# Patient Record
Sex: FEMALE | Race: WHITE | NOT HISPANIC OR LATINO | Employment: OTHER | URBAN - METROPOLITAN AREA
[De-identification: names, ages, dates, MRNs, and addresses within clinical notes are randomized per-mention and may not be internally consistent; named-entity substitution may affect disease eponyms.]

---

## 2017-01-02 RX ORDER — ATENOLOL 50 MG/1
TABLET ORAL
Qty: 30 TABLET | Refills: 11 | Status: SHIPPED | OUTPATIENT
Start: 2017-01-02 | End: 2017-09-19

## 2017-01-13 ENCOUNTER — HOSPITAL ENCOUNTER (OUTPATIENT)
Dept: RADIOLOGY | Facility: HOSPITAL | Age: 77
Discharge: HOME | End: 2017-01-13
Attending: INTERNAL MEDICINE
Payer: MEDICARE

## 2017-01-13 DIAGNOSIS — Z12.31 ENCOUNTER FOR SCREENING MAMMOGRAM FOR MALIGNANT NEOPLASM OF BREAST: ICD-10-CM

## 2017-01-13 DIAGNOSIS — M85.80 OSTEOPENIA: ICD-10-CM

## 2017-01-13 DIAGNOSIS — E03.9 ACQUIRED HYPOTHYROIDISM: ICD-10-CM

## 2017-01-13 DIAGNOSIS — I10 ESSENTIAL HYPERTENSION: ICD-10-CM

## 2017-01-13 DIAGNOSIS — Z00.00 ENCOUNTER FOR PREVENTIVE HEALTH EXAMINATION: ICD-10-CM

## 2017-01-13 PROCEDURE — 77067 SCR MAMMO BI INCL CAD: CPT | Mod: TC

## 2017-01-13 PROCEDURE — 77067 SCR MAMMO BI INCL CAD: CPT | Mod: 26,,, | Performed by: RADIOLOGY

## 2017-01-13 PROCEDURE — 77063 BREAST TOMOSYNTHESIS BI: CPT | Mod: 26,,, | Performed by: RADIOLOGY

## 2017-01-17 ENCOUNTER — LAB VISIT (OUTPATIENT)
Dept: LAB | Facility: HOSPITAL | Age: 77
End: 2017-01-17
Attending: INTERNAL MEDICINE
Payer: MEDICARE

## 2017-01-17 ENCOUNTER — OFFICE VISIT (OUTPATIENT)
Dept: HEMATOLOGY/ONCOLOGY | Facility: CLINIC | Age: 77
End: 2017-01-17
Payer: MEDICARE

## 2017-01-17 VITALS
OXYGEN SATURATION: 95 % | TEMPERATURE: 99 F | BODY MASS INDEX: 24.3 KG/M2 | SYSTOLIC BLOOD PRESSURE: 112 MMHG | DIASTOLIC BLOOD PRESSURE: 70 MMHG | HEART RATE: 75 BPM | WEIGHT: 132.06 LBS | HEIGHT: 62 IN

## 2017-01-17 DIAGNOSIS — I10 ESSENTIAL HYPERTENSION, MALIGNANT: Primary | ICD-10-CM

## 2017-01-17 DIAGNOSIS — D72.829 LEUKOCYTOSIS, UNSPECIFIED TYPE: Primary | ICD-10-CM

## 2017-01-17 DIAGNOSIS — E55.9 UNSPECIFIED VITAMIN D DEFICIENCY: ICD-10-CM

## 2017-01-17 DIAGNOSIS — D53.9 MACROCYTIC ANEMIA: ICD-10-CM

## 2017-01-17 DIAGNOSIS — I10 ESSENTIAL HYPERTENSION, MALIGNANT: ICD-10-CM

## 2017-01-17 DIAGNOSIS — E03.9 UNSPECIFIED HYPOTHYROIDISM: ICD-10-CM

## 2017-01-17 LAB
25(OH)D3+25(OH)D2 SERPL-MCNC: 49 NG/ML
ALBUMIN SERPL BCP-MCNC: 4 G/DL
ALP SERPL-CCNC: 82 U/L
ALT SERPL W/O P-5'-P-CCNC: 16 U/L
ANION GAP SERPL CALC-SCNC: 8 MMOL/L
AST SERPL-CCNC: 21 U/L
BILIRUB SERPL-MCNC: 0.5 MG/DL
BUN SERPL-MCNC: 33 MG/DL
CALCIUM SERPL-MCNC: 9.2 MG/DL
CHLORIDE SERPL-SCNC: 100 MMOL/L
CHOLEST/HDLC SERPL: 3.8 {RATIO}
CO2 SERPL-SCNC: 28 MMOL/L
CREAT SERPL-MCNC: 1 MG/DL
EST. GFR  (AFRICAN AMERICAN): >60 ML/MIN/1.73 M^2
EST. GFR  (NON AFRICAN AMERICAN): 55.6 ML/MIN/1.73 M^2
GLUCOSE SERPL-MCNC: 97 MG/DL
HDL/CHOLESTEROL RATIO: 26.3 %
HDLC SERPL-MCNC: 152 MG/DL
HDLC SERPL-MCNC: 40 MG/DL
LDLC SERPL CALC-MCNC: 66.4 MG/DL
NONHDLC SERPL-MCNC: 112 MG/DL
POTASSIUM SERPL-SCNC: 4.5 MMOL/L
PROT SERPL-MCNC: 7.8 G/DL
SODIUM SERPL-SCNC: 136 MMOL/L
T4 FREE SERPL-MCNC: 0.84 NG/DL
TRIGL SERPL-MCNC: 228 MG/DL
TSH SERPL DL<=0.005 MIU/L-ACNC: 4.5 UIU/ML

## 2017-01-17 PROCEDURE — 99214 OFFICE O/P EST MOD 30 MIN: CPT | Mod: S$GLB,,, | Performed by: INTERNAL MEDICINE

## 2017-01-17 PROCEDURE — 84439 ASSAY OF FREE THYROXINE: CPT

## 2017-01-17 PROCEDURE — 1159F MED LIST DOCD IN RCRD: CPT | Mod: S$GLB,,, | Performed by: INTERNAL MEDICINE

## 2017-01-17 PROCEDURE — 1157F ADVNC CARE PLAN IN RCRD: CPT | Mod: S$GLB,,, | Performed by: INTERNAL MEDICINE

## 2017-01-17 PROCEDURE — 84443 ASSAY THYROID STIM HORMONE: CPT

## 2017-01-17 PROCEDURE — 1160F RVW MEDS BY RX/DR IN RCRD: CPT | Mod: S$GLB,,, | Performed by: INTERNAL MEDICINE

## 2017-01-17 PROCEDURE — 99499 UNLISTED E&M SERVICE: CPT | Mod: S$GLB,,, | Performed by: INTERNAL MEDICINE

## 2017-01-17 PROCEDURE — 1126F AMNT PAIN NOTED NONE PRSNT: CPT | Mod: S$GLB,,, | Performed by: INTERNAL MEDICINE

## 2017-01-17 PROCEDURE — 80061 LIPID PANEL: CPT

## 2017-01-17 PROCEDURE — 3074F SYST BP LT 130 MM HG: CPT | Mod: S$GLB,,, | Performed by: INTERNAL MEDICINE

## 2017-01-17 PROCEDURE — 80053 COMPREHEN METABOLIC PANEL: CPT

## 2017-01-17 PROCEDURE — 3078F DIAST BP <80 MM HG: CPT | Mod: S$GLB,,, | Performed by: INTERNAL MEDICINE

## 2017-01-17 PROCEDURE — 99999 PR PBB SHADOW E&M-EST. PATIENT-LVL III: CPT | Mod: PBBFAC,,, | Performed by: INTERNAL MEDICINE

## 2017-01-17 PROCEDURE — 36415 COLL VENOUS BLD VENIPUNCTURE: CPT | Mod: PO

## 2017-01-17 PROCEDURE — 82306 VITAMIN D 25 HYDROXY: CPT

## 2017-01-17 NOTE — PROGRESS NOTES
Reason for visit: Leukocytosis    HPI:   The patient is a 76-year-old  female who presents to the hematology oncology clinic today to discuss further evaluation and management recommendations for leukocytosis.  I have reviewed all of the patient's relevant clinical history available in the medical record including her records from care everywhere.  Today the patient reports that overall she feels okay.  She reports that her chronic pain from rheumatoid arthritis is stable.  She reports chronic fatigue.  She denies any fevers, chills or night sweats.  She denies any loss of appetite or unintentional weight loss.  She denies any chest pain or shortness of breath.  She denies any melena, hematochezia, hematemesis, hemoptysis or hematuria.  She reports being up-to-date with all of her age-appropriate cancer screening. She denies any bowel or urinary complaints.  She denies any nausea, vomiting or abdominal pain.  The patient reports taking weekly methotrexate with supplemental folic acid and actemra for treatment of rheumatoid arthritis under the supervision of Dr. Chase Tejada with rheumatology.    PAST MEDICAL HISTORY:   1.  Rheumatoid arthritis  2.  Hypertension  3.  Anxiety  4.  Hypothyroidism  5.  Vitamin D deficiency  6.  Osteoporosis  7.  History of hiatal hernia with GERD  8.  Age-related macular degeneration    SURGICAL HISTORY:   1.  Bilateral wrist surgery  2.  Bilateral knee replacement  3.  Bilateral foot surgery  4.  Cholecystectomy  5.  Nissen fundoplication  6.  Appendectomy  7.  SAY with BSO  8.  Bilateral cataract extraction  9.  Multiple resections of localized skin cancer from the forearm  10. Cyroablation of left renal mass in sep 2016    FAMILY HISTORY: The patient's brother was treated for pancreatic cancer which was diagnosed at the age of 70.  She denies any other immediate family members with cancer or bleeding/clotting disorders.    SOCIAL HISTORY: She reports a 0.5-pack-year smoking  history and quit in 1965.  She denies any alcohol use or recreational drug use.  She used to work as a  and retired at the age of 64.  She is  and has 2 daughters.  She lives in [place], [place].    ALLERGIES: Reviewed on medication card.    MEDICATIONS: [Medcard has been reviewed and/or reconciled.]    REVIEW OF SYSTEMS:   GENERAL: [No fevers, chills or sweats. Reports chronic fatigue. Denies weight loss or loss of appetite.]  HEENT: [No blurred vision, tinnitus, nasal discharge, sorethroat or dysphagia.]  HEART: [No chest pain, palpitations or shortness of breath.]    LUNGS: [No cough, hemoptysis or breathing problems.]  ABDOMEN: [No abdominal pain, nausea, vomiting, diarrhea, constipation or melena.]  GENITOURINARY: [No dysuria, bleeding or malodorous discharge.]  NEURO: [No headache, dizziness or vertigo.]  HEMATOLOGY: [No easy bruising, spontaneous bleeding or blood clots in the past].  MUSCULOSKELETAL: [Chronic arthralgias. Denies myalgias or bone pains.]  SKIN: [No rashes or skin lesions.]  PSYCHIATRY: [No depression. Reports h/o anxiety.]    PHYSICAL EXAMINATION:   VS: Reviewed on nurse's notes.  APPEARANCE: The patient is a well-developed, well-nourished and well-groomed elderly  female who appears in no acute distress.    HEENT: No scleral icterus. Both external auditory canals clear. No oral ulcers, lesions. Throat clear  HEAD: No sinus tenderness.  NECK: Supple. No palpable lymphadenopathy. Thyroid non-tender, no palpable masses  CHEST: Breath sounds clear bilaterally. Occasional crackles/rales bilaterally. No rhonchi. Unlabored respirations.  CARDIOVASCULAR: Normal S1, S2. Normal rate. Regular rhythm.  ABDOMEN: Bowel sounds normal. No tenderness. No abdominal distention. No hepatomegaly. No splenomegaly.  LYMPHATIC: No palpable supraclavicular, axillary nodes  EXTREMITIES: No clubbing, cyanosis, edema  SKIN: No lesions. No petechiae. No ecchymoses. No induration or  nodules  NEUROLOGIC: No focal findings. Alert & Oriented x 3. Mood appropriate to affect    LABS:   Reviewed    IMAGING:  Reviewed    IMPRESSION:  1.  Chronic leukocytosis  2.  Chronic macrocytic anemia  3.  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  Bilateral lung inflammation [rheumatoid lung]  5.  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  I had a detailed discussion with the patient today with regard to the various possible etiologies for leukocytosis.  Review of the patient's medical record shows that this has been chronically present on and off for several years.  The patient recalls having been evaluated by a hematologist greater than 10 years ago for this and also underwent bone marrow aspiration and biopsy.  She reports that all of the results were benign at that time.  2.  Review of her peripheral smear does not show any significant abnormalities.  Her rheumatoid arthritis appears to be well-controlled at this time as noted by a normal inflammatory markers.  3.  Results of labwork done for further evaluation of her chronic macrocytic anemia were previously reviewed in detail.  This is most likely due to her chronic treatment with methotrexate.  Vitamin B12 and folate levels look ok.   4.  I had a detailed discussion about the various possible etiologies for her monoclonal paraproteinemia. Results of prior 24 hour UPEP with immunofixation reviewed and also look unremarkable.  5.  Results of CT scans of the thorax/abdomen/pelvis to evaluate for any evidence of pathologic lymphadenopathy suggestive of any evidence of malignancy in the context of leukocytosis with history of rheumatoid arthritis and immunosuppressive therapy were discussed in detail. Recent PET/CT results were also reviewed. Continue pulmonary follow up with Dr. Varela as recommended.  6.  Continue follow up with Dr. Beck with urology as recommended for complex left kidney cyst.  7.  Results of final report of  bone marrow aspiration and biopsy done at [facility] done on 6/1/16 were discussed in detail.  She has a hypercellular marrow with trilineage dyspoiesis and megakaryocytic hyperplasia.  She has relatively increased atypical myeloid blasts which constitute 5% of analyzed cells and approximately 5% clonal lambda restricted plasma cells.  She also has a small CD5 positive clonal B-cell population which constitutes 1% of the sample with a B-cell chronic lymphocytic leukemia/small lymphocytic lymphoma immunophenotype identified by flow cytometry only.  She has adequate bone marrow storage iron with no ringed sideroblasts. She had an abnormal myeloma FISH panel but the overall clinical picture at this time does not appear to support a diagnosis of multiple myeloma. Her overall clinical picture is most suggestive of medication related changes due to methotrexate and less likely to be other etiologies including a myelodysplastic/myeloproliferative neoplasm.  However we will continue with close monitoring at this time. We discussed repeating bone marrow biopsy in the near future based on follow up over the next few months if indicated.    Follow-up in 4 months. She knows to call sooner for any new problems or questions.    Sathish Devine MD

## 2017-01-20 ENCOUNTER — OFFICE VISIT (OUTPATIENT)
Dept: INTERNAL MEDICINE | Facility: CLINIC | Age: 77
End: 2017-01-20
Payer: MEDICARE

## 2017-01-20 VITALS
TEMPERATURE: 98 F | SYSTOLIC BLOOD PRESSURE: 128 MMHG | HEIGHT: 62 IN | HEART RATE: 72 BPM | BODY MASS INDEX: 23.49 KG/M2 | DIASTOLIC BLOOD PRESSURE: 82 MMHG | WEIGHT: 127.63 LBS | OXYGEN SATURATION: 96 %

## 2017-01-20 DIAGNOSIS — J44.1 COPD WITH EXACERBATION: ICD-10-CM

## 2017-01-20 DIAGNOSIS — N28.89 RENAL MASS: ICD-10-CM

## 2017-01-20 DIAGNOSIS — F32.0 MILD MAJOR DEPRESSION: ICD-10-CM

## 2017-01-20 DIAGNOSIS — M05.711 RHEUMATOID ARTHRITIS INVOLVING RIGHT SHOULDER WITH POSITIVE RHEUMATOID FACTOR: Primary | Chronic | ICD-10-CM

## 2017-01-20 DIAGNOSIS — I10 ESSENTIAL HYPERTENSION: Chronic | ICD-10-CM

## 2017-01-20 DIAGNOSIS — J01.00 ACUTE MAXILLARY SINUSITIS, RECURRENCE NOT SPECIFIED: ICD-10-CM

## 2017-01-20 DIAGNOSIS — E03.9 ACQUIRED HYPOTHYROIDISM: ICD-10-CM

## 2017-01-20 PROCEDURE — 1160F RVW MEDS BY RX/DR IN RCRD: CPT | Mod: S$GLB,,, | Performed by: INTERNAL MEDICINE

## 2017-01-20 PROCEDURE — 1157F ADVNC CARE PLAN IN RCRD: CPT | Mod: S$GLB,,, | Performed by: INTERNAL MEDICINE

## 2017-01-20 PROCEDURE — 99214 OFFICE O/P EST MOD 30 MIN: CPT | Mod: 25,S$GLB,, | Performed by: INTERNAL MEDICINE

## 2017-01-20 PROCEDURE — 99999 PR PBB SHADOW E&M-EST. PATIENT-LVL IV: CPT | Mod: PBBFAC,,, | Performed by: INTERNAL MEDICINE

## 2017-01-20 PROCEDURE — 1159F MED LIST DOCD IN RCRD: CPT | Mod: S$GLB,,, | Performed by: INTERNAL MEDICINE

## 2017-01-20 PROCEDURE — 3079F DIAST BP 80-89 MM HG: CPT | Mod: S$GLB,,, | Performed by: INTERNAL MEDICINE

## 2017-01-20 PROCEDURE — 99499 UNLISTED E&M SERVICE: CPT | Mod: S$GLB,,, | Performed by: INTERNAL MEDICINE

## 2017-01-20 PROCEDURE — 3074F SYST BP LT 130 MM HG: CPT | Mod: S$GLB,,, | Performed by: INTERNAL MEDICINE

## 2017-01-20 PROCEDURE — 96372 THER/PROPH/DIAG INJ SC/IM: CPT | Mod: S$GLB,,, | Performed by: INTERNAL MEDICINE

## 2017-01-20 RX ORDER — METHYLPREDNISOLONE ACETATE 80 MG/ML
60 INJECTION, SUSPENSION INTRA-ARTICULAR; INTRALESIONAL; INTRAMUSCULAR; SOFT TISSUE
Status: COMPLETED | OUTPATIENT
Start: 2017-01-20 | End: 2017-01-20

## 2017-01-20 RX ORDER — AMOXICILLIN AND CLAVULANATE POTASSIUM 875; 125 MG/1; MG/1
1 TABLET, FILM COATED ORAL 2 TIMES DAILY
Qty: 20 TABLET | Refills: 0 | Status: SHIPPED | OUTPATIENT
Start: 2017-01-20 | End: 2017-01-30

## 2017-01-20 RX ADMIN — METHYLPREDNISOLONE ACETATE 60 MG: 80 INJECTION, SUSPENSION INTRA-ARTICULAR; INTRALESIONAL; INTRAMUSCULAR; SOFT TISSUE at 10:01

## 2017-01-20 NOTE — MR AVS SNAPSHOT
[facility]  [address]  [place] [address]  Phone: [phone]  Fax: [phone]                  Sarah Parker   2017 10:00 AM   Office Visit    Description:  Female : 1940   Provider:  Briseida Reyes MD   Department:  [facility]           Reason for Visit     Follow-up           Diagnoses this Visit        Comments    Rheumatoid arthritis involving right shoulder with positive rheumatoid factor    -  Primary     Renal mass         Essential hypertension         Acquired hypothyroidism         Mild major depression         Acute maxillary sinusitis, recurrence not specified         COPD with exacerbation                To Do List           Future Appointments        Provider Department Dept Phone    2017 10:00 AM IBV LABORATORY [facility] [phone]    2017 9:00 AM IB CT1 LIMIT 500 LBS [facility] [phone]    2017 10:40 AM Fam Beck IV, MD [facility] - Urology [phone]    2017 2:00 PM Sathish Devine MD [facility] Hemotology Oncology [phone]    2017 9:00 AM Briseida Ryees MD [facility] Internal Medicine [phone]      Goals (5 Years of Data)     None      Follow-Up and Disposition     Return in about 4 months (around 2017).       These Medications        Disp Refills Start End    amoxicillin-clavulanate 875-125mg (AUGMENTIN) 875-125 mg per tablet 20 tablet 0 2017    Take 1 tablet by mouth 2 (two) times daily. - Oral    Pharmacy: [id number] - [facility] [place] - [address] AT [facility] [address] Ph #: [phone]         [facility]     [facility] Nurse Care Line -  Assistance  Registered nurses in the [facility] Center provide clinical advisement, health education, appointment booking, and other advisory services.  Call for this free service at [phone].             Medications            Message regarding Medications     Verify the changes and/or additions to your medication regime listed below are the same as discussed with your clinician today.  If any of these changes or additions are incorrect, please notify your healthcare provider.        START taking these NEW medications        Refills    amoxicillin-clavulanate 875-125mg (AUGMENTIN) 875-125 mg per tablet 0    Sig: Take 1 tablet by mouth 2 (two) times daily.    Class: Normal    Route: Oral      These medications were administered today        Dose Freq    methylPREDNISolone acetate injection 60 mg 60 mg Clinic/HOD 1 time    Sig: Inject 0.75 mLs (60 mg total) into the muscle one time.    Class: Normal    Route: Intramuscular           Verify that the below list of medications is an accurate representation of the medications you are currently taking.  If none reported, the list may be blank. If incorrect, please contact your healthcare provider. Carry this list with you in case of emergency.           Current Medications     albuterol (PROVENTIL) 2.5 mg /3 mL (0.083 %) nebulizer solution Take 3 mLs (2.5 mg total) by nebulization every 8 (eight) hours while awake.    amitriptyline (ELAVIL) 75 MG tablet TAKE 1 TABLET BY MOUTH EVERY EVENING    atenolol (TENORMIN) 50 MG tablet TAKE 1 TABLET BY MOUTH EVERY DAY    atenolol (TENORMIN) 50 MG tablet TAKE 1 TABLET BY MOUTH EVERY DAY    calcium citrate-vitamin D (CITRACAL + D) 315-200 mg-unit per tablet Take 1 tablet by mouth Daily.    citalopram (CELEXA) 10 MG tablet Take 1 tablet (10 mg total) by mouth once daily.    hydrocodone-acetaminophen 5-325mg (NORCO) 5-325 mg per tablet TK 1 T PO Q 6 H PRN    leucovorin (WELLCOVORIN) 5 mg Tab TAKE ONE WEEKLY ON SATURDAYS    levothyroxine (SYNTHROID) 88 MCG tablet TAKE 1 TABLET BY MOUTH BEFORE BREAKFAST    meclizine (ANTIVERT) 50 MG tablet Take 25 mg by mouth 3 (three) times daily as needed.    methotrexate 2.5 MG Tab Take 17.5 mg by mouth every 7 days.      "multivitamin capsule Take by mouth. As directed    ondansetron (ZOFRAN) 4 MG tablet Take 1 tablet (4 mg total) by mouth every 8 (eight) hours as needed for Nausea.    oxymetazoline (AFRIN) 0.05 % nasal spray 1 Aerosol, Spray Nasal At bedtime    tocilizumab (ACTEMRA) 80 mg/4 mL (20 mg/mL) Soln Inject into the vein.    valsartan-hydrochlorothiazide (DIOVAN-HCT) 80-12.5 mg per tablet TAKE 1 TABLET BY MOUTH DAILY    VITAMIN D2 50,000 unit capsule TAKE 1 CAPSULE BY MOUTH EVERY 7 DAYS    amoxicillin-clavulanate 875-125mg (AUGMENTIN) 875-125 mg per tablet Take 1 tablet by mouth 2 (two) times daily.           Clinical Reference Information           Vital Signs - Last Recorded  Most recent update: 1/20/2017 10:04 AM by Wendy Ortega MA    BP Pulse Temp Ht Wt SpO2    128/82 (BP Location: Right arm) 72 97.9 °F (36.6 °C) (Tympanic) 5' 2" (1.575 m) 57.9 kg (127 lb 10.3 oz) 96%    BMI                23.35 kg/m2          Blood Pressure          Most Recent Value    BP  128/82      Allergies as of 1/20/2017     Codeine      Immunizations Administered on Date of Encounter - 1/20/2017     None      [facility] Sign-Up     Activating your [facility] account is as easy as 1-2-3!     1) Visit [email or web address], select Sign Up Now, enter this activation code and your date of birth, then select Next.  [email or web address]  Expires: 3/6/2017 10:25 AM      2) Create a username and password to use when you visit [facility] in the future and select a security question in case you lose your password and select Next.    3) Enter your e-mail address and click Sign Up!    Additional Information  If you have questions, please e-mail [email or web address] or call [phone] to talk to our [facility] staff. Remember, [facility] is NOT to be used for urgent needs. For medical emergencies, dial 911.         "

## 2017-01-20 NOTE — PROGRESS NOTES
"Subjective:       Patient ID: Sarah Parker is a 76 y.o. female.    Chief Complaint: Follow-up    HPI Comments: Here for follow up of medical problems and 4 days cough and head and chest congestion.  No f/c/n/v.  Taking Alk sinus.  No cp/sob/palp.  Taking albuterol nebulizer bid.  Prior to Monday, not needing this regularly.  BMs normal.  Renal cyst ablation 9/27/16.  WBC followed by Dr. Devine.  Lung nodules stable, Dr. Varela follows.      Updated/ annual due 9/17:  HM: 10/16 fluvax, 5/16 [id number], 10/14 [id number], 10/13 TDaP, 1/17 BMD/will bring to Dr. Tejada rep 2y, 1/17 MMG, 10/13 EGD, 2015 Cscope Dr. Garcia rep 5y.        Review of Systems   Constitutional: Negative for chills, diaphoresis and fever.   Respiratory: Negative for cough and shortness of breath.    Cardiovascular: Negative for chest pain, palpitations and leg swelling.   Gastrointestinal: Negative for blood in stool, constipation, diarrhea, nausea and vomiting.   Genitourinary: Negative for dysuria, frequency and hematuria.   Psychiatric/Behavioral: The patient is not nervous/anxious.        Objective:     Visit Vitals    /82 (BP Location: Right arm)    Pulse 72    Temp 97.9 °F (36.6 °C) (Tympanic)    Ht 5' 2" (1.575 m)    Wt 57.9 kg (127 lb 10.3 oz)    SpO2 96%    BMI 23.35 kg/m2       Physical Exam   Constitutional: She is oriented to person, place, and time. She appears well-developed.   HENT:   Right Ear: External ear normal. Tympanic membrane is not injected.   Left Ear: External ear normal. Tympanic membrane is not injected.   Mouth/Throat: Oropharynx is clear and moist.   Eyes: Conjunctivae are normal.   Neck: Neck supple. Carotid bruit is not present. No thyroid mass and no thyromegaly present.   Cardiovascular: Normal rate, regular rhythm and intact distal pulses.  Exam reveals no gallop and no friction rub.    No murmur heard.  Pulmonary/Chest: Effort normal. She has wheezes. She has rales (chronic rales left " lower.).   Abdominal: Soft. Bowel sounds are normal. She exhibits no mass. There is no hepatosplenomegaly. There is no tenderness.   Musculoskeletal: She exhibits no edema.   Lymphadenopathy:     She has no cervical adenopathy.   Neurological: She is alert and oriented to person, place, and time.   Psychiatric: She has a normal mood and affect.       Results for orders placed or performed in visit on 01/17/17   Lipid panel   Result Value Ref Range    Cholesterol 152 120 - 199 mg/dL    Triglycerides 228 (H) 30 - 150 mg/dL    HDL 40 40 - 75 mg/dL    LDL Cholesterol 66.4 63.0 - 159.0 mg/dL    HDL/Chol Ratio 26.3 20.0 - 50.0 %    Total Cholesterol/HDL Ratio 3.8 2.0 - 5.0    Non-HDL Cholesterol 112 mg/dL   TSH   Result Value Ref Range    TSH 4.498 (H) 0.400 - 4.000 uIU/mL   Vitamin D   Result Value Ref Range    Vit D, 25-Hydroxy 49 30 - 96 ng/mL   Comprehensive metabolic panel   Result Value Ref Range    Sodium 136 136 - 145 mmol/L    Potassium 4.5 3.5 - 5.1 mmol/L    Chloride 100 95 - 110 mmol/L    CO2 28 23 - 29 mmol/L    Glucose 97 70 - 110 mg/dL    BUN, Bld 33 (H) 8 - 23 mg/dL    Creatinine 1.0 0.5 - 1.4 mg/dL    Calcium 9.2 8.7 - 10.5 mg/dL    Total Protein 7.8 6.0 - 8.4 g/dL    Albumin 4.0 3.5 - 5.2 g/dL    Total Bilirubin 0.5 0.1 - 1.0 mg/dL    Alkaline Phosphatase 82 55 - 135 U/L    AST 21 10 - 40 U/L    ALT 16 10 - 44 U/L    Anion Gap 8 8 - 16 mmol/L    eGFR if African American >60.0 >60 mL/min/1.73 m^2    eGFR if non African American 55.6 (A) >60 mL/min/1.73 m^2   T4, free   Result Value Ref Range    Free T4 0.84 0.71 - 1.51 ng/dL       Assessment:       1. Rheumatoid arthritis involving right shoulder with positive rheumatoid factor    2. Renal mass    3. Essential hypertension    4. Acquired hypothyroidism    5. Mild major depression    6. Acute maxillary sinusitis, recurrence not specified    7. COPD with exacerbation        Plan:       Sarah was seen today for follow-up.    Diagnoses and all orders for this  visit:    Rheumatoid arthritis involving right shoulder with positive rheumatoid factor- on immunorx.    Renal mass, ablated.    Essential hypertension- stable on rx.    Acquired hypothyroidism- clin stable.    Mild major depression- doing well on rx.    Acute maxillary sinusitis, COPD with exacerbation  -     amoxicillin-clavulanate 875-125mg (AUGMENTIN) 875-125 mg per tablet; Take 1 tablet by mouth 2 (two) times daily.  -     methylPREDNISolone acetate injection 60 mg; Inject 0.75 mLs (60 mg total) into the muscle one time.    RTC 4 mo.

## 2017-01-27 RX ORDER — LEVOTHYROXINE SODIUM 88 UG/1
TABLET ORAL
Qty: 30 TABLET | Refills: 11 | Status: SHIPPED | OUTPATIENT
Start: 2017-01-27 | End: 2019-02-13 | Stop reason: SDUPTHER

## 2017-02-17 ENCOUNTER — HOSPITAL ENCOUNTER (OUTPATIENT)
Dept: RADIOLOGY | Facility: HOSPITAL | Age: 77
Discharge: HOME | End: 2017-02-17
Attending: UROLOGY
Payer: MEDICARE

## 2017-02-17 ENCOUNTER — TELEPHONE (OUTPATIENT)
Dept: UROLOGY | Facility: CLINIC | Age: 77
End: 2017-02-17

## 2017-02-17 DIAGNOSIS — N28.89 RENAL MASS: ICD-10-CM

## 2017-02-17 PROCEDURE — 74178 CT ABD&PLV WO CNTR FLWD CNTR: CPT | Mod: 26,,, | Performed by: RADIOLOGY

## 2017-02-17 PROCEDURE — 74178 CT ABD&PLV WO CNTR FLWD CNTR: CPT | Mod: TC,PO

## 2017-02-17 PROCEDURE — 25500020 PHARM REV CODE 255: Mod: PO | Performed by: UROLOGY

## 2017-02-17 RX ADMIN — IOHEXOL 30 ML: 350 INJECTION, SOLUTION INTRAVENOUS at 02:02

## 2017-02-17 RX ADMIN — IOHEXOL 75 ML: 350 INJECTION, SOLUTION INTRAVENOUS at 02:02

## 2017-02-17 NOTE — TELEPHONE ENCOUNTER
----- Message from Felipe Zarco sent at 2/17/2017  9:33 AM CST -----  Contact: pt  She's calling in regards to being worked into the dr's schedule next week, please advise, [phone] (cell)

## 2017-02-22 ENCOUNTER — TELEPHONE (OUTPATIENT)
Dept: UROLOGY | Facility: CLINIC | Age: 77
End: 2017-02-22

## 2017-02-23 ENCOUNTER — OFFICE VISIT (OUTPATIENT)
Dept: UROLOGY | Facility: CLINIC | Age: 77
End: 2017-02-23
Payer: MEDICARE

## 2017-02-23 VITALS — BODY MASS INDEX: 23.23 KG/M2 | WEIGHT: 127 LBS | DIASTOLIC BLOOD PRESSURE: 72 MMHG | SYSTOLIC BLOOD PRESSURE: 138 MMHG

## 2017-02-23 DIAGNOSIS — N28.89 RENAL MASS: Primary | ICD-10-CM

## 2017-02-23 LAB
BILIRUB SERPL-MCNC: NORMAL MG/DL
BLOOD URINE, POC: NORMAL
COLOR, POC UA: YELLOW
GLUCOSE UR QL STRIP: NORMAL
KETONES UR QL STRIP: NORMAL
LEUKOCYTE ESTERASE URINE, POC: NORMAL
NITRITE, POC UA: NORMAL
PH, POC UA: 6
PROTEIN, POC: NORMAL
SPECIFIC GRAVITY, POC UA: 1.01
UROBILINOGEN, POC UA: NORMAL

## 2017-02-23 PROCEDURE — 1126F AMNT PAIN NOTED NONE PRSNT: CPT | Mod: S$GLB,,, | Performed by: UROLOGY

## 2017-02-23 PROCEDURE — 99999 PR PBB SHADOW E&M-EST. PATIENT-LVL II: CPT | Mod: PBBFAC,,, | Performed by: UROLOGY

## 2017-02-23 PROCEDURE — 1159F MED LIST DOCD IN RCRD: CPT | Mod: S$GLB,,, | Performed by: UROLOGY

## 2017-02-23 PROCEDURE — 81002 URINALYSIS NONAUTO W/O SCOPE: CPT | Mod: S$GLB,,, | Performed by: UROLOGY

## 2017-02-23 PROCEDURE — 1160F RVW MEDS BY RX/DR IN RCRD: CPT | Mod: S$GLB,,, | Performed by: UROLOGY

## 2017-02-23 PROCEDURE — 3078F DIAST BP <80 MM HG: CPT | Mod: S$GLB,,, | Performed by: UROLOGY

## 2017-02-23 PROCEDURE — 3075F SYST BP GE 130 - 139MM HG: CPT | Mod: S$GLB,,, | Performed by: UROLOGY

## 2017-02-23 PROCEDURE — 1157F ADVNC CARE PLAN IN RCRD: CPT | Mod: S$GLB,,, | Performed by: UROLOGY

## 2017-02-23 PROCEDURE — 99214 OFFICE O/P EST MOD 30 MIN: CPT | Mod: 25,S$GLB,, | Performed by: UROLOGY

## 2017-02-23 NOTE — PROGRESS NOTES
"Chief Complaint: left renal lesion    HPI:   2/23/17: Followup CT shows good results at left renal tumor site.  It suggests a new chest nodule (1 cm) but she has had these come and go as she gets bronchitis from time to time.  Also some possible ileac nodes.    11/14/16:  Had her left cryoablation no complications. Pre-procedure biopsy benign.  No pain, no hematuria.  9/9/16: Back after having left renal cryoablation scheduled but cancelled due to tech not arriving on a cancelled flight.  PET scan negative.   6/10/16: 75 yo woman was recently worked up for leukocytosis and CT shows "There has been interval enlargement of a cystic structure at the interpolar region of the left kidney which demonstrates enhancing thin internal septation. "  Designated a Bos3 cyst.  No abd/pelvic pain and no exac/rel factors.  No hematuria.  No urolithiasis.  No urinary bother.  No  history.  Normal sexual function.    Allergies:  Codeine    Medications: has a current medication list which includes the following prescription(s): albuterol, amitriptyline, atenolol, atenolol, calcium citrate-vitamin d3 315-200 mg, citalopram, hydrocodone-acetaminophen 5-325mg, leucovorin, levothyroxine, meclizine, methotrexate, multivitamin, ondansetron, oxymetazoline, tocilizumab, valsartan-hydrochlorothiazide, and vitamin d2.    Review of Systems:  General: No fever, chills, fatigability, or weight loss.  Skin: No rashes, itching, or changes in color or texture of skin.  Chest: Denies KILGORE, cyanosis, wheezing, cough, and sputum production.  Abdomen: Appetite fine. No weight loss. Denies diarrhea, abdominal pain, hematemesis, or blood in stool.  Musculoskeletal: No joint stiffness or swelling. Denies back pain.  : As above.  All other review of systems negative.    PMH:   has a past medical history of Acid reflux; Anxiety; Back pain; Bronchitis, chronic obstructive w acute bronchitis (7/29/2016); Cancer; Cataract; Degenerative disc disease; " Depression; Dry mouth; Hernia, hiatal (11/18/2013); Hypertension; Hypothyroid; Macrocytic anemia (5/3/2016); Macular degeneration; Migraines; Mixed anxiety and depressive disorder; Multiple fractures of ribs of right side; Osteoporosis; Pneumonia; Rheumatoid arthritis; Rheumatoid arthritis(714.0); and Rheumatoid arthritis(714.0).    PSH:   has a past surgical history that includes Laparoscopic Nissen fundoplication; Hernia repair; Cataract extraction (Bilateral, 6/11/15); Fracture surgery (Right); feet (Bilateral); cryoablasion kidney (Left, 09/27/2016); Joint replacement; Hysterectomy (1970); Cholecystectomy (2013); and Appendectomy (1985).    FamHx: family history includes Asthma in her brother and sister; Cancer in her brother and maternal grandfather; Cataracts in her mother; Chronic back pain in her brother and sister; Diabetes Mellitus in her brother; Fibromyalgia in her daughter; Heart disease in her father, maternal grandmother, and mother; Hyperlipidemia in her mother; Hypertension in her brother, father, mother, and sister; Osteoarthritis in her brother, father, mother, and sister; Thyroid disease in her brother and sister. There is no history of Colon cancer or Diabetes.    SocHx:  reports that she quit smoking about 51 years ago. She has a 0.50 pack-year smoking history. She does not have any smokeless tobacco history on file. She reports that she does not drink alcohol or use illicit drugs.     Physical Exam:  Vitals:   Vitals:    02/23/17 1529   BP: 138/72     General: A&Ox3. No apparent distress. No deformities.  Neck: No masses. Normal thyroid.  Lungs: normal inspiration. No use of accessory muscles.  Heart: normal pulse. No arrhythmias.  Abdomen: Soft. NT. ND. No masses. No hernias. No hepatosplenomegaly.  Lymphatic: Neck and groin nodes negative.  Skin: The skin is warm and dry. No jaundice.  Ext: No c/c/e.  : deferred    Labs/Studies:   Urinalysis performed in clinic, summary: UA  normal    Impression/Plan:   1. Would normally re-scan in a year but will check 6 mo once to follow up on the small abnormal findings of this exam.

## 2017-02-23 NOTE — MR AVS SNAPSHOT
[facility]  [address] [facility] [address]  Phone: [phone]  Fax: [phone]                  Sarah Parker   2017 3:00 PM   Office Visit    Description:  Female : 1940   Provider:  Fam Beck IV, MD   Department:  [facility]           Reason for Visit     Other           Diagnoses this Visit        Comments    Renal mass    -  Primary            To Do List           Future Appointments        Provider Department Dept Phone    2017 2:00 PM Sathish Devine MD [facility] Hemotology Oncology [phone]    2017 9:00 AM Briseida Reyes MD [facility] Internal Medicine [phone]    2017 11:00 AM IB LABORATORY [facility] [phone]    2017 8:30 AM IB CT1 LIMIT 500 LBS [facility] [phone]    2017 8:40 AM Fam Beck IV, MD [facility] [phone]      Goals (5 Years of Data)     None      Follow-Up and Disposition     Return in about 6 months (around 2017).      [facility]     [facility] - 24/7 Assistance  Registered nurses in the [facility] provide clinical advisement, health education, appointment booking, and other advisory services.  Call for this free service at [phone].             Medications           Message regarding Medications     Verify the changes and/or additions to your medication regime listed below are the same as discussed with your clinician today.  If any of these changes or additions are incorrect, please notify your healthcare provider.             Verify that the below list of medications is an accurate representation of the medications you are currently taking.  If none reported, the list may be blank. If incorrect, please contact your healthcare provider. Carry this list with you in case of emergency.           Current Medications     albuterol (PROVENTIL) 2.5 mg /3 mL (0.083 %) nebulizer  solution Take 3 mLs (2.5 mg total) by nebulization every 8 (eight) hours while awake.    amitriptyline (ELAVIL) 75 MG tablet TAKE 1 TABLET BY MOUTH EVERY EVENING    atenolol (TENORMIN) 50 MG tablet TAKE 1 TABLET BY MOUTH EVERY DAY    atenolol (TENORMIN) 50 MG tablet TAKE 1 TABLET BY MOUTH EVERY DAY    calcium citrate-vitamin D (CITRACAL + D) 315-200 mg-unit per tablet Take 1 tablet by mouth Daily.    citalopram (CELEXA) 10 MG tablet Take 1 tablet (10 mg total) by mouth once daily.    hydrocodone-acetaminophen 5-325mg (NORCO) 5-325 mg per tablet TK 1 T PO Q 6 H PRN    leucovorin (WELLCOVORIN) 5 mg Tab TAKE ONE WEEKLY ON SATURDAYS    levothyroxine (SYNTHROID) 88 MCG tablet TAKE 1 TABLET BY MOUTH BEFORE BREAKFAST    meclizine (ANTIVERT) 50 MG tablet Take 25 mg by mouth 3 (three) times daily as needed.    methotrexate 2.5 MG Tab Take 17.5 mg by mouth every 7 days.     multivitamin capsule Take by mouth. As directed    ondansetron (ZOFRAN) 4 MG tablet Take 1 tablet (4 mg total) by mouth every 8 (eight) hours as needed for Nausea.    oxymetazoline (AFRIN) 0.05 % nasal spray 1 Aerosol, Spray Nasal At bedtime    tocilizumab (ACTEMRA) 80 mg/4 mL (20 mg/mL) Soln Inject into the vein.    valsartan-hydrochlorothiazide (DIOVAN-HCT) 80-12.5 mg per tablet TAKE 1 TABLET BY MOUTH DAILY    VITAMIN D2 50,000 unit capsule TAKE 1 CAPSULE BY MOUTH EVERY 7 DAYS           Clinical Reference Information           Your Vitals Were     BP Weight BMI          138/72 (BP Location: Left arm, Patient Position: Sitting, BP Method: Manual) 57.6 kg (127 lb) 23.23 kg/m2        Blood Pressure          Most Recent Value    BP  138/72      Allergies as of 2/23/2017     Codeine      Immunizations Administered on Date of Encounter - 2/23/2017     None      Orders Placed During Today's Visit      Normal Orders This Visit    POCT urine dipstick without microscope     Future Labs/Procedures Expected by Expires    Creatinine, serum  2/23/2017 4/24/2018    CT  Abdomen Pelvis W Wo Contrast  2/23/2017 2/23/2018      [facility] Sign-Up     Activating your [facility] account is as easy as 1-2-3!     1) Visit [email or web address], select Sign Up Now, enter this activation code and your date of birth, then select Next.  [email or web address]  Expires: 3/6/2017 10:25 AM      2) Create a username and password to use when you visit [facility] in the future and select a security question in case you lose your password and select Next.    3) Enter your e-mail address and click Sign Up!    Additional Information  If you have questions, please e-mail [email or web address] or call [phone] to talk to our [facility] staff. Remember, [facility] is NOT to be used for urgent needs. For medical emergencies, dial 911.         Language Assistance Services     ATTENTION: Language assistance services are available, free of charge. Please call [phone].      ATENCIÓN: Si habla español, tiene a pichardo disposición servicios gratuitos de asistencia lingüística. Llame al [phone].     CHÚ Ý: N?u b?n nói Ti?ng Vi?t, có các d?ch v? h? tr? ngôn ng? mi?n phí dành cho b?n. G?i s? [phone].         [facility] complies with applicable Federal civil rights laws and does not discriminate on the basis of race, color, national origin, age, disability, or sex.

## 2017-03-30 ENCOUNTER — HOSPITAL ENCOUNTER (OUTPATIENT)
Dept: RADIOLOGY | Facility: HOSPITAL | Age: 77
Discharge: HOME | End: 2017-03-30
Attending: ORTHOPAEDIC SURGERY
Payer: MEDICARE

## 2017-03-30 ENCOUNTER — OFFICE VISIT (OUTPATIENT)
Dept: ORTHOPEDICS | Facility: CLINIC | Age: 77
End: 2017-03-30
Payer: MEDICARE

## 2017-03-30 VITALS
WEIGHT: 127 LBS | HEART RATE: 70 BPM | SYSTOLIC BLOOD PRESSURE: 122 MMHG | BODY MASS INDEX: 23.37 KG/M2 | HEIGHT: 62 IN | DIASTOLIC BLOOD PRESSURE: 68 MMHG

## 2017-03-30 DIAGNOSIS — M25.462 KNEE EFFUSION, LEFT: Primary | ICD-10-CM

## 2017-03-30 DIAGNOSIS — M25.562 ACUTE PAIN OF LEFT KNEE: Primary | ICD-10-CM

## 2017-03-30 DIAGNOSIS — M25.562 ACUTE PAIN OF LEFT KNEE: ICD-10-CM

## 2017-03-30 PROCEDURE — 3074F SYST BP LT 130 MM HG: CPT | Mod: S$GLB,,, | Performed by: PHYSICIAN ASSISTANT

## 2017-03-30 PROCEDURE — 99999 PR PBB SHADOW E&M-EST. PATIENT-LVL IV: CPT | Mod: PBBFAC,,, | Performed by: PHYSICIAN ASSISTANT

## 2017-03-30 PROCEDURE — 73560 X-RAY EXAM OF KNEE 1 OR 2: CPT | Mod: 26,59,RT, | Performed by: RADIOLOGY

## 2017-03-30 PROCEDURE — 73562 X-RAY EXAM OF KNEE 3: CPT | Mod: 26,LT,, | Performed by: RADIOLOGY

## 2017-03-30 PROCEDURE — 1125F AMNT PAIN NOTED PAIN PRSNT: CPT | Mod: S$GLB,,, | Performed by: PHYSICIAN ASSISTANT

## 2017-03-30 PROCEDURE — 1160F RVW MEDS BY RX/DR IN RCRD: CPT | Mod: S$GLB,,, | Performed by: PHYSICIAN ASSISTANT

## 2017-03-30 PROCEDURE — 1157F ADVNC CARE PLAN IN RCRD: CPT | Mod: S$GLB,,, | Performed by: PHYSICIAN ASSISTANT

## 2017-03-30 PROCEDURE — 3078F DIAST BP <80 MM HG: CPT | Mod: S$GLB,,, | Performed by: PHYSICIAN ASSISTANT

## 2017-03-30 PROCEDURE — 1159F MED LIST DOCD IN RCRD: CPT | Mod: S$GLB,,, | Performed by: PHYSICIAN ASSISTANT

## 2017-03-30 PROCEDURE — 20605 DRAIN/INJ JOINT/BURSA W/O US: CPT | Mod: LT,S$GLB,, | Performed by: PHYSICIAN ASSISTANT

## 2017-03-30 PROCEDURE — 99213 OFFICE O/P EST LOW 20 MIN: CPT | Mod: 25,S$GLB,, | Performed by: PHYSICIAN ASSISTANT

## 2017-03-30 NOTE — PROGRESS NOTES
CC:76 y/o female right knee pain    Date of injury: 3/29/2017    HPI:Was walking in her yard, tried to push down the septic tank cover, she slipped and twisted and landed on left knee, concerned to due hx of bilateral knee arthoplasty. Now increase swelling.    PMH:    Past Medical History:   Diagnosis Date    Acid reflux     Anxiety     Back pain     Bronchitis, chronic obstructive w acute bronchitis 7/29/2016    Cancer     [facility] arms, face- Dr. Lata Tejada    Cataract     2+NS    Degenerative disc disease     Depression     Dry mouth     Hernia, hiatal 11/18/2013    Hypertension     Hypothyroid     Macrocytic anemia 5/3/2016    Macular degeneration     Migraines     Mixed anxiety and depressive disorder     Multiple fractures of ribs of right side     Osteoporosis     Pneumonia     Rheumatoid arthritis     Rheumatoid arthritis(714.0)     Rheumatoid arthritis(714.0)     Remicade, MTX.       PSH:    Past Surgical History:   Procedure Laterality Date    APPENDECTOMY  1985    CATARACT EXTRACTION Bilateral 6/11/15    Dr. Booth    CHOLECYSTECTOMY  2013    cryoablasion kidney Left 09/27/2016    feet Bilateral     rheumatoid    FRACTURE SURGERY Right     tibia    HERNIA REPAIR      HYSTERECTOMY  1970    partial    JOINT REPLACEMENT      bilateral knees (2008), hands, wrists, knuckles, toes    LAPAROSCOPIC NISSEN FUNDOPLICATION         Family Hx:    Family History   Problem Relation Age of Onset    Heart disease Mother     Hyperlipidemia Mother     Hypertension Mother     Osteoarthritis Mother     Cataracts Mother     Hypertension Father     Osteoarthritis Father     Heart disease Father     Asthma Sister     Chronic back pain Sister     Hypertension Sister     Osteoarthritis Sister     Thyroid disease Sister     Asthma Brother     Cancer Brother     Chronic back pain Brother     Diabetes Mellitus Brother     Hypertension Brother     Osteoarthritis Brother     Thyroid  disease Brother     Cancer Maternal Grandfather     Fibromyalgia Daughter     Heart disease Maternal Grandmother     Colon cancer Neg Hx     Diabetes Neg Hx        Allergy:    Review of patient's allergies indicates:   Allergen Reactions    Codeine      Other reaction(s): hyper  Other reaction(s): hyper       Medication:    Current Outpatient Prescriptions:     albuterol (PROVENTIL) 2.5 mg /3 mL (0.083 %) nebulizer solution, Take 3 mLs (2.5 mg total) by nebulization every 8 (eight) hours while awake., Disp: 180 mL, Rfl: 0    amitriptyline (ELAVIL) 75 MG tablet, TAKE 1 TABLET BY MOUTH EVERY EVENING, Disp: 90 tablet, Rfl: 3    atenolol (TENORMIN) 50 MG tablet, TAKE 1 TABLET BY MOUTH EVERY DAY, Disp: 30 tablet, Rfl: 11    atenolol (TENORMIN) 50 MG tablet, TAKE 1 TABLET BY MOUTH EVERY DAY, Disp: 30 tablet, Rfl: 11    calcium citrate-vitamin D (CITRACAL + D) 315-200 mg-unit per tablet, Take 1 tablet by mouth Daily., Disp: , Rfl:     citalopram (CELEXA) 10 MG tablet, Take 1 tablet (10 mg total) by mouth once daily. (Patient taking differently: Take 10 mg by mouth nightly. ), Disp: 90 tablet, Rfl: 3    hydrocodone-acetaminophen 5-325mg (NORCO) 5-325 mg per tablet, TK 1 T PO Q 6 H PRN, Disp: , Rfl: 0    leucovorin (WELLCOVORIN) 5 mg Tab, TAKE ONE WEEKLY ON SATURDAYS, Disp: 4 tablet, Rfl: 3    levothyroxine (SYNTHROID) 88 MCG tablet, TAKE 1 TABLET BY MOUTH BEFORE BREAKFAST, Disp: 30 tablet, Rfl: 11    meclizine (ANTIVERT) 50 MG tablet, Take 25 mg by mouth 3 (three) times daily as needed., Disp: , Rfl:     methotrexate 2.5 MG Tab, Take 17.5 mg by mouth every 7 days. , Disp: , Rfl:     multivitamin capsule, Take by mouth. As directed, Disp: , Rfl:     ondansetron (ZOFRAN) 4 MG tablet, Take 1 tablet (4 mg total) by mouth every 8 (eight) hours as needed for Nausea., Disp: 30 tablet, Rfl: 1    oxymetazoline (AFRIN) 0.05 % nasal spray, 1 Aerosol, Spray Nasal At bedtime, Disp: , Rfl:     tocilizumab (ACTEMRA)  "80 mg/4 mL (20 mg/mL) Soln, Inject into the vein., Disp: , Rfl:     valsartan-hydrochlorothiazide (DIOVAN-HCT) 80-12.5 mg per tablet, TAKE 1 TABLET BY MOUTH DAILY, Disp: 90 tablet, Rfl: 3    VITAMIN D2 50,000 unit capsule, TAKE 1 CAPSULE BY MOUTH EVERY 7 DAYS, Disp: 4 capsule, Rfl: 11    Social History:    Social History     Social History    Marital status:      Spouse name: N/A    Number of children: N/A    Years of education: N/A     Occupational History    retired      Social History Main Topics    Smoking status: Former Smoker     Packs/day: 0.25     Years: 2.00     Quit date: 11/2/1965    Smokeless tobacco: Not on file    Alcohol use No    Drug use: No    Sexual activity: No     Other Topics Concern    Not on file     Social History Narrative    Patient is aretired and live with .       Vitals:   /68  Pulse 70  Ht 5' 2" (1.575 m)  Wt 57.6 kg (126 lb 15.8 oz)  BMI 23.23 kg/m2     ROS:  GENERAL: No fever, chills, fatigability or weight loss.  SKIN: No rashes, itching or changes in color or texture of skin.  HEAD: No headaches or recent head trauma.  EYES: Visual acuity fine. No photophobia, ocular pain or diplopia.  EARS: Denies ear pain, discharge or vertigo.  NOSE: No loss of smell, no epistaxis or postnasal drip.  MOUTH & THROAT: No hoarseness or change in voice. No excessive gum bleeding.  NODES: Denies swollen glands.  CHEST: Denies KILGORE, cyanosis, wheezing, cough and sputum production.  CARDIOVASCULAR: Denies chest pain, PND, orthopnea or reduced exercise tolerance.  ABDOMEN: Appetite fine. No weight loss. Denies diarrhea, abdominal pain, hematemesis or blood in stool.  URINARY: No flank pain, dysuria or hematuria.  PERIPHERAL VASCULAR: No claudication or cyanosis.  NEUROLOGIC: No history of seizures, paralysis, alteration of gait or coordination.  MUSCULOSKELETAL: See HPI    PE:  APPEARANCE: Well nourished, well developed, in no acute distress.   HEAD: Normocephalic, " atraumatic.  NEUROLOGIC: Cranial Nerves: II-XII grossly intact, also see MUSCULOSKELETAL  MUSCULOSKELETAL: Knee-left  Knee Exam-abnormal  Gait-abnormal  Muscle Appearance:abnormal  Grooming:normal  Spine Alignment-normal  Muscle Atrophy-Negative  Deformities-Positive  Tenderness-Positive  Paresthesias-Negative  Range of Motion         Ext-abnormal         Flex-abnormal  Muscle Strength-abnormal  Sensation-abnormal  Reflexes-normal  Crepitus-Negative                                Swelling-Positive  Effusion- Positive                                Edema-Positive  Lachman-Negative                               Erythema-Negative  [facility]-Negative                            Apley Grind-Negative  Patellar Comp-Negative                        Alignment-normal/symmetric  Patellar Apprehension-Negative            Synovial fullness-Positive  Passive Patellar Tilt-abnormal  Patellar Tracking-normal   Patellar Glide-abnormal  Q-Angle at 90 degrees-normal  Patellar Grind-abnormal  U-Nvij-Tgbxvcde  Fatigue-Negative                                     HS Tightness-Negative  Tests on Exam-abnormal  Neurovascular Status-normal+2 DP and PT artery pulses  Skin-normal  Mental Status-normal             Diagnosis:              1.left knee pain                   Diagnostic Studies  MRI-No  X-Ray-yes, agree with the findings of Findings: The patient is status post bilateral total knee arthroplasties.  No findings to suggest hardware failure or loosening.  There is a transverse oblique fracture involving the lower pole of the patella which is distracted inferiorly by approximately 9 mm.  There is a large amount of associated soft tissue edema surrounding the left knee.    EMG/NCV-No  Arthrogram-No  Bone Scan-No  CT Scan-No  Doppler-No  ESR-No  CRP-No  CBC with Diff-No   Rheumatoid/Arthritis Panel-No      Plan:                                                 1. PT-no                                                 2.OT-no                                           3.NSAID-no                                        4. Narcotics-no                                     5. Wound care-N/A                                 6. Rest-yes                                           7. Surgery-no                                         8. MYLES Hose-no                                    9. Anticoagulation therapy-no               10. Elevation-no                                     11. Crutches-no                                    12. Walker-no             13. Cane no                        14. Referral-no                                     15.Injection-Aspiration of the Knee:    The patient was placed in the supine position with   the left leg near the end of the bed facing me.  The left knee was placed over a nonsterile pad.The Knee was   prepped, sterily, with Alcohol and Betadine.    A one and   a half inch, 18 gauge needle attached to A 10 cc synringe was placed into the lateral joint. A negative pressure was placed on the needle to ensure the aspiration was placed into the jointAnd not into a blood vessel.   1 cc of cloudy,Straw colored, stringy fluid aspirated. The patient tolerated the knee aspiration well.  A Bandage was placed over the injection site.                             16. Splint   /    Cast   /   Cast Shoe-No              17. RICE            18. Follow up-  Cool compress, rest, elevation.

## 2017-04-01 NOTE — PATIENT INSTRUCTIONS
Water on the Knee    Water on the knee is also known as knee effusion. The knee joint normally has less than 1 ounce of fluid. Injury or inflammation of the knee joint causes extra fluid to collect there. When this happens, the knee joint looks swollen and is usually painful. It may be hard to fully bend the knee.  The most common cause of water on the knee is osteoarthritis due to wear and tear on the joint cartilage. Other causes include injury to the cartilage, inflammatory arthritis such as gout or rheumatoid arthritis, and infection of the joint.  You may need a needle aspiration, if the cause of your water on the knee is not certain. This procedure removes a sample of joint fluid from the knee for testing. This is done with a local anesthetic. Removing excess fluid may also relieve swelling and pain.  Home care  · Limit your activities. Stay off the injured leg as much as possible until pain improves.  · Keep your leg elevated to reduce pain and swelling. When sleeping, place a pillow under the injured leg. When sitting, support the injured leg so it is level with your waist. This is very important during the first 48 hours.  · Apply an ice pack over the injured area for 15 to 20 minutes every 3 to 6 hours. You should do this for the first 24 to 48 hours. You can make an ice pack by filling a plastic bag that seals at the top with ice cubes and then wrapping it with a thin towel. Continue to use ice packs for relief of pain and swelling as needed. As the ice melts, be careful to avoid getting your wrap, splint, or cast wet. After 48 hours, apply heat(warm shower or warm bath) for 15 to 20 minutes several times a day, or alternate ice and heat. If you have to wear a hook-and-loop knee brace, you can open it to apply the ice pack, or heat, directly to the knee. Never put ice directly on the skin. Always wrap the ice in a towel or other type of cloth.  · You may use over-the-counter pain medicine to control  pain, unless another pain medicine was prescribed. If you have chronic liver or kidney disease or have ever had a stomach ulcer or GI bleeding, talk with your healthcare provider before using these medicines.  · If crutches or a walker have been recommended, do not put weight on the injured leg until you can do so without pain. Check with your healthcare provider before returning to sports or full work duties.  · If you have a hook-and-loop knee brace, you can remove it to bathe and sleep, unless told otherwise.  Follow-up care  Follow up with your healthcare provider as advised.  If you are overweight, talk to your healthcare provider about a weight loss program. The excess weight puts extra strain on your knees.  When to seek medical advice  Call your healthcare provider right away if any of these occur:  · Increasing pain, redness, or swelling of the knee  · Fever of 100.4°F (38°C) or above lasting for 24 to 48 hours  Date Last Reviewed: 11/23/2015  © 3710-0130 [employer]. [address] [place] [address], [place] [address]. All rights reserved. This information is not intended as a substitute for professional medical care. Always follow your healthcare professional's instructions.

## 2017-04-03 ENCOUNTER — TELEPHONE (OUTPATIENT)
Dept: ORTHOPEDICS | Facility: CLINIC | Age: 77
End: 2017-04-03

## 2017-04-03 NOTE — TELEPHONE ENCOUNTER
----- Message from Polly Ugalde sent at 4/3/2017 10:17 AM CDT -----  Contact: Patient   Patient would like to get work in today her leg is not any better I offer to put her on the schedule with Merlene Love but she said she can not wait until 4:45,  Please call her at [phone].    Thanks  Td

## 2017-04-03 NOTE — TELEPHONE ENCOUNTER
Returned pt phone call. Pt states that the pain in her left knee has gotten worse and its swollen and changing colors. Pt wanted an appointment for today. Informed pt that Henok Brar is in surgery today and tomorrow morning and will not be back until tomorrow afternoon.pt was scheduled to see Merlene Love PA-C on 4/5 at 8:0am. Pt verified understanding

## 2017-04-04 ENCOUNTER — TELEPHONE (OUTPATIENT)
Dept: ORTHOPEDICS | Facility: CLINIC | Age: 77
End: 2017-04-04

## 2017-04-04 ENCOUNTER — HOSPITAL ENCOUNTER (OUTPATIENT)
Dept: RADIOLOGY | Facility: HOSPITAL | Age: 77
Discharge: HOME | End: 2017-04-04
Attending: ORTHOPAEDIC SURGERY
Payer: MEDICARE

## 2017-04-04 ENCOUNTER — OFFICE VISIT (OUTPATIENT)
Dept: ORTHOPEDICS | Facility: CLINIC | Age: 77
End: 2017-04-04
Payer: MEDICARE

## 2017-04-04 VITALS — SYSTOLIC BLOOD PRESSURE: 124 MMHG | HEIGHT: 62 IN | DIASTOLIC BLOOD PRESSURE: 73 MMHG | HEART RATE: 70 BPM

## 2017-04-04 DIAGNOSIS — R60.0 EDEMA OF LEFT LOWER EXTREMITY: ICD-10-CM

## 2017-04-04 DIAGNOSIS — M79.605 LEFT LEG PAIN: ICD-10-CM

## 2017-04-04 DIAGNOSIS — Z96.652 STATUS POST TOTAL LEFT KNEE REPLACEMENT: Primary | ICD-10-CM

## 2017-04-04 DIAGNOSIS — M25.562 ACUTE PAIN OF LEFT KNEE: ICD-10-CM

## 2017-04-04 DIAGNOSIS — M25.562 LEFT KNEE PAIN, UNSPECIFIED CHRONICITY: Primary | ICD-10-CM

## 2017-04-04 DIAGNOSIS — Z96.652 STATUS POST TOTAL LEFT KNEE REPLACEMENT: ICD-10-CM

## 2017-04-04 PROCEDURE — 1159F MED LIST DOCD IN RCRD: CPT | Mod: S$GLB,,, | Performed by: PHYSICIAN ASSISTANT

## 2017-04-04 PROCEDURE — 93970 EXTREMITY STUDY: CPT | Mod: 26,,, | Performed by: RADIOLOGY

## 2017-04-04 PROCEDURE — 3078F DIAST BP <80 MM HG: CPT | Mod: S$GLB,,, | Performed by: PHYSICIAN ASSISTANT

## 2017-04-04 PROCEDURE — 99999 PR PBB SHADOW E&M-EST. PATIENT-LVL IV: CPT | Mod: PBBFAC,,, | Performed by: PHYSICIAN ASSISTANT

## 2017-04-04 PROCEDURE — 93970 EXTREMITY STUDY: CPT | Mod: TC,PO

## 2017-04-04 PROCEDURE — 1125F AMNT PAIN NOTED PAIN PRSNT: CPT | Mod: S$GLB,,, | Performed by: PHYSICIAN ASSISTANT

## 2017-04-04 PROCEDURE — 99214 OFFICE O/P EST MOD 30 MIN: CPT | Mod: S$GLB,,, | Performed by: PHYSICIAN ASSISTANT

## 2017-04-04 PROCEDURE — 1157F ADVNC CARE PLAN IN RCRD: CPT | Mod: S$GLB,,, | Performed by: PHYSICIAN ASSISTANT

## 2017-04-04 PROCEDURE — 3074F SYST BP LT 130 MM HG: CPT | Mod: S$GLB,,, | Performed by: PHYSICIAN ASSISTANT

## 2017-04-04 PROCEDURE — 1160F RVW MEDS BY RX/DR IN RCRD: CPT | Mod: S$GLB,,, | Performed by: PHYSICIAN ASSISTANT

## 2017-04-04 RX ORDER — MELOXICAM 7.5 MG/1
7.5 TABLET ORAL DAILY
Qty: 30 TABLET | Refills: 0 | Status: SHIPPED | OUTPATIENT
Start: 2017-04-04 | End: 2018-04-06

## 2017-04-04 NOTE — PROGRESS NOTES
Subjective:      Patient ID: Sarah Parker is a 77 y.o. female.    Chief Complaint: Pain and Injury of the Left Knee      HPI: Sarah Parker  is a 77 y.o. female who c/o Pain and Injury of the Left Knee   for duration of about 6 days.  She was mowing the lawn last week when she was trying to resituate a septic tank cover with her right leg.  She inadvertently slipped and fell landing on the left knee.  She's had pain ever since then.  She saw Henok Brar PA-C, in the orthopedic department last week.  He attempted to aspirate the left knee but was only able to get about 1 cc of serous fluid off of the knee.  He did not do an injection, because she has a history of a total knee replacement done by Dr. zhou gasca in about 2007 or 2008.  She does on to tell me that about a year later she had to have a left total knee revision.  She states that the swelling and ecchymosis has worsened since last week.  Her pain level is 5 out of 10 in severity.  Quality is aching, sharp, and constant.  It's improved with ice and rest.  It's worsened with flexion as well as weightbearing.  She is using a walker at this time.  Again, she does complain of associated swelling and ecchymosis in the left lower extremity.  Of note, she has a history of rheumatoid arthritis.    Review of Systems   Constitution: Negative for fever.   HENT: Negative for headaches.    Cardiovascular: Negative for chest pain.   Respiratory: Negative for cough and shortness of breath.    Skin: Positive for color change (ecchymosis left leg). Negative for rash.   Musculoskeletal: Positive for joint pain, joint swelling and stiffness.   Gastrointestinal: Positive for heartburn.   Neurological: Negative for numbness.         Objective:        General    Nursing note and vitals reviewed.  Constitutional: She is oriented to person, place, and time. She appears well-developed and well-nourished.   HENT:   Head: Normocephalic and atraumatic.   Eyes: EOM are normal.    Cardiovascular: Normal rate and regular rhythm.    Pulmonary/Chest: Effort normal.   Abdominal: Soft.   Neurological: She is alert and oriented to person, place, and time.   Psychiatric: She has a normal mood and affect. Her behavior is normal.             Left Knee Exam     Inspection   Erythema: absent  Scars: present (consistent with left total knee arthroplasty.  No sign or symptom of infection.)  Swelling: present (LLE)  Effusion: present (1+)  Deformity: deformity  Bruising: present    Tenderness   The patient tender to palpation of the patellar tendon.    Crepitus   The patient has crepitus of the patella.    Range of Motion   Extension: abnormal   Flexion: abnormal     Comments:  She has a slight extension lag today.  I am passively able to get her to full extension at which point she is able to hold the leg extended.  Her extensor mechanism is intact.  She is tenderness to palpation over the patellar tendon origin on the inferior pole of the patella.  She has diffuse tenderness to palpation throughout the left leg as well.  This is ecchymotic and swollen.  She has 1+ to Hayes pedis pulse with capillary refill less than 2 seconds.  Compartments are soft.  She does have tenderness to palpation in the calf.  She is intact dorsiflexion and plantar flexion to the left foot.  She is able to wiggle her toes.    Vascular Exam       Left Pulses  Dorsalis Pedis:      1+          Edema  Left Lower Leg: present (Lle extends up leg to knee)            Xray:   Left knee from 3/30/2017 images and report were reviewed today.  I agree with the radiologist's interpretation.  The patient is status post bilateral total knee arthroplasties.  No findings to suggest hardware failure or loosening.  There appears to be a transverse oblique fracture involving the lower pole of the patella which is distracted inferiorly by approximately 9 mm.  There is a large amount of associated soft tissue edema surrounding the left  knee.    Assessment:       Encounter Diagnoses   Name Primary?    Status post total left knee replacement Yes    Acute pain of left knee     Edema of left lower extremity     Left leg pain           Plan:       Sarah was seen today for pain and injury.    Diagnoses and all orders for this visit:    Status post total left knee replacement  -     meloxicam (MOBIC) 7.5 MG tablet; Take 1 tablet (7.5 mg total) by mouth once daily. Take with food  -     US Lower Extremity Veins Bilateral; Future    Acute pain of left knee  -     meloxicam (MOBIC) 7.5 MG tablet; Take 1 tablet (7.5 mg total) by mouth once daily. Take with food  -     US Lower Extremity Veins Bilateral; Future    Edema of left lower extremity  -     meloxicam (MOBIC) 7.5 MG tablet; Take 1 tablet (7.5 mg total) by mouth once daily. Take with food  -     US Lower Extremity Veins Bilateral; Future    Left leg pain  -     meloxicam (MOBIC) 7.5 MG tablet; Take 1 tablet (7.5 mg total) by mouth once daily. Take with food  -     US Lower Extremity Veins Bilateral; Future    Ms. Smith comes in today for reevaluation of left knee pain.  Unfortunately, I do not have any prior x-rays to compare her x-rays from last week with.  She does appear to have some mild lucency at the inferior pole of the patella at the patellar tendon origin.  Indications noted within the patellar tendon.  I think this indicates a questionable patella fracture.  My recommendation at this time is to put her into a knee immobilizer on the left lower extremity.  She should ambulate with a walker but limit her weightbearing based on her pain level.  Additionally, she has an extensive amount of swelling within the left lower extremity.  We need to rule out a DVT, so I ordered an ultrasound of the bilateral lower chemise to be done as soon as possible.  Initially, I will put her on a prescription of meloxicam 7.5 mg once daily with food.  See Henok back in the office next week for reevaluation and  repeat x-rays.  I would defer any further treatment to him.  Patient verbalizes understanding and agrees with the above plan.    Return in about 1 week (around 4/11/2017) for f/u with Henok Brar PA-C.          The patient understands, chooses and consents to this plan and accepts all   the risks which include but are not limited to the risks mentioned above.     Disclaimer: This note was prepared using a voice recognition system and is likely to have sound alike errors within the text.

## 2017-04-04 NOTE — MR AVS SNAPSHOT
[facility]  [address] [place] [address]  Phone: [phone]  Fax: [phone]                  Sarah Parker   2017 8:00 AM   Office Visit    Description:  Female : 1940   Provider:  Merlene Love PA-C   Department[facility]  [facility]           Reason for Visit     Left Knee - Pain, Injury           Diagnoses this Visit        Comments    Status post total left knee replacement    -  Primary     Acute pain of left knee         Edema of left lower extremity         Left leg pain                To Do List           Future Appointments        Provider Department Dept Phone    2017 5:00 PM [facility] US1 [facility] [phone]    2017 9:30 AM [facility] XR2 [facility] [phone]    2017 10:00 AM Henok Brar PA-C [facility] [phone]    2017 2:40 PM Eden Gardiner DPM [facility] - Podiatry [phone]    2017 2:00 PM Sathish Devine MD [facility] Hemot[facility] Oncology [phone]      Goals (5 Years of Data)     None      Follow-Up and Disposition     Return in about 1 week (around 2017) for f/u with Henok Brar PA-C.    Follow-up and Disposition History       These Medications        Disp Refills Start End    meloxicam (MOBIC) 7.5 MG tablet 30 tablet 0 2017     Take 1 tablet (7.5 mg total) by mouth once daily. Take with food - Oral    Pharmacy: [facility] [id number] [place] [address] [place] - [address] AT [facility] Ph #: [phone]         [facility]     [facility] On Call Nurse Care Line -  Assistance  Unless otherwise directed by your provider, please contact [facility], our nurse care line that is available for  assistance.     Registered nurses in the [facility] provide: appointment scheduling, clinical advisement, health education, and other advisory services.  Call: [phone] (toll free)               Medications           Message  regarding Medications     Verify the changes and/or additions to your medication regime listed below are the same as discussed with your clinician today.  If any of these changes or additions are incorrect, please notify your healthcare provider.        START taking these NEW medications        Refills    meloxicam (MOBIC) 7.5 MG tablet 0    Sig: Take 1 tablet (7.5 mg total) by mouth once daily. Take with food    Class: Normal    Route: Oral           Verify that the below list of medications is an accurate representation of the medications you are currently taking.  If none reported, the list may be blank. If incorrect, please contact your healthcare provider. Carry this list with you in case of emergency.           Current Medications     albuterol (PROVENTIL) 2.5 mg /3 mL (0.083 %) nebulizer solution Take 3 mLs (2.5 mg total) by nebulization every 8 (eight) hours while awake.    amitriptyline (ELAVIL) 75 MG tablet TAKE 1 TABLET BY MOUTH EVERY EVENING    calcium citrate-vitamin D (CITRACAL + D) 315-200 mg-unit per tablet Take 1 tablet by mouth once daily.     citalopram (CELEXA) 10 MG tablet Take 1 tablet (10 mg total) by mouth once daily.    hydrocodone-acetaminophen 5-325mg (NORCO) 5-325 mg per tablet TK 1 T PO Q 6 H PRN    leucovorin (WELLCOVORIN) 5 mg Tab TAKE ONE WEEKLY ON SATURDAYS    levothyroxine (SYNTHROID) 88 MCG tablet TAKE 1 TABLET BY MOUTH BEFORE BREAKFAST    meclizine (ANTIVERT) 50 MG tablet Take 25 mg by mouth 3 (three) times daily as needed.    methotrexate 2.5 MG Tab Take 17.5 mg by mouth every 7 days.     multivitamin capsule Take by mouth. As directed    ondansetron (ZOFRAN) 4 MG tablet Take 1 tablet (4 mg total) by mouth every 8 (eight) hours as needed for Nausea.    oxymetazoline (AFRIN) 0.05 % nasal spray 1 Aerosol, Spray Nasal At bedtime    tocilizumab (ACTEMRA) 80 mg/4 mL (20 mg/mL) Soln Inject into the vein.    valsartan-hydrochlorothiazide (DIOVAN-HCT) 80-12.5 mg per tablet TAKE 1 TABLET  "BY MOUTH DAILY    VITAMIN D2 50,000 unit capsule TAKE 1 CAPSULE BY MOUTH EVERY 7 DAYS    atenolol (TENORMIN) 50 MG tablet TAKE 1 TABLET BY MOUTH EVERY DAY    meloxicam (MOBIC) 7.5 MG tablet Take 1 tablet (7.5 mg total) by mouth once daily. Take with food           Clinical Reference Information           Your Vitals Were     BP Pulse Height             124/73 70 5' 2" (1.575 m)         Blood Pressure          Most Recent Value    BP  124/73      Allergies as of 4/4/2017     Codeine      Immunizations Administered on Date of Encounter - 4/4/2017     None      Orders Placed During Today's Visit     Future Labs/Procedures Expected by Expires    US Lower Extremity Veins Bilateral  4/4/2017 4/4/2018      Language Assistance Services     ATTENTION: Language assistance services are available, free of charge. Please call [phone].      ATENCIÓN: Si lolly patel, tiene a pichardo disposición servicios gratuitos de asistencia lingüística. Llame al [phone].     CHÚ Ý: N?u b?n nói Ti?ng Vi?t, có các d?ch v? h? tr? ngôn ng? mi?n phí dành cho b?n. G?i s? [phone].         [facility] complies with applicable Federal civil rights laws and does not discriminate on the basis of race, color, national origin, age, disability, or sex.        "

## 2017-04-13 ENCOUNTER — OFFICE VISIT (OUTPATIENT)
Dept: ORTHOPEDICS | Facility: CLINIC | Age: 77
End: 2017-04-13
Payer: MEDICARE

## 2017-04-13 ENCOUNTER — HOSPITAL ENCOUNTER (OUTPATIENT)
Dept: RADIOLOGY | Facility: HOSPITAL | Age: 77
Discharge: HOME | End: 2017-04-13
Attending: ORTHOPAEDIC SURGERY
Payer: MEDICARE

## 2017-04-13 VITALS
HEIGHT: 62 IN | WEIGHT: 127 LBS | HEART RATE: 68 BPM | SYSTOLIC BLOOD PRESSURE: 145 MMHG | DIASTOLIC BLOOD PRESSURE: 76 MMHG | BODY MASS INDEX: 23.37 KG/M2

## 2017-04-13 DIAGNOSIS — M79.672 LEFT FOOT PAIN: ICD-10-CM

## 2017-04-13 DIAGNOSIS — S82.034D CLOSED NONDISPLACED TRANSVERSE FRACTURE OF RIGHT PATELLA WITH ROUTINE HEALING, SUBSEQUENT ENCOUNTER: ICD-10-CM

## 2017-04-13 DIAGNOSIS — M79.672 LEFT FOOT PAIN: Primary | ICD-10-CM

## 2017-04-13 DIAGNOSIS — M25.572 LEFT ANKLE PAIN, UNSPECIFIED CHRONICITY: ICD-10-CM

## 2017-04-13 DIAGNOSIS — M25.562 LEFT KNEE PAIN, UNSPECIFIED CHRONICITY: ICD-10-CM

## 2017-04-13 PROCEDURE — 3077F SYST BP >= 140 MM HG: CPT | Mod: S$GLB,,, | Performed by: PHYSICIAN ASSISTANT

## 2017-04-13 PROCEDURE — 73630 X-RAY EXAM OF FOOT: CPT | Mod: 26,LT,, | Performed by: RADIOLOGY

## 2017-04-13 PROCEDURE — 73562 X-RAY EXAM OF KNEE 3: CPT | Mod: 26,LT,, | Performed by: RADIOLOGY

## 2017-04-13 PROCEDURE — 1125F AMNT PAIN NOTED PAIN PRSNT: CPT | Mod: S$GLB,,, | Performed by: PHYSICIAN ASSISTANT

## 2017-04-13 PROCEDURE — 99999 PR PBB SHADOW E&M-EST. PATIENT-LVL IV: CPT | Mod: PBBFAC,,, | Performed by: PHYSICIAN ASSISTANT

## 2017-04-13 PROCEDURE — 73610 X-RAY EXAM OF ANKLE: CPT | Mod: 26,LT,, | Performed by: RADIOLOGY

## 2017-04-13 PROCEDURE — 99213 OFFICE O/P EST LOW 20 MIN: CPT | Mod: S$GLB,,, | Performed by: PHYSICIAN ASSISTANT

## 2017-04-13 PROCEDURE — 3078F DIAST BP <80 MM HG: CPT | Mod: S$GLB,,, | Performed by: PHYSICIAN ASSISTANT

## 2017-04-13 PROCEDURE — 1160F RVW MEDS BY RX/DR IN RCRD: CPT | Mod: S$GLB,,, | Performed by: PHYSICIAN ASSISTANT

## 2017-04-13 PROCEDURE — 1159F MED LIST DOCD IN RCRD: CPT | Mod: S$GLB,,, | Performed by: PHYSICIAN ASSISTANT

## 2017-04-13 PROCEDURE — 73560 X-RAY EXAM OF KNEE 1 OR 2: CPT | Mod: 26,59,RT, | Performed by: RADIOLOGY

## 2017-04-13 NOTE — MR AVS SNAPSHOT
[facility]  [address] [place] [address]  Phone: [phone]  Fax: [phone]                  Sarah Parker   2017 10:00 AM   Office Visit    Description:  Female : 1940   Provider:  Henok Brar PA-C   Department:  [facility]           Reason for Visit     Left Knee - Pain           Diagnoses this Visit        Comments    Left foot pain    -  Primary     Left ankle pain, unspecified chronicity                To Do List           Future Appointments        Provider Department Dept Phone    2017 11:00 AM [facility] XR2 [facility] [phone]    2017 2:40 PM Eden Gardiner DPM [facility] [phone]    2017 2:00 PM Sathish Devine MD [facility] Hemot[facility] Oncology [phone]    2017 9:00 AM Briseida Reyes MD [facility] Medicine [phone]    2017 11:00 AM [facility] [phone]      Goals (5 Years of Data)     None      [facility]     [facility] Nurse Care Line -  Assistance  Unless otherwise directed by your provider, please contact [facility], our nurse care line that is available for  assistance.     Registered nurses in the [facility] provide: appointment scheduling, clinical advisement, health education, and other advisory services.  Call: [phone] (toll free)               Medications           Message regarding Medications     Verify the changes and/or additions to your medication regime listed below are the same as discussed with your clinician today.  If any of these changes or additions are incorrect, please notify your healthcare provider.             Verify that the below list of medications is an accurate representation of the medications you are currently taking.  If none reported, the list may be blank. If incorrect, please contact your healthcare provider. Carry this list with you in case of emergency.          "  Current Medications     albuterol (PROVENTIL) 2.5 mg /3 mL (0.083 %) nebulizer solution Take 3 mLs (2.5 mg total) by nebulization every 8 (eight) hours while awake.    amitriptyline (ELAVIL) 75 MG tablet TAKE 1 TABLET BY MOUTH EVERY EVENING    atenolol (TENORMIN) 50 MG tablet TAKE 1 TABLET BY MOUTH EVERY DAY    calcium citrate-vitamin D (CITRACAL + D) 315-200 mg-unit per tablet Take 1 tablet by mouth once daily.     citalopram (CELEXA) 10 MG tablet Take 1 tablet (10 mg total) by mouth once daily.    hydrocodone-acetaminophen 5-325mg (NORCO) 5-325 mg per tablet TK 1 T PO Q 6 H PRN    leucovorin (WELLCOVORIN) 5 mg Tab TAKE ONE WEEKLY ON SATURDAYS    levothyroxine (SYNTHROID) 88 MCG tablet TAKE 1 TABLET BY MOUTH BEFORE BREAKFAST    meclizine (ANTIVERT) 50 MG tablet Take 25 mg by mouth 3 (three) times daily as needed.    meloxicam (MOBIC) 7.5 MG tablet Take 1 tablet (7.5 mg total) by mouth once daily. Take with food    methotrexate 2.5 MG Tab Take 17.5 mg by mouth every 7 days.     multivitamin capsule Take by mouth. As directed    ondansetron (ZOFRAN) 4 MG tablet Take 1 tablet (4 mg total) by mouth every 8 (eight) hours as needed for Nausea.    oxymetazoline (AFRIN) 0.05 % nasal spray 1 Aerosol, Spray Nasal At bedtime    tocilizumab (ACTEMRA) 80 mg/4 mL (20 mg/mL) Soln Inject into the vein.    valsartan-hydrochlorothiazide (DIOVAN-HCT) 80-12.5 mg per tablet TAKE 1 TABLET BY MOUTH DAILY    VITAMIN D2 50,000 unit capsule TAKE 1 CAPSULE BY MOUTH EVERY 7 DAYS           Clinical Reference Information           Your Vitals Were     BP Pulse Height Weight BMI    145/76 68 5' 2" (1.575 m) 57.6 kg (126 lb 15.8 oz) 23.23 kg/m2      Blood Pressure          Most Recent Value    BP  (!)  145/76      Allergies as of 4/13/2017     Codeine      Immunizations Administered on Date of Encounter - 4/13/2017     None      Orders Placed During Today's Visit     Future Labs/Procedures Expected by Expires    X-Ray Ankle 2 View Left  " 4/13/2017 4/13/2018    X-Ray Foot 2 View Left  4/13/2017 4/13/2018      Instructions      Knee Fracture    You have a break, or fracture, of the knee joint. This causes pain, swelling, and sometimes bruising.  This type of fracture is treated with a splint, cast, or knee brace, also called an immobilizer. It will take about 4 to 6 weeks for the fracture to heal. But it may take much longer for you to fully recover and go back to all your activities. Surgery may be needed to fix severe injuries.     Home care  · You will be given a splint, cast, or knee brace to prevent your knee joint from moving. Use crutches or a walker, unless you were told otherwise. Dont bear weight on your injured leg until your provider says its OK to do so. Crutches and walkers can be rented at many pharmacies and surgical or orthopedic supply stores.  · Keep your leg raised, or elevated, to reduce pain and swelling. When sleeping, place a pillow under your injured leg. When sitting, support your injured leg so it is level with your waist. This is very important during the first 48 hours.  · Apply an ice pack over the injured area for no more than 15 to 20 minutes. Do this every 1 to 2 hours for the first 24 to 48 hours. Keep using ice packs as needed to ease pain and swelling.  · To make an ice pack, put ice cubes in a sealed zip-lock plastic bag wrapped in a clean, thin towel or cloth. Never put ice or an ice pack directly on your skin. The ice pack can be put right on the cast, splint, or brace. As the ice melts, be careful that the cast, splint, or brace doesnt get wet.  · If you have a hook-and-loop closure knee brace, and your healthcare provider approves, open the brace to put the ice pack directly on your knee. Wrap the ice pack in a clean, thin towel or cloth. Be careful not to move your knee.   · Keep the cast, splint, or brace dry at all times. Bathe with your cast, splint, or brace out of the water. Protect it with 2 large  plastic bags. Place 1 bag around the other. Tape each bag with duct tape at the top end. Water can still leak in. So it's best to keep the cast, splint, or brace away from water. If a fiberglass splint or cast gets wet, dry it with a hair dryer on a cool setting.  · You may use over-the-counter pain medicine to ease pain, unless another pain medicine was prescribed. Always talk with your provider before using these medicines if you have chronic liver or kidney disease, or ever had a stomach ulcer or GI (gastrointestinal) bleeding.      Follow-up care  Follow up with your healthcare provider within 1 week, or as advised. This is to be sure the bone is healing properly.  If any X-rays were taken, you will be told of any new findings that may affect your care.     When to seek medical advice  Call your healthcare provider right away if any of the following occur:  · The plaster cast or splint gets wet or soft  · The fiberglass cast or splint stays wet for more than 24 hours  · The cast has a bad smell  · The plaster cast or splint becomes loose  · There is increased knee pain or tightness under the brace, splint, or cast  · Your toes become swollen, cold, blue, numb, or tingly  Date Last Reviewed: 12/3/2015  © 0640-6037 [employer]. [address] [place] [address]. All rights reserved. This information is not intended as a substitute for professional medical care. Always follow your healthcare professional's instructions.             Language Assistance Services     ATTENTION: Language assistance services are available, free of charge. Please call [phone].      ATENCIÓN: Si habla español, tiene a pichardo disposición servicios gratuitos de asistencia lingüística. Llame al [phone].     [facility] Ý: N?u b?n nói Ti?ng Vi?t, có các d?ch v? h? tr? ngôn ng? mi?n phí dành cho b?n. G?i s? [phone].         [facility] complies with applicable Federal civil rights laws and does not  discriminate on the basis of race, color, national origin, age, disability, or sex.

## 2017-04-13 NOTE — PATIENT INSTRUCTIONS
Knee Fracture    You have a break, or fracture, of the knee joint. This causes pain, swelling, and sometimes bruising.  This type of fracture is treated with a splint, cast, or knee brace, also called an immobilizer. It will take about 4 to 6 weeks for the fracture to heal. But it may take much longer for you to fully recover and go back to all your activities. Surgery may be needed to fix severe injuries.     Home care  · You will be given a splint, cast, or knee brace to prevent your knee joint from moving. Use crutches or a walker, unless you were told otherwise. Dont bear weight on your injured leg until your provider says its OK to do so. Crutches and walkers can be rented at many pharmacies and surgical or orthopedic supply stores.  · Keep your leg raised, or elevated, to reduce pain and swelling. When sleeping, place a pillow under your injured leg. When sitting, support your injured leg so it is level with your waist. This is very important during the first 48 hours.  · Apply an ice pack over the injured area for no more than 15 to 20 minutes. Do this every 1 to 2 hours for the first 24 to 48 hours. Keep using ice packs as needed to ease pain and swelling.  · To make an ice pack, put ice cubes in a sealed zip-lock plastic bag wrapped in a clean, thin towel or cloth. Never put ice or an ice pack directly on your skin. The ice pack can be put right on the cast, splint, or brace. As the ice melts, be careful that the cast, splint, or brace doesnt get wet.  · If you have a hook-and-loop closure knee brace, and your healthcare provider approves, open the brace to put the ice pack directly on your knee. Wrap the ice pack in a clean, thin towel or cloth. Be careful not to move your knee.   · Keep the cast, splint, or brace dry at all times. Bathe with your cast, splint, or brace out of the water. Protect it with 2 large plastic bags. Place 1 bag around the other. Tape each bag with duct tape at the top end.  Water can still leak in. So it's best to keep the cast, splint, or brace away from water. If a fiberglass splint or cast gets wet, dry it with a hair dryer on a cool setting.  · You may use over-the-counter pain medicine to ease pain, unless another pain medicine was prescribed. Always talk with your provider before using these medicines if you have chronic liver or kidney disease, or ever had a stomach ulcer or GI (gastrointestinal) bleeding.      Follow-up care  Follow up with your healthcare provider within 1 week, or as advised. This is to be sure the bone is healing properly.  If any X-rays were taken, you will be told of any new findings that may affect your care.     When to seek medical advice  Call your healthcare provider right away if any of the following occur:  · The plaster cast or splint gets wet or soft  · The fiberglass cast or splint stays wet for more than 24 hours  · The cast has a bad smell  · The plaster cast or splint becomes loose  · There is increased knee pain or tightness under the brace, splint, or cast  · Your toes become swollen, cold, blue, numb, or tingly  Date Last Reviewed: 12/3/2015  © 6133-2719 [employer]. [address] [place] [address], [place] [address]. All rights reserved. This information is not intended as a substitute for professional medical care. Always follow your healthcare professional's instructions.

## 2017-04-13 NOTE — LETTER
April 13, 2017      Merlene Love PA-C  [address] [facility] [address] [place] [address]           [facility] - [facility]  [address] [facility] [address]  [place] [address]  Phone: [phone]  Fax: [phone]          Patient: Sarah Parker   MR Number: [id number]   YOB: 1940   Date of Visit: 4/13/2017       Dear Merlene Love:    Thank you for referring Sarah Parker to me for evaluation. Attached you will find relevant portions of my assessment and plan of care.    If you have questions, please do not hesitate to call me. I look forward to following Sarah Parker along with you.    Sincerely,    Henok Brar PA-C    Enclosure  CC:  No Recipients    If you would like to receive this communication electronically, please contact [email or web address] or [phone] to request more information on [facility] access.    For providers and/or their staff who would like to refer a patient to [facility], please contact us through our one-stop-shop provider referral line, [facility] , at [phone].    If you feel you have received this communication in error or would no longer like to receive these types of communications, please e-mail [email or web address]

## 2017-04-14 NOTE — PROGRESS NOTES
CC:78 y/o female right knee pain    Date of injury: 3/29/2017    HPI:Was walking in her yard, tried to push down the septic tank cover, she slipped and twisted and landed on left knee, concerned to due hx of bilateral knee arthoplasty. Her knee is better, less pain.  PMH:    Past Medical History:   Diagnosis Date    Acid reflux     Anxiety     Back pain     Bronchitis, chronic obstructive w acute bronchitis 7/29/2016    Cancer     [facility] arms, face- Dr. Lata Tejada    Cataract     2+NS    Degenerative disc disease     Depression     Dry mouth     Hernia, hiatal 11/18/2013    Hypertension     Hypothyroid     Macrocytic anemia 5/3/2016    Macular degeneration     Migraines     Mixed anxiety and depressive disorder     Multiple fractures of ribs of right side     Osteoporosis     Pneumonia     Rheumatoid arthritis     Rheumatoid arthritis     Remicade, MTX.    Rheumatoid arthritis        PSH:    Past Surgical History:   Procedure Laterality Date    APPENDECTOMY  1985    CATARACT EXTRACTION Bilateral 6/11/15    Dr. Booth    CHOLECYSTECTOMY  2013    cryoablasion kidney Left 09/27/2016    feet Bilateral     rheumatoid    FRACTURE SURGERY Right     tibia    HERNIA REPAIR      HYSTERECTOMY  1970    partial    JOINT REPLACEMENT      bilateral knees (2008), hands, wrists, knuckles, toes    LAPAROSCOPIC NISSEN FUNDOPLICATION         Family Hx:    Family History   Problem Relation Age of Onset    Heart disease Mother     Hyperlipidemia Mother     Hypertension Mother     Osteoarthritis Mother     Cataracts Mother     Hypertension Father     Osteoarthritis Father     Heart disease Father     Asthma Sister     Chronic back pain Sister     Hypertension Sister     Osteoarthritis Sister     Thyroid disease Sister     Asthma Brother     Cancer Brother     Chronic back pain Brother     Diabetes Mellitus Brother     Hypertension Brother     Osteoarthritis Brother     Thyroid disease  Brother     Cancer Maternal Grandfather     Fibromyalgia Daughter     Heart disease Maternal Grandmother     Colon cancer Neg Hx     Diabetes Neg Hx        Allergy:    Review of patient's allergies indicates:   Allergen Reactions    Codeine      Other reaction(s): hyper  Other reaction(s): hyper       Medication:    Current Outpatient Prescriptions:     albuterol (PROVENTIL) 2.5 mg /3 mL (0.083 %) nebulizer solution, Take 3 mLs (2.5 mg total) by nebulization every 8 (eight) hours while awake., Disp: 180 mL, Rfl: 0    amitriptyline (ELAVIL) 75 MG tablet, TAKE 1 TABLET BY MOUTH EVERY EVENING, Disp: 90 tablet, Rfl: 3    atenolol (TENORMIN) 50 MG tablet, TAKE 1 TABLET BY MOUTH EVERY DAY, Disp: 30 tablet, Rfl: 11    calcium citrate-vitamin D (CITRACAL + D) 315-200 mg-unit per tablet, Take 1 tablet by mouth once daily. , Disp: , Rfl:     citalopram (CELEXA) 10 MG tablet, Take 1 tablet (10 mg total) by mouth once daily. (Patient taking differently: Take 10 mg by mouth nightly. ), Disp: 90 tablet, Rfl: 3    hydrocodone-acetaminophen 5-325mg (NORCO) 5-325 mg per tablet, TK 1 T PO Q 6 H PRN, Disp: , Rfl: 0    leucovorin (WELLCOVORIN) 5 mg Tab, TAKE ONE WEEKLY ON SATURDAYS, Disp: 4 tablet, Rfl: 3    levothyroxine (SYNTHROID) 88 MCG tablet, TAKE 1 TABLET BY MOUTH BEFORE BREAKFAST, Disp: 30 tablet, Rfl: 11    meclizine (ANTIVERT) 50 MG tablet, Take 25 mg by mouth 3 (three) times daily as needed., Disp: , Rfl:     meloxicam (MOBIC) 7.5 MG tablet, Take 1 tablet (7.5 mg total) by mouth once daily. Take with food, Disp: 30 tablet, Rfl: 0    methotrexate 2.5 MG Tab, Take 17.5 mg by mouth every 7 days. , Disp: , Rfl:     multivitamin capsule, Take by mouth. As directed, Disp: , Rfl:     ondansetron (ZOFRAN) 4 MG tablet, Take 1 tablet (4 mg total) by mouth every 8 (eight) hours as needed for Nausea., Disp: 30 tablet, Rfl: 1    oxymetazoline (AFRIN) 0.05 % nasal spray, 1 Aerosol, Spray Nasal At bedtime, Disp: , Rfl:  "    tocilizumab (ACTEMRA) 80 mg/4 mL (20 mg/mL) Soln, Inject into the vein., Disp: , Rfl:     valsartan-hydrochlorothiazide (DIOVAN-HCT) 80-12.5 mg per tablet, TAKE 1 TABLET BY MOUTH DAILY, Disp: 90 tablet, Rfl: 3    VITAMIN D2 50,000 unit capsule, TAKE 1 CAPSULE BY MOUTH EVERY 7 DAYS (Patient taking differently: TAKE 1 CAPSULE BY MOUTH EVERY 14 days), Disp: 4 capsule, Rfl: 11    Social History:    Social History     Social History    Marital status:      Spouse name: N/A    Number of children: N/A    Years of education: N/A     Occupational History    retired      Social History Main Topics    Smoking status: Former Smoker     Packs/day: 0.25     Years: 2.00     Quit date: 11/2/1965    Smokeless tobacco: Never Used    Alcohol use No    Drug use: No    Sexual activity: No     Other Topics Concern    Not on file     Social History Narrative    Patient is aretired and live with .       Vitals:   BP (!) 145/76  Pulse 68  Ht 5' 2" (1.575 m)  Wt 57.6 kg (126 lb 15.8 oz)  BMI 23.23 kg/m2     ROS:  GENERAL: No fever, chills, fatigability or weight loss.  SKIN: No rashes, itching or changes in color or texture of skin.  HEAD: No headaches or recent head trauma.  EYES: Visual acuity fine. No photophobia, ocular pain or diplopia.  EARS: Denies ear pain, discharge or vertigo.  NOSE: No loss of smell, no epistaxis or postnasal drip.  MOUTH & THROAT: No hoarseness or change in voice. No excessive gum bleeding.  NODES: Denies swollen glands.  CHEST: Denies KILGORE, cyanosis, wheezing, cough and sputum production.  CARDIOVASCULAR: Denies chest pain, PND, orthopnea or reduced exercise tolerance.  ABDOMEN: Appetite fine. No weight loss. Denies diarrhea, abdominal pain, hematemesis or blood in stool.  URINARY: No flank pain, dysuria or hematuria.  PERIPHERAL VASCULAR: No claudication or cyanosis.  NEUROLOGIC: No history of seizures, paralysis, alteration of gait or coordination.  MUSCULOSKELETAL: See " HPI    PE:  APPEARANCE: Well nourished, well developed, in no acute distress.   HEAD: Normocephalic, atraumatic.  NEUROLOGIC: Cranial Nerves: II-XII grossly intact, also see MUSCULOSKELETAL  MUSCULOSKELETAL: Knee-left  Knee Exam-abnormal  Gait-abnormal  Muscle Appearance:abnormal  Grooming:normal  Spine Alignment-normal  Muscle Atrophy-Negative  Deformities-Positive  Tenderness-Positive  Paresthesias-Negative  Range of Motion         Ext-abnormal         Flex-abnormal  Muscle Strength-abnormal  Sensation-abnormal  Reflexes-normal  Crepitus-Negative                                Swelling-Positive  Effusion- Positive                                Edema-Positive  Lachman-Negative                               Erythema-Negative  Alexandra's-Negative                            Apley Grind-Negative  Patellar Comp-Negative                        Alignment-normal/symmetric  Patellar Apprehension-Negative            Synovial fullness-Positive  Passive Patellar Tilt-abnormal  Patellar Tracking-normal   Patellar Glide-abnormal  Q-Angle at 90 degrees-normal  Patellar Grind-abnormal  W-Tges-Okspkkur  Fatigue-Negative                                     HS Tightness-Negative  Tests on Exam-abnormal  Neurovascular Status-normal+2 DP and PT artery pulses  Skin-normal  Mental Status-normal             Diagnosis:              1.left knee pain- resolving   2. Non displaced patella fracture   3. Foot pain                   Diagnostic Studies  MRI-No  X-Ray-yes, agree with the findings of Findings: The patient is status post bilateral total knee arthroplasties.  No findings to suggest hardware failure or loosening.  There is a transverse oblique fracture involving the lower pole of the patella which is distracted inferiorly by approximately 9 mm.  There is a large amount of associated soft tissue edema surrounding the left knee.    EMG/NCV-No  Arthrogram-No  Bone Scan-No  CT Scan-No  Doppler-negative DVT  ESR-No  CRP-No  CBC with  Diff-No   Rheumatoid/Arthritis Panel-No      Plan:                                                 1. PT-no                                                 2.OT-no                                          3.NSAID-no                                        4. Narcotics-no                                     5. Wound care-N/A                                 6. Rest-yes                                           7. Surgery-no   Discuss patella fx                                      8. MYLES Hose-no                                    9. Anticoagulation therapy-no               10. Elevation-no                                     11. Crutches-no                                    12. Walker-no             13. Cane no                        14. Referral-no                                     15.Injection-No                           16. Splint   /    Cast   /   Cast Shoe-No              17. RICE            18. Follow up-  Discuss non displaced patella and no change from prior. Patient elects conservative treatment at this time.

## 2017-04-17 ENCOUNTER — TELEPHONE (OUTPATIENT)
Dept: ORTHOPEDICS | Facility: CLINIC | Age: 77
End: 2017-04-17

## 2017-04-17 NOTE — TELEPHONE ENCOUNTER
Returned pt phone call. Pt stated she wanted to know the results of foot xray. Informed pt i would send that request to provider. Pt verified understanding.

## 2017-04-17 NOTE — TELEPHONE ENCOUNTER
----- Message from Cristel Woods sent at 4/17/2017 12:25 PM CDT -----  Contact: pt  Calling for the results of her left foot xray.

## 2017-05-16 ENCOUNTER — OFFICE VISIT (OUTPATIENT)
Dept: HEMATOLOGY/ONCOLOGY | Facility: CLINIC | Age: 77
End: 2017-05-16
Payer: MEDICARE

## 2017-05-16 VITALS
BODY MASS INDEX: 22.89 KG/M2 | HEART RATE: 92 BPM | OXYGEN SATURATION: 96 % | WEIGHT: 129.19 LBS | DIASTOLIC BLOOD PRESSURE: 83 MMHG | SYSTOLIC BLOOD PRESSURE: 140 MMHG | HEIGHT: 63 IN | TEMPERATURE: 99 F

## 2017-05-16 DIAGNOSIS — D53.9 MACROCYTIC ANEMIA: ICD-10-CM

## 2017-05-16 DIAGNOSIS — D72.829 LEUKOCYTOSIS, UNSPECIFIED TYPE: Primary | ICD-10-CM

## 2017-05-16 PROCEDURE — 99499 UNLISTED E&M SERVICE: CPT | Mod: S$GLB,,, | Performed by: INTERNAL MEDICINE

## 2017-05-16 PROCEDURE — 1126F AMNT PAIN NOTED NONE PRSNT: CPT | Mod: S$GLB,,, | Performed by: INTERNAL MEDICINE

## 2017-05-16 PROCEDURE — 3077F SYST BP >= 140 MM HG: CPT | Mod: S$GLB,,, | Performed by: INTERNAL MEDICINE

## 2017-05-16 PROCEDURE — 99999 PR PBB SHADOW E&M-EST. PATIENT-LVL III: CPT | Mod: PBBFAC,,, | Performed by: INTERNAL MEDICINE

## 2017-05-16 PROCEDURE — 1160F RVW MEDS BY RX/DR IN RCRD: CPT | Mod: S$GLB,,, | Performed by: INTERNAL MEDICINE

## 2017-05-16 PROCEDURE — 3079F DIAST BP 80-89 MM HG: CPT | Mod: S$GLB,,, | Performed by: INTERNAL MEDICINE

## 2017-05-16 PROCEDURE — 99214 OFFICE O/P EST MOD 30 MIN: CPT | Mod: S$GLB,,, | Performed by: INTERNAL MEDICINE

## 2017-05-16 PROCEDURE — 1159F MED LIST DOCD IN RCRD: CPT | Mod: S$GLB,,, | Performed by: INTERNAL MEDICINE

## 2017-05-16 NOTE — PROGRESS NOTES
Reason for visit: Chronic leukocytosis    HPI:   The patient is a 77-year-old  female who presents to the hematology oncology clinic today to discuss further evaluation and management recommendations for leukocytosis.  I have reviewed all of the patient's relevant clinical history available in the medical record including her records from care everywhere.  Today the patient reports that overall she feels okay.  She reports that her chronic pain from rheumatoid arthritis is stable.  She reports chronic fatigue.  She denies any fevers, chills or night sweats.  She denies any loss of appetite or unintentional weight loss.  She denies any chest pain or shortness of breath.  She denies any melena, hematochezia, hematemesis, hemoptysis or hematuria.  She reports being up-to-date with all of her age-appropriate cancer screening. She denies any bowel or urinary complaints.  She denies any nausea, vomiting or abdominal pain.  The patient reports taking weekly methotrexate with supplemental folic acid and actemra for treatment of rheumatoid arthritis under the supervision of Dr. Chase Tejada with rheumatology.    PAST MEDICAL HISTORY:   1.  Rheumatoid arthritis  2.  Hypertension  3.  Anxiety  4.  Hypothyroidism  5.  Vitamin D deficiency  6.  Osteoporosis  7.  History of hiatal hernia with GERD  8.  Age-related macular degeneration    SURGICAL HISTORY:   1.  Bilateral wrist surgery  2.  Bilateral knee replacement  3.  Bilateral foot surgery  4.  Cholecystectomy  5.  Nissen fundoplication  6.  Appendectomy  7.  SAY with BSO  8.  Bilateral cataract extraction  9.  Multiple resections of localized skin cancer from the forearm  10. Cyroablation of left renal mass in sep 2016    FAMILY HISTORY: The patient's brother was treated for pancreatic cancer which was diagnosed at the age of 70.  She denies any other immediate family members with cancer or bleeding/clotting disorders.    SOCIAL HISTORY: She reports a 0.5-pack-year  smoking history and quit in 1965.  She denies any alcohol use or recreational drug use.  She used to work as a  and retired at the age of 64.  She is  and has 2 daughters.  She lives in [place], [place].    ALLERGIES: Reviewed on medication card.    MEDICATIONS: [Medcard has been reviewed and/or reconciled.]    REVIEW OF SYSTEMS:   GENERAL: [No fevers, chills or sweats. Reports chronic fatigue. Denies weight loss or loss of appetite.]  HEENT: [No blurred vision, tinnitus, nasal discharge, sorethroat or dysphagia.]  HEART: [No chest pain, palpitations or shortness of breath.]    LUNGS: [No cough, hemoptysis or breathing problems.]  ABDOMEN: [No abdominal pain, nausea, vomiting, diarrhea, constipation or melena.]  GENITOURINARY: [No dysuria, bleeding or malodorous discharge.]  NEURO: [No headache, dizziness or vertigo.]  HEMATOLOGY: [No easy bruising, spontaneous bleeding or blood clots in the past].  MUSCULOSKELETAL: [Chronic arthralgias. Denies myalgias or bone pains.]  SKIN: [No rashes or skin lesions.]  PSYCHIATRY: [No depression. Reports h/o anxiety.]    PHYSICAL EXAMINATION:   VS: Reviewed on nurse's notes.  APPEARANCE: The patient is a well-developed, well-nourished and well-groomed elderly  female who appears in no acute distress.    HEENT: No scleral icterus. Both external auditory canals clear. No oral ulcers, lesions. Throat clear  HEAD: No sinus tenderness.  NECK: Supple. No palpable lymphadenopathy. Thyroid non-tender, no palpable masses  CHEST: Breath sounds clear bilaterally. Occasional crackles/rales bilaterally. No rhonchi. Unlabored respirations.  CARDIOVASCULAR: Normal S1, S2. Normal rate. Regular rhythm.  ABDOMEN: Bowel sounds normal. No tenderness. No abdominal distention. No hepatomegaly. No splenomegaly.  LYMPHATIC: No palpable supraclavicular, axillary nodes  EXTREMITIES: No clubbing, cyanosis. Mild edema in left leg due to recent accidental trauma. This is  improving.  SKIN: No lesions. No petechiae. No ecchymoses. No induration or nodules  NEUROLOGIC: No focal findings. Alert & Oriented x 3. Mood appropriate to affect    LABS:   Reviewed    IMAGING:  Reviewed    IMPRESSION:  1.  Chronic leukocytosis  2.  Chronic macrocytic anemia  3.  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  Bilateral lung inflammation [rheumatoid lung]  5.  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  I had a detailed discussion with the patient today with regard to the various possible etiologies for leukocytosis.  Review of the patient's medical record shows that this has been chronically present on and off for several years.  The patient recalls having been evaluated by a hematologist greater than 10 years ago for this and also underwent bone marrow aspiration and biopsy.  She reports that all of the results were benign at that time.  2.  Review of her peripheral smear does not show any significant abnormalities.  Her rheumatoid arthritis appears to be well-controlled at this time as noted by her inflammatory markers.  3.  Results of labwork done for further evaluation of her chronic macrocytic anemia were previously reviewed in detail.  This is most likely due to her chronic treatment with methotrexate.  Vitamin B12 and folate levels look ok.   4.  I had a detailed discussion about the various possible etiologies for her monoclonal paraproteinemia. Results of prior 24 hour UPEP with immunofixation reviewed and also look unremarkable.  5.  Results of CT scans of the thorax/abdomen/pelvis to evaluate for any evidence of pathologic lymphadenopathy suggestive of any evidence of malignancy in the context of leukocytosis with history of rheumatoid arthritis and immunosuppressive therapy were discussed in detail. Recent PET/CT results were also reviewed. Continue pulmonary follow up with Dr. Varela as recommended.  6.  Continue follow up with Dr. Beck with urology as  recommended for complex left kidney cyst.  7.  Results of final report of bone marrow aspiration and biopsy done at [facility] done on 6/1/16 were discussed in detail.  She has a hypercellular marrow with trilineage dyspoiesis and megakaryocytic hyperplasia.  She has relatively increased atypical myeloid blasts which constitute 5% of analyzed cells and approximately 5% clonal lambda restricted plasma cells.  She also has a small CD5 positive clonal B-cell population which constitutes 1% of the sample with a B-cell chronic lymphocytic leukemia/small lymphocytic lymphoma immunophenotype identified by flow cytometry only.  She has adequate bone marrow storage iron with no ringed sideroblasts. She had an abnormal myeloma FISH panel but the overall clinical picture at this time does not appear to support a diagnosis of multiple myeloma. Her overall clinical picture is most suggestive of medication related changes due to methotrexate and less likely to be other etiologies including a myelodysplastic/myeloproliferative neoplasm.  However we will continue with close monitoring at this time. We discussed repeating bone marrow biopsy in the near future based on follow up over the next few months if indicated.    Follow-up in 4 months. She knows to call sooner for any new problems or questions.    Sathish Devine MD

## 2017-05-18 ENCOUNTER — OFFICE VISIT (OUTPATIENT)
Dept: PODIATRY | Facility: CLINIC | Age: 77
End: 2017-05-18
Payer: MEDICARE

## 2017-05-18 VITALS
SYSTOLIC BLOOD PRESSURE: 170 MMHG | WEIGHT: 129.19 LBS | DIASTOLIC BLOOD PRESSURE: 78 MMHG | HEIGHT: 63 IN | BODY MASS INDEX: 22.89 KG/M2 | HEART RATE: 71 BPM

## 2017-05-18 DIAGNOSIS — M20.41 HAMMERTOES OF BOTH FEET: ICD-10-CM

## 2017-05-18 DIAGNOSIS — M20.42 HAMMERTOES OF BOTH FEET: ICD-10-CM

## 2017-05-18 DIAGNOSIS — M20.31 ACQUIRED HALLUX MALLEUS OF BOTH FEET: Primary | ICD-10-CM

## 2017-05-18 DIAGNOSIS — M20.32 ACQUIRED HALLUX MALLEUS OF BOTH FEET: Primary | ICD-10-CM

## 2017-05-18 PROCEDURE — 1159F MED LIST DOCD IN RCRD: CPT | Mod: S$GLB,,, | Performed by: PODIATRIST

## 2017-05-18 PROCEDURE — 1126F AMNT PAIN NOTED NONE PRSNT: CPT | Mod: S$GLB,,, | Performed by: PODIATRIST

## 2017-05-18 PROCEDURE — 1160F RVW MEDS BY RX/DR IN RCRD: CPT | Mod: S$GLB,,, | Performed by: PODIATRIST

## 2017-05-18 PROCEDURE — 3077F SYST BP >= 140 MM HG: CPT | Mod: S$GLB,,, | Performed by: PODIATRIST

## 2017-05-18 PROCEDURE — 99214 OFFICE O/P EST MOD 30 MIN: CPT | Mod: S$GLB,,, | Performed by: PODIATRIST

## 2017-05-18 PROCEDURE — 3078F DIAST BP <80 MM HG: CPT | Mod: S$GLB,,, | Performed by: PODIATRIST

## 2017-05-18 PROCEDURE — 99999 PR PBB SHADOW E&M-EST. PATIENT-LVL III: CPT | Mod: PBBFAC,,, | Performed by: PODIATRIST

## 2017-05-18 NOTE — PROGRESS NOTES
[facility] -   PODIATRIC MEDICINE AND SURGERY  PROGRESS NOTE  5/18/2017    PODIATRY NOTE  PCP: Dr. Briseida Bennett MD    CHIEF COMPLAINT   Chief Complaint   Patient presents with    Bunions     Bilateral bunions. Discuss treatment options       HPI  Sarah Parker is a 77 y.o. female who has a past medical history of Acid reflux; Anxiety; Back pain; Bronchitis, chronic obstructive w acute bronchitis (7/29/2016); Cancer; Cataract; Degenerative disc disease; Depression; Dry mouth; Hernia, hiatal (11/18/2013); Hypertension; Hypothyroid; Macrocytic anemia (5/3/2016); Macular degeneration; Migraines; Mixed anxiety and depressive disorder; Multiple fractures of ribs of right side; Osteoporosis; Pneumonia; Rheumatoid arthritis; Rheumatoid arthritis; and Rheumatoid arthritis.     Sarah presents to clinic today complaining of bilateral great toe pain.    Patient describes pain as:   Location:bilateral great toes   Quality: intermittent  Achy   Severity:6/10  Duration: several years; she has history of multiple foot surgeries several years ago for bunions, arthriti, and hammertoes   Modifying Factors (Aggravating): weight bearing, tight shoes   Modifying Factors (Alleviating): non weight bearing, wearing wider shoes     Patient denies other pedal complaints at this time.      PMH  Past Medical History:   Diagnosis Date    Acid reflux     Anxiety     Back pain     Bronchitis, chronic obstructive w acute bronchitis 7/29/2016    Cancer     [facility] arms, face- Dr. Lata Tejada    Cataract     2+NS    Degenerative disc disease     Depression     Dry mouth     Hernia, hiatal 11/18/2013    Hypertension     Hypothyroid     Macrocytic anemia 5/3/2016    Macular degeneration     Migraines     Mixed anxiety and depressive disorder     Multiple fractures of ribs of right side     Osteoporosis     Pneumonia     Rheumatoid arthritis     Rheumatoid arthritis     Remicade, MTX.    Rheumatoid arthritis         PROBLEM LIST  Patient Active Problem List    Diagnosis Date Noted    COPD with exacerbation 11/04/2016    History of skin cancer 10/26/2016    Chronic bronchitis 10/26/2016    Calcified granuloma of lung 10/26/2016    Renal mass 08/04/2016    Multiple lung nodules on CT 07/11/2016    Macrocytic anemia 05/03/2016    Leukocytosis 05/03/2016    Osteopenia 11/02/2015    Essential hypertension     Hiatal hernia with gastroesophageal reflux 11/18/2013    ARMD (age related macular degeneration) 11/11/2013    Atherosclerosis of aorta 09/03/2013    Acquired hypothyroidism     Mild major depression     Rheumatoid arthritis 06/19/2013    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 180 mL 0    amitriptyline (ELAVIL) 75 MG tablet TAKE 1 TABLET BY MOUTH EVERY EVENING 90 tablet 3    atenolol (TENORMIN) 50 MG tablet TAKE 1 TABLET BY MOUTH EVERY DAY 30 tablet 11    calcium citrate-vitamin D (CITRACAL + D) 315-200 mg-unit per tablet Take 1 tablet by mouth once daily.       citalopram (CELEXA) 10 MG tablet Take 1 tablet (10 mg total) by mouth once daily. (Patient taking differently: Take 10 mg by mouth nightly. ) 90 tablet 3    hydrocodone-acetaminophen 5-325mg (NORCO) 5-325 mg per tablet TK 1 T PO Q 6 H PRN  0    leucovorin (WELLCOVORIN) 5 mg Tab TAKE ONE WEEKLY ON SATURDAYS 4 tablet 3    levothyroxine (SYNTHROID) 88 MCG tablet TAKE 1 TABLET BY MOUTH BEFORE BREAKFAST 30 tablet 11    meclizine (ANTIVERT) 50 MG tablet Take 25 mg by mouth 3 (three) times daily as needed.      meloxicam (MOBIC) 7.5 MG tablet Take 1 tablet (7.5 mg total) by mouth once daily. Take with food 30 tablet 0    methotrexate 2.5 MG Tab Take 17.5 mg by mouth every 7 days.       multivitamin capsule Take by  mouth. As directed      oxymetazoline (AFRIN) 0.05 % nasal spray 1 Aerosol, Spray Nasal At bedtime      tocilizumab (ACTEMRA) 80 mg/4 mL (20 mg/mL) Soln Inject into the vein.      valsartan-hydrochlorothiazide (DIOVAN-HCT) 80-12.5 mg per tablet TAKE 1 TABLET BY MOUTH DAILY 90 tablet 3    VITAMIN D2 50,000 unit capsule TAKE 1 CAPSULE BY MOUTH EVERY 7 DAYS (Patient taking differently: TAKE 1 CAPSULE BY MOUTH EVERY 14 days) 4 capsule 11    ondansetron (ZOFRAN) 4 MG tablet Take 1 tablet (4 mg total) by mouth every 8 (eight) hours as needed for Nausea. 30 tablet 1     No current facility-administered medications on file prior to visit.        Medication List with Changes/Refills   Current Medications    ALBUTEROL (PROVENTIL) 2.5 MG /3 ML (0.083 %) NEBULIZER SOLUTION    Take 3 mLs (2.5 mg total) by nebulization every 8 (eight) hours while awake.    AMITRIPTYLINE (ELAVIL) 75 MG TABLET    TAKE 1 TABLET BY MOUTH EVERY EVENING    ATENOLOL (TENORMIN) 50 MG TABLET    TAKE 1 TABLET BY MOUTH EVERY DAY    CALCIUM CITRATE-VITAMIN D (CITRACAL + D) 315-200 MG-UNIT PER TABLET    Take 1 tablet by mouth once daily.     CITALOPRAM (CELEXA) 10 MG TABLET    Take 1 tablet (10 mg total) by mouth once daily.    HYDROCODONE-ACETAMINOPHEN 5-325MG (NORCO) 5-325 MG PER TABLET    TK 1 T PO Q 6 H PRN    LEUCOVORIN (WELLCOVORIN) 5 MG TAB    TAKE ONE WEEKLY ON SATURDAYS    LEVOTHYROXINE (SYNTHROID) 88 MCG TABLET    TAKE 1 TABLET BY MOUTH BEFORE BREAKFAST    MECLIZINE (ANTIVERT) 50 MG TABLET    Take 25 mg by mouth 3 (three) times daily as needed.    MELOXICAM (MOBIC) 7.5 MG TABLET    Take 1 tablet (7.5 mg total) by mouth once daily. Take with food    METHOTREXATE 2.5 MG TAB    Take 17.5 mg by mouth every 7 days.     MULTIVITAMIN CAPSULE    Take by mouth. As directed    ONDANSETRON (ZOFRAN) 4 MG TABLET    Take 1 tablet (4 mg total) by mouth every 8 (eight) hours as needed for Nausea.    OXYMETAZOLINE (AFRIN) 0.05 % NASAL SPRAY    1 Aerosol, Spray  Nasal At bedtime    TOCILIZUMAB (ACTEMRA) 80 MG/4 ML (20 MG/ML) SOLN    Inject into the vein.    VALSARTAN-HYDROCHLOROTHIAZIDE (DIOVAN-HCT) 80-12.5 MG PER TABLET    TAKE 1 TABLET BY MOUTH DAILY    VITAMIN D2 50,000 UNIT CAPSULE    TAKE 1 CAPSULE BY MOUTH EVERY 7 DAYS       PSH     Past Surgical History:   Procedure Laterality Date    APPENDECTOMY  1985    CATARACT EXTRACTION Bilateral 6/11/15    Dr. Booth    CHOLECYSTECTOMY  2013    cryoablasion kidney Left 09/27/2016    feet Bilateral     rheumatoid    FRACTURE SURGERY Right     tibia    HERNIA REPAIR      HYSTERECTOMY  1970    partial    JOINT REPLACEMENT      bilateral knees (2008), hands, wrists, knuckles, toes    LAPAROSCOPIC NISSEN FUNDOPLICATION          ALL  Review of patient's allergies indicates:   Allergen Reactions    Codeine      Other reaction(s): hyper  Other reaction(s): hyper       SOC     Social History   Substance Use Topics    Smoking status: Former Smoker     Packs/day: 0.25     Years: 2.00     Quit date: 11/2/1965    Smokeless tobacco: Never Used    Alcohol use No         FAMILY HX    Family History   Problem Relation Age of Onset    Heart disease Mother     Hyperlipidemia Mother     Hypertension Mother     Osteoarthritis Mother     Cataracts Mother     Hypertension Father     Osteoarthritis Father     Heart disease Father     Asthma Sister     Chronic back pain Sister     Hypertension Sister     Osteoarthritis Sister     Thyroid disease Sister     Asthma Brother     Cancer Brother     Chronic back pain Brother     Diabetes Mellitus Brother     Hypertension Brother     Osteoarthritis Brother     Thyroid disease Brother     Cancer Maternal Grandfather     Fibromyalgia Daughter     Heart disease Maternal Grandmother     Colon cancer Neg Hx     Diabetes Neg Hx             REVIEW OF SYSTEMS  General: Denies any fever or chills  Chest: Denies shortness of breath, wheezing, coughing, or sputum  "production  Heart: Denies chest pain, cold extremities, orthopenia, or reduced exercise tolerance  As noted above and per history of current illness above, otherwise negative in the remainder of the 14 systems.     PHYSICAL EXAM  Vitals:    05/18/17 1143   BP: (!) 170/78   Pulse: 71   Weight: 58.6 kg (129 lb 3 oz)   Height: 5' 2.5" (1.588 m)   PainSc: 0-No pain       General: This patient is well-developed, well-nourished and appears stated age, well-oriented to person, place and time, and cooperative and pleasant on today's visit      LOWER EXTREMITY  Vascular exam:   · Dorsalis pedis and posterior tibial pulses palpable 2/4 bilaterally.   · Capillary refill time immediate to the toes.   · Feet are warm to the touch. Skin temperature warm to warm from proximally to distally   · There are varicosities, telangiectasias noted to bilateral foot and ankle regions.   · There are no ecchymoses noted to bilateral foot and ankle regions.   · There is no gross lower extremity edema.    Dermatologic exam:   · Skin moist with healthy texture and turgor.  · There are no open ulcerations, lacerations, or fissures to bilateral foot and ankle regions. There are no signs of infection as there is no erythema, no proximal-extending lymphangiitis, no fluctuance, or crepitus noted on palpation to bilateral foot and ankle regions.   · There is no interdigital maceration.   · There are no hyperkeratotic lesions noted to feet. Nails are well-trimmed.    Neurologic exam:  · Epicritic sensation is intact as the patient is able to sense light touch to bilateral foot and ankle regions.   · Achilles and patellar deep tendon reflexes intact  · Babinski reflex absent    Musculoskeletal/Orthopedic exam:   · Rigid digital contactures b/l hallux IPJ with abduction deformity  · Muscle strength AT/EHL/EDL/PT: 5/5; Achilles/Gastroc/Soleus: 5/5; PB/PL: 5/5 Muscle tone is normal.  · Ankle joint ROM  B/L supple DF/PF, non-crepitus      IMAGING   Reviewed " by me and I agree with radiologist findings, 3 views of foot/ankle, reveal:    Results for orders placed during the hospital encounter of 04/13/17   X-Ray Foot Complete 3 view Left    Narrative 3 views of the left foot    Comparison: Study from 07/21/2014    Findings: There are 2 screws seen across the 1st MTP joint.  There is chronic deformity/nonunited fractures/postoperative changes involving the necks of the 2nd through 5th metatarsal heads.  No acute fractures are identified.  There is a flexion deformity at the interphalangeal joint of the 1st digit.  There is also some lateral subluxation/deviation at the interphalangeal joint of the 1st digit.  There are degenerative changes noted at the talonavicular joint and at the tarsal metatarsal joints.  Soft tissue calcification seen inferior to the calcaneus stable as well.    Impression  No significant overall change.      Electronically signed by: GWEN MONTAÑO D.O.  Date:     04/13/17  Time:    11:09           ASSESSMENT  Acquired hallux malleus of both feet    Hammertoes of both feet      PLAN    1. Patient was educated about clinical and imaging findings, and verbalizes understanding of above.  2. Treatment plan: Reviewed findings and explained condition to the patient with visual reference to the foot and x-rays. Patient was educated and counseled regarding hallux malleus. I explained the abnormal mechanics have lead to overpowering of the long flexor tendon and degenerative changes of the big toe joint. Sometimes surgical intervention involving tendon transfer or joint fusion necessary to resolve symptoms if conservative treatment measures fail. We discussed the use of NSAIDs, injections, and functional foot orthotics to improve pain and mechanics of the foot. The patient was given a prescription for orthotics and extra depth shoes. Will manage conservatively until options are exhausted   3. RTC  for follow up/evaluation as scheduled       Future  Appointments  Date Time Provider Department Center   5/23/2017 10:15 AM Octavio Navarro OD [facility] OPHTHAL [place]   5/24/2017 9:00 AM Briseida Reyes MD [facility] IM [place]   8/21/2017 11:00 AM [facility] [place]   8/24/2017 8:30 AM [facility] CT1 LIMIT 500 LBS [facility] CT SCAN [place]   8/28/2017 8:40 AM Fam Beck IV, MD ON UROLOGY [facility]   9/19/2017 2:40 PM Sathish Devine MD [facility] HEM ONC [place]   10/30/2017 10:00 AM [facility] XR2 SUMH XRAY [place]   10/30/2017 10:20 AM PULMONARY LAB, [facility] PULMLAB [place]   10/30/2017 11:00 AM Sonu Varela MD [facility] PULMSVC [place]       Report Electronically Signed By:  Eden Gardiner DPM   Podiatric Medicine & Surgery  [facility]  5/18/2017

## 2017-05-24 ENCOUNTER — OFFICE VISIT (OUTPATIENT)
Dept: INTERNAL MEDICINE | Facility: CLINIC | Age: 77
End: 2017-05-24
Payer: MEDICARE

## 2017-05-24 VITALS
HEIGHT: 63 IN | TEMPERATURE: 98 F | BODY MASS INDEX: 20.12 KG/M2 | DIASTOLIC BLOOD PRESSURE: 76 MMHG | WEIGHT: 113.56 LBS | OXYGEN SATURATION: 98 % | HEART RATE: 79 BPM | SYSTOLIC BLOOD PRESSURE: 132 MMHG

## 2017-05-24 DIAGNOSIS — J42 CHRONIC BRONCHITIS, UNSPECIFIED CHRONIC BRONCHITIS TYPE: ICD-10-CM

## 2017-05-24 DIAGNOSIS — F51.01 PRIMARY INSOMNIA: ICD-10-CM

## 2017-05-24 DIAGNOSIS — I10 ESSENTIAL HYPERTENSION: Chronic | ICD-10-CM

## 2017-05-24 DIAGNOSIS — I70.0 ATHEROSCLEROSIS OF AORTA: ICD-10-CM

## 2017-05-24 DIAGNOSIS — K59.00 CONSTIPATION, UNSPECIFIED CONSTIPATION TYPE: ICD-10-CM

## 2017-05-24 DIAGNOSIS — Z29.9 PREVENTIVE MEASURE: ICD-10-CM

## 2017-05-24 DIAGNOSIS — M05.711 RHEUMATOID ARTHRITIS INVOLVING RIGHT SHOULDER WITH POSITIVE RHEUMATOID FACTOR: Chronic | ICD-10-CM

## 2017-05-24 DIAGNOSIS — E03.9 ACQUIRED HYPOTHYROIDISM: Primary | ICD-10-CM

## 2017-05-24 DIAGNOSIS — F32.0 MILD MAJOR DEPRESSION: ICD-10-CM

## 2017-05-24 PROCEDURE — 99499 UNLISTED E&M SERVICE: CPT | Mod: S$GLB,,, | Performed by: INTERNAL MEDICINE

## 2017-05-24 PROCEDURE — 99214 OFFICE O/P EST MOD 30 MIN: CPT | Mod: S$GLB,,, | Performed by: INTERNAL MEDICINE

## 2017-05-24 PROCEDURE — 1159F MED LIST DOCD IN RCRD: CPT | Mod: S$GLB,,, | Performed by: INTERNAL MEDICINE

## 2017-05-24 PROCEDURE — 3075F SYST BP GE 130 - 139MM HG: CPT | Mod: S$GLB,,, | Performed by: INTERNAL MEDICINE

## 2017-05-24 PROCEDURE — 1160F RVW MEDS BY RX/DR IN RCRD: CPT | Mod: S$GLB,,, | Performed by: INTERNAL MEDICINE

## 2017-05-24 PROCEDURE — 3078F DIAST BP <80 MM HG: CPT | Mod: S$GLB,,, | Performed by: INTERNAL MEDICINE

## 2017-05-24 PROCEDURE — 1126F AMNT PAIN NOTED NONE PRSNT: CPT | Mod: S$GLB,,, | Performed by: INTERNAL MEDICINE

## 2017-05-24 PROCEDURE — 1157F ADVNC CARE PLAN IN RCRD: CPT | Mod: 8P,S$GLB,, | Performed by: INTERNAL MEDICINE

## 2017-05-24 PROCEDURE — 99999 PR PBB SHADOW E&M-EST. PATIENT-LVL III: CPT | Mod: PBBFAC,,, | Performed by: INTERNAL MEDICINE

## 2017-05-24 RX ORDER — LANOLIN ALCOHOL/MO/W.PET/CERES
400 CREAM (GRAM) TOPICAL DAILY
Qty: 100 TABLET | Refills: 6 | Status: SHIPPED | OUTPATIENT
Start: 2017-05-24 | End: 2017-11-21

## 2017-05-24 RX ORDER — HYDROXYZINE HYDROCHLORIDE 25 MG/1
25-50 TABLET, FILM COATED ORAL NIGHTLY PRN
Qty: 40 TABLET | Refills: 3 | Status: SHIPPED | OUTPATIENT
Start: 2017-05-24 | End: 2017-11-01 | Stop reason: SDUPTHER

## 2017-05-24 NOTE — PROGRESS NOTES
"Subjective:       Patient ID: Sarah Parker is a 77 y.o. female.    Chief Complaint: Follow-up    Here for follow up of medical problems.  Granddaughter committed suicide a few weeks ago, stress due to this.  Citalopram not working so well.  Can't sleep.  Breathing ok.  Appetite is low to normal.  No f/c/sw/cough.  No cp/sob/palp.  BMs slow, only taking gas pills.  Probiotics didn't help.  Norco about every 3-4 days.    Updated/ annual due 9/17:  HM: 10/16 fluvax, 5/16 [id number], 10/14 [id number], 10/13 TDaP, 1/17 BMD/will bring to Dr. Tejada rep 2y, 1/17 MMG, 10/13 EGD, 2015 Cscope Dr. Garcia rep 5y.            Review of Systems   Constitutional: Negative for chills, diaphoresis and fever.   Respiratory: Negative for cough and shortness of breath.    Cardiovascular: Negative for chest pain, palpitations and leg swelling.   Gastrointestinal: Negative for blood in stool, constipation, diarrhea, nausea and vomiting.   Genitourinary: Negative for dysuria, frequency and hematuria.   Psychiatric/Behavioral: The patient is not nervous/anxious.        Objective:   /76 (BP Location: Right arm, Patient Position: Sitting, BP Method: Manual)   Pulse 79   Temp 97.7 °F (36.5 °C) (Tympanic)   Ht 5' 2.5" (1.588 m)   Wt 51.5 kg (113 lb 8.6 oz)   SpO2 98%   BMI 20.44 kg/m²     Physical Exam   Constitutional: She is oriented to person, place, and time. She appears well-developed.   HENT:   Mouth/Throat: Oropharynx is clear and moist.   Neck: Neck supple. Carotid bruit is not present. No thyroid mass present.   Cardiovascular: Normal rate, regular rhythm and intact distal pulses.  Exam reveals no gallop and no friction rub.    No murmur heard.  Pulmonary/Chest: Effort normal. She has no wheezes. She has rales (scattered lower lungs).   Abdominal: Soft. Bowel sounds are normal. She exhibits no mass. There is no hepatosplenomegaly. There is no tenderness.   Musculoskeletal: She exhibits no edema.   Lymphadenopathy:     " She has no cervical adenopathy.   Neurological: She is alert and oriented to person, place, and time.   Psychiatric: She has a normal mood and affect.       Assessment:       1. Acquired hypothyroidism    2. Chronic bronchitis, unspecified chronic bronchitis type    3. Essential hypertension    4. Rheumatoid arthritis involving right shoulder with positive rheumatoid factor    5. Mild major depression    6. Primary insomnia    7. Atherosclerosis of aorta    8. Constipation, unspecified constipation type    9. Preventive measure        Plan:       Sarah was seen today for follow-up.    Diagnoses and all orders for this visit:    Acquired hypothyroidism- clin stable.    Chronic bronchitis, unspecified chronic bronchitis type- doing well.    Essential hypertension- stable on rx.    Rheumatoid arthritis involving right shoulder with positive rheumatoid factor- doing ok on treatment with prn norco.    Mild major depression- cont citalopram.    Primary insomnia- dry hydroxy, if not effective will use xanax so that effect will wear off by the morning/ cares for .  -     hydrOXYzine HCl (ATARAX) 25 MG tablet; Take 1-2 tablets (25-50 mg total) by mouth nightly as needed for Anxiety (and insomnia).    Atherosclerosis of aorta- chol very good level.    Constipation, unspecified constipation type- try MgO.  -     magnesium oxide (MAG-OX) 400 mg tablet; Take 1 tablet (400 mg total) by mouth once daily.    RTC 4 mo for annual.

## 2017-05-26 ENCOUNTER — OFFICE VISIT (OUTPATIENT)
Dept: OPHTHALMOLOGY | Facility: CLINIC | Age: 77
End: 2017-05-26
Payer: MEDICARE

## 2017-05-26 DIAGNOSIS — Z96.1 PSEUDOPHAKIA OF BOTH EYES: Primary | ICD-10-CM

## 2017-05-26 DIAGNOSIS — I10 ESSENTIAL HYPERTENSION: Chronic | ICD-10-CM

## 2017-05-26 DIAGNOSIS — H52.4 BILATERAL PRESBYOPIA: ICD-10-CM

## 2017-05-26 PROCEDURE — 99499 UNLISTED E&M SERVICE: CPT | Mod: S$GLB,,, | Performed by: OPTOMETRIST

## 2017-05-26 PROCEDURE — 92014 COMPRE OPH EXAM EST PT 1/>: CPT | Mod: S$GLB,,, | Performed by: OPTOMETRIST

## 2017-05-26 PROCEDURE — 99999 PR PBB SHADOW E&M-EST. PATIENT-LVL I: CPT | Mod: PBBFAC,,, | Performed by: OPTOMETRIST

## 2017-05-26 PROCEDURE — 92015 DETERMINE REFRACTIVE STATE: CPT | Mod: S$GLB,,, | Performed by: OPTOMETRIST

## 2017-05-26 NOTE — PROGRESS NOTES
HPI     No visual complaints. Last eye visit 09/01/2015 TRF. Left eye has been   having a thick yellowish mucus in it since January. Both eyelids stick   together in the morning.  PCIOL OS 07/30/15 + 21.5WF/CDE 8.04  PCIOL OD +21.5 SN60WF / CDE 9.46  AMD   RA X 36 YEARS    Systane BID OU  Systane gel QHS OU    Last edited by Jaymie Banda on 5/26/2017 11:22 AM. (History)            Assessment /Plan     For exam results, see Encounter Report.    Pseudophakia of both eyes    Essential hypertension    Bilateral presbyopia      Stable IOL OU    No HTN Retinopathy    Dispense Final Rx for glasses.  RTC 1 year

## 2017-07-06 ENCOUNTER — TELEPHONE (OUTPATIENT)
Dept: ORTHOPEDICS | Facility: CLINIC | Age: 77
End: 2017-07-06

## 2017-07-06 NOTE — TELEPHONE ENCOUNTER
Spoke with patient in regards to her severe pain in left leg. Patient asked to be put on the wait list if anyone cancels. Patient declined next available appt date and time. Pt verbalized all understanding of the wait list instructions. -AS

## 2017-07-24 ENCOUNTER — TELEPHONE (OUTPATIENT)
Dept: INTERNAL MEDICINE | Facility: CLINIC | Age: 77
End: 2017-07-24

## 2017-07-24 NOTE — TELEPHONE ENCOUNTER
----- Message from Dianna Almaraz sent at 7/24/2017  8:39 AM CDT -----  Patient states that she thinks that she has a pinched nerve in her lower back and she is in a lot of pain.   She would like a referral to see Dr Elliot Barry as soon as possible.   Call her at [phone] or [phone].                                                kirk

## 2017-07-25 ENCOUNTER — TELEPHONE (OUTPATIENT)
Dept: INTERNAL MEDICINE | Facility: CLINIC | Age: 77
End: 2017-07-25

## 2017-07-25 DIAGNOSIS — M54.9 BACK PAIN, UNSPECIFIED BACK LOCATION, UNSPECIFIED BACK PAIN LATERALITY, UNSPECIFIED CHRONICITY: Primary | ICD-10-CM

## 2017-07-25 NOTE — TELEPHONE ENCOUNTER
----- Message from Robyn Warner LPN sent at 7/24/2017  9:26 AM CDT -----  Pt informed will send message to Dr. Bennett office to call tomorrow with referral. pls contact pt.  ----- Message -----  From: Dianna Almaraz  Sent: 7/24/2017   8:39 AM  To: Eric [facility]ORROW Staff    Patient states that she thinks that she has a pinched nerve in her lower back and she is in a lot of pain.   She would like a referral to see Dr Elliot Barry as soon as possible.   Call her at [phone] or [phone].                                                kirk

## 2017-07-25 NOTE — TELEPHONE ENCOUNTER
Pt is requesting a referral to see Dr. Jhonny menendez.  She thinks she has a pinched nerve in her back and it is causing a lot of pain.  Please sign attached referral and send back for scheduling if you approve./rpr

## 2017-07-27 ENCOUNTER — OFFICE VISIT (OUTPATIENT)
Dept: PAIN MEDICINE | Facility: CLINIC | Age: 77
End: 2017-07-27
Payer: MEDICARE

## 2017-07-27 VITALS
BODY MASS INDEX: 22.08 KG/M2 | WEIGHT: 120 LBS | HEART RATE: 72 BPM | DIASTOLIC BLOOD PRESSURE: 79 MMHG | RESPIRATION RATE: 16 BRPM | HEIGHT: 62 IN | SYSTOLIC BLOOD PRESSURE: 137 MMHG

## 2017-07-27 DIAGNOSIS — M47.817 SPONDYLOSIS OF LUMBOSACRAL REGION WITHOUT MYELOPATHY OR RADICULOPATHY: Primary | ICD-10-CM

## 2017-07-27 DIAGNOSIS — M54.16 BILATERAL LUMBAR RADICULOPATHY: ICD-10-CM

## 2017-07-27 PROCEDURE — 99499 UNLISTED E&M SERVICE: CPT | Mod: S$GLB,,, | Performed by: ANESTHESIOLOGY

## 2017-07-27 PROCEDURE — 1125F AMNT PAIN NOTED PAIN PRSNT: CPT | Mod: S$GLB,,, | Performed by: ANESTHESIOLOGY

## 2017-07-27 PROCEDURE — 99214 OFFICE O/P EST MOD 30 MIN: CPT | Mod: S$GLB,,, | Performed by: ANESTHESIOLOGY

## 2017-07-27 PROCEDURE — 1159F MED LIST DOCD IN RCRD: CPT | Mod: S$GLB,,, | Performed by: ANESTHESIOLOGY

## 2017-07-27 PROCEDURE — 99999 PR PBB SHADOW E&M-EST. PATIENT-LVL III: CPT | Mod: PBBFAC,,, | Performed by: ANESTHESIOLOGY

## 2017-07-27 RX ORDER — METHYLPREDNISOLONE 4 MG/1
TABLET ORAL
Qty: 1 PACKAGE | Refills: 0 | Status: SHIPPED | OUTPATIENT
Start: 2017-07-27 | End: 2017-09-19

## 2017-07-27 NOTE — LETTER
July 27, 2017      Briseida Reyes MD  [address]  [place] [facility] [address]           [facility]  [address]  [place] [address]  Phone: [phone]  Fax: [phone]          Patient: Sarah Parker   MR Number: [id number]   YOB: 1940   Date of Visit: 7/27/2017       Dear Dr. Briseida Reyes:    Thank you for referring Sarah Parker to me for evaluation. Attached you will find relevant portions of my assessment and plan of care.    If you have questions, please do not hesitate to call me. I look forward to following Sarah Parker along with you.    Sincerely,    Elliot Barry MD    Enclosure  CC:  No Recipients    If you would like to receive this communication electronically, please contact [email or web address] or [phone] to request more information on [facility] access.    For providers and/or their staff who would like to refer a patient to [facility], please contact us through our one-stop-shop provider referral line, [facility], at [phone].    If you feel you have received this communication in error or would no longer like to receive these types of communications, please e-mail [email or web address]

## 2017-07-27 NOTE — PROGRESS NOTES
Chief Pain Complaint:  Lower back pain, bilateral leg pain    History of Present Illness:   This patient is a 77 y.o. female who presents today complaining of the above noted pain/s. The patient describes the pain as follows.    - duration of pain: 3 weeks   - timing: intermittent   - character: aching, sharp  - radiating, dermatomal: extends into bilateral lower extremities posteriorly, S1  - antecedent trauma, prior spinal surgery: patient reports prior trauma, no prior spinal surgery   - pertinent negatives: No fever, No chills, No weight loss, No bladder dysfunction, No bowel dysfunction, No saddle anesthesia  - pertinent positives: generalized nonspecific Lower Extremity weakness bilaterally    - medications, other therapies tried (physical therapy, injections):     >> Tylenol, Norco    >> Has NOT previously undergone Physical Therapy    >> Has NOT previously undergone spinal injection/s, an AYAAN with Dr. Gray      Imaging / Labs / Studies (reviewed on 7/27/2017):      Results for orders placed in visit on 08/19/10   X-Ray Lumbar Spine Complete 5 View    Narrative DATE OF EXAM: Aug 19 2010   RESULTS: THE BONES ARE DIFFUSELY DEMINERALIZED.  THERE IS MILD   DEXTROSCOLIOSIS.  GRADE 2 ANTEROLISTHESIS OF APPROXIMATELY 1.1 CM IS   IDENTIFIED AT L4-5.  MINIMAL GRADE 1 ANTEROLISTHESIS IS PRESENT AT L2-3.    THERE IS MULTILEVEL DEGENERATIVE VERTEBRAL END PLATE SPURRING, DISC SPACE   NARROWING, AND FACET ARTHROPATHY.  THE PEDICLES APPEAR INTACT.  THERE IS   NO VERTEBRAL COMPRESSION FRACTURE.         Review of Systems:  CONSTITUTIONAL: patient denies any fever, chills, or weight loss  SKIN: patient denies any rash or itching  RESPIRATORY: patient denies having any shortness of breath  GASTROINTESTINAL: patient reports constipation  GENITOURINARY: patient denies having any abnormal bladder function    MUSCULOSKELETAL:  - patient complains of the above noted pain/s (see chief pain complaint)    NEUROLOGICAL:   - pain as  "above  - strength in Lower extremities is decreased, BILATERALLY  - sensation in Lower extremities is abnormal, BILATERALLY  - patient denies any loss of bowel or bladder control      PSYCHIATRIC: patient reports a history of anxiety and depression    Other:  All other systems reviewed and are negative      Physical Exam:  /79 (BP Location: Right arm, Patient Position: Sitting, BP Method: Automatic)   Pulse 72   Resp 16   Ht 5' 2" (1.575 m)   Wt 54.4 kg (120 lb)   BMI 21.95 kg/m²  (reviewed on 7/27/2017)  General: alert and oriented, in no apparent distress  Gait: normal gait  Skin: No rashes, No discoloration, No obvious lesions  HEENT: EOMI  Cardiovascular: no significant peripheral edema present  Respiratory: respirations nonlabored    Musculoskeletal:  - Any pain on flexion, extension, rotation:    >> pain on extension and rotation  - Straight Leg Raise:     >> LEFT :: negative    >> RIGHT :: negative    - Any tenderness to palpation across paraspinal muscles, joints, bursae:     >> across lumbar paraspinals    Neuro:  - Extremity Strength:     >> LEFT :: 5/5    >> RIGHT :: 5/5     Psych:  Mood and affect is appropriate      Assessment:  Lumbar Spondylosis  Lumbar Radiculopathy      Plan:  Patient presents today complaining of low back and bilateral lower extremity pain that extends posteriorly for approximately 3 weeks.  Patient has a history of rheumatoid arthritis, she also notes having an episode of sciatica years prior which was treated with an epidural steroid injection.  I will prescribe patient a Medrol Dosepak and she will give her pain another 1-2 weeks.  If pain persists then I will check a lumbar MRI and consider a spinal injection.  She has been taking Norco which helps and she does not require a refill at this time.  This medication is prescribed by her rheumatologist.  Imaging / studies reviewed, detailed above.  I discussed in detail the risks, benefits, and alternatives to any and all " potential treatment options.  All questions and concerns were fully addressed today in clinic.      Disclaimer:  This note may have been prepared using voice recognition software, it may have not been extensively proofed, as such there could be errors within the text such as sound alike errors.

## 2017-07-28 RX ORDER — VALSARTAN AND HYDROCHLOROTHIAZIDE 80; 12.5 MG/1; MG/1
TABLET, FILM COATED ORAL
Qty: 90 TABLET | Refills: 3 | Status: SHIPPED | OUTPATIENT
Start: 2017-07-28 | End: 2018-07-30 | Stop reason: SDUPTHER

## 2017-08-08 ENCOUNTER — TELEPHONE (OUTPATIENT)
Dept: ORTHOPEDICS | Facility: CLINIC | Age: 77
End: 2017-08-08

## 2017-08-08 NOTE — TELEPHONE ENCOUNTER
Pt c/o pain to bilateral hips. Requesting appt before Aug 31st. Pt has seen Dr. Barry in Interventional pain mgmt and is to follow up with him.  We will send a message to Dr. Barry's staff for them to speak with her. Pt verbalized understanding.

## 2017-08-09 ENCOUNTER — TELEPHONE (OUTPATIENT)
Dept: CARDIOLOGY | Facility: HOSPITAL | Age: 77
End: 2017-08-09

## 2017-08-09 ENCOUNTER — TELEPHONE (OUTPATIENT)
Dept: RADIOLOGY | Facility: HOSPITAL | Age: 77
End: 2017-08-09

## 2017-08-09 DIAGNOSIS — M54.16 LUMBAR RADICULOPATHY: Primary | ICD-10-CM

## 2017-08-09 NOTE — TELEPHONE ENCOUNTER
----- Message from Mandy Umaña sent at 8/9/2017  8:39 AM CDT -----  Contact: Pt  Pt called and stated she needed to speak to the nurse. She stated that she needs an MRI. She can be reached at [phone] (home)       Thanks,  TF

## 2017-08-09 NOTE — TELEPHONE ENCOUNTER
Patient calling to schedule MRI.  MRI scheduling called and will contact patient for appointment.

## 2017-08-11 ENCOUNTER — TELEPHONE (OUTPATIENT)
Dept: PAIN MEDICINE | Facility: CLINIC | Age: 77
End: 2017-08-11

## 2017-08-11 NOTE — TELEPHONE ENCOUNTER
----- Message from Brianna Shabazz sent at 8/10/2017  9:33 AM CDT -----  Contact: Patient  Patient is checking on status of an order for an open MRI, please call her back at [phone]. Thank you

## 2017-08-11 NOTE — TELEPHONE ENCOUNTER
Left message letting patient know that her order for open MRI will be faxed today.  She should expect a call before the end of next week for an appointment.

## 2017-08-23 ENCOUNTER — TELEPHONE (OUTPATIENT)
Dept: PAIN MEDICINE | Facility: CLINIC | Age: 77
End: 2017-08-23

## 2017-08-23 NOTE — TELEPHONE ENCOUNTER
----- Message from Светлана Roy sent at 8/23/2017  1:40 PM CDT -----  Contact: pt  She's calling stating that she had a MRI yesterday and wants to know if she should schedule an appointment to go over results, please advise [phone] (home)

## 2017-08-24 ENCOUNTER — HOSPITAL ENCOUNTER (OUTPATIENT)
Dept: RADIOLOGY | Facility: HOSPITAL | Age: 77
Discharge: HOME | End: 2017-08-24
Attending: UROLOGY
Payer: MEDICARE

## 2017-08-24 DIAGNOSIS — N28.89 RENAL MASS: ICD-10-CM

## 2017-08-24 PROCEDURE — 74178 CT ABD&PLV WO CNTR FLWD CNTR: CPT | Mod: TC,PO

## 2017-08-24 PROCEDURE — 25500020 PHARM REV CODE 255: Mod: PO | Performed by: UROLOGY

## 2017-08-24 PROCEDURE — 74178 CT ABD&PLV WO CNTR FLWD CNTR: CPT | Mod: 26,,, | Performed by: RADIOLOGY

## 2017-08-24 RX ADMIN — IOHEXOL 100 ML: 350 INJECTION, SOLUTION INTRAVENOUS at 10:08

## 2017-08-24 RX ADMIN — IOHEXOL 30 ML: 350 INJECTION, SOLUTION INTRAVENOUS at 10:08

## 2017-08-28 ENCOUNTER — OFFICE VISIT (OUTPATIENT)
Dept: PAIN MEDICINE | Facility: CLINIC | Age: 77
End: 2017-08-28
Payer: MEDICARE

## 2017-08-28 ENCOUNTER — OFFICE VISIT (OUTPATIENT)
Dept: UROLOGY | Facility: CLINIC | Age: 77
End: 2017-08-28
Payer: MEDICARE

## 2017-08-28 VITALS
DIASTOLIC BLOOD PRESSURE: 94 MMHG | WEIGHT: 120 LBS | HEIGHT: 62 IN | BODY MASS INDEX: 22.08 KG/M2 | SYSTOLIC BLOOD PRESSURE: 146 MMHG | HEART RATE: 93 BPM

## 2017-08-28 VITALS
WEIGHT: 128.88 LBS | HEIGHT: 62 IN | BODY MASS INDEX: 23.72 KG/M2 | DIASTOLIC BLOOD PRESSURE: 94 MMHG | SYSTOLIC BLOOD PRESSURE: 170 MMHG

## 2017-08-28 DIAGNOSIS — M47.817 SPONDYLOSIS OF LUMBOSACRAL REGION WITHOUT MYELOPATHY OR RADICULOPATHY: ICD-10-CM

## 2017-08-28 DIAGNOSIS — M51.36 DDD (DEGENERATIVE DISC DISEASE), LUMBAR: ICD-10-CM

## 2017-08-28 DIAGNOSIS — M48.061 LUMBAR FORAMINAL STENOSIS: ICD-10-CM

## 2017-08-28 DIAGNOSIS — M54.16 LUMBAR RADICULOPATHY: Primary | ICD-10-CM

## 2017-08-28 DIAGNOSIS — N28.9 RENAL LESION: Primary | ICD-10-CM

## 2017-08-28 PROCEDURE — 99999 PR PBB SHADOW E&M-EST. PATIENT-LVL IV: CPT | Mod: PBBFAC,,, | Performed by: PHYSICIAN ASSISTANT

## 2017-08-28 PROCEDURE — 3008F BODY MASS INDEX DOCD: CPT | Mod: S$GLB,,, | Performed by: UROLOGY

## 2017-08-28 PROCEDURE — 1159F MED LIST DOCD IN RCRD: CPT | Mod: S$GLB,,, | Performed by: UROLOGY

## 2017-08-28 PROCEDURE — 99499 UNLISTED E&M SERVICE: CPT | Mod: S$GLB,,, | Performed by: PHYSICIAN ASSISTANT

## 2017-08-28 PROCEDURE — 3008F BODY MASS INDEX DOCD: CPT | Mod: S$GLB,,, | Performed by: PHYSICIAN ASSISTANT

## 2017-08-28 PROCEDURE — 99999 PR PBB SHADOW E&M-EST. PATIENT-LVL II: CPT | Mod: PBBFAC,,, | Performed by: UROLOGY

## 2017-08-28 PROCEDURE — 99214 OFFICE O/P EST MOD 30 MIN: CPT | Mod: S$GLB,,, | Performed by: UROLOGY

## 2017-08-28 PROCEDURE — 1125F AMNT PAIN NOTED PAIN PRSNT: CPT | Mod: S$GLB,,, | Performed by: PHYSICIAN ASSISTANT

## 2017-08-28 PROCEDURE — 3077F SYST BP >= 140 MM HG: CPT | Mod: S$GLB,,, | Performed by: UROLOGY

## 2017-08-28 PROCEDURE — 1159F MED LIST DOCD IN RCRD: CPT | Mod: S$GLB,,, | Performed by: PHYSICIAN ASSISTANT

## 2017-08-28 PROCEDURE — 3077F SYST BP >= 140 MM HG: CPT | Mod: S$GLB,,, | Performed by: PHYSICIAN ASSISTANT

## 2017-08-28 PROCEDURE — 3080F DIAST BP >= 90 MM HG: CPT | Mod: S$GLB,,, | Performed by: PHYSICIAN ASSISTANT

## 2017-08-28 PROCEDURE — 3080F DIAST BP >= 90 MM HG: CPT | Mod: S$GLB,,, | Performed by: UROLOGY

## 2017-08-28 PROCEDURE — 99214 OFFICE O/P EST MOD 30 MIN: CPT | Mod: S$GLB,,, | Performed by: PHYSICIAN ASSISTANT

## 2017-08-28 RX ORDER — GABAPENTIN 300 MG/1
300 CAPSULE ORAL NIGHTLY
Qty: 30 CAPSULE | Refills: 1 | Status: SHIPPED | OUTPATIENT
Start: 2017-08-28 | End: 2017-11-21 | Stop reason: HOSPADM

## 2017-08-28 NOTE — PROGRESS NOTES
"Chief Complaint: left renal lesion    HPI:   8/28/17: Having a lot of back pain lately, but CT totally reassuring and chest nodule is smaller and likely benign.    2/23/17: Followup CT shows good results at left renal tumor site.  It suggests a new chest nodule (1 cm) but she has had these come and go as she gets bronchitis from time to time.  Also some possible ileac nodes.    11/14/16:  Had her left cryoablation no complications. Pre-procedure biopsy benign.  No pain, no hematuria.  9/9/16: Back after having left renal cryoablation scheduled but cancelled due to tech not arriving on a cancelled flight.  PET scan negative.   6/10/16: 75 yo woman was recently worked up for leukocytosis and CT shows "There has been interval enlargement of a cystic structure at the interpolar region of the left kidney which demonstrates enhancing thin internal septation. "  Designated a Bos3 cyst.  No abd/pelvic pain and no exac/rel factors.  No hematuria.  No urolithiasis.  No urinary bother.  No  history.  Normal sexual function.    Allergies:  Codeine    Medications: has a current medication list which includes the following prescription(s): amitriptyline, calcium citrate-vitamin d3 315-200 mg, citalopram, hydrocodone-acetaminophen 5-325mg, hydroxyzine hcl, leucovorin, levothyroxine, magnesium oxide, meclizine, methotrexate, multivitamin, ondansetron, oxymetazoline, tocilizumab, valsartan-hydrochlorothiazide, vitamin d2, albuterol, atenolol, meloxicam, and methylprednisolone.    Review of Systems:  General: No fever, chills, fatigability, or weight loss.  Skin: No rashes, itching, or changes in color or texture of skin.  Chest: Denies KILGORE, cyanosis, wheezing, cough, and sputum production.  Abdomen: Appetite fine. No weight loss. Denies diarrhea, abdominal pain, hematemesis, or blood in stool.  Musculoskeletal: No joint stiffness or swelling. Denies back pain.  : As above.  All other review of systems negative.    PM:   has a " past medical history of Acid reflux; Anxiety; Back pain; Bronchitis, chronic obstructive w acute bronchitis (7/29/2016); Cancer; Cataract; Degenerative disc disease; Depression; Dry mouth; Hernia, hiatal (11/18/2013); Hypertension; Hypothyroid; Macrocytic anemia (5/3/2016); Macular degeneration; Migraines; Mixed anxiety and depressive disorder; Multiple fractures of ribs of right side; Osteoporosis; Pneumonia; Rheumatoid arthritis; Rheumatoid arthritis(714.0); and Rheumatoid arthritis(714.0).    PSH:   has a past surgical history that includes Laparoscopic Nissen fundoplication; Hernia repair; Cataract extraction (Bilateral, 6/11/15); Fracture surgery (Right); feet (Bilateral); cryoablasion kidney (Left, 09/27/2016); Joint replacement; Hysterectomy (1970); Cholecystectomy (2013); and Appendectomy (1985).    FamHx: family history includes Asthma in her brother and sister; Cancer in her brother and maternal grandfather; Cataracts in her mother; Chronic back pain in her brother and sister; Diabetes Mellitus in her brother; Fibromyalgia in her daughter; Heart disease in her father, maternal grandmother, and mother; Hyperlipidemia in her mother; Hypertension in her brother, father, mother, and sister; Osteoarthritis in her brother, father, mother, and sister; Thyroid disease in her brother and sister.    SocHx:  reports that she quit smoking about 51 years ago. She has a 0.50 pack-year smoking history. She has never used smokeless tobacco. She reports that she does not drink alcohol or use drugs.     Physical Exam:  Vitals:   Vitals:    08/28/17 0840   BP: (!) 170/94     General: A&Ox3. No apparent distress. No deformities.  Neck: No masses. Normal thyroid.  Lungs: normal inspiration. No use of accessory muscles.  Heart: normal pulse. No arrhythmias.  Abdomen: Soft. NT. ND.  Skin: The skin is warm and dry. No jaundice.  Ext: No c/c/e.  : deferred    Labs/Studies:   Urinalysis performed in clinic, summary: PADMINI  normal    Impression/Plan:   1. Recheck 1 year with CT.

## 2017-08-28 NOTE — PROGRESS NOTES
Chief Pain Complaint:  Lower back pain, bilateral leg pain    History of Present Illness:   This patient is a 77 y.o. female who presents today complaining of the above noted pain/s. The patient describes the pain as follows.    - duration of pain: > 3 weeks   - timing: intermittent   - character: aching, sharp  - radiating, dermatomal: extends into bilateral lower extremities posteriorly, S1  - antecedent trauma, prior spinal surgery: patient reports prior trauma, no prior spinal surgery   - pertinent negatives: No fever, No chills, No weight loss, No bladder dysfunction, No bowel dysfunction, No saddle anesthesia  - pertinent positives: generalized nonspecific Lower Extremity weakness bilaterally    - medications, other therapies tried (physical therapy, injections):     >> Tylenol, Norco, Medrol dose amira    >> Has NOT previously undergone Physical Therapy    >> Has previously undergone spinal injection/s   - including a L-AYAAN with Dr. Gray      Imaging / Labs / Studies (reviewed on 8/28/2017):    8/22/17 L[place]BAR [place] ([place], full report scanned into Media in EMR)  L1-L2: disc bulge, facet arthropathy,bilateral  bony NF narrowing greater on the right side, cannot exclude right L1 nerve impingement  L2-L3: disc bulge, facet arthropathy, bilateral bony NF narrowing with possible, but not definite, nerve impingements  L3-L4: disc bulge, facet arthropathy, bilateral bony NF narrowing greater on the left side, severe left lateral recess stenosis with possible left L3 nerve impingement  L4-L5: disc bulge, facet arthropathy, bilateral bony NF narrowing greater on the left side, severe left lateral recess stenosis with possible L4 nerve impingements  L5-S1: disc bulge, facet arthropathy, bilateral bony NF narrowing with possible, but not definite, nerve impingements       Results for orders placed in visit on 08/19/10   X-Ray Lumbar Spine Complete 5 View    Narrative DATE OF EXAM: Aug 19  "2010   RESULTS: THE BONES ARE DIFFUSELY DEMINERALIZED.  THERE IS MILD   DEXTROSCOLIOSIS.  GRADE 2 ANTEROLISTHESIS OF APPROXIMATELY 1.1 CM IS   IDENTIFIED AT L4-5.  MINIMAL GRADE 1 ANTEROLISTHESIS IS PRESENT AT L2-3.    THERE IS MULTILEVEL DEGENERATIVE VERTEBRAL END PLATE SPURRING, DISC SPACE   NARROWING, AND FACET ARTHROPATHY.  THE PEDICLES APPEAR INTACT.  THERE IS   NO VERTEBRAL COMPRESSION FRACTURE.         Review of Systems:  CONSTITUTIONAL: patient denies any fever, chills, or weight loss  SKIN: patient denies any rash or itching  RESPIRATORY: patient denies having any shortness of breath  GASTROINTESTINAL: patient reports constipation  GENITOURINARY: patient denies having any abnormal bladder function    MUSCULOSKELETAL:  - patient complains of the above noted pain/s (see chief pain complaint)    NEUROLOGICAL:   - pain as above  - strength in Lower extremities is decreased, BILATERALLY  - sensation in Lower extremities is abnormal, BILATERALLY  - patient denies any loss of bowel or bladder control      PSYCHIATRIC: patient reports a history of anxiety and depression    Other:  All other systems reviewed and are negative      Physical Exam:  Vitals:  BP (!) 146/94   Pulse 93   Ht 5' 2" (1.575 m)   Wt 54.4 kg (120 lb)   BMI 21.95 kg/m²    (reviewed on 8/28/2017)    General: alert and oriented, in no apparent distress  Gait: normal gait  Skin: No rashes, No discoloration, No obvious lesions  HEENT: EOMI  Cardiovascular: no significant peripheral edema present  Respiratory: respirations nonlabored    Musculoskeletal:  - Any pain on flexion, extension, rotation:    >> pain on extension and rotation  - Straight Leg Raise:     >> LEFT :: negative    >> RIGHT :: negative  - Any tenderness to palpation across paraspinal muscles, joints, bursae:     >> across lumbar paraspinals    Neuro:  - Extremity Strength:     >> LEFT :: 5/5    >> RIGHT :: 5/5     Psych:  Mood and affect is appropriate        Assessment:  Lumbar " Spondylosis  Lumbar Radiculopathy    Plan:  Patient presents today for follow-up. She complains of low back and bilateral lower extremity pain that extends posteriorly.  She had this pain in the past, had a L-AYAAN with Dr. Gray, and had great relief for over a year. Patient has a history of RA.  - Lumbar MRI reviewed, detailed above, which shows multilevel DDD with several levels with possible nerve root impingement.  - Schedule bilateral L5/S1 TF AYAAN.  - Will start gabapentin 300mg QHS.   - She has been taking Norco from Rheumatologist which helps, and she not wish to increase her dose.  RTC after injection if needed. I discussed the risks, benefits, and alternatives to potential treatment options. All questions and concerns were fully addressed today in clinic. Dr. Barry was consulted regarding the patient plan and agrees.

## 2017-09-01 ENCOUNTER — HOSPITAL ENCOUNTER (OUTPATIENT)
Dept: RADIOLOGY | Facility: HOSPITAL | Age: 77
Discharge: HOME | End: 2017-09-01
Attending: PHYSICIAN ASSISTANT | Admitting: ANESTHESIOLOGY
Payer: MEDICARE

## 2017-09-01 ENCOUNTER — HOSPITAL ENCOUNTER (OUTPATIENT)
Facility: HOSPITAL | Age: 77
Discharge: HOME | End: 2017-09-01
Attending: ANESTHESIOLOGY | Admitting: ANESTHESIOLOGY
Payer: MEDICARE

## 2017-09-01 VITALS
HEART RATE: 101 BPM | SYSTOLIC BLOOD PRESSURE: 193 MMHG | OXYGEN SATURATION: 96 % | WEIGHT: 120 LBS | HEIGHT: 62 IN | RESPIRATION RATE: 16 BRPM | BODY MASS INDEX: 22.08 KG/M2 | TEMPERATURE: 98 F | DIASTOLIC BLOOD PRESSURE: 99 MMHG

## 2017-09-01 DIAGNOSIS — M54.16 LUMBAR RADICULOPATHY: ICD-10-CM

## 2017-09-01 DIAGNOSIS — M54.16 BILATERAL LUMBAR RADICULOPATHY: Primary | ICD-10-CM

## 2017-09-01 PROCEDURE — 63600175 PHARM REV CODE 636 W HCPCS

## 2017-09-01 PROCEDURE — 64483 NJX AA&/STRD TFRM EPI L/S 1: CPT | Mod: 50

## 2017-09-01 PROCEDURE — 63600175 PHARM REV CODE 636 W HCPCS: Performed by: ANESTHESIOLOGY

## 2017-09-01 PROCEDURE — 25000003 PHARM REV CODE 250: Performed by: ANESTHESIOLOGY

## 2017-09-01 PROCEDURE — 25500020 PHARM REV CODE 255

## 2017-09-01 PROCEDURE — 64483 NJX AA&/STRD TFRM EPI L/S 1: CPT | Mod: 50,,, | Performed by: ANESTHESIOLOGY

## 2017-09-01 PROCEDURE — 25000003 PHARM REV CODE 250

## 2017-09-01 RX ORDER — LIDOCAINE HYDROCHLORIDE 20 MG/ML
INJECTION, SOLUTION INFILTRATION; PERINEURAL
Status: DISCONTINUED | OUTPATIENT
Start: 2017-09-01 | End: 2017-09-01 | Stop reason: HOSPADM

## 2017-09-01 RX ORDER — DEXAMETHASONE SODIUM PHOSPHATE 4 MG/ML
INJECTION, SOLUTION INTRA-ARTICULAR; INTRALESIONAL; INTRAMUSCULAR; INTRAVENOUS; SOFT TISSUE
Status: DISCONTINUED | OUTPATIENT
Start: 2017-09-01 | End: 2017-09-01 | Stop reason: HOSPADM

## 2017-09-01 NOTE — PLAN OF CARE
Problem: Patient Care Overview  Goal: Plan of Care Review  Outcome: Outcome(s) achieved Date Met: 09/01/17  Patient d/c home in stable condition via wheelchair with ride. Verbalized understanding of d/c instructions. Patient voiced no complaints at this time. Patient stood at side of bed, walked steps with no new motor deficits. Neurologically intact.

## 2017-09-01 NOTE — H&P (VIEW-ONLY)
Chief Pain Complaint:  Lower back pain, bilateral leg pain    History of Present Illness:   This patient is a 77 y.o. female who presents today complaining of the above noted pain/s. The patient describes the pain as follows.    - duration of pain: > 3 weeks   - timing: intermittent   - character: aching, sharp  - radiating, dermatomal: extends into bilateral lower extremities posteriorly, S1  - antecedent trauma, prior spinal surgery: patient reports prior trauma, no prior spinal surgery   - pertinent negatives: No fever, No chills, No weight loss, No bladder dysfunction, No bowel dysfunction, No saddle anesthesia  - pertinent positives: generalized nonspecific Lower Extremity weakness bilaterally    - medications, other therapies tried (physical therapy, injections):     >> Tylenol, Norco, Medrol dose amira    >> Has NOT previously undergone Physical Therapy    >> Has previously undergone spinal injection/s   - including a L-AYAAN with Dr. Gray      Imaging / Labs / Studies (reviewed on 8/28/2017):    8/22/17 LUMBAR MRI (from [facility], full report scanned into Media in EMR)  L1-L2: disc bulge, facet arthropathy,bilateral  bony NF narrowing greater on the right side, cannot exclude right L1 nerve impingement  L2-L3: disc bulge, facet arthropathy, bilateral bony NF narrowing with possible, but not definite, nerve impingements  L3-L4: disc bulge, facet arthropathy, bilateral bony NF narrowing greater on the left side, severe left lateral recess stenosis with possible left L3 nerve impingement  L4-L5: disc bulge, facet arthropathy, bilateral bony NF narrowing greater on the left side, severe left lateral recess stenosis with possible L4 nerve impingements  L5-S1: disc bulge, facet arthropathy, bilateral bony NF narrowing with possible, but not definite, nerve impingements       Results for orders placed in visit on 08/19/10   X-Ray Lumbar Spine Complete 5 View    Narrative DATE OF EXAM: Aug 19  "2010   RESULTS: THE BONES ARE DIFFUSELY DEMINERALIZED.  THERE IS MILD   DEXTROSCOLIOSIS.  GRADE 2 ANTEROLISTHESIS OF APPROXIMATELY 1.1 CM IS   IDENTIFIED AT L4-5.  MINIMAL GRADE 1 ANTEROLISTHESIS IS PRESENT AT L2-3.    THERE IS MULTILEVEL DEGENERATIVE VERTEBRAL END PLATE SPURRING, DISC SPACE   NARROWING, AND FACET ARTHROPATHY.  THE PEDICLES APPEAR INTACT.  THERE IS   NO VERTEBRAL COMPRESSION FRACTURE.         Review of Systems:  CONSTITUTIONAL: patient denies any fever, chills, or weight loss  SKIN: patient denies any rash or itching  RESPIRATORY: patient denies having any shortness of breath  GASTROINTESTINAL: patient reports constipation  GENITOURINARY: patient denies having any abnormal bladder function    MUSCULOSKELETAL:  - patient complains of the above noted pain/s (see chief pain complaint)    NEUROLOGICAL:   - pain as above  - strength in Lower extremities is decreased, BILATERALLY  - sensation in Lower extremities is abnormal, BILATERALLY  - patient denies any loss of bowel or bladder control      PSYCHIATRIC: patient reports a history of anxiety and depression    Other:  All other systems reviewed and are negative      Physical Exam:  Vitals:  BP (!) 146/94   Pulse 93   Ht 5' 2" (1.575 m)   Wt 54.4 kg (120 lb)   BMI 21.95 kg/m²    (reviewed on 8/28/2017)    General: alert and oriented, in no apparent distress  Gait: normal gait  Skin: No rashes, No discoloration, No obvious lesions  HEENT: EOMI  Cardiovascular: no significant peripheral edema present  Respiratory: respirations nonlabored    Musculoskeletal:  - Any pain on flexion, extension, rotation:    >> pain on extension and rotation  - Straight Leg Raise:     >> LEFT :: negative    >> RIGHT :: negative  - Any tenderness to palpation across paraspinal muscles, joints, bursae:     >> across lumbar paraspinals    Neuro:  - Extremity Strength:     >> LEFT :: 5/5    >> RIGHT :: 5/5     Psych:  Mood and affect is appropriate        Assessment:  Lumbar " Spondylosis  Lumbar Radiculopathy    Plan:  Patient presents today for follow-up. She complains of low back and bilateral lower extremity pain that extends posteriorly.  She had this pain in the past, had a L-AYAAN with Dr. Gray, and had great relief for over a year. Patient has a history of RA.  - Lumbar MRI reviewed, detailed above, which shows multilevel DDD with several levels with possible nerve root impingement.  - Schedule bilateral L5/S1 TF AYAAN.  - Will start gabapentin 300mg QHS.   - She has been taking Norco from Rheumatologist which helps, and she not wish to increase her dose.  RTC after injection if needed. I discussed the risks, benefits, and alternatives to potential treatment options. All questions and concerns were fully addressed today in clinic. Dr. Barry was consulted regarding the patient plan and agrees.

## 2017-09-01 NOTE — DISCHARGE SUMMARY
[facility]  Discharge Note       Description of Procedure: Lumbar Transforaminal Epidural Steroid Injection under Fluoroscopic Guidance    Procedure Date: 9/1/2017    Admit Date: 9/1/2017  Discharge Date: 9/1/2017     Attending Physician: Elliot Barry   Discharge Provider: Elliot Barry    Preoperative Diagnosis:   Active Hospital Problems    Diagnosis  POA    Lumbar radiculopathy [M54.16]  Yes     Priority: High      Resolved Hospital Problems    Diagnosis Date Resolved POA   No resolved problems to display.        Postoperative Diagnosis: as above, same as preoperative diagnosis    Discharged Condition: Stable    Hospital Course: Patient was admitted for an outpatient procedure. The procedure was tolerated well with no complications.    Final Diagnoses: Same as principal problem.    Disposition: Home, self-care.    Follow up/Patient Instructions:  Follow-up in clinic in 2-3 weeks.    Medications: No medications were prescribed today. The patient was advised to resume normal medication regimen without change.  Specific information was provided regarding restarting any anticoagulant/s.    Discharge Procedure Orders (must include Diet, Follow-up, Activity):  Light activity for the remainder of the day, resume normal activity tomorrow. Resume normal diet. Follow-up in clinic in 2-3 weeks.

## 2017-09-01 NOTE — OP NOTE
"Procedure: Lumbar Transforaminal Epidural Steroid Injection under Fluoroscopic Guidance (supraneural approach)    Level: L5/S1     Side: Bilateral    PROCEDURE DATE: 9/1/2017    Pre-operative Diagnosis: Lumbar Radiculopathy  Post-operative Diagnosis: Lumbar Radiculopathy    Provider: Elliot Barry MD  Assistant(s): None    Anesthesia: Local, No Sedation    >> 0 mg of VERSED    >> 0 mcg of FENTANYL     Indication: Low back pain with radiculopathy consistent with distribution of targeted nerve. Symptoms unresponsive to conservative treatments. Fluoroscopy was used to optimize visualization of needle placement and to maximize safety.     Procedure Description / Technique:  The patient was seen and identified in the preoperative area. Risks, benefits, complications, and alternatives were discussed with the patient. The patient agreed to proceed with the procedure and signed the consent. The site and side of the procedure was identified and marked. An IV was not placed for this procedure. The patient was taken to the procedural suite.    The patient was positioned in prone orientation on procedure table and a pillow was placed under the abdomen to reduce lumbar lordosis. A time out was performed prior to any intervention. The procedure, site, side, and allergies were stated and agreed to by all present. The lumbosacral area was widely prepped with ChloraPrep. The procedural site was draped in usual sterile fashion. Vital signs were closely monitored throughout this procedure. Conscious sedation was not used for this procedure.    The target area was visualized under fluoroscopy. The cephalocaudal angle of the fluoroscope was adjusted as to align the vertebral end plates. The fluoroscopic arm was rotated ipsilaterally to an angle of approximately 30 degrees until the "saadia dog" outline came into view and the tip of the inferior superior articular process pointed towards the midline, 6:00 position of the above pedicle. " "A 25 gauge 3.5 inch spinal needle was directed towards the "chin" of the "saadia dog" (adjacent to the pars interarticularis and inferior to the pedicle). The needle was advanced until OS was met at the inferior border of the pedicle / pars interface. The needle was adjusted so that it would pass inferior to the osseous border. The fluoroscope was then placed in the lateral position and the needle was slowly advanced until it rested in the posterior 1/3rd of the vertebral foramen. AP fluoroscopy was checked and the needle tip rested at the 6:00 position under the pedicle. No paresthesia was elicited during needle placement. With the needle tip in its final position, gentle aspiration was negative for blood and CSF. Omnipaque 240 (1 to 2 mL) was injected under live fluoroscopy. Microbore tubing was used for injection. There was no pain or paresthesia on injection. The contrast clearly delineated the targeted nerve root on AP fluoroscopy. No vascular uptake was seen. A solution containing 3 mL of 1% PF Lidocaine and 1.5 mL of Dexamethasone (10 mg/mL) was mixed and 2 mL was injected slowly at each level targeted. There was minimal resistance on injection. No pain or paresthesia was elicited on injection. The stylet was replaced and the needle was withdrawn intact. This procedure was performed for each of the above indicated levels.     Description of Findings: Not applicable    Prosthetic devices, grafts, tissues, or devices implanted: None    Specimen Removed: No    Estimated Blood Loss: minimal    COMPLICATIONS: None    DISPOSITION / PLANS: The patient was transferred to the recovery area in a stable condition for observation. The patient was reexamined prior to discharge. There was no evidence of acute neurologic injury following the procedure.  Patient was discharged from the recovery room after meeting discharge criteria. Home discharge instructions were given to the patient by the staff.    "

## 2017-09-02 ENCOUNTER — TELEPHONE (OUTPATIENT)
Dept: URGENT CARE | Facility: CLINIC | Age: 77
End: 2017-09-02

## 2017-09-02 NOTE — TELEPHONE ENCOUNTER
----- Message from Anastasiia Tracy sent at 9/1/2017 12:11 PM CDT -----  Contact: patient  Calling concerning needing an alternative medication for Atenolol. States the pharmacy said the manufactures is completely out at this time. Please call patient ASAP @ [phone]. Thanks, gunnar      [facility] [id number] - [place] - [address] AT [place] & [facility]  [address] [place] [address]  Phone: [phone] Fax: [phone]      RX SENT IN>  SM

## 2017-09-04 RX ORDER — METOPROLOL SUCCINATE 50 MG/1
50 TABLET, EXTENDED RELEASE ORAL DAILY
Qty: 30 TABLET | Refills: 11 | Status: SHIPPED | OUTPATIENT
Start: 2017-09-04 | End: 2018-09-10 | Stop reason: SDUPTHER

## 2017-09-08 ENCOUNTER — TELEPHONE (OUTPATIENT)
Dept: INTERNAL MEDICINE | Facility: CLINIC | Age: 77
End: 2017-09-08

## 2017-09-08 NOTE — TELEPHONE ENCOUNTER
----- Message from Dianna Almaraz sent at 9/8/2017 11:04 AM CDT -----  Patient is returning your call.  Call her at [phone].  She said she got her prescription and thank you.                                                       kirk

## 2017-09-19 ENCOUNTER — LAB VISIT (OUTPATIENT)
Dept: LAB | Facility: HOSPITAL | Age: 77
End: 2017-09-19
Attending: INTERNAL MEDICINE
Payer: MEDICARE

## 2017-09-19 ENCOUNTER — OFFICE VISIT (OUTPATIENT)
Dept: HEMATOLOGY/ONCOLOGY | Facility: CLINIC | Age: 77
End: 2017-09-19
Payer: MEDICARE

## 2017-09-19 VITALS
HEART RATE: 84 BPM | RESPIRATION RATE: 18 BRPM | SYSTOLIC BLOOD PRESSURE: 130 MMHG | DIASTOLIC BLOOD PRESSURE: 80 MMHG | WEIGHT: 131.38 LBS | HEIGHT: 62 IN | TEMPERATURE: 98 F | BODY MASS INDEX: 24.18 KG/M2 | OXYGEN SATURATION: 97 %

## 2017-09-19 DIAGNOSIS — D72.829 LEUKOCYTOSIS, UNSPECIFIED TYPE: Primary | ICD-10-CM

## 2017-09-19 DIAGNOSIS — E03.9 ACQUIRED HYPOTHYROIDISM: ICD-10-CM

## 2017-09-19 DIAGNOSIS — D53.9 MACROCYTIC ANEMIA: ICD-10-CM

## 2017-09-19 DIAGNOSIS — Z29.9 PREVENTIVE MEASURE: ICD-10-CM

## 2017-09-19 LAB
ALBUMIN SERPL BCP-MCNC: 3.6 G/DL
ALP SERPL-CCNC: 75 U/L
ALT SERPL W/O P-5'-P-CCNC: 13 U/L
ANION GAP SERPL CALC-SCNC: 11 MMOL/L
AST SERPL-CCNC: 18 U/L
BASOPHILS # BLD AUTO: 0.01 K/UL
BASOPHILS NFR BLD: 0.1 %
BILIRUB SERPL-MCNC: 0.5 MG/DL
BUN SERPL-MCNC: 16 MG/DL
CALCIUM SERPL-MCNC: 9.1 MG/DL
CHLORIDE SERPL-SCNC: 103 MMOL/L
CO2 SERPL-SCNC: 23 MMOL/L
CREAT SERPL-MCNC: 0.9 MG/DL
DIFFERENTIAL METHOD: ABNORMAL
EOSINOPHIL # BLD AUTO: 0.1 K/UL
EOSINOPHIL NFR BLD: 1.5 %
ERYTHROCYTE [DISTWIDTH] IN BLOOD BY AUTOMATED COUNT: 17.3 %
EST. GFR  (AFRICAN AMERICAN): >60 ML/MIN/1.73 M^2
EST. GFR  (NON AFRICAN AMERICAN): >60 ML/MIN/1.73 M^2
GLUCOSE SERPL-MCNC: 106 MG/DL
HCT VFR BLD AUTO: 29.9 %
HGB BLD-MCNC: 10.2 G/DL
LYMPHOCYTES # BLD AUTO: 2.3 K/UL
LYMPHOCYTES NFR BLD: 30 %
MCH RBC QN AUTO: 34.2 PG
MCHC RBC AUTO-ENTMCNC: 34.1 G/DL
MCV RBC AUTO: 100 FL
MONOCYTES # BLD AUTO: 1.3 K/UL
MONOCYTES NFR BLD: 16.5 %
NEUTROPHILS # BLD AUTO: 4 K/UL
NEUTROPHILS NFR BLD: 51 %
PLATELET # BLD AUTO: 440 K/UL
PMV BLD AUTO: 9.3 FL
POTASSIUM SERPL-SCNC: 4.2 MMOL/L
PROT SERPL-MCNC: 7.6 G/DL
RBC # BLD AUTO: 2.98 M/UL
SODIUM SERPL-SCNC: 137 MMOL/L
TSH SERPL DL<=0.005 MIU/L-ACNC: 1.88 UIU/ML
WBC # BLD AUTO: 7.76 K/UL

## 2017-09-19 PROCEDURE — 1126F AMNT PAIN NOTED NONE PRSNT: CPT | Mod: S$GLB,,, | Performed by: INTERNAL MEDICINE

## 2017-09-19 PROCEDURE — 3075F SYST BP GE 130 - 139MM HG: CPT | Mod: S$GLB,,, | Performed by: INTERNAL MEDICINE

## 2017-09-19 PROCEDURE — 85025 COMPLETE CBC W/AUTO DIFF WBC: CPT

## 2017-09-19 PROCEDURE — 99499 UNLISTED E&M SERVICE: CPT | Mod: S$GLB,,, | Performed by: INTERNAL MEDICINE

## 2017-09-19 PROCEDURE — 3079F DIAST BP 80-89 MM HG: CPT | Mod: S$GLB,,, | Performed by: INTERNAL MEDICINE

## 2017-09-19 PROCEDURE — 99999 PR PBB SHADOW E&M-EST. PATIENT-LVL IV: CPT | Mod: PBBFAC,,, | Performed by: INTERNAL MEDICINE

## 2017-09-19 PROCEDURE — 99214 OFFICE O/P EST MOD 30 MIN: CPT | Mod: S$GLB,,, | Performed by: INTERNAL MEDICINE

## 2017-09-19 PROCEDURE — 3008F BODY MASS INDEX DOCD: CPT | Mod: S$GLB,,, | Performed by: INTERNAL MEDICINE

## 2017-09-19 PROCEDURE — 80053 COMPREHEN METABOLIC PANEL: CPT

## 2017-09-19 PROCEDURE — 84443 ASSAY THYROID STIM HORMONE: CPT

## 2017-09-19 PROCEDURE — 36415 COLL VENOUS BLD VENIPUNCTURE: CPT | Mod: PO

## 2017-09-19 PROCEDURE — 1159F MED LIST DOCD IN RCRD: CPT | Mod: S$GLB,,, | Performed by: INTERNAL MEDICINE

## 2017-09-19 NOTE — PROGRESS NOTES
Reason for visit: Chronic leukocytosis    HPI:   The patient is a 77-year-old  female who presents to the hematology oncology clinic today to discuss further evaluation and management recommendations for leukocytosis.  I have reviewed all of the patient's relevant clinical history available in the medical record including her records from care everywhere.  Today the patient reports that overall she feels okay.  She reports that her chronic pain from rheumatoid arthritis is stable.  She reports chronic fatigue.  She denies any fevers, chills or night sweats.  She denies any loss of appetite or unintentional weight loss.  She denies any chest pain or shortness of breath.  She denies any melena, hematochezia, hematemesis, hemoptysis or hematuria.  She reports being up-to-date with all of her age-appropriate cancer screening. She denies any bowel or urinary complaints.  She denies any nausea, vomiting or abdominal pain.  The patient reports taking weekly methotrexate with supplemental folic acid and actemra for treatment of rheumatoid arthritis under the supervision of Dr. Chase Tejada with rheumatology.    PAST MEDICAL HISTORY:   1.  Rheumatoid arthritis  2.  Hypertension  3.  Anxiety  4.  Hypothyroidism  5.  Vitamin D deficiency  6.  Osteoporosis  7.  History of hiatal hernia with GERD  8.  Age-related macular degeneration    SURGICAL HISTORY:   1.  Bilateral wrist surgery  2.  Bilateral knee replacement  3.  Bilateral foot surgery  4.  Cholecystectomy  5.  Nissen fundoplication  6.  Appendectomy  7.  SAY with BSO  8.  Bilateral cataract extraction  9.  Multiple resections of localized skin cancer from the forearm  10. Cyroablation of left renal mass in sep 2016    FAMILY HISTORY: The patient's brother was treated for pancreatic cancer which was diagnosed at the age of 70.  She denies any other immediate family members with cancer or bleeding/clotting disorders.    SOCIAL HISTORY: She reports a 0.5-pack-year  smoking history and quit in 1965.  She denies any alcohol use or recreational drug use.  She used to work as a  and retired at the age of 64.  She is  and has 2 daughters.  She lives in [place], [place].    ALLERGIES: Reviewed on medication card.    MEDICATIONS: [Medcard has been reviewed and/or reconciled.]    REVIEW OF SYSTEMS:   GENERAL: [No fevers, chills or sweats. Reports chronic fatigue. Denies weight loss or loss of appetite.]  HEENT: [No blurred vision, tinnitus, nasal discharge, sorethroat or dysphagia.]  HEART: [No chest pain, palpitations or shortness of breath.]    LUNGS: [No cough, hemoptysis or breathing problems.]  ABDOMEN: [No abdominal pain, nausea, vomiting, diarrhea, constipation or melena.]  GENITOURINARY: [No dysuria, bleeding or malodorous discharge.]  NEURO: [No headache, dizziness or vertigo.]  HEMATOLOGY: [No easy bruising, spontaneous bleeding or blood clots in the past].  MUSCULOSKELETAL: [Chronic arthralgias. Denies myalgias or bone pains.]  SKIN: [No rashes or skin lesions.]  PSYCHIATRY: [No depression. Reports h/o anxiety.]    PHYSICAL EXAMINATION:   VS: Reviewed on nurse's notes.  APPEARANCE: The patient is a well-developed, well-nourished and well-groomed elderly  female who appears in no acute distress.    HEENT: No scleral icterus. Both external auditory canals clear. No oral ulcers, lesions. Throat clear  HEAD: No sinus tenderness.  NECK: Supple. No palpable lymphadenopathy. Thyroid non-tender, no palpable masses  CHEST: Breath sounds clear bilaterally. Occasional crackles/rales bilaterally. No rhonchi. Unlabored respirations.  CARDIOVASCULAR: Normal S1, S2. Normal rate. Regular rhythm.  ABDOMEN: Bowel sounds normal. No tenderness. No abdominal distention. No hepatomegaly. No splenomegaly.  LYMPHATIC: No palpable supraclavicular, axillary nodes  EXTREMITIES: No clubbing, cyanosis. Mild edema in left leg due to recent accidental trauma. This is  improving.  SKIN: No lesions. No petechiae. No ecchymoses. No induration or nodules  NEUROLOGIC: No focal findings. Alert & Oriented x 3. Mood appropriate to affect    LABS:   Reviewed    IMAGING:  Reviewed    IMPRESSION:  1.  Chronic leukocytosis  2.  Chronic macrocytic anemia  3.  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  Bilateral lung inflammation [rheumatoid lung]  5.  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  I had a detailed discussion with the patient today with regard to the various possible etiologies for leukocytosis.  Review of the patient's medical record shows that this has been chronically present on and off for several years.  The patient recalls having been evaluated by a hematologist greater than 10 years ago for this and also underwent bone marrow aspiration and biopsy.  She reports that all of the results were benign at that time.  2.  Review of her peripheral smear does not show any significant abnormalities.  Her rheumatoid arthritis appears to be well-controlled at this time as noted by her inflammatory markers.  3.  Results of labwork done for further evaluation of her chronic macrocytic anemia were previously reviewed in detail.  This is most likely due to her chronic treatment with methotrexate.  Vitamin B12 and folate levels look ok.   4.  I had a detailed discussion about the various possible etiologies for her monoclonal paraproteinemia. Results of prior 24 hour UPEP with immunofixation reviewed and also look unremarkable.  5.  Results of CT scans of the thorax/abdomen/pelvis to evaluate for any evidence of pathologic lymphadenopathy suggestive of any evidence of malignancy in the context of leukocytosis with history of rheumatoid arthritis and immunosuppressive therapy were discussed in detail. Recent PET/CT results were also reviewed. Continue pulmonary follow up with Dr. Varela as recommended.  6.  Continue follow up with Dr. Beck with urology as  recommended.  7.  Results of final report of bone marrow aspiration and biopsy done at [facility] done on 6/1/16 were discussed in detail.  She has a hypercellular marrow with trilineage dyspoiesis and megakaryocytic hyperplasia.  She has relatively increased atypical myeloid blasts which constitute 5% of analyzed cells and approximately 5% clonal lambda restricted plasma cells.  She also has a small CD5 positive clonal B-cell population which constitutes 1% of the sample with a B-cell chronic lymphocytic leukemia/small lymphocytic lymphoma immunophenotype identified by flow cytometry only.  She has adequate bone marrow storage iron with no ringed sideroblasts. She had an abnormal myeloma FISH panel but the overall clinical picture at this time does not appear to support a diagnosis of multiple myeloma. Her overall clinical picture is most suggestive of medication related changes due to methotrexate and less likely to be other etiologies including a myelodysplastic/myeloproliferative neoplasm.  However we will continue with close monitoring at this time. We discussed repeating bone marrow biopsy in the near future based on follow up over the next few months if indicated.    Follow-up in 6 months. She knows to call sooner for any new problems or questions.    Sathish Devine MD

## 2017-09-21 ENCOUNTER — OFFICE VISIT (OUTPATIENT)
Dept: PAIN MEDICINE | Facility: CLINIC | Age: 77
End: 2017-09-21
Payer: MEDICARE

## 2017-09-21 VITALS
RESPIRATION RATE: 18 BRPM | HEART RATE: 76 BPM | DIASTOLIC BLOOD PRESSURE: 77 MMHG | WEIGHT: 131 LBS | HEIGHT: 62 IN | SYSTOLIC BLOOD PRESSURE: 153 MMHG | BODY MASS INDEX: 24.11 KG/M2

## 2017-09-21 DIAGNOSIS — M51.36 DDD (DEGENERATIVE DISC DISEASE), LUMBAR: ICD-10-CM

## 2017-09-21 DIAGNOSIS — M48.061 LUMBAR FORAMINAL STENOSIS: ICD-10-CM

## 2017-09-21 DIAGNOSIS — M47.817 SPONDYLOSIS OF LUMBOSACRAL REGION WITHOUT MYELOPATHY OR RADICULOPATHY: Primary | ICD-10-CM

## 2017-09-21 DIAGNOSIS — M54.16 LUMBAR RADICULOPATHY: ICD-10-CM

## 2017-09-21 DIAGNOSIS — M54.16 BILATERAL LUMBAR RADICULOPATHY: ICD-10-CM

## 2017-09-21 PROCEDURE — 1159F MED LIST DOCD IN RCRD: CPT | Mod: S$GLB,,, | Performed by: PHYSICIAN ASSISTANT

## 2017-09-21 PROCEDURE — 3008F BODY MASS INDEX DOCD: CPT | Mod: S$GLB,,, | Performed by: PHYSICIAN ASSISTANT

## 2017-09-21 PROCEDURE — 3078F DIAST BP <80 MM HG: CPT | Mod: S$GLB,,, | Performed by: PHYSICIAN ASSISTANT

## 2017-09-21 PROCEDURE — 99499 UNLISTED E&M SERVICE: CPT | Mod: S$GLB,,, | Performed by: PHYSICIAN ASSISTANT

## 2017-09-21 PROCEDURE — 1125F AMNT PAIN NOTED PAIN PRSNT: CPT | Mod: S$GLB,,, | Performed by: PHYSICIAN ASSISTANT

## 2017-09-21 PROCEDURE — 99999 PR PBB SHADOW E&M-EST. PATIENT-LVL IV: CPT | Mod: PBBFAC,,, | Performed by: PHYSICIAN ASSISTANT

## 2017-09-21 PROCEDURE — 3077F SYST BP >= 140 MM HG: CPT | Mod: S$GLB,,, | Performed by: PHYSICIAN ASSISTANT

## 2017-09-21 PROCEDURE — 99213 OFFICE O/P EST LOW 20 MIN: CPT | Mod: S$GLB,,, | Performed by: PHYSICIAN ASSISTANT

## 2017-09-21 NOTE — PROGRESS NOTES
Chief Pain Complaint:  Lower back pain, bilateral leg pain    History of Present Illness:   This patient is a 77 y.o. female who presents today complaining of the above noted pain/s. The patient describes the pain as follows.    - duration of pain: > 3 weeks   - timing: intermittent   - character: aching, sharp  - radiating, dermatomal: extends into bilateral lower extremities posteriorly, S1  - antecedent trauma, prior spinal surgery: patient reports prior trauma, no prior spinal surgery   - pertinent negatives: No fever, No chills, No weight loss, No bladder dysfunction, No bowel dysfunction, No saddle anesthesia  - pertinent positives: generalized nonspecific Lower Extremity weakness bilaterally    - medications, other therapies tried (physical therapy, injections):     >> Tylenol, Norco, Medrol dose amira    >> Has NOT previously undergone Physical Therapy    >> Has previously undergone spinal injection/s   - including a L-AYAAN with Dr. Gray   - bilateral L5/S1 TF AYAAN on 9-1-17    Imaging / Labs / Studies (reviewed on 9/21/2017):    8/22/17 LUMBAR MRI (from [facility], full report scanned into Media in EMR)  L1-L2: disc bulge, facet arthropathy,bilateral  bony NF narrowing greater on the right side, cannot exclude right L1 nerve impingement  L2-L3: disc bulge, facet arthropathy, bilateral bony NF narrowing with possible, but not definite, nerve impingements  L3-L4: disc bulge, facet arthropathy, bilateral bony NF narrowing greater on the left side, severe left lateral recess stenosis with possible left L3 nerve impingement  L4-L5: disc bulge, facet arthropathy, bilateral bony NF narrowing greater on the left side, severe left lateral recess stenosis with possible L4 nerve impingements  L5-S1: disc bulge, facet arthropathy, bilateral bony NF narrowing with possible, but not definite, nerve impingements       Results for orders placed in visit on 08/19/10   X-Ray Lumbar Spine Complete 5  "View    Narrative RESULTS: THE BONES ARE DIFFUSELY DEMINERALIZED.  THERE IS MILD DEXTROSCOLIOSIS.  GRADE 2 ANTEROLISTHESIS OF APPROXIMATELY 1.1 CM IS IDENTIFIED AT L4-5.  MINIMAL GRADE 1 ANTEROLISTHESIS IS PRESENT AT L2-3.  THERE IS MULTILEVEL DEGENERATIVE VERTEBRAL END PLATE SPURRING, DISC SPACE NARROWING, AND FACET ARTHROPATHY.  THE PEDICLES APPEAR INTACT.  THERE IS NO VERTEBRAL COMPRESSION FRACTURE.         Review of Systems:  CONSTITUTIONAL: patient denies any fever, chills, or weight loss  SKIN: patient denies any rash or itching  RESPIRATORY: patient denies having any shortness of breath  GASTROINTESTINAL: patient reports constipation  GENITOURINARY: patient denies having any abnormal bladder function    MUSCULOSKELETAL:  - patient complains of the above noted pain/s (see chief pain complaint)    NEUROLOGICAL:   - pain as above  - strength in Lower extremities is decreased, BILATERALLY  - sensation in Lower extremities is abnormal, BILATERALLY  - patient denies any loss of bowel or bladder control      PSYCHIATRIC: patient reports a history of anxiety and depression    Other:  All other systems reviewed and are negative      Physical Exam:  Vitals:  BP (!) 153/77 (BP Location: Right arm, Patient Position: Sitting, BP Method: Large (Automatic))   Pulse 76   Resp 18   Ht 5' 2" (1.575 m)   Wt 59.4 kg (131 lb)   BMI 23.96 kg/m²    (reviewed on 9/21/2017)    General: alert and oriented, in no apparent distress  Gait: normal gait  Skin: No rashes, No discoloration, No obvious lesions  HEENT: EOMI  Cardiovascular: no significant peripheral edema present  Respiratory: respirations nonlabored    Musculoskeletal:  - Any pain on flexion, extension, rotation:    >> pain on extension and rotation  - Straight Leg Raise:     >> LEFT :: negative    >> RIGHT :: negative  - Any tenderness to palpation across paraspinal muscles, joints, bursae:     >> across lumbar paraspinals    Neuro:  - Extremity Strength:     >> LEFT :: " 5/5    >> RIGHT :: 5/5     Psych:  Mood and affect is appropriate        Assessment:  Lumbar Spondylosis  Lumbar Radiculopathy    Plan:  Patient presents today for follow-up. She complains of low back and bilateral lower extremity pain that extends posteriorly. Lumbar MRI shows multilevel DDD with several levels with possible nerve root impingement. She had this pain in the past, had a L-AYAAN with Dr. Gray, and had great relief for over a year. Patient has a history of RA.  - S/p bilateral L5/S1 TF AYAAN on 9-1-17 with excellent releif. She feels much better overall.  - She never started taking gabapentin 300mg QHS because she was feeling better. We can consider this in in the future if needed.   - She has been taking Norco from Rheumatologist which helps, and she not wish to increase her dose.  RTC PRN. I discussed the risks, benefits, and alternatives to potential treatment options. All questions and concerns were fully addressed today in clinic. Dr. Barry was consulted regarding the patient plan and agrees.

## 2017-09-26 NOTE — PROGRESS NOTES
"Subjective:       Patient ID: Sarah Parker is a 77 y.o. female.    Chief Complaint: Follow-up    Here for f/u medical problems and preventive exam.  RA flare of pain currently.  Taking pain meds.  LBP after a fall in 4/17, now improving after AYAAN bilaterally.  Breathing ok lately.  Energy is low.  Stress is very much lately, with family illness- brother dying of pancreatic CA.   illness progressing.  Daughter flooded in [place].  Grandchild suicide a few months ago.    No f/c/sw/cough.  No cp/sob/palp.  BMs normal.  Urine normal.  Taking vit D.    HM: 9/17 today fluvax, 5/16 [id number], 10/14 [id number], 10/13 TDaP, 1/17 BMD/will bring to Dr. Tejada rep 2y, 1/17 MMG, 10/13 EGD, 2015 Cscope Dr. Garcia rep 5y.          Review of Systems   Constitutional: Negative for appetite change, chills, diaphoresis and fever.   HENT: Negative for congestion, ear pain, rhinorrhea, sinus pressure and sore throat.    Respiratory: Negative for cough, chest tightness and shortness of breath.    Cardiovascular: Negative for chest pain and palpitations.   Gastrointestinal: Negative for blood in stool, constipation, diarrhea, nausea and vomiting.   Genitourinary: Negative for dysuria, frequency, hematuria, menstrual problem, urgency and vaginal discharge.   Musculoskeletal: Negative for arthralgias.   Skin: Negative for rash.   Neurological: Negative for dizziness and headaches.   Psychiatric/Behavioral: Negative for sleep disturbance. The patient is not nervous/anxious.        Objective:   /82 (BP Location: Right arm, Patient Position: Sitting)   Pulse 80   Temp 97.4 °F (36.3 °C) (Tympanic)   Ht 5' 2" (1.575 m)   Wt 56.8 kg (125 lb 3.5 oz)   SpO2 97%   BMI 22.90 kg/m²     Physical Exam   Constitutional: She is oriented to person, place, and time. She appears well-developed and well-nourished.   HENT:   Right Ear: External ear normal. Tympanic membrane is not injected.   Left Ear: External ear normal. Tympanic " membrane is not injected.   Mouth/Throat: Oropharynx is clear and moist.   Eyes: Conjunctivae are normal.   Neck: Normal range of motion. Neck supple. No thyromegaly present.   Cardiovascular: Normal rate, regular rhythm and intact distal pulses.  Exam reveals no gallop and no friction rub.    No murmur heard.  Pulmonary/Chest: Effort normal and breath sounds normal. She has no wheezes. She has no rales. Right breast exhibits no mass, no nipple discharge, no skin change and no tenderness. Left breast exhibits no mass, no nipple discharge, no skin change and no tenderness.   Abdominal: Soft. Bowel sounds are normal. She exhibits no mass. There is no tenderness.   Genitourinary: Vagina normal and uterus normal. There is no lesion on the right labia. There is no lesion on the left labia. Uterus is not tender. Cervix exhibits no motion tenderness and no discharge. Right adnexum displays no mass, no tenderness and no fullness. Left adnexum displays no mass, no tenderness and no fullness. No tenderness in the vagina. No vaginal discharge found.   Musculoskeletal: She exhibits no edema.   Lymphadenopathy:     She has no cervical adenopathy.     She has no axillary adenopathy.   Neurological: She is alert and oriented to person, place, and time.   Skin: Skin is warm. No rash noted.   Psychiatric: She has a normal mood and affect.         Results for MARKEL ROLDAN (MRN [id number]) as of 9/27/2017 10:00   Ref. Range 9/19/2017 12:51   WBC Latest Ref Range: 3.90 - 12.70 K/uL 7.76   RBC Latest Ref Range: 4.00 - 5.40 M/uL 2.98 (L)   Hemoglobin Latest Ref Range: 12.0 - 16.0 g/dL 10.2 (L)   Hematocrit Latest Ref Range: 37.0 - 48.5 % 29.9 (L)   MCV Latest Ref Range: 82 - 98 fL 100 (H)   MCH Latest Ref Range: 27.0 - 31.0 pg 34.2 (H)   MCHC Latest Ref Range: 32.0 - 36.0 g/dL 34.1   RDW Latest Ref Range: 11.5 - 14.5 % 17.3 (H)   Platelets Latest Ref Range: 150 - 350 K/uL 440 (H)   MPV Latest Ref Range: 9.2 - 12.9 fL 9.3   Gran%  Latest Ref Range: 38.0 - 73.0 % 51.0   Gran # Latest Ref Range: 1.8 - 7.7 K/uL 4.0   Lymph% Latest Ref Range: 18.0 - 48.0 % 30.0   Lymph # Latest Ref Range: 1.0 - 4.8 K/uL 2.3   Mono% Latest Ref Range: 4.0 - 15.0 % 16.5 (H)   Mono # Latest Ref Range: 0.3 - 1.0 K/uL 1.3 (H)   Eosinophil% Latest Ref Range: 0.0 - 8.0 % 1.5   Eos # Latest Ref Range: 0.0 - 0.5 K/uL 0.1   Basophil% Latest Ref Range: 0.0 - 1.9 % 0.1   Baso # Latest Ref Range: 0.00 - 0.20 K/uL 0.01   Sodium Latest Ref Range: 136 - 145 mmol/L 137   Potassium Latest Ref Range: 3.5 - 5.1 mmol/L 4.2   Chloride Latest Ref Range: 95 - 110 mmol/L 103   CO2 Latest Ref Range: 23 - 29 mmol/L 23   Anion Gap Latest Ref Range: 8 - 16 mmol/L 11   BUN, Bld Latest Ref Range: 8 - 23 mg/dL 16   Creatinine Latest Ref Range: 0.5 - 1.4 mg/dL 0.9   eGFR if non African American Latest Ref Range: >60 mL/min/1.73 m^2 >60.0   eGFR if African American Latest Ref Range: >60 mL/min/1.73 m^2 >60.0   Glucose Latest Ref Range: 70 - 110 mg/dL 106   Calcium Latest Ref Range: 8.7 - 10.5 mg/dL 9.1   Alkaline Phosphatase Latest Ref Range: 55 - 135 U/L 75   Total Protein Latest Ref Range: 6.0 - 8.4 g/dL 7.6   Albumin Latest Ref Range: 3.5 - 5.2 g/dL 3.6   Total Bilirubin Latest Ref Range: 0.1 - 1.0 mg/dL 0.5   AST Latest Ref Range: 10 - 40 U/L 18   ALT Latest Ref Range: 10 - 44 U/L 13   TSH Latest Ref Range: 0.400 - 4.000 uIU/mL 1.884   Results for MARKEL ROLDAN (MRN [id number]) as of 9/27/2017 10:37   Ref. Range 11/14/2016 14:00 1/17/2017 15:36   Vit D, 25-Hydroxy Latest Ref Range: 30 - 96 ng/mL  49     Assessment:       1. Encounter for preventive health examination    2. Rheumatoid arthritis involving right shoulder with positive rheumatoid factor    3. Mild major depression    4. Essential hypertension    5. Hiatal hernia with gastroesophageal reflux    6. Chronic bronchitis, unspecified chronic bronchitis type    7. Acquired hypothyroidism    8. Encounter for screening mammogram for  malignant neoplasm of breast     9. Anemia, unspecified        Plan:       Sarah was seen today for follow-up.    Diagnoses and all orders for this visit:    Encounter for preventive health examination- utd.  -     Influenza - High Dose (65+) (PF) (IM)  -     Mammo Digital Screening Bilat with CAD; Future    Rheumatoid arthritis involving right shoulder with positive rheumatoid factor    Mild major depression and chronic pain- if not iron def anemia, will change cital to duloxetine.  RTC 2 mo.  -     duloxetine (CYMBALTA) 20 MG capsule; Take 1 capsule (20 mg total) by mouth once daily.    Essential hypertension- stable on rx.    Hiatal hernia with gastroesophageal reflux    Chronic bronchitis, unspecified chronic bronchitis type- doing well currently.    Acquired hypothyroidism- stable on rx.    Anemia, unspecified- has been variable, but hx PUD bleeding in past- check lab now.  -     CBC auto differential; Future  -     Iron and TIBC; Future  -     Ferritin; Future

## 2017-09-27 ENCOUNTER — LAB VISIT (OUTPATIENT)
Dept: LAB | Facility: HOSPITAL | Age: 77
End: 2017-09-27
Attending: INTERNAL MEDICINE
Payer: MEDICARE

## 2017-09-27 ENCOUNTER — OFFICE VISIT (OUTPATIENT)
Dept: INTERNAL MEDICINE | Facility: CLINIC | Age: 77
End: 2017-09-27
Payer: MEDICARE

## 2017-09-27 VITALS
HEART RATE: 80 BPM | BODY MASS INDEX: 23.05 KG/M2 | DIASTOLIC BLOOD PRESSURE: 82 MMHG | HEIGHT: 62 IN | WEIGHT: 125.25 LBS | SYSTOLIC BLOOD PRESSURE: 130 MMHG | TEMPERATURE: 97 F | OXYGEN SATURATION: 97 %

## 2017-09-27 DIAGNOSIS — K21.9 HIATAL HERNIA WITH GASTROESOPHAGEAL REFLUX: ICD-10-CM

## 2017-09-27 DIAGNOSIS — D64.9 ANEMIA, UNSPECIFIED: ICD-10-CM

## 2017-09-27 DIAGNOSIS — Z00.00 ENCOUNTER FOR PREVENTIVE HEALTH EXAMINATION: Primary | ICD-10-CM

## 2017-09-27 DIAGNOSIS — I10 ESSENTIAL HYPERTENSION: Chronic | ICD-10-CM

## 2017-09-27 DIAGNOSIS — Z12.31 ENCOUNTER FOR SCREENING MAMMOGRAM FOR MALIGNANT NEOPLASM OF BREAST: ICD-10-CM

## 2017-09-27 DIAGNOSIS — K44.9 HIATAL HERNIA WITH GASTROESOPHAGEAL REFLUX: ICD-10-CM

## 2017-09-27 DIAGNOSIS — F32.0 MILD MAJOR DEPRESSION: ICD-10-CM

## 2017-09-27 DIAGNOSIS — M05.711 RHEUMATOID ARTHRITIS INVOLVING RIGHT SHOULDER WITH POSITIVE RHEUMATOID FACTOR: Chronic | ICD-10-CM

## 2017-09-27 DIAGNOSIS — J42 CHRONIC BRONCHITIS, UNSPECIFIED CHRONIC BRONCHITIS TYPE: ICD-10-CM

## 2017-09-27 DIAGNOSIS — E03.9 ACQUIRED HYPOTHYROIDISM: ICD-10-CM

## 2017-09-27 LAB
BASOPHILS # BLD AUTO: 0.01 K/UL
BASOPHILS NFR BLD: 0.1 %
DIFFERENTIAL METHOD: ABNORMAL
EOSINOPHIL # BLD AUTO: 0.1 K/UL
EOSINOPHIL NFR BLD: 1.3 %
ERYTHROCYTE [DISTWIDTH] IN BLOOD BY AUTOMATED COUNT: 17 %
FERRITIN SERPL-MCNC: 253 NG/ML
HCT VFR BLD AUTO: 31.7 %
HGB BLD-MCNC: 10.7 G/DL
IRON SERPL-MCNC: 118 UG/DL
LYMPHOCYTES # BLD AUTO: 2.4 K/UL
LYMPHOCYTES NFR BLD: 34.6 %
MCH RBC QN AUTO: 34.6 PG
MCHC RBC AUTO-ENTMCNC: 33.8 G/DL
MCV RBC AUTO: 103 FL
MONOCYTES # BLD AUTO: 0.9 K/UL
MONOCYTES NFR BLD: 12.6 %
NEUTROPHILS # BLD AUTO: 3.6 K/UL
NEUTROPHILS NFR BLD: 51 %
PLATELET # BLD AUTO: 407 K/UL
PMV BLD AUTO: 10.2 FL
RBC # BLD AUTO: 3.09 M/UL
SATURATED IRON: 29 %
TOTAL IRON BINDING CAPACITY: 406 UG/DL
TRANSFERRIN SERPL-MCNC: 274 MG/DL
WBC # BLD AUTO: 6.97 K/UL

## 2017-09-27 PROCEDURE — 99499 UNLISTED E&M SERVICE: CPT | Mod: S$GLB,,, | Performed by: INTERNAL MEDICINE

## 2017-09-27 PROCEDURE — 99999 PR PBB SHADOW E&M-EST. PATIENT-LVL IV: CPT | Mod: PBBFAC,,, | Performed by: INTERNAL MEDICINE

## 2017-09-27 PROCEDURE — 82728 ASSAY OF FERRITIN: CPT

## 2017-09-27 PROCEDURE — 85025 COMPLETE CBC W/AUTO DIFF WBC: CPT

## 2017-09-27 PROCEDURE — 83540 ASSAY OF IRON: CPT

## 2017-09-27 PROCEDURE — G0008 ADMIN INFLUENZA VIRUS VAC: HCPCS | Mod: S$GLB,,, | Performed by: INTERNAL MEDICINE

## 2017-09-27 PROCEDURE — 36415 COLL VENOUS BLD VENIPUNCTURE: CPT | Mod: PO

## 2017-09-27 PROCEDURE — 90662 IIV NO PRSV INCREASED AG IM: CPT | Mod: S$GLB,,, | Performed by: INTERNAL MEDICINE

## 2017-09-27 PROCEDURE — 99397 PER PM REEVAL EST PAT 65+ YR: CPT | Mod: S$GLB,,, | Performed by: INTERNAL MEDICINE

## 2017-09-27 RX ORDER — DULOXETIN HYDROCHLORIDE 20 MG/1
20 CAPSULE, DELAYED RELEASE ORAL DAILY
Qty: 30 CAPSULE | Refills: 11 | Status: SHIPPED | OUTPATIENT
Start: 2017-09-27 | End: 2018-05-24 | Stop reason: SDUPTHER

## 2017-09-28 ENCOUNTER — TELEPHONE (OUTPATIENT)
Dept: INTERNAL MEDICINE | Facility: CLINIC | Age: 77
End: 2017-09-28

## 2017-09-28 NOTE — TELEPHONE ENCOUNTER
Unable to reach pt and her mailbox is full--    Please inform blood count is a little better and iron studies are all normal.  So she is stable and this is not a concern.  SM

## 2017-10-02 RX ORDER — CITALOPRAM 10 MG/1
TABLET ORAL
Qty: 30 TABLET | Refills: 11 | Status: SHIPPED | OUTPATIENT
Start: 2017-10-02 | End: 2017-11-24

## 2017-10-04 ENCOUNTER — TELEPHONE (OUTPATIENT)
Dept: INTERNAL MEDICINE | Facility: CLINIC | Age: 77
End: 2017-10-04

## 2017-10-04 NOTE — TELEPHONE ENCOUNTER
----- Message from Yessenia Banda sent at 10/4/2017 10:59 AM CDT -----  Patient requesting lab test results. Please adv/call [phone].//thanks .cw

## 2017-10-17 ENCOUNTER — TELEPHONE (OUTPATIENT)
Dept: PULMONOLOGY | Facility: CLINIC | Age: 77
End: 2017-10-17

## 2017-10-22 RX ORDER — AMITRIPTYLINE HYDROCHLORIDE 75 MG/1
TABLET ORAL
Qty: 90 TABLET | Refills: 3 | Status: SHIPPED | OUTPATIENT
Start: 2017-10-22 | End: 2018-10-18 | Stop reason: SDUPTHER

## 2017-10-30 ENCOUNTER — HOSPITAL ENCOUNTER (OUTPATIENT)
Dept: RADIOLOGY | Facility: HOSPITAL | Age: 77
Discharge: HOME | End: 2017-10-30
Attending: INTERNAL MEDICINE
Payer: MEDICARE

## 2017-10-30 DIAGNOSIS — R91.8 MULTIPLE LUNG NODULES ON CT: Chronic | ICD-10-CM

## 2017-10-30 DIAGNOSIS — M05.10 RHEUMATOID LUNG: Chronic | ICD-10-CM

## 2017-10-30 PROCEDURE — 71020 XR CHEST PA AND LATERAL: CPT | Mod: TC,PO

## 2017-10-30 PROCEDURE — 71020 XR CHEST PA AND LATERAL: CPT | Mod: 26,,, | Performed by: RADIOLOGY

## 2017-11-01 DIAGNOSIS — F51.01 PRIMARY INSOMNIA: ICD-10-CM

## 2017-11-01 RX ORDER — HYDROXYZINE HYDROCHLORIDE 25 MG/1
TABLET, FILM COATED ORAL
Qty: 40 TABLET | Refills: 6 | Status: SHIPPED | OUTPATIENT
Start: 2017-11-01 | End: 2018-08-08 | Stop reason: SDUPTHER

## 2017-11-21 ENCOUNTER — OFFICE VISIT (OUTPATIENT)
Dept: OPHTHALMOLOGY | Facility: CLINIC | Age: 77
End: 2017-11-21
Payer: MEDICARE

## 2017-11-21 DIAGNOSIS — H10.13 ALLERGIC CONJUNCTIVITIS, BILATERAL: Primary | ICD-10-CM

## 2017-11-21 DIAGNOSIS — H04.123 DRY EYES, BILATERAL: ICD-10-CM

## 2017-11-21 PROCEDURE — 92012 INTRM OPH EXAM EST PATIENT: CPT | Mod: S$GLB,,, | Performed by: OPTOMETRIST

## 2017-11-21 PROCEDURE — 99999 PR PBB SHADOW E&M-EST. PATIENT-LVL II: CPT | Mod: PBBFAC,,, | Performed by: OPTOMETRIST

## 2017-11-21 RX ORDER — PREDNISOLONE ACETATE 10 MG/ML
1 SUSPENSION/ DROPS OPHTHALMIC 4 TIMES DAILY
Qty: 5 ML | Refills: 0 | Status: SHIPPED | OUTPATIENT
Start: 2017-11-21 | End: 2018-04-06

## 2017-11-21 RX ORDER — KETOTIFEN FUMARATE 0.35 MG/ML
1 SOLUTION/ DROPS OPHTHALMIC 2 TIMES DAILY
Qty: 5 ML | Refills: 1 | Status: SHIPPED | OUTPATIENT
Start: 2017-11-21 | End: 2019-06-07

## 2017-11-21 NOTE — PROGRESS NOTES
HPI     Mucus in both eyes in the morning, sometimes is a yellowish green color.   Eyes irritated x several months.  Eyes burns, watery, hurts. Patient still   using the Systane gel hs and eye drops tid which is not helping. Last eye   exam 05/06/2017 TRF.     Last edited by Octavio Navarro, KEILA on 11/21/2017 10:06 AM. (History)            Assessment /Plan     For exam results, see Encounter Report.    Allergic conjunctivitis, bilateral    Dry eyes, bilateral    Other orders  -     ketotifen (ZADITOR) 0.025 % (0.035 %) ophthalmic solution; Place 1 drop into both eyes 2 (two) times daily.  Dispense: 5 mL; Refill: 1  -     prednisoLONE acetate (PRED FORTE) 1 % DrpS; Place 1 drop into both eyes 4 (four) times daily. Qid x 7 days, bid x 7 days, qday x 7 days  Dispense: 5 mL; Refill: 0      RTC 10 days if no improvement

## 2017-11-24 ENCOUNTER — OFFICE VISIT (OUTPATIENT)
Dept: INTERNAL MEDICINE | Facility: CLINIC | Age: 77
End: 2017-11-24
Payer: MEDICARE

## 2017-11-24 VITALS
TEMPERATURE: 98 F | SYSTOLIC BLOOD PRESSURE: 124 MMHG | HEIGHT: 62 IN | HEART RATE: 90 BPM | BODY MASS INDEX: 24.01 KG/M2 | WEIGHT: 130.5 LBS | DIASTOLIC BLOOD PRESSURE: 88 MMHG | OXYGEN SATURATION: 100 %

## 2017-11-24 DIAGNOSIS — F32.0 MILD MAJOR DEPRESSION: ICD-10-CM

## 2017-11-24 DIAGNOSIS — M05.711 RHEUMATOID ARTHRITIS INVOLVING RIGHT SHOULDER WITH POSITIVE RHEUMATOID FACTOR: Chronic | ICD-10-CM

## 2017-11-24 DIAGNOSIS — I10 ESSENTIAL HYPERTENSION: Primary | Chronic | ICD-10-CM

## 2017-11-24 PROCEDURE — 99499 UNLISTED E&M SERVICE: CPT | Mod: S$GLB,,, | Performed by: INTERNAL MEDICINE

## 2017-11-24 PROCEDURE — 99213 OFFICE O/P EST LOW 20 MIN: CPT | Mod: S$GLB,,, | Performed by: INTERNAL MEDICINE

## 2017-11-24 PROCEDURE — 99999 PR PBB SHADOW E&M-EST. PATIENT-LVL III: CPT | Mod: PBBFAC,,, | Performed by: INTERNAL MEDICINE

## 2017-11-24 NOTE — PROGRESS NOTES
"Subjective:       Patient ID: Sarah Parker is a 77 y.o. female.    Chief Complaint: Follow-up    Here for follow up of medical problems.  Doing better on cymbalta.  Definitely helping pain, and depression much improved.  Sleeping much better now.  BMs normal.    Updated/ annual due 9/18:  HM: 9/17 fluvax, 5/16 [id number], 10/14 [id number], 10/13 TDaP, 1/17 BMD/will bring to Dr. Tejada rep 2y, 1/17 MMG, 10/13 EGD, 2015 Cscope Dr. Garcia rep 5y.          Review of Systems   Constitutional: Negative for chills, diaphoresis and fever.   Respiratory: Negative for cough and shortness of breath.    Cardiovascular: Negative for chest pain, palpitations and leg swelling.   Gastrointestinal: Negative for blood in stool, constipation, diarrhea, nausea and vomiting.   Genitourinary: Negative for dysuria, frequency and hematuria.   Psychiatric/Behavioral: The patient is not nervous/anxious.        Objective:   /88 (BP Location: Right arm, Patient Position: Sitting, BP Method: Medium (Manual))   Pulse 90   Temp 97.9 °F (36.6 °C) (Tympanic)   Ht 5' 2" (1.575 m)   Wt 59.2 kg (130 lb 8.2 oz)   SpO2 100%   BMI 23.87 kg/m²     Physical Exam   Constitutional: She is oriented to person, place, and time. She appears well-developed.   HENT:   Mouth/Throat: Oropharynx is clear and moist.   Neck: Neck supple. Carotid bruit is not present. No thyroid mass present.   Cardiovascular: Normal rate, regular rhythm and intact distal pulses.  Exam reveals no gallop and no friction rub.    No murmur heard.  Pulmonary/Chest: Effort normal and breath sounds normal. She has no wheezes. She has no rales.   Abdominal: Soft. Bowel sounds are normal. She exhibits no mass. There is no hepatosplenomegaly. There is no tenderness.   Musculoskeletal: She exhibits no edema.   Lymphadenopathy:     She has no cervical adenopathy.   Neurological: She is alert and oriented to person, place, and time.   Psychiatric: She has a normal mood and affect. "       Assessment:       1. Essential hypertension    2. Mild major depression    3. Rheumatoid arthritis involving right shoulder with positive rheumatoid factor        Plan:       Sarah was seen today for follow-up.    Diagnoses and all orders for this visit:    Essential hypertension- stable on rx.    Mild major depression- doing well, cont this rx.    Rheumatoid arthritis involving right shoulder with positive rheumatoid factor    RTC 6mo.

## 2018-01-15 ENCOUNTER — HOSPITAL ENCOUNTER (OUTPATIENT)
Dept: RADIOLOGY | Facility: HOSPITAL | Age: 78
Discharge: HOME | End: 2018-01-15
Attending: INTERNAL MEDICINE
Payer: MEDICARE

## 2018-01-15 DIAGNOSIS — Z00.00 ENCOUNTER FOR PREVENTIVE HEALTH EXAMINATION: ICD-10-CM

## 2018-01-15 DIAGNOSIS — Z12.31 ENCOUNTER FOR SCREENING MAMMOGRAM FOR MALIGNANT NEOPLASM OF BREAST: ICD-10-CM

## 2018-01-15 PROCEDURE — 77067 SCR MAMMO BI INCL CAD: CPT | Mod: TC,PO

## 2018-01-15 PROCEDURE — 77063 BREAST TOMOSYNTHESIS BI: CPT | Mod: 26,,, | Performed by: RADIOLOGY

## 2018-01-15 PROCEDURE — 77067 SCR MAMMO BI INCL CAD: CPT | Mod: 26,,, | Performed by: RADIOLOGY

## 2018-01-25 RX ORDER — LEVOTHYROXINE SODIUM 88 UG/1
TABLET ORAL
Qty: 30 TABLET | Refills: 11 | Status: SHIPPED | OUTPATIENT
Start: 2018-01-25 | End: 2018-04-06 | Stop reason: SDUPTHER

## 2018-01-31 ENCOUNTER — OFFICE VISIT (OUTPATIENT)
Dept: INTERNAL MEDICINE | Facility: CLINIC | Age: 78
End: 2018-01-31
Payer: MEDICARE

## 2018-01-31 VITALS
SYSTOLIC BLOOD PRESSURE: 132 MMHG | WEIGHT: 129 LBS | HEIGHT: 62 IN | BODY MASS INDEX: 23.74 KG/M2 | HEART RATE: 88 BPM | DIASTOLIC BLOOD PRESSURE: 84 MMHG | OXYGEN SATURATION: 100 % | TEMPERATURE: 97 F

## 2018-01-31 DIAGNOSIS — M05.711 RHEUMATOID ARTHRITIS INVOLVING RIGHT SHOULDER WITH POSITIVE RHEUMATOID FACTOR: Chronic | ICD-10-CM

## 2018-01-31 DIAGNOSIS — I10 ESSENTIAL HYPERTENSION: Chronic | ICD-10-CM

## 2018-01-31 DIAGNOSIS — E03.9 ACQUIRED HYPOTHYROIDISM: Primary | ICD-10-CM

## 2018-01-31 DIAGNOSIS — I70.0 ATHEROSCLEROSIS OF AORTA: ICD-10-CM

## 2018-01-31 DIAGNOSIS — F32.0 MILD MAJOR DEPRESSION: ICD-10-CM

## 2018-01-31 PROCEDURE — 99499 UNLISTED E&M SERVICE: CPT | Mod: S$GLB,,, | Performed by: INTERNAL MEDICINE

## 2018-01-31 PROCEDURE — 3008F BODY MASS INDEX DOCD: CPT | Mod: S$GLB,,, | Performed by: INTERNAL MEDICINE

## 2018-01-31 PROCEDURE — 99214 OFFICE O/P EST MOD 30 MIN: CPT | Mod: S$GLB,,, | Performed by: INTERNAL MEDICINE

## 2018-01-31 PROCEDURE — 1159F MED LIST DOCD IN RCRD: CPT | Mod: S$GLB,,, | Performed by: INTERNAL MEDICINE

## 2018-01-31 PROCEDURE — 99999 PR PBB SHADOW E&M-EST. PATIENT-LVL III: CPT | Mod: PBBFAC,,, | Performed by: INTERNAL MEDICINE

## 2018-01-31 PROCEDURE — 1125F AMNT PAIN NOTED PAIN PRSNT: CPT | Mod: S$GLB,,, | Performed by: INTERNAL MEDICINE

## 2018-01-31 RX ORDER — PRAVASTATIN SODIUM 10 MG/1
10 TABLET ORAL DAILY
Qty: 30 TABLET | Refills: 11 | Status: SHIPPED | OUTPATIENT
Start: 2018-01-31 | End: 2019-02-01 | Stop reason: SDUPTHER

## 2018-01-31 NOTE — PROGRESS NOTES
"Subjective:       Patient ID: Sarah Parker is a 77 y.o. female.    Chief Complaint: Follow-up    Here for follow up of medical problems.  Having more rheum pain, Rheum Dr. Tejada thinks she has a virus.  On Actemra.  Sleeping better, handling stress better on cymbalta.  BMs normal.    Dr. Tejada gave steroid injection.    Updated/ annual due 9/18:  HM: 9/17 fluvax, 5/16 [id number], 10/14 [id number], 10/13 TDaP, 1/17 BMD/will bring to Dr. Tejada rep 2y, 1/18 MMG, 10/13 EGD, 2015 Cscope Dr. Garcia rep 5y.          Review of Systems   Constitutional: Negative for chills, diaphoresis and fever.   Respiratory: Negative for cough and shortness of breath.    Cardiovascular: Negative for chest pain, palpitations and leg swelling.   Gastrointestinal: Negative for blood in stool, constipation, diarrhea, nausea and vomiting.   Genitourinary: Negative for dysuria, frequency and hematuria.   Psychiatric/Behavioral: The patient is not nervous/anxious.        Objective:   /84 (BP Location: Right arm, Patient Position: Sitting, BP Method: Medium (Manual))   Pulse 88   Temp 96.7 °F (35.9 °C) (Tympanic)   Ht 5' 2" (1.575 m)   Wt 58.5 kg (128 lb 15.5 oz)   SpO2 100%   BMI 23.59 kg/m²     Physical Exam   Constitutional: She is oriented to person, place, and time. She appears well-developed.   HENT:   Mouth/Throat: Oropharynx is clear and moist.   Neck: Neck supple. Carotid bruit is not present. No thyroid mass present.   Cardiovascular: Normal rate, regular rhythm and intact distal pulses.  Exam reveals no gallop and no friction rub.    No murmur heard.  Pulmonary/Chest: Effort normal and breath sounds normal. She has no wheezes. She has no rales.   Abdominal: Soft. Bowel sounds are normal. She exhibits no mass. There is no hepatosplenomegaly. There is no tenderness.   Musculoskeletal: She exhibits no edema.   Lymphadenopathy:     She has no cervical adenopathy.   Neurological: She is alert and oriented to person, " place, and time.   Psychiatric: She has a normal mood and affect.       Assessment:       1. Acquired hypothyroidism    2. Atherosclerosis of aorta    3. Essential hypertension    4. Mild major depression    5. Rheumatoid arthritis involving right shoulder with positive rheumatoid factor        Plan:       Sarah was seen today for follow-up.    Diagnoses and all orders for this visit:    Acquired hypothyroidism- clin stable.    Atherosclerosis of aorta- 10yr vasc risk 41% so start low dose statin, recheck 5mo.    Essential hypertension-stable on rx.    Mild major depression- doing well on rx.    Rheumatoid arthritis involving right shoulder with positive rheumatoid factor- per Dr. Tejada.    Call if achiness not resolved in another week.  RTC 5mo.

## 2018-03-19 ENCOUNTER — PES CALL (OUTPATIENT)
Dept: ADMINISTRATIVE | Facility: CLINIC | Age: 78
End: 2018-03-19

## 2018-04-06 ENCOUNTER — HOSPITAL ENCOUNTER (OUTPATIENT)
Dept: RADIOLOGY | Facility: HOSPITAL | Age: 78
Discharge: HOME | End: 2018-04-06
Attending: FAMILY MEDICINE
Payer: MEDICARE

## 2018-04-06 ENCOUNTER — OFFICE VISIT (OUTPATIENT)
Dept: INTERNAL MEDICINE | Facility: CLINIC | Age: 78
End: 2018-04-06
Payer: MEDICARE

## 2018-04-06 VITALS
DIASTOLIC BLOOD PRESSURE: 86 MMHG | WEIGHT: 129.63 LBS | HEIGHT: 62 IN | SYSTOLIC BLOOD PRESSURE: 134 MMHG | BODY MASS INDEX: 23.85 KG/M2 | OXYGEN SATURATION: 97 % | RESPIRATION RATE: 18 BRPM | TEMPERATURE: 98 F | HEART RATE: 92 BPM

## 2018-04-06 DIAGNOSIS — J18.9 COMMUNITY ACQUIRED PNEUMONIA, UNSPECIFIED LATERALITY: Primary | ICD-10-CM

## 2018-04-06 DIAGNOSIS — J44.1 COPD WITH EXACERBATION: ICD-10-CM

## 2018-04-06 PROCEDURE — 3079F DIAST BP 80-89 MM HG: CPT | Mod: CPTII,S$GLB,, | Performed by: FAMILY MEDICINE

## 2018-04-06 PROCEDURE — 99999 PR PBB SHADOW E&M-EST. PATIENT-LVL III: CPT | Mod: PBBFAC,,, | Performed by: FAMILY MEDICINE

## 2018-04-06 PROCEDURE — 71046 X-RAY EXAM CHEST 2 VIEWS: CPT | Mod: 26,,, | Performed by: RADIOLOGY

## 2018-04-06 PROCEDURE — 99499 UNLISTED E&M SERVICE: CPT | Mod: S$GLB,,, | Performed by: FAMILY MEDICINE

## 2018-04-06 PROCEDURE — 71046 X-RAY EXAM CHEST 2 VIEWS: CPT | Mod: TC,PO

## 2018-04-06 PROCEDURE — 3075F SYST BP GE 130 - 139MM HG: CPT | Mod: CPTII,S$GLB,, | Performed by: FAMILY MEDICINE

## 2018-04-06 PROCEDURE — 99214 OFFICE O/P EST MOD 30 MIN: CPT | Mod: S$GLB,,, | Performed by: FAMILY MEDICINE

## 2018-04-06 RX ORDER — PREDNISONE 20 MG/1
40 TABLET ORAL DAILY
Qty: 10 TABLET | Refills: 0 | Status: SHIPPED | OUTPATIENT
Start: 2018-04-06 | End: 2018-04-11

## 2018-04-06 RX ORDER — LEVOFLOXACIN 750 MG/1
750 TABLET ORAL DAILY
Qty: 5 TABLET | Refills: 0 | Status: SHIPPED | OUTPATIENT
Start: 2018-04-06 | End: 2018-04-11

## 2018-04-06 NOTE — ASSESSMENT & PLAN NOTE
Chest x-ray done today is concerning for developing pneumonia.  This along with her symptoms of rhonchi on exam.  We'll start on Levaquin since she has COPD.  Also started on a prednisone burst 40 mg daily.  Advised her that if she starts to feel worse over the weekend she needs to go to the ER.

## 2018-04-06 NOTE — PROGRESS NOTES
Subjective:       Patient ID: Saarh Parker is a 78 y.o. female.    Chief Complaint: Cough    HPI  Here today with cough and congestion that has been ongoing for about a week.  She is having diaphragmatic region pain due to the excessive coughing.  Also having sinus pressure and discomfort.  She has a significant past medical history for COPD and in the past that she has had some pneumonia and required hospital admissions for that.  Her last COPD exacerbation was reportedly over a year ago.  Typically she'll get steroids and antibiotics for those situations.  She continues to see pulmonology on a routine basis.  She is not having any fever.  She does take immunosuppressive medications for rheumatoid arthritis which makes her a bit higher risk for infection and also not responding the same.    Family History   Problem Relation Age of Onset    Heart disease Mother     Hyperlipidemia Mother     Hypertension Mother     Osteoarthritis Mother     Cataracts Mother     Hypertension Father     Osteoarthritis Father     Heart disease Father     Asthma Sister     Chronic back pain Sister     Hypertension Sister     Osteoarthritis Sister     Thyroid disease Sister     Asthma Brother     Cancer Brother     Chronic back pain Brother     Diabetes Mellitus Brother     Hypertension Brother     Osteoarthritis Brother     Thyroid disease Brother     Cancer Maternal Grandfather     Fibromyalgia Daughter     Heart disease Maternal Grandmother     Colon cancer Neg Hx     Diabetes Neg Hx        Current Outpatient Prescriptions:     amitriptyline (ELAVIL) 75 MG tablet, TAKE 1 TABLET BY MOUTH EVERY EVENING, Disp: 90 tablet, Rfl: 3    calcium citrate-vitamin D (CITRACAL + D) 315-200 mg-unit per tablet, Take 1 tablet by mouth once daily. , Disp: , Rfl:     duloxetine (CYMBALTA) 20 MG capsule, Take 1 capsule (20 mg total) by mouth once daily., Disp: 30 capsule, Rfl: 11    hydrocodone-acetaminophen 5-325mg  (NORCO) 5-325 mg per tablet, TK 1 T PO Q 6 H PRN, Disp: , Rfl: 0    hydrOXYzine HCl (ATARAX) 25 MG tablet, TAKE 1 TO 2 TABLETS(25 TO 50 MG) BY MOUTH EVERY NIGHT AS NEEDED FOR ANXIETY OR INSOMNIA, Disp: 40 tablet, Rfl: 6    ketotifen (ZADITOR) 0.025 % (0.035 %) ophthalmic solution, Place 1 drop into both eyes 2 (two) times daily., Disp: 5 mL, Rfl: 1    leucovorin (WELLCOVORIN) 5 mg Tab, TAKE ONE WEEKLY ON SATURDAYS, Disp: 4 tablet, Rfl: 3    levothyroxine (SYNTHROID) 88 MCG tablet, TAKE 1 TABLET BY MOUTH BEFORE BREAKFAST, Disp: 30 tablet, Rfl: 11    meclizine (ANTIVERT) 50 MG tablet, Take 25 mg by mouth 3 (three) times daily as needed., Disp: , Rfl:     methotrexate 2.5 MG Tab, Take 17.5 mg by mouth every 7 days. , Disp: , Rfl:     metoprolol succinate (TOPROL-XL) 50 MG 24 hr tablet, Take 1 tablet (50 mg total) by mouth once daily., Disp: 30 tablet, Rfl: 11    multivitamin capsule, Take by mouth. As directed, Disp: , Rfl:     ondansetron (ZOFRAN) 4 MG tablet, Take 1 tablet (4 mg total) by mouth every 8 (eight) hours as needed for Nausea., Disp: 30 tablet, Rfl: 1    oxymetazoline (AFRIN) 0.05 % nasal spray, 1 Aerosol, Spray Nasal At bedtime, Disp: , Rfl:     pravastatin (PRAVACHOL) 10 MG tablet, Take 1 tablet (10 mg total) by mouth once daily., Disp: 30 tablet, Rfl: 11    tocilizumab (ACTEMRA) 80 mg/4 mL (20 mg/mL) Soln, Inject into the vein., Disp: , Rfl:     valsartan-hydrochlorothiazide (DIOVAN-HCT) 80-12.5 mg per tablet, TAKE 1 TABLET BY MOUTH DAILY, Disp: 90 tablet, Rfl: 3    VITAMIN D2 50,000 unit capsule, TAKE 1 CAPSULE BY MOUTH EVERY 7 DAYS (Patient taking differently: monthly), Disp: 4 capsule, Rfl: 11    albuterol (PROVENTIL) 2.5 mg /3 mL (0.083 %) nebulizer solution, Take 3 mLs (2.5 mg total) by nebulization every 8 (eight) hours while awake., Disp: 180 mL, Rfl: 0    levoFLOXacin (LEVAQUIN) 750 MG tablet, Take 1 tablet (750 mg total) by mouth once daily., Disp: 5 tablet, Rfl: 0     "predniSONE (DELTASONE) 20 MG tablet, Take 2 tablets (40 mg total) by mouth once daily., Disp: 10 tablet, Rfl: 0    Review of Systems   Constitutional: Negative for chills and fever.   HENT: Positive for congestion.    Respiratory: Positive for cough and shortness of breath.    Cardiovascular: Negative for chest pain.   Gastrointestinal: Negative for abdominal pain.   Skin: Negative for rash.   Neurological: Negative for dizziness.       Objective:   /86   Pulse 92   Temp 97.8 °F (36.6 °C) (Tympanic)   Resp 18   Ht 5' 2" (1.575 m)   Wt 58.8 kg (129 lb 10.1 oz)   SpO2 97%   BMI 23.71 kg/m²      Physical Exam   Constitutional: She is oriented to person, place, and time. She appears well-developed and well-nourished. No distress.   HENT:   Head: Normocephalic and atraumatic.   Nose: Nose normal.   Eyes: Conjunctivae and EOM are normal. Pupils are equal, round, and reactive to light. Right eye exhibits no discharge. Left eye exhibits no discharge.   Neck: No thyromegaly present.   Cardiovascular: Normal rate and regular rhythm.    No murmur heard.  Pulmonary/Chest: Effort normal. No respiratory distress. She has wheezes. She has rales.   Has overall poor airflow and rhonchi in both lower lung fields.   Abdominal: Soft. She exhibits no distension.   Musculoskeletal: She exhibits no edema.   Neurological: She is alert and oriented to person, place, and time.   Skin: No rash noted. She is not diaphoretic.   Psychiatric: She has a normal mood and affect. Her behavior is normal.       Assessment & Plan     Problem List Items Addressed This Visit        Pulmonary    COPD with exacerbation - Primary    Current Assessment & Plan     Chest x-ray done today is concerning for developing pneumonia.  This along with her symptoms of rhonchi on exam.  We'll start on Levaquin since she has COPD.  Also started on a prednisone burst 40 mg daily.  Advised her that if she starts to feel worse over the weekend she needs to go to " the ER.                 Follow-up if symptoms worsen or fail to improve.

## 2018-04-10 ENCOUNTER — OFFICE VISIT (OUTPATIENT)
Dept: HEMATOLOGY/ONCOLOGY | Facility: CLINIC | Age: 78
End: 2018-04-10
Payer: MEDICARE

## 2018-04-10 VITALS
HEART RATE: 95 BPM | OXYGEN SATURATION: 99 % | TEMPERATURE: 98 F | DIASTOLIC BLOOD PRESSURE: 72 MMHG | SYSTOLIC BLOOD PRESSURE: 140 MMHG | HEIGHT: 62 IN | RESPIRATION RATE: 18 BRPM | WEIGHT: 129 LBS | BODY MASS INDEX: 23.74 KG/M2

## 2018-04-10 DIAGNOSIS — D72.829 LEUKOCYTOSIS, UNSPECIFIED TYPE: Primary | ICD-10-CM

## 2018-04-10 DIAGNOSIS — D53.9 MACROCYTIC ANEMIA: ICD-10-CM

## 2018-04-10 PROCEDURE — 99214 OFFICE O/P EST MOD 30 MIN: CPT | Mod: S$GLB,,, | Performed by: INTERNAL MEDICINE

## 2018-04-10 PROCEDURE — 99499 UNLISTED E&M SERVICE: CPT | Mod: S$GLB,,, | Performed by: INTERNAL MEDICINE

## 2018-04-10 PROCEDURE — 3078F DIAST BP <80 MM HG: CPT | Mod: CPTII,S$GLB,, | Performed by: INTERNAL MEDICINE

## 2018-04-10 PROCEDURE — 3077F SYST BP >= 140 MM HG: CPT | Mod: CPTII,S$GLB,, | Performed by: INTERNAL MEDICINE

## 2018-04-10 PROCEDURE — 99999 PR PBB SHADOW E&M-EST. PATIENT-LVL IV: CPT | Mod: PBBFAC,,, | Performed by: INTERNAL MEDICINE

## 2018-04-10 NOTE — PROGRESS NOTES
Reason for visit: Chronic leukocytosis    HPI:   The patient is a 78-year-old  female who presents to the hematology oncology clinic today to discuss further evaluation and management recommendations for leukocytosis.  I have reviewed all of the patient's relevant clinical history available in the medical record including her records from care everywhere.  Today the patient reports that she is currently on treatment for pneumonia. She reports slow improvement since diagnosis last week. She reports that her chronic pain from rheumatoid arthritis is stable.  She reports chronic fatigue.  She denies any fevers, chills or night sweats.  She denies any loss of appetite or unintentional weight loss.  She denies any chest pain or shortness of breath.  She denies any melena, hematochezia, hematemesis, hemoptysis or hematuria.  She reports being up-to-date with all of her age-appropriate cancer screening. She denies any bowel or urinary complaints.  She denies any nausea, vomiting or abdominal pain.  The patient reports taking weekly methotrexate with supplemental folic acid and actemra for treatment of rheumatoid arthritis under the supervision of Dr. Chase Tejada with rheumatology.    PAST MEDICAL HISTORY:   1.  Rheumatoid arthritis  2.  Hypertension  3.  Anxiety  4.  Hypothyroidism  5.  Vitamin D deficiency  6.  Osteoporosis  7.  History of hiatal hernia with GERD  8.  Age-related macular degeneration    SURGICAL HISTORY:   1.  Bilateral wrist surgery  2.  Bilateral knee replacement  3.  Bilateral foot surgery  4.  Cholecystectomy  5.  Nissen fundoplication  6.  Appendectomy  7.  SAY with BSO  8.  Bilateral cataract extraction  9.  Multiple resections of localized skin cancer from the forearm  10. Cyroablation of left renal mass in sep 2016    FAMILY HISTORY: The patient's brother was treated for pancreatic cancer which was diagnosed at the age of 70.  She denies any other immediate family members with cancer or  bleeding/clotting disorders.    SOCIAL HISTORY: She reports a 0.5-pack-year smoking history and quit in 1965.  She denies any alcohol use or recreational drug use.  She used to work as a  and retired at the age of 64.  She is  and has 2 daughters.  She lives in [place], [place].    ALLERGIES: Reviewed on medication card.    MEDICATIONS: [Medcard has been reviewed and/or reconciled.]    REVIEW OF SYSTEMS:   GENERAL: [No fevers, chills or sweats. Reports chronic fatigue. Denies weight loss or loss of appetite.]  HEENT: [No blurred vision, tinnitus, nasal discharge, sorethroat or dysphagia.]  HEART: [No chest pain, palpitations or shortness of breath.]    LUNGS: [Reports cough. Denies hemoptysis or breathing problems.]  ABDOMEN: [No abdominal pain, nausea, vomiting, diarrhea, constipation or melena.]  GENITOURINARY: [No dysuria, bleeding or malodorous discharge.]  NEURO: [No headache, dizziness or vertigo.]  HEMATOLOGY: [No easy bruising, spontaneous bleeding or blood clots in the past].  MUSCULOSKELETAL: [Chronic arthralgias. Denies myalgias or bone pains.]  SKIN: [No rashes or skin lesions.]  PSYCHIATRY: [No depression. Reports h/o anxiety.]    PHYSICAL EXAMINATION:   VS: Reviewed on nurse's notes.  APPEARANCE: The patient is a well-developed, well-nourished and well-groomed elderly  female who appears in no acute distress.    HEENT: No scleral icterus. Both external auditory canals clear. No oral ulcers, lesions. Throat clear  HEAD: No sinus tenderness.  NECK: Supple. No palpable lymphadenopathy. Thyroid non-tender, no palpable masses  CHEST: Breath sounds clear bilaterally. Occasional crackles/rales bilaterally. No rhonchi. Unlabored respirations.  CARDIOVASCULAR: Normal S1, S2. Normal rate. Regular rhythm.  ABDOMEN: Bowel sounds normal. No tenderness. No abdominal distention. No hepatomegaly. No splenomegaly.  LYMPHATIC: No palpable supraclavicular, axillary nodes  EXTREMITIES: No  clubbing, cyanosis. Mild edema in left leg due to recent accidental trauma. This is improving.  SKIN: No lesions. No petechiae. No ecchymoses. No induration or nodules  NEUROLOGIC: No focal findings. Alert & Oriented x 3. Mood appropriate to affect    LABS:   Reviewed    IMAGING:  Reviewed    IMPRESSION:  1.  Chronic leukocytosis  2.  Chronic macrocytic anemia  3.  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  Bilateral lung inflammation [rheumatoid lung]  5.  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  I had a detailed discussion with the patient previously with regard to the various possible etiologies for leukocytosis.  Review of the patient's medical record shows that this has been chronically present on and off for several years.  The patient recalls having been evaluated by a hematologist greater than 10 years ago for this and also underwent bone marrow aspiration and biopsy.  She reports that all of the results were benign at that time.  2.  Review of her peripheral smear does not show any significant abnormalities.  Her rheumatoid arthritis appears to be well-controlled at this time as noted by her inflammatory markers.  3.  Results of labwork done for further evaluation of her chronic macrocytic anemia were previously reviewed in detail.  This is most likely due to her chronic treatment with methotrexate.  Vitamin B12 and folate levels look ok.   4.  I had a detailed discussion about the various possible etiologies for her monoclonal paraproteinemia. Results of prior 24 hour UPEP with immunofixation reviewed and also look unremarkable.  5.  Results of CT scans of the thorax/abdomen/pelvis to evaluate for any evidence of pathologic lymphadenopathy suggestive of any evidence of malignancy in the context of leukocytosis with history of rheumatoid arthritis and immunosuppressive therapy were discussed in detail. Recent PET/CT results were also reviewed. Continue pulmonary follow up with  Dr. Varela as recommended.  6.  Continue follow up with Dr. Beck with urology as recommended.  7.  Results of final report of bone marrow aspiration and biopsy done at [facility] done on 6/1/16 were discussed in detail.  She has a hypercellular marrow with trilineage dyspoiesis and megakaryocytic hyperplasia.  She has relatively increased atypical myeloid blasts which constitute 5% of analyzed cells and approximately 5% clonal lambda restricted plasma cells.  She also has a small CD5 positive clonal B-cell population which constitutes 1% of the sample with a B-cell chronic lymphocytic leukemia/small lymphocytic lymphoma immunophenotype identified by flow cytometry only.  She has adequate bone marrow storage iron with no ringed sideroblasts. She had an abnormal myeloma FISH panel but the overall clinical picture at this time does not appear to support a diagnosis of multiple myeloma. Her overall clinical picture is most suggestive of medication related changes due to methotrexate and less likely to be other etiologies including a myelodysplastic/myeloproliferative neoplasm.  However we will continue with close monitoring at this time. We discussed repeating bone marrow biopsy in the near future based on follow up over the next few months if indicated.  8. She knows to proceed to ER for any worsening of her pneumonia.    Follow-up in 6 months. She knows to call sooner for any new problems or questions.    Sathish Devine MD

## 2018-04-16 ENCOUNTER — OFFICE VISIT (OUTPATIENT)
Dept: INTERNAL MEDICINE | Facility: CLINIC | Age: 78
End: 2018-04-16
Payer: MEDICARE

## 2018-04-16 VITALS
HEART RATE: 80 BPM | OXYGEN SATURATION: 97 % | TEMPERATURE: 98 F | WEIGHT: 131.19 LBS | DIASTOLIC BLOOD PRESSURE: 63 MMHG | BODY MASS INDEX: 24.14 KG/M2 | HEIGHT: 62 IN | RESPIRATION RATE: 18 BRPM | SYSTOLIC BLOOD PRESSURE: 135 MMHG

## 2018-04-16 DIAGNOSIS — J18.9 COMMUNITY ACQUIRED PNEUMONIA, UNSPECIFIED LATERALITY: Primary | ICD-10-CM

## 2018-04-16 PROCEDURE — 3075F SYST BP GE 130 - 139MM HG: CPT | Mod: CPTII,S$GLB,, | Performed by: FAMILY MEDICINE

## 2018-04-16 PROCEDURE — 99999 PR PBB SHADOW E&M-EST. PATIENT-LVL III: CPT | Mod: PBBFAC,,, | Performed by: FAMILY MEDICINE

## 2018-04-16 PROCEDURE — 99214 OFFICE O/P EST MOD 30 MIN: CPT | Mod: S$GLB,,, | Performed by: FAMILY MEDICINE

## 2018-04-16 PROCEDURE — 3078F DIAST BP <80 MM HG: CPT | Mod: CPTII,S$GLB,, | Performed by: FAMILY MEDICINE

## 2018-04-16 RX ORDER — METHYLPREDNISOLONE 4 MG/1
TABLET ORAL
Qty: 1 PACKAGE | Refills: 0 | Status: SHIPPED | OUTPATIENT
Start: 2018-04-16 | End: 2018-05-24

## 2018-04-16 NOTE — PROGRESS NOTES
Subjective:       Patient ID: Sarah Parker is a 78 y.o. female.    Chief Complaint: Cough    HPI  Mrs. Bell is here today to follow-up on cough.  10 days ago she was here and diagnosed with pneumonia.  She completed a course of prednisone burst as well as Levaquin.  She says that she has improved overall from that time except she continues to have a nagging cough.  It is worse whenever she lays down in the evening.  She has been also using the nebulizer treatment which doesn't help too much.  She reports also just feeling fatigued.  She said earlier she was in the grocery store and said that her legs just felt like rubber.    Family History   Problem Relation Age of Onset    Heart disease Mother     Hyperlipidemia Mother     Hypertension Mother     Osteoarthritis Mother     Cataracts Mother     Hypertension Father     Osteoarthritis Father     Heart disease Father     Asthma Sister     Chronic back pain Sister     Hypertension Sister     Osteoarthritis Sister     Thyroid disease Sister     Asthma Brother     Cancer Brother     Chronic back pain Brother     Diabetes Mellitus Brother     Hypertension Brother     Osteoarthritis Brother     Thyroid disease Brother     Cancer Maternal Grandfather     Fibromyalgia Daughter     Heart disease Maternal Grandmother     Colon cancer Neg Hx     Diabetes Neg Hx        Current Outpatient Prescriptions:     amitriptyline (ELAVIL) 75 MG tablet, TAKE 1 TABLET BY MOUTH EVERY EVENING, Disp: 90 tablet, Rfl: 3    calcium citrate-vitamin D (CITRACAL + D) 315-200 mg-unit per tablet, Take 1 tablet by mouth once daily. , Disp: , Rfl:     duloxetine (CYMBALTA) 20 MG capsule, Take 1 capsule (20 mg total) by mouth once daily., Disp: 30 capsule, Rfl: 11    hydrocodone-acetaminophen 5-325mg (NORCO) 5-325 mg per tablet, TK 1 T PO Q 6 H PRN, Disp: , Rfl: 0    hydrOXYzine HCl (ATARAX) 25 MG tablet, TAKE 1 TO 2 TABLETS(25 TO 50 MG) BY MOUTH EVERY NIGHT AS NEEDED  FOR ANXIETY OR INSOMNIA, Disp: 40 tablet, Rfl: 6    ketotifen (ZADITOR) 0.025 % (0.035 %) ophthalmic solution, Place 1 drop into both eyes 2 (two) times daily., Disp: 5 mL, Rfl: 1    leucovorin (WELLCOVORIN) 5 mg Tab, TAKE ONE WEEKLY ON SATURDAYS, Disp: 4 tablet, Rfl: 3    levothyroxine (SYNTHROID) 88 MCG tablet, TAKE 1 TABLET BY MOUTH BEFORE BREAKFAST, Disp: 30 tablet, Rfl: 11    meclizine (ANTIVERT) 50 MG tablet, Take 25 mg by mouth 3 (three) times daily as needed., Disp: , Rfl:     methotrexate 2.5 MG Tab, Take 17.5 mg by mouth every 7 days. , Disp: , Rfl:     metoprolol succinate (TOPROL-XL) 50 MG 24 hr tablet, Take 1 tablet (50 mg total) by mouth once daily., Disp: 30 tablet, Rfl: 11    multivitamin capsule, Take by mouth. As directed, Disp: , Rfl:     ondansetron (ZOFRAN) 4 MG tablet, Take 1 tablet (4 mg total) by mouth every 8 (eight) hours as needed for Nausea., Disp: 30 tablet, Rfl: 1    oxymetazoline (AFRIN) 0.05 % nasal spray, 1 Aerosol, Spray Nasal At bedtime, Disp: , Rfl:     pravastatin (PRAVACHOL) 10 MG tablet, Take 1 tablet (10 mg total) by mouth once daily., Disp: 30 tablet, Rfl: 11    tocilizumab (ACTEMRA) 80 mg/4 mL (20 mg/mL) Soln, Inject into the vein., Disp: , Rfl:     valsartan-hydrochlorothiazide (DIOVAN-HCT) 80-12.5 mg per tablet, TAKE 1 TABLET BY MOUTH DAILY, Disp: 90 tablet, Rfl: 3    VITAMIN D2 50,000 unit capsule, TAKE 1 CAPSULE BY MOUTH EVERY 7 DAYS (Patient taking differently: monthly), Disp: 4 capsule, Rfl: 11    albuterol (PROVENTIL) 2.5 mg /3 mL (0.083 %) nebulizer solution, Take 3 mLs (2.5 mg total) by nebulization every 8 (eight) hours while awake., Disp: 180 mL, Rfl: 0    methylPREDNISolone (MEDROL DOSEPACK) 4 mg tablet, use as directed, Disp: 1 Package, Rfl: 0    Review of Systems   Constitutional: Positive for fatigue. Negative for chills and fever.   Respiratory: Positive for cough. Negative for shortness of breath, wheezing and stridor.    Cardiovascular:  "Negative for chest pain.   Gastrointestinal: Negative for abdominal pain.   Skin: Negative for rash.   Neurological: Negative for dizziness.       Objective:   /63   Pulse 80   Temp 97.7 °F (36.5 °C) (Tympanic)   Resp 18   Ht 5' 2" (1.575 m)   Wt 59.5 kg (131 lb 2.8 oz)   SpO2 97%   BMI 23.99 kg/m²      Physical Exam   Constitutional: She is oriented to person, place, and time. She appears well-developed and well-nourished. No distress.   HENT:   Head: Normocephalic and atraumatic.   Nose: Nose normal.   Normal oropharynx.   Eyes: Conjunctivae and EOM are normal. Pupils are equal, round, and reactive to light. Right eye exhibits no discharge. Left eye exhibits no discharge.   Neck: No thyromegaly present.   Cardiovascular: Normal rate and regular rhythm.    No murmur heard.  Pulmonary/Chest: Effort normal and breath sounds normal. No respiratory distress. She has no wheezes.   Still has some coarse breath sounds but overall much better air movement and improved from last time.  No wheezing appreciated   Abdominal: Soft. She exhibits no distension.   Musculoskeletal: She exhibits no edema.   Neurological: She is alert and oriented to person, place, and time.   Skin: No rash noted. She is not diaphoretic.   Psychiatric: She has a normal mood and affect. Her behavior is normal.       Assessment & Plan     Problem List Items Addressed This Visit        Pulmonary    Community acquired pneumonia - Primary    Current Assessment & Plan     Recommended an extended course of Medrol Dosepak and over-the-counter cough syrup.  Her oxygen saturation is normal and lung findings are actually better.  I think her symptoms are just residual from the pneumonia treatment.  However, since she is having pretty significant fatigue I'm going to do some lab work today with CBC and CMP just to be sure that everything seems normal.         Relevant Orders    Comprehensive metabolic panel    CBC auto differential            No " Follow-up on file.

## 2018-04-16 NOTE — ASSESSMENT & PLAN NOTE
Recommended an extended course of Medrol Dosepak and over-the-counter cough syrup.  Her oxygen saturation is normal and lung findings are actually better.  I think her symptoms are just residual from the pneumonia treatment.  However, since she is having pretty significant fatigue I'm going to do some lab work today with CBC and CMP just to be sure that everything seems normal.

## 2018-05-10 ENCOUNTER — PATIENT OUTREACH (OUTPATIENT)
Dept: ADMINISTRATIVE | Facility: HOSPITAL | Age: 78
End: 2018-05-10

## 2018-05-22 NOTE — PROGRESS NOTES
"Subjective:      Patient ID: Sarah Parker is a 78 y.o. female.    Chief Complaint: Follow-up (6 month)      HPI  Here for follow up of medical problems.  Recent removal of several melanomas right arm, Dr. Tejada and Dr. Mack.  Tolerating low dose statin, started because of 41% 10yr vasc risk.  No f/c/sw/cough.  On abic for skin CA suture sites.  Lots stress with  stubborn issues.  Off actemra due to abics so having more RA pain.  BMs normal.  Sometimes trouble sleeping, lots frustration and irritation and worry.  Wants to go outside and scream at times.  No cp/sob/palp.    Updated/ annual due 9/18:  HM: 9/17 fluvax, 5/16 [id number], 10/14 [id number], 10/13 TDaP, 1/17 BMD/will bring to Dr. Tejada rep 2y, 1/18 MMG, 10/13 EGD, 2015 Cscope Dr. Garcia rep 5y.       Review of Systems   Constitutional: Negative for chills, diaphoresis and fever.   Respiratory: Negative for cough and shortness of breath.    Cardiovascular: Negative for chest pain, palpitations and leg swelling.   Gastrointestinal: Negative for blood in stool, constipation, diarrhea, nausea and vomiting.   Genitourinary: Negative for dysuria, frequency and hematuria.   Psychiatric/Behavioral: The patient is not nervous/anxious.          Objective:   /86 (BP Location: Right arm, Patient Position: Sitting)   Pulse 84   Temp 98.6 °F (37 °C) (Tympanic)   Ht 5' 2" (1.575 m)   Wt 58.4 kg (128 lb 12 oz)   SpO2 98%   BMI 23.55 kg/m²     Physical Exam   Constitutional: She is oriented to person, place, and time. She appears well-developed.   HENT:   Mouth/Throat: Oropharynx is clear and moist.   Neck: Neck supple. Carotid bruit is not present. No thyroid mass present.   Cardiovascular: Normal rate, regular rhythm and intact distal pulses.  Exam reveals no gallop and no friction rub.    No murmur heard.  Pulmonary/Chest: Effort normal and breath sounds normal. She has no wheezes. She has no rales.   Abdominal: Soft. Bowel sounds are normal. " She exhibits no mass. There is no hepatosplenomegaly. There is no tenderness.   Musculoskeletal: She exhibits no edema.   Lymphadenopathy:     She has no cervical adenopathy.   Neurological: She is alert and oriented to person, place, and time.   Psychiatric: She has a normal mood and affect.           Assessment:       1. Acquired hypothyroidism    2. Atherosclerosis of aorta    3. Essential hypertension    4. Mild major depression    5. Rheumatoid arthritis involving right shoulder with positive rheumatoid factor    6. Rheumatoid lung    7. Preventive measure    8. Melanoma of right upper arm          Plan:     Sarah was seen today for follow-up.    Diagnoses and all orders for this visit:    Acquired hypothyroidism- clinically stable, check lab 4mo.    Atherosclerosis of aorta- now on statin, check lab 4mo.    Essential hypertension- adeq control.    Mild major depression/ chronic pain- increase dose SNRI-  -     DULoxetine (CYMBALTA) 20 MG capsule; Take 2 capsules (40 mg total) by mouth once daily.    Rheumatoid arthritis involving right shoulder with positive rheumatoid factor/ Rheumatoid lung- per Rheum.    Preventive measure- due in 4mo.  -     CBC auto differential; Future  -     Comprehensive metabolic panel; Future  -     Lipid panel; Future  -     TSH; Future    Melanoma of right upper arm- per Derm.

## 2018-05-24 ENCOUNTER — OFFICE VISIT (OUTPATIENT)
Dept: INTERNAL MEDICINE | Facility: CLINIC | Age: 78
End: 2018-05-24
Payer: MEDICARE

## 2018-05-24 VITALS
BODY MASS INDEX: 23.69 KG/M2 | OXYGEN SATURATION: 98 % | SYSTOLIC BLOOD PRESSURE: 138 MMHG | HEIGHT: 62 IN | HEART RATE: 84 BPM | WEIGHT: 128.75 LBS | TEMPERATURE: 99 F | DIASTOLIC BLOOD PRESSURE: 86 MMHG

## 2018-05-24 DIAGNOSIS — E03.9 ACQUIRED HYPOTHYROIDISM: Primary | ICD-10-CM

## 2018-05-24 DIAGNOSIS — F32.0 MILD MAJOR DEPRESSION: ICD-10-CM

## 2018-05-24 DIAGNOSIS — I70.0 ATHEROSCLEROSIS OF AORTA: ICD-10-CM

## 2018-05-24 DIAGNOSIS — I10 ESSENTIAL HYPERTENSION: Chronic | ICD-10-CM

## 2018-05-24 DIAGNOSIS — M05.10 RHEUMATOID LUNG: ICD-10-CM

## 2018-05-24 DIAGNOSIS — M05.711 RHEUMATOID ARTHRITIS INVOLVING RIGHT SHOULDER WITH POSITIVE RHEUMATOID FACTOR: Chronic | ICD-10-CM

## 2018-05-24 DIAGNOSIS — C43.61 MELANOMA OF RIGHT UPPER ARM: ICD-10-CM

## 2018-05-24 DIAGNOSIS — Z29.9 PREVENTIVE MEASURE: ICD-10-CM

## 2018-05-24 PROCEDURE — 99499 UNLISTED E&M SERVICE: CPT | Mod: S$GLB,,, | Performed by: INTERNAL MEDICINE

## 2018-05-24 PROCEDURE — 99999 PR PBB SHADOW E&M-EST. PATIENT-LVL III: CPT | Mod: PBBFAC,,, | Performed by: INTERNAL MEDICINE

## 2018-05-24 PROCEDURE — 99214 OFFICE O/P EST MOD 30 MIN: CPT | Mod: S$GLB,,, | Performed by: INTERNAL MEDICINE

## 2018-05-24 PROCEDURE — 3075F SYST BP GE 130 - 139MM HG: CPT | Mod: CPTII,S$GLB,, | Performed by: INTERNAL MEDICINE

## 2018-05-24 PROCEDURE — 3079F DIAST BP 80-89 MM HG: CPT | Mod: CPTII,S$GLB,, | Performed by: INTERNAL MEDICINE

## 2018-05-24 RX ORDER — ERGOCALCIFEROL 1.25 MG/1
CAPSULE ORAL
COMMUNITY
Start: 2018-04-27 | End: 2019-07-08

## 2018-05-24 RX ORDER — DULOXETIN HYDROCHLORIDE 20 MG/1
40 CAPSULE, DELAYED RELEASE ORAL DAILY
Qty: 60 CAPSULE | Refills: 11 | Status: SHIPPED | OUTPATIENT
Start: 2018-05-24 | End: 2019-02-13

## 2018-05-30 ENCOUNTER — OFFICE VISIT (OUTPATIENT)
Dept: OPHTHALMOLOGY | Facility: CLINIC | Age: 78
End: 2018-05-30
Payer: MEDICARE

## 2018-05-30 DIAGNOSIS — H10.13 ALLERGIC CONJUNCTIVITIS, BILATERAL: Primary | ICD-10-CM

## 2018-05-30 DIAGNOSIS — H52.4 BILATERAL PRESBYOPIA: ICD-10-CM

## 2018-05-30 DIAGNOSIS — Z96.1 PSEUDOPHAKIA OF BOTH EYES: ICD-10-CM

## 2018-05-30 DIAGNOSIS — I10 ESSENTIAL HYPERTENSION: ICD-10-CM

## 2018-05-30 DIAGNOSIS — H04.123 DRY EYES, BILATERAL: ICD-10-CM

## 2018-05-30 PROCEDURE — 99999 PR PBB SHADOW E&M-EST. PATIENT-LVL II: CPT | Mod: PBBFAC,,, | Performed by: OPTOMETRIST

## 2018-05-30 PROCEDURE — 92014 COMPRE OPH EXAM EST PT 1/>: CPT | Mod: S$GLB,,, | Performed by: OPTOMETRIST

## 2018-05-30 PROCEDURE — 92015 DETERMINE REFRACTIVE STATE: CPT | Mod: S$GLB,,, | Performed by: OPTOMETRIST

## 2018-05-30 PROCEDURE — 99499 UNLISTED E&M SERVICE: CPT | Mod: S$GLB,,, | Performed by: OPTOMETRIST

## 2018-05-30 RX ORDER — PREDNISOLONE ACETATE 10 MG/ML
1 SUSPENSION/ DROPS OPHTHALMIC 4 TIMES DAILY
Qty: 5 ML | Refills: 0 | Status: SHIPPED | OUTPATIENT
Start: 2018-05-30 | End: 2018-07-11

## 2018-05-30 NOTE — PATIENT INSTRUCTIONS
Allergic conjunctivitis, bilateral  -     prednisoLONE acetate (PRED FORTE) 1 % DrpS; Place 1 drop into both eyes 4 (four) times daily. Qid x 7 days, bid x 7 days, qday x 7 days  Dispense: 5 mL; Refill: 0     Essential hypertension     Dry eyes, bilateral     Pseudophakia of both eyes     Bilateral presbyopia        Refilled Pred Forte for allergy symptoms.     No HTN Retinopathy     Stable IOL OU.     May use OTC glasses.  RTC 1 year  Discussed above and answered questions.

## 2018-05-30 NOTE — PROGRESS NOTES
HPI     Hypertensive Eye Exam    Additional comments: yearly           Comments   Last seen by TRF on 11/21/17 for allergic conjunctivitis OU.  No noticeable changes in vision since last exam.  Wear OTC readers +2.50  C/o yellow discharge coming from both eyes on and off.  Patient denies any pain  No other complaints  Pt uses Systane PRN  1. PCIOL OU           Last edited by Katheryn Blue, PCT on 5/30/2018  1:22 PM. (History)              Assessment /Plan     For exam results, see Encounter Report.    Allergic conjunctivitis, bilateral  -     prednisoLONE acetate (PRED FORTE) 1 % DrpS; Place 1 drop into both eyes 4 (four) times daily. Qid x 7 days, bid x 7 days, qday x 7 days  Dispense: 5 mL; Refill: 0    Essential hypertension    Dry eyes, bilateral    Pseudophakia of both eyes    Bilateral presbyopia      Refilled Pred Forte for allergy symptoms.    No HTN Retinopathy    Stable IOL OU.    May use OTC glasses.  RTC 1 year  Discussed above and answered questions.

## 2018-07-03 ENCOUNTER — LAB VISIT (OUTPATIENT)
Dept: LAB | Facility: HOSPITAL | Age: 78
End: 2018-07-03
Attending: INTERNAL MEDICINE
Payer: MEDICARE

## 2018-07-03 DIAGNOSIS — I70.0 ATHEROSCLEROSIS OF AORTA: ICD-10-CM

## 2018-07-03 LAB
ALT SERPL W/O P-5'-P-CCNC: 16 U/L
CHOLEST SERPL-MCNC: 142 MG/DL
CHOLEST/HDLC SERPL: 3.2 {RATIO}
HDLC SERPL-MCNC: 44 MG/DL
HDLC SERPL: 31 %
LDLC SERPL CALC-MCNC: 73.2 MG/DL
NONHDLC SERPL-MCNC: 98 MG/DL
TRIGL SERPL-MCNC: 124 MG/DL

## 2018-07-03 PROCEDURE — 84460 ALANINE AMINO (ALT) (SGPT): CPT | Mod: PO

## 2018-07-03 PROCEDURE — 36415 COLL VENOUS BLD VENIPUNCTURE: CPT | Mod: PO

## 2018-07-03 PROCEDURE — 80061 LIPID PANEL: CPT

## 2018-07-11 ENCOUNTER — OFFICE VISIT (OUTPATIENT)
Dept: INTERNAL MEDICINE | Facility: CLINIC | Age: 78
End: 2018-07-11
Payer: MEDICARE

## 2018-07-11 VITALS
TEMPERATURE: 97 F | BODY MASS INDEX: 23.37 KG/M2 | WEIGHT: 127 LBS | HEIGHT: 62 IN | HEART RATE: 96 BPM | OXYGEN SATURATION: 98 % | DIASTOLIC BLOOD PRESSURE: 78 MMHG | SYSTOLIC BLOOD PRESSURE: 126 MMHG

## 2018-07-11 DIAGNOSIS — J18.9 COMMUNITY ACQUIRED PNEUMONIA, UNSPECIFIED LATERALITY: ICD-10-CM

## 2018-07-11 DIAGNOSIS — D72.829 LEUKOCYTOSIS, UNSPECIFIED TYPE: ICD-10-CM

## 2018-07-11 DIAGNOSIS — H35.30 ARMD (AGE RELATED MACULAR DEGENERATION): Chronic | ICD-10-CM

## 2018-07-11 DIAGNOSIS — J42 CHRONIC BRONCHITIS, UNSPECIFIED CHRONIC BRONCHITIS TYPE: ICD-10-CM

## 2018-07-11 DIAGNOSIS — J44.1 COPD WITH EXACERBATION: ICD-10-CM

## 2018-07-11 DIAGNOSIS — E03.9 ACQUIRED HYPOTHYROIDISM: ICD-10-CM

## 2018-07-11 DIAGNOSIS — M05.711 RHEUMATOID ARTHRITIS INVOLVING RIGHT SHOULDER WITH POSITIVE RHEUMATOID FACTOR: Chronic | ICD-10-CM

## 2018-07-11 DIAGNOSIS — I70.0 ATHEROSCLEROSIS OF AORTA: ICD-10-CM

## 2018-07-11 DIAGNOSIS — Z00.00 ENCOUNTER FOR PREVENTIVE HEALTH EXAMINATION: Primary | ICD-10-CM

## 2018-07-11 DIAGNOSIS — J84.10 CALCIFIED GRANULOMA OF LUNG: ICD-10-CM

## 2018-07-11 DIAGNOSIS — K44.9 HIATAL HERNIA WITH GASTROESOPHAGEAL REFLUX: ICD-10-CM

## 2018-07-11 DIAGNOSIS — M85.80 OSTEOPENIA, UNSPECIFIED LOCATION: ICD-10-CM

## 2018-07-11 DIAGNOSIS — C43.61 MELANOMA OF RIGHT UPPER ARM: ICD-10-CM

## 2018-07-11 DIAGNOSIS — K21.9 HIATAL HERNIA WITH GASTROESOPHAGEAL REFLUX: ICD-10-CM

## 2018-07-11 DIAGNOSIS — D53.9 MACROCYTIC ANEMIA: ICD-10-CM

## 2018-07-11 DIAGNOSIS — F32.0 MILD MAJOR DEPRESSION: ICD-10-CM

## 2018-07-11 DIAGNOSIS — M54.16 LUMBAR RADICULOPATHY: ICD-10-CM

## 2018-07-11 DIAGNOSIS — M05.10 RHEUMATOID LUNG: ICD-10-CM

## 2018-07-11 DIAGNOSIS — N28.89 RENAL MASS: ICD-10-CM

## 2018-07-11 DIAGNOSIS — Z85.828 HISTORY OF SKIN CANCER: ICD-10-CM

## 2018-07-11 DIAGNOSIS — I10 ESSENTIAL HYPERTENSION: Chronic | ICD-10-CM

## 2018-07-11 DIAGNOSIS — R91.8 MULTIPLE LUNG NODULES ON CT: ICD-10-CM

## 2018-07-11 PROCEDURE — G0439 PPPS, SUBSEQ VISIT: HCPCS | Mod: S$GLB,,, | Performed by: PHYSICIAN ASSISTANT

## 2018-07-11 PROCEDURE — 99499 UNLISTED E&M SERVICE: CPT | Mod: HCNC,S$GLB,, | Performed by: PHYSICIAN ASSISTANT

## 2018-07-11 PROCEDURE — 3078F DIAST BP <80 MM HG: CPT | Mod: CPTII,S$GLB,, | Performed by: PHYSICIAN ASSISTANT

## 2018-07-11 PROCEDURE — 99999 PR PBB SHADOW E&M-EST. PATIENT-LVL V: CPT | Mod: PBBFAC,,, | Performed by: PHYSICIAN ASSISTANT

## 2018-07-11 PROCEDURE — 3074F SYST BP LT 130 MM HG: CPT | Mod: CPTII,S$GLB,, | Performed by: PHYSICIAN ASSISTANT

## 2018-07-11 NOTE — PROGRESS NOTES
"Sarah Parker presented for a  Medicare AWV and comprehensive Health Risk Assessment today. The following components were reviewed and updated:    · Medical history  · Family History  · Social history  · Allergies and Current Medications  · Health Risk Assessment  · Health Maintenance  · Care Team     ** See Completed Assessments for Annual Wellness Visit within the encounter summary.**       The following assessments were completed:  · Living Situation  · CAGE  · Depression Screening  · Timed Get Up and Go  · Whisper Test  · Cognitive Function Screening  · Nutrition Screening  · ADL Screening  · PAQ Screening    Vitals:    07/11/18 0946   BP: 126/78   Pulse: 96   Temp: 97.2 °F (36.2 °C)   TempSrc: Tympanic   SpO2: 98%   Weight: 57.6 kg (126 lb 15.8 oz)   Height: 5' 2" (1.575 m)     Body mass index is 23.23 kg/m².  Physical Exam   Constitutional: She is oriented to person, place, and time. She appears well-developed and well-nourished. No distress.   HENT:   Head: Normocephalic and atraumatic.   Cardiovascular: Normal rate, regular rhythm, normal heart sounds and intact distal pulses.  Exam reveals no gallop and no friction rub.    No murmur heard.  Pulmonary/Chest: Effort normal and breath sounds normal. No respiratory distress. She has no wheezes. She has no rales.   Musculoskeletal: Normal range of motion.   Neurological: She is alert and oriented to person, place, and time.   Skin: Skin is warm. Capillary refill takes less than 2 seconds. No rash noted. She is not diaphoretic.   Psychiatric: She has a normal mood and affect. Her behavior is normal. Judgment and thought content normal.   Nursing note and vitals reviewed.        Diagnoses and health risks identified today and associated recommendations/orders:    1. Encounter for preventive health examination  -completed today.  -last colonoscopy done at [facility] on [address] 2 years ago. Pt reports 1 small benign polyp was found and removed. "   -Zoster due but pt states that she recently had the shingles. Further recommendations for zoster vaccine, discuss with your rheumatologist and PCP.     2. Lumbar radiculopathy  -Stable and controlled. Not taking any medication. Continue current treatment plan as previously prescribed with your PCP.   -lumbar MRI 8/22/2017.   -has seen pain management. But pain has been stable so no further follow up.     3. Mild major depression  -Stable.  recently diagnosed with dementia. Stable on cymbalta 20mg. Continue current treatment plan as previously prescribed with your PCP.     4. ARMD (age related macular degeneration)  -Stable and controlled. Continue current treatment plan as previously prescribed with your ophthalmologist    5. Calcified granuloma of lung  -CT chest 7/18/2016  --Stable and controlled. Continue current treatment plan as previously prescribed with your PCP.     6. Chronic bronchitis, unspecified chronic bronchitis type  -Stable and controlled. Continue current treatment plan as previously prescribed with your PCP.     7. Community acquired pneumonia, unspecified laterality  -4/2018. CXR 4/6/2018. Symptoms resolved.     8. COPD with exacerbation  -Stable and controlled. Continue current treatment plan as previously prescribed with your PCP and pulmonologist.   - PFT 11/14/2016.     9. Multiple lung nodules on CT  -Stable and controlled. Continue current treatment plan as previously prescribed with your pulmonologist.   -CT chest 7/18/2016.     10. Atherosclerosis of aorta  -CT abd 10/17/2013  -CXR 4/6/2018  - Continue current treatment plan as previously prescribed with your PCP.     11. Essential hypertension  -Stable and controlled on metoprolol, and valsartan-hctz. Continue current treatment plan as previously prescribed with your PCP.     12. Renal mass  -Stable. Monitored yearly by Dr. Beck. Continue current treatment plan as previously prescribed with your urologist   -S/P left renal  cryoablation and biopsy 9/27/2016. Scheduled for follow up with urologist, Dr. Beck in August.     13. History of skin cancer  -monitored by Dermatologist Dr. Lata Tejada and Dr. Mack.    14. Leukocytosis, unspecified type  -Stable and controlled. Continue current treatment plan as previously prescribed with your hem/onc.     15. Macrocytic anemia  -Stable and controlled. Continue current treatment plan as previously prescribed with your hem/onc.   Lab Results   Component Value Date    WBC 6.97 09/27/2017    HGB 10.7 (L) 09/27/2017    HCT 31.7 (L) 09/27/2017     (H) 09/27/2017     (H) 09/27/2017       16. Melanoma of right upper arm  -monitored by Dermatologist Dr. Lata Tejada and Dr. Mack.    17. Acquired hypothyroidism  -Stable and controlled on synthroid 88mcg. Continue current treatment plan as previously prescribed with your PCP.   -  Lab Results   Component Value Date    TSH 1.884 09/19/2017     18. Hiatal hernia with gastroesophageal reflux  -Stable and controlled on prilosec Continue current treatment plan as previously prescribed with your PCP.   -CT abd pelvis 8/24/2017.     19. Osteopenia, unspecified location  -DEXA 1/13/2017.   -Stable and controlled. Continue current treatment plan as previously prescribed with your PCP.     20. Rheumatoid arthritis involving right shoulder with positive rheumatoid factor  -Stable and controlled. Close follow up with Rheumatologist, Dr. Tejada. Continue current treatment plan as previously prescribed with your rheumatologist.    21. Rheumatoid lung  -Stable and controlled. Close follow up with Rheumatologist, Dr. Tejada. Continue current treatment plan as previously prescribed with your rheumatologist.      Provided Sarah with a 5-10 year written screening schedule and personal prevention plan. Recommendations were developed using the USPSTF age appropriate recommendations. Education, counseling, and referrals were provided as needed. After  Visit Summary printed and given to patient which includes a list of additional screenings\tests needed.    Follow-up if symptoms worsen or fail to improve.    Marquita Matute PA-C  I offered to discuss end of life issues, including information on how to make advance directives that the patient could use to name someone who would make medical decisions on their behalf if they became too ill to make themselves.    ___Patient declined  _X_Patient is interested, I provided paper work and offered to discuss.

## 2018-07-11 NOTE — PATIENT INSTRUCTIONS
Counseling and Referral of Other Preventative  (Italic type indicates deductible and co-insurance are waived)    Patient Name: Sarah Parker  Today's Date: 7/11/2018    Health Maintenance       Date Due Completion Date    Zoster Vaccine 02/21/2002 ---    Influenza Vaccine 08/01/2018 9/27/2017    Override on 11/2/2015: Done    DEXA SCAN 01/13/2020 1/13/2017    Override on 5/22/2012: Done (Osteopenia; improving BMD; repeat in 2 years)    Lipid Panel 07/03/2023 7/3/2018    TETANUS VACCINE 10/14/2023 10/14/2013        No orders of the defined types were placed in this encounter.    The following information is provided to all patients.  This information is to help you find resources for any of the problems found today that may be affecting your health:                Living healthy guide: www.[facility].[place].gov      Understanding Diabetes: www.diabetes.org      Eating healthy: www.cdc.gov/healthyweight      [facility] home safety checklist: www.cdc.gov/steadi/patient.html      Agency on Aging: www.goea.[place].[facility]      Alcoholics anonymous (AA): www.aa.org      Physical Activity: www.rené.nih.gov/nc4zbcb      Tobacco use: www.quitwithusla.org

## 2018-07-30 RX ORDER — VALSARTAN AND HYDROCHLOROTHIAZIDE 80; 12.5 MG/1; MG/1
TABLET, FILM COATED ORAL
Qty: 90 TABLET | Refills: 3 | Status: SHIPPED | OUTPATIENT
Start: 2018-07-30 | End: 2019-07-08

## 2018-08-08 DIAGNOSIS — F51.01 PRIMARY INSOMNIA: ICD-10-CM

## 2018-08-08 RX ORDER — HYDROXYZINE HYDROCHLORIDE 25 MG/1
TABLET, FILM COATED ORAL
Qty: 60 TABLET | Refills: 3 | Status: ON HOLD | OUTPATIENT
Start: 2018-08-08 | End: 2019-08-26 | Stop reason: HOSPADM

## 2018-08-14 ENCOUNTER — LAB VISIT (OUTPATIENT)
Dept: LAB | Facility: HOSPITAL | Age: 78
End: 2018-08-14
Attending: UROLOGY
Payer: MEDICARE

## 2018-08-14 DIAGNOSIS — N28.9 RENAL LESION: ICD-10-CM

## 2018-08-14 LAB
CREAT SERPL-MCNC: 0.9 MG/DL
EST. GFR  (AFRICAN AMERICAN): >60 ML/MIN/1.73 M^2
EST. GFR  (NON AFRICAN AMERICAN): >60 ML/MIN/1.73 M^2

## 2018-08-14 PROCEDURE — 36415 COLL VENOUS BLD VENIPUNCTURE: CPT | Mod: PO

## 2018-08-14 PROCEDURE — 82565 ASSAY OF CREATININE: CPT | Mod: PO

## 2018-08-27 ENCOUNTER — TELEPHONE (OUTPATIENT)
Dept: RADIOLOGY | Facility: HOSPITAL | Age: 78
End: 2018-08-27

## 2018-08-28 ENCOUNTER — HOSPITAL ENCOUNTER (OUTPATIENT)
Dept: RADIOLOGY | Facility: HOSPITAL | Age: 78
Discharge: HOME | End: 2018-08-28
Attending: UROLOGY
Payer: MEDICARE

## 2018-08-28 DIAGNOSIS — N28.9 RENAL LESION: ICD-10-CM

## 2018-08-28 PROCEDURE — 25500020 PHARM REV CODE 255: Mod: PO | Performed by: UROLOGY

## 2018-08-28 PROCEDURE — 74178 CT ABD&PLV WO CNTR FLWD CNTR: CPT | Mod: 26,,, | Performed by: RADIOLOGY

## 2018-08-28 PROCEDURE — 74178 CT ABD&PLV WO CNTR FLWD CNTR: CPT | Mod: TC,PO

## 2018-08-28 RX ADMIN — IOHEXOL 30 ML: 350 INJECTION, SOLUTION INTRAVENOUS at 08:08

## 2018-08-28 RX ADMIN — IOHEXOL 100 ML: 350 INJECTION, SOLUTION INTRAVENOUS at 08:08

## 2018-08-30 ENCOUNTER — OFFICE VISIT (OUTPATIENT)
Dept: UROLOGY | Facility: CLINIC | Age: 78
End: 2018-08-30
Payer: MEDICARE

## 2018-08-30 VITALS — BODY MASS INDEX: 23.05 KG/M2 | WEIGHT: 126 LBS

## 2018-08-30 DIAGNOSIS — N28.89 RENAL MASS: Primary | ICD-10-CM

## 2018-08-30 LAB
BILIRUB SERPL-MCNC: NORMAL MG/DL
BLOOD URINE, POC: NORMAL
COLOR, POC UA: YELLOW
GLUCOSE UR QL STRIP: NORMAL
KETONES UR QL STRIP: NORMAL
LEUKOCYTE ESTERASE URINE, POC: NORMAL
NITRITE, POC UA: NORMAL
PH, POC UA: 5
PROTEIN, POC: NORMAL
SPECIFIC GRAVITY, POC UA: 1.01
UROBILINOGEN, POC UA: NORMAL

## 2018-08-30 PROCEDURE — 99999 PR PBB SHADOW E&M-EST. PATIENT-LVL III: CPT | Mod: PBBFAC,,, | Performed by: UROLOGY

## 2018-08-30 PROCEDURE — 81002 URINALYSIS NONAUTO W/O SCOPE: CPT | Mod: S$GLB,,, | Performed by: UROLOGY

## 2018-08-30 PROCEDURE — 99214 OFFICE O/P EST MOD 30 MIN: CPT | Mod: 25,S$GLB,, | Performed by: UROLOGY

## 2018-08-30 NOTE — PROGRESS NOTES
"Chief Complaint: left renal lesion    HPI:   8/30/18: Followup CT reassuring on annual imaging.  Path reviewed inconclusive.  No new problems, feeling fine.  Reviewed history in detail.  8/28/17: Having a lot of back pain lately, but CT totally reassuring and chest nodule is smaller and likely benign.    2/23/17: Followup CT shows good results at left renal tumor site.  It suggests a new chest nodule (1 cm) but she has had these come and go as she gets bronchitis from time to time.  Also some possible ileac nodes.    11/14/16:  Had her left cryoablation no complications. Pre-procedure biopsy benign.  No pain, no hematuria.  9/9/16: Back after having left renal cryoablation scheduled but cancelled due to tech not arriving on a cancelled flight.  PET scan negative.   6/10/16: 75 yo woman was recently worked up for leukocytosis and CT shows "There has been interval enlargement of a cystic structure at the interpolar region of the left kidney which demonstrates enhancing thin internal septation. "  Designated a Bos3 cyst.  No abd/pelvic pain and no exac/rel factors.  No hematuria.  No urolithiasis.  No urinary bother.  No  history.  Normal sexual function.    Allergies:  Codeine    Medications: has a current medication list which includes the following prescription(s): albuterol, amitriptyline, calcium citrate-vitamin d3 315-200 mg, duloxetine, ergocalciferol, hydrocodone-acetaminophen, hydroxyzine hcl, ketotifen, leucovorin, levothyroxine, meclizine, methotrexate, metoprolol succinate, multivitamin, ondansetron, oxymetazoline, pravastatin, tocilizumab, and valsartan-hydrochlorothiazide.    Review of Systems:  General: No fever, chills, fatigability, or weight loss.  Skin: No rashes, itching, or changes in color or texture of skin.  Chest: Denies [place], cyanosis, wheezing, cough, and sputum production.  Abdomen: Appetite fine. No weight loss. Denies diarrhea, abdominal pain, hematemesis, or blood in " stool.  Musculoskeletal: Some joint stiffness or swelling. Denies back pain.  : As above  All other review of systems negative.    PMH:   has a past medical history of Acid reflux, Anxiety, Back pain, Bronchitis, chronic obstructive w acute bronchitis (7/29/2016), Cancer, Cataract, Degenerative disc disease, Depression, Dry mouth, Hernia, hiatal (11/18/2013), Hypertension, Hypothyroid, Macrocytic anemia (5/3/2016), Macular degeneration, Migraines, Mixed anxiety and depressive disorder, Multiple fractures of ribs of right side, Osteoporosis, Pneumonia, Pneumonia due to other staphylococcus, Rheumatoid arthritis, Rheumatoid arthritis(714.0), and Rheumatoid arthritis(714.0).    PSH:   has a past surgical history that includes Laparoscopic Nissen fundoplication; Hernia repair; Cataract extraction (Bilateral, 6/11/15); Fracture surgery (Right); feet (Bilateral); cryoablasion kidney (Left, 09/27/2016); Joint replacement; Hysterectomy (1970); Cholecystectomy (2013); Appendectomy (1985); Renal Cryoablation (N/A, 9/27/2016); FUNDOPLICATION, NISSEN, LAPAROSCOPIC (N/A, 12/18/2013); and EGD (ESOPHAGOGASTRODUODENOSCOPY) (N/A, 10/31/2013).    FamHx: family history includes Asthma in her brother and sister; Cancer in her brother and maternal grandfather; Cataracts in her mother; Chronic back pain in her brother and sister; Diabetes Mellitus in her brother; Fibromyalgia in her daughter; Heart disease in her father, maternal grandmother, and mother; Hyperlipidemia in her mother; Hypertension in her brother, father, mother, and sister; Osteoarthritis in her brother, father, mother, and sister; Thyroid disease in her brother and sister.    SocHx:  reports that she quit smoking about 52 years ago. She has a 0.50 pack-year smoking history. she has never used smokeless tobacco. She reports that she does not drink alcohol or use drugs.     Physical Exam:  Vitals:   There were no vitals filed for this visit.  General: A&Ox3. No apparent  distress. No deformities.  Neck: No masses. Normal thyroid.  Lungs: normal inspiration. No use of accessory muscles.  Heart: normal pulse. No arrhythmias.  Abdomen: Soft. NT. ND.  Skin: The skin is warm and dry. No jaundice.  Ext: No c/c/e.  : deferred    Labs/Studies:   Urinalysis performed in clinic, summary: UA normal    Impression/Plan:   1. Recheck 1 year with CT.  Discussed in detail and reassured.

## 2018-09-10 RX ORDER — METOPROLOL SUCCINATE 50 MG/1
TABLET, EXTENDED RELEASE ORAL
Qty: 30 TABLET | Refills: 11 | Status: SHIPPED | OUTPATIENT
Start: 2018-09-10 | End: 2019-07-08

## 2018-09-12 NOTE — PROGRESS NOTES
"Subjective:      Patient ID: Sarah Parker is a 78 y.o. female.    Chief Complaint: Annual Exam and Cough      HPI  Here for f/u medical problems and preventive exam.  Stress is better on increased cymbalta, has "calmed down" with  issues.  Sleeping ok.  Melanoma surgeries are done.  Walking for exercise.  No f/c/sw.  No cp/sob/palp.  BMs regular for her.  Hx IBS.  Urine normal.  Tra vit D.  Has had URI sx x 3wk- took own 5mg prednisone for a few days and got better, until 4d ago started with head and chest congestion again.    Has lost a few pounds, notices in her clothes.      8/18 CT abd:  Impression       No adverse change since 08/24/2017.  Post ablation changes of the left kidney without evidence to suggest residual or recurrent disease at this time.  Additional stable findings as above.         HM: 9/17 fluvax, 5/16 [id number], 10/14 [id number], 10/13 TDaP, 1/17 BMD/will bring to Dr. Tejada rep 2y, 1/18 MMG, 10/13 EGD, 2015 Cscope Dr. Garcia rep 5y.     Review of Systems   Constitutional: Negative for appetite change, chills, diaphoresis and fever.   HENT: Negative for congestion, ear pain, rhinorrhea, sinus pressure and sore throat.    Respiratory: Negative for cough, chest tightness and shortness of breath.    Cardiovascular: Negative for chest pain and palpitations.   Gastrointestinal: Negative for blood in stool, constipation, diarrhea, nausea and vomiting.   Genitourinary: Negative for dysuria, frequency, hematuria, menstrual problem, urgency and vaginal discharge.   Musculoskeletal: Negative for arthralgias.   Skin: Negative for rash.   Neurological: Negative for dizziness and headaches.   Psychiatric/Behavioral: Negative for sleep disturbance. The patient is not nervous/anxious.          Objective:   /80 (BP Location: Right arm, Patient Position: Sitting, BP Method: Medium (Manual))   Pulse 89   Temp 97.6 °F (36.4 °C) (Tympanic)   Ht 5' 2" (1.575 m)   Wt 57.8 kg (127 lb 6.8 oz)  "  SpO2 97%   BMI 23.31 kg/m²     Physical Exam   Constitutional: She is oriented to person, place, and time. She appears well-developed and well-nourished.   HENT:   Right Ear: External ear normal. Tympanic membrane is not injected.   Left Ear: External ear normal. Tympanic membrane is not injected.   Mouth/Throat: Oropharynx is clear and moist.   Eyes: Conjunctivae are normal.   Neck: Normal range of motion. Neck supple. No thyromegaly present.   Cardiovascular: Normal rate, regular rhythm and intact distal pulses. Exam reveals no gallop and no friction rub.   No murmur heard.  Pulmonary/Chest: Effort normal. She has wheezes. She has rales (lower lungs bilat.). Right breast exhibits no mass, no nipple discharge, no skin change and no tenderness. Left breast exhibits no mass, no nipple discharge, no skin change and no tenderness.   Abdominal: Soft. Bowel sounds are normal. She exhibits no mass. There is no tenderness.   Musculoskeletal: She exhibits no edema.   Lymphadenopathy:     She has no cervical adenopathy.        Right axillary: No lateral adenopathy present.        Left axillary: No lateral adenopathy present.  Neurological: She is alert and oriented to person, place, and time.   Skin: Skin is warm. No rash noted.   Psychiatric: She has a normal mood and affect.       Results for MARKEL ROLDAN (MRN [id number]) as of 9/25/2018 09:55   Ref. Range 7/3/2018 09:04   ALT Latest Ref Range: 10 - 44 U/L 16   Triglycerides Latest Ref Range: 30 - 150 mg/dL 124   Cholesterol Latest Ref Range: 120 - 199 mg/dL 142   HDL Latest Ref Range: 40 - 75 mg/dL 44   LDL Cholesterol Latest Ref Range: 63.0 - 159.0 mg/dL 73.2   Total Cholesterol/HDL Ratio Latest Ref Range: 2.0 - 5.0  3.2       Assessment:       1. Encounter for preventive health examination    2. Rheumatoid arthritis involving right shoulder with positive rheumatoid factor    3. Rheumatoid lung    4. Renal mass    5. Osteopenia, unspecified location    6. Melanoma  of right upper arm    7. Mild major depression    8. Essential hypertension    9. Hiatal hernia with gastroesophageal reflux    10. Acquired hypothyroidism    11. Vitamin D deficiency    12. COPD with exacerbation    13. Encounter for screening mammogram for malignant neoplasm of breast           Plan:     Encounter for preventive health examination- lab now with [place].  Discussed pt needs to get Shingrix vaccination at pharmacy.  MMG due in 4mo.  -     CBC auto differential; Future; Expected date: 09/25/2018  -     Comprehensive metabolic panel; Future; Expected date: 09/25/2018  -     TSH; Future; Expected date: 09/25/2018  -     Vitamin D; Future    Rheumatoid arthritis involving right shoulder with positive rheumatoid factor/ Rheumatoid lung    Renal mass- per Urol.    Osteopenia, unspecified location    COPD with exacerbation- albuterol and zithro 500mg daily x 5d.  Call if not better.  Will try not to CXR but if not better, needs.  HOLD ACTEMRA for this week's dose.    Melanoma of right upper arm- per Derm.    Mild major depression and chronic pain, doing well.    Essential hypertension- stable on rx, cont.    Hiatal hernia with gastroesophageal reflux- on otc PPI, doing well.    Acquired hypothyroidism- clin stable, check lab.    Vitamin D deficiency  -     Vitamin D; Future

## 2018-09-25 ENCOUNTER — LAB VISIT (OUTPATIENT)
Dept: LAB | Facility: HOSPITAL | Age: 78
End: 2018-09-25
Attending: INTERNAL MEDICINE
Payer: MEDICARE

## 2018-09-25 ENCOUNTER — OFFICE VISIT (OUTPATIENT)
Dept: INTERNAL MEDICINE | Facility: CLINIC | Age: 78
End: 2018-09-25
Payer: MEDICARE

## 2018-09-25 VITALS
WEIGHT: 127.44 LBS | TEMPERATURE: 98 F | DIASTOLIC BLOOD PRESSURE: 80 MMHG | HEART RATE: 89 BPM | OXYGEN SATURATION: 97 % | SYSTOLIC BLOOD PRESSURE: 122 MMHG | HEIGHT: 62 IN | BODY MASS INDEX: 23.45 KG/M2

## 2018-09-25 DIAGNOSIS — E03.9 ACQUIRED HYPOTHYROIDISM: ICD-10-CM

## 2018-09-25 DIAGNOSIS — F32.0 MILD MAJOR DEPRESSION: ICD-10-CM

## 2018-09-25 DIAGNOSIS — Z00.00 ENCOUNTER FOR PREVENTIVE HEALTH EXAMINATION: ICD-10-CM

## 2018-09-25 DIAGNOSIS — M85.80 OSTEOPENIA, UNSPECIFIED LOCATION: ICD-10-CM

## 2018-09-25 DIAGNOSIS — C43.61 MELANOMA OF RIGHT UPPER ARM: ICD-10-CM

## 2018-09-25 DIAGNOSIS — Z00.00 ENCOUNTER FOR PREVENTIVE HEALTH EXAMINATION: Primary | ICD-10-CM

## 2018-09-25 DIAGNOSIS — K44.9 HIATAL HERNIA WITH GASTROESOPHAGEAL REFLUX: ICD-10-CM

## 2018-09-25 DIAGNOSIS — E55.9 VITAMIN D DEFICIENCY: ICD-10-CM

## 2018-09-25 DIAGNOSIS — Z12.31 ENCOUNTER FOR SCREENING MAMMOGRAM FOR MALIGNANT NEOPLASM OF BREAST: ICD-10-CM

## 2018-09-25 DIAGNOSIS — K21.9 HIATAL HERNIA WITH GASTROESOPHAGEAL REFLUX: ICD-10-CM

## 2018-09-25 DIAGNOSIS — J44.1 COPD WITH EXACERBATION: ICD-10-CM

## 2018-09-25 DIAGNOSIS — N28.89 RENAL MASS: ICD-10-CM

## 2018-09-25 DIAGNOSIS — I10 ESSENTIAL HYPERTENSION: Chronic | ICD-10-CM

## 2018-09-25 DIAGNOSIS — M05.10 RHEUMATOID LUNG: ICD-10-CM

## 2018-09-25 DIAGNOSIS — M05.711 RHEUMATOID ARTHRITIS INVOLVING RIGHT SHOULDER WITH POSITIVE RHEUMATOID FACTOR: Chronic | ICD-10-CM

## 2018-09-25 LAB
25(OH)D3+25(OH)D2 SERPL-MCNC: 40 NG/ML
ALBUMIN SERPL BCP-MCNC: 3.9 G/DL
ALP SERPL-CCNC: 73 U/L
ALT SERPL W/O P-5'-P-CCNC: 17 U/L
ANION GAP SERPL CALC-SCNC: 9 MMOL/L
AST SERPL-CCNC: 23 U/L
BASOPHILS # BLD AUTO: 0.01 K/UL
BASOPHILS NFR BLD: 0.1 %
BILIRUB SERPL-MCNC: 0.7 MG/DL
BUN SERPL-MCNC: 25 MG/DL
CALCIUM SERPL-MCNC: 9.4 MG/DL
CHLORIDE SERPL-SCNC: 104 MMOL/L
CO2 SERPL-SCNC: 21 MMOL/L
CREAT SERPL-MCNC: 0.9 MG/DL
DIFFERENTIAL METHOD: ABNORMAL
EOSINOPHIL # BLD AUTO: 0.1 K/UL
EOSINOPHIL NFR BLD: 1.6 %
ERYTHROCYTE [DISTWIDTH] IN BLOOD BY AUTOMATED COUNT: 17.7 %
EST. GFR  (AFRICAN AMERICAN): >60 ML/MIN/1.73 M^2
EST. GFR  (NON AFRICAN AMERICAN): >60 ML/MIN/1.73 M^2
GLUCOSE SERPL-MCNC: 93 MG/DL
HCT VFR BLD AUTO: 29.3 %
HGB BLD-MCNC: 9.3 G/DL
IMM GRANULOCYTES # BLD AUTO: 0.12 K/UL
IMM GRANULOCYTES NFR BLD AUTO: 1.4 %
LYMPHOCYTES # BLD AUTO: 3.4 K/UL
LYMPHOCYTES NFR BLD: 40.8 %
MCH RBC QN AUTO: 34.1 PG
MCHC RBC AUTO-ENTMCNC: 31.7 G/DL
MCV RBC AUTO: 107 FL
MONOCYTES # BLD AUTO: 0.3 K/UL
MONOCYTES NFR BLD: 3.6 %
NEUTROPHILS # BLD AUTO: 4.4 K/UL
NEUTROPHILS NFR BLD: 52.5 %
NRBC BLD-RTO: 0 /100 WBC
PLATELET # BLD AUTO: 150 K/UL
PMV BLD AUTO: 11.9 FL
POTASSIUM SERPL-SCNC: 4.4 MMOL/L
PROT SERPL-MCNC: 7.8 G/DL
RBC # BLD AUTO: 2.73 M/UL
SODIUM SERPL-SCNC: 134 MMOL/L
TSH SERPL DL<=0.005 MIU/L-ACNC: 2.43 UIU/ML
WBC # BLD AUTO: 8.31 K/UL

## 2018-09-25 PROCEDURE — 36415 COLL VENOUS BLD VENIPUNCTURE: CPT | Mod: PO

## 2018-09-25 PROCEDURE — 90662 IIV NO PRSV INCREASED AG IM: CPT | Mod: PBBFAC,PO

## 2018-09-25 PROCEDURE — 99999 PR PBB SHADOW E&M-EST. PATIENT-LVL III: CPT | Mod: PBBFAC,,, | Performed by: INTERNAL MEDICINE

## 2018-09-25 PROCEDURE — 80053 COMPREHEN METABOLIC PANEL: CPT

## 2018-09-25 PROCEDURE — 99213 OFFICE O/P EST LOW 20 MIN: CPT | Mod: 25,S$PBB,, | Performed by: INTERNAL MEDICINE

## 2018-09-25 PROCEDURE — 99499 UNLISTED E&M SERVICE: CPT | Mod: HCNC,S$GLB,, | Performed by: INTERNAL MEDICINE

## 2018-09-25 PROCEDURE — 3079F DIAST BP 80-89 MM HG: CPT | Mod: CPTII,,, | Performed by: INTERNAL MEDICINE

## 2018-09-25 PROCEDURE — 84443 ASSAY THYROID STIM HORMONE: CPT

## 2018-09-25 PROCEDURE — 82306 VITAMIN D 25 HYDROXY: CPT

## 2018-09-25 PROCEDURE — 99213 OFFICE O/P EST LOW 20 MIN: CPT | Mod: PBBFAC,PO,25 | Performed by: INTERNAL MEDICINE

## 2018-09-25 PROCEDURE — 1101F PT FALLS ASSESS-DOCD LE1/YR: CPT | Mod: CPTII,,, | Performed by: INTERNAL MEDICINE

## 2018-09-25 PROCEDURE — 85025 COMPLETE CBC W/AUTO DIFF WBC: CPT

## 2018-09-25 PROCEDURE — 99397 PER PM REEVAL EST PAT 65+ YR: CPT | Mod: S$PBB,25,, | Performed by: INTERNAL MEDICINE

## 2018-09-25 PROCEDURE — 3074F SYST BP LT 130 MM HG: CPT | Mod: CPTII,,, | Performed by: INTERNAL MEDICINE

## 2018-09-25 RX ORDER — AZITHROMYCIN 500 MG/1
500 TABLET, FILM COATED ORAL DAILY
Qty: 5 TABLET | Refills: 0 | Status: SHIPPED | OUTPATIENT
Start: 2018-09-25 | End: 2018-10-05

## 2018-10-01 ENCOUNTER — TELEPHONE (OUTPATIENT)
Dept: INTERNAL MEDICINE | Facility: CLINIC | Age: 78
End: 2018-10-01

## 2018-10-01 NOTE — TELEPHONE ENCOUNTER
----- Message from Briseiad Reyes MD sent at 9/27/2018 11:46 AM CDT -----  Please inform pt overall her labs look good/stable.   Let me know if not getting well in the next week.  SM

## 2018-10-18 RX ORDER — AMITRIPTYLINE HYDROCHLORIDE 75 MG/1
TABLET ORAL
Qty: 90 TABLET | Refills: 3 | Status: ON HOLD | OUTPATIENT
Start: 2018-10-18 | End: 2019-08-26 | Stop reason: HOSPADM

## 2018-10-23 ENCOUNTER — OFFICE VISIT (OUTPATIENT)
Dept: HEMATOLOGY/ONCOLOGY | Facility: CLINIC | Age: 78
End: 2018-10-23
Payer: MEDICARE

## 2018-10-23 VITALS
DIASTOLIC BLOOD PRESSURE: 90 MMHG | OXYGEN SATURATION: 98 % | HEART RATE: 73 BPM | TEMPERATURE: 98 F | HEIGHT: 62 IN | BODY MASS INDEX: 23.49 KG/M2 | SYSTOLIC BLOOD PRESSURE: 160 MMHG | WEIGHT: 127.63 LBS | RESPIRATION RATE: 18 BRPM

## 2018-10-23 DIAGNOSIS — D53.9 MACROCYTIC ANEMIA: Primary | ICD-10-CM

## 2018-10-23 PROCEDURE — 99999 PR PBB SHADOW E&M-EST. PATIENT-LVL IV: CPT | Mod: PBBFAC,,, | Performed by: INTERNAL MEDICINE

## 2018-10-23 PROCEDURE — 99214 OFFICE O/P EST MOD 30 MIN: CPT | Mod: PBBFAC,PO | Performed by: INTERNAL MEDICINE

## 2018-10-23 PROCEDURE — 99214 OFFICE O/P EST MOD 30 MIN: CPT | Mod: S$PBB,,, | Performed by: INTERNAL MEDICINE

## 2018-10-23 PROCEDURE — 3077F SYST BP >= 140 MM HG: CPT | Mod: CPTII,,, | Performed by: INTERNAL MEDICINE

## 2018-10-23 PROCEDURE — 1101F PT FALLS ASSESS-DOCD LE1/YR: CPT | Mod: CPTII,,, | Performed by: INTERNAL MEDICINE

## 2018-10-23 PROCEDURE — 99499 UNLISTED E&M SERVICE: CPT | Mod: HCNC,S$GLB,, | Performed by: INTERNAL MEDICINE

## 2018-10-23 PROCEDURE — 3080F DIAST BP >= 90 MM HG: CPT | Mod: CPTII,,, | Performed by: INTERNAL MEDICINE

## 2018-10-23 NOTE — PROGRESS NOTES
Reason for visit: Chronic leukocytosis    HPI:   The patient is a 78-year-old  female who presents to the hematology oncology clinic today to discuss further evaluation and management recommendations for leukocytosis.  I have reviewed all of the patient's relevant clinical history available in the medical record including her records from care everywhere.  Today the patient reports that she is currently on treatment for pneumonia. She reports slow improvement since diagnosis last week. She reports that her chronic pain from rheumatoid arthritis is stable.  She reports chronic fatigue.  She denies any fevers, chills or night sweats.  She denies any loss of appetite or unintentional weight loss.  She denies any chest pain or shortness of breath.  She denies any melena, hematochezia, hematemesis, hemoptysis or hematuria.  She reports being up-to-date with all of her age-appropriate cancer screening. She denies any bowel or urinary complaints.  She denies any nausea, vomiting or abdominal pain.  The patient reports taking weekly methotrexate with supplemental folic acid and actemra for treatment of rheumatoid arthritis under the supervision of Dr. Chase Tejada with rheumatology. She reports that she had to stop this for several weeks because of complications with infection after skin cancer resection/graft.    PAST MEDICAL HISTORY:   1.  Rheumatoid arthritis  2.  Hypertension  3.  Anxiety  4.  Hypothyroidism  5.  Vitamin D deficiency  6.  Osteoporosis  7.  History of hiatal hernia with GERD  8.  Age-related macular degeneration  9.  Melanoma treated in 2018    SURGICAL HISTORY:   1.  Bilateral wrist surgery  2.  Bilateral knee replacement  3.  Bilateral foot surgery  4.  Cholecystectomy  5.  Nissen fundoplication  6.  Appendectomy  7.  SAY with BSO  8.  Bilateral cataract extraction  9.  Multiple resections of localized skin cancer from the forearm  10. Cyroablation of left renal mass in sep 2016    FAMILY  HISTORY: The patient's brother was treated for pancreatic cancer which was diagnosed at the age of 70.  She denies any other immediate family members with cancer or bleeding/clotting disorders.    SOCIAL HISTORY: She reports a 0.5-pack-year smoking history and quit in 1965.  She denies any alcohol use or recreational drug use.  She used to work as a  and retired at the age of 64.  She is  and has 2 daughters.  She lives in [place], [place].    ALLERGIES: Reviewed on medication card.    MEDICATIONS: [Medcard has been reviewed and/or reconciled.]    REVIEW OF SYSTEMS:   GENERAL: [No fevers, chills or sweats. Reports chronic fatigue. Denies weight loss or loss of appetite.]  HEENT: [No blurred vision, tinnitus, nasal discharge, sorethroat or dysphagia.]  HEART: [No chest pain, palpitations or shortness of breath.]    LUNGS: [Reports cough. Denies hemoptysis or breathing problems.]  ABDOMEN: [No abdominal pain, nausea, vomiting, diarrhea, constipation or melena.]  GENITOURINARY: [No dysuria, bleeding or malodorous discharge.]  NEURO: [No headache, dizziness or vertigo.]  HEMATOLOGY: [No easy bruising, spontaneous bleeding or blood clots in the past].  MUSCULOSKELETAL: [Chronic arthralgias. Denies myalgias or bone pains.]  SKIN: [No rashes or skin lesions.]  PSYCHIATRY: [No depression. Reports h/o anxiety.]    PHYSICAL EXAMINATION:   VS: Reviewed on nurse's notes.  APPEARANCE: The patient is a well-developed, well-nourished and well-groomed elderly  female who appears in no acute distress.    HEENT: No scleral icterus. Both external auditory canals clear. No oral ulcers, lesions. Throat clear  HEAD: No sinus tenderness.  NECK: Supple. No palpable lymphadenopathy. Thyroid non-tender, no palpable masses  CHEST: Breath sounds clear bilaterally. Occasional crackles/rales bilaterally. No rhonchi. Unlabored respirations.  CARDIOVASCULAR: Normal S1, S2. Normal rate. Regular rhythm.  ABDOMEN:  Bowel sounds normal. No tenderness. No abdominal distention. No hepatomegaly. No splenomegaly.  LYMPHATIC: No palpable supraclavicular, axillary nodes  EXTREMITIES: No clubbing, cyanosis. Mild edema in left leg due to recent accidental trauma. This is improving.  SKIN: No lesions. No petechiae. No ecchymoses. No induration or nodules  NEUROLOGIC: No focal findings. Alert & Oriented x 3. Mood appropriate to affect    LABS:   Reviewed    IMAGING:  Reviewed    IMPRESSION:  1.  Chronic leukocytosis  2.  Chronic macrocytic anemia  3.  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  Bilateral lung inflammation [rheumatoid lung]  5.  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  I had a detailed discussion with the patient previously with regard to the various possible etiologies for leukocytosis.  Review of the patient's medical record shows that this has been chronically present on and off for several years.  The patient recalls having been evaluated by a hematologist greater than 10 years ago for this and also underwent bone marrow aspiration and biopsy.  She reports that all of the results were benign at that time.  2.  Review of her peripheral smear does not show any significant abnormalities.  Her rheumatoid arthritis appears to be well-controlled at this time as noted by her inflammatory markers.  3.  Results of labwork done for further evaluation of her chronic macrocytic anemia were previously reviewed in detail.  This is most likely due to her chronic treatment with methotrexate.  Vitamin B12 and folate levels look ok.   4.  I had a detailed discussion about the various possible etiologies for her monoclonal paraproteinemia. Results of prior 24 hour UPEP with immunofixation reviewed and also look unremarkable.  5.  Results of CT scans of the thorax/abdomen/pelvis to evaluate for any evidence of pathologic lymphadenopathy suggestive of any evidence of malignancy in the context of leukocytosis  with history of rheumatoid arthritis and immunosuppressive therapy were discussed in detail. Recent PET/CT results were also reviewed. Continue pulmonary follow up with Dr. Varela as recommended.  6.  Continue follow up with Dr. Beck with urology as recommended.  7.  Results of final report of bone marrow aspiration and biopsy done at [facility] done on 6/1/16 were discussed in detail.  She has a hypercellular marrow with trilineage dyspoiesis and megakaryocytic hyperplasia.  She has relatively increased atypical myeloid blasts which constitute 5% of analyzed cells and approximately 5% clonal lambda restricted plasma cells.  She also has a small CD5 positive clonal B-cell population which constitutes 1% of the sample with a B-cell chronic lymphocytic leukemia/small lymphocytic lymphoma immunophenotype identified by flow cytometry only.  She has adequate bone marrow storage iron with no ringed sideroblasts. She had an abnormal myeloma FISH panel but the overall clinical picture at this time does not appear to support a diagnosis of multiple myeloma. Her overall clinical picture is most suggestive of medication related changes due to methotrexate and less likely to be other etiologies including a myelodysplastic/myeloproliferative neoplasm.  However we will continue with close monitoring at this time. We discussed repeating bone marrow biopsy in the near future based on follow up over the next few months if indicated.  8. She knows to proceed to ER for any worsening of her pneumonia.    Follow-up in 3 months with labs. She knows to call sooner for any new problems or questions.    Sathish Devine MD

## 2019-01-30 RX ORDER — LEVOTHYROXINE SODIUM 88 UG/1
TABLET ORAL
Qty: 90 TABLET | Refills: 3 | Status: SHIPPED | OUTPATIENT
Start: 2019-01-30 | End: 2020-01-31 | Stop reason: SDUPTHER

## 2019-02-01 RX ORDER — PRAVASTATIN SODIUM 10 MG/1
TABLET ORAL
Qty: 30 TABLET | Refills: 11 | Status: SHIPPED | OUTPATIENT
Start: 2019-02-01 | End: 2020-02-17 | Stop reason: SDUPTHER

## 2019-02-04 ENCOUNTER — TELEPHONE (OUTPATIENT)
Dept: INTERNAL MEDICINE | Facility: CLINIC | Age: 79
End: 2019-02-04

## 2019-02-04 NOTE — TELEPHONE ENCOUNTER
----- Message from Mandy Laurentite sent at 2/4/2019  2:36 PM CST -----  Contact: Pt   Pt called and stated she needed to be squeezed into the schedule as soon as possible for a 4 mo follow up she missed in January. She can be reached at [phone].    Thanks,  TF

## 2019-02-05 ENCOUNTER — HOSPITAL ENCOUNTER (OUTPATIENT)
Dept: RADIOLOGY | Facility: HOSPITAL | Age: 79
Discharge: HOME | End: 2019-02-05
Attending: INTERNAL MEDICINE
Payer: MEDICARE

## 2019-02-05 VITALS — BODY MASS INDEX: 23.37 KG/M2 | WEIGHT: 127 LBS | HEIGHT: 62 IN

## 2019-02-05 DIAGNOSIS — Z12.31 ENCOUNTER FOR SCREENING MAMMOGRAM FOR MALIGNANT NEOPLASM OF BREAST: ICD-10-CM

## 2019-02-05 DIAGNOSIS — Z00.00 ENCOUNTER FOR PREVENTIVE HEALTH EXAMINATION: ICD-10-CM

## 2019-02-05 PROCEDURE — 77067 SCR MAMMO BI INCL CAD: CPT | Mod: TC,HCNC,PO

## 2019-02-05 PROCEDURE — 77063 BREAST TOMOSYNTHESIS BI: CPT | Mod: 26,HCNC,, | Performed by: RADIOLOGY

## 2019-02-05 PROCEDURE — 77063 MAMMO DIGITAL SCREENING BILAT WITH TOMOSYNTHESIS_CAD: ICD-10-PCS | Mod: 26,HCNC,, | Performed by: RADIOLOGY

## 2019-02-05 PROCEDURE — 77067 SCR MAMMO BI INCL CAD: CPT | Mod: 26,HCNC,, | Performed by: RADIOLOGY

## 2019-02-05 PROCEDURE — 77067 MAMMO DIGITAL SCREENING BILAT WITH TOMOSYNTHESIS_CAD: ICD-10-PCS | Mod: 26,HCNC,, | Performed by: RADIOLOGY

## 2019-02-06 ENCOUNTER — OFFICE VISIT (OUTPATIENT)
Dept: INTERNAL MEDICINE | Facility: CLINIC | Age: 79
End: 2019-02-06
Payer: MEDICARE

## 2019-02-06 ENCOUNTER — LAB VISIT (OUTPATIENT)
Dept: LAB | Facility: HOSPITAL | Age: 79
End: 2019-02-06
Attending: INTERNAL MEDICINE
Payer: MEDICARE

## 2019-02-06 ENCOUNTER — TELEPHONE (OUTPATIENT)
Dept: INTERNAL MEDICINE | Facility: CLINIC | Age: 79
End: 2019-02-06

## 2019-02-06 VITALS
DIASTOLIC BLOOD PRESSURE: 82 MMHG | HEIGHT: 62 IN | TEMPERATURE: 99 F | BODY MASS INDEX: 23.17 KG/M2 | SYSTOLIC BLOOD PRESSURE: 136 MMHG | HEART RATE: 98 BPM | OXYGEN SATURATION: 99 % | WEIGHT: 125.88 LBS

## 2019-02-06 DIAGNOSIS — R53.83 MALAISE AND FATIGUE: ICD-10-CM

## 2019-02-06 DIAGNOSIS — R44.1 VISUAL HALLUCINATION: ICD-10-CM

## 2019-02-06 DIAGNOSIS — J41.0 SIMPLE CHRONIC BRONCHITIS: ICD-10-CM

## 2019-02-06 DIAGNOSIS — M05.10 RHEUMATOID LUNG: ICD-10-CM

## 2019-02-06 DIAGNOSIS — I10 ESSENTIAL HYPERTENSION: Primary | Chronic | ICD-10-CM

## 2019-02-06 DIAGNOSIS — R53.81 MALAISE AND FATIGUE: ICD-10-CM

## 2019-02-06 DIAGNOSIS — N28.89 RENAL MASS: ICD-10-CM

## 2019-02-06 DIAGNOSIS — C43.61 MELANOMA OF RIGHT UPPER ARM: ICD-10-CM

## 2019-02-06 DIAGNOSIS — F32.0 MILD MAJOR DEPRESSION: ICD-10-CM

## 2019-02-06 DIAGNOSIS — M05.711 RHEUMATOID ARTHRITIS INVOLVING RIGHT SHOULDER WITH POSITIVE RHEUMATOID FACTOR: Chronic | ICD-10-CM

## 2019-02-06 DIAGNOSIS — E03.9 ACQUIRED HYPOTHYROIDISM: ICD-10-CM

## 2019-02-06 LAB
BACTERIA #/AREA URNS HPF: ABNORMAL /HPF
BILIRUB UR QL STRIP: NEGATIVE
CLARITY UR: CLEAR
COLOR UR: YELLOW
GLUCOSE UR QL STRIP: NEGATIVE
HGB UR QL STRIP: NEGATIVE
KETONES UR QL STRIP: NEGATIVE
LEUKOCYTE ESTERASE UR QL STRIP: ABNORMAL
MICROSCOPIC COMMENT: ABNORMAL
NITRITE UR QL STRIP: NEGATIVE
PH UR STRIP: 7 [PH] (ref 5–8)
PROT UR QL STRIP: NEGATIVE
SP GR UR STRIP: <=1.005 (ref 1–1.03)
URN SPEC COLLECT METH UR: ABNORMAL
WBC #/AREA URNS HPF: 12 /HPF (ref 0–5)

## 2019-02-06 PROCEDURE — 99214 PR OFFICE/OUTPT VISIT, EST, LEVL IV, 30-39 MIN: ICD-10-PCS | Mod: HCNC,S$GLB,, | Performed by: INTERNAL MEDICINE

## 2019-02-06 PROCEDURE — 1101F PT FALLS ASSESS-DOCD LE1/YR: CPT | Mod: HCNC,CPTII,S$GLB, | Performed by: INTERNAL MEDICINE

## 2019-02-06 PROCEDURE — 3075F SYST BP GE 130 - 139MM HG: CPT | Mod: HCNC,CPTII,S$GLB, | Performed by: INTERNAL MEDICINE

## 2019-02-06 PROCEDURE — 99999 PR PBB SHADOW E&M-EST. PATIENT-LVL III: CPT | Mod: PBBFAC,HCNC,, | Performed by: INTERNAL MEDICINE

## 2019-02-06 PROCEDURE — 99214 OFFICE O/P EST MOD 30 MIN: CPT | Mod: HCNC,S$GLB,, | Performed by: INTERNAL MEDICINE

## 2019-02-06 PROCEDURE — 3079F PR MOST RECENT DIASTOLIC BLOOD PRESSURE 80-89 MM HG: ICD-10-PCS | Mod: HCNC,CPTII,S$GLB, | Performed by: INTERNAL MEDICINE

## 2019-02-06 PROCEDURE — 3075F PR MOST RECENT SYSTOLIC BLOOD PRESS GE 130-139MM HG: ICD-10-PCS | Mod: HCNC,CPTII,S$GLB, | Performed by: INTERNAL MEDICINE

## 2019-02-06 PROCEDURE — 99499 RISK ADDL DX/OHS AUDIT: ICD-10-PCS | Mod: HCNC,S$GLB,, | Performed by: INTERNAL MEDICINE

## 2019-02-06 PROCEDURE — 81000 URINALYSIS NONAUTO W/SCOPE: CPT | Mod: HCNC

## 2019-02-06 PROCEDURE — 99499 UNLISTED E&M SERVICE: CPT | Mod: HCNC,S$GLB,, | Performed by: INTERNAL MEDICINE

## 2019-02-06 PROCEDURE — 99999 PR PBB SHADOW E&M-EST. PATIENT-LVL III: ICD-10-PCS | Mod: PBBFAC,HCNC,, | Performed by: INTERNAL MEDICINE

## 2019-02-06 PROCEDURE — 3079F DIAST BP 80-89 MM HG: CPT | Mod: HCNC,CPTII,S$GLB, | Performed by: INTERNAL MEDICINE

## 2019-02-06 PROCEDURE — 1101F PR PT FALLS ASSESS DOC 0-1 FALLS W/OUT INJ PAST YR: ICD-10-PCS | Mod: HCNC,CPTII,S$GLB, | Performed by: INTERNAL MEDICINE

## 2019-02-06 RX ORDER — CEPHALEXIN 500 MG/1
CAPSULE ORAL
Refills: 0 | COMMUNITY
Start: 2019-02-04 | End: 2019-05-16 | Stop reason: ALTCHOICE

## 2019-02-06 RX ORDER — DOXYCYCLINE 100 MG/1
100 CAPSULE ORAL 2 TIMES DAILY
Qty: 14 CAPSULE | Refills: 0 | Status: SHIPPED | OUTPATIENT
Start: 2019-02-06 | End: 2019-02-13

## 2019-02-06 NOTE — PROGRESS NOTES
"Subjective:      Patient ID: Sarah Parker is a 78 y.o. female.    Chief Complaint: Follow-up      HPI  Here for follow up of medical problems.  On keflex for melanoma site infection right forearm.  Breathing well, no recent URI.  Overall fatigue, "dragging."  Lots stress with  burdens.  Exhausted after lunch.  RA is lots pain due to off Actemra and off MTX, due to arm infx.  LOTS pain.  BMs ok, not nec daily.    In past month, seeing things not there- only at night, seeing a little girl that is going for her purse.  Last night didn't take hydroxyzine and didn't have sx.  Does have urine urgency, no dysuria.  No f/c/sw/cough.  Occas sinus drainage cough.  No cp/sob/palp.    Updated/ annual due 9/19:  HM: 9/17 fluvax, 5/16 [id number], 10/14 [id number], 10/13 TDaP, 1/17 BMD/will bring to Dr. Tejada rep 2y, 2/19 MMG, 10/13 EGD, 2015 Cscope Dr. Garcia rep 5y.      Review of Systems   Constitutional: Negative for chills, diaphoresis and fever.   Respiratory: Negative for cough and shortness of breath.    Cardiovascular: Negative for chest pain, palpitations and leg swelling.   Gastrointestinal: Negative for blood in stool, constipation, diarrhea, nausea and vomiting.   Genitourinary: Negative for dysuria, frequency and hematuria.   Psychiatric/Behavioral: The patient is not nervous/anxious.          Objective:   /82 (BP Location: Right arm, Patient Position: Sitting)   Pulse 98   Temp 98.9 °F (37.2 °C) (Tympanic)   Ht 5' 2" (1.575 m)   Wt 57.1 kg (125 lb 14.1 oz)   SpO2 99%   BMI 23.02 kg/m²     Physical Exam   Constitutional: She is oriented to person, place, and time. She appears well-developed.   HENT:   Mouth/Throat: Oropharynx is clear and moist.   Neck: Neck supple. Carotid bruit is not present. No thyroid mass present.   Cardiovascular: Normal rate, regular rhythm and intact distal pulses. Exam reveals no gallop and no friction rub.   No murmur heard.  Pulmonary/Chest: Effort normal and " breath sounds normal. She has no wheezes. She has no rales.   Abdominal: Soft. Bowel sounds are normal. She exhibits no mass. There is no hepatosplenomegaly. There is no tenderness.   Musculoskeletal: She exhibits no edema.   Lymphadenopathy:     She has no cervical adenopathy.   Neurological: She is alert and oriented to person, place, and time.   Psychiatric: She has a normal mood and affect.           Assessment:       1. Essential hypertension    2. Simple chronic bronchitis    3. Acquired hypothyroidism    4. Melanoma of right upper arm    5. Mild major depression    6. Renal mass    7. Rheumatoid arthritis involving right shoulder with positive rheumatoid factor    8. Rheumatoid lung    9. Malaise and fatigue    10. Visual hallucination          Plan:     Essential hypertension- stable, cont med.    Acquired hypothyroidism- cont rx.    Melanoma of right upper arm- per Derm.    Renal mass- per Urol.    Rheumatoid arthritis involving right shoulder with positive rheumatoid factor/ Mild major depression- MAY NEED increase cymbalta to 60mg if no infx.    Rheumatoid lung/ Simple chronic bronchitis- doing well right now.    Malaise and fatigue/ Visual hallucination- poss infx, poss hydroxyzine.  -     Urinalysis; Future; Expected date: 02/06/2019

## 2019-02-06 NOTE — TELEPHONE ENCOUNTER
Please tell pt I sent a rx for doxycycline to her pharmacy for a UTI.  She can ask the Derm if she can stop the cephalexin while she is on this.  But if she just has a couple more, I would take both just to finish out the cephalexin course.  SM

## 2019-02-06 NOTE — TELEPHONE ENCOUNTER
Informed pt rx for doxycycline has been sent to pharmacy for UTI and that she should ask Derm if she can stop the Cephalexin while she is on the Doxy.  Pt has 8 more days of abx left.  She stated she will call the doctor.  Verbalized understanding all instructions./rpr

## 2019-02-11 ENCOUNTER — TELEPHONE (OUTPATIENT)
Dept: INTERNAL MEDICINE | Facility: CLINIC | Age: 79
End: 2019-02-11

## 2019-02-11 NOTE — TELEPHONE ENCOUNTER
----- Message from Lindsay Diaz sent at 2/11/2019 10:32 AM CST -----  Contact: Pt  Please give pt a call at ..[phone] (home) regarding issues with the antibiotic she was given and states that it has her extremely nauseous.

## 2019-02-13 ENCOUNTER — OFFICE VISIT (OUTPATIENT)
Dept: INTERNAL MEDICINE | Facility: CLINIC | Age: 79
End: 2019-02-13
Payer: MEDICARE

## 2019-02-13 VITALS
WEIGHT: 127.19 LBS | TEMPERATURE: 98 F | HEIGHT: 63 IN | SYSTOLIC BLOOD PRESSURE: 122 MMHG | BODY MASS INDEX: 22.54 KG/M2 | DIASTOLIC BLOOD PRESSURE: 80 MMHG | HEART RATE: 96 BPM | OXYGEN SATURATION: 98 %

## 2019-02-13 DIAGNOSIS — F32.0 MILD MAJOR DEPRESSION: ICD-10-CM

## 2019-02-13 DIAGNOSIS — I10 ESSENTIAL HYPERTENSION: Chronic | ICD-10-CM

## 2019-02-13 DIAGNOSIS — M05.711 RHEUMATOID ARTHRITIS INVOLVING RIGHT SHOULDER WITH POSITIVE RHEUMATOID FACTOR: Chronic | ICD-10-CM

## 2019-02-13 DIAGNOSIS — R53.81 MALAISE AND FATIGUE: Primary | ICD-10-CM

## 2019-02-13 DIAGNOSIS — R53.83 MALAISE AND FATIGUE: Primary | ICD-10-CM

## 2019-02-13 DIAGNOSIS — R44.1 VISUAL HALLUCINATION: ICD-10-CM

## 2019-02-13 PROCEDURE — 99999 PR PBB SHADOW E&M-EST. PATIENT-LVL III: CPT | Mod: PBBFAC,HCNC,, | Performed by: INTERNAL MEDICINE

## 2019-02-13 PROCEDURE — 1101F PT FALLS ASSESS-DOCD LE1/YR: CPT | Mod: HCNC,CPTII,S$GLB, | Performed by: INTERNAL MEDICINE

## 2019-02-13 PROCEDURE — 3079F PR MOST RECENT DIASTOLIC BLOOD PRESSURE 80-89 MM HG: ICD-10-PCS | Mod: HCNC,CPTII,S$GLB, | Performed by: INTERNAL MEDICINE

## 2019-02-13 PROCEDURE — 3079F DIAST BP 80-89 MM HG: CPT | Mod: HCNC,CPTII,S$GLB, | Performed by: INTERNAL MEDICINE

## 2019-02-13 PROCEDURE — 1101F PR PT FALLS ASSESS DOC 0-1 FALLS W/OUT INJ PAST YR: ICD-10-PCS | Mod: HCNC,CPTII,S$GLB, | Performed by: INTERNAL MEDICINE

## 2019-02-13 PROCEDURE — 99499 RISK ADDL DX/OHS AUDIT: ICD-10-PCS | Mod: HCNC,S$GLB,, | Performed by: INTERNAL MEDICINE

## 2019-02-13 PROCEDURE — 99499 UNLISTED E&M SERVICE: CPT | Mod: HCNC,S$GLB,, | Performed by: INTERNAL MEDICINE

## 2019-02-13 PROCEDURE — 99999 PR PBB SHADOW E&M-EST. PATIENT-LVL III: ICD-10-PCS | Mod: PBBFAC,HCNC,, | Performed by: INTERNAL MEDICINE

## 2019-02-13 PROCEDURE — 99213 OFFICE O/P EST LOW 20 MIN: CPT | Mod: HCNC,S$GLB,, | Performed by: INTERNAL MEDICINE

## 2019-02-13 PROCEDURE — 99213 PR OFFICE/OUTPT VISIT, EST, LEVL III, 20-29 MIN: ICD-10-PCS | Mod: HCNC,S$GLB,, | Performed by: INTERNAL MEDICINE

## 2019-02-13 PROCEDURE — 3074F SYST BP LT 130 MM HG: CPT | Mod: HCNC,CPTII,S$GLB, | Performed by: INTERNAL MEDICINE

## 2019-02-13 PROCEDURE — 3074F PR MOST RECENT SYSTOLIC BLOOD PRESSURE < 130 MM HG: ICD-10-PCS | Mod: HCNC,CPTII,S$GLB, | Performed by: INTERNAL MEDICINE

## 2019-02-13 NOTE — PROGRESS NOTES
"Subjective:      Patient ID: Sarah Parker is a 78 y.o. female.    Chief Complaint: Follow-up (UTI)      HPI  Here for follow up of medical problems.  Didn't tolerate doxycycline after 2 doses so stopped and restarted keflex.  Feels much better.  No dysuria.  Only complaint is pain from RA because can't take Actmera due to infected melanoma removal site.  No more "dragging" feeling.  Sleeping better now.  No more hallucinating.  No more weird dreams.  No more hydroxyzine.      Updated/ annual due 9/19:  HM: 9/18 fluvax, 5/16 [id number], 10/14 [id number], 10/13 TDaP, 1/17 BMD/will bring to Dr. Tejada rep 2y, 2/19 MMG, 10/13 EGD, 2015 Cscope Dr. Garcia rep 5y.        Review of Systems   Constitutional: Negative for chills, diaphoresis and fever.   Respiratory: Negative for cough and shortness of breath.    Cardiovascular: Negative for chest pain, palpitations and leg swelling.   Gastrointestinal: Negative for blood in stool, constipation, diarrhea, nausea and vomiting.   Genitourinary: Negative for dysuria, frequency and hematuria.   Psychiatric/Behavioral: The patient is not nervous/anxious.          Objective:   /80 (BP Location: Right arm, Patient Position: Sitting)   Pulse 96   Temp 97.8 °F (36.6 °C) (Tympanic)   Ht 5' 2.5" (1.588 m)   Wt 57.7 kg (127 lb 3.3 oz)   SpO2 98%   BMI 22.90 kg/m²     Physical Exam   Constitutional: She is oriented to person, place, and time. She appears well-developed.   HENT:   Mouth/Throat: Oropharynx is clear and moist.   Neck: Neck supple. Carotid bruit is not present. No thyroid mass present.   Cardiovascular: Normal rate, regular rhythm and intact distal pulses. Exam reveals no gallop and no friction rub.   No murmur heard.  Pulmonary/Chest: Effort normal and breath sounds normal. She has no wheezes. She has no rales.   Abdominal: Soft. Bowel sounds are normal. She exhibits no mass. There is no hepatosplenomegaly. There is no tenderness.   Musculoskeletal: She " exhibits no edema.   Lymphadenopathy:     She has no cervical adenopathy.   Neurological: She is alert and oriented to person, place, and time.   Psychiatric: She has a normal mood and affect.     Results for MARKEL ROLDAN (MRN [id number]) as of 2/13/2019 10:40   Ref. Range 2/6/2019 11:17   WBC Latest Ref Range: 3.90 - 12.70 K/uL 13.20 (H)   RBC Latest Ref Range: 4.00 - 5.40 M/uL 2.91 (L)   Hemoglobin Latest Ref Range: 12.0 - 16.0 g/dL 10.2 (L)   Hematocrit Latest Ref Range: 37.0 - 48.5 % 31.9 (L)   MCV Latest Ref Range: 82 - 98 fL 110 (H)   MCH Latest Ref Range: 27.0 - 31.0 pg 35.1 (H)   MCHC Latest Ref Range: 32.0 - 36.0 g/dL 32.0   RDW Latest Ref Range: 11.5 - 14.5 % 17.2 (H)   Platelets Latest Ref Range: 150 - 350 K/uL 486 (H)   MPV Latest Ref Range: 9.2 - 12.9 fL 9.3   Gran% Latest Ref Range: 38.0 - 73.0 % 49.3   Gran # (ANC) Latest Ref Range: 1.8 - 7.7 K/uL 6.5   Immature Granulocytes Latest Ref Range: 0.0 - 0.5 % 1.9 (H)   Immature Grans (Abs) Latest Ref Range: 0.00 - 0.04 K/uL 0.25 (H)   Lymph% Latest Ref Range: 18.0 - 48.0 % 38.0   Lymph # Latest Ref Range: 1.0 - 4.8 K/uL 5.0 (H)   Mono% Latest Ref Range: 4.0 - 15.0 % 9.4   Mono # Latest Ref Range: 0.3 - 1.0 K/uL 1.2 (H)   Eosinophil% Latest Ref Range: 0.0 - 8.0 % 1.2   Eos # Latest Ref Range: 0.0 - 0.5 K/uL 0.2   Basophil% Latest Ref Range: 0.0 - 1.9 % 0.2   Baso # Latest Ref Range: 0.00 - 0.20 K/uL 0.02   nRBC Latest Ref Range: 0 /100 WBC 0   Aniso Unknown Slight   Hypo Unknown Occasional   Differential Method Unknown Automated   Sodium Latest Ref Range: 136 - 145 mmol/L 135 (L)   Potassium Latest Ref Range: 3.5 - 5.1 mmol/L 4.7   Chloride Latest Ref Range: 95 - 110 mmol/L 99   CO2 Latest Ref Range: 23 - 29 mmol/L 26   Anion Gap Latest Ref Range: 8 - 16 mmol/L 10   BUN, Bld Latest Ref Range: 8 - 23 mg/dL 24 (H)   Creatinine Latest Ref Range: 0.5 - 1.4 mg/dL 0.9   eGFR if non African American Latest Ref Range: >60 mL/min/1.73 m^2 >60.0   eGFR if African  American Latest Ref Range: >60 mL/min/1.73 m^2 >60.0   Glucose Latest Ref Range: 70 - 110 mg/dL 100   Calcium Latest Ref Range: 8.7 - 10.5 mg/dL 9.9   Alkaline Phosphatase Latest Ref Range: 55 - 135 U/L 114   Total Protein Latest Ref Range: 6.0 - 8.4 g/dL 8.8 (H)   Albumin Latest Ref Range: 3.5 - 5.2 g/dL 4.1   Total Bilirubin Latest Ref Range: 0.1 - 1.0 mg/dL 0.6   AST Latest Ref Range: 10 - 40 U/L 28   ALT Latest Ref Range: 10 - 44 U/L 27   Triglycerides Latest Ref Range: 30 - 150 mg/dL 203 (H)   Cholesterol Latest Ref Range: 120 - 199 mg/dL 144   HDL Latest Ref Range: 40 - 75 mg/dL 39 (L)   HDL/Chol Ratio Latest Ref Range: 20.0 - 50.0 % 27.1   LDL Cholesterol Latest Ref Range: 63.0 - 159.0 mg/dL 64.4   Non-HDL Cholesterol Latest Units: mg/dL 105   Total Cholesterol/HDL Ratio Latest Ref Range: 2.0 - 5.0  3.7   TSH Latest Ref Range: 0.400 - 4.000 uIU/mL 4.326 (H)   Free T4 Latest Ref Range: 0.71 - 1.51 ng/dL 1.00         Assessment:       1. Malaise and fatigue    2. Visual hallucination    3. Essential hypertension    4. Rheumatoid arthritis involving right shoulder with positive rheumatoid factor    5. Mild major depression          Plan:     Malaise and fatigue- back to baseline.  RTC 3 mo, then ongoing q4mo.    Visual hallucination- resolved- poss infection vs overload vs hydroxyzine.    Essential hypertension- stable.    Rheumatoid arthritis involving right shoulder with positive rheumatoid factor- restart Actmera once stops keflex in 2d.    Mild major depression- cont rx.

## 2019-02-15 ENCOUNTER — TELEPHONE (OUTPATIENT)
Dept: INTERNAL MEDICINE | Facility: CLINIC | Age: 79
End: 2019-02-15

## 2019-02-15 NOTE — TELEPHONE ENCOUNTER
Pt states that she cannot tolerate the Doxycycline d/t GI issues.  She also stated to tell you that you were correct about the dosage that was discussed.  Pt requesting new rx sent to [facility].    Please advise./rpr

## 2019-02-15 NOTE — TELEPHONE ENCOUNTER
Pt reports uti symptoms have cleared up with the Keflex.  Informed her that her chart has been updated.  Instructed to call with any concerns.  Verbalized understanding./rpr

## 2019-02-15 NOTE — TELEPHONE ENCOUNTER
Please tell pt I already noted doxycycline is not tolerated by her.  I did not think she needed another antibiotic- has something changed?  I think the Keflex for her arm wound was good for the urine.  How is she feeling?  SM

## 2019-02-15 NOTE — TELEPHONE ENCOUNTER
----- Message from Jackelyn Olivera sent at 2/15/2019 11:28 AM CST -----  Contact: Patient  Type:  Needs Medical Advice    Who Called:  Sarah  Symptoms (please be specific): n/a  How long has patient had these symptoms:  n/a  Pharmacy name and phone #:  n/a  Would the patient rather a call back or a response via [facility]? Call back  Best Call Back Number: [phone]  Additional Information: Please let  know that the medication (Doxcycline) she was given made her sick. She would like to discuss with Verónica.    Thanks,  Jackelyn

## 2019-03-05 ENCOUNTER — TELEPHONE (OUTPATIENT)
Dept: INTERNAL MEDICINE | Facility: CLINIC | Age: 79
End: 2019-03-05

## 2019-03-05 DIAGNOSIS — M54.16 LUMBAR RADICULOPATHY: Primary | ICD-10-CM

## 2019-03-05 NOTE — TELEPHONE ENCOUNTER
Pt is requesting to see pain mgmt for lt sciatic pain.  Please sign attached referral if approved and send back for scheduling./rpr

## 2019-03-05 NOTE — TELEPHONE ENCOUNTER
----- Message from Jackelyn Olivera sent at 3/5/2019  8:34 AM CST -----  Contact: Patient  Type:  Needs Medical Advice    Who Called: Sarah  Symptoms (please be specific):  Left Side Sciatica Pain.   How long has patient had these symptoms:  months  Pharmacy name and phone #: n/a  Would the patient rather a call back or a response via [facility]? Call back  Best Call Back Number: [phone]  Additional Information: The patient would like a referral to pain management for Left Side Sciatica Pain.

## 2019-03-20 ENCOUNTER — OFFICE VISIT (OUTPATIENT)
Dept: PAIN MEDICINE | Facility: CLINIC | Age: 79
End: 2019-03-20
Payer: MEDICARE

## 2019-03-20 VITALS
BODY MASS INDEX: 22.5 KG/M2 | SYSTOLIC BLOOD PRESSURE: 155 MMHG | DIASTOLIC BLOOD PRESSURE: 84 MMHG | RESPIRATION RATE: 16 BRPM | HEART RATE: 86 BPM | WEIGHT: 127 LBS | HEIGHT: 63 IN

## 2019-03-20 DIAGNOSIS — M54.16 LUMBAR RADICULOPATHY: Primary | ICD-10-CM

## 2019-03-20 PROCEDURE — 99999 PR PBB SHADOW E&M-EST. PATIENT-LVL III: CPT | Mod: PBBFAC,HCNC,, | Performed by: PAIN MEDICINE

## 2019-03-20 PROCEDURE — 99999 PR PBB SHADOW E&M-EST. PATIENT-LVL III: ICD-10-PCS | Mod: PBBFAC,HCNC,, | Performed by: PAIN MEDICINE

## 2019-03-20 PROCEDURE — 3079F PR MOST RECENT DIASTOLIC BLOOD PRESSURE 80-89 MM HG: ICD-10-PCS | Mod: HCNC,CPTII,S$GLB, | Performed by: PAIN MEDICINE

## 2019-03-20 PROCEDURE — 1101F PT FALLS ASSESS-DOCD LE1/YR: CPT | Mod: HCNC,CPTII,S$GLB, | Performed by: PAIN MEDICINE

## 2019-03-20 PROCEDURE — 3079F DIAST BP 80-89 MM HG: CPT | Mod: HCNC,CPTII,S$GLB, | Performed by: PAIN MEDICINE

## 2019-03-20 PROCEDURE — 99214 PR OFFICE/OUTPT VISIT, EST, LEVL IV, 30-39 MIN: ICD-10-PCS | Mod: HCNC,S$GLB,, | Performed by: PAIN MEDICINE

## 2019-03-20 PROCEDURE — 1101F PR PT FALLS ASSESS DOC 0-1 FALLS W/OUT INJ PAST YR: ICD-10-PCS | Mod: HCNC,CPTII,S$GLB, | Performed by: PAIN MEDICINE

## 2019-03-20 PROCEDURE — 3077F SYST BP >= 140 MM HG: CPT | Mod: HCNC,CPTII,S$GLB, | Performed by: PAIN MEDICINE

## 2019-03-20 PROCEDURE — 99214 OFFICE O/P EST MOD 30 MIN: CPT | Mod: HCNC,S$GLB,, | Performed by: PAIN MEDICINE

## 2019-03-20 PROCEDURE — 3077F PR MOST RECENT SYSTOLIC BLOOD PRESSURE >= 140 MM HG: ICD-10-PCS | Mod: HCNC,CPTII,S$GLB, | Performed by: PAIN MEDICINE

## 2019-03-20 RX ORDER — GABAPENTIN 300 MG/1
300 CAPSULE ORAL NIGHTLY
Qty: 30 CAPSULE | Refills: 3 | Status: SHIPPED | OUTPATIENT
Start: 2019-03-20 | End: 2019-05-07

## 2019-03-20 RX ORDER — METHYLPREDNISOLONE 4 MG/1
TABLET ORAL
Qty: 1 PACKAGE | Refills: 0 | Status: SHIPPED | OUTPATIENT
Start: 2019-03-20 | End: 2019-05-07

## 2019-03-20 NOTE — PATIENT INSTRUCTIONS
-provided a prescription for Medrol Dosepak  -will start gabapentin 300 mg at bedtime  -have ordered a lumbar MRI for evaluation of left lower extremity weakness  -continue physical therapy exercises at home as tolerated  -follow up in clinic in 4-6 weeks

## 2019-03-20 NOTE — PROGRESS NOTES
Chief Pain Complaint:  Back Pain (low back pain, to the legs, to feet) and Numbness (bilateral foot numbness/ burning sensation)      History of Present Illness:   This patient is a 79 y.o. female who presents today complaining of the above noted pain/s. The patient describes the pain as follows.  Ms. Parker is a new patient to clinic with complaints of low back pain which radiates into bilateral lower extremities.  She has been having these symptoms for several years and has undergone epidural steroid injections in 2017 which provided significant pain relief.  She reports that her symptoms returned approximately October of 2018 her located mostly in the bilateral S1 distribution.  She states that the left leg is worse than the right leg and she describes having numbness in her bilateral feet but denies weakness.  She reports that it does cause difficulty with walking and she spends much of her day walking for exercise.  She also performs physical therapy exercises at home 3 days per week for the last several years.  Today she rates her pain as 3/10 and describes a sensation like she has an Ace bandage wrapped around her legs..  She denies having bowel bladder difficulties.    Previous Therapy:  Medications:Lortab and Amitriptyline  Injections: Lumbar AYAAN  Surgeries: None  Physical Therapy: Completed in the Past    Past Surgical History:   Procedure Laterality Date    APPENDECTOMY  1985    CATARACT EXTRACTION Bilateral 6/11/15    Dr. Booth    CHOLECYSTECTOMY  2013    cryoablasion kidney Left 09/27/2016    EGD (ESOPHAGOGASTRODUODENOSCOPY) N/A 10/31/2013    [facility]    feet Bilateral     rheumatoid    FRACTURE SURGERY Right     tibia    FUNDOPLICATION, NISSEN, LAPAROSCOPIC N/A 12/18/2013    Performed by Galindo Vasquez MD at [facility] OR    HERNIA REPAIR      HYSTERECTOMY  1970    partial    JOINT REPLACEMENT      bilateral knees (2008), hands, wrists, knuckles, toes     "LAPAROSCOPIC NISSEN FUNDOPLICATION      Renal Cryoablation N/A 9/27/2016    Performed by [facility] Provider at [facility] OR       Imaging / Labs / Studies (reviewed on 3/20/2019):  Results for orders placed in visit on 08/19/10   X-Ray Lumbar Spine Complete 5 View    Narrative DATE OF EXAM: Aug 19 2010      [facility]   0144  -  L-SPINE 4 VIEWS MINIMUM:     [id number]     CLINICAL HISTORY:   724.4 0 LUMBOSACRAL NEURITIS NOS     RESULTS: THE BONES ARE DIFFUSELY DEMINERALIZED.  THERE IS MILD   DEXTROSCOLIOSIS.  GRADE 2 ANTEROLISTHESIS OF APPROXIMATELY 1.1 CM IS   IDENTIFIED AT L4-5.  MINIMAL GRADE 1 ANTEROLISTHESIS IS PRESENT AT L2-3.    THERE IS MULTILEVEL DEGENERATIVE VERTEBRAL END PLATE SPURRING, DISC SPACE   NARROWING, AND FACET ARTHROPATHY.  THE PEDICLES APPEAR INTACT.  THERE IS   NO VERTEBRAL COMPRESSION FRACTURE.     IMPRESSION: SEE RESULTS ABOVE.     Review of Systems:  Review of Systems   Constitutional: Negative for fever.   Eyes: Negative for blurred vision.   Respiratory: Negative for cough and wheezing.    Cardiovascular: Negative for chest pain and orthopnea.   Gastrointestinal: Negative for constipation, diarrhea, nausea and vomiting.   Genitourinary: Negative for dysuria.   Musculoskeletal: Positive for back pain.        Bilateral lower extremity pain   Skin: Negative for itching and rash.   Endo/Heme/Allergies: Does not bruise/bleed easily.       Physical Exam:  BP (!) 155/84   Pulse 86   Resp 16   Ht 5' 2.5" (1.588 m)   Wt 57.6 kg (127 lb)   BMI 22.86 kg/m²  (reviewed on 3/20/2019)\  General    Constitutional: She is oriented to person, place, and time. She appears well-developed and well-nourished.   HENT:   Head: Normocephalic and atraumatic.   Eyes: EOM are normal.   Neck: Neck supple.   Pulmonary/Chest: Effort normal.   Abdominal: She exhibits no distension.   Neurological: She is alert and oriented to person, place, and time. No cranial nerve deficit.   Psychiatric: She has a normal mood and " affect.     General Musculoskeletal Exam   Gait: antalgic     Back (L-Spine & T-Spine) / Neck (C-Spine) Exam     Tenderness Right paramedian tenderness of the Lower L-Spine. Left paramedian tenderness of the Lower L-Spine.     Back (L-Spine & T-Spine) Range of Motion   Extension: normal   Flexion: normal   Lateral bend right: normal   Lateral bend left: normal   Rotation right: normal   Rotation left: normal     Spinal Sensation   Right Side Sensation  L-Spine Level: normal  Left Side Sensation  L-Spine Level: normal    Comments:  Negative straight leg raise bilaterally      Muscle Strength   Right Lower Extremity   Hip Flexion: 5/5   Quadriceps:  5/5   Hamstrin/5   Left Lower Extremity   Hip Flexion: 4/5   Quadriceps:  5/5   Hamstrin/5     Reflexes     Left Side  Quadriceps:  2+  Achilles:  2+  Ankle Clonus:  absent    Right Side   Quadriceps:  2+  Achilles:  2+  Ankle Clonus:  absent      Assessment  Lumbar Radiculopathy  Lumbar Spondylosis    1. 79 y.o. year old patient with PMH of   Past Medical History:   Diagnosis Date    Acid reflux     Anxiety     Back pain     Bronchitis, chronic obstructive w acute bronchitis 2016    Cancer     NMSC arms, face- Dr. Lata Tejada    Cataract     2+NS    Degenerative disc disease     Depression     Dry mouth     Hernia, hiatal 2013    Hypertension     Hypothyroid     Macrocytic anemia 5/3/2016    Macular degeneration     Migraines     Mixed anxiety and depressive disorder     Multiple fractures of ribs of right side     Osteoporosis     Pneumonia     Pneumonia due to other staphylococcus     Rheumatoid arthritis     Rheumatoid arthritis(714.0)     Rheumatoid arthritis(714.0)     Remicade, MTX.      presenting with pain located lumbar spine and bilateral lower extremities  2. Pain Generators / Etiology: Lumbar Radiculopathy and Lumbar Spondylosis  3. Failed Meds (E- Effective, NE- Not Effective):  Amitriptyline-minimally effective,  Norco-minimally effective  4. Physical Therapy - Currently Participating  5. Psychological comorbidities - None  6. Anticoagulants / Antiplatelets: None     PLAN:  1. Medications:  Provided a Medrol Dosepak and gabapentin 300 mg at bedtime    2. PT - patient performs physical therapy exercises at home 3 days per week for the last several years, have instructed her to continue this  3. Psychological - none  4. Labs - obtain  none  5. Imaging - obtain lumbar MRI  6. Interventions - schedule none; consider bilateral S1 transforaminal epidural steroid injections in the future  7. Referrals - none  8. Records - none  9. Follow up visit - follow up in clinic in 4 weeks  10. Patient Questions - answered all of the patient's questions regarding diagnosis, therapy, and treatment  11.  This condition does not require this patient to take time off of work    JONAH Felix MD  Interventional Pain  [facility] [place]

## 2019-03-29 ENCOUNTER — TELEPHONE (OUTPATIENT)
Dept: RADIOLOGY | Facility: HOSPITAL | Age: 79
End: 2019-03-29

## 2019-04-01 ENCOUNTER — HOSPITAL ENCOUNTER (OUTPATIENT)
Dept: RADIOLOGY | Facility: HOSPITAL | Age: 79
Discharge: HOME | End: 2019-04-01
Attending: PAIN MEDICINE
Payer: MEDICARE

## 2019-04-01 DIAGNOSIS — M54.16 LUMBAR RADICULOPATHY: ICD-10-CM

## 2019-04-01 PROCEDURE — 72148 MRI LUMBAR SPINE WITHOUT CONTRAST: ICD-10-PCS | Mod: 26,HCNC,, | Performed by: RADIOLOGY

## 2019-04-01 PROCEDURE — 72148 MRI LUMBAR SPINE W/O DYE: CPT | Mod: 26,HCNC,, | Performed by: RADIOLOGY

## 2019-04-01 PROCEDURE — 72148 MRI LUMBAR SPINE W/O DYE: CPT | Mod: TC,HCNC

## 2019-04-02 ENCOUNTER — TELEPHONE (OUTPATIENT)
Dept: PAIN MEDICINE | Facility: CLINIC | Age: 79
End: 2019-04-02

## 2019-04-02 NOTE — TELEPHONE ENCOUNTER
----- Message from Syd Nunez sent at 4/2/2019 10:08 AM CDT -----  Contact: self  Type:  Sooner Apoointment Request    Caller is requesting a sooner appointment.  Caller declined first available appointment listed below.  Caller will not accept being placed on the waitlist and is requesting a message be sent to doctor.  Name of Caller:zeferino martinez  When is the first available appointment?05/06  Symptoms:severe pain  Would the patient rather a call back or a response via [facility]? Call back  Best Call Back Number:[phone]  Additional Information: requesting getting a sooner appt due to pt being out of medication and is in a lot of pain.    Thank,  Syd Nunez

## 2019-04-02 NOTE — TELEPHONE ENCOUNTER
Contacted pt. Pt requesting work in appt. Offered pt appt next Tuesday 4/9/19 at 12pm. Pt gladly accepted. All questions answered.//lp

## 2019-04-09 ENCOUNTER — OFFICE VISIT (OUTPATIENT)
Dept: PAIN MEDICINE | Facility: CLINIC | Age: 79
End: 2019-04-09
Payer: MEDICARE

## 2019-04-09 VITALS
HEART RATE: 87 BPM | WEIGHT: 127 LBS | BODY MASS INDEX: 23.37 KG/M2 | RESPIRATION RATE: 18 BRPM | SYSTOLIC BLOOD PRESSURE: 152 MMHG | DIASTOLIC BLOOD PRESSURE: 87 MMHG | HEIGHT: 62 IN

## 2019-04-09 DIAGNOSIS — M47.817 SPONDYLOSIS OF LUMBOSACRAL REGION WITHOUT MYELOPATHY OR RADICULOPATHY: ICD-10-CM

## 2019-04-09 DIAGNOSIS — M51.36 DDD (DEGENERATIVE DISC DISEASE), LUMBAR: ICD-10-CM

## 2019-04-09 DIAGNOSIS — M48.061 LUMBAR FORAMINAL STENOSIS: ICD-10-CM

## 2019-04-09 DIAGNOSIS — M54.16 LUMBAR RADICULOPATHY: Primary | ICD-10-CM

## 2019-04-09 PROCEDURE — 3079F PR MOST RECENT DIASTOLIC BLOOD PRESSURE 80-89 MM HG: ICD-10-PCS | Mod: HCNC,CPTII,S$GLB, | Performed by: PHYSICIAN ASSISTANT

## 2019-04-09 PROCEDURE — 99999 PR PBB SHADOW E&M-EST. PATIENT-LVL V: CPT | Mod: PBBFAC,HCNC,, | Performed by: PHYSICIAN ASSISTANT

## 2019-04-09 PROCEDURE — 99214 OFFICE O/P EST MOD 30 MIN: CPT | Mod: HCNC,S$GLB,, | Performed by: PHYSICIAN ASSISTANT

## 2019-04-09 PROCEDURE — 3077F SYST BP >= 140 MM HG: CPT | Mod: HCNC,CPTII,S$GLB, | Performed by: PHYSICIAN ASSISTANT

## 2019-04-09 PROCEDURE — 99999 PR PBB SHADOW E&M-EST. PATIENT-LVL V: ICD-10-PCS | Mod: PBBFAC,HCNC,, | Performed by: PHYSICIAN ASSISTANT

## 2019-04-09 PROCEDURE — 3077F PR MOST RECENT SYSTOLIC BLOOD PRESSURE >= 140 MM HG: ICD-10-PCS | Mod: HCNC,CPTII,S$GLB, | Performed by: PHYSICIAN ASSISTANT

## 2019-04-09 PROCEDURE — 1101F PT FALLS ASSESS-DOCD LE1/YR: CPT | Mod: HCNC,CPTII,S$GLB, | Performed by: PHYSICIAN ASSISTANT

## 2019-04-09 PROCEDURE — 3079F DIAST BP 80-89 MM HG: CPT | Mod: HCNC,CPTII,S$GLB, | Performed by: PHYSICIAN ASSISTANT

## 2019-04-09 PROCEDURE — 1101F PR PT FALLS ASSESS DOC 0-1 FALLS W/OUT INJ PAST YR: ICD-10-PCS | Mod: HCNC,CPTII,S$GLB, | Performed by: PHYSICIAN ASSISTANT

## 2019-04-09 PROCEDURE — 99214 PR OFFICE/OUTPT VISIT, EST, LEVL IV, 30-39 MIN: ICD-10-PCS | Mod: HCNC,S$GLB,, | Performed by: PHYSICIAN ASSISTANT

## 2019-04-09 NOTE — PROGRESS NOTES
Chief Pain Complaint:  Low-back Pain    Interval History: Patient presents today for follow-up. She was last seen on 3-20-19 with Dr. Felix. At that visit, the plan was to order an updated MRI. She complains of low back and bilateral lower extremity pain that extends posteriorly. Historically, both legs were painful, but she reports today that it has been more so on left side. She does have bilateral foot numbness, worse on left.    Initial History of Present Illness:   This patient is a 79 y.o. female who presents today complaining of the above noted pain/s. The patient describes the pain as follows.  Ms. Parker is a new patient to clinic with complaints of low back pain which radiates into bilateral lower extremities.  She has been having these symptoms for several years and has undergone epidural steroid injections in 2017 which provided significant pain relief.  She reports that her symptoms returned approximately October of 2018 her located mostly in the bilateral S1 distribution.  She states that the left leg is worse than the right leg and she describes having numbness in her bilateral feet but denies weakness.  She reports that it does cause difficulty with walking and she spends much of her day walking for exercise.  She also performs physical therapy exercises at home 3 days per week for the last several years.  Today she rates her pain as 3/10 and describes a sensation like she has an Ace bandage wrapped around her legs..  She denies having bowel bladder difficulties.    Previous Therapy:  Medications:Lortab and Amitriptyline  Injections: Left L3/4 TF AYAAN with Dr. Gray on 9-15-10 with relief for almost 5 years; bilateral L5/S1 TF AYAAN on 9-1-17 with Dr. Barry with excellent relief for about 13 months  Surgeries: No Spinal surgeries  Physical Therapy: Completed in the Past    Past Surgical History:   Procedure Laterality Date    APPENDECTOMY  1985    CATARACT EXTRACTION Bilateral 6/11/15    Dr. Booth     CHOLECYSTECTOMY  2013    cryoablasion kidney Left 09/27/2016    EGD (ESOPHAGOGASTRODUODENOSCOPY) N/A 10/31/2013    Performed by [facility]ello [facility] at [facility] ENDO    feet Bilateral     rheumatoid    FRACTURE SURGERY Right     tibia    FUNDOPLICATION, NISSEN, LAPAROSCOPIC N/A 12/18/2013    Performed by Galindo Vasquez MD at [facility] OR    HERNIA REPAIR      HYSTERECTOMY  1970    partial    JOINT REPLACEMENT      bilateral knees (2008), hands, wrists, knuckles, toes    LAPAROSCOPIC NISSEN FUNDOPLICATION      Renal Cryoablation N/A 9/27/2016    Performed by [facility] Diagnostic Provider at [facility] OR       Imaging / Labs / Studies (reviewed on 4/9/2019):    Results for orders placed during the hospital encounter of 04/01/19   MRI Lumbar Spine Without Contrast    Narrative COMPARISON:  CT of the abdomen pelvis from 08/28/2018.  FINDINGS:  The osseous structures demonstrate nonspecific diminished signal intensity on T1 weighted imaging.  L1-L2: Facet ligamentum flavum hypertrophy with prominent right paracentral disc protrusion.  There is asymmetric marked right neural foraminal narrowing.  The minimum AP spinal canal diameter measurement is 11 mm.  L2-L3: Facet ligamentum flavum hypertrophy with posterior disc bulge.  There is effacement of the ventral thecal sac.  There is mild bilateral neural foraminal narrowing.  L3-L4: Facet ligamentum flavum hypertrophy with posterior disc bulge.  There is a small annular tear of the disc.  There is bilateral neural foraminal narrowing, left greater than right.  The minimum AP spinal canal diameter measurement is 11 mm.  L4-L5: Small annular tear of the disc.  There is facet hypertrophy and posterior disc osteophyte complex which results in marked narrowing of the spinal canal which demonstrates a minimum AP diameter of 5.5 mm.  Bilateral neural foraminal narrowing is seen which is greater on the right side.  L5-S1: There is facet hypertrophy and posterior disc osteophyte complex.   There is narrowing at the inferior left lateral recess.  Bilateral renal cysts are partially visualized.  There are post interventional changes involving the lateral interpolar left kidney with adjacent scarring noted.  The tip of the conus medullaris terminates near the L1 level.  No fracture.  There is grade 1/2 anterolisthesis of L4 on L5.    Impression Multilevel discogenic degenerative changes of the lumbar spine with areas of spinal canal and neural foraminal encroachment as described above.  There is grade 1/2 anterolisthesis of L4 on L5.  Nonspecific diminished signal intensity within the osseous structures which can be seen with both benign and malignant marrow replacement processes.  Please correlate with history and lab values.  Other incidental findings as above.        8/22/17 LUMBAR MRI (from [facility], full report scanned into Media in EMR)  L1-L2: disc bulge, facet arthropathy,bilateral  bony NF narrowing greater on the right side, cannot exclude right L1 nerve impingement  L2-L3: disc bulge, facet arthropathy, bilateral bony NF narrowing with possible, but not definite, nerve impingements  L3-L4: disc bulge, facet arthropathy, bilateral bony NF narrowing greater on the left side, severe left lateral recess stenosis with possible left L3 nerve impingement  L4-L5: disc bulge, facet arthropathy, bilateral bony NF narrowing greater on the left side, severe left lateral recess stenosis with possible L4 nerve impingements  L5-S1: disc bulge, facet arthropathy, bilateral bony NF narrowing with possible, but not definite, nerve impingements      Results for orders placed in visit on 08/19/10   X-Ray Lumbar Spine Complete 5 View    Narrative RESULTS: THE BONES ARE DIFFUSELY DEMINERALIZED.  THERE IS MILD DEXTROSCOLIOSIS.  GRADE 2 ANTEROLISTHESIS OF APPROXIMATELY 1.1 CM IS IDENTIFIED AT L4-5.  MINIMAL GRADE 1 ANTEROLISTHESIS IS PRESENT AT L2-3.  THERE IS MULTILEVEL DEGENERATIVE  "VERTEBRAL END PLATE SPURRING, DISC SPACE NARROWING, AND FACET ARTHROPATHY.  THE PEDICLES APPEAR INTACT.  THERE IS NO VERTEBRAL COMPRESSION FRACTURE.         Review of Systems:  Review of Systems   Constitutional: Negative for fever.   Eyes: Negative for blurred vision.   Respiratory: Negative for cough and wheezing.    Cardiovascular: Negative for chest pain and orthopnea.   Gastrointestinal: Negative for constipation, diarrhea, nausea and vomiting.   Genitourinary: Negative for dysuria.   Musculoskeletal: Positive for back pain.        Bilateral lower extremity pain   Skin: Negative for itching and rash.   Endo/Heme/Allergies: Does not bruise/bleed easily.       Physical Exam:  Vitals:  BP (!) 152/87 (BP Location: Right arm, Patient Position: Sitting, BP Method: Medium (Automatic))   Pulse 87   Resp 18   Ht 5' 2" (1.575 m)   Wt 57.6 kg (127 lb)   BMI 23.23 kg/m²   (reviewed on 4/9/2019)    General: alert and oriented, in no apparent distress.  Gait: normal gait.  Skin: no rashes, no discoloration, no obvious lesions  HEENT: normocephalic, atraumatic. Pupils equal and round.  Cardiovascular: no significant peripheral edema present.  Respiratory: without use of accessory muscles of respiration.    Musculoskeletal - Lumbar Spine:  - Pain on flexion of lumbar spine: Present  - Pain on extension of lumbar spine: Present  - Lumbar facet loading: Absent   - TTP over the lumbar facet joints: Absent  - TTP over the lumbar paraspinals: Present  - TTP over the SI joints:  Absent   - TTP over GT bursa: Absent   - Straight Leg Raise: Equivocal on left  - DEE DEE: Negative    Neuro - Lower Extremities:  - RLE Strength:     >> 5/5 strength with right hip flexion/ extension    >> 5/5 strength with right knee flexion/ extension    >> 5/5 strength in right ankle with plantar and dorsiflexion  - LLE Strength:     >> 4/5 strength with left hip flexion/ extension    >> 5/5 strength with knee flexion extension on the left     >> 5/5 " strength in left ankle with plantar and dorsiflexion  - Extremity Reflexes: Brisk and symmetric throughout  - Sensory: Sensation to light touch decreased in dorsal foot on left, somewhat on right      Psych:  Mood and affect is appropriate        Assessment  1. 79 y.o. year old patient with PMH of   Past Medical History:   Diagnosis Date    Acid reflux     Anxiety     Back pain     Bronchitis, chronic obstructive w acute bronchitis 7/29/2016    Cancer     NMSC arms, face- Dr. Ltaa Tejada    Cataract     2+NS    Degenerative disc disease     Depression     Dry mouth     Hernia, hiatal 11/18/2013    Hypertension     Hypothyroid     Macrocytic anemia 5/3/2016    Macular degeneration     Migraines     Mixed anxiety and depressive disorder     Multiple fractures of ribs of right side     Osteoporosis     Pneumonia     Pneumonia due to other staphylococcus     Rheumatoid arthritis     Rheumatoid arthritis(714.0)     Rheumatoid arthritis(714.0)     Remicade, MTX.      presenting with pain located lumbar spine and bilateral lower extremities, mostly on left.  Diagnoses include:    ICD-10-CM ICD-9-CM   1. Lumbar radiculopathy M54.16 724.4   2. Spondylosis of lumbosacral region without myelopathy or radiculopathy M47.817 721.3   3. DDD (degenerative disc disease), lumbar M51.36 722.52   4. Lumbar foraminal stenosis M99.83 724.02      2. Pain Generators / Etiology: Lumbar Radiculopathy and Lumbar Spondylosis  3. Failed Meds (E- Effective, NE- Not Effective):  Amitriptyline-minimally effective, Norco-minimally effective  4. Physical Therapy - Currently Participating  5. Psychological comorbidities - None  6. Anticoagulants / Antiplatelets: None       Plan:  1. Interventional: Schedule left L5/S1 (vs. S1) TF AYAAN with Dr. Felix.  Patient is not taking prescription blood thinners or ASA.     2. Pharmacologic: Continue gabapentin 300mg QHS.    3. Rehabilitative: Patient performs physical therapy exercises at  home 3 days per week for the last several years. Encouraged continuation of regular exercise. Avoid heavy lifting/ bending    4. Diagnostic: Lumbar MRI reviewed, detailed above, which shows multilevel foraminal stenosis with severe spinal stenosis at L4/5.    5. Other: We discussed refer to Dr. Lopez (Neurosurgery) for opinion regarding spinal stenosis, but patient is not interested.     6. Follow up: 3-4 weeks post-injection    - I discussed the risks, benefits, and alternatives to potential treatment options. All questions and concerns were fully addressed today in clinic. Dr. Carlin was consulted regarding the patient plan and agrees.

## 2019-04-10 ENCOUNTER — TELEPHONE (OUTPATIENT)
Dept: PAIN MEDICINE | Facility: CLINIC | Age: 79
End: 2019-04-10

## 2019-04-10 NOTE — TELEPHONE ENCOUNTER
----- Message from Unruly Yeager sent at 4/10/2019 11:13 AM CDT -----  Contact: pt  States she's calling to follow up on receiving a phone call from the scheduling nurse rg scheduling her injection and can be reached at [phone]//thanks/dbw

## 2019-04-12 ENCOUNTER — TELEPHONE (OUTPATIENT)
Dept: PAIN MEDICINE | Facility: CLINIC | Age: 79
End: 2019-04-12

## 2019-04-15 ENCOUNTER — TELEPHONE (OUTPATIENT)
Dept: PAIN MEDICINE | Facility: CLINIC | Age: 79
End: 2019-04-15

## 2019-04-15 NOTE — TELEPHONE ENCOUNTER
Contacted patient; procedure scheduled with Dr. Felix. Instructions given verbally and also mailed to listed address.     ----- Message from Chelsea Singh sent at 4/15/2019  9:54 AM CDT -----  Contact: self  Pt is calling to schedule appt for back injections. Please call pt back at [phone].      Thanks,   Chelsea Singh

## 2019-04-18 ENCOUNTER — TELEPHONE (OUTPATIENT)
Dept: PAIN MEDICINE | Facility: CLINIC | Age: 79
End: 2019-04-18

## 2019-04-18 NOTE — TELEPHONE ENCOUNTER
----- Message from Liss Bentley sent at 4/18/2019  8:42 AM CDT -----  Contact: pt  Pt request call back from [place] needs to know if theres a cancellation next week for a procedure .. .[phone] (home)

## 2019-04-22 NOTE — TELEPHONE ENCOUNTER
Contacted patient; spoke to patient's spouse. Informed him that we did not have any appointments sooner than April 26th. Instructed spouse to have patient call back for any other questions/concerns.

## 2019-04-26 ENCOUNTER — HOSPITAL ENCOUNTER (OUTPATIENT)
Facility: HOSPITAL | Age: 79
Discharge: HOME | End: 2019-04-26
Attending: PAIN MEDICINE | Admitting: PAIN MEDICINE
Payer: MEDICARE

## 2019-04-26 VITALS
HEIGHT: 62 IN | OXYGEN SATURATION: 98 % | SYSTOLIC BLOOD PRESSURE: 126 MMHG | HEART RATE: 90 BPM | RESPIRATION RATE: 15 BRPM | DIASTOLIC BLOOD PRESSURE: 59 MMHG | WEIGHT: 127 LBS | BODY MASS INDEX: 23.37 KG/M2

## 2019-04-26 DIAGNOSIS — M54.16 LUMBAR RADICULOPATHY: ICD-10-CM

## 2019-04-26 PROCEDURE — 63600175 PHARM REV CODE 636 W HCPCS: Mod: HCNC

## 2019-04-26 PROCEDURE — 64483 PR EPIDURAL INJ, ANES/STEROID, TRANSFORAMINAL, LUMB/SACR, SNGL LEVL: ICD-10-PCS | Mod: HCNC,LT,, | Performed by: PAIN MEDICINE

## 2019-04-26 PROCEDURE — 64483 NJX AA&/STRD TFRM EPI L/S 1: CPT | Mod: HCNC,LT,, | Performed by: PAIN MEDICINE

## 2019-04-26 NOTE — OP NOTE
"Procedure: Lumbar Transforaminal Epidural Steroid Injection under Fluoroscopic Guidance (supraneural approach)    Level: L5/S1     Side: Left    PROCEDURE DATE: 4/26/2019    Pre-operative Diagnosis: Lumbar Radiculopathy  Post-operative Diagnosis: Lumbar Radiculopathy    Provider: JONAH Felix MD  Assistant(s): None    Anesthesia: Local, No Sedation    >> 0 mg of VERSED    >> 0 mcg of FENTANYL     Indication: Low back pain with radiculopathy consistent with distribution of targeted nerve. Symptoms unresponsive to conservative treatments. Fluoroscopy was used to optimize visualization of needle placement and to maximize safety.     Procedure Description / Technique:  The patient was seen and identified in the preoperative area. Risks, benefits, complications, and alternatives were discussed with the patient. The patient agreed to proceed with the procedure and signed the consent. The site and side of the procedure was identified and marked. An IV was not placed for this procedure. The patient was taken to the procedural suite.    The patient was positioned in prone orientation on procedure table and a pillow was placed under the abdomen to reduce lumbar lordosis. A time out was performed prior to any intervention. The procedure, site, side, and allergies were stated and agreed to by all present. The lumbosacral area was widely prepped with ChloraPrep. The procedural site was draped in usual sterile fashion. Vital signs were closely monitored throughout this procedure. Conscious sedation was not used for this procedure.    The target area was visualized under fluoroscopy. The cephalocaudal angle of the fluoroscope was adjusted as to align the vertebral end plates. The fluoroscopic arm was rotated ipsilaterally to an angle of approximately 30 degrees until the "saadia dog" outline came into view and the tip of the inferior superior articular process pointed towards the midline, 6:00 position of the above pedicle. A " "25 gauge 3.5 inch spinal needle was directed towards the "chin" of the "saadia dog" (adjacent to the pars interarticularis and inferior to the pedicle). The needle was advanced until OS was met at the inferior border of the pedicle / pars interface. The needle was adjusted so that it would pass inferior to the osseous border. The fluoroscope was then placed in the lateral position and the needle was slowly advanced until it rested in the posterior 1/3rd of the vertebral foramen. AP fluoroscopy was checked and the needle tip rested at the 6:00 position under the pedicle. No paresthesia was elicited during needle placement. With the needle tip in its final position, gentle aspiration was negative for blood and CSF. Omnipaque 240 (1 to 2 mL) was injected under live fluoroscopy. Microbore tubing was used for injection. There was no pain or paresthesia on injection. The contrast clearly delineated the targeted nerve root on AP fluoroscopy. No vascular uptake was seen. A solution containing 1 mL of 1% PF Lidocaine and 1 mL of Dexamethasone (10 mg/mL) was mixed and 2 mL was injected slowly at each level targeted. There was minimal resistance on injection. No pain or paresthesia was elicited on injection. The stylet was replaced and the needle was withdrawn intact. This procedure was performed for each of the above indicated levels.     Description of Findings: Not applicable    Prosthetic devices, grafts, tissues, or devices implanted: None    Specimen Removed: No    Estimated Blood Loss: minimal    COMPLICATIONS: None    DISPOSITION / PLANS: The patient was transferred to the recovery area in a stable condition for observation. The patient was reexamined prior to discharge. There was no evidence of acute neurologic injury following the procedure.  Patient was discharged from the recovery room after meeting discharge criteria. Home discharge instructions were given to the patient by the staff.    "

## 2019-04-26 NOTE — PLAN OF CARE
Problem: Adult Inpatient Plan of Care  Goal: Plan of Care Review  Outcome: Outcome(s) achieved Date Met: 04/26/19  Patient d/c home in stable condition via wheelchair with ride. Verbalized understanding of d/c instructions. Patient voiced no complaints at this time. Patient stood at side of bed, walked steps with no new motor deficits. Neurologically intact.

## 2019-04-26 NOTE — H&P
Chief Pain Complaint:  Low-back Pain     Interval History: Patient presents today for follow-up. She was last seen on 3-20-19 with Dr. Felix. At that visit, the plan was to order an updated MRI. She complains of low back and bilateral lower extremity pain that extends posteriorly. Historically, both legs were painful, but she reports today that it has been more so on left side. She does have bilateral foot numbness, worse on left.     Initial History of Present Illness:   This patient is a 79 y.o. female who presents today complaining of the above noted pain/s. The patient describes the pain as follows.  Ms. Parker is a new patient to clinic with complaints of low back pain which radiates into bilateral lower extremities.  She has been having these symptoms for several years and has undergone epidural steroid injections in 2017 which provided significant pain relief.  She reports that her symptoms returned approximately October of 2018 her located mostly in the bilateral S1 distribution.  She states that the left leg is worse than the right leg and she describes having numbness in her bilateral feet but denies weakness.  She reports that it does cause difficulty with walking and she spends much of her day walking for exercise.  She also performs physical therapy exercises at home 3 days per week for the last several years.  Today she rates her pain as 3/10 and describes a sensation like she has an Ace bandage wrapped around her legs..  She denies having bowel bladder difficulties.     Previous Therapy:  Medications:Lortab and Amitriptyline  Injections: Left L3/4 TF AYAAN with Dr. Gray on 9-15-10 with relief for almost 5 years; bilateral L5/S1 TF AYAAN on 9-1-17 with Dr. Barry with excellent relief for about 13 months  Surgeries: No Spinal surgeries  Physical Therapy: Completed in the Past           Past Surgical History:   Procedure Laterality Date    APPENDECTOMY   1985    CATARACT EXTRACTION Bilateral 6/11/15      Dr. Booth    CHOLECYSTECTOMY   2013    cryoablasion kidney Left 09/27/2016    EGD (ESOPHAGOGASTRODUODENOSCOPY) N/A 10/31/2013     Performed by Donald Cuello MD at [facility]    feet Bilateral       rheumatoid    FRACTURE SURGERY Right       tibia    FUNDOPLICATION, NISSEN, LAPAROSCOPIC N/A 12/18/2013     Performed by Galindo Vasquez MD at [facility]    HERNIA REPAIR        HYSTERECTOMY   1970     partial    JOINT REPLACEMENT         bilateral knees (2008), hands, wrists, knuckles, toes    LAPAROSCOPIC NISSEN FUNDOPLICATION        Renal Cryoablation N/A 9/27/2016     Performed by [facility] at [facility]         Imaging / Labs / Studies (reviewed on 4/9/2019):          Results for orders placed during the hospital encounter of 04/01/19   MRI Lumbar Spine Without Contrast     Narrative COMPARISON:  CT of the abdomen pelvis from 08/28/2018.  FINDINGS:  The osseous structures demonstrate nonspecific diminished signal intensity on T1 weighted imaging.  L1-L2: Facet ligamentum flavum hypertrophy with prominent right paracentral disc protrusion.  There is asymmetric marked right neural foraminal narrowing.  The minimum AP spinal canal diameter measurement is 11 mm.  L2-L3: Facet ligamentum flavum hypertrophy with posterior disc bulge.  There is effacement of the ventral thecal sac.  There is mild bilateral neural foraminal narrowing.  L3-L4: Facet ligamentum flavum hypertrophy with posterior disc bulge.  There is a small annular tear of the disc.  There is bilateral neural foraminal narrowing, left greater than right.  The minimum AP spinal canal diameter measurement is 11 mm.  L4-L5: Small annular tear of the disc.  There is facet hypertrophy and posterior disc osteophyte complex which results in marked narrowing of the spinal canal which demonstrates a minimum AP diameter of 5.5 mm.  Bilateral neural foraminal narrowing is seen which is greater on the right side.  L5-S1: There is facet hypertrophy  and posterior disc osteophyte complex.  There is narrowing at the inferior left lateral recess.  Bilateral renal cysts are partially visualized.  There are post interventional changes involving the lateral interpolar left kidney with adjacent scarring noted.  The tip of the conus medullaris terminates near the L1 level.  No fracture.  There is grade 1/2 anterolisthesis of L4 on L5.     Impression Multilevel discogenic degenerative changes of the lumbar spine with areas of spinal canal and neural foraminal encroachment as described above.  There is grade 1/2 anterolisthesis of L4 on L5.  Nonspecific diminished signal intensity within the osseous structures which can be seen with both benign and malignant marrow replacement processes.  Please correlate with history and lab values.  Other incidental findings as above.         8/22/17 LUMBAR MRI (from [facility], full report scanned into Media in EMR)  L1-L2: disc bulge, facet arthropathy,bilateral  bony NF narrowing greater on the right side, cannot exclude right L1 nerve impingement  L2-L3: disc bulge, facet arthropathy, bilateral bony NF narrowing with possible, but not definite, nerve impingements  L3-L4: disc bulge, facet arthropathy, bilateral bony NF narrowing greater on the left side, severe left lateral recess stenosis with possible left L3 nerve impingement  L4-L5: disc bulge, facet arthropathy, bilateral bony NF narrowing greater on the left side, severe left lateral recess stenosis with possible L4 nerve impingements  L5-S1: disc bulge, facet arthropathy, bilateral bony NF narrowing with possible, but not definite, nerve impingements             Results for orders placed in visit on 08/19/10   X-Ray Lumbar Spine Complete 5 View     Narrative RESULTS: THE BONES ARE DIFFUSELY DEMINERALIZED.  THERE IS MILD DEXTROSCOLIOSIS.  GRADE 2 ANTEROLISTHESIS OF APPROXIMATELY 1.1 CM IS IDENTIFIED AT L4-5.  MINIMAL GRADE 1 ANTEROLISTHESIS IS PRESENT  "AT L2-3.  THERE IS MULTILEVEL DEGENERATIVE VERTEBRAL END PLATE SPURRING, DISC SPACE NARROWING, AND FACET ARTHROPATHY.  THE PEDICLES APPEAR INTACT.  THERE IS NO VERTEBRAL COMPRESSION FRACTURE.            Review of Systems:  Review of Systems   Constitutional: Negative for fever.   Eyes: Negative for blurred vision.   Respiratory: Negative for cough and wheezing.    Cardiovascular: Negative for chest pain and orthopnea.   Gastrointestinal: Negative for constipation, diarrhea, nausea and vomiting.   Genitourinary: Negative for dysuria.   Musculoskeletal: Positive for back pain.        Bilateral lower extremity pain   Skin: Negative for itching and rash.   Endo/Heme/Allergies: Does not bruise/bleed easily.         Physical Exam:  Vitals:  BP (!) 152/87 (BP Location: Right arm, Patient Position: Sitting, BP Method: Medium (Automatic))   Pulse 87   Resp 18   Ht 5' 2" (1.575 m)   Wt 57.6 kg (127 lb)   BMI 23.23 kg/m²   (reviewed on 4/9/2019)     General: alert and oriented, in no apparent distress.  Gait: normal gait.  Skin: no rashes, no discoloration, no obvious lesions  HEENT: normocephalic, atraumatic. Pupils equal and round.  Cardiovascular: no significant peripheral edema present.  Respiratory: without use of accessory muscles of respiration.     Musculoskeletal - Lumbar Spine:  - Pain on flexion of lumbar spine: Present  - Pain on extension of lumbar spine: Present  - Lumbar facet loading: Absent   - TTP over the lumbar facet joints: Absent  - TTP over the lumbar paraspinals: Present  - TTP over the SI joints:  Absent   - TTP over GT bursa: Absent   - Straight Leg Raise: Equivocal on left  - DEE DEE: Negative     Neuro - Lower Extremities:  - RLE Strength:     >> 5/5 strength with right hip flexion/ extension    >> 5/5 strength with right knee flexion/ extension    >> 5/5 strength in right ankle with plantar and dorsiflexion  - LLE Strength:     >> 4/5 strength with left hip flexion/ extension    >> 5/5 strength " with knee flexion extension on the left     >> 5/5 strength in left ankle with plantar and dorsiflexion  - Extremity Reflexes: Brisk and symmetric throughout  - Sensory: Sensation to light touch decreased in dorsal foot on left, somewhat on right      Psych:  Mood and affect is appropriate           Assessment  1. 79 y.o. year old patient with PMH of        Past Medical History:   Diagnosis Date    Acid reflux      Anxiety      Back pain      Bronchitis, chronic obstructive w acute bronchitis 7/29/2016    Cancer       [facility] arms, face- Dr. Lata Tejada    Cataract       2+NS    Degenerative disc disease      Depression      Dry mouth      Hernia, hiatal 11/18/2013    Hypertension      Hypothyroid      Macrocytic anemia 5/3/2016    Macular degeneration      Migraines      Mixed anxiety and depressive disorder      Multiple fractures of ribs of right side      Osteoporosis      Pneumonia      Pneumonia due to other staphylococcus      Rheumatoid arthritis      Rheumatoid arthritis(714.0)      Rheumatoid arthritis(714.0)       Remicade, MTX.       presenting with pain located lumbar spine and bilateral lower extremities, mostly on left.  Diagnoses include:      ICD-10-CM ICD-9-CM   1. Lumbar radiculopathy M54.16 724.4   2. Spondylosis of lumbosacral region without myelopathy or radiculopathy M47.817 721.3   3. DDD (degenerative disc disease), lumbar M51.36 722.52   4. Lumbar foraminal stenosis M99.83 724.02      2. Pain Generators / Etiology: Lumbar Radiculopathy and Lumbar Spondylosis  3. Failed Meds (E- Effective, NE- Not Effective):  Amitriptyline-minimally effective, Norco-minimally effective  4. Physical Therapy - Currently Participating  5. Psychological comorbidities - None  6. Anticoagulants / Antiplatelets: None        Plan:  Will proceed with left L5/S1 (vs. S1) TF AYAAN Felix MD  Interventional Pain Medicine  [facility] [place]

## 2019-05-02 NOTE — PROGRESS NOTES
"Subjective:      Patient ID: Sarah Parker is a 79 y.o. female.    Chief Complaint: Follow-up      HPI  Here for follow up of medical problems.  Coughing constantly.  A little better after prednisone 50mg course, s/p 3d azithro 500mg.  Can't catch her breath.  No f/c/n/v/d/achiness.  Pain due to off RA med while sick.  More depression lately, cymbalta decreased to 20mg unknown when or why.  Lots stress and feels low.    Updated/ annual due 9/19:  HM: 9/18 fluvax, 5/16 [id number], 10/14 [id number], 10/13 TDaP, 1/17 BMD/will bring to Dr. Tejada rep 2y, 2/19 MMG, 10/13 EGD, 2015 Cscope Dr. Garcia rep 5y.     Review of Systems   Constitutional: Negative for chills, diaphoresis and fever.   Respiratory: Negative for cough and shortness of breath.    Cardiovascular: Negative for chest pain, palpitations and leg swelling.   Gastrointestinal: Negative for blood in stool, constipation, diarrhea, nausea and vomiting.   Genitourinary: Negative for dysuria, frequency and hematuria.   Psychiatric/Behavioral: The patient is not nervous/anxious.          Objective:   /78 (BP Location: Right arm, Patient Position: Sitting, BP Method: Medium (Manual))   Pulse 80   Temp 98.3 °F (36.8 °C) (Oral)   Ht 5' 2" (1.575 m)   Wt 57.5 kg (126 lb 12.2 oz)   SpO2 98%   BMI 23.19 kg/m²     Physical Exam   Constitutional: She is oriented to person, place, and time. She appears well-developed.   HENT:   Mouth/Throat: Oropharynx is clear and moist.   Neck: Neck supple. Carotid bruit is not present. No thyroid mass present.   Cardiovascular: Normal rate, regular rhythm and intact distal pulses. Exam reveals no gallop and no friction rub.   No murmur heard.  Pulmonary/Chest: Effort normal. She has wheezes. She has rales.   Abdominal: Soft. Bowel sounds are normal. She exhibits no mass. There is no hepatosplenomegaly. There is no tenderness.   Musculoskeletal: She exhibits no edema.   Lymphadenopathy:     She has no cervical adenopathy. "   Neurological: She is alert and oriented to person, place, and time.   Psychiatric: She has a normal mood and affect.           Assessment:       1. COPD exacerbation    2. Cough    3. Multiple lung nodules on CT    4. Essential hypertension    5. Rheumatoid lung    6. Rheumatoid arthritis involving right shoulder with positive rheumatoid factor    7. Acquired hypothyroidism    8. Calcified granuloma of lung    9. Mild major depression    10. Depression, recurrent          Plan:     COPD exacerbation,Cough x 2 weeks, Multiple lung nodules on CT, Rheumatoid lung-  -     azithromycin (ZITHROMAX) 500 MG tablet; Take 1 tablet (500 mg total) by mouth once daily. for 5 days  Dispense: 5 tablet; Refill: 0  -     X-Ray Chest PA And Lateral; Future; Expected date: 05/16/2019  -     methylPREDNISolone acetate injection 60 mg  -     hydrocodone-chlorpheniramine (TUSSIONEX) 10-8 mg/5 mL suspension; Take 5 mLs by mouth every 12 (twelve) hours as needed for Cough.  Dispense: 115 mL; Refill: 0    Essential hypertension- stable, cont rx.    Rheumatoid arthritis involving right shoulder with positive rheumatoid factor    Acquired hypothyroidism    Calcified granuloma of lung- no f/u needed.    Depression, recurrent- increase to 40mg.  RTC 2mo.  -     DULoxetine (CYMBALTA) 20 MG capsule; Take 2 capsules (40 mg total) by mouth once daily.  Dispense: 180 capsule; Refill: 3

## 2019-05-05 DIAGNOSIS — F32.0 MILD MAJOR DEPRESSION: ICD-10-CM

## 2019-05-06 RX ORDER — DULOXETIN HYDROCHLORIDE 20 MG/1
CAPSULE, DELAYED RELEASE ORAL
Qty: 30 CAPSULE | Refills: 11 | Status: SHIPPED | OUTPATIENT
Start: 2019-05-06 | End: 2019-05-16 | Stop reason: SDUPTHER

## 2019-05-07 ENCOUNTER — OFFICE VISIT (OUTPATIENT)
Dept: INTERNAL MEDICINE | Facility: CLINIC | Age: 79
End: 2019-05-07
Payer: MEDICARE

## 2019-05-07 VITALS
DIASTOLIC BLOOD PRESSURE: 82 MMHG | SYSTOLIC BLOOD PRESSURE: 152 MMHG | HEIGHT: 62 IN | BODY MASS INDEX: 23.32 KG/M2 | WEIGHT: 126.75 LBS | TEMPERATURE: 99 F

## 2019-05-07 DIAGNOSIS — J44.1 COPD WITH ACUTE EXACERBATION: Primary | ICD-10-CM

## 2019-05-07 PROCEDURE — 3077F SYST BP >= 140 MM HG: CPT | Mod: HCNC,CPTII,S$GLB, | Performed by: FAMILY MEDICINE

## 2019-05-07 PROCEDURE — 3079F PR MOST RECENT DIASTOLIC BLOOD PRESSURE 80-89 MM HG: ICD-10-PCS | Mod: HCNC,CPTII,S$GLB, | Performed by: FAMILY MEDICINE

## 2019-05-07 PROCEDURE — 99214 OFFICE O/P EST MOD 30 MIN: CPT | Mod: HCNC,S$GLB,, | Performed by: FAMILY MEDICINE

## 2019-05-07 PROCEDURE — 99999 PR PBB SHADOW E&M-EST. PATIENT-LVL III: ICD-10-PCS | Mod: PBBFAC,HCNC,, | Performed by: FAMILY MEDICINE

## 2019-05-07 PROCEDURE — 3077F PR MOST RECENT SYSTOLIC BLOOD PRESSURE >= 140 MM HG: ICD-10-PCS | Mod: HCNC,CPTII,S$GLB, | Performed by: FAMILY MEDICINE

## 2019-05-07 PROCEDURE — 1101F PR PT FALLS ASSESS DOC 0-1 FALLS W/OUT INJ PAST YR: ICD-10-PCS | Mod: HCNC,CPTII,S$GLB, | Performed by: FAMILY MEDICINE

## 2019-05-07 PROCEDURE — 99999 PR PBB SHADOW E&M-EST. PATIENT-LVL III: CPT | Mod: PBBFAC,HCNC,, | Performed by: FAMILY MEDICINE

## 2019-05-07 PROCEDURE — 1101F PT FALLS ASSESS-DOCD LE1/YR: CPT | Mod: HCNC,CPTII,S$GLB, | Performed by: FAMILY MEDICINE

## 2019-05-07 PROCEDURE — 3079F DIAST BP 80-89 MM HG: CPT | Mod: HCNC,CPTII,S$GLB, | Performed by: FAMILY MEDICINE

## 2019-05-07 PROCEDURE — 99214 PR OFFICE/OUTPT VISIT, EST, LEVL IV, 30-39 MIN: ICD-10-PCS | Mod: HCNC,S$GLB,, | Performed by: FAMILY MEDICINE

## 2019-05-07 RX ORDER — AZITHROMYCIN 500 MG/1
500 TABLET, FILM COATED ORAL DAILY
Qty: 3 TABLET | Refills: 0 | Status: SHIPPED | OUTPATIENT
Start: 2019-05-07 | End: 2019-05-10

## 2019-05-07 RX ORDER — PREDNISONE 50 MG/1
50 TABLET ORAL DAILY
Qty: 5 TABLET | Refills: 0 | Status: SHIPPED | OUTPATIENT
Start: 2019-05-07 | End: 2019-05-12

## 2019-05-07 RX ORDER — ALBUTEROL SULFATE 0.83 MG/ML
2.5 SOLUTION RESPIRATORY (INHALATION) EVERY 6 HOURS PRN
Qty: 1 BOX | Refills: 5 | Status: SHIPPED | OUTPATIENT
Start: 2019-05-07 | End: 2020-08-14

## 2019-05-07 RX ORDER — BENZONATATE 200 MG/1
200 CAPSULE ORAL 3 TIMES DAILY PRN
Qty: 21 CAPSULE | Refills: 0 | Status: SHIPPED | OUTPATIENT
Start: 2019-05-07 | End: 2019-08-19

## 2019-05-07 NOTE — PROGRESS NOTES
Subjective:   Patient ID: Sarah Parker is a 79 y.o. female.  Chief Complaint:  Cough and Chest Congestion      PCP Dr. Reyes.    Presents for evaluation of exacerbation of chronic bronchitis with increased cough, congestion, shortness of breath, and wheezing.    Diagnosis COPD/chronic bronchitis.  No maintenance medications.  Albuterol as needed.  Out of medication for nebulizer.    Last antibiotic use was doxycycline which caused significant nausea and vomiting, so just continue Keflex for UTI.  Symptoms improved.    Last bronchitis/pneumonia treatment appears to be September 2018 with azithromycin.    Last oral steroid use March 2019 related to chronic back pain.  Symptoms started 2 weeks ago with sore throat and sinus issues.  Those resolved.  Settled into chest.    2-3 day history of above symptoms getting rapidly worse.  Patient did have flu vaccine this season.    No known flu or strep contact.    Cough   This is a recurrent problem. Episode onset: 3-5 days. The problem has been gradually worsening. The problem occurs every few minutes. The cough is productive of sputum. Associated symptoms include myalgias, nasal congestion, postnasal drip, rhinorrhea, shortness of breath, weight loss and wheezing. Pertinent negatives include no chest pain, chills, ear congestion, ear pain, fever, headaches, heartburn, hemoptysis, rash, sore throat or sweats. Nothing aggravates the symptoms. Risk factors for lung disease include smoking/tobacco exposure. She has tried nothing for the symptoms. Her past medical history is significant for asthma, bronchitis, COPD and pneumonia.   Shortness of Breath   This is a recurrent problem. Episode onset: 3-5 days. The problem occurs intermittently. The problem has been waxing and waning. Associated symptoms include rhinorrhea, sputum production and wheezing. Pertinent negatives include no abdominal pain, chest pain, claudication, coryza, ear pain, fever, headaches, hemoptysis,  "leg pain, leg swelling, neck pain, orthopnea, PND, rash, sore throat, swollen glands, syncope or vomiting. The symptoms are aggravated by URIs. She has tried nothing for the symptoms. Her past medical history is significant for asthma, COPD and pneumonia.         Review of Systems   Constitutional: Positive for weight loss. Negative for chills, fatigue and fever.   HENT: Positive for congestion, postnasal drip and rhinorrhea. Negative for dental problem, ear discharge, ear pain, sinus pressure, sinus pain, sneezing, sore throat and trouble swallowing.    Eyes: Negative.    Respiratory: Positive for cough, sputum production, shortness of breath and wheezing. Negative for apnea, hemoptysis, choking, chest tightness and stridor.    Cardiovascular: Negative for chest pain, orthopnea, claudication, leg swelling, syncope and PND.   Gastrointestinal: Negative for abdominal pain, diarrhea, heartburn, nausea and vomiting.   Musculoskeletal: Positive for myalgias. Negative for neck pain.   Skin: Negative for rash.   Neurological: Negative for headaches.     Objective:   BP (!) 152/82 (BP Location: Left arm, Patient Position: Sitting, BP Method: Small (Manual))   Temp 98.9 °F (37.2 °C) (Tympanic)   Ht 5' 2" (1.575 m)   Wt 57.5 kg (126 lb 12.2 oz)   BMI 23.19 kg/m²     Physical Exam   Constitutional: She appears well-developed and well-nourished.  Non-toxic appearance. She does not have a sickly appearance. She appears ill. No distress.    Blood pressure elevated   HENT:   Right Ear: Hearing, tympanic membrane, external ear and ear canal normal.   Left Ear: Hearing, tympanic membrane, external ear and ear canal normal.   Nose: Nose normal. Right sinus exhibits no maxillary sinus tenderness and no frontal sinus tenderness. Left sinus exhibits no maxillary sinus tenderness and no frontal sinus tenderness.   Mouth/Throat: Uvula is midline, oropharynx is clear and moist and mucous membranes are normal.   Eyes: Conjunctivae are " normal. Right conjunctiva is not injected. Left conjunctiva is not injected.   Neck: No JVD present.   Cardiovascular: Normal rate, regular rhythm and normal heart sounds.   Pulmonary/Chest: Effort normal. No accessory muscle usage. No tachypnea. No respiratory distress. She has no decreased breath sounds. She has wheezes in the right upper field, the right middle field, the right lower field, the left upper field, the left middle field and the left lower field. She has rhonchi. She has no rales. She exhibits no tenderness.   Abdominal: Soft. She exhibits no distension. There is no tenderness.   Musculoskeletal: She exhibits no edema.   Lymphadenopathy:     She has no cervical adenopathy.   Skin: No rash noted.   Psychiatric: She has a normal mood and affect.   Nursing note and vitals reviewed.    Assessment:       ICD-10-CM ICD-9-CM   1. COPD with acute exacerbation J44.1 491.21     Plan:   COPD with acute exacerbation  -     albuterol (PROVENTIL) 2.5 mg /3 mL (0.083 %) nebulizer solution; Take 3 mLs (2.5 mg total) by nebulization every 6 (six) hours as needed for Wheezing.  Dispense: 1 Box; Refill: 5  -     benzonatate (TESSALON) 200 MG capsule; Take 1 capsule (200 mg total) by mouth 3 (three) times daily as needed for Cough.  Dispense: 21 capsule; Refill: 0  -     predniSONE (DELTASONE) 50 MG Tab; Take 1 tablet (50 mg total) by mouth once daily. for 5 days  Dispense: 5 tablet; Refill: 0  -     azithromycin (ZITHROMAX) 500 MG tablet; Take 1 tablet (500 mg total) by mouth once daily. for 3 days  Dispense: 3 tablet; Refill: 0      Azithromycin for possible bacterial cause of COPD exacerbation.  Patient unable to tolerate doxycycline.    Refill albuterol as needed for shortness of breath or wheezing.    Tessalon as needed for cough  Short course of prednisone based on significant amount of wheezing during today's exam.    Follow-up Dr. Reyes 2 weeks as scheduled  Return to clinic sooner if no significant  improvement with above treatment.

## 2019-05-16 ENCOUNTER — TELEPHONE (OUTPATIENT)
Dept: FAMILY MEDICINE | Facility: CLINIC | Age: 79
End: 2019-05-16

## 2019-05-16 ENCOUNTER — OFFICE VISIT (OUTPATIENT)
Dept: FAMILY MEDICINE | Facility: CLINIC | Age: 79
End: 2019-05-16
Payer: MEDICARE

## 2019-05-16 ENCOUNTER — HOSPITAL ENCOUNTER (OUTPATIENT)
Dept: RADIOLOGY | Facility: HOSPITAL | Age: 79
Discharge: HOME | End: 2019-05-16
Attending: INTERNAL MEDICINE
Payer: MEDICARE

## 2019-05-16 VITALS
DIASTOLIC BLOOD PRESSURE: 78 MMHG | OXYGEN SATURATION: 98 % | HEIGHT: 62 IN | SYSTOLIC BLOOD PRESSURE: 118 MMHG | WEIGHT: 126.75 LBS | TEMPERATURE: 98 F | HEART RATE: 80 BPM | BODY MASS INDEX: 23.32 KG/M2

## 2019-05-16 DIAGNOSIS — J84.10 CALCIFIED GRANULOMA OF LUNG: ICD-10-CM

## 2019-05-16 DIAGNOSIS — E03.9 ACQUIRED HYPOTHYROIDISM: ICD-10-CM

## 2019-05-16 DIAGNOSIS — J44.1 COPD EXACERBATION: ICD-10-CM

## 2019-05-16 DIAGNOSIS — R05.9 COUGH: ICD-10-CM

## 2019-05-16 DIAGNOSIS — I10 ESSENTIAL HYPERTENSION: Chronic | ICD-10-CM

## 2019-05-16 DIAGNOSIS — J44.1 COPD EXACERBATION: Primary | ICD-10-CM

## 2019-05-16 DIAGNOSIS — M05.711 RHEUMATOID ARTHRITIS INVOLVING RIGHT SHOULDER WITH POSITIVE RHEUMATOID FACTOR: Chronic | ICD-10-CM

## 2019-05-16 DIAGNOSIS — R91.8 MULTIPLE LUNG NODULES ON CT: ICD-10-CM

## 2019-05-16 DIAGNOSIS — F33.9 DEPRESSION, RECURRENT: ICD-10-CM

## 2019-05-16 DIAGNOSIS — F32.0 MILD MAJOR DEPRESSION: ICD-10-CM

## 2019-05-16 DIAGNOSIS — M05.10 RHEUMATOID LUNG: ICD-10-CM

## 2019-05-16 PROCEDURE — 71046 XR CHEST PA AND LATERAL: ICD-10-PCS | Mod: 26,HCNC,, | Performed by: RADIOLOGY

## 2019-05-16 PROCEDURE — 71046 X-RAY EXAM CHEST 2 VIEWS: CPT | Mod: TC,HCNC,FY,PO

## 2019-05-16 PROCEDURE — 99999 PR PBB SHADOW E&M-EST. PATIENT-LVL V: ICD-10-PCS | Mod: PBBFAC,HCNC,, | Performed by: INTERNAL MEDICINE

## 2019-05-16 PROCEDURE — 99499 RISK ADDL DX/OHS AUDIT: ICD-10-PCS | Mod: HCNC,S$GLB,, | Performed by: INTERNAL MEDICINE

## 2019-05-16 PROCEDURE — 1101F PT FALLS ASSESS-DOCD LE1/YR: CPT | Mod: HCNC,CPTII,S$GLB, | Performed by: INTERNAL MEDICINE

## 2019-05-16 PROCEDURE — 99499 UNLISTED E&M SERVICE: CPT | Mod: HCNC,S$GLB,, | Performed by: INTERNAL MEDICINE

## 2019-05-16 PROCEDURE — 99214 OFFICE O/P EST MOD 30 MIN: CPT | Mod: 25,HCNC,S$GLB, | Performed by: INTERNAL MEDICINE

## 2019-05-16 PROCEDURE — 3078F PR MOST RECENT DIASTOLIC BLOOD PRESSURE < 80 MM HG: ICD-10-PCS | Mod: HCNC,CPTII,S$GLB, | Performed by: INTERNAL MEDICINE

## 2019-05-16 PROCEDURE — 1101F PR PT FALLS ASSESS DOC 0-1 FALLS W/OUT INJ PAST YR: ICD-10-PCS | Mod: HCNC,CPTII,S$GLB, | Performed by: INTERNAL MEDICINE

## 2019-05-16 PROCEDURE — 99999 PR PBB SHADOW E&M-EST. PATIENT-LVL V: CPT | Mod: PBBFAC,HCNC,, | Performed by: INTERNAL MEDICINE

## 2019-05-16 PROCEDURE — 3074F SYST BP LT 130 MM HG: CPT | Mod: HCNC,CPTII,S$GLB, | Performed by: INTERNAL MEDICINE

## 2019-05-16 PROCEDURE — 71046 X-RAY EXAM CHEST 2 VIEWS: CPT | Mod: 26,HCNC,, | Performed by: RADIOLOGY

## 2019-05-16 PROCEDURE — 3074F PR MOST RECENT SYSTOLIC BLOOD PRESSURE < 130 MM HG: ICD-10-PCS | Mod: HCNC,CPTII,S$GLB, | Performed by: INTERNAL MEDICINE

## 2019-05-16 PROCEDURE — 99214 PR OFFICE/OUTPT VISIT, EST, LEVL IV, 30-39 MIN: ICD-10-PCS | Mod: 25,HCNC,S$GLB, | Performed by: INTERNAL MEDICINE

## 2019-05-16 PROCEDURE — 3078F DIAST BP <80 MM HG: CPT | Mod: HCNC,CPTII,S$GLB, | Performed by: INTERNAL MEDICINE

## 2019-05-16 RX ORDER — HYDROCODONE POLISTIREX AND CHLORPHENIRAMINE POLISTIREX 10; 8 MG/5ML; MG/5ML
5 SUSPENSION, EXTENDED RELEASE ORAL EVERY 12 HOURS PRN
Qty: 115 ML | Refills: 0 | Status: SHIPPED | OUTPATIENT
Start: 2019-05-16 | End: 2019-05-26

## 2019-05-16 RX ORDER — DULOXETIN HYDROCHLORIDE 20 MG/1
40 CAPSULE, DELAYED RELEASE ORAL DAILY
Qty: 180 CAPSULE | Refills: 3 | Status: SHIPPED | OUTPATIENT
Start: 2019-05-16 | End: 2020-06-07 | Stop reason: SDUPTHER

## 2019-05-16 RX ORDER — METHYLPREDNISOLONE ACETATE 80 MG/ML
60 INJECTION, SUSPENSION INTRA-ARTICULAR; INTRALESIONAL; INTRAMUSCULAR; SOFT TISSUE
Status: DISCONTINUED | OUTPATIENT
Start: 2019-05-16 | End: 2019-08-26 | Stop reason: HOSPADM

## 2019-05-16 RX ORDER — AZITHROMYCIN 500 MG/1
500 TABLET, FILM COATED ORAL DAILY
Qty: 5 TABLET | Refills: 0 | Status: SHIPPED | OUTPATIENT
Start: 2019-05-16 | End: 2019-05-21

## 2019-05-16 NOTE — TELEPHONE ENCOUNTER
----- Message from Briseida Reyes MD sent at 5/16/2019 12:22 PM CDT -----  Please inform pt no pneumonia, take meds as discussed today.  SM

## 2019-05-17 ENCOUNTER — TELEPHONE (OUTPATIENT)
Dept: FAMILY MEDICINE | Facility: CLINIC | Age: 79
End: 2019-05-17

## 2019-05-17 NOTE — TELEPHONE ENCOUNTER
----- Message from Alexis Chacon sent at 5/17/2019 10:14 AM CDT -----  Contact: Pt  Type:  Test Results    Who Called: Pt  Name of Test (Lab/Mammo/Etc): x-ray  Date of Test: 05/16/19  Ordering Provider: layo  Where the test was performed: [facility]  Would the patient rather a call back or a response via [facility]? Call back   Best Call Back Number:  [phone] ([place])   Additional Information: n/a

## 2019-05-22 ENCOUNTER — HOSPITAL ENCOUNTER (OUTPATIENT)
Dept: RADIOLOGY | Facility: HOSPITAL | Age: 79
Discharge: HOME | End: 2019-05-22
Attending: PHYSICIAN ASSISTANT
Payer: MEDICARE

## 2019-05-22 ENCOUNTER — OFFICE VISIT (OUTPATIENT)
Dept: PAIN MEDICINE | Facility: CLINIC | Age: 79
End: 2019-05-22
Payer: MEDICARE

## 2019-05-22 VITALS
DIASTOLIC BLOOD PRESSURE: 91 MMHG | WEIGHT: 126 LBS | HEART RATE: 83 BPM | RESPIRATION RATE: 18 BRPM | HEIGHT: 62 IN | BODY MASS INDEX: 23.19 KG/M2 | SYSTOLIC BLOOD PRESSURE: 170 MMHG

## 2019-05-22 DIAGNOSIS — M54.16 LUMBAR RADICULOPATHY: Primary | ICD-10-CM

## 2019-05-22 DIAGNOSIS — M54.16 BILATERAL LUMBAR RADICULOPATHY: ICD-10-CM

## 2019-05-22 DIAGNOSIS — M51.36 DDD (DEGENERATIVE DISC DISEASE), LUMBAR: ICD-10-CM

## 2019-05-22 DIAGNOSIS — M47.817 SPONDYLOSIS OF LUMBOSACRAL REGION WITHOUT MYELOPATHY OR RADICULOPATHY: ICD-10-CM

## 2019-05-22 DIAGNOSIS — M79.671 RIGHT FOOT PAIN: ICD-10-CM

## 2019-05-22 DIAGNOSIS — M48.061 LUMBAR FORAMINAL STENOSIS: ICD-10-CM

## 2019-05-22 PROCEDURE — 99214 OFFICE O/P EST MOD 30 MIN: CPT | Mod: HCNC,S$GLB,, | Performed by: PHYSICIAN ASSISTANT

## 2019-05-22 PROCEDURE — 99999 PR PBB SHADOW E&M-EST. PATIENT-LVL V: ICD-10-PCS | Mod: PBBFAC,HCNC,, | Performed by: PHYSICIAN ASSISTANT

## 2019-05-22 PROCEDURE — 1101F PR PT FALLS ASSESS DOC 0-1 FALLS W/OUT INJ PAST YR: ICD-10-PCS | Mod: HCNC,CPTII,S$GLB, | Performed by: PHYSICIAN ASSISTANT

## 2019-05-22 PROCEDURE — 73630 X-RAY EXAM OF FOOT: CPT | Mod: 26,HCNC,RT, | Performed by: RADIOLOGY

## 2019-05-22 PROCEDURE — 73630 XR FOOT COMPLETE 3 VIEW RIGHT: ICD-10-PCS | Mod: 26,HCNC,RT, | Performed by: RADIOLOGY

## 2019-05-22 PROCEDURE — 1101F PT FALLS ASSESS-DOCD LE1/YR: CPT | Mod: HCNC,CPTII,S$GLB, | Performed by: PHYSICIAN ASSISTANT

## 2019-05-22 PROCEDURE — 73630 X-RAY EXAM OF FOOT: CPT | Mod: TC,HCNC,RT

## 2019-05-22 PROCEDURE — 99214 PR OFFICE/OUTPT VISIT, EST, LEVL IV, 30-39 MIN: ICD-10-PCS | Mod: HCNC,S$GLB,, | Performed by: PHYSICIAN ASSISTANT

## 2019-05-22 PROCEDURE — 3077F SYST BP >= 140 MM HG: CPT | Mod: HCNC,CPTII,S$GLB, | Performed by: PHYSICIAN ASSISTANT

## 2019-05-22 PROCEDURE — 99999 PR PBB SHADOW E&M-EST. PATIENT-LVL V: CPT | Mod: PBBFAC,HCNC,, | Performed by: PHYSICIAN ASSISTANT

## 2019-05-22 PROCEDURE — 3077F PR MOST RECENT SYSTOLIC BLOOD PRESSURE >= 140 MM HG: ICD-10-PCS | Mod: HCNC,CPTII,S$GLB, | Performed by: PHYSICIAN ASSISTANT

## 2019-05-22 PROCEDURE — 3080F DIAST BP >= 90 MM HG: CPT | Mod: HCNC,CPTII,S$GLB, | Performed by: PHYSICIAN ASSISTANT

## 2019-05-22 PROCEDURE — 3080F PR MOST RECENT DIASTOLIC BLOOD PRESSURE >= 90 MM HG: ICD-10-PCS | Mod: HCNC,CPTII,S$GLB, | Performed by: PHYSICIAN ASSISTANT

## 2019-05-22 NOTE — PROGRESS NOTES
Chief Pain Complaint:  Low back with radiation into BLE (mostly on left)    Interval History: Patient was seen on 4/26/19. At that time she underwent left L5/S1 TF AYAAN.  The patient reports that she is/was better following the procedure.  she reports 90% pain relief.  The changes lasted 2-3 weeks.  The changes have not continued through this visit. She feels the pain is slowly returning, although it is not as bad yet as prior to the injection.    Interval History: Patient presents today for follow-up. She was last seen on 3-20-19 with Dr. Felix. At that visit, the plan was to order an updated MRI. She complains of low back and bilateral lower extremity pain that extends posteriorly. Historically, both legs were painful, but she reports today that it has been more so on left side. She does have bilateral foot numbness, worse on left.    Initial History of Present Illness:   This patient is a 79 y.o. female who presents today complaining of the above noted pain/s. The patient describes the pain as follows.  Ms. Parker is a new patient to clinic with complaints of low back pain which radiates into bilateral lower extremities.  She has been having these symptoms for several years and has undergone epidural steroid injections in 2017 which provided significant pain relief.  She reports that her symptoms returned approximately October of 2018 her located mostly in the bilateral S1 distribution.  She states that the left leg is worse than the right leg and she describes having numbness in her bilateral feet but denies weakness.  She reports that it does cause difficulty with walking and she spends much of her day walking for exercise.  She also performs physical therapy exercises at home 3 days per week for the last several years.  Today she rates her pain as 3/10 and describes a sensation like she has an Ace bandage wrapped around her legs..  She denies having bowel bladder difficulties.    Previous  Therapy:  Medications:Lortab and Amitriptyline  Injections:    - Left L3/4 TF AYAAN with Dr. Gray on 9-15-10 with relief for almost 5 years &  bilateral L5/S1 TF AYAAN on 9-1-17 with Dr. Barry with excellent relief for about 13 months   - left L5/S1 TF AYAAN on 4/26/19 with 90% pain relief for 2-3 weeks  Surgeries: No Spinal surgeries  Physical Therapy: Completed in the Past    Past Surgical History:   Procedure Laterality Date    APPENDECTOMY  1985    CATARACT EXTRACTION Bilateral 6/11/15    Dr. Booth    CHOLECYSTECTOMY  2013    cryoablasion kidney Left 09/27/2016    EGD (ESOPHAGOGASTRODUODENOSCOPY) N/A 10/31/2013    Performed by Donald Cuello MD at [facility]    feet Bilateral     rheumatoid    FRACTURE SURGERY Right     tibia    FUNDOPLICATION, NISSEN, LAPAROSCOPIC N/A 12/18/2013    Performed by Galindo Vasquez MD at [facility] OR    HERNIA REPAIR      HYSTERECTOMY  1970    partial    JOINT REPLACEMENT      bilateral knees (2008), hands, wrists, knuckles, toes    LAPAROSCOPIC NISSEN FUNDOPLICATION      Renal Cryoablation N/A 9/27/2016    Performed by [facility] Diagnostic Provider at [facility]       Imaging / Labs / Studies (reviewed on 5/22/2019):    Results for orders placed during the hospital encounter of 04/01/19   MRI Lumbar Spine Without Contrast    Narrative COMPARISON:  CT of the abdomen pelvis from 08/28/2018.  FINDINGS:  The osseous structures demonstrate nonspecific diminished signal intensity on T1 weighted imaging.  L1-L2: Facet ligamentum flavum hypertrophy with prominent right paracentral disc protrusion.  There is asymmetric marked right neural foraminal narrowing.  The minimum AP spinal canal diameter measurement is 11 mm.  L2-L3: Facet ligamentum flavum hypertrophy with posterior disc bulge.  There is effacement of the ventral thecal sac.  There is mild bilateral neural foraminal narrowing.  L3-L4: Facet ligamentum flavum hypertrophy with posterior disc bulge.  There is a small annular tear of  the disc.  There is bilateral neural foraminal narrowing, left greater than right.  The minimum AP spinal canal diameter measurement is 11 mm.  L4-L5: Small annular tear of the disc.  There is facet hypertrophy and posterior disc osteophyte complex which results in marked narrowing of the spinal canal which demonstrates a minimum AP diameter of 5.5 mm.  Bilateral neural foraminal narrowing is seen which is greater on the right side.  L5-S1: There is facet hypertrophy and posterior disc osteophyte complex.  There is narrowing at the inferior left lateral recess.  Bilateral renal cysts are partially visualized.  There are post interventional changes involving the lateral interpolar left kidney with adjacent scarring noted.  The tip of the conus medullaris terminates near the L1 level.  No fracture.  There is grade 1/2 anterolisthesis of L4 on L5.    Impression Multilevel discogenic degenerative changes of the lumbar spine with areas of spinal canal and neural foraminal encroachment as described above.  There is grade 1/2 anterolisthesis of L4 on L5.  Nonspecific diminished signal intensity within the osseous structures which can be seen with both benign and malignant marrow replacement processes.  Please correlate with history and lab values.  Other incidental findings as above.        8/22/17 LUMBAR MRI (from [facility], full report scanned into Media in EMR)  L1-L2: disc bulge, facet arthropathy,bilateral  bony NF narrowing greater on the right side, cannot exclude right L1 nerve impingement  L2-L3: disc bulge, facet arthropathy, bilateral bony NF narrowing with possible, but not definite, nerve impingements  L3-L4: disc bulge, facet arthropathy, bilateral bony NF narrowing greater on the left side, severe left lateral recess stenosis with possible left L3 nerve impingement  L4-L5: disc bulge, facet arthropathy, bilateral bony NF narrowing greater on the left side, severe left lateral recess  "stenosis with possible L4 nerve impingements  L5-S1: disc bulge, facet arthropathy, bilateral bony NF narrowing with possible, but not definite, nerve impingements      Results for orders placed in visit on 08/19/10   X-Ray Lumbar Spine Complete 5 View    Narrative RESULTS: THE BONES ARE DIFFUSELY DEMINERALIZED.  THERE IS MILD DEXTROSCOLIOSIS.  GRADE 2 ANTEROLISTHESIS OF APPROXIMATELY 1.1 CM IS IDENTIFIED AT L4-5.  MINIMAL GRADE 1 ANTEROLISTHESIS IS PRESENT AT L2-3.  THERE IS MULTILEVEL DEGENERATIVE VERTEBRAL END PLATE SPURRING, DISC SPACE NARROWING, AND FACET ARTHROPATHY.  THE PEDICLES APPEAR INTACT.  THERE IS NO VERTEBRAL COMPRESSION FRACTURE.         Review of Systems:  Review of Systems   Constitutional: Negative for fever.   Eyes: Negative for blurred vision.   Respiratory: Negative for cough and wheezing.    Cardiovascular: Negative for chest pain and orthopnea.   Gastrointestinal: Negative for constipation, diarrhea, nausea and vomiting.   Genitourinary: Negative for dysuria.   Musculoskeletal: Positive for back pain.        Bilateral lower extremity pain   Skin: Negative for itching and rash.   Endo/Heme/Allergies: Does not bruise/bleed easily.       Physical Exam:  Vitals:  BP (!) 170/91 (BP Location: Right arm, Patient Position: Sitting, BP Method: Medium (Automatic))   Pulse 83   Resp 18   Ht 5' 2" (1.575 m)   Wt 57.2 kg (126 lb)   BMI 23.05 kg/m²   (reviewed on 5/22/2019)    General: alert and oriented, in no apparent distress.  Gait: normal gait.  Skin: no rashes, no discoloration, no obvious lesions  HEENT: normocephalic, atraumatic. Pupils equal and round.  Cardiovascular: no significant peripheral edema present.  Respiratory: without use of accessory muscles of respiration.    Musculoskeletal - Lumbar Spine:  - Pain on flexion of lumbar spine: Present  - Pain on extension of lumbar spine: Present  - Lumbar facet loading: Absent   - TTP over the lumbar paraspinals: Present  - TTP over the SI " joints:  Absent   - TTP over GT bursa: Absent   - Straight Leg Raise: Equivocal on left  - DEE DEE: Negative    Neuro - Lower Extremities:  - RLE Strength:     >> 5/5 strength with right hip flexion/ extension    >> 5/5 strength with right knee flexion/ extension    >> 5/5 strength in right ankle with plantar and dorsiflexion  - LLE Strength:     >> 4/5 strength with left hip flexion/ extension    >> 5/5 strength with knee flexion extension on the left     >> 5/5 strength in left ankle with plantar and dorsiflexion  - Extremity Reflexes: Brisk and symmetric throughout  - Sensory: Sensation to light touch decreased in dorsal foot on left, somewhat on right      Psych:  Mood and affect is appropriate        Assessment  1. 79 y.o. year old patient with PMH of   Past Medical History:   Diagnosis Date    Acid reflux     Anxiety     Back pain     Bronchitis, chronic obstructive w acute bronchitis 7/29/2016    Cancer     [facility] arms, face- Dr. Lata Tejada    Cataract     2+NS    Degenerative disc disease     Depression     Dry mouth     Hernia, hiatal 11/18/2013    Hypertension     Hypothyroid     Macrocytic anemia 5/3/2016    Macular degeneration     Migraines     Mixed anxiety and depressive disorder     Multiple fractures of ribs of right side     Osteoporosis     Pneumonia     Pneumonia due to other staphylococcus     Rheumatoid arthritis     Rheumatoid arthritis(714.0)     Rheumatoid arthritis(714.0)     Remicade, MTX.      presenting with pain located lumbar spine and bilateral lower extremities, mostly on left.  Diagnoses include:    ICD-10-CM ICD-9-CM   1. Lumbar radiculopathy M54.16 724.4   2. Spondylosis of lumbosacral region without myelopathy or radiculopathy M47.817 721.3   3. DDD (degenerative disc disease), lumbar M51.36 722.52   4. Lumbar foraminal stenosis M99.83 724.02   5. Bilateral lumbar radiculopathy M54.16 724.4   6. Right foot pain M79.671 729.5      2. Pain Generators /  Etiology: Lumbar Radiculopathy and Lumbar Spondylosis. Lumbar MRI shows multilevel foraminal stenosis with severe spinal stenosis at L4/5.   3. Failed Meds (E- Effective, NE- Not Effective):  Amitriptyline-minimally effective, Norco-minimally effective  4. Physical Therapy - Currently Participating  5. Psychological comorbidities - None  6. Anticoagulants / Antiplatelets: None       Plan:  1. Interventional:   - S/p left L5/S1 TF AYAAN on 4/26/19 with 90% pain relief for 2-3 weeks. Pain is starting to return.  - Schedule repeat left L5/S1 TF AYAAN. Patient is not taking prescription blood thinners or ASA.     2. Pharmacologic: Continue gabapentin 300mg QHS.    3. Rehabilitative: Patient performs physical therapy exercises at home 3 days per week for the last several years. Encouraged continuation of regular exercise. Avoid heavy lifting/ bending    4. Diagnostic: Order right foot x-ray.    5. Other:   - Refer to podiatry for right foot pain.  She was a patient of Dr. Pitts in the past.  - We have previously discussed referral to Dr. Lopez (Neurosurgery) for opinion regarding spinal stenosis, but patient is not interested.     6. Follow up: 3-4 weeks post-injection    - I discussed the risks, benefits, and alternatives to potential treatment options. All questions and concerns were fully addressed today in clinic. Dr. Felix was consulted regarding the patient plan and agrees.

## 2019-05-31 ENCOUNTER — TELEPHONE (OUTPATIENT)
Dept: PHYSICAL MEDICINE AND REHAB | Facility: CLINIC | Age: 79
End: 2019-05-31

## 2019-05-31 ENCOUNTER — TELEPHONE (OUTPATIENT)
Dept: PAIN MEDICINE | Facility: CLINIC | Age: 79
End: 2019-05-31

## 2019-05-31 NOTE — TELEPHONE ENCOUNTER
----- Message from Lawrence Otto sent at 5/31/2019  2:15 PM CDT -----  ..Type:  Patient Returning Call    Who Called:pt   Who Left Message for Patient:  Does the patient know what this is regarding?: injection   Would the patient rather a call back or a response via [facility]? Call back   Best Call Back Number:[phone]  Additional Information: pt is requesting a call from nurse to schedule her injection.

## 2019-05-31 NOTE — TELEPHONE ENCOUNTER
Contacted patient. Injection scheduled on 06/07/19 at [facility]. Instructions given verbally. Pt verbalized understanding.       ----- Message from Allie Salmeron PA-C sent at 5/22/2019 11:54 AM CDT -----  Schedule repeat left L5/S1 TF AYAAN. Patient is not taking prescription blood thinners or ASA.

## 2019-06-05 ENCOUNTER — OFFICE VISIT (OUTPATIENT)
Dept: PODIATRY | Facility: CLINIC | Age: 79
End: 2019-06-05
Payer: MEDICARE

## 2019-06-05 ENCOUNTER — OFFICE VISIT (OUTPATIENT)
Dept: OPHTHALMOLOGY | Facility: CLINIC | Age: 79
End: 2019-06-05
Payer: MEDICARE

## 2019-06-05 VITALS
RESPIRATION RATE: 17 BRPM | WEIGHT: 127.88 LBS | BODY MASS INDEX: 23.53 KG/M2 | DIASTOLIC BLOOD PRESSURE: 79 MMHG | HEART RATE: 93 BPM | SYSTOLIC BLOOD PRESSURE: 144 MMHG | HEIGHT: 62 IN

## 2019-06-05 DIAGNOSIS — M06.9 RHEUMATOID ARTHRITIS INVOLVING LEFT FOOT, UNSPECIFIED RHEUMATOID FACTOR PRESENCE: ICD-10-CM

## 2019-06-05 DIAGNOSIS — I10 ESSENTIAL HYPERTENSION: Primary | ICD-10-CM

## 2019-06-05 DIAGNOSIS — M06.9 RHEUMATOID ARTHRITIS INVOLVING RIGHT FOOT, UNSPECIFIED RHEUMATOID FACTOR PRESENCE: ICD-10-CM

## 2019-06-05 DIAGNOSIS — M77.41 METATARSALGIA, RIGHT FOOT: ICD-10-CM

## 2019-06-05 DIAGNOSIS — B35.1 ONYCHOMYCOSIS: ICD-10-CM

## 2019-06-05 DIAGNOSIS — G60.9 IDIOPATHIC PERIPHERAL NEUROPATHY: Primary | ICD-10-CM

## 2019-06-05 DIAGNOSIS — M24.571 CONTRACTURE, RIGHT ANKLE: ICD-10-CM

## 2019-06-05 DIAGNOSIS — Z96.1 PSEUDOPHAKIA OF BOTH EYES: ICD-10-CM

## 2019-06-05 DIAGNOSIS — H52.4 BILATERAL PRESBYOPIA: ICD-10-CM

## 2019-06-05 DIAGNOSIS — M05.711 RHEUMATOID ARTHRITIS INVOLVING RIGHT SHOULDER WITH POSITIVE RHEUMATOID FACTOR: Chronic | ICD-10-CM

## 2019-06-05 DIAGNOSIS — M20.41 HAMMER TOE OF RIGHT FOOT: ICD-10-CM

## 2019-06-05 PROCEDURE — 11719 TRIM NAIL(S) ANY NUMBER: CPT | Mod: Q9,HCNC,S$GLB, | Performed by: PODIATRIST

## 2019-06-05 PROCEDURE — 99999 PR PBB SHADOW E&M-EST. PATIENT-LVL III: CPT | Mod: PBBFAC,HCNC,, | Performed by: PODIATRIST

## 2019-06-05 PROCEDURE — 3077F SYST BP >= 140 MM HG: CPT | Mod: HCNC,CPTII,S$GLB, | Performed by: PODIATRIST

## 2019-06-05 PROCEDURE — 92014 COMPRE OPH EXAM EST PT 1/>: CPT | Mod: HCNC,S$GLB,, | Performed by: OPTOMETRIST

## 2019-06-05 PROCEDURE — 99999 PR PBB SHADOW E&M-EST. PATIENT-LVL II: CPT | Mod: PBBFAC,HCNC,, | Performed by: OPTOMETRIST

## 2019-06-05 PROCEDURE — 99213 OFFICE O/P EST LOW 20 MIN: CPT | Mod: 25,HCNC,S$GLB, | Performed by: PODIATRIST

## 2019-06-05 PROCEDURE — 99213 PR OFFICE/OUTPT VISIT, EST, LEVL III, 20-29 MIN: ICD-10-PCS | Mod: 25,HCNC,S$GLB, | Performed by: PODIATRIST

## 2019-06-05 PROCEDURE — 11720 PR DEBRIDEMENT OF NAIL(S), 1-5: ICD-10-PCS | Mod: 59,Q9,HCNC,S$GLB | Performed by: PODIATRIST

## 2019-06-05 PROCEDURE — 3078F PR MOST RECENT DIASTOLIC BLOOD PRESSURE < 80 MM HG: ICD-10-PCS | Mod: HCNC,CPTII,S$GLB, | Performed by: PODIATRIST

## 2019-06-05 PROCEDURE — 99999 PR PBB SHADOW E&M-EST. PATIENT-LVL III: ICD-10-PCS | Mod: PBBFAC,HCNC,, | Performed by: PODIATRIST

## 2019-06-05 PROCEDURE — 99999 PR PBB SHADOW E&M-EST. PATIENT-LVL II: ICD-10-PCS | Mod: PBBFAC,HCNC,, | Performed by: OPTOMETRIST

## 2019-06-05 PROCEDURE — 11719 PR TRIM NAIL(S): ICD-10-PCS | Mod: Q9,HCNC,S$GLB, | Performed by: PODIATRIST

## 2019-06-05 PROCEDURE — 1101F PR PT FALLS ASSESS DOC 0-1 FALLS W/OUT INJ PAST YR: ICD-10-PCS | Mod: HCNC,CPTII,S$GLB, | Performed by: PODIATRIST

## 2019-06-05 PROCEDURE — 3077F PR MOST RECENT SYSTOLIC BLOOD PRESSURE >= 140 MM HG: ICD-10-PCS | Mod: HCNC,CPTII,S$GLB, | Performed by: PODIATRIST

## 2019-06-05 PROCEDURE — 3078F DIAST BP <80 MM HG: CPT | Mod: HCNC,CPTII,S$GLB, | Performed by: PODIATRIST

## 2019-06-05 PROCEDURE — 11720 DEBRIDE NAIL 1-5: CPT | Mod: 59,Q9,HCNC,S$GLB | Performed by: PODIATRIST

## 2019-06-05 PROCEDURE — 92015 DETERMINE REFRACTIVE STATE: CPT | Mod: HCNC,S$GLB,, | Performed by: OPTOMETRIST

## 2019-06-05 PROCEDURE — 1101F PT FALLS ASSESS-DOCD LE1/YR: CPT | Mod: HCNC,CPTII,S$GLB, | Performed by: PODIATRIST

## 2019-06-05 PROCEDURE — 92015 PR REFRACTION: ICD-10-PCS | Mod: HCNC,S$GLB,, | Performed by: OPTOMETRIST

## 2019-06-05 PROCEDURE — 92014 PR EYE EXAM, EST PATIENT,COMPREHESV: ICD-10-PCS | Mod: HCNC,S$GLB,, | Performed by: OPTOMETRIST

## 2019-06-05 NOTE — PROGRESS NOTES
Subjective:       Patient ID: Sarah Parker is a 79 y.o. female.    Chief Complaint: Nail Care (Bilat toenail cut down, pain 4/10,wear tennis, ambulation without pinky, non diabetic, PCP Dr. Bennett)      HPI: Patient presents to the office with the chief complaint of elongated, thickened and dystrophic nail plates to the B/L foot. This patient does have Peripheral Neuropathy. Patient does follow with Primary Care for management of comorbid states. This patient's PMD is Briseida Bennett MD. This patient last saw his/her primary care provider on 5/16.  Patient also like to discuss recent x-ray evaluation.  She states pain to the plantar aspect the right foot.  She does have severe rheumatoid arthritis with fibular deviations to the bilateral lower extremity.  She presents this afternoon with her .  She is ambulatory with the assistance of a cane.    Review of patient's allergies indicates:   Allergen Reactions    Codeine      Other reaction(s): hyper  Other reaction(s): hyper    Doxycycline        Past Medical History:   Diagnosis Date    Acid reflux     Anxiety     Back pain     Bronchitis, chronic obstructive w acute bronchitis 7/29/2016    Cancer     [facility] arms, face- Dr. Lata Tejada    Cataract     2+NS    Degenerative disc disease     Depression     Dry mouth     Hernia, hiatal 11/18/2013    Hypertension     Hypothyroid     Macrocytic anemia 5/3/2016    Macular degeneration     Migraines     Mixed anxiety and depressive disorder     Multiple fractures of ribs of right side     Osteoporosis     Pneumonia     Pneumonia due to other staphylococcus     Rheumatoid arthritis     Rheumatoid arthritis(714.0)     Rheumatoid arthritis(714.0)     Remicade, MTX.       Family History   Problem Relation Age of Onset    Heart disease Mother     Hyperlipidemia Mother     Hypertension Mother     Osteoarthritis Mother     Cataracts Mother     Hypertension Father     Osteoarthritis  Father     Heart disease Father     Asthma Sister     Chronic back pain Sister     Hypertension Sister     Osteoarthritis Sister     Thyroid disease Sister     Asthma Brother     Cancer Brother     Chronic back pain Brother     Diabetes Mellitus Brother     Hypertension Brother     Osteoarthritis Brother     Thyroid disease Brother     Cancer Maternal Grandfather     Fibromyalgia Daughter     Heart disease Maternal Grandmother     Colon cancer Neg Hx     Diabetes Neg Hx        Social History     Socioeconomic History    Marital status:      Spouse name: Not on file    Number of children: Not on file    Years of education: Not on file    Highest education level: Not on file   Occupational History    Occupation: retired   Social Needs    Financial resource strain: Not on file    Food insecurity:     Worry: Not on file     Inability: Not on file    Transportation needs:     Medical: Not on file     Non-medical: Not on file   Tobacco Use    Smoking status: Former Smoker     Packs/day: 0.25     Years: 2.00     Pack years: 0.50     Last attempt to quit: 1965     Years since quittin.6    Smokeless tobacco: Never Used   Substance and Sexual Activity    Alcohol use: No    Drug use: No    Sexual activity: Never     Partners: Male   Lifestyle    Physical activity:     Days per week: Not on file     Minutes per session: Not on file    Stress: Not on file   Relationships    Social connections:     Talks on phone: Not on file     Gets together: Not on file     Attends Anabaptism service: Not on file     Active member of club or organization: Not on file     Attends meetings of clubs or organizations: Not on file     Relationship status: Not on file   Other Topics Concern    Not on file   Social History Narrative    Patient is aretired and live with .       Past Surgical History:   Procedure Laterality Date    APPENDECTOMY  1985    CATARACT EXTRACTION Bilateral 6/11/15     "Dr. Booth    CHOLECYSTECTOMY  2013    cryoablasion kidney Left 09/27/2016    EGD (ESOPHAGOGASTRODUODENOSCOPY) N/A 10/31/2013    Performed by Donald Cuello MD at [facility] ENDO    feet Bilateral     rheumatoid    FRACTURE SURGERY Right     tibia    FUNDOPLICATION, NISSEN, LAPAROSCOPIC N/A 12/18/2013    Performed by Galindo Vasquez MD at [facility] OR    HERNIA REPAIR      HYSTERECTOMY  1970    partial    JOINT REPLACEMENT      bilateral knees (2008), hands, wrists, knuckles, toes    LAPAROSCOPIC NISSEN FUNDOPLICATION      Renal Cryoablation N/A 9/27/2016    Performed by [facility] Diagnostic Provider at [facility] OR       Review of Systems   Constitutional: Negative for chills, fatigue and fever.   HENT: Negative for hearing loss.    Eyes: Negative for photophobia and visual disturbance.   Respiratory: Negative for cough, chest tightness, shortness of breath and wheezing.    Cardiovascular: Negative for chest pain and palpitations.   Gastrointestinal: Negative for constipation, diarrhea, nausea and vomiting.   Endocrine: Negative for cold intolerance and heat intolerance.   Genitourinary: Negative for flank pain.   Musculoskeletal: Positive for arthralgias and gait problem. Negative for neck pain and neck stiffness.   Skin: Negative for wound.   Neurological: Positive for numbness. Negative for light-headedness and headaches.   Psychiatric/Behavioral: Negative for sleep disturbance.          Objective:   BP (!) 144/79 (BP Location: Right arm, Patient Position: Sitting, BP Method: Medium (Automatic))   Pulse 93   Resp 17   Ht 5' 2" (1.575 m)   Wt 58 kg (127 lb 13.9 oz)   BMI 23.39 kg/m²     LOWER EXTREMITY PHYSICAL EXAMINATION    NEUROLOGY: Protective sensation is not intact to the left and right plantar surfaces of the foot and digits, as the patient has no sensation/detection at greater than 4 distinct points of contact with 5.07 [place] monofilament. Sensation to light touch is intact on the left and right " foot. Proprioception is intact, bilateral. Sensation to pin prick is reduced to absent. Vibratory sensation is diminished    DERMATOLOGY: On the left foot, nails 1 are suggestive of onychomycotic changes. On the right foot, nails 1 are suggestive of onychomycotic changes. These nail plates are thickened, are dystrophic, chaulky in appearance and malodorous with substantial subungual debris. These nail plates are yellow to brown in appearance. The remaining nail plates are elongated and do not have suggestive clinical features of onychomycosis.  Callus formation, plantar aspect the right 4th metatarsophalangeal joint.    ORTHOPEDIC:  Severe rheumatoid arthritis noted to bilateral lower extremity, with fibular deviations.  Manual Muscle Testing is 5/5 in all planes on the left and right, without pains, with and without resistance.  Pain to palpation to plantar aspect right foot the 4th metatarsal head.  Prominent bone is noted. Hammertoe contractures are noted.    VASCULAR: Warm to warm, proximal to distal. Capillary refill time is within normal limits and less  than 3 seconds. Hair growth is sparse on the left and right dorsal foot and at the digits. The left dorsalis pedis pulse is 1/4 and on the right is 1/4, and the left posterior tibial pulse is 1/4 and is 1/4 on the right.     Assessment:     1. Idiopathic peripheral neuropathy    2. Rheumatoid arthritis involving right foot, unspecified rheumatoid factor presence    3. Metatarsalgia, right foot    4. Hammer toe of right foot    5. Contracture, right ankle    6. Rheumatoid arthritis involving left foot, unspecified rheumatoid factor presence    7. Rheumatoid arthritis involving right shoulder with positive rheumatoid factor    8. Onychomycosis        Plan:     Idiopathic peripheral neuropathy  Neuropathic foot counseling and education is provided at this visit. Patient is advised to wear socks and shoes at all times.  Do not walk barefoot, or with just socks,  even when indoors.  Be sure to check and inspect the inside of the shoe before putting them on her feet.  Protect your feet at all times.  Walking shoes and/or athletic shoes are the best types of shoe gear. Do not wear vinyl or plastic type shoe gear, as they do not stretch and/or breathe.  Protect your feet from hot and/or cold. Elevate the extremities when sitting.  Do not wear excessively tight socks and/or shoe gear. Wiggle your toes for a few minutes throughout the day. Move your ankles up and down, in and out, to help blood flow in your lower extremity.     Rheumatoid arthritis involving right foot, unspecified rheumatoid factor presence  Metatarsalgia, right foot  Hammer toe of right foot  Contracture, right ankle  XRAYS are reviewed in detail with the patient. All questions and concerns regarding findings and its/their implications are outlined and discussed.  Thorough discussion is had with the patient today, concerning the diagnosis, its etiology, and the treatment algorithm at present.    The procedure of (right 4th metatarsal head resection) was thoroughly explained to the patient. Its necessity and/or purpose and the implications therein were outlined, including any pertinent advantages and/or disadvantages, and possible complications, if any. Possible complications include recurrence of pathology and/or deformity, infection (cellulitis, drainage, purulence, malodor, etc...), pain, numbness, neuritis, edema, burning, loss of function, need for further surgery, possible need for removal of any implanted hardware, soft tissue contracture and/or scarring, etc... No guarantees were given and/or implied. Post-operative expectations and weightbearing protocol is thoroughly explained the patient, who acknowledges understanding.     This patient does have hammertoe (digital) contractures. I did advise the patient to ambulate with shoe gear that is high in the tox box to allow for extra room and depth in the  sagittal plane, in order to alleviate and lessen the potential for dorsal digital break down at the IPJs. I do also recommended shoe gear that is soft and supple in the foot bed as to lessen the potential for plantar distal digital break down at the contracted digits. If the patient does not feel the aforementioned is necessary, he or she may also purchase OTC padding devices to be worn across the MTPJ, at the distal aspects of the digits, and/or at the dorsal aspects of the IPJs. The patient does acknowledge understanding and is said to be amenable to compliance.      Rheumatoid arthritis involving left foot, unspecified rheumatoid factor presence  Rheumatoid arthritis involving right shoulder with positive rheumatoid factor  Continue rheumatological follow-up.  Patient advised to follow up with Primary Care Provider and/or Rheumatologist for management of rheumatological pathology.    Onychomycosis  The onychomycotic nail plates, as outlined above, are sharply debrided with double action nail nipper, and/or with the assistance of a mechanical rotary mel, with removal of all offending nail and nail border(s), for reduction of pains. Nails are reduced in terms of length, width and girth with removal of subungual debris to facilitate pain free weight bearing and ambulation. The elongated nails as outlined in the objective portion of this note, were trimmed to appropriate length, with a double action nail nipper, for alleviation/reduction of pains as well. Follow up in approx. 3-4 months.          Future Appointments   Date Time Provider Department Center   6/5/2019  1:30 PM Octavio Navarro OD [facility]   6/7/2019  7:00 AM [facility] PAIN1 [facility] PMXRAY [place]   7/5/2019  8:40 AM Allie Salmeron PA-C [facility] IN [facility]   7/16/2019  4:20 [facility]   8/12/2019 10:10 AM [facility] LABORATORY [facility] LAB [place]   8/13/2019  8:30 AM [facility] CT1 LIMIT 500 LBS IBV CT  SCAN [place]   8/14/2019  8:40 AM Fam Beck IV, MD [facility] UROLOGY [facility]   8/14/2019 10:00 AM JM COTA [facility] IM [facility]

## 2019-06-05 NOTE — PROGRESS NOTES
HPI     No visual complaints.  Patient wears OTC readers.  Last eye exam 05/30/2018 TRF.  Update glasses RX.    Last edited by Jaymie Banda on 6/5/2019 10:54 AM. (History)            Assessment /Plan     For exam results, see Encounter Report.    Essential hypertension    Pseudophakia of both eyes    Bilateral presbyopia      No HTN Retinopathy    Stable IOL OU.    Dispense Final Rx for glasses.  RTC 1 year  Discussed above and answered questions.    Pt hit head on mirror by door when she tripped on pt chair by door. Pt states she had a small bump but was nearly gone by time exam was over. Pt refused to see Urgent Care doctor this morning. SOS form filled out today.

## 2019-06-06 ENCOUNTER — TELEPHONE (OUTPATIENT)
Dept: PAIN MEDICINE | Facility: CLINIC | Age: 79
End: 2019-06-06

## 2019-06-06 NOTE — TELEPHONE ENCOUNTER
Pt stated she has poison ivy rash on both arms. Spoke with Dr. Felix and pt needs to cancel procedure for 6/7/2019 and get an appt with PCP

## 2019-06-06 NOTE — TELEPHONE ENCOUNTER
----- Message from Brianna Pickett sent at 6/6/2019 11:20 AM CDT -----  Contact: Patient  Type:  Needs Medical Advice    Who Called: Patient  Symptoms (please be specific):    How long has patient had these symptoms:    Pharmacy name and phone #:    Would the patient rather a call back or a response via [facility]? call  Best Call Back Number: [phone]  Additional Information: Patient has a poison ivy rash on both arms and needs to know if she can still have her injection

## 2019-06-07 ENCOUNTER — OFFICE VISIT (OUTPATIENT)
Dept: FAMILY MEDICINE | Facility: CLINIC | Age: 79
End: 2019-06-07
Payer: MEDICARE

## 2019-06-07 VITALS
SYSTOLIC BLOOD PRESSURE: 122 MMHG | OXYGEN SATURATION: 97 % | RESPIRATION RATE: 18 BRPM | DIASTOLIC BLOOD PRESSURE: 70 MMHG | HEART RATE: 94 BPM | WEIGHT: 125.44 LBS | TEMPERATURE: 98 F | BODY MASS INDEX: 23.08 KG/M2 | HEIGHT: 62 IN

## 2019-06-07 DIAGNOSIS — L23.7 POISON IVY DERMATITIS: Primary | ICD-10-CM

## 2019-06-07 PROCEDURE — 99213 PR OFFICE/OUTPT VISIT, EST, LEVL III, 20-29 MIN: ICD-10-PCS | Mod: 25,HCNC,S$GLB, | Performed by: REGISTERED NURSE

## 2019-06-07 PROCEDURE — 1101F PR PT FALLS ASSESS DOC 0-1 FALLS W/OUT INJ PAST YR: ICD-10-PCS | Mod: HCNC,CPTII,S$GLB, | Performed by: REGISTERED NURSE

## 2019-06-07 PROCEDURE — 3078F DIAST BP <80 MM HG: CPT | Mod: HCNC,CPTII,S$GLB, | Performed by: REGISTERED NURSE

## 2019-06-07 PROCEDURE — 1101F PT FALLS ASSESS-DOCD LE1/YR: CPT | Mod: HCNC,CPTII,S$GLB, | Performed by: REGISTERED NURSE

## 2019-06-07 PROCEDURE — 99213 OFFICE O/P EST LOW 20 MIN: CPT | Mod: 25,HCNC,S$GLB, | Performed by: REGISTERED NURSE

## 2019-06-07 PROCEDURE — 3078F PR MOST RECENT DIASTOLIC BLOOD PRESSURE < 80 MM HG: ICD-10-PCS | Mod: HCNC,CPTII,S$GLB, | Performed by: REGISTERED NURSE

## 2019-06-07 PROCEDURE — 3074F PR MOST RECENT SYSTOLIC BLOOD PRESSURE < 130 MM HG: ICD-10-PCS | Mod: HCNC,CPTII,S$GLB, | Performed by: REGISTERED NURSE

## 2019-06-07 PROCEDURE — 99999 PR PBB SHADOW E&M-EST. PATIENT-LVL V: ICD-10-PCS | Mod: PBBFAC,HCNC,, | Performed by: REGISTERED NURSE

## 2019-06-07 PROCEDURE — 96372 THER/PROPH/DIAG INJ SC/IM: CPT | Mod: HCNC,S$GLB,, | Performed by: REGISTERED NURSE

## 2019-06-07 PROCEDURE — 96372 PR INJECTION,THERAP/PROPH/DIAG2ST, IM OR SUBCUT: ICD-10-PCS | Mod: HCNC,S$GLB,, | Performed by: REGISTERED NURSE

## 2019-06-07 PROCEDURE — 99999 PR PBB SHADOW E&M-EST. PATIENT-LVL V: CPT | Mod: PBBFAC,HCNC,, | Performed by: REGISTERED NURSE

## 2019-06-07 PROCEDURE — 3074F SYST BP LT 130 MM HG: CPT | Mod: HCNC,CPTII,S$GLB, | Performed by: REGISTERED NURSE

## 2019-06-07 RX ORDER — BETAMETHASONE SODIUM PHOSPHATE AND BETAMETHASONE ACETATE 3; 3 MG/ML; MG/ML
6 INJECTION, SUSPENSION INTRA-ARTICULAR; INTRALESIONAL; INTRAMUSCULAR; SOFT TISSUE
Status: COMPLETED | OUTPATIENT
Start: 2019-06-07 | End: 2019-06-07

## 2019-06-07 RX ORDER — METHYLPREDNISOLONE 4 MG/1
TABLET ORAL
Qty: 1 PACKAGE | Refills: 0 | Status: SHIPPED | OUTPATIENT
Start: 2019-06-07 | End: 2019-07-08

## 2019-06-07 RX ADMIN — BETAMETHASONE SODIUM PHOSPHATE AND BETAMETHASONE ACETATE 6 MG: 3; 3 INJECTION, SUSPENSION INTRA-ARTICULAR; INTRALESIONAL; INTRAMUSCULAR; SOFT TISSUE at 09:06

## 2019-06-07 NOTE — PROGRESS NOTES
Subjective:       Patient ID: Sarah Parker is a 79 y.o. female.    Chief Complaint   Patient presents with    Rash       HPI    Sarah Parker is here today with c/o poison ivy to arms and eyelids after working in her yard recently.  She has been using Eucerin cream to area but not helping.  Reports skin itching and irritation with a few scattered blisters.      Review of Systems   Constitutional: Negative.    Respiratory: Negative.    Cardiovascular: Negative.    Skin: Positive for rash.   Neurological: Negative.        Review of patient's allergies indicates:   Allergen Reactions    Codeine      Other reaction(s): hyper  Other reaction(s): hyper    Doxycycline        Patient Active Problem List   Diagnosis    Rheumatoid arthritis    Acquired hypothyroidism    Depression, recurrent    Rheumatoid lung    Atherosclerosis of aorta    ARMD (age related macular degeneration)    Hiatal hernia with gastroesophageal reflux    Essential hypertension    Osteopenia    Macrocytic anemia    Leukocytosis    Multiple lung nodules on CT    Renal mass    History of skin cancer    Chronic bronchitis    Calcified granuloma of lung    COPD with exacerbation    Lumbar radiculopathy    Melanoma of right upper arm    Idiopathic peripheral neuropathy    Rheumatoid arthritis involving right foot    Rheumatoid arthritis involving left foot       Current Outpatient Medications on File Prior to Visit   Medication Sig Dispense Refill    albuterol (PROVENTIL) 2.5 mg /3 mL (0.083 %) nebulizer solution Take 3 mLs (2.5 mg total) by nebulization every 6 (six) hours as needed for Wheezing. 1 Box 5    amitriptyline (ELAVIL) 75 MG tablet TAKE 1 TABLET BY MOUTH EVERY EVENING 90 tablet 3    benzonatate (TESSALON) 200 MG capsule Take 1 capsule (200 mg total) by mouth 3 (three) times daily as needed for Cough. 21 capsule 0    calcium citrate-vitamin D (CITRACAL + D) 315-200 mg-unit per tablet Take 1 tablet by mouth  "once daily.       DULoxetine (CYMBALTA) 20 MG capsule Take 2 capsules (40 mg total) by mouth once daily. 180 capsule 3    ergocalciferol (ERGOCALCIFEROL) 50,000 unit Cap TAKE 1 CAPSULE BY MOUTH ONCE A WEEK      hydrocodone-acetaminophen 5-325mg (NORCO) 5-325 mg per tablet TK 1 T PO Q 6 H PRN  0    hydrOXYzine HCl (ATARAX) 25 MG tablet TAKE 1 TO 2 TABLETS(25 TO 50 MG) BY MOUTH EVERY NIGHT AS NEEDED FOR ANXIETY OR INSOMNIA 60 tablet 3    leucovorin (WELLCOVORIN) 5 mg Tab TAKE ONE WEEKLY ON SATURDAYS 4 tablet 3    levothyroxine (SYNTHROID) 88 MCG tablet TAKE 1 TABLET BY MOUTH BEFORE BREAKFAST 90 tablet 3    meclizine (ANTIVERT) 50 MG tablet Take 25 mg by mouth 3 (three) times daily as needed.      methotrexate 2.5 MG Tab Take 17.5 mg by mouth every 7 days.       metoprolol succinate (TOPROL-XL) 50 MG 24 hr tablet TAKE 1 TABLET(50 MG) BY MOUTH EVERY DAY 30 tablet 11    multivitamin capsule Take by mouth. As directed      ondansetron (ZOFRAN) 4 MG tablet Take 1 tablet (4 mg total) by mouth every 8 (eight) hours as needed for Nausea. 30 tablet 1    oxymetazoline (AFRIN) 0.05 % nasal spray 1 Aerosol, Spray Nasal At bedtime      pravastatin (PRAVACHOL) 10 MG tablet TAKE 1 TABLET(10 MG) BY MOUTH EVERY DAY 30 tablet 11    tocilizumab (ACTEMRA) 80 mg/4 mL (20 mg/mL) Soln Inject into the vein.      valsartan-hydrochlorothiazide (DIOVAN-HCT) 80-12.5 mg per tablet TAKE 1 TABLET BY MOUTH DAILY 90 tablet 3         Past medical, surgical, family and social histories have been reviewed today.        Objective:     Vitals:    06/07/19 0912   BP: 122/70   Pulse: 94   Resp: 18   Temp: 98 °F (36.7 °C)   TempSrc: Oral   SpO2: 97%   Weight: 56.9 kg (125 lb 7.1 oz)   Height: 5' 2" (1.575 m)         Physical Exam   Constitutional: She is oriented to person, place, and time. She appears well-developed and well-nourished.   Neurological: She is alert and oriented to person, place, and time.   Skin: Rash (poison ivy to arms and " RT upper eyelid) noted.        Vitals reviewed.        Diagnosis       1. Poison ivy dermatitis          Assessment/ Plan     Poison ivy dermatitis  -     betamethasone acetate-betamethasone sodium phosphate injection 6 mg  -     methylPREDNISolone (MEDROL DOSEPACK) 4 mg tablet; use as directed  Dispense: 1 Package; Refill: 0      Injection today.  Skin care discussed.  Fill the steroid pack if injection not effective.  Follow-up in clinic as needed.      JULY Dueñas  [facility]

## 2019-06-25 ENCOUNTER — HOSPITAL ENCOUNTER (OUTPATIENT)
Facility: HOSPITAL | Age: 79
Discharge: HOME | End: 2019-06-25
Attending: PAIN MEDICINE | Admitting: PAIN MEDICINE
Payer: MEDICARE

## 2019-06-25 VITALS
BODY MASS INDEX: 22.53 KG/M2 | TEMPERATURE: 98 F | WEIGHT: 122.44 LBS | HEART RATE: 89 BPM | DIASTOLIC BLOOD PRESSURE: 67 MMHG | RESPIRATION RATE: 16 BRPM | HEIGHT: 62 IN | OXYGEN SATURATION: 100 % | SYSTOLIC BLOOD PRESSURE: 139 MMHG

## 2019-06-25 DIAGNOSIS — M54.16 LUMBAR RADICULOPATHY: ICD-10-CM

## 2019-06-25 PROCEDURE — 64483 NJX AA&/STRD TFRM EPI L/S 1: CPT | Mod: HCNC | Performed by: PAIN MEDICINE

## 2019-06-25 PROCEDURE — 25500020 PHARM REV CODE 255: Mod: HCNC | Performed by: PAIN MEDICINE

## 2019-06-25 PROCEDURE — 64483 NJX AA&/STRD TFRM EPI L/S 1: CPT | Mod: HCNC,LT,, | Performed by: PAIN MEDICINE

## 2019-06-25 PROCEDURE — 63600175 PHARM REV CODE 636 W HCPCS: Mod: HCNC | Performed by: PAIN MEDICINE

## 2019-06-25 PROCEDURE — 25000003 PHARM REV CODE 250: Mod: HCNC | Performed by: PAIN MEDICINE

## 2019-06-25 PROCEDURE — 64483 PR EPIDURAL INJ, ANES/STEROID, TRANSFORAMINAL, LUMB/SACR, SNGL LEVL: ICD-10-PCS | Mod: HCNC,LT,, | Performed by: PAIN MEDICINE

## 2019-06-25 RX ORDER — DEXAMETHASONE SODIUM PHOSPHATE 10 MG/ML
INJECTION INTRAMUSCULAR; INTRAVENOUS
Status: DISCONTINUED | OUTPATIENT
Start: 2019-06-25 | End: 2019-06-25 | Stop reason: HOSPADM

## 2019-06-25 RX ORDER — LIDOCAINE HYDROCHLORIDE 10 MG/ML
INJECTION, SOLUTION EPIDURAL; INFILTRATION; INTRACAUDAL; PERINEURAL
Status: DISCONTINUED | OUTPATIENT
Start: 2019-06-25 | End: 2019-06-25 | Stop reason: HOSPADM

## 2019-06-25 NOTE — H&P
Progress Notes        Chief Pain Complaint:  Low back with radiation into BLE (mostly on left)     Interval History: Patient was seen on 4/26/19. At that time she underwent left L5/S1 TF AYAAN.  The patient reports that she is/was better following the procedure.  she reports 90% pain relief.  The changes lasted 2-3 weeks.  The changes have not continued through this visit. She feels the pain is slowly returning, although it is not as bad yet as prior to the injection.     Interval History: Patient presents today for follow-up. She was last seen on 3-20-19 with Dr. Felix. At that visit, the plan was to order an updated MRI. She complains of low back and bilateral lower extremity pain that extends posteriorly. Historically, both legs were painful, but she reports today that it has been more so on left side. She does have bilateral foot numbness, worse on left.     Initial History of Present Illness:   This patient is a 79 y.o. female who presents today complaining of the above noted pain/s. The patient describes the pain as follows.  Ms. Parker is a new patient to clinic with complaints of low back pain which radiates into bilateral lower extremities.  She has been having these symptoms for several years and has undergone epidural steroid injections in 2017 which provided significant pain relief.  She reports that her symptoms returned approximately October of 2018 her located mostly in the bilateral S1 distribution.  She states that the left leg is worse than the right leg and she describes having numbness in her bilateral feet but denies weakness.  She reports that it does cause difficulty with walking and she spends much of her day walking for exercise.  She also performs physical therapy exercises at home 3 days per week for the last several years.  Today she rates her pain as 3/10 and describes a sensation like she has an Ace bandage wrapped around her legs..  She denies having bowel bladder  difficulties.     Previous Therapy:  Medications:Lortab and Amitriptyline  Injections:               - Left L3/4 TF AYAAN with Dr. Gray on 9-15-10 with relief for almost 5 years &  bilateral L5/S1 TF AYAAN on 9-1-17 with Dr. Barry with excellent relief for about 13 months              - left L5/S1 TF AYAAN on 4/26/19 with 90% pain relief for 2-3 weeks  Surgeries: No Spinal surgeries  Physical Therapy: Completed in the Past           Past Surgical History:   Procedure Laterality Date    APPENDECTOMY   1985    CATARACT EXTRACTION Bilateral 6/11/15     Dr. Booth    CHOLECYSTECTOMY   2013    cryoablasion kidney Left 09/27/2016    EGD (ESOPHAGOGASTRODUODENOSCOPY) N/A 10/31/2013     Performed by Donlad Cuello MD at [facility] ENDO    feet Bilateral       rheumatoid    FRACTURE SURGERY Right       tibia    FUNDOPLICATION, NISSEN, LAPAROSCOPIC N/A 12/18/2013     Performed by Galindo Vasquez MD at [facility] OR    HERNIA REPAIR        HYSTERECTOMY   1970     partial    JOINT REPLACEMENT         bilateral knees (2008), hands, wrists, knuckles, toes    LAPAROSCOPIC NISSEN FUNDOPLICATION        Renal Cryoablation N/A 9/27/2016     Performed by [facility] Diagnostic Provider at [facility] OR         Imaging / Labs / Studies (reviewed on 5/22/2019):          Results for orders placed during the hospital encounter of 04/01/19   MRI Lumbar Spine Without Contrast     Narrative COMPARISON:  CT of the abdomen pelvis from 08/28/2018.  FINDINGS:  The osseous structures demonstrate nonspecific diminished signal intensity on T1 weighted imaging.  L1-L2: Facet ligamentum flavum hypertrophy with prominent right paracentral disc protrusion.  There is asymmetric marked right neural foraminal narrowing.  The minimum AP spinal canal diameter measurement is 11 mm.  L2-L3: Facet ligamentum flavum hypertrophy with posterior disc bulge.  There is effacement of the ventral thecal sac.  There is mild bilateral neural foraminal narrowing.  L3-L4: Facet  ligamentum flavum hypertrophy with posterior disc bulge.  There is a small annular tear of the disc.  There is bilateral neural foraminal narrowing, left greater than right.  The minimum AP spinal canal diameter measurement is 11 mm.  L4-L5: Small annular tear of the disc.  There is facet hypertrophy and posterior disc osteophyte complex which results in marked narrowing of the spinal canal which demonstrates a minimum AP diameter of 5.5 mm.  Bilateral neural foraminal narrowing is seen which is greater on the right side.  L5-S1: There is facet hypertrophy and posterior disc osteophyte complex.  There is narrowing at the inferior left lateral recess.  Bilateral renal cysts are partially visualized.  There are post interventional changes involving the lateral interpolar left kidney with adjacent scarring noted.  The tip of the conus medullaris terminates near the L1 level.  No fracture.  There is grade 1/2 anterolisthesis of L4 on L5.     Impression Multilevel discogenic degenerative changes of the lumbar spine with areas of spinal canal and neural foraminal encroachment as described above.  There is grade 1/2 anterolisthesis of L4 on L5.  Nonspecific diminished signal intensity within the osseous structures which can be seen with both benign and malignant marrow replacement processes.  Please correlate with history and lab values.  Other incidental findings as above.         8/22/17 LUMBAR MRI (from [facility], full report scanned into Media in EMR)  L1-L2: disc bulge, facet arthropathy,bilateral  bony NF narrowing greater on the right side, cannot exclude right L1 nerve impingement  L2-L3: disc bulge, facet arthropathy, bilateral bony NF narrowing with possible, but not definite, nerve impingements  L3-L4: disc bulge, facet arthropathy, bilateral bony NF narrowing greater on the left side, severe left lateral recess stenosis with possible left L3 nerve impingement  L4-L5: disc bulge, facet  "arthropathy, bilateral bony NF narrowing greater on the left side, severe left lateral recess stenosis with possible L4 nerve impingements  L5-S1: disc bulge, facet arthropathy, bilateral bony NF narrowing with possible, but not definite, nerve impingements             Results for orders placed in visit on 08/19/10   X-Ray Lumbar Spine Complete 5 View     Narrative RESULTS: THE BONES ARE DIFFUSELY DEMINERALIZED.  THERE IS MILD DEXTROSCOLIOSIS.  GRADE 2 ANTEROLISTHESIS OF APPROXIMATELY 1.1 CM IS IDENTIFIED AT L4-5.  MINIMAL GRADE 1 ANTEROLISTHESIS IS PRESENT AT L2-3.  THERE IS MULTILEVEL DEGENERATIVE VERTEBRAL END PLATE SPURRING, DISC SPACE NARROWING, AND FACET ARTHROPATHY.  THE PEDICLES APPEAR INTACT.  THERE IS NO VERTEBRAL COMPRESSION FRACTURE.            Review of Systems:  Review of Systems   Constitutional: Negative for fever.   Eyes: Negative for blurred vision.   Respiratory: Negative for cough and wheezing.    Cardiovascular: Negative for chest pain and orthopnea.   Gastrointestinal: Negative for constipation, diarrhea, nausea and vomiting.   Genitourinary: Negative for dysuria.   Musculoskeletal: Positive for back pain.        Bilateral lower extremity pain   Skin: Negative for itching and rash.   Endo/Heme/Allergies: Does not bruise/bleed easily.         Physical Exam:  Vitals:  BP (!) 170/91 (BP Location: Right arm, Patient Position: Sitting, BP Method: Medium (Automatic))   Pulse 83   Resp 18   Ht 5' 2" (1.575 m)   Wt 57.2 kg (126 lb)   BMI 23.05 kg/m²   (reviewed on 5/22/2019)     General: alert and oriented, in no apparent distress.  Gait: normal gait.  Skin: no rashes, no discoloration, no obvious lesions  HEENT: normocephalic, atraumatic. Pupils equal and round.  Cardiovascular: no significant peripheral edema present.  Respiratory: without use of accessory muscles of respiration.     Musculoskeletal - Lumbar Spine:  - Pain on flexion of lumbar spine: Present  - Pain on extension of lumbar spine: " Present  - Lumbar facet loading: Absent   - TTP over the lumbar paraspinals: Present  - TTP over the SI joints:  Absent   - TTP over GT bursa: Absent   - Straight Leg Raise: Equivocal on left  - DEE DEE: Negative     Neuro - Lower Extremities:  - RLE Strength:     >> 5/5 strength with right hip flexion/ extension    >> 5/5 strength with right knee flexion/ extension    >> 5/5 strength in right ankle with plantar and dorsiflexion  - LLE Strength:     >> 4/5 strength with left hip flexion/ extension    >> 5/5 strength with knee flexion extension on the left     >> 5/5 strength in left ankle with plantar and dorsiflexion  - Extremity Reflexes: Brisk and symmetric throughout  - Sensory: Sensation to light touch decreased in dorsal foot on left, somewhat on right      Psych:  Mood and affect is appropriate           Assessment  1. 79 y.o. year old patient with PMH of        Past Medical History:   Diagnosis Date    Acid reflux      Anxiety      Back pain      Bronchitis, chronic obstructive w acute bronchitis 7/29/2016    Cancer       [facility] arms, face- Dr. Lata Tejada    Cataract       2+NS    Degenerative disc disease      Depression      Dry mouth      Hernia, hiatal 11/18/2013    Hypertension      Hypothyroid      Macrocytic anemia 5/3/2016    Macular degeneration      Migraines      Mixed anxiety and depressive disorder      Multiple fractures of ribs of right side      Osteoporosis      Pneumonia      Pneumonia due to other staphylococcus      Rheumatoid arthritis      Rheumatoid arthritis(714.0)      Rheumatoid arthritis(714.0)       Remicade, MTX.       presenting with pain located lumbar spine and bilateral lower extremities, mostly on left.  Diagnoses include:      ICD-10-CM ICD-9-CM   1. Lumbar radiculopathy M54.16 724.4   2. Spondylosis of lumbosacral region without myelopathy or radiculopathy M47.817 721.3   3. DDD (degenerative disc disease), lumbar M51.36 722.52   4. Lumbar foraminal  stenosis M99.83 724.02   5. Bilateral lumbar radiculopathy M54.16 724.4   6. Right foot pain M79.671 729.5      2. Pain Generators / Etiology: Lumbar Radiculopathy and Lumbar Spondylosis. Lumbar MRI shows multilevel foraminal stenosis with severe spinal stenosis at L4/5.   3. Failed Meds (E- Effective, NE- Not Effective):  Amitriptyline-minimally effective, Norco-minimally effective  4. Physical Therapy - Currently Participating  5. Psychological comorbidities - None  6. Anticoagulants / Antiplatelets: None        Plan:  1. Interventional:   - repeat L5/S1 TF AYAAN on 4/26/19 with 90% pain relief for 2-3 weeks. Pain is starting to return.    JONAH Felix MD  Interventional Pain Medicine  [facility] [place]

## 2019-06-25 NOTE — PLAN OF CARE
PT D/C'D HOME VIA WHEELCHAIR WITH FAMILY TO VEHICLE. STEPS TAKEN TO WHEELCHAIR, NO DEFICITS NOTED. PT VERBALIZED UNDERSTANDING OF D/C INSTRUCTIONS.

## 2019-06-25 NOTE — PLAN OF CARE
Received patient to unit via stretcher. Received patient status report and AVS from procedure nurse. Patient monitoring system attached, vital signs obtained and assessed.

## 2019-06-25 NOTE — DISCHARGE INSTRUCTIONS

## 2019-06-25 NOTE — OP NOTE
"Procedure: Lumbar Transforaminal Epidural Steroid Injection under Fluoroscopic Guidance (supraneural approach)    Level: L5/S1     Side: Left    PROCEDURE DATE: 6/25/2019    Pre-operative Diagnosis: Lumbar Radiculopathy  Post-operative Diagnosis: Lumbar Radiculopathy    Provider: JONAH Felix MD  Assistant(s): None    Anesthesia: Local, No Sedation    >> 0 mg of VERSED    >> 0 mcg of FENTANYL     Indication: Low back pain with radiculopathy consistent with distribution of targeted nerve. Symptoms unresponsive to conservative treatments. Fluoroscopy was used to optimize visualization of needle placement and to maximize safety.     Procedure Description / Technique:  The patient was seen and identified in the preoperative area. Risks, benefits, complications, and alternatives were discussed with the patient. The patient agreed to proceed with the procedure and signed the consent. The site and side of the procedure was identified and marked. An IV was not placed for this procedure. The patient was taken to the procedural suite.    The patient was positioned in prone orientation on procedure table and a pillow was placed under the abdomen to reduce lumbar lordosis. A time out was performed prior to any intervention. The procedure, site, side, and allergies were stated and agreed to by all present. The lumbosacral area was widely prepped with ChloraPrep. The procedural site was draped in usual sterile fashion. Vital signs were closely monitored throughout this procedure. Conscious sedation was not used for this procedure.    The target area was visualized under fluoroscopy. The cephalocaudal angle of the fluoroscope was adjusted as to align the vertebral end plates. The fluoroscopic arm was rotated ipsilaterally to an angle of approximately 30 degrees until the "saadia dog" outline came into view and the tip of the inferior superior articular process pointed towards the midline, 6:00 position of the above pedicle. A " "25 gauge 3.5 inch spinal needle was directed towards the "chin" of the "saadia dog" (adjacent to the pars interarticularis and inferior to the pedicle). The needle was advanced until OS was met at the inferior border of the pedicle / pars interface. The needle was adjusted so that it would pass inferior to the osseous border. The fluoroscope was then placed in the lateral position and the needle was slowly advanced until it rested in the posterior 1/3rd of the vertebral foramen. AP fluoroscopy was checked and the needle tip rested at the 6:00 position under the pedicle. No paresthesia was elicited during needle placement. With the needle tip in its final position, gentle aspiration was negative for blood and CSF. Omnipaque 240 (1 to 2 mL) was injected under live fluoroscopy. Microbore tubing was used for injection. There was no pain or paresthesia on injection. The contrast clearly delineated the targeted nerve root on AP fluoroscopy. No vascular uptake was seen. A solution containing 1 mL of 1% PF Lidocaine and 1 mL of Dexamethasone (10 mg/mL) was mixed and 2 mL was injected slowly at each level targeted. There was minimal resistance on injection. No pain or paresthesia was elicited on injection. The stylet was replaced and the needle was withdrawn intact. This procedure was performed for each of the above indicated levels.     Description of Findings: Not applicable    Prosthetic devices, grafts, tissues, or devices implanted: None    Specimen Removed: No    Estimated Blood Loss: minimal    COMPLICATIONS: None    DISPOSITION / PLANS: The patient was transferred to the recovery area in a stable condition for observation. The patient was reexamined prior to discharge. There was no evidence of acute neurologic injury following the procedure.  Patient was discharged from the recovery room after meeting discharge criteria. Home discharge instructions were given to the patient by the staff.    "

## 2019-06-25 NOTE — DISCHARGE SUMMARY
[facility]  Short Stay  Discharge Summary    Admit Date: 6/25/2019    Discharge Date and Time: 6/25/2019 11:02 AM      Discharge Attending Physician: JONAH Felix MD     Hospital Course (synopsis of major diagnoses, care, treatment, and services provided during the course of the hospital stay): Patient was admitted to Pre-op where informed consent was signed.  The patient was then taken to the procedure suite where the procedure was performed.  The patient was then return to the Pre-Op area and discharge was performed.     Final Diagnoses:    Principal Problem: <principal problem not specified>   Secondary Diagnoses: There are no hospital problems to display for this patient.      Discharged Condition: good    Disposition: Home or Self Care    Follow up/Patient Instructions:    Medications:  Reconciled Home Medications:      Medication List      CONTINUE taking these medications    ACTEMRA 80 mg/4 mL (20 mg/mL) Soln  Generic drug:  tocilizumab  Inject into the vein.     AFRIN (OXYMETAZOLINE) 0.05 % nasal spray  Generic drug:  oxymetazoline  1 Aerosol, Spray Nasal At bedtime     albuterol 2.5 mg /3 mL (0.083 %) nebulizer solution  Commonly known as:  PROVENTIL  Take 3 mLs (2.5 mg total) by nebulization every 6 (six) hours as needed for Wheezing.     amitriptyline 75 MG tablet  Commonly known as:  ELAVIL  TAKE 1 TABLET BY MOUTH EVERY EVENING     benzonatate 200 MG capsule  Commonly known as:  TESSALON  Take 1 capsule (200 mg total) by mouth 3 (three) times daily as needed for Cough.     CITRACAL PLUS D 315-200 mg-unit per tablet  Generic drug:  calcium citrate-vitamin D3 315-200 mg  Take 1 tablet by mouth once daily.     DULoxetine 20 MG capsule  Commonly known as:  CYMBALTA  Take 2 capsules (40 mg total) by mouth once daily.     ergocalciferol 50,000 unit Cap  Commonly known as:  ERGOCALCIFEROL  TAKE 1 CAPSULE BY MOUTH ONCE A WEEK     HYDROcodone-acetaminophen 5-325 mg per tablet  Commonly known  as:  NORCO  TK 1 T PO Q 6 H PRN     hydrOXYzine HCl 25 MG tablet  Commonly known as:  ATARAX  TAKE 1 TO 2 TABLETS(25 TO 50 MG) BY MOUTH EVERY NIGHT AS NEEDED FOR ANXIETY OR INSOMNIA     leucovorin 5 mg Tab  Commonly known as:  WELLCOVORIN  TAKE ONE WEEKLY ON SATURDAYS     levothyroxine 88 MCG tablet  Commonly known as:  SYNTHROID  TAKE 1 TABLET BY MOUTH BEFORE BREAKFAST     meclizine 50 MG tablet  Commonly known as:  ANTIVERT  Take 25 mg by mouth 3 (three) times daily as needed.     methotrexate 2.5 MG Tab  Take 17.5 mg by mouth every 7 days.     methylPREDNISolone 4 mg tablet  Commonly known as:  MEDROL DOSEPACK  use as directed     metoprolol succinate 50 MG 24 hr tablet  Commonly known as:  TOPROL-XL  TAKE 1 TABLET(50 MG) BY MOUTH EVERY DAY     multivitamin capsule  Take by mouth. As directed     ondansetron 4 MG tablet  Commonly known as:  ZOFRAN  Take 1 tablet (4 mg total) by mouth every 8 (eight) hours as needed for Nausea.     pravastatin 10 MG tablet  Commonly known as:  PRAVACHOL  TAKE 1 TABLET(10 MG) BY MOUTH EVERY DAY     valsartan-hydrochlorothiazide 80-12.5 mg per tablet  Commonly known as:  DIOVAN-HCT  TAKE 1 TABLET BY MOUTH DAILY          Discharge Procedure Orders   Diet general     Call MD for:  severe uncontrolled pain     Call MD for:  difficulty breathing, headache or visual disturbances     Call MD for:  redness, tenderness, or signs of infection (pain, swelling, redness, odor or green/yellow discharge around incision site)     Activity as tolerated

## 2019-07-02 NOTE — PROGRESS NOTES
"Subjective:      Patient ID: Sarah Parker is a 79 y.o. female.    Chief Complaint: Follow-up      HPI  Here for follow up of medical problems.  Depression much better on increased cymbalta, but pain not improved even with 2 ESIs.  Breathing ok lately.  No wt loss.      Updated/ annual due 9/19:  HM: 9/18 fluvax, 5/16 [id number], 10/14 [id number], 10/13 TDaP, 1/17 BMD/will bring to Dr. Tejada rep 2y, 2/19 MMG, 10/13 EGD, 2015 Cscope Dr. Garcia rep 5y, Pain Dr. Carlin.     Review of Systems   Constitutional: Negative for chills, diaphoresis and fever.   Respiratory: Negative for cough and shortness of breath.    Cardiovascular: Negative for chest pain, palpitations and leg swelling.   Gastrointestinal: Negative for blood in stool, constipation, diarrhea, nausea and vomiting.   Genitourinary: Negative for dysuria, frequency and hematuria.   Psychiatric/Behavioral: The patient is not nervous/anxious.          Objective:   /82   Pulse 86   Temp 98.4 °F (36.9 °C) (Oral)   Ht 5' 2" (1.575 m)   Wt 57.1 kg (125 lb 14.1 oz)   SpO2 95%   BMI 23.02 kg/m²     Physical Exam   Constitutional: She is oriented to person, place, and time. She appears well-developed.   HENT:   Mouth/Throat: Oropharynx is clear and moist.   Neck: Neck supple. Carotid bruit is not present. No thyroid mass present.   Cardiovascular: Normal rate, regular rhythm and intact distal pulses. Exam reveals no gallop and no friction rub.   No murmur heard.  Pulmonary/Chest: Effort normal and breath sounds normal. She has no wheezes. She has no rales.   Abdominal: Soft. Bowel sounds are normal. She exhibits no mass. There is no hepatosplenomegaly. There is no tenderness.   Musculoskeletal: She exhibits no edema.   Lymphadenopathy:     She has no cervical adenopathy.   Neurological: She is alert and oriented to person, place, and time.   Psychiatric: She has a normal mood and affect.           Assessment:       1. Essential hypertension    2. " Depression, recurrent    3. Acquired hypothyroidism    4. Rheumatoid arthritis involving right shoulder with positive rheumatoid factor    5. Rheumatoid lung    6. Simple chronic bronchitis    7. Preventive measure    8. Asymptomatic postmenopausal state    9. Osteopenia, unspecified location    10. Encounter for screening mammogram for malignant neoplasm of breast           Plan:     Essential hypertension- monitor BPs at home.    Depression, recurrent- doing much better, cont rx.    Acquired hypothyroidism- Clinically stable, continue present treatment.    Rheumatoid arthritis involving right shoulder with positive rheumatoid factor  Rheumatoid lung, Simple chronic bronchitis    Preventive measure- due in 2 mo.  -     CBC auto differential; Future; Expected date: 07/16/2019  -     Comprehensive metabolic panel; Future; Expected date: 07/16/2019  -     Lipid panel; Future; Expected date: 07/16/2019  -     TSH; Future; Expected date: 07/16/2019  -     Vitamin D; Future  -     DXA Bone Density Spine And Hip; Future; Expected date: 07/16/2019  -     Mammo Digital Screening Bilat; Future; Expected date: 07/16/2019    To see Jose D re abd adenopathy.

## 2019-07-08 ENCOUNTER — OFFICE VISIT (OUTPATIENT)
Dept: GASTROENTEROLOGY | Facility: CLINIC | Age: 79
End: 2019-07-08
Payer: MEDICARE

## 2019-07-08 ENCOUNTER — LAB VISIT (OUTPATIENT)
Dept: LAB | Facility: HOSPITAL | Age: 79
End: 2019-07-08
Attending: INTERNAL MEDICINE
Payer: MEDICARE

## 2019-07-08 VITALS
SYSTOLIC BLOOD PRESSURE: 132 MMHG | BODY MASS INDEX: 23.77 KG/M2 | HEIGHT: 62 IN | HEART RATE: 115 BPM | DIASTOLIC BLOOD PRESSURE: 74 MMHG | WEIGHT: 129.19 LBS

## 2019-07-08 DIAGNOSIS — R19.01 RIGHT UPPER QUADRANT ABDOMINAL MASS: ICD-10-CM

## 2019-07-08 DIAGNOSIS — N28.89 RENAL MASS: ICD-10-CM

## 2019-07-08 DIAGNOSIS — R19.01 RIGHT UPPER QUADRANT ABDOMINAL MASS: Primary | ICD-10-CM

## 2019-07-08 LAB
CREAT SERPL-MCNC: 1 MG/DL (ref 0.5–1.4)
EST. GFR  (AFRICAN AMERICAN): >60 ML/MIN/1.73 M^2
EST. GFR  (NON AFRICAN AMERICAN): 54.5 ML/MIN/1.73 M^2

## 2019-07-08 PROCEDURE — 3075F SYST BP GE 130 - 139MM HG: CPT | Mod: HCNC,CPTII,S$GLB, | Performed by: NURSE PRACTITIONER

## 2019-07-08 PROCEDURE — 99999 PR PBB SHADOW E&M-EST. PATIENT-LVL III: CPT | Mod: PBBFAC,HCNC,, | Performed by: NURSE PRACTITIONER

## 2019-07-08 PROCEDURE — 3075F PR MOST RECENT SYSTOLIC BLOOD PRESS GE 130-139MM HG: ICD-10-PCS | Mod: HCNC,CPTII,S$GLB, | Performed by: NURSE PRACTITIONER

## 2019-07-08 PROCEDURE — 99204 OFFICE O/P NEW MOD 45 MIN: CPT | Mod: HCNC,S$GLB,, | Performed by: NURSE PRACTITIONER

## 2019-07-08 PROCEDURE — 36415 COLL VENOUS BLD VENIPUNCTURE: CPT | Mod: HCNC

## 2019-07-08 PROCEDURE — 82565 ASSAY OF CREATININE: CPT | Mod: HCNC

## 2019-07-08 PROCEDURE — 99204 PR OFFICE/OUTPT VISIT, NEW, LEVL IV, 45-59 MIN: ICD-10-PCS | Mod: HCNC,S$GLB,, | Performed by: NURSE PRACTITIONER

## 2019-07-08 PROCEDURE — 3078F PR MOST RECENT DIASTOLIC BLOOD PRESSURE < 80 MM HG: ICD-10-PCS | Mod: HCNC,CPTII,S$GLB, | Performed by: NURSE PRACTITIONER

## 2019-07-08 PROCEDURE — 1101F PT FALLS ASSESS-DOCD LE1/YR: CPT | Mod: HCNC,CPTII,S$GLB, | Performed by: NURSE PRACTITIONER

## 2019-07-08 PROCEDURE — 99999 PR PBB SHADOW E&M-EST. PATIENT-LVL III: ICD-10-PCS | Mod: PBBFAC,HCNC,, | Performed by: NURSE PRACTITIONER

## 2019-07-08 PROCEDURE — 1101F PR PT FALLS ASSESS DOC 0-1 FALLS W/OUT INJ PAST YR: ICD-10-PCS | Mod: HCNC,CPTII,S$GLB, | Performed by: NURSE PRACTITIONER

## 2019-07-08 PROCEDURE — 3078F DIAST BP <80 MM HG: CPT | Mod: HCNC,CPTII,S$GLB, | Performed by: NURSE PRACTITIONER

## 2019-07-08 NOTE — PROGRESS NOTES
Clinic Consult:  [facility] Consultation Note    Reason for Consult:  The primary encounter diagnosis was Right upper quadrant abdominal mass. A diagnosis of Renal mass was also pertinent to this visit.    PCP: Briseida Bennett   No address on file    HPI:  This is a 79 y.o. female here for evaluation of the above. She presents to clinic with complaints of painless palpable mass over right abdomen. Onset started 2 weeks ago. She reports seeing and feeling this mass only when standing up. She says it goes away with laying down. Her last colonoscopy was 4-5 years ago at [facility]. She is scheduled for a CT scan in August for surveillance of renal mass.     Review of Systems   Constitutional: Negative for fever, malaise/fatigue and weight loss.   HENT: Negative for sore throat.    Respiratory: Negative for cough and wheezing.    Cardiovascular: Negative for chest pain and palpitations.   Gastrointestinal: Negative for abdominal pain, blood in stool, constipation, diarrhea, heartburn, melena, nausea and vomiting.   Genitourinary: Negative for dysuria and frequency.   Musculoskeletal: Negative for back pain, joint pain, myalgias and neck pain.   Skin: Negative for itching and rash.   Neurological: Negative for dizziness, speech change, seizures, loss of consciousness and headaches.   Psychiatric/Behavioral: Negative for depression and substance abuse. The patient is not nervous/anxious.        Medical History:  has a past medical history of Acid reflux, Anxiety, Back pain, Bronchitis, chronic obstructive w acute bronchitis (7/29/2016), Cancer, Cataract, Degenerative disc disease, Depression, Dry mouth, Hernia, hiatal (11/18/2013), Hypertension, Hypothyroid, Macrocytic anemia (5/3/2016), Macular degeneration, Migraines, Mixed anxiety and depressive disorder, Multiple fractures of ribs of right side, Osteoporosis, Pneumonia, Pneumonia due to other staphylococcus, Rheumatoid arthritis, Rheumatoid  arthritis(714.0), and Rheumatoid arthritis(714.0).    Surgical History:  has a past surgical history that includes Laparoscopic Nissen fundoplication; Hernia repair; Cataract extraction (Bilateral, 6/11/15); Fracture surgery (Right); feet (Bilateral); cryoablasion kidney (Left, 09/27/2016); Joint replacement; Hysterectomy (1970); Cholecystectomy (2013); Appendectomy (1985); and Transforaminal epidural injection of steroid (Left, 6/25/2019).    Family History: family history includes Asthma in her brother and sister; Cancer in her brother and maternal grandfather; Cataracts in her mother; Chronic back pain in her brother and sister; Diabetes Mellitus in her brother; Fibromyalgia in her daughter; Heart disease in her father, maternal grandmother, and mother; Hyperlipidemia in her mother; Hypertension in her brother, father, mother, and sister; Osteoarthritis in her brother, father, mother, and sister; Thyroid disease in her brother and sister..     Social History:  reports that she quit smoking about 53 years ago. She has a 0.50 pack-year smoking history. She has never used smokeless tobacco. She reports that she does not drink alcohol or use drugs.    Allergies: Reviewed    Home Medications:   Current Outpatient Medications on File Prior to Visit   Medication Sig Dispense Refill    albuterol (PROVENTIL) 2.5 mg /3 mL (0.083 %) nebulizer solution Take 3 mLs (2.5 mg total) by nebulization every 6 (six) hours as needed for Wheezing. 1 Box 5    amitriptyline (ELAVIL) 75 MG tablet TAKE 1 TABLET BY MOUTH EVERY EVENING 90 tablet 3    benzonatate (TESSALON) 200 MG capsule Take 1 capsule (200 mg total) by mouth 3 (three) times daily as needed for Cough. 21 capsule 0    calcium citrate-vitamin D (CITRACAL + D) 315-200 mg-unit per tablet Take 1 tablet by mouth once daily.       DULoxetine (CYMBALTA) 20 MG capsule Take 2 capsules (40 mg total) by mouth once daily. 180 capsule 3    hydrocodone-acetaminophen 5-325mg (NORCO)  "5-325 mg per tablet TK 1 T PO Q 6 H PRN  0    hydrOXYzine HCl (ATARAX) 25 MG tablet TAKE 1 TO 2 TABLETS(25 TO 50 MG) BY MOUTH EVERY NIGHT AS NEEDED FOR ANXIETY OR INSOMNIA 60 tablet 3    leucovorin (WELLCOVORIN) 5 mg Tab TAKE ONE WEEKLY ON SATURDAYS 4 tablet 3    levothyroxine (SYNTHROID) 88 MCG tablet TAKE 1 TABLET BY MOUTH BEFORE BREAKFAST 90 tablet 3    methotrexate 2.5 MG Tab Take 17.5 mg by mouth every 7 days.       multivitamin capsule Take by mouth. As directed      ondansetron (ZOFRAN) 4 MG tablet Take 1 tablet (4 mg total) by mouth every 8 (eight) hours as needed for Nausea. 30 tablet 1    oxymetazoline (AFRIN) 0.05 % nasal spray 1 Aerosol, Spray Nasal At bedtime      pravastatin (PRAVACHOL) 10 MG tablet TAKE 1 TABLET(10 MG) BY MOUTH EVERY DAY 30 tablet 11    tocilizumab (ACTEMRA) 80 mg/4 mL (20 mg/mL) Soln Inject into the vein.      meclizine (ANTIVERT) 50 MG tablet Take 25 mg by mouth 3 (three) times daily as needed.       Current Facility-Administered Medications on File Prior to Visit   Medication Dose Route Frequency Provider Last Rate Last Dose    methylPREDNISolone acetate injection 60 mg  60 mg Intramuscular 1 time in Clinic/HOD Briseida Reyes MD           Physical Exam:  /74   Pulse (!) 115   Ht 5' 2" (1.575 m)   Wt 58.6 kg (129 lb 3 oz)   BMI 23.63 kg/m²   Body mass index is 23.63 kg/m².  Physical Exam   Constitutional: She is oriented to person, place, and time and well-developed, well-nourished, and in no distress. No distress.   HENT:   Head: Normocephalic.   Eyes: Pupils are equal, round, and reactive to light. Conjunctivae are normal.   Cardiovascular: Normal rate, regular rhythm and normal heart sounds.   Pulmonary/Chest: Effort normal and breath sounds normal. No respiratory distress.   Abdominal: Soft. Bowel sounds are normal. She exhibits no distension. There is no tenderness.       Neurological: She is alert and oriented to person, place, and time. No cranial " nerve deficit.   Skin: Skin is warm and dry. No rash noted.   Psychiatric: Mood and affect normal.       Labs: Pertinent labs reviewed.    Assessment:  1. Right upper quadrant abdominal mass    2. Renal mass         Recommendations:  - will plan on getting CT scan now. Will also add dx of renal mass so she will not need repeat CT scan in August.   -     CT Abdomen Pelvis W Wo Contrast; Future; Expected date: 07/08/2019  -     Creatinine, serum; Future; Expected date: 07/08/2019    Follow up to be determined after procedure.    Thank you so much for allowing me to participate in the care of LEXI Nieves

## 2019-07-09 ENCOUNTER — HOSPITAL ENCOUNTER (OUTPATIENT)
Dept: RADIOLOGY | Facility: HOSPITAL | Age: 79
Discharge: HOME | End: 2019-07-09
Attending: NURSE PRACTITIONER
Payer: MEDICARE

## 2019-07-09 DIAGNOSIS — N28.89 RENAL MASS: ICD-10-CM

## 2019-07-09 DIAGNOSIS — R19.01 RIGHT UPPER QUADRANT ABDOMINAL MASS: ICD-10-CM

## 2019-07-09 PROCEDURE — 74178 CT ABD&PLV WO CNTR FLWD CNTR: CPT | Mod: 26,HCNC,, | Performed by: RADIOLOGY

## 2019-07-09 PROCEDURE — 74178 CT ABDOMEN PELVIS W WO CONTRAST: ICD-10-PCS | Mod: 26,HCNC,, | Performed by: RADIOLOGY

## 2019-07-09 PROCEDURE — 74178 CT ABD&PLV WO CNTR FLWD CNTR: CPT | Mod: TC,HCNC,PO

## 2019-07-09 PROCEDURE — 25500020 PHARM REV CODE 255: Mod: HCNC,PO | Performed by: NURSE PRACTITIONER

## 2019-07-09 RX ADMIN — IOHEXOL 75 ML: 350 INJECTION, SOLUTION INTRAVENOUS at 08:07

## 2019-07-12 ENCOUNTER — TELEPHONE (OUTPATIENT)
Dept: GASTROENTEROLOGY | Facility: CLINIC | Age: 79
End: 2019-07-12

## 2019-07-12 NOTE — TELEPHONE ENCOUNTER
----- Message from Miah Brown sent at 7/12/2019  9:35 AM CDT -----  Contact: [phone]  Would like a nurse to contact her regarding her MRI state she was told she woiuld be called today, she can be reached @ [phone]. Thanks

## 2019-07-15 ENCOUNTER — TELEPHONE (OUTPATIENT)
Dept: FAMILY MEDICINE | Facility: CLINIC | Age: 79
End: 2019-07-15

## 2019-07-15 NOTE — TELEPHONE ENCOUNTER
----- Message from Macie Lutz sent at 7/15/2019  9:33 AM CDT -----  Contact: self  needs call back regarding appointment, will elaborate..[phone] (home)

## 2019-07-16 ENCOUNTER — OFFICE VISIT (OUTPATIENT)
Dept: FAMILY MEDICINE | Facility: CLINIC | Age: 79
End: 2019-07-16
Payer: MEDICARE

## 2019-07-16 VITALS
WEIGHT: 125.88 LBS | HEART RATE: 86 BPM | BODY MASS INDEX: 23.17 KG/M2 | HEIGHT: 62 IN | TEMPERATURE: 98 F | DIASTOLIC BLOOD PRESSURE: 82 MMHG | OXYGEN SATURATION: 95 % | SYSTOLIC BLOOD PRESSURE: 135 MMHG

## 2019-07-16 DIAGNOSIS — M05.711 RHEUMATOID ARTHRITIS INVOLVING RIGHT SHOULDER WITH POSITIVE RHEUMATOID FACTOR: Chronic | ICD-10-CM

## 2019-07-16 DIAGNOSIS — Z29.9 PREVENTIVE MEASURE: ICD-10-CM

## 2019-07-16 DIAGNOSIS — M85.80 OSTEOPENIA, UNSPECIFIED LOCATION: ICD-10-CM

## 2019-07-16 DIAGNOSIS — M05.10 RHEUMATOID LUNG: ICD-10-CM

## 2019-07-16 DIAGNOSIS — E03.9 ACQUIRED HYPOTHYROIDISM: ICD-10-CM

## 2019-07-16 DIAGNOSIS — F33.9 DEPRESSION, RECURRENT: ICD-10-CM

## 2019-07-16 DIAGNOSIS — Z78.0 ASYMPTOMATIC POSTMENOPAUSAL STATE: ICD-10-CM

## 2019-07-16 DIAGNOSIS — J41.0 SIMPLE CHRONIC BRONCHITIS: ICD-10-CM

## 2019-07-16 DIAGNOSIS — Z12.31 ENCOUNTER FOR SCREENING MAMMOGRAM FOR MALIGNANT NEOPLASM OF BREAST: ICD-10-CM

## 2019-07-16 DIAGNOSIS — I10 ESSENTIAL HYPERTENSION: Primary | Chronic | ICD-10-CM

## 2019-07-16 PROCEDURE — 3079F PR MOST RECENT DIASTOLIC BLOOD PRESSURE 80-89 MM HG: ICD-10-PCS | Mod: HCNC,CPTII,S$GLB, | Performed by: INTERNAL MEDICINE

## 2019-07-16 PROCEDURE — 99213 PR OFFICE/OUTPT VISIT, EST, LEVL III, 20-29 MIN: ICD-10-PCS | Mod: HCNC,S$GLB,, | Performed by: INTERNAL MEDICINE

## 2019-07-16 PROCEDURE — 99213 OFFICE O/P EST LOW 20 MIN: CPT | Mod: HCNC,S$GLB,, | Performed by: INTERNAL MEDICINE

## 2019-07-16 PROCEDURE — 3079F DIAST BP 80-89 MM HG: CPT | Mod: HCNC,CPTII,S$GLB, | Performed by: INTERNAL MEDICINE

## 2019-07-16 PROCEDURE — 99999 PR PBB SHADOW E&M-EST. PATIENT-LVL III: ICD-10-PCS | Mod: PBBFAC,HCNC,, | Performed by: INTERNAL MEDICINE

## 2019-07-16 PROCEDURE — 99999 PR PBB SHADOW E&M-EST. PATIENT-LVL III: CPT | Mod: PBBFAC,HCNC,, | Performed by: INTERNAL MEDICINE

## 2019-07-16 PROCEDURE — 3075F PR MOST RECENT SYSTOLIC BLOOD PRESS GE 130-139MM HG: ICD-10-PCS | Mod: HCNC,CPTII,S$GLB, | Performed by: INTERNAL MEDICINE

## 2019-07-16 PROCEDURE — 3075F SYST BP GE 130 - 139MM HG: CPT | Mod: HCNC,CPTII,S$GLB, | Performed by: INTERNAL MEDICINE

## 2019-07-16 PROCEDURE — 1101F PR PT FALLS ASSESS DOC 0-1 FALLS W/OUT INJ PAST YR: ICD-10-PCS | Mod: HCNC,CPTII,S$GLB, | Performed by: INTERNAL MEDICINE

## 2019-07-16 PROCEDURE — 1101F PT FALLS ASSESS-DOCD LE1/YR: CPT | Mod: HCNC,CPTII,S$GLB, | Performed by: INTERNAL MEDICINE

## 2019-07-16 RX ORDER — METOPROLOL SUCCINATE 100 MG/1
100 TABLET, EXTENDED RELEASE ORAL DAILY
COMMUNITY
End: 2019-11-21

## 2019-07-19 ENCOUNTER — TELEPHONE (OUTPATIENT)
Dept: UROLOGY | Facility: CLINIC | Age: 79
End: 2019-07-19

## 2019-07-19 ENCOUNTER — TELEPHONE (OUTPATIENT)
Dept: HEMATOLOGY/ONCOLOGY | Facility: CLINIC | Age: 79
End: 2019-07-19

## 2019-07-19 DIAGNOSIS — R59.1 LYMPHADENOPATHY: Primary | ICD-10-CM

## 2019-07-19 NOTE — TELEPHONE ENCOUNTER
Pt called back regarding message left.  Pt rescheduled appt with Dr. Devine to Tuesday 7/23 at 10:40.  Pt verbalized understanding of date, time, and location.

## 2019-07-19 NOTE — TELEPHONE ENCOUNTER
LM for pt to call back to reschedule appt with Dr. Devine to sooner date.  Pt now with enlarged lymph nodes.

## 2019-07-19 NOTE — TELEPHONE ENCOUNTER
----- Message from Donald Cuello MD sent at 7/19/2019 11:12 AM CDT -----  I would refer to Heme Onc for sure    ----- Message -----  From: Nicolle Martinez NP  Sent: 7/17/2019  12:37 PM  To: Donald Cuello MD    I was wondering if you could look at a Ct scan for me dated 7/9/19. I saw her for a palpable mass over right abdomen. It is a hardened protrusion over her right mid abdomen (close to her side) that is only felt when she is standing up. Nothing is palpated when she is laying down. I checked a CT scan that showed bilateral enlarged lymph node chain. I am not sure this is contributory to her symptoms. Would this need follow up with hem/onc. She is already seeing quezada for kidney abnormality.   Thanks,  Nicolle

## 2019-07-23 ENCOUNTER — OFFICE VISIT (OUTPATIENT)
Dept: HEMATOLOGY/ONCOLOGY | Facility: CLINIC | Age: 79
End: 2019-07-23
Payer: MEDICARE

## 2019-07-23 ENCOUNTER — LAB VISIT (OUTPATIENT)
Dept: LAB | Facility: HOSPITAL | Age: 79
End: 2019-07-23
Attending: INTERNAL MEDICINE
Payer: MEDICARE

## 2019-07-23 VITALS
HEART RATE: 89 BPM | BODY MASS INDEX: 23.05 KG/M2 | DIASTOLIC BLOOD PRESSURE: 69 MMHG | SYSTOLIC BLOOD PRESSURE: 160 MMHG | WEIGHT: 125.25 LBS | TEMPERATURE: 97 F | HEIGHT: 62 IN | OXYGEN SATURATION: 98 % | RESPIRATION RATE: 17 BRPM

## 2019-07-23 DIAGNOSIS — D72.829 LEUKOCYTOSIS, UNSPECIFIED TYPE: ICD-10-CM

## 2019-07-23 DIAGNOSIS — R59.0 INGUINAL LYMPHADENOPATHY: ICD-10-CM

## 2019-07-23 DIAGNOSIS — D53.9 MACROCYTIC ANEMIA: Primary | ICD-10-CM

## 2019-07-23 DIAGNOSIS — D53.9 MACROCYTIC ANEMIA: ICD-10-CM

## 2019-07-23 DIAGNOSIS — R93.5 ABNORMAL FINDINGS ON DIAGNOSTIC IMAGING OF OTHER ABDOMINAL REGIONS, INCLUDING RETROPERITONEUM: ICD-10-CM

## 2019-07-23 LAB
ALBUMIN SERPL BCP-MCNC: 3.7 G/DL (ref 3.5–5.2)
ALP SERPL-CCNC: 116 U/L (ref 55–135)
ALT SERPL W/O P-5'-P-CCNC: 13 U/L (ref 10–44)
ANION GAP SERPL CALC-SCNC: 11 MMOL/L (ref 8–16)
AST SERPL-CCNC: 16 U/L (ref 10–40)
BASOPHILS # BLD AUTO: 0.02 K/UL (ref 0–0.2)
BASOPHILS NFR BLD: 0.1 % (ref 0–1.9)
BILIRUB SERPL-MCNC: 0.4 MG/DL (ref 0.1–1)
BUN SERPL-MCNC: 16 MG/DL (ref 8–23)
CALCIUM SERPL-MCNC: 9.4 MG/DL (ref 8.7–10.5)
CHLORIDE SERPL-SCNC: 102 MMOL/L (ref 95–110)
CO2 SERPL-SCNC: 23 MMOL/L (ref 23–29)
CREAT SERPL-MCNC: 0.9 MG/DL (ref 0.5–1.4)
DIFFERENTIAL METHOD: ABNORMAL
EOSINOPHIL # BLD AUTO: 0.1 K/UL (ref 0–0.5)
EOSINOPHIL NFR BLD: 0.4 % (ref 0–8)
ERYTHROCYTE [DISTWIDTH] IN BLOOD BY AUTOMATED COUNT: 19.1 % (ref 11.5–14.5)
EST. GFR  (AFRICAN AMERICAN): >60 ML/MIN/1.73 M^2
EST. GFR  (NON AFRICAN AMERICAN): >60 ML/MIN/1.73 M^2
GLUCOSE SERPL-MCNC: 104 MG/DL (ref 70–110)
HCT VFR BLD AUTO: 27.9 % (ref 37–48.5)
HGB BLD-MCNC: 8.9 G/DL (ref 12–16)
LDH SERPL L TO P-CCNC: 231 U/L (ref 110–260)
LYMPHOCYTES # BLD AUTO: 7.8 K/UL (ref 1–4.8)
LYMPHOCYTES NFR BLD: 38.4 % (ref 18–48)
MCH RBC QN AUTO: 34.9 PG (ref 27–31)
MCHC RBC AUTO-ENTMCNC: 31.9 G/DL (ref 32–36)
MCV RBC AUTO: 109 FL (ref 82–98)
MONOCYTES # BLD AUTO: 4.4 K/UL (ref 0.3–1)
MONOCYTES NFR BLD: 21.5 % (ref 4–15)
NEUTROPHILS # BLD AUTO: 8.1 K/UL (ref 1.8–7.7)
NEUTROPHILS NFR BLD: 44.4 % (ref 38–73)
PLATELET # BLD AUTO: 327 K/UL (ref 150–350)
PMV BLD AUTO: 9.4 FL (ref 9.2–12.9)
POTASSIUM SERPL-SCNC: 4 MMOL/L (ref 3.5–5.1)
PROT SERPL-MCNC: 8.6 G/DL (ref 6–8.4)
RBC # BLD AUTO: 2.55 M/UL (ref 4–5.4)
SODIUM SERPL-SCNC: 136 MMOL/L (ref 136–145)
WBC # BLD AUTO: 20.32 K/UL (ref 3.9–12.7)

## 2019-07-23 PROCEDURE — 1101F PT FALLS ASSESS-DOCD LE1/YR: CPT | Mod: HCNC,CPTII,S$GLB, | Performed by: INTERNAL MEDICINE

## 2019-07-23 PROCEDURE — 99499 UNLISTED E&M SERVICE: CPT | Mod: HCNC,S$GLB,, | Performed by: INTERNAL MEDICINE

## 2019-07-23 PROCEDURE — 3078F DIAST BP <80 MM HG: CPT | Mod: HCNC,CPTII,S$GLB, | Performed by: INTERNAL MEDICINE

## 2019-07-23 PROCEDURE — 99499 RISK ADDL DX/OHS AUDIT: ICD-10-PCS | Mod: HCNC,S$GLB,, | Performed by: INTERNAL MEDICINE

## 2019-07-23 PROCEDURE — 99999 PR PBB SHADOW E&M-EST. PATIENT-LVL IV: CPT | Mod: PBBFAC,HCNC,, | Performed by: INTERNAL MEDICINE

## 2019-07-23 PROCEDURE — 85025 COMPLETE CBC W/AUTO DIFF WBC: CPT | Mod: HCNC

## 2019-07-23 PROCEDURE — 99214 PR OFFICE/OUTPT VISIT, EST, LEVL IV, 30-39 MIN: ICD-10-PCS | Mod: HCNC,S$GLB,, | Performed by: INTERNAL MEDICINE

## 2019-07-23 PROCEDURE — 3077F SYST BP >= 140 MM HG: CPT | Mod: HCNC,CPTII,S$GLB, | Performed by: INTERNAL MEDICINE

## 2019-07-23 PROCEDURE — 3077F PR MOST RECENT SYSTOLIC BLOOD PRESSURE >= 140 MM HG: ICD-10-PCS | Mod: HCNC,CPTII,S$GLB, | Performed by: INTERNAL MEDICINE

## 2019-07-23 PROCEDURE — 99999 PR PBB SHADOW E&M-EST. PATIENT-LVL IV: ICD-10-PCS | Mod: PBBFAC,HCNC,, | Performed by: INTERNAL MEDICINE

## 2019-07-23 PROCEDURE — 36415 COLL VENOUS BLD VENIPUNCTURE: CPT | Mod: HCNC

## 2019-07-23 PROCEDURE — 1101F PR PT FALLS ASSESS DOC 0-1 FALLS W/OUT INJ PAST YR: ICD-10-PCS | Mod: HCNC,CPTII,S$GLB, | Performed by: INTERNAL MEDICINE

## 2019-07-23 PROCEDURE — 99214 OFFICE O/P EST MOD 30 MIN: CPT | Mod: HCNC,S$GLB,, | Performed by: INTERNAL MEDICINE

## 2019-07-23 PROCEDURE — 3078F PR MOST RECENT DIASTOLIC BLOOD PRESSURE < 80 MM HG: ICD-10-PCS | Mod: HCNC,CPTII,S$GLB, | Performed by: INTERNAL MEDICINE

## 2019-07-23 PROCEDURE — 83615 LACTATE (LD) (LDH) ENZYME: CPT | Mod: HCNC

## 2019-07-23 PROCEDURE — 80053 COMPREHEN METABOLIC PANEL: CPT | Mod: HCNC

## 2019-07-23 NOTE — PROGRESS NOTES
Reason for visit: Chronic leukocytosis    HPI:   The patient is a 79-year-old  female who presents to the hematology oncology clinic today to discuss further evaluation and management recommendations for leukocytosis.  I have reviewed all of the patient's relevant clinical history available in the medical record including her records from care everywhere.  Today the patient reports that she had been having mild abdominal pain associated with a palpable mass in her right abdominal quadrant which she had noticed a few weeks ago.  This led to CT imaging which was significant for pelvic lymphadenopathy.  She was also noted to have increased leukocytosis during her recent lab work with Dr. Tejada with Rheumatology.  The patient presents to the clinic today to discuss further recommendations.    She reports that her chronic pain from rheumatoid arthritis is stable.  She reports chronic fatigue.  She denies any fevers, chills or night sweats.  She denies any loss of appetite or unintentional weight loss.  She denies any chest pain or shortness of breath.  She denies any melena, hematochezia, hematemesis, hemoptysis or hematuria.  She reports being up-to-date with all of her age-appropriate cancer screening. She denies any bowel or urinary complaints.  She denies any nausea, vomiting or abdominal pain.  The patient reports taking weekly methotrexate with supplemental folic acid and actemra for treatment of rheumatoid arthritis under the supervision of Dr. Chase Tejada with rheumatology.     PAST MEDICAL HISTORY:   1.  Rheumatoid arthritis  2.  Hypertension  3.  Anxiety  4.  Hypothyroidism  5.  Vitamin D deficiency  6.  Osteoporosis  7.  History of hiatal hernia with GERD  8.  Age-related macular degeneration  9.  Melanoma treated in 2018    SURGICAL HISTORY:   1.  Bilateral wrist surgery  2.  Bilateral knee replacement  3.  Bilateral foot surgery  4.  Cholecystectomy  5.  Nissen fundoplication  6.   Appendectomy  7.  SAY with BSO  8.  Bilateral cataract extraction  9.  Multiple resections of localized skin cancer from the forearm  10. Cyroablation of left renal mass in sep 2016    FAMILY HISTORY: The patient's brother was treated for pancreatic cancer which was diagnosed at the age of 70.  She denies any other immediate family members with cancer or bleeding/clotting disorders.    SOCIAL HISTORY: She reports a 0.5-pack-year smoking history and quit in 1965.  She denies any alcohol use or recreational drug use.  She used to work as a  and retired at the age of 64.  She is  and has 2 daughters.  She lives in [place], [place].    ALLERGIES: Reviewed on medication card.    MEDICATIONS: [Medcard has been reviewed and/or reconciled.]    REVIEW OF SYSTEMS:   GENERAL: [No fevers, chills or sweats. Reports chronic fatigue. Denies weight loss or loss of appetite.]  HEENT: [No blurred vision, tinnitus, nasal discharge, sorethroat or dysphagia.]  HEART: [No chest pain, palpitations or shortness of breath.]    LUNGS: [Reports cough. Denies hemoptysis or breathing problems.]  ABDOMEN: [No abdominal pain, nausea, vomiting, diarrhea, constipation or melena.]  GENITOURINARY: [No dysuria, bleeding or malodorous discharge.]  NEURO: [No headache, dizziness or vertigo.]  HEMATOLOGY: [No easy bruising, spontaneous bleeding or blood clots in the past].  MUSCULOSKELETAL: [Chronic arthralgias. Denies myalgias or bone pains.]  SKIN: [No rashes or skin lesions.]  PSYCHIATRY: [No depression. Reports h/o anxiety.]    PHYSICAL EXAMINATION:   VS: Reviewed on nurse's notes.  APPEARANCE: The patient is a well-developed, well-nourished and well-groomed elderly  female who appears in no acute distress.    HEENT: No scleral icterus. Both external auditory canals clear. No oral ulcers, lesions. Throat clear  HEAD: No sinus tenderness.  NECK: Supple. No palpable lymphadenopathy. Thyroid non-tender, no palpable  masses  CHEST: Breath sounds clear bilaterally. Occasional crackles/rales bilaterally. No rhonchi. Unlabored respirations.  CARDIOVASCULAR: Normal S1, S2. Normal rate. Regular rhythm.  ABDOMEN: Bowel sounds normal. No tenderness. No abdominal distention. No hepatomegaly. No splenomegaly.  There is 4 cm x 4 cm diffuse palpable mass in the right lower abdominal quadrant with no significant tenderness to palpation (?  Lipoma)  LYMPHATIC: No palpable supraclavicular, axillary nodes. She has palpable bilateral inguinal lymphadenopathy.  EXTREMITIES: No clubbing, cyanosis.   SKIN: No lesions. No petechiae. No ecchymoses. No induration or nodules  NEUROLOGIC: No focal findings. Alert & Oriented x 3. Mood appropriate to affect    LABS:   Reviewed    IMAGING:  Reviewed    IMPRESSION:  1.  Chronic leukocytosis  2.  Chronic macrocytic anemia  3.  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  Bilateral lung inflammation [rheumatoid lung]  5.  Left kidney complex cyst s/p cryoablation in sep 2016  6.  Bilateral inguinal lymphadenopathy    PLAN:  1.  I had a detailed discussion with the patient previously with regard to the various possible etiologies for leukocytosis.  Review of the patient's medical record shows that this has been chronically present on and off for several years.  The patient recalls having been evaluated by a hematologist greater than 10 years ago for this and also underwent bone marrow aspiration and biopsy.  She reports that all of the results were benign at that time.  2.  Review of her peripheral smear does not show any significant abnormalities.  Her rheumatoid arthritis appears to be well-controlled at this time as noted by her inflammatory markers.  3.  Results of labwork done for further evaluation of her chronic macrocytic anemia were previously reviewed in detail.  This is most likely due to her chronic treatment with methotrexate.  Vitamin B12 and folate levels look ok.   4.  I had a detailed  discussion about the various possible etiologies for her monoclonal paraproteinemia. Results of prior 24 hour UPEP with immunofixation reviewed and also look unremarkable.  5.  Results of CT scans of the thorax/abdomen/pelvis to evaluate for any evidence of pathologic lymphadenopathy suggestive of any evidence of malignancy in the context of leukocytosis with history of rheumatoid arthritis and immunosuppressive therapy were discussed in detail. Recent PET/CT results were also reviewed. Continue pulmonary follow up with Dr. Varela as recommended.  6.  Continue follow up with Dr. Beck with urology as recommended.  7.  Results of final report of bone marrow aspiration and biopsy done at [facility] done on 6/1/16 were discussed in detail.  She has a hypercellular marrow with trilineage dyspoiesis and megakaryocytic hyperplasia.  She has relatively increased atypical myeloid blasts which constitute 5% of analyzed cells and approximately 5% clonal lambda restricted plasma cells.  She also has a small CD5 positive clonal B-cell population which constitutes 1% of the sample with a B-cell chronic lymphocytic leukemia/small lymphocytic lymphoma immunophenotype identified by flow cytometry only.  She has adequate bone marrow storage iron with no ringed sideroblasts. She had an abnormal myeloma FISH panel but the overall clinical picture at this time does not appear to support a diagnosis of multiple myeloma. Her overall clinical picture is most suggestive of medication related changes due to methotrexate and less likely to be other etiologies including a myelodysplastic/myeloproliferative neoplasm.  However we will continue with close monitoring at this time. We discussed repeating bone marrow biopsy in the near future based on follow up over the next few months if indicated.  8.  We discussed that at this time I would recommend proceeding with repeat PET-CT scan and I will consider proceeding with excisional biopsy  for further diagnostic workup based on results.  I will also check lab work today.    Follow-up after PET-CT scan to review results and discuss further management. She knows to call sooner for any new problems or questions.    Sathish Devnie MD

## 2019-07-23 NOTE — LETTER
July 23, 2019      [place]  [address] [address] [facility] [address]            [facility]  [address] [address] [facility] [address]  Phone: [phone]  Fax: [phone]          Patient: Sarah Parker   MR Number: [id number]   YOB: 1940   Date of Visit: 7/23/2019       Dear Nicolle Martinez:    Thank you for referring Sarah Parker to me for evaluation. Attached you will find relevant portions of my assessment and plan of care.    If you have questions, please do not hesitate to call me. I look forward to following Sarah Parker along with you.    Sincerely,    Sathish Devine MD    Enclosure  CC:  No Recipients    If you would like to receive this communication electronically, please contact [email or web address] or [phone] to request more information on [email or web address] access.    For providers and/or their staff who would like to refer a patient to [facility], please contact us through our one-stop-shop provider referral line, Prashant Smith, at [phone].    If you feel you have received this communication in error or would no longer like to receive these types of communications, please e-mail [email or web address]

## 2019-07-25 ENCOUNTER — TELEPHONE (OUTPATIENT)
Dept: RADIOLOGY | Facility: HOSPITAL | Age: 79
End: 2019-07-25

## 2019-07-29 ENCOUNTER — HOSPITAL ENCOUNTER (OUTPATIENT)
Dept: RADIOLOGY | Facility: HOSPITAL | Age: 79
Discharge: HOME | End: 2019-07-29
Attending: INTERNAL MEDICINE
Payer: MEDICARE

## 2019-07-29 DIAGNOSIS — R93.5 ABNORMAL FINDINGS ON DIAGNOSTIC IMAGING OF OTHER ABDOMINAL REGIONS, INCLUDING RETROPERITONEUM: ICD-10-CM

## 2019-07-29 DIAGNOSIS — R59.0 INGUINAL LYMPHADENOPATHY: ICD-10-CM

## 2019-07-29 DIAGNOSIS — D53.9 MACROCYTIC ANEMIA: ICD-10-CM

## 2019-07-29 DIAGNOSIS — D72.829 LEUKOCYTOSIS, UNSPECIFIED TYPE: ICD-10-CM

## 2019-07-29 PROCEDURE — A9552 F18 FDG: HCPCS | Mod: HCNC

## 2019-07-29 PROCEDURE — 78815 NM PET CT ROUTINE: ICD-10-PCS | Mod: 26,PS,HCNC, | Performed by: RADIOLOGY

## 2019-07-29 PROCEDURE — 78815 PET IMAGE W/CT SKULL-THIGH: CPT | Mod: 26,PS,HCNC, | Performed by: RADIOLOGY

## 2019-07-29 PROCEDURE — 78815 PET IMAGE W/CT SKULL-THIGH: CPT | Mod: TC,HCNC,PS

## 2019-07-30 RX ORDER — VALSARTAN AND HYDROCHLOROTHIAZIDE 80; 12.5 MG/1; MG/1
TABLET, FILM COATED ORAL
Qty: 90 TABLET | Refills: 3 | Status: SHIPPED | OUTPATIENT
Start: 2019-07-30

## 2019-08-02 ENCOUNTER — OFFICE VISIT (OUTPATIENT)
Dept: HEMATOLOGY/ONCOLOGY | Facility: CLINIC | Age: 79
End: 2019-08-02
Payer: MEDICARE

## 2019-08-02 VITALS
HEART RATE: 96 BPM | TEMPERATURE: 98 F | RESPIRATION RATE: 18 BRPM | WEIGHT: 127 LBS | SYSTOLIC BLOOD PRESSURE: 162 MMHG | OXYGEN SATURATION: 98 % | BODY MASS INDEX: 23.37 KG/M2 | HEIGHT: 62 IN | DIASTOLIC BLOOD PRESSURE: 88 MMHG

## 2019-08-02 DIAGNOSIS — M05.711 RHEUMATOID ARTHRITIS INVOLVING RIGHT SHOULDER WITH POSITIVE RHEUMATOID FACTOR: Chronic | ICD-10-CM

## 2019-08-02 DIAGNOSIS — D53.9 MACROCYTIC ANEMIA: Primary | ICD-10-CM

## 2019-08-02 DIAGNOSIS — D47.2 MONOCLONAL PARAPROTEINEMIA: ICD-10-CM

## 2019-08-02 DIAGNOSIS — D72.829 LEUKOCYTOSIS, UNSPECIFIED TYPE: ICD-10-CM

## 2019-08-02 DIAGNOSIS — R91.8 MULTIPLE LUNG NODULES ON CT: ICD-10-CM

## 2019-08-02 PROCEDURE — 99999 PR PBB SHADOW E&M-EST. PATIENT-LVL IV: ICD-10-PCS | Mod: PBBFAC,HCNC,, | Performed by: INTERNAL MEDICINE

## 2019-08-02 PROCEDURE — 99499 UNLISTED E&M SERVICE: CPT | Mod: HCNC,S$GLB,, | Performed by: INTERNAL MEDICINE

## 2019-08-02 PROCEDURE — 3077F SYST BP >= 140 MM HG: CPT | Mod: HCNC,CPTII,S$GLB, | Performed by: INTERNAL MEDICINE

## 2019-08-02 PROCEDURE — 99999 PR PBB SHADOW E&M-EST. PATIENT-LVL IV: CPT | Mod: PBBFAC,HCNC,, | Performed by: INTERNAL MEDICINE

## 2019-08-02 PROCEDURE — 99214 PR OFFICE/OUTPT VISIT, EST, LEVL IV, 30-39 MIN: ICD-10-PCS | Mod: HCNC,S$GLB,, | Performed by: INTERNAL MEDICINE

## 2019-08-02 PROCEDURE — 99214 OFFICE O/P EST MOD 30 MIN: CPT | Mod: HCNC,S$GLB,, | Performed by: INTERNAL MEDICINE

## 2019-08-02 PROCEDURE — 3077F PR MOST RECENT SYSTOLIC BLOOD PRESSURE >= 140 MM HG: ICD-10-PCS | Mod: HCNC,CPTII,S$GLB, | Performed by: INTERNAL MEDICINE

## 2019-08-02 PROCEDURE — 3079F PR MOST RECENT DIASTOLIC BLOOD PRESSURE 80-89 MM HG: ICD-10-PCS | Mod: HCNC,CPTII,S$GLB, | Performed by: INTERNAL MEDICINE

## 2019-08-02 PROCEDURE — 1101F PR PT FALLS ASSESS DOC 0-1 FALLS W/OUT INJ PAST YR: ICD-10-PCS | Mod: HCNC,CPTII,S$GLB, | Performed by: INTERNAL MEDICINE

## 2019-08-02 PROCEDURE — 1101F PT FALLS ASSESS-DOCD LE1/YR: CPT | Mod: HCNC,CPTII,S$GLB, | Performed by: INTERNAL MEDICINE

## 2019-08-02 PROCEDURE — 99499 RISK ADDL DX/OHS AUDIT: ICD-10-PCS | Mod: HCNC,S$GLB,, | Performed by: INTERNAL MEDICINE

## 2019-08-02 PROCEDURE — 3079F DIAST BP 80-89 MM HG: CPT | Mod: HCNC,CPTII,S$GLB, | Performed by: INTERNAL MEDICINE

## 2019-08-02 NOTE — PROGRESS NOTES
Reason for visit: Chronic leukocytosis    HPI:   The patient is a 79-year-old  female who presents to the hematology oncology clinic today to discuss further evaluation and management recommendations for leukocytosis.  I have reviewed all of the patient's relevant clinical history available in the medical record including her records from care everywhere.  Today the patient reports that she had been having mild abdominal pain associated with a palpable mass in her right abdominal quadrant which she had noticed a few weeks ago.  This led to CT imaging which was significant for pelvic lymphadenopathy.  She was also noted to have increased leukocytosis during her recent lab work with Dr. Tejada with Rheumatology.  The patient presents to the clinic today to discuss further recommendations.    She reports that her chronic pain from rheumatoid arthritis is stable.  She reports chronic fatigue.  She denies any fevers, chills or night sweats.  She denies any loss of appetite or unintentional weight loss.  She denies any chest pain or shortness of breath.  She denies any melena, hematochezia, hematemesis, hemoptysis or hematuria.  She reports being up-to-date with all of her age-appropriate cancer screening. She denies any bowel or urinary complaints.  She denies any nausea, vomiting or abdominal pain.  The patient reports taking weekly methotrexate with supplemental folic acid and actemra for treatment of rheumatoid arthritis under the supervision of Dr. Chase Tejada with rheumatology.     PAST MEDICAL HISTORY:   1.  Rheumatoid arthritis  2.  Hypertension  3.  Anxiety  4.  Hypothyroidism  5.  Vitamin D deficiency  6.  Osteoporosis  7.  History of hiatal hernia with GERD  8.  Age-related macular degeneration  9.  Melanoma treated in 2018    SURGICAL HISTORY:   1.  Bilateral wrist surgery  2.  Bilateral knee replacement  3.  Bilateral foot surgery  4.  Cholecystectomy  5.  Nissen fundoplication  6.   Appendectomy  7.  SAY with BSO  8.  Bilateral cataract extraction  9.  Multiple resections of localized skin cancer from the forearm  10. Cyroablation of left renal mass in sep 2016    FAMILY HISTORY: The patient's brother was treated for pancreatic cancer which was diagnosed at the age of 70.  She denies any other immediate family members with cancer or bleeding/clotting disorders.    SOCIAL HISTORY: She reports a 0.5-pack-year smoking history and quit in 1965.  She denies any alcohol use or recreational drug use.  She used to work as a  and retired at the age of 64.  She is  and has 2 daughters.  She lives in [place], [place].    ALLERGIES: Reviewed on medication card.    MEDICATIONS: [Medcard has been reviewed and/or reconciled.]    REVIEW OF SYSTEMS:   GENERAL: [No fevers, chills or sweats. Reports chronic fatigue. Denies weight loss or loss of appetite.]  HEENT: [No blurred vision, tinnitus, nasal discharge, sorethroat or dysphagia.]  HEART: [No chest pain, palpitations or shortness of breath.]    LUNGS: [Reports cough. Denies hemoptysis or breathing problems.]  ABDOMEN: [No abdominal pain, nausea, vomiting, diarrhea, constipation or melena.]  GENITOURINARY: [No dysuria, bleeding or malodorous discharge.]  NEURO: [No headache, dizziness or vertigo.]  HEMATOLOGY: [No easy bruising, spontaneous bleeding or blood clots in the past].  MUSCULOSKELETAL: [Chronic arthralgias. Denies myalgias or bone pains.]  SKIN: [No rashes or skin lesions.]  PSYCHIATRY: [No depression. Reports h/o anxiety.]    PHYSICAL EXAMINATION:   VS: Reviewed on nurse's notes.  APPEARANCE: The patient is a well-developed, well-nourished and well-groomed elderly  female who appears in no acute distress.    HEENT: No scleral icterus. Both external auditory canals clear. No oral ulcers, lesions. Throat clear  HEAD: No sinus tenderness.  NECK: Supple. No palpable lymphadenopathy. Thyroid non-tender, no palpable  masses  CHEST: Breath sounds clear bilaterally. Occasional crackles/rales bilaterally. No rhonchi. Unlabored respirations.  CARDIOVASCULAR: Normal S1, S2. Normal rate. Regular rhythm.  ABDOMEN: Bowel sounds normal. No tenderness. No abdominal distention. No hepatomegaly. No splenomegaly.  There is 4 cm x 4 cm diffuse palpable mass in the right lower abdominal quadrant with no significant tenderness to palpation (?  Lipoma)  LYMPHATIC: No palpable supraclavicular, axillary nodes. She has palpable bilateral inguinal lymphadenopathy.  EXTREMITIES: No clubbing, cyanosis.   SKIN: No lesions. No petechiae. No ecchymoses. No induration or nodules  NEUROLOGIC: No focal findings. Alert & Oriented x 3. Mood appropriate to affect    LABS:   Reviewed    IMAGING:  Reviewed    IMPRESSION:  1.  Chronic leukocytosis  2.  Chronic macrocytic anemia  3.  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  Bilateral lung inflammation [rheumatoid lung]  5.  Left kidney complex cyst s/p cryoablation in sep 2016  6.  Bilateral inguinal lymphadenopathy    PLAN:  1.  I had a detailed discussion with the patient previously with regard to the various possible etiologies for leukocytosis.  Review of the patient's medical record shows that this has been chronically present on and off for several years.  The patient recalls having been evaluated by a hematologist greater than 10 years ago for this and also underwent bone marrow aspiration and biopsy.  She reports that all of the results were benign at that time.  2.  Review of her peripheral smear does not show any significant abnormalities.  Her rheumatoid arthritis appears to be well-controlled at this time as noted by her inflammatory markers.  3.  Results of labwork done for further evaluation of her chronic macrocytic anemia were previously reviewed in detail.  This is most likely due to her chronic treatment with methotrexate.  Vitamin B12 and folate levels look ok.   4.  I had a detailed  discussion about the various possible etiologies for her monoclonal paraproteinemia. Results of prior 24 hour UPEP with immunofixation reviewed and also look unremarkable.  5.  Results of CT scans of the thorax/abdomen/pelvis to evaluate for any evidence of pathologic lymphadenopathy suggestive of any evidence of malignancy in the context of leukocytosis with history of rheumatoid arthritis and immunosuppressive therapy were discussed in detail. Recent PET/CT results were also reviewed. Continue pulmonary follow up with Dr. Varela as recommended.  6.  Continue follow up with Dr. Beck with urology as recommended.  7.  Results of final report of bone marrow aspiration and biopsy done at [facility] done on 6/1/16 were discussed in detail.  She has a hypercellular marrow with trilineage dyspoiesis and megakaryocytic hyperplasia.  She has relatively increased atypical myeloid blasts which constitute 5% of analyzed cells and approximately 5% clonal lambda restricted plasma cells.  She also has a small CD5 positive clonal B-cell population which constitutes 1% of the sample with a B-cell chronic lymphocytic leukemia/small lymphocytic lymphoma immunophenotype identified by flow cytometry only.  She has adequate bone marrow storage iron with no ringed sideroblasts. She had an abnormal myeloma FISH panel but the overall clinical picture at this time does not appear to support a diagnosis of multiple myeloma. Her overall clinical picture is most suggestive of medication related changes due to methotrexate and less likely to be other etiologies including a myelodysplastic/myeloproliferative neoplasm.  However we will continue with close monitoring at this time. We discussed repeating bone marrow biopsy in the near future based on follow up over the next few months if indicated.  8. PET-CT scan results from July 2019 were reviewed in detail.  At this time I will present the patient's case at multidisciplinary tumor  conference and review recommendations with the patient after.      Follow-up after above to discuss further management. She knows to call sooner for any new problems or questions.    Sathish Devine MD

## 2019-08-09 ENCOUNTER — TELEPHONE (OUTPATIENT)
Dept: HEMATOLOGY/ONCOLOGY | Facility: CLINIC | Age: 79
End: 2019-08-09

## 2019-08-09 NOTE — TELEPHONE ENCOUNTER
----- Message from Sathish Devine MD sent at 8/9/2019 11:26 AM CDT -----  Contact: Patient   She is scheduled to be discussed at tumor board next Friday (7-8 am). We will contact her after this. Thank you  ----- Message -----  From: Tammie Bernard LPN  Sent: 8/9/2019  11:12 AM  To: Sathish Devine MD    Good morning,    Please see below patient would like her test results, and treatment recommendations.    Thanks,  Ibeth    ----- Message -----  From: Polly Ugalde  Sent: 8/9/2019  10:47 AM  To: Sybil Bower Staff    Patient is calling to get her results and also wants to know about her treatment options. Please call her at  [phone].    Thanks  Td

## 2019-08-14 ENCOUNTER — OFFICE VISIT (OUTPATIENT)
Dept: UROLOGY | Facility: CLINIC | Age: 79
End: 2019-08-14
Payer: MEDICARE

## 2019-08-14 ENCOUNTER — OFFICE VISIT (OUTPATIENT)
Dept: PAIN MEDICINE | Facility: CLINIC | Age: 79
End: 2019-08-14
Payer: MEDICARE

## 2019-08-14 VITALS — BODY MASS INDEX: 23.05 KG/M2 | WEIGHT: 126 LBS

## 2019-08-14 VITALS
SYSTOLIC BLOOD PRESSURE: 152 MMHG | HEART RATE: 99 BPM | WEIGHT: 126 LBS | DIASTOLIC BLOOD PRESSURE: 86 MMHG | BODY MASS INDEX: 23.19 KG/M2 | HEIGHT: 62 IN

## 2019-08-14 DIAGNOSIS — C64.9 RENAL CELL CARCINOMA, UNSPECIFIED LATERALITY: Primary | ICD-10-CM

## 2019-08-14 DIAGNOSIS — M51.36 DDD (DEGENERATIVE DISC DISEASE), LUMBAR: ICD-10-CM

## 2019-08-14 DIAGNOSIS — R59.1 LYMPHADENOPATHY: ICD-10-CM

## 2019-08-14 DIAGNOSIS — M47.817 SPONDYLOSIS OF LUMBOSACRAL REGION WITHOUT MYELOPATHY OR RADICULOPATHY: ICD-10-CM

## 2019-08-14 DIAGNOSIS — M54.16 LUMBAR RADICULOPATHY: Primary | ICD-10-CM

## 2019-08-14 DIAGNOSIS — M48.061 LUMBAR FORAMINAL STENOSIS: ICD-10-CM

## 2019-08-14 LAB
BILIRUB SERPL-MCNC: NORMAL MG/DL
BLOOD URINE, POC: NORMAL
COLOR, POC UA: YELLOW
GLUCOSE UR QL STRIP: NORMAL
KETONES UR QL STRIP: NORMAL
LEUKOCYTE ESTERASE URINE, POC: NORMAL
NITRITE, POC UA: NORMAL
PH, POC UA: 6
PROTEIN, POC: NORMAL
SPECIFIC GRAVITY, POC UA: 1.02
UROBILINOGEN, POC UA: NORMAL

## 2019-08-14 PROCEDURE — 1101F PR PT FALLS ASSESS DOC 0-1 FALLS W/OUT INJ PAST YR: ICD-10-PCS | Mod: HCNC,CPTII,S$GLB, | Performed by: UROLOGY

## 2019-08-14 PROCEDURE — 3079F DIAST BP 80-89 MM HG: CPT | Mod: HCNC,CPTII,S$GLB, | Performed by: PHYSICIAN ASSISTANT

## 2019-08-14 PROCEDURE — 3077F PR MOST RECENT SYSTOLIC BLOOD PRESSURE >= 140 MM HG: ICD-10-PCS | Mod: HCNC,CPTII,S$GLB, | Performed by: PHYSICIAN ASSISTANT

## 2019-08-14 PROCEDURE — 99214 PR OFFICE/OUTPT VISIT, EST, LEVL IV, 30-39 MIN: ICD-10-PCS | Mod: HCNC,S$GLB,, | Performed by: PHYSICIAN ASSISTANT

## 2019-08-14 PROCEDURE — 99214 PR OFFICE/OUTPT VISIT, EST, LEVL IV, 30-39 MIN: ICD-10-PCS | Mod: HCNC,25,S$GLB, | Performed by: UROLOGY

## 2019-08-14 PROCEDURE — 99214 OFFICE O/P EST MOD 30 MIN: CPT | Mod: HCNC,S$GLB,, | Performed by: PHYSICIAN ASSISTANT

## 2019-08-14 PROCEDURE — 99999 PR PBB SHADOW E&M-EST. PATIENT-LVL III: CPT | Mod: PBBFAC,HCNC,, | Performed by: UROLOGY

## 2019-08-14 PROCEDURE — 81002 POCT URINE DIPSTICK WITHOUT MICROSCOPE: ICD-10-PCS | Mod: HCNC,S$GLB,, | Performed by: UROLOGY

## 2019-08-14 PROCEDURE — 1101F PT FALLS ASSESS-DOCD LE1/YR: CPT | Mod: HCNC,CPTII,S$GLB, | Performed by: PHYSICIAN ASSISTANT

## 2019-08-14 PROCEDURE — 81002 URINALYSIS NONAUTO W/O SCOPE: CPT | Mod: HCNC,S$GLB,, | Performed by: UROLOGY

## 2019-08-14 PROCEDURE — 99999 PR PBB SHADOW E&M-EST. PATIENT-LVL V: CPT | Mod: PBBFAC,HCNC,, | Performed by: PHYSICIAN ASSISTANT

## 2019-08-14 PROCEDURE — 1101F PR PT FALLS ASSESS DOC 0-1 FALLS W/OUT INJ PAST YR: ICD-10-PCS | Mod: HCNC,CPTII,S$GLB, | Performed by: PHYSICIAN ASSISTANT

## 2019-08-14 PROCEDURE — 99999 PR PBB SHADOW E&M-EST. PATIENT-LVL V: ICD-10-PCS | Mod: PBBFAC,HCNC,, | Performed by: PHYSICIAN ASSISTANT

## 2019-08-14 PROCEDURE — 99214 OFFICE O/P EST MOD 30 MIN: CPT | Mod: HCNC,25,S$GLB, | Performed by: UROLOGY

## 2019-08-14 PROCEDURE — 1101F PT FALLS ASSESS-DOCD LE1/YR: CPT | Mod: HCNC,CPTII,S$GLB, | Performed by: UROLOGY

## 2019-08-14 PROCEDURE — 99999 PR PBB SHADOW E&M-EST. PATIENT-LVL III: ICD-10-PCS | Mod: PBBFAC,HCNC,, | Performed by: UROLOGY

## 2019-08-14 PROCEDURE — 3079F PR MOST RECENT DIASTOLIC BLOOD PRESSURE 80-89 MM HG: ICD-10-PCS | Mod: HCNC,CPTII,S$GLB, | Performed by: PHYSICIAN ASSISTANT

## 2019-08-14 PROCEDURE — 3077F SYST BP >= 140 MM HG: CPT | Mod: HCNC,CPTII,S$GLB, | Performed by: PHYSICIAN ASSISTANT

## 2019-08-14 RX ORDER — TOPIRAMATE 25 MG/1
TABLET ORAL
Qty: 60 TABLET | Refills: 0 | Status: ON HOLD | OUTPATIENT
Start: 2019-08-14 | End: 2020-09-11 | Stop reason: HOSPADM

## 2019-08-14 NOTE — PROGRESS NOTES
Chief Pain Complaint:  Low back with radiation into BLE (mostly on left)    Interval History: Patient was seen on 6/25/19. At that time she underwent left L5/S1 TF AYAAN.  The patient reports that she is/was unchanged following the procedure.  She also has been taking care of her sick .      Interval History: Patient was seen on 4/26/19. At that time she underwent left L5/S1 TF AYAAN.  The patient reports that she is/was better following the procedure.  she reports 90% pain relief.  The changes lasted 2-3 weeks.  The changes have not continued through this visit. She feels the pain is slowly returning, although it is not as bad yet as prior to the injection.    Interval History: Patient presents today for follow-up. She was last seen on 3-20-19 with Dr. Felix. At that visit, the plan was to order an updated MRI. She complains of low back and bilateral lower extremity pain that extends posteriorly. Historically, both legs were painful, but she reports today that it has been more so on left side. She does have bilateral foot numbness, worse on left.    Initial History of Present Illness:   This patient is a 79 y.o. female who presents today complaining of the above noted pain/s. The patient describes the pain as follows.  Ms. Parker is a new patient to clinic with complaints of low back pain which radiates into bilateral lower extremities.  She has been having these symptoms for several years and has undergone epidural steroid injections in 2017 which provided significant pain relief.  She reports that her symptoms returned approximately October of 2018 her located mostly in the bilateral S1 distribution.  She states that the left leg is worse than the right leg and she describes having numbness in her bilateral feet but denies weakness.  She reports that it does cause difficulty with walking and she spends much of her day walking for exercise.  She also performs physical therapy exercises at home 3 days per week for  the last several years.  Today she rates her pain as 3/10 and describes a sensation like she has an Ace bandage wrapped around her legs..  She denies having bowel bladder difficulties.    Previous Therapy:  Medications:Lortab and Amitriptyline  Injections:    - Left L3/4 TF AYAAN with Dr. Gray on 9-15-10 with relief for almost 5 years &  bilateral L5/S1 TF AYAAN on 9-1-17 with Dr. Barry with excellent relief for about 13 months   - left L5/S1 TF AYAAN on 4/26/19 with 90% pain relief for 2-3 weeks   - left L5/S1 TF AYAAN on 6/25/19 with limited relief  Surgeries: No Spinal surgeries  Physical Therapy: Completed in the Past    Past Surgical History:   Procedure Laterality Date    APPENDECTOMY  1985    CATARACT EXTRACTION Bilateral 6/11/15    Dr. Booth    CHOLECYSTECTOMY  2013    cryoablasion kidney Left 09/27/2016    EGD (ESOPHAGOGASTRODUODENOSCOPY) N/A 10/31/2013    Performed by [facility]ello [facility] at [facility] ENDO    feet Bilateral     rheumatoid    FRACTURE SURGERY Right     tibia    FUNDOPLICATION, NISSEN, LAPAROSCOPIC N/A 12/18/2013    Performed by Galindo Vasquez MD at [facility] OR    HERNIA REPAIR      HYSTERECTOMY  1970    partial    JOINT REPLACEMENT      bilateral knees (2008), hands, wrists, knuckles, toes    LAPAROSCOPIC NISSEN FUNDOPLICATION      Left L5/S1 TF AYAAN with local Left 6/25/2019    [facility]    Renal Cryoablation N/A 9/27/2016    Performed by [facility] Diagnostic Provider at [facility] OR       Imaging / Labs / Studies (reviewed on 8/14/2019):    Results for orders placed during the hospital encounter of 05/22/19   X-Ray Foot Complete Right    Narrative COMPARISON:  No bilateral foot series July 21, 2014  FINDINGS:  Minimal interval progression degenerative changes on the right.  There is generalized osteopenia, pes planus and extensive postsurgical change noted throughout the forefoot.  Little significant interval change in the forefoot.  Postsurgical changes  1st MTP joint with cannulated screw transfixing the fused joint.  Postsurgical changes identified in the distal margins of the 2nd through 5th metatarsals with varying degrees of subluxation at the articulating margins of the metatarsals with the dislocation noted at the 4th MTP joint.  There is dorsal overriding of the remaining portion of the proximal phalanx.    Impression Overall essentially stable findings as detailed above the.     Results for orders placed during the hospital encounter of 04/01/19   MRI Lumbar Spine Without Contrast    Narrative COMPARISON:  CT of the abdomen pelvis from 08/28/2018.  FINDINGS:  The osseous structures demonstrate nonspecific diminished signal intensity on T1 weighted imaging.  L1-L2: Facet ligamentum flavum hypertrophy with prominent right paracentral disc protrusion.  There is asymmetric marked right neural foraminal narrowing.  The minimum AP spinal canal diameter measurement is 11 mm.  L2-L3: Facet ligamentum flavum hypertrophy with posterior disc bulge.  There is effacement of the ventral thecal sac.  There is mild bilateral neural foraminal narrowing.  L3-L4: Facet ligamentum flavum hypertrophy with posterior disc bulge.  There is a small annular tear of the disc.  There is bilateral neural foraminal narrowing, left greater than right.  The minimum AP spinal canal diameter measurement is 11 mm.  L4-L5: Small annular tear of the disc.  There is facet hypertrophy and posterior disc osteophyte complex which results in marked narrowing of the spinal canal which demonstrates a minimum AP diameter of 5.5 mm.  Bilateral neural foraminal narrowing is seen which is greater on the right side.  L5-S1: There is facet hypertrophy and posterior disc osteophyte complex.  There is narrowing at the inferior left lateral recess.  Bilateral renal cysts are partially visualized.  There are post interventional changes involving the lateral interpolar left kidney with adjacent scarring  noted.  The tip of the conus medullaris terminates near the L1 level.  No fracture.  There is grade 1/2 anterolisthesis of L4 on L5.    Impression Multilevel discogenic degenerative changes of the lumbar spine with areas of spinal canal and neural foraminal encroachment as described above.  There is grade 1/2 anterolisthesis of L4 on L5.  Nonspecific diminished signal intensity within the osseous structures which can be seen with both benign and malignant marrow replacement processes.  Please correlate with history and lab values.  Other incidental findings as above.        8/22/17 LUMBAR MRI (from [facility], full report scanned into Media in EMR)  L1-L2: disc bulge, facet arthropathy,bilateral  bony NF narrowing greater on the right side, cannot exclude right L1 nerve impingement  L2-L3: disc bulge, facet arthropathy, bilateral bony NF narrowing with possible, but not definite, nerve impingements  L3-L4: disc bulge, facet arthropathy, bilateral bony NF narrowing greater on the left side, severe left lateral recess stenosis with possible left L3 nerve impingement  L4-L5: disc bulge, facet arthropathy, bilateral bony NF narrowing greater on the left side, severe left lateral recess stenosis with possible L4 nerve impingements  L5-S1: disc bulge, facet arthropathy, bilateral bony NF narrowing with possible, but not definite, nerve impingements      Results for orders placed in visit on 08/19/10   X-Ray Lumbar Spine Complete 5 View    Narrative RESULTS: THE BONES ARE DIFFUSELY DEMINERALIZED.  THERE IS MILD DEXTROSCOLIOSIS.  GRADE 2 ANTEROLISTHESIS OF APPROXIMATELY 1.1 CM IS IDENTIFIED AT L4-5.  MINIMAL GRADE 1 ANTEROLISTHESIS IS PRESENT AT L2-3.  THERE IS MULTILEVEL DEGENERATIVE VERTEBRAL END PLATE SPURRING, DISC SPACE NARROWING, AND FACET ARTHROPATHY.  THE PEDICLES APPEAR INTACT.  THERE IS NO VERTEBRAL COMPRESSION FRACTURE.         Review of Systems:  Constitutional: Negative for fever.  "  Eyes: Negative for blurred vision.   Respiratory: Negative for cough and wheezing.    Cardiovascular: Negative for chest pain and orthopnea.   Gastrointestinal: Negative for constipation, diarrhea, nausea and vomiting.   Genitourinary: Negative for dysuria.   Musculoskeletal: Positive for back pain.        Bilateral lower extremity pain   Skin: Negative for itching and rash.   Endo/Heme/Allergies: Does not bruise/bleed easily.       Physical Exam:  Vitals:  BP (!) 152/86 (BP Location: Right arm, Patient Position: Sitting)   Pulse 99   Ht 5' 2" (1.575 m)   Wt 57.2 kg (126 lb)   BMI 23.05 kg/m²   (reviewed on 8/14/2019)    General: alert and oriented, in no apparent distress.  Gait: antalgic gait.  Skin: no rashes, no discoloration, no obvious lesions  HEENT: normocephalic, atraumatic. Pupils equal and round.  Cardiovascular: no significant peripheral edema present.  Respiratory: without use of accessory muscles of respiration.    Musculoskeletal - Lumbar Spine:  - Pain on flexion of lumbar spine: Present  - Pain on extension of lumbar spine: Present  - Lumbar facet loading: Absent   - TTP over the lumbar paraspinals: Present  - TTP over the SI joints:  Absent   - TTP over GT bursa: Absent   - Straight Leg Raise: Equivocal on left  - DEE DEE: Negative    Neuro - Lower Extremities:  - RLE Strength:     >> 5/5 strength with right hip flexion/ extension    >> 5/5 strength with right knee flexion/ extension    >> 5/5 strength in right ankle with plantar and dorsiflexion  - LLE Strength:     >> 4/5 strength with left hip flexion/ extension    >> 5/5 strength with knee flexion extension on the left     >> 5/5 strength in left ankle with plantar and dorsiflexion  - Extremity Reflexes: Brisk and symmetric throughout  - Sensory: Sensation to light touch decreased in dorsal foot on left, somewhat on right      Psych:  Mood and affect is appropriate        Assessment  1. 79 y.o. year old patient with PMH of   Past Medical " History:   Diagnosis Date    Acid reflux     Anxiety     Back pain     Bronchitis, chronic obstructive w acute bronchitis 7/29/2016    Cancer     [facility] [place]- Dr. Lata Tejada    Cataract     2+NS    Degenerative disc disease     Depression     Dry mouth     Hernia, hiatal 11/18/2013    Hypertension     Hypothyroid     Macrocytic anemia 5/3/2016    Macular degeneration     Migraines     Mixed anxiety and depressive disorder     Multiple fractures of ribs of right side     Osteoporosis     Pneumonia     Pneumonia due to other staphylococcus     Rheumatoid arthritis     Rheumatoid arthritis(714.0)     Rheumatoid arthritis(714.0)     Remicade, MTX.      presenting with pain located lumbar spine and bilateral lower extremities, mostly on left.  Diagnoses include:    ICD-10-CM ICD-9-CM   1. Lumbar radiculopathy M54.16 724.4   2. Spondylosis of lumbosacral region without myelopathy or radiculopathy M47.817 721.3   3. DDD (degenerative disc disease), lumbar M51.36 722.52   4. Lumbar foraminal stenosis M99.83 724.02      2. Pain Generators / Etiology: Lumbar Radiculopathy and Lumbar Spondylosis. Lumbar MRI shows multilevel foraminal stenosis with severe spinal stenosis at L4/5.   3. Failed Meds (E- Effective, NE- Not Effective):  Amitriptyline-minimally effective, Norco-minimally effective  4. Physical Therapy - Currently Participating  5. Psychological comorbidities - None  6. Anticoagulants / Antiplatelets: None       Plan:  1. Interventional: S/p left L5/S1 TF AYAAN on 4/26/19 with 90% pain relief for 2-3 weeks. She then had repeat left L5/S1 TF AYAAN on 6/25/19 with limited relief.    2. Pharmacologic:   - Will start Topamax 25mg BID (with titration instructions, take 1 tablet QHS x 1 week then increase to BID increasing as tolerated).   - D/c gabapentin 300mg QHS - as this caused bad dreams.    3. Rehabilitative: Patient performs physical therapy exercises at home 3 days per week for the last  several years. Encouraged continuation of regular exercise. Avoid heavy lifting/ bending    4. Diagnostic: None.     5. Other: Refer to Dr. Loepz/ Missy Richards PA-C (Neurosurgery) for surgical consultation.      6. Follow up: PRN    - I discussed the risks, benefits, and alternatives to potential treatment options. All questions and concerns were fully addressed today in clinic. Dr. Felix was consulted regarding the patient plan and agrees.

## 2019-08-14 NOTE — PROGRESS NOTES
"Chief Complaint: left renal lesion    HPI:   8/14/19: Followup PET/CT shows "Interval enlargement of a couple of obturator/external iliac chain lymph node on the right ( 9 -> 15mm).  No changes in renal findings.  8/30/18: Followup CT reassuring on annual imaging.  Path reviewed inconclusive.  No new problems, feeling fine.  Reviewed history in detail.  8/28/17: Having a lot of back pain lately, but CT totally reassuring and chest nodule is smaller and likely benign.    2/23/17: Followup CT shows good results at left renal tumor site.  It suggests a new chest nodule (1 cm) but she has had these come and go as she gets bronchitis from time to time.  Also some possible ileac nodes.    11/14/16:  Had her left cryoablation no complications. Pre-procedure biopsy benign.  No pain, no hematuria.  9/9/16: Back after having left renal cryoablation scheduled but cancelled due to tech not arriving on a cancelled flight.  PET scan negative.   6/10/16: 75 yo woman was recently worked up for leukocytosis and CT shows "There has been interval enlargement of a cystic structure at the interpolar region of the left kidney which demonstrates enhancing thin internal septation. "  Designated a Bos3 cyst.  No abd/pelvic pain and no exac/rel factors.  No hematuria.  No urolithiasis.  No urinary bother.  No  history.  Normal sexual function.    Allergies:  Codeine and Doxycycline    Medications: has a current medication list which includes the following prescription(s): albuterol, amitriptyline, benzonatate, calcium citrate-vitamin d3 315-200 mg, duloxetine, hydrocodone-acetaminophen, hydroxyzine hcl, leucovorin, levothyroxine, meclizine, methotrexate, metoprolol succinate, multivitamin, ondansetron, oxymetazoline, pravastatin, tocilizumab, and valsartan-hydrochlorothiazide, and the following Facility-Administered Medications: methylprednisolone acetate.    Review of Systems:  General: No fever, chills, fatigability, or weight " loss.  Skin: No rashes, itching, or changes in color or texture of skin.  Chest: Denies KILGORE, cyanosis, wheezing, cough, and sputum production.  Abdomen: Appetite fine. No weight loss. Denies diarrhea, abdominal pain, hematemesis, or blood in stool.  Musculoskeletal: Some joint stiffness or swelling. Denies back pain.  : As above  All other review of systems negative.    PMH:   has a past medical history of Acid reflux, Anxiety, Back pain, Bronchitis, chronic obstructive w acute bronchitis (7/29/2016), Cancer, Cataract, Degenerative disc disease, Depression, Dry mouth, Hernia, hiatal (11/18/2013), Hypertension, Hypothyroid, Macrocytic anemia (5/3/2016), Macular degeneration, Migraines, Mixed anxiety and depressive disorder, Multiple fractures of ribs of right side, Osteoporosis, Pneumonia, Pneumonia due to other staphylococcus, Rheumatoid arthritis, Rheumatoid arthritis(714.0), and Rheumatoid arthritis(714.0).    PSH:   has a past surgical history that includes Laparoscopic Nissen fundoplication; Hernia repair; Cataract extraction (Bilateral, 6/11/15); Fracture surgery (Right); feet (Bilateral); cryoablasion kidney (Left, 09/27/2016); Joint replacement; Hysterectomy (1970); Cholecystectomy (2013); Appendectomy (1985); and Transforaminal epidural injection of steroid (Left, 6/25/2019).    FamHx: family history includes Asthma in her brother and sister; Cancer in her brother and maternal grandfather; Cataracts in her mother; Chronic back pain in her brother and sister; Diabetes Mellitus in her brother; Fibromyalgia in her daughter; Heart disease in her father, maternal grandmother, and mother; Hyperlipidemia in her mother; Hypertension in her brother, father, mother, and sister; Osteoarthritis in her brother, father, mother, and sister; Thyroid disease in her brother and sister.    SocHx:  reports that she quit smoking about 53 years ago. She has a 0.50 pack-year smoking history. She has never used smokeless tobacco.  She reports that she does not drink alcohol or use drugs.     Physical Exam:  Vitals:   There were no vitals filed for this visit.  General: A&Ox3. No apparent distress. No deformities.  Neck: No masses. Normal thyroid.  Lungs: normal inspiration. No use of accessory muscles.  Heart: normal pulse. No arrhythmias.  Abdomen: Soft. NT. ND.  Skin: The skin is warm and dry. No jaundice.  Ext: No c/c/e.  : deferred    Labs/Studies:   Urinalysis performed in clinic, summary: UA normal    Impression/Plan:   1. Recheck 1 year with CT.  Will not order now as [facility] may do interim imaging.   No adverse renal findings but changes in lymphadenopathy suggests a need for biopsy.  Will defer to Dr. Devine's judgment would refer to GenSurg if this is desired.  Could be linked to RA and benign.

## 2019-08-16 ENCOUNTER — TUMOR BOARD CONFERENCE (OUTPATIENT)
Dept: INFUSION THERAPY | Facility: HOSPITAL | Age: 79
End: 2019-08-16

## 2019-08-16 NOTE — PROGRESS NOTES
Tumor Board Documentation      Sarah Parker was presented by Dr. Sathish Devine at our Tumor Board on 8/16/2019, which included representatives from Medical Oncology, Radiation Oncology, Surgical Oncology, Pathology, Navigation, Research, Plastic Surgery, Radiology, Gastrointestinal.    Sarah currently presents as a new patient with Leukocytosis, with history of the following treatments:  .    Additionally, we reviewed previous medical and familial history, history of present illness, and recent lab results along with all available histopathologic and imaging studies. The tumor board considered available treatment options and made the following recommendations:  Additional screening: Send blood specimen for FISH to determine if CLL       The following procedures/referrals were also placed: No orders of the defined types were placed in this encounter.      Clinical Trial Status: Not discussed     National site-specific guidelines were discussed with respect to the case.    Tumor board is a meeting of clinicians from various specialty areas who evaluate and discuss patients for whom a multidisciplinary approach is being considered. Final determinations in the plan of care are those of the provider(s). The responsibility for follow up of recommendations given during tumor board is that of the provider.     Devika Fish RN

## 2019-08-19 ENCOUNTER — OFFICE VISIT (OUTPATIENT)
Dept: FAMILY MEDICINE | Facility: CLINIC | Age: 79
End: 2019-08-19
Payer: MEDICARE

## 2019-08-19 VITALS
SYSTOLIC BLOOD PRESSURE: 110 MMHG | OXYGEN SATURATION: 98 % | BODY MASS INDEX: 23.17 KG/M2 | TEMPERATURE: 98 F | HEIGHT: 62 IN | HEART RATE: 109 BPM | DIASTOLIC BLOOD PRESSURE: 58 MMHG | WEIGHT: 125.88 LBS

## 2019-08-19 DIAGNOSIS — J44.1 COPD WITH EXACERBATION: Primary | ICD-10-CM

## 2019-08-19 PROCEDURE — 96372 THER/PROPH/DIAG INJ SC/IM: CPT | Mod: HCNC,S$GLB,, | Performed by: REGISTERED NURSE

## 2019-08-19 PROCEDURE — 99999 PR PBB SHADOW E&M-EST. PATIENT-LVL V: ICD-10-PCS | Mod: PBBFAC,HCNC,, | Performed by: REGISTERED NURSE

## 2019-08-19 PROCEDURE — 3074F SYST BP LT 130 MM HG: CPT | Mod: HCNC,CPTII,S$GLB, | Performed by: REGISTERED NURSE

## 2019-08-19 PROCEDURE — 3078F PR MOST RECENT DIASTOLIC BLOOD PRESSURE < 80 MM HG: ICD-10-PCS | Mod: HCNC,CPTII,S$GLB, | Performed by: REGISTERED NURSE

## 2019-08-19 PROCEDURE — 99214 OFFICE O/P EST MOD 30 MIN: CPT | Mod: 25,HCNC,S$GLB, | Performed by: REGISTERED NURSE

## 2019-08-19 PROCEDURE — 99214 PR OFFICE/OUTPT VISIT, EST, LEVL IV, 30-39 MIN: ICD-10-PCS | Mod: 25,HCNC,S$GLB, | Performed by: REGISTERED NURSE

## 2019-08-19 PROCEDURE — 99999 PR PBB SHADOW E&M-EST. PATIENT-LVL V: CPT | Mod: PBBFAC,HCNC,, | Performed by: REGISTERED NURSE

## 2019-08-19 PROCEDURE — 1101F PT FALLS ASSESS-DOCD LE1/YR: CPT | Mod: HCNC,CPTII,S$GLB, | Performed by: REGISTERED NURSE

## 2019-08-19 PROCEDURE — 96372 PR INJECTION,THERAP/PROPH/DIAG2ST, IM OR SUBCUT: ICD-10-PCS | Mod: HCNC,S$GLB,, | Performed by: REGISTERED NURSE

## 2019-08-19 PROCEDURE — 3074F PR MOST RECENT SYSTOLIC BLOOD PRESSURE < 130 MM HG: ICD-10-PCS | Mod: HCNC,CPTII,S$GLB, | Performed by: REGISTERED NURSE

## 2019-08-19 PROCEDURE — 1101F PR PT FALLS ASSESS DOC 0-1 FALLS W/OUT INJ PAST YR: ICD-10-PCS | Mod: HCNC,CPTII,S$GLB, | Performed by: REGISTERED NURSE

## 2019-08-19 PROCEDURE — 3078F DIAST BP <80 MM HG: CPT | Mod: HCNC,CPTII,S$GLB, | Performed by: REGISTERED NURSE

## 2019-08-19 RX ORDER — BETAMETHASONE SODIUM PHOSPHATE AND BETAMETHASONE ACETATE 3; 3 MG/ML; MG/ML
9 INJECTION, SUSPENSION INTRA-ARTICULAR; INTRALESIONAL; INTRAMUSCULAR; SOFT TISSUE
Status: COMPLETED | OUTPATIENT
Start: 2019-08-19 | End: 2019-08-19

## 2019-08-19 RX ORDER — AZITHROMYCIN 250 MG/1
TABLET, FILM COATED ORAL
Qty: 6 TABLET | Refills: 0 | Status: ON HOLD | OUTPATIENT
Start: 2019-08-19 | End: 2019-08-26 | Stop reason: HOSPADM

## 2019-08-19 RX ORDER — BENZONATATE 100 MG/1
100-200 CAPSULE ORAL 3 TIMES DAILY PRN
Qty: 60 CAPSULE | Refills: 0 | Status: ON HOLD | OUTPATIENT
Start: 2019-08-19 | End: 2020-07-24

## 2019-08-19 RX ADMIN — BETAMETHASONE SODIUM PHOSPHATE AND BETAMETHASONE ACETATE 9 MG: 3; 3 INJECTION, SUSPENSION INTRA-ARTICULAR; INTRALESIONAL; INTRAMUSCULAR; SOFT TISSUE at 10:08

## 2019-08-21 ENCOUNTER — HOSPITAL ENCOUNTER (INPATIENT)
Facility: HOSPITAL | Age: 79
LOS: 5 days | Discharge: HOME HEALTH | DRG: 871 | End: 2019-08-26
Attending: EMERGENCY MEDICINE | Admitting: INTERNAL MEDICINE
Payer: MEDICARE

## 2019-08-21 DIAGNOSIS — A41.9 SEPSIS, DUE TO UNSPECIFIED ORGANISM: Primary | ICD-10-CM

## 2019-08-21 DIAGNOSIS — R50.9 FEVER: ICD-10-CM

## 2019-08-21 DIAGNOSIS — E87.6 HYPOKALEMIA: ICD-10-CM

## 2019-08-21 DIAGNOSIS — A41.9 SEPSIS: ICD-10-CM

## 2019-08-21 DIAGNOSIS — I10 ESSENTIAL HYPERTENSION: Chronic | ICD-10-CM

## 2019-08-21 DIAGNOSIS — J18.9 PNEUMONIA OF LEFT LOWER LOBE DUE TO INFECTIOUS ORGANISM: ICD-10-CM

## 2019-08-21 DIAGNOSIS — D64.9 ANEMIA, UNSPECIFIED TYPE: ICD-10-CM

## 2019-08-21 LAB
ABO + RH BLD: NORMAL
ALBUMIN SERPL BCP-MCNC: 2.2 G/DL (ref 3.5–5.2)
ALP SERPL-CCNC: 154 U/L (ref 55–135)
ALT SERPL W/O P-5'-P-CCNC: 32 U/L (ref 10–44)
ANION GAP SERPL CALC-SCNC: 12 MMOL/L (ref 8–16)
AST SERPL-CCNC: 23 U/L (ref 10–40)
BILIRUB SERPL-MCNC: 0.8 MG/DL (ref 0.1–1)
BLD GP AB SCN CELLS X3 SERPL QL: NORMAL
BUN SERPL-MCNC: 22 MG/DL (ref 8–23)
CALCIUM SERPL-MCNC: 8.2 MG/DL (ref 8.7–10.5)
CHLORIDE SERPL-SCNC: 95 MMOL/L (ref 95–110)
CO2 SERPL-SCNC: 21 MMOL/L (ref 23–29)
CREAT SERPL-MCNC: 1 MG/DL (ref 0.5–1.4)
EST. GFR  (AFRICAN AMERICAN): >60 ML/MIN/1.73 M^2
EST. GFR  (NON AFRICAN AMERICAN): 54 ML/MIN/1.73 M^2
GLUCOSE SERPL-MCNC: 124 MG/DL (ref 70–110)
INR PPP: 1 (ref 0.8–1.2)
LACTATE SERPL-SCNC: 0.8 MMOL/L (ref 0.5–2.2)
LACTATE SERPL-SCNC: 1 MMOL/L (ref 0.5–2.2)
POTASSIUM SERPL-SCNC: 2.9 MMOL/L (ref 3.5–5.1)
PROCALCITONIN SERPL IA-MCNC: 2.25 NG/ML
PROT SERPL-MCNC: 6.9 G/DL (ref 6–8.4)
PROTHROMBIN TIME: 11.3 SEC (ref 9–12.5)
SODIUM SERPL-SCNC: 128 MMOL/L (ref 136–145)

## 2019-08-21 PROCEDURE — 93005 ELECTROCARDIOGRAM TRACING: CPT | Mod: HCNC

## 2019-08-21 PROCEDURE — 63600175 PHARM REV CODE 636 W HCPCS: Mod: HCNC | Performed by: EMERGENCY MEDICINE

## 2019-08-21 PROCEDURE — 85060 PATHOLOGIST REVIEW: ICD-10-PCS | Mod: HCNC,,, | Performed by: PATHOLOGY

## 2019-08-21 PROCEDURE — 25000003 PHARM REV CODE 250: Mod: HCNC | Performed by: INTERNAL MEDICINE

## 2019-08-21 PROCEDURE — 85027 COMPLETE CBC AUTOMATED: CPT | Mod: HCNC

## 2019-08-21 PROCEDURE — 86920 COMPATIBILITY TEST SPIN: CPT | Mod: HCNC

## 2019-08-21 PROCEDURE — 85007 BL SMEAR W/DIFF WBC COUNT: CPT | Mod: HCNC

## 2019-08-21 PROCEDURE — 85060 BLOOD SMEAR INTERPRETATION: CPT | Mod: HCNC,,, | Performed by: PATHOLOGY

## 2019-08-21 PROCEDURE — 85610 PROTHROMBIN TIME: CPT | Mod: HCNC

## 2019-08-21 PROCEDURE — 83605 ASSAY OF LACTIC ACID: CPT | Mod: HCNC

## 2019-08-21 PROCEDURE — 63600175 PHARM REV CODE 636 W HCPCS: Mod: HCNC | Performed by: INTERNAL MEDICINE

## 2019-08-21 PROCEDURE — 93010 EKG 12-LEAD: ICD-10-PCS | Mod: HCNC,,, | Performed by: INTERNAL MEDICINE

## 2019-08-21 PROCEDURE — 99291 CRITICAL CARE FIRST HOUR: CPT | Mod: 25,HCNC

## 2019-08-21 PROCEDURE — 84145 PROCALCITONIN (PCT): CPT | Mod: HCNC

## 2019-08-21 PROCEDURE — 25000003 PHARM REV CODE 250: Mod: HCNC | Performed by: EMERGENCY MEDICINE

## 2019-08-21 PROCEDURE — 93010 ELECTROCARDIOGRAM REPORT: CPT | Mod: HCNC,,, | Performed by: INTERNAL MEDICINE

## 2019-08-21 PROCEDURE — 36415 COLL VENOUS BLD VENIPUNCTURE: CPT | Mod: HCNC

## 2019-08-21 PROCEDURE — 80053 COMPREHEN METABOLIC PANEL: CPT | Mod: HCNC

## 2019-08-21 PROCEDURE — 11000001 HC ACUTE MED/SURG PRIVATE ROOM: Mod: HCNC

## 2019-08-21 PROCEDURE — 86901 BLOOD TYPING SEROLOGIC RH(D): CPT | Mod: HCNC

## 2019-08-21 PROCEDURE — 96365 THER/PROPH/DIAG IV INF INIT: CPT | Mod: HCNC

## 2019-08-21 PROCEDURE — 87040 BLOOD CULTURE FOR BACTERIA: CPT | Mod: 59,HCNC

## 2019-08-21 RX ORDER — PANTOPRAZOLE SODIUM 40 MG/1
40 TABLET, DELAYED RELEASE ORAL DAILY
Status: DISCONTINUED | OUTPATIENT
Start: 2019-08-22 | End: 2019-08-26 | Stop reason: HOSPADM

## 2019-08-21 RX ORDER — IPRATROPIUM BROMIDE AND ALBUTEROL SULFATE 2.5; .5 MG/3ML; MG/3ML
3 SOLUTION RESPIRATORY (INHALATION) EVERY 6 HOURS
Status: DISCONTINUED | OUTPATIENT
Start: 2019-08-21 | End: 2019-08-24

## 2019-08-21 RX ORDER — DULOXETIN HYDROCHLORIDE 20 MG/1
40 CAPSULE, DELAYED RELEASE ORAL DAILY
Status: DISCONTINUED | OUTPATIENT
Start: 2019-08-22 | End: 2019-08-26 | Stop reason: HOSPADM

## 2019-08-21 RX ORDER — LEVOTHYROXINE SODIUM 88 UG/1
88 TABLET ORAL
Status: DISCONTINUED | OUTPATIENT
Start: 2019-08-22 | End: 2019-08-22

## 2019-08-21 RX ORDER — PRAVASTATIN SODIUM 10 MG/1
10 TABLET ORAL DAILY
Status: DISCONTINUED | OUTPATIENT
Start: 2019-08-22 | End: 2019-08-26 | Stop reason: HOSPADM

## 2019-08-21 RX ORDER — ONDANSETRON 2 MG/ML
4 INJECTION INTRAMUSCULAR; INTRAVENOUS EVERY 8 HOURS PRN
Status: DISCONTINUED | OUTPATIENT
Start: 2019-08-21 | End: 2019-08-26 | Stop reason: HOSPADM

## 2019-08-21 RX ORDER — ACETAMINOPHEN 500 MG
1000 TABLET ORAL
Status: COMPLETED | OUTPATIENT
Start: 2019-08-21 | End: 2019-08-21

## 2019-08-21 RX ORDER — HYDROXYZINE HYDROCHLORIDE 25 MG/1
25 TABLET, FILM COATED ORAL 3 TIMES DAILY PRN
Status: DISCONTINUED | OUTPATIENT
Start: 2019-08-21 | End: 2019-08-26 | Stop reason: HOSPADM

## 2019-08-21 RX ORDER — LEVOFLOXACIN 5 MG/ML
750 INJECTION, SOLUTION INTRAVENOUS
Status: DISCONTINUED | OUTPATIENT
Start: 2019-08-22 | End: 2019-08-22

## 2019-08-21 RX ORDER — GUAIFENESIN 100 MG/5ML
200 SOLUTION ORAL EVERY 4 HOURS PRN
Status: DISCONTINUED | OUTPATIENT
Start: 2019-08-21 | End: 2019-08-26 | Stop reason: HOSPADM

## 2019-08-21 RX ORDER — TOPIRAMATE 25 MG/1
25 TABLET ORAL DAILY
Status: DISCONTINUED | OUTPATIENT
Start: 2019-08-22 | End: 2019-08-26 | Stop reason: HOSPADM

## 2019-08-21 RX ORDER — LEVOFLOXACIN 5 MG/ML
750 INJECTION, SOLUTION INTRAVENOUS
Status: COMPLETED | OUTPATIENT
Start: 2019-08-21 | End: 2019-08-21

## 2019-08-21 RX ORDER — IPRATROPIUM BROMIDE AND ALBUTEROL SULFATE 2.5; .5 MG/3ML; MG/3ML
3 SOLUTION RESPIRATORY (INHALATION) EVERY 6 HOURS
Status: DISCONTINUED | OUTPATIENT
Start: 2019-08-22 | End: 2019-08-21

## 2019-08-21 RX ORDER — ACETAMINOPHEN 325 MG/1
650 TABLET ORAL EVERY 6 HOURS PRN
Status: DISCONTINUED | OUTPATIENT
Start: 2019-08-21 | End: 2019-08-26 | Stop reason: HOSPADM

## 2019-08-21 RX ORDER — MAG HYDROX/ALUMINUM HYD/SIMETH 200-200-20
30 SUSPENSION, ORAL (FINAL DOSE FORM) ORAL EVERY 6 HOURS PRN
Status: DISCONTINUED | OUTPATIENT
Start: 2019-08-21 | End: 2019-08-26 | Stop reason: HOSPADM

## 2019-08-21 RX ORDER — POTASSIUM CHLORIDE 20 MEQ/1
40 TABLET, EXTENDED RELEASE ORAL ONCE
Status: COMPLETED | OUTPATIENT
Start: 2019-08-22 | End: 2019-08-22

## 2019-08-21 RX ORDER — SODIUM CHLORIDE 9 MG/ML
INJECTION, SOLUTION INTRAVENOUS CONTINUOUS
Status: DISCONTINUED | OUTPATIENT
Start: 2019-08-21 | End: 2019-08-22

## 2019-08-21 RX ORDER — HYDRALAZINE HYDROCHLORIDE 20 MG/ML
10 INJECTION INTRAMUSCULAR; INTRAVENOUS EVERY 8 HOURS PRN
Status: DISCONTINUED | OUTPATIENT
Start: 2019-08-21 | End: 2019-08-26 | Stop reason: HOSPADM

## 2019-08-21 RX ORDER — POTASSIUM CHLORIDE 20 MEQ/1
40 TABLET, EXTENDED RELEASE ORAL
Status: COMPLETED | OUTPATIENT
Start: 2019-08-21 | End: 2019-08-21

## 2019-08-21 RX ORDER — DIPHENHYDRAMINE HCL 25 MG
25 CAPSULE ORAL EVERY 6 HOURS PRN
Status: DISCONTINUED | OUTPATIENT
Start: 2019-08-21 | End: 2019-08-26 | Stop reason: HOSPADM

## 2019-08-21 RX ADMIN — VANCOMYCIN HYDROCHLORIDE 1750 MG: 750 INJECTION, POWDER, LYOPHILIZED, FOR SOLUTION INTRAVENOUS at 11:08

## 2019-08-21 RX ADMIN — PIPERACILLIN AND TAZOBACTAM 4.5 G: 4; .5 INJECTION, POWDER, LYOPHILIZED, FOR SOLUTION INTRAVENOUS; PARENTERAL at 10:08

## 2019-08-21 RX ADMIN — LEVOFLOXACIN 750 MG: 750 INJECTION, SOLUTION INTRAVENOUS at 08:08

## 2019-08-21 RX ADMIN — ACETAMINOPHEN 1000 MG: 500 TABLET ORAL at 08:08

## 2019-08-21 RX ADMIN — POTASSIUM CHLORIDE 40 MEQ: 1500 TABLET, EXTENDED RELEASE ORAL at 10:08

## 2019-08-21 RX ADMIN — SODIUM CHLORIDE: 0.9 INJECTION, SOLUTION INTRAVENOUS at 10:08

## 2019-08-21 RX ADMIN — SODIUM CHLORIDE 1503 ML: 0.9 INJECTION, SOLUTION INTRAVENOUS at 07:08

## 2019-08-21 RX ADMIN — AMITRIPTYLINE HYDROCHLORIDE 75 MG: 50 TABLET, FILM COATED ORAL at 10:08

## 2019-08-21 RX ADMIN — ACETAMINOPHEN 650 MG: 325 TABLET ORAL at 11:08

## 2019-08-21 NOTE — ED PROVIDER NOTES
79 year old female with weakness and fever for 6 days. Was treated for URI by PCP Friday but is worsening.     Pt understands that a workup will begin in the treatment lounge/results waiting areas due to there being no available beds. Pt also undertstands they will be placed in the next available bed where they will be seen and dispositioned by a physician. I am removing myself from the care of pt. Pt will be assigned to next available physician.      Walker Quispeshon FNP-C          SCRIBE #1 NOTE: I, Mick Hurst, am scribing for, and in the presence of, Giovanni Cunningham Jr., MD. I have scribed the HPI, ROS, PEX.     SCRIBE #2 NOTE: I, Eric Garay, am scribing for, and in the presence of,  Khushbu Cheek MD. I have scribed the remaining portions of the note not scribed by Scribe #1.      History     Chief Complaint   Patient presents with    Shortness of Breath     +fatigue     Review of patient's allergies indicates:   Allergen Reactions    Codeine      Other reaction(s): hyper  Other reaction(s): hyper    Doxycycline     Gabapentin Other (See Comments)     Bad dreams         History of Present Illness     HPI    8/21/2019, 7:23 PM  History obtained from the patient      History of Present Illness: Sarah Parker is a 79 y.o. female patient who presents to the Emergency Department for evaluation of SOB which onset gradually 5 days ago. Symptoms are constant and moderate in severity. No mitigating or exacerbating factors reported. Associated sxs include fatigue and productive cough. Patient denies any CP, fever, chills, abd pain, back pain, and all other sxs at this time. No prior Tx reported. Pt is not on oxygen at home. No further complaints or concerns at this time.         Arrival mode: Personal vehicle    PCP: Briseida Bennett MD        Past Medical History:  Past Medical History:   Diagnosis Date    Acid reflux     Anxiety     Back pain     Bronchitis, chronic obstructive w acute bronchitis 7/29/2016     Cancer     [facility] arms, face- Dr. Lata Tejada    Cataract     2+NS    Degenerative disc disease     Depression     Dry mouth     Hernia, hiatal 11/18/2013    Hypertension     Hypothyroid     Macrocytic anemia 5/3/2016    Macular degeneration     Migraines     Mixed anxiety and depressive disorder     Multiple fractures of ribs of right side     Osteoporosis     Pneumonia     Pneumonia due to other staphylococcus     Rheumatoid arthritis     Rheumatoid arthritis(714.0)     Rheumatoid arthritis(714.0)     Remicade, MTX.       Past Surgical History:  Past Surgical History:   Procedure Laterality Date    APPENDECTOMY  1985    CATARACT EXTRACTION Bilateral 6/11/15    Dr. Booth    CHOLECYSTECTOMY  2013    cryoablasion kidney Left 09/27/2016    EGD (ESOPHAGOGASTRODUODENOSCOPY) N/A 10/31/2013    Performed by Donald Cuello MD at [facility] ENDO    feet Bilateral     rheumatoid    FRACTURE SURGERY Right     tibia    FUNDOPLICATION, NISSEN, LAPAROSCOPIC N/A 12/18/2013    Performed by Galindo Vasquez MD at [facility] OR    HERNIA REPAIR      HYSTERECTOMY  1970    partial    JOINT REPLACEMENT      bilateral knees (2008), hands, wrists, knuckles, toes    LAPAROSCOPIC NISSEN FUNDOPLICATION      Left L5/S1 TF AYAAN with local Left 6/25/2019    Performed by [facility] at [facility] PAIN MGT    Renal Cryoablation N/A 9/27/2016    Performed by [facility] Provider at [facility] OR         Family History:  Family History   Problem Relation Age of Onset    Heart disease Mother     Hyperlipidemia Mother     Hypertension Mother     Osteoarthritis Mother     Cataracts Mother     Hypertension Father     Osteoarthritis Father     Heart disease Father     Asthma Sister     Chronic back pain Sister     Hypertension Sister     Osteoarthritis Sister     Thyroid disease Sister     Asthma Brother     Cancer Brother     Chronic back pain Brother     Diabetes Mellitus Brother     Hypertension Brother      Osteoarthritis Brother     Thyroid disease Brother     Cancer Maternal Grandfather     Fibromyalgia Daughter     Heart disease Maternal Grandmother     Colon cancer Neg Hx     Diabetes Neg Hx        Social History:  Social History     Tobacco Use    Smoking status: Former Smoker     Packs/day: 0.25     Years: 2.00     Pack years: 0.50     Last attempt to quit: 1965     Years since quittin.8    Smokeless tobacco: Never Used   Substance and Sexual Activity    Alcohol use: No    Drug use: No    Sexual activity: Never     Partners: Male        Review of Systems     Review of Systems   Constitutional: Positive for fatigue. Negative for fever.   HENT: Negative for sore throat.    Respiratory: Positive for cough and shortness of breath.    Cardiovascular: Negative for chest pain.   Gastrointestinal: Negative for abdominal pain and nausea.   Genitourinary: Negative for dysuria.   Musculoskeletal: Negative for back pain.   Skin: Negative for rash.   Neurological: Negative for weakness.   Hematological: Does not bruise/bleed easily.   All other systems reviewed and are negative.       Physical Exam     Initial Vitals [19 1853]   BP Pulse Resp Temp SpO2   134/63 (!) 135 (!) 25 (!) 100.6 °F (38.1 °C) (!) 90 %      MAP       --          Physical Exam  Nursing Notes and Vital Signs Reviewed.  Constitutional: Pt has low grade fever. Patient is in no acute distress. Well-developed and well-nourished.  Head: Atraumatic. Normocephalic.  Eyes: PERRL. EOM intact. Conjunctivae are not pale. No scleral icterus.  ENT: Mucous membranes are moist. Oropharynx is clear and symmetric.    Neck: Supple. Full ROM. No lymphadenopathy.  Cardiovascular: Tachycardic. Regular rhythm. No murmurs, rubs, or gallops. Distal pulses are 2+ and symmetric.  Pulmonary/Chest: No respiratory distress. Bilateral rales.  Abdominal: Soft and non-distended.  There is no tenderness.  No rebound, guarding, or rigidity. Good bowel  sounds.  Genitourinary: No CVA tenderness  Musculoskeletal: Moves all extremities. No obvious deformities. No edema. No calf tenderness.  Skin: Warm and dry.  Neurological:  Alert, awake, and appropriate.  Normal speech.  No acute focal neurological deficits are appreciated.  Psychiatric: Normal affect. Good eye contact. Appropriate in content.     ED Course   Critical Care  Date/Time: 8/21/2019 9:24 PM  Performed by: Khushbu Cheek MD  Authorized by: Khushbu Cheek MD   Direct patient critical care time: 20 minutes  Additional history critical care time: 10 minutes  Ordering / reviewing critical care time: 15 minutes  Documentation critical care time: 5 minutes  Total critical care time (exclusive of procedural time) : 50 minutes  Critical care time was exclusive of separately billable procedures and treating other patients and teaching time.  Critical care was necessary to treat or prevent imminent or life-threatening deterioration of the following conditions: sepsis (PNA).  Critical care was time spent personally by me on the following activities: blood draw for specimens, development of treatment plan with patient or surrogate, interpretation of cardiac output measurements, evaluation of patient's response to treatment, examination of patient, ordering and performing treatments and interventions, obtaining history from patient or surrogate, ordering and review of laboratory studies, ordering and review of radiographic studies, pulse oximetry, re-evaluation of patient's condition and review of old charts.        ED Vital Signs:  Vitals:    08/23/19 0758 08/23/19 1133 08/23/19 1300 08/23/19 1430   BP: (!) 176/83 (!) 173/83  (!) 140/63   Pulse: 99 94 98 106   Resp: 20 18 20    Temp: 98.9 °F (37.2 °C) 97.6 °F (36.4 °C)     TempSrc: Oral      SpO2: 98% 98% 96% 98%   Weight:       Height:        08/23/19 1550 08/23/19 1827 08/23/19 1957 08/23/19 2011   BP: (!) 142/73 124/60 139/83    Pulse: (!) 113 (!) 127 (!) 122 (!)  127   Resp: 20 (!) 22 20 18   Temp: 98.1 °F (36.7 °C) 99.3 °F (37.4 °C) 99.3 °F (37.4 °C)    TempSrc:  Oral Oral    SpO2: 99% 98% 96% 97%   Weight:       Height:        08/24/19 0016 08/24/19 0050 08/24/19 0442 08/24/19 0500   BP: (!) 144/73  (!) 175/81    Pulse: 110 109 110    Resp: 18 18 18    Temp: 98.4 °F (36.9 °C)  98 °F (36.7 °C)    TempSrc: Oral  Oral    SpO2: 96% 96% 95%    Weight:    60 kg (132 lb 4.4 oz)   Height:        08/24/19 0523 08/24/19 0700 08/24/19 0726   BP: (!) 174/81 (!) 164/72 139/66   Pulse: (!) 111 (!) 122 (!) 116   Resp:   20   Temp:   99 °F (37.2 °C)   TempSrc:   Oral   SpO2: 96%  96%   Weight:      Height:          Abnormal Lab Results:  Labs Reviewed   COMPREHENSIVE METABOLIC PANEL - Abnormal; Notable for the following components:       Result Value    Sodium 128 (*)     Potassium 2.9 (*)     CO2 21 (*)     Glucose 124 (*)     Calcium 8.2 (*)     Albumin 2.2 (*)     Alkaline Phosphatase 154 (*)     eGFR if non  54 (*)     All other components within normal limits   URINALYSIS, REFLEX TO URINE CULTURE - Abnormal; Notable for the following components:    Specific Gravity, UA <=1.005 (*)     Protein, UA Trace (*)     Occult Blood UA Trace (*)     Leukocytes, UA 2+ (*)     All other components within normal limits    Narrative:     Preferred Collection Type->Urine, Clean Catch   PROCALCITONIN - Abnormal; Notable for the following components:    Procalcitonin 2.25 (*)     All other components within normal limits   MAGNESIUM - Abnormal; Notable for the following components:    Magnesium 0.9 (*)     All other components within normal limits    Narrative:      MG critical result(s) called and verbal readback obtained from Ria Almonte RN, 08/22/2019 05:59   PHOSPHORUS - Abnormal; Notable for the following components:    Phosphorus 2.3 (*)     All other components within normal limits   COMPREHENSIVE METABOLIC PANEL - Abnormal; Notable for the following components:    Sodium  130 (*)     Potassium 3.2 (*)     CO2 22 (*)     Calcium 7.5 (*)     Total Protein 5.8 (*)     Albumin 1.8 (*)     Anion Gap 7 (*)     All other components within normal limits   URINALYSIS MICROSCOPIC - Abnormal; Notable for the following components:    WBC, UA 15 (*)     Bacteria Few (*)     All other components within normal limits    Narrative:     Preferred Collection Type->Urine, Clean Catch   LACTIC ACID, PLASMA   PROTIME-INR   LACTIC ACID, PLASMA   TYPE & SCREEN        All Lab Results:  Results for orders placed or performed during the hospital encounter of 08/21/19   Blood culture x two cultures. Draw prior to antibiotics.   Result Value Ref Range    Blood Culture, Routine No Growth to date     Blood Culture, Routine No Growth to date     Blood Culture, Routine No Growth to date    Blood culture x two cultures. Draw prior to antibiotics.   Result Value Ref Range    Blood Culture, Routine No Growth to date     Blood Culture, Routine No Growth to date     Blood Culture, Routine No Growth to date    Urine culture   Result Value Ref Range    Urine Culture, Routine No growth    CBC auto differential   Result Value Ref Range    WBC 60.67 (HH) 3.90 - 12.70 K/uL    RBC 1.99 (L) 4.00 - 5.40 M/uL    Hemoglobin 7.0 (L) 12.0 - 16.0 g/dL    Hematocrit 20.9 (L) 37.0 - 48.5 %    Mean Corpuscular Volume 105 (H) 82 - 98 fL    Mean Corpuscular Hemoglobin 35.2 (H) 27.0 - 31.0 pg    Mean Corpuscular Hemoglobin Conc 33.5 32.0 - 36.0 g/dL    RDW 18.8 (H) 11.5 - 14.5 %    Platelets 266 150 - 350 K/uL    MPV 9.7 9.2 - 12.9 fL    Lymph # CANCELED 1.0 - 4.8 K/uL    Mono # CANCELED 0.3 - 1.0 K/uL    Eos # CANCELED 0.0 - 0.5 K/uL    Baso # CANCELED 0.00 - 0.20 K/uL    Gran% 50.0 38.0 - 73.0 %    Lymph% 21.0 18.0 - 48.0 %    Mono% 26.0 (H) 4.0 - 15.0 %    Eosinophil% 0.0 0.0 - 8.0 %    Basophil% 0.0 0.0 - 1.9 %    Metamyelocytes 2.0 %    Myelocytes 1.0 %    Platelet Estimate Appears normal     Aniso Slight     Poik Slight     Poly  Occasional     Ovalocytes Occasional     Target Cells Occasional     Tear Drop Cells Occasional     Spherocytes Occasional     Large/Giant Platelets Present     Differential Method Manual    Comprehensive metabolic panel   Result Value Ref Range    Sodium 128 (L) 136 - 145 mmol/L    Potassium 2.9 (L) 3.5 - 5.1 mmol/L    Chloride 95 95 - 110 mmol/L    CO2 21 (L) 23 - 29 mmol/L    Glucose 124 (H) 70 - 110 mg/dL    BUN, Bld 22 8 - 23 mg/dL    Creatinine 1.0 0.5 - 1.4 mg/dL    Calcium 8.2 (L) 8.7 - 10.5 mg/dL    Total Protein 6.9 6.0 - 8.4 g/dL    Albumin 2.2 (L) 3.5 - 5.2 g/dL    Total Bilirubin 0.8 0.1 - 1.0 mg/dL    Alkaline Phosphatase 154 (H) 55 - 135 U/L    AST 23 10 - 40 U/L    ALT 32 10 - 44 U/L    Anion Gap 12 8 - 16 mmol/L    eGFR if African American >60 >60 mL/min/1.73 m^2    eGFR if non African American 54 (A) >60 mL/min/1.73 m^2   Lactic acid, plasma #1   Result Value Ref Range    Lactate (Lactic Acid) 1.0 0.5 - 2.2 mmol/L   Urinalysis, Reflex to Urine Culture Urine, Clean Catch   Result Value Ref Range    Specimen UA Urine, Clean Catch     Color, UA Yellow Yellow, Straw, Monse    Appearance, UA Clear Clear    pH, UA 6.0 5.0 - 8.0    Specific Gravity, UA <=1.005 (A) 1.005 - 1.030    Protein, UA Trace (A) Negative    Glucose, UA Negative Negative    Ketones, UA Negative Negative    Bilirubin (UA) Negative Negative    Occult Blood UA Trace (A) Negative    Nitrite, UA Negative Negative    Urobilinogen, UA Negative <2.0 EU/dL    Leukocytes, UA 2+ (A) Negative   Procalcitonin   Result Value Ref Range    Procalcitonin 2.25 (H) <0.25 ng/mL   Protime-INR   Result Value Ref Range    Prothrombin Time 11.3 9.0 - 12.5 sec    INR 1.0 0.8 - 1.2   Lactic acid, plasma #2   Result Value Ref Range    Lactate (Lactic Acid) 0.8 0.5 - 2.2 mmol/L   Magnesium   Result Value Ref Range    Magnesium 0.9 (LL) 1.6 - 2.6 mg/dL   Phosphorus   Result Value Ref Range    Phosphorus 2.3 (L) 2.7 - 4.5 mg/dL   Comprehensive metabolic panel    Result Value Ref Range    Sodium 130 (L) 136 - 145 mmol/L    Potassium 3.2 (L) 3.5 - 5.1 mmol/L    Chloride 101 95 - 110 mmol/L    CO2 22 (L) 23 - 29 mmol/L    Glucose 103 70 - 110 mg/dL    BUN, Bld 18 8 - 23 mg/dL    Creatinine 0.9 0.5 - 1.4 mg/dL    Calcium 7.5 (L) 8.7 - 10.5 mg/dL    Total Protein 5.8 (L) 6.0 - 8.4 g/dL    Albumin 1.8 (L) 3.5 - 5.2 g/dL    Total Bilirubin 0.7 0.1 - 1.0 mg/dL    Alkaline Phosphatase 124 55 - 135 U/L    AST 20 10 - 40 U/L    ALT 25 10 - 44 U/L    Anion Gap 7 (L) 8 - 16 mmol/L    eGFR if African American >60 >60 mL/min/1.73 m^2    eGFR if non African American >60 >60 mL/min/1.73 m^2   Urinalysis Microscopic   Result Value Ref Range    RBC, UA 1 0 - 4 /hpf    WBC, UA 15 (H) 0 - 5 /hpf    Bacteria Few (A) None-Occ /hpf    Squam Epithel, UA 1 /hpf    Microscopic Comment SEE COMMENT    CBC auto differential   Result Value Ref Range    WBC 39.89 (H) 3.90 - 12.70 K/uL    RBC 1.81 (L) 4.00 - 5.40 M/uL    Hemoglobin 6.2 (L) 12.0 - 16.0 g/dL    Hematocrit 18.8 (LL) 37.0 - 48.5 %    Mean Corpuscular Volume 104 (H) 82 - 98 fL    Mean Corpuscular Hemoglobin 34.3 (H) 27.0 - 31.0 pg    Mean Corpuscular Hemoglobin Conc 33.0 32.0 - 36.0 g/dL    RDW 18.7 (H) 11.5 - 14.5 %    Platelets 216 150 - 350 K/uL    MPV 9.6 9.2 - 12.9 fL    Gran # (ANC) 18.5 (H) 1.8 - 7.7 K/uL    Lymph # 10.6 (H) 1.0 - 4.8 K/uL    Mono # 10.7 (H) 0.3 - 1.0 K/uL    Eos # 0.0 0.0 - 0.5 K/uL    Baso # 0.06 0.00 - 0.20 K/uL    Gran% 50.9 38.0 - 73.0 %    Lymph% 26.5 18.0 - 48.0 %    Mono% 26.8 (H) 4.0 - 15.0 %    Eosinophil% 0.1 0.0 - 8.0 %    Basophil% 0.2 0.0 - 1.9 %    Differential Method Automated    Pathologist Review   Result Value Ref Range    Pathologist Review Peripheral Smear REVIEWED    Pathologist Review   Result Value Ref Range    Pathologist Review Peripheral Smear REVIEWED    Basic metabolic panel   Result Value Ref Range    Sodium 133 (L) 136 - 145 mmol/L    Potassium 2.7 (LL) 3.5 - 5.1 mmol/L    Chloride 99 95  - 110 mmol/L    CO2 23 23 - 29 mmol/L    Glucose 115 (H) 70 - 110 mg/dL    BUN, Bld 14 8 - 23 mg/dL    Creatinine 0.8 0.5 - 1.4 mg/dL    Calcium 8.0 (L) 8.7 - 10.5 mg/dL    Anion Gap 11 8 - 16 mmol/L    eGFR if African American >60 >60 mL/min/1.73 m^2    eGFR if non African American >60 >60 mL/min/1.73 m^2   Magnesium   Result Value Ref Range    Magnesium 0.9 (LL) 1.6 - 2.6 mg/dL   CBC auto differential   Result Value Ref Range    WBC 38.34 (H) 3.90 - 12.70 K/uL    RBC 2.80 (L) 4.00 - 5.40 M/uL    Hemoglobin 9.2 (L) 12.0 - 16.0 g/dL    Hematocrit 26.8 (L) 37.0 - 48.5 %    Mean Corpuscular Volume 96 82 - 98 fL    Mean Corpuscular Hemoglobin 32.9 (H) 27.0 - 31.0 pg    Mean Corpuscular Hemoglobin Conc 34.3 32.0 - 36.0 g/dL    RDW 21.1 (H) 11.5 - 14.5 %    Platelets 151 150 - 350 K/uL    MPV 10.0 9.2 - 12.9 fL    Lymph # CANCELED 1.0 - 4.8 K/uL    Mono # CANCELED 0.3 - 1.0 K/uL    Eos # CANCELED 0.0 - 0.5 K/uL    Baso # CANCELED 0.00 - 0.20 K/uL    Gran% 45.0 38.0 - 73.0 %    Lymph% 25.0 18.0 - 48.0 %    Mono% 24.0 (H) 4.0 - 15.0 %    Eosinophil% 0.0 0.0 - 8.0 %    Basophil% 0.0 0.0 - 1.9 %    Bands 1.0 %    Metamyelocytes 4.0 %    Myelocytes 1.0 %    Differential Method Manual    VANCOMYCIN, TROUGH before 3rd dose   Result Value Ref Range    Vancomycin-Trough 9.3 (L) 10.0 - 22.0 ug/mL   CBC auto differential   Result Value Ref Range    WBC 37.54 (H) 3.90 - 12.70 K/uL    RBC 2.46 (L) 4.00 - 5.40 M/uL    Hemoglobin 8.0 (L) 12.0 - 16.0 g/dL    Hematocrit 23.5 (L) 37.0 - 48.5 %    Mean Corpuscular Volume 96 82 - 98 fL    Mean Corpuscular Hemoglobin 32.5 (H) 27.0 - 31.0 pg    Mean Corpuscular Hemoglobin Conc 34.0 32.0 - 36.0 g/dL    RDW 21.2 (H) 11.5 - 14.5 %    Platelets 145 (L) 150 - 350 K/uL    MPV 10.3 9.2 - 12.9 fL   Magnesium   Result Value Ref Range    Magnesium 1.0 (L) 1.6 - 2.6 mg/dL   Comprehensive metabolic panel   Result Value Ref Range    Sodium 132 (L) 136 - 145 mmol/L    Potassium 3.0 (L) 3.5 - 5.1 mmol/L     Chloride 104 95 - 110 mmol/L    CO2 22 (L) 23 - 29 mmol/L    Glucose 101 70 - 110 mg/dL    BUN, Bld 10 8 - 23 mg/dL    Creatinine 0.7 0.5 - 1.4 mg/dL    Calcium 7.4 (L) 8.7 - 10.5 mg/dL    Total Protein 5.2 (L) 6.0 - 8.4 g/dL    Albumin 1.5 (L) 3.5 - 5.2 g/dL    Total Bilirubin 0.6 0.1 - 1.0 mg/dL    Alkaline Phosphatase 123 55 - 135 U/L    AST 18 10 - 40 U/L    ALT 22 10 - 44 U/L    Anion Gap 6 (L) 8 - 16 mmol/L    eGFR if African American >60 >60 mL/min/1.73 m^2    eGFR if non African American >60 >60 mL/min/1.73 m^2   Type & Screen   Result Value Ref Range    Group & Rh O POS     Indirect Saw NEG    ISTAT PROCEDURE   Result Value Ref Range    POC PH 7.436 7.35 - 7.45    POC PCO2 24.9 (LL) 35 - 45 mmHg    POC PO2 85 80 - 100 mmHg    POC HCO3 16.8 (L) 24 - 28 mmol/L    POC BE -7 -2 to 2 mmol/L    POC SATURATED O2 97 95 - 100 %    Sample ARTERIAL     Site LR     Allens Test Pass     DelSys Nasal Can     Mode SPONT     Flow 2    Prepare RBC 2 Units; H/H 6.2/18.8   Result Value Ref Range    UNIT NUMBER [id number]     Product Code K1096N17     DISPENSE STATUS TRANSFUSED     CODING SYSTEM [id number]     Unit Blood Type Code 5100     Unit Blood Type O POS     Unit Expiration [id number]     UNIT NUMBER [id number]     Product Code X4701P22     DISPENSE STATUS TRANSFUSED     CODING SYSTEM ZBUK798     Unit Blood Type Code 5100     Unit Blood Type O POS     Unit Expiration [id number]          Imaging Results:  Imaging Results          X-Ray Chest AP Portable (Final result)  Result time 08/21/19 20:00:57    Final result by Molina Riley MD (08/21/19 20:00:57)                 Impression:      Left lower lobe infiltrate compatible with pneumonia.      Electronically signed by: Molina Riley MD  Date:    08/21/2019  Time:    20:00             Narrative:    EXAMINATION:  XR CHEST AP PORTABLE    CLINICAL HISTORY:  Pneumonia., Sepsis;    COMPARISON:  05/16/2019.    FINDINGS:  Patchy left perihilar and lower lobe  infiltrate are present with small pleural effusion.  Hiatal hernia.    Right lung is clear.  Old right rib fractures.  Scoliosis.    Bilateral shoulder arthropathy.                               RADIOLOGY REPORT (Final result)  Result time 08/23/19 16:34:50    Final result by Unknown User (08/23/19 16:34:50)                                   The EKG was ordered, reviewed, and independently interpreted by the ED provider.  Interpretation time: 1900  Rate: 134 BPM  Rhythm: sinus tachycardia  Interpretation: Nonspecific intraventricular block. T wave abnormality, consider inferior ischemia and anterolateral ischemia. No STEMI.           The Emergency Provider reviewed the vital signs and test results, which are outlined above.     ED Discussion     8:00 PM: Dr. Cunningham transfers care of pt to Dr. Cheek pending imaging results.    8:20 PM: Re-evaluated pt. Pt denies any recent hospitalizations but states that her  recently spent time in the hospital. Pt reports sxs onset 5 days ago. Pt is O x3 at this time. Pt reports chronic back and joint pain.    9:11 PM: Discussed case with Dr. Wilkinson ([facility]). Dr. Wilkinson agrees with current care and management of pt and accepts admission.   Admitting Service: Hospital Medicine  Admitting Physician: Dr. Wilkinson  Admit to: Inpatient Tele    9:16 PM: Re-evaluated pt. I have discussed test results, shared treatment plan, and the need for admission with patient and family at bedside. Pt and family express understanding at this time and agree with all information. All questions answered. Pt and famil have no further questions or concerns at this time. Pt is ready for admit.      ED Medication(s):  Medications   piperacillin-tazobactam 4.5 g in dextrose 5 % 100 mL IVPB (ready to mix system) (4.5 g Intravenous New Bag 8/24/19 0051)   hydrALAZINE injection 10 mg (10 mg Intravenous Given 8/24/19 0166)   acetaminophen tablet 650 mg (650 mg Oral Given 8/23/19 2540)    ondansetron injection 4 mg (has no administration in time range)   diphenhydrAMINE capsule 25 mg (has no administration in time range)   guaifenesin 100 mg/5 ml syrup 200 mg (has no administration in time range)   aluminum-magnesium hydroxide-simethicone 200-200-20 mg/5 mL suspension 30 mL (has no administration in time range)   pantoprazole EC tablet 40 mg (40 mg Oral Given 8/23/19 0831)   amitriptyline tablet 75 mg (75 mg Oral Given 8/23/19 2115)   DULoxetine DR capsule 40 mg (40 mg Oral Given 8/23/19 0830)   hydrOXYzine HCl tablet 25 mg (has no administration in time range)   pravastatin tablet 10 mg (10 mg Oral Given 8/23/19 0831)   topiramate tablet 25 mg (25 mg Oral Given 8/23/19 0831)   albuterol-ipratropium 2.5 mg-0.5 mg/3 mL nebulizer solution 3 mL (3 mLs Nebulization Given 8/24/19 0050)   0.9%  NaCl infusion (for blood administration) (has no administration in time range)   levothyroxine tablet 100 mcg (100 mcg Oral Given 8/24/19 0553)   sodium chloride 3% nebulizer solution 4 mL (has no administration in time range)   docusate sodium capsule 100 mg (100 mg Oral Given 8/23/19 2115)   0.9%  NaCl infusion ( Intravenous New Bag 8/24/19 0700)   tamsulosin 24 hr capsule 0.4 mg (0.4 mg Oral Given 8/23/19 1955)   vancomycin in dextrose 5 % 1 gram/250 mL IVPB 1,000 mg (1,000 mg Intravenous New Bag 8/24/19 0130)   sodium chloride 0.9% bolus 1,503 mL (0 mL/kg × 50.1 kg (Ideal) Intravenous Stopped 8/21/19 2234)   levoFLOXacin 750 mg/150 mL IVPB 750 mg (0 mg Intravenous Stopped 8/21/19 2131)   acetaminophen tablet 1,000 mg (1,000 mg Oral Given 8/21/19 2032)   potassium chloride SA CR tablet 40 mEq (40 mEq Oral Given 8/21/19 2218)   piperacillin-tazobactam 4.5 g in dextrose 5 % 100 mL IVPB (ready to mix system) (0 g Intravenous Stopped 8/21/19 5669)   vancomycin 1.75 g in 5 % dextrose 500 mL IVPB (0 mg Intravenous Stopped 8/22/19 1567)   potassium chloride SA CR tablet 40 mEq (40 mEq Oral Given 8/22/19 0029)    magnesium sulfate 2g in water 50mL IVPB (premix) (2 g Intravenous New Bag 8/22/19 0613)   furosemide injection 40 mg (40 mg Intravenous Given 8/22/19 1841)   magnesium sulfate 2g in water 50mL IVPB (premix) (0 g Intravenous Stopped 8/23/19 1203)     Followed by   magnesium sulfate in dextrose IVPB (premix) 1 g (1 g Intravenous New Bag 8/23/19 1203)   potassium chloride 10 mEq in 100 mL IVPB (10 mEq Intravenous New Bag 8/23/19 1827)       Current Discharge Medication List                    Medical Decision Making:   Clinical Tests:   Lab Tests: Ordered and Reviewed  Radiological Study: Reviewed and Ordered  Medical Tests: Reviewed and Ordered             Scribe Attestation:   Scribe #1: I performed the above scribed service and the documentation accurately describes the services I performed. I attest to the accuracy of the note.     Attending:   Physician Attestation Statement for Scribe #1: I, Giovanni Cunningham Jr., MD, personally performed the services described in this documentation, as scribed by Mick Hurst, in my presence, and it is both accurate and complete.       Scribe Attestation:   Scribe #2: I performed the above scribed service and the documentation accurately describes the services I performed. I attest to the accuracy of the note.    Attending Attestation:           Physician Attestation for Scribe:    Physician Attestation Statement for Scribe #2: I, Khushbu Cheek MD, reviewed documentation, as scribed by Eric Garay in my presence, and it is both accurate and complete. I also acknowledge and confirm the content of the note done by Scribe #1.           Clinical Impression       ICD-10-CM ICD-9-CM   1. Sepsis, due to unspecified organism A41.9 038.9     995.91   2. Fever R50.9 780.60   3. Pneumonia of left lower lobe due to infectious organism J18.1 486   4. Anemia, unspecified type D64.9 285.9   5. Hypokalemia E87.6 276.8   6. Sepsis A41.9 038.9     995.91       Disposition:   Disposition:  Admitted  Condition: Fair         Si ALEXANDER Cheek MD  08/24/19 0790

## 2019-08-22 ENCOUNTER — TELEPHONE (OUTPATIENT)
Dept: FAMILY MEDICINE | Facility: CLINIC | Age: 79
End: 2019-08-22

## 2019-08-22 PROBLEM — D64.9 ANEMIA: Status: ACTIVE | Noted: 2019-08-22

## 2019-08-22 PROBLEM — E87.6 HYPOKALEMIA: Status: ACTIVE | Noted: 2019-08-22

## 2019-08-22 PROBLEM — E83.42 HYPOMAGNESEMIA: Status: ACTIVE | Noted: 2019-08-22

## 2019-08-22 LAB
ALBUMIN SERPL BCP-MCNC: 1.8 G/DL (ref 3.5–5.2)
ALLENS TEST: ABNORMAL
ALP SERPL-CCNC: 124 U/L (ref 55–135)
ALT SERPL W/O P-5'-P-CCNC: 25 U/L (ref 10–44)
ANION GAP SERPL CALC-SCNC: 7 MMOL/L (ref 8–16)
ANISOCYTOSIS BLD QL SMEAR: SLIGHT
AST SERPL-CCNC: 20 U/L (ref 10–40)
BACTERIA #/AREA URNS HPF: ABNORMAL /HPF
BASOPHILS # BLD AUTO: 0.06 K/UL (ref 0–0.2)
BASOPHILS # BLD AUTO: ABNORMAL K/UL (ref 0–0.2)
BASOPHILS NFR BLD: 0 % (ref 0–1.9)
BASOPHILS NFR BLD: 0.2 % (ref 0–1.9)
BILIRUB SERPL-MCNC: 0.7 MG/DL (ref 0.1–1)
BILIRUB UR QL STRIP: NEGATIVE
BUN SERPL-MCNC: 18 MG/DL (ref 8–23)
CALCIUM SERPL-MCNC: 7.5 MG/DL (ref 8.7–10.5)
CHLORIDE SERPL-SCNC: 101 MMOL/L (ref 95–110)
CLARITY UR: CLEAR
CO2 SERPL-SCNC: 22 MMOL/L (ref 23–29)
COLOR UR: YELLOW
CREAT SERPL-MCNC: 0.9 MG/DL (ref 0.5–1.4)
DACRYOCYTES BLD QL SMEAR: ABNORMAL
DELSYS: ABNORMAL
DIFFERENTIAL METHOD: ABNORMAL
DIFFERENTIAL METHOD: ABNORMAL
EOSINOPHIL # BLD AUTO: 0 K/UL (ref 0–0.5)
EOSINOPHIL # BLD AUTO: ABNORMAL K/UL (ref 0–0.5)
EOSINOPHIL NFR BLD: 0 % (ref 0–8)
EOSINOPHIL NFR BLD: 0.1 % (ref 0–8)
ERYTHROCYTE [DISTWIDTH] IN BLOOD BY AUTOMATED COUNT: 18.7 % (ref 11.5–14.5)
ERYTHROCYTE [DISTWIDTH] IN BLOOD BY AUTOMATED COUNT: 18.8 % (ref 11.5–14.5)
EST. GFR  (AFRICAN AMERICAN): >60 ML/MIN/1.73 M^2
EST. GFR  (NON AFRICAN AMERICAN): >60 ML/MIN/1.73 M^2
FLOW: 2
GIANT PLATELETS BLD QL SMEAR: PRESENT
GLUCOSE SERPL-MCNC: 103 MG/DL (ref 70–110)
GLUCOSE UR QL STRIP: NEGATIVE
HCO3 UR-SCNC: 16.8 MMOL/L (ref 24–28)
HCT VFR BLD AUTO: 18.8 % (ref 37–48.5)
HCT VFR BLD AUTO: 20.9 % (ref 37–48.5)
HGB BLD-MCNC: 6.2 G/DL (ref 12–16)
HGB BLD-MCNC: 7 G/DL (ref 12–16)
HGB UR QL STRIP: ABNORMAL
KETONES UR QL STRIP: NEGATIVE
LEUKOCYTE ESTERASE UR QL STRIP: ABNORMAL
LYMPHOCYTES # BLD AUTO: 10.6 K/UL (ref 1–4.8)
LYMPHOCYTES # BLD AUTO: ABNORMAL K/UL (ref 1–4.8)
LYMPHOCYTES NFR BLD: 21 % (ref 18–48)
LYMPHOCYTES NFR BLD: 26.5 % (ref 18–48)
MAGNESIUM SERPL-MCNC: 0.9 MG/DL (ref 1.6–2.6)
MCH RBC QN AUTO: 34.3 PG (ref 27–31)
MCH RBC QN AUTO: 35.2 PG (ref 27–31)
MCHC RBC AUTO-ENTMCNC: 33 G/DL (ref 32–36)
MCHC RBC AUTO-ENTMCNC: 33.5 G/DL (ref 32–36)
MCV RBC AUTO: 104 FL (ref 82–98)
MCV RBC AUTO: 105 FL (ref 82–98)
METAMYELOCYTES NFR BLD MANUAL: 2 %
MICROSCOPIC COMMENT: ABNORMAL
MODE: ABNORMAL
MONOCYTES # BLD AUTO: 10.7 K/UL (ref 0.3–1)
MONOCYTES # BLD AUTO: ABNORMAL K/UL (ref 0.3–1)
MONOCYTES NFR BLD: 26 % (ref 4–15)
MONOCYTES NFR BLD: 26.8 % (ref 4–15)
MYELOCYTES NFR BLD MANUAL: 1 %
NEUTROPHILS # BLD AUTO: 18.5 K/UL (ref 1.8–7.7)
NEUTROPHILS NFR BLD: 50 % (ref 38–73)
NEUTROPHILS NFR BLD: 50.9 % (ref 38–73)
NITRITE UR QL STRIP: NEGATIVE
OVALOCYTES BLD QL SMEAR: ABNORMAL
PATH REV BLD -IMP: NORMAL
PATH REV BLD -IMP: NORMAL
PCO2 BLDA: 24.9 MMHG (ref 35–45)
PH SMN: 7.44 [PH] (ref 7.35–7.45)
PH UR STRIP: 6 [PH] (ref 5–8)
PHOSPHATE SERPL-MCNC: 2.3 MG/DL (ref 2.7–4.5)
PLATELET # BLD AUTO: 216 K/UL (ref 150–350)
PLATELET # BLD AUTO: 266 K/UL (ref 150–350)
PLATELET BLD QL SMEAR: ABNORMAL
PMV BLD AUTO: 9.6 FL (ref 9.2–12.9)
PMV BLD AUTO: 9.7 FL (ref 9.2–12.9)
PO2 BLDA: 85 MMHG (ref 80–100)
POC BE: -7 MMOL/L
POC SATURATED O2: 97 % (ref 95–100)
POIKILOCYTOSIS BLD QL SMEAR: SLIGHT
POLYCHROMASIA BLD QL SMEAR: ABNORMAL
POTASSIUM SERPL-SCNC: 3.2 MMOL/L (ref 3.5–5.1)
PROT SERPL-MCNC: 5.8 G/DL (ref 6–8.4)
PROT UR QL STRIP: ABNORMAL
RBC # BLD AUTO: 1.81 M/UL (ref 4–5.4)
RBC # BLD AUTO: 1.99 M/UL (ref 4–5.4)
RBC #/AREA URNS HPF: 1 /HPF (ref 0–4)
SAMPLE: ABNORMAL
SITE: ABNORMAL
SODIUM SERPL-SCNC: 130 MMOL/L (ref 136–145)
SP GR UR STRIP: <=1.005 (ref 1–1.03)
SPHEROCYTES BLD QL SMEAR: ABNORMAL
SQUAMOUS #/AREA URNS HPF: 1 /HPF
TARGETS BLD QL SMEAR: ABNORMAL
URN SPEC COLLECT METH UR: ABNORMAL
UROBILINOGEN UR STRIP-ACNC: NEGATIVE EU/DL
WBC # BLD AUTO: 39.89 K/UL (ref 3.9–12.7)
WBC # BLD AUTO: 60.67 K/UL (ref 3.9–12.7)
WBC #/AREA URNS HPF: 15 /HPF (ref 0–5)

## 2019-08-22 PROCEDURE — 25000003 PHARM REV CODE 250: Mod: HCNC | Performed by: INTERNAL MEDICINE

## 2019-08-22 PROCEDURE — 84100 ASSAY OF PHOSPHORUS: CPT | Mod: HCNC

## 2019-08-22 PROCEDURE — 85025 COMPLETE CBC W/AUTO DIFF WBC: CPT | Mod: HCNC

## 2019-08-22 PROCEDURE — 63600175 PHARM REV CODE 636 W HCPCS: Mod: HCNC | Performed by: EMERGENCY MEDICINE

## 2019-08-22 PROCEDURE — 99900035 HC TECH TIME PER 15 MIN (STAT): Mod: HCNC

## 2019-08-22 PROCEDURE — 99223 PR INITIAL HOSPITAL CARE,LEVL III: ICD-10-PCS | Mod: HCNC,,, | Performed by: INTERNAL MEDICINE

## 2019-08-22 PROCEDURE — 63600175 PHARM REV CODE 636 W HCPCS: Mod: HCNC | Performed by: INTERNAL MEDICINE

## 2019-08-22 PROCEDURE — 27000221 HC OXYGEN, UP TO 24 HOURS: Mod: HCNC

## 2019-08-22 PROCEDURE — 21400001 HC TELEMETRY ROOM: Mod: HCNC

## 2019-08-22 PROCEDURE — 83735 ASSAY OF MAGNESIUM: CPT | Mod: HCNC

## 2019-08-22 PROCEDURE — 36430 TRANSFUSION BLD/BLD COMPNT: CPT

## 2019-08-22 PROCEDURE — 94640 AIRWAY INHALATION TREATMENT: CPT | Mod: HCNC

## 2019-08-22 PROCEDURE — 81000 URINALYSIS NONAUTO W/SCOPE: CPT | Mod: HCNC

## 2019-08-22 PROCEDURE — 80053 COMPREHEN METABOLIC PANEL: CPT | Mod: HCNC

## 2019-08-22 PROCEDURE — 94761 N-INVAS EAR/PLS OXIMETRY MLT: CPT | Mod: HCNC

## 2019-08-22 PROCEDURE — 25000242 PHARM REV CODE 250 ALT 637 W/ HCPCS: Mod: HCNC | Performed by: EMERGENCY MEDICINE

## 2019-08-22 PROCEDURE — 85060 BLOOD SMEAR INTERPRETATION: CPT | Mod: HCNC,,, | Performed by: PATHOLOGY

## 2019-08-22 PROCEDURE — 82803 BLOOD GASES ANY COMBINATION: CPT | Mod: HCNC

## 2019-08-22 PROCEDURE — 99223 1ST HOSP IP/OBS HIGH 75: CPT | Mod: HCNC,,, | Performed by: INTERNAL MEDICINE

## 2019-08-22 PROCEDURE — 87086 URINE CULTURE/COLONY COUNT: CPT | Mod: HCNC

## 2019-08-22 PROCEDURE — 85060 PATHOLOGIST REVIEW: ICD-10-PCS | Mod: HCNC,,, | Performed by: PATHOLOGY

## 2019-08-22 PROCEDURE — 36600 WITHDRAWAL OF ARTERIAL BLOOD: CPT | Mod: HCNC

## 2019-08-22 PROCEDURE — P9016 RBC LEUKOCYTES REDUCED: HCPCS | Mod: HCNC

## 2019-08-22 RX ORDER — FUROSEMIDE 10 MG/ML
40 INJECTION INTRAMUSCULAR; INTRAVENOUS ONCE
Status: COMPLETED | OUTPATIENT
Start: 2019-08-22 | End: 2019-08-22

## 2019-08-22 RX ORDER — HYDROCODONE BITARTRATE AND ACETAMINOPHEN 500; 5 MG/1; MG/1
TABLET ORAL
Status: DISCONTINUED | OUTPATIENT
Start: 2019-08-22 | End: 2019-08-26 | Stop reason: HOSPADM

## 2019-08-22 RX ORDER — DOCUSATE SODIUM 100 MG/1
100 CAPSULE, LIQUID FILLED ORAL 2 TIMES DAILY
Status: DISCONTINUED | OUTPATIENT
Start: 2019-08-22 | End: 2019-08-26 | Stop reason: HOSPADM

## 2019-08-22 RX ORDER — MAGNESIUM SULFATE HEPTAHYDRATE 40 MG/ML
2 INJECTION, SOLUTION INTRAVENOUS
Status: COMPLETED | OUTPATIENT
Start: 2019-08-22 | End: 2019-08-22

## 2019-08-22 RX ORDER — LEVOTHYROXINE SODIUM 100 UG/1
100 TABLET ORAL
Status: DISCONTINUED | OUTPATIENT
Start: 2019-08-23 | End: 2019-08-26 | Stop reason: HOSPADM

## 2019-08-22 RX ORDER — SODIUM CHLORIDE FOR INHALATION 3 %
4 VIAL, NEBULIZER (ML) INHALATION
Status: DISCONTINUED | OUTPATIENT
Start: 2019-08-22 | End: 2019-08-26 | Stop reason: HOSPADM

## 2019-08-22 RX ADMIN — POTASSIUM CHLORIDE 40 MEQ: 1500 TABLET, EXTENDED RELEASE ORAL at 12:08

## 2019-08-22 RX ADMIN — DOCUSATE SODIUM 100 MG: 100 CAPSULE, LIQUID FILLED ORAL at 08:08

## 2019-08-22 RX ADMIN — PANTOPRAZOLE SODIUM 40 MG: 40 TABLET, DELAYED RELEASE ORAL at 10:08

## 2019-08-22 RX ADMIN — TOPIRAMATE 25 MG: 25 TABLET, FILM COATED ORAL at 10:08

## 2019-08-22 RX ADMIN — HYDRALAZINE HYDROCHLORIDE 10 MG: 20 INJECTION INTRAMUSCULAR; INTRAVENOUS at 08:08

## 2019-08-22 RX ADMIN — VANCOMYCIN HYDROCHLORIDE 750 MG: 750 INJECTION, POWDER, LYOPHILIZED, FOR SOLUTION INTRAVENOUS at 10:08

## 2019-08-22 RX ADMIN — PIPERACILLIN AND TAZOBACTAM 4.5 G: 4; .5 INJECTION, POWDER, LYOPHILIZED, FOR SOLUTION INTRAVENOUS; PARENTERAL at 05:08

## 2019-08-22 RX ADMIN — DULOXETINE HYDROCHLORIDE 40 MG: 20 CAPSULE, DELAYED RELEASE ORAL at 10:08

## 2019-08-22 RX ADMIN — IPRATROPIUM BROMIDE AND ALBUTEROL SULFATE 3 ML: .5; 3 SOLUTION RESPIRATORY (INHALATION) at 07:08

## 2019-08-22 RX ADMIN — LEVOTHYROXINE SODIUM 88 MCG: 88 TABLET ORAL at 05:08

## 2019-08-22 RX ADMIN — PRAVASTATIN SODIUM 10 MG: 10 TABLET ORAL at 10:08

## 2019-08-22 RX ADMIN — AMITRIPTYLINE HYDROCHLORIDE 75 MG: 50 TABLET, FILM COATED ORAL at 08:08

## 2019-08-22 RX ADMIN — IPRATROPIUM BROMIDE AND ALBUTEROL SULFATE 3 ML: .5; 3 SOLUTION RESPIRATORY (INHALATION) at 01:08

## 2019-08-22 RX ADMIN — PIPERACILLIN AND TAZOBACTAM 4.5 G: 4; .5 INJECTION, POWDER, LYOPHILIZED, FOR SOLUTION INTRAVENOUS; PARENTERAL at 02:08

## 2019-08-22 RX ADMIN — FUROSEMIDE 40 MG: 10 INJECTION, SOLUTION INTRAMUSCULAR; INTRAVENOUS at 06:08

## 2019-08-22 RX ADMIN — IPRATROPIUM BROMIDE AND ALBUTEROL SULFATE 3 ML: .5; 3 SOLUTION RESPIRATORY (INHALATION) at 12:08

## 2019-08-22 RX ADMIN — MAGNESIUM SULFATE HEPTAHYDRATE 2 G: 40 INJECTION, SOLUTION INTRAVENOUS at 06:08

## 2019-08-22 NOTE — PLAN OF CARE
Problem: Adult Inpatient Plan of Care  Goal: Plan of Care Review  Outcome: Ongoing (interventions implemented as appropriate)  Patient AAO x4. VSS..   Patient remained afebrile throughout the shift.  Patient remained free of falls this shift.  Plan of care reviewed.  Patient verbalized understanding.  Patient turning independently   Frequent weight shifting encouraged.  Patient ST on monitor.  Bed low, siderails up x2, wheels locked, call light in reach.  Bed alarm maintained for safety.  Patient to receive 2 units of RBC  IV abx continued  Patient instructed to call for assistance.  Hourly rounding completed.  Will continue to monitor.

## 2019-08-22 NOTE — SUBJECTIVE & OBJECTIVE
Oncology Treatment Plan:   [No treatment plan]    Medications:  Continuous Infusions:   sodium chloride 0.9% 125 mL/hr at 08/21/19 2235     Scheduled Meds:   albuterol-ipratropium  3 mL Nebulization Q6H    amitriptyline  75 mg Oral QHS    DULoxetine  40 mg Oral Daily    levoFLOXacin  750 mg Intravenous Q48H    levothyroxine  88 mcg Oral Before breakfast    pantoprazole  40 mg Oral Daily    piperacillin-tazobactam (ZOSYN) IVPB  4.5 g Intravenous Q8H    pravastatin  10 mg Oral Daily    topiramate  25 mg Oral Daily    vancomycin (VANCOCIN) IVPB  750 mg Intravenous Q24H     PRN Meds:acetaminophen, aluminum-magnesium hydroxide-simethicone, diphenhydrAMINE, guaifenesin 100 mg/5 ml, hydrALAZINE, hydrOXYzine HCl, ondansetron     Review of patient's allergies indicates:   Allergen Reactions    Codeine      Other reaction(s): hyper  Other reaction(s): hyper    Doxycycline     Gabapentin Other (See Comments)     Bad dreams        Past Medical History:   Diagnosis Date    Acid reflux     Anxiety     Back pain     Bronchitis, chronic obstructive w acute bronchitis 7/29/2016    Cancer     [facility] arms, face- Dr. Lata Tejada    Cataract     2+NS    Degenerative disc disease     Depression     Dry mouth     Hernia, hiatal 11/18/2013    Hypertension     Hypothyroid     Macrocytic anemia 5/3/2016    Macular degeneration     Migraines     Mixed anxiety and depressive disorder     Multiple fractures of ribs of right side     Osteoporosis     Pneumonia     Pneumonia due to other staphylococcus     Rheumatoid arthritis     Rheumatoid arthritis(714.0)     Rheumatoid arthritis(714.0)     Remicade, MTX.     Past Surgical History:   Procedure Laterality Date    APPENDECTOMY  1985    CATARACT EXTRACTION Bilateral 6/11/15    Dr. Booth    CHOLECYSTECTOMY  2013    cryoablasion kidney Left 09/27/2016    EGD (ESOPHAGOGASTRODUODENOSCOPY) N/A 10/31/2013    Per[facility] ENDO    feet  Bilateral     rheumatoid    FRACTURE SURGERY Right     tibia    FUNDOPLICATION, NISSEN, LAPAROSCOPIC N/A 2013    Performed by Galindo Vasquez [facility] at [facility] OR    HERNIA REPAIR      HYSTERECTOMY  1970    partial    JOINT REPLACEMENT      bilateral knees (), hands, wrists, knuckles, toes    LAPAROSCOPIC NISSEN FUNDOPLICATION      Left L5/S1 TF AYAAN with local Left 2019    Performed by [facility]dy Felix [facility] at [facility] PAIN MGT    Renal Cryoablation N/A 2016    Performed by [facility] Diagnostic Provider at [facility] OR     Family History     Problem Relation (Age of Onset)    Asthma Sister, Brother    Cancer Brother, Maternal Grandfather    Cataracts Mother    Chronic back pain Sister, Brother    Diabetes Mellitus Brother    Fibromyalgia Daughter    Heart disease Mother, Father, Maternal Grandmother    Hyperlipidemia Mother    Hypertension Mother, Father, Sister, Brother    Osteoarthritis Mother, Father, Sister, Brother    Thyroid disease Sister, Brother        Tobacco Use    Smoking status: Former Smoker     Packs/day: 0.25     Years: 2.00     Pack years: 0.50     Last attempt to quit: 1965     Years since quittin.8    Smokeless tobacco: Never Used   Substance and Sexual Activity    Alcohol use: No    Drug use: No    Sexual activity: Never     Partners: Male       Review of Systems   Constitutional: Positive for activity change, appetite change and fatigue. Negative for chills, diaphoresis, fever and unexpected weight change.   HENT: Negative for congestion, hearing loss, nosebleeds, postnasal drip and trouble swallowing.    Eyes: Negative for discharge and visual disturbance.   Respiratory: Positive for cough, chest tightness and shortness of breath.    Cardiovascular: Negative for chest pain, palpitations and leg swelling.   Gastrointestinal: Positive for nausea. Negative for abdominal distention, blood in stool, constipation, diarrhea and vomiting.   Endocrine: Negative for cold intolerance  and heat intolerance.   Genitourinary: Negative for difficulty urinating, dyspareunia, flank pain and hematuria.   Musculoskeletal: Positive for arthralgias and back pain. Negative for gait problem and myalgias.   Skin: Negative.    Neurological: Negative for dizziness, weakness, light-headedness and headaches.   Hematological: Negative for adenopathy. Does not bruise/bleed easily.   Psychiatric/Behavioral: Negative for agitation, behavioral problems and confusion. The patient is nervous/anxious.      Objective:     Vital Signs (Most Recent):  Temp: 98.8 °F (37.1 °C) (08/22/19 0732)  Pulse: (!) 119 (08/22/19 0932)  Resp: 18 (08/22/19 0732)  BP: (!) 140/67 (08/22/19 0732)  SpO2: 98 % (08/22/19 0732) Vital Signs (24h Range):  Temp:  [97.8 °F (36.6 °C)-101.3 °F (38.5 °C)] 98.8 °F (37.1 °C)  Pulse:  [] 119  Resp:  [16-30] 18  SpO2:  [90 %-98 %] 98 %  BP: (121-155)/(58-76) 140/67     Weight: 59.1 kg (130 lb 3.2 oz)  Body mass index is 23.81 kg/m².  Body surface area is 1.61 meters squared.      Intake/Output Summary (Last 24 hours) at 8/22/2019 1013  Last data filed at 8/22/2019 0327  Gross per 24 hour   Intake 2253 ml   Output --   Net 2253 ml       Physical Exam   Constitutional: She is oriented to person, place, and time. She appears well-developed and well-nourished. She appears lethargic. She appears ill. No distress.   HENT:   Head: Normocephalic and atraumatic.   Right Ear: Hearing and external ear normal.   Left Ear: Hearing and external ear normal.   Nose: No rhinorrhea or sinus tenderness. Right sinus exhibits no maxillary sinus tenderness and no frontal sinus tenderness. Left sinus exhibits no maxillary sinus tenderness and no frontal sinus tenderness.   Mouth/Throat: Uvula is midline, oropharynx is clear and moist and mucous membranes are normal. No oral lesions.   Eyes: Pupils are equal, round, and reactive to light. Conjunctivae are normal. Right eye exhibits no discharge. Left eye exhibits no  discharge.   Neck: Normal range of motion. Carotid bruit is not present. No tracheal deviation present. No thyromegaly present.   Cardiovascular: Normal rate, regular rhythm, S1 normal, S2 normal, normal heart sounds and intact distal pulses.   No murmur heard.  Pulses:       Dorsalis pedis pulses are 2+ on the right side, and 2+ on the left side.   Pulmonary/Chest: Effort normal. No respiratory distress. She has wheezes in the right upper field and the left upper field. She has rales.   Abdominal: Soft. Bowel sounds are normal. She exhibits distension. She exhibits no mass. There is no tenderness.   Musculoskeletal: Normal range of motion. She exhibits no edema.   Lymphadenopathy:     She has no cervical adenopathy.        Right: No supraclavicular adenopathy present.        Left: No supraclavicular adenopathy present.   Neurological: She is oriented to person, place, and time. She has normal strength. She appears lethargic. No sensory deficit. Coordination and gait normal.   Skin: Skin is warm and dry. Capillary refill takes less than 2 seconds. No rash noted. There is pallor.   Psychiatric: Her speech is normal and behavior is normal. Judgment and thought content normal. Her mood appears anxious. Cognition and memory are normal. She does not exhibit a depressed mood.   Vitals reviewed.      Significant Labs:   CBC:   Recent Labs   Lab 08/21/19 1945 08/22/19  0539   WBC 60.67* 39.89*   HGB 7.0* 6.2*   HCT 20.9* 18.8*    216    and CMP:   Recent Labs   Lab 08/21/19 1945 08/22/19  0453   * 130*   K 2.9* 3.2*   CL 95 101   CO2 21* 22*   * 103   BUN 22 18   CREATININE 1.0 0.9   CALCIUM 8.2* 7.5*   PROT 6.9 5.8*   ALBUMIN 2.2* 1.8*   BILITOT 0.8 0.7   ALKPHOS 154* 124   AST 23 20   ALT 32 25   ANIONGAP 12 7*   EGFRNONAA 54* >60       Diagnostic Results:  I have reviewed all pertinent imaging results/findings within the past 24 hours.

## 2019-08-22 NOTE — ED NOTES
Notified Charge Nurse that no urine was returned upon in-and-out catheterization attempt. ED tech attempted catheterization twice and I attempted catheterization once. Will evaluate patient's ability to urinate after fluids are complete.

## 2019-08-22 NOTE — PROGRESS NOTES
Checked with Dr. John about a sputum induction.   Patient brought sputum cup and said that she has been coughing sputum up. Cup left at bedside.  Patient states that she would prefer to not take the sodium chloride because she doesn't want to cough more.  Patient told to call and let us know when she has coughed into cup.

## 2019-08-22 NOTE — ASSESSMENT & PLAN NOTE
Chronic leukocytosis, followed by Dr. Devine in the Oncology Clinic.  Scheduled to see Oncology in the clinic tomorrow.  History of bone marrow biopsy in the past.  26% monocytes, with 50% granulocytes.  Consult Oncology in the morning for evaluation/recommendations.

## 2019-08-22 NOTE — ASSESSMENT & PLAN NOTE
Patient has been followed by the Hem/Onc clinic for chronic lymphocytosis, baseline WBC is approximately 20K, WBC increased to 60K, likely a leukmoid reaction. Will evaluate after discharge and possibly order a flow cytometry at that time.     --Daily CBC, CMP  --If WBC does not continue to decrease may order further studies to evaluate.

## 2019-08-22 NOTE — ASSESSMENT & PLAN NOTE
Sepsis secondary to pneumonia.  Fever 101.3, -135, WBC 03346, 0% bands, lactic acid 0.8.  Chest x-ray reveals left lower lobe infiltrate.  Started on IV antibiotics.  Continue IV fluids.  Follow up on cultures.     8/22 Cultures to date negative;  Continue antibiotics pending clinical course.

## 2019-08-22 NOTE — TELEPHONE ENCOUNTER
----- Message from Tomas Honeycutt sent at 8/22/2019  8:08 AM CDT -----  Contact: pt daughter   Type:  Needs Medical Advice    Who Called:  abraham   Symptoms (please be specific):   How long has patient had these symptoms:   Pharmacy name and phone #:    Would the patient rather a call back or a response via [facility]?: call   Best Call Back Number: [phone]  Additional Information: caller is requesting a call back from the nurse in regards to the pt being in the hospital

## 2019-08-22 NOTE — PLAN OF CARE
CM spoke with patient  who is awake and alert, and able to make needs known. CM spoke to patient about Help at Home and who was at home to manage her care once discharged. Patient states that she stays with her  but her daughter stays next door to her and she helps her. Patient states that before hospitalization she was able to get around in her house with the use of a walker. Patient denies using any oxygen at home. Patient states that her family provides transportation to and from appointments. CM provided a transitional care folder, information on advanced directives, information on pharmacy bedside delivery, and discharge planning begins on admission with contact information for any needs/questions.    D/C Plan: Home   PCP: Dr. Reyes  Preferred Pharmacy: [email or web address]   Discharge transportation: family   [facility]: declined  Pharmacy Bedside Delivery: yes       08/22/19 1478   Discharge Assessment   Assessment Type Discharge Planning Assessment   Confirmed/corrected address and phone number on facesheet? Yes   Assessment information obtained from? Patient   Expected Length of Stay (days)   (tbd)   Communicated expected length of stay with patient/caregiver yes   Prior to hospitilization cognitive status: Alert/Oriented   Prior to hospitalization functional status: Assistive Equipment   Current cognitive status: Alert/Oriented   Current Functional Status: Assistive Equipment   Facility Arrived From: home   Lives With spouse   Able to Return to Prior Arrangements yes   Is patient able to care for self after discharge? Yes   Who are your caregiver(s) and their phone number(s)? Albina Martínez (daughter) [phone]   Patient's perception of discharge disposition home or selfcare   Readmission Within the Last 30 Days no previous admission in last 30 days   Patient currently being followed by outpatient case management? No   Patient currently receives any other outside agency services? No    Equipment Currently Used at Home walker, standard   Do you have any problems affording any of your prescribed medications? No   Is the patient taking medications as prescribed? yes   Does the patient have transportation home? Yes   Does the patient receive services at the Coumadin Clinic? No   Discharge Plan A Home with family   DME Needed Upon Discharge  none   Patient/Family in Agreement with Plan yes

## 2019-08-22 NOTE — HOSPITAL COURSE
8/22:  States to be breathing better;  Nurse reported difficult breathing with some gurgling type respirations;  Fluids going at 125cc/hr have been curtailed;  ABG with reduce PCo2 at ~24; oxygenating well; have asked for a portable chest xray;  Will continue nebsl; attention to pulmonary toilet; incentives etc    8/23:  Looks and feels better today;  Complains of no BM since admission;  Colace added last evening;  To receive IV potassium and magnesium today;  BP up today; Portable CXR reviewed; syill with left sided infiltrates;  Effusion on left is less; will continue present regimen with monitoring    8/24/19 -  Pt feels better . Denies SOB or chest pain.   BP is elevated and tachycardia is noted. Pt will be placed back on BB therapy that she was on it at home.   Leukocytosis is trending down .Blood cultures no growth to date. Urine culture is negative.   H/H are stable at moderate anemia . Will monitor   Nelson is placed due to large volume urinary retention. Flomax is added as well as Bethanechol . Will consult Urology for evaluation of urinary retention. Will decrease Amitriptyline to 25 mg . This could contribute to urinary retention.      8/25/19-  Pt continues to feel better . Tmax 99.7.  Blood cultures and urine cultures are negative   Worsening leukocytosis with premature cells are noted in today's lab. Dr. Miranda saw pt today and has ordered  leukemia immuno phenotyping of  peripheral blood.     Nelson discontinued today . Pt is voiding without difficulty.    Antibiotic day #5.     8/26/19-   Pt feels better subjectively . Reports fatigue and SOB are better . We evaluated pt for home O2 this morning . Her O2 sat on RA while exercising was 88% , therefore qualifies for Home O2.   Hematology rounded on her today and their recommendations are as follows --WBC: 63.17 with 2% blasts, peripheral flow cytometry pending, patient will need bone marrow biopsy, message sent to IR waiting for reply for scheduling.  Discussed this with patient, patient is agreeable for bone marrow biopsy. Discussed with HM, plan is for possible d/c today.    Pt was noted to have urinary retention and needed Nelson placed . We started her on Flomax which has helped . Nelson discontinued and pt was able to void.     Pt is afebrile. We plans to discharge pt home today with home health service and home O2. Pt will be switched to oral Augmentin to complete antibiotic treatment for pneumonia.  She will follow up with her PCP in 3 days and Hematology /Oncology in a week and possible bone marrow biopsy as out patient.

## 2019-08-22 NOTE — HPI
79-year-old  female with PMH significant for rheumatoid arthritis, chronic leukocytosis, followed in the Oncology Clinic by Dr. Devine, history of bone marrow biopsy in the past, presents to the ED complaining of five days of generalized weakness, associated with fever.  She was seen by Dr. Reyes (PCP), was told that she had acute viral bronchitis and was prescribed oral Zithromax.  However patient has not been improving, hence presented to the ED.  She reported fever of 101.3, -135.  WBC 60,000, 0% bands, lactic acid 1.2.  Chest x-ray reveals left lower lobe infiltrate.  Other laboratory abnormalities including low potassium of 2.9, hemoglobin 7.0 (recently was 8.9).  Patient received IV levofloxacin, IV Zosyn, IV vancomycin empirically in the ED.  Admitting diagnosis sepsis secondary to pneumonia.     
Ms. Parker is an elderly 79-year-old  female with PMH significant for rheumatoid arthritis, chronic leukocytosis, followed in the Oncology Clinic by Dr. Devine, history of bone marrow biopsy in the past, presents to the ED complaining of five days of generalized weakness, associated with fever.  She was seen by Dr. Reyes (PCP), was told that she had acute viral bronchitis and was prescribed oral Zithromax.  However patient has not been improving, hence presented to the ED.  She reported fever of 101.3, -135.  WBC 60,000, 0% bands, lactic acid 1.2.  Chest x-ray reveals left lower lobe infiltrate.  Other laboratory abnormalities including low potassium of 2.9, hemoglobin 7.0 (recently was 8.9).  Patient received IV levofloxacin, IV Zosyn, IV vancomycin empirically in the ED.  Admitting diagnosis sepsis secondary to pneumonia.  
Improved

## 2019-08-22 NOTE — ASSESSMENT & PLAN NOTE
Sepsis secondary to pneumonia.  Fever 101.3, -135, WBC 34686, 0% bands, lactic acid 0.8.  Chest x-ray reveals left lower lobe infiltrate.  Started on IV antibiotics.  Continue IV fluids.  Follow up on cultures.

## 2019-08-22 NOTE — PROGRESS NOTES
No respiratory distress at this time . Wearing 2 L NC at this time . Dry cough . Coarse crackles breath sounds

## 2019-08-22 NOTE — SUBJECTIVE & OBJECTIVE
Interval History: admitted with sepsis from pulmonary source, with symptomatic anemia and marked leukocytosis; hemetology consulted;    Review of Systems   Constitutional: Positive for activity change, appetite change and fever.   HENT: Positive for congestion.    Eyes: Negative.    Respiratory: Positive for cough and shortness of breath.    Cardiovascular: Negative.    Gastrointestinal: Negative.  Negative for nausea and vomiting.   Endocrine: Negative.    Genitourinary: Negative for difficulty urinating and dysuria.   Musculoskeletal: Negative.    Skin: Negative.    Allergic/Immunologic: Negative.    Neurological: Positive for weakness. Negative for dizziness.   Hematological: Negative.    Psychiatric/Behavioral: Negative for agitation and confusion.     Objective:     Vital Signs (Most Recent):  Temp: 99.4 °F (37.4 °C) (08/22/19 1114)  Pulse: (!) 120 (08/22/19 1327)  Resp: (!) 22 (08/22/19 1327)  BP: (!) 159/70 (08/22/19 1114)  SpO2: 96 % (08/22/19 1345) Vital Signs (24h Range):  Temp:  [97.8 °F (36.6 °C)-101.3 °F (38.5 °C)] 99.4 °F (37.4 °C)  Pulse:  [] 120  Resp:  [16-30] 22  SpO2:  [90 %-98 %] 96 %  BP: (121-159)/(58-76) 159/70     Weight: 59.1 kg (130 lb 3.2 oz)  Body mass index is 23.81 kg/m².    Intake/Output Summary (Last 24 hours) at 8/22/2019 1428  Last data filed at 8/22/2019 0327  Gross per 24 hour   Intake 2253 ml   Output --   Net 2253 ml      Physical Exam   Constitutional: She is oriented to person, place, and time. She appears well-developed and well-nourished. No distress.   HENT:   Head: Normocephalic and atraumatic.   Eyes: Pupils are equal, round, and reactive to light. Right eye exhibits no discharge. Left eye exhibits no discharge.   Neck: Normal range of motion. No JVD present.   Cardiovascular: Regular rhythm.   Resting tachycardia   Pulmonary/Chest: She is in respiratory distress.   Labored breathing  'decreased at bases   Abdominal: Soft.   Genitourinary:   Genitourinary Comments:  Deferred   Musculoskeletal: Normal range of motion.   Neurological: She is alert and oriented to person, place, and time.   Skin: Skin is warm and dry. Capillary refill takes 2 to 3 seconds. She is not diaphoretic. There is pallor.   Arthritic changes both hands, with ulnar deviation   Psychiatric: She has a normal mood and affect. Her behavior is normal.   Nursing note and vitals reviewed.      Significant Labs:   ABGs:   Recent Labs   Lab 08/22/19  1401   PH 7.436   PCO2 24.9*   HCO3 16.8*   POCSATURATED 97   BE -7     Blood Culture:   Recent Labs   Lab 08/21/19 1930 08/21/19 1945   LABBLOO No Growth to date No Growth to date     BMP:   Recent Labs   Lab 08/22/19  0453      *   K 3.2*      CO2 22*   BUN 18   CREATININE 0.9   CALCIUM 7.5*   MG 0.9*     CBC:   Recent Labs   Lab 08/21/19 1945 08/22/19  0539   WBC 60.67* 39.89*   HGB 7.0* 6.2*   HCT 20.9* 18.8*    216     Cardiac Markers: No results for input(s): CKMB, MYOGLOBIN, BNP, TROPISTAT in the last 48 hours.  Lactic Acid:   Recent Labs   Lab 08/21/19 1945 08/21/19  2312   LACTATE 1.0 0.8     Magnesium:   Recent Labs   Lab 08/22/19  0453   MG 0.9*     Urine Culture: No results for input(s): LABURIN in the last 48 hours.    Significant Imaging:   Imaging Results          X-Ray Chest AP Portable (Final result)  Result time 08/21/19 20:00:57    Final result by Molina Riley MD (08/21/19 20:00:57)                 Impression:      Left lower lobe infiltrate compatible with pneumonia.      Electronically signed by: Molina Riley MD  Date:    08/21/2019  Time:    20:00             Narrative:    EXAMINATION:  XR CHEST AP PORTABLE    CLINICAL HISTORY:  Pneumonia., Sepsis;    COMPARISON:  05/16/2019.    FINDINGS:  Patchy left perihilar and lower lobe infiltrate are present with small pleural effusion.  Hiatal hernia.    Right lung is clear.  Old right rib fractures.  Scoliosis.    Bilateral shoulder arthropathy.

## 2019-08-22 NOTE — SUBJECTIVE & OBJECTIVE
Interval History: presenting with sepsis due to pneumonia and a chronic indwelling conner catheter.    Review of Systems   Constitutional: Positive for activity change and fever.   HENT: Negative for congestion.    Eyes: Negative.    Respiratory: Positive for cough and shortness of breath.    Cardiovascular: Negative for chest pain, palpitations and leg swelling.   Gastrointestinal: Negative for abdominal distention, nausea and vomiting.   Endocrine: Negative for cold intolerance.   Genitourinary:        Chronic indwelling conner   Musculoskeletal: Negative.    Skin: Negative.    Neurological: Negative for dizziness.   Hematological: Negative.    Psychiatric/Behavioral: Negative for agitation and behavioral problems.     Objective:     Vital Signs (Most Recent):  Temp: 100.1 °F (37.8 °C) (08/22/19 1730)  Pulse: (!) 122 (08/22/19 1730)  Resp: (!) 22 (08/22/19 1730)  BP: (!) 178/77 (08/22/19 1730)  SpO2: 95 % (08/22/19 1630) Vital Signs (24h Range):  Temp:  [97.8 °F (36.6 °C)-101.3 °F (38.5 °C)] 100.1 °F (37.8 °C)  Pulse:  [] 122  Resp:  [16-30] 22  SpO2:  [90 %-98 %] 95 %  BP: (121-178)/(58-77) 178/77     Weight: 59.1 kg (130 lb 3.2 oz)  Body mass index is 23.81 kg/m².    Intake/Output Summary (Last 24 hours) at 8/22/2019 1812  Last data filed at 8/22/2019 1415  Gross per 24 hour   Intake 2453 ml   Output --   Net 2453 ml      Physical Exam   Constitutional: She is oriented to person, place, and time. She appears well-nourished. She appears distressed.   Appears older than stated;    HENT:   Head: Normocephalic and atraumatic.   Eyes: EOM are normal. Right eye exhibits no discharge. Left eye exhibits no discharge. No scleral icterus.   Neck: Normal range of motion. No JVD present.   Cardiovascular: Normal rate and regular rhythm.   Pulmonary/Chest: Effort normal.   Dullness at the lung bases   Abdominal: Soft.   Genitourinary:   Genitourinary Comments: Conner in place   Musculoskeletal: Normal range of motion.    Lymphadenopathy:     She has no cervical adenopathy.   Neurological: She is alert and oriented to person, place, and time.   Skin: Skin is dry. Capillary refill takes 2 to 3 seconds. She is not diaphoretic. No erythema.   Psychiatric: She has a normal mood and affect. Her behavior is normal. Thought content normal.       Significant Labs:   Blood Culture:   Recent Labs   Lab 08/21/19 1930 08/21/19 1945   LABBLOO No Growth to date No Growth to date     BMP:   Recent Labs   Lab 08/22/19  0453      *   K 3.2*      CO2 22*   BUN 18   CREATININE 0.9   CALCIUM 7.5*   MG 0.9*     CBC:   Recent Labs   Lab 08/21/19 1945 08/22/19  0539   WBC 60.67* 39.89*   HGB 7.0* 6.2*   HCT 20.9* 18.8*    216     CMP:   Recent Labs   Lab 08/21/19 1945 08/22/19  0453   * 130*   K 2.9* 3.2*   CL 95 101   CO2 21* 22*   * 103   BUN 22 18   CREATININE 1.0 0.9   CALCIUM 8.2* 7.5*   PROT 6.9 5.8*   ALBUMIN 2.2* 1.8*   BILITOT 0.8 0.7   ALKPHOS 154* 124   AST 23 20   ALT 32 25   ANIONGAP 12 7*   EGFRNONAA 54* >60     Magnesium:   Recent Labs   Lab 08/22/19  0453   MG 0.9*     TSH: No results for input(s): TSH in the last 4320 hours.  Urine Culture: No results for input(s): LABURIN in the last 48 hours.  Urine Studies:   Recent Labs   Lab 08/22/19  0354   COLORU Yellow   APPEARANCEUA Clear   PHUR 6.0   SPECGRAV <=1.005*   PROTEINUA Trace*   GLUCUA Negative   KETONESU Negative   BILIRUBINUA Negative   OCCULTUA Trace*   NITRITE Negative   UROBILINOGEN Negative   LEUKOCYTESUR 2+*   RBCUA 1   WBCUA 15*   BACTERIA Few*   SQUAMEPITHEL 1       Significant Imaging: I have reviewed all pertinent imaging results/findings within the past 24 hours.

## 2019-08-22 NOTE — ASSESSMENT & PLAN NOTE
Chest x-ray reveals left lower lobe infiltrate.  Complains of dry nonproductive cough.  Continue IV levofloxacin, IV Zosyn, IV vancomycin empirically.  Follow up on blood and sputum cultures.  Continue supplemental oxygen to maintain saturations greater than 92%.  Continue bronchodilators every 6 hr scheduled.      8/22:  Continue antibiotics; Cultures to date (blood and urine) are negative; continue nebs Q6h around the clock and Q2hrs prn.

## 2019-08-22 NOTE — SUBJECTIVE & OBJECTIVE
Past Medical History:   Diagnosis Date    Acid reflux     Anxiety     Back pain     Bronchitis, chronic obstructive w acute bronchitis 7/29/2016    Cancer     [facility] arms, face- Dr. Lata Tejada    Cataract     2+NS    Degenerative disc disease     Depression     Dry mouth     Hernia, hiatal 11/18/2013    Hypertension     Hypothyroid     Macrocytic anemia 5/3/2016    Macular degeneration     Migraines     Mixed anxiety and depressive disorder     Multiple fractures of ribs of right side     Osteoporosis     Pneumonia     Pneumonia due to other staphylococcus     Rheumatoid arthritis     Rheumatoid arthritis(714.0)     Rheumatoid arthritis(714.0)     Remicade, MTX.       Past Surgical History:   Procedure Laterality Date    APPENDECTOMY  1985    CATARACT EXTRACTION Bilateral 6/11/15    Dr. Booth    CHOLECYSTECTOMY  2013    cryoablasion kidney Left 09/27/2016    EGD (ESOPHAGOGASTRODUODENOSCOPY) N/A 10/31/2013    Performed by [facility] at [facility] ENDO    feet Bilateral     rheumatoid    FRACTURE SURGERY Right     tibia    FUNDOPLICATION, NISSEN, LAPAROSCOPIC N/A 12/18/2013    Performed by Galindo Vasquez MD at [facility] OR    HERNIA REPAIR      HYSTERECTOMY  1970    partial    JOINT REPLACEMENT      bilateral knees (2008), hands, wrists, knuckles, toes    LAPAROSCOPIC NISSEN FUNDOPLICATION      Left L5/S1 TF AYAAN with local Left 6/25/2019    [facility] PAIN MGT    Renal Cryoablation N/A 9/27/2016    Performed by [facility] Diagnostic Provider at [facility] OR       Review of patient's allergies indicates:   Allergen Reactions    Codeine      Other reaction(s): hyper  Other reaction(s): hyper    Doxycycline     Gabapentin Other (See Comments)     Bad dreams       Current Facility-Administered Medications on File Prior to Encounter   Medication    methylPREDNISolone acetate injection 60 mg     Current Outpatient Medications on File Prior to Encounter   Medication  Sig    albuterol (PROVENTIL) 2.5 mg /3 mL (0.083 %) nebulizer solution Take 3 mLs (2.5 mg total) by nebulization every 6 (six) hours as needed for Wheezing.    amitriptyline (ELAVIL) 75 MG tablet TAKE 1 TABLET BY MOUTH EVERY EVENING    azithromycin (Z-ANUEL) 250 MG tablet Take 2 tablets by mouth on day 1; Take 1 tablet by mouth on days 2-5    benzonatate (TESSALON) 100 MG capsule Take 1-2 capsules (100-200 mg total) by mouth 3 (three) times daily as needed for Cough.    calcium citrate-vitamin D (CITRACAL + D) 315-200 mg-unit per tablet Take 1 tablet by mouth once daily.     DULoxetine (CYMBALTA) 20 MG capsule Take 2 capsules (40 mg total) by mouth once daily.    hydrocodone-acetaminophen 5-325mg (NORCO) 5-325 mg per tablet TK 1 T PO Q 6 H PRN    hydrOXYzine HCl (ATARAX) 25 MG tablet TAKE 1 TO 2 TABLETS(25 TO 50 MG) BY MOUTH EVERY NIGHT AS NEEDED FOR ANXIETY OR INSOMNIA    leucovorin (WELLCOVORIN) 5 mg Tab TAKE ONE WEEKLY ON SATURDAYS    levothyroxine (SYNTHROID) 88 MCG tablet TAKE 1 TABLET BY MOUTH BEFORE BREAKFAST    methotrexate 2.5 MG Tab Take 17.5 mg by mouth every 7 days.     metoprolol succinate (TOPROL-XL) 100 MG 24 hr tablet Take 100 mg by mouth once daily.    multivitamin capsule Take by mouth. As directed    pravastatin (PRAVACHOL) 10 MG tablet TAKE 1 TABLET(10 MG) BY MOUTH EVERY DAY    topiramate (TOPAMAX) 25 MG tablet Take 1 tablet (25 mg total) by mouth at night x 1 week then increase to 2 (two) times daily. Increase as tolerated.    valsartan-hydrochlorothiazide (DIOVAN-HCT) 80-12.5 mg per tablet TAKE 1 TABLET BY MOUTH DAILY    meclizine (ANTIVERT) 50 MG tablet Take 25 mg by mouth 3 (three) times daily as needed.    ondansetron (ZOFRAN) 4 MG tablet Take 1 tablet (4 mg total) by mouth every 8 (eight) hours as needed for Nausea.    oxymetazoline (AFRIN) 0.05 % nasal spray 1 Aerosol, Spray Nasal At bedtime    tocilizumab (ACTEMRA) 80 mg/4 mL (20 mg/mL) Soln Inject into the vein.      Family History     Problem Relation (Age of Onset)    Asthma Sister, Brother    Cancer Brother, Maternal Grandfather    Cataracts Mother    Chronic back pain Sister, Brother    Diabetes Mellitus Brother    Fibromyalgia Daughter    Heart disease Mother, Father, Maternal Grandmother    Hyperlipidemia Mother    Hypertension Mother, Father, Sister, Brother    Osteoarthritis Mother, Father, Sister, Brother    Thyroid disease Sister, Brother        Tobacco Use    Smoking status: Former Smoker     Packs/day: 0.25     Years: 2.00     Pack years: 0.50     Last attempt to quit: 1965     Years since quittin.8    Smokeless tobacco: Never Used   Substance and Sexual Activity    Alcohol use: No    Drug use: No    Sexual activity: Never     Partners: Male     Review of Systems   Constitutional: Positive for diaphoresis, fatigue and fever. Negative for chills.   HENT: Positive for congestion. Negative for nosebleeds and sinus pressure.    Eyes: Negative.  Negative for visual disturbance.   Respiratory: Positive for cough (Dry nonproductive) and shortness of breath. Negative for chest tightness and wheezing.    Cardiovascular: Negative.  Negative for chest pain, palpitations and leg swelling.   Gastrointestinal: Negative.  Negative for abdominal pain, diarrhea, nausea and vomiting.   Endocrine: Negative.  Negative for polyuria.   Genitourinary: Negative.  Negative for dysuria, flank pain, frequency and urgency.   Musculoskeletal: Negative.  Negative for back pain, joint swelling and neck stiffness.   Skin: Negative.  Negative for color change, pallor and rash.   Allergic/Immunologic: Positive for immunocompromised state.   Neurological: Negative.  Negative for dizziness, syncope, speech difficulty, numbness and headaches.   Hematological: Negative.  Negative for adenopathy. Does not bruise/bleed easily.   Psychiatric/Behavioral: Positive for decreased concentration. Negative for confusion and hallucinations. The  patient is not nervous/anxious.    All other systems reviewed and are negative.    Objective:     Vital Signs (Most Recent):  Temp: 100.3 °F (37.9 °C) (08/21/19 2315)  Pulse: (!) 111 (08/21/19 2331)  Resp: 20 (08/21/19 2309)  BP: (!) 145/68 (08/21/19 2331)  SpO2: (!) 92 % (08/21/19 2331) Vital Signs (24h Range):  Temp:  [100.3 °F (37.9 °C)-101.3 °F (38.5 °C)] 100.3 °F (37.9 °C)  Pulse:  [109-135] 111  Resp:  [18-25] 20  SpO2:  [90 %-94 %] 92 %  BP: (134-155)/(63-76) 145/68     Weight: 59.1 kg (130 lb 3.2 oz)  Body mass index is 23.81 kg/m².    Physical Exam   Constitutional: She is oriented to person, place, and time. No distress.   Elderly pleasant  female, appears uncomfortable, worn out and fatigued.  Daughter at the bedside P   HENT:   Head: Normocephalic and atraumatic.   Eyes: Conjunctivae and EOM are normal. No scleral icterus.   Neck: Normal range of motion. Neck supple.   Cardiovascular: Regular rhythm, normal heart sounds and intact distal pulses. Tachycardia present.   No murmur heard.  Pulmonary/Chest: Effort normal. No accessory muscle usage. No respiratory distress. She has rhonchi. She exhibits no tenderness.   Abdominal: Soft. Bowel sounds are normal. She exhibits no distension. There is no tenderness.   Musculoskeletal: Normal range of motion. She exhibits no edema or tenderness.   Neurological: She is alert and oriented to person, place, and time. No cranial nerve deficit. She exhibits normal muscle tone. Coordination normal.   Skin: Skin is warm and dry. She is not diaphoretic. No erythema.   Psychiatric: She has a normal mood and affect. Her behavior is normal.   Nursing note and vitals reviewed.        CRANIAL NERVES     CN III, IV, VI   Extraocular motions are normal.        Significant Labs:   BMP:   Recent Labs   Lab 08/21/19 1945   *   *   K 2.9*   CL 95   CO2 21*   BUN 22   CREATININE 1.0   CALCIUM 8.2*     CBC:   Recent Labs   Lab 08/21/19 1945   WBC 60.67*   HGB  7.0*   HCT 20.9*        CMP:   Recent Labs   Lab 08/21/19 1945   *   K 2.9*   CL 95   CO2 21*   *   BUN 22   CREATININE 1.0   CALCIUM 8.2*   PROT 6.9   ALBUMIN 2.2*   BILITOT 0.8   ALKPHOS 154*   AST 23   ALT 32   ANIONGAP 12   EGFRNONAA 54*     Lactic Acid:   Recent Labs   Lab 08/21/19 1945 08/21/19  2312   LACTATE 1.0 0.8     All pertinent labs within the past 24 hours have been reviewed.    Significant Imaging: I have reviewed and interpreted all pertinent imaging results/findings within the past 24 hours.     Imaging Results          X-Ray Chest AP Portable (Final result)  Result time 08/21/19 20:00:57    Final result by Molina Riley MD (08/21/19 20:00:57)                 Impression:      Left lower lobe infiltrate compatible with pneumonia.      Electronically signed by: Molina Riley MD  Date:    08/21/2019  Time:    20:00             Narrative:    EXAMINATION:  XR CHEST AP PORTABLE    CLINICAL HISTORY:  Pneumonia., Sepsis;    COMPARISON:  05/16/2019.    FINDINGS:  Patchy left perihilar and lower lobe infiltrate are present with small pleural effusion.  Hiatal hernia.    Right lung is clear.  Old right rib fractures.  Scoliosis.    Bilateral shoulder arthropathy.                                I have independently reviewed and interpreted the EKG.     I have independently reviewed all pertinent labs within the past 24 hours.    I have independently reviewed, visualized and interpreted all pertinent imaging results within the past 24 hours and discussed the findings with the ED physician, Dr. Cheek.

## 2019-08-22 NOTE — PLAN OF CARE
Problem: Adult Inpatient Plan of Care  Goal: Readiness for Transition of Care    Intervention: Mutually Develop Transition Plan     08/22/19 0206   Discharge Needs Assessment   Readmission Within the Last 30 Days no previous admission in last 30 days   Equipment Currently Used at Home walker, standard   Social Work Plan   Patient/Family in Agreement with Plan yes   Living Environment   Able to Return to Prior Arrangements yes   (RETIRED) Current Health   Expected Length of Stay (days)   (tbd)   OTHER   Communicated expected length of stay with patient/caregiver yes   Is patient able to care for self after discharge? Yes   Who are your caregiver(s) and their phone number(s)? Albina Martínez (daughter) [phone]   (RETIRED) Social Work Plan   Patient's perception of discharge disposition home or selfcare

## 2019-08-22 NOTE — PROGRESS NOTES
Pharmacokinetic Initial Assessment: IV Vancomycin    Assessment/Plan:    Initiate intravenous vancomycin with loading dose of 1750 mg once followed by a maintenance dose of vancomycin 750 mg IV every 24 hours  Desired empiric serum trough concentration is 15 to 20 mcg/mL  Draw vancomycin trough level 30 min prior to third dose on 8/23/19 at approximately 2230   Pharmacy will continue to follow and monitor vancomycin.      Please contact pharmacy at extension [phone] with any questions regarding this assessment.     Thank you for the consult,   Zeyad Flores       Patient brief summary:  Sarah Parker is a 79 y.o. female initiated on antimicrobial therapy with IV Vancomycin for treatment of suspected lower respiratory infection    Drug Allergies:   Review of patient's allergies indicates:   Allergen Reactions    Codeine      Other reaction(s): hyper  Other reaction(s): hyper    Doxycycline     Gabapentin Other (See Comments)     Bad dreams       Actual Body Weight:   59.1 kg    Renal Function:   Estimated Creatinine Clearance: 37.3 mL/min (based on SCr of 1 mg/dL).,     Dialysis Method (if applicable):  N/A     CBC (last 72 hours):  Recent Labs   Lab Result Units 08/21/19  1945   WBC K/uL 60.67*   Hemoglobin g/dL 7.0*   Hematocrit % 20.9*   Platelets K/uL 266   Gran% % 50.0   Lymph% % 21.0   Mono% % 26.0*   Eosinophil% % 0.0   Basophil% % 0.0   Differential Method  Manual       Metabolic Panel (last 72 hours):  Recent Labs   Lab Result Units 08/21/19  1945   Sodium mmol/L 128*   Potassium mmol/L 2.9*   Chloride mmol/L 95   CO2 mmol/L 21*   Glucose mg/dL 124*   BUN, Bld mg/dL 22   Creatinine mg/dL 1.0   Albumin g/dL 2.2*   Total Bilirubin mg/dL 0.8   Alkaline Phosphatase U/L 154*   AST U/L 23   ALT U/L 32       Drug levels (last 3 results):  No results for input(s): VANCOMYCINRA, VANCOMYCINPE, VANCOMYCINTR in the last 72 hours.    Microbiologic Results:  Microbiology Results (last 7 days)       Procedure Component  Value Units Date/Time    Blood culture x two cultures. Draw prior to antibiotics. [[id number]] Collected:  08/21/19 1945    Order Status:  Sent Specimen:  Blood from Peripheral, Hand, Right Updated:  08/21/19 1948    Blood culture x two cultures. Draw prior to antibiotics. [[id number]] Collected:  08/21/19 1930    Order Status:  Sent Specimen:  Blood from Peripheral, Hand, Right Updated:  08/21/19 1948

## 2019-08-22 NOTE — H&P
[facility] Hospital Medicine  History & Physical    Patient Name: Sarah Parker  MRN: [id number]  Admission Date: 8/21/2019  Attending Physician: Ramiro Wilkinson MD  Primary Care Provider: Briseida Reyes MD         Patient information was obtained from patient, relative(s), past medical records and ER records.     Subjective:     Principal Problem:Sepsis    Chief Complaint:   Chief Complaint   Patient presents with    Shortness of Breath     +fatigue        HPI: Ms. Parker is an elderly 79-year-old  female with PMH significant for rheumatoid arthritis, chronic leukocytosis, followed in the Oncology Clinic by Dr. Devine, history of bone marrow biopsy in the past, presents to the ED complaining of five days of generalized weakness, associated with fever.  She was seen by Dr. Reyes (PCP), was told that she had acute viral bronchitis and was prescribed oral Zithromax.  However patient has not been improving, hence presented to the ED.  She reported fever of 101.3, -135.  WBC 60,000, 0% bands, lactic acid 1.2.  Chest x-ray reveals left lower lobe infiltrate.  Other laboratory abnormalities including low potassium of 2.9, hemoglobin 7.0 (recently was 8.9).  Patient received IV levofloxacin, IV Zosyn, IV vancomycin empirically in the ED.  Admitting diagnosis sepsis secondary to pneumonia.    Past Medical History:   Diagnosis Date    Acid reflux     Anxiety     Back pain     Bronchitis, chronic obstructive w acute bronchitis 7/29/2016    Cancer     [facility] arms, face- Dr. Lata Tejada    Cataract     2+NS    Degenerative disc disease     Depression     Dry mouth     Hernia, hiatal 11/18/2013    Hypertension     Hypothyroid     Macrocytic anemia 5/3/2016    Macular degeneration     Migraines     Mixed anxiety and depressive disorder     Multiple fractures of ribs of right side     Osteoporosis     Pneumonia     Pneumonia due to other staphylococcus     Rheumatoid  arthritis     Rheumatoid arthritis(714.0)     Rheumatoid arthritis(714.0)     Remicade, MTX.       Past Surgical History:   Procedure Laterality Date    APPENDECTOMY  1985    CATARACT EXTRACTION Bilateral 6/11/15    Dr. Booth    CHOLECYSTECTOMY  2013    cryoablasion kidney Left 09/27/2016    EGD (ESOPHAGOGASTRODUODENOSCOPY) N/A 10/31/2013    Performed by Donald Cuello MD at [facility] ENDO    feet Bilateral     rheumatoid    FRACTURE SURGERY Right     tibia    FUNDOPLICATION, NISSEN, LAPAROSCOPIC N/A 12/18/2013    Performed by Galindo Vasquez MD at [facility] OR    HERNIA REPAIR      HYSTERECTOMY  1970    partial    JOINT REPLACEMENT      bilateral knees (2008), hands, wrists, knuckles, toes    LAPAROSCOPIC NISSEN FUNDOPLICATION      Left L5/S1 TF AYAAN with local Left 6/25/2019    [facility] PAIN MGT    Renal Cryoablation N/A 9/27/2016    Performed by [facility] Provider at [facility] OR       Review of patient's allergies indicates:   Allergen Reactions    Codeine      Other reaction(s): hyper  Other reaction(s): hyper    Doxycycline     Gabapentin Other (See Comments)     Bad dreams       Current Facility-Administered Medications on File Prior to Encounter   Medication    methylPREDNISolone acetate injection 60 mg     Current Outpatient Medications on File Prior to Encounter   Medication Sig    albuterol (PROVENTIL) 2.5 mg /3 mL (0.083 %) nebulizer solution Take 3 mLs (2.5 mg total) by nebulization every 6 (six) hours as needed for Wheezing.    amitriptyline (ELAVIL) 75 MG tablet TAKE 1 TABLET BY MOUTH EVERY EVENING    azithromycin (Z-ANUEL) 250 MG tablet Take 2 tablets by mouth on day 1; Take 1 tablet by mouth on days 2-5    benzonatate (TESSALON) 100 MG capsule Take 1-2 capsules (100-200 mg total) by mouth 3 (three) times daily as needed for Cough.    calcium citrate-vitamin D (CITRACAL + D) 315-200 mg-unit per tablet Take 1 tablet by mouth once daily.     DULoxetine  (CYMBALTA) 20 MG capsule Take 2 capsules (40 mg total) by mouth once daily.    hydrocodone-acetaminophen 5-325mg (NORCO) 5-325 mg per tablet TK 1 T PO Q 6 H PRN    hydrOXYzine HCl (ATARAX) 25 MG tablet TAKE 1 TO 2 TABLETS(25 TO 50 MG) BY MOUTH EVERY NIGHT AS NEEDED FOR ANXIETY OR INSOMNIA    leucovorin (WELLCOVORIN) 5 mg Tab TAKE ONE WEEKLY ON SATURDAYS    levothyroxine (SYNTHROID) 88 MCG tablet TAKE 1 TABLET BY MOUTH BEFORE BREAKFAST    methotrexate 2.5 MG Tab Take 17.5 mg by mouth every 7 days.     metoprolol succinate (TOPROL-XL) 100 MG 24 hr tablet Take 100 mg by mouth once daily.    multivitamin capsule Take by mouth. As directed    pravastatin (PRAVACHOL) 10 MG tablet TAKE 1 TABLET(10 MG) BY MOUTH EVERY DAY    topiramate (TOPAMAX) 25 MG tablet Take 1 tablet (25 mg total) by mouth at night x 1 week then increase to 2 (two) times daily. Increase as tolerated.    valsartan-hydrochlorothiazide (DIOVAN-HCT) 80-12.5 mg per tablet TAKE 1 TABLET BY MOUTH DAILY    meclizine (ANTIVERT) 50 MG tablet Take 25 mg by mouth 3 (three) times daily as needed.    ondansetron (ZOFRAN) 4 MG tablet Take 1 tablet (4 mg total) by mouth every 8 (eight) hours as needed for Nausea.    oxymetazoline (AFRIN) 0.05 % nasal spray 1 Aerosol, Spray Nasal At bedtime    tocilizumab (ACTEMRA) 80 mg/4 mL (20 mg/mL) Soln Inject into the vein.     Family History     Problem Relation (Age of Onset)    Asthma Sister, Brother    Cancer Brother, Maternal Grandfather    Cataracts Mother    Chronic back pain Sister, Brother    Diabetes Mellitus Brother    Fibromyalgia Daughter    Heart disease Mother, Father, Maternal Grandmother    Hyperlipidemia Mother    Hypertension Mother, Father, Sister, Brother    Osteoarthritis Mother, Father, Sister, Brother    Thyroid disease Sister, Brother        Tobacco Use    Smoking status: Former Smoker     Packs/day: 0.25     Years: 2.00     Pack years: 0.50     Last attempt to quit: 11/2/1965     Years  since quittin.8    Smokeless tobacco: Never Used   Substance and Sexual Activity    Alcohol use: No    Drug use: No    Sexual activity: Never     Partners: Male     Review of Systems   Constitutional: Positive for diaphoresis, fatigue and fever. Negative for chills.   HENT: Positive for congestion. Negative for nosebleeds and sinus pressure.    Eyes: Negative.  Negative for visual disturbance.   Respiratory: Positive for cough (Dry nonproductive) and shortness of breath. Negative for chest tightness and wheezing.    Cardiovascular: Negative.  Negative for chest pain, palpitations and leg swelling.   Gastrointestinal: Negative.  Negative for abdominal pain, diarrhea, nausea and vomiting.   Endocrine: Negative.  Negative for polyuria.   Genitourinary: Negative.  Negative for dysuria, flank pain, frequency and urgency.   Musculoskeletal: Negative.  Negative for back pain, joint swelling and neck stiffness.   Skin: Negative.  Negative for color change, pallor and rash.   Allergic/Immunologic: Positive for immunocompromised state.   Neurological: Negative.  Negative for dizziness, syncope, speech difficulty, numbness and headaches.   Hematological: Negative.  Negative for adenopathy. Does not bruise/bleed easily.   Psychiatric/Behavioral: Positive for decreased concentration. Negative for confusion and hallucinations. The patient is not nervous/anxious.    All other systems reviewed and are negative.    Objective:     Vital Signs (Most Recent):  Temp: 100.3 °F (37.9 °C) (19 2315)  Pulse: (!) 111 (19)  Resp: 20 (19 2309)  BP: (!) 145/68 (19)  SpO2: (!) 92 % (19) Vital Signs (24h Range):  Temp:  [100.3 °F (37.9 °C)-101.3 °F (38.5 °C)] 100.3 °F (37.9 °C)  Pulse:  [109-135] 111  Resp:  [18-25] 20  SpO2:  [90 %-94 %] 92 %  BP: (134-155)/(63-76) 145/68     Weight: 59.1 kg (130 lb 3.2 oz)  Body mass index is 23.81 kg/m².    Physical Exam   Constitutional: She is oriented to  person, place, and time. No distress.   Elderly pleasant  female, appears uncomfortable, worn out and fatigued.  Daughter at the bedside P   HENT:   Head: Normocephalic and atraumatic.   Eyes: Conjunctivae and EOM are normal. No scleral icterus.   Neck: Normal range of motion. Neck supple.   Cardiovascular: Regular rhythm, normal heart sounds and intact distal pulses. Tachycardia present.   No murmur heard.  Pulmonary/Chest: Effort normal. No accessory muscle usage. No respiratory distress. She has rhonchi. She exhibits no tenderness.   Abdominal: Soft. Bowel sounds are normal. She exhibits no distension. There is no tenderness.   Musculoskeletal: Normal range of motion. She exhibits no edema or tenderness.   Neurological: She is alert and oriented to person, place, and time. No cranial nerve deficit. She exhibits normal muscle tone. Coordination normal.   Skin: Skin is warm and dry. She is not diaphoretic. No erythema.   Psychiatric: She has a normal mood and affect. Her behavior is normal.   Nursing note and vitals reviewed.        CRANIAL NERVES     CN III, IV, VI   Extraocular motions are normal.        Significant Labs:   BMP:   Recent Labs   Lab 08/21/19 1945   *   *   K 2.9*   CL 95   CO2 21*   BUN 22   CREATININE 1.0   CALCIUM 8.2*     CBC:   Recent Labs   Lab 08/21/19 1945   WBC 60.67*   HGB 7.0*   HCT 20.9*        CMP:   Recent Labs   Lab 08/21/19 1945   *   K 2.9*   CL 95   CO2 21*   *   BUN 22   CREATININE 1.0   CALCIUM 8.2*   PROT 6.9   ALBUMIN 2.2*   BILITOT 0.8   ALKPHOS 154*   AST 23   ALT 32   ANIONGAP 12   EGFRNONAA 54*     Lactic Acid:   Recent Labs   Lab 08/21/19 1945 08/21/19  2312   LACTATE 1.0 0.8     All pertinent labs within the past 24 hours have been reviewed.    Significant Imaging: I have reviewed and interpreted all pertinent imaging results/findings within the past 24 hours.     Imaging Results          X-Ray Chest AP Portable (Final  result)  Result time 08/21/19 20:00:57    Final result by Molina Riley MD (08/21/19 20:00:57)                 Impression:      Left lower lobe infiltrate compatible with pneumonia.      Electronically signed by: Molina Riley MD  Date:    08/21/2019  Time:    20:00             Narrative:    EXAMINATION:  XR CHEST AP PORTABLE    CLINICAL HISTORY:  Pneumonia., Sepsis;    COMPARISON:  05/16/2019.    FINDINGS:  Patchy left perihilar and lower lobe infiltrate are present with small pleural effusion.  Hiatal hernia.    Right lung is clear.  Old right rib fractures.  Scoliosis.    Bilateral shoulder arthropathy.                                I have independently reviewed and interpreted the EKG.     I have independently reviewed all pertinent labs within the past 24 hours.    I have independently reviewed, visualized and interpreted all pertinent imaging results within the past 24 hours and discussed the findings with the ED physician, Dr. Cheek.            Assessment/Plan:     * Sepsis  Sepsis secondary to pneumonia.  Fever 101.3, -135, WBC 42357, 0% bands, lactic acid 0.8.  Chest x-ray reveals left lower lobe infiltrate.  Started on IV antibiotics.  Continue IV fluids.  Follow up on cultures.    Pneumonia of left lower lobe due to infectious organism  Chest x-ray reveals left lower lobe infiltrate.  Complains of dry nonproductive cough.  Continue IV levofloxacin, IV Zosyn, IV vancomycin empirically.  Follow up on blood and sputum cultures.  Continue supplemental oxygen to maintain saturations greater than 92%.  Continue bronchodilators every 6 hr scheduled.      Leukocytosis  Chronic leukocytosis, followed by Dr. Devine in the Oncology Clinic.  Scheduled to see Oncology in the clinic tomorrow.  History of bone marrow biopsy in the past.  26% monocytes, with 50% granulocytes.  Consult Oncology in the morning for evaluation/recommendations.        Acquired hypothyroidism  Continue home dose  Synthroid.      Rheumatoid arthritis  Methotrexate every seven days at home.        VTE Risk Mitigation (From admission, onward)        Ordered     Place sequential compression device  Until discontinued      08/21/19 2115             Ramiro Wilkinson MD  Department of Hospital Medicine   [facility]

## 2019-08-22 NOTE — ASSESSMENT & PLAN NOTE
Chronic leukocytosis, followed by Dr. Devine in the Oncology Clinic.  Scheduled to see Oncology in the clinic tomorrow.  History of bone marrow biopsy in the past.  26% monocytes, with 50% granulocytes.  Consult Oncology in the morning for evaluation/recommendations.    8/22:  History of chronic leukocytosis; likely exacerbated by the infection;  Heme-onc in earlier to see;  No new recommendations offered  To continue antibiotics pending culture results;

## 2019-08-22 NOTE — NURSING
Notified Dr. John of patient's BP elevation during blood transfusion. Received orders for lasix 40mg between units.

## 2019-08-22 NOTE — CONSULTS
[facility]   Hematology/Oncology  Consult Note    Patient Name: Sarah Parker  MRN: [id number]  Admission Date: 8/21/2019  Hospital Length of Stay: 1 days  Code Status: Prior   Attending Provider: Fam Morales MD  Consulting Provider: Joan Kay NP  Primary Care Physician: Briseida Reyes MD  Principal Problem:Sepsis    Inpatient consult to Oncology  Consult performed by: Joan Kay NP  Consult ordered by: Ramiro Wilkinson MD  Reason for consult: Leukocytosis  Assessment/Recommendations: Leukocytosis  Patient has been followed by the Hem/Onc clinic for chronic lymphocytosis, baseline WBC is approximately 20K, WBC increased to 60K, likely a leukmoid reaction. Will evaluate after discharge and possibly order a flow cytometry at that time.     --Daily CBC, CMP  --If WBC does not continue to decrease may order further studies to evaluate.           Subjective:     HPI:  79-year-old  female with PMH significant for rheumatoid arthritis, chronic leukocytosis, followed in the Oncology Clinic by Dr. Devine, history of bone marrow biopsy in the past, presents to the ED complaining of five days of generalized weakness, associated with fever.  She was seen by Dr. Reyes (PCP), was told that she had acute viral bronchitis and was prescribed oral Zithromax.  However patient has not been improving, hence presented to the ED.  She reported fever of 101.3, -135.  WBC 60,000, 0% bands, lactic acid 1.2.  Chest x-ray reveals left lower lobe infiltrate.  Other laboratory abnormalities including low potassium of 2.9, hemoglobin 7.0 (recently was 8.9).  Patient received IV levofloxacin, IV Zosyn, IV vancomycin empirically in the ED.  Admitting diagnosis sepsis secondary to pneumonia.       Oncology Treatment Plan:   [No treatment plan]    Medications:  Continuous Infusions:   sodium chloride 0.9% 125 mL/hr at 08/21/19 5667     Scheduled Meds:   albuterol-ipratropium  3 mL Nebulization  Q6H    amitriptyline  75 mg Oral QHS    DULoxetine  40 mg Oral Daily    levoFLOXacin  750 mg Intravenous Q48H    levothyroxine  88 mcg Oral Before breakfast    pantoprazole  40 mg Oral Daily    piperacillin-tazobactam (ZOSYN) IVPB  4.5 g Intravenous Q8H    pravastatin  10 mg Oral Daily    topiramate  25 mg Oral Daily    vancomycin (VANCOCIN) IVPB  750 mg Intravenous Q24H     PRN Meds:acetaminophen, aluminum-magnesium hydroxide-simethicone, diphenhydrAMINE, guaifenesin 100 mg/5 ml, hydrALAZINE, hydrOXYzine HCl, ondansetron     Review of patient's allergies indicates:   Allergen Reactions    Codeine      Other reaction(s): hyper  Other reaction(s): hyper    Doxycycline     Gabapentin Other (See Comments)     Bad dreams        Past Medical History:   Diagnosis Date    Acid reflux     Anxiety     Back pain     Bronchitis, chronic obstructive w acute bronchitis 7/29/2016    Cancer     [facility] arms, face- Dr. Lata Tejada    Cataract     2+NS    Degenerative disc disease     Depression     Dry mouth     Hernia, hiatal 11/18/2013    Hypertension     Hypothyroid     Macrocytic anemia 5/3/2016    Macular degeneration     Migraines     Mixed anxiety and depressive disorder     Multiple fractures of ribs of right side     Osteoporosis     Pneumonia     Pneumonia due to other staphylococcus     Rheumatoid arthritis     Rheumatoid arthritis(714.0)     Rheumatoid arthritis(714.0)     Remicade, MTX.     Past Surgical History:   Procedure Laterality Date    APPENDECTOMY  1985    CATARACT EXTRACTION Bilateral 6/11/15    Dr. Booth    CHOLECYSTECTOMY  2013    cryoablasion kidney Left 09/27/2016    EGD (ESOPHAGOGASTRODUODENOSCOPY) N/A 10/31/2013    Performed by [facility] at [facility] ENDO    feet Bilateral     rheumatoid    FRACTURE SURGERY Right     tibia    FUNDOPLICATION, NISSEN, LAPAROSCOPIC N/A 12/18/2013    Performed by Galindo [facility]quez MD at [facility] OR    HERNIA REPAIR       HYSTERECTOMY  1970    partial    JOINT REPLACEMENT      bilateral knees (), hands, wrists, knuckles, toes    LAPAROSCOPIC NISSEN FUNDOPLICATION      Left L5/S1 TF AYAAN with local Left 2019    [facility] PAIN MGT    Renal Cryoablation N/A 2016    Performed by [facility] at [facility] OR     Family History     Problem Relation (Age of Onset)    Asthma Sister, Brother    Cancer Brother, Maternal Grandfather    Cataracts Mother    Chronic back pain Sister, Brother    Diabetes Mellitus Brother    Fibromyalgia Daughter    Heart disease Mother, Father, Maternal Grandmother    Hyperlipidemia Mother    Hypertension Mother, Father, Sister, Brother    Osteoarthritis Mother, Father, Sister, Brother    Thyroid disease Sister, Brother        Tobacco Use    Smoking status: Former Smoker     Packs/day: 0.25     Years: 2.00     Pack years: 0.50     Last attempt to quit: 1965     Years since quittin.8    Smokeless tobacco: Never Used   Substance and Sexual Activity    Alcohol use: No    Drug use: No    Sexual activity: Never     Partners: Male       Review of Systems   Constitutional: Positive for activity change, appetite change and fatigue. Negative for chills, diaphoresis, fever and unexpected weight change.   HENT: Negative for congestion, hearing loss, nosebleeds, postnasal drip and trouble swallowing.    Eyes: Negative for discharge and visual disturbance.   Respiratory: Positive for cough, chest tightness and shortness of breath.    Cardiovascular: Negative for chest pain, palpitations and leg swelling.   Gastrointestinal: Positive for nausea. Negative for abdominal distention, blood in stool, constipation, diarrhea and vomiting.   Endocrine: Negative for cold intolerance and heat intolerance.   Genitourinary: Negative for difficulty urinating, dyspareunia, flank pain and hematuria.   Musculoskeletal: Positive for arthralgias and back pain. Negative for gait  problem and myalgias.   Skin: Negative.    Neurological: Negative for dizziness, weakness, light-headedness and headaches.   Hematological: Negative for adenopathy. Does not bruise/bleed easily.   Psychiatric/Behavioral: Negative for agitation, behavioral problems and confusion. The patient is nervous/anxious.      Objective:     Vital Signs (Most Recent):  Temp: 98.8 °F (37.1 °C) (08/22/19 0732)  Pulse: (!) 119 (08/22/19 0932)  Resp: 18 (08/22/19 0732)  BP: (!) 140/67 (08/22/19 0732)  SpO2: 98 % (08/22/19 0732) Vital Signs (24h Range):  Temp:  [97.8 °F (36.6 °C)-101.3 °F (38.5 °C)] 98.8 °F (37.1 °C)  Pulse:  [] 119  Resp:  [16-30] 18  SpO2:  [90 %-98 %] 98 %  BP: (121-155)/(58-76) 140/67     Weight: 59.1 kg (130 lb 3.2 oz)  Body mass index is 23.81 kg/m².  Body surface area is 1.61 meters squared.      Intake/Output Summary (Last 24 hours) at 8/22/2019 1013  Last data filed at 8/22/2019 0327  Gross per 24 hour   Intake 2253 ml   Output --   Net 2253 ml       Physical Exam   Constitutional: She is oriented to person, place, and time. She appears well-developed and well-nourished. She appears lethargic. She appears ill. No distress.   HENT:   Head: Normocephalic and atraumatic.   Right Ear: Hearing and external ear normal.   Left Ear: Hearing and external ear normal.   Nose: No rhinorrhea or sinus tenderness. Right sinus exhibits no maxillary sinus tenderness and no frontal sinus tenderness. Left sinus exhibits no maxillary sinus tenderness and no frontal sinus tenderness.   Mouth/Throat: Uvula is midline, oropharynx is clear and moist and mucous membranes are normal. No oral lesions.   Eyes: Pupils are equal, round, and reactive to light. Conjunctivae are normal. Right eye exhibits no discharge. Left eye exhibits no discharge.   Neck: Normal range of motion. Carotid bruit is not present. No tracheal deviation present. No thyromegaly present.   Cardiovascular: Normal rate, regular rhythm, S1 normal, S2 normal,  normal heart sounds and intact distal pulses.   No murmur heard.  Pulses:       Dorsalis pedis pulses are 2+ on the right side, and 2+ on the left side.   Pulmonary/Chest: Effort normal. No respiratory distress. She has wheezes in the right upper field and the left upper field. She has rales.   Abdominal: Soft. Bowel sounds are normal. She exhibits distension. She exhibits no mass. There is no tenderness.   Musculoskeletal: Normal range of motion. She exhibits no edema.   Lymphadenopathy:     She has no cervical adenopathy.        Right: No supraclavicular adenopathy present.        Left: No supraclavicular adenopathy present.   Neurological: She is oriented to person, place, and time. She has normal strength. She appears lethargic. No sensory deficit. Coordination and gait normal.   Skin: Skin is warm and dry. Capillary refill takes less than 2 seconds. No rash noted. There is pallor.   Psychiatric: Her speech is normal and behavior is normal. Judgment and thought content normal. Her mood appears anxious. Cognition and memory are normal. She does not exhibit a depressed mood.   Vitals reviewed.      Significant Labs:   CBC:   Recent Labs   Lab 08/21/19 1945 08/22/19  0539   WBC 60.67* 39.89*   HGB 7.0* 6.2*   HCT 20.9* 18.8*    216    and CMP:   Recent Labs   Lab 08/21/19 1945 08/22/19  0453   * 130*   K 2.9* 3.2*   CL 95 101   CO2 21* 22*   * 103   BUN 22 18   CREATININE 1.0 0.9   CALCIUM 8.2* 7.5*   PROT 6.9 5.8*   ALBUMIN 2.2* 1.8*   BILITOT 0.8 0.7   ALKPHOS 154* 124   AST 23 20   ALT 32 25   ANIONGAP 12 7*   EGFRNONAA 54* >60       Diagnostic Results:  I have reviewed all pertinent imaging results/findings within the past 24 hours.    Assessment/Plan:     Leukocytosis  Patient has been followed by the Hem/Onc clinic for chronic lymphocytosis, baseline WBC is approximately 20K, WBC increased to 60K, likely a leukmoid reaction. Will evaluate after discharge and possibly order a flow  cytometry at that time.     --Daily CBC, CMP  --If WBC does not continue to decrease may order further studies to evaluate.         Thank you for your consult. I will follow-up with patient. Please contact us if you have any additional questions.    Joan Kay NP  Hematology/Oncology  [facility]

## 2019-08-22 NOTE — PROGRESS NOTES
[facility] Hospital Medicine  Progress Note    Patient Name: Sarah Parker  MRN: [id number]  Patient Class: IP- Inpatient   Admission Date: 8/21/2019  Length of Stay: 1 days  Attending Physician: aFm Morales MD  Primary Care Provider: Briseida Reyes MD        Subjective:     Principal Problem:Sepsis        HPI:  Ms. Parker is an elderly 79-year-old  female with PMH significant for rheumatoid arthritis, chronic leukocytosis, followed in the Oncology Clinic by Dr. Devine, history of bone marrow biopsy in the past, presents to the ED complaining of five days of generalized weakness, associated with fever.  She was seen by Dr. Reyes (PCP), was told that she had acute viral bronchitis and was prescribed oral Zithromax.  However patient has not been improving, hence presented to the ED.  She reported fever of 101.3, -135.  WBC 60,000, 0% bands, lactic acid 1.2.  Chest x-ray reveals left lower lobe infiltrate.  Other laboratory abnormalities including low potassium of 2.9, hemoglobin 7.0 (recently was 8.9).  Patient received IV levofloxacin, IV Zosyn, IV vancomycin empirically in the ED.  Admitting diagnosis sepsis secondary to pneumonia.    Overview/Hospital Course:  8/22:  States to be breathing better;  Nurse reported difficult breathing with some gurgling type respirations;  Fluids going at 125cc/hr have been curtailed;  ABG with reduce PCo2 at ~24; oxygenating well; have asked for a portable chest xray;  Will continue nebsl; attention to pulmonary toilet; incentives etc    Interval History: admitted with sepsis from pulmonary source, with symptomatic anemia and marked leukocytosis; hemetology consulted;    Review of Systems   Constitutional: Positive for activity change, appetite change and fever.   HENT: Positive for congestion.    Eyes: Negative.    Respiratory: Positive for cough and shortness of breath.    Cardiovascular: Negative.    Gastrointestinal: Negative.   Negative for nausea and vomiting.   Endocrine: Negative.    Genitourinary: Negative for difficulty urinating and dysuria.   Musculoskeletal: Negative.    Skin: Negative.    Allergic/Immunologic: Negative.    Neurological: Positive for weakness. Negative for dizziness.   Hematological: Negative.    Psychiatric/Behavioral: Negative for agitation and confusion.     Objective:     Vital Signs (Most Recent):  Temp: 99.4 °F (37.4 °C) (08/22/19 1114)  Pulse: (!) 120 (08/22/19 1327)  Resp: (!) 22 (08/22/19 1327)  BP: (!) 159/70 (08/22/19 1114)  SpO2: 96 % (08/22/19 1345) Vital Signs (24h Range):  Temp:  [97.8 °F (36.6 °C)-101.3 °F (38.5 °C)] 99.4 °F (37.4 °C)  Pulse:  [] 120  Resp:  [16-30] 22  SpO2:  [90 %-98 %] 96 %  BP: (121-159)/(58-76) 159/70     Weight: 59.1 kg (130 lb 3.2 oz)  Body mass index is 23.81 kg/m².    Intake/Output Summary (Last 24 hours) at 8/22/2019 1428  Last data filed at 8/22/2019 0327  Gross per 24 hour   Intake 2253 ml   Output --   Net 2253 ml      Physical Exam   Constitutional: She is oriented to person, place, and time. She appears well-developed and well-nourished. No distress.   HENT:   Head: Normocephalic and atraumatic.   Eyes: Pupils are equal, round, and reactive to light. Right eye exhibits no discharge. Left eye exhibits no discharge.   Neck: Normal range of motion. No JVD present.   Cardiovascular: Regular rhythm.   Resting tachycardia   Pulmonary/Chest: She is in respiratory distress.   Labored breathing  'decreased at bases   Abdominal: Soft.   Genitourinary:   Genitourinary Comments: Deferred   Musculoskeletal: Normal range of motion.   Neurological: She is alert and oriented to person, place, and time.   Skin: Skin is warm and dry. Capillary refill takes 2 to 3 seconds. She is not diaphoretic. There is pallor.   Arthritic changes both hands, with ulnar deviation   Psychiatric: She has a normal mood and affect. Her behavior is normal.   Nursing note and vitals  reviewed.      Significant Labs:   ABGs:   Recent Labs   Lab 08/22/19  1401   PH 7.436   PCO2 24.9*   HCO3 16.8*   POCSATURATED 97   BE -7     Blood Culture:   Recent Labs   Lab 08/21/19 1930 08/21/19 1945   LABBLOO No Growth to date No Growth to date     BMP:   Recent Labs   Lab 08/22/19  0453      *   K 3.2*      CO2 22*   BUN 18   CREATININE 0.9   CALCIUM 7.5*   MG 0.9*     CBC:   Recent Labs   Lab 08/21/19 1945 08/22/19  0539   WBC 60.67* 39.89*   HGB 7.0* 6.2*   HCT 20.9* 18.8*    216     Cardiac Markers: No results for input(s): CKMB, MYOGLOBIN, BNP, TROPISTAT in the last 48 hours.  Lactic Acid:   Recent Labs   Lab 08/21/19 1945 08/21/19  2312   LACTATE 1.0 0.8     Magnesium:   Recent Labs   Lab 08/22/19  0453   MG 0.9*     Urine Culture: No results for input(s): LABURIN in the last 48 hours.    Significant Imaging:   Imaging Results          X-Ray Chest AP Portable (Final result)  Result time 08/21/19 20:00:57    Final result by Molina Riley MD (08/21/19 20:00:57)                 Impression:      Left lower lobe infiltrate compatible with pneumonia.      Electronically signed by: Molina Riley MD  Date:    08/21/2019  Time:    20:00             Narrative:    EXAMINATION:  XR CHEST AP PORTABLE    CLINICAL HISTORY:  Pneumonia., Sepsis;    COMPARISON:  05/16/2019.    FINDINGS:  Patchy left perihilar and lower lobe infiltrate are present with small pleural effusion.  Hiatal hernia.    Right lung is clear.  Old right rib fractures.  Scoliosis.    Bilateral shoulder arthropathy.                                Assessment/Plan:      * Sepsis  Sepsis secondary to pneumonia.  Fever 101.3, -135, WBC 60212, 0% bands, lactic acid 0.8.  Chest x-ray reveals left lower lobe infiltrate.  Started on IV antibiotics.  Continue IV fluids.  Follow up on cultures.     8/22 Cultures to date negative;  Continue antibiotics pending clinical course.    Anemia  8/22:  Symptomatic at  present;  Will transfuse 2 units f packed cells and observe      Hypomagnesemia  8/22 Magnesium ordered with monitoring; received 2 grams IV this morning; will repeat daily      Hypokalemia  8/22:  Replace by oral route and monitor labs;      Pneumonia of left lower lobe due to infectious organism  Chest x-ray reveals left lower lobe infiltrate.  Complains of dry nonproductive cough.  Continue IV levofloxacin, IV Zosyn, IV vancomycin empirically.  Follow up on blood and sputum cultures.  Continue supplemental oxygen to maintain saturations greater than 92%.  Continue bronchodilators every 6 hr scheduled.      8/22:  Continue antibiotics; Cultures to date (blood and urine) are negative; continue nebs Q6h around the clock and Q2hrs prn.    Leukocytosis  Chronic leukocytosis, followed by Dr. Devine in the Oncology Clinic.  Scheduled to see Oncology in the clinic tomorrow.  History of bone marrow biopsy in the past.  26% monocytes, with 50% granulocytes.  Consult Oncology in the morning for evaluation/recommendations.    8/22:  History of chronic leukocytosis; likely exacerbated by the infection;  Heme-onc in earlier to see;  No new recommendations offered  To continue antibiotics pending culture results;        Acquired hypothyroidism  Continue home dose Synthroid.  8/22:  adjusting synthroid;   Will monitor;    Rheumatoid arthritis  Methotrexate every seven days at home.      8/22;  Continue her MTX per schedule and observe;       VTE Risk Mitigation (From admission, onward)        Ordered     Place sequential compression device  Until discontinued      08/21/19 211                Fam John MD  Department of Hospital Medicine   [facility]

## 2019-08-22 NOTE — ASSESSMENT & PLAN NOTE
Chest x-ray reveals left lower lobe infiltrate.  Complains of dry nonproductive cough.  Continue IV levofloxacin, IV Zosyn, IV vancomycin empirically.  Follow up on blood and sputum cultures.  Continue supplemental oxygen to maintain saturations greater than 92%.  Continue bronchodilators every 6 hr scheduled.

## 2019-08-23 LAB
ANION GAP SERPL CALC-SCNC: 11 MMOL/L (ref 8–16)
BACTERIA UR CULT: NO GROWTH
BASOPHILS # BLD AUTO: ABNORMAL K/UL (ref 0–0.2)
BASOPHILS NFR BLD: 0 % (ref 0–1.9)
BLD PROD TYP BPU: NORMAL
BLD PROD TYP BPU: NORMAL
BLOOD UNIT EXPIRATION DATE: NORMAL
BLOOD UNIT EXPIRATION DATE: NORMAL
BLOOD UNIT TYPE CODE: 5100
BLOOD UNIT TYPE CODE: 5100
BLOOD UNIT TYPE: NORMAL
BLOOD UNIT TYPE: NORMAL
BUN SERPL-MCNC: 14 MG/DL (ref 8–23)
CALCIUM SERPL-MCNC: 8 MG/DL (ref 8.7–10.5)
CHLORIDE SERPL-SCNC: 99 MMOL/L (ref 95–110)
CO2 SERPL-SCNC: 23 MMOL/L (ref 23–29)
CODING SYSTEM: NORMAL
CODING SYSTEM: NORMAL
CREAT SERPL-MCNC: 0.8 MG/DL (ref 0.5–1.4)
DIFFERENTIAL METHOD: ABNORMAL
DISPENSE STATUS: NORMAL
DISPENSE STATUS: NORMAL
EOSINOPHIL # BLD AUTO: ABNORMAL K/UL (ref 0–0.5)
EOSINOPHIL NFR BLD: 0 % (ref 0–8)
ERYTHROCYTE [DISTWIDTH] IN BLOOD BY AUTOMATED COUNT: 21.1 % (ref 11.5–14.5)
EST. GFR  (AFRICAN AMERICAN): >60 ML/MIN/1.73 M^2
EST. GFR  (NON AFRICAN AMERICAN): >60 ML/MIN/1.73 M^2
GLUCOSE SERPL-MCNC: 115 MG/DL (ref 70–110)
HCT VFR BLD AUTO: 26.8 % (ref 37–48.5)
HGB BLD-MCNC: 9.2 G/DL (ref 12–16)
LYMPHOCYTES # BLD AUTO: ABNORMAL K/UL (ref 1–4.8)
LYMPHOCYTES NFR BLD: 25 % (ref 18–48)
MAGNESIUM SERPL-MCNC: 0.9 MG/DL (ref 1.6–2.6)
MCH RBC QN AUTO: 32.9 PG (ref 27–31)
MCHC RBC AUTO-ENTMCNC: 34.3 G/DL (ref 32–36)
MCV RBC AUTO: 96 FL (ref 82–98)
METAMYELOCYTES NFR BLD MANUAL: 4 %
MONOCYTES # BLD AUTO: ABNORMAL K/UL (ref 0.3–1)
MONOCYTES NFR BLD: 24 % (ref 4–15)
MYELOCYTES NFR BLD MANUAL: 1 %
NEUTROPHILS NFR BLD: 45 % (ref 38–73)
NEUTS BAND NFR BLD MANUAL: 1 %
NUM UNITS TRANS PACKED RBC: NORMAL
NUM UNITS TRANS PACKED RBC: NORMAL
PLATELET # BLD AUTO: 151 K/UL (ref 150–350)
PMV BLD AUTO: 10 FL (ref 9.2–12.9)
POTASSIUM SERPL-SCNC: 2.7 MMOL/L (ref 3.5–5.1)
RBC # BLD AUTO: 2.8 M/UL (ref 4–5.4)
SODIUM SERPL-SCNC: 133 MMOL/L (ref 136–145)
WBC # BLD AUTO: 38.34 K/UL (ref 3.9–12.7)

## 2019-08-23 PROCEDURE — 99232 SBSQ HOSP IP/OBS MODERATE 35: CPT | Mod: HCNC,,, | Performed by: INTERNAL MEDICINE

## 2019-08-23 PROCEDURE — 83735 ASSAY OF MAGNESIUM: CPT | Mod: HCNC

## 2019-08-23 PROCEDURE — 25000003 PHARM REV CODE 250: Mod: HCNC | Performed by: INTERNAL MEDICINE

## 2019-08-23 PROCEDURE — P9016 RBC LEUKOCYTES REDUCED: HCPCS | Mod: HCNC

## 2019-08-23 PROCEDURE — 85027 COMPLETE CBC AUTOMATED: CPT | Mod: HCNC

## 2019-08-23 PROCEDURE — C1751 CATH, INF, PER/CENT/MIDLINE: HCPCS | Mod: HCNC

## 2019-08-23 PROCEDURE — 51702 INSERT TEMP BLADDER CATH: CPT | Mod: HCNC

## 2019-08-23 PROCEDURE — 63600175 PHARM REV CODE 636 W HCPCS: Mod: HCNC | Performed by: INTERNAL MEDICINE

## 2019-08-23 PROCEDURE — 99232 PR SUBSEQUENT HOSPITAL CARE,LEVL II: ICD-10-PCS | Mod: HCNC,,, | Performed by: INTERNAL MEDICINE

## 2019-08-23 PROCEDURE — 63600175 PHARM REV CODE 636 W HCPCS: Mod: HCNC | Performed by: EMERGENCY MEDICINE

## 2019-08-23 PROCEDURE — 94640 AIRWAY INHALATION TREATMENT: CPT | Mod: HCNC

## 2019-08-23 PROCEDURE — 85007 BL SMEAR W/DIFF WBC COUNT: CPT | Mod: HCNC

## 2019-08-23 PROCEDURE — 25000242 PHARM REV CODE 250 ALT 637 W/ HCPCS: Mod: HCNC | Performed by: EMERGENCY MEDICINE

## 2019-08-23 PROCEDURE — 80202 ASSAY OF VANCOMYCIN: CPT | Mod: HCNC

## 2019-08-23 PROCEDURE — 21400001 HC TELEMETRY ROOM: Mod: HCNC

## 2019-08-23 PROCEDURE — 27000221 HC OXYGEN, UP TO 24 HOURS: Mod: HCNC

## 2019-08-23 PROCEDURE — 80048 BASIC METABOLIC PNL TOTAL CA: CPT | Mod: HCNC

## 2019-08-23 PROCEDURE — 36569 INSJ PICC 5 YR+ W/O IMAGING: CPT | Mod: HCNC

## 2019-08-23 PROCEDURE — 94761 N-INVAS EAR/PLS OXIMETRY MLT: CPT | Mod: HCNC

## 2019-08-23 RX ORDER — MAGNESIUM SULFATE 1 G/100ML
1 INJECTION INTRAVENOUS ONCE
Status: COMPLETED | OUTPATIENT
Start: 2019-08-23 | End: 2019-08-23

## 2019-08-23 RX ORDER — SODIUM CHLORIDE 9 MG/ML
INJECTION, SOLUTION INTRAVENOUS CONTINUOUS
Status: DISCONTINUED | OUTPATIENT
Start: 2019-08-23 | End: 2019-08-24

## 2019-08-23 RX ORDER — MAGNESIUM SULFATE HEPTAHYDRATE 40 MG/ML
2 INJECTION, SOLUTION INTRAVENOUS ONCE
Status: COMPLETED | OUTPATIENT
Start: 2019-08-23 | End: 2019-08-23

## 2019-08-23 RX ORDER — TAMSULOSIN HYDROCHLORIDE 0.4 MG/1
0.4 CAPSULE ORAL DAILY
Status: DISCONTINUED | OUTPATIENT
Start: 2019-08-23 | End: 2019-08-26 | Stop reason: HOSPADM

## 2019-08-23 RX ORDER — POTASSIUM CHLORIDE 7.45 MG/ML
10 INJECTION INTRAVENOUS
Status: COMPLETED | OUTPATIENT
Start: 2019-08-23 | End: 2019-08-23

## 2019-08-23 RX ADMIN — POTASSIUM CHLORIDE 10 MEQ: 10 INJECTION, SOLUTION INTRAVENOUS at 02:08

## 2019-08-23 RX ADMIN — TAMSULOSIN HYDROCHLORIDE 0.4 MG: 0.4 CAPSULE ORAL at 07:08

## 2019-08-23 RX ADMIN — AMITRIPTYLINE HYDROCHLORIDE 75 MG: 50 TABLET, FILM COATED ORAL at 09:08

## 2019-08-23 RX ADMIN — POTASSIUM CHLORIDE 10 MEQ: 10 INJECTION, SOLUTION INTRAVENOUS at 01:08

## 2019-08-23 RX ADMIN — DOCUSATE SODIUM 100 MG: 100 CAPSULE, LIQUID FILLED ORAL at 08:08

## 2019-08-23 RX ADMIN — PIPERACILLIN AND TAZOBACTAM 4.5 G: 4; .5 INJECTION, POWDER, LYOPHILIZED, FOR SOLUTION INTRAVENOUS; PARENTERAL at 12:08

## 2019-08-23 RX ADMIN — POTASSIUM CHLORIDE 10 MEQ: 10 INJECTION, SOLUTION INTRAVENOUS at 03:08

## 2019-08-23 RX ADMIN — LEVOTHYROXINE SODIUM 100 MCG: 100 TABLET ORAL at 06:08

## 2019-08-23 RX ADMIN — POTASSIUM CHLORIDE 10 MEQ: 10 INJECTION, SOLUTION INTRAVENOUS at 06:08

## 2019-08-23 RX ADMIN — POTASSIUM CHLORIDE 10 MEQ: 10 INJECTION, SOLUTION INTRAVENOUS at 04:08

## 2019-08-23 RX ADMIN — PRAVASTATIN SODIUM 10 MG: 10 TABLET ORAL at 08:08

## 2019-08-23 RX ADMIN — POTASSIUM CHLORIDE 10 MEQ: 10 INJECTION, SOLUTION INTRAVENOUS at 05:08

## 2019-08-23 RX ADMIN — ACETAMINOPHEN 650 MG: 325 TABLET ORAL at 02:08

## 2019-08-23 RX ADMIN — PIPERACILLIN AND TAZOBACTAM 4.5 G: 4; .5 INJECTION, POWDER, LYOPHILIZED, FOR SOLUTION INTRAVENOUS; PARENTERAL at 08:08

## 2019-08-23 RX ADMIN — DULOXETINE HYDROCHLORIDE 40 MG: 20 CAPSULE, DELAYED RELEASE ORAL at 08:08

## 2019-08-23 RX ADMIN — MAGNESIUM SULFATE IN DEXTROSE 1 G: 10 INJECTION, SOLUTION INTRAVENOUS at 12:08

## 2019-08-23 RX ADMIN — IPRATROPIUM BROMIDE AND ALBUTEROL SULFATE 3 ML: .5; 3 SOLUTION RESPIRATORY (INHALATION) at 08:08

## 2019-08-23 RX ADMIN — DOCUSATE SODIUM 100 MG: 100 CAPSULE, LIQUID FILLED ORAL at 09:08

## 2019-08-23 RX ADMIN — HYDRALAZINE HYDROCHLORIDE 10 MG: 20 INJECTION INTRAMUSCULAR; INTRAVENOUS at 01:08

## 2019-08-23 RX ADMIN — TOPIRAMATE 25 MG: 25 TABLET, FILM COATED ORAL at 08:08

## 2019-08-23 RX ADMIN — SODIUM CHLORIDE: 0.9 INJECTION, SOLUTION INTRAVENOUS at 07:08

## 2019-08-23 RX ADMIN — IPRATROPIUM BROMIDE AND ALBUTEROL SULFATE 3 ML: .5; 3 SOLUTION RESPIRATORY (INHALATION) at 07:08

## 2019-08-23 RX ADMIN — PANTOPRAZOLE SODIUM 40 MG: 40 TABLET, DELAYED RELEASE ORAL at 08:08

## 2019-08-23 RX ADMIN — IPRATROPIUM BROMIDE AND ALBUTEROL SULFATE 3 ML: .5; 3 SOLUTION RESPIRATORY (INHALATION) at 12:08

## 2019-08-23 RX ADMIN — PIPERACILLIN AND TAZOBACTAM 4.5 G: 4; .5 INJECTION, POWDER, LYOPHILIZED, FOR SOLUTION INTRAVENOUS; PARENTERAL at 04:08

## 2019-08-23 RX ADMIN — IPRATROPIUM BROMIDE AND ALBUTEROL SULFATE 3 ML: .5; 3 SOLUTION RESPIRATORY (INHALATION) at 01:08

## 2019-08-23 RX ADMIN — MAGNESIUM SULFATE IN WATER 2 G: 40 INJECTION, SOLUTION INTRAVENOUS at 10:08

## 2019-08-23 NOTE — SUBJECTIVE & OBJECTIVE
Interval History:  Patient resting and sleeping in bed spoke with daughter at bedside    Oncology Treatment Plan:   [No treatment plan]    Medications:  Continuous Infusions:  Scheduled Meds:   albuterol-ipratropium  3 mL Nebulization Q6H    amitriptyline  75 mg Oral QHS    docusate sodium  100 mg Oral BID    DULoxetine  40 mg Oral Daily    levothyroxine  100 mcg Oral Before breakfast    pantoprazole  40 mg Oral Daily    piperacillin-tazobactam (ZOSYN) IVPB  4.5 g Intravenous Q8H    pravastatin  10 mg Oral Daily    topiramate  25 mg Oral Daily    vancomycin (VANCOCIN) IVPB  750 mg Intravenous Q24H     PRN Meds:sodium chloride, acetaminophen, aluminum-magnesium hydroxide-simethicone, diphenhydrAMINE, guaifenesin 100 mg/5 ml, hydrALAZINE, hydrOXYzine HCl, ondansetron, sodium chloride 3%     Review of Systems   Constitutional: Positive for activity change and fatigue.   Psychiatric/Behavioral: Positive for dysphoric mood. The patient is nervous/anxious.      Objective:     Vital Signs (Most Recent):  Temp: 98.6 °F (37 °C) (08/23/19 0445)  Pulse: 93 (08/23/19 0445)  Resp: 18 (08/23/19 0445)  BP: (!) 142/73 (08/23/19 0445)  SpO2: 100 % (08/23/19 0445) Vital Signs (24h Range):  Temp:  [98.6 °F (37 °C)-100.1 °F (37.8 °C)] 98.6 °F (37 °C)  Pulse:  [] 93  Resp:  [16-27] 18  SpO2:  [95 %-100 %] 100 %  BP: (132-184)/(63-81) 142/73     Weight: 59.1 kg (130 lb 3.2 oz)  Body mass index is 23.81 kg/m².  Body surface area is 1.61 meters squared.      Intake/Output Summary (Last 24 hours) at 8/23/2019 0559  Last data filed at 8/22/2019 1415  Gross per 24 hour   Intake 560 ml   Output 1426 ml   Net -866 ml       Physical Exam   Constitutional: She appears cachectic. She has a sickly appearance. She appears ill. She appears distressed.   Pulmonary/Chest: She is in respiratory distress.   Abdominal: Soft.   Skin: Skin is dry.       Significant Labs:   BMP:   Recent Labs   Lab 08/21/19  1945 08/22/19  0453   * 103    * 130*   K 2.9* 3.2*   CL 95 101   CO2 21* 22*   BUN 22 18   CREATININE 1.0 0.9   CALCIUM 8.2* 7.5*   MG  --  0.9*   , CBC:   Recent Labs   Lab 08/21/19 1945 08/22/19  0539   WBC 60.67* 39.89*   HGB 7.0* 6.2*   HCT 20.9* 18.8*    216   , CMP:   Recent Labs   Lab 08/21/19 1945 08/22/19  0453   * 130*   K 2.9* 3.2*   CL 95 101   CO2 21* 22*   * 103   BUN 22 18   CREATININE 1.0 0.9   CALCIUM 8.2* 7.5*   PROT 6.9 5.8*   ALBUMIN 2.2* 1.8*   BILITOT 0.8 0.7   ALKPHOS 154* 124   AST 23 20   ALT 32 25   ANIONGAP 12 7*   EGFRNONAA 54* >60   , Coagulation:   Recent Labs   Lab 08/21/19 2034   INR 1.0   , Haptoglobin: No results for input(s): HAPTOGLOBIN in the last 48 hours., Immunology: No results for input(s): SPEP, TOBIN, STEWART, FREELAMBDALI in the last 48 hours. and LDH: No results for input(s): LDHCSF, BFSOURCE in the last 48 hours.    Diagnostic Results:  I have reviewed all pertinent imaging results/findings within the past 24 hours.

## 2019-08-23 NOTE — PROGRESS NOTES
"In to see patient with resting tachycardia;  Receiving duonebs, but not recently;  Had in and out cath last evening with 1liter out;  Bladder scan this afternoon disclosed greater than 1 liter in her bladder; she had a folet placed and has had about 1.175 liters out;  She complains of thirst;  No prior bladder issues; She's bee at bed rest;    /60 (BP Location: Right arm, Patient Position: Lying)   Pulse (!) 127   Temp 99.3 °F (37.4 °C) (Oral)   Resp (!) 22   Ht 5' 2" (1.575 m)   Wt 58.8 kg (129 lb 10.1 oz)   LMP  (LMP Unknown)   SpO2 98%   Breastfeeding? No   BMI 23.71 kg/m²    No JVD; Cor rapid regular  Lungs with dullness at the lung bases.    Will start her on maintenance fluids and observe;  Add flomax in the evening;  Also a stool softener  Continue nebs Q6hrs;  Increase activity level  Possible voiding trial next 24-48 hours.  "

## 2019-08-23 NOTE — ASSESSMENT & PLAN NOTE
Sepsis secondary to pneumonia.  Fever 101.3, -135, WBC 90153, 0% bands, lactic acid 0.8.  Chest x-ray reveals left lower lobe infiltrate.  Started on IV antibiotics.  Continue IV fluids.  Follow up on cultures.    8/22 Cultures to date negative;  Continue antibiotics pending clinical course.    8/23:CXR stable; some improvement noted; Cultures still negative to date;  Continues on vanc and zosyn; continue nebs. Supplemental oxygen; monitor CXR

## 2019-08-23 NOTE — PROGRESS NOTES
[facility] Hospital Medicine  Progress Note    Patient Name: Sarah Parker  MRN: [id number]  Patient Class: IP- Inpatient   Admission Date: 8/21/2019  Length of Stay: 2 days  Attending Physician: Fam Morales MD  Primary Care Provider: Briseida Reyes MD        Subjective:     Principal Problem:Sepsis        HPI:  Ms. Parker is an elderly 79-year-old  female with PMH significant for rheumatoid arthritis, chronic leukocytosis, followed in the Oncology Clinic by Dr. Devine, history of bone marrow biopsy in the past, presents to the ED complaining of five days of generalized weakness, associated with fever.  She was seen by Dr. Reyes (PCP), was told that she had acute viral bronchitis and was prescribed oral Zithromax.  However patient has not been improving, hence presented to the ED.  She reported fever of 101.3, -135.  WBC 60,000, 0% bands, lactic acid 1.2.  Chest x-ray reveals left lower lobe infiltrate.  Other laboratory abnormalities including low potassium of 2.9, hemoglobin 7.0 (recently was 8.9).  Patient received IV levofloxacin, IV Zosyn, IV vancomycin empirically in the ED.  Admitting diagnosis sepsis secondary to pneumonia.    Overview/Hospital Course:  8/22:  States to be breathing better;  Nurse reported difficult breathing with some gurgling type respirations;  Fluids going at 125cc/hr have been curtailed;  ABG with reduce PCo2 at ~24; oxygenating well; have asked for a portable chest xray;  Will continue nebsl; attention to pulmonary toilet; incentives etc    8/23:  Looks and feels better today;  Complains of no BM since admission;  Colace added last evening;  To receive IV potassium and magnesium today;  BP up today; Portable CXR reviewed; syill with left sided infiltrates;  Effusion on left is less; will continue present regimen with monitoring    Interval History: presenting with sepsis due to pneumonia and a chronic indwelling conner catheter.    Review of  Systems   Constitutional: Positive for activity change and fever.   HENT: Negative for congestion.    Eyes: Negative.    Respiratory: Positive for cough and shortness of breath.    Cardiovascular: Negative for chest pain, palpitations and leg swelling.   Gastrointestinal: Negative for abdominal distention, nausea and vomiting.   Endocrine: Negative for cold intolerance.   Genitourinary:        Chronic indwelling conner   Musculoskeletal: Negative.    Skin: Negative.    Neurological: Negative for dizziness.   Hematological: Negative.    Psychiatric/Behavioral: Negative for agitation and behavioral problems.     Objective:     Vital Signs (Most Recent):  Temp: 100.1 °F (37.8 °C) (08/22/19 1730)  Pulse: (!) 122 (08/22/19 1730)  Resp: (!) 22 (08/22/19 1730)  BP: (!) 178/77 (08/22/19 1730)  SpO2: 95 % (08/22/19 1630) Vital Signs (24h Range):  Temp:  [97.8 °F (36.6 °C)-101.3 °F (38.5 °C)] 100.1 °F (37.8 °C)  Pulse:  [] 122  Resp:  [16-30] 22  SpO2:  [90 %-98 %] 95 %  BP: (121-178)/(58-77) 178/77     Weight: 59.1 kg (130 lb 3.2 oz)  Body mass index is 23.81 kg/m².    Intake/Output Summary (Last 24 hours) at 8/22/2019 1812  Last data filed at 8/22/2019 1415  Gross per 24 hour   Intake 2453 ml   Output --   Net 2453 ml      Physical Exam   Constitutional: She is oriented to person, place, and time. She appears well-nourished. She appears distressed.   Appears older than stated;    HENT:   Head: Normocephalic and atraumatic.   Eyes: EOM are normal. Right eye exhibits no discharge. Left eye exhibits no discharge. No scleral icterus.   Neck: Normal range of motion. No JVD present.   Cardiovascular: Normal rate and regular rhythm.   Pulmonary/Chest: Effort normal.   Dullness at the lung bases   Abdominal: Soft.   Genitourinary:   Genitourinary Comments: Conner in place   Musculoskeletal: Normal range of motion.   Lymphadenopathy:     She has no cervical adenopathy.   Neurological: She is alert and oriented to person, place,  and time.   Skin: Skin is dry. Capillary refill takes 2 to 3 seconds. She is not diaphoretic. No erythema.   Psychiatric: She has a normal mood and affect. Her behavior is normal. Thought content normal.       Significant Labs:   Blood Culture:   Recent Labs   Lab 08/21/19 1930 08/21/19 1945   LABBLOO No Growth to date No Growth to date     BMP:   Recent Labs   Lab 08/22/19  0453      *   K 3.2*      CO2 22*   BUN 18   CREATININE 0.9   CALCIUM 7.5*   MG 0.9*     CBC:   Recent Labs   Lab 08/21/19 1945 08/22/19  0539   WBC 60.67* 39.89*   HGB 7.0* 6.2*   HCT 20.9* 18.8*    216     CMP:   Recent Labs   Lab 08/21/19 1945 08/22/19  0453   * 130*   K 2.9* 3.2*   CL 95 101   CO2 21* 22*   * 103   BUN 22 18   CREATININE 1.0 0.9   CALCIUM 8.2* 7.5*   PROT 6.9 5.8*   ALBUMIN 2.2* 1.8*   BILITOT 0.8 0.7   ALKPHOS 154* 124   AST 23 20   ALT 32 25   ANIONGAP 12 7*   EGFRNONAA 54* >60     Magnesium:   Recent Labs   Lab 08/22/19  0453   MG 0.9*     TSH: No results for input(s): TSH in the last 4320 hours.  Urine Culture: No results for input(s): LABURIN in the last 48 hours.  Urine Studies:   Recent Labs   Lab 08/22/19  0354   COLORU Yellow   APPEARANCEUA Clear   PHUR 6.0   SPECGRAV <=1.005*   PROTEINUA Trace*   GLUCUA Negative   KETONESU Negative   BILIRUBINUA Negative   OCCULTUA Trace*   NITRITE Negative   UROBILINOGEN Negative   LEUKOCYTESUR 2+*   RBCUA 1   WBCUA 15*   BACTERIA Few*   SQUAMEPITHEL 1       Significant Imaging: I have reviewed all pertinent imaging results/findings within the past 24 hours.      Assessment/Plan:      * Sepsis  Sepsis secondary to pneumonia.  Fever 101.3, -135, WBC 64819, 0% bands, lactic acid 0.8.  Chest x-ray reveals left lower lobe infiltrate.  Started on IV antibiotics.  Continue IV fluids.  Follow up on cultures.    8/22 Cultures to date negative;  Continue antibiotics pending clinical course.    8/23:CXR stable; some improvement noted;  Cultures still negative to date;  Continues on vanc and zosyn; continue nebs. Supplemental oxygen; monitor CXR    Anemia  8/22:  Symptomatic at present;  Will transfuse 2 units f packed cells and observe      8/23:  Transfused 2 units overnight; completed this morning; will repeat labs;   HCT now 26.8; up from 18.8 on admission;  Looks and feels better;     Hypomagnesemia  8/22 Magnesium ordered with monitoring; received 2 grams IV this morning; will repeat daily      8/23:  To receive 3 grams IV now and monitor;  Potassium replacement today as well;  Will monitor the labs;     Hypokalemia  8/22:  Replace by oral route and monitor labs;    8/23:  IV potassium ordered; will monitor;      Pneumonia of left lower lobe due to infectious organism  Chest x-ray reveals left lower lobe infiltrate.  Complains of dry nonproductive cough.  Continue IV levofloxacin, IV Zosyn, IV vancomycin empirically.  Follow up on blood and sputum cultures.  Continue supplemental oxygen to maintain saturations greater than 92%.  Continue bronchodilators every 6 hr scheduled.      8/22:  Continue antibiotics; Cultures to date (blood and urine) are negative; continue nebs Q6h around the clock and Q2hrs prn.    8/23:  Cultures remain negative; CXR slightly improved effusion on left; infiltrates persist;  Continue nebs; supplemental oxygen; antibiotics;   Will add a probiotic to her regimen;     Leukocytosis  Chronic leukocytosis, followed by Dr. Devine in the Oncology Clinic.  Scheduled to see Oncology in the clinic tomorrow.  History of bone marrow biopsy in the past.  26% monocytes, with 50% granulocytes.  Consult Oncology in the morning for evaluation/recommendations.    8/22:  History of chronic leukocytosis; likely exacerbated by the infection;  Heme-onc in earlier to see;  No new recommendations offered  To continue antibiotics pending culture results; and again today;  Has history of lymphocytosis with usal counts 20k;  Present rise  represents a leukemoid reaction;  Will continue to trend with daily labs;  Will refer back to hematology at discharge;     8/23:  Hematology in yesterday        Acquired hypothyroidism  Continue home dose Synthroid.  8/22:  adjusting synthroid;   Will monitor;    Rheumatoid arthritis  Methotrexate every seven days at home.      8/22;  Continue her MTX per schedule and observe;     8/23:  Stable clinically; continues on MTX weekly; will monitor her labs, notably the hemograms;      VTE Risk Mitigation (From admission, onward)        Ordered     Place sequential compression device  Until discontinued      08/21/19 2117                Fam John MD  Department of Hospital Medicine   [facility]

## 2019-08-23 NOTE — PROGRESS NOTES
[facility] -   Hematology/Oncology  Progress Note    Patient Name: Sarah Parker  Admission Date: 8/21/2019  Hospital Length of Stay: 2 days  Code Status: Prior     Subjective:     HPI:  79-year-old  female with PMH significant for rheumatoid arthritis, chronic leukocytosis, followed in the Oncology Clinic by Dr. Devine, history of bone marrow biopsy in the past, presents to the ED complaining of five days of generalized weakness, associated with fever.  She was seen by Dr. Reyes (PCP), was told that she had acute viral bronchitis and was prescribed oral Zithromax.  However patient has not been improving, hence presented to the ED.  She reported fever of 101.3, -135.  WBC 60,000, 0% bands, lactic acid 1.2.  Chest x-ray reveals left lower lobe infiltrate.  Other laboratory abnormalities including low potassium of 2.9, hemoglobin 7.0 (recently was 8.9).  Patient received IV levofloxacin, IV Zosyn, IV vancomycin empirically in the ED.  Admitting diagnosis sepsis secondary to pneumonia.       Interval History:  Patient resting and sleeping in bed spoke with daughter at bedside    Oncology Treatment Plan:   [No treatment plan]    Medications:  Continuous Infusions:  Scheduled Meds:   albuterol-ipratropium  3 mL Nebulization Q6H    amitriptyline  75 mg Oral QHS    docusate sodium  100 mg Oral BID    DULoxetine  40 mg Oral Daily    levothyroxine  100 mcg Oral Before breakfast    pantoprazole  40 mg Oral Daily    piperacillin-tazobactam (ZOSYN) IVPB  4.5 g Intravenous Q8H    pravastatin  10 mg Oral Daily    topiramate  25 mg Oral Daily    vancomycin (VANCOCIN) IVPB  750 mg Intravenous Q24H     PRN Meds:sodium chloride, acetaminophen, aluminum-magnesium hydroxide-simethicone, diphenhydrAMINE, guaifenesin 100 mg/5 ml, hydrALAZINE, hydrOXYzine HCl, ondansetron, sodium chloride 3%     Review of Systems   Constitutional: Positive for activity change and fatigue.    Psychiatric/Behavioral: Positive for dysphoric mood. The patient is nervous/anxious.      Objective:     Vital Signs (Most Recent):  Temp: 98.6 °F (37 °C) (08/23/19 0445)  Pulse: 93 (08/23/19 0445)  Resp: 18 (08/23/19 0445)  BP: (!) 142/73 (08/23/19 0445)  SpO2: 100 % (08/23/19 0445) Vital Signs (24h Range):  Temp:  [98.6 °F (37 °C)-100.1 °F (37.8 °C)] 98.6 °F (37 °C)  Pulse:  [] 93  Resp:  [16-27] 18  SpO2:  [95 %-100 %] 100 %  BP: (132-184)/(63-81) 142/73     Weight: 59.1 kg (130 lb 3.2 oz)  Body mass index is 23.81 kg/m².  Body surface area is 1.61 meters squared.      Intake/Output Summary (Last 24 hours) at 8/23/2019 0559  Last data filed at 8/22/2019 1415  Gross per 24 hour   Intake 560 ml   Output 1426 ml   Net -866 ml       Physical Exam   Constitutional: She appears cachectic. She has a sickly appearance. She appears ill. She appears distressed.   Pulmonary/Chest: She is in respiratory distress.   Abdominal: Soft.   Skin: Skin is dry.       Significant Labs:   BMP:   Recent Labs   Lab 08/21/19 1945 08/22/19  0453   * 103   * 130*   K 2.9* 3.2*   CL 95 101   CO2 21* 22*   BUN 22 18   CREATININE 1.0 0.9   CALCIUM 8.2* 7.5*   MG  --  0.9*   , CBC:   Recent Labs   Lab 08/21/19 1945 08/22/19  0539   WBC 60.67* 39.89*   HGB 7.0* 6.2*   HCT 20.9* 18.8*    216   , CMP:   Recent Labs   Lab 08/21/19 1945 08/22/19  0453   * 130*   K 2.9* 3.2*   CL 95 101   CO2 21* 22*   * 103   BUN 22 18   CREATININE 1.0 0.9   CALCIUM 8.2* 7.5*   PROT 6.9 5.8*   ALBUMIN 2.2* 1.8*   BILITOT 0.8 0.7   ALKPHOS 154* 124   AST 23 20   ALT 32 25   ANIONGAP 12 7*   EGFRNONAA 54* >60   , Coagulation:   Recent Labs   Lab 08/21/19 2034   INR 1.0   , Haptoglobin: No results for input(s): HAPTOGLOBIN in the last 48 hours., Immunology: No results for input(s): SPEP, TOBIN, STEWART, FREELAMBDALI in the last 48 hours. and LDH: No results for input(s): LDHCSF, BFSOURCE in the last 48 hours.    Diagnostic  Results:  I have reviewed all pertinent imaging results/findings within the past 24 hours.    Assessment/Plan:     Leukocytosis  Patient has been followed by the Hem/Onc clinic for chronic lymphocytosis, baseline WBC is approximately 20K, WBC increased to 60K, likely a leukmoid reaction. Will evaluate after discharge and possibly order a flow cytometry at that time.     --Daily CBC, CMP  --If WBC does not continue to decrease may order further studies to evaluate.  08/23/2019 white count decreased from yesterday on admission.  Spoke with daughter likely leukemoid reaction but will need to be assessed in as we proceed followed no need for leukemia immuno phenotyping at the present time        Thank you for your consult. I will follow-up with patient. Please contact us if you have any additional questions.     Corby Miranda MD  Hematology/Oncology  [facility]

## 2019-08-23 NOTE — PROCEDURES
"Sarah Parker is a 79 y.o. female patient.    Temp: 98.9 °F (37.2 °C) (08/22/19 1955)  Pulse: 110 (08/23/19 0022)  Resp: 18 (08/23/19 0022)  BP: (!) 155/76 (08/23/19 0022)  SpO2: 97 % (08/23/19 0022)  Weight: 59.1 kg (130 lb 3.2 oz) (08/21/19 1904)  Height: 5' 2" (157.5 cm) (08/21/19 1853)    PICC  Date/Time: 8/23/2019 1:00 AM  Location procedure was performed: [facility] PICC LINE PLACEMENT  Performed by: Mercedes Connor RN  Consent Done: Yes  Time out: Immediately prior to procedure a time out was called to verify the correct patient, procedure, equipment, support staff and site/side marked as required  Indications: med administration  Anesthesia: local infiltration  Local anesthetic: lidocaine 1% without epinephrine  Anesthetic Total (mL): 5  Preparation: skin prepped with ChloraPrep  Skin prep agent dried: skin prep agent completely dried prior to procedure  Sterile barriers: all five maximum sterile barriers used - cap, mask, sterile gown, sterile gloves, and large sterile sheet  Hand hygiene: hand hygiene performed prior to central venous catheter insertion  Location details: left basilic  Catheter type: double lumen  Catheter size: 6 Fr  Catheter Length: 37cm    Ultrasound guidance: yes  Vessel Caliber: small and patent, compressibility normal  Vascular Doppler: not done  Needle advanced into vessel with real time Ultrasound guidance.  Guidewire confirmed in vessel.  Sterile sheath used.  no esophageal manometryNumber of attempts: 1  Post-procedure: blood return through all ports, chlorhexidine patch and sterile dressing applied  Estimated blood loss (mL): 0  Specimens: No  Implants: No  Assessment: placement verified by x-ray, tip termination and successful placement  Complications: none          Mercedes Connor  8/23/2019  "

## 2019-08-23 NOTE — ASSESSMENT & PLAN NOTE
Methotrexate every seven days at home.      8/22;  Continue her MTX per schedule and observe;     8/23:  Stable clinically; continues on MTX weekly; will monitor her labs, notably the hemograms;

## 2019-08-23 NOTE — CHAPLAIN
Initial visit with patient.  Provided ministries of listening, presence, and prayer.  Pt mentioned that she has been though a lot.  She shared that her  had been here just a few weeks ago and now she is here.  Pt said that it has been difficult especially for her daughter who is having to drive a lot to help take care of them.  I prayed with pt before leaving and will follow up as needed.    Chaplain Octavio Post M.Div., [facility]

## 2019-08-23 NOTE — ASSESSMENT & PLAN NOTE
8/22:  Symptomatic at present;  Will transfuse 2 units f packed cells and observe      8/23:  Transfused 2 units overnight; completed this morning; will repeat labs;   HCT now 26.8; up from 18.8 on admission;  Looks and feels better;

## 2019-08-23 NOTE — ASSESSMENT & PLAN NOTE
Patient has been followed by the Hem/Onc clinic for chronic lymphocytosis, baseline WBC is approximately 20K, WBC increased to 60K, likely a leukmoid reaction. Will evaluate after discharge and possibly order a flow cytometry at that time.     --Daily CBC, CMP  --If WBC does not continue to decrease may order further studies to evaluate.  08/23/2019 white count decreased from yesterday on admission.  Spoke with daughter likely leukemoid reaction but will need to be assessed in as we proceed followed no need for leukemia immuno phenotyping at the present time

## 2019-08-23 NOTE — ASSESSMENT & PLAN NOTE
8/22 Magnesium ordered with monitoring; received 2 grams IV this morning; will repeat daily      8/23:  To receive 3 grams IV now and monitor;  Potassium replacement today as well;  Will monitor the labs;

## 2019-08-23 NOTE — ASSESSMENT & PLAN NOTE
Chest x-ray reveals left lower lobe infiltrate.  Complains of dry nonproductive cough.  Continue IV levofloxacin, IV Zosyn, IV vancomycin empirically.  Follow up on blood and sputum cultures.  Continue supplemental oxygen to maintain saturations greater than 92%.  Continue bronchodilators every 6 hr scheduled.      8/22:  Continue antibiotics; Cultures to date (blood and urine) are negative; continue nebs Q6h around the clock and Q2hrs prn.    8/23:  Cultures remain negative; CXR slightly improved effusion on left; infiltrates persist;  Continue nebs; supplemental oxygen; antibiotics;   Will add a probiotic to her regimen;

## 2019-08-23 NOTE — ASSESSMENT & PLAN NOTE
Chronic leukocytosis, followed by Dr. Devine in the Oncology Clinic.  Scheduled to see Oncology in the clinic tomorrow.  History of bone marrow biopsy in the past.  26% monocytes, with 50% granulocytes.  Consult Oncology in the morning for evaluation/recommendations.    8/22:  History of chronic leukocytosis; likely exacerbated by the infection;  Heme-onc in earlier to see;  No new recommendations offered  To continue antibiotics pending culture results; and again today;  Has history of lymphocytosis with usal counts 20k;  Present rise represents a leukemoid reaction;  Will continue to trend with daily labs;  Will refer back to hematology at discharge;     8/23:  Hematology in yesterday

## 2019-08-24 PROBLEM — R33.9 URINARY RETENTION: Status: ACTIVE | Noted: 2019-08-24

## 2019-08-24 LAB
ALBUMIN SERPL BCP-MCNC: 1.5 G/DL (ref 3.5–5.2)
ALP SERPL-CCNC: 123 U/L (ref 55–135)
ALT SERPL W/O P-5'-P-CCNC: 22 U/L (ref 10–44)
ANION GAP SERPL CALC-SCNC: 6 MMOL/L (ref 8–16)
ANISOCYTOSIS BLD QL SMEAR: SLIGHT
AST SERPL-CCNC: 18 U/L (ref 10–40)
BASOPHILS NFR BLD: 0 % (ref 0–1.9)
BILIRUB SERPL-MCNC: 0.6 MG/DL (ref 0.1–1)
BUN SERPL-MCNC: 10 MG/DL (ref 8–23)
CALCIUM SERPL-MCNC: 7.4 MG/DL (ref 8.7–10.5)
CHLORIDE SERPL-SCNC: 104 MMOL/L (ref 95–110)
CO2 SERPL-SCNC: 22 MMOL/L (ref 23–29)
CREAT SERPL-MCNC: 0.7 MG/DL (ref 0.5–1.4)
DACRYOCYTES BLD QL SMEAR: ABNORMAL
DIFFERENTIAL METHOD: ABNORMAL
EOSINOPHIL NFR BLD: 0 % (ref 0–8)
ERYTHROCYTE [DISTWIDTH] IN BLOOD BY AUTOMATED COUNT: 21.2 % (ref 11.5–14.5)
EST. GFR  (AFRICAN AMERICAN): >60 ML/MIN/1.73 M^2
EST. GFR  (NON AFRICAN AMERICAN): >60 ML/MIN/1.73 M^2
GLUCOSE SERPL-MCNC: 101 MG/DL (ref 70–110)
HCT VFR BLD AUTO: 23.5 % (ref 37–48.5)
HGB BLD-MCNC: 8 G/DL (ref 12–16)
LYMPHOCYTES NFR BLD: 18 % (ref 18–48)
MAGNESIUM SERPL-MCNC: 1 MG/DL (ref 1.6–2.6)
MCH RBC QN AUTO: 32.5 PG (ref 27–31)
MCHC RBC AUTO-ENTMCNC: 34 G/DL (ref 32–36)
MCV RBC AUTO: 96 FL (ref 82–98)
METAMYELOCYTES NFR BLD MANUAL: 6 %
MONOCYTES NFR BLD: 21 % (ref 4–15)
MYELOCYTES NFR BLD MANUAL: 4 %
NEUTROPHILS NFR BLD: 40 % (ref 38–73)
NEUTS BAND NFR BLD MANUAL: 9 %
OVALOCYTES BLD QL SMEAR: ABNORMAL
PLATELET # BLD AUTO: 145 K/UL (ref 150–350)
PLATELET BLD QL SMEAR: ABNORMAL
PMV BLD AUTO: 10.3 FL (ref 9.2–12.9)
POIKILOCYTOSIS BLD QL SMEAR: SLIGHT
POLYCHROMASIA BLD QL SMEAR: ABNORMAL
POTASSIUM SERPL-SCNC: 3 MMOL/L (ref 3.5–5.1)
PROMYELOCYTES NFR BLD MANUAL: 2 %
PROT SERPL-MCNC: 5.2 G/DL (ref 6–8.4)
RBC # BLD AUTO: 2.46 M/UL (ref 4–5.4)
SODIUM SERPL-SCNC: 132 MMOL/L (ref 136–145)
SPHEROCYTES BLD QL SMEAR: ABNORMAL
TARGETS BLD QL SMEAR: ABNORMAL
VANCOMYCIN SERPL-MCNC: 12.6 UG/ML
VANCOMYCIN TROUGH SERPL-MCNC: 9.3 UG/ML (ref 10–22)
WBC # BLD AUTO: 37.54 K/UL (ref 3.9–12.7)

## 2019-08-24 PROCEDURE — 85027 COMPLETE CBC AUTOMATED: CPT | Mod: HCNC

## 2019-08-24 PROCEDURE — 63600175 PHARM REV CODE 636 W HCPCS: Mod: HCNC | Performed by: INTERNAL MEDICINE

## 2019-08-24 PROCEDURE — 25000003 PHARM REV CODE 250: Mod: HCNC | Performed by: INTERNAL MEDICINE

## 2019-08-24 PROCEDURE — 97162 PT EVAL MOD COMPLEX 30 MIN: CPT | Mod: HCNC

## 2019-08-24 PROCEDURE — 97530 THERAPEUTIC ACTIVITIES: CPT | Mod: HCNC

## 2019-08-24 PROCEDURE — 99232 PR SUBSEQUENT HOSPITAL CARE,LEVL II: ICD-10-PCS | Mod: HCNC,,, | Performed by: INTERNAL MEDICINE

## 2019-08-24 PROCEDURE — 63600175 PHARM REV CODE 636 W HCPCS: Mod: HCNC | Performed by: EMERGENCY MEDICINE

## 2019-08-24 PROCEDURE — 99232 SBSQ HOSP IP/OBS MODERATE 35: CPT | Mod: HCNC,,, | Performed by: INTERNAL MEDICINE

## 2019-08-24 PROCEDURE — 83735 ASSAY OF MAGNESIUM: CPT | Mod: HCNC

## 2019-08-24 PROCEDURE — 85007 BL SMEAR W/DIFF WBC COUNT: CPT | Mod: HCNC

## 2019-08-24 PROCEDURE — 99900035 HC TECH TIME PER 15 MIN (STAT): Mod: HCNC

## 2019-08-24 PROCEDURE — 94761 N-INVAS EAR/PLS OXIMETRY MLT: CPT | Mod: HCNC

## 2019-08-24 PROCEDURE — 25000242 PHARM REV CODE 250 ALT 637 W/ HCPCS: Mod: HCNC | Performed by: EMERGENCY MEDICINE

## 2019-08-24 PROCEDURE — 80202 ASSAY OF VANCOMYCIN: CPT | Mod: HCNC

## 2019-08-24 PROCEDURE — 97110 THERAPEUTIC EXERCISES: CPT | Mod: HCNC

## 2019-08-24 PROCEDURE — 27000221 HC OXYGEN, UP TO 24 HOURS: Mod: HCNC

## 2019-08-24 PROCEDURE — 21400001 HC TELEMETRY ROOM: Mod: HCNC

## 2019-08-24 PROCEDURE — 80053 COMPREHEN METABOLIC PANEL: CPT | Mod: HCNC

## 2019-08-24 PROCEDURE — 25000242 PHARM REV CODE 250 ALT 637 W/ HCPCS: Mod: HCNC | Performed by: INTERNAL MEDICINE

## 2019-08-24 PROCEDURE — 94640 AIRWAY INHALATION TREATMENT: CPT | Mod: HCNC

## 2019-08-24 RX ORDER — AMITRIPTYLINE HYDROCHLORIDE 25 MG/1
25 TABLET, FILM COATED ORAL NIGHTLY
Status: DISCONTINUED | OUTPATIENT
Start: 2019-08-24 | End: 2019-08-26 | Stop reason: HOSPADM

## 2019-08-24 RX ORDER — METOPROLOL SUCCINATE 50 MG/1
TABLET, EXTENDED RELEASE ORAL
Refills: 11 | Status: ON HOLD | COMMUNITY
Start: 2019-08-06 | End: 2019-08-25

## 2019-08-24 RX ORDER — LEVALBUTEROL INHALATION SOLUTION 0.63 MG/3ML
0.63 SOLUTION RESPIRATORY (INHALATION) EVERY 8 HOURS
Status: DISCONTINUED | OUTPATIENT
Start: 2019-08-24 | End: 2019-08-26 | Stop reason: HOSPADM

## 2019-08-24 RX ORDER — VANCOMYCIN HCL IN 5 % DEXTROSE 1G/250ML
1000 PLASTIC BAG, INJECTION (ML) INTRAVENOUS
Status: DISCONTINUED | OUTPATIENT
Start: 2019-08-24 | End: 2019-08-24

## 2019-08-24 RX ORDER — MAGNESIUM SULFATE HEPTAHYDRATE 40 MG/ML
2 INJECTION, SOLUTION INTRAVENOUS ONCE
Status: COMPLETED | OUTPATIENT
Start: 2019-08-24 | End: 2019-08-24

## 2019-08-24 RX ORDER — BETHANECHOL CHLORIDE 10 MG/1
10 TABLET ORAL 3 TIMES DAILY
Status: DISCONTINUED | OUTPATIENT
Start: 2019-08-24 | End: 2019-08-26

## 2019-08-24 RX ORDER — METOPROLOL SUCCINATE 50 MG/1
50 TABLET, EXTENDED RELEASE ORAL DAILY
Status: DISCONTINUED | OUTPATIENT
Start: 2019-08-24 | End: 2019-08-25

## 2019-08-24 RX ORDER — POTASSIUM CHLORIDE 20 MEQ/1
40 TABLET, EXTENDED RELEASE ORAL ONCE
Status: COMPLETED | OUTPATIENT
Start: 2019-08-24 | End: 2019-08-24

## 2019-08-24 RX ADMIN — POTASSIUM CHLORIDE 40 MEQ: 1500 TABLET, EXTENDED RELEASE ORAL at 09:08

## 2019-08-24 RX ADMIN — PANTOPRAZOLE SODIUM 40 MG: 40 TABLET, DELAYED RELEASE ORAL at 08:08

## 2019-08-24 RX ADMIN — DOCUSATE SODIUM 100 MG: 100 CAPSULE, LIQUID FILLED ORAL at 08:08

## 2019-08-24 RX ADMIN — LEVALBUTEROL 0.63 MG: 0.63 SOLUTION RESPIRATORY (INHALATION) at 04:08

## 2019-08-24 RX ADMIN — PRAVASTATIN SODIUM 10 MG: 10 TABLET ORAL at 08:08

## 2019-08-24 RX ADMIN — VANCOMYCIN HYDROCHLORIDE 1000 MG: 1 INJECTION, POWDER, LYOPHILIZED, FOR SOLUTION INTRAVENOUS at 01:08

## 2019-08-24 RX ADMIN — GUAIFENESIN 200 MG: 200 SOLUTION ORAL at 02:08

## 2019-08-24 RX ADMIN — PIPERACILLIN AND TAZOBACTAM 4.5 G: 4; .5 INJECTION, POWDER, LYOPHILIZED, FOR SOLUTION INTRAVENOUS; PARENTERAL at 03:08

## 2019-08-24 RX ADMIN — SODIUM CHLORIDE: 0.9 INJECTION, SOLUTION INTRAVENOUS at 07:08

## 2019-08-24 RX ADMIN — SODIUM CHLORIDE: 0.9 INJECTION, SOLUTION INTRAVENOUS at 06:08

## 2019-08-24 RX ADMIN — LEVALBUTEROL 0.63 MG: 0.63 SOLUTION RESPIRATORY (INHALATION) at 10:08

## 2019-08-24 RX ADMIN — GUAIFENESIN 200 MG: 200 SOLUTION ORAL at 08:08

## 2019-08-24 RX ADMIN — PIPERACILLIN AND TAZOBACTAM 4.5 G: 4; .5 INJECTION, POWDER, LYOPHILIZED, FOR SOLUTION INTRAVENOUS; PARENTERAL at 08:08

## 2019-08-24 RX ADMIN — PIPERACILLIN AND TAZOBACTAM 4.5 G: 4; .5 INJECTION, POWDER, LYOPHILIZED, FOR SOLUTION INTRAVENOUS; PARENTERAL at 12:08

## 2019-08-24 RX ADMIN — DULOXETINE HYDROCHLORIDE 40 MG: 20 CAPSULE, DELAYED RELEASE ORAL at 08:08

## 2019-08-24 RX ADMIN — BETHANECHOL CHLORIDE 10 MG: 10 TABLET ORAL at 02:08

## 2019-08-24 RX ADMIN — IPRATROPIUM BROMIDE AND ALBUTEROL SULFATE 3 ML: .5; 3 SOLUTION RESPIRATORY (INHALATION) at 07:08

## 2019-08-24 RX ADMIN — BETHANECHOL CHLORIDE 10 MG: 10 TABLET ORAL at 09:08

## 2019-08-24 RX ADMIN — TOPIRAMATE 25 MG: 25 TABLET, FILM COATED ORAL at 08:08

## 2019-08-24 RX ADMIN — ACETAMINOPHEN 650 MG: 325 TABLET ORAL at 02:08

## 2019-08-24 RX ADMIN — TAMSULOSIN HYDROCHLORIDE 0.4 MG: 0.4 CAPSULE ORAL at 08:08

## 2019-08-24 RX ADMIN — DOCUSATE SODIUM 100 MG: 100 CAPSULE, LIQUID FILLED ORAL at 09:08

## 2019-08-24 RX ADMIN — LEVOTHYROXINE SODIUM 100 MCG: 100 TABLET ORAL at 05:08

## 2019-08-24 RX ADMIN — METOPROLOL SUCCINATE 50 MG: 50 TABLET, EXTENDED RELEASE ORAL at 10:08

## 2019-08-24 RX ADMIN — HYDRALAZINE HYDROCHLORIDE 10 MG: 20 INJECTION INTRAMUSCULAR; INTRAVENOUS at 05:08

## 2019-08-24 RX ADMIN — AMITRIPTYLINE HYDROCHLORIDE 25 MG: 25 TABLET, FILM COATED ORAL at 09:08

## 2019-08-24 RX ADMIN — VANCOMYCIN HYDROCHLORIDE 1250 MG: 100 INJECTION, POWDER, LYOPHILIZED, FOR SOLUTION INTRAVENOUS at 08:08

## 2019-08-24 RX ADMIN — MAGNESIUM SULFATE IN WATER 2 G: 40 INJECTION, SOLUTION INTRAVENOUS at 08:08

## 2019-08-24 RX ADMIN — IPRATROPIUM BROMIDE AND ALBUTEROL SULFATE 3 ML: .5; 3 SOLUTION RESPIRATORY (INHALATION) at 12:08

## 2019-08-24 NOTE — SUBJECTIVE & OBJECTIVE
Interval History:  Patient much more alert this morning in bed daughter at bedside  Oncology Treatment Plan:   [No treatment plan]    Medications:  Continuous Infusions:   sodium chloride 0.9% 100 mL/hr at 08/24/19 0700     Scheduled Meds:   albuterol-ipratropium  3 mL Nebulization Q6H    amitriptyline  75 mg Oral QHS    docusate sodium  100 mg Oral BID    DULoxetine  40 mg Oral Daily    levothyroxine  100 mcg Oral Before breakfast    pantoprazole  40 mg Oral Daily    piperacillin-tazobactam (ZOSYN) IVPB  4.5 g Intravenous Q8H    potassium chloride  40 mEq Oral Once    pravastatin  10 mg Oral Daily    tamsulosin  0.4 mg Oral Daily    topiramate  25 mg Oral Daily    vancomycin (VANCOCIN) IVPB  1,000 mg Intravenous Q24H     PRN Meds:sodium chloride, acetaminophen, aluminum-magnesium hydroxide-simethicone, diphenhydrAMINE, guaifenesin 100 mg/5 ml, hydrALAZINE, hydrOXYzine HCl, ondansetron, sodium chloride 3%     Review of Systems   Constitutional: Positive for activity change and fatigue.   Respiratory: Positive for shortness of breath.    Neurological: Positive for weakness.   Psychiatric/Behavioral: Positive for dysphoric mood. The patient is nervous/anxious.      Objective:     Vital Signs (Most Recent):  Temp: 99 °F (37.2 °C) (08/24/19 0726)  Pulse: (!) 118 (08/24/19 0746)  Resp: 20 (08/24/19 0746)  BP: 139/66 (08/24/19 0726)  SpO2: 95 % (08/24/19 0746) Vital Signs (24h Range):  Temp:  [97.6 °F (36.4 °C)-99.3 °F (37.4 °C)] 99 °F (37.2 °C)  Pulse:  [] 118  Resp:  [18-22] 20  SpO2:  [95 %-99 %] 95 %  BP: (124-175)/(60-83) 139/66     Weight: 60 kg (132 lb 4.4 oz)  Body mass index is 24.19 kg/m².  Body surface area is 1.62 meters squared.      Intake/Output Summary (Last 24 hours) at 8/24/2019 0910  Last data filed at 8/24/2019 0600  Gross per 24 hour   Intake 3266.33 ml   Output 2075 ml   Net 1191.33 ml       Physical Exam   Constitutional: She appears cachectic. She has a sickly appearance. She  appears ill. She appears distressed.   HENT:   Head: Normocephalic.   Eyes: Pupils are equal, round, and reactive to light.   Pulmonary/Chest: She is in respiratory distress.   Neurological: She is alert.   Skin: Skin is dry.       Significant Labs:   BMP:   Recent Labs   Lab 08/23/19  0815 08/24/19  0510   * 101   * 132*   K 2.7* 3.0*   CL 99 104   CO2 23 22*   BUN 14 10   CREATININE 0.8 0.7   CALCIUM 8.0* 7.4*   MG 0.9* 1.0*   , CBC:   Recent Labs   Lab 08/23/19  0851 08/24/19  0510   WBC 38.34* 37.54*   HGB 9.2* 8.0*   HCT 26.8* 23.5*    145*   , CMP:   Recent Labs   Lab 08/23/19  0815 08/24/19  0510   * 132*   K 2.7* 3.0*   CL 99 104   CO2 23 22*   * 101   BUN 14 10   CREATININE 0.8 0.7   CALCIUM 8.0* 7.4*   PROT  --  5.2*   ALBUMIN  --  1.5*   BILITOT  --  0.6   ALKPHOS  --  123   AST  --  18   ALT  --  22   ANIONGAP 11 6*   EGFRNONAA >60 >60   , Coagulation: No results for input(s): PT, INR, APTT in the last 48 hours., Haptoglobin: No results for input(s): HAPTOGLOBIN in the last 48 hours. and Immunology: No results for input(s): SPEP, TOBIN, STEWART, FREELAMBDALI in the last 48 hours.    Diagnostic Results:  I have reviewed all pertinent imaging results/findings within the past 24 hours.

## 2019-08-24 NOTE — ASSESSMENT & PLAN NOTE
Patient has been followed by the Hem/Onc clinic for chronic lymphocytosis, baseline WBC is approximately 20K, WBC increased to 60K, likely a leukmoid reaction. Will evaluate after discharge and possibly order a flow cytometry at that time.     --Daily CBC, CMP  --If WBC does not continue to decrease may order further studies to evaluate.  08/23/2019 white count decreased from yesterday on admission.  Spoke with daughter likely leukemoid reaction but will need to be assessed in as we proceed followed no need for leukemia immuno phenotyping at the present time  08/24/2019 white count slowly decreasing the still remains elevated patient appears to be clinically improving discussed with them the fact that she most likely has looks moist reaction on top of persistent leukocytosis once patient is discharged may want to consider flow cytometry on peripheral blood patient is not interested in bone marrow aspirate and biopsy will make a determination once patient is outpatient setting

## 2019-08-24 NOTE — PROGRESS NOTES
[facility] -   Hematology/Oncology  Progress Note    Patient Name: Sarah Parker  Admission Date: 8/21/2019  Hospital Length of Stay: 3 days  Code Status: Prior     Subjective:     HPI:  79-year-old  female with PMH significant for rheumatoid arthritis, chronic leukocytosis, followed in the Oncology Clinic by Dr. Devine, history of bone marrow biopsy in the past, presents to the ED complaining of five days of generalized weakness, associated with fever.  She was seen by Dr. Reyes (PCP), was told that she had acute viral bronchitis and was prescribed oral Zithromax.  However patient has not been improving, hence presented to the ED.  She reported fever of 101.3, -135.  WBC 60,000, 0% bands, lactic acid 1.2.  Chest x-ray reveals left lower lobe infiltrate.  Other laboratory abnormalities including low potassium of 2.9, hemoglobin 7.0 (recently was 8.9).  Patient received IV levofloxacin, IV Zosyn, IV vancomycin empirically in the ED.  Admitting diagnosis sepsis secondary to pneumonia.       Interval History:  Patient much more alert this morning in bed daughter at bedside  Oncology Treatment Plan:   [No treatment plan]    Medications:  Continuous Infusions:   sodium chloride 0.9% 100 mL/hr at 08/24/19 0700     Scheduled Meds:   albuterol-ipratropium  3 mL Nebulization Q6H    amitriptyline  75 mg Oral QHS    docusate sodium  100 mg Oral BID    DULoxetine  40 mg Oral Daily    levothyroxine  100 mcg Oral Before breakfast    pantoprazole  40 mg Oral Daily    piperacillin-tazobactam (ZOSYN) IVPB  4.5 g Intravenous Q8H    potassium chloride  40 mEq Oral Once    pravastatin  10 mg Oral Daily    tamsulosin  0.4 mg Oral Daily    topiramate  25 mg Oral Daily    vancomycin (VANCOCIN) IVPB  1,000 mg Intravenous Q24H     PRN Meds:sodium chloride, acetaminophen, aluminum-magnesium hydroxide-simethicone, diphenhydrAMINE, guaifenesin 100 mg/5 ml, hydrALAZINE, hydrOXYzine HCl,  ondansetron, sodium chloride 3%     Review of Systems   Constitutional: Positive for activity change and fatigue.   Respiratory: Positive for shortness of breath.    Neurological: Positive for weakness.   Psychiatric/Behavioral: Positive for dysphoric mood. The patient is nervous/anxious.      Objective:     Vital Signs (Most Recent):  Temp: 99 °F (37.2 °C) (08/24/19 0726)  Pulse: (!) 118 (08/24/19 0746)  Resp: 20 (08/24/19 0746)  BP: 139/66 (08/24/19 0726)  SpO2: 95 % (08/24/19 0746) Vital Signs (24h Range):  Temp:  [97.6 °F (36.4 °C)-99.3 °F (37.4 °C)] 99 °F (37.2 °C)  Pulse:  [] 118  Resp:  [18-22] 20  SpO2:  [95 %-99 %] 95 %  BP: (124-175)/(60-83) 139/66     Weight: 60 kg (132 lb 4.4 oz)  Body mass index is 24.19 kg/m².  Body surface area is 1.62 meters squared.      Intake/Output Summary (Last 24 hours) at 8/24/2019 0910  Last data filed at 8/24/2019 0600  Gross per 24 hour   Intake 3266.33 ml   Output 2075 ml   Net 1191.33 ml       Physical Exam   Constitutional: She appears cachectic. She has a sickly appearance. She appears ill. She appears distressed.   HENT:   Head: Normocephalic.   Eyes: Pupils are equal, round, and reactive to light.   Pulmonary/Chest: She is in respiratory distress.   Neurological: She is alert.   Skin: Skin is dry.       Significant Labs:   BMP:   Recent Labs   Lab 08/23/19  0815 08/24/19  0510   * 101   * 132*   K 2.7* 3.0*   CL 99 104   CO2 23 22*   BUN 14 10   CREATININE 0.8 0.7   CALCIUM 8.0* 7.4*   MG 0.9* 1.0*   , CBC:   Recent Labs   Lab 08/23/19  0851 08/24/19  0510   WBC 38.34* 37.54*   HGB 9.2* 8.0*   HCT 26.8* 23.5*    145*   , CMP:   Recent Labs   Lab 08/23/19  0815 08/24/19  0510   * 132*   K 2.7* 3.0*   CL 99 104   CO2 23 22*   * 101   BUN 14 10   CREATININE 0.8 0.7   CALCIUM 8.0* 7.4*   PROT  --  5.2*   ALBUMIN  --  1.5*   BILITOT  --  0.6   ALKPHOS  --  123   AST  --  18   ALT  --  22   ANIONGAP 11 6*   EGFRNONAA >60 >60   ,  Coagulation: No results for input(s): PT, INR, APTT in the last 48 hours., Haptoglobin: No results for input(s): HAPTOGLOBIN in the last 48 hours. and Immunology: No results for input(s): SPEP, TOBIN, STEWART, FREELAMBDALI in the last 48 hours.    Diagnostic Results:  I have reviewed all pertinent imaging results/findings within the past 24 hours.    Assessment/Plan:     Leukocytosis  Patient has been followed by the Hem/Onc clinic for chronic lymphocytosis, baseline WBC is approximately 20K, WBC increased to 60K, likely a leukmoid reaction. Will evaluate after discharge and possibly order a flow cytometry at that time.     --Daily CBC, CMP  --If WBC does not continue to decrease may order further studies to evaluate.  08/23/2019 white count decreased from yesterday on admission.  Spoke with daughter likely leukemoid reaction but will need to be assessed in as we proceed followed no need for leukemia immuno phenotyping at the present time  08/24/2019 white count slowly decreasing the still remains elevated patient appears to be clinically improving discussed with them the fact that she most likely has looks moist reaction on top of persistent leukocytosis once patient is discharged may want to consider flow cytometry on peripheral blood patient is not interested in bone marrow aspirate and biopsy will make a determination once patient is outpatient setting        Thank you for your consult. I will follow-up with patient. Please contact us if you have any additional questions.     Corby Miranda MD  Hematology/Oncology  [facility] -

## 2019-08-24 NOTE — PT/OT/SLP EVAL
Physical Therapy Evaluation    Patient Name:  Sarah Parker   MRN:  [id number]    Recommendations:     Discharge Recommendations:  nursing facility, skilled, home health PT, home health OT   Discharge Equipment Recommendations: (tbd)   Barriers to discharge: Decreased caregiver support    Assessment:     Sarah Parker is a 79 y.o. female admitted with a medical diagnosis of Sepsis.  She presents with the following impairments/functional limitations:  weakness, impaired functional mobilty, decreased safety awareness, impaired endurance, gait instability, impaired self care skills, decreased lower extremity function, impaired balance.    Rehab Prognosis: Fair; patient would benefit from acute skilled PT services to address these deficits and reach maximum level of function.    Recent Surgery: * No surgery found *      Plan:     During this hospitalization, patient to be seen 5 x/week to address the identified rehab impairments via gait training, therapeutic activities, therapeutic exercises and progress toward the following goals:    · Plan of Care Expires:  08/31/19    Subjective     Chief Complaint: weakness; sob  Patient/Family Comments/goals: to go home and back to normal  Pain/Comfort:  · Pain Rating 1: 0/10    Patients cultural, spiritual, Baptism conflicts given the current situation:      Living Environment:  Lives with , but  is currently in snf; no family close by, but dtr is coming in from [place] to stay with patient; ramp and no steps  Prior to admission, patients level of function was indep.  Equipment used at home: (a walker).  DME owned (not currently used): wheelchair.  Upon discharge, patient will have assistance from family - not sure how long.    Objective:     Communicated with MOHAN Chawla prior to session.  Patient found HOB elevated with conner catheter, oxygen, peripheral IV, PICC line, telemetry, SCD  upon PT entry to room.    General Precautions: Standard, fall, respiratory    Orthopedic Precautions:N/A   Braces: N/A     Exams:  · B LE strength grossly 3+/5 and ROM WFL    Functional Mobility:  · Bed Mobility - sup to/from sit min A cues for efficient log-rolling technique/HOB elevated  · Transfers - sit to/from stand min A with RW - cues for safe hand placement  · Gt - Amb 5ft to bedside chair RW min A - inc sob and unsteady      Therapeutic Activities and Exercises:   PT educated patient on POC, safety/fall precautions with mobility, d/c recs and le rom to do in chair.    AM-PAC 6 CLICK MOBILITY  Total Score:15     Patient left up in chair with all lines intact, call button in reach, chair alarm on, [place] notified and daughter present.    GOALS:   Multidisciplinary Problems     Physical Therapy Goals        Problem: Physical Therapy Goal    Goal Priority Disciplines Outcome Goal Variances Interventions   Physical Therapy Goal     PT, PT/OT      Description:  1. Patient will perform supine to/from sit sup  2. Patient will perform sit to/from stand with RW sup  3. Patient will amb > 100ft RW sba no gross LOB                    History:     Past Medical History:   Diagnosis Date    Acid reflux     Anxiety     Back pain     Bronchitis, chronic obstructive w acute bronchitis 7/29/2016    Cancer     [facility] arms, face- Dr. Lata Tejada    Cataract     2+NS    Degenerative disc disease     Depression     Dry mouth     Hernia, hiatal 11/18/2013    Hypertension     Hypothyroid     Macrocytic anemia 5/3/2016    Macular degeneration     Migraines     Mixed anxiety and depressive disorder     Multiple fractures of ribs of right side     Osteoporosis     Pneumonia     Pneumonia due to other staphylococcus     Rheumatoid arthritis     Rheumatoid arthritis(714.0)     Rheumatoid arthritis(714.0)     Remicade, MTX.       Past Surgical History:   Procedure Laterality Date    APPENDECTOMY  1985    CATARACT EXTRACTION Bilateral 6/11/15    Dr. Booth    CHOLECYSTECTOMY  2013     cryoablasion kidney Left 09/27/2016    EGD (ESOPHAGOGASTRODUODENOSCOPY) N/A 10/31/2013    Performed by Donald Cuello MD at [facility] ENDO    feet Bilateral     rheumatoid    FRACTURE SURGERY Right     tibia    FUNDOPLICATION, NISSEN, LAPAROSCOPIC N/A 12/18/2013    Performed by Galindo Vasquez MD at [facility] OR    HERNIA REPAIR      HYSTERECTOMY  1970    partial    JOINT REPLACEMENT      bilateral knees (2008), hands, wrists, knuckles, toes    LAPAROSCOPIC NISSEN FUNDOPLICATION      Left L5/S1 TF AYAAN with local Left 6/25/2019    [facility]    Renal Cryoablation N/A 9/27/2016    Performed by Dosc Diagnostic Provider at [facility] OR       Time Tracking:     PT Received On:    PT Start Time: 0800     PT Stop Time: 0840  PT Total Time (min): 40 min     Billable Minutes: Evaluation 15, Therapeutic Activity 15 and Therapeutic Exercise 10      Elliot Chatterjee, PT  08/24/2019

## 2019-08-24 NOTE — PLAN OF CARE
Problem: Infection  Goal: Infection Symptom Resolution    Intervention: Prevent or Manage Infection  Patient awake and alert free from falls and injury daughter at bedside, sinus tachycardic rhythm on monitor, denies pain at this time, weight shift assistance provided, ABT in progress, PICC line patent and intact, POC reviewed with patient and daughter,  bed low locked, call light within reach, will continue to monitor

## 2019-08-24 NOTE — PLAN OF CARE
Problem: Adult Inpatient Plan of Care  Goal: Plan of Care Review  Outcome: Ongoing (interventions implemented as appropriate)  Pt tolerates txs well; on nc 2l/m.

## 2019-08-24 NOTE — PLAN OF CARE
Problem: Adult Inpatient Plan of Care  Goal: Plan of Care Review  Outcome: Ongoing (interventions implemented as appropriate)  AAO X4. VSS. Sinus tach on monitor. NS @100 mL/hr.   Pain managed with PRN medication. Family at bedside.   Fall precautions in place, call bell in reach, bed in low and locked position.   POC discussed w/, verbalized understanding. Will continue to monitor.

## 2019-08-24 NOTE — PROGRESS NOTES
Pharmacokinetic Assessment Follow Up: IV Vancomycin    Vancomycin serum concentration assessment(s):    The trough level was drawn correctly and can be used to guide therapy at this time. The measurement is below the desired definitive target range of 15 to 20 mcg/mL.    Vancomycin Regimen Plan:    Change regimen to Vancomycin 1000 mg IV every 24 hours and we are collecting a random level at 18 hours post the last dose to test whether 1 gram every 18 hours is the most appropriate interval for this patient. The next dose is still scheduled for 0130 on 8/25/19     Drug levels (last 3 results):  Recent Labs   Lab Result Units 08/23/19  2321   Vancomycin-Trough ug/mL 9.3*       Pharmacy will continue to follow and monitor vancomycin.    Please contact pharmacy at extension [phone] for questions regarding this assessment.    Thank you for the consult,   Zeyad Flores       Patient brief summary:  Sarah Parker is a 79 y.o. female initiated on antimicrobial therapy with IV Vancomycin for treatment of lower respiratory infection    The patient's current regimen is Vancomycin 1000 mg every 24 hours    Drug Allergies:   Review of patient's allergies indicates:   Allergen Reactions    Codeine      Other reaction(s): hyper  Other reaction(s): hyper    Doxycycline     Gabapentin Other (See Comments)     Bad dreams       Actual Body Weight:   58.8 kg    Renal Function:   Estimated Creatinine Clearance: 46.6 mL/min (based on SCr of 0.8 mg/dL).,     Dialysis Method (if applicable):  N/A     CBC (last 72 hours):  Recent Labs   Lab Result Units 08/21/19  1945 08/22/19  0539 08/23/19  0851   WBC K/uL 60.67* 39.89* 38.34*   Hemoglobin g/dL 7.0* 6.2* 9.2*   Hematocrit % 20.9* 18.8* 26.8*   Platelets K/uL 266 216 151   Gran% % 50.0 50.9 45.0   Lymph% % 21.0 26.5 25.0   Mono% % 26.0* 26.8* 24.0*   Eosinophil% % 0.0 0.1 0.0   Basophil% % 0.0 0.2 0.0   Differential Method  Manual Automated Manual       Metabolic Panel (last 72  hours):  Recent Labs   Lab Result Units 08/21/19 1945 08/22/19  0354 08/22/19  0453 08/23/19  0815   Sodium mmol/L 128*  --  130* 133*   Potassium mmol/L 2.9*  --  3.2* 2.7*   Chloride mmol/L 95  --  101 99   CO2 mmol/L 21*  --  22* 23   Glucose mg/dL 124*  --  103 115*   Glucose, UA   --  Negative  --   --    BUN, Bld mg/dL 22  --  18 14   Creatinine mg/dL 1.0  --  0.9 0.8   Albumin g/dL 2.2*  --  1.8*  --    Total Bilirubin mg/dL 0.8  --  0.7  --    Alkaline Phosphatase U/L 154*  --  124  --    AST U/L 23  --  20  --    ALT U/L 32  --  25  --    Magnesium mg/dL  --   --  0.9* 0.9*   Phosphorus mg/dL  --   --  2.3*  --        Vancomycin Administrations:  vancomycin given in the last 96 hours                     vancomycin in dextrose 5 % 1 gram/250 mL IVPB 1,000 mg (mg) 1,000 mg New Bag 08/24/19 0130    vancomycin 750 mg in dextrose 5 % 250 mL IVPB (ready to mix system) (mg) 750 mg New Bag 08/22/19 2250    vancomycin 1.75 g in 5 % dextrose 500 mL IVPB (mg) 1,750 mg New Bag 08/21/19 2305                      Microbiologic Results:  Microbiology Results (last 7 days)       Procedure Component Value Units Date/Time    Urine culture [[id number]] Collected:  08/22/19 0354    Order Status:  Completed Specimen:  Urine Updated:  08/23/19 1220     Urine Culture, Routine No growth    Narrative:       Preferred Collection Type->Urine, Clean Catch    Blood culture x two cultures. Draw prior to antibiotics. [[id number]] Collected:  08/21/19 1930    Order Status:  Completed Specimen:  Blood from Peripheral, Hand, Right Updated:  08/23/19 0612     Blood Culture, Routine No Growth to date      No Growth to date    Narrative:       Aerobic and anaerobic    Blood culture x two cultures. Draw prior to antibiotics. [[id number]] Collected:  08/21/19 1945    Order Status:  Completed Specimen:  Blood from Peripheral, Hand, Right Updated:  08/23/19 0612     Blood Culture, Routine No Growth to date      No Growth to date    Narrative:        Aerobic and anaerobic

## 2019-08-25 LAB
ALBUMIN SERPL BCP-MCNC: 1.6 G/DL (ref 3.5–5.2)
ALP SERPL-CCNC: 147 U/L (ref 55–135)
ALT SERPL W/O P-5'-P-CCNC: 23 U/L (ref 10–44)
ANION GAP SERPL CALC-SCNC: 11 MMOL/L (ref 8–16)
ANISOCYTOSIS BLD QL SMEAR: SLIGHT
AST SERPL-CCNC: 23 U/L (ref 10–40)
BASOPHILS NFR BLD: 0 % (ref 0–1.9)
BILIRUB SERPL-MCNC: 0.7 MG/DL (ref 0.1–1)
BLASTS NFR BLD MANUAL: 2 %
BUN SERPL-MCNC: 9 MG/DL (ref 8–23)
CALCIUM SERPL-MCNC: 8 MG/DL (ref 8.7–10.5)
CHLORIDE SERPL-SCNC: 101 MMOL/L (ref 95–110)
CO2 SERPL-SCNC: 20 MMOL/L (ref 23–29)
CREAT SERPL-MCNC: 0.7 MG/DL (ref 0.5–1.4)
DACRYOCYTES BLD QL SMEAR: ABNORMAL
DIFFERENTIAL METHOD: ABNORMAL
EOSINOPHIL NFR BLD: 1 % (ref 0–8)
ERYTHROCYTE [DISTWIDTH] IN BLOOD BY AUTOMATED COUNT: 21.6 % (ref 11.5–14.5)
EST. GFR  (AFRICAN AMERICAN): >60 ML/MIN/1.73 M^2
EST. GFR  (NON AFRICAN AMERICAN): >60 ML/MIN/1.73 M^2
FOLATE SERPL-MCNC: 12 NG/ML (ref 4–24)
GLUCOSE SERPL-MCNC: 94 MG/DL (ref 70–110)
HCT VFR BLD AUTO: 25.1 % (ref 37–48.5)
HGB BLD-MCNC: 8.2 G/DL (ref 12–16)
IRON SERPL-MCNC: 36 UG/DL (ref 30–160)
LYMPHOCYTES NFR BLD: 21 % (ref 18–48)
MAGNESIUM SERPL-MCNC: 1.1 MG/DL (ref 1.6–2.6)
MCH RBC QN AUTO: 32 PG (ref 27–31)
MCHC RBC AUTO-ENTMCNC: 32.7 G/DL (ref 32–36)
MCV RBC AUTO: 98 FL (ref 82–98)
METAMYELOCYTES NFR BLD MANUAL: 4 %
MONOCYTES NFR BLD: 12 % (ref 4–15)
MYELOCYTES NFR BLD MANUAL: 5 %
NEUTROPHILS NFR BLD: 46 % (ref 38–73)
OB PNL STL: NEGATIVE
OVALOCYTES BLD QL SMEAR: ABNORMAL
PLATELET # BLD AUTO: 154 K/UL (ref 150–350)
PLATELET BLD QL SMEAR: ABNORMAL
PMV BLD AUTO: 9.6 FL (ref 9.2–12.9)
POIKILOCYTOSIS BLD QL SMEAR: SLIGHT
POLYCHROMASIA BLD QL SMEAR: ABNORMAL
POTASSIUM SERPL-SCNC: 3.5 MMOL/L (ref 3.5–5.1)
PROMYELOCYTES NFR BLD MANUAL: 9 %
PROT SERPL-MCNC: 5.7 G/DL (ref 6–8.4)
RBC # BLD AUTO: 2.56 M/UL (ref 4–5.4)
SATURATED IRON: 19 % (ref 20–50)
SODIUM SERPL-SCNC: 132 MMOL/L (ref 136–145)
SPHEROCYTES BLD QL SMEAR: ABNORMAL
TARGETS BLD QL SMEAR: ABNORMAL
TOTAL IRON BINDING CAPACITY: 185 UG/DL (ref 250–450)
TRANSFERRIN SERPL-MCNC: 125 MG/DL (ref 200–375)
VIT B12 SERPL-MCNC: 1918 PG/ML (ref 210–950)
WBC # BLD AUTO: 51.5 K/UL (ref 3.9–12.7)

## 2019-08-25 PROCEDURE — 21400001 HC TELEMETRY ROOM: Mod: HCNC

## 2019-08-25 PROCEDURE — 88184 FLOWCYTOMETRY/ TC 1 MARKER: CPT | Mod: HCNC | Performed by: PATHOLOGY

## 2019-08-25 PROCEDURE — 27000221 HC OXYGEN, UP TO 24 HOURS: Mod: HCNC

## 2019-08-25 PROCEDURE — 85027 COMPLETE CBC AUTOMATED: CPT | Mod: HCNC

## 2019-08-25 PROCEDURE — 25000003 PHARM REV CODE 250: Mod: HCNC | Performed by: INTERNAL MEDICINE

## 2019-08-25 PROCEDURE — 97530 THERAPEUTIC ACTIVITIES: CPT | Mod: HCNC

## 2019-08-25 PROCEDURE — 63600175 PHARM REV CODE 636 W HCPCS: Mod: HCNC | Performed by: INTERNAL MEDICINE

## 2019-08-25 PROCEDURE — 82272 OCCULT BLD FECES 1-3 TESTS: CPT | Mod: HCNC

## 2019-08-25 PROCEDURE — 85060 BLOOD SMEAR INTERPRETATION: CPT | Mod: HCNC,,, | Performed by: PATHOLOGY

## 2019-08-25 PROCEDURE — 88189 FLOWCYTOMETRY/READ 16 & >: CPT | Mod: HCNC,,, | Performed by: PATHOLOGY

## 2019-08-25 PROCEDURE — 94761 N-INVAS EAR/PLS OXIMETRY MLT: CPT | Mod: HCNC

## 2019-08-25 PROCEDURE — 85060 PATHOLOGIST REVIEW: ICD-10-PCS | Mod: HCNC,,, | Performed by: PATHOLOGY

## 2019-08-25 PROCEDURE — 82607 VITAMIN B-12: CPT | Mod: HCNC

## 2019-08-25 PROCEDURE — 99900035 HC TECH TIME PER 15 MIN (STAT): Mod: HCNC

## 2019-08-25 PROCEDURE — 83540 ASSAY OF IRON: CPT | Mod: HCNC

## 2019-08-25 PROCEDURE — 80053 COMPREHEN METABOLIC PANEL: CPT | Mod: HCNC

## 2019-08-25 PROCEDURE — 88189 PR  FLOWCYTOMETRY/READ, 16 & > MARKERS: ICD-10-PCS | Mod: HCNC,,, | Performed by: PATHOLOGY

## 2019-08-25 PROCEDURE — 99232 SBSQ HOSP IP/OBS MODERATE 35: CPT | Mod: HCNC,,, | Performed by: INTERNAL MEDICINE

## 2019-08-25 PROCEDURE — 88185 FLOWCYTOMETRY/TC ADD-ON: CPT | Mod: HCNC | Performed by: PATHOLOGY

## 2019-08-25 PROCEDURE — 97116 GAIT TRAINING THERAPY: CPT | Mod: HCNC

## 2019-08-25 PROCEDURE — 94640 AIRWAY INHALATION TREATMENT: CPT | Mod: HCNC

## 2019-08-25 PROCEDURE — 85007 BL SMEAR W/DIFF WBC COUNT: CPT | Mod: HCNC

## 2019-08-25 PROCEDURE — 25000242 PHARM REV CODE 250 ALT 637 W/ HCPCS: Mod: HCNC | Performed by: INTERNAL MEDICINE

## 2019-08-25 PROCEDURE — 82746 ASSAY OF FOLIC ACID SERUM: CPT | Mod: HCNC

## 2019-08-25 PROCEDURE — 83735 ASSAY OF MAGNESIUM: CPT | Mod: HCNC

## 2019-08-25 PROCEDURE — 99232 PR SUBSEQUENT HOSPITAL CARE,LEVL II: ICD-10-PCS | Mod: HCNC,,, | Performed by: INTERNAL MEDICINE

## 2019-08-25 PROCEDURE — 63600175 PHARM REV CODE 636 W HCPCS: Mod: HCNC | Performed by: EMERGENCY MEDICINE

## 2019-08-25 RX ORDER — ENOXAPARIN SODIUM 100 MG/ML
40 INJECTION SUBCUTANEOUS EVERY 24 HOURS
Status: DISCONTINUED | OUTPATIENT
Start: 2019-08-25 | End: 2019-08-26 | Stop reason: HOSPADM

## 2019-08-25 RX ORDER — HYDROCHLOROTHIAZIDE 25 MG/1
25 TABLET ORAL DAILY
Status: DISCONTINUED | OUTPATIENT
Start: 2019-08-25 | End: 2019-08-26 | Stop reason: HOSPADM

## 2019-08-25 RX ORDER — LOSARTAN POTASSIUM 25 MG/1
25 TABLET ORAL DAILY
Status: DISCONTINUED | OUTPATIENT
Start: 2019-08-25 | End: 2019-08-26 | Stop reason: HOSPADM

## 2019-08-25 RX ORDER — LANOLIN ALCOHOL/MO/W.PET/CERES
400 CREAM (GRAM) TOPICAL 2 TIMES DAILY
Status: DISCONTINUED | OUTPATIENT
Start: 2019-08-25 | End: 2019-08-26

## 2019-08-25 RX ORDER — FERROUS GLUCONATE 324(37.5)
324 TABLET ORAL
Status: DISCONTINUED | OUTPATIENT
Start: 2019-08-26 | End: 2019-08-26 | Stop reason: HOSPADM

## 2019-08-25 RX ORDER — METOPROLOL SUCCINATE 50 MG/1
100 TABLET, EXTENDED RELEASE ORAL DAILY
Status: DISCONTINUED | OUTPATIENT
Start: 2019-08-26 | End: 2019-08-26 | Stop reason: HOSPADM

## 2019-08-25 RX ADMIN — TAMSULOSIN HYDROCHLORIDE 0.4 MG: 0.4 CAPSULE ORAL at 08:08

## 2019-08-25 RX ADMIN — TOPIRAMATE 25 MG: 25 TABLET, FILM COATED ORAL at 08:08

## 2019-08-25 RX ADMIN — BETHANECHOL CHLORIDE 10 MG: 10 TABLET ORAL at 08:08

## 2019-08-25 RX ADMIN — PIPERACILLIN AND TAZOBACTAM 4.5 G: 4; .5 INJECTION, POWDER, LYOPHILIZED, FOR SOLUTION INTRAVENOUS; PARENTERAL at 08:08

## 2019-08-25 RX ADMIN — LEVOTHYROXINE SODIUM 100 MCG: 100 TABLET ORAL at 06:08

## 2019-08-25 RX ADMIN — GUAIFENESIN 200 MG: 200 SOLUTION ORAL at 08:08

## 2019-08-25 RX ADMIN — AMITRIPTYLINE HYDROCHLORIDE 25 MG: 25 TABLET, FILM COATED ORAL at 08:08

## 2019-08-25 RX ADMIN — LEVALBUTEROL 0.63 MG: 0.63 SOLUTION RESPIRATORY (INHALATION) at 03:08

## 2019-08-25 RX ADMIN — PIPERACILLIN AND TAZOBACTAM 4.5 G: 4; .5 INJECTION, POWDER, LYOPHILIZED, FOR SOLUTION INTRAVENOUS; PARENTERAL at 12:08

## 2019-08-25 RX ADMIN — METOPROLOL SUCCINATE 50 MG: 50 TABLET, EXTENDED RELEASE ORAL at 08:08

## 2019-08-25 RX ADMIN — ENOXAPARIN SODIUM 40 MG: 100 INJECTION SUBCUTANEOUS at 05:08

## 2019-08-25 RX ADMIN — LEVALBUTEROL 0.63 MG: 0.63 SOLUTION RESPIRATORY (INHALATION) at 12:08

## 2019-08-25 RX ADMIN — LEVALBUTEROL 0.63 MG: 0.63 SOLUTION RESPIRATORY (INHALATION) at 08:08

## 2019-08-25 RX ADMIN — GUAIFENESIN 200 MG: 200 SOLUTION ORAL at 02:08

## 2019-08-25 RX ADMIN — PANTOPRAZOLE SODIUM 40 MG: 40 TABLET, DELAYED RELEASE ORAL at 08:08

## 2019-08-25 RX ADMIN — GUAIFENESIN 200 MG: 200 SOLUTION ORAL at 04:08

## 2019-08-25 RX ADMIN — DOCUSATE SODIUM 100 MG: 100 CAPSULE, LIQUID FILLED ORAL at 08:08

## 2019-08-25 RX ADMIN — HYDRALAZINE HYDROCHLORIDE 10 MG: 20 INJECTION INTRAMUSCULAR; INTRAVENOUS at 12:08

## 2019-08-25 RX ADMIN — ACETAMINOPHEN 650 MG: 325 TABLET ORAL at 08:08

## 2019-08-25 RX ADMIN — LOSARTAN POTASSIUM 25 MG: 25 TABLET, FILM COATED ORAL at 04:08

## 2019-08-25 RX ADMIN — MAGNESIUM OXIDE TAB 400 MG (241.3 MG ELEMENTAL MG) 400 MG: 400 (241.3 MG) TAB at 10:08

## 2019-08-25 RX ADMIN — PIPERACILLIN AND TAZOBACTAM 4.5 G: 4; .5 INJECTION, POWDER, LYOPHILIZED, FOR SOLUTION INTRAVENOUS; PARENTERAL at 04:08

## 2019-08-25 RX ADMIN — HYDROCHLOROTHIAZIDE 25 MG: 25 TABLET ORAL at 04:08

## 2019-08-25 RX ADMIN — BETHANECHOL CHLORIDE 10 MG: 10 TABLET ORAL at 04:08

## 2019-08-25 RX ADMIN — MAGNESIUM OXIDE TAB 400 MG (241.3 MG ELEMENTAL MG) 400 MG: 400 (241.3 MG) TAB at 08:08

## 2019-08-25 RX ADMIN — DULOXETINE HYDROCHLORIDE 40 MG: 20 CAPSULE, DELAYED RELEASE ORAL at 08:08

## 2019-08-25 RX ADMIN — GUAIFENESIN 200 MG: 200 SOLUTION ORAL at 11:08

## 2019-08-25 RX ADMIN — PRAVASTATIN SODIUM 10 MG: 10 TABLET ORAL at 08:08

## 2019-08-25 NOTE — PT/OT/SLP PROGRESS
Physical Therapy  Treatment    Sarah Parker   MRN: [id number]   Admitting Diagnosis: Sepsis    PT Received On: 08/25/19  PT Start Time: 0921     PT Stop Time: 0944    PT Total Time (min): 23 min       Billable Minutes:  Gait Training 13 minutes and Therapeutic Activity 10 minutes    Treatment Type: Treatment  PT/PTA: PTA     PTA Visit Number: 1       General Precautions: Standard, fall  Orthopedic Precautions: N/A   Braces:      Spiritual, Cultural Beliefs, Evangelical Practices, Values that Affect Care: no    Subjective:  Communicated with epic and nurse Christina prior to session.    Pain/Comfort  Pain Rating 1: 0/10    Objective:   Patient found with: oxygen, peripheral IV, conner catheter, telemetry    Functional Mobility:  Bed Mobility: SPV       Transfers:Min assist       Gait: min assist       Stairs:n/a          Balance:   Static Sit: GOOD: Takes MODERATE challenges from all directions  Dynamic Sit: GOOD: Maintains balance through MODERATE excursions of active trunk movement  Static Stand: FAIR+: Takes MINIMAL challenges from all directions  Dynamic stand: FAIR: Needs CONTACT GUARD during gait     Therapeutic Activities and Exercises:  Completed bed mobility: roll to R, roll to L, supine to side lying to sitting; sit to stand with supervision to min assist. Gait training with RW, min assist ~ 20 feet with slow danilo and decreased B step length. Returned to EOB and with mostly supervision and CGA for LE management transitioned back to supine.     AM-PAC 6 CLICK MOBILITY  How much help from another person does this patient currently need?   1 = Unable, Total/Dependent Assistance  2 = A lot, Maximum/Moderate Assistance  3 = A little, Minimum/Contact Guard/Supervision  4 = None, Modified [place]/Independent    Turning over in bed (including adjusting bedclothes, sheets and blankets)?: 4  Sitting down on and standing up from a chair with arms (e.g., wheelchair, bedside commode, etc.): 3  Moving from lying on  back to sitting on the side of the bed?: 3  Moving to and from a bed to a chair (including a wheelchair)?: 3  Need to walk in hospital room?: 3  Climbing 3-5 steps with a railing?: 1  Basic Mobility Total Score: 17    AM-PAC Raw Score CMS G-Code Modifier Level of Impairment Assistance   6 % Total / Unable   7 - 9 CM 80 - 100% Maximal Assist   10 - 14 CL 60 - 80% Moderate Assist   15 - 19 CK 40 - 60% Moderate Assist   20 - 22 CJ 20 - 40% Minimal Assist   23 CI 1-20% SBA / CGA   24 CH 0% Independent/ Mod I     Patient left supine with all lines intact, call button in reach, nursing notified and daughter present.    Assessment:  Sarah Parker is a 79 y.o. female with a medical diagnosis of Sepsis.    Rehab identified problem list/impairments: Rehab identified problem list/impairments: weakness, impaired endurance, decreased coordination    Rehab potential is good.    Activity tolerance: Good    Discharge recommendations: Discharge Facility/Level of Care Needs: home health PT, nursing facility, skilled(depending on progress)     Barriers to discharge:      Equipment recommendations:       GOALS:   Multidisciplinary Problems     Physical Therapy Goals        Problem: Physical Therapy Goal    Goal Priority Disciplines Outcome Goal Variances Interventions   Physical Therapy Goal     PT, PT/OT      Description:  1. Patient will perform supine to/from sit sup  2. Patient will perform sit to/from stand with RW sup  3. Patient will amb > 100ft RW sba no gross LOB                    PLAN:    Patient to be seen 5 x/week  to address the above listed problems via gait training, therapeutic activities, therapeutic exercises  Plan of Care expires: 08/31/19  Plan of Care reviewed with: patient         Ant Morriskins, PTA  08/25/2019

## 2019-08-25 NOTE — ASSESSMENT & PLAN NOTE
8/24/19-  Nelson was placed due to large volume retention detected in bladder scan.  Flomax and Bethanechol are added.  Decrease Amitriptyline to 25 mg and eventually stop   Consult urology     8/25/19-  Nelson is discontinued today. Pt is voiding without difficulty   Continue Bethanechol and Flomax   Amitriptyline dose is being decreased to 25 mg

## 2019-08-25 NOTE — ASSESSMENT & PLAN NOTE
Patient has been followed by the Hem/Onc clinic for chronic lymphocytosis, baseline WBC is approximately 20K, WBC increased to 60K, likely a leukmoid reaction. Will evaluate after discharge and possibly order a flow cytometry at that time.     --Daily CBC, CMP  --If WBC does not continue to decrease may order further studies to evaluate.  08/23/2019 white count decreased from yesterday on admission.  Spoke with daughter likely leukemoid reaction but will need to be assessed in as we proceed followed no need for leukemia immuno phenotyping at the present time  08/24/2019 white count slowly decreasing the still remains elevated patient appears to be clinically improving discussed with them the fact that she most likely has looks moist reaction on top of persistent leukocytosis once patient is discharged may want to consider flow cytometry on peripheral blood patient is not interested in bone marrow aspirate and biopsy will make a determination once patient is outpatient setting  08/25/2019.  White count increased to greater than 50,000 will request leukemia immuno phenotyping peripheral blood discussed with family at bedside discussed with Hospital Medicine as well

## 2019-08-25 NOTE — ASSESSMENT & PLAN NOTE
Sepsis secondary to pneumonia.  Fever 101.3, -135, WBC 21210, 0% bands, lactic acid 0.8.  Chest x-ray reveals left lower lobe infiltrate.  Started on IV antibiotics.  Continue IV fluids.  Follow up on cultures.    8/22 Cultures to date negative;  Continue antibiotics pending clinical course.    8/23:CXR stable; some improvement noted; Cultures still negative to date;  Continues on vanc and zosyn; continue nebs. Supplemental oxygen; monitor CXR  8/24/19-  Clinically improving . BP is elevated . Tachcyardia is noted. Will resume home beta blocker   Continue Antibiotic  Zosyn and vancomycin for now Day #4.  8/25/19-   Clinically improving. Vitals are stable . Tmax 99.7.   Antibiotic day #5. Will deescalate   Worsening Leukocytosis is noted today . This  is  leukemoid  reaction in response to infection or stress  VS leukemia . Dr. Miranda , Hematologist is following pt.

## 2019-08-25 NOTE — PROGRESS NOTES
Pharmacokinetic Assessment Follow Up: IV Vancomycin    Vancomycin serum concentration assessment(s):    The random level was drawn correctly and can be used to guide therapy at this time. The measurement is below the desired definitive target range of 15 to 20 mcg/mL.     However, note that the random level was collected 15.5 hours after a single dose of 1000 mg. Patient had previously been on Vancomycin 750 mg IV q24h after an initial loading dose of 1750 mg. Trough prior to the 3rd dose on 8/23 was subtherapeutic at 9.3 mcg/mL (L). A proportional dose increase would have been to increase the dose to 1250 mg IV every 24 hours based on the trough from 8/23/19. Furthermore, patient's SCr has improved from 1 mg/dL at baseline and is now 0.7 mg/dL, which puts her in the 1250 mg every 24 hours dosing regimen per nomogram as well as based on recent levels.     Vancomycin Regimen Plan:    Change regimen to Vancomycin 1250 mg IV every 24 hours with next serum trough concentration measured at 2000 prior to 3rd new dose on 08/26/2019.     Drug levels (last 3 results):  Recent Labs   Lab Result Units 08/23/19  2321 08/24/19  1705   Vancomycin, Random ug/mL  --  12.6   Vancomycin-Trough ug/mL 9.3*  --      Pharmacy will continue to follow and monitor vancomycin.    Please contact pharmacy at extension [phone] for questions regarding this assessment.    Thank you for the consult,   Adelina Antonio PharmD     Patient brief summary:  Sarah Parker is a 79 y.o. female initiated on antimicrobial therapy with IV Vancomycin for treatment of sepsis secondary to pneumonia    The patient's current regimen is Vancomycin 1250 mg IV every 24 hours.    Drug Allergies:   Review of patient's allergies indicates:   Allergen Reactions    Codeine      Other reaction(s): hyper  Other reaction(s): hyper    Doxycycline     Gabapentin Other (See Comments)     Bad dreams     Actual Body Weight:   62.5 kg    Renal Function:   Estimated Creatinine  Clearance: 58.5 mL/min (based on SCr of 0.7 mg/dL).,     Dialysis Method (if applicable):  N/A    CBC (last 72 hours):  Recent Labs   Lab Result Units 08/23/19  0851 08/24/19  0510 08/25/19  0546   WBC K/uL 38.34* 37.54* 51.50*   Hemoglobin g/dL 9.2* 8.0* 8.2*   Hematocrit % 26.8* 23.5* 25.1*   Platelets K/uL 151 145* 154   Gran% % 45.0 40.0 46.0   Lymph% % 25.0 18.0 21.0   Mono% % 24.0* 21.0* 12.0   Eosinophil% % 0.0 0.0 1.0   Basophil% % 0.0 0.0 0.0   Differential Method  Manual Manual Manual     Metabolic Panel (last 72 hours):  Recent Labs   Lab Result Units 08/23/19  0815 08/24/19  0510 08/25/19  0546   Sodium mmol/L 133* 132* 132*   Potassium mmol/L 2.7* 3.0* 3.5   Chloride mmol/L 99 104 101   CO2 mmol/L 23 22* 20*   Glucose mg/dL 115* 101 94   BUN, Bld mg/dL 14 10 9   Creatinine mg/dL 0.8 0.7 0.7   Albumin g/dL  --  1.5* 1.6*   Total Bilirubin mg/dL  --  0.6 0.7   Alkaline Phosphatase U/L  --  123 147*   AST U/L  --  18 23   ALT U/L  --  22 23   Magnesium mg/dL 0.9* 1.0* 1.1*     Vancomycin Administrations:  vancomycin given in the last 96 hours                     vancomycin (VANCOCIN) 1,250 mg in dextrose 5 % 250 mL IVPB (mg) 1,250 mg New Bag 08/24/19 2043    vancomycin in dextrose 5 % 1 gram/250 mL IVPB 1,000 mg (mg) 1,000 mg New Bag 08/24/19 0130    vancomycin 750 mg in dextrose 5 % 250 mL IVPB (ready to mix system) (mg) 750 mg New Bag 08/22/19 2250                Microbiologic Results:  Microbiology Results (last 7 days)       Procedure Component Value Units Date/Time    Blood culture x two cultures. Draw prior to antibiotics. [[id number]] Collected:  08/21/19 1930    Order Status:  Completed Specimen:  Blood from Peripheral, Hand, Right Updated:  08/25/19 0612     Blood Culture, Routine No Growth to date      No Growth to date      No Growth to date      No Growth to date    Narrative:       Aerobic and anaerobic    Blood culture x two cultures. Draw prior to antibiotics. [[id number]] Collected:  08/21/19  1945    Order Status:  Completed Specimen:  Blood from Peripheral, Hand, Right Updated:  08/25/19 0612     Blood Culture, Routine No Growth to date      No Growth to date      No Growth to date      No Growth to date    Narrative:       Aerobic and anaerobic    Urine culture [[id number]] Collected:  08/22/19 0354    Order Status:  Completed Specimen:  Urine Updated:  08/23/19 1220     Urine Culture, Routine No growth    Narrative:       Preferred Collection Type->Urine, Clean Catch          Thank you for allowing us to participate in this patient's care.     Adelina Antonio, PharmD 08/24/2019 7:45 PM

## 2019-08-25 NOTE — ASSESSMENT & PLAN NOTE
8/22:  Symptomatic at present;  Will transfuse 2 units f packed cells and observe      8/23:  Transfused 2 units overnight; completed this morning; will repeat labs;   HCT now 26.8; up from 18.8 on admission;  Looks and feels better;     8/24/19- Monitor H&H. Get stool for occult blood, Get iron study ..    8/25/19- Stool occult is pending . Start oral iron replacement.

## 2019-08-25 NOTE — PLAN OF CARE
Problem: Adult Inpatient Plan of Care  Goal: Plan of Care Review  Outcome: Ongoing (interventions implemented as appropriate)  Patient has some wheezing but her spo2 is good on nasal cannula 2 lpm.

## 2019-08-25 NOTE — ASSESSMENT & PLAN NOTE
Chest x-ray reveals left lower lobe infiltrate.  Complains of dry nonproductive cough.  Continue IV levofloxacin, IV Zosyn, IV vancomycin empirically.  Follow up on blood and sputum cultures.  Continue supplemental oxygen to maintain saturations greater than 92%.  Continue bronchodilators every 6 hr scheduled.      8/22:  Continue antibiotics; Cultures to date (blood and urine) are negative; continue nebs Q6h around the clock and Q2hrs prn.    8/23:  Cultures remain negative; CXR slightly improved effusion on left; infiltrates persist;  Continue nebs; supplemental oxygen; antibiotics;   Will add a probiotic to her regimen;   8/24/19-  Continue antibiotic . Day#4. Leukocytosis is trending down.  Blood cultures are neg to date   CXR showed some radiologic improvement

## 2019-08-25 NOTE — PROGRESS NOTES
[facility] -   Hematology/Oncology  Progress Note    Patient Name: Sarah Parker  Admission Date: 8/21/2019  Hospital Length of Stay: 4 days  Code Status: Prior     Subjective:     HPI:  79-year-old  female with PMH significant for rheumatoid arthritis, chronic leukocytosis, followed in the Oncology Clinic by Dr. Devine, history of bone marrow biopsy in the past, presents to the ED complaining of five days of generalized weakness, associated with fever.  She was seen by Dr. Reyes (PCP), was told that she had acute viral bronchitis and was prescribed oral Zithromax.  However patient has not been improving, hence presented to the ED.  She reported fever of 101.3, -135.  WBC 60,000, 0% bands, lactic acid 1.2.  Chest x-ray reveals left lower lobe infiltrate.  Other laboratory abnormalities including low potassium of 2.9, hemoglobin 7.0 (recently was 8.9).  Patient received IV levofloxacin, IV Zosyn, IV vancomycin empirically in the ED.  Admitting diagnosis sepsis secondary to pneumonia.       Interval History:  Patient appears alert and asking when she can she go home    Oncology Treatment Plan:   [No treatment plan]    Medications:  Continuous Infusions:  Scheduled Meds:   amitriptyline  25 mg Oral QHS    bethanechol  10 mg Oral TID    docusate sodium  100 mg Oral BID    DULoxetine  40 mg Oral Daily    levalbuterol  0.63 mg Nebulization Q8H    levothyroxine  100 mcg Oral Before breakfast    metoprolol succinate  50 mg Oral Daily    pantoprazole  40 mg Oral Daily    piperacillin-tazobactam (ZOSYN) IVPB  4.5 g Intravenous Q8H    pravastatin  10 mg Oral Daily    tamsulosin  0.4 mg Oral Daily    topiramate  25 mg Oral Daily    vancomycin (VANCOCIN) IVPB  1,250 mg Intravenous Q24H     PRN Meds:sodium chloride, acetaminophen, aluminum-magnesium hydroxide-simethicone, diphenhydrAMINE, guaifenesin 100 mg/5 ml, hydrALAZINE, hydrOXYzine HCl, ondansetron, sodium chloride 3%      Review of Systems   Constitutional: Positive for activity change and fatigue.   Respiratory: Positive for shortness of breath.    Neurological: Positive for weakness.   Psychiatric/Behavioral: Positive for decreased concentration and dysphoric mood. The patient is nervous/anxious.      Objective:     Vital Signs (Most Recent):  Temp: 99.7 °F (37.6 °C) (08/25/19 0718)  Pulse: 102 (08/25/19 0801)  Resp: 18 (08/25/19 0801)  BP: (!) 144/72 (08/25/19 0718)  SpO2: 97 % (08/25/19 0801) Vital Signs (24h Range):  Temp:  [97 °F (36.1 °C)-99.7 °F (37.6 °C)] 99.7 °F (37.6 °C)  Pulse:  [100-127] 102  Resp:  [18-24] 18  SpO2:  [92 %-97 %] 97 %  BP: (138-175)/(63-80) 144/72     Weight: 62.5 kg (137 lb 12.6 oz)  Body mass index is 25.2 kg/m².  Body surface area is 1.65 meters squared.      Intake/Output Summary (Last 24 hours) at 8/25/2019 0842  Last data filed at 8/25/2019 0830  Gross per 24 hour   Intake 5301.66 ml   Output 2000 ml   Net 3301.66 ml       Physical Exam   Constitutional: She appears cachectic. She has a sickly appearance. She appears ill. She appears distressed.   HENT:   Head: Normocephalic.   Eyes: Pupils are equal, round, and reactive to light.   Cardiovascular: Normal rate.   Pulmonary/Chest: She is in respiratory distress.   Neurological: She is alert.   Skin: Skin is dry.   Psychiatric: Her mood appears anxious. She exhibits a depressed mood.       Significant Labs:   BMP:   Recent Labs   Lab 08/24/19  0510 08/25/19  0546    94   * 132*   K 3.0* 3.5    101   CO2 22* 20*   BUN 10 9   CREATININE 0.7 0.7   CALCIUM 7.4* 8.0*   MG 1.0* 1.1*   , CBC:   Recent Labs   Lab 08/23/19  0851 08/24/19  0510 08/25/19  0546   WBC 38.34* 37.54* 51.50*   HGB 9.2* 8.0* 8.2*   HCT 26.8* 23.5* 25.1*    145* 154   , CMP:   Recent Labs   Lab 08/24/19  0510 08/25/19  0546   * 132*   K 3.0* 3.5    101   CO2 22* 20*    94   BUN 10 9   CREATININE 0.7 0.7   CALCIUM 7.4* 8.0*   PROT 5.2*  5.7*   ALBUMIN 1.5* 1.6*   BILITOT 0.6 0.7   ALKPHOS 123 147*   AST 18 23   ALT 22 23   ANIONGAP 6* 11   EGFRNONAA >60 >60   , Coagulation: No results for input(s): PT, INR, APTT in the last 48 hours., Haptoglobin: No results for input(s): HAPTOGLOBIN in the last 48 hours., Immunology: No results for input(s): SPEP, TOBIN, STEWART, FREELAMBDALI in the last 48 hours., LDH: No results for input(s): LDHCSF, BFSOURCE in the last 48 hours., LFTs:   Recent Labs   Lab 08/24/19  0510 08/25/19  0546   ALT 22 23   AST 18 23   ALKPHOS 123 147*   BILITOT 0.6 0.7   PROT 5.2* 5.7*   ALBUMIN 1.5* 1.6*    and Reticulocytes: No results for input(s): RETIC in the last 48 hours.    Diagnostic Results:  I have reviewed all pertinent imaging results/findings within the past 24 hours.    Assessment/Plan:     Leukocytosis  Patient has been followed by the Hem/Onc clinic for chronic lymphocytosis, baseline WBC is approximately 20K, WBC increased to 60K, likely a leukmoid reaction. Will evaluate after discharge and possibly order a flow cytometry at that time.     --Daily CBC, CMP  --If WBC does not continue to decrease may order further studies to evaluate.  08/23/2019 white count decreased from yesterday on admission.  Spoke with daughter likely leukemoid reaction but will need to be assessed in as we proceed followed no need for leukemia immuno phenotyping at the present time  08/24/2019 white count slowly decreasing the still remains elevated patient appears to be clinically improving discussed with them the fact that she most likely has looks moist reaction on top of persistent leukocytosis once patient is discharged may want to consider flow cytometry on peripheral blood patient is not interested in bone marrow aspirate and biopsy will make a determination once patient is outpatient setting  08/25/2019.  White count increased to greater than 50,000 will request leukemia immuno phenotyping peripheral blood discussed with family at bedside  discussed with Hospital Medicine as well        Thank you for your consult. I will follow-up with patient. Please contact us if you have any additional questions.     Corby Miranda MD  Hematology/Oncology  [facility]

## 2019-08-25 NOTE — SUBJECTIVE & OBJECTIVE
Interval History:   Pt remains afebrile .  Leukocytosis is trending down .  Pt feels better symptomatically  H&H are stable at moderate anemia . Will get iron study . Monitor H&H. Get stool for occult blood.    Nelson was placed due to large volume urianary retention.  Decrease Amitriptyline to 25 mg ( home med)      Review of Systems   Constitutional: Negative for activity change, appetite change and fever.   HENT: Negative for sore throat.    Eyes: Negative for visual disturbance.   Respiratory: Negative for cough, chest tightness and shortness of breath.    Cardiovascular: Negative for chest pain, palpitations and leg swelling.   Gastrointestinal: Negative for abdominal distention, abdominal pain, constipation, diarrhea, nausea and vomiting.   Endocrine: Negative for polyuria.   Genitourinary: Negative for decreased urine volume, dysuria, flank pain, frequency and hematuria.        Urinary retention    Musculoskeletal: Negative for back pain and gait problem.   Skin: Negative for rash.   Neurological: Negative for syncope, speech difficulty, weakness, light-headedness and headaches.   Psychiatric/Behavioral: Negative for confusion, hallucinations and sleep disturbance.     Objective:     Vital Signs (Most Recent):  Temp: 98.9 °F (37.2 °C) (08/24/19 1613)  Pulse: (!) 114 (08/24/19 1621)  Resp: 20 (08/24/19 1621)  BP: (!) 144/67 (08/24/19 1613)  SpO2: 96 % (08/24/19 1621) Vital Signs (24h Range):  Temp:  [98 °F (36.7 °C)-99.3 °F (37.4 °C)] 98.9 °F (37.2 °C)  Pulse:  [100-127] 114  Resp:  [18-20] 20  SpO2:  [95 %-97 %] 96 %  BP: (138-175)/(63-83) 144/67     Weight: 63.1 kg (139 lb 1.8 oz)  Body mass index is 25.44 kg/m².    Intake/Output Summary (Last 24 hours) at 8/24/2019 1902  Last data filed at 8/24/2019 1800  Gross per 24 hour   Intake 3116.33 ml   Output 1425 ml   Net 1691.33 ml      Physical Exam   Constitutional: She is oriented to person, place, and time. She appears well-nourished. No distress.   HENT:    Head: Normocephalic and atraumatic.   Eyes: Pupils are equal, round, and reactive to light. EOM are normal.   Neck: Normal range of motion. Neck supple.   Cardiovascular: Normal rate and normal heart sounds.   Pulmonary/Chest: Breath sounds normal. She has no wheezes. She has no rales.   Abdominal: Bowel sounds are normal. She exhibits no mass. There is no tenderness. There is no guarding.   Musculoskeletal: She exhibits no edema.   Neurological: She is alert and oriented to person, place, and time. No cranial nerve deficit.       Significant Labs:   BMP:   Recent Labs   Lab 08/24/19  0510      *   K 3.0*      CO2 22*   BUN 10   CREATININE 0.7   CALCIUM 7.4*   MG 1.0*     CBC:   Recent Labs   Lab 08/23/19  0851 08/24/19  0510   WBC 38.34* 37.54*   HGB 9.2* 8.0*   HCT 26.8* 23.5*    145*     CMP:   Recent Labs   Lab 08/23/19  0815 08/24/19  0510   * 132*   K 2.7* 3.0*   CL 99 104   CO2 23 22*   * 101   BUN 14 10   CREATININE 0.8 0.7   CALCIUM 8.0* 7.4*   PROT  --  5.2*   ALBUMIN  --  1.5*   BILITOT  --  0.6   ALKPHOS  --  123   AST  --  18   ALT  --  22   ANIONGAP 11 6*   EGFRNONAA >60 >60       Significant Imaging:

## 2019-08-25 NOTE — ASSESSMENT & PLAN NOTE
8/22 Magnesium ordered with monitoring; received 2 grams IV this morning; will repeat daily      8/23:  To receive 3 grams IV now and monitor;  Potassium replacement today as well;  Will monitor the labs;   8/24/19-  Again low noted . Replete IV . Start oral replacement for maintenance

## 2019-08-25 NOTE — PROGRESS NOTES
[facility] Hospital Medicine  Progress Note    Patient Name: Sarah Parker  MRN: [id number]  Patient Class: IP- Inpatient   Admission Date: 8/21/2019  Length of Stay: 4 days  Attending Physician: Hari Sloan MD  Primary Care Provider: Briseida Reyes MD        Subjective:     Principal Problem:Sepsis        HPI:  Ms. Parker is an elderly 79-year-old  female with PMH significant for rheumatoid arthritis, chronic leukocytosis, followed in the Oncology Clinic by Dr. Devine, history of bone marrow biopsy in the past, presents to the ED complaining of five days of generalized weakness, associated with fever.  She was seen by Dr. Reyes (PCP), was told that she had acute viral bronchitis and was prescribed oral Zithromax.  However patient has not been improving, hence presented to the ED.  She reported fever of 101.3, -135.  WBC 60,000, 0% bands, lactic acid 1.2.  Chest x-ray reveals left lower lobe infiltrate.  Other laboratory abnormalities including low potassium of 2.9, hemoglobin 7.0 (recently was 8.9).  Patient received IV levofloxacin, IV Zosyn, IV vancomycin empirically in the ED.  Admitting diagnosis sepsis secondary to pneumonia.    Overview/Hospital Course:  8/22:  States to be breathing better;  Nurse reported difficult breathing with some gurgling type respirations;  Fluids going at 125cc/hr have been curtailed;  ABG with reduce PCo2 at ~24; oxygenating well; have asked for a portable chest xray;  Will continue nebsl; attention to pulmonary toilet; incentives etc    8/23:  Looks and feels better today;  Complains of no BM since admission;  Colace added last evening;  To receive IV potassium and magnesium today;  BP up today; Portable CXR reviewed; syill with left sided infiltrates;  Effusion on left is less; will continue present regimen with monitoring    8/24/19 -  Pt feels better . Denies SOB or chest pain.   BP is elevated and tachycardia is noted. Pt will be  placed back on BB therapy that she was on it at home.   Leukocytosis is trending down .Blood cultures no growth to date. Urine culture is negative.   H/H are stable at moderate anemia . Will monitor   Nelson is placed due to large volume urinary retention. Flomax is added as well as Bethanechol . Will consult Urology for evaluation of urinary retention. Will decrease Amitriptyline to 25 mg . This could contribute to urinary retention.      8/25/19-  Pt continues to feel better . Tmax 99.7.  Blood cultures and urine cultures are negative   Worsening leukocytosis with premature cells are noted in today's lab. Dr. Miranda saw pt today and has ordered  leukemia immuno phenotyping of  peripheral blood.     Nelson discontinued today . Pt is voiding without difficulty.    Antibiotic day #5.     Interval History:   Tmax 99.7. Pt feels better overall . Reports not feeling as tired.  Appetite is normal.  Labs revealed worsening leukocytosis with premature cells in the peripheral blood.  evaluated pt and has ordered Leukemia phenotyping of peripheral blood.   Antibiotic day #5.     Review of Systems   Constitutional: Negative for activity change, appetite change and fever.   HENT: Negative for sore throat.    Eyes: Negative for visual disturbance.   Respiratory: Negative for cough, chest tightness and shortness of breath.         On O2 via NC    Cardiovascular: Negative for chest pain, palpitations and leg swelling.   Gastrointestinal: Negative for abdominal distention, abdominal pain, constipation, diarrhea, nausea and vomiting.   Endocrine: Negative for polyuria.   Genitourinary: Negative for decreased urine volume, dysuria, flank pain, frequency and hematuria.   Musculoskeletal: Negative for back pain and gait problem.   Skin: Negative for rash.   Neurological: Negative for syncope, speech difficulty, weakness, light-headedness and headaches.   Psychiatric/Behavioral: Negative for confusion, hallucinations and sleep  disturbance.     Objective:     Vital Signs (Most Recent):  Temp: 98.5 °F (36.9 °C) (08/25/19 1214)  Pulse: 89 (08/25/19 1513)  Resp: 20 (08/25/19 1513)  BP: (!) 146/69 (08/25/19 1214)  SpO2: (!) 94 % (08/25/19 1214) Vital Signs (24h Range):  Temp:  [97 °F (36.1 °C)-99.7 °F (37.6 °C)] 98.5 °F (36.9 °C)  Pulse:  [] 89  Resp:  [18-24] 20  SpO2:  [92 %-97 %] 94 %  BP: (144-175)/(69-80) 146/69     Weight: 62.5 kg (137 lb 12.6 oz)  Body mass index is 25.2 kg/m².    Intake/Output Summary (Last 24 hours) at 8/25/2019 1630  Last data filed at 8/25/2019 0830  Gross per 24 hour   Intake 5201.66 ml   Output 1150 ml   Net 4051.66 ml      Physical Exam   Constitutional: She is oriented to person, place, and time. No distress.   HENT:   Head: Normocephalic and atraumatic.   Eyes: Pupils are equal, round, and reactive to light. EOM are normal.   Neck: Normal range of motion. Neck supple.   Cardiovascular: Regular rhythm and normal heart sounds.   Tachycardia    Pulmonary/Chest: She has no rales.   Crackles at bases , clear upper lung fields    Abdominal: Soft. Bowel sounds are normal. She exhibits no distension. There is no tenderness. There is no guarding.   Musculoskeletal: She exhibits no edema.   Neurological: She is alert and oriented to person, place, and time.       Significant Labs:   BMP:   Recent Labs   Lab 08/25/19  0546   GLU 94   *   K 3.5      CO2 20*   BUN 9   CREATININE 0.7   CALCIUM 8.0*   MG 1.1*     CBC:   Recent Labs   Lab 08/24/19  0510 08/25/19  0546   WBC 37.54* 51.50*   HGB 8.0* 8.2*   HCT 23.5* 25.1*   * 154     Urine culture [[id number]] Collected: 08/22/19 0353   Order Status: Completed Specimen: Urine Updated: 08/23/19 1220    Urine Culture, Routine No growth   Narrative:     Preferred Collection Type->Urine, Clean Catch   Blood culture x two cultures. Draw prior to antibiotics. [[id number]] Collected: 08/21/19 1945   Order Status: Completed Specimen: Blood from Peripheral, Hand,  Right Updated: 08/25/19 0612    Blood Culture, Routine No Growth to date     No Growth to date     No Growth to date     No Growth to date   Narrative:     Aerobic and anaerobic   Blood culture x two cultures. Draw prior to antibiotics. [[id number]] Collected: 08/21/19 1930   Order Status: Completed Specimen: Blood from Peripheral, Hand, Right Updated: 08/25/19 0612    Blood Culture, Routine No Growth to date     No Growth to date     No Growth to date     No Growth to date     Significant Imaging:       Assessment/Plan:      * Sepsis  Sepsis secondary to pneumonia.  Fever 101.3, -135, WBC 69075, 0% bands, lactic acid 0.8.  Chest x-ray reveals left lower lobe infiltrate.  Started on IV antibiotics.  Continue IV fluids.  Follow up on cultures.    8/22 Cultures to date negative;  Continue antibiotics pending clinical course.    8/23:CXR stable; some improvement noted; Cultures still negative to date;  Continues on vanc and zosyn; continue nebs. Supplemental oxygen; monitor CXR  8/24/19-  Clinically improving . BP is elevated . Tachcyardia is noted. Will resume home beta blocker   Continue Antibiotic  Zosyn and vancomycin for now Day #4.  8/25/19-   Clinically improving. Vitals are stable . Tmax 99.7.   Antibiotic day #5. Will deescalate   Worsening Leukocytosis is noted today . This  is  leukemoid  reaction in response to infection or stress  VS leukemia . Dr. Miranda , Hematologist is following pt.     Urinary retention  8/24/19-  Nelson was placed due to large volume retention detected in bladder scan.  Flomax and Bethanechol are added.  Decrease Amitriptyline to 25 mg and eventually stop   Consult urology     8/25/19-  Nelson is discontinued today. Pt is voiding without difficulty   Continue Bethanechol and Flomax   Amitriptyline dose is being decreased to 25 mg        Anemia  8/22:  Symptomatic at present;  Will transfuse 2 units f packed cells and observe      8/23:  Transfused 2 units overnight; completed this  morning; will repeat labs;   HCT now 26.8; up from 18.8 on admission;  Looks and feels better;     8/24/19- Monitor H&H. Get stool for occult blood, Get iron study ..    8/25/19- Stool occult is pending . Start oral iron replacement.     Hypomagnesemia  8/22 Magnesium ordered with monitoring; received 2 grams IV this morning; will repeat daily      8/23:  To receive 3 grams IV now and monitor;  Potassium replacement today as well;  Will monitor the labs;   8/24/19-  Again low noted . Replete IV . Start oral replacement for maintenance .  8/25/19-  Start oral replacement     Hypokalemia  8/22:  Replace by oral route and monitor labs;    8/23:  IV potassium ordered; will monitor;    8/24/19- Slightly low noted. Replace orally       Pneumonia of left lower lobe due to infectious organism  Chest x-ray reveals left lower lobe infiltrate.  Complains of dry nonproductive cough.  Continue IV levofloxacin, IV Zosyn, IV vancomycin empirically.  Follow up on blood and sputum cultures.  Continue supplemental oxygen to maintain saturations greater than 92%.  Continue bronchodilators every 6 hr scheduled.      8/22:  Continue antibiotics; Cultures to date (blood and urine) are negative; continue nebs Q6h around the clock and Q2hrs prn.    8/23:  Cultures remain negative; CXR slightly improved effusion on left; infiltrates persist;  Continue nebs; supplemental oxygen; antibiotics;   Will add a probiotic to her regimen;   8/24/19-  Continue antibiotic . Day#4. Leukocytosis is trending down.  Blood cultures are neg to date   CXR showed some radiologic improvement     8/25/19-   Antibiotic day #5. Will deescalate .     Leukocytosis  Chronic leukocytosis, followed by Dr. Devine in the Oncology Clinic.  Scheduled to see Oncology in the clinic tomorrow.  History of bone marrow biopsy in the past.  26% monocytes, with 50% granulocytes.  Consult Oncology in the morning for evaluation/recommendations.    8/22:  History of chronic  leukocytosis; likely exacerbated by the infection;  Heme-onc in earlier to see;  No new recommendations offered  To continue antibiotics pending culture results; and again today;  Has history of lymphocytosis with usal counts 20k;  Present rise represents a leukemoid reaction;  Will continue to trend with daily labs;  Will refer back to hematology at discharge;     8/23:  Hematology in yesterday  8/24/19-  Hematology consult obtained and their plan is noted.   8/25/19 -  Worsening leukocytosis is noted today .  is following . He has ordered leukemia immunophenotyping of peripheral blood.         Acquired hypothyroidism  Continue home dose Synthroid.  8/22:  adjusting synthroid;   Will monitor;    Rheumatoid arthritis  Methotrexate every seven days at home.      8/22;  Continue her MTX per schedule and observe;     8/23:  Stable clinically; continues on MTX weekly; will monitor her labs, notably the hemograms;      VTE Risk Mitigation (From admission, onward)        Ordered     Place sequential compression device  Until discontinued      08/21/19 0719                Hari Sloan MD  Department of Hospital Medicine   [facility]

## 2019-08-25 NOTE — PLAN OF CARE
Problem: Adult Inpatient Plan of Care  Goal: Plan of Care Review  Outcome: Ongoing (interventions implemented as appropriate)  Pt tolerates txs well; no resp distress noted.

## 2019-08-25 NOTE — PROGRESS NOTES
[facility] Hospital Medicine  Progress Note    Patient Name: Sarah Parker  MRN: [id number]  Patient Class: IP- Inpatient   Admission Date: 8/21/2019  Length of Stay: 3 days  Attending Physician: Hari Sloan MD  Primary Care Provider: Briseida Reyes MD        Subjective:     Principal Problem:Sepsis        HPI:  Ms. Parker is an elderly 79-year-old  female with PMH significant for rheumatoid arthritis, chronic leukocytosis, followed in the Oncology Clinic by Dr. Devine, history of bone marrow biopsy in the past, presents to the ED complaining of five days of generalized weakness, associated with fever.  She was seen by Dr. Reyes (PCP), was told that she had acute viral bronchitis and was prescribed oral Zithromax.  However patient has not been improving, hence presented to the ED.  She reported fever of 101.3, -135.  WBC 60,000, 0% bands, lactic acid 1.2.  Chest x-ray reveals left lower lobe infiltrate.  Other laboratory abnormalities including low potassium of 2.9, hemoglobin 7.0 (recently was 8.9).  Patient received IV levofloxacin, IV Zosyn, IV vancomycin empirically in the ED.  Admitting diagnosis sepsis secondary to pneumonia.    Overview/Hospital Course:  8/22:  States to be breathing better;  Nurse reported difficult breathing with some gurgling type respirations;  Fluids going at 125cc/hr have been curtailed;  ABG with reduce PCo2 at ~24; oxygenating well; have asked for a portable chest xray;  Will continue nebsl; attention to pulmonary toilet; incentives etc    8/23:  Looks and feels better today;  Complains of no BM since admission;  Colace added last evening;  To receive IV potassium and magnesium today;  BP up today; Portable CXR reviewed; syill with left sided infiltrates;  Effusion on left is less; will continue present regimen with monitoring    8/24/19 -  Pt feels better . Denies SOB or chest pain.   BP is elevated and tachycardia is noted. Pt will be  placed back on BB therapy that she was on it at home.   Leukocytosis is trending down .Blood cultures no growth to date. Urine culture is negative.   H/H are stable at moderate anemia . Will monitor   Nelson is placed due to large volume urinary retention. Flomax is added as well as Bethanechol . Will consult Urology for evaluation of urinary retention. Will decrease Amitriptyline to 25 mg . This could contribute to urinary retention.        Interval History:   Pt remains afebrile .  Leukocytosis is trending down .  Pt feels better symptomatically  H&H are stable at moderate anemia . Will get iron study . Monitor H&H. Get stool for occult blood.    Nelson was placed due to large volume urianary retention.  Decrease Amitriptyline to 25 mg ( home med)      Review of Systems   Constitutional: Negative for activity change, appetite change and fever.   HENT: Negative for sore throat.    Eyes: Negative for visual disturbance.   Respiratory: Negative for cough, chest tightness and shortness of breath.    Cardiovascular: Negative for chest pain, palpitations and leg swelling.   Gastrointestinal: Negative for abdominal distention, abdominal pain, constipation, diarrhea, nausea and vomiting.   Endocrine: Negative for polyuria.   Genitourinary: Negative for decreased urine volume, dysuria, flank pain, frequency and hematuria.        Urinary retention    Musculoskeletal: Negative for back pain and gait problem.   Skin: Negative for rash.   Neurological: Negative for syncope, speech difficulty, weakness, light-headedness and headaches.   Psychiatric/Behavioral: Negative for confusion, hallucinations and sleep disturbance.     Objective:     Vital Signs (Most Recent):  Temp: 98.9 °F (37.2 °C) (08/24/19 1613)  Pulse: (!) 114 (08/24/19 1621)  Resp: 20 (08/24/19 1621)  BP: (!) 144/67 (08/24/19 1613)  SpO2: 96 % (08/24/19 1621) Vital Signs (24h Range):  Temp:  [98 °F (36.7 °C)-99.3 °F (37.4 °C)] 98.9 °F (37.2 °C)  Pulse:  [100-127]  114  Resp:  [18-20] 20  SpO2:  [95 %-97 %] 96 %  BP: (138-175)/(63-83) 144/67     Weight: 63.1 kg (139 lb 1.8 oz)  Body mass index is 25.44 kg/m².    Intake/Output Summary (Last 24 hours) at 8/24/2019 1902  Last data filed at 8/24/2019 1800  Gross per 24 hour   Intake 3116.33 ml   Output 1425 ml   Net 1691.33 ml      Physical Exam   Constitutional: She is oriented to person, place, and time. She appears well-nourished. No distress.   HENT:   Head: Normocephalic and atraumatic.   Eyes: Pupils are equal, round, and reactive to light. EOM are normal.   Neck: Normal range of motion. Neck supple.   Cardiovascular: Normal rate and normal heart sounds.   Pulmonary/Chest: Breath sounds normal. She has no wheezes. She has no rales.   Abdominal: Bowel sounds are normal. She exhibits no mass. There is no tenderness. There is no guarding.   Musculoskeletal: She exhibits no edema.   Neurological: She is alert and oriented to person, place, and time. No cranial nerve deficit.       Significant Labs:   BMP:   Recent Labs   Lab 08/24/19  0510      *   K 3.0*      CO2 22*   BUN 10   CREATININE 0.7   CALCIUM 7.4*   MG 1.0*     CBC:   Recent Labs   Lab 08/23/19  0851 08/24/19  0510   WBC 38.34* 37.54*   HGB 9.2* 8.0*   HCT 26.8* 23.5*    145*     CMP:   Recent Labs   Lab 08/23/19  0815 08/24/19  0510   * 132*   K 2.7* 3.0*   CL 99 104   CO2 23 22*   * 101   BUN 14 10   CREATININE 0.8 0.7   CALCIUM 8.0* 7.4*   PROT  --  5.2*   ALBUMIN  --  1.5*   BILITOT  --  0.6   ALKPHOS  --  123   AST  --  18   ALT  --  22   ANIONGAP 11 6*   EGFRNONAA >60 >60       Significant Imaging:       Assessment/Plan:      * Sepsis  Sepsis secondary to pneumonia.  Fever 101.3, -135, WBC 48050, 0% bands, lactic acid 0.8.  Chest x-ray reveals left lower lobe infiltrate.  Started on IV antibiotics.  Continue IV fluids.  Follow up on cultures.    8/22 Cultures to date negative;  Continue antibiotics pending clinical  course.    8/23:CXR stable; some improvement noted; Cultures still negative to date;  Continues on vanc and zosyn; continue nebs. Supplemental oxygen; monitor CXR  8/24/19-  Clinically improving . BP is elevated . Tachcyardia is noted. Will resume home beta blocker   Continue Antibiotic  Zosyn and vancomycin for now Day #4.    Urinary retention  8/24/19-  Nelson was placed due to large volume retention detected in bladder scan.  Flomax and Bethanechol are added.  Decrease Amitriptyline to 25 mg and eventually stop   Consult urology       Anemia  8/22:  Symptomatic at present;  Will transfuse 2 units f packed cells and observe      8/23:  Transfused 2 units overnight; completed this morning; will repeat labs;   HCT now 26.8; up from 18.8 on admission;  Looks and feels better;     8/24/19- Monitor H&H. Get stool for occult blood, Get iron study     Hypomagnesemia  8/22 Magnesium ordered with monitoring; received 2 grams IV this morning; will repeat daily      8/23:  To receive 3 grams IV now and monitor;  Potassium replacement today as well;  Will monitor the labs;   8/24/19-  Again low noted . Replete IV . Start oral replacement for maintenance     Hypokalemia  8/22:  Replace by oral route and monitor labs;    8/23:  IV potassium ordered; will monitor;    8/24/19- Slightly low noted. Replace orally       Pneumonia of left lower lobe due to infectious organism  Chest x-ray reveals left lower lobe infiltrate.  Complains of dry nonproductive cough.  Continue IV levofloxacin, IV Zosyn, IV vancomycin empirically.  Follow up on blood and sputum cultures.  Continue supplemental oxygen to maintain saturations greater than 92%.  Continue bronchodilators every 6 hr scheduled.      8/22:  Continue antibiotics; Cultures to date (blood and urine) are negative; continue nebs Q6h around the clock and Q2hrs prn.    8/23:  Cultures remain negative; CXR slightly improved effusion on left; infiltrates persist;  Continue nebs;  supplemental oxygen; antibiotics;   Will add a probiotic to her regimen;   8/24/19-  Continue antibiotic . Day#4. Leukocytosis is trending down.  Blood cultures are neg to date   CXR showed some radiologic improvement     Leukocytosis  Chronic leukocytosis, followed by Dr. Devine in the Oncology Clinic.  Scheduled to see Oncology in the clinic tomorrow.  History of bone marrow biopsy in the past.  26% monocytes, with 50% granulocytes.  Consult Oncology in the morning for evaluation/recommendations.    8/22:  History of chronic leukocytosis; likely exacerbated by the infection;  Heme-onc in earlier to see;  No new recommendations offered  To continue antibiotics pending culture results; and again today;  Has history of lymphocytosis with usal counts 20k;  Present rise represents a leukemoid reaction;  Will continue to trend with daily labs;  Will refer back to hematology at discharge;     8/23:  Hematology in yesterday  8/24/19-  Hematology consult obtained and their plan is noted.         Acquired hypothyroidism  Continue home dose Synthroid.  8/22:  adjusting synthroid;   Will monitor;    Rheumatoid arthritis  Methotrexate every seven days at home.      8/22;  Continue her MTX per schedule and observe;     8/23:  Stable clinically; continues on MTX weekly; will monitor her labs, notably the hemograms;      VTE Risk Mitigation (From admission, onward)        Ordered     Place sequential compression device  Until discontinued      08/21/19 2114                Hari Sloan MD  Department of Hospital Medicine   [facility]

## 2019-08-25 NOTE — SUBJECTIVE & OBJECTIVE
Interval History:  Patient appears alert and asking when she can she go home    Oncology Treatment Plan:   [No treatment plan]    Medications:  Continuous Infusions:  Scheduled Meds:   amitriptyline  25 mg Oral QHS    bethanechol  10 mg Oral TID    docusate sodium  100 mg Oral BID    DULoxetine  40 mg Oral Daily    levalbuterol  0.63 mg Nebulization Q8H    levothyroxine  100 mcg Oral Before breakfast    metoprolol succinate  50 mg Oral Daily    pantoprazole  40 mg Oral Daily    piperacillin-tazobactam (ZOSYN) IVPB  4.5 g Intravenous Q8H    pravastatin  10 mg Oral Daily    tamsulosin  0.4 mg Oral Daily    topiramate  25 mg Oral Daily    vancomycin (VANCOCIN) IVPB  1,250 mg Intravenous Q24H     PRN Meds:sodium chloride, acetaminophen, aluminum-magnesium hydroxide-simethicone, diphenhydrAMINE, guaifenesin 100 mg/5 ml, hydrALAZINE, hydrOXYzine HCl, ondansetron, sodium chloride 3%     Review of Systems   Constitutional: Positive for activity change and fatigue.   Respiratory: Positive for shortness of breath.    Neurological: Positive for weakness.   Psychiatric/Behavioral: Positive for decreased concentration and dysphoric mood. The patient is nervous/anxious.      Objective:     Vital Signs (Most Recent):  Temp: 99.7 °F (37.6 °C) (08/25/19 0718)  Pulse: 102 (08/25/19 0801)  Resp: 18 (08/25/19 0801)  BP: (!) 144/72 (08/25/19 0718)  SpO2: 97 % (08/25/19 0801) Vital Signs (24h Range):  Temp:  [97 °F (36.1 °C)-99.7 °F (37.6 °C)] 99.7 °F (37.6 °C)  Pulse:  [100-127] 102  Resp:  [18-24] 18  SpO2:  [92 %-97 %] 97 %  BP: (138-175)/(63-80) 144/72     Weight: 62.5 kg (137 lb 12.6 oz)  Body mass index is 25.2 kg/m².  Body surface area is 1.65 meters squared.      Intake/Output Summary (Last 24 hours) at 8/25/2019 0842  Last data filed at 8/25/2019 0830  Gross per 24 hour   Intake 5301.66 ml   Output 2000 ml   Net 3301.66 ml       Physical Exam   Constitutional: She appears cachectic. She has a sickly appearance. She  appears ill. She appears distressed.   HENT:   Head: Normocephalic.   Eyes: Pupils are equal, round, and reactive to light.   Cardiovascular: Normal rate.   Pulmonary/Chest: She is in respiratory distress.   Neurological: She is alert.   Skin: Skin is dry.   Psychiatric: Her mood appears anxious. She exhibits a depressed mood.       Significant Labs:   BMP:   Recent Labs   Lab 08/24/19  0510 08/25/19  0546    94   * 132*   K 3.0* 3.5    101   CO2 22* 20*   BUN 10 9   CREATININE 0.7 0.7   CALCIUM 7.4* 8.0*   MG 1.0* 1.1*   , CBC:   Recent Labs   Lab 08/23/19  0851 08/24/19  0510 08/25/19  0546   WBC 38.34* 37.54* 51.50*   HGB 9.2* 8.0* 8.2*   HCT 26.8* 23.5* 25.1*    145* 154   , CMP:   Recent Labs   Lab 08/24/19 0510 08/25/19  0546   * 132*   K 3.0* 3.5    101   CO2 22* 20*    94   BUN 10 9   CREATININE 0.7 0.7   CALCIUM 7.4* 8.0*   PROT 5.2* 5.7*   ALBUMIN 1.5* 1.6*   BILITOT 0.6 0.7   ALKPHOS 123 147*   AST 18 23   ALT 22 23   ANIONGAP 6* 11   EGFRNONAA >60 >60   , Coagulation: No results for input(s): PT, INR, APTT in the last 48 hours., Haptoglobin: No results for input(s): HAPTOGLOBIN in the last 48 hours., Immunology: No results for input(s): SPEP, TOBIN, STEWART, FREELAMBDALI in the last 48 hours., LDH: No results for input(s): LDHCSF, BFSOURCE in the last 48 hours., LFTs:   Recent Labs   Lab 08/24/19  0510 08/25/19  0546   ALT 22 23   AST 18 23   ALKPHOS 123 147*   BILITOT 0.6 0.7   PROT 5.2* 5.7*   ALBUMIN 1.5* 1.6*    and Reticulocytes: No results for input(s): RETIC in the last 48 hours.    Diagnostic Results:  I have reviewed all pertinent imaging results/findings within the past 24 hours.

## 2019-08-25 NOTE — SUBJECTIVE & OBJECTIVE
Interval History:   Tmax 99.7. Pt feels better overall . Reports not feeling as tired.  Appetite is normal.  Labs revealed worsening leukocytosis with premature cells in the peripheral blood.  evaluated pt and has ordered Leukemia phenotyping of peripheral blood.   Antibiotic day #5.     Review of Systems   Constitutional: Negative for activity change, appetite change and fever.   HENT: Negative for sore throat.    Eyes: Negative for visual disturbance.   Respiratory: Negative for cough, chest tightness and shortness of breath.         On O2 via NC    Cardiovascular: Negative for chest pain, palpitations and leg swelling.   Gastrointestinal: Negative for abdominal distention, abdominal pain, constipation, diarrhea, nausea and vomiting.   Endocrine: Negative for polyuria.   Genitourinary: Negative for decreased urine volume, dysuria, flank pain, frequency and hematuria.   Musculoskeletal: Negative for back pain and gait problem.   Skin: Negative for rash.   Neurological: Negative for syncope, speech difficulty, weakness, light-headedness and headaches.   Psychiatric/Behavioral: Negative for confusion, hallucinations and sleep disturbance.     Objective:     Vital Signs (Most Recent):  Temp: 98.5 °F (36.9 °C) (08/25/19 1214)  Pulse: 89 (08/25/19 1513)  Resp: 20 (08/25/19 1513)  BP: (!) 146/69 (08/25/19 1214)  SpO2: (!) 94 % (08/25/19 1214) Vital Signs (24h Range):  Temp:  [97 °F (36.1 °C)-99.7 °F (37.6 °C)] 98.5 °F (36.9 °C)  Pulse:  [] 89  Resp:  [18-24] 20  SpO2:  [92 %-97 %] 94 %  BP: (144-175)/(69-80) 146/69     Weight: 62.5 kg (137 lb 12.6 oz)  Body mass index is 25.2 kg/m².    Intake/Output Summary (Last 24 hours) at 8/25/2019 1630  Last data filed at 8/25/2019 0830  Gross per 24 hour   Intake 5201.66 ml   Output 1150 ml   Net 4051.66 ml      Physical Exam   Constitutional: She is oriented to person, place, and time. No distress.   HENT:   Head: Normocephalic and atraumatic.   Eyes: Pupils are  equal, round, and reactive to light. EOM are normal.   Neck: Normal range of motion. Neck supple.   Cardiovascular: Regular rhythm and normal heart sounds.   Tachycardia    Pulmonary/Chest: She has no rales.   Crackles at bases , clear upper lung fields    Abdominal: Soft. Bowel sounds are normal. She exhibits no distension. There is no tenderness. There is no guarding.   Musculoskeletal: She exhibits no edema.   Neurological: She is alert and oriented to person, place, and time.       Significant Labs:   BMP:   Recent Labs   Lab 08/25/19  0546   GLU 94   *   K 3.5      CO2 20*   BUN 9   CREATININE 0.7   CALCIUM 8.0*   MG 1.1*     CBC:   Recent Labs   Lab 08/24/19  0510 08/25/19  0546   WBC 37.54* 51.50*   HGB 8.0* 8.2*   HCT 23.5* 25.1*   * 154     Urine culture [[id number]] Collected: 08/22/19 0354   Order Status: Completed Specimen: Urine Updated: 08/23/19 1220    Urine Culture, Routine No growth   Narrative:     Preferred Collection Type->Urine, Clean Catch   Blood culture x two cultures. Draw prior to antibiotics. [[id number]] Collected: 08/21/19 1945   Order Status: Completed Specimen: Blood from Peripheral, Hand, Right Updated: 08/25/19 0612    Blood Culture, Routine No Growth to date     No Growth to date     No Growth to date     No Growth to date   Narrative:     Aerobic and anaerobic   Blood culture x two cultures. Draw prior to antibiotics. [[id number]] Collected: 08/21/19 1930   Order Status: Completed Specimen: Blood from Peripheral, Hand, Right Updated: 08/25/19 0612    Blood Culture, Routine No Growth to date     No Growth to date     No Growth to date     No Growth to date     Significant Imaging:

## 2019-08-25 NOTE — ASSESSMENT & PLAN NOTE
8/22 Magnesium ordered with monitoring; received 2 grams IV this morning; will repeat daily      8/23:  To receive 3 grams IV now and monitor;  Potassium replacement today as well;  Will monitor the labs;   8/24/19-  Again low noted . Replete IV . Start oral replacement for maintenance .  8/25/19-  Start oral replacement

## 2019-08-25 NOTE — ASSESSMENT & PLAN NOTE
Sepsis secondary to pneumonia.  Fever 101.3, -135, WBC 89815, 0% bands, lactic acid 0.8.  Chest x-ray reveals left lower lobe infiltrate.  Started on IV antibiotics.  Continue IV fluids.  Follow up on cultures.    8/22 Cultures to date negative;  Continue antibiotics pending clinical course.    8/23:CXR stable; some improvement noted; Cultures still negative to date;  Continues on vanc and zosyn; continue nebs. Supplemental oxygen; monitor CXR  8/24/19-  Clinically improving . BP is elevated . Tachcyardia is noted. Will resume home beta blocker   Continue Antibiotic  Zosyn and vancomycin for now Day #4.

## 2019-08-25 NOTE — PLAN OF CARE
Problem: Physical Therapy Goal  Goal: Physical Therapy Goal  1. Patient will perform supine to/from sit sup  2. Patient will perform sit to/from stand with RW sup  3. Patient will amb > 100ft RW sba no gross LOB   Outcome: Ongoing (interventions implemented as appropriate)  Patient ambulated ~ 20 feet with RW, min assist and supine to sit with SPV

## 2019-08-25 NOTE — PROGRESS NOTES
Pharmacokinetic Assessment Sign Off: IV Vancomycin    Therapy with Vancomycin complete and/or consult discontinued by provider.  Pharmacy will sign off, please re-consult as needed.    Thank you for allowing us to participate in this patient's care.     Adelina Antonio PharmD 08/25/2019 4:51 PM

## 2019-08-25 NOTE — ASSESSMENT & PLAN NOTE
8/22:  Symptomatic at present;  Will transfuse 2 units f packed cells and observe      8/23:  Transfused 2 units overnight; completed this morning; will repeat labs;   HCT now 26.8; up from 18.8 on admission;  Looks and feels better;     8/24/19- Monitor H&H. Get stool for occult blood, Get iron study

## 2019-08-25 NOTE — ASSESSMENT & PLAN NOTE
8/22:  Replace by oral route and monitor labs;    8/23:  IV potassium ordered; will monitor;    8/24/19- Slightly low noted. Replace orally

## 2019-08-25 NOTE — ASSESSMENT & PLAN NOTE
Chronic leukocytosis, followed by Dr. Devine in the Oncology Clinic.  Scheduled to see Oncology in the clinic tomorrow.  History of bone marrow biopsy in the past.  26% monocytes, with 50% granulocytes.  Consult Oncology in the morning for evaluation/recommendations.    8/22:  History of chronic leukocytosis; likely exacerbated by the infection;  Heme-onc in earlier to see;  No new recommendations offered  To continue antibiotics pending culture results; and again today;  Has history of lymphocytosis with usal counts 20k;  Present rise represents a leukemoid reaction;  Will continue to trend with daily labs;  Will refer back to hematology at discharge;     8/23:  Hematology in yesterday  8/24/19-  Hematology consult obtained and their plan is noted.   8/25/19 -  Worsening leukocytosis is noted today .  is following . He has ordered leukemia immunophenotyping of peripheral blood.

## 2019-08-25 NOTE — ASSESSMENT & PLAN NOTE
8/24/19-  Nelson was placed due to large volume retention detected in bladder scan.  Flomax and Bethanechol are added.  Decrease Amitriptyline to 25 mg and eventually stop   Consult urology

## 2019-08-25 NOTE — ASSESSMENT & PLAN NOTE
Chest x-ray reveals left lower lobe infiltrate.  Complains of dry nonproductive cough.  Continue IV levofloxacin, IV Zosyn, IV vancomycin empirically.  Follow up on blood and sputum cultures.  Continue supplemental oxygen to maintain saturations greater than 92%.  Continue bronchodilators every 6 hr scheduled.      8/22:  Continue antibiotics; Cultures to date (blood and urine) are negative; continue nebs Q6h around the clock and Q2hrs prn.    8/23:  Cultures remain negative; CXR slightly improved effusion on left; infiltrates persist;  Continue nebs; supplemental oxygen; antibiotics;   Will add a probiotic to her regimen;   8/24/19-  Continue antibiotic . Day#4. Leukocytosis is trending down.  Blood cultures are neg to date   CXR showed some radiologic improvement     8/25/19-   Antibiotic day #5. Will deescalate .

## 2019-08-25 NOTE — ASSESSMENT & PLAN NOTE
Chronic leukocytosis, followed by Dr. Devine in the Oncology Clinic.  Scheduled to see Oncology in the clinic tomorrow.  History of bone marrow biopsy in the past.  26% monocytes, with 50% granulocytes.  Consult Oncology in the morning for evaluation/recommendations.    8/22:  History of chronic leukocytosis; likely exacerbated by the infection;  Heme-onc in earlier to see;  No new recommendations offered  To continue antibiotics pending culture results; and again today;  Has history of lymphocytosis with usal counts 20k;  Present rise represents a leukemoid reaction;  Will continue to trend with daily labs;  Will refer back to hematology at discharge;     8/23:  Hematology in yesterday  8/24/19-  Hematology consult obtained and their plan is noted.

## 2019-08-26 ENCOUNTER — DOCUMENTATION ONLY (OUTPATIENT)
Dept: CASE MANAGEMENT | Facility: HOSPITAL | Age: 79
End: 2019-08-26

## 2019-08-26 VITALS
HEIGHT: 62 IN | SYSTOLIC BLOOD PRESSURE: 139 MMHG | BODY MASS INDEX: 23.98 KG/M2 | RESPIRATION RATE: 18 BRPM | HEART RATE: 90 BPM | TEMPERATURE: 98 F | WEIGHT: 130.31 LBS | DIASTOLIC BLOOD PRESSURE: 72 MMHG | OXYGEN SATURATION: 96 %

## 2019-08-26 PROBLEM — E87.6 HYPOKALEMIA: Status: RESOLVED | Noted: 2019-08-22 | Resolved: 2019-08-26

## 2019-08-26 PROBLEM — A41.9 SEPSIS: Status: RESOLVED | Noted: 2019-08-21 | Resolved: 2019-08-26

## 2019-08-26 PROBLEM — J18.9 PNEUMONIA OF LEFT LOWER LOBE DUE TO INFECTIOUS ORGANISM: Status: RESOLVED | Noted: 2019-08-21 | Resolved: 2019-08-26

## 2019-08-26 PROBLEM — E83.42 HYPOMAGNESEMIA: Status: RESOLVED | Noted: 2019-08-22 | Resolved: 2019-08-26

## 2019-08-26 PROBLEM — R33.9 URINARY RETENTION: Status: RESOLVED | Noted: 2019-08-24 | Resolved: 2019-08-26

## 2019-08-26 LAB
ANISOCYTOSIS BLD QL SMEAR: SLIGHT
BASOPHILS # BLD AUTO: ABNORMAL K/UL (ref 0–0.2)
BASOPHILS NFR BLD: 0 % (ref 0–1.9)
BLASTS NFR BLD MANUAL: 2 %
BNP SERPL-MCNC: 356 PG/ML (ref 0–99)
DACRYOCYTES BLD QL SMEAR: ABNORMAL
DIFFERENTIAL METHOD: ABNORMAL
EOSINOPHIL # BLD AUTO: ABNORMAL K/UL (ref 0–0.5)
EOSINOPHIL NFR BLD: 0 % (ref 0–8)
ERYTHROCYTE [DISTWIDTH] IN BLOOD BY AUTOMATED COUNT: 21.4 % (ref 11.5–14.5)
HCT VFR BLD AUTO: 25 % (ref 37–48.5)
HGB BLD-MCNC: 8.2 G/DL (ref 12–16)
LYMPHOCYTES # BLD AUTO: ABNORMAL K/UL (ref 1–4.8)
LYMPHOCYTES NFR BLD: 22 % (ref 18–48)
MAGNESIUM SERPL-MCNC: 0.9 MG/DL (ref 1.6–2.6)
MCH RBC QN AUTO: 32.3 PG (ref 27–31)
MCHC RBC AUTO-ENTMCNC: 32.8 G/DL (ref 32–36)
MCV RBC AUTO: 98 FL (ref 82–98)
METAMYELOCYTES NFR BLD MANUAL: 8 %
MONOCYTES # BLD AUTO: ABNORMAL K/UL (ref 0.3–1)
MONOCYTES NFR BLD: 25 % (ref 4–15)
MYELOCYTES NFR BLD MANUAL: 7 %
NEUTROPHILS NFR BLD: 33 % (ref 38–73)
NEUTS BAND NFR BLD MANUAL: 1 %
OVALOCYTES BLD QL SMEAR: ABNORMAL
PATH REV BLD -IMP: NORMAL
PLATELET # BLD AUTO: 153 K/UL (ref 150–350)
PMV BLD AUTO: 10.9 FL (ref 9.2–12.9)
POIKILOCYTOSIS BLD QL SMEAR: SLIGHT
POLYCHROMASIA BLD QL SMEAR: ABNORMAL
PROCALCITONIN SERPL IA-MCNC: 0.49 NG/ML
PROMYELOCYTES NFR BLD MANUAL: 2 %
RBC # BLD AUTO: 2.54 M/UL (ref 4–5.4)
WBC # BLD AUTO: 63.17 K/UL (ref 3.9–12.7)

## 2019-08-26 PROCEDURE — 63600175 PHARM REV CODE 636 W HCPCS: Mod: HCNC | Performed by: INTERNAL MEDICINE

## 2019-08-26 PROCEDURE — 25000003 PHARM REV CODE 250: Mod: HCNC | Performed by: INTERNAL MEDICINE

## 2019-08-26 PROCEDURE — 99233 SBSQ HOSP IP/OBS HIGH 50: CPT | Mod: HCNC,,, | Performed by: INTERNAL MEDICINE

## 2019-08-26 PROCEDURE — 85027 COMPLETE CBC AUTOMATED: CPT | Mod: HCNC

## 2019-08-26 PROCEDURE — 85007 BL SMEAR W/DIFF WBC COUNT: CPT | Mod: HCNC

## 2019-08-26 PROCEDURE — 97110 THERAPEUTIC EXERCISES: CPT | Mod: HCNC

## 2019-08-26 PROCEDURE — 25000242 PHARM REV CODE 250 ALT 637 W/ HCPCS: Mod: HCNC | Performed by: INTERNAL MEDICINE

## 2019-08-26 PROCEDURE — 63600175 PHARM REV CODE 636 W HCPCS: Mod: HCNC | Performed by: EMERGENCY MEDICINE

## 2019-08-26 PROCEDURE — 97166 OT EVAL MOD COMPLEX 45 MIN: CPT | Mod: HCNC

## 2019-08-26 PROCEDURE — 94761 N-INVAS EAR/PLS OXIMETRY MLT: CPT | Mod: HCNC

## 2019-08-26 PROCEDURE — 99900035 HC TECH TIME PER 15 MIN (STAT): Mod: HCNC

## 2019-08-26 PROCEDURE — 84145 PROCALCITONIN (PCT): CPT | Mod: HCNC

## 2019-08-26 PROCEDURE — 83735 ASSAY OF MAGNESIUM: CPT | Mod: HCNC

## 2019-08-26 PROCEDURE — 94640 AIRWAY INHALATION TREATMENT: CPT | Mod: HCNC

## 2019-08-26 PROCEDURE — 97116 GAIT TRAINING THERAPY: CPT | Mod: HCNC

## 2019-08-26 PROCEDURE — 97530 THERAPEUTIC ACTIVITIES: CPT | Mod: HCNC

## 2019-08-26 PROCEDURE — 99233 PR SUBSEQUENT HOSPITAL CARE,LEVL III: ICD-10-PCS | Mod: HCNC,,, | Performed by: INTERNAL MEDICINE

## 2019-08-26 PROCEDURE — 83880 ASSAY OF NATRIURETIC PEPTIDE: CPT | Mod: HCNC

## 2019-08-26 RX ORDER — LANOLIN ALCOHOL/MO/W.PET/CERES
400 CREAM (GRAM) TOPICAL 3 TIMES DAILY
Status: DISCONTINUED | OUTPATIENT
Start: 2019-08-26 | End: 2019-08-26 | Stop reason: HOSPADM

## 2019-08-26 RX ORDER — LANOLIN ALCOHOL/MO/W.PET/CERES
400 CREAM (GRAM) TOPICAL 3 TIMES DAILY
Refills: 0 | COMMUNITY
Start: 2019-08-26

## 2019-08-26 RX ORDER — TAMSULOSIN HYDROCHLORIDE 0.4 MG/1
0.4 CAPSULE ORAL DAILY
Qty: 30 CAPSULE | Refills: 0 | Status: SHIPPED | OUTPATIENT
Start: 2019-08-27 | End: 2020-09-14

## 2019-08-26 RX ORDER — FUROSEMIDE 10 MG/ML
20 INJECTION INTRAMUSCULAR; INTRAVENOUS ONCE
Status: COMPLETED | OUTPATIENT
Start: 2019-08-26 | End: 2019-08-26

## 2019-08-26 RX ORDER — POTASSIUM CHLORIDE 20 MEQ/1
40 TABLET, EXTENDED RELEASE ORAL ONCE
Status: COMPLETED | OUTPATIENT
Start: 2019-08-26 | End: 2019-08-26

## 2019-08-26 RX ORDER — AMOXICILLIN AND CLAVULANATE POTASSIUM 875; 125 MG/1; MG/1
1 TABLET, FILM COATED ORAL 2 TIMES DAILY
Qty: 20 TABLET | Refills: 0 | Status: SHIPPED | OUTPATIENT
Start: 2019-08-26 | End: 2019-08-30

## 2019-08-26 RX ORDER — FERROUS GLUCONATE 324(37.5)
324 TABLET ORAL
Qty: 30 TABLET | Refills: 11 | COMMUNITY
Start: 2019-08-27 | End: 2020-06-30

## 2019-08-26 RX ORDER — AMITRIPTYLINE HYDROCHLORIDE 25 MG/1
25 TABLET, FILM COATED ORAL NIGHTLY
Qty: 30 TABLET | Refills: 0 | Status: SHIPPED | OUTPATIENT
Start: 2019-08-26 | End: 2019-09-10 | Stop reason: SDUPTHER

## 2019-08-26 RX ADMIN — DULOXETINE HYDROCHLORIDE 40 MG: 20 CAPSULE, DELAYED RELEASE ORAL at 09:08

## 2019-08-26 RX ADMIN — MAGNESIUM OXIDE TAB 400 MG (241.3 MG ELEMENTAL MG) 400 MG: 400 (241.3 MG) TAB at 09:08

## 2019-08-26 RX ADMIN — TAMSULOSIN HYDROCHLORIDE 0.4 MG: 0.4 CAPSULE ORAL at 09:08

## 2019-08-26 RX ADMIN — PRAVASTATIN SODIUM 10 MG: 10 TABLET ORAL at 09:08

## 2019-08-26 RX ADMIN — LEVALBUTEROL 0.63 MG: 0.63 SOLUTION RESPIRATORY (INHALATION) at 07:08

## 2019-08-26 RX ADMIN — PANTOPRAZOLE SODIUM 40 MG: 40 TABLET, DELAYED RELEASE ORAL at 09:08

## 2019-08-26 RX ADMIN — METOPROLOL SUCCINATE 100 MG: 50 TABLET, EXTENDED RELEASE ORAL at 09:08

## 2019-08-26 RX ADMIN — POTASSIUM CHLORIDE 40 MEQ: 1500 TABLET, EXTENDED RELEASE ORAL at 09:08

## 2019-08-26 RX ADMIN — PIPERACILLIN AND TAZOBACTAM 4.5 G: 4; .5 INJECTION, POWDER, LYOPHILIZED, FOR SOLUTION INTRAVENOUS; PARENTERAL at 09:08

## 2019-08-26 RX ADMIN — PIPERACILLIN AND TAZOBACTAM 4.5 G: 4; .5 INJECTION, POWDER, LYOPHILIZED, FOR SOLUTION INTRAVENOUS; PARENTERAL at 01:08

## 2019-08-26 RX ADMIN — FERROUS GLUCONATE TAB 324 MG (37.5 MG ELEMENTAL IRON) 324 MG: 324 (37.5 FE) TAB at 09:08

## 2019-08-26 RX ADMIN — TOPIRAMATE 25 MG: 25 TABLET, FILM COATED ORAL at 09:08

## 2019-08-26 RX ADMIN — HYDROCHLOROTHIAZIDE 25 MG: 25 TABLET ORAL at 09:08

## 2019-08-26 RX ADMIN — LEVALBUTEROL 0.63 MG: 0.63 SOLUTION RESPIRATORY (INHALATION) at 12:08

## 2019-08-26 RX ADMIN — DOCUSATE SODIUM 100 MG: 100 CAPSULE, LIQUID FILLED ORAL at 09:08

## 2019-08-26 RX ADMIN — LEVOTHYROXINE SODIUM 100 MCG: 100 TABLET ORAL at 05:08

## 2019-08-26 RX ADMIN — LOSARTAN POTASSIUM 25 MG: 25 TABLET, FILM COATED ORAL at 09:08

## 2019-08-26 RX ADMIN — FUROSEMIDE 20 MG: 10 INJECTION, SOLUTION INTRAMUSCULAR; INTRAVENOUS at 09:08

## 2019-08-26 NOTE — DISCHARGE SUMMARY
[facility] Hospital Medicine  Discharge Summary      Patient Name: Sarah Parker  MRN: [id number]  Admission Date: 8/21/2019  Hospital Length of Stay: 5 days  Discharge Date and Time:  08/26/2019 1:27 PM  Attending Physician: Hari Sloan MD   Discharging Provider: Hari Sloan MD  Primary Care Provider: Briseida Reyes MD      HPI:   Ms. Parker is an elderly 79-year-old  female with PMH significant for rheumatoid arthritis, chronic leukocytosis, followed in the Oncology Clinic by Dr. Devine, history of bone marrow biopsy in the past, presents to the ED complaining of five days of generalized weakness, associated with fever.  She was seen by Dr. Reyes (PCP), was told that she had acute viral bronchitis and was prescribed oral Zithromax.  However patient has not been improving, hence presented to the ED.  She reported fever of 101.3, -135.  WBC 60,000, 0% bands, lactic acid 1.2.  Chest x-ray reveals left lower lobe infiltrate.  Other laboratory abnormalities including low potassium of 2.9, hemoglobin 7.0 (recently was 8.9).  Patient received IV levofloxacin, IV Zosyn, IV vancomycin empirically in the ED.  Admitting diagnosis sepsis secondary to pneumonia.    * No surgery found *      Hospital Course:   8/22:  States to be breathing better;  Nurse reported difficult breathing with some gurgling type respirations;  Fluids going at 125cc/hr have been curtailed;  ABG with reduce PCo2 at ~24; oxygenating well; have asked for a portable chest xray;  Will continue nebsl; attention to pulmonary toilet; incentives etc    8/23:  Looks and feels better today;  Complains of no BM since admission;  Colace added last evening;  To receive IV potassium and magnesium today;  BP up today; Portable CXR reviewed; syill with left sided infiltrates;  Effusion on left is less; will continue present regimen with monitoring    8/24/19 -  Pt feels better . Denies SOB or chest pain.   BP is  elevated and tachycardia is noted. Pt will be placed back on BB therapy that she was on it at home.   Leukocytosis is trending down .Blood cultures no growth to date. Urine culture is negative.   H/H are stable at moderate anemia . Will monitor   Nelson is placed due to large volume urinary retention. Flomax is added as well as Bethanechol . Will consult Urology for evaluation of urinary retention. Will decrease Amitriptyline to 25 mg . This could contribute to urinary retention.      8/25/19-  Pt continues to feel better . Tmax 99.7.  Blood cultures and urine cultures are negative   Worsening leukocytosis with premature cells are noted in today's lab. Dr. Miranda saw pt today and has ordered  leukemia immuno phenotyping of  peripheral blood.     Nelson discontinued today . Pt is voiding without difficulty.    Antibiotic day #5.     8/26/19-   Pt feels better subjectively . Reports fatigue and SOB are better . We evaluated pt for home O2 this morning . Her O2 sat on RA while exercising was 88% , therefore qualifies for Home O2.   Hematology rounded on her today and their recommendations are as follows --WBC: 63.17 with 2% blasts, peripheral flow cytometry pending, patient will need bone marrow biopsy, message sent to IR waiting for reply for scheduling. Discussed this with patient, patient is agreeable for bone marrow biopsy. Discussed with HM, plan is for possible d/c today.    Pt was noted to have urinary retention and needed Nelson placed . We started her on Flomax which has helped . Nelson discontinued and pt was able to void.     Pt is afebrile. We plans to discharge pt home today with home health service and home O2. Pt will be switched to oral Augmentin to complete antibiotic treatment for pneumonia.  She will follow up with her PCP in 3 days and Hematology /Oncology in a week and possible bone marrow biopsy as out patient.      Clarification of Home O2 requirement - Pt is noted to have hypoxia  and desaturation  with exertion documented in  Home oxygen evaluation by RT. Pt has documented h/o COPD (PFT done in 11/2016) and Rheumatoid lung suggestive of chronic lung disease which has worsened due to current pulmonary infection.       Consults:   Consults (From admission, onward)        Status Ordering Provider     Inpatient consult to Oncology  Once     Provider:  Corby Miranda MD    Completed AMRITA LEAL     Inpatient consult to PICC team ([facility])  Once     Provider:  (Not yet assigned)    Acknowledged ZACK ANDERSON          No new Assessment & Plan notes have been filed under this hospital service since the last note was generated.  Service: Hospital Medicine    Final Active Diagnoses:    Diagnosis Date Noted POA    Anemia [D64.9] 08/22/2019 Unknown    Leukocytosis [D72.829] 05/03/2016 Yes    Acquired hypothyroidism [E03.9]  Yes    Rheumatoid arthritis [M06.9] 06/19/2013 Yes     Chronic      Problems Resolved During this Admission:    Diagnosis Date Noted Date Resolved POA    PRINCIPAL PROBLEM:  Sepsis [A41.9] 08/21/2019 08/26/2019 Yes    Urinary retention [R33.9] 08/24/2019 08/26/2019 Clinically Undetermined    Hypokalemia [E87.6] 08/22/2019 08/26/2019 Unknown    Hypomagnesemia [E83.42] 08/22/2019 08/26/2019 Unknown    Pneumonia of left lower lobe due to infectious organism [J18.1] 08/21/2019 08/26/2019 Yes       Discharged Condition: stable    Disposition: Home-Health Care c    Follow Up:  Follow-up Information     Briseida Bennett MD. Schedule an appointment as soon as possible for a visit in 3 days.    Specialty:  Internal Medicine  Why:  discharge follow up   Contact information:  [address] [id number] [phone]             Sathish Devine MD. Schedule an appointment as soon as possible for a visit in 1 week.    Specialty:  Hematology and Oncology  Why:  Follow up on Leukocytosis. Evaluation for bone marrow biopsy   Contact information:  [address]  [place]  "[place] [id number]  [phone]             [facility].    Specialty:  Home Health Services  Why:  Home Health  Contact information:  [address]  [place] [address] [phone]                 Patient Instructions:      OXYGEN FOR HOME USE     Order Specific Question Answer Comments   Liter Flow 2    Duration Continuous    Qualifying SpO2: 88% on exercise    Testing done at: Rest    Device home concentrator with portable unit    Height: 5' 2" (1.575 m)    Weight: 59.1 kg (130 lb 4.7 oz)    Alternative treatment measures have been tried or considered and deemed clinically ineffective. Yes      Ambulatory referral to Home Health   Referral Priority: Routine Referral Type: Home Health   Referral Reason: Specialty Services Required   Requested Specialty: Home Health Services   Number of Visits Requested: 1     Diet Adult Regular     Order Specific Question Answer Comments   Na restriction, if any: 2gNa      Activity as tolerated       Significant Diagnostic Studies: Labs:   BMP:   Recent Labs   Lab 08/25/19 0546 08/26/19 0520   GLU 94  --    *  --    K 3.5  --      --    CO2 20*  --    BUN 9  --    CREATININE 0.7  --    CALCIUM 8.0*  --    MG 1.1* 0.9*   , CMP   Recent Labs   Lab 08/25/19  0546   *   K 3.5      CO2 20*   GLU 94   BUN 9   CREATININE 0.7   CALCIUM 8.0*   PROT 5.7*   ALBUMIN 1.6*   BILITOT 0.7   ALKPHOS 147*   AST 23   ALT 23   ANIONGAP 11   ESTGFRAFRICA >60   EGFRNONAA >60    and CBC   Recent Labs   Lab 08/25/19  0546 08/26/19  0520   WBC 51.50* 63.17*   HGB 8.2* 8.2*   HCT 25.1* 25.0*    153       Pending Diagnostic Studies:     Procedure Component Value Units Date/Time    Flow Cytometry Analysis (Peripheral Blood) [[id number]] Collected:  08/25/19 0850    Order Status:  Sent Lab Status:  In process Updated:  08/26/19 0745    Specimen:  Blood          Medications:  Reconciled Home Medications:      Medication List      START taking these medications  "   amoxicillin-clavulanate 875-125mg 875-125 mg per tablet  Commonly known as:  AUGMENTIN  Take 1 tablet by mouth 2 (two) times daily. for 10 days     ferrous gluconate 324 mg (37.5 mg iron) Tab tablet  Take 1 tablet (324 mg total) by mouth daily with breakfast.  Start taking on:  8/27/2019     magnesium oxide 400 mg (241.3 mg magnesium) tablet  Commonly known as:  MAG-OX  Take 1 tablet (400 mg total) by mouth 3 (three) times daily.     tamsulosin 0.4 mg Cap  Commonly known as:  FLOMAX  Take 1 capsule (0.4 mg total) by mouth once daily. For urinary retention  Start taking on:  8/27/2019        CHANGE how you take these medications    amitriptyline 25 MG tablet  Commonly known as:  ELAVIL  Take 1 tablet (25 mg total) by mouth every evening. Was 75 mg, now decrease to 25 mg  What changed:    · medication strength  · how much to take  · additional instructions     metoprolol succinate 100 MG 24 hr tablet  Commonly known as:  TOPROL-XL  Take 100 mg by mouth once daily.  What changed:  Another medication with the same name was removed. Continue taking this medication, and follow the directions you see here.        CONTINUE taking these medications    ACTEMRA 80 mg/4 mL (20 mg/mL) Soln  Generic drug:  tocilizumab  Inject into the vein.     albuterol 2.5 mg /3 mL (0.083 %) nebulizer solution  Commonly known as:  PROVENTIL  Take 3 mLs (2.5 mg total) by nebulization every 6 (six) hours as needed for Wheezing.     benzonatate 100 MG capsule  Commonly known as:  TESSALON  Take 1-2 capsules (100-200 mg total) by mouth 3 (three) times daily as needed for Cough.     CITRACAL PLUS D 315-200 mg-unit per tablet  Generic drug:  calcium citrate-vitamin D3 315-200 mg  Take 1 tablet by mouth once daily.     DULoxetine 20 MG capsule  Commonly known as:  CYMBALTA  Take 2 capsules (40 mg total) by mouth once daily.     HYDROcodone-acetaminophen 5-325 mg per tablet  Commonly known as:  NORCO  TK 1 T PO Q 6 H PRN     leucovorin 5 mg  Tab  Commonly known as:  WELLCOVORIN  TAKE ONE WEEKLY ON SATURDAYS     levothyroxine 88 MCG tablet  Commonly known as:  SYNTHROID  TAKE 1 TABLET BY MOUTH BEFORE BREAKFAST     meclizine 50 MG tablet  Commonly known as:  ANTIVERT  Take 25 mg by mouth 3 (three) times daily as needed.     methotrexate 2.5 MG Tab  Take 17.5 mg by mouth every 7 days.     multivitamin capsule  Take by mouth. As directed     ondansetron 4 MG tablet  Commonly known as:  ZOFRAN  Take 1 tablet (4 mg total) by mouth every 8 (eight) hours as needed for Nausea.     pravastatin 10 MG tablet  Commonly known as:  PRAVACHOL  TAKE 1 TABLET(10 MG) BY MOUTH EVERY DAY     topiramate 25 MG tablet  Commonly known as:  TOPAMAX  Take 1 tablet (25 mg total) by mouth at night x 1 week then increase to 2 (two) times daily. Increase as tolerated.     valsartan-hydrochlorothiazide 80-12.5 mg per tablet  Commonly known as:  DIOVAN-HCT  TAKE 1 TABLET BY MOUTH DAILY        STOP taking these medications    AFRIN (OXYMETAZOLINE) 0.05 % nasal spray  Generic drug:  oxymetazoline     azithromycin 250 MG tablet  Commonly known as:  Z-ANUEL     hydrOXYzine HCl 25 MG tablet  Commonly known as:  ATARAX            Indwelling Lines/Drains at time of discharge:   Lines/Drains/Airways     Peripherally Inserted Central Catheter Line                 PICC Double Lumen 08/23/19 0100 left basilic 3 days                Time spent on the discharge of patient: 40 minutes  Patient was seen and examined on the date of discharge and determined to be suitable for discharge.         Hari Sloan MD  Department of Hospital Medicine  [facility]

## 2019-08-26 NOTE — PROGRESS NOTES
Contacted Rhonda Evans, [facility] regarding O2 order.  Resides outside of service area so contacted Austin Matthews with [facility] [phone].  Discharge summary, orders and Home O2 evaluation faxed to [facility] for O2 approval and delivery.

## 2019-08-26 NOTE — NURSING
"Pts daughter upset stating "we've been waiting since 10 am with discharge orders". o2 still not delivered for pt transfer as of 1740. Pts daughter states they will leave with or without oxygen and will sign AMA if necessary.   Secure chat message sent to Dr. Sloan requesting to send pt home without transport o2 per pt and family request.  okay with request. Family aware of risk of being sent without o2, will follow up with home health/ home o2 delivery themselves after discharge.   Pt wheeled down to daughters vehicle. Case management notified of o2 situation.   "

## 2019-08-26 NOTE — PLAN OF CARE
"Received IM from Kat (supervisor) asking if O2 delivered at 2:57p informed "no" .   Received secured chat from Kera SALVADOR(RN) stating [facility]uf[facility] ([facility]) would reach out once authorization approved.   Rufino have not called , IM, or emailed with approval or denial.   No further action taken.   "

## 2019-08-26 NOTE — PROGRESS NOTES
[facility]   Hematology/Oncology  Progress Note    Patient Name: Sarah Parker  Admission Date: 8/21/2019  Hospital Length of Stay: 5 days  Code Status: Prior     Subjective:     HPI:  79-year-old  female with PMH significant for rheumatoid arthritis, chronic leukocytosis, followed in the Oncology Clinic by Dr. Devine, history of bone marrow biopsy in the past, presents to the ED complaining of five days of generalized weakness, associated with fever.  She was seen by Dr. Reyes (PCP), was told that she had acute viral bronchitis and was prescribed oral Zithromax.  However patient has not been improving, hence presented to the ED.  She reported fever of 101.3, -135.  WBC 60,000, 0% bands, lactic acid 1.2.  Chest x-ray reveals left lower lobe infiltrate.  Other laboratory abnormalities including low potassium of 2.9, hemoglobin 7.0 (recently was 8.9).  Patient received IV levofloxacin, IV Zosyn, IV vancomycin empirically in the ED.  Admitting diagnosis sepsis secondary to pneumonia.       Interval History: Patient reports overall improvement. WBC: 63.17 today with 2% blasts. Will need a bone marrow biopsy, message sent to IR waiting for reply.     Oncology Treatment Plan:   [No treatment plan]    Medications:  Continuous Infusions:  Scheduled Meds:   amitriptyline  25 mg Oral QHS    docusate sodium  100 mg Oral BID    DULoxetine  40 mg Oral Daily    enoxaparin  40 mg Subcutaneous Daily    ferrous gluconate  324 mg Oral Daily with breakfast    furosemide  20 mg Intravenous Once    hydroCHLOROthiazide  25 mg Oral Daily    levalbuterol  0.63 mg Nebulization Q8H    levothyroxine  100 mcg Oral Before breakfast    losartan  25 mg Oral Daily    magnesium oxide  400 mg Oral TID    metoprolol succinate  100 mg Oral Daily    pantoprazole  40 mg Oral Daily    piperacillin-tazobactam (ZOSYN) IVPB  4.5 g Intravenous Q8H    potassium chloride  40 mEq Oral Once    pravastatin   10 mg Oral Daily    tamsulosin  0.4 mg Oral Daily    topiramate  25 mg Oral Daily     PRN Meds:sodium chloride, acetaminophen, aluminum-magnesium hydroxide-simethicone, diphenhydrAMINE, guaifenesin 100 mg/5 ml, hydrALAZINE, hydrOXYzine HCl, ondansetron, sodium chloride 3%     Review of Systems   Constitutional: Positive for fatigue. Negative for activity change, appetite change, chills, diaphoresis, fever and unexpected weight change.   HENT: Negative for congestion, hearing loss, nosebleeds, postnasal drip and trouble swallowing.    Eyes: Negative for discharge and visual disturbance.   Respiratory: Positive for cough, shortness of breath and wheezing. Negative for chest tightness.    Cardiovascular: Negative for chest pain, palpitations and leg swelling.   Gastrointestinal: Negative for abdominal distention, blood in stool, constipation, diarrhea, nausea and vomiting.   Endocrine: Negative for cold intolerance and heat intolerance.   Genitourinary: Negative for difficulty urinating, dyspareunia, flank pain and hematuria.   Musculoskeletal: Positive for arthralgias, back pain and myalgias.   Skin: Negative.    Neurological: Negative for dizziness, weakness, light-headedness and headaches.   Hematological: Negative for adenopathy. Does not bruise/bleed easily.   Psychiatric/Behavioral: Negative for agitation, behavioral problems and confusion. The patient is nervous/anxious.      Objective:     Vital Signs (Most Recent):  Temp: 98.5 °F (36.9 °C) (08/26/19 0727)  Pulse: 105 (08/26/19 0741)  Resp: 18 (08/26/19 0741)  BP: (!) 143/73 (08/26/19 0727)  SpO2: (!) 91 % (08/26/19 0741) Vital Signs (24h Range):  Temp:  [98 °F (36.7 °C)-99 °F (37.2 °C)] 98.5 °F (36.9 °C)  Pulse:  [] 105  Resp:  [16-20] 18  SpO2:  [91 %-96 %] 91 %  BP: (143-165)/(68-73) 143/73     Weight: 59.1 kg (130 lb 4.7 oz)  Body mass index is 23.83 kg/m².  Body surface area is 1.61 meters squared.      Intake/Output Summary (Last 24 hours) at  8/26/2019 0926  Last data filed at 8/26/2019 0600  Gross per 24 hour   Intake 540 ml   Output 200 ml   Net 340 ml       Physical Exam   Constitutional: She is oriented to person, place, and time. She appears well-developed and well-nourished. She appears ill. No distress.   HENT:   Head: Normocephalic and atraumatic.   Right Ear: Hearing and external ear normal.   Left Ear: Hearing and external ear normal.   Nose: No rhinorrhea or sinus tenderness. Right sinus exhibits no maxillary sinus tenderness and no frontal sinus tenderness. Left sinus exhibits no maxillary sinus tenderness and no frontal sinus tenderness.   Mouth/Throat: Uvula is midline, oropharynx is clear and moist and mucous membranes are normal. No oral lesions.   Eyes: Pupils are equal, round, and reactive to light. Conjunctivae are normal. Right eye exhibits no discharge. Left eye exhibits no discharge.   Neck: Normal range of motion. Carotid bruit is not present. No tracheal deviation present. No thyromegaly present.   Cardiovascular: Normal rate, regular rhythm, S1 normal, S2 normal, normal heart sounds and intact distal pulses.   No murmur heard.  Pulses:       Dorsalis pedis pulses are 2+ on the right side, and 2+ on the left side.   Pulmonary/Chest: Effort normal. No respiratory distress. She has rales in the right lower field and the left lower field.   Abdominal: Soft. She exhibits distension. She exhibits no mass. Bowel sounds are decreased. There is no tenderness.   Musculoskeletal: Normal range of motion. She exhibits deformity (Left hand). She exhibits no edema.        Lumbar back: She exhibits tenderness.   Lymphadenopathy:     She has no cervical adenopathy.        Right: No supraclavicular adenopathy present.        Left: No supraclavicular adenopathy present.   Neurological: She is alert and oriented to person, place, and time. She has normal strength. No sensory deficit. Coordination and gait normal.   Skin: Skin is warm and dry.  Capillary refill takes less than 2 seconds. No rash noted. There is pallor.   Psychiatric: Her speech is normal and behavior is normal. Judgment and thought content normal. Her mood appears anxious. Her affect is not labile. Cognition and memory are normal. She does not exhibit a depressed mood.   Nursing note and vitals reviewed.      Significant Labs:   CBC:   Recent Labs   Lab 08/25/19  0546 08/26/19  0520   WBC 51.50* 63.17*   HGB 8.2* 8.2*   HCT 25.1* 25.0*    153    and CMP:   Recent Labs   Lab 08/25/19  0546   *   K 3.5      CO2 20*   GLU 94   BUN 9   CREATININE 0.7   CALCIUM 8.0*   PROT 5.7*   ALBUMIN 1.6*   BILITOT 0.7   ALKPHOS 147*   AST 23   ALT 23   ANIONGAP 11   EGFRNONAA >60       Diagnostic Results:  I have reviewed all pertinent imaging results/findings within the past 24 hours.    Assessment/Plan:     Leukocytosis  Patient has been followed by the Hem/Onc clinic for chronic lymphocytosis, baseline WBC is approximately 20K, WBC increased to 60K, likely a leukmoid reaction. Will evaluate after discharge and possibly order a flow cytometry at that time.     --Daily CBC, CMP  --If WBC does not continue to decrease may order further studies to evaluate.  08/23/2019 white count decreased from yesterday on admission.  Spoke with daughter likely leukemoid reaction but will need to be assessed in as we proceed followed no need for leukemia immuno phenotyping at the present time  08/24/2019 white count slowly decreasing the still remains elevated patient appears to be clinically improving discussed with them the fact that she most likely has looks moist reaction on top of persistent leukocytosis once patient is discharged may want to consider flow cytometry on peripheral blood patient is not interested in bone marrow aspirate and biopsy will make a determination once patient is outpatient setting  08/25/2019.  White count increased to greater than 50,000 will request leukemia immuno  phenotyping peripheral blood discussed with family at bedside discussed with Hospital Medicine as well    8/26/19: WBC: 63.17 with 2% blasts, peripheral flow cytometry pending, patient will need bone marrow biopsy, message sent to IR waiting for reply for scheduling. Discussed this with patient, patient is agreeable for bone marrow biopsy. Discussed with HM, plan is for possible d/c today.        Thank you for your consult. I will follow-up with patient. Please contact us if you have any additional questions.     Joan Kay NP  Hematology/Oncology  [facility]

## 2019-08-26 NOTE — PLAN OF CARE
Problem: Infection  Goal: Infection Symptom Resolution    Intervention: Prevent or Manage Infection  Patient awake and alert free from falls and injury, sinus tachycardic rhythm on monitor, denies pain at this time, weight shift assistance provided, ABT continues, voids spontaneously, POC reviewed with patient,  bed low locked, call light within reach, will continue to monitor

## 2019-08-26 NOTE — PT/OT/SLP EVAL
Occupational Therapy   Evaluation    Name: Sarah Parker  MRN: [id number]  Admitting Diagnosis:  Sepsis      Recommendations:     Discharge Recommendations: nursing facility, skilled, rehabilitation facility  Discharge Equipment Recommendations:  (tbd)  Barriers to discharge:  None    Assessment:     Sarah Parker is a 79 y.o. female with a medical diagnosis of Sepsis.  She presents with debility and generalized weakness . Performance deficits affecting function: weakness, impaired self care skills, impaired balance, decreased safety awareness, decreased ROM, impaired endurance, gait instability, impaired functional mobilty, decreased upper extremity function.      Rehab Prognosis: Fair; patient would benefit from acute skilled OT services to address these deficits and reach maximum level of function.       Plan:     Patient to be seen 3 x/week to address the above listed problems via self-care/home management, therapeutic exercises, therapeutic activities  · Plan of Care Expires: 09/02/19  · Plan of Care Reviewed with: patient    Subjective     Chief Complaint: debility and generalized weakness  Patient/Family Comments/goals:     Occupational Profile:  Living Environment: lives with spouse who is unable to care for her  Previous level of function: mod (I) with functional mobiliy and adl's  Roles and Routines: occupational therapy  Equipment Used at Home:  rollator  Assistance upon Discharge:     Pain/Comfort:  · Pain Rating 1: 0/10    Patients cultural, spiritual, Taoism conflicts given the current situation:      Objective:     Communicated with: nurse and epic chart review prior to session.  Patient found HOB elevated with peripheral IV, oxygen upon OT entry to room.    General Precautions: Standard, fall   Orthopedic Precautions:N/A   Braces: N/A     Occupational Performance:    Bed Mobility:    · Patient completed Rolling/Turning to Right with minimum assistance  · Patient completed Scooting/Bridging  with minimum assistance  · Patient completed Supine to Sit with minimum assistance    Functional Mobility/Transfers:  · Patient completed Sit <> Stand Transfer with minimum assistance  with  rolling walker   · Patient completed Bed <> Chair Transfer using Step Transfer technique with minimum assistance with rolling walker  · Functional Mobility: pt ambulated 40 feet x 2 with min a with rw at slow pace    Activities of Daily Living:  · Grooming: minimum assistance .  · Lower Body Dressing: minimum assistance .    Cognitive/Visual Perceptual:  Cognitive/Psychosocial Skills:     -       Oriented to: Person, Place, Time and Situation   -       Follows Commands/attention:Follows two-step commands  -       Communication: clear/fluent  -       Memory: No Deficits noted  -       Safety awareness/insight to disability: impaired   Visual/Perceptual:      -Intact .    Physical Exam:  Upper Extremity Range of Motion:     -       Right Upper Extremity: WFL except hand /digits  -       Left Upper Extremity: WFL except hand/ digits  Upper Extremity Strength:    -       Right Upper Extremity: mmt: 3/5 grossly  -       Left Upper Extremity: mmt: 3/5 grossly   Strength: -       Right Upper Extremity: unable to accurately test  -       Left Upper Extremity: unable to accurately test    AMPAC 6 Click ADL:  AMPAC Total Score: 18    Treatment & Education:    Education:    Patient left up in chair with all lines intact, call button in reach, chair alarm on and nurse notified    GOALS:   Multidisciplinary Problems     Occupational Therapy Goals        Problem: Occupational Therapy Goal    Goal Priority Disciplines Outcome Interventions   Occupational Therapy Goal     OT, PT/OT     Description:  OT goals to be met 9-2-19  Min a with ue dressing  Min a with le dressing  Pt will tolerate 1 set x 15 reps b ue rom exercise                    History:     Past Medical History:   Diagnosis Date    Acid reflux     Anxiety     Back pain      Bronchitis, chronic obstructive w acute bronchitis 7/29/2016    Cancer     [facility] arms, face- Dr. Lata Tejada    Cataract     2+NS    Degenerative disc disease     Depression     Dry mouth     Hernia, hiatal 11/18/2013    Hypertension     Hypothyroid     Macrocytic anemia 5/3/2016    Macular degeneration     Migraines     Mixed anxiety and depressive disorder     Multiple fractures of ribs of right side     Osteoporosis     Pneumonia     Pneumonia due to other staphylococcus     Rheumatoid arthritis     Rheumatoid arthritis(714.0)     Rheumatoid arthritis(714.0)     Remicade, MTX.       Past Surgical History:   Procedure Laterality Date    APPENDECTOMY  1985    CATARACT EXTRACTION Bilateral 6/11/15    Dr. Booth    CHOLECYSTECTOMY  2013    cryoablasion kidney Left 09/27/2016    EGD (ESOPHAGOGASTRODUODENOSCOPY) N/A 10/31/2013    Performed [facility] Donald [facility]ello [facility] ENDO    feet Bilateral     rheumatoid    FRACTURE SURGERY Right     tibia    FUNDOPLICATION, NISSEN, LAPAROSCOPIC N/A 12/18/2013    Performed by Galindo Vasquez MD at [facility] OR    HERNIA REPAIR      HYSTERECTOMY  1970    partial    JOINT REPLACEMENT      bilateral knees (2008), hands, wrists, knuckles, toes    LAPAROSCOPIC NISSEN FUNDOPLICATION      Left L5/S1 TF AYAAN with local Left 6/25/2019    [facility] Rowdy [facility] at [facility] PAIN MGT    Renal Cryoablation N/A 9/27/2016    Performed by Dosc Diagnostic Provider at [facility] OR       Time Tracking:     OT Date of Treatment: 08/26/19  OT Start Time: 1146  OT Stop Time: 1210  OT Total Time (min): 24 min    Billable Minutes:Evaluation 12 minutes  Therapeutic Activity 12 minutes    Yolis Penny, OT  8/26/2019

## 2019-08-26 NOTE — NURSING
Went over discharge instructions with patient and family member at bedside.   Stressed importance of making and keeping all follow ups as well as making prescribed medication changes.   Prescriptions x3 delivered to pt bedside via [facility] prior to discharge.  Patient verbalized understanding and has no questions in regards to discharge.  PICC removed from [facility] without complications.  Telemetry box removed and returned to monitor tech.  Patient awaiting home oxygen delivery to bedside, instructed to call nurse station once O2 has arrived.   Primary nurse notified of pt's discharge status.

## 2019-08-26 NOTE — SUBJECTIVE & OBJECTIVE
Interval History: Patient reports overall improvement. WBC: 63.17 today with 2% blasts. Will need a bone marrow biopsy, message sent to IR waiting for reply.     Oncology Treatment Plan:   [No treatment plan]    Medications:  Continuous Infusions:  Scheduled Meds:   amitriptyline  25 mg Oral QHS    docusate sodium  100 mg Oral BID    DULoxetine  40 mg Oral Daily    enoxaparin  40 mg Subcutaneous Daily    ferrous gluconate  324 mg Oral Daily with breakfast    furosemide  20 mg Intravenous Once    hydroCHLOROthiazide  25 mg Oral Daily    levalbuterol  0.63 mg Nebulization Q8H    levothyroxine  100 mcg Oral Before breakfast    losartan  25 mg Oral Daily    magnesium oxide  400 mg Oral TID    metoprolol succinate  100 mg Oral Daily    pantoprazole  40 mg Oral Daily    piperacillin-tazobactam (ZOSYN) IVPB  4.5 g Intravenous Q8H    potassium chloride  40 mEq Oral Once    pravastatin  10 mg Oral Daily    tamsulosin  0.4 mg Oral Daily    topiramate  25 mg Oral Daily     PRN Meds:sodium chloride, acetaminophen, aluminum-magnesium hydroxide-simethicone, diphenhydrAMINE, guaifenesin 100 mg/5 ml, hydrALAZINE, hydrOXYzine HCl, ondansetron, sodium chloride 3%     Review of Systems   Constitutional: Positive for fatigue. Negative for activity change, appetite change, chills, diaphoresis, fever and unexpected weight change.   HENT: Negative for congestion, hearing loss, nosebleeds, postnasal drip and trouble swallowing.    Eyes: Negative for discharge and visual disturbance.   Respiratory: Positive for cough, shortness of breath and wheezing. Negative for chest tightness.    Cardiovascular: Negative for chest pain, palpitations and leg swelling.   Gastrointestinal: Negative for abdominal distention, blood in stool, constipation, diarrhea, nausea and vomiting.   Endocrine: Negative for cold intolerance and heat intolerance.   Genitourinary: Negative for difficulty urinating, dyspareunia, flank pain and hematuria.    Musculoskeletal: Positive for arthralgias, back pain and myalgias.   Skin: Negative.    Neurological: Negative for dizziness, weakness, light-headedness and headaches.   Hematological: Negative for adenopathy. Does not bruise/bleed easily.   Psychiatric/Behavioral: Negative for agitation, behavioral problems and confusion. The patient is nervous/anxious.      Objective:     Vital Signs (Most Recent):  Temp: 98.5 °F (36.9 °C) (08/26/19 0727)  Pulse: 105 (08/26/19 0741)  Resp: 18 (08/26/19 0741)  BP: (!) 143/73 (08/26/19 0727)  SpO2: (!) 91 % (08/26/19 0741) Vital Signs (24h Range):  Temp:  [98 °F (36.7 °C)-99 °F (37.2 °C)] 98.5 °F (36.9 °C)  Pulse:  [] 105  Resp:  [16-20] 18  SpO2:  [91 %-96 %] 91 %  BP: (143-165)/(68-73) 143/73     Weight: 59.1 kg (130 lb 4.7 oz)  Body mass index is 23.83 kg/m².  Body surface area is 1.61 meters squared.      Intake/Output Summary (Last 24 hours) at 8/26/2019 0926  Last data filed at 8/26/2019 0600  Gross per 24 hour   Intake 540 ml   Output 200 ml   Net 340 ml       Physical Exam   Constitutional: She is oriented to person, place, and time. She appears well-developed and well-nourished. She appears ill. No distress.   HENT:   Head: Normocephalic and atraumatic.   Right Ear: Hearing and external ear normal.   Left Ear: Hearing and external ear normal.   Nose: No rhinorrhea or sinus tenderness. Right sinus exhibits no maxillary sinus tenderness and no frontal sinus tenderness. Left sinus exhibits no maxillary sinus tenderness and no frontal sinus tenderness.   Mouth/Throat: Uvula is midline, oropharynx is clear and moist and mucous membranes are normal. No oral lesions.   Eyes: Pupils are equal, round, and reactive to light. Conjunctivae are normal. Right eye exhibits no discharge. Left eye exhibits no discharge.   Neck: Normal range of motion. Carotid bruit is not present. No tracheal deviation present. No thyromegaly present.   Cardiovascular: Normal rate, regular rhythm,  S1 normal, S2 normal, normal heart sounds and intact distal pulses.   No murmur heard.  Pulses:       Dorsalis pedis pulses are 2+ on the right side, and 2+ on the left side.   Pulmonary/Chest: Effort normal. No respiratory distress. She has rales in the right lower field and the left lower field.   Abdominal: Soft. She exhibits distension. She exhibits no mass. Bowel sounds are decreased. There is no tenderness.   Musculoskeletal: Normal range of motion. She exhibits deformity (Left hand). She exhibits no edema.        Lumbar back: She exhibits tenderness.   Lymphadenopathy:     She has no cervical adenopathy.        Right: No supraclavicular adenopathy present.        Left: No supraclavicular adenopathy present.   Neurological: She is alert and oriented to person, place, and time. She has normal strength. No sensory deficit. Coordination and gait normal.   Skin: Skin is warm and dry. Capillary refill takes less than 2 seconds. No rash noted. There is pallor.   Psychiatric: Her speech is normal and behavior is normal. Judgment and thought content normal. Her mood appears anxious. Her affect is not labile. Cognition and memory are normal. She does not exhibit a depressed mood.   Nursing note and vitals reviewed.      Significant Labs:   CBC:   Recent Labs   Lab 08/25/19  0546 08/26/19  0520   WBC 51.50* 63.17*   HGB 8.2* 8.2*   HCT 25.1* 25.0*    153    and CMP:   Recent Labs   Lab 08/25/19  0546   *   K 3.5      CO2 20*   GLU 94   BUN 9   CREATININE 0.7   CALCIUM 8.0*   PROT 5.7*   ALBUMIN 1.6*   BILITOT 0.7   ALKPHOS 147*   AST 23   ALT 23   ANIONGAP 11   EGFRNONAA >60       Diagnostic Results:  I have reviewed all pertinent imaging results/findings within the past 24 hours.

## 2019-08-26 NOTE — PLAN OF CARE
Met with patient and daughter. Discussed options for receiving care upon hospital discharge. Patient would like to use [facility]. Preference letter obtained for [facility]. Referral placed in [facility] to [facility].      Patient qualifies for home oxygen, spoke with Rufino at [facility]. Once oxygen approved to deliver they will contact JENNY Hammer . Secure message sent to Keila.    Both home health and oxygen discussed with patient and daughter.         08/26/19 1314   Post-Acute Status   Post-Acute Authorization [facility] Status Referrals Sent

## 2019-08-26 NOTE — ASSESSMENT & PLAN NOTE
Patient has been followed by the Hem/Onc clinic for chronic lymphocytosis, baseline WBC is approximately 20K, WBC increased to 60K, likely a leukmoid reaction. Will evaluate after discharge and possibly order a flow cytometry at that time.     --Daily CBC, CMP  --If WBC does not continue to decrease may order further studies to evaluate.  08/23/2019 white count decreased from yesterday on admission.  Spoke with daughter likely leukemoid reaction but will need to be assessed in as we proceed followed no need for leukemia immuno phenotyping at the present time  08/24/2019 white count slowly decreasing the still remains elevated patient appears to be clinically improving discussed with them the fact that she most likely has looks moist reaction on top of persistent leukocytosis once patient is discharged may want to consider flow cytometry on peripheral blood patient is not interested in bone marrow aspirate and biopsy will make a determination once patient is outpatient setting  08/25/2019.  White count increased to greater than 50,000 will request leukemia immuno phenotyping peripheral blood discussed with family at bedside discussed with Hospital Medicine as well    8/26/19: WBC: 63.17 with 2% blasts, peripheral flow cytometry pending, patient will need bone marrow biopsy, message sent to IR waiting for reply for scheduling. Discussed this with patient, patient is agreeable for bone marrow biopsy. Discussed with HM, plan is for possible d/c today.

## 2019-08-26 NOTE — PROGRESS NOTES
Oxygen Evaluation    1) Patient's O2 Sat on room air while at rest: 92%         If at rest Sat is 89% or above, continue.    2) Patient's O2 Sat on room air while exercisin%         If exercise Sat is 88% or below, continue.    3) Patient's O2 Sat while exercising on O2:  at  LPM         (Must show improvement from #2 for patients to qualify)    If exercise Sat on O2 shows improvement from #2, this patient qualifies for portable Oxygen.  If not, the patient does not qualify.

## 2019-08-26 NOTE — PLAN OF CARE
Problem: Physical Therapy Goal  Goal: Physical Therapy Goal  1. Patient will perform supine to/from sit sup  2. Patient will perform sit to/from stand with RW sup  3. Patient will amb > 100ft RW sba no gross LOB   Outcome: Ongoing (interventions implemented as appropriate)  PATIENT DOING WELL GT INTO HALLWAY WITH RW 40'X2 , GOOD STEADY PACE AT CGAX1.

## 2019-08-26 NOTE — PT/OT/SLP PROGRESS
Physical Therapy  Treatment    Sarah Parker   MRN: [id number]   Admitting Diagnosis: Sepsis    PT Received On: 08/26/19  PT Start Time: 1235     PT Stop Time: 1300    PT Total Time (min): 25 min       Billable Minutes:  Gait Training 15 and Therapeutic Exercise 10    Treatment Type: Treatment  PT/PTA: PTA     PTA Visit Number: 2       General Precautions: Standard, fall  Orthopedic Precautions: N/A   Braces: N/A    Spiritual, Cultural Beliefs, Orthodox Practices, Values that Affect Care: no    Subjective:  Communicated with NURSE, MARITZA AND [facility] CHART REVIEW  prior to session.  PATIENT AGREE TO TX NOW.          Objective:   Patient found with: peripheral IV, telemetry, oxygen, SUPINE  IN BED  . ASSISTED PATIENT FOR OOB T/FS , GT ACTIVITY, TYLER LE EXERCISES.    Functional Mobility:  Bed Mobility:    SUPINE TO SIT AT CGAX1.    Transfers:   SIT TO STAND ,STAND TO SIT AT CGAX1.    Gait:    40'X2, AT CGAX1 ,  GOOD STEADY PACE ,   CUES TO MAINTAIN SAFE ALIGNMENT WITH RW DURING GT .    Stairs:N/A    Balance:   Static Sit: FAIR: Maintains without assist, but unable to take any challenges   Dynamic Sit: FAIR: Cannot move trunk without losing balance  Static Stand: FAIR: Maintains without assist but unable to take challenges  Dynamic stand: FAIR: Needs CONTACT GUARD during gait     Therapeutic Activities and Exercises:  TYLER LE EXERCISES, OOB T/FS TO B/S CHAIR, GT INTO HALLWAY.    AM-PAC 6 CLICK MOBILITY  How much help from another person does this patient currently need?   1 = Unable, Total/Dependent Assistance  2 = A lot, Maximum/Moderate Assistance  3 = A little, Minimum/Contact Guard/Supervision  4 = None, Modified [place]/Independent    Turning over in bed (including adjusting bedclothes, sheets and blankets)?: 4  Sitting down on and standing up from a chair with arms (e.g., wheelchair, bedside commode, etc.): 3  Moving from lying on back to sitting on the side of the bed?: 3  Moving to and from a bed to a chair  (including a wheelchair)?: 3  Need to walk in hospital room?: 3  Climbing 3-5 steps with a railing?: 1  Basic Mobility Total Score: 17    AM-PAC Raw Score CMS G-Code Modifier Level of Impairment Assistance   6 % Total / Unable   7 - 9 CM 80 - 100% Maximal Assist   10 - 14 CL 60 - 80% Moderate Assist   15 - 19 CK 40 - 60% Moderate Assist   20 - 22 CJ 20 - 40% Minimal Assist   23 CI 1-20% SBA / CGA   24 CH 0% Independent/ Mod I     Patient left up in chair with all lines intact, call button in reach and chair alarm on.    Assessment:  Saarh Parker is a 79 y.o. female with a medical diagnosis of Sepsis .   PATIENT PROGRESSING WELL WITH INCREASING HER FUNCTIONAL MOBILITY .   PATIENT REMAIN MOTIVATED TO INCREASE ACTIVITIES AS TOLERATED.    Rehab identified problem list/impairments: Rehab identified problem list/impairments: weakness, impaired self care skills, impaired balance, decreased coordination, impaired endurance, impaired functional mobilty, gait instability    Rehab potential is good.    Activity tolerance: Good    Discharge recommendations: Discharge Facility/Level of Care Needs: home health PT, nursing facility, skilled, rehabilitation facility     Barriers to discharge:      Equipment recommendations:       GOALS:   Multidisciplinary Problems     Physical Therapy Goals        Problem: Physical Therapy Goal    Goal Priority Disciplines Outcome Goal Variances Interventions   Physical Therapy Goal     PT, PT/OT Ongoing (interventions implemented as appropriate)     Description:  1. Patient will perform supine to/from sit sup  2. Patient will perform sit to/from stand with RW sup  3. Patient will amb > 100ft RW sba no gross LOB                    PLAN:    Patient to be seen 5 x/week  to address the above listed problems via gait training, therapeutic activities, therapeutic exercises  Plan of Care expires: 08/31/19  Plan of Care reviewed with: patient         Elizabeth Berkowitz, PTA  08/26/2019

## 2019-08-27 LAB
BACTERIA BLD CULT: NORMAL
BACTERIA BLD CULT: NORMAL
FLOW CYTOMETRY ANTIBODIES ANALYZED - BLOOD: NORMAL
FLOW CYTOMETRY COMMENT - BLOOD: NORMAL
FLOW CYTOMETRY INTERPRETATION - BLOOD: NORMAL

## 2019-08-27 NOTE — PLAN OF CARE
08/27/19 1608   Final Note   Assessment Type Final Discharge Note   Anticipated Discharge Disposition Home-Health   Right Care Referral Info   Post Acute Recommendation Home-care   Facility Name [facility]

## 2019-08-28 ENCOUNTER — PATIENT OUTREACH (OUTPATIENT)
Dept: ADMINISTRATIVE | Facility: CLINIC | Age: 79
End: 2019-08-28

## 2019-08-28 ENCOUNTER — TELEPHONE (OUTPATIENT)
Dept: FAMILY MEDICINE | Facility: CLINIC | Age: 79
End: 2019-08-28

## 2019-08-28 NOTE — PATIENT INSTRUCTIONS
When You Have Pneumonia  You have been diagnosed with pneumonia. This is a serious lung infection. Most cases of pneumonia are caused by bacteria. Pneumonia most often occurs in older adults, young children, and people with chronic health problems.  Home care  · Take your medicine exactly as directed. Dont skip doses. Continue taking your antibiotics as until they are all gone, even if you start to feel better. This will prevent the pneumonia from coming back.  · Drink at least 8 glasses of water daily, unless directed otherwise. This helps to loosen and thin secretions so that you can cough them up.  · Use a cool-mist humidifier in your bedroom. Be sure to clean the humidifier daily.  · Dont use medicines to suppress your cough unless your cough is dry, painful, or interferes with your sleep. Coughing up mucus is normal. You may use an expectorant if your healthcare provider says its okay.  · You can use warm compresses or a heating pad on the lowest setting to relieve chest discomfort. Use several times a day for 15-20 minutes at a time. To prevent injury to your skin, set the temperature to warm, not hot. Dont put the compress or pad directly on your skin. Make certain it has a cover or wrap it in a towel. This is to prevent skin burns.  · Get plenty of rest until your fever, shortness of breath, and chest pain go away.  · Plan to get a flu shot every year. The flu is a common cause of pneumonia. Getting a flu shot every year can help prevent both the flu and pneumonia.  Getting the pneumococcal vaccine  Talk with your healthcare provider about getting the pneumococcalvaccine. Pneumococcal pneumonia is caused by bacteria that spread from person to person. It can cause minor problems, such as ear infections. But it can also turn into life-threatening illnesses of the lungs (pneumonia), the covering of the brain and spinal cord (meningitis), and the blood (bacteremia).  Children under 2 years of age, adults  over age 65, people with certain health conditions, and smokers are at the highest risk of pneumococcal disease. This vaccine can help prevent pneumococcal disease in both adults and children. Some people should not have the vaccine. Make sure to ask your healthcare provider if you should have the vaccine.   Follow-up care  Make a follow-up appointment as directed by our staff.  When to call your healthcare provider  Call your healthcare provider if you have any of the following:  · Fever above 100.4°F (38°C), or as directed by your healthcare provider  · Mucus from the lungs (sputum) thats yellow, green, bloody, or smells bad  · A large amount of sputum  · Vomiting  · Symptoms that get worse  When to call 911  Call 911 right away if you have any of the following:  · Chest pain  · Trouble breathing  · Blue lips or fingernails   Date Last Reviewed: 11/1/2016  © 7907-7673 [employer]. [address] [place] [address], [place] [address]. All rights reserved. This information is not intended as a substitute for professional medical care. Always follow your healthcare professional's instructions.

## 2019-08-28 NOTE — TELEPHONE ENCOUNTER
----- Message from Tiffany Mendenhall sent at 8/28/2019  7:54 AM CDT -----  Contact: PATIENT   PT requesting a call back to speak with MsMarjorie Verónica before scheduling an appointment.Please call back at [phone].      Thanks,  Tiffany Mendenhall

## 2019-08-29 NOTE — PROGRESS NOTES
Transitional Care Note  Subjective:       Patient ID: Sarah Parker is a 79 y.o. female.  Chief Complaint: Transitional Care    Family and/or Caretaker present at visit?  Yes.  Diagnostic tests reviewed/disposition: No diagnosic tests pending after this hospitalization.  Disease/illness education:   Home health/community services discussion/referrals: Patient has home health established at [facility].   Establishment or re-establishment of referral orders for community resources: No other necessary community resources.   Discussion with other health care providers: No discussion with other health care providers necessary.   HPI   Here for follow up of medical problems and 8/21-8/26/19 hospital follow up for:  --------------------------------------------------------------------------------------------------------  Admitting diagnosis sepsis secondary to pneumonia.     * No surgery found *       Hospital Course:   8/22:  States to be breathing better;  Nurse reported difficult breathing with some gurgling type respirations;  Fluids going at 125cc/hr have been curtailed;  ABG with reduce PCo2 at ~24; oxygenating well; have asked for a portable chest xray;  Will continue nebsl; attention to pulmonary toilet; incentives etc     8/23:  Looks and feels better today;  Complains of no BM since admission;  Colace added last evening;  To receive IV potassium and magnesium today;  BP up today; Portable CXR reviewed; syill with left sided infiltrates;  Effusion on left is less; will continue present regimen with monitoring     8/24/19 -  Pt feels better . Denies SOB or chest pain.   BP is elevated and tachycardia is noted. Pt will be placed back on BB therapy that she was on it at home.   Leukocytosis is trending down .Blood cultures no growth to date. Urine culture is negative.   H/H are stable at moderate anemia . Will monitor   Nelson is placed due to large volume urinary retention. Flomax is added as well as Bethanechol .  Will consult Urology for evaluation of urinary retention. Will decrease Amitriptyline to 25 mg . This could contribute to urinary retention.       8/25/19-  Pt continues to feel better . Tmax 99.7.  Blood cultures and urine cultures are negative   Worsening leukocytosis with premature cells are noted in today's lab. Dr. Miranda saw pt today and has ordered  leukemia immuno phenotyping of  peripheral blood.      Nelson discontinued today . Pt is voiding without difficulty.     Antibiotic day #5.      8/26/19-   Pt feels better subjectively . Reports fatigue and SOB are better . We evaluated pt for home O2 this morning . Her O2 sat on RA while exercising was 88% , therefore qualifies for Home O2.   Hematology rounded on her today and their recommendations are as follows --WBC: 63.17 with 2% blasts, peripheral flow cytometry pending, patient will need bone marrow biopsy, message sent to IR waiting for reply for scheduling. Discussed this with patient, patient is agreeable for bone marrow biopsy. Discussed with HM, plan is for possible d/c today.     Pt was noted to have urinary retention and needed Nelson placed . We started her on Flomax which has helped . Nleson discontinued and pt was able to void.      Pt is afebrile. We plans to discharge pt home today with home health service and home O2. Pt will be switched to oral Augmentin to complete antibiotic treatment for pneumonia.  She will follow up with her PCP in 3 days and Hematology /Oncology in a week and possible bone marrow biopsy as out patient.       Clarification of Home O2 requirement - Pt is noted to have hypoxia  and desaturation with exertion documented in  Home oxygen evaluation by RT. Pt has documented h/o COPD (PFT done in 11/2016) and Rheumatoid lung suggestive of chronic lung disease which has worsened due to current pulmonary infection.  "  -------------------------------------------------------------------------------------------------------------------------   Had 3 units PRBC in hospital.  Taking iron and Mg.  Stool is loose since day #2 of antibiotics.  "Black water", little amount at a time, 4-5x per day.  On day #10 Augmentin.  No n/v.  Breathing back to baseline, O2 new at night since hospital.     Has not been eating much.  Only fluids lately.  Not taking in protein.  Now no f/c.    No pain currently.  Urinating now normal.  Off Actemra and MTX x 1mo.      Review of Systems   Constitutional: Negative for chills, diaphoresis and fever.   Respiratory: Negative for cough and shortness of breath.    Cardiovascular: Negative for chest pain, palpitations and leg swelling.   Gastrointestinal: Negative for blood in stool, constipation, diarrhea, nausea and vomiting.   Genitourinary: Negative for dysuria, frequency and hematuria.   Psychiatric/Behavioral: The patient is not nervous/anxious.        Objective:       Vitals:    08/30/19 1000   BP: (!) 149/75   Pulse: 101   Temp: 97.7 °F (36.5 °C)       Physical Exam   Constitutional: She is oriented to person, place, and time. She appears well-developed.   HENT:   Mouth/Throat: Oropharynx is clear and moist.   Neck: Neck supple. Carotid bruit is not present. No thyroid mass present.   Cardiovascular: Normal rate, regular rhythm and intact distal pulses. Exam reveals no gallop and no friction rub.   No murmur heard.  Pulmonary/Chest: Effort normal. She has no wheezes. She has rales (scattered bilateral).   Abdominal: Soft. Bowel sounds are normal. She exhibits no mass. There is no hepatosplenomegaly. There is no tenderness.   Musculoskeletal: She exhibits no edema.   Lymphadenopathy:     She has no cervical adenopathy.   Neurological: She is alert and oriented to person, place, and time.   Psychiatric: She has a normal mood and affect.       Assessment:       1. Pneumonia, unspecified organism    2. " Anemia, unspecified type    3. Leukocytosis, unspecified type    4. Rheumatoid arthritis involving right shoulder with positive rheumatoid factor    5. Monoclonal paraproteinemia    6. Essential hypertension    7. Depression, recurrent    8. COPD with exacerbation    9. Acquired hypothyroidism        Plan:     Sarah was seen today for transitional care.    Diagnoses and all orders for this visit:    Pneumonia, unspecified organism with sepsis, Anemia, unspecified type, Leukocytosis, unspecified type- stat tests now.  Increase protein in diet.  RTC 1wk.  -     CBC auto differential; Future  -     Comprehensive metabolic panel; Future  -     X-Ray Chest PA And Lateral; Future    Rheumatoid arthritis involving right shoulder with positive rheumatoid factor- off Actemra and MTX now.    Monoclonal paraproteinemia    Essential hypertension- cont rx.    Depression, recurrent    COPD with exacerbation    Acquired hypothyroidism

## 2019-08-30 ENCOUNTER — TELEPHONE (OUTPATIENT)
Dept: FAMILY MEDICINE | Facility: CLINIC | Age: 79
End: 2019-08-30

## 2019-08-30 ENCOUNTER — HOSPITAL ENCOUNTER (OUTPATIENT)
Dept: RADIOLOGY | Facility: HOSPITAL | Age: 79
Discharge: HOME | End: 2019-08-30
Attending: INTERNAL MEDICINE
Payer: MEDICARE

## 2019-08-30 ENCOUNTER — OFFICE VISIT (OUTPATIENT)
Dept: FAMILY MEDICINE | Facility: CLINIC | Age: 79
End: 2019-08-30
Payer: MEDICARE

## 2019-08-30 VITALS
WEIGHT: 127.44 LBS | HEIGHT: 62 IN | BODY MASS INDEX: 23.45 KG/M2 | HEART RATE: 101 BPM | OXYGEN SATURATION: 91 % | DIASTOLIC BLOOD PRESSURE: 75 MMHG | SYSTOLIC BLOOD PRESSURE: 149 MMHG | TEMPERATURE: 98 F

## 2019-08-30 DIAGNOSIS — D64.9 ANEMIA, UNSPECIFIED TYPE: ICD-10-CM

## 2019-08-30 DIAGNOSIS — D47.2 MONOCLONAL PARAPROTEINEMIA: ICD-10-CM

## 2019-08-30 DIAGNOSIS — J18.9 PNEUMONIA, UNSPECIFIED ORGANISM: Primary | ICD-10-CM

## 2019-08-30 DIAGNOSIS — F33.9 DEPRESSION, RECURRENT: ICD-10-CM

## 2019-08-30 DIAGNOSIS — J44.1 COPD WITH EXACERBATION: ICD-10-CM

## 2019-08-30 DIAGNOSIS — J18.9 PNEUMONIA, UNSPECIFIED ORGANISM: ICD-10-CM

## 2019-08-30 DIAGNOSIS — D72.829 LEUKOCYTOSIS, UNSPECIFIED TYPE: ICD-10-CM

## 2019-08-30 DIAGNOSIS — M05.711 RHEUMATOID ARTHRITIS INVOLVING RIGHT SHOULDER WITH POSITIVE RHEUMATOID FACTOR: Chronic | ICD-10-CM

## 2019-08-30 DIAGNOSIS — E03.9 ACQUIRED HYPOTHYROIDISM: ICD-10-CM

## 2019-08-30 DIAGNOSIS — I10 ESSENTIAL HYPERTENSION: Chronic | ICD-10-CM

## 2019-08-30 PROCEDURE — 99496 TRANSITIONAL CARE MANAGE SERVICE 7 DAY DISCHARGE: ICD-10-PCS | Mod: HCNC,S$GLB,, | Performed by: INTERNAL MEDICINE

## 2019-08-30 PROCEDURE — 99999 PR PBB SHADOW E&M-EST. PATIENT-LVL V: CPT | Mod: PBBFAC,HCNC,, | Performed by: INTERNAL MEDICINE

## 2019-08-30 PROCEDURE — 71046 X-RAY EXAM CHEST 2 VIEWS: CPT | Mod: 26,HCNC,, | Performed by: RADIOLOGY

## 2019-08-30 PROCEDURE — 71046 X-RAY EXAM CHEST 2 VIEWS: CPT | Mod: TC,HCNC,PO

## 2019-08-30 PROCEDURE — 99499 UNLISTED E&M SERVICE: CPT | Mod: HCNC,S$GLB,, | Performed by: INTERNAL MEDICINE

## 2019-08-30 PROCEDURE — 71046 XR CHEST PA AND LATERAL: ICD-10-PCS | Mod: 26,HCNC,, | Performed by: RADIOLOGY

## 2019-08-30 PROCEDURE — 99496 TRANSJ CARE MGMT HIGH F2F 7D: CPT | Mod: HCNC,S$GLB,, | Performed by: INTERNAL MEDICINE

## 2019-08-30 PROCEDURE — 99499 RISK ADDL DX/OHS AUDIT: ICD-10-PCS | Mod: HCNC,S$GLB,, | Performed by: INTERNAL MEDICINE

## 2019-08-30 PROCEDURE — 99999 PR PBB SHADOW E&M-EST. PATIENT-LVL V: ICD-10-PCS | Mod: PBBFAC,HCNC,, | Performed by: INTERNAL MEDICINE

## 2019-08-30 RX ORDER — CEFDINIR 300 MG/1
300 CAPSULE ORAL 2 TIMES DAILY
Qty: 20 CAPSULE | Refills: 0 | Status: SHIPPED | OUTPATIENT
Start: 2019-08-30 | End: 2019-09-09

## 2019-08-30 NOTE — TELEPHONE ENCOUNTER
PC with pt    Pulse ox 91%.  Daughter has picked up new Omnicef Rx, to start soon.    Discussed labs, WBC higher 77K, Hct stable.  Lytes ok.  Pneumonia is not resolving on augmentin, so changing to Omnicef  Pt does not want to go to hospital.  Will drink plenty fluids and rest.  To ER if fever or worsening shortness of breath or weakness or distress.  SM

## 2019-08-30 NOTE — TELEPHONE ENCOUNTER
This elevated WBC has been noted before and she is being followed by Hematology.  She is being seen by Oncology today.

## 2019-08-30 NOTE — TELEPHONE ENCOUNTER
I left message on pt's home phone that CXR shows a new spot.  Stop augmentin, start omnicef.  CXR in 3-4d.  SM

## 2019-08-30 NOTE — TELEPHONE ENCOUNTER
S/w pt regarding that CXR shows a new spot.  Stop augmentin, start omnicef.  CXR in 3-4d. Pt verbalized understanding. -KT-

## 2019-08-30 NOTE — TELEPHONE ENCOUNTER
----- Message from Maggi De Oliveira sent at 8/30/2019 12:38 PM CDT -----  Contact: [facility]   Labs calling is calling with critical labs results. #[phone]          .Thank You  Maggi De Oliveira

## 2019-09-03 ENCOUNTER — HOSPITAL ENCOUNTER (OUTPATIENT)
Dept: RADIOLOGY | Facility: HOSPITAL | Age: 79
Discharge: HOME | End: 2019-09-03
Attending: INTERNAL MEDICINE
Payer: MEDICARE

## 2019-09-03 DIAGNOSIS — J18.9 PNEUMONIA, UNSPECIFIED ORGANISM: ICD-10-CM

## 2019-09-03 PROCEDURE — 71046 X-RAY EXAM CHEST 2 VIEWS: CPT | Mod: 26,HCNC,, | Performed by: RADIOLOGY

## 2019-09-03 PROCEDURE — 71046 X-RAY EXAM CHEST 2 VIEWS: CPT | Mod: TC,HCNC,PO

## 2019-09-03 PROCEDURE — 71046 XR CHEST PA AND LATERAL: ICD-10-PCS | Mod: 26,HCNC,, | Performed by: RADIOLOGY

## 2019-09-03 RX ORDER — METOPROLOL SUCCINATE 50 MG/1
TABLET, EXTENDED RELEASE ORAL
Qty: 30 TABLET | Refills: 11 | Status: SHIPPED | OUTPATIENT
Start: 2019-09-03 | End: 2020-09-08

## 2019-09-03 NOTE — PROGRESS NOTES
"Subjective:      Patient ID: Sarah Parker is a 79 y.o. female.    Chief Complaint: Follow-up      HPI  Here for follow up of medical problems.  Feels some better, but still weak.  Tolerates omnicef.  No f/c.  Less short of breath, on O2 at night and a lot of the time during the day.  Pulse ox at home 94-96% now during the day.  BMs much better on omnicef from augmentin.  Does not appear that she got steroids in the hospital.  Last night she coughed to exhaustion and gagging.        9/3/19 CXR:  FINDINGS:  The cardiac and mediastinal silhouettes appear within normal limits.   There is persistent left basilar parenchymal opacity and effusion which is not appear significantly changed from priors.  Consider further evaluation with CT given persistence of findings.  Right lung remains clear.  Multiple healed right-sided rib fracture deformities are again noted.    Results for SARAH PARKER (MRN [id number]) as of 9/3/2019 12:43   Ref. Range 8/30/2019 11:51 9/3/2019 11:01 9/3/2019 11:28   WBC Latest Ref Range: 3.90 - 12.70 K/uL 77.35 (HH)  40.76 (H)   RBC Latest Ref Range: 4.00 - 5.40 M/uL 2.60 (L)  2.50 (L)   Hemoglobin Latest Ref Range: 12.0 - 16.0 g/dL 8.6 (L)  7.9 (L)   Hematocrit Latest Ref Range: 37.0 - 48.5 % 26.5 (L)  25.7 (L)   MCV Latest Ref Range: 82 - 98 fL 102 (H)  103 (H)   MCH Latest Ref Range: 27.0 - 31.0 pg 33.1 (H)  31.6 (H)   MCHC Latest Ref Range: 32.0 - 36.0 g/dL 32.5  30.7 (L)   RDW Latest Ref Range: 11.5 - 14.5 % 20.1 (H)  19.9 (H)   Platelets Latest Ref Range: 150 - 350 K/uL 188  184   MPV Latest Ref Range: 9.2 - 12.9 fL 10.4  9.8     Review of Systems      Objective:   /66 (BP Location: Left arm, Patient Position: Sitting, BP Method: Medium (Manual))   Pulse 104   Temp 97.8 °F (36.6 °C) (Oral)   Ht 5' 2" (1.575 m)   Wt 54.5 kg (120 lb 2.4 oz)   LMP  (LMP Unknown)   SpO2 96%   BMI 21.98 kg/m²     Physical Exam        Results for SARAH PARKER (MRN [id number]) as of 9/3/2019 " 13:22   Ref. Range 8/30/2019 11:51 9/3/2019 11:01 9/3/2019 11:28   WBC Latest Ref Range: 3.90 - 12.70 K/uL 77.35 (HH)  40.76 (H)   RBC Latest Ref Range: 4.00 - 5.40 M/uL 2.60 (L)  2.50 (L)   Hemoglobin Latest Ref Range: 12.0 - 16.0 g/dL 8.6 (L)  7.9 (L)   Hematocrit Latest Ref Range: 37.0 - 48.5 % 26.5 (L)  25.7 (L)   MCV Latest Ref Range: 82 - 98 fL 102 (H)  103 (H)   MCH Latest Ref Range: 27.0 - 31.0 pg 33.1 (H)  31.6 (H)   MCHC Latest Ref Range: 32.0 - 36.0 g/dL 32.5  30.7 (L)   RDW Latest Ref Range: 11.5 - 14.5 % 20.1 (H)  19.9 (H)   Platelets Latest Ref Range: 150 - 350 K/uL 188  184   MPV Latest Ref Range: 9.2 - 12.9 fL 10.4  9.8   Platelet Estimate Unknown   Appears normal   Gran% Latest Ref Range: 38.0 - 73.0 % 45.0  46.0   Lymph% Latest Ref Range: 18.0 - 48.0 % 20.0  25.0   Lymph # Latest Ref Range: 1.0 - 4.8 K/uL   CANCELED   Mono% Latest Ref Range: 4.0 - 15.0 % 18.0 (H)  16.0 (H)   Mono # Latest Ref Range: 0.3 - 1.0 K/uL   CANCELED   Eosinophil% Latest Ref Range: 0.0 - 8.0 % 0.0  0.0   Eos # Latest Ref Range: 0.0 - 0.5 K/uL   CANCELED   Basophil% Latest Ref Range: 0.0 - 1.9 % 0.0  0.0   Baso # Latest Ref Range: 0.00 - 0.20 K/uL   CANCELED   BANDS Latest Units: % 3.0  4.0   Metamyelocytes Latest Units: % 3.0  5.0   Myelocytes Latest Units: % 5.0  2.0   Promyelocytes Latest Units: % 1.0  1.0   Blasts Latest Ref Range: 0 % 5.0 (A)  1.0 (A)   Aniso Unknown Slight  Slight   Poik Unknown Slight  Slight   Poly Unknown   Occasional   Large/Giant Platelets Unknown   Present   Differential Method Unknown Manual  Manual   Sodium Latest Ref Range: 136 - 145 mmol/L 133 (L)  131 (L)   Potassium Latest Ref Range: 3.5 - 5.1 mmol/L 3.9  4.3   Chloride Latest Ref Range: 95 - 110 mmol/L 95  97   CO2 Latest Ref Range: 23 - 29 mmol/L 26  25   Anion Gap Latest Ref Range: 8 - 16 mmol/L 12  9   BUN, Bld Latest Ref Range: 8 - 23 mg/dL 7 (L)  12   Creatinine Latest Ref Range: 0.5 - 1.4 mg/dL 0.7  0.8   eGFR if non African American  Latest Ref Range: >60 mL/min/1.73 m^2 >60.0  >60.0   eGFR if African American Latest Ref Range: >60 mL/min/1.73 m^2 >60.0  >60.0   Glucose Latest Ref Range: 70 - 110 mg/dL 97  100   Calcium Latest Ref Range: 8.7 - 10.5 mg/dL 8.6 (L)  8.8   Alkaline Phosphatase Latest Ref Range: 55 - 135 U/L 129  115   PROTEIN TOTAL Latest Ref Range: 6.0 - 8.4 g/dL 7.9  7.8   Albumin Latest Ref Range: 3.5 - 5.2 g/dL 2.5 (L)  2.7 (L)   BILIRUBIN TOTAL Latest Ref Range: 0.1 - 1.0 mg/dL 0.3  0.3   AST Latest Ref Range: 10 - 40 U/L 24  16   ALT Latest Ref Range: 10 - 44 U/L 17  11   TSH Latest Ref Range: 0.400 - 4.000 uIU/mL   5.174 (H)   Free T4 Latest Ref Range: 0.71 - 1.51 ng/dL   1.20     Assessment:       1. Pneumonia, unspecified organism    2. Leukocytosis, unspecified type    3. Rheumatoid arthritis involving right shoulder with positive rheumatoid factor    4. Acquired hypothyroidism    5. COPD with exacerbation    6. Essential hypertension    7. Depression, recurrent    8. COPD exacerbation          Plan:     Pneumonia, unspecified organism, COPD with exacerbation- cont omnicef, steroid now for bronchospasm.  -     methylPREDNISolone acetate injection 60 mg  -     Ambulatory referral to Pulmonology  -     X-Ray Chest PA And Lateral; Future; Expected date: 09/04/2019    Leukocytosis, unspecified type  -     CBC auto differential; Future; Expected date: 09/04/2019    Rheumatoid arthritis involving right shoulder with positive rheumatoid factor  Acquired hypothyroidism  Essential hypertension  Depression, recurrent  All stable now.

## 2019-09-03 NOTE — H&P (VIEW-ONLY)
"Subjective:      Patient ID: Sarah Parker is a 79 y.o. female.    Chief Complaint: Follow-up      HPI  Here for follow up of medical problems.  Feels some better, but still weak.  Tolerates omnicef.  No f/c.  Less short of breath, on O2 at night and a lot of the time during the day.  Pulse ox at home 94-96% now during the day.  BMs much better on omnicef from augmentin.  Does not appear that she got steroids in the hospital.  Last night she coughed to exhaustion and gagging.        9/3/19 CXR:  FINDINGS:  The cardiac and mediastinal silhouettes appear within normal limits.   There is persistent left basilar parenchymal opacity and effusion which is not appear significantly changed from priors.  Consider further evaluation with CT given persistence of findings.  Right lung remains clear.  Multiple healed right-sided rib fracture deformities are again noted.    Results for SARAH PARKER (MRN [id number]) as of 9/3/2019 12:43   Ref. Range 8/30/2019 11:51 9/3/2019 11:01 9/3/2019 11:28   WBC Latest Ref Range: 3.90 - 12.70 K/uL 77.35 (HH)  40.76 (H)   RBC Latest Ref Range: 4.00 - 5.40 M/uL 2.60 (L)  2.50 (L)   Hemoglobin Latest Ref Range: 12.0 - 16.0 g/dL 8.6 (L)  7.9 (L)   Hematocrit Latest Ref Range: 37.0 - 48.5 % 26.5 (L)  25.7 (L)   MCV Latest Ref Range: 82 - 98 fL 102 (H)  103 (H)   MCH Latest Ref Range: 27.0 - 31.0 pg 33.1 (H)  31.6 (H)   MCHC Latest Ref Range: 32.0 - 36.0 g/dL 32.5  30.7 (L)   RDW Latest Ref Range: 11.5 - 14.5 % 20.1 (H)  19.9 (H)   Platelets Latest Ref Range: 150 - 350 K/uL 188  184   MPV Latest Ref Range: 9.2 - 12.9 fL 10.4  9.8     Review of Systems      Objective:   /66 (BP Location: Left arm, Patient Position: Sitting, BP Method: Medium (Manual))   Pulse 104   Temp 97.8 °F (36.6 °C) (Oral)   Ht 5' 2" (1.575 m)   Wt 54.5 kg (120 lb 2.4 oz)   LMP  (LMP Unknown)   SpO2 96%   BMI 21.98 kg/m²     Physical Exam        Results for SARAH PARKER (MRN [id number]) as of 9/3/2019 " 13:22   Ref. Range 8/30/2019 11:51 9/3/2019 11:01 9/3/2019 11:28   WBC Latest Ref Range: 3.90 - 12.70 K/uL 77.35 (HH)  40.76 (H)   RBC Latest Ref Range: 4.00 - 5.40 M/uL 2.60 (L)  2.50 (L)   Hemoglobin Latest Ref Range: 12.0 - 16.0 g/dL 8.6 (L)  7.9 (L)   Hematocrit Latest Ref Range: 37.0 - 48.5 % 26.5 (L)  25.7 (L)   MCV Latest Ref Range: 82 - 98 fL 102 (H)  103 (H)   MCH Latest Ref Range: 27.0 - 31.0 pg 33.1 (H)  31.6 (H)   MCHC Latest Ref Range: 32.0 - 36.0 g/dL 32.5  30.7 (L)   RDW Latest Ref Range: 11.5 - 14.5 % 20.1 (H)  19.9 (H)   Platelets Latest Ref Range: 150 - 350 K/uL 188  184   MPV Latest Ref Range: 9.2 - 12.9 fL 10.4  9.8   Platelet Estimate Unknown   Appears normal   Gran% Latest Ref Range: 38.0 - 73.0 % 45.0  46.0   Lymph% Latest Ref Range: 18.0 - 48.0 % 20.0  25.0   Lymph # Latest Ref Range: 1.0 - 4.8 K/uL   CANCELED   Mono% Latest Ref Range: 4.0 - 15.0 % 18.0 (H)  16.0 (H)   Mono # Latest Ref Range: 0.3 - 1.0 K/uL   CANCELED   Eosinophil% Latest Ref Range: 0.0 - 8.0 % 0.0  0.0   Eos # Latest Ref Range: 0.0 - 0.5 K/uL   CANCELED   Basophil% Latest Ref Range: 0.0 - 1.9 % 0.0  0.0   Baso # Latest Ref Range: 0.00 - 0.20 K/uL   CANCELED   BANDS Latest Units: % 3.0  4.0   Metamyelocytes Latest Units: % 3.0  5.0   Myelocytes Latest Units: % 5.0  2.0   Promyelocytes Latest Units: % 1.0  1.0   Blasts Latest Ref Range: 0 % 5.0 (A)  1.0 (A)   Aniso Unknown Slight  Slight   Poik Unknown Slight  Slight   Poly Unknown   Occasional   Large/Giant Platelets Unknown   Present   Differential Method Unknown Manual  Manual   Sodium Latest Ref Range: 136 - 145 mmol/L 133 (L)  131 (L)   Potassium Latest Ref Range: 3.5 - 5.1 mmol/L 3.9  4.3   Chloride Latest Ref Range: 95 - 110 mmol/L 95  97   CO2 Latest Ref Range: 23 - 29 mmol/L 26  25   Anion Gap Latest Ref Range: 8 - 16 mmol/L 12  9   BUN, Bld Latest Ref Range: 8 - 23 mg/dL 7 (L)  12   Creatinine Latest Ref Range: 0.5 - 1.4 mg/dL 0.7  0.8   eGFR if non African American  Latest Ref Range: >60 mL/min/1.73 m^2 >60.0  >60.0   eGFR if African American Latest Ref Range: >60 mL/min/1.73 m^2 >60.0  >60.0   Glucose Latest Ref Range: 70 - 110 mg/dL 97  100   Calcium Latest Ref Range: 8.7 - 10.5 mg/dL 8.6 (L)  8.8   Alkaline Phosphatase Latest Ref Range: 55 - 135 U/L 129  115   PROTEIN TOTAL Latest Ref Range: 6.0 - 8.4 g/dL 7.9  7.8   Albumin Latest Ref Range: 3.5 - 5.2 g/dL 2.5 (L)  2.7 (L)   BILIRUBIN TOTAL Latest Ref Range: 0.1 - 1.0 mg/dL 0.3  0.3   AST Latest Ref Range: 10 - 40 U/L 24  16   ALT Latest Ref Range: 10 - 44 U/L 17  11   TSH Latest Ref Range: 0.400 - 4.000 uIU/mL   5.174 (H)   Free T4 Latest Ref Range: 0.71 - 1.51 ng/dL   1.20     Assessment:       1. Pneumonia, unspecified organism    2. Leukocytosis, unspecified type    3. Rheumatoid arthritis involving right shoulder with positive rheumatoid factor    4. Acquired hypothyroidism    5. COPD with exacerbation    6. Essential hypertension    7. Depression, recurrent    8. COPD exacerbation          Plan:     Pneumonia, unspecified organism, COPD with exacerbation- cont omnicef, steroid now for bronchospasm.  -     methylPREDNISolone acetate injection 60 mg  -     Ambulatory referral to Pulmonology  -     X-Ray Chest PA And Lateral; Future; Expected date: 09/04/2019    Leukocytosis, unspecified type  -     CBC auto differential; Future; Expected date: 09/04/2019    Rheumatoid arthritis involving right shoulder with positive rheumatoid factor  Acquired hypothyroidism  Essential hypertension  Depression, recurrent  All stable now.

## 2019-09-04 ENCOUNTER — OFFICE VISIT (OUTPATIENT)
Dept: FAMILY MEDICINE | Facility: CLINIC | Age: 79
End: 2019-09-04
Payer: MEDICARE

## 2019-09-04 ENCOUNTER — TELEPHONE (OUTPATIENT)
Dept: HOME HEALTH SERVICES | Facility: HOSPITAL | Age: 79
End: 2019-09-04

## 2019-09-04 VITALS
WEIGHT: 120.13 LBS | OXYGEN SATURATION: 96 % | BODY MASS INDEX: 22.11 KG/M2 | TEMPERATURE: 98 F | HEIGHT: 62 IN | HEART RATE: 104 BPM | SYSTOLIC BLOOD PRESSURE: 138 MMHG | DIASTOLIC BLOOD PRESSURE: 66 MMHG

## 2019-09-04 DIAGNOSIS — F33.9 DEPRESSION, RECURRENT: ICD-10-CM

## 2019-09-04 DIAGNOSIS — J18.9 PNEUMONIA, UNSPECIFIED ORGANISM: Primary | ICD-10-CM

## 2019-09-04 DIAGNOSIS — D72.829 LEUKOCYTOSIS, UNSPECIFIED TYPE: ICD-10-CM

## 2019-09-04 DIAGNOSIS — M05.711 RHEUMATOID ARTHRITIS INVOLVING RIGHT SHOULDER WITH POSITIVE RHEUMATOID FACTOR: Chronic | ICD-10-CM

## 2019-09-04 DIAGNOSIS — J44.1 COPD EXACERBATION: ICD-10-CM

## 2019-09-04 DIAGNOSIS — J44.1 COPD WITH EXACERBATION: ICD-10-CM

## 2019-09-04 DIAGNOSIS — I10 ESSENTIAL HYPERTENSION: Chronic | ICD-10-CM

## 2019-09-04 DIAGNOSIS — E03.9 ACQUIRED HYPOTHYROIDISM: ICD-10-CM

## 2019-09-04 PROCEDURE — 1101F PT FALLS ASSESS-DOCD LE1/YR: CPT | Mod: HCNC,CPTII,S$GLB, | Performed by: INTERNAL MEDICINE

## 2019-09-04 PROCEDURE — 3075F PR MOST RECENT SYSTOLIC BLOOD PRESS GE 130-139MM HG: ICD-10-PCS | Mod: HCNC,CPTII,S$GLB, | Performed by: INTERNAL MEDICINE

## 2019-09-04 PROCEDURE — 99999 PR PBB SHADOW E&M-EST. PATIENT-LVL V: CPT | Mod: PBBFAC,HCNC,, | Performed by: INTERNAL MEDICINE

## 2019-09-04 PROCEDURE — 99999 PR PBB SHADOW E&M-EST. PATIENT-LVL V: ICD-10-PCS | Mod: PBBFAC,HCNC,, | Performed by: INTERNAL MEDICINE

## 2019-09-04 PROCEDURE — 99213 OFFICE O/P EST LOW 20 MIN: CPT | Mod: 25,HCNC,S$GLB, | Performed by: INTERNAL MEDICINE

## 2019-09-04 PROCEDURE — 96372 THER/PROPH/DIAG INJ SC/IM: CPT | Mod: HCNC,S$GLB,, | Performed by: INTERNAL MEDICINE

## 2019-09-04 PROCEDURE — 96372 PR INJECTION,THERAP/PROPH/DIAG2ST, IM OR SUBCUT: ICD-10-PCS | Mod: HCNC,S$GLB,, | Performed by: INTERNAL MEDICINE

## 2019-09-04 PROCEDURE — 3078F PR MOST RECENT DIASTOLIC BLOOD PRESSURE < 80 MM HG: ICD-10-PCS | Mod: HCNC,CPTII,S$GLB, | Performed by: INTERNAL MEDICINE

## 2019-09-04 PROCEDURE — 3078F DIAST BP <80 MM HG: CPT | Mod: HCNC,CPTII,S$GLB, | Performed by: INTERNAL MEDICINE

## 2019-09-04 PROCEDURE — 99213 PR OFFICE/OUTPT VISIT, EST, LEVL III, 20-29 MIN: ICD-10-PCS | Mod: 25,HCNC,S$GLB, | Performed by: INTERNAL MEDICINE

## 2019-09-04 PROCEDURE — 1101F PR PT FALLS ASSESS DOC 0-1 FALLS W/OUT INJ PAST YR: ICD-10-PCS | Mod: HCNC,CPTII,S$GLB, | Performed by: INTERNAL MEDICINE

## 2019-09-04 PROCEDURE — 3075F SYST BP GE 130 - 139MM HG: CPT | Mod: HCNC,CPTII,S$GLB, | Performed by: INTERNAL MEDICINE

## 2019-09-04 RX ORDER — METHYLPREDNISOLONE ACETATE 80 MG/ML
60 INJECTION, SUSPENSION INTRA-ARTICULAR; INTRALESIONAL; INTRAMUSCULAR; SOFT TISSUE
Status: COMPLETED | OUTPATIENT
Start: 2019-09-04 | End: 2019-09-04

## 2019-09-04 RX ADMIN — METHYLPREDNISOLONE ACETATE 60 MG: 80 INJECTION, SUSPENSION INTRA-ARTICULAR; INTRALESIONAL; INTRAMUSCULAR; SOFT TISSUE at 10:09

## 2019-09-05 ENCOUNTER — EXTERNAL HOME HEALTH (OUTPATIENT)
Dept: HOME HEALTH SERVICES | Facility: HOSPITAL | Age: 79
End: 2019-09-05
Payer: MEDICARE

## 2019-09-05 ENCOUNTER — OFFICE VISIT (OUTPATIENT)
Dept: PULMONOLOGY | Facility: CLINIC | Age: 79
End: 2019-09-05
Payer: MEDICARE

## 2019-09-05 ENCOUNTER — HOSPITAL ENCOUNTER (OUTPATIENT)
Dept: RADIOLOGY | Facility: HOSPITAL | Age: 79
Discharge: HOME | End: 2019-09-05
Attending: INTERNAL MEDICINE
Payer: MEDICARE

## 2019-09-05 ENCOUNTER — TELEPHONE (OUTPATIENT)
Dept: RADIOLOGY | Facility: HOSPITAL | Age: 79
End: 2019-09-05

## 2019-09-05 VITALS
OXYGEN SATURATION: 96 % | DIASTOLIC BLOOD PRESSURE: 80 MMHG | SYSTOLIC BLOOD PRESSURE: 138 MMHG | BODY MASS INDEX: 21.99 KG/M2 | WEIGHT: 119.5 LBS | HEART RATE: 95 BPM | HEIGHT: 62 IN | RESPIRATION RATE: 18 BRPM

## 2019-09-05 DIAGNOSIS — J18.9 PNEUMONIA, UNSPECIFIED ORGANISM: ICD-10-CM

## 2019-09-05 DIAGNOSIS — M85.80 OSTEOPENIA, UNSPECIFIED LOCATION: ICD-10-CM

## 2019-09-05 DIAGNOSIS — D72.829 LEUKOCYTOSIS, UNSPECIFIED TYPE: ICD-10-CM

## 2019-09-05 DIAGNOSIS — D53.9 MACROCYTIC ANEMIA: Primary | ICD-10-CM

## 2019-09-05 DIAGNOSIS — D53.9 MACROCYTIC ANEMIA: ICD-10-CM

## 2019-09-05 DIAGNOSIS — D64.9 ANEMIA, UNSPECIFIED TYPE: ICD-10-CM

## 2019-09-05 DIAGNOSIS — I10 ESSENTIAL HYPERTENSION: Chronic | ICD-10-CM

## 2019-09-05 DIAGNOSIS — J18.9 PNEUMONIA OF LEFT LOWER LOBE DUE TO INFECTIOUS ORGANISM: Primary | ICD-10-CM

## 2019-09-05 DIAGNOSIS — M05.10 RHEUMATOID LUNG: ICD-10-CM

## 2019-09-05 DIAGNOSIS — R91.8 MULTIPLE LUNG NODULES ON CT: ICD-10-CM

## 2019-09-05 DIAGNOSIS — N28.89 RENAL MASS: ICD-10-CM

## 2019-09-05 DIAGNOSIS — D72.829 LEUKOCYTOSIS, UNSPECIFIED TYPE: Primary | ICD-10-CM

## 2019-09-05 PROCEDURE — 71046 XR CHEST PA AND LATERAL: ICD-10-PCS | Mod: 26,HCNC,, | Performed by: RADIOLOGY

## 2019-09-05 PROCEDURE — 99499 RISK ADDL DX/OHS AUDIT: ICD-10-PCS | Mod: HCNC,S$GLB,, | Performed by: INTERNAL MEDICINE

## 2019-09-05 PROCEDURE — 3079F PR MOST RECENT DIASTOLIC BLOOD PRESSURE 80-89 MM HG: ICD-10-PCS | Mod: HCNC,CPTII,S$GLB, | Performed by: INTERNAL MEDICINE

## 2019-09-05 PROCEDURE — 99999 PR PBB SHADOW E&M-EST. PATIENT-LVL V: ICD-10-PCS | Mod: PBBFAC,HCNC,, | Performed by: INTERNAL MEDICINE

## 2019-09-05 PROCEDURE — 3075F PR MOST RECENT SYSTOLIC BLOOD PRESS GE 130-139MM HG: ICD-10-PCS | Mod: HCNC,CPTII,S$GLB, | Performed by: INTERNAL MEDICINE

## 2019-09-05 PROCEDURE — 99499 UNLISTED E&M SERVICE: CPT | Mod: HCNC,S$GLB,, | Performed by: INTERNAL MEDICINE

## 2019-09-05 PROCEDURE — 71046 X-RAY EXAM CHEST 2 VIEWS: CPT | Mod: TC,HCNC

## 2019-09-05 PROCEDURE — 99999 PR PBB SHADOW E&M-EST. PATIENT-LVL V: CPT | Mod: PBBFAC,HCNC,, | Performed by: INTERNAL MEDICINE

## 2019-09-05 PROCEDURE — 3288F PR FALLS RISK ASSESSMENT DOCUMENTED: ICD-10-PCS | Mod: HCNC,CPTII,S$GLB, | Performed by: INTERNAL MEDICINE

## 2019-09-05 PROCEDURE — 1100F PR PT FALLS ASSESS DOC 2+ FALLS/FALL W/INJURY/YR: ICD-10-PCS | Mod: HCNC,CPTII,S$GLB, | Performed by: INTERNAL MEDICINE

## 2019-09-05 PROCEDURE — 99215 PR OFFICE/OUTPT VISIT, EST, LEVL V, 40-54 MIN: ICD-10-PCS | Mod: HCNC,S$GLB,, | Performed by: INTERNAL MEDICINE

## 2019-09-05 PROCEDURE — 3288F FALL RISK ASSESSMENT DOCD: CPT | Mod: HCNC,CPTII,S$GLB, | Performed by: INTERNAL MEDICINE

## 2019-09-05 PROCEDURE — 71046 X-RAY EXAM CHEST 2 VIEWS: CPT | Mod: 26,HCNC,, | Performed by: RADIOLOGY

## 2019-09-05 PROCEDURE — 99215 OFFICE O/P EST HI 40 MIN: CPT | Mod: HCNC,S$GLB,, | Performed by: INTERNAL MEDICINE

## 2019-09-05 PROCEDURE — 3075F SYST BP GE 130 - 139MM HG: CPT | Mod: HCNC,CPTII,S$GLB, | Performed by: INTERNAL MEDICINE

## 2019-09-05 PROCEDURE — 3079F DIAST BP 80-89 MM HG: CPT | Mod: HCNC,CPTII,S$GLB, | Performed by: INTERNAL MEDICINE

## 2019-09-05 PROCEDURE — 1100F PTFALLS ASSESS-DOCD GE2>/YR: CPT | Mod: HCNC,CPTII,S$GLB, | Performed by: INTERNAL MEDICINE

## 2019-09-05 NOTE — PROGRESS NOTES
Subjective:       Patient ID: Sarah Parker is a 79 y.o. female.    Chief Complaint:   She   presents for evaluation and treatment of pneumonia  after being discharged from the hospital  2  weeks ago. Hx of Rheumatoid lung disease with pulmonary nodules in the past. Since discharge symptoms have unchanged course since that time. Still weak. Patient denies fever or chills. Symptoms are aggravated by acitivty - unable to perfrom activities of daily living . Symptoms improve with rest. On oxygen  Respiratory: positive for dyspnea on exertion and sputum; Cardiovascular: no chest pain or palpitations.  Patient currently is on oxygen at 2 L/min per nasal cannula..  MEDICAL RECORDS FROM THE HOSPITAL REVIEWED:  [facility] Hospital Medicine  Discharge Summary     Patient Name: Sarah Parker  MRN: [id number]  Admission Date: 8/21/2019  Hospital Length of Stay: 5 days  Discharge Date and Time:  08/26/2019 1:27 PM  Attending Physician: Hari Sloan MD   Discharging Provider: Hari Sloan MD  Primary Care Provider: Briseida Reyes MD     HPI:   Ms. Parker is an elderly 79-year-old  female with PMH significant for rheumatoid arthritis, chronic leukocytosis, followed in the Oncology Clinic by Dr. Devine, history of bone marrow biopsy in the past, presents to the ED complaining of five days of generalized weakness, associated with fever.  She was seen by Dr. Reyes (PCP), was told that she had acute viral bronchitis and was prescribed oral Zithromax.  However patient has not been improving, hence presented to the ED.  She reported fever of 101.3, -135.  WBC 60,000, 0% bands, lactic acid 1.2.  Chest x-ray reveals left lower lobe infiltrate.  Other laboratory abnormalities including low potassium of 2.9, hemoglobin 7.0 (recently was 8.9).  Patient received IV levofloxacin, IV Zosyn, IV vancomycin empirically in the ED.  Admitting diagnosis sepsis secondary to pneumonia.     * No  surgery found *       Hospital Course:   8/22:  States to be breathing better;  Nurse reported difficult breathing with some gurgling type respirations;  Fluids going at 125cc/hr have been curtailed;  ABG with reduce PCo2 at ~24; oxygenating well; have asked for a portable chest xray;  Will continue nebsl; attention to pulmonary toilet; incentives etc     8/23:  Looks and feels better today;  Complains of no BM since admission;  Colace added last evening;  To receive IV potassium and magnesium today;  BP up today; Portable CXR reviewed; syill with left sided infiltrates;  Effusion on left is less; will continue present regimen with monitoring     8/24/19 -  Pt feels better . Denies SOB or chest pain.   BP is elevated and tachycardia is noted. Pt will be placed back on BB therapy that she was on it at home.   Leukocytosis is trending down .Blood cultures no growth to date. Urine culture is negative.   H/H are stable at moderate anemia . Will monitor   Nelson is placed due to large volume urinary retention. Flomax is added as well as Bethanechol . Will consult Urology for evaluation of urinary retention. Will decrease Amitriptyline to 25 mg . This could contribute to urinary retention.       8/25/19-  Pt continues to feel better . Tmax 99.7.  Blood cultures and urine cultures are negative   Worsening leukocytosis with premature cells are noted in today's lab. Dr. Miranda saw pt today and has ordered  leukemia immuno phenotyping of  peripheral blood.      Nelson discontinued today . Pt is voiding without difficulty.     Antibiotic day #5.      8/26/19-   Pt feels better subjectively . Reports fatigue and SOB are better . We evaluated pt for home O2 this morning . Her O2 sat on RA while exercising was 88% , therefore qualifies for Home O2.   Hematology rounded on her today and their recommendations are as follows --WBC: 63.17 with 2% blasts, peripheral flow cytometry pending, patient will need bone marrow biopsy, message  sent to IR waiting for reply for scheduling. Discussed this with patient, patient is agreeable for bone marrow biopsy. Discussed with HM, plan is for possible d/c today.     Pt was noted to have urinary retention and needed Nelson placed . We started her on Flomax which has helped . Nelson discontinued and pt was able to void.      Pt is afebrile. We plans to discharge pt home today with home health service and home O2. Pt will be switched to oral Augmentin to complete antibiotic treatment for pneumonia.  She will follow up with her PCP in 3 days and Hematology /Oncology in a week and possible bone marrow biopsy as out patient.       Clarification of Home O2 requirement - Pt is noted to have hypoxia  and desaturation with exertion documented in  Home oxygen evaluation by RT. Pt has documented h/o COPD (PFT done in 11/2016) and Rheumatoid lung suggestive of chronic lung disease which has worsened due to current pulmonary infection.         Consults:           Consults (From admission, onward)         Status Ordering Provider       Inpatient consult to Oncology  Once     Provider:  Corby Miranda MD    Completed AMRITA LEAL       Inpatient consult to PICC team ([facility])  Once     Provider:  (Not yet assigned)    Acknowledged ZACK ANDERSON             No new Assessment & Plan notes have been filed under this hospital service since the last note was generated.  Service: Hospital Medicine             Final Active Diagnoses:     Diagnosis Date Noted POA    Anemia [D64.9] 08/22/2019 Unknown    Leukocytosis [D72.829] 05/03/2016 Yes    Acquired hypothyroidism [E03.9]   Yes    Rheumatoid arthritis [M06.9] 06/19/2013 Yes       Chronic       Problems Resolved During this Admission:     Diagnosis Date Noted Date Resolved POA    PRINCIPAL PROBLEM:  Sepsis [A41.9] 08/21/2019 08/26/2019 Yes    Urinary retention [R33.9] 08/24/2019 08/26/2019 Clinically Undetermined    Hypokalemia [E87.6] 08/22/2019 08/26/2019 Unknown     Hypomagnesemia [E83.42] 08/22/2019 08/26/2019 Unknown    Pneumonia of left lower lobe due to infectious organism [J18.1] 08/21/2019 08/26/2019 Yes         Discharged Condition: stable     Disposition: Home-Health Care [facility]     Follow Up:      Follow-up Information      Briseida Bennett MD. Schedule an appointment as soon as possible for a visit in 3 days.    Specialty:  Internal Medicine  Why:  discharge follow up   Contact information:  [address]  [facility] [address] [phone]                 Sathish Devine MD. Schedule an appointment as soon as possible for a visit in 1 week.    Specialty:  Hematology and Oncology  Why:  Follow up on Leukocytosis. Evaluation for bone marrow biopsy   Contact information:  [address]  [place] [address] [phone]                 [facility].    Specialty:  Home Health Services  Why:  Home Health  [facility] information:  [address] [facility] [id number] [phone]          Pneumonia    HPI  Past Medical History:   Diagnosis Date    Acid reflux     Anxiety     Back pain     Bronchitis, chronic obstructive w acute bronchitis 7/29/2016    [facility]    Cataract     2+NS    Degenerative disc disease     Depression     Dry mouth     Hernia, hiatal 11/18/2013    Hypertension     Hypothyroid     Macrocytic anemia 5/3/2016    Macular degeneration     Migraines     Mixed anxiety and depressive disorder     Multiple fractures of ribs of right side     Osteoporosis     Pneumonia     Pneumonia due to other staphylococcus     Rheumatoid arthritis     Rheumatoid arthritis(714.0)     Rheumatoid arthritis(714.0)     Remicade, MTX.     Past Surgical History:   Procedure Laterality Date    APPENDECTOMY  1985    CATARACT EXTRACTION Bilateral 6/11/15    Dr. Booth    CHOLECYSTECTOMY  2013    cryoablasion kidney Left 09/27/2016    EGD (ESOPHAGOGASTRODUODENOSCOPY) N/A 10/31/2013     Performed by Donald Cuello MD at [facility] ENDO    feet Bilateral     rheumatoid    FRACTURE SURGERY Right     tibia    FUNDOPLICATION, NISSEN, LAPAROSCOPIC N/A 2013    Performed by Galindo Vasquez MD at [facility] OR    HERNIA REPAIR      HYSTERECTOMY  1970    partial    JOINT REPLACEMENT      bilateral knees (), hands, wrists, knuckles, toes    LAPAROSCOPIC NISSEN FUNDOPLICATION      Left L5/S1 TF AYAAN with local Left 2019    Performed by Rowdy Felix [facility] at [facility] MGT    Renal Cryoablation N/A 2016    Performed by [facility] Diagnostic Provider at [facility] OR     Social History     Socioeconomic History    Marital status:      Spouse name: Not on file    Number of children: Not on file    Years of education: Not on file    Highest education level: Not on file   Occupational History    Occupation: retired   Social Needs    Financial resource strain: Not on file    Food insecurity:     Worry: Not on file     Inability: Not on file    Transportation needs:     Medical: Not on file     Non-medical: Not on file   Tobacco Use    Smoking status: Former Smoker     Packs/day: 0.25     Years: 2.00     Pack years: 0.50     Last attempt to quit: 1965     Years since quittin.8    Smokeless tobacco: Never Used   Substance and Sexual Activity    Alcohol use: No    Drug use: No    Sexual activity: Never     Partners: Male   Lifestyle    Physical activity:     Days per week: Not on file     Minutes per session: Not on file    Stress: Not on file   Relationships    Social connections:     Talks on phone: Not on file     Gets together: Not on file     Attends Jain service: Not on file     Active member of club or organization: Not on file     Attends meetings of clubs or organizations: Not on file     Relationship status: Not on file   Other Topics Concern    Not on file   Social History Narrative    Patient is aretired and live with .     Review of Systems    Constitutional: Positive for fatigue. Negative for fever.   HENT: Positive for postnasal drip, rhinorrhea and congestion.    Eyes: Negative for redness and itching.   Respiratory: Positive for cough, sputum production, shortness of breath, dyspnea on extertion, use of rescue inhaler and Paroxysmal Nocturnal Dyspnea.    Cardiovascular: Negative for chest pain, palpitations and leg swelling.   Genitourinary: Negative for difficulty urinating and hematuria.   Endocrine: Negative for cold intolerance and heat intolerance.    Skin: Negative for rash.   Gastrointestinal: Negative for nausea and abdominal pain.   Neurological: Negative for dizziness, syncope, weakness and light-headedness.   Hematological: Negative for adenopathy. Does not bruise/bleed easily.   Psychiatric/Behavioral: Negative for sleep disturbance. The patient is not nervous/anxious.        Objective:      Physical Exam   Constitutional: She is oriented to person, place, and time. She appears well-developed and well-nourished.   HENT:   Head: Normocephalic and atraumatic.   Mouth/Throat: Oropharyngeal exudate present.   Eyes: Pupils are equal, round, and reactive to light. Conjunctivae are normal.   Neck: Neck supple. No JVD present. No tracheal deviation present. No thyromegaly present.   Cardiovascular: Normal rate, regular rhythm and normal heart sounds.   Pulmonary/Chest: Effort normal. No respiratory distress. She has decreased breath sounds. She has wheezes in the right lower field and the left lower field. She has no rhonchi. She has no rales. She exhibits no tenderness.   Abdominal: Soft. Bowel sounds are normal.   Musculoskeletal: Normal range of motion. She exhibits no edema.   Lymphadenopathy:     She has no cervical adenopathy.   Neurological: She is alert and oriented to person, place, and time.   Skin: Skin is warm and dry.   Nursing note and vitals reviewed.    Personal Diagnostic Review  Chest x-ray: left lower lobe loculated pleural  effusion  CT Chest Without Contrast  Narrative: EXAMINATION:  CT CHEST WITHOUT CONTRAST    CLINICAL HISTORY:  Chest pain or SOB, pleurisy or effusion suspected; Lobar pneumonia, unspecified organism    TECHNIQUE:  Axial images performed through the chest without IV contrast.  Sagittal and coronal reformats obtained.    COMPARISON:  Chest x-ray 09/05/2019    FINDINGS:  Heart: Normal heart size.  Small pericardial effusion.  Coronary arterial calcification.    Mediastinum: Moderate sized hiatal hernia.  Numerous mildly enlarged lymph nodes present throughout the mediastinum.  Largest representative lymph node is a subcarinal lymph node measuring 2.1 x 1.8 cm.    Vasculature: Moderate aortic atherosclerosis..    Mild mediastinal adenopathy, presumably reactive.  Attention on follow-up.    No pleural effusion evident.    Lungs: Large amount of consolidation throughout the left lower lobe.  No effusion.  Some additional scattered reticulonodular opacities surrounding this area consolidation left lower lobe with some of these changes also present in the left upper lobe.  A few scattered subtle reticulonodular opacities in the right lung.  No consolidation or right-sided effusion.    Esophagus: Unremarkable.    Upper Abdomen: Aortic atherosclerosis.  Small cyst hepatic dome.    Bones: No acute fracture. Large amount of arthritic changes lower thoracic spine with levoscoliosis lower thoracic spine.  Impression: Large amount of left lower lobe consolidation consistent with pneumonia.  Additional scattered reticulonodular opacities in the lungs greater in the left upper lobe.    Negative for pleural effusion.    Very small pericardial effusion.    All CT scans at this facility use dose modulation, iterative reconstruction and/or weight based dosing when appropriate to reduce radiation dose to as low as reasonably achievable.    Electronically signed by: Escobar Walsh MD  Date:    09/06/2019  Time:    12:45  CT Biopsy Bone  Marrow (xpd)  Narrative: EXAMINATION:  CT BIOPSY BONE MARROW (XPD)    CLINICAL HISTORY:  Nutritional anemia, unspecified    TECHNIQUE:  1% lidocaine locally    :ANITA Rodrigues    Complications: None    COMPARISON:  None    FINDINGS:  Procedure: The risks and benefits of this procedure were discussed with the patient, written informed consent was obtained.  The patient was placed prone on the CT gantry.  Preprocedural imaging revealed normal positioning of the iliac crests.  A suitable skin site for biopsy was selected. Moderate sedation using versed and fentanyl was provided with and trained observer monitoring the patient's vital signs and level of consciousness. The total time of sedation was 30 minutes.    The skin site was prepped and draped in sterile fashion.  The skin was anesthetized with 1% lidocaine.    Using CT guidance a 11 gauge introducer needle was guided into the right iliac crest.  Prior to biopsy appropriate needle positioning was confirmed with CT. Thirty cc of marrow aspirate was obtained and given to pathology.  Single 11 gauge marrow core biopsy was then obtained and given to pathology.    The needle was removed.    Postprocedural imaging was acquired. The site was bandaged sterilely. The patient left the room in stable condition.    Findings:    1.  Preprocedural CT showed appropriate imaging characteristics for right iliac bone marrow aspiration and biopsy.    2.  CT-guided biopsy of a right iliac crest with 11 gauge samples acquired.  3.  Post procedural CT showed No complication.  Impression: CT guided marrow aspiration and biopsy from the right iliac crest.    All CT scans at this facility use dose modulation, iterative reconstruction, and/or weight based dosing when appropriate to reduce radiation dose to as low as reasonable achievable.    Electronically signed by: Zeyad Rodrigues MD  Date:    09/06/2019  Time:    12:39      .[email or web address]  No flowsheet data found.        Assessment:        1. Pneumonia of left lower lobe due to infectious organism    2. Multiple lung nodules on CT    3. Rheumatoid lung        Outpatient Encounter Medications as of 9/5/2019   Medication Sig Dispense Refill    albuterol (PROVENTIL) 2.5 mg /3 mL (0.083 %) nebulizer solution Take 3 mLs (2.5 mg total) by nebulization every 6 (six) hours as needed for Wheezing. 1 Box 5    amitriptyline (ELAVIL) 25 MG tablet Take 1 tablet (25 mg total) by mouth every evening. Was 75 mg, now decrease to 25 mg 30 tablet 0    benzonatate (TESSALON) 100 MG capsule Take 1-2 capsules (100-200 mg total) by mouth 3 (three) times daily as needed for Cough. 60 capsule 0    calcium citrate-vitamin D (CITRACAL + D) 315-200 mg-unit per tablet Take 1 tablet by mouth once daily.       cefdinir (OMNICEF) 300 MG capsule Take 1 capsule (300 mg total) by mouth 2 (two) times daily. for 10 days 20 capsule 0    DULoxetine (CYMBALTA) 20 MG capsule Take 2 capsules (40 mg total) by mouth once daily. 180 capsule 3    ferrous gluconate 324 mg (37.5 mg iron) Tab tablet Take 1 tablet (324 mg total) by mouth daily with breakfast. 30 tablet 11    hydrocodone-acetaminophen 5-325mg (NORCO) 5-325 mg per tablet TK 1 T PO Q 6 H PRN  0    leucovorin (WELLCOVORIN) 5 mg Tab TAKE ONE WEEKLY ON SATURDAYS 4 tablet 3    levothyroxine (SYNTHROID) 88 MCG tablet TAKE 1 TABLET BY MOUTH BEFORE BREAKFAST 90 tablet 3    magnesium oxide (MAG-OX) 400 mg (241.3 mg magnesium) tablet Take 1 tablet (400 mg total) by mouth 3 (three) times daily.  0    meclizine (ANTIVERT) 50 MG tablet Take 25 mg by mouth 3 (three) times daily as needed.      methotrexate 2.5 MG Tab Take 17.5 mg by mouth every 7 days.       metoprolol succinate (TOPROL-XL) 100 MG 24 hr tablet Take 100 mg by mouth once daily.      metoprolol succinate (TOPROL-XL) 50 MG 24 hr tablet TAKE 1 TABLET(50 MG) BY MOUTH EVERY DAY 30 tablet 11    multivitamin capsule Take by mouth. As directed      ondansetron (ZOFRAN) 4 MG  tablet Take 1 tablet (4 mg total) by mouth every 8 (eight) hours as needed for Nausea. 30 tablet 1    pravastatin (PRAVACHOL) 10 MG tablet TAKE 1 TABLET(10 MG) BY MOUTH EVERY DAY 30 tablet 11    tamsulosin (FLOMAX) 0.4 mg Cap Take 1 capsule (0.4 mg total) by mouth once daily. For urinary retention 30 capsule 0    tocilizumab (ACTEMRA) 80 mg/4 mL (20 mg/mL) Soln Inject into the vein.      topiramate (TOPAMAX) 25 MG tablet Take 1 tablet (25 mg total) by mouth at night x 1 week then increase to 2 (two) times daily. Increase as tolerated. 60 tablet 0    valsartan-hydrochlorothiazide (DIOVAN-HCT) 80-12.5 mg per tablet TAKE 1 TABLET BY MOUTH DAILY 90 tablet 3    [] methylPREDNISolone acetate injection 60 mg        No facility-administered encounter medications on file as of 2019.      Orders Placed This Encounter   Procedures    CT Chest Without Contrast     Standing Status:   Future     Number of Occurrences:   1     Standing Expiration Date:   2020     Order Specific Question:   May the Radiologist modify the order per protocol to meet the clinical needs of the patient?     Answer:   Yes     Plan:       Requested Prescriptions      No prescriptions requested or ordered in this encounter     Pneumonia of left lower lobe due to infectious organism  -     CT Chest Without Contrast; Future; Expected date: 2019    Multiple lung nodules on CT    Rheumatoid lung           Follow up in about 6 weeks (around 10/17/2019).    Review of medical records from hospital. Medical records from hospital were reviewed during office visit - these included but were not limited to review of radiographic studies and /or radiologists reports, laboratory studies, discharge summaries, procedure notes, consultations and other transcribed notes.    MEDICAL DECISION MAKING: Moderate to high complexity.  Overall, the multiple problems listed are of moderate to high severity that may impact quality of life and activities  of daily living. Side effects of medications, treatment plan as well as options and alternatives reviewed and discussed with patient. There was counseling of patient concerning these issues.    Total time spent in face to face counseling and coordination of care - 40 minutes over 50% of time was used in discussion of prognosis, risks, benefits of treatment, instructions and compliance with regimen . Discussion with other physicians or health care providers (homehealth, durable medical equipment providers).

## 2019-09-05 NOTE — PATIENT INSTRUCTIONS
PRE-THORACENTESIS INSTRUCTIONS   *Arrive at    am / pm (2 hours before your scheduled procedure time). This arrival time will allow us time to prepare you for your procedure   on (day)  at(time)  on(date) . Check in at Endoscopy Lab desk on the 5th Floor of [facility] [id number] [place] [address] [address] [place] [address]). You DO NOT check in on the first floor for this procedure.   Please read the following instructions carefully before your appointment.   ALL PATIENTS:   Plan to be at the hospital for approximately 4-6 hours.   You must have a responsible person who can drive you to the hospital, stay while the procedure is being done, assume responsibility for your care at discharge, and drive you home. If not, your exam will be canceled.   Please leave all valuables at home, including jewelry. You will need to bring your s license and medical insurance card.   Also, you will be responsible for any co-payment at time of registration.   You will be sedated for the procedure. Due to the sedation you will not be able to operate a vehicle or sign any legal documentation for 24 hours after exam.   Wear loose and comfortable clothing and wear socks if you are cold natured.   Bring all medications (in original containers) that you are currently taking, or a complete list.   To prepare for this test, you MAY NOT have anything to eat or drink after midnight, not even water, unless you take any necessary medications as listed in # 1 below and then a sip of water with those medications is allowed.   1 - If you take BLOOD PRESSURE, HEART, SEIZURE or PSYCHIATRIC MEDICATIONS in the morning, please take them the morning of your procedure.   Please take these medications as soon as you awaken with a small sip of water ... please make sure they are taken before you leave to come to the hospital for your procedure.   On the day before  your procedure, take all of your regular scheduled medications except for the blood thinning medications discussed below.   2 - If you are DIABETIC:   Check blood sugar levels on the morning of the exam and/or as needed if you feel hypoglycemic (low blood sugar).   DO NOT TAKE any diabetic medications (including insulin) the morning of the exam.   If your blood sugar goes below 70, you may drink 4 ounces of juice, soda or eat a piece of hard candy. Wait 15 minutes then recheck your blood sugar. If it isn't going up, you may drink another 4 ounces and contact your Primary Care doctor or our office. Please do not have any liquids within 2 hours of your arrival time.   3 - STOP BLOOD THINNING MEDICATION, Aspirin, Ibuprofen, Naproxen, etc as listed below:   Coumadin, Plavix, Aspirin, NSAIDs (nonsteroidal anti-inflammatory drugs)  and fish oil.   If on Coumadin or Plavix, notify the prescribing physician that it is being temporarily discontinued for procedure.   Coumadin must be stopped 3 days prior to exam.   Plavix, Aspirin and NSAIDs (see above) must be stopped 7 DAYS PRIOR TO EXAM.   If you are on a blood thinner not listed, please contact your physician about stop times. Such as Aggrenox, Brilinta, Effient, Pradaxa, Xaralto, etc.   Avoid Smoking for 24 hours prior to the test!   Endoscopy Pre-Procedure Nursing Call Line [phone]   For Insurance or Financial obligations, call [phone]   [facility] Nursing Line [phone]

## 2019-09-05 NOTE — LETTER
September 9, 2019      Briseida Reyes MD  [address] [facility]  [place] [address]           [facility]  [address] [facility]  [place] [address]  Phone: [phone]  Fax: [phone]          Patient: Sarah Parker   MR Number: [id number]   YOB: 1940   Date of Visit: 9/5/2019       Dear Dr. Briseida Reyes:    Thank you for referring Sarah Parker to me for evaluation. Attached you will find relevant portions of my assessment and plan of care.    If you have questions, please do not hesitate to call me. I look forward to following Sarah Parker along with you.    Sincerely,    Feliciano Hernandez MD    Enclosure  CC:  No Recipients    If you would like to receive this communication electronically, please contact [email or web address] or [phone] to request more information on [employer] access.    For providers and/or their staff who would like to refer a patient to [facility], please contact us through our one-stop-shop provider referral line, [facility], at [phone].    If you feel you have received this communication in error or would no longer like to receive these types of communications, please e-mail [email or web address]

## 2019-09-06 ENCOUNTER — HOSPITAL ENCOUNTER (OUTPATIENT)
Dept: RADIOLOGY | Facility: HOSPITAL | Age: 79
Discharge: HOME | End: 2019-09-06
Attending: INTERNAL MEDICINE
Payer: MEDICARE

## 2019-09-06 VITALS
WEIGHT: 119 LBS | SYSTOLIC BLOOD PRESSURE: 142 MMHG | OXYGEN SATURATION: 96 % | BODY MASS INDEX: 21.9 KG/M2 | DIASTOLIC BLOOD PRESSURE: 74 MMHG | RESPIRATION RATE: 16 BRPM | HEART RATE: 91 BPM | HEIGHT: 62 IN

## 2019-09-06 DIAGNOSIS — D53.9 MACROCYTIC ANEMIA: ICD-10-CM

## 2019-09-06 DIAGNOSIS — D72.829 LEUKOCYTOSIS, UNSPECIFIED TYPE: ICD-10-CM

## 2019-09-06 DIAGNOSIS — J18.9 PNEUMONIA OF LEFT LOWER LOBE DUE TO INFECTIOUS ORGANISM: ICD-10-CM

## 2019-09-06 PROCEDURE — 88184 FLOWCYTOMETRY/ TC 1 MARKER: CPT | Mod: HCNC | Performed by: PATHOLOGY

## 2019-09-06 PROCEDURE — 88342 TISSUE SPECIMEN TO PATHOLOGY, BONE MARROW ASPIRATION/BIOPSY PROCEDURE: ICD-10-PCS | Mod: 26,59,HCNC, | Performed by: PATHOLOGY

## 2019-09-06 PROCEDURE — 88189 PR  FLOWCYTOMETRY/READ, 16 & > MARKERS: ICD-10-PCS | Mod: HCNC,,, | Performed by: PATHOLOGY

## 2019-09-06 PROCEDURE — 88341 IMHCHEM/IMCYTCHM EA ADD ANTB: CPT | Mod: 26,HCNC,, | Performed by: PATHOLOGY

## 2019-09-06 PROCEDURE — 88365 TISSUE SPECIMEN TO PATHOLOGY, BONE MARROW ASPIRATION/BIOPSY PROCEDURE: ICD-10-PCS | Mod: 26,HCNC,, | Performed by: PATHOLOGY

## 2019-09-06 PROCEDURE — 85097 BONE MARROW INTERPRETATION: CPT | Mod: HCNC,,, | Performed by: PATHOLOGY

## 2019-09-06 PROCEDURE — 63600175 PHARM REV CODE 636 W HCPCS: Mod: HCNC | Performed by: RADIOLOGY

## 2019-09-06 PROCEDURE — 88311 DECALCIFY TISSUE: CPT | Mod: 26,HCNC,, | Performed by: PATHOLOGY

## 2019-09-06 PROCEDURE — 88185 FLOWCYTOMETRY/TC ADD-ON: CPT | Mod: 59,HCNC | Performed by: PATHOLOGY

## 2019-09-06 PROCEDURE — 88365 INSITU HYBRIDIZATION (FISH): CPT | Mod: 26,HCNC,, | Performed by: PATHOLOGY

## 2019-09-06 PROCEDURE — 88341 TISSUE SPECIMEN TO PATHOLOGY, BONE MARROW ASPIRATION/BIOPSY PROCEDURE: ICD-10-PCS | Mod: 26,HCNC,, | Performed by: PATHOLOGY

## 2019-09-06 PROCEDURE — 88305 TISSUE EXAM BY PATHOLOGIST: CPT | Mod: 26,HCNC,, | Performed by: PATHOLOGY

## 2019-09-06 PROCEDURE — 71250 CT THORAX DX C-: CPT | Mod: TC,HCNC

## 2019-09-06 PROCEDURE — 88311 TISSUE SPECIMEN TO PATHOLOGY, BONE MARROW ASPIRATION/BIOPSY PROCEDURE: ICD-10-PCS | Mod: 26,HCNC,, | Performed by: PATHOLOGY

## 2019-09-06 PROCEDURE — 77012 CT SCAN FOR NEEDLE BIOPSY: CPT | Mod: TC,HCNC

## 2019-09-06 PROCEDURE — 81450 HL NEO GSAP 5-50DNA/DNA&RNA: CPT | Mod: HCNC

## 2019-09-06 PROCEDURE — 88305 TISSUE EXAM BY PATHOLOGIST: CPT | Mod: HCNC | Performed by: PATHOLOGY

## 2019-09-06 PROCEDURE — 30000890 MAYO MISCELLANEOUS TEST (REFLEX): Mod: HCNC

## 2019-09-06 PROCEDURE — 88313 TISSUE SPECIMEN TO PATHOLOGY, BONE MARROW ASPIRATION/BIOPSY PROCEDURE: ICD-10-PCS | Mod: 26,HCNC,, | Performed by: PATHOLOGY

## 2019-09-06 PROCEDURE — 85097 TISSUE SPECIMEN TO PATHOLOGY, BONE MARROW ASPIRATION/BIOPSY PROCEDURE: ICD-10-PCS | Mod: HCNC,,, | Performed by: PATHOLOGY

## 2019-09-06 PROCEDURE — 88305 TISSUE SPECIMEN TO PATHOLOGY, BONE MARROW ASPIRATION/BIOPSY PROCEDURE: ICD-10-PCS | Mod: 26,HCNC,, | Performed by: PATHOLOGY

## 2019-09-06 PROCEDURE — 88341 IMHCHEM/IMCYTCHM EA ADD ANTB: CPT | Mod: HCNC,59 | Performed by: PATHOLOGY

## 2019-09-06 PROCEDURE — 88264 CHROMOSOME ANALYSIS 20-25: CPT | Mod: HCNC

## 2019-09-06 PROCEDURE — 88299 UNLISTED CYTOGENETIC STUDY: CPT | Mod: HCNC

## 2019-09-06 PROCEDURE — C1830 POWER BONE MARROW BX NEEDLE: HCPCS | Mod: HCNC

## 2019-09-06 PROCEDURE — 88342 IMHCHEM/IMCYTCHM 1ST ANTB: CPT | Mod: 26,59,HCNC, | Performed by: PATHOLOGY

## 2019-09-06 PROCEDURE — 88189 FLOWCYTOMETRY/READ 16 & >: CPT | Mod: HCNC,,, | Performed by: PATHOLOGY

## 2019-09-06 PROCEDURE — 88313 SPECIAL STAINS GROUP 2: CPT | Mod: HCNC

## 2019-09-06 PROCEDURE — 88237 TISSUE CULTURE BONE MARROW: CPT | Mod: HCNC

## 2019-09-06 PROCEDURE — 88313 SPECIAL STAINS GROUP 2: CPT | Mod: 26,HCNC,, | Performed by: PATHOLOGY

## 2019-09-06 RX ORDER — MIDAZOLAM HYDROCHLORIDE 1 MG/ML
INJECTION INTRAMUSCULAR; INTRAVENOUS CODE/TRAUMA/SEDATION MEDICATION
Status: COMPLETED | OUTPATIENT
Start: 2019-09-06 | End: 2019-09-06

## 2019-09-06 RX ORDER — FENTANYL CITRATE 50 UG/ML
INJECTION, SOLUTION INTRAMUSCULAR; INTRAVENOUS CODE/TRAUMA/SEDATION MEDICATION
Status: COMPLETED | OUTPATIENT
Start: 2019-09-06 | End: 2019-09-06

## 2019-09-06 RX ADMIN — MIDAZOLAM HYDROCHLORIDE 0.5 MG: 1 INJECTION, SOLUTION INTRAMUSCULAR; INTRAVENOUS at 11:09

## 2019-09-06 RX ADMIN — MIDAZOLAM HYDROCHLORIDE 0.5 MG: 1 INJECTION, SOLUTION INTRAMUSCULAR; INTRAVENOUS at 12:09

## 2019-09-06 RX ADMIN — FENTANYL CITRATE 25 MCG: 50 INJECTION, SOLUTION INTRAMUSCULAR; INTRAVENOUS at 11:09

## 2019-09-06 RX ADMIN — FENTANYL CITRATE 25 MCG: 50 INJECTION, SOLUTION INTRAMUSCULAR; INTRAVENOUS at 12:09

## 2019-09-06 NOTE — NURSING
Spoke with Maribel at Dr. Devine's office and verified that doctor was aware that patient has active pneumonia. Orders given to proceed with bone marrow biospy

## 2019-09-06 NOTE — SEDATION DOCUMENTATION
Patient placed on CT table prone with bilateral arms above head.  VSS.  CM in place.  Patient verbalizes understanding of procedure

## 2019-09-06 NOTE — DISCHARGE INSTRUCTIONS
Bone Marrow Aspiration and Biopsy  Does this test have other names?  Bone marrow exam  What is this test?  This is a two-part test that looks at the blood cells in a sample of bone marrow, the spongy tissue within certain bones. This test may help your healthcare provider diagnose or monitor a blood disease or health condition affecting your marrow.  Your bone marrow has a liquid part and a solid part. Aspiration uses a needle to remove a sample of the liquid part of bone marrow. Biopsy uses a larger needle to remove a small amount of bone with its marrow.  Part of the job of bone marrow is to make blood cells. This test can find out how well your bone marrow is working. This test is also done to find some types of cancer.  Why do I need this test?  You might have this test if your healthcare provider wants to find out the health of your bone marrow or to check on how well your marrow is making blood cells.  You may have an aspiration to check for:  · The health of your bone marrow for a transplant  · Acute leukemia  · Multiple myeloma  In some cases, bone marrow aspiration is used to confirm chromosome disorders in newborns.  You may have an aspiration followed by a biopsy if you could have:  · Bacterial, fungal, or parasitic infection  · Unexplained anemia, leucopenia, or thrombocytopenia  · Metastatic cancer or many other diseases  What other tests might I have along with this test?  Your healthcare provider may also order these tests:  · Complete blood count, or CBC  · Reticulocyte count to find out your red blood cell survival rate  What do my test results mean?  Many things may affect your lab test results. These include the method each lab uses to do the test. Even if your test results are different from the normal value, you may not have a problem. To learn what the results mean for you, talk with your healthcare provider.  The lab will look at different aspects of your bone marrow to help find certain  diseases or conditions. These aspects include:  · Type and number of blood cells  · Any abnormalities in the size, shape, or look of cells  · Level of iron in the bone marrow  · Abnormal amount of young white blood cells, called blasts  · Any chromosomal abnormalities  Depending on what is seen, your results may mean you have an infection, a blood disease, leukemia, or cancer that has spread to the bone marrow from another site.  Your healthcare provider will take your results and combine this information with information from your physical exam, health history, and other types of tests to make a diagnosis.   If your results are negative, your provider may order other tests to diagnose your condition.   How is this test done?  These tests require a sample of bone marrow. A number of sites on your body can be used for marrow aspiration, but the hip bone is a common spot. You will likely lie on your side or stomach on an exam table. Your healthcare provider will numb the area of the test. You may feel a slight prick from the needle that the provider uses to give the numbing agent.  Does this test pose any risks?  It's not possible to numb the bone, so you may feel slight pain during the procedure. But you shouldn't feel any pain afterward. Risks from a bone marrow test are rare, but you could have bleeding or an infection.  What might affect my test results?  Other factors aren't likely to affect your results.  How do I get ready for this test?  Tell your healthcare provider if you take aspirin or have any allergies. Also tell your provider if you are pregnant, take any blood-thinner medicines, or have a history of bleeding problems.  Be sure your provider knows about all other medicines, herbs, vitamins, and supplements you are taking. This includes medicines that don't need a prescription and any illicit drugs you may use.     © 0194-3620 [employer]. [address] [place] [address], [place] [address]. All  rights reserved. This information is not intended as a substitute for professional medical care. Always follow your healthcare professional's instructions.        Recovery After Procedural Sedation (Adult)  You have been given medicine by vein to make you sleep during your surgery. This may have included both a pain medicine and sleeping medicine. Most of the effects have worn off. But you may still have some drowsiness for the next 6 to 8 hours.  Home care  Follow these guidelines when you get home:  · For the next 8 hours, you should be watched by a responsible adult. This person should make sure your condition is not getting worse.  · Don't drink any alcohol for the next 24 hours.  · Don't drive, operate dangerous machinery, or make important business or personal decisions during the next 24 hours.  Note: Your healthcare provider may tell you not to take any medicine by mouth for pain or sleep in the next 4 hours. These medicines may react with the medicines you were given in the hospital. This could cause a much stronger response than usual.  Follow-up care  Follow up with your healthcare provider if you are not alert and back to your usual level of activity within 12 hours.  When to seek medical advice  Call your healthcare provider right away if any of these occur:  · Drowsiness gets worse  · Weakness or dizziness gets worse  · Repeated vomiting  · You can't be awakened   Date Last Reviewed: 10/18/2016  © 7795-7078 [employer]. [address] [place] [address], [place] [address]. All rights reserved. This information is not intended as a substitute for professional medical care. Always follow your healthcare professional's instructions.

## 2019-09-06 NOTE — DISCHARGE SUMMARY
Pre Op Diagnosis: Chronic leukocytosis      Post Op Diagnosis: same     Procedure:  Bone marrow bx     Procedure performed by: Lilia JUARES, Barbara BOSS     Written Informed Consent Obtained: Yes     Specimen Removed:  Yes     Estimated Blood Loss:  minimal     Findings: Local anesthesia and moderate sedation were used.     The patient tolerated the procedure well and there were no complications.      Sterile technique was performed in the posterior right iliac, lidocaine was used as a local anesthetic.  Multiple samples taken from right iliac crest.  Pt tolerated the procedure well without immediate complications.  Please see radiologist report for details. F/u with PCP and/or ordering physician.

## 2019-09-09 ENCOUNTER — TELEPHONE (OUTPATIENT)
Dept: FAMILY MEDICINE | Facility: CLINIC | Age: 79
End: 2019-09-09

## 2019-09-09 LAB
BONE MARROW IRON STAIN COMMENT: NORMAL
BONE MARROW WRIGHT STAIN COMMENT: NORMAL

## 2019-09-09 NOTE — TELEPHONE ENCOUNTER
----- Message from Macie Lutz sent at 9/9/2019  4:14 PM CDT -----  Contact: self  needs call back regarding amitriptyline dosage..[phone] (hlvf)

## 2019-09-09 NOTE — TELEPHONE ENCOUNTER
Pt called regarding dosage of amitriptyline.    When she was d/c'd from the hospital, her dosage had been decreased from 75mg to 25mg.  Pt states that you instructed her to double the 25mg tablets.  Pt sleeping well with 50mg.  Does she need a new rx?  She is running out of pills.  Please advise./rpr

## 2019-09-10 LAB
DNA/RNA EXTRACT AND HOLD RESULT: NORMAL
DNA/RNA EXTRACTION: NORMAL
EXHR SPECIMEN TYPE: NORMAL

## 2019-09-10 RX ORDER — AMITRIPTYLINE HYDROCHLORIDE 50 MG/1
50 TABLET, FILM COATED ORAL NIGHTLY
Qty: 30 TABLET | Refills: 11 | Status: SHIPPED | OUTPATIENT
Start: 2019-09-10 | End: 2019-10-17

## 2019-09-12 ENCOUNTER — TELEPHONE (OUTPATIENT)
Dept: PULMONOLOGY | Facility: CLINIC | Age: 79
End: 2019-09-12

## 2019-09-12 LAB
BODY SITE - BONE MARROW: NORMAL
CLINICAL DIAGNOSIS - BONE MARROW: NORMAL
FLOW CYTOMETRY ANTIBODIES ANALYZED - BONE MARROW: NORMAL
FLOW CYTOMETRY COMMENT - BONE MARROW: NORMAL
FLOW CYTOMETRY INTERPRETATION - BONE MARROW: NORMAL

## 2019-09-13 ENCOUNTER — OFFICE VISIT (OUTPATIENT)
Dept: HEMATOLOGY/ONCOLOGY | Facility: CLINIC | Age: 79
End: 2019-09-13
Payer: MEDICARE

## 2019-09-13 ENCOUNTER — LAB VISIT (OUTPATIENT)
Dept: LAB | Facility: HOSPITAL | Age: 79
End: 2019-09-13
Attending: INTERNAL MEDICINE
Payer: MEDICARE

## 2019-09-13 VITALS
HEIGHT: 62 IN | TEMPERATURE: 98 F | SYSTOLIC BLOOD PRESSURE: 125 MMHG | OXYGEN SATURATION: 94 % | DIASTOLIC BLOOD PRESSURE: 84 MMHG | HEART RATE: 114 BPM | WEIGHT: 117.94 LBS | BODY MASS INDEX: 21.7 KG/M2 | RESPIRATION RATE: 18 BRPM

## 2019-09-13 DIAGNOSIS — D72.829 LEUKOCYTOSIS, UNSPECIFIED TYPE: ICD-10-CM

## 2019-09-13 DIAGNOSIS — C93.10 CHRONIC MYELOMONOCYTIC LEUKEMIA NOT HAVING ACHIEVED REMISSION: ICD-10-CM

## 2019-09-13 DIAGNOSIS — D50.9 MICROCYTIC ANEMIA: ICD-10-CM

## 2019-09-13 DIAGNOSIS — D46.9 MDS/MPN (MYELODYSPLASTIC/MYELOPROLIFERATIVE NEOPLASMS): ICD-10-CM

## 2019-09-13 DIAGNOSIS — M05.10 RHEUMATOID LUNG: ICD-10-CM

## 2019-09-13 DIAGNOSIS — D46.9 MDS/MPN (MYELODYSPLASTIC/MYELOPROLIFERATIVE NEOPLASMS): Primary | ICD-10-CM

## 2019-09-13 DIAGNOSIS — E88.09 PLASMA CELL DYSCRASIA: ICD-10-CM

## 2019-09-13 LAB
ALBUMIN SERPL BCP-MCNC: 3.2 G/DL (ref 3.5–5.2)
ALP SERPL-CCNC: 128 U/L (ref 55–135)
ALT SERPL W/O P-5'-P-CCNC: 10 U/L (ref 10–44)
ANION GAP SERPL CALC-SCNC: 11 MMOL/L (ref 8–16)
ANISOCYTOSIS BLD QL SMEAR: SLIGHT
AST SERPL-CCNC: 20 U/L (ref 10–40)
BASOPHILS # BLD AUTO: ABNORMAL K/UL (ref 0–0.2)
BASOPHILS NFR BLD: 0 % (ref 0–1.9)
BILIRUB SERPL-MCNC: 0.5 MG/DL (ref 0.1–1)
BUN SERPL-MCNC: 22 MG/DL (ref 8–23)
CALCIUM SERPL-MCNC: 9.3 MG/DL (ref 8.7–10.5)
CHLORIDE SERPL-SCNC: 98 MMOL/L (ref 95–110)
CO2 SERPL-SCNC: 24 MMOL/L (ref 23–29)
CREAT SERPL-MCNC: 1 MG/DL (ref 0.5–1.4)
DIFFERENTIAL METHOD: ABNORMAL
EOSINOPHIL # BLD AUTO: ABNORMAL K/UL (ref 0–0.5)
EOSINOPHIL NFR BLD: 0 % (ref 0–8)
ERYTHROCYTE [DISTWIDTH] IN BLOOD BY AUTOMATED COUNT: 20.6 % (ref 11.5–14.5)
EST. GFR  (AFRICAN AMERICAN): >60 ML/MIN/1.73 M^2
EST. GFR  (NON AFRICAN AMERICAN): 54 ML/MIN/1.73 M^2
GLUCOSE SERPL-MCNC: 110 MG/DL (ref 70–110)
HCT VFR BLD AUTO: 25.4 % (ref 37–48.5)
HGB BLD-MCNC: 7.9 G/DL (ref 12–16)
IMM GRANULOCYTES # BLD AUTO: ABNORMAL K/UL (ref 0–0.04)
IMM GRANULOCYTES NFR BLD AUTO: ABNORMAL % (ref 0–0.5)
LYMPHOCYTES # BLD AUTO: ABNORMAL K/UL (ref 1–4.8)
LYMPHOCYTES NFR BLD: 60 % (ref 18–48)
MCH RBC QN AUTO: 32.1 PG (ref 27–31)
MCHC RBC AUTO-ENTMCNC: 31.1 G/DL (ref 32–36)
MCV RBC AUTO: 103 FL (ref 82–98)
MONOCYTES # BLD AUTO: ABNORMAL K/UL (ref 0.3–1)
MONOCYTES NFR BLD: 10 % (ref 4–15)
MYELOCYTES NFR BLD MANUAL: 1 %
NEUTROPHILS # BLD AUTO: ABNORMAL K/UL (ref 1.8–7.7)
NEUTROPHILS NFR BLD: 27 % (ref 38–73)
NEUTS BAND NFR BLD MANUAL: 2 %
NRBC BLD-RTO: 1 /100 WBC
PLATELET # BLD AUTO: 189 K/UL (ref 150–350)
PLATELET BLD QL SMEAR: ABNORMAL
PMV BLD AUTO: 10.3 FL (ref 9.2–12.9)
POLYCHROMASIA BLD QL SMEAR: ABNORMAL
POTASSIUM SERPL-SCNC: 4.9 MMOL/L (ref 3.5–5.1)
PROT SERPL-MCNC: 8.6 G/DL (ref 6–8.4)
RBC # BLD AUTO: 2.46 M/UL (ref 4–5.4)
SODIUM SERPL-SCNC: 133 MMOL/L (ref 136–145)
WBC # BLD AUTO: 26.07 K/UL (ref 3.9–12.7)

## 2019-09-13 PROCEDURE — 3288F PR FALLS RISK ASSESSMENT DOCUMENTED: ICD-10-PCS | Mod: HCNC,CPTII,S$GLB, | Performed by: INTERNAL MEDICINE

## 2019-09-13 PROCEDURE — 3288F FALL RISK ASSESSMENT DOCD: CPT | Mod: HCNC,CPTII,S$GLB, | Performed by: INTERNAL MEDICINE

## 2019-09-13 PROCEDURE — 81270 JAK2 GENE: CPT | Mod: HCNC

## 2019-09-13 PROCEDURE — 81207 BCR/ABL1 GENE MINOR BP: CPT | Mod: HCNC

## 2019-09-13 PROCEDURE — 3074F PR MOST RECENT SYSTOLIC BLOOD PRESSURE < 130 MM HG: ICD-10-PCS | Mod: HCNC,CPTII,S$GLB, | Performed by: INTERNAL MEDICINE

## 2019-09-13 PROCEDURE — 99999 PR PBB SHADOW E&M-EST. PATIENT-LVL V: CPT | Mod: PBBFAC,HCNC,, | Performed by: INTERNAL MEDICINE

## 2019-09-13 PROCEDURE — 3079F PR MOST RECENT DIASTOLIC BLOOD PRESSURE 80-89 MM HG: ICD-10-PCS | Mod: HCNC,CPTII,S$GLB, | Performed by: INTERNAL MEDICINE

## 2019-09-13 PROCEDURE — 85007 BL SMEAR W/DIFF WBC COUNT: CPT | Mod: HCNC

## 2019-09-13 PROCEDURE — 1100F PTFALLS ASSESS-DOCD GE2>/YR: CPT | Mod: HCNC,CPTII,S$GLB, | Performed by: INTERNAL MEDICINE

## 2019-09-13 PROCEDURE — 99499 RISK ADDL DX/OHS AUDIT: ICD-10-PCS | Mod: HCNC,S$GLB,, | Performed by: INTERNAL MEDICINE

## 2019-09-13 PROCEDURE — 3079F DIAST BP 80-89 MM HG: CPT | Mod: HCNC,CPTII,S$GLB, | Performed by: INTERNAL MEDICINE

## 2019-09-13 PROCEDURE — 99999 PR PBB SHADOW E&M-EST. PATIENT-LVL V: ICD-10-PCS | Mod: PBBFAC,HCNC,, | Performed by: INTERNAL MEDICINE

## 2019-09-13 PROCEDURE — 99215 OFFICE O/P EST HI 40 MIN: CPT | Mod: HCNC,S$GLB,, | Performed by: INTERNAL MEDICINE

## 2019-09-13 PROCEDURE — 3074F SYST BP LT 130 MM HG: CPT | Mod: HCNC,CPTII,S$GLB, | Performed by: INTERNAL MEDICINE

## 2019-09-13 PROCEDURE — 81403 MOPATH PROCEDURE LEVEL 4: CPT | Mod: HCNC

## 2019-09-13 PROCEDURE — 81219 CALR GENE COM VARIANTS: CPT | Mod: HCNC

## 2019-09-13 PROCEDURE — 85060 PATHOLOGIST REVIEW: ICD-10-PCS | Mod: HCNC,,, | Performed by: PATHOLOGY

## 2019-09-13 PROCEDURE — 80053 COMPREHEN METABOLIC PANEL: CPT | Mod: HCNC

## 2019-09-13 PROCEDURE — 85060 BLOOD SMEAR INTERPRETATION: CPT | Mod: HCNC,,, | Performed by: PATHOLOGY

## 2019-09-13 PROCEDURE — 1100F PR PT FALLS ASSESS DOC 2+ FALLS/FALL W/INJURY/YR: ICD-10-PCS | Mod: HCNC,CPTII,S$GLB, | Performed by: INTERNAL MEDICINE

## 2019-09-13 PROCEDURE — 85027 COMPLETE CBC AUTOMATED: CPT | Mod: HCNC

## 2019-09-13 PROCEDURE — 81403 MOPATH PROCEDURE LEVEL 4: CPT | Mod: 91,HCNC

## 2019-09-13 PROCEDURE — 36415 COLL VENOUS BLD VENIPUNCTURE: CPT | Mod: HCNC

## 2019-09-13 PROCEDURE — 99215 PR OFFICE/OUTPT VISIT, EST, LEVL V, 40-54 MIN: ICD-10-PCS | Mod: HCNC,S$GLB,, | Performed by: INTERNAL MEDICINE

## 2019-09-13 PROCEDURE — 99499 UNLISTED E&M SERVICE: CPT | Mod: HCNC,S$GLB,, | Performed by: INTERNAL MEDICINE

## 2019-09-13 RX ORDER — HYDROXYUREA 500 MG/1
CAPSULE ORAL
Qty: 30 CAPSULE | Refills: 0 | Status: SHIPPED | OUTPATIENT
Start: 2019-09-13 | End: 2019-11-27

## 2019-09-13 NOTE — PROGRESS NOTES
Subjective:   Date of Visit: 9/13/19   ?   CHIEF COMPLAINT: Follow-up  ???????   ONCOLOGIC DIAGNOSIS:  CMML TYPE 2  ?   CURRENT TREATMENT:  HYDREA    FINAL PATHOLOGIC DIAGNOSIS  BONE MARROW, RIGHT ILIAC CREST (ASPIRATE SMEAR, TOUCH PREPARATION, CLOT SECTION, AND  CORE BIOPSY):  -- HYPERCELLULAR MARROW (95%) WITH TRILINEAGE DYSPOIESIS, MONOCYTOSIS AND INCEREASED  BLASTS, SUSPICIOUS FOR CHRONIC MYELOMONOCYTIC LEUKEMIA -2.  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  -- PLASMA CELL NEOPLASM.  -- CD5-POSITIVE MONOCLONAL B-CELL POPULATION DETECTED BY FLOW CYTOMETRIC ANALYSIS.  -- ADEQUATE IRON STORAGE.    HPI:  I am seeing Ms. Parker for the 1st time today in the company of her daughter.  She was a patient of Dr. Devine.  I will assume care of the patient going forward.  She is here today to discuss the results of her bone marrow biopsy sites which showed hypercellular marrow with trilineage dyspoiesis and monocytosis; there was also increased blasts suspicious for chronic myelomonocytic leukemia type 2.  The bone marrow was also evident is with reticulin myelofibrosis a plasma cell neoplasm.  There was a population of CD5 positive monoclonal B-cell that was detected in the bone marrow.  This result was discussed with patient and daughter who obviously was very overwhelmed with information as she was recently hospitalized for pneumonia.  She complains of generalized weakness and fatigue but denies any specific symptoms such as fever, chills, nausea or vomiting, diarrhea, unintentional weight changes, chest pain or shortness of breath.  She also denies any lymphadenopathy, night sweats, hemoptysis or hematochezia.  Patient's  was recently discharged from hospital as well and currently at a rehab facility.  They felt there has been a lot of psychosocial issues in family making today's discussion it very difficult one.  Patient is known to have severe arthritis which she follows rheumatology service for and has  been optimized therapeutically. I have reviewed all of the patient's relevant clinical history available in the medical record including her records from care everywhere as well as her previous imaging and pathology results. She reports being up-to-date with all of her age-appropriate cancer screening. She denies any bowel or urinary complaints. The patient reports taking weekly methotrexate with supplemental folic acid and actemra for treatment of rheumatoid arthritis under the supervision of Dr. Chase Tejada with rheumatology.       Review of Systems   Constitutional: Positive for fatigue. Negative for activity change, appetite change, chills, fever and unexpected weight change.   HENT: Negative for hearing loss, mouth sores, nosebleeds, sore throat, tinnitus, trouble swallowing and voice change.    Eyes: Negative for visual disturbance.   Respiratory: Negative for cough, chest tightness and shortness of breath.    Cardiovascular: Negative for chest pain, palpitations and leg swelling.   Gastrointestinal: Negative for abdominal pain, anal bleeding, blood in stool, constipation, diarrhea, nausea and vomiting.   Genitourinary: Negative for dysuria, frequency, hematuria, pelvic pain, vaginal bleeding and vaginal pain.   Musculoskeletal: Negative for arthralgias, back pain, joint swelling and neck pain.   Skin: Negative for color change, pallor, rash and wound.   Allergic/Immunologic: Negative for immunocompromised state.   Neurological: Positive for weakness. Negative for dizziness, tremors, syncope, speech difficulty, light-headedness and headaches.   Hematological: Negative for adenopathy. Does not bruise/bleed easily.   Psychiatric/Behavioral: Negative for agitation, confusion, decreased concentration, hallucinations and sleep disturbance. The patient is not nervous/anxious.        ?   PAST MEDICAL HISTORY:   Past Medical History:   Diagnosis Date    Acid reflux     Anxiety     Back pain     Bronchitis,  chronic obstructive w acute bronchitis 7/29/2016    Cancer     [facility] arms, face- Dr. Lata Tejada    Cataract     2+NS    Degenerative disc disease     Depression     Dry mouth     Hernia, hiatal 11/18/2013    Hypertension     Hypothyroid     Macrocytic anemia 5/3/2016    Macular degeneration     Migraines     Mixed anxiety and depressive disorder     Multiple fractures of ribs of right side     Osteoporosis     Pneumonia     Pneumonia due to other staphylococcus     Rheumatoid arthritis     Rheumatoid arthritis(714.0)     Rheumatoid arthritis(714.0)     Remicade, MTX.    ?     PAST SURGICAL HISTORY:   Past Surgical History:   Procedure Laterality Date    APPENDECTOMY  1985    CATARACT EXTRACTION Bilateral 6/11/15    Dr. Booth    CHOLECYSTECTOMY  2013    cryoablasion kidney Left 09/27/2016    EGD (ESOPHAGOGASTRODUODENOSCOPY) N/A 10/31/2013    Performed by Donald Cuello MD at [facility] ENDO    feet Bilateral     rheumatoid    FRACTURE SURGERY Right     tibia    FUNDOPLICATION, NISSEN, LAPAROSCOPIC N/A 12/18/2013    Performed by Galindo Vasquez MD at [facility] OR    HERNIA REPAIR      HYSTERECTOMY  1970    partial    JOINT REPLACEMENT      bilateral knees (2008), hands, wrists, knuckles, toes    LAPAROSCOPIC NISSEN FUNDOPLICATION      Left L5/S1 TF AYAAN with local Left 6/25/2019    Performed by Rowdy Felix MD at [facility] PAIN MGT    Renal Cryoablation N/A 9/27/2016    Performed by [facility] Provider at [facility] OR      ?   ALLERGIES:   Allergies as of 09/13/2019 - Reviewed 09/13/2019   Allergen Reaction Noted    Codeine  06/08/2012    Doxycycline  02/13/2019    Gabapentin Other (See Comments) 08/14/2019      ?   MEDICATIONS:?   Outpatient Medications Marked as Taking for the 9/13/19 encounter (Office Visit) with Denton Knox MD   Medication Sig Dispense Refill    albuterol (PROVENTIL) 2.5 mg /3 mL (0.083 %) nebulizer solution Take 3 mLs (2.5 mg total) by nebulization every 6  (six) hours as needed for Wheezing. 1 Box 5    amitriptyline (ELAVIL) 50 MG tablet Take 1 tablet (50 mg total) by mouth every evening. Was 75 mg, now decrease to 25 mg 30 tablet 11    benzonatate (TESSALON) 100 MG capsule Take 1-2 capsules (100-200 mg total) by mouth 3 (three) times daily as needed for Cough. 60 capsule 0    calcium citrate-vitamin D (CITRACAL + D) 315-200 mg-unit per tablet Take 1 tablet by mouth once daily.       DULoxetine (CYMBALTA) 20 MG capsule Take 2 capsules (40 mg total) by mouth once daily. 180 capsule 3    ferrous gluconate 324 mg (37.5 mg iron) Tab tablet Take 1 tablet (324 mg total) by mouth daily with breakfast. 30 tablet 11    hydrocodone-acetaminophen 5-325mg (NORCO) 5-325 mg per tablet TK 1 T PO Q 6 H PRN  0    leucovorin (WELLCOVORIN) 5 mg Tab TAKE ONE WEEKLY ON SATURDAYS 4 tablet 3    levothyroxine (SYNTHROID) 88 MCG tablet TAKE 1 TABLET BY MOUTH BEFORE BREAKFAST 90 tablet 3    magnesium oxide (MAG-OX) 400 mg (241.3 mg magnesium) tablet Take 1 tablet (400 mg total) by mouth 3 (three) times daily.  0    meclizine (ANTIVERT) 50 MG tablet Take 25 mg by mouth 3 (three) times daily as needed.      methotrexate 2.5 MG Tab Take 17.5 mg by mouth every 7 days.       metoprolol succinate (TOPROL-XL) 100 MG 24 hr tablet Take 100 mg by mouth once daily.      metoprolol succinate (TOPROL-XL) 50 MG 24 hr tablet TAKE 1 TABLET(50 MG) BY MOUTH EVERY DAY 30 tablet 11    multivitamin capsule Take by mouth. As directed      ondansetron (ZOFRAN) 4 MG tablet Take 1 tablet (4 mg total) by mouth every 8 (eight) hours as needed for Nausea. 30 tablet 1    pravastatin (PRAVACHOL) 10 MG tablet TAKE 1 TABLET(10 MG) BY MOUTH EVERY DAY 30 tablet 11    tamsulosin (FLOMAX) 0.4 mg Cap Take 1 capsule (0.4 mg total) by mouth once daily. For urinary retention 30 capsule 0    tocilizumab (ACTEMRA) 80 mg/4 mL (20 mg/mL) Soln Inject into the vein.      topiramate (TOPAMAX) 25 MG tablet Take 1 tablet  (25 mg total) by mouth at night x 1 week then increase to 2 (two) times daily. Increase as tolerated. 60 tablet 0    valsartan-hydrochlorothiazide (DIOVAN-HCT) 80-12.5 mg per tablet TAKE 1 TABLET BY MOUTH DAILY 90 tablet 3      ?   SOCIAL HISTORY:?   Social History     Tobacco Use    Smoking status: Former Smoker     Packs/day: 0.25     Years: 2.00     Pack years: 0.50     Last attempt to quit: 1965     Years since quittin.8    Smokeless tobacco: Never Used   Substance Use Topics    Alcohol use: No        ?   FAMILY HISTORY:   family history includes Asthma in her brother and sister; Cancer in her brother and maternal grandfather; Cataracts in her mother; Chronic back pain in her brother and sister; Diabetes Mellitus in her brother; Fibromyalgia in her daughter; Heart disease in her father, maternal grandmother, and mother; Hyperlipidemia in her mother; Hypertension in her brother, father, mother, and sister; Osteoarthritis in her brother, father, mother, and sister; Thyroid disease in her brother and sister.   ?     Objective:      Physical Exam   Constitutional: She is oriented to person, place, and time. She appears well-developed and well-nourished. She appears cachectic. She is cooperative.  Non-toxic appearance. She does not appear ill. She appears distressed.   HENT:   Head: Normocephalic and atraumatic.   Mouth/Throat: No oropharyngeal exudate.   Eyes: Pupils are equal, round, and reactive to light. Conjunctivae are normal. Right eye exhibits no discharge. Left eye exhibits no discharge. No scleral icterus.   Neck: Normal range of motion. Neck supple. No thyromegaly present.   Cardiovascular: Normal rate and regular rhythm.   No murmur heard.  Pulmonary/Chest: Effort normal and breath sounds normal. No respiratory distress. She exhibits no tenderness.   Abdominal: Soft. Bowel sounds are normal. She exhibits no distension and no mass. There is no tenderness. There is no rebound and no guarding.    Musculoskeletal: Normal range of motion. She exhibits no edema or tenderness.   Lymphadenopathy:     She has no cervical adenopathy.        Right cervical: No superficial cervical adenopathy present.       Left cervical: No superficial cervical adenopathy present.        Right axillary: No pectoral adenopathy present.        Left axillary: No pectoral adenopathy present.       Right: No inguinal and no supraclavicular adenopathy present.        Left: No inguinal and no supraclavicular adenopathy present.   Neurological: She is alert and oriented to person, place, and time. No cranial nerve deficit or sensory deficit.   Skin: Skin is warm and dry. Capillary refill takes 2 to 3 seconds. No rash noted. No erythema. No pallor.   Psychiatric: She has a normal mood and affect. Her behavior is normal. Judgment normal.       ?   Vitals:    09/13/19 1238   BP: 125/84   Pulse: (!) 114   Resp: 18   Temp: 98.1 °F (36.7 °C)      ?     ECOG SCORE    2 - Capable of all selfcare but unable to carry out any work activities, active > 50% of hours           Laboratory:  ?   Lab Visit on 09/13/2019   Component Date Value Ref Range Status    WBC 09/13/2019 26.07* 3.90 - 12.70 K/uL Final    RBC 09/13/2019 2.46* 4.00 - 5.40 M/uL Final    Hemoglobin 09/13/2019 7.9* 12.0 - 16.0 g/dL Final    Hematocrit 09/13/2019 25.4* 37.0 - 48.5 % Final    Mean Corpuscular Volume 09/13/2019 103* 82 - 98 fL Final    Mean Corpuscular Hemoglobin 09/13/2019 32.1* 27.0 - 31.0 pg Final    Mean Corpuscular Hemoglobin Conc 09/13/2019 31.1* 32.0 - 36.0 g/dL Final    RDW 09/13/2019 20.6* 11.5 - 14.5 % Final    Platelets 09/13/2019 189  150 - 350 K/uL Final    MPV 09/13/2019 10.3  9.2 - 12.9 fL Final    Immature Granulocytes 09/13/2019 CANCELED  0.0 - 0.5 % Final    Gran # (ANC) 09/13/2019 CANCELED  1.8 - 7.7 K/uL Final    Immature Grans (Abs) 09/13/2019 CANCELED  0.00 - 0.04 K/uL Final    Lymph # 09/13/2019 CANCELED  1.0 - 4.8 K/uL Final    Mono #  09/13/2019 CANCELED  0.3 - 1.0 K/uL Final    Eos # 09/13/2019 CANCELED  0.0 - 0.5 K/uL Final    Baso # 09/13/2019 CANCELED  0.00 - 0.20 K/uL Final    nRBC 09/13/2019 1* 0 /100 WBC Final    Gran% 09/13/2019 27.0* 38.0 - 73.0 % Final    Lymph% 09/13/2019 60.0* 18.0 - 48.0 % Final    Mono% 09/13/2019 10.0  4.0 - 15.0 % Final    Eosinophil% 09/13/2019 0.0  0.0 - 8.0 % Final    Basophil% 09/13/2019 0.0  0.0 - 1.9 % Final    Bands 09/13/2019 2.0  % Final    Myelocytes 09/13/2019 1.0  % Final    Platelet Estimate 09/13/2019 Appears normal   Final    Aniso 09/13/2019 Slight   Final    Poly 09/13/2019 Occasional   Final    Differential Method 09/13/2019 Manual   Final    Sodium 09/13/2019 133* 136 - 145 mmol/L Final    Potassium 09/13/2019 4.9  3.5 - 5.1 mmol/L Final    Chloride 09/13/2019 98  95 - 110 mmol/L Final    CO2 09/13/2019 24  23 - 29 mmol/L Final    Glucose 09/13/2019 110  70 - 110 mg/dL Final    BUN, Bld 09/13/2019 22  8 - 23 mg/dL Final    Creatinine 09/13/2019 1.0  0.5 - 1.4 mg/dL Final    Calcium 09/13/2019 9.3  8.7 - 10.5 mg/dL Final    Total Protein 09/13/2019 8.6* 6.0 - 8.4 g/dL Final    Albumin 09/13/2019 3.2* 3.5 - 5.2 g/dL Final    Total Bilirubin 09/13/2019 0.5  0.1 - 1.0 mg/dL Final    Alkaline Phosphatase 09/13/2019 128  55 - 135 U/L Final    AST 09/13/2019 20  10 - 40 U/L Final    ALT 09/13/2019 10  10 - 44 U/L Final    Anion Gap 09/13/2019 11  8 - 16 mmol/L Final    eGFR if African American 09/13/2019 >60  >60 mL/min/1.73 m^2 Final    eGFR if non African American 09/13/2019 54* >60 mL/min/1.73 m^2 Final      ?   Tumor markers   ?   ?   Imaging: CT Chest Without Contrast  Narrative: EXAMINATION:  CT CHEST WITHOUT CONTRAST    CLINICAL HISTORY:  Chest pain or SOB, pleurisy or effusion suspected; Lobar pneumonia, unspecified organism    TECHNIQUE:  Axial images performed through the chest without IV contrast.  Sagittal and coronal reformats obtained.    COMPARISON:  Chest  x-ray 09/05/2019    FINDINGS:  Heart: Normal heart size.  Small pericardial effusion.  Coronary arterial calcification.    Mediastinum: Moderate sized hiatal hernia.  Numerous mildly enlarged lymph nodes present throughout the mediastinum.  Largest representative lymph node is a subcarinal lymph node measuring 2.1 x 1.8 cm.    Vasculature: Moderate aortic atherosclerosis..    Mild mediastinal adenopathy, presumably reactive.  Attention on follow-up.    No pleural effusion evident.    Lungs: Large amount of consolidation throughout the left lower lobe.  No effusion.  Some additional scattered reticulonodular opacities surrounding this area consolidation left lower lobe with some of these changes also present in the left upper lobe.  A few scattered subtle reticulonodular opacities in the right lung.  No consolidation or right-sided effusion.    Esophagus: Unremarkable.    Upper Abdomen: Aortic atherosclerosis.  Small cyst hepatic dome.    Bones: No acute fracture. Large amount of arthritic changes lower thoracic spine with levoscoliosis lower thoracic spine.  Impression: Large amount of left lower lobe consolidation consistent with pneumonia.  Additional scattered reticulonodular opacities in the lungs greater in the left upper lobe.    Negative for pleural effusion.    Very small pericardial effusion.    All CT scans at this facility use dose modulation, iterative reconstruction and/or weight based dosing when appropriate to reduce radiation dose to as low as reasonably achievable.    Electronically signed by: Escobar Walsh MD  Date:    09/06/2019  Time:    12:45  CT Biopsy Bone Marrow (xpd)  Narrative: EXAMINATION:  CT BIOPSY BONE MARROW (XPD)    CLINICAL HISTORY:  Nutritional anemia, unspecified    TECHNIQUE:  1% lidocaine locally    :ANITA Rodrigues    Complications: None    COMPARISON:  None    FINDINGS:  Procedure: The risks and benefits of this procedure were discussed with the patient, written informed  consent was obtained.  The patient was placed prone on the CT gantry.  Preprocedural imaging revealed normal positioning of the iliac crests.  A suitable skin site for biopsy was selected. Moderate sedation using versed and fentanyl was provided with and trained observer monitoring the patient's vital signs and level of consciousness. The total time of sedation was 30 minutes.    The skin site was prepped and draped in sterile fashion.  The skin was anesthetized with 1% lidocaine.    Using CT guidance a 11 gauge introducer needle was guided into the right iliac crest.  Prior to biopsy appropriate needle positioning was confirmed with CT. Thirty cc of marrow aspirate was obtained and given to pathology.  Single 11 gauge marrow core biopsy was then obtained and given to pathology.    The needle was removed.    Postprocedural imaging was acquired. The site was bandaged sterilely. The patient left the room in stable condition.    Findings:    1.  Preprocedural CT showed appropriate imaging characteristics for right iliac bone marrow aspiration and biopsy.    2.  CT-guided biopsy of a right iliac crest with 11 gauge samples acquired.  3.  Post procedural CT showed No complication.  Impression: CT guided marrow aspiration and biopsy from the right iliac crest.    All CT scans at this facility use dose modulation, iterative reconstruction, and/or weight based dosing when appropriate to reduce radiation dose to as low as reasonable achievable.    Electronically signed by: Zeyad Rodrigues MD  Date:    09/06/2019  Time:    12:39     ?      Pathology:  Pathology Results  (Last 10 years)               10/12/15 0000  Tissue Specimen to Pathology Final result    05/29/13 0000  Tissue Specimen to Pathology Final result           ?   Assessment/Plan:       1. MDS/MPN (myelodysplastic/myeloproliferative neoplasms)    2. Leukocytosis, unspecified type    3. Microcytic anemia    4. Rheumatoid lung    5. Plasma cell dyscrasia    6.  Chronic myelomonocytic leukemia not having achieved remission      I had a detailed discussion with patient as well as daughter who was present in the room regarding the results of her most recent bone marrow.  We discussed the findings including CMML type 2, CLL as well as plasma cell dyscrasia.  I discussed the natural history of each of these disorders and had mentioned to her that the most crucial and serious want to tackle at this time will be CMML.  Our best intention at this time will be to treat CMML however not curative at this time given the fact the patient is not a transplant candidate.  I will order few more test is to call for the actual diagnosis including but not limited to NGS using bone marrow aspirate,  BCR-ABL gene, the JAK2 Exon 12, MPL and CALR mutations.  I have prescribed a low-dose of cytoreductive agent, Hydrea 500 mg daily for palliation pending the results of this testing. Chronic macrocytic anemia were previously reviewed in detail.  This is most likely due to her chronic treatment with methotrexate.  Vitamin B12 and folate levels look ok.  Will await the results of the testing and will plan to represent this case at tumor conference.  Patient knows to call our office with any questions and to proceed to the emergency room weight worsened symptoms including fever, chills, chest pain or shortness of breath.      Follow-Up: No follow-ups on file.    CECILIO PUGA Md., Ph.D  Hematology & Oncology Department  Phone #: [phone]

## 2019-09-16 LAB
PATH REV BLD -IMP: NORMAL
PATH REV BLD -IMP: NORMAL

## 2019-09-17 LAB
CHROM BANDING METHOD: NORMAL
CHROMOSOME ANALYSIS BM ADDITIONAL INFORMATION: NORMAL
CHROMOSOME ANALYSIS BM RELEASED BY: NORMAL
CHROMOSOME ANALYSIS BM RESULT SUMMARY: NORMAL
CLINICAL CYTOGENETICIST REVIEW: NORMAL
DIAGNOSTIC BCR/ABL 1 RESULT: NORMAL
KARYOTYP MAR: NORMAL
MAYO MISCELLANEOUS RESULT (REF): NORMAL
NARRATIVE DIAGNOSTIC REPORT-IMP: NORMAL
REASON FOR REFERRAL (NARRATIVE): NORMAL
REF LAB TEST METHOD: NORMAL
SPECIMEN SOURCE: NORMAL
SPECIMEN TYPE, BCR/ABL: NORMAL
SPECIMEN: NORMAL

## 2019-09-18 LAB
JAK2 EXON 12 MUTATION DETECTION BLOOD: NORMAL
PATH REPORT.FINAL DX SPEC: NORMAL

## 2019-09-20 ENCOUNTER — OFFICE VISIT (OUTPATIENT)
Dept: HEMATOLOGY/ONCOLOGY | Facility: CLINIC | Age: 79
End: 2019-09-20
Payer: MEDICARE

## 2019-09-20 ENCOUNTER — LAB VISIT (OUTPATIENT)
Dept: LAB | Facility: HOSPITAL | Age: 79
End: 2019-09-20
Attending: INTERNAL MEDICINE
Payer: MEDICARE

## 2019-09-20 VITALS
WEIGHT: 117.5 LBS | DIASTOLIC BLOOD PRESSURE: 87 MMHG | HEIGHT: 62 IN | RESPIRATION RATE: 18 BRPM | TEMPERATURE: 97 F | SYSTOLIC BLOOD PRESSURE: 151 MMHG | HEART RATE: 104 BPM | BODY MASS INDEX: 21.62 KG/M2 | OXYGEN SATURATION: 98 %

## 2019-09-20 DIAGNOSIS — C93.10 CHRONIC MYELOMONOCYTIC LEUKEMIA NOT HAVING ACHIEVED REMISSION: ICD-10-CM

## 2019-09-20 DIAGNOSIS — Z51.11 ENCOUNTER FOR ANTINEOPLASTIC CHEMOTHERAPY: ICD-10-CM

## 2019-09-20 DIAGNOSIS — Z01.89 ENCOUNTER FOR OTHER SPECIFIED SPECIAL EXAMINATIONS: ICD-10-CM

## 2019-09-20 DIAGNOSIS — C93.10 CHRONIC MYELOMONOCYTIC LEUKEMIA NOT HAVING ACHIEVED REMISSION: Primary | ICD-10-CM

## 2019-09-20 LAB
ALBUMIN SERPL BCP-MCNC: 3.2 G/DL (ref 3.5–5.2)
ALP SERPL-CCNC: 110 U/L (ref 55–135)
ALT SERPL W/O P-5'-P-CCNC: 9 U/L (ref 10–44)
ANION GAP SERPL CALC-SCNC: 10 MMOL/L (ref 8–16)
AST SERPL-CCNC: 19 U/L (ref 10–40)
BASOPHILS # BLD AUTO: ABNORMAL K/UL (ref 0–0.2)
BASOPHILS NFR BLD: 0 % (ref 0–1.9)
BILIRUB SERPL-MCNC: 0.3 MG/DL (ref 0.1–1)
BUN SERPL-MCNC: 33 MG/DL (ref 8–23)
CALCIUM SERPL-MCNC: 9.2 MG/DL (ref 8.7–10.5)
CHLORIDE SERPL-SCNC: 98 MMOL/L (ref 95–110)
CO2 SERPL-SCNC: 24 MMOL/L (ref 23–29)
CREAT SERPL-MCNC: 1.2 MG/DL (ref 0.5–1.4)
DIFFERENTIAL METHOD: ABNORMAL
EOSINOPHIL # BLD AUTO: ABNORMAL K/UL (ref 0–0.5)
EOSINOPHIL NFR BLD: 0 % (ref 0–8)
ERYTHROCYTE [DISTWIDTH] IN BLOOD BY AUTOMATED COUNT: 21.2 % (ref 11.5–14.5)
EST. GFR  (AFRICAN AMERICAN): 50 ML/MIN/1.73 M^2
EST. GFR  (NON AFRICAN AMERICAN): 44 ML/MIN/1.73 M^2
GLUCOSE SERPL-MCNC: 106 MG/DL (ref 70–110)
HCT VFR BLD AUTO: 24.2 % (ref 37–48.5)
HGB BLD-MCNC: 7.7 G/DL (ref 12–16)
IMM GRANULOCYTES # BLD AUTO: ABNORMAL K/UL (ref 0–0.04)
IMM GRANULOCYTES NFR BLD AUTO: ABNORMAL % (ref 0–0.5)
LYMPHOCYTES # BLD AUTO: ABNORMAL K/UL (ref 1–4.8)
LYMPHOCYTES NFR BLD: 47 % (ref 18–48)
MCH RBC QN AUTO: 32.9 PG (ref 27–31)
MCHC RBC AUTO-ENTMCNC: 31.8 G/DL (ref 32–36)
MCV RBC AUTO: 103 FL (ref 82–98)
METAMYELOCYTES NFR BLD MANUAL: 2 %
MONOCYTES # BLD AUTO: ABNORMAL K/UL (ref 0.3–1)
MONOCYTES NFR BLD: 13 % (ref 4–15)
NEUTROPHILS NFR BLD: 36 % (ref 38–73)
NEUTS BAND NFR BLD MANUAL: 2 %
NRBC BLD-RTO: 1 /100 WBC
PLATELET # BLD AUTO: 213 K/UL (ref 150–350)
PLATELET BLD QL SMEAR: ABNORMAL
PMV BLD AUTO: 9.9 FL (ref 9.2–12.9)
POTASSIUM SERPL-SCNC: 4.8 MMOL/L (ref 3.5–5.1)
PROT SERPL-MCNC: 8.5 G/DL (ref 6–8.4)
RBC # BLD AUTO: 2.34 M/UL (ref 4–5.4)
SODIUM SERPL-SCNC: 132 MMOL/L (ref 136–145)
WBC # BLD AUTO: 24.74 K/UL (ref 3.9–12.7)

## 2019-09-20 PROCEDURE — 99499 UNLISTED E&M SERVICE: CPT | Mod: HCNC,S$GLB,, | Performed by: INTERNAL MEDICINE

## 2019-09-20 PROCEDURE — 1100F PR PT FALLS ASSESS DOC 2+ FALLS/FALL W/INJURY/YR: ICD-10-PCS | Mod: HCNC,CPTII,S$GLB, | Performed by: INTERNAL MEDICINE

## 2019-09-20 PROCEDURE — 3079F DIAST BP 80-89 MM HG: CPT | Mod: HCNC,CPTII,S$GLB, | Performed by: INTERNAL MEDICINE

## 2019-09-20 PROCEDURE — 3077F PR MOST RECENT SYSTOLIC BLOOD PRESSURE >= 140 MM HG: ICD-10-PCS | Mod: HCNC,CPTII,S$GLB, | Performed by: INTERNAL MEDICINE

## 2019-09-20 PROCEDURE — 36415 COLL VENOUS BLD VENIPUNCTURE: CPT | Mod: HCNC

## 2019-09-20 PROCEDURE — 85007 BL SMEAR W/DIFF WBC COUNT: CPT | Mod: HCNC

## 2019-09-20 PROCEDURE — 80053 COMPREHEN METABOLIC PANEL: CPT | Mod: HCNC

## 2019-09-20 PROCEDURE — 3288F PR FALLS RISK ASSESSMENT DOCUMENTED: ICD-10-PCS | Mod: HCNC,CPTII,S$GLB, | Performed by: INTERNAL MEDICINE

## 2019-09-20 PROCEDURE — 1100F PTFALLS ASSESS-DOCD GE2>/YR: CPT | Mod: HCNC,CPTII,S$GLB, | Performed by: INTERNAL MEDICINE

## 2019-09-20 PROCEDURE — 99999 PR PBB SHADOW E&M-EST. PATIENT-LVL V: ICD-10-PCS | Mod: PBBFAC,HCNC,, | Performed by: INTERNAL MEDICINE

## 2019-09-20 PROCEDURE — 99215 PR OFFICE/OUTPT VISIT, EST, LEVL V, 40-54 MIN: ICD-10-PCS | Mod: HCNC,S$GLB,, | Performed by: INTERNAL MEDICINE

## 2019-09-20 PROCEDURE — 3079F PR MOST RECENT DIASTOLIC BLOOD PRESSURE 80-89 MM HG: ICD-10-PCS | Mod: HCNC,CPTII,S$GLB, | Performed by: INTERNAL MEDICINE

## 2019-09-20 PROCEDURE — 99999 PR PBB SHADOW E&M-EST. PATIENT-LVL V: CPT | Mod: PBBFAC,HCNC,, | Performed by: INTERNAL MEDICINE

## 2019-09-20 PROCEDURE — 3077F SYST BP >= 140 MM HG: CPT | Mod: HCNC,CPTII,S$GLB, | Performed by: INTERNAL MEDICINE

## 2019-09-20 PROCEDURE — 99499 RISK ADDL DX/OHS AUDIT: ICD-10-PCS | Mod: HCNC,S$GLB,, | Performed by: INTERNAL MEDICINE

## 2019-09-20 PROCEDURE — 99215 OFFICE O/P EST HI 40 MIN: CPT | Mod: HCNC,S$GLB,, | Performed by: INTERNAL MEDICINE

## 2019-09-20 PROCEDURE — 85027 COMPLETE CBC AUTOMATED: CPT | Mod: HCNC

## 2019-09-20 PROCEDURE — 3288F FALL RISK ASSESSMENT DOCD: CPT | Mod: HCNC,CPTII,S$GLB, | Performed by: INTERNAL MEDICINE

## 2019-09-20 NOTE — PROGRESS NOTES
Subjective:   Date of Visit: 9/20/19   ?   CHIEF COMPLAINT:   Chronic myelomonocytic leukemia type 2???????   ?   ONCOLOGIC DIAGNOSIS:  Chronic myelomonocytic leukemia type 2  ?   CURRENT TREATMENT:  Hydrea    ?      Leukocytosis    5/3/2016 Initial Diagnosis     Leukocytosis         8/16/2019 Tumor Conference     Presenting Hospital / Clinic: [facility] [place]  Virtual Tumor Board Conference: In person  Presenter: Dr. Sathish Devine  Specialties Present: Medical Oncology;Radiation Oncology;Surgical Oncology;Pathology;Navigation;Research;Plastic Surgery;Radiology;Gastrointestinal  Presentation at Cancer Conference: Prospective  Cancer Type: Other  Other Cancer: Leukocytosis  Recommended Plan: Additional screening  Send blood specimen for FISH to determine if CLL           Chronic myelomonocytic leukemia not having achieved remission    9/13/2019 Initial Diagnosis     Chronic myelomonocytic leukemia not having achieved remission         9/30/2019 -  Chemotherapy     Treatment Summary   Plan Name: OP AZACITADINE 7-DAY (SUB-Q)  Treatment Goal: Palliative  Status: Active  Start Date: 9/30/2019 (Planned)  End Date: 12/31/2019 (Planned)  Provider: Denton Knox MD  Chemotherapy: azaCITIDine (VIDAZA) chemo injection 115 mg, 75 mg/m2, Subcutaneous, Clinic/HOD 1 time, 0 of 4 cycles             Note:  Ms Parker was seen at [facility] today in the company of her daughter and .  She is a pleasant 79-year-old female with recent diagnosis of chronic myelomonocytic leukemia type 2 who was started on Hydrea about a week ago.  Today she denies any symptoms with the Hydrea including diarrhea, headache, blurry vision, chest pain or shortness of breath.  She also denies any fever, chills, nausea or vomiting, no abdominal pain or change in urinary frequency.  She seems to be tolerating the medications so far well without any complaints or obvious complications.    Review of Systems   Constitutional: Negative  for activity change, appetite change, chills, fatigue, fever and unexpected weight change.   HENT: Negative for hearing loss, mouth sores, nosebleeds, sore throat, tinnitus, trouble swallowing and voice change.    Eyes: Negative for visual disturbance.   Respiratory: Negative for cough, chest tightness and shortness of breath.    Cardiovascular: Negative for chest pain, palpitations and leg swelling.   Gastrointestinal: Negative for abdominal pain, anal bleeding, blood in stool, constipation, diarrhea, nausea and vomiting.   Genitourinary: Negative for dysuria, frequency, hematuria, pelvic pain, vaginal bleeding and vaginal pain.   Musculoskeletal: Negative for arthralgias, back pain, joint swelling and neck pain.   Skin: Negative for color change, pallor, rash and wound.   Allergic/Immunologic: Negative for immunocompromised state.   Neurological: Negative for dizziness, tremors, syncope, speech difficulty, weakness, light-headedness and headaches.   Hematological: Negative for adenopathy. Does not bruise/bleed easily.   Psychiatric/Behavioral: Negative for agitation, confusion, decreased concentration, hallucinations and sleep disturbance. The patient is not nervous/anxious.        ?   PAST MEDICAL HISTORY:   Past Medical History:   Diagnosis Date    Acid reflux     Anxiety     Back pain     Bronchitis, chronic obstructive w acute bronchitis 7/29/2016    Cancer     [facility] arms, face- Dr. Lata Tejada    Cataract     2+NS    Degenerative disc disease     Depression     Dry mouth     Hernia, hiatal 11/18/2013    Hypertension     Hypothyroid     Macrocytic anemia 5/3/2016    Macular degeneration     Migraines     Mixed anxiety and depressive disorder     Multiple fractures of ribs of right side     Osteoporosis     Pneumonia     Pneumonia due to other staphylococcus     Rheumatoid arthritis     Rheumatoid arthritis(714.0)     Rheumatoid arthritis(714.0)     Remicade, MTX.    ?     PAST SURGICAL  HISTORY:   Past Surgical History:   Procedure Laterality Date    APPENDECTOMY  1985    CATARACT EXTRACTION Bilateral 6/11/15    Dr. Booth    CHOLECYSTECTOMY  2013    cryoablasion kidney Left 09/27/2016    EGD (ESOPHAGOGASTRODUODENOSCOPY) N/A 10/31/2013    Performed by Donald Cuello [facility] at [facility]    feet Bilateral     rheumatoid    FRACTURE SURGERY Right     tibia    FUNDOPLICATION, NISSEN, LAPAROSCOPIC N/A 12/18/2013    Performed by Galindo Vasquez MD at [facility] OR    HERNIA REPAIR      HYSTERECTOMY  1970    partial    JOINT REPLACEMENT      bilateral knees (2008), hands, wrists, knuckles, toes    LAPAROSCOPIC NISSEN FUNDOPLICATION      Left L5/S1 TF AYAAN with local Left 6/25/2019    Performed by Rowdy Felix MD at [facility] PAIN MGT    Renal Cryoablation N/A 9/27/2016    Performed by Dosc Diagnostic Provider at [facility] OR      ?   ALLERGIES:   Allergies as of 09/20/2019 - Reviewed 09/20/2019   Allergen Reaction Noted    Codeine  06/08/2012    Doxycycline  02/13/2019    Gabapentin Other (See Comments) 08/14/2019      ?   MEDICATIONS:?   Outpatient Medications Marked as Taking for the 9/20/19 encounter (Office Visit) with Denton Knox MD   Medication Sig Dispense Refill    albuterol (PROVENTIL) 2.5 mg /3 mL (0.083 %) nebulizer solution Take 3 mLs (2.5 mg total) by nebulization every 6 (six) hours as needed for Wheezing. 1 Box 5    amitriptyline (ELAVIL) 50 MG tablet Take 1 tablet (50 mg total) by mouth every evening. Was 75 mg, now decrease to 25 mg 30 tablet 11    benzonatate (TESSALON) 100 MG capsule Take 1-2 capsules (100-200 mg total) by mouth 3 (three) times daily as needed for Cough. 60 capsule 0    calcium citrate-vitamin D (CITRACAL + D) 315-200 mg-unit per tablet Take 1 tablet by mouth once daily.       DULoxetine (CYMBALTA) 20 MG capsule Take 2 capsules (40 mg total) by mouth once daily. 180 capsule 3    ferrous gluconate 324 mg (37.5 mg iron) Tab tablet Take 1 tablet (324 mg  total) by mouth daily with breakfast. 30 tablet 11    hydrocodone-acetaminophen 5-325mg (NORCO) 5-325 mg per tablet TK 1 T PO Q 6 H PRN  0    hydroxyurea (HYDREA) 500 mg Cap Take one capsule by mouth once daily. 30 capsule 0    leucovorin (WELLCOVORIN) 5 mg Tab TAKE ONE WEEKLY ON  4 tablet 3    levothyroxine (SYNTHROID) 88 MCG tablet TAKE 1 TABLET BY MOUTH BEFORE BREAKFAST 90 tablet 3    magnesium oxide (MAG-OX) 400 mg (241.3 mg magnesium) tablet Take 1 tablet (400 mg total) by mouth 3 (three) times daily.  0    meclizine (ANTIVERT) 50 MG tablet Take 25 mg by mouth 3 (three) times daily as needed.      methotrexate 2.5 MG Tab Take 17.5 mg by mouth every 7 days.       metoprolol succinate (TOPROL-XL) 100 MG 24 hr tablet Take 100 mg by mouth once daily.      metoprolol succinate (TOPROL-XL) 50 MG 24 hr tablet TAKE 1 TABLET(50 MG) BY MOUTH EVERY DAY 30 tablet 11    multivitamin capsule Take by mouth. As directed      ondansetron (ZOFRAN) 4 MG tablet Take 1 tablet (4 mg total) by mouth every 8 (eight) hours as needed for Nausea. 30 tablet 1    pravastatin (PRAVACHOL) 10 MG tablet TAKE 1 TABLET(10 MG) BY MOUTH EVERY DAY 30 tablet 11    tamsulosin (FLOMAX) 0.4 mg Cap Take 1 capsule (0.4 mg total) by mouth once daily. For urinary retention 30 capsule 0    tocilizumab (ACTEMRA) 80 mg/4 mL (20 mg/mL) Soln Inject into the vein.      valsartan-hydrochlorothiazide (DIOVAN-HCT) 80-12.5 mg per tablet TAKE 1 TABLET BY MOUTH DAILY 90 tablet 3      ?   SOCIAL HISTORY:?   Social History     Tobacco Use    Smoking status: Former Smoker     Packs/day: 0.25     Years: 2.00     Pack years: 0.50     Last attempt to quit: 1965     Years since quittin.9    Smokeless tobacco: Never Used   Substance Use Topics    Alcohol use: No        ?   FAMILY HISTORY:   family history includes Asthma in her brother and sister; Cancer in her brother and maternal grandfather; Cataracts in her mother; Chronic back pain in  her brother and sister; Diabetes Mellitus in her brother; Fibromyalgia in her daughter; Heart disease in her father, maternal grandmother, and mother; Hyperlipidemia in her mother; Hypertension in her brother, father, mother, and sister; Osteoarthritis in her brother, father, mother, and sister; Thyroid disease in her brother and sister.   ?     Objective:      Physical Exam   Constitutional: She is oriented to person, place, and time. She appears well-developed and well-nourished. She appears cachectic. She is cooperative.  Non-toxic appearance. She does not appear ill. No distress.   HENT:   Head: Normocephalic and atraumatic.   Mouth/Throat: No oropharyngeal exudate.   Eyes: Pupils are equal, round, and reactive to light. Conjunctivae are normal. Right eye exhibits no discharge. Left eye exhibits no discharge. No scleral icterus.   Neck: Normal range of motion. Neck supple. No thyromegaly present.   Cardiovascular: Normal rate and regular rhythm.   No murmur heard.  Pulmonary/Chest: Effort normal and breath sounds normal. No respiratory distress. She exhibits no tenderness.   Abdominal: Soft. Bowel sounds are normal. She exhibits no distension and no mass. There is no tenderness. There is no rebound and no guarding.   Musculoskeletal: Normal range of motion. She exhibits no edema or tenderness.   Lymphadenopathy:     She has no cervical adenopathy.        Right cervical: No superficial cervical adenopathy present.       Left cervical: No superficial cervical adenopathy present.        Right axillary: No pectoral adenopathy present.        Left axillary: No pectoral adenopathy present.       Right: No inguinal and no supraclavicular adenopathy present.        Left: No inguinal and no supraclavicular adenopathy present.   Neurological: She is alert and oriented to person, place, and time. No cranial nerve deficit or sensory deficit.   Skin: Skin is warm and dry. Capillary refill takes 2 to 3 seconds. No rash noted.  No erythema. No pallor.   Psychiatric: She has a normal mood and affect. Her behavior is normal. Judgment normal.       ?   Vitals:    09/20/19 1341   BP: (!) 151/87   Pulse: 104   Resp: 18   Temp: 97.3 °F (36.3 °C)      ?     ECOG SCORE    2 - Capable of all selfcare but unable to carry out any work activities, active > 50% of hours         Laboratory:  ?   Lab Visit on 09/20/2019   Component Date Value Ref Range Status    WBC 09/20/2019 24.74* 3.90 - 12.70 K/uL Final    RBC 09/20/2019 2.34* 4.00 - 5.40 M/uL Final    Hemoglobin 09/20/2019 7.7* 12.0 - 16.0 g/dL Final    Hematocrit 09/20/2019 24.2* 37.0 - 48.5 % Final    Mean Corpuscular Volume 09/20/2019 103* 82 - 98 fL Final    Mean Corpuscular Hemoglobin 09/20/2019 32.9* 27.0 - 31.0 pg Final    Mean Corpuscular Hemoglobin Conc 09/20/2019 31.8* 32.0 - 36.0 g/dL Final    RDW 09/20/2019 21.2* 11.5 - 14.5 % Final    Platelets 09/20/2019 213  150 - 350 K/uL Final    MPV 09/20/2019 9.9  9.2 - 12.9 fL Final    Immature Granulocytes 09/20/2019 CANCELED  0.0 - 0.5 % Final    Immature Grans (Abs) 09/20/2019 CANCELED  0.00 - 0.04 K/uL Final    Lymph # 09/20/2019 CANCELED  1.0 - 4.8 K/uL Final    Mono # 09/20/2019 CANCELED  0.3 - 1.0 K/uL Final    Eos # 09/20/2019 CANCELED  0.0 - 0.5 K/uL Final    Baso # 09/20/2019 CANCELED  0.00 - 0.20 K/uL Final    nRBC 09/20/2019 1* 0 /100 WBC Final    Gran% 09/20/2019 36.0* 38.0 - 73.0 % Final    Lymph% 09/20/2019 47.0  18.0 - 48.0 % Final    Mono% 09/20/2019 13.0  4.0 - 15.0 % Final    Eosinophil% 09/20/2019 0.0  0.0 - 8.0 % Final    Basophil% 09/20/2019 0.0  0.0 - 1.9 % Final    Bands 09/20/2019 2.0  % Final    Metamyelocytes 09/20/2019 2.0  % Final    Platelet Estimate 09/20/2019 Appears normal   Final    Differential Method 09/20/2019 Manual   Final    Sodium 09/20/2019 132* 136 - 145 mmol/L Final    Potassium 09/20/2019 4.8  3.5 - 5.1 mmol/L Final    Chloride 09/20/2019 98  95 - 110 mmol/L Final    CO2  09/20/2019 24  23 - 29 mmol/L Final    Glucose 09/20/2019 106  70 - 110 mg/dL Final    BUN, Bld 09/20/2019 33* 8 - 23 mg/dL Final    Creatinine 09/20/2019 1.2  0.5 - 1.4 mg/dL Final    Calcium 09/20/2019 9.2  8.7 - 10.5 mg/dL Final    Total Protein 09/20/2019 8.5* 6.0 - 8.4 g/dL Final    Albumin 09/20/2019 3.2* 3.5 - 5.2 g/dL Final    Total Bilirubin 09/20/2019 0.3  0.1 - 1.0 mg/dL Final    Alkaline Phosphatase 09/20/2019 110  55 - 135 U/L Final    AST 09/20/2019 19  10 - 40 U/L Final    ALT 09/20/2019 9* 10 - 44 U/L Final    Anion Gap 09/20/2019 10  8 - 16 mmol/L Final    eGFR if  09/20/2019 50* >60 mL/min/1.73 m^2 Final    eGFR if non African American 09/20/2019 44* >60 mL/min/1.73 m^2 Final      ?   Tumor markers   ?   ?   Imaging: CT Chest Without Contrast  Narrative: EXAMINATION:  CT CHEST WITHOUT CONTRAST    CLINICAL HISTORY:  Chest pain or SOB, pleurisy or effusion suspected; Lobar pneumonia, unspecified organism    TECHNIQUE:  Axial images performed through the chest without IV contrast.  Sagittal and coronal reformats obtained.    COMPARISON:  Chest x-ray 09/05/2019    FINDINGS:  Heart: Normal heart size.  Small pericardial effusion.  Coronary arterial calcification.    Mediastinum: Moderate sized hiatal hernia.  Numerous mildly enlarged lymph nodes present throughout the mediastinum.  Largest representative lymph node is a subcarinal lymph node measuring 2.1 x 1.8 cm.    Vasculature: Moderate aortic atherosclerosis..    Mild mediastinal adenopathy, presumably reactive.  Attention on follow-up.    No pleural effusion evident.    Lungs: Large amount of consolidation throughout the left lower lobe.  No effusion.  Some additional scattered reticulonodular opacities surrounding this area consolidation left lower lobe with some of these changes also present in the left upper lobe.  A few scattered subtle reticulonodular opacities in the right lung.  No consolidation or right-sided  effusion.    Esophagus: Unremarkable.    Upper Abdomen: Aortic atherosclerosis.  Small cyst hepatic dome.    Bones: No acute fracture. Large amount of arthritic changes lower thoracic spine with levoscoliosis lower thoracic spine.  Impression: Large amount of left lower lobe consolidation consistent with pneumonia.  Additional scattered reticulonodular opacities in the lungs greater in the left upper lobe.    Negative for pleural effusion.    Very small pericardial effusion.    All CT scans at this facility use dose modulation, iterative reconstruction and/or weight based dosing when appropriate to reduce radiation dose to as low as reasonably achievable.    Electronically signed by: Escobar Walsh MD  Date:    09/06/2019  Time:    12:45  CT Biopsy Bone Marrow (xpd)  Narrative: EXAMINATION:  CT BIOPSY BONE MARROW (XPD)    CLINICAL HISTORY:  Nutritional anemia, unspecified    TECHNIQUE:  1% lidocaine locally    :ANITA Rodrigues    Complications: None    COMPARISON:  None    FINDINGS:  Procedure: The risks and benefits of this procedure were discussed with the patient, written informed consent was obtained.  The patient was placed prone on the CT gantry.  Preprocedural imaging revealed normal positioning of the iliac crests.  A suitable skin site for biopsy was selected. Moderate sedation using versed and fentanyl was provided with and trained observer monitoring the patient's vital signs and level of consciousness. The total time of sedation was 30 minutes.    The skin site was prepped and draped in sterile fashion.  The skin was anesthetized with 1% lidocaine.    Using CT guidance a 11 gauge introducer needle was guided into the right iliac crest.  Prior to biopsy appropriate needle positioning was confirmed with CT. Thirty cc of marrow aspirate was obtained and given to pathology.  Single 11 gauge marrow core biopsy was then obtained and given to pathology.    The needle was removed.    Postprocedural imaging  was acquired. The site was bandaged sterilely. The patient left the room in stable condition.    Findings:    1.  Preprocedural CT showed appropriate imaging characteristics for right iliac bone marrow aspiration and biopsy.    2.  CT-guided biopsy of a right iliac crest with 11 gauge samples acquired.  3.  Post procedural CT showed No complication.  Impression: CT guided marrow aspiration and biopsy from the right iliac crest.    All CT scans at this facility use dose modulation, iterative reconstruction, and/or weight based dosing when appropriate to reduce radiation dose to as low as reasonable achievable.    Electronically signed by: Zeyad Rodrigues MD  Date:    09/06/2019  Time:    12:39     ?      Pathology:  Pathology Results  (Last 10 years)               10/12/15 0000  Tissue Specimen to Pathology Final result    05/29/13 0000  Tissue Specimen to Pathology Final result           ?   Assessment/Plan:       1. Chronic myelomonocytic leukemia not having achieved remission     I had a detailed conversation with patient's and her family regarding the prognosis and survival data on CMML.  I discussed with her that based on [place] chronic myelomonocytic leukemia (CMML) prognostic model in adults = 1 point for monocytosis = intermediate risk; 18.5 months median survival.  Patient denies any unintentional weight loss, fever or night sweats.  I have reviewed her most recent labs and determine that since she is tolerating Hydrea reasonably well without any obvious complications that will proceed with hypomethylating agent such as azacitidine.  This is scheduled on a 7 day course every 28 days as 50 milligram/meter sq subcutaneously.  Meanwhile I will order order labs to be obtained in a week including serum erythropoietin level, reticulocyte count, RBC folate, serum vitamin B12, CBC and CMP, LDH, iron studies, serum copper, hepatitis panel, these tests are to rule out other causes of monocytosis.  Patient and family agree  to plan of care as documented.  She knows to contact our clinic with any questions or concerns regarding her treatment or condition.  Will plan on seeing her back in about 2 weeks.  ?   Follow-Up: Follow up in about 2 weeks (around 10/4/2019).    CECILIO PUGA Md., Ph.D  Hematology & Oncology Department  Phone #: [phone]

## 2019-09-24 LAB
MPNR  FINAL DIAGNOSIS: NORMAL
MPNR  SPECIMEN TYPE: NORMAL
MPNR RESULT: NORMAL

## 2019-09-25 ENCOUNTER — TELEPHONE (OUTPATIENT)
Dept: HEMATOLOGY/ONCOLOGY | Facility: CLINIC | Age: 79
End: 2019-09-25

## 2019-09-25 NOTE — TELEPHONE ENCOUNTER
Returned  patient's call back, she was wanting to know when was her next appointment and time..  Patient also had questions in regards to [employer].. Transferred call to finance on behalf thereafter

## 2019-09-25 NOTE — TELEPHONE ENCOUNTER
----- Message from Lindsay Diaz sent at 9/25/2019  9:34 AM CDT -----  Contact: Pt  Please give pt a call at .[phone] (home) regarding her treatment

## 2019-09-27 NOTE — PROGRESS NOTES
"Subjective:      Patient ID: Sarah Parker is a 79 y.o. female.    Chief Complaint: Follow-up (1 month )      HPI  Here for f/u medical problems and preventive exam.  Feeling very well.  Tolerating hydrea.  Energy ok.  Sleeping ok.  No f/c/sw/cough.  No cp/sob/palp.  BMs slow, ok with stool softener.  Urine normal.  Taking vit D.      HM: 9/18 fluvax, 5/16 [id number], 10/14 [id number], 10/13 TDaP, 1/17 BMD/will bring to Dr. Tejada rep 2y, 2/19 MMG, 10/13 EGD, 2015 Cscope Dr. Garcia rep 5y, Pain Dr. Carlin.     Review of Systems   Constitutional: Negative for appetite change, chills, diaphoresis and fever.   HENT: Negative for congestion, ear pain, rhinorrhea, sinus pressure and sore throat.    Respiratory: Negative for cough, chest tightness and shortness of breath.    Cardiovascular: Negative for chest pain and palpitations.   Gastrointestinal: Negative for blood in stool, constipation, diarrhea, nausea and vomiting.   Genitourinary: Negative for dysuria, frequency, hematuria, menstrual problem, urgency and vaginal discharge.   Musculoskeletal: Negative for arthralgias.   Skin: Negative for rash.   Neurological: Negative for dizziness and headaches.   Psychiatric/Behavioral: Negative for sleep disturbance. The patient is not nervous/anxious.          Objective:   Pulse (!) 37   Temp 96.7 °F (35.9 °C) (Tympanic)   Ht 5' 2" (1.575 m)   Wt 53.2 kg (117 lb 4.6 oz)   LMP  (LMP Unknown)   SpO2 (!) 89%   BMI 21.45 kg/m²     Physical Exam   Constitutional: She is oriented to person, place, and time. She appears well-developed and well-nourished.   HENT:   Right Ear: External ear normal. Tympanic membrane is not injected.   Left Ear: External ear normal. Tympanic membrane is not injected.   Mouth/Throat: Oropharynx is clear and moist.   Eyes: Conjunctivae are normal.   Neck: Normal range of motion. Neck supple. No thyromegaly present.   Cardiovascular: Normal rate, regular rhythm and intact distal pulses. Exam " reveals no gallop and no friction rub.   No murmur heard.  Pulmonary/Chest: Effort normal and breath sounds normal. She has no wheezes. She has no rales. Right breast exhibits no mass, no nipple discharge, no skin change and no tenderness. Left breast exhibits no mass, no nipple discharge, no skin change and no tenderness.   Abdominal: Soft. Bowel sounds are normal. She exhibits no mass. There is no tenderness.   Musculoskeletal: She exhibits no edema.   Lymphadenopathy:     She has no cervical adenopathy.        Right axillary: No lateral adenopathy present.        Left axillary: No lateral adenopathy present.  Neurological: She is alert and oriented to person, place, and time.   Skin: Skin is warm. No rash noted.   Psychiatric: She has a normal mood and affect.       Results for MARKEL ROLDAN (MRN [id number]) as of 10/11/2019 11:02   Ref. Range 9/3/2019 11:28   Cholesterol Latest Ref Range: 120 - 199 mg/dL 132   HDL Latest Ref Range: 40 - 75 mg/dL 33 (L)   Hdl/Cholesterol Ratio Latest Ref Range: 20.0 - 50.0 % 25.0   LDL Cholesterol External Latest Ref Range: 63.0 - 159.0 mg/dL 49.0 (L)   Non-HDL Cholesterol Latest Units: mg/dL 99   Total Cholesterol/HDL Ratio Latest Ref Range: 2.0 - 5.0  4.0   Triglycerides Latest Ref Range: 30 - 150 mg/dL 250 (H)   Vit D, 25-Hydroxy Latest Ref Range: 30 - 96 ng/mL 52   TSH Latest Ref Range: 0.400 - 4.000 uIU/mL 5.174 (H)   Free T4 Latest Ref Range: 0.71 - 1.51 ng/dL 1.20       Assessment:       1. Encounter for preventive health examination    2. Rheumatoid arthritis involving left foot with positive rheumatoid factor    3. Rheumatoid lung    4. Osteopenia, unspecified location    5. Monoclonal paraproteinemia    6. Melanoma of right upper arm    7. Essential hypertension    8. Depression, recurrent    9. Simple chronic bronchitis    10. Acquired hypothyroidism    11. Asymptomatic postmenopausal state    12. Atherosclerosis of aorta    13. Chronic myelomonocytic leukemia not  having achieved remission    14. Other hyperlipidemia    15. CKD (chronic kidney disease) stage 3, GFR 30-59 ml/min          Plan:     Encounter for preventive health examination- fluvax when ok with HemeOnc.    Rheumatoid arthritis involving left foot with positive rheumatoid factor- off mtx, on Actemra.    Rheumatoid lung, Simple chronic bronchitis- doing well lately.    Osteopenia, unspecified location, Asymptomatic postmenopausal state  -     DXA Bone Density Spine And Hip; Future; Expected date: 10/11/2019    Melanoma of right upper arm    Essential hypertension, CKD 3- adeq control of BP, cont to follow.    Depression, recurrent- doing well, cont rx.    Acquired hypothyroidism- Clinically stable, continue present treatment.    Atherosclerosis of aorta- cont statin.    Monoclonal paraproteinemia,Chronic myelomonocytic leukemia not having achieved remission- per HemeOnc.    RTC 3 mo.

## 2019-10-01 ENCOUNTER — TELEPHONE (OUTPATIENT)
Dept: FAMILY MEDICINE | Facility: CLINIC | Age: 79
End: 2019-10-01

## 2019-10-01 NOTE — TELEPHONE ENCOUNTER
----- Message from Chel Mina sent at 10/1/2019  9:10 AM CDT -----  Patient needs call back rg oxygen tank, patient needs ok from doctor for tank to be picked up..[phone]

## 2019-10-01 NOTE — TELEPHONE ENCOUNTER
Informed pt she can stay off of Flomax if she is not having urinary issues and that [facility] has been contacted to d/c O2./rpr

## 2019-10-01 NOTE — TELEPHONE ENCOUNTER
Pt states that she needs ok for [employer] to  home O2 that she is no longer using.  States it was prescribed upon hospital d/c.  She has not used in in 4 days and sats have been in high 90's.  Also wants to be advised on taking Flomax.  She completed rx that was given to her by lani johnson for urinary retention.  She denies having any urinary dysfunction.  Does she need to get a new rx?  Please advise./rpr

## 2019-10-09 ENCOUNTER — OFFICE VISIT (OUTPATIENT)
Dept: PODIATRY | Facility: CLINIC | Age: 79
End: 2019-10-09
Payer: MEDICARE

## 2019-10-09 VITALS
SYSTOLIC BLOOD PRESSURE: 146 MMHG | HEART RATE: 100 BPM | DIASTOLIC BLOOD PRESSURE: 77 MMHG | HEIGHT: 62 IN | WEIGHT: 117.5 LBS | BODY MASS INDEX: 21.62 KG/M2

## 2019-10-09 DIAGNOSIS — G60.9 IDIOPATHIC PERIPHERAL NEUROPATHY: Primary | ICD-10-CM

## 2019-10-09 DIAGNOSIS — M06.9 RHEUMATOID ARTHRITIS INVOLVING RIGHT FOOT, UNSPECIFIED RHEUMATOID FACTOR PRESENCE: ICD-10-CM

## 2019-10-09 DIAGNOSIS — M24.571 CONTRACTURE, RIGHT ANKLE: ICD-10-CM

## 2019-10-09 DIAGNOSIS — B35.1 ONYCHOMYCOSIS: ICD-10-CM

## 2019-10-09 DIAGNOSIS — L84 CALLUS: ICD-10-CM

## 2019-10-09 DIAGNOSIS — M79.671 PAIN IN RIGHT FOOT: ICD-10-CM

## 2019-10-09 DIAGNOSIS — M24.572 CONTRACTURE, LEFT ANKLE: ICD-10-CM

## 2019-10-09 DIAGNOSIS — M72.2 PLANTAR FASCIITIS: ICD-10-CM

## 2019-10-09 DIAGNOSIS — M79.672 PAIN IN LEFT FOOT: ICD-10-CM

## 2019-10-09 PROCEDURE — 99499 RISK ADDL DX/OHS AUDIT: ICD-10-PCS | Mod: HCNC,S$GLB,, | Performed by: PODIATRIST

## 2019-10-09 PROCEDURE — 11719 TRIM NAIL(S) ANY NUMBER: CPT | Mod: Q9,59,HCNC,S$GLB | Performed by: PODIATRIST

## 2019-10-09 PROCEDURE — 99499 UNLISTED E&M SERVICE: CPT | Mod: HCNC,S$GLB,, | Performed by: PODIATRIST

## 2019-10-09 PROCEDURE — 99999 PR PBB SHADOW E&M-EST. PATIENT-LVL III: ICD-10-PCS | Mod: PBBFAC,HCNC,, | Performed by: PODIATRIST

## 2019-10-09 PROCEDURE — 3077F SYST BP >= 140 MM HG: CPT | Mod: HCNC,CPTII,S$GLB, | Performed by: PODIATRIST

## 2019-10-09 PROCEDURE — 1101F PT FALLS ASSESS-DOCD LE1/YR: CPT | Mod: HCNC,CPTII,S$GLB, | Performed by: PODIATRIST

## 2019-10-09 PROCEDURE — 3078F DIAST BP <80 MM HG: CPT | Mod: HCNC,CPTII,S$GLB, | Performed by: PODIATRIST

## 2019-10-09 PROCEDURE — 11720 PR DEBRIDEMENT OF NAIL(S), 1-5: ICD-10-PCS | Mod: 59,Q9,HCNC,S$GLB | Performed by: PODIATRIST

## 2019-10-09 PROCEDURE — 11055 PR TRIM HYPERKERATOTIC SKIN LESION, ONE: ICD-10-PCS | Mod: Q9,HCNC,S$GLB, | Performed by: PODIATRIST

## 2019-10-09 PROCEDURE — 3077F PR MOST RECENT SYSTOLIC BLOOD PRESSURE >= 140 MM HG: ICD-10-PCS | Mod: HCNC,CPTII,S$GLB, | Performed by: PODIATRIST

## 2019-10-09 PROCEDURE — 99999 PR PBB SHADOW E&M-EST. PATIENT-LVL III: CPT | Mod: PBBFAC,HCNC,, | Performed by: PODIATRIST

## 2019-10-09 PROCEDURE — 11720 DEBRIDE NAIL 1-5: CPT | Mod: 59,Q9,HCNC,S$GLB | Performed by: PODIATRIST

## 2019-10-09 PROCEDURE — 3078F PR MOST RECENT DIASTOLIC BLOOD PRESSURE < 80 MM HG: ICD-10-PCS | Mod: HCNC,CPTII,S$GLB, | Performed by: PODIATRIST

## 2019-10-09 PROCEDURE — 1101F PR PT FALLS ASSESS DOC 0-1 FALLS W/OUT INJ PAST YR: ICD-10-PCS | Mod: HCNC,CPTII,S$GLB, | Performed by: PODIATRIST

## 2019-10-09 PROCEDURE — 11055 PARING/CUTG B9 HYPRKER LES 1: CPT | Mod: Q9,HCNC,S$GLB, | Performed by: PODIATRIST

## 2019-10-09 PROCEDURE — 11719 PR TRIM NAIL(S): ICD-10-PCS | Mod: Q9,59,HCNC,S$GLB | Performed by: PODIATRIST

## 2019-10-09 PROCEDURE — 99213 OFFICE O/P EST LOW 20 MIN: CPT | Mod: 25,HCNC,S$GLB, | Performed by: PODIATRIST

## 2019-10-09 PROCEDURE — 99213 PR OFFICE/OUTPT VISIT, EST, LEVL III, 20-29 MIN: ICD-10-PCS | Mod: 25,HCNC,S$GLB, | Performed by: PODIATRIST

## 2019-10-09 NOTE — PROGRESS NOTES
Subjective:       Patient ID: Sarah Parker is a 79 y.o. female.    Chief Complaint: Routine Foot Care (right and left foot nail care,  pain 0/10,no diabetic, PCP Sabrina) and Nail Care      HPI: Patient presents to the office with the chief complaint of elongated, thickened and dystrophic nail plates to the B/L foot. Patient also complains of calluses to the left and/or right foot. This patient does have a PMHx. of Peripheral Neuropathy. Patient does follow with Primary Care for management of comorbid states. This patient's PMD is Briseida Bennett MD. This patient last saw his/her primary care provider on 9/4.  Recent diagnosis of leukemia.  Patient presents this afternoon ambulatory with a rolling walker and in the presence of her .  States plantar heel pains, bilateral, 8/10 with gait.    Review of patient's allergies indicates:   Allergen Reactions    Codeine      Other reaction(s): hyper  Other reaction(s): hyper    Doxycycline     Gabapentin Other (See Comments)     Bad dreams       Past Medical History:   Diagnosis Date    Acid reflux     Anxiety     Back pain     Bronchitis, chronic obstructive w acute bronchitis 7/29/2016    Cancer     [facility] arms, face- Dr. Lata Tejada    Cataract     2+NS    Degenerative disc disease     Depression     Dry mouth     Hernia, hiatal 11/18/2013    Hypertension     Hypothyroid     Macrocytic anemia 5/3/2016    Macular degeneration     Migraines     Mixed anxiety and depressive disorder     Multiple fractures of ribs of right side     Osteoporosis     Pneumonia     Pneumonia due to other staphylococcus     Rheumatoid arthritis     Rheumatoid arthritis(714.0)     Rheumatoid arthritis(714.0)     Remicade, MTX.       Family History   Problem Relation Age of Onset    Heart disease Mother     Hyperlipidemia Mother     Hypertension Mother     Osteoarthritis Mother     Cataracts Mother     Hypertension Father     Osteoarthritis Father      Heart disease Father     Asthma Sister     Chronic back pain Sister     Hypertension Sister     Osteoarthritis Sister     Thyroid disease Sister     Asthma Brother     Cancer Brother     Chronic back pain Brother     Diabetes Mellitus Brother     Hypertension Brother     Osteoarthritis Brother     Thyroid disease Brother     Cancer Maternal Grandfather     Fibromyalgia Daughter     Heart disease Maternal Grandmother     Colon cancer Neg Hx     Diabetes Neg Hx        Social History     Socioeconomic History    Marital status:      Spouse name: Not on file    Number of children: Not on file    Years of education: Not on file    Highest education level: Not on file   Occupational History    Occupation: retired   Social Needs    Financial resource strain: Not on file    Food insecurity:     Worry: Not on file     Inability: Not on file    Transportation needs:     Medical: Not on file     Non-medical: Not on file   Tobacco Use    Smoking status: Former Smoker     Packs/day: 0.25     Years: 2.00     Pack years: 0.50     Last attempt to quit: 1965     Years since quittin.9    Smokeless tobacco: Never Used   Substance and Sexual Activity    Alcohol use: No    Drug use: No    Sexual activity: Never     Partners: Male   Lifestyle    Physical activity:     Days per week: Not on file     Minutes per session: Not on file    Stress: Not on file   Relationships    Social connections:     Talks on phone: Not on file     Gets together: Not on file     Attends Latter day service: Not on file     Active member of club or organization: Not on file     Attends meetings of clubs or organizations: Not on file     Relationship status: Not on file   Other Topics Concern    Not on file   Social History Narrative    Patient is aretired and live with .       Past Surgical History:   Procedure Laterality Date    APPENDECTOMY  1985    CATARACT EXTRACTION Bilateral 6/11/15    Dr. Botoh  "   CHOLECYSTECTOMY  2013    cryoablasion kidney Left 09/27/2016    feet Bilateral     rheumatoid    FRACTURE SURGERY Right     tibia    HERNIA REPAIR      HYSTERECTOMY  1970    partial    JOINT REPLACEMENT      bilateral knees (2008), hands, wrists, knuckles, toes    LAPAROSCOPIC NISSEN FUNDOPLICATION      TRANSFORAMINAL EPIDURAL INJECTION OF STEROID Left 6/25/2019    Procedure: Left L5/S1 TF AYAAN with local;  Surgeon: Rowdy Felix MD;  Location: [facility];  Service: Pain Management;  Laterality: Left;       Review of Systems   Constitutional: Negative for chills, fatigue and fever.   HENT: Negative for hearing loss.    Eyes: Negative for photophobia and visual disturbance.   Respiratory: Negative for cough, chest tightness, shortness of breath and wheezing.    Cardiovascular: Negative for chest pain and palpitations.   Gastrointestinal: Negative for constipation, diarrhea, nausea and vomiting.   Endocrine: Negative for cold intolerance and heat intolerance.   Genitourinary: Negative for flank pain.   Musculoskeletal: Positive for arthralgias and gait problem. Negative for neck pain and neck stiffness.   Skin: Positive for wound.   Neurological: Positive for numbness. Negative for light-headedness and headaches.   Psychiatric/Behavioral: Negative for sleep disturbance.          Objective:   BP (!) 146/77 (BP Location: Right arm, Patient Position: Sitting, BP Method: Medium (Automatic))   Pulse 100   Ht 5' 2" (1.575 m)   Wt 53.3 kg (117 lb 8.1 oz)   LMP  (LMP Unknown)   BMI 21.49 kg/m²     Physical Exam  LOWER EXTREMITY PHYSICAL EXAMINATION    NEUROLOGY: Protective sensation is not intact to the left and right plantar surfaces of the foot and digits, as the patient has no sensation/detection at greater than 4 distinct points of contact with 5.07 [place] monofilament. Sensation to light touch is intact on the left and right foot. Proprioception is intact, bilateral. Sensation to pin prick " is reduced to absent. Vibratory sensation is diminished    DERMATOLOGY: On the left foot, nails 1 are suggestive of onychomycotic changes. On the right foot, nails 1 are suggestive of onychomycotic changes. These nail plates are thickened, are dystrophic, chaulky in appearance and malodorous with substantial subungual debris. These nail plates are yellow to brown in appearance. The remaining nail plates are elongated and do not have suggestive clinical features of onychomycosis. Callus formation, plantar aspect of the right cuboid bone..     ORTHOPEDIC: Manual Muscle Testing is 5/5 in all planes on the left and right, without pains, with and without resistance.  Fibular deviation to noted. Severe HAV is noted with lateral deviation.  Pes planus foot type is noted.  Patient is ambulatory with assistance of a rolling walker.    VASCULAR: Warm to warm, proximal to distal. Capillary refill time is within normal limits and less  than 3 seconds. Hair growth is sparse on the left and right dorsal foot and at the digits. The left dorsalis pedis pulse is 1/4 and on the right is 1/4, and the left posterior tibial pulse is 1/4 and is 1/4 on the right.     Assessment:     1. Idiopathic peripheral neuropathy    2. Onychomycosis    3. Callus    4. Plantar fasciitis    5. Pain in right foot    6. Pain in left foot    7. Contracture, right ankle    8. Contracture, left ankle    9. Rheumatoid arthritis involving right foot, unspecified rheumatoid factor presence        Plan:     Idiopathic peripheral neuropathy  Neuropathic foot counseling and education is provided at this visit. Patient is advised to wear socks and shoes at all times.  Do not walk barefoot, or with just socks, even when indoors.  Be sure to check and inspect the inside of the shoe before putting them on her feet.  Protect your feet at all times.  Walking shoes and/or athletic shoes are the best types of shoe gear. Do not wear vinyl or plastic type shoe gear, as  they do not stretch and/or breathe.  Protect your feet from hot and/or cold. Elevate the extremities when sitting.  Do not wear excessively tight socks and/or shoe gear. Wiggle your toes for a few minutes throughout the day. Move your ankles up and down, in and out, to help blood flow in your lower extremity.     Onychomycosis  The onychomycotic nail plates, as outlined above, are sharply debrided with double action nail nipper, and/or with the assistance of a mechanical rotary mel, with removal of all offending nail and nail border(s), for reduction of pains. Nails are reduced in terms of length, width and girth with removal of subungual debris to facilitate pain free weight bearing and ambulation. The elongated nails as outlined in the objective portion of this note, were trimmed to appropriate length, with a double action nail nipper, for alleviation/reduction of pains as well. Follow up in approx. 3-4 months.    Callus  Hypertrophic skin formation, as outlined within the examination portion of this note, is/are trimmed/pared surgically debrided with sharp #10/#15 blade, to alleviate discomfort with weight bearing and ambulation, and to lessen the possibility of skin complications, e.g., ulceration due to pressure. No ulceration(s) is are noted with/post debridement.     Plantar fasciitis  Pain in right foot  Pain in left foot  Did discuss proper and supportive shoe gear in detail and at length with the patient.  These are shoes with firm and robust arch support; medial counter.  Shoes which only bend at the metatarsophalangeal joint and which are rigid in the midfoot and hindfoot. Patient urged to purchase running type or cross training type shoes gear which are designed for pronation control.    I did recommend cortisone injection, but patient denies this time.  Continue Tylenol as needed.     Contracture, right ankle  Contracture, left ankle  Stretching exercises are discussed, taught, and are demonstrates to  the patient this afternoon due to concomitant diagnosis of equinus contracture. The relationship between equinus contracture and the other aforementioned pathologies are detailed and outlined to the patient. The patient does acknowledge understanding, and we will embark on a vigorous stretching algorithm for the lower extremity.    Rheumatoid arthritis involving right foot, unspecified rheumatoid factor presence  Patient advised to follow up with Primary Care Provider and/or Rheumatologist for management of rheumatological pathology.          Future Appointments   Date Time Provider Department Center   10/11/2019  9:30 AM Prerna Banda NP [facility]   10/11/2019 10:40 AM Briseida Reyes MD [facility]   10/14/2019  1:05 PM LABORATORY, [facility] LAB [facility]   10/14/2019  1:40 PM Denton Knox MD HG HEM ONC [facility]   10/17/2019  9:00 AM Feliciano Hernandez MD HG PULMSVC [facility]   2/6/2020 10:00 AM [facility] MAMMO1 [facility] [place]   2/12/2020  9:30 AM Sebastian Walter DPM ONLC POD [facility]   6/10/2020  1:30 PM Octavio Navarro OD ONLC OPHTHAL [facility]   8/17/2020  9:40 AM Fam Beck IV, MD ON UROLOGY [facility]

## 2019-10-11 ENCOUNTER — OFFICE VISIT (OUTPATIENT)
Dept: FAMILY MEDICINE | Facility: CLINIC | Age: 79
End: 2019-10-11
Payer: MEDICARE

## 2019-10-11 VITALS
RESPIRATION RATE: 16 BRPM | DIASTOLIC BLOOD PRESSURE: 90 MMHG | TEMPERATURE: 98 F | SYSTOLIC BLOOD PRESSURE: 146 MMHG | WEIGHT: 117.19 LBS | WEIGHT: 117.31 LBS | HEIGHT: 62 IN | OXYGEN SATURATION: 97 % | HEART RATE: 98 BPM | TEMPERATURE: 97 F | OXYGEN SATURATION: 89 % | HEIGHT: 62 IN | BODY MASS INDEX: 21.59 KG/M2 | BODY MASS INDEX: 21.57 KG/M2 | HEART RATE: 37 BPM

## 2019-10-11 DIAGNOSIS — N18.30 CKD (CHRONIC KIDNEY DISEASE) STAGE 3, GFR 30-59 ML/MIN: ICD-10-CM

## 2019-10-11 DIAGNOSIS — F33.9 DEPRESSION, RECURRENT: ICD-10-CM

## 2019-10-11 DIAGNOSIS — K21.9 HIATAL HERNIA WITH GASTROESOPHAGEAL REFLUX: ICD-10-CM

## 2019-10-11 DIAGNOSIS — C93.10 CHRONIC MYELOMONOCYTIC LEUKEMIA NOT HAVING ACHIEVED REMISSION: Chronic | ICD-10-CM

## 2019-10-11 DIAGNOSIS — K44.9 HIATAL HERNIA WITH GASTROESOPHAGEAL REFLUX: ICD-10-CM

## 2019-10-11 DIAGNOSIS — I10 ESSENTIAL HYPERTENSION: Chronic | ICD-10-CM

## 2019-10-11 DIAGNOSIS — G60.9 IDIOPATHIC PERIPHERAL NEUROPATHY: ICD-10-CM

## 2019-10-11 DIAGNOSIS — E78.49 OTHER HYPERLIPIDEMIA: ICD-10-CM

## 2019-10-11 DIAGNOSIS — C43.61 MELANOMA OF RIGHT UPPER ARM: ICD-10-CM

## 2019-10-11 DIAGNOSIS — I70.0 ATHEROSCLEROSIS OF AORTA: ICD-10-CM

## 2019-10-11 DIAGNOSIS — M05.711 RHEUMATOID ARTHRITIS INVOLVING RIGHT SHOULDER WITH POSITIVE RHEUMATOID FACTOR: Chronic | ICD-10-CM

## 2019-10-11 DIAGNOSIS — M54.16 LUMBAR RADICULOPATHY: ICD-10-CM

## 2019-10-11 DIAGNOSIS — D84.9 IMMUNOSUPPRESSED STATUS: ICD-10-CM

## 2019-10-11 DIAGNOSIS — E03.9 ACQUIRED HYPOTHYROIDISM: ICD-10-CM

## 2019-10-11 DIAGNOSIS — Z00.00 ENCOUNTER FOR PREVENTIVE HEALTH EXAMINATION: Primary | ICD-10-CM

## 2019-10-11 DIAGNOSIS — J84.10 CALCIFIED GRANULOMA OF LUNG: ICD-10-CM

## 2019-10-11 DIAGNOSIS — M85.80 OSTEOPENIA, UNSPECIFIED LOCATION: ICD-10-CM

## 2019-10-11 DIAGNOSIS — Z85.828 HISTORY OF SKIN CANCER: ICD-10-CM

## 2019-10-11 DIAGNOSIS — D64.9 ANEMIA, UNSPECIFIED TYPE: ICD-10-CM

## 2019-10-11 DIAGNOSIS — M05.10 RHEUMATOID LUNG: ICD-10-CM

## 2019-10-11 DIAGNOSIS — Z78.0 ASYMPTOMATIC POSTMENOPAUSAL STATE: ICD-10-CM

## 2019-10-11 DIAGNOSIS — D47.2 MONOCLONAL PARAPROTEINEMIA: ICD-10-CM

## 2019-10-11 DIAGNOSIS — J44.1 COPD WITH EXACERBATION: ICD-10-CM

## 2019-10-11 DIAGNOSIS — M05.772 RHEUMATOID ARTHRITIS INVOLVING LEFT FOOT WITH POSITIVE RHEUMATOID FACTOR: ICD-10-CM

## 2019-10-11 DIAGNOSIS — J41.0 SIMPLE CHRONIC BRONCHITIS: ICD-10-CM

## 2019-10-11 DIAGNOSIS — H35.30 ARMD (AGE RELATED MACULAR DEGENERATION): Chronic | ICD-10-CM

## 2019-10-11 PROCEDURE — 3080F DIAST BP >= 90 MM HG: CPT | Mod: HCNC,CPTII,S$GLB, | Performed by: INTERNAL MEDICINE

## 2019-10-11 PROCEDURE — 99499 UNLISTED E&M SERVICE: CPT | Mod: HCNC,S$GLB,, | Performed by: NURSE PRACTITIONER

## 2019-10-11 PROCEDURE — 99999 PR PBB SHADOW E&M-EST. PATIENT-LVL V: CPT | Mod: PBBFAC,HCNC,, | Performed by: NURSE PRACTITIONER

## 2019-10-11 PROCEDURE — G0439 PPPS, SUBSEQ VISIT: HCPCS | Mod: HCNC,S$GLB,, | Performed by: NURSE PRACTITIONER

## 2019-10-11 PROCEDURE — 3080F PR MOST RECENT DIASTOLIC BLOOD PRESSURE >= 90 MM HG: ICD-10-PCS | Mod: HCNC,CPTII,S$GLB, | Performed by: NURSE PRACTITIONER

## 2019-10-11 PROCEDURE — 99499 RISK ADDL DX/OHS AUDIT: ICD-10-PCS | Mod: HCNC,S$GLB,, | Performed by: INTERNAL MEDICINE

## 2019-10-11 PROCEDURE — 3077F PR MOST RECENT SYSTOLIC BLOOD PRESSURE >= 140 MM HG: ICD-10-PCS | Mod: HCNC,CPTII,S$GLB, | Performed by: NURSE PRACTITIONER

## 2019-10-11 PROCEDURE — 99499 UNLISTED E&M SERVICE: CPT | Mod: HCNC,S$GLB,, | Performed by: INTERNAL MEDICINE

## 2019-10-11 PROCEDURE — 3080F DIAST BP >= 90 MM HG: CPT | Mod: HCNC,CPTII,S$GLB, | Performed by: NURSE PRACTITIONER

## 2019-10-11 PROCEDURE — 3077F PR MOST RECENT SYSTOLIC BLOOD PRESSURE >= 140 MM HG: ICD-10-PCS | Mod: HCNC,CPTII,S$GLB, | Performed by: INTERNAL MEDICINE

## 2019-10-11 PROCEDURE — 99999 PR PBB SHADOW E&M-EST. PATIENT-LVL IV: ICD-10-PCS | Mod: PBBFAC,HCNC,, | Performed by: INTERNAL MEDICINE

## 2019-10-11 PROCEDURE — 3080F PR MOST RECENT DIASTOLIC BLOOD PRESSURE >= 90 MM HG: ICD-10-PCS | Mod: HCNC,CPTII,S$GLB, | Performed by: INTERNAL MEDICINE

## 2019-10-11 PROCEDURE — 99499 RISK ADDL DX/OHS AUDIT: ICD-10-PCS | Mod: HCNC,S$GLB,, | Performed by: NURSE PRACTITIONER

## 2019-10-11 PROCEDURE — 99999 PR PBB SHADOW E&M-EST. PATIENT-LVL IV: CPT | Mod: PBBFAC,HCNC,, | Performed by: INTERNAL MEDICINE

## 2019-10-11 PROCEDURE — G0439 PR MEDICARE ANNUAL WELLNESS SUBSEQUENT VISIT: ICD-10-PCS | Mod: HCNC,S$GLB,, | Performed by: NURSE PRACTITIONER

## 2019-10-11 PROCEDURE — 99999 PR PBB SHADOW E&M-EST. PATIENT-LVL V: ICD-10-PCS | Mod: PBBFAC,HCNC,, | Performed by: NURSE PRACTITIONER

## 2019-10-11 PROCEDURE — 99397 PR PREVENTIVE VISIT,EST,65 & OVER: ICD-10-PCS | Mod: HCNC,S$GLB,, | Performed by: INTERNAL MEDICINE

## 2019-10-11 PROCEDURE — 3077F SYST BP >= 140 MM HG: CPT | Mod: HCNC,CPTII,S$GLB, | Performed by: INTERNAL MEDICINE

## 2019-10-11 PROCEDURE — 99397 PER PM REEVAL EST PAT 65+ YR: CPT | Mod: HCNC,S$GLB,, | Performed by: INTERNAL MEDICINE

## 2019-10-11 PROCEDURE — 3077F SYST BP >= 140 MM HG: CPT | Mod: HCNC,CPTII,S$GLB, | Performed by: NURSE PRACTITIONER

## 2019-10-11 RX ORDER — HYDROXYZINE HYDROCHLORIDE 25 MG/1
25 TABLET, FILM COATED ORAL NIGHTLY
COMMUNITY
Start: 2018-08-08

## 2019-10-11 NOTE — PATIENT INSTRUCTIONS
Counseling and Referral of Other Preventative  (Italic type indicates deductible and co-insurance are waived)    Patient Name: Sarah Parker  Today's Date: 10/11/2019    Health Maintenance       Date Due Completion Date    Shingles Vaccine (1 of 2) 02/21/1992 ---    Influenza Vaccine (1) 09/01/2019 9/25/2018    DEXA SCAN 01/13/2020 1/13/2017    Override on 5/22/2012: Done (Osteopenia; improving BMD; repeat in 2 years)    TETANUS VACCINE 10/14/2023 10/14/2013    Lipid Panel 09/03/2024 9/3/2019        No orders of the defined types were placed in this encounter.    The following information is provided to all patients.  This information is to help you find resources for any of the problems found today that may be affecting your health:                Living healthy guide: www.[employer].[place].[facility]      Understanding Diabetes: www.diabetes.org      Eating healthy: www.cdc.gov/healthyweight      [facility] home safety checklist: www.cdc.gov/steadi/patient.html      Agency on Aging: www.goea.[place].[facility]      Alcoholics anonymous (AA): www.aa.org      Physical Activity: www.rené.nih.gov/rj1vetq      Tobacco use: www.quitwithusla.org     Counseling and Referral of Other Preventative  (Italic type indicates deductible and co-insurance are waived)    Patient Name: Sarah Parker  Today's Date: 10/14/2019    Health Maintenance       Date Due Completion Date    Shingles Vaccine (1 of 2) 02/21/1992 ---    Influenza Vaccine (1) 09/01/2019 9/25/2018    DEXA SCAN 01/13/2020 1/13/2017    Override on 5/22/2012: Done (Osteopenia; improving BMD; repeat in 2 years)    TETANUS VACCINE 10/14/2023 10/14/2013    Lipid Panel 09/03/2024 9/3/2019        No orders of the defined types were placed in this encounter.    The following information is provided to all patients.  This information is to help you find resources for any of the problems found today that may be affecting your health:                Living healthy guide: www.[facility].[place]       Understanding Diabetes: www.diabetes.org      Eating healthy: www.cdc.gov/healthyweight      CDC home safety checklist: www.cdc.gov/steadi/patient.html      Agency on Aging: www.goea.[place].[facility]      Alcoholics anonymous (AA): www.aa.org      Physical Activity: www.rené.nih.gov/mp6uboq      Tobacco use: www.quitwithusla.org

## 2019-10-11 NOTE — Clinical Note
Your patient was seen today for a HRA visit. Abnormalities have been identified during this visit that may require additional testing and follow up. I have included a copy of my visit note, please review the note and feel free to contact me with any questions. Thank you for allowing me to participate in the care of your patients. Prerna Banda NP

## 2019-10-11 NOTE — PROGRESS NOTES
"Sarah Parker presented for a  Medicare AWV and comprehensive Health Risk Assessment today. The following components were reviewed and updated:    · Medical history  · Family History  · Social history  · Allergies and Current Medications  · Health Risk Assessment  · Health Maintenance  · Care Team     ** See Completed Assessments for Annual Wellness Visit within the encounter summary.**       The following assessments were completed:  · Living Situation  · CAGE  · Depression Screening  · Timed Get Up and Go  · Whisper Test  · Cognitive Function Screening  · Nutrition Screening  · ADL Screening  · PAQ Screening    Vitals:    10/11/19 1004   BP: (!) 146/90   Pulse: 98   Resp: 16   Temp: 98.3 °F (36.8 °C)   SpO2: 97%   Weight: 53.1 kg (117 lb 2.8 oz)   Height: 5' 2" (1.575 m)     Body mass index is 21.43 kg/m².  Physical Exam   Constitutional: She appears well-developed.   HENT:   Head: Normocephalic and atraumatic.   Eyes: Pupils are equal, round, and reactive to light.   Neck: Carotid bruit is not present.   Cardiovascular: Normal rate, regular rhythm, normal heart sounds, intact distal pulses and normal pulses. Exam reveals no gallop.   No murmur heard.  Pulmonary/Chest: Effort normal and breath sounds normal.   Abdominal: Soft. Normal appearance and bowel sounds are normal. She exhibits no distension. There is no tenderness.   Musculoskeletal: Normal range of motion. She exhibits no edema or tenderness.   Neurological: She is alert. She exhibits normal muscle tone. Gait normal.   Skin: Skin is warm, dry and intact.   Psychiatric: She has a normal mood and affect. Her speech is normal and behavior is normal. Judgment and thought content normal. Cognition and memory are normal.   Nursing note and vitals reviewed.        Diagnoses and health risks identified today and associated recommendations/orders:    1. Encounter for preventive health examination  completed    2. Essential hypertension  This problem is currently not " controlled on Diovan and Metoprolol.   Please follow up with your PCP as planned to discuss adjustments to your treatment plan.    3. Chronic myelomonocytic leukemia not having achieved remission   Chemotherapy        Treatment Summary   Plan Name: OP AZACITIDINE 7-DAY (SUB-Q)  Treatment Goal: Palliative  Status: Active  Start Date: 9/30/2019 (Planned)  End Date: 12/31/2019 (Planned)  Provider: Denton Knox MD  Chemotherapy: azacitidine (VIDAZA) chemo injection 115 mg, 75 mg/m2, Subcutaneous, Clinic/HOD 1 time, 0 of 4 cycles   Stable. Followed by oncologist     4. Atherosclerosis of aorta  Chronic and Stable on Lipitor. Continue current treatment plan as previously prescribed with your PCP    5. COPD   Chronic and Stable on Proventil as needed. Continue current treatment plan as previously prescribed with your pulmologist    6. Depression, recurrent  Chronic and Stable on Cymbalta. Continue current treatment plan as previously prescribed with your PCP    7. Rheumatoid arthritis involving right shoulder with positive rheumatoid factor  Chronic and Stable on Actemra and Nor co as needed for pain . Continue current treatment plan as previously prescribed with your rheumatologist    8. Rheumatoid lung  Chronic and Stable on Actemra. Continue current treatment plan as previously prescribed with your rheumatologist- Mallory and pulmonologist     9. Other hyperlipidemia  Component      Latest Ref Rng & Units 9/3/2019   Cholesterol      120 - 199 mg/dL 132   Triglycerides      30 - 150 mg/dL 250 (H)   HDL      40 - 75 mg/dL 33 (L)   LDL Cholesterol External      63.0 - 159.0 mg/dL 49.0 (L)   Hdl/Cholesterol Ratio      20.0 - 50.0 % 25.0   Total Cholesterol/HDL Ratio      2.0 - 5.0 4.0   Non-HDL Cholesterol      mg/dL 99   Chronic and Stable on Lipitor. Continue current treatment plan as previously prescribed with your rheumatologist    10. Osteopenia, unspecified location   DEXA1/20/2017-  due now Chronic and Stable.  Follow  Up with PCP in the next hour    11. History of skin cancer  Stable . States sites on the forearm  scheduled to see dermatologist in the next    12. Hiatal hernia with gastroesophageal reflux  Chronic and Stable on diet modifications Continue current treatment plan as previously prescribed with your PCP    13. Idiopathic peripheral neuropathy  Chronic and Stable. Continue current treatment plan as previously prescribed with your PCP       14 Lumbar radiculopathy  Chronic and Stable on Norco as needed . Continue current treatment plan as previously prescribed with your pain management - [facility]     15. Melanoma of right upper arm  Stable . States new site on the foream. scheduled to see dermatologist in the next week    16. Calcified granuloma of lung  Chronic and Stable. Continue current treatment plan as previously prescribed with your PCP    17. ARMD (age related macular degeneration)  Chronic and Stable. Continue current treatment plan as previously prescribed with your  opthiolomgist    18. Anemia, unspecified type  Chronic and Stable on Icar C  Continue current treatment plan as previously prescribed with your PCP    19. Acquired hypothyroidism  Chronic and Stable on Synthroid . Continue current treatment plan as previously prescribed with your PCP    20 . Immunosuppressed status  Chronic and Stable. Treated 9/919 in pneumonia now resolved  Continue current treatment plan as previously prescribed with your rheumatologist     I offered to discuss end of life issues, including information on how to make advance directives that the patient could use to name someone who would make medical decisions on their behalf if they became too ill to make themselves.  _X_Patient is interested, I provided paper work and offered to discuss.    Provided Sarah with a 5-10 year written screening schedule and personal prevention plan. Recommendations were developed using the USPSTF age appropriate recommendations. Education,  counseling, and referrals were provided as needed. After Visit Summary printed and given to patient which includes a list of additional screenings\tests needed.    Follow up in about 1 year (around 10/11/2020).    Prerna Banda NP

## 2019-10-13 NOTE — PROGRESS NOTES
Subjective:   Date of Visit: 10/14/19   ?   CHIEF COMPLAINT:   Chronic myelomonocytic leukemia type 2???????   ?   ONCOLOGIC DIAGNOSIS:  Chronic myelomonocytic leukemia type 2  ?   CURRENT TREATMENT:  Hydrea    ?      Leukocytosis    5/3/2016 Initial Diagnosis     Leukocytosis      8/16/2019 Tumor Conference     Presenting Hospital / Clinic: [facility]  Virtual Tumor Board Conference: In person  Presenter: Dr. Sathish Devine  Specialties Present: Medical Oncology;Radiation Oncology;Surgical Oncology;Pathology;Navigation;Research;Plastic Surgery;Radiology;Gastrointestinal  Presentation at Cancer Conference: Prospective  Cancer Type: Other  Other Cancer: Leukocytosis  Recommended Plan: Additional screening  Send blood specimen for FISH to determine if CLL        Chronic myelomonocytic leukemia not having achieved remission    9/13/2019 Initial Diagnosis     Chronic myelomonocytic leukemia not having achieved remission      10/14/2019 -  Chemotherapy     Treatment Summary   Plan Name: OP AZACITADINE 7-DAY (SUB-Q)  Treatment Goal: Palliative  Status: Active  Start Date: 10/14/2019  End Date: 1/14/2020 (Planned)  Provider: Denton Knox MD  Chemotherapy: azaCITIDine (VIDAZA) chemo injection 115 mg, 75 mg/m2 = 115 mg, Subcutaneous, Clinic/HOD 1 time, 1 of 4 cycles          Note:  Ms Parker was seen at [facility] today in the company of her daughter and .  She is a pleasant 79-year-old female with recent diagnosis of chronic myelomonocytic leukemia type 2 who was started on Hydrea about a week ago.  Today she denies any symptoms with the Hydrea including diarrhea, headache, blurry vision, chest pain or shortness of breath.  She also denies any fever, chills, nausea or vomiting, no abdominal pain or change in urinary frequency.  She seems to be tolerating the medications so far well without any complaints or obvious complications.    Review of Systems   Constitutional: Negative for activity  change, appetite change, chills, fatigue, fever and unexpected weight change.   HENT: Negative for hearing loss, mouth sores, nosebleeds, sore throat, tinnitus, trouble swallowing and voice change.    Eyes: Negative for visual disturbance.   Respiratory: Negative for cough, chest tightness and shortness of breath.    Cardiovascular: Negative for chest pain, palpitations and leg swelling.   Gastrointestinal: Negative for abdominal pain, anal bleeding, blood in stool, constipation, diarrhea, nausea and vomiting.   Genitourinary: Negative for dysuria, frequency, hematuria, pelvic pain, vaginal bleeding and vaginal pain.   Musculoskeletal: Negative for arthralgias, back pain, joint swelling and neck pain.   Skin: Negative for color change, pallor, rash and wound.   Allergic/Immunologic: Negative for immunocompromised state.   Neurological: Negative for dizziness, tremors, syncope, speech difficulty, weakness, light-headedness and headaches.   Hematological: Negative for adenopathy. Does not bruise/bleed easily.   Psychiatric/Behavioral: Negative for agitation, confusion, decreased concentration, hallucinations and sleep disturbance. The patient is not nervous/anxious.        ?   PAST MEDICAL HISTORY:   Past Medical History:   Diagnosis Date    Acid reflux     Anxiety     Back pain     Bronchitis, chronic obstructive w acute bronchitis 7/29/2016    Cancer     [facility] arms, face- Dr. Lata Tejada    Cataract     2+NS    Degenerative disc disease     Depression     Dry mouth     Hernia, hiatal 11/18/2013    Hypertension     Hypothyroid     Macrocytic anemia 5/3/2016    Macular degeneration     Migraines     Mixed anxiety and depressive disorder     Multiple fractures of ribs of right side     Osteoporosis     Other hyperlipidemia 10/11/2019    Pneumonia     Pneumonia due to other staphylococcus     Rheumatoid arthritis     Rheumatoid arthritis(714.0)     Rheumatoid arthritis(714.0)     Remicade, MTX.     ?     PAST SURGICAL HISTORY:   Past Surgical History:   Procedure Laterality Date    APPENDECTOMY  1985    CATARACT EXTRACTION Bilateral 6/11/15    Dr. Booth    CHOLECYSTECTOMY  2013    cryoablasion kidney Left 09/27/2016    feet Bilateral     rheumatoid    FRACTURE SURGERY Right     tibia    HERNIA REPAIR      HYSTERECTOMY  1970    partial    JOINT REPLACEMENT      bilateral knees (2008), hands, wrists, knuckles, toes    LAPAROSCOPIC NISSEN FUNDOPLICATION      TRANSFORAMINAL EPIDURAL INJECTION OF STEROID Left 6/25/2019    Procedure: Left L5/S1 TF AYAAN with local;  Surgeon: Rowdy Felix MD;  [facility];  Service: Pain Management;  Laterality: Left;      ?   ALLERGIES:   Allergies as of 10/14/2019 - Reviewed 10/14/2019   Allergen Reaction Noted    Codeine  06/08/2012    Doxycycline  02/13/2019    Gabapentin Other (See Comments) 08/14/2019      ?   MEDICATIONS:?   Outpatient Medications Marked as Taking for the 10/14/19 encounter (Office Visit) with Denton Knox MD   Medication Sig Dispense Refill    albuterol (PROVENTIL) 2.5 mg /3 mL (0.083 %) nebulizer solution Take 3 mLs (2.5 mg total) by nebulization every 6 (six) hours as needed for Wheezing. 1 Box 5    benzonatate (TESSALON) 100 MG capsule Take 1-2 capsules (100-200 mg total) by mouth 3 (three) times daily as needed for Cough. 60 capsule 0    calcium citrate-vitamin D (CITRACAL + D) 315-200 mg-unit per tablet Take 1 tablet by mouth once daily.       DULoxetine (CYMBALTA) 20 MG capsule Take 2 capsules (40 mg total) by mouth once daily. 180 capsule 3    hydrocodone-acetaminophen 5-325mg (NORCO) 5-325 mg per tablet TK 1 T PO Q 6 H PRN  0    hydroxyurea (HYDREA) 500 mg Cap Take one capsule by mouth once daily. 30 capsule 0    hydrOXYzine HCl (ATARAX) 25 MG tablet       leucovorin (WELLCOVORIN) 5 mg Tab TAKE ONE WEEKLY ON SATURDAYS 4 tablet 3    levothyroxine (SYNTHROID) 88 MCG tablet TAKE 1 TABLET BY MOUTH BEFORE  BREAKFAST 90 tablet 3    magnesium oxide (MAG-OX) 400 mg (241.3 mg magnesium) tablet Take 1 tablet (400 mg total) by mouth 3 (three) times daily.  0    meclizine (ANTIVERT) 50 MG tablet Take 25 mg by mouth 3 (three) times daily as needed.      methotrexate 2.5 MG Tab Take 17.5 mg by mouth every 7 days.       metoprolol succinate (TOPROL-XL) 100 MG 24 hr tablet Take 100 mg by mouth once daily.      metoprolol succinate (TOPROL-XL) 50 MG 24 hr tablet TAKE 1 TABLET(50 MG) BY MOUTH EVERY DAY 30 tablet 11    multivitamin capsule Take by mouth. As directed      ondansetron (ZOFRAN) 4 MG tablet Take 1 tablet (4 mg total) by mouth every 8 (eight) hours as needed for Nausea. 30 tablet 1    pravastatin (PRAVACHOL) 10 MG tablet TAKE 1 TABLET(10 MG) BY MOUTH EVERY DAY 30 tablet 11    tocilizumab (ACTEMRA) 80 mg/4 mL (20 mg/mL) Soln Inject into the vein.      valsartan-hydrochlorothiazide (DIOVAN-HCT) 80-12.5 mg per tablet TAKE 1 TABLET BY MOUTH DAILY 90 tablet 3      ?   SOCIAL HISTORY:?   Social History     Tobacco Use    Smoking status: Former Smoker     Packs/day: 0.25     Years: 2.00     Pack years: 0.50     Last attempt to quit: 1965     Years since quittin.9    Smokeless tobacco: Never Used   Substance Use Topics    Alcohol use: No        ?   FAMILY HISTORY:   family history includes Asthma in her brother and sister; Cancer in her brother and maternal grandfather; Cataracts in her mother; Chronic back pain in her brother and sister; Diabetes Mellitus in her brother; Fibromyalgia in her daughter; Heart disease in her father, maternal grandmother, and mother; Hyperlipidemia in her mother; Hypertension in her brother, father, mother, and sister; Osteoarthritis in her brother, father, mother, and sister; Thyroid disease in her brother and sister.   ?     Objective:      Physical Exam   Constitutional: She is oriented to person, place, and time. She appears well-developed and well-nourished. She appears  cachectic. She is cooperative.  Non-toxic appearance. She does not appear ill. No distress.   HENT:   Head: Normocephalic and atraumatic.   Mouth/Throat: No oropharyngeal exudate.   Eyes: Pupils are equal, round, and reactive to light. Conjunctivae are normal. Right eye exhibits no discharge. Left eye exhibits no discharge. No scleral icterus.   Neck: Normal range of motion. Neck supple. No thyromegaly present.   Cardiovascular: Normal rate and regular rhythm.   No murmur heard.  Pulmonary/Chest: Effort normal and breath sounds normal. No respiratory distress. She exhibits no tenderness.   Abdominal: Soft. Bowel sounds are normal. She exhibits no distension and no mass. There is no tenderness. There is no rebound and no guarding.   Musculoskeletal: Normal range of motion. She exhibits no edema or tenderness.   Lymphadenopathy:     She has no cervical adenopathy.        Right cervical: No superficial cervical adenopathy present.       Left cervical: No superficial cervical adenopathy present.        Right axillary: No pectoral adenopathy present.        Left axillary: No pectoral adenopathy present.       Right: No inguinal and no supraclavicular adenopathy present.        Left: No inguinal and no supraclavicular adenopathy present.   Neurological: She is alert and oriented to person, place, and time. No cranial nerve deficit or sensory deficit.   Skin: Skin is warm and dry. Capillary refill takes 2 to 3 seconds. No rash noted. No erythema. No pallor.   Psychiatric: She has a normal mood and affect. Her behavior is normal. Judgment normal.       ?   Vitals:    10/14/19 1328   BP: (!) 159/89   Pulse: 91   Resp: 14   Temp: 97.9 °F (36.6 °C)      ?     ECOG SCORE    1 - Restricted in strenuous activity-ambulatory and able to carry out work of a light nature         Laboratory:  ?   Lab Visit on 10/14/2019   Component Date Value Ref Range Status    WBC 10/14/2019 9.38  3.90 - 12.70 K/uL Final    RBC 10/14/2019 2.28*  4.00 - 5.40 M/uL Final    Hemoglobin 10/14/2019 7.7* 12.0 - 16.0 g/dL Final    Hematocrit 10/14/2019 24.9* 37.0 - 48.5 % Final    Mean Corpuscular Volume 10/14/2019 109* 82 - 98 fL Final    Mean Corpuscular Hemoglobin 10/14/2019 33.8* 27.0 - 31.0 pg Final    Mean Corpuscular Hemoglobin Conc 10/14/2019 30.9* 32.0 - 36.0 g/dL Final    RDW 10/14/2019 24.7* 11.5 - 14.5 % Final    Platelets 10/14/2019 121* 150 - 350 K/uL Final    MPV 10/14/2019 10.6  9.2 - 12.9 fL Final    Immature Granulocytes 10/14/2019 1.9* 0.0 - 0.5 % Final    Gran # (ANC) 10/14/2019 1.9  1.8 - 7.7 K/uL Final    Immature Grans (Abs) 10/14/2019 0.18* 0.00 - 0.04 K/uL Final    Lymph # 10/14/2019 6.3* 1.0 - 4.8 K/uL Final    Mono # 10/14/2019 1.1* 0.3 - 1.0 K/uL Final    Eos # 10/14/2019 0.1  0.0 - 0.5 K/uL Final    Baso # 10/14/2019 0.02  0.00 - 0.20 K/uL Final    nRBC 10/14/2019 2* 0 /100 WBC Final    Gran% 10/14/2019 20.7* 38.0 - 73.0 % Final    Lymph% 10/14/2019 67.3* 18.0 - 48.0 % Final    Mono% 10/14/2019 11.3  4.0 - 15.0 % Final    Eosinophil% 10/14/2019 0.5  0.0 - 8.0 % Final    Basophil% 10/14/2019 0.2  0.0 - 1.9 % Final    Aniso 10/14/2019 Moderate   Final    Poly 10/14/2019 Occasional   Final    Hypo 10/14/2019 Occasional   Final    Differential Method 10/14/2019 Automated   Final    Sodium 10/14/2019 138  136 - 145 mmol/L Final    Potassium 10/14/2019 4.7  3.5 - 5.1 mmol/L Final    Chloride 10/14/2019 102  95 - 110 mmol/L Final    CO2 10/14/2019 26  23 - 29 mmol/L Final    Glucose 10/14/2019 100  70 - 110 mg/dL Final    BUN, Bld 10/14/2019 25* 8 - 23 mg/dL Final    Creatinine 10/14/2019 0.9  0.5 - 1.4 mg/dL Final    Calcium 10/14/2019 9.4  8.7 - 10.5 mg/dL Final    Total Protein 10/14/2019 8.7* 6.0 - 8.4 g/dL Final    Albumin 10/14/2019 3.8  3.5 - 5.2 g/dL Final    Total Bilirubin 10/14/2019 0.5  0.1 - 1.0 mg/dL Final    Alkaline Phosphatase 10/14/2019 91  55 - 135 U/L Final    AST 10/14/2019 17  10 - 40  U/L Final    ALT 10/14/2019 14  10 - 44 U/L Final    Anion Gap 10/14/2019 10  8 - 16 mmol/L Final    eGFR if African American 10/14/2019 >60  >60 mL/min/1.73 m^2 Final    eGFR if non African American 10/14/2019 >60  >60 mL/min/1.73 m^2 Final      ?   Tumor markers   ?   ?   Imaging: CT Chest Without Contrast  Narrative: EXAMINATION:  CT CHEST WITHOUT CONTRAST    CLINICAL HISTORY:  Chest pain or SOB, pleurisy or effusion suspected; Lobar pneumonia, unspecified organism    TECHNIQUE:  Axial images performed through the chest without IV contrast.  Sagittal and coronal reformats obtained.    COMPARISON:  Chest x-ray 09/05/2019    FINDINGS:  Heart: Normal heart size.  Small pericardial effusion.  Coronary arterial calcification.    Mediastinum: Moderate sized hiatal hernia.  Numerous mildly enlarged lymph nodes present throughout the mediastinum.  Largest representative lymph node is a subcarinal lymph node measuring 2.1 x 1.8 cm.    Vasculature: Moderate aortic atherosclerosis..    Mild mediastinal adenopathy, presumably reactive.  Attention on follow-up.    No pleural effusion evident.    Lungs: Large amount of consolidation throughout the left lower lobe.  No effusion.  Some additional scattered reticulonodular opacities surrounding this area consolidation left lower lobe with some of these changes also present in the left upper lobe.  A few scattered subtle reticulonodular opacities in the right lung.  No consolidation or right-sided effusion.    Esophagus: Unremarkable.    Upper Abdomen: Aortic atherosclerosis.  Small cyst hepatic dome.    Bones: No acute fracture. Large amount of arthritic changes lower thoracic spine with levoscoliosis lower thoracic spine.  Impression: Large amount of left lower lobe consolidation consistent with pneumonia.  Additional scattered reticulonodular opacities in the lungs greater in the left upper lobe.    Negative for pleural effusion.    Very small pericardial effusion.    All  CT scans at this facility use dose modulation, iterative reconstruction and/or weight based dosing when appropriate to reduce radiation dose to as low as reasonably achievable.    Electronically signed by: Escobar Walsh MD  Date:    09/06/2019  Time:    12:45  CT Biopsy Bone Marrow (xpd)  Narrative: EXAMINATION:  CT BIOPSY BONE MARROW (XPD)    CLINICAL HISTORY:  Nutritional anemia, unspecified    TECHNIQUE:  1% lidocaine locally    :ANITA Rodrigues    Complications: None    COMPARISON:  None    FINDINGS:  Procedure: The risks and benefits of this procedure were discussed with the patient, written informed consent was obtained.  The patient was placed prone on the CT gantry.  Preprocedural imaging revealed normal positioning of the iliac crests.  A suitable skin site for biopsy was selected. Moderate sedation using versed and fentanyl was provided with and trained observer monitoring the patient's vital signs and level of consciousness. The total time of sedation was 30 minutes.    The skin site was prepped and draped in sterile fashion.  The skin was anesthetized with 1% lidocaine.    Using CT guidance a 11 gauge introducer needle was guided into the right iliac crest.  Prior to biopsy appropriate needle positioning was confirmed with CT. Thirty cc of marrow aspirate was obtained and given to pathology.  Single 11 gauge marrow core biopsy was then obtained and given to pathology.    The needle was removed.    Postprocedural imaging was acquired. The site was bandaged sterilely. The patient left the room in stable condition.    Findings:    1.  Preprocedural CT showed appropriate imaging characteristics for right iliac bone marrow aspiration and biopsy.    2.  CT-guided biopsy of a right iliac crest with 11 gauge samples acquired.  3.  Post procedural CT showed No complication.  Impression: CT guided marrow aspiration and biopsy from the right iliac crest.    All CT scans at this facility use dose modulation,  iterative reconstruction, and/or weight based dosing when appropriate to reduce radiation dose to as low as reasonable achievable.    Electronically signed by: Zeyad Rodrigues MD  Date:    09/06/2019  Time:    12:39     ?      Pathology:  Pathology Results  (Last 10 years)               10/12/15 0000  Tissue Specimen to Pathology Final result    05/29/13 0000  Tissue Specimen to Pathology Final result           ?   Assessment/Plan:       1. Chronic myelomonocytic leukemia not having achieved remission     I had a detailed conversation with patient's and her family regarding the prognosis and survival data on CMML.  I discussed with her that based on [place] chronic myelomonocytic leukemia (CMML) prognostic model in adults = 1 point for monocytosis = intermediate risk; 18.5 months median survival.  Patient denies any unintentional weight loss, fever or night sweats.  I have reviewed her recent labs which seems stable and improving on the Hydrea.  Will plan on starting azacitidine today for 7 day course every 28 days at 50 milligrams/meter sq subcutaneously.  I reiterated some of the common side effects associated with this drug including GI toxicity nausea and vomiting, fever, chills.  Will continue to monitor her labs.  She is not a candidate for transplant and they are aware of this.  She knows to contact our clinic with any questions or concerns regarding her treatment or condition.  Will plan on seeing her back in about 4 weeks.  Will plan on administering flu shots on day 7 of this for cycle.  I also refilled anti medics for her to  from her local pharmacy.  ?   Follow-Up: Follow up in about 4 weeks (around 11/11/2019).    CECILIO PUGA Md., Ph.D  Hematology & Oncology Department  Phone #: [phone]

## 2019-10-14 ENCOUNTER — INFUSION (OUTPATIENT)
Dept: INFUSION THERAPY | Facility: HOSPITAL | Age: 79
End: 2019-10-14
Attending: INTERNAL MEDICINE
Payer: MEDICARE

## 2019-10-14 ENCOUNTER — OFFICE VISIT (OUTPATIENT)
Dept: HEMATOLOGY/ONCOLOGY | Facility: CLINIC | Age: 79
End: 2019-10-14
Payer: MEDICARE

## 2019-10-14 VITALS
RESPIRATION RATE: 14 BRPM | WEIGHT: 116.88 LBS | OXYGEN SATURATION: 97 % | DIASTOLIC BLOOD PRESSURE: 89 MMHG | TEMPERATURE: 98 F | BODY MASS INDEX: 21.51 KG/M2 | HEIGHT: 62 IN | HEART RATE: 91 BPM | SYSTOLIC BLOOD PRESSURE: 159 MMHG

## 2019-10-14 VITALS — WEIGHT: 116.88 LBS | HEIGHT: 62 IN | BODY MASS INDEX: 21.51 KG/M2

## 2019-10-14 DIAGNOSIS — C93.10 CHRONIC MYELOMONOCYTIC LEUKEMIA NOT HAVING ACHIEVED REMISSION: Primary | ICD-10-CM

## 2019-10-14 DIAGNOSIS — C93.10 CHRONIC MYELOMONOCYTIC LEUKEMIA NOT HAVING ACHIEVED REMISSION: ICD-10-CM

## 2019-10-14 PROCEDURE — 3079F PR MOST RECENT DIASTOLIC BLOOD PRESSURE 80-89 MM HG: ICD-10-PCS | Mod: HCNC,CPTII,S$GLB, | Performed by: INTERNAL MEDICINE

## 2019-10-14 PROCEDURE — 99499 RISK ADDL DX/OHS AUDIT: ICD-10-PCS | Mod: HCNC,S$GLB,, | Performed by: INTERNAL MEDICINE

## 2019-10-14 PROCEDURE — 3077F SYST BP >= 140 MM HG: CPT | Mod: HCNC,CPTII,S$GLB, | Performed by: INTERNAL MEDICINE

## 2019-10-14 PROCEDURE — 99999 PR PBB SHADOW E&M-EST. PATIENT-LVL IV: CPT | Mod: PBBFAC,HCNC,, | Performed by: INTERNAL MEDICINE

## 2019-10-14 PROCEDURE — 3077F PR MOST RECENT SYSTOLIC BLOOD PRESSURE >= 140 MM HG: ICD-10-PCS | Mod: HCNC,CPTII,S$GLB, | Performed by: INTERNAL MEDICINE

## 2019-10-14 PROCEDURE — 1101F PT FALLS ASSESS-DOCD LE1/YR: CPT | Mod: HCNC,CPTII,S$GLB, | Performed by: INTERNAL MEDICINE

## 2019-10-14 PROCEDURE — 3079F DIAST BP 80-89 MM HG: CPT | Mod: HCNC,CPTII,S$GLB, | Performed by: INTERNAL MEDICINE

## 2019-10-14 PROCEDURE — 63600175 PHARM REV CODE 636 W HCPCS: Mod: JG,HCNC | Performed by: INTERNAL MEDICINE

## 2019-10-14 PROCEDURE — 1101F PR PT FALLS ASSESS DOC 0-1 FALLS W/OUT INJ PAST YR: ICD-10-PCS | Mod: HCNC,CPTII,S$GLB, | Performed by: INTERNAL MEDICINE

## 2019-10-14 PROCEDURE — 99215 PR OFFICE/OUTPT VISIT, EST, LEVL V, 40-54 MIN: ICD-10-PCS | Mod: 25,HCNC,S$GLB, | Performed by: INTERNAL MEDICINE

## 2019-10-14 PROCEDURE — 99499 UNLISTED E&M SERVICE: CPT | Mod: HCNC,S$GLB,, | Performed by: INTERNAL MEDICINE

## 2019-10-14 PROCEDURE — 99999 PR PBB SHADOW E&M-EST. PATIENT-LVL IV: ICD-10-PCS | Mod: PBBFAC,HCNC,, | Performed by: INTERNAL MEDICINE

## 2019-10-14 PROCEDURE — 96401 CHEMO ANTI-NEOPL SQ/IM: CPT | Mod: HCNC

## 2019-10-14 PROCEDURE — 99215 OFFICE O/P EST HI 40 MIN: CPT | Mod: 25,HCNC,S$GLB, | Performed by: INTERNAL MEDICINE

## 2019-10-14 RX ORDER — AZACITIDINE 100 MG/1
75 INJECTION, POWDER, LYOPHILIZED, FOR SOLUTION INTRAVENOUS; SUBCUTANEOUS
Status: CANCELLED | OUTPATIENT
Start: 2019-10-14

## 2019-10-14 RX ORDER — AZACITIDINE 100 MG/1
75 INJECTION, POWDER, LYOPHILIZED, FOR SOLUTION INTRAVENOUS; SUBCUTANEOUS
Status: CANCELLED | OUTPATIENT
Start: 2019-10-17

## 2019-10-14 RX ORDER — AZACITIDINE 100 MG/1
75 INJECTION, POWDER, LYOPHILIZED, FOR SOLUTION INTRAVENOUS; SUBCUTANEOUS
Status: CANCELLED | OUTPATIENT
Start: 2019-10-18

## 2019-10-14 RX ORDER — AZACITIDINE 100 MG/1
75 INJECTION, POWDER, LYOPHILIZED, FOR SOLUTION INTRAVENOUS; SUBCUTANEOUS
Status: CANCELLED | OUTPATIENT
Start: 2019-10-15

## 2019-10-14 RX ORDER — PROCHLORPERAZINE MALEATE 5 MG
5 TABLET ORAL EVERY 6 HOURS PRN
Qty: 30 TABLET | Refills: 1 | Status: SHIPPED | OUTPATIENT
Start: 2019-10-14 | End: 2019-10-14 | Stop reason: SDUPTHER

## 2019-10-14 RX ORDER — AZACITIDINE 100 MG/1
75 INJECTION, POWDER, LYOPHILIZED, FOR SOLUTION INTRAVENOUS; SUBCUTANEOUS
Status: CANCELLED | OUTPATIENT
Start: 2019-10-21

## 2019-10-14 RX ORDER — AZACITIDINE 100 MG/1
75 INJECTION, POWDER, LYOPHILIZED, FOR SOLUTION INTRAVENOUS; SUBCUTANEOUS
Status: CANCELLED | OUTPATIENT
Start: 2019-10-22

## 2019-10-14 RX ORDER — ONDANSETRON HYDROCHLORIDE 8 MG/1
8 TABLET, FILM COATED ORAL EVERY 12 HOURS PRN
Qty: 30 TABLET | Refills: 2 | Status: SHIPPED | OUTPATIENT
Start: 2019-10-14 | End: 2019-11-27

## 2019-10-14 RX ORDER — PROCHLORPERAZINE MALEATE 5 MG
TABLET ORAL
Qty: 385 TABLET | Refills: 1 | Status: SHIPPED | OUTPATIENT
Start: 2019-10-14

## 2019-10-14 RX ORDER — AZACITIDINE 100 MG/1
75 INJECTION, POWDER, LYOPHILIZED, FOR SOLUTION INTRAVENOUS; SUBCUTANEOUS
Status: CANCELLED | OUTPATIENT
Start: 2019-10-16

## 2019-10-14 RX ORDER — AZACITIDINE 100 MG/1
75 INJECTION, POWDER, LYOPHILIZED, FOR SOLUTION INTRAVENOUS; SUBCUTANEOUS
Status: COMPLETED | OUTPATIENT
Start: 2019-10-14 | End: 2019-10-14

## 2019-10-14 RX ADMIN — AZACITIDINE 115 MG: 100 INJECTION, POWDER, LYOPHILIZED, FOR SOLUTION INTRAVENOUS; SUBCUTANEOUS at 02:10

## 2019-10-14 NOTE — PLAN OF CARE
Pt. Is stable. Pt. Received vidaza d1 today. Pt. Will return consecutively for 6 more days of treatment.

## 2019-10-14 NOTE — PROGRESS NOTES
Patient, Sarah Parker (MRN #[id number]), presented with a recent Platelet count less than 150 K/uL consistent with the definition of thrombocytopenia (ICD10 - D69.6).    Platelets   Date Value Ref Range Status   10/14/2019 121 (L) 150 - 350 K/uL Final     The patient's thrombocytopenia was monitored, evaluated, addressed and/or treated. This addendum to the medical record is made on 10/14/2019.

## 2019-10-14 NOTE — DISCHARGE INSTRUCTIONS
[facility]  [address] [facility]  [address]  [phone] phone     [phone] fax  Hours of Operation: Monday- Friday 8:00am- 5:00pm  After hours phone  [phone]  Hematology / Oncology Physicians on call      Dr. Ruben Colón      Please call with any concerns regarding your appointment today.    HOME CARE AFTER CHEMOTHERAPY   Meals   Many patients feel sick and lose their appetites during treatment. Eat small meals several times a day. Choose bland foods with little taste or smell if you have problems with nausea. Be sure to cook all food thoroughly. This kills bacteria and helps you avoid intestinal infection. Soft foods are easier to swallow and digest.   Activity   Exercise keeps you strong and keeps your heart and lungs active. Talk to your doctor about an appropriate exercise program for you.   Skin Care   To prevent a skin infection, bathe or shower once a day. Use a moisturizing soap and wash with warm water. Avoid very hot or cold water. Chemotherapy can make your skin dry . Apply moisturizing lotion to help relieve dry skin. Some drugs used in high doses can cause slight burns to appear (like sunburn). Ask for a special cream to help relieve the burn and protect your skin.   Prevent Mouth Sores   During chemotherapy, many people get mouth sores. Do the following to help prevent mouth sores or to ease discomfort.   Brush your teeth with a soft-bristle toothbrush after every meal.  Don't use dental floss if your platelet count is below 50,000. Your doctor or nurse will tell you if this is the case.  Use an oral swab or special soft toothbrush if your gums bleed during regular brushing.  Use mouthwash as directed. If you can't tolerate commercial mouthwash, use salt and baking soda to clean your mouth. Mix 1 teaspoon of salt and 1 teaspoon of baking soda into a glass of water. Swish and spit.  Call your  doctor or return to this facility if you develop any of the following:   Sore throat   White patches in the mouth or throat   Fever of 100.4ºF (38ºC) or higher, or as directed by your healthcare provider  © 2000-2011 [facility], [address] [place] [address]. All rights reserved. This information is not intended as a substitute for professional medical care. Always follow your healthcare professional's   FALL PREVENTION   Falls often occur due to slipping, tripping or losing your balance. Here are ways to reduce your risk of falling again.   Was there anything that caused your fall that can be fixed, removed or replaced?   Make your home safe by keeping walkways clear of objects you may trip over.   Use non-slip pads under rugs.   Do not walk in poorly lit areas.   Do not stand on chairs or wobbly ladders.   Use caution when reaching overhead or looking upward. This position can cause a loss of balance.   Be sure your shoes fit properly, have non-slip bottoms and are in good condition.   Be cautious when going up and down stairs, curbs, and when walking on uneven sidewalks.   If your balance is poor, consider using a cane or walker.   If your fall was related to alcohol use, stop or limit alcohol intake.   If your fall was related to use of sleeping medicines, talk to your doctor about this. You may need to reduce your dosage at bedtime if you awaken during the night to go to the bathroom.   To reduce the need for nighttime bathroom trips:   Avoid drinking fluids for several hours before going to bed   Empty your bladder before going to bed   Men can keep a urinal at the bedside   © 2000-2011 [facility], [address] [place] [address]. All rights reserved. This information is not intended as a substitute for professional medical care. Always follow your healthcare professional's instructions.  Support Groups/Classes    Support groups and classes are being offered at the   [facility] and [facility]!!    "Cooking with Cancer" (Nutrition Class):  Second Wednesday of each month   at noon at the [facility] [place].  Metastatic Support Group:  Third Tuesday of each month   at noon at the [facility].  Next Steps Class/Group: Second and fourth Thursday of each month at noon at the [facility].  [facility] (Breast Cancer Support Group): First Tuesday of each month   at 5:30pm at the [facility] location.  [employer]: [facility]: Second and third Tuesday of each month from 7:30am - 2pm.  [facility]: First and fourth Tuesday of each month from 7:30am - 2pm    If you are interested in attending or would like more information please ask our social workers or your nurse!  "

## 2019-10-15 ENCOUNTER — INFUSION (OUTPATIENT)
Dept: INFUSION THERAPY | Facility: HOSPITAL | Age: 79
End: 2019-10-15
Attending: INTERNAL MEDICINE
Payer: MEDICARE

## 2019-10-15 VITALS
DIASTOLIC BLOOD PRESSURE: 76 MMHG | HEIGHT: 62 IN | TEMPERATURE: 98 F | HEART RATE: 106 BPM | SYSTOLIC BLOOD PRESSURE: 132 MMHG | OXYGEN SATURATION: 100 % | BODY MASS INDEX: 21.51 KG/M2 | RESPIRATION RATE: 16 BRPM | WEIGHT: 116.88 LBS

## 2019-10-15 DIAGNOSIS — C93.10 CHRONIC MYELOMONOCYTIC LEUKEMIA NOT HAVING ACHIEVED REMISSION: Primary | ICD-10-CM

## 2019-10-15 LAB
ANNOTATION COMMENT IMP: NORMAL
DX: NORMAL
NGS CLINCIAL TRIALS: NORMAL
NGS INTERPRETATION: NORMAL
NGS ONCOHEME PANEL GENE LIST: NORMAL
NGS PATHOGENIC MUTATIONS DETECTED: NORMAL
NGS REVIEWED BY:: NORMAL
NGS VARIANTS OF UNKNOWN SIGNIFICANCE: NORMAL
NGSHM RESULT, BLOOD: NORMAL
REF LAB TEST METHOD: NORMAL
SPECIMEN SOURCE: NORMAL
TEST PERFORMANCE INFO SPEC: NORMAL

## 2019-10-15 PROCEDURE — 96401 CHEMO ANTI-NEOPL SQ/IM: CPT | Mod: HCNC

## 2019-10-15 PROCEDURE — 63600175 PHARM REV CODE 636 W HCPCS: Mod: JW,JG,HCNC | Performed by: INTERNAL MEDICINE

## 2019-10-15 RX ORDER — AZACITIDINE 100 MG/1
75 INJECTION, POWDER, LYOPHILIZED, FOR SOLUTION INTRAVENOUS; SUBCUTANEOUS
Status: DISCONTINUED | OUTPATIENT
Start: 2019-10-15 | End: 2019-10-15

## 2019-10-15 RX ORDER — AZACITIDINE 100 MG/1
75 INJECTION, POWDER, LYOPHILIZED, FOR SOLUTION INTRAVENOUS; SUBCUTANEOUS
Status: COMPLETED | OUTPATIENT
Start: 2019-10-15 | End: 2019-10-15

## 2019-10-15 RX ADMIN — AZACITIDINE 115 MG: 100 INJECTION, POWDER, LYOPHILIZED, FOR SOLUTION INTRAVENOUS; SUBCUTANEOUS at 11:10

## 2019-10-15 NOTE — DISCHARGE INSTRUCTIONS
[facility]  [id number] [facility]  [address]  [phone] phone     [phone] fax  Hours of Operation: Monday- Friday 8:00am- 5:00pm  After hours phone  [phone]  Hematology / Oncology Physicians on call      Dr. Ruben Colón      Please call with any concerns regarding your appointment today.    FALL PREVENTION   Falls often occur due to slipping, tripping or losing your balance. Here are ways to reduce your risk of falling again.   Was there anything that caused your fall that can be fixed, removed or replaced?   Make your home safe by keeping walkways clear of objects you may trip over.   Use non-slip pads under rugs.   Do not walk in poorly lit areas.   Do not stand on chairs or wobbly ladders.   Use caution when reaching overhead or looking upward. This position can cause a loss of balance.   Be sure your shoes fit properly, have non-slip bottoms and are in good condition.   Be cautious when going up and down stairs, curbs, and when walking on uneven sidewalks.   If your balance is poor, consider using a cane or walker.   If your fall was related to alcohol use, stop or limit alcohol intake.   If your fall was related to use of sleeping medicines, talk to your doctor about this. You may need to reduce your dosage at bedtime if you awaken during the night to go to the bathroom.   To reduce the need for nighttime bathroom trips:   Avoid drinking fluids for several hours before going to bed   Empty your bladder before going to bed   Men can keep a urinal at the bedside   © 2189-9052 Abdullahi Watson, [address] [place] [address], [place] [address]. All rights reserved. This information is not intended as a substitute for professional medical care. Always follow your healthcare professional's instructions.  Support Groups/Classes    Support groups and classes are being offered at the   [facility] and  "[facility]    "Cooking with Cancer" (Nutrition Class):  Second Wednesday of each month   at noon at the [facility].  Metastatic Support Group:  Third Tuesday of each month   at noon at the [facility].  Next Steps Class/Group: Second and fourth Thursday of each month at noon at the [facility].  Hope Chest (Breast Cancer Support Group): First Tuesday of each month   at 5:30pm at the [facility] location.  [employer]: [facility]: Second and third Tuesday of each month from 7:30am - 2pm.  [facility]: First and fourth Tuesday of each month from 7:30am - 2pm    If you are interested in attending or would like more information please ask our social workers or your nurse!  "

## 2019-10-16 ENCOUNTER — INFUSION (OUTPATIENT)
Dept: INFUSION THERAPY | Facility: HOSPITAL | Age: 79
End: 2019-10-16
Attending: INTERNAL MEDICINE
Payer: MEDICARE

## 2019-10-16 VITALS
HEIGHT: 62 IN | HEART RATE: 96 BPM | WEIGHT: 116.88 LBS | SYSTOLIC BLOOD PRESSURE: 135 MMHG | TEMPERATURE: 98 F | DIASTOLIC BLOOD PRESSURE: 77 MMHG | RESPIRATION RATE: 16 BRPM | BODY MASS INDEX: 21.51 KG/M2 | OXYGEN SATURATION: 99 %

## 2019-10-16 DIAGNOSIS — C93.10 CHRONIC MYELOMONOCYTIC LEUKEMIA NOT HAVING ACHIEVED REMISSION: Primary | ICD-10-CM

## 2019-10-16 PROCEDURE — 63600175 PHARM REV CODE 636 W HCPCS: Mod: JG,HCNC | Performed by: INTERNAL MEDICINE

## 2019-10-16 PROCEDURE — 96401 CHEMO ANTI-NEOPL SQ/IM: CPT | Mod: HCNC

## 2019-10-16 RX ORDER — AZACITIDINE 100 MG/1
75 INJECTION, POWDER, LYOPHILIZED, FOR SOLUTION INTRAVENOUS; SUBCUTANEOUS
Status: COMPLETED | OUTPATIENT
Start: 2019-10-16 | End: 2019-10-16

## 2019-10-16 RX ADMIN — AZACITIDINE 115 MG: 100 INJECTION, POWDER, LYOPHILIZED, FOR SOLUTION INTRAVENOUS; SUBCUTANEOUS at 10:10

## 2019-10-16 NOTE — DISCHARGE INSTRUCTIONS
[facility]  [address]  [address]  [phone] phone     [phone] fax  Hours of Operation: Monday- Friday 8:00am- 5:00pm  After hours phone  [phone]  Hematology / Oncology Physicians on call      Dr. Ruben Colón      Please call with any concerns regarding your appointment today.    HOME CARE AFTER CHEMOTHERAPY   Meals   Many patients feel sick and lose their appetites during treatment. Eat small meals several times a day. Choose bland foods with little taste or smell if you have problems with nausea. Be sure to cook all food thoroughly. This kills bacteria and helps you avoid intestinal infection. Soft foods are easier to swallow and digest.   Activity   Exercise keeps you strong and keeps your heart and lungs active. Talk to your doctor about an appropriate exercise program for you.   Skin Care   To prevent a skin infection, bathe or shower once a day. Use a moisturizing soap and wash with warm water. Avoid very hot or cold water. Chemotherapy can make your skin dry . Apply moisturizing lotion to help relieve dry skin. Some drugs used in high doses can cause slight burns to appear (like sunburn). Ask for a special cream to help relieve the burn and protect your skin.   Prevent Mouth Sores   During chemotherapy, many people get mouth sores. Do the following to help prevent mouth sores or to ease discomfort.   Brush your teeth with a soft-bristle toothbrush after every meal.  Don't use dental floss if your platelet count is below 50,000. Your doctor or nurse will tell you if this is the case.  Use an oral swab or special soft toothbrush if your gums bleed during regular brushing.  Use mouthwash as directed. If you can't tolerate commercial mouthwash, use salt and baking soda to clean your mouth. Mix 1 teaspoon of salt and 1 teaspoon of baking soda into a glass of water. Swish and spit.  Call your  doctor or return to this facility if you develop any of the following:   Sore throat   White patches in the mouth or throat   Fever of 100.4ºF (38ºC) or higher, or as directed by your healthcare provider  © 2000-2011 Abdullahi [facility], [address] [place] [address]. All rights reserved. This information is not intended as a substitute for professional medical care. Always follow your healthcare professional's   FALL PREVENTION   Falls often occur due to slipping, tripping or losing your balance. Here are ways to reduce your risk of falling again.   Was there anything that caused your fall that can be fixed, removed or replaced?   Make your home safe by keeping walkways clear of objects you may trip over.   Use non-slip pads under rugs.   Do not walk in poorly lit areas.   Do not stand on chairs or wobbly ladders.   Use caution when reaching overhead or looking upward. This position can cause a loss of balance.   Be sure your shoes fit properly, have non-slip bottoms and are in good condition.   Be cautious when going up and down stairs, curbs, and when walking on uneven sidewalks.   If your balance is poor, consider using a cane or walker.   If your fall was related to alcohol use, stop or limit alcohol intake.   If your fall was related to use of sleeping medicines, talk to your doctor about this. You may need to reduce your dosage at bedtime if you awaken during the night to go to the bathroom.   To reduce the need for nighttime bathroom trips:   Avoid drinking fluids for several hours before going to bed   Empty your bladder before going to bed   Men can keep a urinal at the bedside   © 2000-2011 Abdullahi [facility], [address] [place] [address]. All rights reserved. This information is not intended as a substitute for professional medical care. Always follow your healthcare professional's instructions.  Support Groups/Classes    Support groups and classes are being offered at the   [facility] and [facility]    "Cooking with Cancer" (Nutrition Class):  Second Wednesday of each month   at noon at the [facility].  Metastatic Support Group:  Third Tuesday of each month   at noon at the [facility].  Next Steps Class/Group: Second and fourth Thursday of each month at noon at the [facility].  Hope Chest (Breast Cancer Support Group): First Tuesday of each month   at 5:30pm at the [facility] location.  [employer]: [facility]: Second and third Tuesday of each month from 7:30am - 2pm.  [facility]: First and fourth Tuesday of each month from 7:30am - 2pm    If you are interested in attending or would like more information please ask our social workers or your nurse!  "

## 2019-10-17 ENCOUNTER — SOCIAL WORK (OUTPATIENT)
Dept: HEMATOLOGY/ONCOLOGY | Facility: CLINIC | Age: 79
End: 2019-10-17

## 2019-10-17 ENCOUNTER — INFUSION (OUTPATIENT)
Dept: INFUSION THERAPY | Facility: HOSPITAL | Age: 79
End: 2019-10-17
Attending: INTERNAL MEDICINE
Payer: MEDICARE

## 2019-10-17 ENCOUNTER — OFFICE VISIT (OUTPATIENT)
Dept: PULMONOLOGY | Facility: CLINIC | Age: 79
End: 2019-10-17
Payer: MEDICARE

## 2019-10-17 VITALS
BODY MASS INDEX: 21.25 KG/M2 | SYSTOLIC BLOOD PRESSURE: 122 MMHG | HEART RATE: 69 BPM | WEIGHT: 115.5 LBS | DIASTOLIC BLOOD PRESSURE: 66 MMHG | HEIGHT: 62 IN | OXYGEN SATURATION: 99 % | RESPIRATION RATE: 18 BRPM

## 2019-10-17 VITALS
RESPIRATION RATE: 18 BRPM | TEMPERATURE: 97 F | OXYGEN SATURATION: 97 % | HEIGHT: 62 IN | WEIGHT: 115.5 LBS | BODY MASS INDEX: 21.25 KG/M2 | HEART RATE: 90 BPM | DIASTOLIC BLOOD PRESSURE: 68 MMHG | SYSTOLIC BLOOD PRESSURE: 132 MMHG

## 2019-10-17 DIAGNOSIS — C93.10 CHRONIC MYELOMONOCYTIC LEUKEMIA NOT HAVING ACHIEVED REMISSION: Primary | ICD-10-CM

## 2019-10-17 DIAGNOSIS — R91.8 MULTIPLE LUNG NODULES ON CT: Primary | ICD-10-CM

## 2019-10-17 DIAGNOSIS — M05.10 RHEUMATOID LUNG: ICD-10-CM

## 2019-10-17 PROCEDURE — 96401 CHEMO ANTI-NEOPL SQ/IM: CPT | Mod: HCNC

## 2019-10-17 PROCEDURE — 3074F PR MOST RECENT SYSTOLIC BLOOD PRESSURE < 130 MM HG: ICD-10-PCS | Mod: HCNC,CPTII,S$GLB, | Performed by: INTERNAL MEDICINE

## 2019-10-17 PROCEDURE — 3074F SYST BP LT 130 MM HG: CPT | Mod: HCNC,CPTII,S$GLB, | Performed by: INTERNAL MEDICINE

## 2019-10-17 PROCEDURE — 3078F PR MOST RECENT DIASTOLIC BLOOD PRESSURE < 80 MM HG: ICD-10-PCS | Mod: HCNC,CPTII,S$GLB, | Performed by: INTERNAL MEDICINE

## 2019-10-17 PROCEDURE — 99999 PR PBB SHADOW E&M-EST. PATIENT-LVL V: ICD-10-PCS | Mod: PBBFAC,HCNC,, | Performed by: INTERNAL MEDICINE

## 2019-10-17 PROCEDURE — 3288F FALL RISK ASSESSMENT DOCD: CPT | Mod: HCNC,CPTII,S$GLB, | Performed by: INTERNAL MEDICINE

## 2019-10-17 PROCEDURE — 99213 OFFICE O/P EST LOW 20 MIN: CPT | Mod: HCNC,S$GLB,, | Performed by: INTERNAL MEDICINE

## 2019-10-17 PROCEDURE — 3288F PR FALLS RISK ASSESSMENT DOCUMENTED: ICD-10-PCS | Mod: HCNC,CPTII,S$GLB, | Performed by: INTERNAL MEDICINE

## 2019-10-17 PROCEDURE — 1100F PR PT FALLS ASSESS DOC 2+ FALLS/FALL W/INJURY/YR: ICD-10-PCS | Mod: HCNC,CPTII,S$GLB, | Performed by: INTERNAL MEDICINE

## 2019-10-17 PROCEDURE — 99999 PR PBB SHADOW E&M-EST. PATIENT-LVL V: CPT | Mod: PBBFAC,HCNC,, | Performed by: INTERNAL MEDICINE

## 2019-10-17 PROCEDURE — 3078F DIAST BP <80 MM HG: CPT | Mod: HCNC,CPTII,S$GLB, | Performed by: INTERNAL MEDICINE

## 2019-10-17 PROCEDURE — 1100F PTFALLS ASSESS-DOCD GE2>/YR: CPT | Mod: HCNC,CPTII,S$GLB, | Performed by: INTERNAL MEDICINE

## 2019-10-17 PROCEDURE — 63600175 PHARM REV CODE 636 W HCPCS: Mod: JW,JG,HCNC | Performed by: INTERNAL MEDICINE

## 2019-10-17 PROCEDURE — 99213 PR OFFICE/OUTPT VISIT, EST, LEVL III, 20-29 MIN: ICD-10-PCS | Mod: HCNC,S$GLB,, | Performed by: INTERNAL MEDICINE

## 2019-10-17 RX ORDER — AZACITIDINE 100 MG/1
75 INJECTION, POWDER, LYOPHILIZED, FOR SOLUTION INTRAVENOUS; SUBCUTANEOUS
Status: COMPLETED | OUTPATIENT
Start: 2019-10-17 | End: 2019-10-17

## 2019-10-17 RX ADMIN — AZACITIDINE 115 MG: 100 INJECTION, POWDER, LYOPHILIZED, FOR SOLUTION INTRAVENOUS; SUBCUTANEOUS at 10:10

## 2019-10-17 NOTE — PROGRESS NOTES
Subjective:       Patient ID: Sarah Parker is a 79 y.o. female.    Chief Complaint: She       Pneumonia    HPI   Follow-up for pneumonia approximately 7 to 8 weeks ago.  The patient has no complaints of cough or sputum production.  No fever or chills.  Shortness of breath has resolved.  Patient reports that overall her physical status has returned to normal. She recently restarted taking therapy for her CML.    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    Past Medical History:   Diagnosis Date    Acid reflux     Anxiety     Back pain     Bronchitis, chronic obstructive w acute bronchitis 7/29/2016    Cancer     [facility] arms, face- Dr. Lata Tejada    Cataract     2+NS    Degenerative disc disease     Depression     Dry mouth     Hernia, hiatal 11/18/2013    Hypertension     Hypothyroid     Macrocytic anemia 5/3/2016    Macular degeneration     Migraines     Mixed anxiety and depressive disorder     Multiple fractures of ribs of right side     Osteoporosis     Other hyperlipidemia 10/11/2019    Pneumonia     Pneumonia due to other staphylococcus     Rheumatoid arthritis     Rheumatoid arthritis(714.0)     Rheumatoid arthritis(714.0)     Remicade, MTX.     Past Surgical History:   Procedure Laterality Date    APPENDECTOMY  1985    CATARACT EXTRACTION Bilateral 6/11/15    Dr. Booth    CHOLECYSTECTOMY  2013    cryoablasion kidney Left 09/27/2016    feet Bilateral     rheumatoid    FRACTURE SURGERY Right     tibia    HERNIA REPAIR      HYSTERECTOMY  1970    partial    JOINT REPLACEMENT      bilateral knees (2008), hands, wrists, knuckles, toes    LAPAROSCOPIC NISSEN FUNDOPLICATION      TRANSFORAMINAL EPIDURAL INJECTION OF STEROID Left 6/25/2019    Procedure: Left L5/S1 TF AYAAN with local;  Surgeon: Rowdy Felix MD;  Location: [facility];  Service: Pain Management;  Laterality: Left;     Social History      Socioeconomic History    Marital status:      Spouse name: Not on file    Number of children: Not on file    Years of education: Not on file    Highest education level: Not on file   Occupational History    Occupation: retired   Social Needs    Financial resource strain: Not on file    Food insecurity:     Worry: Not on file     Inability: Not on file    Transportation needs:     Medical: Not on file     Non-medical: Not on file   Tobacco Use    Smoking status: Former Smoker     Packs/day: 0.25     Years: 2.00     Pack years: 0.50     Last attempt to quit: 1965     Years since quittin.9    Smokeless tobacco: Never Used   Substance and Sexual Activity    Alcohol use: No    Drug use: No    Sexual activity: Never     Partners: Male   Lifestyle    Physical activity:     Days per week: Not on file     Minutes per session: Not on file    Stress: Not on file   Relationships    Social connections:     Talks on phone: Not on file     Gets together: Not on file     Attends [employer] service: Not on file     Active member of club or organization: Not on file     Attends meetings of clubs or organizations: Not on file     Relationship status: Not on file   Other Topics Concern    Not on file   Social History Narrative    Patient is aretired and live with .     Review of Systems   Constitutional: Positive for fatigue. Negative for fever.   HENT: Negative for postnasal drip and rhinorrhea.    Eyes: Negative for redness and itching.   Respiratory: Negative for cough, shortness of breath, wheezing, dyspnea on extertion and Paroxysmal Nocturnal Dyspnea.    Cardiovascular: Negative for chest pain.   Genitourinary: Negative for difficulty urinating and hematuria.   Endocrine: Negative for polyphagia, cold intolerance and heat intolerance.    Musculoskeletal: Positive for arthralgias.   Skin: Negative for rash.   Gastrointestinal: Negative for nausea, vomiting, abdominal pain and abdominal  "distention.   Neurological: Negative for dizziness and headaches.   Hematological: Negative for adenopathy. Does not bruise/bleed easily and no excessive bruising.   Psychiatric/Behavioral: The patient is not nervous/anxious.        Objective:      /66   Pulse 69   Resp 18   Ht 5' 2" (1.575 m)   Wt 52.4 kg (115 lb 8.3 oz)   LMP  (LMP Unknown)   SpO2 99%   BMI 21.13 kg/m²   Physical Exam   Constitutional: She is oriented to person, place, and time. She appears well-developed and well-nourished.   HENT:   Head: Normocephalic and atraumatic.   Eyes: Pupils are equal, round, and reactive to light. Conjunctivae are normal.   Neck: Neck supple. No JVD present. No tracheal deviation present. No thyromegaly present.   Cardiovascular: Normal rate, regular rhythm and normal heart sounds.   Pulmonary/Chest: Effort normal and breath sounds normal. No respiratory distress. She has no wheezes. She has no rales. She exhibits no tenderness.   Abdominal: Soft. Bowel sounds are normal.   Musculoskeletal: Normal range of motion. She exhibits no edema.   Lymphadenopathy:     She has no cervical adenopathy.   Neurological: She is alert and oriented to person, place, and time.   Skin: Skin is warm and dry.   Nursing note and vitals reviewed.    Personal Diagnostic Review  none pertinent  CT Chest Without Contrast  Narrative: EXAMINATION:  CT CHEST WITHOUT CONTRAST    CLINICAL HISTORY:  Chest pain or SOB, pleurisy or effusion suspected; Lobar pneumonia, unspecified organism    TECHNIQUE:  Axial images performed through the chest without IV contrast.  Sagittal and coronal reformats obtained.    COMPARISON:  Chest x-ray 09/05/2019    FINDINGS:  Heart: Normal heart size.  Small pericardial effusion.  Coronary arterial calcification.    Mediastinum: Moderate sized hiatal hernia.  Numerous mildly enlarged lymph nodes present throughout the mediastinum.  Largest representative lymph node is a subcarinal lymph node measuring 2.1 x " 1.8 cm.    Vasculature: Moderate aortic atherosclerosis..    Mild mediastinal adenopathy, presumably reactive.  Attention on follow-up.    No pleural effusion evident.    Lungs: Large amount of consolidation throughout the left lower lobe.  No effusion.  Some additional scattered reticulonodular opacities surrounding this area consolidation left lower lobe with some of these changes also present in the left upper lobe.  A few scattered subtle reticulonodular opacities in the right lung.  No consolidation or right-sided effusion.    Esophagus: Unremarkable.    Upper Abdomen: Aortic atherosclerosis.  Small cyst hepatic dome.    Bones: No acute fracture. Large amount of arthritic changes lower thoracic spine with levoscoliosis lower thoracic spine.  Impression: Large amount of left lower lobe consolidation consistent with pneumonia.  Additional scattered reticulonodular opacities in the lungs greater in the left upper lobe.    Negative for pleural effusion.    Very small pericardial effusion.    All CT scans at this facility use dose modulation, iterative reconstruction and/or weight based dosing when appropriate to reduce radiation dose to as low as reasonably achievable.    Electronically signed by: Escobar Walsh MD  Date:    09/06/2019  Time:    12:45  CT Biopsy Bone Marrow (xpd)  Narrative: EXAMINATION:  CT BIOPSY BONE MARROW (XPD)    CLINICAL HISTORY:  Nutritional anemia, unspecified    TECHNIQUE:  1% lidocaine locally    :ANITA Rodrigues    Complications: None    COMPARISON:  None    FINDINGS:  Procedure: The risks and benefits of this procedure were discussed with the patient, written informed consent was obtained.  The patient was placed prone on the CT gantry.  Preprocedural imaging revealed normal positioning of the iliac crests.  A suitable skin site for biopsy was selected. Moderate sedation using versed and fentanyl was provided with and trained observer monitoring the patient's vital signs and level  of consciousness. The total time of sedation was 30 minutes.    The skin site was prepped and draped in sterile fashion.  The skin was anesthetized with 1% lidocaine.    Using CT guidance a 11 gauge introducer needle was guided into the right iliac crest.  Prior to biopsy appropriate needle positioning was confirmed with CT. Thirty cc of marrow aspirate was obtained and given to pathology.  Single 11 gauge marrow core biopsy was then obtained and given to pathology.    The needle was removed.    Postprocedural imaging was acquired. The site was bandaged sterilely. The patient left the room in stable condition.    Findings:    1.  Preprocedural CT showed appropriate imaging characteristics for right iliac bone marrow aspiration and biopsy.    2.  CT-guided biopsy of a right iliac crest with 11 gauge samples acquired.  3.  Post procedural CT showed No complication.  Impression: CT guided marrow aspiration and biopsy from the right iliac crest.    All CT scans at this facility use dose modulation, iterative reconstruction, and/or weight based dosing when appropriate to reduce radiation dose to as low as reasonable achievable.    Electronically signed by: Zeyad Rodrigues MD  Date:    09/06/2019  Time:    12:39      Office Spirometry Results:     No flowsheet data found.  Pulmonary Studies Review 10/17/2019   SpO2 99   Height 62.000   Weight 1848.34   BMI (Calculated) 21.2   Predicted Distance 296.8   Predicted Distance Meters (Calculated) 434.27         Assessment:       Multiple lung nodules on CT  -     X-Ray Chest PA And Lateral; Future; Expected date: 10/17/2019    Rheumatoid lung  -     X-Ray Chest PA And Lateral; Future; Expected date: 10/17/2019          Outpatient Encounter Medications as of 10/17/2019   Medication Sig Dispense Refill    albuterol (PROVENTIL) 2.5 mg /3 mL (0.083 %) nebulizer solution Take 3 mLs (2.5 mg total) by nebulization every 6 (six) hours as needed for Wheezing. 1 Box 5    amitriptyline  (ELAVIL) 50 MG tablet Take 1 tablet (50 mg total) by mouth every evening. Was 75 mg, now decrease to 25 mg 30 tablet 11    benzonatate (TESSALON) 100 MG capsule Take 1-2 capsules (100-200 mg total) by mouth 3 (three) times daily as needed for Cough. 60 capsule 0    calcium citrate-vitamin D (CITRACAL + D) 315-200 mg-unit per tablet Take 1 tablet by mouth once daily.       DULoxetine (CYMBALTA) 20 MG capsule Take 2 capsules (40 mg total) by mouth once daily. 180 capsule 3    ferrous gluconate 324 mg (37.5 mg iron) Tab tablet Take 1 tablet (324 mg total) by mouth daily with breakfast. 30 tablet 11    hydrocodone-acetaminophen 5-325mg (NORCO) 5-325 mg per tablet TK 1 T PO Q 6 H PRN  0    hydroxyurea (HYDREA) 500 mg Cap Take one capsule by mouth once daily. 30 capsule 0    hydrOXYzine HCl (ATARAX) 25 MG tablet       leucovorin (WELLCOVORIN) 5 mg Tab TAKE ONE WEEKLY ON SATURDAYS 4 tablet 3    levothyroxine (SYNTHROID) 88 MCG tablet TAKE 1 TABLET BY MOUTH BEFORE BREAKFAST 90 tablet 3    magnesium oxide (MAG-OX) 400 mg (241.3 mg magnesium) tablet Take 1 tablet (400 mg total) by mouth 3 (three) times daily.  0    meclizine (ANTIVERT) 50 MG tablet Take 25 mg by mouth 3 (three) times daily as needed.      methotrexate 2.5 MG Tab Take 17.5 mg by mouth every 7 days.       metoprolol succinate (TOPROL-XL) 100 MG 24 hr tablet Take 100 mg by mouth once daily.      metoprolol succinate (TOPROL-XL) 50 MG 24 hr tablet TAKE 1 TABLET(50 MG) BY MOUTH EVERY DAY 30 tablet 11    multivitamin capsule Take by mouth. As directed      ondansetron (ZOFRAN) 4 MG tablet Take 1 tablet (4 mg total) by mouth every 8 (eight) hours as needed for Nausea. 30 tablet 1    pravastatin (PRAVACHOL) 10 MG tablet TAKE 1 TABLET(10 MG) BY MOUTH EVERY DAY 30 tablet 11    prochlorperazine (COMPAZINE) 5 MG tablet TAKE 1 TABLET(5 MG) BY MOUTH EVERY 6 HOURS AS NEEDED FOR NAUSEA 385 tablet 1    tamsulosin (FLOMAX) 0.4 mg Cap Take 1 capsule (0.4 mg  total) by mouth once daily. For urinary retention 30 capsule 0    tocilizumab (ACTEMRA) 80 mg/4 mL (20 mg/mL) Soln Inject into the vein.      valsartan-hydrochlorothiazide (DIOVAN-HCT) 80-12.5 mg per tablet TAKE 1 TABLET BY MOUTH DAILY 90 tablet 3    ondansetron (ZOFRAN) 8 MG tablet Take 1 tablet (8 mg total) by mouth every 12 (twelve) hours as needed for Nausea. 30 tablet 2    topiramate (TOPAMAX) 25 MG tablet Take 1 tablet (25 mg total) by mouth at night x 1 week then increase to 2 (two) times daily. Increase as tolerated. 60 tablet 0     Facility-Administered Encounter Medications as of 10/17/2019   Medication Dose Route Frequency Provider Last Rate Last Dose    [COMPLETED] azaCITIDine (VIDAZA) chemo injection 115 mg  75 mg/m2 (Treatment Plan Recorded) Subcutaneous 1 time in Clinic/HOD Denton Knox MD   115 mg at 10/16/19 1058     Plan:       Requested Prescriptions      No prescriptions requested or ordered in this encounter     Problem List Items Addressed This Visit     Multiple lung nodules on CT - Primary    Relevant Orders    X-Ray Chest PA And Lateral    Rheumatoid lung    Relevant Orders    X-Ray Chest PA And Lateral             Follow up in about 1 year (around 10/17/2020) for Review CXR.    MEDICAL DECISION MAKING: Moderate to high complexity.  Overall, the multiple problems listed are of moderate to high severity that may impact quality of life and activities of daily living. Side effects of medications, treatment plan as well as options and alternatives reviewed and discussed with patient. There was counseling of patient concerning these issues.     Total time spent in face to face counseling and coordination of care - 15  minutes over 50% of time was used in discussion of prognosis, risks, benefits of treatment, instructions and compliance with regimen . Discussion with other physicians or health care providers (DME, NP, pharmacy, respiratory therapy) occurred.

## 2019-10-17 NOTE — PROGRESS NOTES
MSDAIJA Figueroa met with pt for the first time. MSW Intern offered services such as financial assistance, emotional support, and transportation. The pt said that she did not need any assistance of any kind at this time. MSW Intern also spoke about Cancer Services and [employer] and the services that they can provide. The pt was not interested in any services at this time. GAGE Figueroa met with the pt's daughter as well and gave the pt the SW packet. GAGE Figueroa will follow up at next appt to check on pt status.

## 2019-10-17 NOTE — DISCHARGE INSTRUCTIONS
[facility]  [address] [facility]  [address]  [phone] phone     [phone] fax  Hours of Operation: Monday- Friday 8:00am- 5:00pm  After hours phone  [phone]  Hematology / Oncology Physicians on call      Dr. Ruben Colón      Please call with any concerns regarding your appointment today.    HOME CARE AFTER CHEMOTHERAPY   Meals   Many patients feel sick and lose their appetites during treatment. Eat small meals several times a day. Choose bland foods with little taste or smell if you have problems with nausea. Be sure to cook all food thoroughly. This kills bacteria and helps you avoid intestinal infection. Soft foods are easier to swallow and digest.   Activity   Exercise keeps you strong and keeps your heart and lungs active. Talk to your doctor about an appropriate exercise program for you.   Skin Care   To prevent a skin infection, bathe or shower once a day. Use a moisturizing soap and wash with warm water. Avoid very hot or cold water. Chemotherapy can make your skin dry . Apply moisturizing lotion to help relieve dry skin. Some drugs used in high doses can cause slight burns to appear (like sunburn). Ask for a special cream to help relieve the burn and protect your skin.   Prevent Mouth Sores   During chemotherapy, many people get mouth sores. Do the following to help prevent mouth sores or to ease discomfort.   Brush your teeth with a soft-bristle toothbrush after every meal.  Don't use dental floss if your platelet count is below 50,000. Your doctor or nurse will tell you if this is the case.  Use an oral swab or special soft toothbrush if your gums bleed during regular brushing.  Use mouthwash as directed. If you can't tolerate commercial mouthwash, use salt and baking soda to clean your mouth. Mix 1 teaspoon of salt and 1 teaspoon of baking soda into a glass of water. Swish and spit.  Call your  doctor or return to this facility if you develop any of the following:   Sore throat   White patches in the mouth or throat   Fever of 100.4ºF (38ºC) or higher, or as directed by your healthcare provider  © 2000-2011 [facility], [address] [place] [address]. All rights reserved. This information is not intended as a substitute for professional medical care. Always follow your healthcare professional's   FALL PREVENTION   Falls often occur due to slipping, tripping or losing your balance. Here are ways to reduce your risk of falling again.   Was there anything that caused your fall that can be fixed, removed or replaced?   Make your home safe by keeping walkways clear of objects you may trip over.   Use non-slip pads under rugs.   Do not walk in poorly lit areas.   Do not stand on chairs or wobbly ladders.   Use caution when reaching overhead or looking upward. This position can cause a loss of balance.   Be sure your shoes fit properly, have non-slip bottoms and are in good condition.   Be cautious when going up and down stairs, curbs, and when walking on uneven sidewalks.   If your balance is poor, consider using a cane or walker.   If your fall was related to alcohol use, stop or limit alcohol intake.   If your fall was related to use of sleeping medicines, talk to your doctor about this. You may need to reduce your dosage at bedtime if you awaken during the night to go to the bathroom.   To reduce the need for nighttime bathroom trips:   Avoid drinking fluids for several hours before going to bed   Empty your bladder before going to bed   Men can keep a urinal at the bedside   © 2000-2011 [facility], [address] [place] [address]. All rights reserved. This information is not intended as a substitute for professional medical care. Always follow your healthcare professional's instructions.  Support Groups/Classes    Support groups and classes are being offered at the   [facility] and [facility]    "Cooking with Cancer" (Nutrition Class):  Second Wednesday of each month   at noon at the [facility] [place].  Metastatic Support Group:  Third Tuesday of each month   at noon at the [facility].  Next Steps Class/Group: Second and fourth Thursday of each month at noon at the [facility].  Hope Chest (Breast Cancer Support Group): First Tuesday of each month   at 5:30pm at the [facility] location.  [employer]: [facility]: Second and third Tuesday of each month from 7:30am - 2pm.  [facility]: First and fourth Tuesday of each month from 7:30am - 2pm    If you are interested in attending or would like more information please ask our social workers or your nurse!  "

## 2019-10-18 ENCOUNTER — INFUSION (OUTPATIENT)
Dept: INFUSION THERAPY | Facility: HOSPITAL | Age: 79
End: 2019-10-18
Payer: MEDICARE

## 2019-10-18 ENCOUNTER — SOCIAL WORK (OUTPATIENT)
Dept: INFUSION THERAPY | Facility: HOSPITAL | Age: 79
End: 2019-10-18

## 2019-10-18 VITALS
SYSTOLIC BLOOD PRESSURE: 157 MMHG | BODY MASS INDEX: 21.25 KG/M2 | HEART RATE: 95 BPM | RESPIRATION RATE: 16 BRPM | WEIGHT: 115.5 LBS | DIASTOLIC BLOOD PRESSURE: 81 MMHG | OXYGEN SATURATION: 97 % | TEMPERATURE: 97 F | HEIGHT: 62 IN

## 2019-10-18 DIAGNOSIS — C93.10 CHRONIC MYELOMONOCYTIC LEUKEMIA NOT HAVING ACHIEVED REMISSION: Primary | ICD-10-CM

## 2019-10-18 PROCEDURE — 96401 CHEMO ANTI-NEOPL SQ/IM: CPT | Mod: HCNC

## 2019-10-18 PROCEDURE — 63600175 PHARM REV CODE 636 W HCPCS: Mod: JW,JG,HCNC | Performed by: INTERNAL MEDICINE

## 2019-10-18 RX ORDER — AZACITIDINE 100 MG/1
75 INJECTION, POWDER, LYOPHILIZED, FOR SOLUTION INTRAVENOUS; SUBCUTANEOUS
Status: COMPLETED | OUTPATIENT
Start: 2019-10-18 | End: 2019-10-18

## 2019-10-18 RX ADMIN — AZACITIDINE 115 MG: 100 INJECTION, POWDER, LYOPHILIZED, FOR SOLUTION INTRAVENOUS; SUBCUTANEOUS at 11:10

## 2019-10-19 NOTE — PROGRESS NOTES
CHA f/u with pt from MSW intern, Adelina BLACK. previous conversation. Pt stated that she had taken time to read over the paperwork that MSW intern had provided pt with the previous day. Pt stated that she was familiar with [employer] because of her  cancer diagnosis but was unsure if she could get boost for herself. Also, pt explained that she would be buying prosthesis bras when she finished her treatment today. SW explained that a simple referral was needed with her signature and she would be able to get boost on her own and look into their bras. Pt provided signature for [employer] referral. Pt stated that she would like to fund out what the totals of her bills are at this time because she has not received a bill yet. SW explained that meeting with a FC would be ideal; however, finding out the exact amount would be challenging because every visit has not occurred, and somethings may be needed on other days when it is not such as fluids. CHA explained that on Monday/Tuesday, pt could meet with Zenaida MCCULLOUGH, for financial counseling. CHA introduced the [employer] to pt as an additional source of income. Pt provided signature to [employer] and is anticipating a phone call. By the end of next week. SW explained that she would email Zenaida MCCULLOUGH, regarding pts inquiry. SW provided information on LLS and explained that pt could apply for co-pay assistance because of her diagnosis. CHA provided a pt with a packet of information for pt to read over and discuss with her daughter.     CHA explained that she would have her SW colleague, Adelina, f/u regarding applying for LLS deanna if interested.

## 2019-10-21 ENCOUNTER — INFUSION (OUTPATIENT)
Dept: INFUSION THERAPY | Facility: HOSPITAL | Age: 79
End: 2019-10-21
Attending: INTERNAL MEDICINE
Payer: MEDICARE

## 2019-10-21 VITALS
OXYGEN SATURATION: 94 % | TEMPERATURE: 97 F | DIASTOLIC BLOOD PRESSURE: 79 MMHG | HEART RATE: 97 BPM | RESPIRATION RATE: 18 BRPM | SYSTOLIC BLOOD PRESSURE: 145 MMHG

## 2019-10-21 DIAGNOSIS — C93.10 CHRONIC MYELOMONOCYTIC LEUKEMIA NOT HAVING ACHIEVED REMISSION: Primary | ICD-10-CM

## 2019-10-21 PROCEDURE — 63600175 PHARM REV CODE 636 W HCPCS: Mod: JG,HCNC | Performed by: INTERNAL MEDICINE

## 2019-10-21 PROCEDURE — 96401 CHEMO ANTI-NEOPL SQ/IM: CPT | Mod: HCNC

## 2019-10-21 RX ORDER — AZACITIDINE 100 MG/1
75 INJECTION, POWDER, LYOPHILIZED, FOR SOLUTION INTRAVENOUS; SUBCUTANEOUS
Status: COMPLETED | OUTPATIENT
Start: 2019-10-21 | End: 2019-10-21

## 2019-10-21 RX ADMIN — AZACITIDINE 115 MG: 100 INJECTION, POWDER, LYOPHILIZED, FOR SOLUTION INTRAVENOUS; SUBCUTANEOUS at 10:10

## 2019-10-21 NOTE — DISCHARGE INSTRUCTIONS
[facility]  [address] [facility]  [address]  [phone] phone     [phone] fax  Hours of Operation: Monday- Friday 8:00am- 5:00pm  After hours phone  [phone]  Hematology / Oncology Physicians on call      GILBERT Miller Dr., Dr., Dr., Dr., NP Sydney Prescott, NP Tyesha Taylor, NP    Please call with any concerns regarding your appointment today.HOME CARE AFTER CHEMOTHERAPY   Meals   Many patients feel sick and lose their appetites during treatment. Eat small meals several times a day. Choose bland foods with little taste or smell if you have problems with nausea. Be sure to cook all food thoroughly. This kills bacteria and helps you avoid intestinal infection. Soft foods are easier to swallow and digest.   Activity   Exercise keeps you strong and keeps your heart and lungs active. Talk to your doctor about an appropriate exercise program for you.   Skin Care   To prevent a skin infection, bathe or shower once a day. Use a moisturizing soap and wash with warm water. Avoid very hot or cold water. Chemotherapy can make your skin dry . Apply moisturizing lotion to help relieve dry skin. Some drugs used in high doses can cause slight burns to appear (like sunburn). Ask for a special cream to help relieve the burn and protect your skin.   Prevent Mouth Sores   During chemotherapy, many people get mouth sores. Do the following to help prevent mouth sores or to ease discomfort.   Brush your teeth with a soft-bristle toothbrush after every meal.  Don't use dental floss if your platelet count is below 50,000. Your doctor or nurse will tell you if this is the case.  Use an oral swab or special soft toothbrush if your gums bleed during regular brushing.  Use mouthwash as directed. If you can't tolerate commercial mouthwash, use salt and baking soda to clean your mouth. Mix 1 teaspoon of salt and 1  teaspoon of baking soda into a glass of water. Swish and spit.  Call your doctor or return to this facility if you develop any of the following:   Sore throat   White patches in the mouth or throat   Fever of 100.4ºF (38ºC) or higher, or as directed by your healthcare provider  © 2000-2011 [facility], [address] [place] [address]. All rights reserved. This information is not intended as a substitute for professional medical care. Always follow your healthcare professional's   FALL PREVENTION   Falls often occur due to slipping, tripping or losing your balance. Here are ways to reduce your risk of falling again.   Was there anything that caused your fall that can be fixed, removed or replaced?   Make your home safe by keeping walkways clear of objects you may trip over.   Use non-slip pads under rugs.   Do not walk in poorly lit areas.   Do not stand on chairs or wobbly ladders.   Use caution when reaching overhead or looking upward. This position can cause a loss of balance.   Be sure your shoes fit properly, have non-slip bottoms and are in good condition.   Be cautious when going up and down stairs, curbs, and when walking on uneven sidewalks.   If your balance is poor, consider using a cane or walker.   If your fall was related to alcohol use, stop or limit alcohol intake.   If your fall was related to use of sleeping medicines, talk to your doctor about this. You may need to reduce your dosage at bedtime if you awaken during the night to go to the bathroom.   To reduce the need for nighttime bathroom trips:   Avoid drinking fluids for several hours before going to bed   Empty your bladder before going to bed   Men can keep a urinal at the bedside   © 2000-2011 [facility], [address] [place] [address]. All rights reserved. This information is not intended as a substitute for professional medical care. Always follow your healthcare professional's instructions.  Support  "Groups/Classes    Support groups and classes are being offered at the   [facility] and [facility]    "Cooking with Cancer" (Nutrition Class):  Second Wednesday of each month   at noon at the Cancer Center.  Metastatic Support Group:  Third Tuesday of each month   at noon at the [facility].  Next Steps Class/Group: Second and fourth Thursday of each month at noon at the Cancer Center.  Hope Chest (Breast Cancer Support Group): First Tuesday of each month   at 5:30pm at the [facility] location.  [employer]: [facility]: Second and third Tuesday of each month from 7:30am - 2pm.  [facility]: First and fourth Tuesday of each month from 7:30am - 2pm    If you are interested in attending or would like more information please ask our social workers or your nurse!  "

## 2019-10-22 ENCOUNTER — INFUSION (OUTPATIENT)
Dept: INFUSION THERAPY | Facility: HOSPITAL | Age: 79
End: 2019-10-22
Attending: INTERNAL MEDICINE
Payer: MEDICARE

## 2019-10-22 ENCOUNTER — SOCIAL WORK (OUTPATIENT)
Dept: HEMATOLOGY/ONCOLOGY | Facility: CLINIC | Age: 79
End: 2019-10-22

## 2019-10-22 VITALS
WEIGHT: 115.5 LBS | OXYGEN SATURATION: 96 % | RESPIRATION RATE: 18 BRPM | SYSTOLIC BLOOD PRESSURE: 116 MMHG | HEART RATE: 102 BPM | TEMPERATURE: 98 F | DIASTOLIC BLOOD PRESSURE: 68 MMHG | HEIGHT: 62 IN | BODY MASS INDEX: 21.25 KG/M2

## 2019-10-22 DIAGNOSIS — C93.10 CHRONIC MYELOMONOCYTIC LEUKEMIA NOT HAVING ACHIEVED REMISSION: Primary | ICD-10-CM

## 2019-10-22 PROCEDURE — G0008 FLU VACCINE - HIGH DOSE (65+) PRESERVATIVE FREE IM: ICD-10-PCS | Mod: HCNC,,, | Performed by: INTERNAL MEDICINE

## 2019-10-22 PROCEDURE — 63600175 PHARM REV CODE 636 W HCPCS: Mod: JG,HCNC | Performed by: INTERNAL MEDICINE

## 2019-10-22 PROCEDURE — G0008 ADMIN INFLUENZA VIRUS VAC: HCPCS | Mod: PBBFAC

## 2019-10-22 PROCEDURE — 90662 IIV NO PRSV INCREASED AG IM: CPT | Mod: HCNC,,, | Performed by: INTERNAL MEDICINE

## 2019-10-22 PROCEDURE — G0008 ADMIN INFLUENZA VIRUS VAC: HCPCS | Mod: HCNC,,, | Performed by: INTERNAL MEDICINE

## 2019-10-22 PROCEDURE — 90662 FLU VACCINE - HIGH DOSE (65+) PRESERVATIVE FREE IM: ICD-10-PCS | Mod: HCNC,,, | Performed by: INTERNAL MEDICINE

## 2019-10-22 PROCEDURE — 96401 CHEMO ANTI-NEOPL SQ/IM: CPT | Mod: HCNC

## 2019-10-22 PROCEDURE — 90662 IIV NO PRSV INCREASED AG IM: CPT | Mod: PBBFAC

## 2019-10-22 RX ORDER — AMITRIPTYLINE HYDROCHLORIDE 75 MG/1
TABLET ORAL
Qty: 90 TABLET | Refills: 3 | Status: SHIPPED | OUTPATIENT
Start: 2019-10-22

## 2019-10-22 RX ORDER — AZACITIDINE 100 MG/1
75 INJECTION, POWDER, LYOPHILIZED, FOR SOLUTION INTRAVENOUS; SUBCUTANEOUS
Status: COMPLETED | OUTPATIENT
Start: 2019-10-22 | End: 2019-10-22

## 2019-10-22 RX ADMIN — AZACITIDINE 115 MG: 100 INJECTION, POWDER, LYOPHILIZED, FOR SOLUTION INTRAVENOUS; SUBCUTANEOUS at 11:10

## 2019-10-22 NOTE — DISCHARGE INSTRUCTIONS
[facility]  [address] [facility]  [address]  [phone] phone     [phone] fax  Hours of Operation: Monday- Friday 8:00am- 5:00pm  After hours phone  [phone]  Hematology / Oncology Physicians on call      GILBERT Miller Dr., Dr., Dr., Dr., NP Sydney Prescott, NP Tyesha Taylor, NP    Please call with any concerns regarding your appointment today.HOME CARE AFTER CHEMOTHERAPY   Meals   Many patients feel sick and lose their appetites during treatment. Eat small meals several times a day. Choose bland foods with little taste or smell if you have problems with nausea. Be sure to cook all food thoroughly. This kills bacteria and helps you avoid intestinal infection. Soft foods are easier to swallow and digest.   Activity   Exercise keeps you strong and keeps your heart and lungs active. Talk to your doctor about an appropriate exercise program for you.   Skin Care   To prevent a skin infection, bathe or shower once a day. Use a moisturizing soap and wash with warm water. Avoid very hot or cold water. Chemotherapy can make your skin dry . Apply moisturizing lotion to help relieve dry skin. Some drugs used in high doses can cause slight burns to appear (like sunburn). Ask for a special cream to help relieve the burn and protect your skin.   Prevent Mouth Sores   During chemotherapy, many people get mouth sores. Do the following to help prevent mouth sores or to ease discomfort.   Brush your teeth with a soft-bristle toothbrush after every meal.  Don't use dental floss if your platelet count is below 50,000. Your doctor or nurse will tell you if this is the case.  Use an oral swab or special soft toothbrush if your gums bleed during regular brushing.  Use mouthwash as directed. If you can't tolerate commercial mouthwash, use salt and baking soda to clean your mouth. Mix 1 teaspoon of salt and 1  teaspoon of baking soda into a glass of water. Swish and spit.  Call your doctor or return to this facility if you develop any of the following:   Sore throat   White patches in the mouth or throat   Fever of 100.4ºF (38ºC) or higher, or as directed by your healthcare provider  © 2000-2011 [facility], [address] [place] [address]. All rights reserved. This information is not intended as a substitute for professional medical care. Always follow your healthcare professional's   FALL PREVENTION   Falls often occur due to slipping, tripping or losing your balance. Here are ways to reduce your risk of falling again.   Was there anything that caused your fall that can be fixed, removed or replaced?   Make your home safe by keeping walkways clear of objects you may trip over.   Use non-slip pads under rugs.   Do not walk in poorly lit areas.   Do not stand on chairs or wobbly ladders.   Use caution when reaching overhead or looking upward. This position can cause a loss of balance.   Be sure your shoes fit properly, have non-slip bottoms and are in good condition.   Be cautious when going up and down stairs, curbs, and when walking on uneven sidewalks.   If your balance is poor, consider using a cane or walker.   If your fall was related to alcohol use, stop or limit alcohol intake.   If your fall was related to use of sleeping medicines, talk to your doctor about this. You may need to reduce your dosage at bedtime if you awaken during the night to go to the bathroom.   To reduce the need for nighttime bathroom trips:   Avoid drinking fluids for several hours before going to bed   Empty your bladder before going to bed   Men can keep a urinal at the bedside   © 2000-2011 [facility], [address] [place] [address]. All rights reserved. This information is not intended as a substitute for professional medical care. Always follow your healthcare professional's instructions.  Support  "Groups/Classes    Support groups and classes are being offered at the   [facility] and [facility]    "Cooking with Cancer" (Nutrition Class):  Second Wednesday of each month   at noon at the Cancer Center.  Metastatic Support Group:  Third Tuesday of each month   at noon at the [facility].  Next Steps Class/Group: Second and fourth Thursday of each month at noon at the Cancer Center.  Hope Chest (Breast Cancer Support Group): First Tuesday of each month   at 5:30pm at the [facility] location.  [employer]: [facility]: Second and third Tuesday of each month from 7:30am - 2pm.  [facility]: First and fourth Tuesday of each month from 7:30am - 2pm    If you are interested in attending or would like more information please ask our social workers or your nurse!  "

## 2019-10-26 PROCEDURE — G0179 PR HOME HEALTH MD RECERTIFICATION: ICD-10-PCS | Mod: ,,, | Performed by: INTERNAL MEDICINE

## 2019-10-26 PROCEDURE — G0179 MD RECERTIFICATION HHA PT: HCPCS | Mod: ,,, | Performed by: INTERNAL MEDICINE

## 2019-10-28 ENCOUNTER — DOCUMENTATION ONLY (OUTPATIENT)
Dept: HEMATOLOGY/ONCOLOGY | Facility: CLINIC | Age: 79
End: 2019-10-28

## 2019-10-30 ENCOUNTER — DOCUMENTATION ONLY (OUTPATIENT)
Dept: HEMATOLOGY/ONCOLOGY | Facility: CLINIC | Age: 79
End: 2019-10-30

## 2019-10-30 ENCOUNTER — TELEPHONE (OUTPATIENT)
Dept: HEMATOLOGY/ONCOLOGY | Facility: CLINIC | Age: 79
End: 2019-10-30

## 2019-10-30 NOTE — PROGRESS NOTES
Applications submitted today for pt via [employer] provider portal for co-pay assistance (chemotherapy) as well as the travel assistance deanna.

## 2019-10-30 NOTE — TELEPHONE ENCOUNTER
MSW Intern followed up with pt over the phone in regards to qualifying for Medicaid from a previous meeting with CHA. The pt does nto qualify for Medicaid at this time. MSW Intern offered pt the opportunity to apply for S for copay assistance. The pt agreed to application process and provided MSW Intern with household income and size to begin the application process. MSW Intern will follow up with pt as needed.

## 2019-11-01 ENCOUNTER — EXTERNAL HOME HEALTH (OUTPATIENT)
Dept: HOME HEALTH SERVICES | Facility: HOSPITAL | Age: 79
End: 2019-11-01
Payer: MEDICARE

## 2019-11-01 ENCOUNTER — TELEPHONE (OUTPATIENT)
Dept: FAMILY MEDICINE | Facility: CLINIC | Age: 79
End: 2019-11-01

## 2019-11-01 NOTE — TELEPHONE ENCOUNTER
----- Message from Lyn Castillo sent at 11/1/2019 12:02 PM CDT -----  Contact: michael/[facility]  Please call [phone] regarding pt, states pt has swollen area on leg, hurt to touch, need advise.

## 2019-11-09 NOTE — PROGRESS NOTES
Subjective:   Date of Visit: 11/11/19   ?   CHIEF COMPLAINT:   Chronic myelomonocytic leukemia type 2???????   ?   ONCOLOGIC DIAGNOSIS:  Chronic myelomonocytic leukemia type 2  ?   CURRENT TREATMENT:  Azacitadine  ?      Leukocytosis    5/3/2016 Initial Diagnosis     Leukocytosis      8/16/2019 Tumor Conference     Presenting Hospital / Clinic: [facility]  Virtual Tumor Board Conference: In person  Presenter: Dr. Sathish Devine  Specialties Present: Medical Oncology;Radiation Oncology;Surgical Oncology;Pathology;Navigation;Research;Plastic Surgery;Radiology;Gastrointestinal  Presentation at Cancer Conference: Prospective  Cancer Type: Other  Other Cancer: Leukocytosis  Recommended Plan: Additional screening  Send blood specimen for FISH to determine if CLL        Chronic myelomonocytic leukemia not having achieved remission    9/13/2019 Initial Diagnosis     Chronic myelomonocytic leukemia not having achieved remission      10/14/2019 -  Chemotherapy     Treatment Summary   Plan Name: OP AZACITADINE 7-DAY (SUB-Q)  Treatment Goal: Palliative  Status: Active  Start Date: 10/14/2019  End Date: 1/21/2020 (Planned)  Provider: Denton Knox MD  Chemotherapy: azaCITIDine (VIDAZA) chemo injection 115 mg, 75 mg/m2 = 115 mg, Subcutaneous, Clinic/HOD 1 time, 1 of 4 cycles  Administration: 115 mg (10/14/2019), 115 mg (10/15/2019), 115 mg (10/21/2019), 115 mg (10/16/2019), 115 mg (10/17/2019), 115 mg (10/18/2019), 115 mg (10/22/2019)          Note:  Ms Parker was seen at [facility] today in the company of her daughter and .  She is a pleasant 79-year-old female with recent diagnosis of chronic myelomonocytic leukemia type 2 who was started on azacitidine.  She seems to be tolerating that well and has completed the 1st 7 day course    Today she denies diarrhea, headache, blurry vision, chest pain or shortness of breath.  She also denies any fever, chills, nausea or vomiting, no abdominal pain or  change in urinary frequency.      Review of Systems   Constitutional: Negative for activity change, appetite change, chills, fatigue, fever and unexpected weight change.   HENT: Negative for hearing loss, mouth sores, nosebleeds, sore throat, tinnitus, trouble swallowing and voice change.    Eyes: Negative for visual disturbance.   Respiratory: Negative for cough, chest tightness and shortness of breath.    Cardiovascular: Negative for chest pain, palpitations and leg swelling.   Gastrointestinal: Negative for abdominal pain, anal bleeding, blood in stool, constipation, diarrhea, nausea and vomiting.   Genitourinary: Negative for dysuria, frequency, hematuria, pelvic pain, vaginal bleeding and vaginal pain.   Musculoskeletal: Negative for arthralgias, back pain, joint swelling and neck pain.   Skin: Negative for color change, pallor, rash and wound.   Allergic/Immunologic: Negative for immunocompromised state.   Neurological: Negative for dizziness, tremors, syncope, speech difficulty, weakness, light-headedness and headaches.   Hematological: Negative for adenopathy. Does not bruise/bleed easily.   Psychiatric/Behavioral: Negative for agitation, confusion, decreased concentration, hallucinations and sleep disturbance. The patient is not nervous/anxious.        ?   PAST MEDICAL HISTORY:   Past Medical History:   Diagnosis Date    Acid reflux     Anxiety     Back pain     Bronchitis, chronic obstructive w acute bronchitis 7/29/2016    Cancer     [facility] arms, face- Dr. Lata Tejada    Cataract     2+NS    Degenerative disc disease     Depression     Dry mouth     Hernia, hiatal 11/18/2013    Hypertension     Hypothyroid     Macrocytic anemia 5/3/2016    Macular degeneration     Migraines     Mixed anxiety and depressive disorder     Multiple fractures of ribs of right side     Osteoporosis     Other hyperlipidemia 10/11/2019    Pneumonia     Pneumonia due to other staphylococcus     Rheumatoid  arthritis     Rheumatoid arthritis(714.0)     Rheumatoid arthritis(714.0)     Remicade, MTX.    ?     PAST SURGICAL HISTORY:   Past Surgical History:   Procedure Laterality Date    APPENDECTOMY  1985    CATARACT EXTRACTION Bilateral 6/11/15    Dr. Booth    CHOLECYSTECTOMY  2013    cryoablasion kidney Left 09/27/2016    feet Bilateral     rheumatoid    FRACTURE SURGERY Right     tibia    HERNIA REPAIR      HYSTERECTOMY  1970    partial    JOINT REPLACEMENT      bilateral knees (2008), hands, wrists, knuckles, toes    LAPAROSCOPIC NISSEN FUNDOPLICATION      TRANSFORAMINAL EPIDURAL INJECTION OF STEROID Left 6/25/2019    Procedure: Left L5/S1 TF AYAAN with local;  Surgeon: Rowdy Felix MD;  Location: [facility];  Service: Pain Management;  Laterality: Left;      ?   ALLERGIES:   Allergies as of 11/11/2019 - Reviewed 11/11/2019   Allergen Reaction Noted    Codeine  06/08/2012    Doxycycline  02/13/2019    Gabapentin Other (See Comments) 08/14/2019      ?   MEDICATIONS:?   Outpatient Medications Marked as Taking for the 11/11/19 encounter (Office Visit) with Denton Knox MD   Medication Sig Dispense Refill    albuterol (PROVENTIL) 2.5 mg /3 mL (0.083 %) nebulizer solution Take 3 mLs (2.5 mg total) by nebulization every 6 (six) hours as needed for Wheezing. 1 Box 5    amitriptyline (ELAVIL) 75 MG tablet TAKE 1 TABLET BY MOUTH EVERY EVENING 90 tablet 3    benzonatate (TESSALON) 100 MG capsule Take 1-2 capsules (100-200 mg total) by mouth 3 (three) times daily as needed for Cough. 60 capsule 0    calcium citrate-vitamin D (CITRACAL + D) 315-200 mg-unit per tablet Take 1 tablet by mouth once daily.       DULoxetine (CYMBALTA) 20 MG capsule Take 2 capsules (40 mg total) by mouth once daily. 180 capsule 3    hydrocodone-acetaminophen 5-325mg (NORCO) 5-325 mg per tablet TK 1 T PO Q 6 H PRN  0    hydroxyurea (HYDREA) 500 mg Cap Take one capsule by mouth once daily. 30 capsule 0    hydrOXYzine  HCl (ATARAX) 25 MG tablet       leucovorin (WELLCOVORIN) 5 mg Tab TAKE ONE WEEKLY ON  4 tablet 3    levothyroxine (SYNTHROID) 88 MCG tablet TAKE 1 TABLET BY MOUTH BEFORE BREAKFAST 90 tablet 3    magnesium oxide (MAG-OX) 400 mg (241.3 mg magnesium) tablet Take 1 tablet (400 mg total) by mouth 3 (three) times daily.  0    meclizine (ANTIVERT) 50 MG tablet Take 25 mg by mouth 3 (three) times daily as needed.      methotrexate 2.5 MG Tab Take 17.5 mg by mouth every 7 days.       metoprolol succinate (TOPROL-XL) 100 MG 24 hr tablet Take 100 mg by mouth once daily.      metoprolol succinate (TOPROL-XL) 50 MG 24 hr tablet TAKE 1 TABLET(50 MG) BY MOUTH EVERY DAY 30 tablet 11    multivitamin capsule Take by mouth. As directed      ondansetron (ZOFRAN) 4 MG tablet Take 1 tablet (4 mg total) by mouth every 8 (eight) hours as needed for Nausea. 30 tablet 1    ondansetron (ZOFRAN) 8 MG tablet Take 1 tablet (8 mg total) by mouth every 12 (twelve) hours as needed for Nausea. 30 tablet 2    pravastatin (PRAVACHOL) 10 MG tablet TAKE 1 TABLET(10 MG) BY MOUTH EVERY DAY 30 tablet 11    prochlorperazine (COMPAZINE) 5 MG tablet TAKE 1 TABLET(5 MG) BY MOUTH EVERY 6 HOURS AS NEEDED FOR NAUSEA 385 tablet 1    tocilizumab (ACTEMRA) 80 mg/4 mL (20 mg/mL) Soln Inject into the vein.      valsartan-hydrochlorothiazide (DIOVAN-HCT) 80-12.5 mg per tablet TAKE 1 TABLET BY MOUTH DAILY 90 tablet 3      ?   SOCIAL HISTORY:?   Social History     Tobacco Use    Smoking status: Former Smoker     Packs/day: 0.25     Years: 2.00     Pack years: 0.50     Last attempt to quit: 1965     Years since quittin.0    Smokeless tobacco: Never Used   Substance Use Topics    Alcohol use: No        ?   FAMILY HISTORY:   family history includes Asthma in her brother and sister; Cancer in her brother and maternal grandfather; Cataracts in her mother; Chronic back pain in her brother and sister; Diabetes Mellitus in her brother;  Fibromyalgia in her daughter; Heart disease in her father, maternal grandmother, and mother; Hyperlipidemia in her mother; Hypertension in her brother, father, mother, and sister; Osteoarthritis in her brother, father, mother, and sister; Thyroid disease in her brother and sister.   ?     Objective:      Physical Exam   Constitutional: She is oriented to person, place, and time. She appears well-developed and well-nourished. She is cooperative.  Non-toxic appearance. She does not appear ill. No distress.   HENT:   Head: Normocephalic and atraumatic.   Mouth/Throat: No oropharyngeal exudate.   Eyes: Pupils are equal, round, and reactive to light. Conjunctivae are normal. Right eye exhibits no discharge. Left eye exhibits no discharge. No scleral icterus.   Neck: Normal range of motion. Neck supple. No thyromegaly present.   Cardiovascular: Normal rate and regular rhythm.   No murmur heard.  Pulmonary/Chest: Effort normal and breath sounds normal. No respiratory distress. She exhibits no tenderness.   Abdominal: Soft. Bowel sounds are normal. She exhibits no distension and no mass. There is no tenderness. There is no rebound and no guarding.   Musculoskeletal: Normal range of motion. She exhibits no edema or tenderness.   Lymphadenopathy:     She has no cervical adenopathy.        Right cervical: No superficial cervical adenopathy present.       Left cervical: No superficial cervical adenopathy present.        Right axillary: No pectoral adenopathy present.        Left axillary: No pectoral adenopathy present.No inguinal adenopathy noted on the right or left side.        Right: No supraclavicular adenopathy present.        Left: No supraclavicular adenopathy present.   Neurological: She is alert and oriented to person, place, and time. No cranial nerve deficit or sensory deficit.   Skin: Skin is warm and dry. Capillary refill takes 2 to 3 seconds. No rash noted. No erythema. No pallor.   Psychiatric: She has a normal  mood and affect. Her behavior is normal. Judgment normal.       ?   Vitals:    11/11/19 1255   BP: (!) 144/89   Pulse: 101   Temp: 98.4 °F (36.9 °C)      ?     ECOG SCORE    1 - Restricted in strenuous activity-ambulatory and able to carry out work of a light nature         Laboratory:  ?   Lab Visit on 11/11/2019   Component Date Value Ref Range Status    WBC 11/11/2019 15.66* 3.90 - 12.70 K/uL Final    RBC 11/11/2019 2.12* 4.00 - 5.40 M/uL Final    Hemoglobin 11/11/2019 7.2* 12.0 - 16.0 g/dL Final    Hematocrit 11/11/2019 23.8* 37.0 - 48.5 % Final    Mean Corpuscular Volume 11/11/2019 112* 82 - 98 fL Final    Mean Corpuscular Hemoglobin 11/11/2019 34.0* 27.0 - 31.0 pg Final    Mean Corpuscular Hemoglobin Conc 11/11/2019 30.3* 32.0 - 36.0 g/dL Final    RDW 11/11/2019 22.5* 11.5 - 14.5 % Final    Platelets 11/11/2019 63* 150 - 350 K/uL Final    MPV 11/11/2019 11.3  9.2 - 12.9 fL Final    Immature Granulocytes 11/11/2019 8.6* 0.0 - 0.5 % Final    Gran # (ANC) 11/11/2019 3.5  1.8 - 7.7 K/uL Final    Immature Grans (Abs) 11/11/2019 1.34* 0.00 - 0.04 K/uL Final    Lymph # 11/11/2019 8.0* 1.0 - 4.8 K/uL Final    Mono # 11/11/2019 2.8* 0.3 - 1.0 K/uL Final    Eos # 11/11/2019 0.0  0.0 - 0.5 K/uL Final    Baso # 11/11/2019 0.02  0.00 - 0.20 K/uL Final    nRBC 11/11/2019 3* 0 /100 WBC Final    Gran% 11/11/2019 22.2* 38.0 - 73.0 % Final    Lymph% 11/11/2019 50.8* 18.0 - 48.0 % Final    Mono% 11/11/2019 18.1* 4.0 - 15.0 % Final    Eosinophil% 11/11/2019 0.2  0.0 - 8.0 % Final    Basophil% 11/11/2019 0.1  0.0 - 1.9 % Final    Differential Method 11/11/2019 Automated   Final    Sodium 11/11/2019 137  136 - 145 mmol/L Final    Potassium 11/11/2019 4.6  3.5 - 5.1 mmol/L Final    Chloride 11/11/2019 103  95 - 110 mmol/L Final    CO2 11/11/2019 23  23 - 29 mmol/L Final    Glucose 11/11/2019 98  70 - 110 mg/dL Final    BUN, Bld 11/11/2019 23  8 - 23 mg/dL Final    Creatinine 11/11/2019 1.1  0.5 - 1.4  mg/dL Final    Calcium 11/11/2019 9.8  8.7 - 10.5 mg/dL Final    Total Protein 11/11/2019 8.3  6.0 - 8.4 g/dL Final    Albumin 11/11/2019 3.7  3.5 - 5.2 g/dL Final    Total Bilirubin 11/11/2019 0.6  0.1 - 1.0 mg/dL Final    Alkaline Phosphatase 11/11/2019 90  55 - 135 U/L Final    AST 11/11/2019 21  10 - 40 U/L Final    ALT 11/11/2019 8* 10 - 44 U/L Final    Anion Gap 11/11/2019 11  8 - 16 mmol/L Final    eGFR if  11/11/2019 56* >60 mL/min/1.73 m^2 Final    eGFR if non African American 11/11/2019 49* >60 mL/min/1.73 m^2 Final      ?   Tumor markers   ?   ?   Imaging: CT Chest Without Contrast  Narrative: EXAMINATION:  CT CHEST WITHOUT CONTRAST    CLINICAL HISTORY:  Chest pain or SOB, pleurisy or effusion suspected; Lobar pneumonia, unspecified organism    TECHNIQUE:  Axial images performed through the chest without IV contrast.  Sagittal and coronal reformats obtained.    COMPARISON:  Chest x-ray 09/05/2019    FINDINGS:  Heart: Normal heart size.  Small pericardial effusion.  Coronary arterial calcification.    Mediastinum: Moderate sized hiatal hernia.  Numerous mildly enlarged lymph nodes present throughout the mediastinum.  Largest representative lymph node is a subcarinal lymph node measuring 2.1 x 1.8 cm.    Vasculature: Moderate aortic atherosclerosis..    Mild mediastinal adenopathy, presumably reactive.  Attention on follow-up.    No pleural effusion evident.    Lungs: Large amount of consolidation throughout the left lower lobe.  No effusion.  Some additional scattered reticulonodular opacities surrounding this area consolidation left lower lobe with some of these changes also present in the left upper lobe.  A few scattered subtle reticulonodular opacities in the right lung.  No consolidation or right-sided effusion.    Esophagus: Unremarkable.    Upper Abdomen: Aortic atherosclerosis.  Small cyst hepatic dome.    Bones: No acute fracture. Large amount of arthritic changes lower  thoracic spine with levoscoliosis lower thoracic spine.  Impression: Large amount of left lower lobe consolidation consistent with pneumonia.  Additional scattered reticulonodular opacities in the lungs greater in the left upper lobe.    Negative for pleural effusion.    Very small pericardial effusion.    All CT scans at this facility use dose modulation, iterative reconstruction and/or weight based dosing when appropriate to reduce radiation dose to as low as reasonably achievable.    Electronically signed by: Escobar Walsh MD  Date:    09/06/2019  Time:    12:45  CT Biopsy Bone Marrow (xpd)  Narrative: EXAMINATION:  CT BIOPSY BONE MARROW (XPD)    CLINICAL HISTORY:  Nutritional anemia, unspecified    TECHNIQUE:  1% lidocaine locally    :ANITA Rodrigues    Complications: None    COMPARISON:  None    FINDINGS:  Procedure: The risks and benefits of this procedure were discussed with the patient, written informed consent was obtained.  The patient was placed prone on the CT gantry.  Preprocedural imaging revealed normal positioning of the iliac crests.  A suitable skin site for biopsy was selected. Moderate sedation using versed and fentanyl was provided with and trained observer monitoring the patient's vital signs and level of consciousness. The total time of sedation was 30 minutes.    The skin site was prepped and draped in sterile fashion.  The skin was anesthetized with 1% lidocaine.    Using CT guidance a 11 gauge introducer needle was guided into the right iliac crest.  Prior to biopsy appropriate needle positioning was confirmed with CT. Thirty cc of marrow aspirate was obtained and given to pathology.  Single 11 gauge marrow core biopsy was then obtained and given to pathology.    The needle was removed.    Postprocedural imaging was acquired. The site was bandaged sterilely. The patient left the room in stable condition.    Findings:    1.  Preprocedural CT showed appropriate imaging characteristics for  right iliac bone marrow aspiration and biopsy.    2.  CT-guided biopsy of a right iliac crest with 11 gauge samples acquired.  3.  Post procedural CT showed No complication.  Impression: CT guided marrow aspiration and biopsy from the right iliac crest.    All CT scans at this facility use dose modulation, iterative reconstruction, and/or weight based dosing when appropriate to reduce radiation dose to as low as reasonable achievable.    Electronically signed by: Zeyad Rodrigues MD  Date:    09/06/2019  Time:    12:39     ?      Pathology:  Pathology Results  (Last 10 years)               10/12/15 0000  Tissue Specimen to Pathology Final result    05/29/13 0000  Tissue Specimen to Pathology Final result           ?   Assessment/Plan:       1. Chronic myelomonocytic leukemia not having achieved remission        Ms. mckinley'rafal is noted to have CMML type 2.  I discussed with her that based on [place] chronic myelomonocytic leukemia (CMML) prognostic model in adults = 1 point for monocytosis = intermediate risk; 18.5 months median survival.     She is currently on azacitidine 7 day course every 28 days at 50 milligrams/meter sq subcutaneously and seems to be tolerating effectively well.  She is however noted to be thrombocytopenic with platelet noted at 63,000. She denies any bleeding episode.  Hemoglobin is also noted at 7.2 grams/deciliter however she is asymptomatic.    Plan is to delay treatment that was supposed to start on 11/11/2019 by 1 week and re-evaluate her lab work to determine if cytopenia is improving.  She is agreeable to the plan as documented.    Ample time given for questions and addressed to her satisfaction.  Will see her back in a week however she knows to contact us sooner if needed    ?   Follow-Up: Follow up in about 1 week (around 11/18/2019).    CECILIO PUGA Md., Ph.D  Hematology & Oncology Department  Phone #: [phone]

## 2019-11-11 ENCOUNTER — LAB VISIT (OUTPATIENT)
Dept: LAB | Facility: HOSPITAL | Age: 79
End: 2019-11-11
Attending: INTERNAL MEDICINE
Payer: MEDICARE

## 2019-11-11 ENCOUNTER — OFFICE VISIT (OUTPATIENT)
Dept: HEMATOLOGY/ONCOLOGY | Facility: CLINIC | Age: 79
End: 2019-11-11
Payer: MEDICARE

## 2019-11-11 VITALS
WEIGHT: 115.94 LBS | DIASTOLIC BLOOD PRESSURE: 89 MMHG | HEART RATE: 101 BPM | TEMPERATURE: 98 F | BODY MASS INDEX: 21.34 KG/M2 | HEIGHT: 62 IN | OXYGEN SATURATION: 97 % | SYSTOLIC BLOOD PRESSURE: 144 MMHG

## 2019-11-11 DIAGNOSIS — C93.10 CHRONIC MYELOMONOCYTIC LEUKEMIA NOT HAVING ACHIEVED REMISSION: Primary | ICD-10-CM

## 2019-11-11 DIAGNOSIS — C93.10 CHRONIC MYELOMONOCYTIC LEUKEMIA NOT HAVING ACHIEVED REMISSION: ICD-10-CM

## 2019-11-11 LAB
ALBUMIN SERPL BCP-MCNC: 3.7 G/DL (ref 3.5–5.2)
ALP SERPL-CCNC: 90 U/L (ref 55–135)
ALT SERPL W/O P-5'-P-CCNC: 8 U/L (ref 10–44)
ANION GAP SERPL CALC-SCNC: 11 MMOL/L (ref 8–16)
AST SERPL-CCNC: 21 U/L (ref 10–40)
BASOPHILS # BLD AUTO: 0.02 K/UL (ref 0–0.2)
BASOPHILS NFR BLD: 0.1 % (ref 0–1.9)
BILIRUB SERPL-MCNC: 0.6 MG/DL (ref 0.1–1)
BUN SERPL-MCNC: 23 MG/DL (ref 8–23)
CALCIUM SERPL-MCNC: 9.8 MG/DL (ref 8.7–10.5)
CHLORIDE SERPL-SCNC: 103 MMOL/L (ref 95–110)
CO2 SERPL-SCNC: 23 MMOL/L (ref 23–29)
CREAT SERPL-MCNC: 1.1 MG/DL (ref 0.5–1.4)
DIFFERENTIAL METHOD: ABNORMAL
EOSINOPHIL # BLD AUTO: 0 K/UL (ref 0–0.5)
EOSINOPHIL NFR BLD: 0.2 % (ref 0–8)
ERYTHROCYTE [DISTWIDTH] IN BLOOD BY AUTOMATED COUNT: 22.5 % (ref 11.5–14.5)
EST. GFR  (AFRICAN AMERICAN): 56 ML/MIN/1.73 M^2
EST. GFR  (NON AFRICAN AMERICAN): 49 ML/MIN/1.73 M^2
GLUCOSE SERPL-MCNC: 98 MG/DL (ref 70–110)
HCT VFR BLD AUTO: 23.8 % (ref 37–48.5)
HGB BLD-MCNC: 7.2 G/DL (ref 12–16)
IMM GRANULOCYTES # BLD AUTO: 1.34 K/UL (ref 0–0.04)
IMM GRANULOCYTES NFR BLD AUTO: 8.6 % (ref 0–0.5)
LYMPHOCYTES # BLD AUTO: 8 K/UL (ref 1–4.8)
LYMPHOCYTES NFR BLD: 50.8 % (ref 18–48)
MCH RBC QN AUTO: 34 PG (ref 27–31)
MCHC RBC AUTO-ENTMCNC: 30.3 G/DL (ref 32–36)
MCV RBC AUTO: 112 FL (ref 82–98)
MONOCYTES # BLD AUTO: 2.8 K/UL (ref 0.3–1)
MONOCYTES NFR BLD: 18.1 % (ref 4–15)
NEUTROPHILS # BLD AUTO: 3.5 K/UL (ref 1.8–7.7)
NEUTROPHILS NFR BLD: 22.2 % (ref 38–73)
NRBC BLD-RTO: 3 /100 WBC
PLATELET # BLD AUTO: 63 K/UL (ref 150–350)
PMV BLD AUTO: 11.3 FL (ref 9.2–12.9)
POTASSIUM SERPL-SCNC: 4.6 MMOL/L (ref 3.5–5.1)
PROT SERPL-MCNC: 8.3 G/DL (ref 6–8.4)
RBC # BLD AUTO: 2.12 M/UL (ref 4–5.4)
SODIUM SERPL-SCNC: 137 MMOL/L (ref 136–145)
WBC # BLD AUTO: 15.66 K/UL (ref 3.9–12.7)

## 2019-11-11 PROCEDURE — 99999 PR PBB SHADOW E&M-EST. PATIENT-LVL IV: ICD-10-PCS | Mod: PBBFAC,HCNC,, | Performed by: INTERNAL MEDICINE

## 2019-11-11 PROCEDURE — 99999 PR PBB SHADOW E&M-EST. PATIENT-LVL IV: CPT | Mod: PBBFAC,HCNC,, | Performed by: INTERNAL MEDICINE

## 2019-11-11 PROCEDURE — 80053 COMPREHEN METABOLIC PANEL: CPT | Mod: HCNC

## 2019-11-11 PROCEDURE — 99499 RISK ADDL DX/OHS AUDIT: ICD-10-PCS | Mod: HCNC,S$GLB,, | Performed by: INTERNAL MEDICINE

## 2019-11-11 PROCEDURE — 99214 OFFICE O/P EST MOD 30 MIN: CPT | Mod: HCNC,S$GLB,, | Performed by: INTERNAL MEDICINE

## 2019-11-11 PROCEDURE — 1101F PR PT FALLS ASSESS DOC 0-1 FALLS W/OUT INJ PAST YR: ICD-10-PCS | Mod: HCNC,CPTII,S$GLB, | Performed by: INTERNAL MEDICINE

## 2019-11-11 PROCEDURE — 3077F SYST BP >= 140 MM HG: CPT | Mod: HCNC,CPTII,S$GLB, | Performed by: INTERNAL MEDICINE

## 2019-11-11 PROCEDURE — 1101F PT FALLS ASSESS-DOCD LE1/YR: CPT | Mod: HCNC,CPTII,S$GLB, | Performed by: INTERNAL MEDICINE

## 2019-11-11 PROCEDURE — 3077F PR MOST RECENT SYSTOLIC BLOOD PRESSURE >= 140 MM HG: ICD-10-PCS | Mod: HCNC,CPTII,S$GLB, | Performed by: INTERNAL MEDICINE

## 2019-11-11 PROCEDURE — 3079F DIAST BP 80-89 MM HG: CPT | Mod: HCNC,CPTII,S$GLB, | Performed by: INTERNAL MEDICINE

## 2019-11-11 PROCEDURE — 99214 PR OFFICE/OUTPT VISIT, EST, LEVL IV, 30-39 MIN: ICD-10-PCS | Mod: HCNC,S$GLB,, | Performed by: INTERNAL MEDICINE

## 2019-11-11 PROCEDURE — 99499 UNLISTED E&M SERVICE: CPT | Mod: HCNC,S$GLB,, | Performed by: INTERNAL MEDICINE

## 2019-11-11 PROCEDURE — 3079F PR MOST RECENT DIASTOLIC BLOOD PRESSURE 80-89 MM HG: ICD-10-PCS | Mod: HCNC,CPTII,S$GLB, | Performed by: INTERNAL MEDICINE

## 2019-11-11 PROCEDURE — 36415 COLL VENOUS BLD VENIPUNCTURE: CPT | Mod: HCNC

## 2019-11-18 ENCOUNTER — OFFICE VISIT (OUTPATIENT)
Dept: HEMATOLOGY/ONCOLOGY | Facility: CLINIC | Age: 79
End: 2019-11-18
Payer: MEDICARE

## 2019-11-18 ENCOUNTER — INFUSION (OUTPATIENT)
Dept: INFUSION THERAPY | Facility: HOSPITAL | Age: 79
End: 2019-11-18
Attending: INTERNAL MEDICINE
Payer: MEDICARE

## 2019-11-18 VITALS — HEIGHT: 62 IN | BODY MASS INDEX: 21.34 KG/M2 | WEIGHT: 115.94 LBS

## 2019-11-18 DIAGNOSIS — R22.32 AXILLARY MASS, LEFT: ICD-10-CM

## 2019-11-18 DIAGNOSIS — C93.10 CHRONIC MYELOMONOCYTIC LEUKEMIA NOT HAVING ACHIEVED REMISSION: Primary | ICD-10-CM

## 2019-11-18 DIAGNOSIS — D64.9 ANEMIA, UNSPECIFIED TYPE: ICD-10-CM

## 2019-11-18 PROCEDURE — 63600175 PHARM REV CODE 636 W HCPCS: Mod: JG,HCNC | Performed by: INTERNAL MEDICINE

## 2019-11-18 PROCEDURE — 1101F PT FALLS ASSESS-DOCD LE1/YR: CPT | Mod: HCNC,CPTII,S$GLB, | Performed by: INTERNAL MEDICINE

## 2019-11-18 PROCEDURE — 99215 PR OFFICE/OUTPT VISIT, EST, LEVL V, 40-54 MIN: ICD-10-PCS | Mod: 25,HCNC,S$GLB, | Performed by: INTERNAL MEDICINE

## 2019-11-18 PROCEDURE — 99499 RISK ADDL DX/OHS AUDIT: ICD-10-PCS | Mod: HCNC,S$GLB,, | Performed by: INTERNAL MEDICINE

## 2019-11-18 PROCEDURE — 99499 UNLISTED E&M SERVICE: CPT | Mod: HCNC,S$GLB,, | Performed by: INTERNAL MEDICINE

## 2019-11-18 PROCEDURE — 1101F PR PT FALLS ASSESS DOC 0-1 FALLS W/OUT INJ PAST YR: ICD-10-PCS | Mod: HCNC,CPTII,S$GLB, | Performed by: INTERNAL MEDICINE

## 2019-11-18 PROCEDURE — 99215 OFFICE O/P EST HI 40 MIN: CPT | Mod: 25,HCNC,S$GLB, | Performed by: INTERNAL MEDICINE

## 2019-11-18 PROCEDURE — 96401 CHEMO ANTI-NEOPL SQ/IM: CPT | Mod: HCNC

## 2019-11-18 RX ORDER — AZACITIDINE 100 MG/1
75 INJECTION, POWDER, LYOPHILIZED, FOR SOLUTION INTRAVENOUS; SUBCUTANEOUS
Status: CANCELLED | OUTPATIENT
Start: 2019-11-21

## 2019-11-18 RX ORDER — AZACITIDINE 100 MG/1
75 INJECTION, POWDER, LYOPHILIZED, FOR SOLUTION INTRAVENOUS; SUBCUTANEOUS
Status: CANCELLED | OUTPATIENT
Start: 2019-11-26

## 2019-11-18 RX ORDER — AZACITIDINE 100 MG/1
75 INJECTION, POWDER, LYOPHILIZED, FOR SOLUTION INTRAVENOUS; SUBCUTANEOUS
Status: CANCELLED | OUTPATIENT
Start: 2019-11-18

## 2019-11-18 RX ORDER — AZACITIDINE 100 MG/1
75 INJECTION, POWDER, LYOPHILIZED, FOR SOLUTION INTRAVENOUS; SUBCUTANEOUS
Status: DISCONTINUED | OUTPATIENT
Start: 2019-11-18 | End: 2019-11-18

## 2019-11-18 RX ORDER — AZACITIDINE 100 MG/1
75 INJECTION, POWDER, LYOPHILIZED, FOR SOLUTION INTRAVENOUS; SUBCUTANEOUS
Status: CANCELLED | OUTPATIENT
Start: 2019-11-25

## 2019-11-18 RX ORDER — AZACITIDINE 100 MG/1
75 INJECTION, POWDER, LYOPHILIZED, FOR SOLUTION INTRAVENOUS; SUBCUTANEOUS
Status: COMPLETED | OUTPATIENT
Start: 2019-11-18 | End: 2019-11-18

## 2019-11-18 RX ORDER — AZACITIDINE 100 MG/1
75 INJECTION, POWDER, LYOPHILIZED, FOR SOLUTION INTRAVENOUS; SUBCUTANEOUS
Status: CANCELLED | OUTPATIENT
Start: 2019-11-22

## 2019-11-18 RX ORDER — AZACITIDINE 100 MG/1
75 INJECTION, POWDER, LYOPHILIZED, FOR SOLUTION INTRAVENOUS; SUBCUTANEOUS
Status: CANCELLED | OUTPATIENT
Start: 2019-11-20

## 2019-11-18 RX ORDER — AZACITIDINE 100 MG/1
75 INJECTION, POWDER, LYOPHILIZED, FOR SOLUTION INTRAVENOUS; SUBCUTANEOUS
Status: CANCELLED | OUTPATIENT
Start: 2019-11-19

## 2019-11-18 RX ADMIN — AZACITIDINE 115 MG: 100 INJECTION, POWDER, LYOPHILIZED, FOR SOLUTION INTRAVENOUS; SUBCUTANEOUS at 02:11

## 2019-11-18 NOTE — ASSESSMENT & PLAN NOTE
Partly related to current treatment with hypomethylatting agent.  Currently asymptomatic.  Continue to monitor.

## 2019-11-18 NOTE — ASSESSMENT & PLAN NOTE
Patient noticed left axilla mass about a week ago.  Physical assessment showed nontender for axillary mass measuring about 1 cm in diameter.  Patient is due for mammogram and has a scheduled mammographic evaluation.  Encouraged to go ahead with mammogram.  Will monitor.

## 2019-11-18 NOTE — NURSING
1413pm: Injection given without difficulties.Bandaid applied. Patient instructed to stay in the clinic for 15 minutes. Patient verbalized understanding and will notify nurse with any complaints.

## 2019-11-18 NOTE — DISCHARGE INSTRUCTIONS
[facility]  [address] [facility]  [address]  [phone] phone     [phone] fax  Hours of Operation: Monday- Friday 8:00am- 5:00pm  After hours phone  [phone]  Hematology / Oncology Physicians on call      Dr. Ruben Baird, GILBERT Youngblood NP Tyesha Taylor, NP    Please call with any concerns regarding your appointment today.FALL PREVENTION   Falls often occur due to slipping, tripping or losing your balance. Here are ways to reduce your risk of falling again.   Was there anything that caused your fall that can be fixed, removed or replaced?   Make your home safe by keeping walkways clear of objects you may trip over.   Use non-slip pads under rugs.   Do not walk in poorly lit areas.   Do not stand on chairs or wobbly ladders.   Use caution when reaching overhead or looking upward. This position can cause a loss of balance.   Be sure your shoes fit properly, have non-slip bottoms and are in good condition.   Be cautious when going up and down stairs, curbs, and when walking on uneven sidewalks.   If your balance is poor, consider using a cane or walker.   If your fall was related to alcohol use, stop or limit alcohol intake.   If your fall was related to use of sleeping medicines, talk to your doctor about this. You may need to reduce your dosage at bedtime if you awaken during the night to go to the bathroom.   To reduce the need for nighttime bathroom trips:   Avoid drinking fluids for several hours before going to bed   Empty your bladder before going to bed   Men can keep a urinal at the bedside   © 1268-5859 [facility] [facility], [address] [place] [address], [place] [address]. All rights reserved. This information is not intended as a substitute for professional medical care. Always follow your healthcare professional's instructions.  Support Groups/Classes    Support groups  "and classes are being offered at the   [facility] and [facility]!!    "Cooking with Cancer" (Nutrition Class):  Second Wednesday of each month   at noon at the Cancer Center.  Metastatic Support Group:  Third Tuesday of each month   at noon at the [facility].  Next Steps Class/Group: Second and fourth Thursday of each month at noon at the [facility].  [facility] (Breast Cancer Support Group): First Tuesday of each month   at 5:30pm at the [facility] location.  [facility]: [facility]: Second and third Tuesday of each month from 7:30am - 2pm.  [facility]: First and fourth Tuesday of each month from 7:30am - 2pm    If you are interested in attending or would like more information please ask our social workers or your nurse!  "

## 2019-11-18 NOTE — ASSESSMENT & PLAN NOTE
noted to have CMML type 2.  I discussed with her that based on [place] chronic myelomonocytic leukemia (CMML) prognostic model in adults = 1 point for monocytosis = intermediate risk; 18.5 months median survival.     Reviewed today's lab.  Hemoglobin noted at 7.4.  Improvement and platelet count to above 80,000. Will proceed with treatment.

## 2019-11-18 NOTE — PROGRESS NOTES
Subjective:   Date of Visit: 11/18/19   ?   CHIEF COMPLAINT:   Chronic myelomonocytic leukemia type 2???????   ?   ONCOLOGIC DIAGNOSIS:  Chronic myelomonocytic leukemia type 2  ?   CURRENT TREATMENT:  Azacitadine  ?      Leukocytosis    5/3/2016 Initial Diagnosis     Leukocytosis      8/16/2019 Tumor Conference     Presenting Hospital / Clinic: [facility] [place] [facility]  Virtual Tumor Board Conference: In person  Presenter: Dr. Sathish Devine  Specialties Present: Medical Oncology;Radiation Oncology;Surgical Oncology;Pathology;Navigation;Research;Plastic Surgery;Radiology;Gastrointestinal  Presentation at Cancer Conference: Prospective  Cancer Type: Other  Other Cancer: Leukocytosis  Recommended Plan: Additional screening  Send blood specimen for FISH to determine if CLL        Chronic myelomonocytic leukemia not having achieved remission    9/13/2019 Initial Diagnosis     Chronic myelomonocytic leukemia not having achieved remission      10/14/2019 -  Chemotherapy     Treatment Summary   Plan Name: OP AZACITADINE 7-DAY (SUB-Q)  Treatment Goal: Palliative  Status: Active  Start Date: 10/14/2019  End Date: 1/21/2020 (Planned)  Provider: Denton Knox MD  Chemotherapy: azaCITIDine (VIDAZA) chemo injection 115 mg, 75 mg/m2 = 115 mg, Subcutaneous, Clinic/HOD 1 time, 1 of 4 cycles  Administration: 115 mg (10/14/2019), 115 mg (10/15/2019), 115 mg (10/21/2019), 115 mg (10/16/2019), 115 mg (10/17/2019), 115 mg (10/18/2019), 115 mg (10/22/2019)          Note:  Ms Parker was seen at [facility] today in the company of her daughter and .  She is a pleasant 79-year-old female with recent diagnosis of chronic myelomonocytic leukemia type 2 who was started on azacitidine.  She seems to be tolerating that well and has completed the 1st 7 day course.  Treatment was held by a week due to significant pancytopenia.    Today she denies diarrhea, headache, blurry vision, chest pain or shortness of breath.  She also  denies any fever, chills, nausea or vomiting, no abdominal pain or change in urinary frequency.      Review of Systems   Constitutional: Negative for activity change, appetite change, chills, fatigue, fever and unexpected weight change.   HENT: Negative for hearing loss, mouth sores, nosebleeds, sore throat, tinnitus, trouble swallowing and voice change.    Eyes: Negative for visual disturbance.   Respiratory: Negative for cough, chest tightness and shortness of breath.    Cardiovascular: Negative for chest pain, palpitations and leg swelling.   Gastrointestinal: Negative for abdominal pain, anal bleeding, blood in stool, constipation, diarrhea, nausea and vomiting.   Genitourinary: Negative for dysuria, frequency, hematuria, pelvic pain, vaginal bleeding and vaginal pain.   Musculoskeletal: Negative for arthralgias, back pain, joint swelling and neck pain.   Skin: Negative for color change, pallor, rash and wound.   Allergic/Immunologic: Negative for immunocompromised state.   Neurological: Negative for dizziness, tremors, syncope, speech difficulty, weakness, light-headedness and headaches.   Hematological: Negative for adenopathy. Does not bruise/bleed easily.   Psychiatric/Behavioral: Negative for agitation, confusion, decreased concentration, hallucinations and sleep disturbance. The patient is not nervous/anxious.        ?   PAST MEDICAL HISTORY:   Past Medical History:   Diagnosis Date    Acid reflux     Anxiety     Back pain     Bronchitis, chronic obstructive w acute bronchitis 7/29/2016    Cancer     [facility] arms, face- Dr. Lata Tejada    Cataract     2+NS    Degenerative disc disease     Depression     Dry mouth     Hernia, hiatal 11/18/2013    Hypertension     Hypothyroid     Macrocytic anemia 5/3/2016    Macular degeneration     Migraines     Mixed anxiety and depressive disorder     Multiple fractures of ribs of right side     Osteoporosis     Other hyperlipidemia 10/11/2019     Pneumonia     Pneumonia due to other staphylococcus     Rheumatoid arthritis     Rheumatoid arthritis(714.0)     Rheumatoid arthritis(714.0)     Remicade, MTX.    ?     PAST SURGICAL HISTORY:   Past Surgical History:   Procedure Laterality Date    APPENDECTOMY  1985    CATARACT EXTRACTION Bilateral 6/11/15    Dr. Booth    CHOLECYSTECTOMY  2013    cryoablasion kidney Left 2016    feet Bilateral     rheumatoid    FRACTURE SURGERY Right     tibia    HERNIA REPAIR      HYSTERECTOMY  1970    partial    JOINT REPLACEMENT      bilateral knees (), hands, wrists, knuckles, toes    LAPAROSCOPIC NISSEN FUNDOPLICATION      TRANSFORAMINAL EPIDURAL INJECTION OF STEROID Left 2019    Procedure: Left L5/S1 TF AYAAN with local;  Surgeon: Rowdy Felix MD;  Location: [facility];  Service: Pain Management;  Laterality: Left;      ?   ALLERGIES:   Allergies as of 2019 - Reviewed 2019   Allergen Reaction Noted    Codeine  2012    Doxycycline  2019    Gabapentin Other (See Comments) 2019      ?   MEDICATIONS:?   No outpatient medications have been marked as taking for the 19 encounter (Office Visit) with Denton Knox MD.      ?   SOCIAL HISTORY:?   Social History     Tobacco Use    Smoking status: Former Smoker     Packs/day: 0.25     Years: 2.00     Pack years: 0.50     Last attempt to quit: 1965     Years since quittin.0    Smokeless tobacco: Never Used   Substance Use Topics    Alcohol use: No        ?   FAMILY HISTORY:   family history includes Asthma in her brother and sister; Cancer in her brother and maternal grandfather; Cataracts in her mother; Chronic back pain in her brother and sister; Diabetes Mellitus in her brother; Fibromyalgia in her daughter; Heart disease in her father, maternal grandmother, and mother; Hyperlipidemia in her mother; Hypertension in her brother, father, mother, and sister; Osteoarthritis in her brother, father,  mother, and sister; Thyroid disease in her brother and sister.   ?     Objective:      Physical Exam   Constitutional: She is oriented to person, place, and time. She appears well-developed and well-nourished. She is cooperative.  Non-toxic appearance. She does not appear ill. No distress.   HENT:   Head: Normocephalic and atraumatic.   Mouth/Throat: No oropharyngeal exudate.   Eyes: Pupils are equal, round, and reactive to light. Conjunctivae are normal. Right eye exhibits no discharge. Left eye exhibits no discharge. No scleral icterus.   Neck: Normal range of motion. Neck supple. No thyromegaly present.   Cardiovascular: Normal rate and regular rhythm.   No murmur heard.  Pulmonary/Chest: Effort normal and breath sounds normal. No respiratory distress. She exhibits no tenderness.   Abdominal: Soft. Bowel sounds are normal. She exhibits no distension and no mass. There is no tenderness. There is no rebound and no guarding.   Musculoskeletal: Normal range of motion. She exhibits no edema or tenderness.   Lymphadenopathy:     She has no cervical adenopathy.        Right cervical: No superficial cervical adenopathy present.       Left cervical: No superficial cervical adenopathy present.        Right axillary: No pectoral adenopathy present.        Left axillary: No pectoral adenopathy present.No inguinal adenopathy noted on the right or left side.        Right: No supraclavicular adenopathy present.        Left: No supraclavicular adenopathy present.   Neurological: She is alert and oriented to person, place, and time. No cranial nerve deficit or sensory deficit.   Skin: Skin is warm and dry. Capillary refill takes 2 to 3 seconds. No rash noted. No erythema. No pallor.   Psychiatric: She has a normal mood and affect. Her behavior is normal. Judgment normal.       ?   There were no vitals filed for this visit.   ?     ECOG SCORE    2 - Capable of all selfcare but unable to carry out any work activities, active > 50%  of hours         Laboratory:  ?   Lab Visit on 11/18/2019   Component Date Value Ref Range Status    WBC 11/18/2019 26.78* 3.90 - 12.70 K/uL Final    RBC 11/18/2019 2.17* 4.00 - 5.40 M/uL Final    Hemoglobin 11/18/2019 7.4* 12.0 - 16.0 g/dL Final    Hematocrit 11/18/2019 24.4* 37.0 - 48.5 % Final    Mean Corpuscular Volume 11/18/2019 112* 82 - 98 fL Final    Mean Corpuscular Hemoglobin 11/18/2019 34.1* 27.0 - 31.0 pg Final    Mean Corpuscular Hemoglobin Conc 11/18/2019 30.3* 32.0 - 36.0 g/dL Final    RDW 11/18/2019 22.0* 11.5 - 14.5 % Final    Platelets 11/18/2019 87* 150 - 350 K/uL Final    MPV 11/18/2019 12.4  9.2 - 12.9 fL Final    Immature Granulocytes 11/18/2019 CANCELED  0.0 - 0.5 % Final    Immature Grans (Abs) 11/18/2019 CANCELED  0.00 - 0.04 K/uL Final    Lymph # 11/18/2019 CANCELED  1.0 - 4.8 K/uL Final    Mono # 11/18/2019 CANCELED  0.3 - 1.0 K/uL Final    Eos # 11/18/2019 CANCELED  0.0 - 0.5 K/uL Final    Baso # 11/18/2019 CANCELED  0.00 - 0.20 K/uL Final    nRBC 11/18/2019 4* 0 /100 WBC Final    Gran% 11/18/2019 30.0* 38.0 - 73.0 % Final    Lymph% 11/18/2019 51.0* 18.0 - 48.0 % Final    Mono% 11/18/2019 12.0  4.0 - 15.0 % Final    Eosinophil% 11/18/2019 1.0  0.0 - 8.0 % Final    Basophil% 11/18/2019 0.0  0.0 - 1.9 % Final    Bands 11/18/2019 4.0  % Final    Metamyelocytes 11/18/2019 2.0  % Final    Platelet Estimate 11/18/2019 Decreased*  Final    Aniso 11/18/2019 Moderate   Final    Hypo 11/18/2019 Occasional   Final    Smudge Cells 11/18/2019 Present   Final    Differential Method 11/18/2019 Manual   Final    Sodium 11/18/2019 136  136 - 145 mmol/L Final    Potassium 11/18/2019 4.8  3.5 - 5.1 mmol/L Final    Chloride 11/18/2019 101  95 - 110 mmol/L Final    CO2 11/18/2019 25  23 - 29 mmol/L Final    Glucose 11/18/2019 102  70 - 110 mg/dL Final    BUN, Bld 11/18/2019 22  8 - 23 mg/dL Final    Creatinine 11/18/2019 1.0  0.5 - 1.4 mg/dL Final    Calcium 11/18/2019 9.3   8.7 - 10.5 mg/dL Final    Total Protein 11/18/2019 8.6* 6.0 - 8.4 g/dL Final    Albumin 11/18/2019 3.9  3.5 - 5.2 g/dL Final    Total Bilirubin 11/18/2019 0.5  0.1 - 1.0 mg/dL Final    Alkaline Phosphatase 11/18/2019 106  55 - 135 U/L Final    AST 11/18/2019 24  10 - 40 U/L Final    ALT 11/18/2019 11  10 - 44 U/L Final    Anion Gap 11/18/2019 10  8 - 16 mmol/L Final    eGFR if African American 11/18/2019 >60  >60 mL/min/1.73 m^2 Final    eGFR if non African American 11/18/2019 54* >60 mL/min/1.73 m^2 Final      ?   Tumor markers   ?   ?   Imaging: CT Chest Without Contrast  Narrative: EXAMINATION:  CT CHEST WITHOUT CONTRAST    CLINICAL HISTORY:  Chest pain or SOB, pleurisy or effusion suspected; Lobar pneumonia, unspecified organism    TECHNIQUE:  Axial images performed through the chest without IV contrast.  Sagittal and coronal reformats obtained.    COMPARISON:  Chest x-ray 09/05/2019    FINDINGS:  Heart: Normal heart size.  Small pericardial effusion.  Coronary arterial calcification.    Mediastinum: Moderate sized hiatal hernia.  Numerous mildly enlarged lymph nodes present throughout the mediastinum.  Largest representative lymph node is a subcarinal lymph node measuring 2.1 x 1.8 cm.    Vasculature: Moderate aortic atherosclerosis..    Mild mediastinal adenopathy, presumably reactive.  Attention on follow-up.    No pleural effusion evident.    Lungs: Large amount of consolidation throughout the left lower lobe.  No effusion.  Some additional scattered reticulonodular opacities surrounding this area consolidation left lower lobe with some of these changes also present in the left upper lobe.  A few scattered subtle reticulonodular opacities in the right lung.  No consolidation or right-sided effusion.    Esophagus: Unremarkable.    Upper Abdomen: Aortic atherosclerosis.  Small cyst hepatic dome.    Bones: No acute fracture. Large amount of arthritic changes lower thoracic spine with levoscoliosis  lower thoracic spine.  Impression: Large amount of left lower lobe consolidation consistent with pneumonia.  Additional scattered reticulonodular opacities in the lungs greater in the left upper lobe.    Negative for pleural effusion.    Very small pericardial effusion.    All CT scans at this facility use dose modulation, iterative reconstruction and/or weight based dosing when appropriate to reduce radiation dose to as low as reasonably achievable.    Electronically signed by: Escobar Walsh MD  Date:    09/06/2019  Time:    12:45  CT Biopsy Bone Marrow (xpd)  Narrative: EXAMINATION:  CT BIOPSY BONE MARROW (XPD)    CLINICAL HISTORY:  Nutritional anemia, unspecified    TECHNIQUE:  1% lidocaine locally    :ANITA Rodrigues    Complications: None    COMPARISON:  None    FINDINGS:  Procedure: The risks and benefits of this procedure were discussed with the patient, written informed consent was obtained.  The patient was placed prone on the CT gantry.  Preprocedural imaging revealed normal positioning of the iliac crests.  A suitable skin site for biopsy was selected. Moderate sedation using versed and fentanyl was provided with and trained observer monitoring the patient's vital signs and level of consciousness. The total time of sedation was 30 minutes.    The skin site was prepped and draped in sterile fashion.  The skin was anesthetized with 1% lidocaine.    Using CT guidance a 11 gauge introducer needle was guided into the right iliac crest.  Prior to biopsy appropriate needle positioning was confirmed with CT. Thirty cc of marrow aspirate was obtained and given to pathology.  Single 11 gauge marrow core biopsy was then obtained and given to pathology.    The needle was removed.    Postprocedural imaging was acquired. The site was bandaged sterilely. The patient left the room in stable condition.    Findings:    1.  Preprocedural CT showed appropriate imaging characteristics for right iliac bone marrow  aspiration and biopsy.    2.  CT-guided biopsy of a right iliac crest with 11 gauge samples acquired.  3.  Post procedural CT showed No complication.  Impression: CT guided marrow aspiration and biopsy from the right iliac crest.    All CT scans at this facility use dose modulation, iterative reconstruction, and/or weight based dosing when appropriate to reduce radiation dose to as low as reasonable achievable.    Electronically signed by: Zeyad Rodrigues MD  Date:    09/06/2019  Time:    12:39     ?      Pathology:  Pathology Results  (Last 10 years)               10/12/15 0000  Tissue Specimen to Pathology Final result    05/29/13 0000  Tissue Specimen to Pathology Final result           ?   Assessment/Plan:       .Chronic myelomonocytic leukemia not having achieved remission   noted to have CMML type 2.  I discussed with her that based on [place] chronic myelomonocytic leukemia (CMML) prognostic model in adults = 1 point for monocytosis = intermediate risk; 18.5 months median survival.     Reviewed today's lab.  Hemoglobin noted at 7.4.  Improvement and platelet count to above 80,000. Will proceed with treatment.    Axillary mass, left  Patient noticed left axilla mass about a week ago.  Physical assessment showed nontender for axillary mass measuring about 1 cm in diameter.  Patient is due for mammogram and has a scheduled mammographic evaluation.  Encouraged to go ahead with mammogram.  Will monitor.    Anemia  Partly related to current treatment with hypomethylatting agent.  Currently asymptomatic.  Continue to monitor.      Ample time given for questions and addressed to her satisfaction.  Will see her back in 2 weeks however she knows to contact us sooner if needed    ?   Follow-Up: Follow up in about 2 weeks (around 12/2/2019).    CECILIO PUGA Md., Ph.D  Hematology & Oncology Department  Phone #: [phone]

## 2019-11-19 ENCOUNTER — INFUSION (OUTPATIENT)
Dept: INFUSION THERAPY | Facility: HOSPITAL | Age: 79
End: 2019-11-19
Attending: INTERNAL MEDICINE
Payer: MEDICARE

## 2019-11-19 ENCOUNTER — TELEPHONE (OUTPATIENT)
Dept: FAMILY MEDICINE | Facility: CLINIC | Age: 79
End: 2019-11-19

## 2019-11-19 VITALS
WEIGHT: 115.94 LBS | DIASTOLIC BLOOD PRESSURE: 76 MMHG | HEART RATE: 86 BPM | BODY MASS INDEX: 21.34 KG/M2 | TEMPERATURE: 97 F | RESPIRATION RATE: 17 BRPM | HEIGHT: 62 IN | SYSTOLIC BLOOD PRESSURE: 135 MMHG | OXYGEN SATURATION: 94 %

## 2019-11-19 DIAGNOSIS — C93.10 CHRONIC MYELOMONOCYTIC LEUKEMIA NOT HAVING ACHIEVED REMISSION: Primary | ICD-10-CM

## 2019-11-19 PROCEDURE — 96401 CHEMO ANTI-NEOPL SQ/IM: CPT | Mod: HCNC

## 2019-11-19 PROCEDURE — 63600175 PHARM REV CODE 636 W HCPCS: Mod: JW,JG,HCNC | Performed by: INTERNAL MEDICINE

## 2019-11-19 RX ORDER — AZACITIDINE 100 MG/1
75 INJECTION, POWDER, LYOPHILIZED, FOR SOLUTION INTRAVENOUS; SUBCUTANEOUS
Status: COMPLETED | OUTPATIENT
Start: 2019-11-19 | End: 2019-11-19

## 2019-11-19 RX ADMIN — AZACITIDINE 115 MG: 100 INJECTION, POWDER, LYOPHILIZED, FOR SOLUTION INTRAVENOUS; SUBCUTANEOUS at 10:11

## 2019-11-19 NOTE — NURSING
1054am: Injection given without difficulties.Bandaid applied. Patient instructed to stay in the clinic for 15 minutes. Patient verbalized understanding and will notify nurse with any complaints.

## 2019-11-19 NOTE — TELEPHONE ENCOUNTER
Pt states she has located a lump under her arm next to her breast.  States it is not painful.  The Oncologist advised her to see PCP ASAP to get mmg ordered.  Scheduled pt to see Dr. Reyes this week./rpr

## 2019-11-19 NOTE — PROGRESS NOTES
"Subjective:      Patient ID: Sarah Parker is a 79 y.o. female.    Chief Complaint: lump under arm/ breast area      HPI  Pt here for follow up of medical problems and left axillary lump noted about a week ago.  No pain, no drainage.  Last MMG 2/5/19.    Updated/ annual due 10/20:  HM: 11/19 fluvax, 5/16 [id number], 10/14 [id number], 10/13 TDaP, 1/17 BMD/will bring to Dr. Tejada rep 2y, 2/19 MMG, 10/13 EGD, 2015 Cscope Dr. Garcia rep 5y, Pain Dr. Carlin.     Review of Systems   Constitutional: Negative for chills, diaphoresis and fever.   Respiratory: Negative for cough and shortness of breath.    Cardiovascular: Negative for chest pain, palpitations and leg swelling.   Gastrointestinal: Negative for blood in stool, constipation, diarrhea, nausea and vomiting.   Genitourinary: Negative for dysuria, frequency and hematuria.   Psychiatric/Behavioral: The patient is not nervous/anxious.          Objective:   /70 (BP Location: Left arm, Patient Position: Sitting, BP Method: Medium (Manual))   Pulse 85   Temp 98.2 °F (36.8 °C) (Oral)   Ht 5' 2" (1.575 m)   Wt 52.6 kg (115 lb 15.4 oz)   LMP  (LMP Unknown)   SpO2 (!) 74%   BMI 21.21 kg/m²     Physical Exam   Constitutional: She is oriented to person, place, and time. She appears well-developed and well-nourished.   HENT:   Right Ear: External ear normal. Tympanic membrane is not injected.   Left Ear: External ear normal. Tympanic membrane is not injected.   Mouth/Throat: Oropharynx is clear and moist.   Eyes: Conjunctivae are normal.   Neck: Normal range of motion. Neck supple. No thyromegaly present.   Cardiovascular: Normal rate, regular rhythm and intact distal pulses. Exam reveals no gallop and no friction rub.   No murmur heard.  Pulmonary/Chest: Effort normal and breath sounds normal. She has no wheezes. She has no rales. Right breast exhibits no mass, no nipple discharge, no skin change and no tenderness. Left breast exhibits no mass, no nipple " discharge, no skin change and no tenderness.   Abdominal: Soft. Bowel sounds are normal. She exhibits no mass. There is no tenderness.   Musculoskeletal: She exhibits no edema.   Lymphadenopathy:     She has no cervical adenopathy.     She has axillary adenopathy.        Right axillary: No lateral adenopathy present.        Left axillary: Lateral (2cm, nontender) adenopathy present.   Neurological: She is alert and oriented to person, place, and time.   Skin: Skin is warm. No rash noted.   Psychiatric: She has a normal mood and affect.           Assessment:       1. Axillary mass, left    2. Enlarged lymph nodes, unspecified     3. Chronic myelomonocytic leukemia not having achieved remission    4. Essential hypertension    5. Rheumatoid arthritis involving right shoulder with positive rheumatoid factor          Plan:     Axillary mass, left  -     US Soft Tissue Axilla; Future; Expected date: 11/21/2019  -     Mammo Digital Diagnostic Left; Future; Expected date: 11/21/2019    Enlarged lymph nodes, unspecified   -     Mammo Digital Diagnostic Left; Future; Expected date: 11/21/2019    [place] results.

## 2019-11-19 NOTE — TELEPHONE ENCOUNTER
----- Message from Hammad Mack sent at 11/19/2019  9:37 AM CST -----  Contact: self  [phone]  Pt would like to return call from nurse regarding knot on breast. Please call back at [phone].   damon Britt Md

## 2019-11-19 NOTE — DISCHARGE INSTRUCTIONS
HOME CARE AFTER CHEMOTHERAPY   Meals   Many patients feel sick and lose their appetites during treatment. Eat small meals several times a day. Choose bland foods with little taste or smell if you have problems with nausea. Be sure to cook all food thoroughly. This kills bacteria and helps you avoid intestinal infection. Soft foods are easier to swallow and digest.   Activity   Exercise keeps you strong and keeps your heart and lungs active. Talk to your doctor about an appropriate exercise program for you.   Skin Care   To prevent a skin infection, bathe or shower once a day. Use a moisturizing soap and wash with warm water. Avoid very hot or cold water. Chemotherapy can make your skin dry . Apply moisturizing lotion to help relieve dry skin. Some drugs used in high doses can cause slight burns to appear (like sunburn). Ask for a special cream to help relieve the burn and protect your skin.   Prevent Mouth Sores   During chemotherapy, many people get mouth sores. Do the following to help prevent mouth sores or to ease discomfort.   Brush your teeth with a soft-bristle toothbrush after every meal.  Don't use dental floss if your platelet count is below 50,000. Your doctor or nurse will tell you if this is the case.  Use an oral swab or special soft toothbrush if your gums bleed during regular brushing.  Use mouthwash as directed. If you can't tolerate commercial mouthwash, use salt and baking soda to clean your mouth. Mix 1 teaspoon of salt and 1 teaspoon of baking soda into a glass of water. Swish and spit.  Call your doctor or return to this facility if you develop any of the following:   Sore throat   White patches in the mouth or throat   Fever of 100.4ºF (38ºC) or higher, or as directed by your healthcare provider  © 3282-5467 [employer], [address] [place] [address], [place] [address]. All rights reserved. This information is not intended as a substitute for professional medical care. Always follow your  healthcare professional's   FALL PREVENTION   Falls often occur due to slipping, tripping or losing your balance. Here are ways to reduce your risk of falling again.   Was there anything that caused your fall that can be fixed, removed or replaced?   Make your home safe by keeping walkways clear of objects you may trip over.   Use non-slip pads under rugs.   Do not walk in poorly lit areas.   Do not stand on chairs or wobbly ladders.   Use caution when reaching overhead or looking upward. This position can cause a loss of balance.   Be sure your shoes fit properly, have non-slip bottoms and are in good condition.   Be cautious when going up and down stairs, curbs, and when walking on uneven sidewalks.   If your balance is poor, consider using a cane or walker.   If your fall was related to alcohol use, stop or limit alcohol intake.   If your fall was related to use of sleeping medicines, talk to your doctor about this. You may need to reduce your dosage at bedtime if you awaken during the night to go to the bathroom.   To reduce the need for nighttime bathroom trips:   Avoid drinking fluids for several hours before going to bed   Empty your bladder before going to bed   Men can keep a urinal at the bedside   © 5719-0475 [facility], [address] [place] [address], [place] [address]. All rights reserved. This information is not intended as a substitute for professional medical care. Always follow your healthcare professional's instructions.

## 2019-11-20 ENCOUNTER — INFUSION (OUTPATIENT)
Dept: INFUSION THERAPY | Facility: HOSPITAL | Age: 79
End: 2019-11-20
Attending: INTERNAL MEDICINE
Payer: MEDICARE

## 2019-11-20 ENCOUNTER — DOCUMENTATION ONLY (OUTPATIENT)
Dept: HEMATOLOGY/ONCOLOGY | Facility: CLINIC | Age: 79
End: 2019-11-20

## 2019-11-20 VITALS
DIASTOLIC BLOOD PRESSURE: 61 MMHG | OXYGEN SATURATION: 96 % | WEIGHT: 115.94 LBS | HEIGHT: 62 IN | RESPIRATION RATE: 16 BRPM | SYSTOLIC BLOOD PRESSURE: 107 MMHG | HEART RATE: 96 BPM | BODY MASS INDEX: 21.34 KG/M2 | TEMPERATURE: 98 F

## 2019-11-20 DIAGNOSIS — C93.10 CHRONIC MYELOMONOCYTIC LEUKEMIA NOT HAVING ACHIEVED REMISSION: Primary | ICD-10-CM

## 2019-11-20 PROCEDURE — 96401 CHEMO ANTI-NEOPL SQ/IM: CPT | Mod: HCNC

## 2019-11-20 PROCEDURE — 63600175 PHARM REV CODE 636 W HCPCS: Mod: JG,HCNC | Performed by: INTERNAL MEDICINE

## 2019-11-20 RX ORDER — AZACITIDINE 100 MG/1
75 INJECTION, POWDER, LYOPHILIZED, FOR SOLUTION INTRAVENOUS; SUBCUTANEOUS
Status: COMPLETED | OUTPATIENT
Start: 2019-11-20 | End: 2019-11-20

## 2019-11-20 RX ADMIN — AZACITIDINE 115 MG: 100 INJECTION, POWDER, LYOPHILIZED, FOR SOLUTION INTRAVENOUS; SUBCUTANEOUS at 11:11

## 2019-11-20 NOTE — PROGRESS NOTES
Brief f/u with pt. She is smiling, well-groomed and in no visible distress. She reports having received travel assistance check from [employer]. We also discussed her co-pay assistance approval. If she would like to get her Medicare B premiums reimbursed by [employer], she was asked to bring in a copy of her Social Security award letter. She agreed; plans made to f/u Friday (SW on opposite side of town tomorrow). She had no other new needs for  today; will f/u as planned.

## 2019-11-20 NOTE — NURSING
1118am: Injection given without difficulties.Bandaid applied. Patient instructed to stay in the clinic for 15 minutes. Patient verbalized understanding and will notify nurse with any complaints.

## 2019-11-20 NOTE — DISCHARGE INSTRUCTIONS
HOME CARE AFTER CHEMOTHERAPY   Meals   Many patients feel sick and lose their appetites during treatment. Eat small meals several times a day. Choose bland foods with little taste or smell if you have problems with nausea. Be sure to cook all food thoroughly. This kills bacteria and helps you avoid intestinal infection. Soft foods are easier to swallow and digest.   Activity   Exercise keeps you strong and keeps your heart and lungs active. Talk to your doctor about an appropriate exercise program for you.   Skin Care   To prevent a skin infection, bathe or shower once a day. Use a moisturizing soap and wash with warm water. Avoid very hot or cold water. Chemotherapy can make your skin dry . Apply moisturizing lotion to help relieve dry skin. Some drugs used in high doses can cause slight burns to appear (like sunburn). Ask for a special cream to help relieve the burn and protect your skin.   Prevent Mouth Sores   During chemotherapy, many people get mouth sores. Do the following to help prevent mouth sores or to ease discomfort.   Brush your teeth with a soft-bristle toothbrush after every meal.  Don't use dental floss if your platelet count is below 50,000. Your doctor or nurse will tell you if this is the case.  Use an oral swab or special soft toothbrush if your gums bleed during regular brushing.  Use mouthwash as directed. If you can't tolerate commercial mouthwash, use salt and baking soda to clean your mouth. Mix 1 teaspoon of salt and 1 teaspoon of baking soda into a glass of water. Swish and spit.  Call your doctor or return to this facility if you develop any of the following:   Sore throat   White patches in the mouth or throat   Fever of 100.4ºF (38ºC) or higher, or as directed by your healthcare provider  © 5794-2367 [employer]i [employer], [address] [place] [address], [place] [address]. All rights reserved. This information is not intended as a substitute for professional medical care. Always follow your  "healthcare professional's   FALL PREVENTION   Falls often occur due to slipping, tripping or losing your balance. Here are ways to reduce your risk of falling again.   Was there anything that caused your fall that can be fixed, removed or replaced?   Make your home safe by keeping walkways clear of objects you may trip over.   Use non-slip pads under rugs.   Do not walk in poorly lit areas.   Do not stand on chairs or wobbly ladders.   Use caution when reaching overhead or looking upward. This position can cause a loss of balance.   Be sure your shoes fit properly, have non-slip bottoms and are in good condition.   Be cautious when going up and down stairs, curbs, and when walking on uneven sidewalks.   If your balance is poor, consider using a cane or walker.   If your fall was related to alcohol use, stop or limit alcohol intake.   If your fall was related to use of sleeping medicines, talk to your doctor about this. You may need to reduce your dosage at bedtime if you awaken during the night to go to the bathroom.   To reduce the need for nighttime bathroom trips:   Avoid drinking fluids for several hours before going to bed   Empty your bladder before going to bed   Men can keep a urinal at the bedside   © 5630-3777 [facility]ly [facility], [address] [place] [address], [place] [address]. All rights reserved. This information is not intended as a substitute for professional medical care. Always follow your healthcare professional's instructions.  Support Groups/Classes    Support groups and classes are being offered at the   [facility] and [facility]!!    "Cooking with Cancer" (Nutrition Class):  Second Wednesday of each month   at noon at the Cancer Center.  Metastatic Support Group:  Third Tuesday of each month   at noon at the Cancer Center.  Next Steps Class/Group: Second and fourth Thursday of each month at noon at the Cancer [place].  Hope Chest (Breast Cancer Support Group): First Tuesday of each month   at " 5:30pm at the [facility] location.  Rajwinder Vasques Mobile: [facility]: Second and third Tuesday of each month from 7:30am - 2pm.  [facility]: First and fourth Tuesday of each month from 7:30am - 2pm    If you are interested in attending or would like more information please ask our social workers or your nurse!

## 2019-11-21 ENCOUNTER — OFFICE VISIT (OUTPATIENT)
Dept: FAMILY MEDICINE | Facility: CLINIC | Age: 79
End: 2019-11-21
Payer: MEDICARE

## 2019-11-21 ENCOUNTER — INFUSION (OUTPATIENT)
Dept: INFUSION THERAPY | Facility: HOSPITAL | Age: 79
End: 2019-11-21
Attending: INTERNAL MEDICINE
Payer: MEDICARE

## 2019-11-21 VITALS
TEMPERATURE: 97 F | HEART RATE: 94 BPM | HEIGHT: 62 IN | BODY MASS INDEX: 21.34 KG/M2 | RESPIRATION RATE: 15 BRPM | DIASTOLIC BLOOD PRESSURE: 81 MMHG | WEIGHT: 115.94 LBS | OXYGEN SATURATION: 86 % | SYSTOLIC BLOOD PRESSURE: 131 MMHG

## 2019-11-21 VITALS
HEIGHT: 62 IN | WEIGHT: 115.94 LBS | OXYGEN SATURATION: 74 % | SYSTOLIC BLOOD PRESSURE: 124 MMHG | BODY MASS INDEX: 21.34 KG/M2 | HEART RATE: 85 BPM | DIASTOLIC BLOOD PRESSURE: 70 MMHG | TEMPERATURE: 98 F

## 2019-11-21 DIAGNOSIS — R59.9 ENLARGED LYMPH NODES, UNSPECIFIED: ICD-10-CM

## 2019-11-21 DIAGNOSIS — I10 ESSENTIAL HYPERTENSION: Chronic | ICD-10-CM

## 2019-11-21 DIAGNOSIS — M05.711 RHEUMATOID ARTHRITIS INVOLVING RIGHT SHOULDER WITH POSITIVE RHEUMATOID FACTOR: Chronic | ICD-10-CM

## 2019-11-21 DIAGNOSIS — R22.32 AXILLARY MASS, LEFT: Primary | ICD-10-CM

## 2019-11-21 DIAGNOSIS — C93.10 CHRONIC MYELOMONOCYTIC LEUKEMIA NOT HAVING ACHIEVED REMISSION: Primary | ICD-10-CM

## 2019-11-21 DIAGNOSIS — C93.10 CHRONIC MYELOMONOCYTIC LEUKEMIA NOT HAVING ACHIEVED REMISSION: Chronic | ICD-10-CM

## 2019-11-21 PROCEDURE — 3074F PR MOST RECENT SYSTOLIC BLOOD PRESSURE < 130 MM HG: ICD-10-PCS | Mod: HCNC,CPTII,S$GLB, | Performed by: INTERNAL MEDICINE

## 2019-11-21 PROCEDURE — 99999 PR PBB SHADOW E&M-EST. PATIENT-LVL III: ICD-10-PCS | Mod: PBBFAC,HCNC,, | Performed by: INTERNAL MEDICINE

## 2019-11-21 PROCEDURE — 99499 RISK ADDL DX/OHS AUDIT: ICD-10-PCS | Mod: HCNC,S$GLB,, | Performed by: INTERNAL MEDICINE

## 2019-11-21 PROCEDURE — 96401 CHEMO ANTI-NEOPL SQ/IM: CPT | Mod: HCNC

## 2019-11-21 PROCEDURE — 63600175 PHARM REV CODE 636 W HCPCS: Mod: JG,HCNC | Performed by: INTERNAL MEDICINE

## 2019-11-21 PROCEDURE — 1159F PR MEDICATION LIST DOCUMENTED IN MEDICAL RECORD: ICD-10-PCS | Mod: HCNC,S$GLB,, | Performed by: INTERNAL MEDICINE

## 2019-11-21 PROCEDURE — 3074F SYST BP LT 130 MM HG: CPT | Mod: HCNC,CPTII,S$GLB, | Performed by: INTERNAL MEDICINE

## 2019-11-21 PROCEDURE — 3078F DIAST BP <80 MM HG: CPT | Mod: HCNC,CPTII,S$GLB, | Performed by: INTERNAL MEDICINE

## 2019-11-21 PROCEDURE — 99499 UNLISTED E&M SERVICE: CPT | Mod: HCNC,S$GLB,, | Performed by: INTERNAL MEDICINE

## 2019-11-21 PROCEDURE — 1159F MED LIST DOCD IN RCRD: CPT | Mod: HCNC,S$GLB,, | Performed by: INTERNAL MEDICINE

## 2019-11-21 PROCEDURE — 3078F PR MOST RECENT DIASTOLIC BLOOD PRESSURE < 80 MM HG: ICD-10-PCS | Mod: HCNC,CPTII,S$GLB, | Performed by: INTERNAL MEDICINE

## 2019-11-21 PROCEDURE — 1126F AMNT PAIN NOTED NONE PRSNT: CPT | Mod: HCNC,S$GLB,, | Performed by: INTERNAL MEDICINE

## 2019-11-21 PROCEDURE — 99999 PR PBB SHADOW E&M-EST. PATIENT-LVL III: CPT | Mod: PBBFAC,HCNC,, | Performed by: INTERNAL MEDICINE

## 2019-11-21 PROCEDURE — 99213 PR OFFICE/OUTPT VISIT, EST, LEVL III, 20-29 MIN: ICD-10-PCS | Mod: HCNC,S$GLB,, | Performed by: INTERNAL MEDICINE

## 2019-11-21 PROCEDURE — 1126F PR PAIN SEVERITY QUANTIFIED, NO PAIN PRESENT: ICD-10-PCS | Mod: HCNC,S$GLB,, | Performed by: INTERNAL MEDICINE

## 2019-11-21 PROCEDURE — 1101F PT FALLS ASSESS-DOCD LE1/YR: CPT | Mod: HCNC,CPTII,S$GLB, | Performed by: INTERNAL MEDICINE

## 2019-11-21 PROCEDURE — 1101F PR PT FALLS ASSESS DOC 0-1 FALLS W/OUT INJ PAST YR: ICD-10-PCS | Mod: HCNC,CPTII,S$GLB, | Performed by: INTERNAL MEDICINE

## 2019-11-21 PROCEDURE — 99213 OFFICE O/P EST LOW 20 MIN: CPT | Mod: HCNC,S$GLB,, | Performed by: INTERNAL MEDICINE

## 2019-11-21 RX ORDER — AZACITIDINE 100 MG/1
75 INJECTION, POWDER, LYOPHILIZED, FOR SOLUTION INTRAVENOUS; SUBCUTANEOUS
Status: COMPLETED | OUTPATIENT
Start: 2019-11-21 | End: 2019-11-21

## 2019-11-21 RX ADMIN — AZACITIDINE 115 MG: 100 INJECTION, POWDER, LYOPHILIZED, FOR SOLUTION INTRAVENOUS; SUBCUTANEOUS at 11:11

## 2019-11-21 NOTE — DISCHARGE INSTRUCTIONS
[facility]  [address] [facility]  [address]  [phone] phone     [phone] fax  Hours of Operation: Monday- Friday 8:00am- 5:00pm  After hours phone  [phone]  Hematology / Oncology Physicians on call      GILBERT Miller Dr., Dr., Dr., Dr., NP Sydney Prescott, NP Tyesha Taylor, NP    Please call with any concerns regarding your appointment today.HOME CARE AFTER CHEMOTHERAPY   Meals   Many patients feel sick and lose their appetites during treatment. Eat small meals several times a day. Choose bland foods with little taste or smell if you have problems with nausea. Be sure to cook all food thoroughly. This kills bacteria and helps you avoid intestinal infection. Soft foods are easier to swallow and digest.   Activity   Exercise keeps you strong and keeps your heart and lungs active. Talk to your doctor about an appropriate exercise program for you.   Skin Care   To prevent a skin infection, bathe or shower once a day. Use a moisturizing soap and wash with warm water. Avoid very hot or cold water. Chemotherapy can make your skin dry . Apply moisturizing lotion to help relieve dry skin. Some drugs used in high doses can cause slight burns to appear (like sunburn). Ask for a special cream to help relieve the burn and protect your skin.   Prevent Mouth Sores   During chemotherapy, many people get mouth sores. Do the following to help prevent mouth sores or to ease discomfort.   Brush your teeth with a soft-bristle toothbrush after every meal.  Don't use dental floss if your platelet count is below 50,000. Your doctor or nurse will tell you if this is the case.  Use an oral swab or special soft toothbrush if your gums bleed during regular brushing.  Use mouthwash as directed. If you can't tolerate commercial mouthwash, use salt and baking soda to clean your mouth. Mix 1 teaspoon of salt and 1  teaspoon of baking soda into a glass of water. Swish and spit.  Call your doctor or return to this facility if you develop any of the following:   Sore throat   White patches in the mouth or throat   Fever of 100.4ºF (38ºC) or higher, or as directed by your healthcare provider  © 2000-2011 [facility], [address] [place] [address]. All rights reserved. This information is not intended as a substitute for professional medical care. Always follow your healthcare professional's   FALL PREVENTION   Falls often occur due to slipping, tripping or losing your balance. Here are ways to reduce your risk of falling again.   Was there anything that caused your fall that can be fixed, removed or replaced?   Make your home safe by keeping walkways clear of objects you may trip over.   Use non-slip pads under rugs.   Do not walk in poorly lit areas.   Do not stand on chairs or wobbly ladders.   Use caution when reaching overhead or looking upward. This position can cause a loss of balance.   Be sure your shoes fit properly, have non-slip bottoms and are in good condition.   Be cautious when going up and down stairs, curbs, and when walking on uneven sidewalks.   If your balance is poor, consider using a cane or walker.   If your fall was related to alcohol use, stop or limit alcohol intake.   If your fall was related to use of sleeping medicines, talk to your doctor about this. You may need to reduce your dosage at bedtime if you awaken during the night to go to the bathroom.   To reduce the need for nighttime bathroom trips:   Avoid drinking fluids for several hours before going to bed   Empty your bladder before going to bed   Men can keep a urinal at the bedside   © 2000-2011 [facility], [address] [place] [address]. All rights reserved. This information is not intended as a substitute for professional medical care. Always follow your healthcare professional's instructions.  Support  "Groups/Classes    Support groups and classes are being offered at the   [facility] and [facility]    "Cooking with Cancer" (Nutrition Class):  Second Wednesday of each month   at noon at the Cancer Center.  Metastatic Support Group:  Third Tuesday of each month   at noon at the Cancer Center.  Next Steps Class/Group: Second and fourth Thursday of each month at noon at the Cancer Center.  Hope Chest (Breast Cancer Support Group): First Tuesday of each month   at 5:30pm at the [facility] location.  [employer]: [facility]: Second and third Tuesday of each month from 7:30am - 2pm.  [facility]: First and fourth Tuesday of each month from 7:30am - 2pm    If you are interested in attending or would like more information please ask our social workers or your nurse!  "

## 2019-11-21 NOTE — NURSING
1114am: Injection given without difficulties.Bandaid applied. Patient instructed to stay in the clinic for 15 minutes. Patient verbalized understanding and will notify nurse with any complaints.   no

## 2019-11-22 ENCOUNTER — DOCUMENTATION ONLY (OUTPATIENT)
Dept: HEMATOLOGY/ONCOLOGY | Facility: CLINIC | Age: 79
End: 2019-11-22

## 2019-11-22 ENCOUNTER — INFUSION (OUTPATIENT)
Dept: INFUSION THERAPY | Facility: HOSPITAL | Age: 79
End: 2019-11-22
Attending: INTERNAL MEDICINE
Payer: MEDICARE

## 2019-11-22 VITALS
TEMPERATURE: 98 F | SYSTOLIC BLOOD PRESSURE: 144 MMHG | OXYGEN SATURATION: 98 % | HEART RATE: 101 BPM | WEIGHT: 115.94 LBS | DIASTOLIC BLOOD PRESSURE: 78 MMHG | BODY MASS INDEX: 21.34 KG/M2 | RESPIRATION RATE: 16 BRPM | HEIGHT: 62 IN

## 2019-11-22 DIAGNOSIS — C93.10 CHRONIC MYELOMONOCYTIC LEUKEMIA NOT HAVING ACHIEVED REMISSION: Primary | ICD-10-CM

## 2019-11-22 PROCEDURE — 96401 CHEMO ANTI-NEOPL SQ/IM: CPT | Mod: HCNC

## 2019-11-22 PROCEDURE — 63600175 PHARM REV CODE 636 W HCPCS: Mod: JG,HCNC | Performed by: INTERNAL MEDICINE

## 2019-11-22 RX ORDER — AZACITIDINE 100 MG/1
75 INJECTION, POWDER, LYOPHILIZED, FOR SOLUTION INTRAVENOUS; SUBCUTANEOUS
Status: COMPLETED | OUTPATIENT
Start: 2019-11-22 | End: 2019-11-22

## 2019-11-22 RX ADMIN — AZACITIDINE 115 MG: 100 INJECTION, POWDER, LYOPHILIZED, FOR SOLUTION INTRAVENOUS; SUBCUTANEOUS at 11:11

## 2019-11-22 NOTE — DISCHARGE INSTRUCTIONS
[facility]  [address] [address]  [address]  [phone] phone     [phone] fax  Hours of Operation: Monday- Friday 8:00am- 5:00pm  After hours phone  [phone]  Hematology / Oncology Physicians on call      GILBERT Miller Dr., Dr., Dr., Dr., NP Sydney Prescott, NP Tyesha Taylor, NP    Please call with any concerns regarding your appointment today.HOME CARE AFTER CHEMOTHERAPY   Meals   Many patients feel sick and lose their appetites during treatment. Eat small meals several times a day. Choose bland foods with little taste or smell if you have problems with nausea. Be sure to cook all food thoroughly. This kills bacteria and helps you avoid intestinal infection. Soft foods are easier to swallow and digest.   Activity   Exercise keeps you strong and keeps your heart and lungs active. Talk to your doctor about an appropriate exercise program for you.   Skin Care   To prevent a skin infection, bathe or shower once a day. Use a moisturizing soap and wash with warm water. Avoid very hot or cold water. Chemotherapy can make your skin dry . Apply moisturizing lotion to help relieve dry skin. Some drugs used in high doses can cause slight burns to appear (like sunburn). Ask for a special cream to help relieve the burn and protect your skin.   Prevent Mouth Sores   During chemotherapy, many people get mouth sores. Do the following to help prevent mouth sores or to ease discomfort.   Brush your teeth with a soft-bristle toothbrush after every meal.  Don't use dental floss if your platelet count is below 50,000. Your doctor or nurse will tell you if this is the case.  Use an oral swab or special soft toothbrush if your gums bleed during regular brushing.  Use mouthwash as directed. If you can't tolerate commercial mouthwash, use salt and baking soda to clean your mouth. Mix 1 teaspoon of salt and 1  teaspoon of baking soda into a glass of water. Swish and spit.  Call your doctor or return to this facility if you develop any of the following:   Sore throat   White patches in the mouth or throat   Fever of 100.4ºF (38ºC) or higher, or as directed by your healthcare provider  © 2000-2011 [facility], [address] [place] [address]. All rights reserved. This information is not intended as a substitute for professional medical care. Always follow your healthcare professional's   FALL PREVENTION   Falls often occur due to slipping, tripping or losing your balance. Here are ways to reduce your risk of falling again.   Was there anything that caused your fall that can be fixed, removed or replaced?   Make your home safe by keeping walkways clear of objects you may trip over.   Use non-slip pads under rugs.   Do not walk in poorly lit areas.   Do not stand on chairs or wobbly ladders.   Use caution when reaching overhead or looking upward. This position can cause a loss of balance.   Be sure your shoes fit properly, have non-slip bottoms and are in good condition.   Be cautious when going up and down stairs, curbs, and when walking on uneven sidewalks.   If your balance is poor, consider using a cane or walker.   If your fall was related to alcohol use, stop or limit alcohol intake.   If your fall was related to use of sleeping medicines, talk to your doctor about this. You may need to reduce your dosage at bedtime if you awaken during the night to go to the bathroom.   To reduce the need for nighttime bathroom trips:   Avoid drinking fluids for several hours before going to bed   Empty your bladder before going to bed   Men can keep a urinal at the bedside   © 2000-2011 [facility], [address] [place] [address]. All rights reserved. This information is not intended as a substitute for professional medical care. Always follow your healthcare professional's instructions.  Support  "Groups/Classes    Support groups and classes are being offered at the   [facility] and [facility]    "Cooking with Cancer" (Nutrition Class):  Second Wednesday of each month   at noon at the Cancer Center.  Metastatic Support Group:  Third Tuesday of each month   at noon at the Cancer Center.  Next Steps Class/Group: Second and fourth Thursday of each month at noon at the Cancer Center.  Hope Chest (Breast Cancer Support Group): First Tuesday of each month   at 5:30pm at the [facility] location.  [employer] Mobile: [facility]: Second and third Tuesday of each month from 7:30am - 2pm.  [facility]: First and fourth Tuesday of each month from 7:30am - 2pm    If you are interested in attending or would like more information please ask our social workers or your nurse!  "

## 2019-11-22 NOTE — PROGRESS NOTES
Met with pt to f/u. 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 Said she does not yet have 2020 SS award letter. Let her know that is okay but if she has 2019 award letter she can bring in and we can request reimbursement for a few months this year. She verbalized understanding and said she would bring it when she comes back in. SW or colleagues will f/u then.

## 2019-11-22 NOTE — NURSING
1104am: Injection given without difficulties.Bandaid applied. Patient instructed to stay in the clinic for 15 minutes. Patient verbalized understanding and will notify nurse with any complaints.

## 2019-11-25 ENCOUNTER — INFUSION (OUTPATIENT)
Dept: INFUSION THERAPY | Facility: HOSPITAL | Age: 79
End: 2019-11-25
Attending: INTERNAL MEDICINE
Payer: MEDICARE

## 2019-11-25 VITALS
BODY MASS INDEX: 21.34 KG/M2 | HEIGHT: 62 IN | HEART RATE: 97 BPM | RESPIRATION RATE: 16 BRPM | WEIGHT: 115.94 LBS | SYSTOLIC BLOOD PRESSURE: 107 MMHG | DIASTOLIC BLOOD PRESSURE: 62 MMHG | OXYGEN SATURATION: 96 % | TEMPERATURE: 97 F

## 2019-11-25 DIAGNOSIS — C93.10 CHRONIC MYELOMONOCYTIC LEUKEMIA NOT HAVING ACHIEVED REMISSION: Primary | ICD-10-CM

## 2019-11-25 PROCEDURE — 96401 CHEMO ANTI-NEOPL SQ/IM: CPT | Mod: HCNC

## 2019-11-25 PROCEDURE — 63600175 PHARM REV CODE 636 W HCPCS: Mod: JW,JG,HCNC | Performed by: INTERNAL MEDICINE

## 2019-11-25 RX ORDER — AZACITIDINE 100 MG/1
75 INJECTION, POWDER, LYOPHILIZED, FOR SOLUTION INTRAVENOUS; SUBCUTANEOUS
Status: COMPLETED | OUTPATIENT
Start: 2019-11-25 | End: 2019-11-25

## 2019-11-25 RX ADMIN — AZACITIDINE 115 MG: 100 INJECTION, POWDER, LYOPHILIZED, FOR SOLUTION INTRAVENOUS; SUBCUTANEOUS at 10:11

## 2019-11-25 NOTE — DISCHARGE INSTRUCTIONS
[facility]  [address] [address]  [address]  [phone] phone     [phone] fax  Hours of Operation: Monday- Friday 8:00am- 5:00pm  After hours phone  [phone]  Hematology / Oncology Physicians on call      GILBERT Miller Dr., Dr., Dr., Dr., NP Sydney Prescott, NP Tyesha Taylor, NP    Please call with any concerns regarding your appointment today.HOME CARE AFTER CHEMOTHERAPY   Meals   Many patients feel sick and lose their appetites during treatment. Eat small meals several times a day. Choose bland foods with little taste or smell if you have problems with nausea. Be sure to cook all food thoroughly. This kills bacteria and helps you avoid intestinal infection. Soft foods are easier to swallow and digest.   Activity   Exercise keeps you strong and keeps your heart and lungs active. Talk to your doctor about an appropriate exercise program for you.   Skin Care   To prevent a skin infection, bathe or shower once a day. Use a moisturizing soap and wash with warm water. Avoid very hot or cold water. Chemotherapy can make your skin dry . Apply moisturizing lotion to help relieve dry skin. Some drugs used in high doses can cause slight burns to appear (like sunburn). Ask for a special cream to help relieve the burn and protect your skin.   Prevent Mouth Sores   During chemotherapy, many people get mouth sores. Do the following to help prevent mouth sores or to ease discomfort.   Brush your teeth with a soft-bristle toothbrush after every meal.  Don't use dental floss if your platelet count is below 50,000. Your doctor or nurse will tell you if this is the case.  Use an oral swab or special soft toothbrush if your gums bleed during regular brushing.  Use mouthwash as directed. If you can't tolerate commercial mouthwash, use salt and baking soda to clean your mouth. Mix 1 teaspoon of salt and 1  teaspoon of baking soda into a glass of water. Swish and spit.  Call your doctor or return to this facility if you develop any of the following:   Sore throat   White patches in the mouth or throat   Fever of 100.4ºF (38ºC) or higher, or as directed by your healthcare provider  © 2000-2011 [facility], [address] [place] [address]. All rights reserved. This information is not intended as a substitute for professional medical care. Always follow your healthcare professional's   FALL PREVENTION   Falls often occur due to slipping, tripping or losing your balance. Here are ways to reduce your risk of falling again.   Was there anything that caused your fall that can be fixed, removed or replaced?   Make your home safe by keeping walkways clear of objects you may trip over.   Use non-slip pads under rugs.   Do not walk in poorly lit areas.   Do not stand on chairs or wobbly ladders.   Use caution when reaching overhead or looking upward. This position can cause a loss of balance.   Be sure your shoes fit properly, have non-slip bottoms and are in good condition.   Be cautious when going up and down stairs, curbs, and when walking on uneven sidewalks.   If your balance is poor, consider using a cane or walker.   If your fall was related to alcohol use, stop or limit alcohol intake.   If your fall was related to use of sleeping medicines, talk to your doctor about this. You may need to reduce your dosage at bedtime if you awaken during the night to go to the bathroom.   To reduce the need for nighttime bathroom trips:   Avoid drinking fluids for several hours before going to bed   Empty your bladder before going to bed   Men can keep a urinal at the bedside   © 2000-2011 [facility], [address] [place] [address]. All rights reserved. This information is not intended as a substitute for professional medical care. Always follow your healthcare professional's instructions.

## 2019-11-25 NOTE — NURSING
1048am: Injection given without difficulties.Bandaid applied. Patient instructed to stay in the clinic for 15 minutes. Patient verbalized understanding and will notify nurse with any complaints.

## 2019-11-26 ENCOUNTER — INFUSION (OUTPATIENT)
Dept: INFUSION THERAPY | Facility: HOSPITAL | Age: 79
End: 2019-11-26
Attending: INTERNAL MEDICINE
Payer: MEDICARE

## 2019-11-26 VITALS
RESPIRATION RATE: 16 BRPM | HEIGHT: 62 IN | HEART RATE: 75 BPM | SYSTOLIC BLOOD PRESSURE: 121 MMHG | OXYGEN SATURATION: 97 % | BODY MASS INDEX: 21.34 KG/M2 | WEIGHT: 115.94 LBS | DIASTOLIC BLOOD PRESSURE: 69 MMHG | TEMPERATURE: 97 F

## 2019-11-26 DIAGNOSIS — C93.10 CHRONIC MYELOMONOCYTIC LEUKEMIA NOT HAVING ACHIEVED REMISSION: Primary | ICD-10-CM

## 2019-11-26 PROCEDURE — 96401 CHEMO ANTI-NEOPL SQ/IM: CPT | Mod: HCNC

## 2019-11-26 PROCEDURE — 63600175 PHARM REV CODE 636 W HCPCS: Mod: JG,HCNC | Performed by: INTERNAL MEDICINE

## 2019-11-26 RX ORDER — AZACITIDINE 100 MG/1
75 INJECTION, POWDER, LYOPHILIZED, FOR SOLUTION INTRAVENOUS; SUBCUTANEOUS
Status: COMPLETED | OUTPATIENT
Start: 2019-11-26 | End: 2019-11-26

## 2019-11-26 RX ADMIN — AZACITIDINE 115 MG: 100 INJECTION, POWDER, LYOPHILIZED, FOR SOLUTION INTRAVENOUS; SUBCUTANEOUS at 11:11

## 2019-11-26 NOTE — NURSING
1103am: Injection given without difficulties.Bandaid applied. Patient instructed to stay in the clinic for 15 minutes. Patient verbalized understanding and will notify nurse with any complaints.

## 2019-11-26 NOTE — DISCHARGE INSTRUCTIONS
[facility]  [address] [address] [facility]  [address]  [phone] phone     [phone] fax  Hours of Operation: Monday- Friday 8:00am- 5:00pm  After hours phone  [phone]  Hematology / Oncology Physicians on call      GILBERT Miller Dr., Dr., Dr., Dr., NP Sydney Prescott, NP Tyesha Taylor, NP    Please call with any concerns regarding your appointment today.HOME CARE AFTER CHEMOTHERAPY   Meals   Many patients feel sick and lose their appetites during treatment. Eat small meals several times a day. Choose bland foods with little taste or smell if you have problems with nausea. Be sure to cook all food thoroughly. This kills bacteria and helps you avoid intestinal infection. Soft foods are easier to swallow and digest.   Activity   Exercise keeps you strong and keeps your heart and lungs active. Talk to your doctor about an appropriate exercise program for you.   Skin Care   To prevent a skin infection, bathe or shower once a day. Use a moisturizing soap and wash with warm water. Avoid very hot or cold water. Chemotherapy can make your skin dry . Apply moisturizing lotion to help relieve dry skin. Some drugs used in high doses can cause slight burns to appear (like sunburn). Ask for a special cream to help relieve the burn and protect your skin.   Prevent Mouth Sores   During chemotherapy, many people get mouth sores. Do the following to help prevent mouth sores or to ease discomfort.   Brush your teeth with a soft-bristle toothbrush after every meal.  Don't use dental floss if your platelet count is below 50,000. Your doctor or nurse will tell you if this is the case.  Use an oral swab or special soft toothbrush if your gums bleed during regular brushing.  Use mouthwash as directed. If you can't tolerate commercial mouthwash, use salt and baking soda to clean your mouth. Mix 1 teaspoon of salt and 1  teaspoon of baking soda into a glass of water. Swish and spit.  Call your doctor or return to this facility if you develop any of the following:   Sore throat   White patches in the mouth or throat   Fever of 100.4ºF (38ºC) or higher, or as directed by your healthcare provider  © 2000-2011 [facility], [address] [place] [address]. All rights reserved. This information is not intended as a substitute for professional medical care. Always follow your healthcare professional's   FALL PREVENTION   Falls often occur due to slipping, tripping or losing your balance. Here are ways to reduce your risk of falling again.   Was there anything that caused your fall that can be fixed, removed or replaced?   Make your home safe by keeping walkways clear of objects you may trip over.   Use non-slip pads under rugs.   Do not walk in poorly lit areas.   Do not stand on chairs or wobbly ladders.   Use caution when reaching overhead or looking upward. This position can cause a loss of balance.   Be sure your shoes fit properly, have non-slip bottoms and are in good condition.   Be cautious when going up and down stairs, curbs, and when walking on uneven sidewalks.   If your balance is poor, consider using a cane or walker.   If your fall was related to alcohol use, stop or limit alcohol intake.   If your fall was related to use of sleeping medicines, talk to your doctor about this. You may need to reduce your dosage at bedtime if you awaken during the night to go to the bathroom.   To reduce the need for nighttime bathroom trips:   Avoid drinking fluids for several hours before going to bed   Empty your bladder before going to bed   Men can keep a urinal at the bedside   © 2000-2011 [facility], [address] [place] [address]. All rights reserved. This information is not intended as a substitute for professional medical care. Always follow your healthcare professional's instructions.  Support  "Groups/Classes    Support groups and classes are being offered at the   [facility] and [facility]    "Cooking with Cancer" (Nutrition Class):  Second Wednesday of each month   at noon at the Cancer Center.  Metastatic Support Group:  Third Tuesday of each month   at noon at the Cancer Center.  Next Steps Class/Group: Second and fourth Thursday of each month at noon at the Cancer Center.  Hope Chest (Breast Cancer Support Group): First Tuesday of each month   at 5:30pm at the [facility] location.  [employer] Mobile: [facility]: Second and third Tuesday of each month from 7:30am - 2pm.  [facility]: First and fourth Tuesday of each month from 7:30am - 2pm    If you are interested in attending or would like more information please ask our social workers or your nurse!  "

## 2019-11-27 ENCOUNTER — OFFICE VISIT (OUTPATIENT)
Dept: FAMILY MEDICINE | Facility: CLINIC | Age: 79
End: 2019-11-27
Payer: MEDICARE

## 2019-11-27 ENCOUNTER — TELEPHONE (OUTPATIENT)
Dept: RADIOLOGY | Facility: HOSPITAL | Age: 79
End: 2019-11-27

## 2019-11-27 ENCOUNTER — TELEPHONE (OUTPATIENT)
Dept: FAMILY MEDICINE | Facility: CLINIC | Age: 79
End: 2019-11-27

## 2019-11-27 VITALS
BODY MASS INDEX: 21.34 KG/M2 | SYSTOLIC BLOOD PRESSURE: 126 MMHG | HEIGHT: 62 IN | WEIGHT: 115.94 LBS | HEART RATE: 105 BPM | DIASTOLIC BLOOD PRESSURE: 69 MMHG | TEMPERATURE: 99 F

## 2019-11-27 DIAGNOSIS — R05.9 COUGH: ICD-10-CM

## 2019-11-27 DIAGNOSIS — J44.1 COPD WITH EXACERBATION: Primary | ICD-10-CM

## 2019-11-27 PROCEDURE — 99214 OFFICE O/P EST MOD 30 MIN: CPT | Mod: HCNC,S$GLB,, | Performed by: REGISTERED NURSE

## 2019-11-27 PROCEDURE — 99999 PR PBB SHADOW E&M-EST. PATIENT-LVL IV: CPT | Mod: PBBFAC,HCNC,, | Performed by: REGISTERED NURSE

## 2019-11-27 PROCEDURE — 1126F PR PAIN SEVERITY QUANTIFIED, NO PAIN PRESENT: ICD-10-PCS | Mod: HCNC,S$GLB,, | Performed by: REGISTERED NURSE

## 2019-11-27 PROCEDURE — 99214 PR OFFICE/OUTPT VISIT, EST, LEVL IV, 30-39 MIN: ICD-10-PCS | Mod: HCNC,S$GLB,, | Performed by: REGISTERED NURSE

## 2019-11-27 PROCEDURE — 3078F PR MOST RECENT DIASTOLIC BLOOD PRESSURE < 80 MM HG: ICD-10-PCS | Mod: HCNC,CPTII,S$GLB, | Performed by: REGISTERED NURSE

## 2019-11-27 PROCEDURE — 99999 PR PBB SHADOW E&M-EST. PATIENT-LVL IV: ICD-10-PCS | Mod: PBBFAC,HCNC,, | Performed by: REGISTERED NURSE

## 2019-11-27 PROCEDURE — 3074F PR MOST RECENT SYSTOLIC BLOOD PRESSURE < 130 MM HG: ICD-10-PCS | Mod: HCNC,CPTII,S$GLB, | Performed by: REGISTERED NURSE

## 2019-11-27 PROCEDURE — 1159F PR MEDICATION LIST DOCUMENTED IN MEDICAL RECORD: ICD-10-PCS | Mod: HCNC,S$GLB,, | Performed by: REGISTERED NURSE

## 2019-11-27 PROCEDURE — 1101F PR PT FALLS ASSESS DOC 0-1 FALLS W/OUT INJ PAST YR: ICD-10-PCS | Mod: HCNC,CPTII,S$GLB, | Performed by: REGISTERED NURSE

## 2019-11-27 PROCEDURE — 1101F PT FALLS ASSESS-DOCD LE1/YR: CPT | Mod: HCNC,CPTII,S$GLB, | Performed by: REGISTERED NURSE

## 2019-11-27 PROCEDURE — 1126F AMNT PAIN NOTED NONE PRSNT: CPT | Mod: HCNC,S$GLB,, | Performed by: REGISTERED NURSE

## 2019-11-27 PROCEDURE — 3074F SYST BP LT 130 MM HG: CPT | Mod: HCNC,CPTII,S$GLB, | Performed by: REGISTERED NURSE

## 2019-11-27 PROCEDURE — 3078F DIAST BP <80 MM HG: CPT | Mod: HCNC,CPTII,S$GLB, | Performed by: REGISTERED NURSE

## 2019-11-27 PROCEDURE — 1159F MED LIST DOCD IN RCRD: CPT | Mod: HCNC,S$GLB,, | Performed by: REGISTERED NURSE

## 2019-11-27 RX ORDER — AMOXICILLIN AND CLAVULANATE POTASSIUM 875; 125 MG/1; MG/1
1 TABLET, FILM COATED ORAL 2 TIMES DAILY
Qty: 20 TABLET | Refills: 0 | Status: SHIPPED | OUTPATIENT
Start: 2019-11-27 | End: 2019-12-07

## 2019-11-27 NOTE — PROGRESS NOTES
Subjective:       Patient ID: Sarah Parker is a 79 y.o. female.    Chief Complaint   Patient presents with    Cough       HPI    Sarah Parker is here today with c/o a cough for the past few days.  Tessalon not helping.  Cough productive with PND and RN.  Does report fatigue but she has finished her chemo yesterday.      Review of Systems   Constitutional: Positive for fatigue. Negative for chills and fever.   HENT: Positive for congestion and postnasal drip. Negative for ear pain, rhinorrhea, sinus pain and sore throat.    Eyes: Negative.    Respiratory: Positive for cough. Negative for shortness of breath, wheezing and stridor.    Cardiovascular: Negative.    Neurological: Negative.    Hematological: Negative for adenopathy.         Review of patient's allergies indicates:   Allergen Reactions    Codeine      Other reaction(s): hyper  Other reaction(s): hyper    Doxycycline     Gabapentin Other (See Comments)     Bad dreams         Medication List with Changes/Refills   Current Medications    ALBUTEROL (PROVENTIL) 2.5 MG /3 ML (0.083 %) NEBULIZER SOLUTION    Take 3 mLs (2.5 mg total) by nebulization every 6 (six) hours as needed for Wheezing.    AMITRIPTYLINE (ELAVIL) 75 MG TABLET    TAKE 1 TABLET BY MOUTH EVERY EVENING    BENZONATATE (TESSALON) 100 MG CAPSULE    Take 1-2 capsules (100-200 mg total) by mouth 3 (three) times daily as needed for Cough.    CALCIUM CITRATE-VITAMIN D (CITRACAL + D) 315-200 MG-UNIT PER TABLET    Take 1 tablet by mouth once daily.     DULOXETINE (CYMBALTA) 20 MG CAPSULE    Take 2 capsules (40 mg total) by mouth once daily.    FERROUS GLUCONATE 324 MG (37.5 MG IRON) TAB TABLET    Take 1 tablet (324 mg total) by mouth daily with breakfast.    HYDROCODONE-ACETAMINOPHEN 5-325MG (NORCO) 5-325 MG PER TABLET    TK 1 T PO Q 6 H PRN    HYDROXYZINE HCL (ATARAX) 25 MG TABLET        LEUCOVORIN (WELLCOVORIN) 5 MG TAB    TAKE ONE WEEKLY ON SATURDAYS    LEVOTHYROXINE (SYNTHROID) 88 MCG  TABLET    TAKE 1 TABLET BY MOUTH BEFORE BREAKFAST    MAGNESIUM OXIDE (MAG-OX) 400 MG (241.3 MG MAGNESIUM) TABLET    Take 1 tablet (400 mg total) by mouth 3 (three) times daily.    MECLIZINE (ANTIVERT) 50 MG TABLET    Take 25 mg by mouth 3 (three) times daily as needed.    METHOTREXATE 2.5 MG TAB    Take 17.5 mg by mouth every 7 days.     METOPROLOL SUCCINATE (TOPROL-XL) 50 MG 24 HR TABLET    TAKE 1 TABLET(50 MG) BY MOUTH EVERY DAY    MULTIVITAMIN CAPSULE    Take by mouth. As directed    ONDANSETRON (ZOFRAN) 4 MG TABLET    Take 1 tablet (4 mg total) by mouth every 8 (eight) hours as needed for Nausea.    PRAVASTATIN (PRAVACHOL) 10 MG TABLET    TAKE 1 TABLET(10 MG) BY MOUTH EVERY DAY    PROCHLORPERAZINE (COMPAZINE) 5 MG TABLET    TAKE 1 TABLET(5 MG) BY MOUTH EVERY 6 HOURS AS NEEDED FOR NAUSEA    TAMSULOSIN (FLOMAX) 0.4 MG CAP    Take 1 capsule (0.4 mg total) by mouth once daily. For urinary retention    TOCILIZUMAB (ACTEMRA) 80 MG/4 ML (20 MG/ML) SOLN    Inject into the vein.    TOPIRAMATE (TOPAMAX) 25 MG TABLET    Take 1 tablet (25 mg total) by mouth at night x 1 week then increase to 2 (two) times daily. Increase as tolerated.    VALSARTAN-HYDROCHLOROTHIAZIDE (DIOVAN-HCT) 80-12.5 MG PER TABLET    TAKE 1 TABLET BY MOUTH DAILY   Discontinued Medications    HYDROXYUREA (HYDREA) 500 MG CAP    Take one capsule by mouth once daily.    ONDANSETRON (ZOFRAN) 8 MG TABLET    Take 1 tablet (8 mg total) by mouth every 12 (twelve) hours as needed for Nausea.       Patient Active Problem List   Diagnosis    Rheumatoid arthritis    Acquired hypothyroidism    Depression, recurrent    Rheumatoid lung    Atherosclerosis of aorta    ARMD (age related macular degeneration)    Hiatal hernia with gastroesophageal reflux    Essential hypertension    Osteopenia    Macrocytic anemia    Leukocytosis    Multiple lung nodules on CT    Renal mass    History of skin cancer    Chronic bronchitis    Calcified granuloma of lung     "COPD with exacerbation    Lumbar radiculopathy    Melanoma of right upper arm    Idiopathic peripheral neuropathy    Rheumatoid arthritis involving right foot    Rheumatoid arthritis involving left foot    Monoclonal paraproteinemia    Anemia    Chronic myelomonocytic leukemia not having achieved remission    Other hyperlipidemia    Immunosuppressed status    Axillary mass, left         Past medical, surgical, family and social histories have been reviewed today.        Objective:     Vitals:    11/27/19 1120   BP: 126/69   Pulse: 105   Temp: 98.8 °F (37.1 °C)   Weight: 52.6 kg (115 lb 15.4 oz)   Height: 5' 2" (1.575 m)   PainSc: 0-No pain         Physical Exam   Constitutional: She is oriented to person, place, and time. She appears well-developed and well-nourished.   HENT:   Head: Normocephalic and atraumatic.   Right Ear: Tympanic membrane normal.   Left Ear: Tympanic membrane normal.   Nose: No mucosal edema or rhinorrhea.   Mouth/Throat: Oropharynx is clear and moist and mucous membranes are normal.   Eyes: Pupils are equal, round, and reactive to light. Conjunctivae are normal. Right eye exhibits no discharge. Left eye exhibits no discharge.   Cardiovascular: Normal rate and regular rhythm.   Pulmonary/Chest: Effort normal. No respiratory distress. She has wheezes. She has no rales. She exhibits no tenderness.   Musculoskeletal: Normal range of motion. She exhibits no edema.   Lymphadenopathy:     She has no cervical adenopathy.   Neurological: She is alert and oriented to person, place, and time.   Skin: Skin is warm and dry. Capillary refill takes less than 2 seconds. No rash noted.   Psychiatric: She has a normal mood and affect. Her behavior is normal. Judgment and thought content normal.   Vitals reviewed.        Diagnosis       1. COPD with exacerbation    2. Cough          Assessment/ Plan     COPD with exacerbation  -     amoxicillin-clavulanate 875-125mg (AUGMENTIN) 875-125 mg per tablet; " Take 1 tablet by mouth 2 (two) times daily. for 10 days  Dispense: 20 tablet; Refill: 0    Cough          Robitussin or Tessalon prn cough.  Symptomatic care, rest and fluids.  Follow-up in clinic as needed.          Patient Care Team:  Briseida Reyes MD as PCP - General (Internal Medicine)  Ellen Yu LPN as Care Coordinator  Lata Tejada MD (Dermatology)  Chase Tejada MD (Rheumatology)      JULY Dueñas  [facility]

## 2019-11-27 NOTE — TELEPHONE ENCOUNTER
Pt states that she has had a cough and congestion in what feels like the bronchial tubes for the past few days.  Requesting abx.  Advised that Dr. Reyes is out of the office today and that she should be seen by NP here.  Pt agreed.  Appt scheduled./rpr

## 2019-11-27 NOTE — TELEPHONE ENCOUNTER
----- Message from Lyn Castillo sent at 11/27/2019  7:59 AM CST -----  Contact: pt  Type:  Needs Medical Advice    Who Called: Patient  Symptoms (please be specific): congestion/coughing  How long has patient had these symptoms:  3days  Pharmacy name and phone #:  [facility]/[address]  Would the patient rather a call back or a response via [facility]? Call back  Best Call Back Number: [phone]  Additional Information: pt need to know if she should come in , pt states she is on Chemo

## 2019-11-29 ENCOUNTER — HOSPITAL ENCOUNTER (OUTPATIENT)
Dept: RADIOLOGY | Facility: HOSPITAL | Age: 79
Discharge: HOME | End: 2019-11-29
Attending: INTERNAL MEDICINE
Payer: MEDICARE

## 2019-11-29 VITALS — WEIGHT: 115.94 LBS | BODY MASS INDEX: 21.34 KG/M2 | HEIGHT: 62 IN

## 2019-11-29 DIAGNOSIS — R22.32 AXILLARY MASS, LEFT: ICD-10-CM

## 2019-11-29 DIAGNOSIS — R59.9 ENLARGED LYMPH NODES, UNSPECIFIED: ICD-10-CM

## 2019-11-29 PROCEDURE — 76642 US BREAST LEFT LIMITED: ICD-10-PCS | Mod: 26,HCNC,LT, | Performed by: RADIOLOGY

## 2019-11-29 PROCEDURE — 77065 MAMMO DIGITAL DIAGNOSTIC LEFT WITH TOMOSYNTHESIS_CAD: ICD-10-PCS | Mod: 26,HCNC,LT, | Performed by: RADIOLOGY

## 2019-11-29 PROCEDURE — 77061 MAMMO DIGITAL DIAGNOSTIC LEFT WITH TOMOSYNTHESIS_CAD: ICD-10-PCS | Mod: 26,HCNC,LT, | Performed by: RADIOLOGY

## 2019-11-29 PROCEDURE — 77061 BREAST TOMOSYNTHESIS UNI: CPT | Mod: 26,HCNC,LT, | Performed by: RADIOLOGY

## 2019-11-29 PROCEDURE — 76642 ULTRASOUND BREAST LIMITED: CPT | Mod: TC,HCNC,LT

## 2019-11-29 PROCEDURE — 77065 DX MAMMO INCL CAD UNI: CPT | Mod: 26,HCNC,LT, | Performed by: RADIOLOGY

## 2019-11-29 PROCEDURE — 77061 BREAST TOMOSYNTHESIS UNI: CPT | Mod: TC,HCNC,LT

## 2019-11-29 PROCEDURE — 77065 DX MAMMO INCL CAD UNI: CPT | Mod: TC,HCNC,LT

## 2019-11-29 PROCEDURE — 76642 ULTRASOUND BREAST LIMITED: CPT | Mod: 26,HCNC,LT, | Performed by: RADIOLOGY

## 2019-12-02 ENCOUNTER — TELEPHONE (OUTPATIENT)
Dept: FAMILY MEDICINE | Facility: CLINIC | Age: 79
End: 2019-12-02

## 2019-12-02 NOTE — TELEPHONE ENCOUNTER
----- Message from Briseida Reyes MD sent at 12/2/2019  1:21 PM CST -----  Please tell pt that the radiologist thinks the underarm nodes are due to her CML, [facility]  SM

## 2019-12-02 NOTE — TELEPHONE ENCOUNTER
Informed pt that the radiologist thinks the underarm nodes are due to her CML, so please discuss with [facility] at appt tomorrow. Pt verbalized understanding. -KT-

## 2019-12-03 ENCOUNTER — LAB VISIT (OUTPATIENT)
Dept: LAB | Facility: HOSPITAL | Age: 79
End: 2019-12-03
Attending: INTERNAL MEDICINE
Payer: MEDICARE

## 2019-12-03 ENCOUNTER — OFFICE VISIT (OUTPATIENT)
Dept: HEMATOLOGY/ONCOLOGY | Facility: CLINIC | Age: 79
End: 2019-12-03
Payer: MEDICARE

## 2019-12-03 ENCOUNTER — TELEPHONE (OUTPATIENT)
Dept: HEMATOLOGY/ONCOLOGY | Facility: CLINIC | Age: 79
End: 2019-12-03

## 2019-12-03 VITALS
HEIGHT: 62 IN | RESPIRATION RATE: 14 BRPM | BODY MASS INDEX: 21.38 KG/M2 | TEMPERATURE: 97 F | SYSTOLIC BLOOD PRESSURE: 134 MMHG | WEIGHT: 116.19 LBS | DIASTOLIC BLOOD PRESSURE: 74 MMHG | HEART RATE: 92 BPM | OXYGEN SATURATION: 90 %

## 2019-12-03 DIAGNOSIS — R22.32 AXILLARY MASS, LEFT: Primary | ICD-10-CM

## 2019-12-03 DIAGNOSIS — M05.711 RHEUMATOID ARTHRITIS INVOLVING RIGHT SHOULDER WITH POSITIVE RHEUMATOID FACTOR: Chronic | ICD-10-CM

## 2019-12-03 DIAGNOSIS — D64.9 ANEMIA, UNSPECIFIED TYPE: ICD-10-CM

## 2019-12-03 DIAGNOSIS — C93.10 CHRONIC MYELOMONOCYTIC LEUKEMIA NOT HAVING ACHIEVED REMISSION: ICD-10-CM

## 2019-12-03 DIAGNOSIS — C93.10 CHRONIC MYELOMONOCYTIC LEUKEMIA NOT HAVING ACHIEVED REMISSION: Chronic | ICD-10-CM

## 2019-12-03 LAB
ALBUMIN SERPL BCP-MCNC: 3.9 G/DL (ref 3.5–5.2)
ALP SERPL-CCNC: 90 U/L (ref 55–135)
ALT SERPL W/O P-5'-P-CCNC: 12 U/L (ref 10–44)
ANION GAP SERPL CALC-SCNC: 10 MMOL/L (ref 8–16)
AST SERPL-CCNC: 26 U/L (ref 10–40)
BASOPHILS # BLD AUTO: 0.12 K/UL (ref 0–0.2)
BASOPHILS NFR BLD: 0.2 % (ref 0–1.9)
BILIRUB SERPL-MCNC: 0.5 MG/DL (ref 0.1–1)
BUN SERPL-MCNC: 25 MG/DL (ref 8–23)
CALCIUM SERPL-MCNC: 9.4 MG/DL (ref 8.7–10.5)
CHLORIDE SERPL-SCNC: 103 MMOL/L (ref 95–110)
CO2 SERPL-SCNC: 24 MMOL/L (ref 23–29)
CREAT SERPL-MCNC: 1.1 MG/DL (ref 0.5–1.4)
DIFFERENTIAL METHOD: ABNORMAL
EOSINOPHIL # BLD AUTO: 0.2 K/UL (ref 0–0.5)
EOSINOPHIL NFR BLD: 0.4 % (ref 0–8)
ERYTHROCYTE [DISTWIDTH] IN BLOOD BY AUTOMATED COUNT: 21.4 % (ref 11.5–14.5)
EST. GFR  (AFRICAN AMERICAN): 56 ML/MIN/1.73 M^2
EST. GFR  (NON AFRICAN AMERICAN): 49 ML/MIN/1.73 M^2
GLUCOSE SERPL-MCNC: 104 MG/DL (ref 70–110)
HCT VFR BLD AUTO: 23.6 % (ref 37–48.5)
HGB BLD-MCNC: 7.1 G/DL (ref 12–16)
IMM GRANULOCYTES # BLD AUTO: 9.6 K/UL (ref 0–0.04)
IMM GRANULOCYTES NFR BLD AUTO: 19.3 % (ref 0–0.5)
LYMPHOCYTES # BLD AUTO: 19.2 K/UL (ref 1–4.8)
LYMPHOCYTES NFR BLD: 38.7 % (ref 18–48)
MCH RBC QN AUTO: 34.1 PG (ref 27–31)
MCHC RBC AUTO-ENTMCNC: 30.1 G/DL (ref 32–36)
MCV RBC AUTO: 114 FL (ref 82–98)
MONOCYTES # BLD AUTO: 6.1 K/UL (ref 0.3–1)
MONOCYTES NFR BLD: 12.2 % (ref 4–15)
NEUTROPHILS # BLD AUTO: 14.5 K/UL (ref 1.8–7.7)
NEUTROPHILS NFR BLD: 29.2 % (ref 38–73)
NRBC BLD-RTO: 5 /100 WBC
PATH REV BLD -IMP: NORMAL
PLATELET # BLD AUTO: 103 K/UL (ref 150–350)
PMV BLD AUTO: 11.5 FL (ref 9.2–12.9)
POTASSIUM SERPL-SCNC: 4.4 MMOL/L (ref 3.5–5.1)
PROT SERPL-MCNC: 8.4 G/DL (ref 6–8.4)
RBC # BLD AUTO: 2.08 M/UL (ref 4–5.4)
SODIUM SERPL-SCNC: 137 MMOL/L (ref 136–145)
WBC # BLD AUTO: 49.7 K/UL (ref 3.9–12.7)

## 2019-12-03 PROCEDURE — 1159F MED LIST DOCD IN RCRD: CPT | Mod: HCNC,S$GLB,, | Performed by: INTERNAL MEDICINE

## 2019-12-03 PROCEDURE — 99215 PR OFFICE/OUTPT VISIT, EST, LEVL V, 40-54 MIN: ICD-10-PCS | Mod: HCNC,S$GLB,, | Performed by: INTERNAL MEDICINE

## 2019-12-03 PROCEDURE — 85027 COMPLETE CBC AUTOMATED: CPT | Mod: HCNC

## 2019-12-03 PROCEDURE — 99215 OFFICE O/P EST HI 40 MIN: CPT | Mod: HCNC,S$GLB,, | Performed by: INTERNAL MEDICINE

## 2019-12-03 PROCEDURE — 99499 UNLISTED E&M SERVICE: CPT | Mod: HCNC,S$GLB,, | Performed by: INTERNAL MEDICINE

## 2019-12-03 PROCEDURE — 85007 BL SMEAR W/DIFF WBC COUNT: CPT | Mod: HCNC

## 2019-12-03 PROCEDURE — 1159F PR MEDICATION LIST DOCUMENTED IN MEDICAL RECORD: ICD-10-PCS | Mod: HCNC,S$GLB,, | Performed by: INTERNAL MEDICINE

## 2019-12-03 PROCEDURE — 3075F SYST BP GE 130 - 139MM HG: CPT | Mod: HCNC,CPTII,S$GLB, | Performed by: INTERNAL MEDICINE

## 2019-12-03 PROCEDURE — 3078F PR MOST RECENT DIASTOLIC BLOOD PRESSURE < 80 MM HG: ICD-10-PCS | Mod: HCNC,CPTII,S$GLB, | Performed by: INTERNAL MEDICINE

## 2019-12-03 PROCEDURE — 99499 RISK ADDL DX/OHS AUDIT: ICD-10-PCS | Mod: HCNC,S$GLB,, | Performed by: INTERNAL MEDICINE

## 2019-12-03 PROCEDURE — 85060 BLOOD SMEAR INTERPRETATION: CPT | Mod: HCNC,,, | Performed by: PATHOLOGY

## 2019-12-03 PROCEDURE — 3078F DIAST BP <80 MM HG: CPT | Mod: HCNC,CPTII,S$GLB, | Performed by: INTERNAL MEDICINE

## 2019-12-03 PROCEDURE — 1101F PT FALLS ASSESS-DOCD LE1/YR: CPT | Mod: HCNC,CPTII,S$GLB, | Performed by: INTERNAL MEDICINE

## 2019-12-03 PROCEDURE — 99999 PR PBB SHADOW E&M-EST. PATIENT-LVL V: CPT | Mod: PBBFAC,HCNC,, | Performed by: INTERNAL MEDICINE

## 2019-12-03 PROCEDURE — 1126F PR PAIN SEVERITY QUANTIFIED, NO PAIN PRESENT: ICD-10-PCS | Mod: HCNC,S$GLB,, | Performed by: INTERNAL MEDICINE

## 2019-12-03 PROCEDURE — 99999 PR PBB SHADOW E&M-EST. PATIENT-LVL V: ICD-10-PCS | Mod: PBBFAC,HCNC,, | Performed by: INTERNAL MEDICINE

## 2019-12-03 PROCEDURE — 3075F PR MOST RECENT SYSTOLIC BLOOD PRESS GE 130-139MM HG: ICD-10-PCS | Mod: HCNC,CPTII,S$GLB, | Performed by: INTERNAL MEDICINE

## 2019-12-03 PROCEDURE — 1126F AMNT PAIN NOTED NONE PRSNT: CPT | Mod: HCNC,S$GLB,, | Performed by: INTERNAL MEDICINE

## 2019-12-03 PROCEDURE — 1101F PR PT FALLS ASSESS DOC 0-1 FALLS W/OUT INJ PAST YR: ICD-10-PCS | Mod: HCNC,CPTII,S$GLB, | Performed by: INTERNAL MEDICINE

## 2019-12-03 PROCEDURE — 85060 PATHOLOGIST REVIEW: ICD-10-PCS | Mod: HCNC,,, | Performed by: PATHOLOGY

## 2019-12-03 PROCEDURE — 36415 COLL VENOUS BLD VENIPUNCTURE: CPT | Mod: HCNC

## 2019-12-03 PROCEDURE — 80053 COMPREHEN METABOLIC PANEL: CPT | Mod: HCNC

## 2019-12-03 NOTE — ASSESSMENT & PLAN NOTE
Unknown etiology.  Recent ultrasound did not show evidence of mammographic malignancy however there was a lymph node seen in the left axilla. Corresponding to axillary palpable mass are at least 2 enlarged lymph nodes.  The larger one measures 3.2 x 2.1 x 0.9 cm. A second node measures 2.9 x 1.9 x 1.3 cm.  This was attributable to patient's CMML.  Ultrasound guided biopsy of the larger left axilla lymph node has been ordered.

## 2019-12-03 NOTE — ASSESSMENT & PLAN NOTE
Multifactorial including drug effect.  Currently stable at 7.1 grams/deciliter with no evidence of bleed.  Continue to monitor.

## 2019-12-03 NOTE — TELEPHONE ENCOUNTER
----- Message from Anastasiia Tracy sent at 12/3/2019 10:06 AM CST -----  Contact: PATIENT  STATES SHE WILL BE LATE HER  FELL AND SHE IS WAITING FOR SOMEONE TO COME GET HIM UP. PLEASE CALL PATIENT @ [phone]. THANKS, ISABELA

## 2019-12-03 NOTE — PROGRESS NOTES
Subjective:   Date of Visit: 12/3/19   ?   CHIEF COMPLAINT:   Chronic myelomonocytic leukemia type 2???????   ?   ONCOLOGIC DIAGNOSIS:  Chronic myelomonocytic leukemia type 2  ?   CURRENT TREATMENT:  Azacitadine  ?      Leukocytosis    5/3/2016 Initial Diagnosis     Leukocytosis      8/16/2019 Tumor Conference     Presenting Hospital / Clinic: [facility] [place]  Virtual Tumor Board Conference: In person  Presenter: Dr. Sathish Devine  Specialties Present: Medical Oncology;Radiation Oncology;Surgical Oncology;Pathology;Navigation;Research;Plastic Surgery;Radiology;Gastrointestinal  Presentation at Cancer Conference: Prospective  Cancer Type: Other  Other Cancer: Leukocytosis  Recommended Plan: Additional screening  Send blood specimen for FISH to determine if CLL        Chronic myelomonocytic leukemia not having achieved remission    9/13/2019 Initial Diagnosis     Chronic myelomonocytic leukemia not having achieved remission      10/14/2019 -  Chemotherapy     Treatment Summary   Plan Name: OP AZACITADINE 7-DAY (SUB-Q)  Treatment Goal: Palliative  Status: Active  Start Date: 10/14/2019  End Date: 1/21/2020 (Planned)  Provider: Denton Knox MD  Chemotherapy: azaCITIDine (VIDAZA) chemo injection 115 mg, 75 mg/m2 = 115 mg, Subcutaneous, Clinic/HOD 1 time, 2 of 4 cycles  Administration: 115 mg (10/14/2019), 115 mg (10/15/2019), 115 mg (10/21/2019), 115 mg (10/16/2019), 115 mg (10/17/2019), 115 mg (10/18/2019), 115 mg (10/22/2019), 115 mg (11/18/2019), 115 mg (11/19/2019), 115 mg (11/25/2019), 115 mg (11/20/2019), 115 mg (11/21/2019), 115 mg (11/22/2019), 115 mg (11/26/2019)          Note:  Ms Parker was seen at [facility] today in the company of her daughter and .  She is a pleasant 79-year-old female with recent diagnosis of chronic myelomonocytic leukemia type 2 who was started on azacitidine.  Completed cycle 1 of treatment and tolerated well with minimal setbacks.    Recently she was noted  to have upper respiratory infection and currently on antibiotics with amoxicillin.  She denies fever, sick contacts, nausea or vomiting, chest pain, abdominal pain, diarrhea or dysuria.  She did complain of mild shortness of breath only with exertion.    She also was recently sent for ultrasound of her left axillary lymphadenopathy which was thought to be likely related to her chronic leukemia and less likely breast cancer or lymphoma.  She denies unintentional weight loss, fever, or night sweats.      Review of Systems   Constitutional: Negative for activity change, appetite change, chills, fatigue, fever and unexpected weight change.   HENT: Negative for hearing loss, mouth sores, nosebleeds, sore throat, tinnitus, trouble swallowing and voice change.    Eyes: Negative for visual disturbance.   Respiratory: Negative for cough, chest tightness and shortness of breath.    Cardiovascular: Negative for chest pain, palpitations and leg swelling.   Gastrointestinal: Negative for abdominal pain, anal bleeding, blood in stool, constipation, diarrhea, nausea and vomiting.   Genitourinary: Negative for dysuria, frequency, hematuria, pelvic pain, vaginal bleeding and vaginal pain.   Musculoskeletal: Negative for arthralgias, back pain, joint swelling and neck pain.   Skin: Negative for color change, pallor, rash and wound.   Allergic/Immunologic: Negative for immunocompromised state.   Neurological: Negative for dizziness, tremors, syncope, speech difficulty, weakness, light-headedness and headaches.   Hematological: Negative for adenopathy. Does not bruise/bleed easily.   Psychiatric/Behavioral: Negative for agitation, confusion, decreased concentration, hallucinations and sleep disturbance. The patient is not nervous/anxious.        ?   PAST MEDICAL HISTORY:   Past Medical History:   Diagnosis Date    Acid reflux     Anxiety     Back pain     Bronchitis, chronic obstructive w acute bronchitis 7/29/2016    Cancer      [facility] arms, face- Dr. Lata Tejada    Cataract     2+NS    Degenerative disc disease     Depression     Dry mouth     Hernia, hiatal 11/18/2013    Hypertension     Hypothyroid     Macrocytic anemia 5/3/2016    Macular degeneration     Migraines     Mixed anxiety and depressive disorder     Multiple fractures of ribs of right side     Osteoporosis     Other hyperlipidemia 10/11/2019    Pneumonia     Pneumonia due to other staphylococcus     Rheumatoid arthritis     Rheumatoid arthritis(714.0)     Rheumatoid arthritis(714.0)     Remicade, MTX.    ?     PAST SURGICAL HISTORY:   Past Surgical History:   Procedure Laterality Date    APPENDECTOMY  1985    CATARACT EXTRACTION Bilateral 6/11/15    Dr. Booth    CHOLECYSTECTOMY  2013    cryoablasion kidney Left 09/27/2016    feet Bilateral     rheumatoid    FRACTURE SURGERY Right     tibia    HERNIA REPAIR      HYSTERECTOMY  1970    partial    JOINT REPLACEMENT      bilateral knees (2008), hands, wrists, knuckles, toes    LAPAROSCOPIC NISSEN FUNDOPLICATION      TRANSFORAMINAL EPIDURAL INJECTION OF STEROID Left 6/25/2019    Procedure: Left L5/S1 TF AYAAN with local;  Surgeon: Rowdy Felix MD;  Location: [facility];  Service: Pain Management;  Laterality: Left;      ?   ALLERGIES:   Allergies as of 12/03/2019 - Reviewed 12/03/2019   Allergen Reaction Noted    Codeine  06/08/2012    Doxycycline  02/13/2019    Gabapentin Other (See Comments) 08/14/2019      ?   MEDICATIONS:?   Outpatient Medications Marked as Taking for the 12/3/19 encounter (Office Visit) with Denton Knox MD   Medication Sig Dispense Refill    albuterol (PROVENTIL) 2.5 mg /3 mL (0.083 %) nebulizer solution Take 3 mLs (2.5 mg total) by nebulization every 6 (six) hours as needed for Wheezing. 1 Box 5    amoxicillin-clavulanate 875-125mg (AUGMENTIN) 875-125 mg per tablet Take 1 tablet by mouth 2 (two) times daily. for 10 days 20 tablet 0    calcium citrate-vitamin D  (CITRACAL + D) 315-200 mg-unit per tablet Take 1 tablet by mouth once daily.       DULoxetine (CYMBALTA) 20 MG capsule Take 2 capsules (40 mg total) by mouth once daily. 180 capsule 3    hydrocodone-acetaminophen 5-325mg (NORCO) 5-325 mg per tablet TK 1 T PO Q 6 H PRN  0    hydrOXYzine HCl (ATARAX) 25 MG tablet       leucovorin (WELLCOVORIN) 5 mg Tab TAKE ONE WEEKLY ON  4 tablet 3    levothyroxine (SYNTHROID) 88 MCG tablet TAKE 1 TABLET BY MOUTH BEFORE BREAKFAST 90 tablet 3    magnesium oxide (MAG-OX) 400 mg (241.3 mg magnesium) tablet Take 1 tablet (400 mg total) by mouth 3 (three) times daily.  0    meclizine (ANTIVERT) 50 MG tablet Take 25 mg by mouth 3 (three) times daily as needed.      methotrexate 2.5 MG Tab Take 17.5 mg by mouth every 7 days.       metoprolol succinate (TOPROL-XL) 50 MG 24 hr tablet TAKE 1 TABLET(50 MG) BY MOUTH EVERY DAY 30 tablet 11    multivitamin capsule Take by mouth. As directed      ondansetron (ZOFRAN) 4 MG tablet Take 1 tablet (4 mg total) by mouth every 8 (eight) hours as needed for Nausea. 30 tablet 1    pravastatin (PRAVACHOL) 10 MG tablet TAKE 1 TABLET(10 MG) BY MOUTH EVERY DAY 30 tablet 11    prochlorperazine (COMPAZINE) 5 MG tablet TAKE 1 TABLET(5 MG) BY MOUTH EVERY 6 HOURS AS NEEDED FOR NAUSEA 385 tablet 1    tocilizumab (ACTEMRA) 80 mg/4 mL (20 mg/mL) Soln Inject into the vein.      valsartan-hydrochlorothiazide (DIOVAN-HCT) 80-12.5 mg per tablet TAKE 1 TABLET BY MOUTH DAILY 90 tablet 3      ?   SOCIAL HISTORY:?   Social History     Tobacco Use    Smoking status: Former Smoker     Packs/day: 0.25     Years: 2.00     Pack years: 0.50     Last attempt to quit: 1965     Years since quittin.1    Smokeless tobacco: Never Used   Substance Use Topics    Alcohol use: No        ?   FAMILY HISTORY:   family history includes Asthma in her brother and sister; Cancer in her brother and maternal grandfather; Cataracts in her mother; Chronic back pain  in her brother and sister; Diabetes Mellitus in her brother; Fibromyalgia in her daughter; Heart disease in her father, maternal grandmother, and mother; Hyperlipidemia in her mother; Hypertension in her brother, father, mother, and sister; Osteoarthritis in her brother, father, mother, and sister; Thyroid disease in her brother and sister.   ?     Objective:      Physical Exam   Constitutional: She is oriented to person, place, and time. She appears well-developed and well-nourished. She is cooperative.  Non-toxic appearance. She does not appear ill. No distress.   HENT:   Head: Normocephalic and atraumatic.   Mouth/Throat: No oropharyngeal exudate.   Eyes: Pupils are equal, round, and reactive to light. Conjunctivae are normal. Right eye exhibits no discharge. Left eye exhibits no discharge. No scleral icterus.   Neck: Normal range of motion. Neck supple. No thyromegaly present.   Cardiovascular: Normal rate and regular rhythm.   No murmur heard.  Pulmonary/Chest: Effort normal and breath sounds normal. No respiratory distress. She exhibits no tenderness.   Abdominal: Soft. Bowel sounds are normal. She exhibits no distension and no mass. There is no tenderness. There is no rebound and no guarding.   Musculoskeletal: Normal range of motion. She exhibits no edema or tenderness.   Lymphadenopathy:     She has no cervical adenopathy.        Right cervical: No superficial cervical adenopathy present.       Left cervical: No superficial cervical adenopathy present.        Right axillary: No pectoral adenopathy present.        Left axillary: No pectoral adenopathy present.No inguinal adenopathy noted on the right or left side.        Right: No supraclavicular adenopathy present.        Left: No supraclavicular adenopathy present.   Neurological: She is alert and oriented to person, place, and time. No cranial nerve deficit or sensory deficit.   Skin: Skin is warm and dry. Capillary refill takes 2 to 3 seconds. No rash  noted. No erythema. No pallor.   Psychiatric: She has a normal mood and affect. Her behavior is normal. Judgment normal.       ?   Vitals:    12/03/19 1123   BP: 134/74   Pulse: 92   Resp: 14   Temp: 97.3 °F (36.3 °C)      ?     ECOG SCORE    1 - Restricted in strenuous activity-ambulatory and able to carry out work of a light nature         Laboratory:  ?   Lab Visit on 12/03/2019   Component Date Value Ref Range Status    WBC 12/03/2019 49.70* 3.90 - 12.70 K/uL Final    RBC 12/03/2019 2.08* 4.00 - 5.40 M/uL Final    Hemoglobin 12/03/2019 7.1* 12.0 - 16.0 g/dL Final    Hematocrit 12/03/2019 23.6* 37.0 - 48.5 % Final    Mean Corpuscular Volume 12/03/2019 114* 82 - 98 fL Final    Mean Corpuscular Hemoglobin 12/03/2019 34.1* 27.0 - 31.0 pg Final    Mean Corpuscular Hemoglobin Conc 12/03/2019 30.1* 32.0 - 36.0 g/dL Final    RDW 12/03/2019 21.4* 11.5 - 14.5 % Final    Platelets 12/03/2019 103* 150 - 350 K/uL Final    MPV 12/03/2019 11.5  9.2 - 12.9 fL Final    Immature Granulocytes 12/03/2019 19.3* 0.0 - 0.5 % Final    Gran # (ANC) 12/03/2019 14.5* 1.8 - 7.7 K/uL Final    Immature Grans (Abs) 12/03/2019 9.60* 0.00 - 0.04 K/uL Final    Lymph # 12/03/2019 19.2* 1.0 - 4.8 K/uL Final    Mono # 12/03/2019 6.1* 0.3 - 1.0 K/uL Final    Eos # 12/03/2019 0.2  0.0 - 0.5 K/uL Final    Baso # 12/03/2019 0.12  0.00 - 0.20 K/uL Final    nRBC 12/03/2019 5* 0 /100 WBC Final    Gran% 12/03/2019 29.2* 38.0 - 73.0 % Final    Lymph% 12/03/2019 38.7  18.0 - 48.0 % Final    Mono% 12/03/2019 12.2  4.0 - 15.0 % Final    Eosinophil% 12/03/2019 0.4  0.0 - 8.0 % Final    Basophil% 12/03/2019 0.2  0.0 - 1.9 % Final    Differential Method 12/03/2019 Automated   Final    Sodium 12/03/2019 137  136 - 145 mmol/L Final    Potassium 12/03/2019 4.4  3.5 - 5.1 mmol/L Final    Chloride 12/03/2019 103  95 - 110 mmol/L Final    CO2 12/03/2019 24  23 - 29 mmol/L Final    Glucose 12/03/2019 104  70 - 110 mg/dL Final    BUN, Bld  12/03/2019 25* 8 - 23 mg/dL Final    Creatinine 12/03/2019 1.1  0.5 - 1.4 mg/dL Final    Calcium 12/03/2019 9.4  8.7 - 10.5 mg/dL Final    Total Protein 12/03/2019 8.4  6.0 - 8.4 g/dL Final    Albumin 12/03/2019 3.9  3.5 - 5.2 g/dL Final    Total Bilirubin 12/03/2019 0.5  0.1 - 1.0 mg/dL Final    Alkaline Phosphatase 12/03/2019 90  55 - 135 U/L Final    AST 12/03/2019 26  10 - 40 U/L Final    ALT 12/03/2019 12  10 - 44 U/L Final    Anion Gap 12/03/2019 10  8 - 16 mmol/L Final    eGFR if  12/03/2019 56* >60 mL/min/1.73 m^2 Final    eGFR if non African American 12/03/2019 49* >60 mL/min/1.73 m^2 Final    Pathologist Review Peripheral Smear 12/03/2019 REVIEWED   Final      ?   Tumor markers   ?   ?   Imaging: Mammo Digital Diagnostic Left w/ Chintan, US Breast Left Limited  Narrative: Result:   Mammo Digital Diagnostic Left w/ Chintan  US Breast Left Limited     History:  Patient is 79 y.o. and is seen for a diagnostic mammogram.    Films Compared:  Compared to: 02/05/2019 Mammo Digital Screening Bilat w/ Chintan, 01/15/2018   Mammo Digital Screening Bilat with Tomosynthesis_CAD, 01/13/2017 Mammo   Digital Screening Bilat with Tomosynthesis_CAD, 10/08/2014 Mammo Digital   Screening Bilat with Tomosynthesis_CAD, and 10/08/2013 Mammo Digital   Screening Bilat with CAD     Findings:  This procedure was performed using tomosynthesis.  Computer-aided   detection was utilized in the interpretation of this examination.  US Breast Left Limited  There is a lymph node seen in the left axilla. Corresponding to axillary   palpable mass are at least 2 enlarged lymph nodes.  The larger one   measures 3.2 x 2.1 x 0.9 cm.   A second node measures 2.9 x 1.9 x 1.3 cm.    This is presumably secondary to the patient's known leukemia.  Axillary   and mediastinal adenopathy was also present on the recent CT from 9/6/19.    Pet scan also demonstrated pelvic adenopathy.  Correlation suggested.     Mammo Digital Diagnostic  Left w/ Chintan  The left breast is heterogeneously dense, which may obscure small masses.    There are no corresponding lymph nodes seen on this modality.   Impression: There is no mammographic evidence of breast malignancy.    Axillary adenopathy, secondary to patient's leukemia.  Please correlate.    BI-RADS Category:   Left: 2 - Benign  Overall: 2 - Benign.  No evidence of breast malignancy.     Recommendation:  Routine screening mammogram in 1 year is recommended.  Routine screening mammogram in 1 year is recommended.     Your estimated lifetime risk of breast cancer (to age 85) based on   Tyrer-Cuzick risk assessment model is Tyrer-Cuzick: 0.84 %. According to   the American Cancer Society, patients with a lifetime breast cancer risk   of 20% or higher might benefit from supplemental screening tests. ??      ?      Pathology:  Pathology Results  (Last 10 years)               10/12/15 0000  Tissue Specimen to Pathology Final result    05/29/13 0000  Tissue Specimen to Pathology Final result           ?   Assessment/Plan:       .Chronic myelomonocytic leukemia not having achieved remission  On azacitidine.  Reviewed blood work today, leukocytosis noted which is attributable to recent upper respiratory infection for which she is currently on antibiotics.  Hemoglobin is noted at 7.1 grams/deciliter and there is improvement in thrombocytopenia with platelet count above 100.  Her next treatment resumes December 23, 2019.    Axillary mass, left  Unknown etiology.  Recent ultrasound did not show evidence of mammographic malignancy however there was a lymph node seen in the left axilla. Corresponding to axillary palpable mass are at least 2 enlarged lymph nodes.  The larger one measures 3.2 x 2.1 x 0.9 cm. A second node measures 2.9 x 1.9 x 1.3 cm.  This was attributable to patient's CMML.  Ultrasound guided biopsy of the larger left axilla lymph node has been ordered.    Rheumatoid arthritis  Management per rheumatology  service.    Anemia  Multifactorial including drug effect.  Currently stable at 7.1 grams/deciliter with no evidence of bleed.  Continue to monitor.      Ample time given for questions and addressed to her satisfaction.  Will see her back in 2 weeks however she knows to contact us sooner if needed    ?   Follow-Up: Follow up in about 2 weeks (around 12/17/2019).    CECILIO PUGA Md., Ph.D  Hematology & Oncology Department  Phone #: [phone]

## 2019-12-03 NOTE — ASSESSMENT & PLAN NOTE
On azacitidine.  Reviewed blood work today, leukocytosis noted which is attributable to recent upper respiratory infection for which she is currently on antibiotics.  Hemoglobin is noted at 7.1 grams/deciliter and there is improvement in thrombocytopenia with platelet count above 100.  Her next treatment resumes December 23, 2019.

## 2019-12-17 ENCOUNTER — TELEPHONE (OUTPATIENT)
Dept: INTERNAL MEDICINE | Facility: CLINIC | Age: 79
End: 2019-12-17

## 2019-12-22 NOTE — ASSESSMENT & PLAN NOTE
Reviewed labs.  Unfortunately hemoglobin is noted at 6.8 grams/deciliter.  Patient remains asymptomatic.  White count also decreased favorably from 49,229 1000.  At this point will hold off on treatment today.  She be re-evaluated in 1 week with repeat labs.

## 2019-12-22 NOTE — PROGRESS NOTES
Subjective:   Date of Visit: 12/23/19   ?   CHIEF COMPLAINT:   Chronic myelomonocytic leukemia type 2???????   ?   ONCOLOGIC DIAGNOSIS:  Chronic myelomonocytic leukemia type 2  ?   CURRENT TREATMENT:  Azacitadine  ?      Leukocytosis    5/3/2016 Initial Diagnosis     Leukocytosis      8/16/2019 Tumor Conference     Presenting Hospital / Clinic: [facility] [place]  Virtual Tumor Board Conference: In person  Presenter: Dr. Sathish Devine  Specialties Present: Medical Oncology;Radiation Oncology;Surgical Oncology;Pathology;Navigation;Research;Plastic Surgery;Radiology;Gastrointestinal  Presentation at Cancer Conference: Prospective  Cancer Type: Other  Other Cancer: Leukocytosis  Recommended Plan: Additional screening  Send blood specimen for FISH to determine if CLL        Chronic myelomonocytic leukemia not having achieved remission    9/13/2019 Initial Diagnosis     Chronic myelomonocytic leukemia not having achieved remission      10/14/2019 -  Chemotherapy     Treatment Summary   Plan Name: OP AZACITADINE 7-DAY (SUB-Q)  Treatment Goal: Palliative  Status: Active  Start Date: 10/14/2019  End Date: 1/21/2020 (Planned)  Provider: Denton Knox MD  Chemotherapy: azaCITIDine (VIDAZA) chemo injection 115 mg, 75 mg/m2 = 115 mg, Subcutaneous, Clinic/HOD 1 time, 2 of 4 cycles  Administration: 115 mg (10/14/2019), 115 mg (10/15/2019), 115 mg (10/21/2019), 115 mg (10/16/2019), 115 mg (10/17/2019), 115 mg (10/18/2019), 115 mg (10/22/2019), 115 mg (11/18/2019), 115 mg (11/19/2019), 115 mg (11/25/2019), 115 mg (11/20/2019), 115 mg (11/21/2019), 115 mg (11/22/2019), 115 mg (11/26/2019)          Note:  Ms Parker was seen at [facility] today in the company of her daughter and .  She is a pleasant 79-year-old female with recent diagnosis of chronic myelomonocytic leukemia type 2 and currently on azacitidine.    She denies fever, sick contacts, nausea or vomiting, shortness of breath, chest pain, abdominal  pain, diarrhea or dysuria.     She also was recently sent for ultrasound of her left axillary lymphadenopathy which was thought to be likely related to her chronic leukemia and less likely breast cancer or lymphoma.  She denies unintentional weight loss, fever, or night sweats.      Review of Systems   Constitutional: Negative for activity change, appetite change, chills, fatigue, fever and unexpected weight change.   HENT: Negative for hearing loss, mouth sores, nosebleeds, sore throat, tinnitus, trouble swallowing and voice change.    Eyes: Negative for visual disturbance.   Respiratory: Negative for cough, chest tightness and shortness of breath.    Cardiovascular: Negative for chest pain, palpitations and leg swelling.   Gastrointestinal: Negative for abdominal pain, anal bleeding, blood in stool, constipation, diarrhea, nausea and vomiting.   Genitourinary: Negative for dysuria, frequency, hematuria, pelvic pain, vaginal bleeding and vaginal pain.   Musculoskeletal: Negative for arthralgias, back pain, joint swelling and neck pain.   Skin: Negative for color change, pallor, rash and wound.   Allergic/Immunologic: Negative for immunocompromised state.   Neurological: Negative for dizziness, tremors, syncope, speech difficulty, weakness, light-headedness and headaches.   Hematological: Negative for adenopathy. Does not bruise/bleed easily.   Psychiatric/Behavioral: Negative for agitation, confusion, decreased concentration, hallucinations and sleep disturbance. The patient is not nervous/anxious.        ?   PAST MEDICAL HISTORY:   Past Medical History:   Diagnosis Date    Acid reflux     Anxiety     Back pain     Bronchitis, chronic obstructive w acute bronchitis 7/29/2016    Cancer     [facility] arms, face- Dr. Lata Tejada    Cataract     2+NS    Degenerative disc disease     Depression     Dry mouth     Hernia, hiatal 11/18/2013    Hypertension     Hypothyroid     Macrocytic anemia 5/3/2016    Macular  degeneration     Migraines     Mixed anxiety and depressive disorder     Multiple fractures of ribs of right side     Osteoporosis     Other hyperlipidemia 10/11/2019    Pneumonia     Pneumonia due to other staphylococcus     Rheumatoid arthritis     Rheumatoid arthritis(714.0)     Rheumatoid arthritis(714.0)     Remicade, MTX.    ?     PAST SURGICAL HISTORY:   Past Surgical History:   Procedure Laterality Date    APPENDECTOMY  1985    CATARACT EXTRACTION Bilateral 6/11/15    Dr. Booth    CHOLECYSTECTOMY  2013    cryoablasion kidney Left 09/27/2016    feet Bilateral     rheumatoid    FRACTURE SURGERY Right     tibia    HERNIA REPAIR      HYSTERECTOMY  1970    partial    JOINT REPLACEMENT      bilateral knees (2008), hands, wrists, knuckles, toes    LAPAROSCOPIC NISSEN FUNDOPLICATION      TRANSFORAMINAL EPIDURAL INJECTION OF STEROID Left 6/25/2019    Procedure: Left L5/S1 TF AYAAN with local;  Surgeon: Rowdy Felix MD;  Location: [facility];  Service: Pain Management;  Laterality: Left;      ?   ALLERGIES:   Allergies as of 12/23/2019 - Reviewed 12/23/2019   Allergen Reaction Noted    Codeine  06/08/2012    Doxycycline  02/13/2019    Gabapentin Other (See Comments) 08/14/2019      ?   MEDICATIONS:?   Outpatient Medications Marked as Taking for the 12/23/19 encounter (Office Visit) with Denton Knox MD   Medication Sig Dispense Refill    albuterol (PROVENTIL) 2.5 mg /3 mL (0.083 %) nebulizer solution Take 3 mLs (2.5 mg total) by nebulization every 6 (six) hours as needed for Wheezing. 1 Box 5    amitriptyline (ELAVIL) 75 MG tablet TAKE 1 TABLET BY MOUTH EVERY EVENING 90 tablet 3    benzonatate (TESSALON) 100 MG capsule Take 1-2 capsules (100-200 mg total) by mouth 3 (three) times daily as needed for Cough. 60 capsule 0    calcium citrate-vitamin D (CITRACAL + D) 315-200 mg-unit per tablet Take 1 tablet by mouth once daily.       DULoxetine (CYMBALTA) 20 MG capsule Take 2  capsules (40 mg total) by mouth once daily. 180 capsule 3    hydrocodone-acetaminophen 5-325mg (NORCO) 5-325 mg per tablet TK 1 T PO Q 6 H PRN  0    hydrOXYzine HCl (ATARAX) 25 MG tablet       leucovorin (WELLCOVORIN) 5 mg Tab TAKE ONE WEEKLY ON  4 tablet 3    levothyroxine (SYNTHROID) 88 MCG tablet TAKE 1 TABLET BY MOUTH BEFORE BREAKFAST 90 tablet 3    magnesium oxide (MAG-OX) 400 mg (241.3 mg magnesium) tablet Take 1 tablet (400 mg total) by mouth 3 (three) times daily.  0    meclizine (ANTIVERT) 50 MG tablet Take 25 mg by mouth 3 (three) times daily as needed.      metoprolol succinate (TOPROL-XL) 50 MG 24 hr tablet TAKE 1 TABLET(50 MG) BY MOUTH EVERY DAY 30 tablet 11    multivitamin capsule Take by mouth. As directed      ondansetron (ZOFRAN) 4 MG tablet Take 1 tablet (4 mg total) by mouth every 8 (eight) hours as needed for Nausea. 30 tablet 1    pravastatin (PRAVACHOL) 10 MG tablet TAKE 1 TABLET(10 MG) BY MOUTH EVERY DAY 30 tablet 11    prochlorperazine (COMPAZINE) 5 MG tablet TAKE 1 TABLET(5 MG) BY MOUTH EVERY 6 HOURS AS NEEDED FOR NAUSEA 385 tablet 1    tocilizumab (ACTEMRA) 80 mg/4 mL (20 mg/mL) Soln Inject into the vein.      valsartan-hydrochlorothiazide (DIOVAN-HCT) 80-12.5 mg per tablet TAKE 1 TABLET BY MOUTH DAILY 90 tablet 3      ?   SOCIAL HISTORY:?   Social History     Tobacco Use    Smoking status: Former Smoker     Packs/day: 0.25     Years: 2.00     Pack years: 0.50     Last attempt to quit: 1965     Years since quittin.1    Smokeless tobacco: Never Used   Substance Use Topics    Alcohol use: No        ?   FAMILY HISTORY:   family history includes Asthma in her brother and sister; Cancer in her brother and maternal grandfather; Cataracts in her mother; Chronic back pain in her brother and sister; Diabetes Mellitus in her brother; Fibromyalgia in her daughter; Heart disease in her father, maternal grandmother, and mother; Hyperlipidemia in her mother;  Hypertension in her brother, father, mother, and sister; Osteoarthritis in her brother, father, mother, and sister; Thyroid disease in her brother and sister.   ?     Objective:      Physical Exam   Constitutional: She is oriented to person, place, and time. She appears well-developed and well-nourished. She is cooperative.  Non-toxic appearance. She does not appear ill. No distress.   HENT:   Head: Normocephalic and atraumatic.   Mouth/Throat: No oropharyngeal exudate.   Eyes: Pupils are equal, round, and reactive to light. Conjunctivae are normal. Right eye exhibits no discharge. Left eye exhibits no discharge. No scleral icterus.   Neck: Normal range of motion. Neck supple. No thyromegaly present.   Cardiovascular: Normal rate and regular rhythm.   No murmur heard.  Pulmonary/Chest: Effort normal and breath sounds normal. No respiratory distress. She exhibits no tenderness.   Abdominal: Soft. Bowel sounds are normal. She exhibits no distension and no mass. There is no tenderness. There is no rebound and no guarding.   Musculoskeletal: Normal range of motion. She exhibits no edema or tenderness.   Lymphadenopathy:     She has no cervical adenopathy.        Right cervical: No superficial cervical adenopathy present.       Left cervical: No superficial cervical adenopathy present.        Right axillary: No pectoral adenopathy present.        Left axillary: No pectoral adenopathy present.No inguinal adenopathy noted on the right or left side.        Right: No supraclavicular adenopathy present.        Left: No supraclavicular adenopathy present.   Neurological: She is alert and oriented to person, place, and time. No cranial nerve deficit or sensory deficit.   Skin: Skin is warm and dry. Capillary refill takes 2 to 3 seconds. No rash noted. No erythema. No pallor.   Psychiatric: She has a normal mood and affect. Her behavior is normal. Judgment normal.       ?   Vitals:    12/23/19 1311   BP: 137/80   Pulse: 78    Resp: 12   Temp: 98.2 °F (36.8 °C)      ?     ECOG SCORE    1 - Restricted in strenuous activity-ambulatory and able to carry out work of a light nature         Laboratory:  ?   Lab Visit on 12/23/2019   Component Date Value Ref Range Status    WBC 12/23/2019 29.72* 3.90 - 12.70 K/uL Final    RBC 12/23/2019 2.06* 4.00 - 5.40 M/uL Final    Hemoglobin 12/23/2019 6.8* 12.0 - 16.0 g/dL Final    Hematocrit 12/23/2019 22.9* 37.0 - 48.5 % Final    Mean Corpuscular Volume 12/23/2019 111* 82 - 98 fL Final    Mean Corpuscular Hemoglobin 12/23/2019 33.0* 27.0 - 31.0 pg Final    Mean Corpuscular Hemoglobin Conc 12/23/2019 29.7* 32.0 - 36.0 g/dL Final    RDW 12/23/2019 21.1* 11.5 - 14.5 % Final    Platelets 12/23/2019 80* 150 - 350 K/uL Final    MPV 12/23/2019 12.3  9.2 - 12.9 fL Final    Immature Granulocytes 12/23/2019 CANCELED  0.0 - 0.5 % Final    Immature Grans (Abs) 12/23/2019 CANCELED  0.00 - 0.04 K/uL Final    nRBC 12/23/2019 9* 0 /100 WBC Final    Gran% 12/23/2019 38.0  38.0 - 73.0 % Final    Lymph% 12/23/2019 47.0  18.0 - 48.0 % Final    Mono% 12/23/2019 11.0  4.0 - 15.0 % Final    Eosinophil% 12/23/2019 0.0  0.0 - 8.0 % Final    Basophil% 12/23/2019 0.0  0.0 - 1.9 % Final    Myelocytes 12/23/2019 4.0  % Final    Platelet Estimate 12/23/2019 Decreased*  Final    Aniso 12/23/2019 Moderate   Final    Poik 12/23/2019 Moderate   Final    Poly 12/23/2019 Occasional   Final    Hypo 12/23/2019 Occasional   Final    Tear Drop Cells 12/23/2019 Occasional   Final    Stomatocytes 12/23/2019 Present   Final    Smudge Cells 12/23/2019 Present   Final    Differential Method 12/23/2019 Manual   Final    Sodium 12/23/2019 136  136 - 145 mmol/L Final    Potassium 12/23/2019 4.8  3.5 - 5.1 mmol/L Final    Chloride 12/23/2019 103  95 - 110 mmol/L Final    CO2 12/23/2019 24  23 - 29 mmol/L Final    Glucose 12/23/2019 103  70 - 110 mg/dL Final    BUN, Bld 12/23/2019 19  8 - 23 mg/dL Final    Creatinine  12/23/2019 1.0  0.5 - 1.4 mg/dL Final    Calcium 12/23/2019 9.1  8.7 - 10.5 mg/dL Final    Total Protein 12/23/2019 8.4  6.0 - 8.4 g/dL Final    Albumin 12/23/2019 3.7  3.5 - 5.2 g/dL Final    Total Bilirubin 12/23/2019 0.5  0.1 - 1.0 mg/dL Final    Alkaline Phosphatase 12/23/2019 137* 55 - 135 U/L Final    AST 12/23/2019 26  10 - 40 U/L Final    ALT 12/23/2019 17  10 - 44 U/L Final    Anion Gap 12/23/2019 9  8 - 16 mmol/L Final    eGFR if African American 12/23/2019 >60  >60 mL/min/1.73 m^2 Final    eGFR if non African American 12/23/2019 54* >60 mL/min/1.73 m^2 Final      ?   Tumor markers   ?   ?   Imaging: Mammo Digital Diagnostic Left w/ Chintan, US Breast Left Limited  Narrative: Result:   Mammo Digital Diagnostic Left w/ Chintan  US Breast Left Limited     History:  Patient is 79 y.o. and is seen for a diagnostic mammogram.    Films Compared:  Compared to: 02/05/2019 Mammo Digital Screening Bilat w/ Chintan, 01/15/2018   Mammo Digital Screening Bilat with Tomosynthesis_CAD, 01/13/2017 Mammo   Digital Screening Bilat with Tomosynthesis_CAD, 10/08/2014 Mammo Digital   Screening Bilat with Tomosynthesis_CAD, and 10/08/2013 Mammo Digital   Screening Bilat with CAD     Findings:  This procedure was performed using tomosynthesis.  Computer-aided   detection was utilized in the interpretation of this examination.  US Breast Left Limited  There is a lymph node seen in the left axilla. Corresponding to axillary   palpable mass are at least 2 enlarged lymph nodes.  The larger one   measures 3.2 x 2.1 x 0.9 cm.   A second node measures 2.9 x 1.9 x 1.3 cm.    This is presumably secondary to the patient's known leukemia.  Axillary   and mediastinal adenopathy was also present on the recent CT from 9/6/19.    Pet scan also demonstrated pelvic adenopathy.  Correlation suggested.     Mammo Digital Diagnostic Left w/ Chintan  The left breast is heterogeneously dense, which may obscure small masses.    There are no corresponding  lymph nodes seen on this modality.   Impression: There is no mammographic evidence of breast malignancy.    Axillary adenopathy, secondary to patient's leukemia.  Please correlate.    BI-RADS Category:   Left: 2 - Benign  Overall: 2 - Benign.  No evidence of breast malignancy.     Recommendation:  Routine screening mammogram in 1 year is recommended.  Routine screening mammogram in 1 year is recommended.     Your estimated lifetime risk of breast cancer (to age 85) based on   Tyrer-Cuzick risk assessment model is Tyrer-Cuzick: 0.84 %. According to   the American Cancer Society, patients with a lifetime breast cancer risk   of 20% or higher might benefit from supplemental screening tests. ??      ?      Pathology:  Pathology Results  (Last 10 years)               10/12/15 0000  Tissue Specimen to Pathology Final result    05/29/13 0000  Tissue Specimen to Pathology Final result           ?   Assessment/Plan:       .Axillary mass, left  Left axillary lymph node was noted on physical assessment, ultrasound of left axilla showed 2 enlarged lymph nodes. The larger one measures 3.2 x 2.1 x 0.9 cm.  A second node measures 2.9 x 1.9 x 1.3 cm.     This is likely due to CMML.  I also reviewed patient's previous PET-CT scan axilla CT scan that showed diffuse adenopathy including pelvic adenopathy.    Mammography evaluation was unremarkable for any breast mass.  Ultrasound-guided biopsy of the left axillary lymph node has been ordered and scheduled.    .     Rheumatoid arthritis  Management per rheumatology service.    Chronic myelomonocytic leukemia not having achieved remission  Reviewed labs.  Unfortunately hemoglobin is noted at 6.8 grams/deciliter.  Patient remains asymptomatic.  White count also decreased favorably from 49,229 1000.  At this point will hold off on treatment today.  She be re-evaluated in 1 week with repeat labs.      Ample time given for questions and addressed to her satisfaction.  Will see her back in 2  weeks however she knows to contact us sooner if needed    ?   Follow-Up: Follow up in about 1 week (around 12/30/2019).    CECILIO PUGA Md., Ph.D  Hematology & Oncology Department  Phone #: [phone]

## 2019-12-22 NOTE — ASSESSMENT & PLAN NOTE
Left axillary lymph node was noted on physical assessment, ultrasound of left axilla showed 2 enlarged lymph nodes. The larger one measures 3.2 x 2.1 x 0.9 cm.  A second node measures 2.9 x 1.9 x 1.3 cm.     This is likely due to CMML.  I also reviewed patient's previous PET-CT scan axilla CT scan that showed diffuse adenopathy including pelvic adenopathy.    Mammography evaluation was unremarkable for any breast mass.  Ultrasound-guided biopsy of the left axillary lymph node has been ordered and scheduled.    .

## 2019-12-23 ENCOUNTER — OFFICE VISIT (OUTPATIENT)
Dept: HEMATOLOGY/ONCOLOGY | Facility: CLINIC | Age: 79
End: 2019-12-23
Payer: MEDICARE

## 2019-12-23 ENCOUNTER — LAB VISIT (OUTPATIENT)
Dept: LAB | Facility: HOSPITAL | Age: 79
End: 2019-12-23
Attending: INTERNAL MEDICINE
Payer: MEDICARE

## 2019-12-23 ENCOUNTER — TELEPHONE (OUTPATIENT)
Dept: HOME HEALTH SERVICES | Facility: HOSPITAL | Age: 79
End: 2019-12-23

## 2019-12-23 VITALS
HEIGHT: 62 IN | DIASTOLIC BLOOD PRESSURE: 80 MMHG | SYSTOLIC BLOOD PRESSURE: 137 MMHG | WEIGHT: 116.19 LBS | BODY MASS INDEX: 21.38 KG/M2 | HEART RATE: 78 BPM | TEMPERATURE: 98 F | OXYGEN SATURATION: 82 % | RESPIRATION RATE: 12 BRPM

## 2019-12-23 DIAGNOSIS — R22.32 AXILLARY MASS, LEFT: ICD-10-CM

## 2019-12-23 DIAGNOSIS — M05.711 RHEUMATOID ARTHRITIS INVOLVING RIGHT SHOULDER WITH POSITIVE RHEUMATOID FACTOR: Chronic | ICD-10-CM

## 2019-12-23 DIAGNOSIS — C93.10 CHRONIC MYELOMONOCYTIC LEUKEMIA NOT HAVING ACHIEVED REMISSION: Chronic | ICD-10-CM

## 2019-12-23 LAB
ALBUMIN SERPL BCP-MCNC: 3.7 G/DL (ref 3.5–5.2)
ALP SERPL-CCNC: 137 U/L (ref 55–135)
ALT SERPL W/O P-5'-P-CCNC: 17 U/L (ref 10–44)
ANION GAP SERPL CALC-SCNC: 9 MMOL/L (ref 8–16)
ANISOCYTOSIS BLD QL SMEAR: ABNORMAL
AST SERPL-CCNC: 26 U/L (ref 10–40)
BASOPHILS NFR BLD: 0 % (ref 0–1.9)
BILIRUB SERPL-MCNC: 0.5 MG/DL (ref 0.1–1)
BUN SERPL-MCNC: 19 MG/DL (ref 8–23)
CALCIUM SERPL-MCNC: 9.1 MG/DL (ref 8.7–10.5)
CHLORIDE SERPL-SCNC: 103 MMOL/L (ref 95–110)
CO2 SERPL-SCNC: 24 MMOL/L (ref 23–29)
CREAT SERPL-MCNC: 1 MG/DL (ref 0.5–1.4)
DACRYOCYTES BLD QL SMEAR: ABNORMAL
DIFFERENTIAL METHOD: ABNORMAL
EOSINOPHIL NFR BLD: 0 % (ref 0–8)
ERYTHROCYTE [DISTWIDTH] IN BLOOD BY AUTOMATED COUNT: 21.1 % (ref 11.5–14.5)
EST. GFR  (AFRICAN AMERICAN): >60 ML/MIN/1.73 M^2
EST. GFR  (NON AFRICAN AMERICAN): 54 ML/MIN/1.73 M^2
GLUCOSE SERPL-MCNC: 103 MG/DL (ref 70–110)
HCT VFR BLD AUTO: 22.9 % (ref 37–48.5)
HGB BLD-MCNC: 6.8 G/DL (ref 12–16)
HYPOCHROMIA BLD QL SMEAR: ABNORMAL
IMM GRANULOCYTES # BLD AUTO: ABNORMAL K/UL (ref 0–0.04)
IMM GRANULOCYTES NFR BLD AUTO: ABNORMAL % (ref 0–0.5)
LYMPHOCYTES NFR BLD: 47 % (ref 18–48)
MCH RBC QN AUTO: 33 PG (ref 27–31)
MCHC RBC AUTO-ENTMCNC: 29.7 G/DL (ref 32–36)
MCV RBC AUTO: 111 FL (ref 82–98)
MONOCYTES NFR BLD: 11 % (ref 4–15)
MYELOCYTES NFR BLD MANUAL: 4 %
NEUTROPHILS NFR BLD: 38 % (ref 38–73)
NRBC BLD-RTO: 9 /100 WBC
PLATELET # BLD AUTO: 80 K/UL (ref 150–350)
PLATELET BLD QL SMEAR: ABNORMAL
PMV BLD AUTO: 12.3 FL (ref 9.2–12.9)
POIKILOCYTOSIS BLD QL SMEAR: ABNORMAL
POLYCHROMASIA BLD QL SMEAR: ABNORMAL
POTASSIUM SERPL-SCNC: 4.8 MMOL/L (ref 3.5–5.1)
PROT SERPL-MCNC: 8.4 G/DL (ref 6–8.4)
RBC # BLD AUTO: 2.06 M/UL (ref 4–5.4)
SMUDGE CELLS BLD QL SMEAR: PRESENT
SODIUM SERPL-SCNC: 136 MMOL/L (ref 136–145)
STOMATOCYTES BLD QL SMEAR: PRESENT
WBC # BLD AUTO: 29.72 K/UL (ref 3.9–12.7)

## 2019-12-23 PROCEDURE — 85027 COMPLETE CBC AUTOMATED: CPT | Mod: HCNC

## 2019-12-23 PROCEDURE — 1126F PR PAIN SEVERITY QUANTIFIED, NO PAIN PRESENT: ICD-10-PCS | Mod: HCNC,S$GLB,, | Performed by: INTERNAL MEDICINE

## 2019-12-23 PROCEDURE — 3079F DIAST BP 80-89 MM HG: CPT | Mod: HCNC,CPTII,S$GLB, | Performed by: INTERNAL MEDICINE

## 2019-12-23 PROCEDURE — 1126F AMNT PAIN NOTED NONE PRSNT: CPT | Mod: HCNC,S$GLB,, | Performed by: INTERNAL MEDICINE

## 2019-12-23 PROCEDURE — 99999 PR PBB SHADOW E&M-EST. PATIENT-LVL V: ICD-10-PCS | Mod: PBBFAC,HCNC,, | Performed by: INTERNAL MEDICINE

## 2019-12-23 PROCEDURE — 36415 COLL VENOUS BLD VENIPUNCTURE: CPT | Mod: HCNC

## 2019-12-23 PROCEDURE — 3079F PR MOST RECENT DIASTOLIC BLOOD PRESSURE 80-89 MM HG: ICD-10-PCS | Mod: HCNC,CPTII,S$GLB, | Performed by: INTERNAL MEDICINE

## 2019-12-23 PROCEDURE — 99215 PR OFFICE/OUTPT VISIT, EST, LEVL V, 40-54 MIN: ICD-10-PCS | Mod: HCNC,S$GLB,, | Performed by: INTERNAL MEDICINE

## 2019-12-23 PROCEDURE — 80053 COMPREHEN METABOLIC PANEL: CPT | Mod: HCNC

## 2019-12-23 PROCEDURE — 1159F PR MEDICATION LIST DOCUMENTED IN MEDICAL RECORD: ICD-10-PCS | Mod: HCNC,S$GLB,, | Performed by: INTERNAL MEDICINE

## 2019-12-23 PROCEDURE — 1159F MED LIST DOCD IN RCRD: CPT | Mod: HCNC,S$GLB,, | Performed by: INTERNAL MEDICINE

## 2019-12-23 PROCEDURE — 99215 OFFICE O/P EST HI 40 MIN: CPT | Mod: HCNC,S$GLB,, | Performed by: INTERNAL MEDICINE

## 2019-12-23 PROCEDURE — 3075F SYST BP GE 130 - 139MM HG: CPT | Mod: HCNC,CPTII,S$GLB, | Performed by: INTERNAL MEDICINE

## 2019-12-23 PROCEDURE — 99499 UNLISTED E&M SERVICE: CPT | Mod: HCNC,S$GLB,, | Performed by: INTERNAL MEDICINE

## 2019-12-23 PROCEDURE — 85007 BL SMEAR W/DIFF WBC COUNT: CPT | Mod: HCNC

## 2019-12-23 PROCEDURE — 99999 PR PBB SHADOW E&M-EST. PATIENT-LVL V: CPT | Mod: PBBFAC,HCNC,, | Performed by: INTERNAL MEDICINE

## 2019-12-23 PROCEDURE — 99499 RISK ADDL DX/OHS AUDIT: ICD-10-PCS | Mod: HCNC,S$GLB,, | Performed by: INTERNAL MEDICINE

## 2019-12-23 PROCEDURE — 3075F PR MOST RECENT SYSTOLIC BLOOD PRESS GE 130-139MM HG: ICD-10-PCS | Mod: HCNC,CPTII,S$GLB, | Performed by: INTERNAL MEDICINE

## 2019-12-23 PROCEDURE — 1101F PT FALLS ASSESS-DOCD LE1/YR: CPT | Mod: HCNC,CPTII,S$GLB, | Performed by: INTERNAL MEDICINE

## 2019-12-23 PROCEDURE — 1101F PR PT FALLS ASSESS DOC 0-1 FALLS W/OUT INJ PAST YR: ICD-10-PCS | Mod: HCNC,CPTII,S$GLB, | Performed by: INTERNAL MEDICINE

## 2019-12-31 ENCOUNTER — LAB VISIT (OUTPATIENT)
Dept: LAB | Facility: HOSPITAL | Age: 79
End: 2019-12-31
Attending: INTERNAL MEDICINE
Payer: MEDICARE

## 2019-12-31 ENCOUNTER — OFFICE VISIT (OUTPATIENT)
Dept: HEMATOLOGY/ONCOLOGY | Facility: CLINIC | Age: 79
End: 2019-12-31
Payer: MEDICARE

## 2019-12-31 VITALS
TEMPERATURE: 97 F | WEIGHT: 114.88 LBS | HEIGHT: 62 IN | BODY MASS INDEX: 21.14 KG/M2 | SYSTOLIC BLOOD PRESSURE: 141 MMHG | HEART RATE: 88 BPM | DIASTOLIC BLOOD PRESSURE: 75 MMHG

## 2019-12-31 DIAGNOSIS — M05.731 RHEUMATOID ARTHRITIS INVOLVING BOTH WRISTS WITH POSITIVE RHEUMATOID FACTOR: Chronic | ICD-10-CM

## 2019-12-31 DIAGNOSIS — D61.810 PANCYTOPENIA DUE TO ANTINEOPLASTIC CHEMOTHERAPY: ICD-10-CM

## 2019-12-31 DIAGNOSIS — T45.1X5A PANCYTOPENIA DUE TO ANTINEOPLASTIC CHEMOTHERAPY: ICD-10-CM

## 2019-12-31 DIAGNOSIS — C93.10 CHRONIC MYELOMONOCYTIC LEUKEMIA NOT HAVING ACHIEVED REMISSION: Primary | Chronic | ICD-10-CM

## 2019-12-31 DIAGNOSIS — D69.6 THROMBOCYTOPENIA: ICD-10-CM

## 2019-12-31 DIAGNOSIS — R64 CACHEXIA: ICD-10-CM

## 2019-12-31 DIAGNOSIS — M05.732 RHEUMATOID ARTHRITIS INVOLVING BOTH WRISTS WITH POSITIVE RHEUMATOID FACTOR: Chronic | ICD-10-CM

## 2019-12-31 DIAGNOSIS — C93.10 CHRONIC MYELOMONOCYTIC LEUKEMIA NOT HAVING ACHIEVED REMISSION: Chronic | ICD-10-CM

## 2019-12-31 LAB
ALBUMIN SERPL BCP-MCNC: 3.7 G/DL (ref 3.5–5.2)
ALP SERPL-CCNC: 112 U/L (ref 55–135)
ALT SERPL W/O P-5'-P-CCNC: 11 U/L (ref 10–44)
ANION GAP SERPL CALC-SCNC: 9 MMOL/L (ref 8–16)
ANISOCYTOSIS BLD QL SMEAR: ABNORMAL
AST SERPL-CCNC: 24 U/L (ref 10–40)
BASOPHILS # BLD AUTO: ABNORMAL K/UL (ref 0–0.2)
BASOPHILS NFR BLD: 0 % (ref 0–1.9)
BILIRUB SERPL-MCNC: 0.6 MG/DL (ref 0.1–1)
BUN SERPL-MCNC: 23 MG/DL (ref 8–23)
CALCIUM SERPL-MCNC: 8.9 MG/DL (ref 8.7–10.5)
CHLORIDE SERPL-SCNC: 103 MMOL/L (ref 95–110)
CO2 SERPL-SCNC: 25 MMOL/L (ref 23–29)
CREAT SERPL-MCNC: 1 MG/DL (ref 0.5–1.4)
DACRYOCYTES BLD QL SMEAR: ABNORMAL
DIFFERENTIAL METHOD: ABNORMAL
EOSINOPHIL # BLD AUTO: ABNORMAL K/UL (ref 0–0.5)
EOSINOPHIL NFR BLD: 1 % (ref 0–8)
ERYTHROCYTE [DISTWIDTH] IN BLOOD BY AUTOMATED COUNT: 20.8 % (ref 11.5–14.5)
EST. GFR  (AFRICAN AMERICAN): >60 ML/MIN/1.73 M^2
EST. GFR  (NON AFRICAN AMERICAN): 54 ML/MIN/1.73 M^2
GLUCOSE SERPL-MCNC: 119 MG/DL (ref 70–110)
HCT VFR BLD AUTO: 23.1 % (ref 37–48.5)
HGB BLD-MCNC: 6.7 G/DL (ref 12–16)
HYPOCHROMIA BLD QL SMEAR: ABNORMAL
IMM GRANULOCYTES # BLD AUTO: ABNORMAL K/UL (ref 0–0.04)
IMM GRANULOCYTES NFR BLD AUTO: ABNORMAL % (ref 0–0.5)
LYMPHOCYTES # BLD AUTO: ABNORMAL K/UL (ref 1–4.8)
LYMPHOCYTES NFR BLD: 38 % (ref 18–48)
MCH RBC QN AUTO: 32.1 PG (ref 27–31)
MCHC RBC AUTO-ENTMCNC: 29 G/DL (ref 32–36)
MCV RBC AUTO: 111 FL (ref 82–98)
METAMYELOCYTES NFR BLD MANUAL: 5 %
MONOCYTES # BLD AUTO: ABNORMAL K/UL (ref 0.3–1)
MONOCYTES NFR BLD: 7 % (ref 4–15)
MYELOCYTES NFR BLD MANUAL: 4 %
NEUTROPHILS NFR BLD: 45 % (ref 38–73)
NRBC BLD-RTO: 9 /100 WBC
PLATELET # BLD AUTO: 95 K/UL (ref 150–350)
PLATELET BLD QL SMEAR: ABNORMAL
PMV BLD AUTO: 11.9 FL (ref 9.2–12.9)
POIKILOCYTOSIS BLD QL SMEAR: SLIGHT
POLYCHROMASIA BLD QL SMEAR: ABNORMAL
POTASSIUM SERPL-SCNC: 4.3 MMOL/L (ref 3.5–5.1)
PROT SERPL-MCNC: 8.1 G/DL (ref 6–8.4)
RBC # BLD AUTO: 2.09 M/UL (ref 4–5.4)
SODIUM SERPL-SCNC: 137 MMOL/L (ref 136–145)
STOMATOCYTES BLD QL SMEAR: PRESENT
WBC # BLD AUTO: 24.68 K/UL (ref 3.9–12.7)

## 2019-12-31 PROCEDURE — 99999 PR PBB SHADOW E&M-EST. PATIENT-LVL III: ICD-10-PCS | Mod: PBBFAC,HCNC,, | Performed by: INTERNAL MEDICINE

## 2019-12-31 PROCEDURE — 85027 COMPLETE CBC AUTOMATED: CPT | Mod: HCNC

## 2019-12-31 PROCEDURE — 1101F PT FALLS ASSESS-DOCD LE1/YR: CPT | Mod: HCNC,CPTII,S$GLB, | Performed by: INTERNAL MEDICINE

## 2019-12-31 PROCEDURE — 80053 COMPREHEN METABOLIC PANEL: CPT | Mod: HCNC

## 2019-12-31 PROCEDURE — 3077F SYST BP >= 140 MM HG: CPT | Mod: HCNC,CPTII,S$GLB, | Performed by: INTERNAL MEDICINE

## 2019-12-31 PROCEDURE — 99999 PR PBB SHADOW E&M-EST. PATIENT-LVL III: CPT | Mod: PBBFAC,HCNC,, | Performed by: INTERNAL MEDICINE

## 2019-12-31 PROCEDURE — 99499 RISK ADDL DX/OHS AUDIT: ICD-10-PCS | Mod: HCNC,S$GLB,, | Performed by: INTERNAL MEDICINE

## 2019-12-31 PROCEDURE — 3077F PR MOST RECENT SYSTOLIC BLOOD PRESSURE >= 140 MM HG: ICD-10-PCS | Mod: HCNC,CPTII,S$GLB, | Performed by: INTERNAL MEDICINE

## 2019-12-31 PROCEDURE — 1159F MED LIST DOCD IN RCRD: CPT | Mod: HCNC,S$GLB,, | Performed by: INTERNAL MEDICINE

## 2019-12-31 PROCEDURE — 3078F PR MOST RECENT DIASTOLIC BLOOD PRESSURE < 80 MM HG: ICD-10-PCS | Mod: HCNC,CPTII,S$GLB, | Performed by: INTERNAL MEDICINE

## 2019-12-31 PROCEDURE — 1101F PR PT FALLS ASSESS DOC 0-1 FALLS W/OUT INJ PAST YR: ICD-10-PCS | Mod: HCNC,CPTII,S$GLB, | Performed by: INTERNAL MEDICINE

## 2019-12-31 PROCEDURE — 1125F AMNT PAIN NOTED PAIN PRSNT: CPT | Mod: HCNC,S$GLB,, | Performed by: INTERNAL MEDICINE

## 2019-12-31 PROCEDURE — 1159F PR MEDICATION LIST DOCUMENTED IN MEDICAL RECORD: ICD-10-PCS | Mod: HCNC,S$GLB,, | Performed by: INTERNAL MEDICINE

## 2019-12-31 PROCEDURE — 99214 PR OFFICE/OUTPT VISIT, EST, LEVL IV, 30-39 MIN: ICD-10-PCS | Mod: HCNC,S$GLB,, | Performed by: INTERNAL MEDICINE

## 2019-12-31 PROCEDURE — 99499 UNLISTED E&M SERVICE: CPT | Mod: HCNC,S$GLB,, | Performed by: INTERNAL MEDICINE

## 2019-12-31 PROCEDURE — 1125F PR PAIN SEVERITY QUANTIFIED, PAIN PRESENT: ICD-10-PCS | Mod: HCNC,S$GLB,, | Performed by: INTERNAL MEDICINE

## 2019-12-31 PROCEDURE — 36415 COLL VENOUS BLD VENIPUNCTURE: CPT | Mod: HCNC

## 2019-12-31 PROCEDURE — 99214 OFFICE O/P EST MOD 30 MIN: CPT | Mod: HCNC,S$GLB,, | Performed by: INTERNAL MEDICINE

## 2019-12-31 PROCEDURE — 3078F DIAST BP <80 MM HG: CPT | Mod: HCNC,CPTII,S$GLB, | Performed by: INTERNAL MEDICINE

## 2019-12-31 PROCEDURE — 85007 BL SMEAR W/DIFF WBC COUNT: CPT | Mod: HCNC

## 2019-12-31 NOTE — H&P (VIEW-ONLY)
Subjective:       Patient ID: Sarah Parker is a 79 y.o. female.    Chief Complaint: Results; Chemotherapy; and Anemia    HPI 79-year-old female with chronic myelomonocytic leukemia type 2 patient has been receiving Vidaza returns for evaluation of next cycle of therapy with persistent pancytopenia.  ECOG status 1    Past Medical History:   Diagnosis Date    Acid reflux     Anxiety     Back pain     Bronchitis, chronic obstructive w acute bronchitis 7/29/2016    Cancer     [facility] arms, face- Dr. Lata Tejada    Cataract     2+NS    Degenerative disc disease     Depression     Dry mouth     Hernia, hiatal 11/18/2013    Hypertension     Hypothyroid     Macrocytic anemia 5/3/2016    Macular degeneration     Migraines     Mixed anxiety and depressive disorder     Multiple fractures of ribs of right side     Osteoporosis     Other hyperlipidemia 10/11/2019    Pneumonia     Pneumonia due to other staphylococcus     Rheumatoid arthritis     Rheumatoid arthritis(714.0)     Rheumatoid arthritis(714.0)     Remicade, MTX.     Family History   Problem Relation Age of Onset    Heart disease Mother     Hyperlipidemia Mother     Hypertension Mother     Osteoarthritis Mother     Cataracts Mother     Hypertension Father     Osteoarthritis Father     Heart disease Father     Asthma Sister     Chronic back pain Sister     Hypertension Sister     Osteoarthritis Sister     Thyroid disease Sister     Asthma Brother     Cancer Brother     Chronic back pain Brother     Diabetes Mellitus Brother     Hypertension Brother     Osteoarthritis Brother     Thyroid disease Brother     Cancer Maternal Grandfather     Fibromyalgia Daughter     Heart disease Maternal Grandmother     Colon cancer Neg Hx     Diabetes Neg Hx      Social History     Socioeconomic History    Marital status:      Spouse name: Not on file    Number of children: Not on file    Years of education: Not on file     Highest education level: Not on file   Occupational History    Occupation: retired   Social Needs    Financial resource strain: Not on file    Food insecurity:     Worry: Not on file     Inability: Not on file    Transportation needs:     Medical: Not on file     Non-medical: Not on file   Tobacco Use    Smoking status: Former Smoker     Packs/day: 0.25     Years: 2.00     Pack years: 0.50     Last attempt to quit: 1965     Years since quittin.1    Smokeless tobacco: Never Used   Substance and Sexual Activity    Alcohol use: No    Drug use: No    Sexual activity: Never     Partners: Male   Lifestyle    Physical activity:     Days per week: Not on file     Minutes per session: Not on file    Stress: Not at all   Relationships    Social connections:     Talks on phone: Not on file     Gets together: Not on file     Attends Denominational service: Not on file     Active member of club or organization: Not on file     Attends meetings of clubs or organizations: Not on file     Relationship status: Not on file   Other Topics Concern    Not on file   Social History Narrative    Patient is aretired and live with .     Past Surgical History:   Procedure Laterality Date    APPENDECTOMY  1985    CATARACT EXTRACTION Bilateral 6/11/15    Dr. Booth    CHOLECYSTECTOMY  2013    cryoablasion kidney Left 2016    feet Bilateral     rheumatoid    FRACTURE SURGERY Right     tibia    HERNIA REPAIR      HYSTERECTOMY  1970    partial    JOINT REPLACEMENT      bilateral knees (), hands, wrists, knuckles, toes    LAPAROSCOPIC NISSEN FUNDOPLICATION      TRANSFORAMINAL EPIDURAL INJECTION OF STEROID Left 2019    Procedure: Left L5/S1 TF AYAAN with local;  Surgeon: Rowdy Felix MD;  Location: [facility];  Service: Pain Management;  Laterality: Left;       Labs:  Lab Results   Component Value Date    WBC 24.68 (H) 2019    HGB 6.7 (L) 2019    HCT 23.1 (L) 2019      (H) 12/31/2019    PLT 95 (L) 12/31/2019     BMP  Lab Results   Component Value Date     12/31/2019    K 4.3 12/31/2019     12/31/2019    CO2 25 12/31/2019    BUN 23 12/31/2019    CREATININE 1.0 12/31/2019    CALCIUM 8.9 12/31/2019    ANIONGAP 9 12/31/2019    ESTGFRAFRICA >60 12/31/2019    EGFRNONAA 54 (A) 12/31/2019     Lab Results   Component Value Date    ALT 11 12/31/2019    AST 24 12/31/2019    ALKPHOS 112 12/31/2019    BILITOT 0.6 12/31/2019       Lab Results   Component Value Date    IRON 36 08/25/2019    TIBC 185 (L) 08/25/2019    FERRITIN 253 09/27/2017     Lab Results   Component Value Date    EYMJQHXN27 1918 (H) 08/25/2019     Lab Results   Component Value Date    FOLATE 12.0 08/25/2019     Lab Results   Component Value Date    TSH 5.174 (H) 09/03/2019         Review of Systems   Constitutional: Positive for activity change, appetite change, fatigue and unexpected weight change. Negative for chills, diaphoresis and fever.   HENT: Negative for congestion, dental problem, drooling, ear discharge, ear pain, facial swelling, hearing loss, mouth sores, nosebleeds, postnasal drip, rhinorrhea, sinus pressure, sneezing, sore throat, tinnitus, trouble swallowing and voice change.    Eyes: Negative for photophobia, pain, discharge, redness, itching and visual disturbance.   Respiratory: Negative for cough, choking, chest tightness, shortness of breath, wheezing and stridor.    Cardiovascular: Negative for chest pain, palpitations and leg swelling.   Gastrointestinal: Negative for abdominal distention, abdominal pain, anal bleeding, blood in stool, constipation, diarrhea, nausea, rectal pain and vomiting.   Endocrine: Negative for cold intolerance, heat intolerance, polydipsia, polyphagia and polyuria.   Genitourinary: Negative for decreased urine volume, difficulty urinating, dyspareunia, dysuria, enuresis, flank pain, frequency, genital sores, hematuria, menstrual problem, pelvic pain, urgency, vaginal  bleeding, vaginal discharge and vaginal pain.   Musculoskeletal: Positive for arthralgias and joint swelling. Negative for back pain, gait problem, myalgias, neck pain and neck stiffness.   Skin: Negative for color change, pallor and rash.   Allergic/Immunologic: Negative for environmental allergies, food allergies and immunocompromised state.   Neurological: Positive for weakness. Negative for dizziness, tremors, seizures, syncope, facial asymmetry, speech difficulty, light-headedness, numbness and headaches.   Hematological: Negative for adenopathy. Does not bruise/bleed easily.   Psychiatric/Behavioral: Positive for dysphoric mood. Negative for agitation, behavioral problems, confusion, decreased concentration, hallucinations, self-injury, sleep disturbance and suicidal ideas. The patient is nervous/anxious. The patient is not hyperactive.        Objective:      Physical Exam   Constitutional: She is oriented to person, place, and time. She appears cachectic. She has a sickly appearance. She appears ill. She appears distressed.   HENT:   Head: Normocephalic and atraumatic.   Right Ear: External ear normal.   Left Ear: External ear normal.   Nose: Nose normal. Right sinus exhibits no maxillary sinus tenderness and no frontal sinus tenderness. Left sinus exhibits no maxillary sinus tenderness and no frontal sinus tenderness.   Mouth/Throat: Oropharynx is clear and moist. No oropharyngeal exudate.   Eyes: Pupils are equal, round, and reactive to light. Conjunctivae, EOM and lids are normal. Right eye exhibits no discharge. Left eye exhibits no discharge. Right conjunctiva is not injected. Right conjunctiva has no hemorrhage. Left conjunctiva is not injected. Left conjunctiva has no hemorrhage. No scleral icterus.   Neck: Normal range of motion. Neck supple. No JVD present. No tracheal deviation present. No thyromegaly present.   Cardiovascular: Normal rate and regular rhythm.   Pulmonary/Chest: Effort normal. No  stridor. No respiratory distress. She exhibits no tenderness.   Abdominal: Soft. She exhibits no distension and no mass. There is no splenomegaly or hepatomegaly. There is no tenderness. There is no rebound.   Musculoskeletal: Normal range of motion. She exhibits no edema or tenderness.   Lymphadenopathy:     She has no cervical adenopathy.     She has no axillary adenopathy.        Right: No supraclavicular adenopathy present.        Left: No supraclavicular adenopathy present.   Neurological: She is alert and oriented to person, place, and time. No cranial nerve deficit. Coordination abnormal.   Skin: Skin is dry. No rash noted. She is not diaphoretic. No erythema.   Psychiatric: Her behavior is normal. Judgment and thought content normal. Her mood appears anxious. She exhibits a depressed mood.   Vitals reviewed.          Assessment:      1. Chronic myelomonocytic leukemia not having achieved remission    2. Pancytopenia due to antineoplastic chemotherapy    3. Thrombocytopenia    4. Cachexia    5. Rheumatoid arthritis involving both wrists with positive rheumatoid factor           Plan:     Review of laboratory studies demonstrates persistent pancytopenia thrombocytopenia.  Will hold treatment again today she is relatively asymptomatic with a hemoglobin of 6.7.  Patient will return to clinic on 01/09/2020 for evaluation of possible retreatment week of 01/13/2020 discussed implications with patient and indications for further systemic therapy printed off laboratory studies for review fever precautions reviewed 25 min face-to-face time coordination of care 50% time a greater spent with patient and family face-to-face        Corby Miranda Jr, MD FACP

## 2019-12-31 NOTE — PROGRESS NOTES
Subjective:       Patient ID: Sarah Parker is a 79 y.o. female.    Chief Complaint: Results; Chemotherapy; and Anemia    HPI 79-year-old female with chronic myelomonocytic leukemia type 2 patient has been receiving Vidaza returns for evaluation of next cycle of therapy with persistent pancytopenia.  ECOG status 1    Past Medical History:   Diagnosis Date    Acid reflux     Anxiety     Back pain     Bronchitis, chronic obstructive w acute bronchitis 7/29/2016    Cancer     [facility] arms, face- Dr. Lata Tejada    Cataract     2+NS    Degenerative disc disease     Depression     Dry mouth     Hernia, hiatal 11/18/2013    Hypertension     Hypothyroid     Macrocytic anemia 5/3/2016    Macular degeneration     Migraines     Mixed anxiety and depressive disorder     Multiple fractures of ribs of right side     Osteoporosis     Other hyperlipidemia 10/11/2019    Pneumonia     Pneumonia due to other staphylococcus     Rheumatoid arthritis     Rheumatoid arthritis(714.0)     Rheumatoid arthritis(714.0)     Remicade, MTX.     Family History   Problem Relation Age of Onset    Heart disease Mother     Hyperlipidemia Mother     Hypertension Mother     Osteoarthritis Mother     Cataracts Mother     Hypertension Father     Osteoarthritis Father     Heart disease Father     Asthma Sister     Chronic back pain Sister     Hypertension Sister     Osteoarthritis Sister     Thyroid disease Sister     Asthma Brother     Cancer Brother     Chronic back pain Brother     Diabetes Mellitus Brother     Hypertension Brother     Osteoarthritis Brother     Thyroid disease Brother     Cancer Maternal Grandfather     Fibromyalgia Daughter     Heart disease Maternal Grandmother     Colon cancer Neg Hx     Diabetes Neg Hx      Social History     Socioeconomic History    Marital status:      Spouse name: Not on file    Number of children: Not on file    Years of education: Not on file     Highest education level: Not on file   Occupational History    Occupation: retired   Social Needs    Financial resource strain: Not on file    Food insecurity:     Worry: Not on file     Inability: Not on file    Transportation needs:     Medical: Not on file     Non-medical: Not on file   Tobacco Use    Smoking status: Former Smoker     Packs/day: 0.25     Years: 2.00     Pack years: 0.50     Last attempt to quit: 1965     Years since quittin.1    Smokeless tobacco: Never Used   Substance and Sexual Activity    Alcohol use: No    Drug use: No    Sexual activity: Never     Partners: Male   Lifestyle    Physical activity:     Days per week: Not on file     Minutes per session: Not on file    Stress: Not at all   Relationships    Social connections:     Talks on phone: Not on file     Gets together: Not on file     Attends Zoroastrianism service: Not on file     Active member of club or organization: Not on file     Attends meetings of clubs or organizations: Not on file     Relationship status: Not on file   Other Topics Concern    Not on file   Social History Narrative    Patient is aretired and live with .     Past Surgical History:   Procedure Laterality Date    APPENDECTOMY  1985    CATARACT EXTRACTION Bilateral 6/11/15    Dr. Booth    CHOLECYSTECTOMY  2013    cryoablasion kidney Left 2016    feet Bilateral     rheumatoid    FRACTURE SURGERY Right     tibia    HERNIA REPAIR      HYSTERECTOMY  1970    partial    JOINT REPLACEMENT      bilateral knees (), hands, wrists, knuckles, toes    LAPAROSCOPIC NISSEN FUNDOPLICATION      TRANSFORAMINAL EPIDURAL INJECTION OF STEROID Left 2019    Procedure: Left L5/S1 TF AYAAN with local;  Surgeon: Rowdy Felix MD;  Location: [facility];  Service: Pain Management;  Laterality: Left;       Labs:  Lab Results   Component Value Date    WBC 24.68 (H) 2019    HGB 6.7 (L) 2019    HCT 23.1 (L) 2019      (H) 12/31/2019    PLT 95 (L) 12/31/2019     BMP  Lab Results   Component Value Date     12/31/2019    K 4.3 12/31/2019     12/31/2019    CO2 25 12/31/2019    BUN 23 12/31/2019    CREATININE 1.0 12/31/2019    CALCIUM 8.9 12/31/2019    ANIONGAP 9 12/31/2019    ESTGFRAFRICA >60 12/31/2019    EGFRNONAA 54 (A) 12/31/2019     Lab Results   Component Value Date    ALT 11 12/31/2019    AST 24 12/31/2019    ALKPHOS 112 12/31/2019    BILITOT 0.6 12/31/2019       Lab Results   Component Value Date    IRON 36 08/25/2019    TIBC 185 (L) 08/25/2019    FERRITIN 253 09/27/2017     Lab Results   Component Value Date    [id number] 1918 (H) 08/25/2019     Lab Results   Component Value Date    FOLATE 12.0 08/25/2019     Lab Results   Component Value Date    TSH 5.174 (H) 09/03/2019         Review of Systems   Constitutional: Positive for activity change, appetite change, fatigue and unexpected weight change. Negative for chills, diaphoresis and fever.   HENT: Negative for congestion, dental problem, drooling, ear discharge, ear pain, facial swelling, hearing loss, mouth sores, nosebleeds, postnasal drip, rhinorrhea, sinus pressure, sneezing, sore throat, tinnitus, trouble swallowing and voice change.    Eyes: Negative for photophobia, pain, discharge, redness, itching and visual disturbance.   Respiratory: Negative for cough, choking, chest tightness, shortness of breath, wheezing and stridor.    Cardiovascular: Negative for chest pain, palpitations and leg swelling.   Gastrointestinal: Negative for abdominal distention, abdominal pain, anal bleeding, blood in stool, constipation, diarrhea, nausea, rectal pain and vomiting.   Endocrine: Negative for cold intolerance, heat intolerance, polydipsia, polyphagia and polyuria.   Genitourinary: Negative for decreased urine volume, difficulty urinating, dyspareunia, dysuria, enuresis, flank pain, frequency, genital sores, hematuria, menstrual problem, pelvic pain, urgency, vaginal  bleeding, vaginal discharge and vaginal pain.   Musculoskeletal: Positive for arthralgias and joint swelling. Negative for back pain, gait problem, myalgias, neck pain and neck stiffness.   Skin: Negative for color change, pallor and rash.   Allergic/Immunologic: Negative for environmental allergies, food allergies and immunocompromised state.   Neurological: Positive for weakness. Negative for dizziness, tremors, seizures, syncope, facial asymmetry, speech difficulty, light-headedness, numbness and headaches.   Hematological: Negative for adenopathy. Does not bruise/bleed easily.   Psychiatric/Behavioral: Positive for dysphoric mood. Negative for agitation, behavioral problems, confusion, decreased concentration, hallucinations, self-injury, sleep disturbance and suicidal ideas. The patient is nervous/anxious. The patient is not hyperactive.        Objective:      Physical Exam   Constitutional: She is oriented to person, place, and time. She appears cachectic. She has a sickly appearance. She appears ill. She appears distressed.   HENT:   Head: Normocephalic and atraumatic.   Right Ear: External ear normal.   Left Ear: External ear normal.   Nose: Nose normal. Right sinus exhibits no maxillary sinus tenderness and no frontal sinus tenderness. Left sinus exhibits no maxillary sinus tenderness and no frontal sinus tenderness.   Mouth/Throat: Oropharynx is clear and moist. No oropharyngeal exudate.   Eyes: Pupils are equal, round, and reactive to light. Conjunctivae, EOM and lids are normal. Right eye exhibits no discharge. Left eye exhibits no discharge. Right conjunctiva is not injected. Right conjunctiva has no hemorrhage. Left conjunctiva is not injected. Left conjunctiva has no hemorrhage. No scleral icterus.   Neck: Normal range of motion. Neck supple. No JVD present. No tracheal deviation present. No thyromegaly present.   Cardiovascular: Normal rate and regular rhythm.   Pulmonary/Chest: Effort normal. No  stridor. No respiratory distress. She exhibits no tenderness.   Abdominal: Soft. She exhibits no distension and no mass. There is no splenomegaly or hepatomegaly. There is no tenderness. There is no rebound.   Musculoskeletal: Normal range of motion. She exhibits no edema or tenderness.   Lymphadenopathy:     She has no cervical adenopathy.     She has no axillary adenopathy.        Right: No supraclavicular adenopathy present.        Left: No supraclavicular adenopathy present.   Neurological: She is alert and oriented to person, place, and time. No cranial nerve deficit. Coordination abnormal.   Skin: Skin is dry. No rash noted. She is not diaphoretic. No erythema.   Psychiatric: Her behavior is normal. Judgment and thought content normal. Her mood appears anxious. She exhibits a depressed mood.   Vitals reviewed.          Assessment:      1. Chronic myelomonocytic leukemia not having achieved remission    2. Pancytopenia due to antineoplastic chemotherapy    3. Thrombocytopenia    4. Cachexia    5. Rheumatoid arthritis involving both wrists with positive rheumatoid factor           Plan:     Review of laboratory studies demonstrates persistent pancytopenia thrombocytopenia.  Will hold treatment again today she is relatively asymptomatic with a hemoglobin of 6.7.  Patient will return to clinic on 01/09/2020 for evaluation of possible retreatment week of 01/13/2020 discussed implications with patient and indications for further systemic therapy printed off laboratory studies for review fever precautions reviewed 25 min face-to-face time coordination of care 50% time a greater spent with patient and family face-to-face        Corby Miranda Jr, MD FACP

## 2020-01-02 ENCOUNTER — HOSPITAL ENCOUNTER (OUTPATIENT)
Dept: RADIOLOGY | Facility: HOSPITAL | Age: 80
Discharge: HOME | End: 2020-01-02
Attending: INTERNAL MEDICINE
Payer: MEDICARE

## 2020-01-02 DIAGNOSIS — R22.32 AXILLARY MASS, LEFT: ICD-10-CM

## 2020-01-02 PROCEDURE — 88341 IMHCHEM/IMCYTCHM EA ADD ANTB: CPT | Mod: 26,HCNC,59, | Performed by: PATHOLOGY

## 2020-01-02 PROCEDURE — 88189 PR  FLOWCYTOMETRY/READ, 16 & > MARKERS: ICD-10-PCS | Mod: HCNC,,, | Performed by: PATHOLOGY

## 2020-01-02 PROCEDURE — 88342 CHG IMMUNOCYTOCHEMISTRY: ICD-10-PCS | Mod: 26,HCNC,59, | Performed by: PATHOLOGY

## 2020-01-02 PROCEDURE — 88307 TISSUE EXAM BY PATHOLOGIST: CPT | Mod: 26,HCNC,, | Performed by: PATHOLOGY

## 2020-01-02 PROCEDURE — 88185 FLOWCYTOMETRY/TC ADD-ON: CPT | Mod: HCNC | Performed by: PATHOLOGY

## 2020-01-02 PROCEDURE — 88341 IMHCHEM/IMCYTCHM EA ADD ANTB: CPT | Mod: 59,HCNC | Performed by: PATHOLOGY

## 2020-01-02 PROCEDURE — 88184 FLOWCYTOMETRY/ TC 1 MARKER: CPT | Mod: HCNC | Performed by: PATHOLOGY

## 2020-01-02 PROCEDURE — 88341 PR IHC OR ICC EACH ADD'L SINGLE ANTIBODY  STAINPR: ICD-10-PCS | Mod: 26,HCNC,59, | Performed by: PATHOLOGY

## 2020-01-02 PROCEDURE — 88342 IMHCHEM/IMCYTCHM 1ST ANTB: CPT | Mod: 91,HCNC | Performed by: PATHOLOGY

## 2020-01-02 PROCEDURE — 88360 TUMOR IMMUNOHISTOCHEM/MANUAL: CPT | Mod: 26,HCNC,, | Performed by: PATHOLOGY

## 2020-01-02 PROCEDURE — 88189 FLOWCYTOMETRY/READ 16 & >: CPT | Mod: HCNC,,, | Performed by: PATHOLOGY

## 2020-01-02 PROCEDURE — 88360 PR  TUMOR IMMUNOHISTOCHEM/MANUAL: ICD-10-PCS | Mod: 26,HCNC,, | Performed by: PATHOLOGY

## 2020-01-02 PROCEDURE — 88342 IMHCHEM/IMCYTCHM 1ST ANTB: CPT | Mod: 26,HCNC,59, | Performed by: PATHOLOGY

## 2020-01-02 PROCEDURE — 88307 PR  SURG PATH,LEVEL V: ICD-10-PCS | Mod: 26,HCNC,, | Performed by: PATHOLOGY

## 2020-01-02 PROCEDURE — 76942 ECHO GUIDE FOR BIOPSY: CPT | Mod: TC,HCNC

## 2020-01-02 PROCEDURE — 88342 IMHCHEM/IMCYTCHM 1ST ANTB: CPT | Mod: HCNC | Performed by: PATHOLOGY

## 2020-01-02 NOTE — DISCHARGE SUMMARY
Procedure was performed Yessenia KILPATRICK.  Sterile technique was performed in the left axillary area, lidocaine was used as a local anesthetic.  Multiple samples taken from axillary lymph node.  Pt tolerated the procedure well without immediate complications.  Please see radiologist report for details. F/u with PCP and/or ordering physician.

## 2020-01-03 LAB
FLOW CYTOMETRY ANTIBODIES ANALYZED - LYMPH NODE: NORMAL
FLOW CYTOMETRY COMMENT - LYMPH NODE: NORMAL
FLOW CYTOMETRY INTERPRETATION - LYMPH NODE: NORMAL

## 2020-01-10 ENCOUNTER — OFFICE VISIT (OUTPATIENT)
Dept: RADIATION ONCOLOGY | Facility: CLINIC | Age: 80
End: 2020-01-10
Payer: MEDICARE

## 2020-01-10 ENCOUNTER — OFFICE VISIT (OUTPATIENT)
Dept: HEMATOLOGY/ONCOLOGY | Facility: CLINIC | Age: 80
End: 2020-01-10
Payer: MEDICARE

## 2020-01-10 ENCOUNTER — LAB VISIT (OUTPATIENT)
Dept: LAB | Facility: HOSPITAL | Age: 80
End: 2020-01-10
Attending: INTERNAL MEDICINE
Payer: MEDICARE

## 2020-01-10 VITALS
BODY MASS INDEX: 20.98 KG/M2 | SYSTOLIC BLOOD PRESSURE: 149 MMHG | HEIGHT: 62 IN | WEIGHT: 114 LBS | RESPIRATION RATE: 18 BRPM | TEMPERATURE: 98 F | DIASTOLIC BLOOD PRESSURE: 85 MMHG | HEART RATE: 89 BPM | OXYGEN SATURATION: 80 %

## 2020-01-10 VITALS
SYSTOLIC BLOOD PRESSURE: 149 MMHG | RESPIRATION RATE: 12 BRPM | BODY MASS INDEX: 20.98 KG/M2 | HEART RATE: 89 BPM | WEIGHT: 114 LBS | HEIGHT: 62 IN | TEMPERATURE: 98 F | OXYGEN SATURATION: 80 % | DIASTOLIC BLOOD PRESSURE: 85 MMHG

## 2020-01-10 DIAGNOSIS — C93.10 CHRONIC MYELOMONOCYTIC LEUKEMIA NOT HAVING ACHIEVED REMISSION: Primary | Chronic | ICD-10-CM

## 2020-01-10 DIAGNOSIS — C94.80 LEUKEMIA CUTIS: ICD-10-CM

## 2020-01-10 DIAGNOSIS — I70.0 ATHEROSCLEROSIS OF AORTA: ICD-10-CM

## 2020-01-10 DIAGNOSIS — D61.810 PANCYTOPENIA DUE TO ANTINEOPLASTIC CHEMOTHERAPY: ICD-10-CM

## 2020-01-10 DIAGNOSIS — L98.8 LEUKEMIA CUTIS: ICD-10-CM

## 2020-01-10 DIAGNOSIS — T45.1X5A PANCYTOPENIA DUE TO ANTINEOPLASTIC CHEMOTHERAPY: ICD-10-CM

## 2020-01-10 DIAGNOSIS — C93.10 CHRONIC MYELOMONOCYTIC LEUKEMIA NOT HAVING ACHIEVED REMISSION: Primary | ICD-10-CM

## 2020-01-10 DIAGNOSIS — C93.10 CHRONIC MYELOMONOCYTIC LEUKEMIA NOT HAVING ACHIEVED REMISSION: Chronic | ICD-10-CM

## 2020-01-10 DIAGNOSIS — R22.32 AXILLARY MASS, LEFT: ICD-10-CM

## 2020-01-10 LAB
ALBUMIN SERPL BCP-MCNC: 3.7 G/DL (ref 3.5–5.2)
ALP SERPL-CCNC: 134 U/L (ref 55–135)
ALT SERPL W/O P-5'-P-CCNC: 16 U/L (ref 10–44)
ANION GAP SERPL CALC-SCNC: 10 MMOL/L (ref 8–16)
AST SERPL-CCNC: 22 U/L (ref 10–40)
BASOPHILS # BLD AUTO: 0.06 K/UL (ref 0–0.2)
BASOPHILS NFR BLD: 0.2 % (ref 0–1.9)
BILIRUB SERPL-MCNC: 0.6 MG/DL (ref 0.1–1)
BUN SERPL-MCNC: 20 MG/DL (ref 8–23)
CALCIUM SERPL-MCNC: 9.6 MG/DL (ref 8.7–10.5)
CHLORIDE SERPL-SCNC: 102 MMOL/L (ref 95–110)
CO2 SERPL-SCNC: 24 MMOL/L (ref 23–29)
CREAT SERPL-MCNC: 1 MG/DL (ref 0.5–1.4)
DIFFERENTIAL METHOD: ABNORMAL
EOSINOPHIL # BLD AUTO: 0.1 K/UL (ref 0–0.5)
EOSINOPHIL NFR BLD: 0.2 % (ref 0–8)
ERYTHROCYTE [DISTWIDTH] IN BLOOD BY AUTOMATED COUNT: 20.2 % (ref 11.5–14.5)
EST. GFR  (AFRICAN AMERICAN): >60 ML/MIN/1.73 M^2
EST. GFR  (NON AFRICAN AMERICAN): 54 ML/MIN/1.73 M^2
GLUCOSE SERPL-MCNC: 99 MG/DL (ref 70–110)
HCT VFR BLD AUTO: 23.7 % (ref 37–48.5)
HGB BLD-MCNC: 6.9 G/DL (ref 12–16)
IMM GRANULOCYTES # BLD AUTO: 2.69 K/UL (ref 0–0.04)
IMM GRANULOCYTES NFR BLD AUTO: 10.6 % (ref 0–0.5)
LYMPHOCYTES # BLD AUTO: 10.8 K/UL (ref 1–4.8)
LYMPHOCYTES NFR BLD: 42.5 % (ref 18–48)
MCH RBC QN AUTO: 31.8 PG (ref 27–31)
MCHC RBC AUTO-ENTMCNC: 29.1 G/DL (ref 32–36)
MCV RBC AUTO: 109 FL (ref 82–98)
MONOCYTES # BLD AUTO: 3.6 K/UL (ref 0.3–1)
MONOCYTES NFR BLD: 14.1 % (ref 4–15)
NEUTROPHILS # BLD AUTO: 8.2 K/UL (ref 1.8–7.7)
NEUTROPHILS NFR BLD: 32.4 % (ref 38–73)
NRBC BLD-RTO: 8 /100 WBC
PLATELET # BLD AUTO: 91 K/UL (ref 150–350)
PMV BLD AUTO: 12.9 FL (ref 9.2–12.9)
POTASSIUM SERPL-SCNC: 4.5 MMOL/L (ref 3.5–5.1)
PROT SERPL-MCNC: 8.4 G/DL (ref 6–8.4)
RBC # BLD AUTO: 2.17 M/UL (ref 4–5.4)
SODIUM SERPL-SCNC: 136 MMOL/L (ref 136–145)
WBC # BLD AUTO: 25.33 K/UL (ref 3.9–12.7)

## 2020-01-10 PROCEDURE — 3077F PR MOST RECENT SYSTOLIC BLOOD PRESSURE >= 140 MM HG: ICD-10-PCS | Mod: HCNC,CPTII,S$GLB, | Performed by: RADIOLOGY

## 2020-01-10 PROCEDURE — 3288F PR FALLS RISK ASSESSMENT DOCUMENTED: ICD-10-PCS | Mod: HCNC,CPTII,S$GLB, | Performed by: INTERNAL MEDICINE

## 2020-01-10 PROCEDURE — 99205 OFFICE O/P NEW HI 60 MIN: CPT | Mod: HCNC,S$GLB,, | Performed by: RADIOLOGY

## 2020-01-10 PROCEDURE — 99499 UNLISTED E&M SERVICE: CPT | Mod: HCNC,S$GLB,, | Performed by: RADIOLOGY

## 2020-01-10 PROCEDURE — 99499 RISK ADDL DX/OHS AUDIT: ICD-10-PCS | Mod: HCNC,S$GLB,, | Performed by: INTERNAL MEDICINE

## 2020-01-10 PROCEDURE — 99215 OFFICE O/P EST HI 40 MIN: CPT | Mod: 25,HCNC,S$GLB, | Performed by: INTERNAL MEDICINE

## 2020-01-10 PROCEDURE — 1126F AMNT PAIN NOTED NONE PRSNT: CPT | Mod: HCNC,S$GLB,, | Performed by: INTERNAL MEDICINE

## 2020-01-10 PROCEDURE — 3288F FALL RISK ASSESSMENT DOCD: CPT | Mod: HCNC,CPTII,S$GLB, | Performed by: RADIOLOGY

## 2020-01-10 PROCEDURE — 1159F MED LIST DOCD IN RCRD: CPT | Mod: HCNC,S$GLB,, | Performed by: RADIOLOGY

## 2020-01-10 PROCEDURE — 99999 PR PBB SHADOW E&M-EST. PATIENT-LVL III: ICD-10-PCS | Mod: PBBFAC,HCNC,, | Performed by: INTERNAL MEDICINE

## 2020-01-10 PROCEDURE — 36415 COLL VENOUS BLD VENIPUNCTURE: CPT | Mod: HCNC

## 2020-01-10 PROCEDURE — 1125F PR PAIN SEVERITY QUANTIFIED, PAIN PRESENT: ICD-10-PCS | Mod: HCNC,S$GLB,, | Performed by: RADIOLOGY

## 2020-01-10 PROCEDURE — 1125F AMNT PAIN NOTED PAIN PRSNT: CPT | Mod: HCNC,S$GLB,, | Performed by: RADIOLOGY

## 2020-01-10 PROCEDURE — 3079F DIAST BP 80-89 MM HG: CPT | Mod: HCNC,CPTII,S$GLB, | Performed by: RADIOLOGY

## 2020-01-10 PROCEDURE — 99999 PR PBB SHADOW E&M-EST. PATIENT-LVL IV: CPT | Mod: PBBFAC,HCNC,, | Performed by: RADIOLOGY

## 2020-01-10 PROCEDURE — 80053 COMPREHEN METABOLIC PANEL: CPT | Mod: HCNC

## 2020-01-10 PROCEDURE — 3079F PR MOST RECENT DIASTOLIC BLOOD PRESSURE 80-89 MM HG: ICD-10-PCS | Mod: HCNC,CPTII,S$GLB, | Performed by: RADIOLOGY

## 2020-01-10 PROCEDURE — 1126F PR PAIN SEVERITY QUANTIFIED, NO PAIN PRESENT: ICD-10-PCS | Mod: HCNC,S$GLB,, | Performed by: INTERNAL MEDICINE

## 2020-01-10 PROCEDURE — 99999 PR PBB SHADOW E&M-EST. PATIENT-LVL III: CPT | Mod: PBBFAC,HCNC,, | Performed by: INTERNAL MEDICINE

## 2020-01-10 PROCEDURE — 3079F DIAST BP 80-89 MM HG: CPT | Mod: HCNC,CPTII,S$GLB, | Performed by: INTERNAL MEDICINE

## 2020-01-10 PROCEDURE — 1159F MED LIST DOCD IN RCRD: CPT | Mod: HCNC,S$GLB,, | Performed by: INTERNAL MEDICINE

## 2020-01-10 PROCEDURE — 3288F PR FALLS RISK ASSESSMENT DOCUMENTED: ICD-10-PCS | Mod: HCNC,CPTII,S$GLB, | Performed by: RADIOLOGY

## 2020-01-10 PROCEDURE — 1159F PR MEDICATION LIST DOCUMENTED IN MEDICAL RECORD: ICD-10-PCS | Mod: HCNC,S$GLB,, | Performed by: INTERNAL MEDICINE

## 2020-01-10 PROCEDURE — 99999 PR PBB SHADOW E&M-EST. PATIENT-LVL IV: ICD-10-PCS | Mod: PBBFAC,HCNC,, | Performed by: RADIOLOGY

## 2020-01-10 PROCEDURE — 3077F SYST BP >= 140 MM HG: CPT | Mod: HCNC,CPTII,S$GLB, | Performed by: RADIOLOGY

## 2020-01-10 PROCEDURE — 99499 UNLISTED E&M SERVICE: CPT | Mod: HCNC,S$GLB,, | Performed by: INTERNAL MEDICINE

## 2020-01-10 PROCEDURE — 1100F PR PT FALLS ASSESS DOC 2+ FALLS/FALL W/INJURY/YR: ICD-10-PCS | Mod: HCNC,CPTII,S$GLB, | Performed by: RADIOLOGY

## 2020-01-10 PROCEDURE — 3077F PR MOST RECENT SYSTOLIC BLOOD PRESSURE >= 140 MM HG: ICD-10-PCS | Mod: HCNC,CPTII,S$GLB, | Performed by: INTERNAL MEDICINE

## 2020-01-10 PROCEDURE — 3288F FALL RISK ASSESSMENT DOCD: CPT | Mod: HCNC,CPTII,S$GLB, | Performed by: INTERNAL MEDICINE

## 2020-01-10 PROCEDURE — 1100F PTFALLS ASSESS-DOCD GE2>/YR: CPT | Mod: HCNC,CPTII,S$GLB, | Performed by: RADIOLOGY

## 2020-01-10 PROCEDURE — 1159F PR MEDICATION LIST DOCUMENTED IN MEDICAL RECORD: ICD-10-PCS | Mod: HCNC,S$GLB,, | Performed by: RADIOLOGY

## 2020-01-10 PROCEDURE — 99215 PR OFFICE/OUTPT VISIT, EST, LEVL V, 40-54 MIN: ICD-10-PCS | Mod: 25,HCNC,S$GLB, | Performed by: INTERNAL MEDICINE

## 2020-01-10 PROCEDURE — 1100F PR PT FALLS ASSESS DOC 2+ FALLS/FALL W/INJURY/YR: ICD-10-PCS | Mod: HCNC,CPTII,S$GLB, | Performed by: INTERNAL MEDICINE

## 2020-01-10 PROCEDURE — 99499 RISK ADDL DX/OHS AUDIT: ICD-10-PCS | Mod: HCNC,S$GLB,, | Performed by: RADIOLOGY

## 2020-01-10 PROCEDURE — 3077F SYST BP >= 140 MM HG: CPT | Mod: HCNC,CPTII,S$GLB, | Performed by: INTERNAL MEDICINE

## 2020-01-10 PROCEDURE — 1100F PTFALLS ASSESS-DOCD GE2>/YR: CPT | Mod: HCNC,CPTII,S$GLB, | Performed by: INTERNAL MEDICINE

## 2020-01-10 PROCEDURE — 99205 PR OFFICE/OUTPT VISIT, NEW, LEVL V, 60-74 MIN: ICD-10-PCS | Mod: HCNC,S$GLB,, | Performed by: RADIOLOGY

## 2020-01-10 PROCEDURE — 3079F PR MOST RECENT DIASTOLIC BLOOD PRESSURE 80-89 MM HG: ICD-10-PCS | Mod: HCNC,CPTII,S$GLB, | Performed by: INTERNAL MEDICINE

## 2020-01-10 RX ORDER — AZACITIDINE 100 MG/1
75 INJECTION, POWDER, LYOPHILIZED, FOR SOLUTION INTRAVENOUS; SUBCUTANEOUS
Status: CANCELLED | OUTPATIENT
Start: 2020-01-20

## 2020-01-10 RX ORDER — AZACITIDINE 100 MG/1
75 INJECTION, POWDER, LYOPHILIZED, FOR SOLUTION INTRAVENOUS; SUBCUTANEOUS
Status: CANCELLED | OUTPATIENT
Start: 2020-01-21

## 2020-01-10 RX ORDER — AZACITIDINE 100 MG/1
75 INJECTION, POWDER, LYOPHILIZED, FOR SOLUTION INTRAVENOUS; SUBCUTANEOUS
Status: CANCELLED | OUTPATIENT
Start: 2020-01-23

## 2020-01-10 RX ORDER — AZACITIDINE 100 MG/1
75 INJECTION, POWDER, LYOPHILIZED, FOR SOLUTION INTRAVENOUS; SUBCUTANEOUS
Status: CANCELLED | OUTPATIENT
Start: 2020-01-27

## 2020-01-10 RX ORDER — AZACITIDINE 100 MG/1
75 INJECTION, POWDER, LYOPHILIZED, FOR SOLUTION INTRAVENOUS; SUBCUTANEOUS
Status: CANCELLED | OUTPATIENT
Start: 2020-01-28

## 2020-01-10 RX ORDER — AZACITIDINE 100 MG/1
75 INJECTION, POWDER, LYOPHILIZED, FOR SOLUTION INTRAVENOUS; SUBCUTANEOUS
Status: CANCELLED | OUTPATIENT
Start: 2020-01-22

## 2020-01-10 RX ORDER — AZACITIDINE 100 MG/1
75 INJECTION, POWDER, LYOPHILIZED, FOR SOLUTION INTRAVENOUS; SUBCUTANEOUS
Status: CANCELLED | OUTPATIENT
Start: 2020-01-24

## 2020-01-10 NOTE — PROGRESS NOTES
Patient, Sarah Parker (MRN #[id number]), presented with a recent Platelet count less than 150 K/uL consistent with the definition of thrombocytopenia (ICD10 - D69.6).    Platelets   Date Value Ref Range Status   01/10/2020 91 (L) 150 - 350 K/uL Final     The patient's thrombocytopenia was monitored, evaluated, addressed and/or treated. This addendum to the medical record is made on 01/10/2020.

## 2020-01-10 NOTE — LETTER
January 10, 2020      Corby Miranda MD  [address]  [place] [address]            [facility]  [address] [facility] [address]  Phone: [phone]  Fax: [phone]          Patient: Sarah Parker   MR Number: [id number]   YOB: 1940   Date of Visit: 1/10/2020       Dear Dr. Corby Miranda:    Thank you for referring Sarah Parker to me for evaluation. Attached you will find relevant portions of my assessment and plan of care.    If you have questions, please do not hesitate to call me. I look forward to following Sarah Parker along with you.    Sincerely,    Anne Marie Almaraz III, MD    Enclosure  CC:  No Recipients    If you would like to receive this communication electronically, please contact [email or web address] or [phone] to request more information on [facility] [employer] access.    For providers and/or their staff who would like to refer a patient to [facility], please contact us through our one-stop-shop provider referral line, Prashant Smith, at [phone].    If you feel you have received this communication in error or would no longer like to receive these types of communications, please e-mail [email or web address]

## 2020-01-10 NOTE — PROGRESS NOTES
[facility] - [facility]  RADIATION ONCOLOGY CONSULTATION    Name: Sarah Parker  : 1940      Patient Referred To Radiation Oncology By:  Dr. 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 female who presents for consultation for the above diagnosis.   She has been following with Dr. Miranda for CML maintenance on Vidaza.   CT chest 19 showed mediastinal enlarged nodes, LLL consolidation.   She recently noted mass in left axilla growing over the past month.   US left axilla 19 showed enlarged lymph nodes, 3.2cm and 2.9cm. Biopsy is consistent on flow cytometry with leukemia CD5+ B cell lymphoproliferative disorder. She was sent to see me for consideration of palliation to this area.  Today, she notes pain under her left arm and bruising after biopsy.    REVIEW OF SYSTEMS: (Positive findings bold, otherwise negative)   Constitutional: fever, fatigue, weight change  Eyes: blurred vision in the past 3 months, double vision   ENT: ear pain, new mouth lesions, jaw pain, difficulty swallowing, sore throat  Cardiovascular: chest pain on exertion, reflux, leg swelling  Respiratory: shortness of breath, dyspnea, cough, hemoptysis.   GI: abdominal pain, diarrhea, constipation, blood in stool, painful bowel movements  : painful or burning urination, blood in urine  Musculoskeletal: new bone or joint pains  Neurologic: headache, seizure, focal numbness or tingling, balance changes, speech changes  Lymph: new or enlarged lymph nodes  Psychiatric: depression, anxiety    PRIOR RADIATION HISTORY: none    PAST MEDICAL HISTORY:  Past Medical History:   Diagnosis Date    Acid reflux     Anxiety     Back pain     Bronchitis, chronic obstructive w acute bronchitis 2016    Cancer     [facility] arms, face- Dr. Lata Tejada    Cataract     2+NS     Degenerative disc disease     Depression     Dry mouth     Hernia, hiatal 11/18/2013    Hypertension     Hypothyroid     Macrocytic anemia 5/3/2016    Macular degeneration     Migraines     Mixed anxiety and depressive disorder     Multiple fractures of ribs of right side     Osteoporosis     Other hyperlipidemia 10/11/2019    Pneumonia     Pneumonia due to other staphylococcus     Rheumatoid arthritis     Rheumatoid arthritis(714.0)     Rheumatoid arthritis(714.0)     Remicade, MTX.       PAST SURGICAL HISTORY:  Past Surgical History:   Procedure Laterality Date    APPENDECTOMY  1985    CATARACT EXTRACTION Bilateral 6/11/15    Dr. Booth    CHOLECYSTECTOMY  2013    cryoablasion kidney Left 09/27/2016    feet Bilateral     rheumatoid    FRACTURE SURGERY Right     tibia    HERNIA REPAIR      HYSTERECTOMY  1970    partial    JOINT REPLACEMENT      bilateral knees (2008), hands, wrists, knuckles, toes    LAPAROSCOPIC NISSEN FUNDOPLICATION      TRANSFORAMINAL EPIDURAL INJECTION OF STEROID Left 6/25/2019    Procedure: Left L5/S1 TF AYAAN with local;  Surgeon: Rowdy Felix MD;  Location: [facility];  Service: Pain Management;  Laterality: Left;       ALLERGIES:   Review of patient's allergies indicates:   Allergen Reactions    Codeine      Other reaction(s): hyper  Other reaction(s): hyper    Doxycycline     Gabapentin Other (See Comments)     Bad dreams       MEDICATIONS:    Current Outpatient Medications:     albuterol (PROVENTIL) 2.5 mg /3 mL (0.083 %) nebulizer solution, Take 3 mLs (2.5 mg total) by nebulization every 6 (six) hours as needed for Wheezing., Disp: 1 Box, Rfl: 5    amitriptyline (ELAVIL) 75 MG tablet, TAKE 1 TABLET BY MOUTH EVERY EVENING, Disp: 90 tablet, Rfl: 3    benzonatate (TESSALON) 100 MG capsule, Take 1-2 capsules (100-200 mg total) by mouth 3 (three) times daily as needed for Cough., Disp: 60 capsule, Rfl: 0    calcium citrate-vitamin D (CITRACAL + D)  315-200 mg-unit per tablet, Take 1 tablet by mouth once daily. , Disp: , Rfl:     DULoxetine (CYMBALTA) 20 MG capsule, Take 2 capsules (40 mg total) by mouth once daily., Disp: 180 capsule, Rfl: 3    hydrocodone-acetaminophen 5-325mg (NORCO) 5-325 mg per tablet, TK 1 T PO Q 6 H PRN, Disp: , Rfl: 0    hydrOXYzine HCl (ATARAX) 25 MG tablet, , Disp: , Rfl:     leucovorin (WELLCOVORIN) 5 mg Tab, TAKE ONE WEEKLY ON SATURDAYS, Disp: 4 tablet, Rfl: 3    levothyroxine (SYNTHROID) 88 MCG tablet, TAKE 1 TABLET BY MOUTH BEFORE BREAKFAST, Disp: 90 tablet, Rfl: 3    magnesium oxide (MAG-OX) 400 mg (241.3 mg magnesium) tablet, Take 1 tablet (400 mg total) by mouth 3 (three) times daily., Disp: , Rfl: 0    meclizine (ANTIVERT) 50 MG tablet, Take 25 mg by mouth 3 (three) times daily as needed., Disp: , Rfl:     multivitamin capsule, Take by mouth. As directed, Disp: , Rfl:     ondansetron (ZOFRAN) 4 MG tablet, Take 1 tablet (4 mg total) by mouth every 8 (eight) hours as needed for Nausea., Disp: 30 tablet, Rfl: 1    pravastatin (PRAVACHOL) 10 MG tablet, TAKE 1 TABLET(10 MG) BY MOUTH EVERY DAY, Disp: 30 tablet, Rfl: 11    prochlorperazine (COMPAZINE) 5 MG tablet, TAKE 1 TABLET(5 MG) BY MOUTH EVERY 6 HOURS AS NEEDED FOR NAUSEA, Disp: 385 tablet, Rfl: 1    tocilizumab (ACTEMRA) 80 mg/4 mL (20 mg/mL) Soln, Inject into the vein., Disp: , Rfl:     valsartan-hydrochlorothiazide (DIOVAN-HCT) 80-12.5 mg per tablet, TAKE 1 TABLET BY MOUTH DAILY, Disp: 90 tablet, Rfl: 3    ferrous gluconate 324 mg (37.5 mg iron) Tab tablet, Take 1 tablet (324 mg total) by mouth daily with breakfast., Disp: 30 tablet, Rfl: 11    methotrexate 2.5 MG Tab, Take 17.5 mg by mouth every 7 days. , Disp: , Rfl:     metoprolol succinate (TOPROL-XL) 50 MG 24 hr tablet, TAKE 1 TABLET(50 MG) BY MOUTH EVERY DAY, Disp: 30 tablet, Rfl: 11    tamsulosin (FLOMAX) 0.4 mg Cap, Take 1 capsule (0.4 mg total) by mouth once daily. For urinary retention, Disp: 30  capsule, Rfl: 0    topiramate (TOPAMAX) 25 MG tablet, Take 1 tablet (25 mg total) by mouth at night x 1 week then increase to 2 (two) times daily. Increase as tolerated., Disp: 60 tablet, Rfl: 0    SOCIAL HISTORY:  Social History     Socioeconomic History    Marital status:      Spouse name: Not on file    Number of children: Not on file    Years of education: Not on file    Highest education level: Not on file   Occupational History    Occupation: retired   Social Needs    Financial resource strain: Not on file    Food insecurity:     Worry: Not on file     Inability: Not on file    Transportation needs:     Medical: Not on file     Non-medical: Not on file   Tobacco Use    Smoking status: Former Smoker     Packs/day: 0.25     Years: 2.00     Pack years: 0.50     Last attempt to quit: 1965     Years since quittin.2    Smokeless tobacco: Never Used   Substance and Sexual Activity    Alcohol use: No    Drug use: No    Sexual activity: Never     Partners: Male   Lifestyle    Physical activity:     Days per week: Not on file     Minutes per session: Not on file    Stress: Not at all   Relationships    Social connections:     Talks on phone: Not on file     Gets together: Not on file     Attends Protestant service: Not on file     Active member of club or organization: Not on file     Attends meetings of clubs or organizations: Not on file     Relationship status: Not on file   Other Topics Concern    Not on file   Social History Narrative    Patient is aretired and live with .     [facility] in [facility]    FAMILY HISTORY:  Family History   Problem Relation Age of Onset    Heart disease Mother     Hyperlipidemia Mother     Hypertension Mother     Osteoarthritis Mother     Cataracts Mother     Hypertension Father     Osteoarthritis Father     Heart disease Father     Asthma Sister     Chronic back pain Sister     Hypertension Sister     Osteoarthritis Sister     Thyroid  "disease Sister     Asthma Brother     Cancer Brother     Chronic back pain Brother     Diabetes Mellitus Brother     Hypertension Brother     Osteoarthritis Brother     Thyroid disease Brother     Cancer Maternal Grandfather     Fibromyalgia Daughter     Heart disease Maternal Grandmother     Colon cancer Neg Hx     Diabetes Neg Hx        PHYSICAL EXAMINATION:  Constitutional: well appearing, no acute distress, ECOG 1 - Ambulates, capable of light work  Vitals:    BP (!) 149/85   Pulse 89   Temp 98.1 °F (36.7 °C)   Resp 18   Ht 5' 2" (1.575 m)   Wt 51.7 kg (113 lb 15.7 oz)   LMP  (LMP Unknown)   SpO2 (!) 80%   BMI 20.85 kg/m²   Eyes: sclera anicteric, EOMI, pupils equal, round and reactive to light  ENT: oral cavity without lesions, moist mucous membranes  Neck: trachea midline, neck supple  Lymphatic: no cervical, supraclavicular adenopathy, ~4cm round mobile tender left axillary mass  Cardiovascular: regular rate, no murmurs, no edema of the upper or lower extremities, radial pulse 2+  Respiratory: unlabored effort, clear to auscultation, no wheezes  Abdomen: soft, non-tender, no rigidity, no masses, no hepatomegaly  Neuro: Cranial nerves III-XII intact, speech not slurred, gait non-ataxic, no dysdiadochokinesia, strength 5/5 upper and lower extremities  Spine: non-tender to percussion cervical, thoracic and lumbosacral spine    IMAGING AND LABORATORY FINDINGS: As per HPI; images reviewed personally.    ASSESSMENT: 79 y.o. female with symptomatic left axillary mass, likely leukemia in axillary node or leukemia cutis    PLAN: Chloroma or leukemia cutis is very radiosensitive and should respond well to radiation. I will give somewhere around 20Gy/5fx (final dose pending CT simulation) to treat the left axillary lymph node +/- skin involvement if deemed at risk. Unsure at this point if will use photons or electrons. Will follow up final pathology.     We discussed the techniques, toxicities and " indications of radiation and I answered the patient's questions to their apparent satisfaction. Informed consent was obtained and CT simulation will be scheduled next week.    I spent approximately 60 minutes reviewing the available records and evaluating the patient, out of which over 50% of the time was spent face to face with the patient in counseling and coordinating this patient's care.      Anne Marie Almaraz III, M.D.  Radiation Oncology  [facility] [address] [place] [address]  Ph: [phone]  [email or web address]

## 2020-01-10 NOTE — PROGRESS NOTES
Subjective:       Patient ID: Sarah Parker is a 79 y.o. female.    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  Patient reports mass growing in left upper arm biopsy reveals on leukemia immuno phenotyping consistent with lymphoproliferative disorder H&E not completed ECOG status 1    Past Medical History:   Diagnosis Date    Acid reflux     Anxiety     Back pain     Bronchitis, chronic obstructive w acute bronchitis 7/29/2016    Cancer     [facility] arms, face- Dr. Lata Tejada    Cataract     2+NS    Degenerative disc disease     Depression     Dry mouth     Hernia, hiatal 11/18/2013    Hypertension     Hypothyroid     Macrocytic anemia 5/3/2016    Macular degeneration     Migraines     Mixed anxiety and depressive disorder     Multiple fractures of ribs of right side     Osteoporosis     Other hyperlipidemia 10/11/2019    Pneumonia     Pneumonia due to other staphylococcus     Rheumatoid arthritis     Rheumatoid arthritis(714.0)     Rheumatoid arthritis(714.0)     Remicade, MTX.     Family History   Problem Relation Age of Onset    Heart disease Mother     Hyperlipidemia Mother     Hypertension Mother     Osteoarthritis Mother     Cataracts Mother     Hypertension Father     Osteoarthritis Father     Heart disease Father     Asthma Sister     Chronic back pain Sister     Hypertension Sister     Osteoarthritis Sister     Thyroid disease Sister     Asthma Brother     Cancer Brother     Chronic back pain Brother     Diabetes Mellitus Brother     Hypertension Brother     Osteoarthritis Brother     Thyroid disease Brother     Cancer Maternal Grandfather     Fibromyalgia Daughter     Heart disease Maternal Grandmother     Colon cancer Neg Hx     Diabetes Neg Hx      Social History     Socioeconomic History    Marital status:      Spouse  name: Not on file    Number of children: Not on file    Years of education: Not on file    Highest education level: Not on file   Occupational History    Occupation: retired   Social Needs    Financial resource strain: Not on file    Food insecurity:     Worry: Not on file     Inability: Not on file    Transportation needs:     Medical: Not on file     Non-medical: Not on file   Tobacco Use    Smoking status: Former Smoker     Packs/day: 0.25     Years: 2.00     Pack years: 0.50     Last attempt to quit: 1965     Years since quittin.2    Smokeless tobacco: Never Used   Substance and Sexual Activity    Alcohol use: No    Drug use: No    Sexual activity: Never     Partners: Male   Lifestyle    Physical activity:     Days per week: Not on file     Minutes per session: Not on file    Stress: Not at all   Relationships    Social connections:     Talks on phone: Not on file     Gets together: Not on file     Attends Sikhism service: Not on file     Active member of club or organization: Not on file     Attends meetings of clubs or organizations: Not on file     Relationship status: Not on file   Other Topics Concern    Not on file   Social History Narrative    Patient is aretired and live with .     Past Surgical History:   Procedure Laterality Date    APPENDECTOMY  1985    CATARACT EXTRACTION Bilateral 6/11/15    Dr. Booth    CHOLECYSTECTOMY  2013    cryoablasion kidney Left 2016    feet Bilateral     rheumatoid    FRACTURE SURGERY Right     tibia    HERNIA REPAIR      HYSTERECTOMY  1970    partial    JOINT REPLACEMENT      bilateral knees (), hands, wrists, knuckles, toes    LAPAROSCOPIC NISSEN FUNDOPLICATION      TRANSFORAMINAL EPIDURAL INJECTION OF STEROID Left 2019    Procedure: Left L5/S1 TF AYAAN with local;  Surgeon: Rowdy Felix MD;  Location: [facility];  Service: Pain Management;  Laterality: Left;       Labs:  Lab Results   Component Value Date     WBC 25.33 (H) 01/10/2020    HGB 6.9 (L) 01/10/2020    HCT 23.7 (L) 01/10/2020     (H) 01/10/2020    PLT 91 (L) 01/10/2020     BMP  Lab Results   Component Value Date     01/10/2020    K 4.5 01/10/2020     01/10/2020    CO2 24 01/10/2020    BUN 20 01/10/2020    CREATININE 1.0 01/10/2020    CALCIUM 9.6 01/10/2020    ANIONGAP 10 01/10/2020    ESTGFRAFRICA >60 01/10/2020    EGFRNONAA 54 (A) 01/10/2020     Lab Results   Component Value Date    ALT 16 01/10/2020    AST 22 01/10/2020    ALKPHOS 134 01/10/2020    BILITOT 0.6 01/10/2020       Lab Results   Component Value Date    IRON 36 08/25/2019    TIBC 185 (L) 08/25/2019    FERRITIN 253 09/27/2017     Lab Results   Component Value Date    JBPEUWZL32 1918 (H) 08/25/2019     Lab Results   Component Value Date    FOLATE 12.0 08/25/2019     Lab Results   Component Value Date    TSH 5.174 (H) 09/03/2019         Review of Systems   Constitutional: Positive for fatigue. Negative for activity change, appetite change, chills, diaphoresis, fever and unexpected weight change.   HENT: Negative for congestion, dental problem, drooling, ear discharge, ear pain, facial swelling, hearing loss, mouth sores, nosebleeds, postnasal drip, rhinorrhea, sinus pressure, sneezing, sore throat, tinnitus, trouble swallowing and voice change.    Eyes: Negative for photophobia, pain, discharge, redness, itching and visual disturbance.   Respiratory: Negative for cough, choking, chest tightness, shortness of breath, wheezing and stridor.    Cardiovascular: Negative for chest pain, palpitations and leg swelling.   Gastrointestinal: Negative for abdominal distention, abdominal pain, anal bleeding, blood in stool, constipation, diarrhea, nausea, rectal pain and vomiting.   Endocrine: Negative for cold intolerance, heat intolerance, polydipsia, polyphagia and polyuria.   Genitourinary: Negative for decreased urine volume, difficulty urinating, dyspareunia, dysuria, enuresis, flank  pain, frequency, genital sores, hematuria, menstrual problem, pelvic pain, urgency, vaginal bleeding, vaginal discharge and vaginal pain.   Musculoskeletal: Negative for arthralgias, back pain, gait problem, joint swelling, myalgias, neck pain and neck stiffness.   Skin: Positive for rash and wound. Negative for color change and pallor.   Allergic/Immunologic: Negative for environmental allergies, food allergies and immunocompromised state.   Neurological: Positive for weakness. Negative for dizziness, tremors, seizures, syncope, facial asymmetry, speech difficulty, light-headedness, numbness and headaches.   Hematological: Negative for adenopathy. Does not bruise/bleed easily.   Psychiatric/Behavioral: Positive for dysphoric mood. Negative for agitation, behavioral problems, confusion, decreased concentration, hallucinations, self-injury, sleep disturbance and suicidal ideas. The patient is nervous/anxious. The patient is not hyperactive.        Objective:      Physical Exam   Constitutional: She is oriented to person, place, and time. She appears well-developed and well-nourished. She appears distressed.   HENT:   Head: Normocephalic and atraumatic.   Right Ear: External ear normal.   Left Ear: External ear normal.   Nose: Nose normal. Right sinus exhibits no maxillary sinus tenderness and no frontal sinus tenderness. Left sinus exhibits no maxillary sinus tenderness and no frontal sinus tenderness.   Mouth/Throat: Oropharynx is clear and moist. No oropharyngeal exudate.   Eyes: Pupils are equal, round, and reactive to light. Conjunctivae, EOM and lids are normal. Right eye exhibits no discharge. Left eye exhibits no discharge. Right conjunctiva is not injected. Right conjunctiva has no hemorrhage. Left conjunctiva is not injected. Left conjunctiva has no hemorrhage. No scleral icterus.   Neck: Normal range of motion. Neck supple. No JVD present. No tracheal deviation present. No thyromegaly present.    Cardiovascular: Normal rate and regular rhythm.   Pulmonary/Chest: Effort normal. No stridor. No respiratory distress. She exhibits no tenderness.   Abdominal: Soft. She exhibits no distension and no mass. There is no splenomegaly or hepatomegaly. There is no tenderness. There is no rebound.   Musculoskeletal: Normal range of motion. She exhibits no edema or tenderness.   Lymphadenopathy:     She has no cervical adenopathy.     She has no axillary adenopathy.        Right: No supraclavicular adenopathy present.        Left: No supraclavicular adenopathy present.   Neurological: She is alert and oriented to person, place, and time. No cranial nerve deficit. Coordination normal.   Skin: Skin is dry. Rash noted. She is not diaphoretic. No erythema.        Psychiatric: She has a normal mood and affect. Her behavior is normal. Judgment and thought content normal.   Vitals reviewed.          Assessment:      1. Chronic myelomonocytic leukemia not having achieved remission    2. Pancytopenia due to antineoplastic chemotherapy    3. Atherosclerosis of aorta    4. Leukemia cutis           Plan:     Area in left arm is consistent on flow cytometry with leukemia cutis.  At this point will continue to treat with current dosing of Vidaza I will see patient back in 1 week with a repeat CBC.  I have ask Radiation Oncology to see the patient today for presumptive diagnosis of leukemia cutis but we will begin palliative radiation therapy and treat his oligometastatic disease although the possibility of other areas becoming involved is increasingly of our concern patient is aware spoke with pathology hopefully will have results by Monday for further evaluation        Corby Miranda Jr, MD FACP

## 2020-01-13 ENCOUNTER — TELEPHONE (OUTPATIENT)
Dept: FAMILY MEDICINE | Facility: CLINIC | Age: 80
End: 2020-01-13

## 2020-01-13 NOTE — TELEPHONE ENCOUNTER
S/w pt regarding rescheduling appt w/  on same day due to chemo appt on original appt. Verbalized understanding. -KT-

## 2020-01-13 NOTE — TELEPHONE ENCOUNTER
----- Message from Edith Romero sent at 1/13/2020  9:51 AM CST -----  Contact: self [phone]  States that she is calling to speak to nurse regarding her upcoming appt. Please call back at [phone]//thank you acc

## 2020-01-15 ENCOUNTER — TELEPHONE (OUTPATIENT)
Dept: HEMATOLOGY/ONCOLOGY | Facility: CLINIC | Age: 80
End: 2020-01-15

## 2020-01-15 NOTE — TELEPHONE ENCOUNTER
SW intern attempted to speak with pt in regards to distress score. SW intern called both contact numbers and neither have a VM set up to leave messages. SW will call back at a later time.

## 2020-01-15 NOTE — PROGRESS NOTES
"Subjective:      Patient ID: Sarah Parker is a 79 y.o. female.    Chief Complaint: Follow-up      HPI  Here for follow up of medical problems.  Radiation to axillary leukemia mass.  On chemo now.  Feeling well.  Gets weak spells, but rests and is ok.  BPs 125-130sys.  Off rxs for RA, doing well pain-wise.  No f/c/sw/cough.  No cp/sob/palp.  BMs little slow.  Using aftrin every night.  Wt stable in past 3mo.    Updated/ annual due 10/20:  HM: 11/19 fluvax, 5/16 [id number], 10/14 [id number], 10/13 TDaP, 1/17 BMD/will bring to Dr. Tejada rep 2y, 2/19 MMG, 10/13 EGD, 2015 Cscope Dr. Garcia rep 5y, Pain Dr. Carlin.     Review of Systems   Constitutional: Negative for chills, diaphoresis and fever.   Respiratory: Negative for cough and shortness of breath.    Cardiovascular: Negative for chest pain, palpitations and leg swelling.   Gastrointestinal: Negative for blood in stool, constipation, diarrhea, nausea and vomiting.   Genitourinary: Negative for dysuria, frequency and hematuria.   Psychiatric/Behavioral: The patient is not nervous/anxious.          Objective:   /74 (BP Location: Left arm, Patient Position: Sitting, BP Method: Medium (Manual))   Pulse 89   Temp 98.4 °F (36.9 °C) (Temporal)   Ht 5' 2" (1.575 m)   Wt 52.4 kg (115 lb 8.3 oz)   LMP  (LMP Unknown)   SpO2 97%   BMI 21.13 kg/m²     Physical Exam   Constitutional: She is oriented to person, place, and time. She appears well-developed.   HENT:   Mouth/Throat: Oropharynx is clear and moist.   Neck: Neck supple. Carotid bruit is not present. No thyroid mass present.   Cardiovascular: Normal rate, regular rhythm and intact distal pulses. Exam reveals no gallop and no friction rub.   No murmur heard.  Pulmonary/Chest: Effort normal. She has no wheezes. She has rales (chronic.).   Abdominal: Soft. Bowel sounds are normal. She exhibits no mass. There is no hepatosplenomegaly. There is no tenderness.   Musculoskeletal: She exhibits no edema. "   Lymphadenopathy:     She has no cervical adenopathy.     She has axillary adenopathy.        Left axillary: Lateral (2.5cm, nontender firm mass.) adenopathy present.   Neurological: She is alert and oriented to person, place, and time.   Psychiatric: She has a normal mood and affect.           Assessment:       1. Acquired hypothyroidism    2. Axillary mass, left    3. Simple chronic bronchitis    4. Chronic myelomonocytic leukemia not having achieved remission    5. Depression, recurrent    6. Essential hypertension    7. Pancytopenia due to antineoplastic chemotherapy    8. Rheumatoid arthritis involving right shoulder with positive rheumatoid factor    9. Seasonal allergic rhinitis due to pollen          Plan:     Acquired hypothyroidism- cont synthroid.    Simple chronic bronchitis- doing well.    Axillary mass, left, Chronic myelomonocytic leukemia not having achieved remission, Pancytopenia due to antineoplastic chemotherapy- per HemeOnc.    Depression, recurrent- doing well, cont rx.    Essential hypertension- stable, cont to monitor.    Rheumatoid arthritis involving right shoulder with positive rheumatoid factor    Seasonal allergic rhinitis due to pollen- discussed try to wean off aftrin, take flonase daily.  -     fluticasone propionate (FLONASE) 50 mcg/actuation nasal spray; 2 sprays (100 mcg total) by Each Nostril route once daily.  Dispense: 16 g; Refill: 6

## 2020-01-17 ENCOUNTER — HOSPITAL ENCOUNTER (OUTPATIENT)
Dept: RADIATION THERAPY | Facility: HOSPITAL | Age: 80
Discharge: HOME | End: 2020-01-17
Attending: RADIOLOGY
Payer: MEDICARE

## 2020-01-17 ENCOUNTER — OFFICE VISIT (OUTPATIENT)
Dept: HEMATOLOGY/ONCOLOGY | Facility: CLINIC | Age: 80
End: 2020-01-17
Payer: MEDICARE

## 2020-01-17 ENCOUNTER — HOSPITAL ENCOUNTER (OUTPATIENT)
Dept: RADIOLOGY | Facility: HOSPITAL | Age: 80
Discharge: HOME | End: 2020-01-17
Attending: RADIOLOGY
Payer: MEDICARE

## 2020-01-17 VITALS
HEIGHT: 62 IN | OXYGEN SATURATION: 98 % | WEIGHT: 115.5 LBS | BODY MASS INDEX: 21.25 KG/M2 | HEART RATE: 87 BPM | SYSTOLIC BLOOD PRESSURE: 165 MMHG | DIASTOLIC BLOOD PRESSURE: 82 MMHG

## 2020-01-17 DIAGNOSIS — T45.1X5A PANCYTOPENIA DUE TO ANTINEOPLASTIC CHEMOTHERAPY: ICD-10-CM

## 2020-01-17 DIAGNOSIS — R64 CACHEXIA: ICD-10-CM

## 2020-01-17 DIAGNOSIS — D61.810 PANCYTOPENIA DUE TO ANTINEOPLASTIC CHEMOTHERAPY: ICD-10-CM

## 2020-01-17 DIAGNOSIS — D50.0 IRON DEFICIENCY ANEMIA DUE TO CHRONIC BLOOD LOSS: ICD-10-CM

## 2020-01-17 DIAGNOSIS — D69.6 THROMBOCYTOPENIA: ICD-10-CM

## 2020-01-17 DIAGNOSIS — D53.9 MACROCYTIC ANEMIA: ICD-10-CM

## 2020-01-17 DIAGNOSIS — C93.10 CHRONIC MYELOMONOCYTIC LEUKEMIA NOT HAVING ACHIEVED REMISSION: Primary | Chronic | ICD-10-CM

## 2020-01-17 LAB
COMMENT: NORMAL
FINAL PATHOLOGIC DIAGNOSIS: NORMAL
GROSS: NORMAL
Lab: NORMAL
MICROSCOPIC EXAM: NORMAL

## 2020-01-17 PROCEDURE — 1100F PTFALLS ASSESS-DOCD GE2>/YR: CPT | Mod: HCNC,CPTII,S$GLB, | Performed by: INTERNAL MEDICINE

## 2020-01-17 PROCEDURE — 77290 PR  SET RADN THERAPY FIELD COMPLEX: ICD-10-PCS | Mod: 26,HCNC,, | Performed by: RADIOLOGY

## 2020-01-17 PROCEDURE — 99999 PR PBB SHADOW E&M-EST. PATIENT-LVL IV: CPT | Mod: PBBFAC,HCNC,, | Performed by: INTERNAL MEDICINE

## 2020-01-17 PROCEDURE — 3079F DIAST BP 80-89 MM HG: CPT | Mod: HCNC,CPTII,S$GLB, | Performed by: INTERNAL MEDICINE

## 2020-01-17 PROCEDURE — 1100F PR PT FALLS ASSESS DOC 2+ FALLS/FALL W/INJURY/YR: ICD-10-PCS | Mod: HCNC,CPTII,S$GLB, | Performed by: INTERNAL MEDICINE

## 2020-01-17 PROCEDURE — 99499 UNLISTED E&M SERVICE: CPT | Mod: HCNC,S$GLB,, | Performed by: INTERNAL MEDICINE

## 2020-01-17 PROCEDURE — 77014 HC CT GUIDANCE RADIATION THERAPY FLDS PLACEMENT: CPT | Mod: TC,HCNC | Performed by: RADIOLOGY

## 2020-01-17 PROCEDURE — 77290 THER RAD SIMULAJ FIELD CPLX: CPT | Mod: 26,HCNC,, | Performed by: RADIOLOGY

## 2020-01-17 PROCEDURE — 1126F AMNT PAIN NOTED NONE PRSNT: CPT | Mod: HCNC,S$GLB,, | Performed by: INTERNAL MEDICINE

## 2020-01-17 PROCEDURE — 99499 RISK ADDL DX/OHS AUDIT: ICD-10-PCS | Mod: HCNC,S$GLB,, | Performed by: INTERNAL MEDICINE

## 2020-01-17 PROCEDURE — 99999 PR PBB SHADOW E&M-EST. PATIENT-LVL IV: ICD-10-PCS | Mod: PBBFAC,HCNC,, | Performed by: INTERNAL MEDICINE

## 2020-01-17 PROCEDURE — 3077F PR MOST RECENT SYSTOLIC BLOOD PRESSURE >= 140 MM HG: ICD-10-PCS | Mod: HCNC,CPTII,S$GLB, | Performed by: INTERNAL MEDICINE

## 2020-01-17 PROCEDURE — 3288F PR FALLS RISK ASSESSMENT DOCUMENTED: ICD-10-PCS | Mod: HCNC,CPTII,S$GLB, | Performed by: INTERNAL MEDICINE

## 2020-01-17 PROCEDURE — 77334 RADIATION TREATMENT AID(S): CPT | Mod: 26,HCNC,, | Performed by: RADIOLOGY

## 2020-01-17 PROCEDURE — 77334 RADIATION TREATMENT AID(S): CPT | Mod: TC,HCNC | Performed by: RADIOLOGY

## 2020-01-17 PROCEDURE — 77334 PR  RADN TREATMENT AID(S) COMPLX: ICD-10-PCS | Mod: 26,HCNC,, | Performed by: RADIOLOGY

## 2020-01-17 PROCEDURE — 3077F SYST BP >= 140 MM HG: CPT | Mod: HCNC,CPTII,S$GLB, | Performed by: INTERNAL MEDICINE

## 2020-01-17 PROCEDURE — 99215 OFFICE O/P EST HI 40 MIN: CPT | Mod: 25,HCNC,S$GLB, | Performed by: INTERNAL MEDICINE

## 2020-01-17 PROCEDURE — 77290 THER RAD SIMULAJ FIELD CPLX: CPT | Mod: TC,HCNC | Performed by: RADIOLOGY

## 2020-01-17 PROCEDURE — 1159F PR MEDICATION LIST DOCUMENTED IN MEDICAL RECORD: ICD-10-PCS | Mod: HCNC,S$GLB,, | Performed by: INTERNAL MEDICINE

## 2020-01-17 PROCEDURE — 77263 THER RADIOLOGY TX PLNG CPLX: CPT | Mod: HCNC,,, | Performed by: RADIOLOGY

## 2020-01-17 PROCEDURE — 99215 PR OFFICE/OUTPT VISIT, EST, LEVL V, 40-54 MIN: ICD-10-PCS | Mod: 25,HCNC,S$GLB, | Performed by: INTERNAL MEDICINE

## 2020-01-17 PROCEDURE — 1159F MED LIST DOCD IN RCRD: CPT | Mod: HCNC,S$GLB,, | Performed by: INTERNAL MEDICINE

## 2020-01-17 PROCEDURE — 3079F PR MOST RECENT DIASTOLIC BLOOD PRESSURE 80-89 MM HG: ICD-10-PCS | Mod: HCNC,CPTII,S$GLB, | Performed by: INTERNAL MEDICINE

## 2020-01-17 PROCEDURE — 1126F PR PAIN SEVERITY QUANTIFIED, NO PAIN PRESENT: ICD-10-PCS | Mod: HCNC,S$GLB,, | Performed by: INTERNAL MEDICINE

## 2020-01-17 PROCEDURE — 3288F FALL RISK ASSESSMENT DOCD: CPT | Mod: HCNC,CPTII,S$GLB, | Performed by: INTERNAL MEDICINE

## 2020-01-17 PROCEDURE — 77263 PR  RADIATION THERAPY PLAN COMPLEX: ICD-10-PCS | Mod: HCNC,,, | Performed by: RADIOLOGY

## 2020-01-17 NOTE — PROGRESS NOTES
Subjective:       Patient ID: Sarah Parker is a 79 y.o. female.    Chief Complaint: Results; Chemotherapy; and Leukemia    HPI 79-year-old female presents for reinstitution  of Vidaza therapy for chronic myelomonocytic leukemia patient also has leukemia cutis on her left arm.  ECOG status 1 accompanied by family with family member on phone  Past Medical History:   Diagnosis Date    Acid reflux     Anxiety     Back pain     Bronchitis, chronic obstructive w acute bronchitis 7/29/2016    Cancer     [facility] arms, face- Dr. Lata Tejada    Cataract     2+NS    Degenerative disc disease     Depression     Dry mouth     Hernia, hiatal 11/18/2013    Hypertension     Hypothyroid     Macrocytic anemia 5/3/2016    Macular degeneration     Migraines     Mixed anxiety and depressive disorder     Multiple fractures of ribs of right side     Osteoporosis     Other hyperlipidemia 10/11/2019    Pneumonia     Pneumonia due to other staphylococcus     Rheumatoid arthritis     Rheumatoid arthritis(714.0)     Rheumatoid arthritis(714.0)     Remicade, MTX.     Family History   Problem Relation Age of Onset    Heart disease Mother     Hyperlipidemia Mother     Hypertension Mother     Osteoarthritis Mother     Cataracts Mother     Hypertension Father     Osteoarthritis Father     Heart disease Father     Asthma Sister     Chronic back pain Sister     Hypertension Sister     Osteoarthritis Sister     Thyroid disease Sister     Asthma Brother     Cancer Brother     Chronic back pain Brother     Diabetes Mellitus Brother     Hypertension Brother     Osteoarthritis Brother     Thyroid disease Brother     Cancer Maternal Grandfather     Fibromyalgia Daughter     Heart disease Maternal Grandmother     Colon cancer Neg Hx     Diabetes Neg Hx      Social History     Socioeconomic History    Marital status:      Spouse name: Not on file    Number of children: Not on file    Years of  education: Not on file    Highest education level: Not on file   Occupational History    Occupation: retired   Social Needs    Financial resource strain: Not on file    Food insecurity:     Worry: Not on file     Inability: Not on file    Transportation needs:     Medical: Not on file     Non-medical: Not on file   Tobacco Use    Smoking status: Former Smoker     Packs/day: 0.25     Years: 2.00     Pack years: 0.50     Last attempt to quit: 1965     Years since quittin.2    Smokeless tobacco: Never Used   Substance and Sexual Activity    Alcohol use: No    Drug use: No    Sexual activity: Never     Partners: Male   Lifestyle    Physical activity:     Days per week: Not on file     Minutes per session: Not on file    Stress: Not at all   Relationships    Social connections:     Talks on phone: Not on file     Gets together: Not on file     Attends Faith service: Not on file     Active member of club or organization: Not on file     Attends meetings of clubs or organizations: Not on file     Relationship status: Not on file   Other Topics Concern    Not on file   Social History Narrative    Patient is aretired and live with .     Past Surgical History:   Procedure Laterality Date    APPENDECTOMY  1985    CATARACT EXTRACTION Bilateral 6/11/15    Dr. Booth    CHOLECYSTECTOMY  2013    cryoablasion kidney Left 2016    feet Bilateral     rheumatoid    FRACTURE SURGERY Right     tibia    HERNIA REPAIR      HYSTERECTOMY  1970    partial    JOINT REPLACEMENT      bilateral knees (), hands, wrists, knuckles, toes    LAPAROSCOPIC NISSEN FUNDOPLICATION      TRANSFORAMINAL EPIDURAL INJECTION OF STEROID Left 2019    Procedure: Left L5/S1 TF AYAAN with local;  Surgeon: Rowdy Felix MD;  Location: [facility];  Service: Pain Management;  Laterality: Left;       Labs:  Lab Results   Component Value Date    WBC 27.77 (H) 2020    HGB 6.8 (L) 2020    HCT 23.5  (L) 01/17/2020     (H) 01/17/2020    PLT 99 (L) 01/17/2020     BMP  Lab Results   Component Value Date     01/10/2020    K 4.5 01/10/2020     01/10/2020    CO2 24 01/10/2020    BUN 20 01/10/2020    CREATININE 1.0 01/10/2020    CALCIUM 9.6 01/10/2020    ANIONGAP 10 01/10/2020    ESTGFRAFRICA >60 01/10/2020    EGFRNONAA 54 (A) 01/10/2020     Lab Results   Component Value Date    ALT 16 01/10/2020    AST 22 01/10/2020    ALKPHOS 134 01/10/2020    BILITOT 0.6 01/10/2020       Lab Results   Component Value Date    IRON 36 08/25/2019    TIBC 185 (L) 08/25/2019    FERRITIN 253 09/27/2017     Lab Results   Component Value Date    HCERXAXK16 1918 (H) 08/25/2019     Lab Results   Component Value Date    FOLATE 12.0 08/25/2019     Lab Results   Component Value Date    TSH 5.174 (H) 09/03/2019         Review of Systems   Constitutional: Positive for activity change, appetite change, fatigue and unexpected weight change. Negative for chills, diaphoresis and fever.   HENT: Negative for congestion, dental problem, drooling, ear discharge, ear pain, facial swelling, hearing loss, mouth sores, nosebleeds, postnasal drip, rhinorrhea, sinus pressure, sneezing, sore throat, tinnitus, trouble swallowing and voice change.    Eyes: Negative for photophobia, pain, discharge, redness, itching and visual disturbance.   Respiratory: Negative for cough, choking, chest tightness, shortness of breath, wheezing and stridor.    Cardiovascular: Negative for chest pain, palpitations and leg swelling.   Gastrointestinal: Negative for abdominal distention, abdominal pain, anal bleeding, blood in stool, constipation, diarrhea, nausea, rectal pain and vomiting.   Endocrine: Negative for cold intolerance, heat intolerance, polydipsia, polyphagia and polyuria.   Genitourinary: Negative for decreased urine volume, difficulty urinating, dyspareunia, dysuria, enuresis, flank pain, frequency, genital sores, hematuria, menstrual problem,  pelvic pain, urgency, vaginal bleeding, vaginal discharge and vaginal pain.   Musculoskeletal: Negative for arthralgias, back pain, gait problem, joint swelling, myalgias, neck pain and neck stiffness.   Skin: Positive for wound. Negative for color change, pallor and rash.   Allergic/Immunologic: Negative for environmental allergies, food allergies and immunocompromised state.   Neurological: Positive for weakness. Negative for dizziness, tremors, seizures, syncope, facial asymmetry, speech difficulty, light-headedness, numbness and headaches.   Hematological: Negative for adenopathy. Does not bruise/bleed easily.   Psychiatric/Behavioral: Positive for dysphoric mood. Negative for agitation, behavioral problems, confusion, decreased concentration, hallucinations, self-injury, sleep disturbance and suicidal ideas. The patient is nervous/anxious. The patient is not hyperactive.        Objective:      Physical Exam   Constitutional: She is oriented to person, place, and time. She appears cachectic. She has a sickly appearance. She appears ill. She appears distressed.   HENT:   Head: Normocephalic and atraumatic.   Right Ear: External ear normal.   Left Ear: External ear normal.   Nose: Nose normal. Right sinus exhibits no maxillary sinus tenderness and no frontal sinus tenderness. Left sinus exhibits no maxillary sinus tenderness and no frontal sinus tenderness.   Mouth/Throat: Oropharynx is clear and moist. No oropharyngeal exudate.   Eyes: Pupils are equal, round, and reactive to light. Conjunctivae, EOM and lids are normal. Right eye exhibits no discharge. Left eye exhibits no discharge. Right conjunctiva is not injected. Right conjunctiva has no hemorrhage. Left conjunctiva is not injected. Left conjunctiva has no hemorrhage. No scleral icterus.   Neck: Normal range of motion. Neck supple. No JVD present. No tracheal deviation present. No thyromegaly present.   Cardiovascular: Normal rate and regular rhythm.    Pulmonary/Chest: Effort normal. No stridor. No respiratory distress. She exhibits no tenderness.   Abdominal: Soft. She exhibits no distension and no mass. There is no splenomegaly or hepatomegaly. There is no tenderness. There is no rebound.   Musculoskeletal: Normal range of motion. She exhibits no edema or tenderness.   Lymphadenopathy:     She has no cervical adenopathy.     She has no axillary adenopathy.        Right: No supraclavicular adenopathy present.        Left: No supraclavicular adenopathy present.   Neurological: She is alert and oriented to person, place, and time. No cranial nerve deficit. Coordination normal.   Skin: Skin is dry. No rash noted. She is not diaphoretic. No erythema.   Psychiatric: She has a normal mood and affect. Her behavior is normal. Judgment and thought content normal.   Vitals reviewed.          Assessment:      1. Chronic myelomonocytic leukemia not having achieved remission    2. Iron deficiency anemia due to chronic blood loss    3. Pancytopenia due to antineoplastic chemotherapy    4. Macrocytic anemia    5. Thrombocytopenia           Plan:     Patient will begin radiation therapy for leukemia cutis under left arm.  Will continue with current dosing of Vidaza.  Also will check iron status they any peau level to see of erythropoietin can be used concurrently will see back in 2 weeks CBC CMP orders written reviewed 40 min face-to-face time coordination of care with 50% time face-to-face with patient        Corby Miranda Jr, MD FACP

## 2020-01-20 ENCOUNTER — INFUSION (OUTPATIENT)
Dept: INFUSION THERAPY | Facility: HOSPITAL | Age: 80
End: 2020-01-20
Attending: INTERNAL MEDICINE
Payer: MEDICARE

## 2020-01-20 VITALS
RESPIRATION RATE: 20 BRPM | SYSTOLIC BLOOD PRESSURE: 141 MMHG | OXYGEN SATURATION: 100 % | HEART RATE: 96 BPM | DIASTOLIC BLOOD PRESSURE: 80 MMHG | TEMPERATURE: 98 F

## 2020-01-20 DIAGNOSIS — C93.10 CHRONIC MYELOMONOCYTIC LEUKEMIA NOT HAVING ACHIEVED REMISSION: Primary | ICD-10-CM

## 2020-01-20 PROCEDURE — 96401 CHEMO ANTI-NEOPL SQ/IM: CPT | Mod: HCNC

## 2020-01-20 PROCEDURE — 63600175 PHARM REV CODE 636 W HCPCS: Mod: JG,HCNC | Performed by: INTERNAL MEDICINE

## 2020-01-20 RX ORDER — AZACITIDINE 100 MG/1
75 INJECTION, POWDER, LYOPHILIZED, FOR SOLUTION INTRAVENOUS; SUBCUTANEOUS
Status: COMPLETED | OUTPATIENT
Start: 2020-01-20 | End: 2020-01-20

## 2020-01-20 RX ADMIN — AZACITIDINE 115 MG: 100 INJECTION, POWDER, LYOPHILIZED, FOR SOLUTION INTRAVENOUS; SUBCUTANEOUS at 11:01

## 2020-01-20 NOTE — NURSING
Patient's platelets are 99,000. Treatment plan call for platelets above 100,000. Per Dr. Aiyana ross to proceed with treatment.

## 2020-01-20 NOTE — NURSING
Vidaza injections given without difficulties.Bandaids applied. Patient instructed to stay in the clinic for 15 minutes. Patient verbalized understanding and will notify nurse with any complaints.

## 2020-01-20 NOTE — NURSING
Upon further review of patient's labs Hgb was 6.8 on 1/17. Notified Dr. Knox who asked what Dr. Miranda recommended as he saw the patient on 1/17/ The following message/respose was sent via in basket message.     MD Janay Werner RN             Will just continue to follow up I believe she has labs Internet no other week or so    Previous Messages      ----- Message -----   From: Janay Mayfield RN   Sent: 1/20/2020   1:12 PM CST   To: MD Dr. Ruben Werner,     This patient came in this morning for vidaza. Her V/S and O2 sats were good. She did well with treatment and left. However, when reviewing her labs again I noticed that her Hgb was 6.8 on 1/17 and 6.9 on 1/10. The patient was not symptomatic this morning.      I messaged Dr. Knox and he asked for your recommendations since you saw her on 1/17. Any orders for when the patient returns tomorrow? Please advise.     Thanks,     Janay

## 2020-01-20 NOTE — DISCHARGE INSTRUCTIONS
[facility]  [address] [address]  [address] [address]  [phone] phone     [phone] fax  Hours of Operation: Monday- Friday 8:00am- 5:00pm  After hours phone  [phone]  Hematology / Oncology Physicians on call      Dr. Ruben Baird, GILBERT Youngblood NP Tyesha Taylor, NP    Please call with any concerns regarding your appointment today.    FALL PREVENTION   Falls often occur due to slipping, tripping or losing your balance. Here are ways to reduce your risk of falling again.   Was there anything that caused your fall that can be fixed, removed or replaced?   Make your home safe by keeping walkways clear of objects you may trip over.   Use non-slip pads under rugs.   Do not walk in poorly lit areas.   Do not stand on chairs or wobbly ladders.   Use caution when reaching overhead or looking upward. This position can cause a loss of balance.   Be sure your shoes fit properly, have non-slip bottoms and are in good condition.   Be cautious when going up and down stairs, curbs, and when walking on uneven sidewalks.   If your balance is poor, consider using a cane or walker.   If your fall was related to alcohol use, stop or limit alcohol intake.   If your fall was related to use of sleeping medicines, talk to your doctor about this. You may need to reduce your dosage at bedtime if you awaken during the night to go to the bathroom.   To reduce the need for nighttime bathroom trips:   Avoid drinking fluids for several hours before going to bed   Empty your bladder before going to bed   Men can keep a urinal at the bedside   © 7251-1766 [facility], [address] [place] [address], [place] [address]. All rights reserved. This information is not intended as a substitute for professional medical care. Always follow your healthcare professional's instructions.      Oncology: Managing Fatigue    Fatigue is a common side effect of chemotherapy and radiation therapy. It can be caused by worry, lack of sleep, and poor appetite. Fatigue can also be a sign of anemia (a shortage of red blood cells). This could require medical treatment. The tips below can help you feel better.      Family members can help with meals and chores around the house.      Conserving Energy   Note the times of day when you are most tired and plan around them. For instance, if you are more tired in the afternoon, try to get tasks done in the morning.   Decide which tasks are most important. Do those first.   Pass tasks along to others when you need to. Ask for help.   Accept help when its offered. Tell people what they can do to help. For instance, you may need someone to fix a meal, fold clothes, or put gas in your car.   Plan rest times. You may want to take a nap each day. Just sitting quietly for a few minutes can make you feel more rested.  What You Can Do to Feel Better   Relax before you try to sleep. Take a bath or read for a while.   Form a sleep pattern. Go to bed at the same time each night and get up at the same time each morning.   Eat well. Choose foods from all of the food groups each day.   Exercise. Take a brisk walk to help increase your energy.   Avoid caffeine and alcohol. Drink plenty of water or fruit juices instead.  Treating Anemia   If you begin to feel more tired than normal, tell your doctor. Fatigue could be a sign of anemia. This problem is fairly common during chemotherapy and radiation treatments. If your red blood cell count is too low, you are likely to receive a blood transfusion. Most people feel better shortly after the transfusion. In some cases, medication may be prescribed to increase red blood cell production.   Call Your Doctor If You Have:   Shortness of breath or chest pain   A dizzy feeling when you get up from lying or sitting down   Paler skin than normal   Extreme tiredness that is not  helped by sleep    © 5841-5790 [facility], [address] [place] [address], [place] [address]. All rights reserved. This information is not intended as a substitute for professional medical care. Always follow your healthcare professional's instructions.

## 2020-01-21 ENCOUNTER — INFUSION (OUTPATIENT)
Dept: INFUSION THERAPY | Facility: HOSPITAL | Age: 80
End: 2020-01-21
Attending: INTERNAL MEDICINE
Payer: MEDICARE

## 2020-01-21 VITALS
SYSTOLIC BLOOD PRESSURE: 137 MMHG | DIASTOLIC BLOOD PRESSURE: 74 MMHG | RESPIRATION RATE: 18 BRPM | HEART RATE: 93 BPM | TEMPERATURE: 98 F

## 2020-01-21 DIAGNOSIS — C93.10 CHRONIC MYELOMONOCYTIC LEUKEMIA NOT HAVING ACHIEVED REMISSION: Primary | ICD-10-CM

## 2020-01-21 PROCEDURE — 96401 CHEMO ANTI-NEOPL SQ/IM: CPT | Mod: HCNC

## 2020-01-21 PROCEDURE — 63600175 PHARM REV CODE 636 W HCPCS: Mod: JG,HCNC | Performed by: INTERNAL MEDICINE

## 2020-01-21 RX ORDER — AZACITIDINE 100 MG/1
75 INJECTION, POWDER, LYOPHILIZED, FOR SOLUTION INTRAVENOUS; SUBCUTANEOUS
Status: COMPLETED | OUTPATIENT
Start: 2020-01-21 | End: 2020-01-21

## 2020-01-21 RX ADMIN — AZACITIDINE 115 MG: 100 INJECTION, POWDER, LYOPHILIZED, FOR SOLUTION INTRAVENOUS; SUBCUTANEOUS at 10:01

## 2020-01-22 ENCOUNTER — INFUSION (OUTPATIENT)
Dept: INFUSION THERAPY | Facility: HOSPITAL | Age: 80
End: 2020-01-22
Attending: INTERNAL MEDICINE
Payer: MEDICARE

## 2020-01-22 VITALS
HEART RATE: 94 BPM | OXYGEN SATURATION: 94 % | TEMPERATURE: 97 F | DIASTOLIC BLOOD PRESSURE: 74 MMHG | BODY MASS INDEX: 21.25 KG/M2 | SYSTOLIC BLOOD PRESSURE: 145 MMHG | WEIGHT: 115.5 LBS | HEIGHT: 62 IN

## 2020-01-22 DIAGNOSIS — C93.10 CHRONIC MYELOMONOCYTIC LEUKEMIA NOT HAVING ACHIEVED REMISSION: Primary | ICD-10-CM

## 2020-01-22 PROCEDURE — 96401 CHEMO ANTI-NEOPL SQ/IM: CPT | Mod: HCNC

## 2020-01-22 PROCEDURE — 77300 RADIATION THERAPY DOSE PLAN: CPT | Mod: TC,HCNC | Performed by: RADIOLOGY

## 2020-01-22 PROCEDURE — 77295 3-D RADIOTHERAPY PLAN: CPT | Mod: 26,HCNC,, | Performed by: RADIOLOGY

## 2020-01-22 PROCEDURE — 77334 PR  RADN TREATMENT AID(S) COMPLX: ICD-10-PCS | Mod: 26,HCNC,, | Performed by: RADIOLOGY

## 2020-01-22 PROCEDURE — 77295 3-D RADIOTHERAPY PLAN: CPT | Mod: TC,HCNC | Performed by: RADIOLOGY

## 2020-01-22 PROCEDURE — 77300 RADIATION THERAPY DOSE PLAN: CPT | Mod: 26,HCNC,, | Performed by: RADIOLOGY

## 2020-01-22 PROCEDURE — 63600175 PHARM REV CODE 636 W HCPCS: Mod: JG,HCNC | Performed by: INTERNAL MEDICINE

## 2020-01-22 PROCEDURE — 77295 PR 3D RADIOTHERAPY PLAN: ICD-10-PCS | Mod: 26,HCNC,, | Performed by: RADIOLOGY

## 2020-01-22 PROCEDURE — 77334 RADIATION TREATMENT AID(S): CPT | Mod: 26,HCNC,, | Performed by: RADIOLOGY

## 2020-01-22 PROCEDURE — 77300 PR RADIATION THERAPY,DOSIMETRY PLAN: ICD-10-PCS | Mod: 26,HCNC,, | Performed by: RADIOLOGY

## 2020-01-22 PROCEDURE — 77334 RADIATION TREATMENT AID(S): CPT | Mod: TC,HCNC | Performed by: RADIOLOGY

## 2020-01-22 RX ORDER — AZACITIDINE 100 MG/1
75 INJECTION, POWDER, LYOPHILIZED, FOR SOLUTION INTRAVENOUS; SUBCUTANEOUS
Status: COMPLETED | OUTPATIENT
Start: 2020-01-22 | End: 2020-01-22

## 2020-01-22 RX ADMIN — AZACITIDINE 115 MG: 100 INJECTION, POWDER, LYOPHILIZED, FOR SOLUTION INTRAVENOUS; SUBCUTANEOUS at 10:01

## 2020-01-23 ENCOUNTER — DOCUMENTATION ONLY (OUTPATIENT)
Dept: RADIATION ONCOLOGY | Facility: CLINIC | Age: 80
End: 2020-01-23

## 2020-01-23 ENCOUNTER — INFUSION (OUTPATIENT)
Dept: INFUSION THERAPY | Facility: HOSPITAL | Age: 80
End: 2020-01-23
Attending: INTERNAL MEDICINE
Payer: MEDICARE

## 2020-01-23 VITALS
WEIGHT: 115.5 LBS | HEART RATE: 94 BPM | TEMPERATURE: 98 F | BODY MASS INDEX: 21.25 KG/M2 | RESPIRATION RATE: 18 BRPM | SYSTOLIC BLOOD PRESSURE: 134 MMHG | HEIGHT: 62 IN | OXYGEN SATURATION: 99 % | DIASTOLIC BLOOD PRESSURE: 75 MMHG

## 2020-01-23 DIAGNOSIS — L98.8 LEUKEMIA CUTIS: ICD-10-CM

## 2020-01-23 DIAGNOSIS — C93.10 CHRONIC MYELOMONOCYTIC LEUKEMIA NOT HAVING ACHIEVED REMISSION: Primary | ICD-10-CM

## 2020-01-23 DIAGNOSIS — C94.80 LEUKEMIA CUTIS: ICD-10-CM

## 2020-01-23 PROCEDURE — 77417 THER RADIOLOGY PORT IMAGE(S): CPT | Mod: HCNC | Performed by: RADIOLOGY

## 2020-01-23 PROCEDURE — 77412 RADIATION TX DELIVERY LVL 3: CPT | Mod: HCNC | Performed by: RADIOLOGY

## 2020-01-23 PROCEDURE — 63600175 PHARM REV CODE 636 W HCPCS: Mod: JG,HCNC | Performed by: INTERNAL MEDICINE

## 2020-01-23 PROCEDURE — 77387 GUIDANCE FOR RADJ TX DLVR: CPT | Mod: TC,HCNC | Performed by: RADIOLOGY

## 2020-01-23 PROCEDURE — G6002 PR STEREOSCOPIC XRAY GUIDE FOR RADIATION TX DELIV: ICD-10-PCS | Mod: 26,HCNC,, | Performed by: RADIOLOGY

## 2020-01-23 PROCEDURE — 96401 CHEMO ANTI-NEOPL SQ/IM: CPT | Mod: HCNC

## 2020-01-23 PROCEDURE — G6002 STEREOSCOPIC X-RAY GUIDANCE: HCPCS | Mod: 26,HCNC,, | Performed by: RADIOLOGY

## 2020-01-23 RX ORDER — AZACITIDINE 100 MG/1
75 INJECTION, POWDER, LYOPHILIZED, FOR SOLUTION INTRAVENOUS; SUBCUTANEOUS
Status: COMPLETED | OUTPATIENT
Start: 2020-01-23 | End: 2020-01-23

## 2020-01-23 RX ADMIN — AZACITIDINE 115 MG: 100 INJECTION, POWDER, LYOPHILIZED, FOR SOLUTION SUBCUTANEOUS at 10:01

## 2020-01-23 NOTE — PLAN OF CARE
Day 1 outpatient xrt to left axilla. Short term side effects handout and verbal instructions given. Skin care and side effects reviewed. Contact info provided. Patient verbalized understanding.

## 2020-01-23 NOTE — DISCHARGE INSTRUCTIONS
[facility]  [address] [facility] [address]  [address] [address]  [phone] phone     [phone] fax  Hours of Operation: Monday- Friday 8:00am- 5:00pm  After hours phone  [phone]  Hematology / Oncology Physicians on call      GILBERT Miller Dr., Dr., Dr., Dr., NP Sydney Prescott, NP Tyesha Taylor, NP    Please call with any concerns regarding your appointment today.HOME CARE AFTER CHEMOTHERAPY   Meals   Many patients feel sick and lose their appetites during treatment. Eat small meals several times a day. Choose bland foods with little taste or smell if you have problems with nausea. Be sure to cook all food thoroughly. This kills bacteria and helps you avoid intestinal infection. Soft foods are easier to swallow and digest.   Activity   Exercise keeps you strong and keeps your heart and lungs active. Talk to your doctor about an appropriate exercise program for you.   Skin Care   To prevent a skin infection, bathe or shower once a day. Use a moisturizing soap and wash with warm water. Avoid very hot or cold water. Chemotherapy can make your skin dry . Apply moisturizing lotion to help relieve dry skin. Some drugs used in high doses can cause slight burns to appear (like sunburn). Ask for a special cream to help relieve the burn and protect your skin.   Prevent Mouth Sores   During chemotherapy, many people get mouth sores. Do the following to help prevent mouth sores or to ease discomfort.   Brush your teeth with a soft-bristle toothbrush after every meal.  Don't use dental floss if your platelet count is below 50,000. Your doctor or nurse will tell you if this is the case.  Use an oral swab or special soft toothbrush if your gums bleed during regular brushing.  Use mouthwash as directed. If you can't tolerate commercial mouthwash, use salt and baking soda to clean your mouth. Mix 1 teaspoon of salt and 1  teaspoon of baking soda into a glass of water. Swish and spit.  Call your doctor or return to this facility if you develop any of the following:   Sore throat   White patches in the mouth or throat   Fever of 100.4ºF (38ºC) or higher, or as directed by your healthcare provider  © 2000-2011 [facility], [address] [place] [address]. All rights reserved. This information is not intended as a substitute for professional medical care. Always follow your healthcare professional's   FALL PREVENTION   Falls often occur due to slipping, tripping or losing your balance. Here are ways to reduce your risk of falling again.   Was there anything that caused your fall that can be fixed, removed or replaced?   Make your home safe by keeping walkways clear of objects you may trip over.   Use non-slip pads under rugs.   Do not walk in poorly lit areas.   Do not stand on chairs or wobbly ladders.   Use caution when reaching overhead or looking upward. This position can cause a loss of balance.   Be sure your shoes fit properly, have non-slip bottoms and are in good condition.   Be cautious when going up and down stairs, curbs, and when walking on uneven sidewalks.   If your balance is poor, consider using a cane or walker.   If your fall was related to alcohol use, stop or limit alcohol intake.   If your fall was related to use of sleeping medicines, talk to your doctor about this. You may need to reduce your dosage at bedtime if you awaken during the night to go to the bathroom.   To reduce the need for nighttime bathroom trips:   Avoid drinking fluids for several hours before going to bed   Empty your bladder before going to bed   Men can keep a urinal at the bedside   © 2000-2011 [facility], [address] [place] [address]. All rights reserved. This information is not intended as a substitute for professional medical care. Always follow your healthcare professional's instructions.  Support  "Groups/Classes    Support groups and classes are being offered at the   [facility] and [facility]!!    "Cooking with Cancer" (Nutrition Class):  Second Wednesday of each month   at noon at the Cancer Center.  Metastatic Support Group:  Third Tuesday of each month   at noon at the Cancer Center.  Next Steps Class/Group: Second and fourth Thursday of each month at noon at the Cancer Center.  Hope [facility] (Breast Cancer Support Group): First Tuesday of each month   at 5:30pm at the [facility] location.  [employer]: [facility]: Second and third Tuesday of each month from 7:30am - 2pm.  [facility]: First and fourth Tuesday of each month from 7:30am - 2pm    If you are interested in attending or would like more information please ask our social workers or your nurse!  "

## 2020-01-24 ENCOUNTER — INFUSION (OUTPATIENT)
Dept: INFUSION THERAPY | Facility: HOSPITAL | Age: 80
End: 2020-01-24
Attending: INTERNAL MEDICINE
Payer: MEDICARE

## 2020-01-24 VITALS
TEMPERATURE: 98 F | SYSTOLIC BLOOD PRESSURE: 134 MMHG | RESPIRATION RATE: 18 BRPM | HEART RATE: 104 BPM | DIASTOLIC BLOOD PRESSURE: 83 MMHG

## 2020-01-24 DIAGNOSIS — C93.10 CHRONIC MYELOMONOCYTIC LEUKEMIA NOT HAVING ACHIEVED REMISSION: Primary | ICD-10-CM

## 2020-01-24 PROCEDURE — 96401 CHEMO ANTI-NEOPL SQ/IM: CPT | Mod: HCNC

## 2020-01-24 PROCEDURE — G6002 STEREOSCOPIC X-RAY GUIDANCE: HCPCS | Mod: 26,HCNC,, | Performed by: RADIOLOGY

## 2020-01-24 PROCEDURE — G6002 PR STEREOSCOPIC XRAY GUIDE FOR RADIATION TX DELIV: ICD-10-PCS | Mod: 26,HCNC,, | Performed by: RADIOLOGY

## 2020-01-24 PROCEDURE — 63600175 PHARM REV CODE 636 W HCPCS: Mod: JG,HCNC | Performed by: INTERNAL MEDICINE

## 2020-01-24 PROCEDURE — 77412 RADIATION TX DELIVERY LVL 3: CPT | Mod: HCNC | Performed by: RADIOLOGY

## 2020-01-24 PROCEDURE — 77387 GUIDANCE FOR RADJ TX DLVR: CPT | Mod: TC,HCNC | Performed by: RADIOLOGY

## 2020-01-24 RX ORDER — AZACITIDINE 100 MG/1
75 INJECTION, POWDER, LYOPHILIZED, FOR SOLUTION INTRAVENOUS; SUBCUTANEOUS
Status: COMPLETED | OUTPATIENT
Start: 2020-01-24 | End: 2020-01-24

## 2020-01-24 RX ADMIN — AZACITIDINE 115 MG: 100 INJECTION, POWDER, LYOPHILIZED, FOR SOLUTION INTRAVENOUS; SUBCUTANEOUS at 10:01

## 2020-01-24 NOTE — DISCHARGE INSTRUCTIONS
"[facility]  [address] [address]  or  [address] [address]  [phone] phone     [phone] fax  Hours of Operation: Monday- Friday 8:00am- 5:00pm  After hours phone  [phone]  Hematology / Oncology Physicians on call      Dr. Ruben Baird, GILBERT Youngblood NP Tyesha Taylor, NP    Please call with any concerns regarding your appointment today.Support Groups/Classes    Support groups and classes are being offered at the   [facility] and [facility]!!    "Cooking with Cancer" (Nutrition Class):  Second Wednesday of each month   at noon at the [facility].  Metastatic Support Group:  Third Tuesday of each month   at noon at the [facility].  Next Steps Class/Group: Second and fourth Thursday of each month at noon at the [facility].  [facility] (Breast Cancer Support Group): First Tuesday of each month   at 5:30pm at the [facility] location.  [employer]: [facility]: Second and third Tuesday of each month from 7:30am - 2pm.  [facility]: First and fourth Tuesday of each month from 7:30am - 2pm    If you are interested in attending or would like more information please ask our social workers or your nurse!  "

## 2020-01-24 NOTE — NURSING
Pt stated Dr. Miranda ordered procrit, but her Dr. (Lisette) did not feel like she needed it.  Dr. Miranda off today, spoke with Dr. Knox who said because her erythropoetin level in WNL, he is ok with her not having it.  Held procrit today. Vidaza given per order/protocol without difficulty.  Tolerated well.

## 2020-01-27 ENCOUNTER — INFUSION (OUTPATIENT)
Dept: INFUSION THERAPY | Facility: HOSPITAL | Age: 80
End: 2020-01-27
Attending: INTERNAL MEDICINE
Payer: MEDICARE

## 2020-01-27 VITALS
HEART RATE: 98 BPM | DIASTOLIC BLOOD PRESSURE: 78 MMHG | RESPIRATION RATE: 20 BRPM | SYSTOLIC BLOOD PRESSURE: 124 MMHG | OXYGEN SATURATION: 98 % | TEMPERATURE: 97 F

## 2020-01-27 DIAGNOSIS — C93.10 CHRONIC MYELOMONOCYTIC LEUKEMIA NOT HAVING ACHIEVED REMISSION: Primary | ICD-10-CM

## 2020-01-27 PROCEDURE — G6002 PR STEREOSCOPIC XRAY GUIDE FOR RADIATION TX DELIV: ICD-10-PCS | Mod: 26,HCNC,, | Performed by: RADIOLOGY

## 2020-01-27 PROCEDURE — 63600175 PHARM REV CODE 636 W HCPCS: Mod: JW,JG,HCNC | Performed by: INTERNAL MEDICINE

## 2020-01-27 PROCEDURE — G6002 STEREOSCOPIC X-RAY GUIDANCE: HCPCS | Mod: 26,HCNC,, | Performed by: RADIOLOGY

## 2020-01-27 PROCEDURE — 96401 CHEMO ANTI-NEOPL SQ/IM: CPT | Mod: HCNC

## 2020-01-27 PROCEDURE — 77412 RADIATION TX DELIVERY LVL 3: CPT | Mod: HCNC | Performed by: RADIOLOGY

## 2020-01-27 PROCEDURE — 77387 GUIDANCE FOR RADJ TX DLVR: CPT | Mod: TC,HCNC | Performed by: RADIOLOGY

## 2020-01-27 RX ORDER — AZACITIDINE 100 MG/1
75 INJECTION, POWDER, LYOPHILIZED, FOR SOLUTION INTRAVENOUS; SUBCUTANEOUS
Status: COMPLETED | OUTPATIENT
Start: 2020-01-27 | End: 2020-01-27

## 2020-01-27 RX ADMIN — AZACITIDINE 115 MG: 100 INJECTION, POWDER, LYOPHILIZED, FOR SOLUTION INTRAVENOUS; SUBCUTANEOUS at 10:01

## 2020-01-28 ENCOUNTER — INFUSION (OUTPATIENT)
Dept: INFUSION THERAPY | Facility: HOSPITAL | Age: 80
End: 2020-01-28
Attending: INTERNAL MEDICINE
Payer: MEDICARE

## 2020-01-28 VITALS
TEMPERATURE: 97 F | HEART RATE: 87 BPM | BODY MASS INDEX: 21.25 KG/M2 | SYSTOLIC BLOOD PRESSURE: 131 MMHG | HEIGHT: 62 IN | RESPIRATION RATE: 18 BRPM | WEIGHT: 115.5 LBS | DIASTOLIC BLOOD PRESSURE: 65 MMHG

## 2020-01-28 DIAGNOSIS — C93.10 CHRONIC MYELOMONOCYTIC LEUKEMIA NOT HAVING ACHIEVED REMISSION: Primary | ICD-10-CM

## 2020-01-28 PROCEDURE — 63600175 PHARM REV CODE 636 W HCPCS: Mod: JW,JG,HCNC | Performed by: INTERNAL MEDICINE

## 2020-01-28 PROCEDURE — 77412 RADIATION TX DELIVERY LVL 3: CPT | Mod: HCNC | Performed by: RADIOLOGY

## 2020-01-28 PROCEDURE — 96401 CHEMO ANTI-NEOPL SQ/IM: CPT | Mod: HCNC

## 2020-01-28 PROCEDURE — G6002 STEREOSCOPIC X-RAY GUIDANCE: HCPCS | Mod: 26,HCNC,, | Performed by: RADIOLOGY

## 2020-01-28 PROCEDURE — 77387 GUIDANCE FOR RADJ TX DLVR: CPT | Mod: TC,HCNC | Performed by: RADIOLOGY

## 2020-01-28 PROCEDURE — G6002 PR STEREOSCOPIC XRAY GUIDE FOR RADIATION TX DELIV: ICD-10-PCS | Mod: 26,HCNC,, | Performed by: RADIOLOGY

## 2020-01-28 RX ORDER — AZACITIDINE 100 MG/1
75 INJECTION, POWDER, LYOPHILIZED, FOR SOLUTION INTRAVENOUS; SUBCUTANEOUS
Status: COMPLETED | OUTPATIENT
Start: 2020-01-28 | End: 2020-01-28

## 2020-01-28 RX ADMIN — AZACITIDINE 115 MG: 100 INJECTION, POWDER, LYOPHILIZED, FOR SOLUTION INTRAVENOUS; SUBCUTANEOUS at 09:01

## 2020-01-28 NOTE — NURSING
..Injection given without difficulties.Bandaid applied. Patient instructed to stay in the clinic for 15 minutes. Patient verbalized understanding and will notify nurse with any complaints.Pt has radiation appointment.

## 2020-01-28 NOTE — DISCHARGE INSTRUCTIONS
..HOME CARE AFTER CHEMOTHERAPY   Meals   Many patients feel sick and lose their appetites during treatment. Eat small meals several times a day. Choose bland foods with little taste or smell if you have problems with nausea. Be sure to cook all food thoroughly. This kills bacteria and helps you avoid intestinal infection. Soft foods are easier to swallow and digest.   Activity   Exercise keeps you strong and keeps your heart and lungs active. Talk to your doctor about an appropriate exercise program for you.   Skin Care   To prevent a skin infection, bathe or shower once a day. Use a moisturizing soap and wash with warm water. Avoid very hot or cold water. Chemotherapy can make your skin dry . Apply moisturizing lotion to help relieve dry skin. Some drugs used in high doses can cause slight burns to appear (like sunburn). Ask for a special cream to help relieve the burn and protect your skin.   Prevent Mouth Sores   During chemotherapy, many people get mouth sores. Do the following to help prevent mouth sores or to ease discomfort.   Brush your teeth with a soft-bristle toothbrush after every meal.  Don't use dental floss if your platelet count is below 50,000. Your doctor or nurse will tell you if this is the case.  Use an oral swab or special soft toothbrush if your gums bleed during regular brushing.  Use mouthwash as directed. If you can't tolerate commercial mouthwash, use salt and baking soda to clean your mouth. Mix 1 teaspoon of salt and 1 teaspoon of baking soda into a glass of water. Swish and spit.  Call your doctor or return to this facility if you develop any of the following:   Sore throat   White patches in the mouth or throat   Fever of 100.4ºF (38ºC) or higher, or as directed by your healthcare provider  © 4246-2016 [employer], [address] [place] [address], [place] [address]. All rights reserved. This information is not intended as a substitute for professional medical care. Always follow your  healthcare professional's     Preventing the Spread of Infection  Certain infections can spread from person to person. This is why your friend or family member may be put in a special room while in the hospital. Restrictions may be placed on who can go in and out of that room and what protection must be worn. Read this sheet to better understand why this is done.     Practice good hand hygiene to help stop the spread of germs.     Practice good hand hygiene to help stop the spread of germs.   How infection spreads  Infection is caused by germs. An infected person carries germs that he or she can give to others. Even a person who doesnt feel sick can still carry and spread germs. Germs can cause infection by traveling through the air or through direct contact or when each of you touch the same surface in a room such as a doorknob, sink faucet, tabletop or chair.   How you can become infected  To infect you, germs first have to get inside your body. You can get infected by touching a contaminated person or object. You can also get infected by breathing contaminated air. This is why good hand hygiene and other infection control recommendations, like wearing a mask, are so important. Hand hygiene refers to handwashing with soap and water or the use of alcohol-based gels or foams that do not need using water.  Preventing infection  To stop infection from spreading, healthcare workers may do one or more of the following:  · Place an infected patient in a private room, or in a room with others who have exactly the same infection. (This depends on what kind of infection the patient has.)  · Wear a mask, gloves, gown, or other items.  · Wear a respirator (air filter) for some infections.  What you can do  Heres how to help stop the spread of infection:  · You may be asked to wear a mask, gloves, gown, or respirator when you visit. Follow any instructions carefully.  · Practice good hand hygiene, especially after using the  bathroom and before and after touching the patient or the patient surroundings.  · Keep your hands away from your face.  · Cough or sneeze only into a tissue.  · Do not use the patients bathroom.  · Do not visit a patient if you feel sick. Do not visit if you have been exposed to an illness such as the flu, chickenpox, or measles.  Date Last Reviewed: 12/1/2016  © 3627-5269 [employer]. [address] [place] [address]. All rights reserved. This information is not intended as a substitute for professional medical care. Always follow your healthcare professional's instructions.  ..FALL PREVENTION   Falls often occur due to slipping, tripping or losing your balance. Here are ways to reduce your risk of falling again.   Was there anything that caused your fall that can be fixed, removed or replaced?   Make your home safe by keeping walkways clear of objects you may trip over.   Use non-slip pads under rugs.   Do not walk in poorly lit areas.   Do not stand on chairs or wobbly ladders.   Use caution when reaching overhead or looking upward. This position can cause a loss of balance.   Be sure your shoes fit properly, have non-slip bottoms and are in good condition.   Be cautious when going up and down stairs, curbs, and when walking on uneven sidewalks.   If your balance is poor, consider using a cane or walker.   If your fall was related to alcohol use, stop or limit alcohol intake.   If your fall was related to use of sleeping medicines, talk to your doctor about this. You may need to reduce your dosage at bedtime if you awaken during the night to go to the bathroom.   To reduce the need for nighttime bathroom trips:   Avoid drinking fluids for several hours before going to bed   Empty your bladder before going to bed   Men can keep a urinal at the bedside   © 4217-9552 [employer], [address] [place] [address], [place] [address]. All rights reserved. This information is not intended as a  substitute for professional medical care. Always follow your healthcare professional's instructions.

## 2020-01-29 ENCOUNTER — OFFICE VISIT (OUTPATIENT)
Dept: FAMILY MEDICINE | Facility: CLINIC | Age: 80
End: 2020-01-29
Payer: MEDICARE

## 2020-01-29 VITALS
SYSTOLIC BLOOD PRESSURE: 118 MMHG | WEIGHT: 115.5 LBS | HEART RATE: 89 BPM | BODY MASS INDEX: 21.25 KG/M2 | DIASTOLIC BLOOD PRESSURE: 74 MMHG | HEIGHT: 62 IN | TEMPERATURE: 98 F | OXYGEN SATURATION: 97 %

## 2020-01-29 DIAGNOSIS — M05.711 RHEUMATOID ARTHRITIS INVOLVING RIGHT SHOULDER WITH POSITIVE RHEUMATOID FACTOR: Chronic | ICD-10-CM

## 2020-01-29 DIAGNOSIS — C93.10 CHRONIC MYELOMONOCYTIC LEUKEMIA NOT HAVING ACHIEVED REMISSION: Chronic | ICD-10-CM

## 2020-01-29 DIAGNOSIS — J30.1 SEASONAL ALLERGIC RHINITIS DUE TO POLLEN: ICD-10-CM

## 2020-01-29 DIAGNOSIS — J41.0 SIMPLE CHRONIC BRONCHITIS: ICD-10-CM

## 2020-01-29 DIAGNOSIS — E03.9 ACQUIRED HYPOTHYROIDISM: Primary | ICD-10-CM

## 2020-01-29 DIAGNOSIS — R22.32 AXILLARY MASS, LEFT: ICD-10-CM

## 2020-01-29 DIAGNOSIS — I10 ESSENTIAL HYPERTENSION: Chronic | ICD-10-CM

## 2020-01-29 DIAGNOSIS — F33.9 DEPRESSION, RECURRENT: ICD-10-CM

## 2020-01-29 DIAGNOSIS — T45.1X5A PANCYTOPENIA DUE TO ANTINEOPLASTIC CHEMOTHERAPY: ICD-10-CM

## 2020-01-29 DIAGNOSIS — D61.810 PANCYTOPENIA DUE TO ANTINEOPLASTIC CHEMOTHERAPY: ICD-10-CM

## 2020-01-29 PROCEDURE — 3078F PR MOST RECENT DIASTOLIC BLOOD PRESSURE < 80 MM HG: ICD-10-PCS | Mod: HCNC,CPTII,S$GLB, | Performed by: INTERNAL MEDICINE

## 2020-01-29 PROCEDURE — 99214 PR OFFICE/OUTPT VISIT, EST, LEVL IV, 30-39 MIN: ICD-10-PCS | Mod: HCNC,S$GLB,, | Performed by: INTERNAL MEDICINE

## 2020-01-29 PROCEDURE — 3074F PR MOST RECENT SYSTOLIC BLOOD PRESSURE < 130 MM HG: ICD-10-PCS | Mod: HCNC,CPTII,S$GLB, | Performed by: INTERNAL MEDICINE

## 2020-01-29 PROCEDURE — 1159F MED LIST DOCD IN RCRD: CPT | Mod: HCNC,S$GLB,, | Performed by: INTERNAL MEDICINE

## 2020-01-29 PROCEDURE — 1101F PR PT FALLS ASSESS DOC 0-1 FALLS W/OUT INJ PAST YR: ICD-10-PCS | Mod: HCNC,CPTII,S$GLB, | Performed by: INTERNAL MEDICINE

## 2020-01-29 PROCEDURE — 1101F PT FALLS ASSESS-DOCD LE1/YR: CPT | Mod: HCNC,CPTII,S$GLB, | Performed by: INTERNAL MEDICINE

## 2020-01-29 PROCEDURE — 99499 UNLISTED E&M SERVICE: CPT | Mod: HCNC,S$GLB,, | Performed by: INTERNAL MEDICINE

## 2020-01-29 PROCEDURE — 1159F PR MEDICATION LIST DOCUMENTED IN MEDICAL RECORD: ICD-10-PCS | Mod: HCNC,S$GLB,, | Performed by: INTERNAL MEDICINE

## 2020-01-29 PROCEDURE — 99999 PR PBB SHADOW E&M-EST. PATIENT-LVL III: ICD-10-PCS | Mod: PBBFAC,HCNC,, | Performed by: INTERNAL MEDICINE

## 2020-01-29 PROCEDURE — 3078F DIAST BP <80 MM HG: CPT | Mod: HCNC,CPTII,S$GLB, | Performed by: INTERNAL MEDICINE

## 2020-01-29 PROCEDURE — 99999 PR PBB SHADOW E&M-EST. PATIENT-LVL III: CPT | Mod: PBBFAC,HCNC,, | Performed by: INTERNAL MEDICINE

## 2020-01-29 PROCEDURE — 1126F AMNT PAIN NOTED NONE PRSNT: CPT | Mod: HCNC,S$GLB,, | Performed by: INTERNAL MEDICINE

## 2020-01-29 PROCEDURE — 3074F SYST BP LT 130 MM HG: CPT | Mod: HCNC,CPTII,S$GLB, | Performed by: INTERNAL MEDICINE

## 2020-01-29 PROCEDURE — 1126F PR PAIN SEVERITY QUANTIFIED, NO PAIN PRESENT: ICD-10-PCS | Mod: HCNC,S$GLB,, | Performed by: INTERNAL MEDICINE

## 2020-01-29 PROCEDURE — 99499 RISK ADDL DX/OHS AUDIT: ICD-10-PCS | Mod: HCNC,S$GLB,, | Performed by: INTERNAL MEDICINE

## 2020-01-29 PROCEDURE — 99214 OFFICE O/P EST MOD 30 MIN: CPT | Mod: HCNC,S$GLB,, | Performed by: INTERNAL MEDICINE

## 2020-01-29 RX ORDER — FLUTICASONE PROPIONATE 50 MCG
2 SPRAY, SUSPENSION (ML) NASAL DAILY
Qty: 16 G | Refills: 6 | Status: SHIPPED | OUTPATIENT
Start: 2020-01-29

## 2020-01-30 ENCOUNTER — DOCUMENTATION ONLY (OUTPATIENT)
Dept: RADIATION ONCOLOGY | Facility: CLINIC | Age: 80
End: 2020-01-30

## 2020-01-30 PROCEDURE — G6002 STEREOSCOPIC X-RAY GUIDANCE: HCPCS | Mod: 26,HCNC,, | Performed by: RADIOLOGY

## 2020-01-30 PROCEDURE — 77412 RADIATION TX DELIVERY LVL 3: CPT | Mod: HCNC | Performed by: RADIOLOGY

## 2020-01-30 PROCEDURE — G6002 PR STEREOSCOPIC XRAY GUIDE FOR RADIATION TX DELIV: ICD-10-PCS | Mod: 26,HCNC,, | Performed by: RADIOLOGY

## 2020-01-30 PROCEDURE — 77387 GUIDANCE FOR RADJ TX DLVR: CPT | Mod: TC,HCNC | Performed by: RADIOLOGY

## 2020-01-31 ENCOUNTER — INFUSION (OUTPATIENT)
Dept: INFUSION THERAPY | Facility: HOSPITAL | Age: 80
End: 2020-01-31
Attending: INTERNAL MEDICINE
Payer: MEDICARE

## 2020-01-31 ENCOUNTER — OFFICE VISIT (OUTPATIENT)
Dept: HEMATOLOGY/ONCOLOGY | Facility: CLINIC | Age: 80
End: 2020-01-31
Payer: MEDICARE

## 2020-01-31 VITALS
BODY MASS INDEX: 21.37 KG/M2 | HEART RATE: 90 BPM | TEMPERATURE: 98 F | WEIGHT: 116.88 LBS | DIASTOLIC BLOOD PRESSURE: 77 MMHG | SYSTOLIC BLOOD PRESSURE: 130 MMHG

## 2020-01-31 VITALS
SYSTOLIC BLOOD PRESSURE: 111 MMHG | HEART RATE: 81 BPM | TEMPERATURE: 98 F | RESPIRATION RATE: 20 BRPM | DIASTOLIC BLOOD PRESSURE: 67 MMHG

## 2020-01-31 DIAGNOSIS — D64.9 ANEMIA, UNSPECIFIED TYPE: ICD-10-CM

## 2020-01-31 DIAGNOSIS — C93.10 CHRONIC MYELOMONOCYTIC LEUKEMIA NOT HAVING ACHIEVED REMISSION: Primary | ICD-10-CM

## 2020-01-31 DIAGNOSIS — C93.10 CHRONIC MYELOMONOCYTIC LEUKEMIA NOT HAVING ACHIEVED REMISSION: ICD-10-CM

## 2020-01-31 PROCEDURE — 1126F PR PAIN SEVERITY QUANTIFIED, NO PAIN PRESENT: ICD-10-PCS | Mod: HCNC,S$GLB,, | Performed by: INTERNAL MEDICINE

## 2020-01-31 PROCEDURE — 1126F AMNT PAIN NOTED NONE PRSNT: CPT | Mod: HCNC,S$GLB,, | Performed by: INTERNAL MEDICINE

## 2020-01-31 PROCEDURE — 1101F PR PT FALLS ASSESS DOC 0-1 FALLS W/OUT INJ PAST YR: ICD-10-PCS | Mod: HCNC,CPTII,S$GLB, | Performed by: INTERNAL MEDICINE

## 2020-01-31 PROCEDURE — 3078F DIAST BP <80 MM HG: CPT | Mod: HCNC,CPTII,S$GLB, | Performed by: INTERNAL MEDICINE

## 2020-01-31 PROCEDURE — 36430 TRANSFUSION BLD/BLD COMPNT: CPT | Mod: HCNC

## 2020-01-31 PROCEDURE — 99999 PR PBB SHADOW E&M-EST. PATIENT-LVL IV: ICD-10-PCS | Mod: PBBFAC,HCNC,, | Performed by: INTERNAL MEDICINE

## 2020-01-31 PROCEDURE — 1101F PT FALLS ASSESS-DOCD LE1/YR: CPT | Mod: HCNC,CPTII,S$GLB, | Performed by: INTERNAL MEDICINE

## 2020-01-31 PROCEDURE — 3075F SYST BP GE 130 - 139MM HG: CPT | Mod: HCNC,CPTII,S$GLB, | Performed by: INTERNAL MEDICINE

## 2020-01-31 PROCEDURE — 1159F MED LIST DOCD IN RCRD: CPT | Mod: HCNC,S$GLB,, | Performed by: INTERNAL MEDICINE

## 2020-01-31 PROCEDURE — 1159F PR MEDICATION LIST DOCUMENTED IN MEDICAL RECORD: ICD-10-PCS | Mod: HCNC,S$GLB,, | Performed by: INTERNAL MEDICINE

## 2020-01-31 PROCEDURE — 3075F PR MOST RECENT SYSTOLIC BLOOD PRESS GE 130-139MM HG: ICD-10-PCS | Mod: HCNC,CPTII,S$GLB, | Performed by: INTERNAL MEDICINE

## 2020-01-31 PROCEDURE — 99215 OFFICE O/P EST HI 40 MIN: CPT | Mod: HCNC,S$GLB,, | Performed by: INTERNAL MEDICINE

## 2020-01-31 PROCEDURE — 3078F PR MOST RECENT DIASTOLIC BLOOD PRESSURE < 80 MM HG: ICD-10-PCS | Mod: HCNC,CPTII,S$GLB, | Performed by: INTERNAL MEDICINE

## 2020-01-31 PROCEDURE — 99999 PR PBB SHADOW E&M-EST. PATIENT-LVL IV: CPT | Mod: PBBFAC,HCNC,, | Performed by: INTERNAL MEDICINE

## 2020-01-31 PROCEDURE — 77336 RADIATION PHYSICS CONSULT: CPT | Mod: HCNC | Performed by: RADIOLOGY

## 2020-01-31 PROCEDURE — 99215 PR OFFICE/OUTPT VISIT, EST, LEVL V, 40-54 MIN: ICD-10-PCS | Mod: HCNC,S$GLB,, | Performed by: INTERNAL MEDICINE

## 2020-01-31 RX ORDER — HYDROCODONE BITARTRATE AND ACETAMINOPHEN 500; 5 MG/1; MG/1
TABLET ORAL ONCE
Status: CANCELLED | OUTPATIENT
Start: 2020-01-31 | End: 2020-01-31

## 2020-01-31 RX ORDER — HYDROCODONE BITARTRATE AND ACETAMINOPHEN 500; 5 MG/1; MG/1
TABLET ORAL ONCE
Status: DISCONTINUED | OUTPATIENT
Start: 2020-01-31 | End: 2020-01-31 | Stop reason: HOSPADM

## 2020-01-31 RX ORDER — LEVOTHYROXINE SODIUM 88 UG/1
TABLET ORAL
Qty: 90 TABLET | Refills: 3 | Status: SHIPPED | OUTPATIENT
Start: 2020-01-31

## 2020-01-31 NOTE — ASSESSMENT & PLAN NOTE
Hemoglobin noted at 6 Mongolian per dL.  Patient remains asymptomatic with mild shortness of breath and fatigued but not in out of ordinary.  Plan is to transfuse with 1 unit of irradiated blood cell.  She is to contact us with any question or concerns.    Plan is to see her back in about 2 weeks with repeat labs.

## 2020-01-31 NOTE — PLAN OF CARE
Pt tolerated transfusion of one unit irradiated blood without s/s transfusion reaction.  AVS printed reviewed, pt verbalizes understanding.  Discharged to ride ambulatory with personal belongings.  Pt states she is feeling a little better at discharge.

## 2020-01-31 NOTE — PATIENT INSTRUCTIONS
Preventing Falls in the Home  An adult or child can fall for many reasons. If you are an older adult, you may fall because your reaction time slows down. Your muscles and joints may get stiff, weak, or less flexible because of illness, medicines, or a physical condition. These things can also make a child more likely to fall or be injured in a fall.  Other health problems that make falls more likely include:  · Arthritis  · Dizziness or lightheadedness when you get out of bed (orthostatic hypotension)  · History of a stroke  · Dizziness  · Anemia  · Certain medicines taken for mental illness  · Problems with balance or gait  · History of falls with or without an injury  · Changes in vision (vision impairment)  · Changes in thinking skills and memory (cognitive impairment)  Injuries from a fall can include broken bones, dislocated joints, and cuts. When these injuries are serious enough, they can make it impossible for you or a child who is injured in a fall to live on his or her own.  Prevention tips  To help prevent falls and fall-related injuries, follow the tips below.   Floors  Make floors safer by doing the following:   · Put nonskid pads under area rugs.  · Remove throw rugs.  · Replace worn floor coverings.  · Tack carpets firmly to each step on carpeted stairs. Put nonskid strips on the edges of uncarpeted stairs.  · Keep floors and stairs free of clutter and cords.  · Arrange furniture so there are clear pathways.  · Clean up any spills right away.  · Wear shoes that fit.  Bathrooms    Make bathrooms safer by doing the following:   · Install grab bars in the tub or shower.  · Apply nonskid strips or put a nonskid rubber mat in the tub or shower.  · Sit on a bath chair to bathe.  · Use bathmats with nonskid backing.  Lighting and the environment  Improve lighting in your home by doing the following:   · Keep a flashlight in each room. Or put a lamp next to the bed within easy reach.  · Put nightlights in  the bedrooms, hallways, kitchen, and bathrooms.  · Make sure all stairways have good lighting.  · Take your time when going up and down stairs.  · Put handrails on both sides of stairs and in walkways for more support. To prevent injury to your wrist or arm, dont use handrails to pull yourself up.  · Install grab bars to pull yourself up.  · Move or rearrange items that you use often. This will make them easier to find or reach.  · Look at your home to find any safety hazards. Especially look at doorways, walkways, and the driveway. Remove or repair any safety problems that you find.  Date Last Reviewed: 8/1/2016  © 0371-5443 [employer], [employer]. [address] [place] [address], [place] [address]. All rights reserved. This information is not intended as a substitute for professional medical care. Always follow your healthcare professional's instructions.

## 2020-01-31 NOTE — PROGRESS NOTES
Subjective:       Patient ID: Sarah Parker is a 79 y.o. female.    Chief Complaint:  Chronic myelomonocytic leukemia    HPI     Patient is a 79-year-old female presents for reinstitution  of Vidaza therapy for chronic myelomonocytic leukemia patient who is currently on treatment with azacitidine.  She is here for routine follow-up.  She denies shortness of breath, palpitation, chest discomfort, abdominal pain, diarrhea or dysuria.  Also denies hemoptysis, hematemesis, hematochezia, melena.  No urinary symptoms per patient.    Past Medical History:   Diagnosis Date    Acid reflux     Anxiety     Back pain     Bronchitis, chronic obstructive w acute bronchitis 7/29/2016    Cancer     [facility] arms, face- Dr. Lata Tejada    Cataract     2+NS    Degenerative disc disease     Depression     Dry mouth     Hernia, hiatal 11/18/2013    Hypertension     Hypothyroid     Macrocytic anemia 5/3/2016    Macular degeneration     Migraines     Mixed anxiety and depressive disorder     Multiple fractures of ribs of right side     Osteoporosis     Other hyperlipidemia 10/11/2019    Pneumonia     Pneumonia due to other staphylococcus     Rheumatoid arthritis     Rheumatoid arthritis(714.0)     Rheumatoid arthritis(714.0)     Remicade, MTX.     Family History   Problem Relation Age of Onset    Heart disease Mother     Hyperlipidemia Mother     Hypertension Mother     Osteoarthritis Mother     Cataracts Mother     Hypertension Father     Osteoarthritis Father     Heart disease Father     Asthma Sister     Chronic back pain Sister     Hypertension Sister     Osteoarthritis Sister     Thyroid disease Sister     Asthma Brother     Cancer Brother     Chronic back pain Brother     Diabetes Mellitus Brother     Hypertension Brother     Osteoarthritis Brother     Thyroid disease Brother     Cancer Maternal Grandfather     Fibromyalgia Daughter     Heart disease Maternal Grandmother     Colon  cancer Neg Hx     Diabetes Neg Hx      Social History     Socioeconomic History    Marital status:      Spouse name: Not on file    Number of children: Not on file    Years of education: Not on file    Highest education level: Not on file   Occupational History    Occupation: retired   Social Needs    Financial resource strain: Not on file    Food insecurity:     Worry: Not on file     Inability: Not on file    Transportation needs:     Medical: Not on file     Non-medical: Not on file   Tobacco Use    Smoking status: Former Smoker     Packs/day: 0.25     Years: 2.00     Pack years: 0.50     Last attempt to quit: 1965     Years since quittin.2    Smokeless tobacco: Never Used   Substance and Sexual Activity    Alcohol use: No    Drug use: No    Sexual activity: Never     Partners: Male   Lifestyle    Physical activity:     Days per week: Not on file     Minutes per session: Not on file    Stress: Not at all   Relationships    Social connections:     Talks on phone: Not on file     Gets together: Not on file     Attends Zoroastrian service: Not on file     Active member of club or organization: Not on file     Attends meetings of clubs or organizations: Not on file     Relationship status: Not on file   Other Topics Concern    Not on file   Social History Narrative    Patient is aretired and live with .     Past Surgical History:   Procedure Laterality Date    APPENDECTOMY  1985    CATARACT EXTRACTION Bilateral 6/11/15    Dr. Booth    CHOLECYSTECTOMY  2013    cryoablasion kidney Left 2016    feet Bilateral     rheumatoid    FRACTURE SURGERY Right     tibia    HERNIA REPAIR      HYSTERECTOMY  1970    partial    JOINT REPLACEMENT      bilateral knees (), hands, wrists, knuckles, toes    LAPAROSCOPIC NISSEN FUNDOPLICATION      TRANSFORAMINAL EPIDURAL INJECTION OF STEROID Left 2019    Procedure: Left L5/S1 TF AYAAN with local;  Surgeon: Rowdy Felix MD;   Location: [facility];  Service: Pain Management;  Laterality: Left;       Labs:  Lab Results   Component Value Date    WBC 18.87 (H) 01/31/2020    HGB 6.0 (L) 01/31/2020    HCT 20.7 (L) 01/31/2020     (H) 01/31/2020    PLT 80 (L) 01/31/2020     BMP  Lab Results   Component Value Date     (L) 01/31/2020    K 4.4 01/31/2020     01/31/2020    CO2 24 01/31/2020    BUN 21 01/31/2020    CREATININE 1.1 01/31/2020    CALCIUM 8.9 01/31/2020    ANIONGAP 8 01/31/2020    ESTGFRAFRICA 56 (A) 01/31/2020    EGFRNONAA 49 (A) 01/31/2020     Lab Results   Component Value Date    ALT 12 01/31/2020    AST 30 01/31/2020    ALKPHOS 160 (H) 01/31/2020    BILITOT 0.4 01/31/2020       Lab Results   Component Value Date    IRON 93 01/17/2020    TIBC 425 01/17/2020    FERRITIN 276 01/17/2020     Lab Results   Component Value Date    HYFSHQZF63 1918 (H) 08/25/2019     Lab Results   Component Value Date    FOLATE 12.0 08/25/2019     Lab Results   Component Value Date    TSH 5.174 (H) 09/03/2019         Review of Systems   Constitutional: Positive for activity change, appetite change, fatigue and unexpected weight change. Negative for chills, diaphoresis and fever.   HENT: Negative for congestion, dental problem, drooling, ear discharge, ear pain, facial swelling, hearing loss, mouth sores, nosebleeds, postnasal drip, rhinorrhea, sinus pressure, sneezing, sore throat, tinnitus, trouble swallowing and voice change.    Eyes: Negative for photophobia, pain, discharge, redness, itching and visual disturbance.   Respiratory: Negative for cough, choking, chest tightness, shortness of breath, wheezing and stridor.    Cardiovascular: Negative for chest pain, palpitations and leg swelling.   Gastrointestinal: Negative for abdominal distention, abdominal pain, anal bleeding, blood in stool, constipation, diarrhea, nausea, rectal pain and vomiting.   Endocrine: Negative for cold intolerance, heat intolerance, polydipsia, polyphagia  and polyuria.   Genitourinary: Negative for decreased urine volume, difficulty urinating, dyspareunia, dysuria, enuresis, flank pain, frequency, genital sores, hematuria, menstrual problem, pelvic pain, urgency, vaginal bleeding, vaginal discharge and vaginal pain.   Musculoskeletal: Negative for arthralgias, back pain, gait problem, joint swelling, myalgias, neck pain and neck stiffness.   Skin: Positive for wound. Negative for color change, pallor and rash.   Allergic/Immunologic: Negative for environmental allergies, food allergies and immunocompromised state.   Neurological: Positive for weakness. Negative for dizziness, tremors, seizures, syncope, facial asymmetry, speech difficulty, light-headedness, numbness and headaches.   Hematological: Negative for adenopathy. Does not bruise/bleed easily.   Psychiatric/Behavioral: Positive for dysphoric mood. Negative for agitation, behavioral problems, confusion, decreased concentration, hallucinations, self-injury, sleep disturbance and suicidal ideas. The patient is nervous/anxious. The patient is not hyperactive.        Objective:      Physical Exam   Constitutional: She is oriented to person, place, and time. She appears cachectic. She has a sickly appearance. She appears ill. She appears distressed.   HENT:   Head: Normocephalic and atraumatic.   Right Ear: External ear normal.   Left Ear: External ear normal.   Nose: Nose normal. Right sinus exhibits no maxillary sinus tenderness and no frontal sinus tenderness. Left sinus exhibits no maxillary sinus tenderness and no frontal sinus tenderness.   Mouth/Throat: Oropharynx is clear and moist. No oropharyngeal exudate.   Eyes: Pupils are equal, round, and reactive to light. Conjunctivae, EOM and lids are normal. Right eye exhibits no discharge. Left eye exhibits no discharge. Right conjunctiva is not injected. Right conjunctiva has no hemorrhage. Left conjunctiva is not injected. Left conjunctiva has no hemorrhage. No  scleral icterus.   Neck: Normal range of motion. Neck supple. No JVD present. No tracheal deviation present. No thyromegaly present.   Cardiovascular: Normal rate and regular rhythm.   Pulmonary/Chest: Effort normal. No stridor. No respiratory distress. She exhibits no tenderness.   Abdominal: Soft. She exhibits no distension and no mass. There is no splenomegaly or hepatomegaly. There is no tenderness. There is no rebound.   Musculoskeletal: Normal range of motion. She exhibits no edema or tenderness.   Lymphadenopathy:     She has no cervical adenopathy.     She has no axillary adenopathy.        Right: No supraclavicular adenopathy present.        Left: No supraclavicular adenopathy present.   Neurological: She is alert and oriented to person, place, and time. No cranial nerve deficit. Coordination normal.   Skin: Skin is dry. No rash noted. She is not diaphoretic. No erythema.   Psychiatric: She has a normal mood and affect. Her behavior is normal. Judgment and thought content normal.   Vitals reviewed.          Assessment:      1. Chronic myelomonocytic leukemia not having achieved remission    2. Anemia, unspecified type           Plan:     Chronic myelomonocytic leukemia not having achieved remission  Currently on active treatment with azacitidine.  Seems to be tolerating effectively well.    Anemia  Hemoglobin noted at 6 Barbadian per dL.  Patient remains asymptomatic with mild shortness of breath and fatigued but not in out of ordinary.  Plan is to transfuse with 1 unit of irradiated blood cell.  She is to contact us with any question or concerns.    Plan is to see her back in about 2 weeks with repeat labs.      Denton Knox MD

## 2020-02-06 ENCOUNTER — HOSPITAL ENCOUNTER (OUTPATIENT)
Dept: RADIOLOGY | Facility: HOSPITAL | Age: 80
Discharge: HOME | End: 2020-02-06
Attending: INTERNAL MEDICINE
Payer: MEDICARE

## 2020-02-06 VITALS — WEIGHT: 116.88 LBS | HEIGHT: 62 IN | BODY MASS INDEX: 21.51 KG/M2

## 2020-02-06 DIAGNOSIS — Z12.31 ENCOUNTER FOR SCREENING MAMMOGRAM FOR MALIGNANT NEOPLASM OF BREAST: ICD-10-CM

## 2020-02-06 DIAGNOSIS — Z29.9 PREVENTIVE MEASURE: ICD-10-CM

## 2020-02-06 DIAGNOSIS — E03.9 ACQUIRED HYPOTHYROIDISM: ICD-10-CM

## 2020-02-06 PROCEDURE — 77067 SCR MAMMO BI INCL CAD: CPT | Mod: TC,HCNC,PO

## 2020-02-06 PROCEDURE — 77063 MAMMO DIGITAL SCREENING BILAT WITH TOMOSYNTHESIS_CAD: ICD-10-PCS | Mod: 26,HCNC,, | Performed by: RADIOLOGY

## 2020-02-06 PROCEDURE — 77067 MAMMO DIGITAL SCREENING BILAT WITH TOMOSYNTHESIS_CAD: ICD-10-PCS | Mod: 26,HCNC,, | Performed by: RADIOLOGY

## 2020-02-06 PROCEDURE — 77063 BREAST TOMOSYNTHESIS BI: CPT | Mod: 26,HCNC,, | Performed by: RADIOLOGY

## 2020-02-06 PROCEDURE — 77067 SCR MAMMO BI INCL CAD: CPT | Mod: 26,HCNC,, | Performed by: RADIOLOGY

## 2020-02-12 ENCOUNTER — OFFICE VISIT (OUTPATIENT)
Dept: PODIATRY | Facility: CLINIC | Age: 80
End: 2020-02-12
Payer: MEDICARE

## 2020-02-12 ENCOUNTER — TELEPHONE (OUTPATIENT)
Dept: PODIATRY | Facility: CLINIC | Age: 80
End: 2020-02-12

## 2020-02-12 VITALS
WEIGHT: 116.88 LBS | HEIGHT: 62 IN | SYSTOLIC BLOOD PRESSURE: 133 MMHG | DIASTOLIC BLOOD PRESSURE: 70 MMHG | HEART RATE: 97 BPM | BODY MASS INDEX: 21.51 KG/M2

## 2020-02-12 DIAGNOSIS — G60.9 IDIOPATHIC PERIPHERAL NEUROPATHY: Primary | ICD-10-CM

## 2020-02-12 DIAGNOSIS — B35.1 ONYCHOMYCOSIS: ICD-10-CM

## 2020-02-12 PROCEDURE — 99999 PR PBB SHADOW E&M-EST. PATIENT-LVL III: ICD-10-PCS | Mod: PBBFAC,HCNC,, | Performed by: PODIATRIST

## 2020-02-12 PROCEDURE — 11719 PR TRIM NAIL(S): ICD-10-PCS | Mod: Q9,HCNC,S$GLB, | Performed by: PODIATRIST

## 2020-02-12 PROCEDURE — 11719 TRIM NAIL(S) ANY NUMBER: CPT | Mod: Q9,HCNC,S$GLB, | Performed by: PODIATRIST

## 2020-02-12 PROCEDURE — 99499 UNLISTED E&M SERVICE: CPT | Mod: HCNC,S$GLB,, | Performed by: PODIATRIST

## 2020-02-12 PROCEDURE — 99499 NO LOS: ICD-10-PCS | Mod: HCNC,S$GLB,, | Performed by: PODIATRIST

## 2020-02-12 PROCEDURE — 11720 PR DEBRIDEMENT OF NAIL(S), 1-5: ICD-10-PCS | Mod: 59,Q9,HCNC,S$GLB | Performed by: PODIATRIST

## 2020-02-12 PROCEDURE — 99999 PR PBB SHADOW E&M-EST. PATIENT-LVL III: CPT | Mod: PBBFAC,HCNC,, | Performed by: PODIATRIST

## 2020-02-12 PROCEDURE — 11720 DEBRIDE NAIL 1-5: CPT | Mod: 59,Q9,HCNC,S$GLB | Performed by: PODIATRIST

## 2020-02-12 NOTE — PROGRESS NOTES
Subjective:       Patient ID: Sarah Parker is a 79 y.o. female.    Chief Complaint: Routine Foot Care (pt rates pain 0/10 casulas shoes non daibetic pt PCP Dr. Bennett.)      HPI: Patient presents to the office with the chief complaint of elongated, thickened and dystrophic nail plates to the B/L foot. This patient does have Peripheral Neuropathy. Patient does follow with Primary Care for management of comorbid states. This patient's PMD is Briseida Bennett MD. This patient last saw his/her primary care provider on 1/29.  Patient currently undergoing chemotherapy for leukemia.  She presents this morning with her .  Patient is ambulatory with the assistance of a cane due to severe bilateral lower extremity rheumatoid arthropathy.    Review of patient's allergies indicates:   Allergen Reactions    Codeine      Other reaction(s): hyper  Other reaction(s): hyper    Doxycycline     Gabapentin Other (See Comments)     Bad dreams       Past Medical History:   Diagnosis Date    Acid reflux     Anxiety     Back pain     Bronchitis, chronic obstructive w acute bronchitis 7/29/2016    Cancer     [facility] arms, face- Dr. Lata Tejada    Cataract     2+NS    Degenerative disc disease     Depression     Dry mouth     Hernia, hiatal 11/18/2013    Hypertension     Hypothyroid     Macrocytic anemia 5/3/2016    Macular degeneration     Migraines     Mixed anxiety and depressive disorder     Multiple fractures of ribs of right side     Osteoporosis     Other hyperlipidemia 10/11/2019    Pneumonia     Pneumonia due to other staphylococcus     Rheumatoid arthritis     Rheumatoid arthritis(714.0)     Rheumatoid arthritis(714.0)     Remicade, MTX.       Family History   Problem Relation Age of Onset    Heart disease Mother     Hyperlipidemia Mother     Hypertension Mother     Osteoarthritis Mother     Cataracts Mother     Hypertension Father     Osteoarthritis Father     Heart disease Father      Asthma Sister     Chronic back pain Sister     Hypertension Sister     Osteoarthritis Sister     Thyroid disease Sister     Asthma Brother     Cancer Brother     Chronic back pain Brother     Diabetes Mellitus Brother     Hypertension Brother     Osteoarthritis Brother     Thyroid disease Brother     Cancer Maternal Grandfather     Fibromyalgia Daughter     Heart disease Maternal Grandmother     Colon cancer Neg Hx     Diabetes Neg Hx        Social History     Socioeconomic History    Marital status:      Spouse name: Not on file    Number of children: Not on file    Years of education: Not on file    Highest education level: Not on file   Occupational History    Occupation: retired   Social Needs    Financial resource strain: Not on file    Food insecurity:     Worry: Not on file     Inability: Not on file    Transportation needs:     Medical: Not on file     Non-medical: Not on file   Tobacco Use    Smoking status: Former Smoker     Packs/day: 0.25     Years: 2.00     Pack years: 0.50     Last attempt to quit: 1965     Years since quittin.3    Smokeless tobacco: Never Used   Substance and Sexual Activity    Alcohol use: No    Drug use: No    Sexual activity: Never     Partners: Male   Lifestyle    Physical activity:     Days per week: Not on file     Minutes per session: Not on file    Stress: Not at all   Relationships    Social connections:     Talks on phone: Not on file     Gets together: Not on file     Attends Zoroastrianism service: Not on file     Active member of club or organization: Not on file     Attends meetings of clubs or organizations: Not on file     Relationship status: Not on file   Other Topics Concern    Not on file   Social History Narrative    Patient is aretired and live with .       Past Surgical History:   Procedure Laterality Date    APPENDECTOMY  1985    BREAST BIOPSY      CATARACT EXTRACTION Bilateral 6/11/15    Dr. Booth     "CHOLECYSTECTOMY  2013    cryoablasion kidney Left 09/27/2016    feet Bilateral     rheumatoid    FRACTURE SURGERY Right     tibia    HERNIA REPAIR      HYSTERECTOMY  1970    partial    JOINT REPLACEMENT      bilateral knees (2008), hands, wrists, knuckles, toes    LAPAROSCOPIC NISSEN FUNDOPLICATION      TRANSFORAMINAL EPIDURAL INJECTION OF STEROID Left 6/25/2019    Procedure: Left L5/S1 TF AYAAN with local;  Surgeon: Rowdy Felix MD;  Location: [facility];  Service: Pain Management;  Laterality: Left;       Review of Systems   Constitutional: Negative for chills, fatigue and fever.   HENT: Negative for hearing loss.    Eyes: Negative for photophobia and visual disturbance.   Respiratory: Negative for cough, chest tightness, shortness of breath and wheezing.    Cardiovascular: Negative for chest pain and palpitations.   Gastrointestinal: Negative for constipation, diarrhea, nausea and vomiting.   Endocrine: Negative for cold intolerance and heat intolerance.   Genitourinary: Negative for flank pain.   Musculoskeletal: Positive for arthralgias and gait problem. Negative for neck pain and neck stiffness.   Skin: Negative for wound.   Neurological: Negative for light-headedness and headaches.   Psychiatric/Behavioral: Negative for sleep disturbance.          Objective:   /70   Pulse 97   Ht 5' 2" (1.575 m)   Wt 53 kg (116 lb 13.5 oz)   LMP  (LMP Unknown)   BMI 21.37 kg/m²     Physical Exam  LOWER EXTREMITY PHYSICAL EXAMINATION    NEUROLOGY: Sensation to light touch is intact. Proprioception is intact, bilateral. Sensation to pin prick is reduced to absent. Vibratory sensation is diminished to the left and right lower extremity. Examination with 5.07 [place] monofilament reveals that protective sensation is not intact to the left and right plantar surfaces of the foot and digits, as the patient has no sensation/detection at greater than 4 distinct points of contact.     DERMATOLOGY: On " the left foot, nails 1 are suggestive of onychomycotic changes. On the right foot, nails 1 are suggestive of onychomycotic changes. These nail plates are thickened, are dystrophic, chaulky in appearance and malodorous with substantial subungual debris. These nail plates are yellow to brown in appearance. The remaining nail plates are elongated and do not have suggestive clinical features of onychomycosis. Callus formation, plantar aspect of the right cuboid bone..      VASCULAR: Warm to warm, proximal to distal. Capillary refill time is within normal limits and less  than 3 seconds. Hair growth is sparse on the left and right dorsal foot and at the digits. The left dorsalis pedis pulse is 1/4 and on the right is 1/4, and the left posterior tibial pulse is 1/4 and is 1/4 on the right.       Assessment:     1. Idiopathic peripheral neuropathy    2. Onychomycosis        Plan:     Idiopathic peripheral neuropathy    Onychomycosis        The onychomycotic nail plates, as outlined above, are sharply debrided with double action nail nipper, and/or with the assistance of a mechanical rotary mel, with removal of all offending nail and nail border(s), for reduction of pains. Nails are reduced in terms of length, width and girth with removal of subungual debris to facilitate pain free weight bearing and ambulation. The elongated nails as outlined in the objective portion of this note, were trimmed to appropriate length, with a double action nail nipper, for alleviation/reduction of pains as well. Follow up in approx. 3-4 months.        Future Appointments   Date Time Provider Department Center   2/14/2020  3:00 PM ALBERT CARTER [facility]   2/14/2020  3:20 PM Denton Knox MD [facility]   3/2/2020 11:00 AM Anne Marie Almaraz III, MD [facility] RAD [facility]   5/20/2020 10:45 AM Sebastian Walter DPM [facility] POD [facility]   6/3/2020 10:40 AM Briseida Reyes MD [facility] MED [address]   6/10/2020  1:30 PM  Octavio Navarro OD [facility] OPHTHAL  [facility]   8/17/2020  9:40 AM Fam Beck IV, MD ON UROLOGY [facility]   10/14/2020  9:00 AM [facility] XR1 [facility] XRAY [facility]   10/14/2020  9:20 AM Feliciano Hernandez MD [facility] PULMSVC [facility]

## 2020-02-12 NOTE — TELEPHONE ENCOUNTER
Spoke with pt she wanted a later aapt I informed her that we don't have any opens for the time she wants pt stated that she and her  will come for 10:00 am      Liza Boyd MA  Podiatry Surgical Department  [facility]            ----- Message from Lawrence Otto sent at 2/12/2020  7:38 AM CST -----  Pt  is requesting a call from nurse to discuss a later time for tyrone appt due she wont be able to bring the pt in til 10am. Pt is already scheduled for 9am.          Please call Pt  back [phone]

## 2020-02-14 ENCOUNTER — LAB VISIT (OUTPATIENT)
Dept: LAB | Facility: HOSPITAL | Age: 80
End: 2020-02-14
Attending: INTERNAL MEDICINE
Payer: MEDICARE

## 2020-02-14 ENCOUNTER — OFFICE VISIT (OUTPATIENT)
Dept: HEMATOLOGY/ONCOLOGY | Facility: CLINIC | Age: 80
End: 2020-02-14
Payer: MEDICARE

## 2020-02-14 VITALS
TEMPERATURE: 98 F | DIASTOLIC BLOOD PRESSURE: 81 MMHG | WEIGHT: 114.63 LBS | BODY MASS INDEX: 21.1 KG/M2 | SYSTOLIC BLOOD PRESSURE: 157 MMHG | OXYGEN SATURATION: 97 % | RESPIRATION RATE: 14 BRPM | HEIGHT: 62 IN | HEART RATE: 103 BPM

## 2020-02-14 DIAGNOSIS — D69.6 THROMBOCYTOPENIA: ICD-10-CM

## 2020-02-14 DIAGNOSIS — C93.10 CHRONIC MYELOMONOCYTIC LEUKEMIA NOT HAVING ACHIEVED REMISSION: ICD-10-CM

## 2020-02-14 DIAGNOSIS — C94.80 LEUKEMIA CUTIS: ICD-10-CM

## 2020-02-14 DIAGNOSIS — C93.10 CHRONIC MYELOMONOCYTIC LEUKEMIA NOT HAVING ACHIEVED REMISSION: Primary | ICD-10-CM

## 2020-02-14 DIAGNOSIS — M05.711 RHEUMATOID ARTHRITIS INVOLVING RIGHT SHOULDER WITH POSITIVE RHEUMATOID FACTOR: Chronic | ICD-10-CM

## 2020-02-14 DIAGNOSIS — L98.8 LEUKEMIA CUTIS: ICD-10-CM

## 2020-02-14 DIAGNOSIS — D64.9 ANEMIA, UNSPECIFIED TYPE: ICD-10-CM

## 2020-02-14 LAB
ACANTHOCYTES BLD QL SMEAR: PRESENT
ALBUMIN SERPL BCP-MCNC: 3.6 G/DL (ref 3.5–5.2)
ALP SERPL-CCNC: 171 U/L (ref 55–135)
ALT SERPL W/O P-5'-P-CCNC: 18 U/L (ref 10–44)
ANION GAP SERPL CALC-SCNC: 8 MMOL/L (ref 8–16)
ANISOCYTOSIS BLD QL SMEAR: ABNORMAL
AST SERPL-CCNC: 22 U/L (ref 10–40)
BASOPHILS NFR BLD: 0 % (ref 0–1.9)
BILIRUB SERPL-MCNC: 0.5 MG/DL (ref 0.1–1)
BUN SERPL-MCNC: 19 MG/DL (ref 8–23)
CALCIUM SERPL-MCNC: 9 MG/DL (ref 8.7–10.5)
CHLORIDE SERPL-SCNC: 105 MMOL/L (ref 95–110)
CO2 SERPL-SCNC: 24 MMOL/L (ref 23–29)
CREAT SERPL-MCNC: 1 MG/DL (ref 0.5–1.4)
DACRYOCYTES BLD QL SMEAR: ABNORMAL
DIFFERENTIAL METHOD: ABNORMAL
EOSINOPHIL NFR BLD: 0 % (ref 0–8)
ERYTHROCYTE [DISTWIDTH] IN BLOOD BY AUTOMATED COUNT: 22.5 % (ref 11.5–14.5)
EST. GFR  (AFRICAN AMERICAN): >60 ML/MIN/1.73 M^2
EST. GFR  (NON AFRICAN AMERICAN): 54 ML/MIN/1.73 M^2
GLUCOSE SERPL-MCNC: 155 MG/DL (ref 70–110)
HCT VFR BLD AUTO: 25.3 % (ref 37–48.5)
HGB BLD-MCNC: 7.3 G/DL (ref 12–16)
HYPOCHROMIA BLD QL SMEAR: ABNORMAL
IMM GRANULOCYTES # BLD AUTO: ABNORMAL K/UL (ref 0–0.04)
IMM GRANULOCYTES NFR BLD AUTO: ABNORMAL % (ref 0–0.5)
LYMPHOCYTES NFR BLD: 53 % (ref 18–48)
MCH RBC QN AUTO: 28.9 PG (ref 27–31)
MCHC RBC AUTO-ENTMCNC: 28.9 G/DL (ref 32–36)
MCV RBC AUTO: 100 FL (ref 82–98)
MONOCYTES NFR BLD: 11 % (ref 4–15)
NEUTROPHILS NFR BLD: 36 % (ref 38–73)
NRBC BLD-RTO: 7 /100 WBC
OVALOCYTES BLD QL SMEAR: ABNORMAL
PLATELET # BLD AUTO: 72 K/UL (ref 150–350)
PLATELET BLD QL SMEAR: ABNORMAL
PMV BLD AUTO: ABNORMAL FL (ref 9.2–12.9)
POIKILOCYTOSIS BLD QL SMEAR: ABNORMAL
POLYCHROMASIA BLD QL SMEAR: ABNORMAL
POTASSIUM SERPL-SCNC: 3.9 MMOL/L (ref 3.5–5.1)
PROT SERPL-MCNC: 8.2 G/DL (ref 6–8.4)
RBC # BLD AUTO: 2.53 M/UL (ref 4–5.4)
SODIUM SERPL-SCNC: 137 MMOL/L (ref 136–145)
SPHEROCYTES BLD QL SMEAR: ABNORMAL
STOMATOCYTES BLD QL SMEAR: PRESENT
TARGETS BLD QL SMEAR: ABNORMAL
WBC # BLD AUTO: 10.44 K/UL (ref 3.9–12.7)

## 2020-02-14 PROCEDURE — 1159F PR MEDICATION LIST DOCUMENTED IN MEDICAL RECORD: ICD-10-PCS | Mod: HCNC,S$GLB,, | Performed by: INTERNAL MEDICINE

## 2020-02-14 PROCEDURE — 99215 OFFICE O/P EST HI 40 MIN: CPT | Mod: HCNC,S$GLB,, | Performed by: INTERNAL MEDICINE

## 2020-02-14 PROCEDURE — 99999 PR PBB SHADOW E&M-EST. PATIENT-LVL V: CPT | Mod: PBBFAC,HCNC,, | Performed by: INTERNAL MEDICINE

## 2020-02-14 PROCEDURE — 99999 PR PBB SHADOW E&M-EST. PATIENT-LVL V: ICD-10-PCS | Mod: PBBFAC,HCNC,, | Performed by: INTERNAL MEDICINE

## 2020-02-14 PROCEDURE — 1159F MED LIST DOCD IN RCRD: CPT | Mod: HCNC,S$GLB,, | Performed by: INTERNAL MEDICINE

## 2020-02-14 PROCEDURE — 1126F PR PAIN SEVERITY QUANTIFIED, NO PAIN PRESENT: ICD-10-PCS | Mod: HCNC,S$GLB,, | Performed by: INTERNAL MEDICINE

## 2020-02-14 PROCEDURE — 1126F AMNT PAIN NOTED NONE PRSNT: CPT | Mod: HCNC,S$GLB,, | Performed by: INTERNAL MEDICINE

## 2020-02-14 PROCEDURE — 80053 COMPREHEN METABOLIC PANEL: CPT | Mod: HCNC

## 2020-02-14 PROCEDURE — 1101F PT FALLS ASSESS-DOCD LE1/YR: CPT | Mod: HCNC,CPTII,S$GLB, | Performed by: INTERNAL MEDICINE

## 2020-02-14 PROCEDURE — 3079F DIAST BP 80-89 MM HG: CPT | Mod: HCNC,CPTII,S$GLB, | Performed by: INTERNAL MEDICINE

## 2020-02-14 PROCEDURE — 1101F PR PT FALLS ASSESS DOC 0-1 FALLS W/OUT INJ PAST YR: ICD-10-PCS | Mod: HCNC,CPTII,S$GLB, | Performed by: INTERNAL MEDICINE

## 2020-02-14 PROCEDURE — 3077F SYST BP >= 140 MM HG: CPT | Mod: HCNC,CPTII,S$GLB, | Performed by: INTERNAL MEDICINE

## 2020-02-14 PROCEDURE — 3079F PR MOST RECENT DIASTOLIC BLOOD PRESSURE 80-89 MM HG: ICD-10-PCS | Mod: HCNC,CPTII,S$GLB, | Performed by: INTERNAL MEDICINE

## 2020-02-14 PROCEDURE — 36415 COLL VENOUS BLD VENIPUNCTURE: CPT | Mod: HCNC

## 2020-02-14 PROCEDURE — 3077F PR MOST RECENT SYSTOLIC BLOOD PRESSURE >= 140 MM HG: ICD-10-PCS | Mod: HCNC,CPTII,S$GLB, | Performed by: INTERNAL MEDICINE

## 2020-02-14 PROCEDURE — 99215 PR OFFICE/OUTPT VISIT, EST, LEVL V, 40-54 MIN: ICD-10-PCS | Mod: HCNC,S$GLB,, | Performed by: INTERNAL MEDICINE

## 2020-02-14 NOTE — PROGRESS NOTES
Subjective:       Patient ID: Sarah Parker is a 79 y.o. female.    Chief Complaint:  Chronic myelomonocytic leukemia    Follow-up   Associated symptoms include fatigue and weakness. Pertinent negatives include no abdominal pain, arthralgias, chest pain, chills, congestion, coughing, diaphoresis, fever, headaches, joint swelling, myalgias, nausea, neck pain, numbness, rash, sore throat or vomiting.      Patient is a 79-year-old female presents for reinstitution  of Vidaza therapy for chronic myelomonocytic leukemia patient who is currently on treatment with azacitidine.  She is here for routine follow-up.      INTERVAL HISTORY:  During last office visit patient was noted to be symptomatically anemic with hemoglobin of 6 grams/deciliter.  She was referred to the transfusion department for red blood cell.  She received 1 pack of red blood cell with improvement in symptoms.  She presents today for follow-up and to determine if she is eligible to reinitiate her chemotherapy.She denies shortness of breath, palpitation, chest discomfort, abdominal pain, diarrhea or dysuria.  Also denies hemoptysis, hematemesis, hematochezia, melena.  No urinary symptoms per patient.      Past Medical History:   Diagnosis Date    Acid reflux     Anxiety     Back pain     Bronchitis, chronic obstructive w acute bronchitis 7/29/2016    Cancer     [facility] arms, face- Dr. Lata Tejada    Cataract     2+NS    Degenerative disc disease     Depression     Dry mouth     Hernia, hiatal 11/18/2013    Hypertension     Hypothyroid     Macrocytic anemia 5/3/2016    Macular degeneration     Migraines     Mixed anxiety and depressive disorder     Multiple fractures of ribs of right side     Osteoporosis     Other hyperlipidemia 10/11/2019    Pneumonia     Pneumonia due to other staphylococcus     Rheumatoid arthritis     Rheumatoid arthritis(714.0)     Rheumatoid arthritis(714.0)     Remicade, MTX.     Family History   Problem  Relation Age of Onset    Heart disease Mother     Hyperlipidemia Mother     Hypertension Mother     Osteoarthritis Mother     Cataracts Mother     Hypertension Father     Osteoarthritis Father     Heart disease Father     Asthma Sister     Chronic back pain Sister     Hypertension Sister     Osteoarthritis Sister     Thyroid disease Sister     Asthma Brother     Cancer Brother     Chronic back pain Brother     Diabetes Mellitus Brother     Hypertension Brother     Osteoarthritis Brother     Thyroid disease Brother     Cancer Maternal Grandfather     Fibromyalgia Daughter     Heart disease Maternal Grandmother     Colon cancer Neg Hx     Diabetes Neg Hx      Social History     Socioeconomic History    Marital status:      Spouse name: Not on file    Number of children: Not on file    Years of education: Not on file    Highest education level: Not on file   Occupational History    Occupation: retired   Social Needs    Financial resource strain: Not on file    Food insecurity:     Worry: Not on file     Inability: Not on file    Transportation needs:     Medical: Not on file     Non-medical: Not on file   Tobacco Use    Smoking status: Former Smoker     Packs/day: 0.25     Years: 2.00     Pack years: 0.50     Last attempt to quit: 1965     Years since quittin.3    Smokeless tobacco: Never Used   Substance and Sexual Activity    Alcohol use: No    Drug use: No    Sexual activity: Never     Partners: Male   Lifestyle    Physical activity:     Days per week: Not on file     Minutes per session: Not on file    Stress: Not at all   Relationships    Social connections:     Talks on phone: Not on file     Gets together: Not on file     Attends [employer] service: Not on file     Active member of club or organization: Not on file     Attends meetings of clubs or organizations: Not on file     Relationship status: Not on file   Other Topics Concern    Not on file    Social History Narrative    Patient is aretired and live with .     Past Surgical History:   Procedure Laterality Date    APPENDECTOMY  1985    BREAST BIOPSY      CATARACT EXTRACTION Bilateral 6/11/15    Dr. Booth    CHOLECYSTECTOMY  2013    cryoablasion kidney Left 09/27/2016    feet Bilateral     rheumatoid    FRACTURE SURGERY Right     tibia    HERNIA REPAIR      HYSTERECTOMY  1970    partial    JOINT REPLACEMENT      bilateral knees (2008), hands, wrists, knuckles, toes    LAPAROSCOPIC NISSEN FUNDOPLICATION      TRANSFORAMINAL EPIDURAL INJECTION OF STEROID Left 6/25/2019    Procedure: Left L5/S1 TF AYAAN with local;  Surgeon: Rowdy Felix MD;  [place] [facility]  Service: Pain Management;  Laterality: Left;       Labs:  Lab Results   Component Value Date    WBC 10.44 02/14/2020    HGB 7.3 (L) 02/14/2020    HCT 25.3 (L) 02/14/2020     (H) 02/14/2020    PLT 72 (L) 02/14/2020     BMP  Lab Results   Component Value Date     02/14/2020    K 3.9 02/14/2020     02/14/2020    CO2 24 02/14/2020    BUN 19 02/14/2020    CREATININE 1.0 02/14/2020    CALCIUM 9.0 02/14/2020    ANIONGAP 8 02/14/2020    ESTGFRAFRICA >60 02/14/2020    EGFRNONAA 54 (A) 02/14/2020     Lab Results   Component Value Date    ALT 18 02/14/2020    AST 22 02/14/2020    ALKPHOS 171 (H) 02/14/2020    BILITOT 0.5 02/14/2020       Lab Results   Component Value Date    IRON 93 01/17/2020    TIBC 425 01/17/2020    FERRITIN 276 01/17/2020     Lab Results   Component Value Date    RXBWUPKU42 1918 (H) 08/25/2019     Lab Results   Component Value Date    FOLATE 12.0 08/25/2019     Lab Results   Component Value Date    TSH 5.174 (H) 09/03/2019         Review of Systems   Constitutional: Positive for activity change, appetite change, fatigue and unexpected weight change. Negative for chills, diaphoresis and fever.   HENT: Negative for congestion, dental problem, drooling, ear discharge, ear pain, facial swelling,  hearing loss, mouth sores, nosebleeds, postnasal drip, rhinorrhea, sinus pressure, sneezing, sore throat, tinnitus, trouble swallowing and voice change.    Eyes: Negative for photophobia, pain, discharge, redness, itching and visual disturbance.   Respiratory: Negative for cough, choking, chest tightness, shortness of breath, wheezing and stridor.    Cardiovascular: Negative for chest pain, palpitations and leg swelling.   Gastrointestinal: Negative for abdominal distention, abdominal pain, anal bleeding, blood in stool, constipation, diarrhea, nausea, rectal pain and vomiting.   Endocrine: Negative for cold intolerance, heat intolerance, polydipsia, polyphagia and polyuria.   Genitourinary: Negative for decreased urine volume, difficulty urinating, dyspareunia, dysuria, enuresis, flank pain, frequency, genital sores, hematuria, menstrual problem, pelvic pain, urgency, vaginal bleeding, vaginal discharge and vaginal pain.   Musculoskeletal: Negative for arthralgias, back pain, gait problem, joint swelling, myalgias, neck pain and neck stiffness.   Skin: Positive for wound. Negative for color change, pallor and rash.   Allergic/Immunologic: Negative for environmental allergies, food allergies and immunocompromised state.   Neurological: Positive for weakness. Negative for dizziness, tremors, seizures, syncope, facial asymmetry, speech difficulty, light-headedness, numbness and headaches.   Hematological: Negative for adenopathy. Does not bruise/bleed easily.   Psychiatric/Behavioral: Positive for dysphoric mood. Negative for agitation, behavioral problems, confusion, decreased concentration, hallucinations, self-injury, sleep disturbance and suicidal ideas. The patient is nervous/anxious. The patient is not hyperactive.        Objective:      Physical Exam   Constitutional: She is oriented to person, place, and time. She appears cachectic.   HENT:   Head: Normocephalic and atraumatic.   Right Ear: External ear  normal.   Left Ear: External ear normal.   Nose: Nose normal. Right sinus exhibits no maxillary sinus tenderness and no frontal sinus tenderness. Left sinus exhibits no maxillary sinus tenderness and no frontal sinus tenderness.   Mouth/Throat: Oropharynx is clear and moist. No oropharyngeal exudate.   Eyes: Pupils are equal, round, and reactive to light. Conjunctivae, EOM and lids are normal. Right eye exhibits no discharge. Left eye exhibits no discharge. Right conjunctiva is not injected. Right conjunctiva has no hemorrhage. Left conjunctiva is not injected. Left conjunctiva has no hemorrhage. No scleral icterus.   Neck: Normal range of motion. Neck supple. No JVD present. No tracheal deviation present. No thyromegaly present.   Cardiovascular: Normal rate and regular rhythm.   Pulmonary/Chest: Effort normal. No stridor. No respiratory distress. She exhibits no tenderness.   Abdominal: Soft. She exhibits no distension and no mass. There is no splenomegaly or hepatomegaly. There is no tenderness. There is no rebound.   Musculoskeletal: Normal range of motion. She exhibits no edema or tenderness.   Lymphadenopathy:     She has no cervical adenopathy.     She has no axillary adenopathy.        Right: No supraclavicular adenopathy present.        Left: No supraclavicular adenopathy present.   Neurological: She is alert and oriented to person, place, and time. No cranial nerve deficit. Coordination normal.   Skin: Skin is dry. No rash noted. She is not diaphoretic. No erythema.   Psychiatric: She has a normal mood and affect. Her behavior is normal. Judgment and thought content normal.   Vitals reviewed.          Assessment:      1. Chronic myelomonocytic leukemia not having achieved remission    2. Leukemia cutis    3. Anemia, unspecified type    4. Thrombocytopenia    5. Rheumatoid arthritis involving right shoulder with positive rheumatoid factor           Plan:     Leukemia cutis  Status post radiation treatment.   Follows with Radiation Oncology.    Anemia  Improved compared to prior.  Most recent CBC showed hemoglobin of 7.3 grams/deciliter.  Status post transfusion of 1 unit of packed red blood cell.  Neck evidence of active bleed.  Will continue to monitor.  This appears to be a side effect of current treatment.    Chronic myelomonocytic leukemia not having achieved remission  Improvement in white blood cell count which is noted to be within normal range today perhaps responding to current treatment regimen.  Unfortunately we are unable to initiate treatment today given the moderate thrombocytopenia with [place] [id number].    Patient will return back in 2 weeks with repeat labs to determine eligibility to re-initiate treatment.  She knows to contact us if needed    Thrombocytopenia  Chemotherapy induced.  Moderate thrombocytopenia.  No evidence of bleed.  Will continue to monitor.    Rheumatoid arthritis  Management per rheumatology service      Denton Knox MD

## 2020-02-14 NOTE — ASSESSMENT & PLAN NOTE
Improved compared to prior.  Most recent CBC showed hemoglobin of 7.3 grams/deciliter.  Status post transfusion of 1 unit of packed red blood cell.  Neck evidence of active bleed.  Will continue to monitor.  This appears to be a side effect of current treatment.

## 2020-02-14 NOTE — ASSESSMENT & PLAN NOTE
Improvement in white blood cell count which is noted to be within normal range today perhaps responding to current treatment regimen.  Unfortunately we are unable to initiate treatment today given the moderate thrombocytopenia with platelet count of [id number].    Patient will return back in 2 weeks with repeat labs to determine eligibility to re-initiate treatment.  She knows to contact us if needed

## 2020-02-14 NOTE — ASSESSMENT & PLAN NOTE
Chemotherapy induced.  Moderate thrombocytopenia.  No evidence of bleed.  Will continue to monitor.

## 2020-02-17 RX ORDER — PRAVASTATIN SODIUM 10 MG/1
TABLET ORAL
Qty: 30 TABLET | Refills: 11 | Status: SHIPPED | OUTPATIENT
Start: 2020-02-17

## 2020-02-28 ENCOUNTER — INFUSION (OUTPATIENT)
Dept: INFUSION THERAPY | Facility: HOSPITAL | Age: 80
End: 2020-02-28
Attending: INTERNAL MEDICINE
Payer: MEDICARE

## 2020-02-28 ENCOUNTER — OFFICE VISIT (OUTPATIENT)
Dept: HEMATOLOGY/ONCOLOGY | Facility: CLINIC | Age: 80
End: 2020-02-28
Payer: MEDICARE

## 2020-02-28 VITALS
RESPIRATION RATE: 14 BRPM | HEART RATE: 82 BPM | DIASTOLIC BLOOD PRESSURE: 73 MMHG | HEIGHT: 62 IN | TEMPERATURE: 98 F | BODY MASS INDEX: 20.77 KG/M2 | SYSTOLIC BLOOD PRESSURE: 143 MMHG | WEIGHT: 112.88 LBS | OXYGEN SATURATION: 100 %

## 2020-02-28 DIAGNOSIS — R64 CACHEXIA: ICD-10-CM

## 2020-02-28 DIAGNOSIS — D69.6 THROMBOCYTOPENIA: ICD-10-CM

## 2020-02-28 DIAGNOSIS — C93.10 CHRONIC MYELOMONOCYTIC LEUKEMIA NOT HAVING ACHIEVED REMISSION: Primary | ICD-10-CM

## 2020-02-28 DIAGNOSIS — I10 ESSENTIAL HYPERTENSION: Chronic | ICD-10-CM

## 2020-02-28 DIAGNOSIS — M05.711 RHEUMATOID ARTHRITIS INVOLVING RIGHT SHOULDER WITH POSITIVE RHEUMATOID FACTOR: Chronic | ICD-10-CM

## 2020-02-28 PROCEDURE — 99999 PR PBB SHADOW E&M-EST. PATIENT-LVL IV: CPT | Mod: PBBFAC,HCNC,, | Performed by: INTERNAL MEDICINE

## 2020-02-28 PROCEDURE — 1159F MED LIST DOCD IN RCRD: CPT | Mod: HCNC,S$GLB,, | Performed by: INTERNAL MEDICINE

## 2020-02-28 PROCEDURE — 99999 PR PBB SHADOW E&M-EST. PATIENT-LVL IV: ICD-10-PCS | Mod: PBBFAC,HCNC,, | Performed by: INTERNAL MEDICINE

## 2020-02-28 PROCEDURE — 99499 RISK ADDL DX/OHS AUDIT: ICD-10-PCS | Mod: HCNC,S$GLB,, | Performed by: INTERNAL MEDICINE

## 2020-02-28 PROCEDURE — 99215 OFFICE O/P EST HI 40 MIN: CPT | Mod: 25,HCNC,S$GLB, | Performed by: INTERNAL MEDICINE

## 2020-02-28 PROCEDURE — 1101F PR PT FALLS ASSESS DOC 0-1 FALLS W/OUT INJ PAST YR: ICD-10-PCS | Mod: HCNC,CPTII,S$GLB, | Performed by: INTERNAL MEDICINE

## 2020-02-28 PROCEDURE — 1159F PR MEDICATION LIST DOCUMENTED IN MEDICAL RECORD: ICD-10-PCS | Mod: HCNC,S$GLB,, | Performed by: INTERNAL MEDICINE

## 2020-02-28 PROCEDURE — 3078F PR MOST RECENT DIASTOLIC BLOOD PRESSURE < 80 MM HG: ICD-10-PCS | Mod: HCNC,CPTII,S$GLB, | Performed by: INTERNAL MEDICINE

## 2020-02-28 PROCEDURE — 3077F SYST BP >= 140 MM HG: CPT | Mod: HCNC,CPTII,S$GLB, | Performed by: INTERNAL MEDICINE

## 2020-02-28 PROCEDURE — 99215 PR OFFICE/OUTPT VISIT, EST, LEVL V, 40-54 MIN: ICD-10-PCS | Mod: 25,HCNC,S$GLB, | Performed by: INTERNAL MEDICINE

## 2020-02-28 PROCEDURE — 3077F PR MOST RECENT SYSTOLIC BLOOD PRESSURE >= 140 MM HG: ICD-10-PCS | Mod: HCNC,CPTII,S$GLB, | Performed by: INTERNAL MEDICINE

## 2020-02-28 PROCEDURE — 1125F PR PAIN SEVERITY QUANTIFIED, PAIN PRESENT: ICD-10-PCS | Mod: HCNC,S$GLB,, | Performed by: INTERNAL MEDICINE

## 2020-02-28 PROCEDURE — 99499 UNLISTED E&M SERVICE: CPT | Mod: HCNC,S$GLB,, | Performed by: INTERNAL MEDICINE

## 2020-02-28 PROCEDURE — 63600175 PHARM REV CODE 636 W HCPCS: Mod: JG,HCNC | Performed by: INTERNAL MEDICINE

## 2020-02-28 PROCEDURE — 1101F PT FALLS ASSESS-DOCD LE1/YR: CPT | Mod: HCNC,CPTII,S$GLB, | Performed by: INTERNAL MEDICINE

## 2020-02-28 PROCEDURE — 3078F DIAST BP <80 MM HG: CPT | Mod: HCNC,CPTII,S$GLB, | Performed by: INTERNAL MEDICINE

## 2020-02-28 PROCEDURE — 96401 CHEMO ANTI-NEOPL SQ/IM: CPT | Mod: HCNC

## 2020-02-28 PROCEDURE — 1125F AMNT PAIN NOTED PAIN PRSNT: CPT | Mod: HCNC,S$GLB,, | Performed by: INTERNAL MEDICINE

## 2020-02-28 RX ORDER — AZACITIDINE 100 MG/1
75 INJECTION, POWDER, LYOPHILIZED, FOR SOLUTION INTRAVENOUS; SUBCUTANEOUS
Status: CANCELLED | OUTPATIENT
Start: 2020-02-29

## 2020-02-28 RX ORDER — AZACITIDINE 100 MG/1
75 INJECTION, POWDER, LYOPHILIZED, FOR SOLUTION INTRAVENOUS; SUBCUTANEOUS
Status: CANCELLED | OUTPATIENT
Start: 2020-03-05

## 2020-02-28 RX ORDER — AZACITIDINE 100 MG/1
75 INJECTION, POWDER, LYOPHILIZED, FOR SOLUTION INTRAVENOUS; SUBCUTANEOUS
Status: CANCELLED | OUTPATIENT
Start: 2020-03-03

## 2020-02-28 RX ORDER — AZACITIDINE 100 MG/1
75 INJECTION, POWDER, LYOPHILIZED, FOR SOLUTION INTRAVENOUS; SUBCUTANEOUS
Status: CANCELLED | OUTPATIENT
Start: 2020-02-28

## 2020-02-28 RX ORDER — AZACITIDINE 100 MG/1
75 INJECTION, POWDER, LYOPHILIZED, FOR SOLUTION INTRAVENOUS; SUBCUTANEOUS
Status: CANCELLED | OUTPATIENT
Start: 2020-03-09

## 2020-02-28 RX ORDER — AZACITIDINE 100 MG/1
75 INJECTION, POWDER, LYOPHILIZED, FOR SOLUTION INTRAVENOUS; SUBCUTANEOUS
Status: CANCELLED | OUTPATIENT
Start: 2020-03-04

## 2020-02-28 RX ORDER — AZACITIDINE 100 MG/1
75 INJECTION, POWDER, LYOPHILIZED, FOR SOLUTION INTRAVENOUS; SUBCUTANEOUS
Status: CANCELLED | OUTPATIENT
Start: 2020-03-08

## 2020-02-28 RX ORDER — AZACITIDINE 100 MG/1
75 INJECTION, POWDER, LYOPHILIZED, FOR SOLUTION INTRAVENOUS; SUBCUTANEOUS
Status: COMPLETED | OUTPATIENT
Start: 2020-02-28 | End: 2020-02-28

## 2020-02-28 RX ADMIN — AZACITIDINE 115 MG: 100 INJECTION, POWDER, LYOPHILIZED, FOR SOLUTION SUBCUTANEOUS at 12:02

## 2020-02-28 NOTE — PROGRESS NOTES
Subjective:       Patient ID: Sarah Parker is a 80 y.o. female.    Chief Complaint:  Chronic myelomonocytic leukemia    Follow-up   Associated symptoms include fatigue and weakness. Pertinent negatives include no abdominal pain, arthralgias, chest pain, chills, congestion, coughing, diaphoresis, fever, headaches, joint swelling, myalgias, nausea, neck pain, numbness, rash, sore throat or vomiting.      Patient is a 79-year-old female presents for reinstitution  of Vidaza therapy for chronic myelomonocytic leukemia patient who is currently on treatment with azacitidine.  She is here for routine follow-up.      INTERVAL HISTORY:    Presents today for her treatment with Vidaza.  Apart from arthritis flare up, patient has no other symptoms.  Treatment was delayed last week due to symptomatic anemia for which she received blood transfusion.  She denies shortness of breath, palpitation, chest discomfort, abdominal pain, diarrhea or dysuria.  Also denies hemoptysis, hematemesis, hematochezia, melena.  No urinary symptoms per patient.      Past Medical History:   Diagnosis Date    Acid reflux     Anxiety     Back pain     Bronchitis, chronic obstructive w acute bronchitis 7/29/2016    Cancer     [facility] arms, face- Dr. Lata Tejada    Cataract     2+NS    Degenerative disc disease     Depression     Dry mouth     Hernia, hiatal 11/18/2013    Hypertension     Hypothyroid     Macrocytic anemia 5/3/2016    Macular degeneration     Migraines     Mixed anxiety and depressive disorder     Multiple fractures of ribs of right side     Osteoporosis     Other hyperlipidemia 10/11/2019    Pneumonia     Pneumonia due to other staphylococcus     Rheumatoid arthritis     Rheumatoid arthritis(714.0)     Rheumatoid arthritis(714.0)     Remicade, MTX.     Family History   Problem Relation Age of Onset    Heart disease Mother     Hyperlipidemia Mother     Hypertension Mother     Osteoarthritis Mother      Cataracts Mother     Hypertension Father     Osteoarthritis Father     Heart disease Father     Asthma Sister     Chronic back pain Sister     Hypertension Sister     Osteoarthritis Sister     Thyroid disease Sister     Asthma Brother     Cancer Brother     Chronic back pain Brother     Diabetes Mellitus Brother     Hypertension Brother     Osteoarthritis Brother     Thyroid disease Brother     Cancer Maternal Grandfather     Fibromyalgia Daughter     Heart disease Maternal Grandmother     Colon cancer Neg Hx     Diabetes Neg Hx      Social History     Socioeconomic History    Marital status:      Spouse name: Not on file    Number of children: Not on file    Years of education: Not on file    Highest education level: Not on file   Occupational History    Occupation: retired   Social Needs    Financial resource strain: Not on file    Food insecurity:     Worry: Not on file     Inability: Not on file    Transportation needs:     Medical: Not on file     Non-medical: Not on file   Tobacco Use    Smoking status: Former Smoker     Packs/day: 0.25     Years: 2.00     Pack years: 0.50     Last attempt to quit: 1965     Years since quittin.3    Smokeless tobacco: Never Used   Substance and Sexual Activity    Alcohol use: No    Drug use: No    Sexual activity: Never     Partners: Male   Lifestyle    Physical activity:     Days per week: Not on file     Minutes per session: Not on file    Stress: Not at all   Relationships    Social connections:     Talks on phone: Not on file     Gets together: Not on file     Attends Nondenominational service: Not on file     Active member of club or organization: Not on file     Attends meetings of clubs or organizations: Not on file     Relationship status: Not on file   Other Topics Concern    Not on file   Social History Narrative    Patient is aretired and live with .     Past Surgical History:   Procedure Laterality Date     APPENDECTOMY  1985    BREAST BIOPSY      CATARACT EXTRACTION Bilateral 6/11/15    Dr. Booth    CHOLECYSTECTOMY  2013    cryoablasion kidney Left 09/27/2016    feet Bilateral     rheumatoid    FRACTURE SURGERY Right     tibia    HERNIA REPAIR      HYSTERECTOMY  1970    partial    JOINT REPLACEMENT      bilateral knees (2008), hands, wrists, knuckles, toes    LAPAROSCOPIC NISSEN FUNDOPLICATION      TRANSFORAMINAL EPIDURAL INJECTION OF STEROID Left 6/25/2019    Procedure: Left L5/S1 TF AYAAN with local;  Surgeon: Rowdy Felix MD;  Location: [facility];  Service: Pain Management;  Laterality: Left;       Labs:  Lab Results   Component Value Date    WBC 22.68 (H) 02/28/2020    HGB 6.9 (L) 02/28/2020    HCT 23.6 (L) 02/28/2020     (H) 02/28/2020    PLT 83 (L) 02/28/2020     BMP  Lab Results   Component Value Date     02/28/2020    K 4.7 02/28/2020     02/28/2020    CO2 23 02/28/2020    BUN 15 02/28/2020    CREATININE 1.1 02/28/2020    CALCIUM 9.1 02/28/2020    ANIONGAP 11 02/28/2020    ESTGFRAFRICA 56 (A) 02/28/2020    EGFRNONAA 48 (A) 02/28/2020     Lab Results   Component Value Date    ALT 10 02/28/2020    AST 21 02/28/2020    ALKPHOS 152 (H) 02/28/2020    BILITOT 0.4 02/28/2020       Lab Results   Component Value Date    IRON 93 01/17/2020    TIBC 425 01/17/2020    FERRITIN 276 01/17/2020     Lab Results   Component Value Date    XMDSKQZM61 1918 (H) 08/25/2019     Lab Results   Component Value Date    FOLATE 12.0 08/25/2019     Lab Results   Component Value Date    TSH 5.174 (H) 09/03/2019         Review of Systems   Constitutional: Positive for activity change and fatigue. Negative for chills, diaphoresis and fever.   HENT: Negative for congestion, dental problem, drooling, ear discharge, ear pain, facial swelling, hearing loss, mouth sores, nosebleeds, postnasal drip, rhinorrhea, sinus pressure, sneezing, sore throat, tinnitus, trouble swallowing and voice change.    Eyes: Negative  for photophobia, pain, discharge, redness, itching and visual disturbance.   Respiratory: Negative for cough, choking, chest tightness, shortness of breath, wheezing and stridor.    Cardiovascular: Negative for chest pain, palpitations and leg swelling.   Gastrointestinal: Negative for abdominal distention, abdominal pain, anal bleeding, blood in stool, constipation, diarrhea, nausea, rectal pain and vomiting.   Endocrine: Negative for cold intolerance, heat intolerance, polydipsia, polyphagia and polyuria.   Genitourinary: Negative for decreased urine volume, difficulty urinating, dyspareunia, dysuria, enuresis, flank pain, frequency, genital sores, hematuria, menstrual problem, pelvic pain, urgency, vaginal bleeding, vaginal discharge and vaginal pain.   Musculoskeletal: Negative for arthralgias, back pain, gait problem, joint swelling, myalgias, neck pain and neck stiffness.   Skin: Negative for color change, pallor and rash.   Allergic/Immunologic: Negative for environmental allergies, food allergies and immunocompromised state.   Neurological: Positive for weakness. Negative for dizziness, tremors, seizures, syncope, facial asymmetry, speech difficulty, light-headedness, numbness and headaches.   Hematological: Negative for adenopathy. Does not bruise/bleed easily.   Psychiatric/Behavioral: Positive for dysphoric mood. Negative for agitation, behavioral problems, confusion, decreased concentration, hallucinations, self-injury, sleep disturbance and suicidal ideas. The patient is nervous/anxious. The patient is not hyperactive.        Objective:      Physical Exam   Constitutional: She is oriented to person, place, and time. She appears cachectic.   HENT:   Head: Normocephalic and atraumatic.   Right Ear: External ear normal.   Left Ear: External ear normal.   Nose: Nose normal. Right sinus exhibits no maxillary sinus tenderness and no frontal sinus tenderness. Left sinus exhibits no maxillary sinus tenderness  and no frontal sinus tenderness.   Mouth/Throat: Oropharynx is clear and moist. No oropharyngeal exudate.   Eyes: Pupils are equal, round, and reactive to light. Conjunctivae, EOM and lids are normal. Right eye exhibits no discharge. Left eye exhibits no discharge. Right conjunctiva is not injected. Right conjunctiva has no hemorrhage. Left conjunctiva is not injected. Left conjunctiva has no hemorrhage. No scleral icterus.   Neck: Normal range of motion. Neck supple. No JVD present. No tracheal deviation present. No thyromegaly present.   Cardiovascular: Normal rate and regular rhythm.   Pulmonary/Chest: Effort normal. No stridor. No respiratory distress. She exhibits no tenderness.   Abdominal: Soft. She exhibits no distension and no mass. There is no splenomegaly or hepatomegaly. There is no tenderness. There is no rebound.   Musculoskeletal: Normal range of motion. She exhibits no edema or tenderness.   Lymphadenopathy:     She has no cervical adenopathy.     She has no axillary adenopathy.        Right: No supraclavicular adenopathy present.        Left: No supraclavicular adenopathy present.   Neurological: She is alert and oriented to person, place, and time. No cranial nerve deficit. Coordination normal.   Skin: Skin is dry. No rash noted. She is not diaphoretic. No erythema.   Psychiatric: She has a normal mood and affect. Her behavior is normal. Judgment and thought content normal.   Vitals reviewed.          Assessment:      1. Chronic myelomonocytic leukemia not having achieved remission    2. Rheumatoid arthritis involving right shoulder with positive rheumatoid factor    3. Cachexia    4. Thrombocytopenia    5. Essential hypertension           Plan:     Chronic myelomonocytic leukemia not having achieved remission  Reviewed labs today, noted hemoglobin of 6.9 grams/deciliter.  However patient remains asymptomatic.  Platelet count is at 67938.  Okay to proceed with treatment.  She is to return back in 1  week with repeat labs.    Again I revisited patient's bone marrow biopsy that was done in September of 2019 and based on her current level of cytopenia I suspect she may be progressing towards systemic leukemia, noted nucleated red blood cell in peripheral smear.  I did discuss with patient and  regarding these findings and the need to obtain a bone marrow biopsy.  The order has been placed.    Rheumatoid arthritis  Follows with Rheumatology Service.    Cachexia  Lost about 2 lb from the prior visit, recommend protein boost.  Patient denies any decline in appetite.    Thrombocytopenia  Likely related to the disease versus drug effect.  Noted at 83,000 today.  No evidence of active bleed.    Essential hypertension  Management per PCP.      Denton Knox MD

## 2020-02-28 NOTE — DISCHARGE INSTRUCTIONS
"[facility]  [address] [address].  or  [address]  [phone] phone     [phone] fax  Hours of Operation: Monday- Friday 8:00am- 5:00pm  After hours phone  [phone]  Hematology / Oncology Physicians on call      Dr. Ruben Baird, GILBERT Youngblood NP Tyesha Taylor, NP    Please call with any concerns regarding your appointment today.Support Groups/Classes    Support groups and classes are being offered at the   [facility] and [employer]!!    "Cooking with Cancer" (Nutrition Class):  Second Wednesday of each month   at noon at the [facility].  Metastatic Support Group:  Third Tuesday of each month   at noon at the [facility].  Next Steps Class/Group: Second and fourth Thursday of each month at noon at the [facility].  Hope Chest (Breast Cancer Support Group): First Tuesday of each month   at 5:30pm at the [facility] location.  [employer]: [facility]: Second and third Tuesday of each month from 7:30am - 2pm.  [facility]: First and fourth Tuesday of each month from 7:30am - 2pm    If you are interested in attending or would like more information please ask our social workers or your nurse!  "

## 2020-02-28 NOTE — ASSESSMENT & PLAN NOTE
Likely related to the disease versus drug effect.  Noted at 83,000 today.  No evidence of active bleed.

## 2020-02-28 NOTE — ASSESSMENT & PLAN NOTE
Lost about 2 lb from the prior visit, recommend protein boost.  Patient denies any decline in appetite.

## 2020-02-28 NOTE — ASSESSMENT & PLAN NOTE
Reviewed labs today, noted hemoglobin of 6.9 grams/deciliter.  However patient remains asymptomatic.  [place] is at 45064.  Okay to proceed with treatment.  She is to return back in 1 week with repeat labs.    Again I revisited patient's bone marrow biopsy that was done in September of 2019 and based on her current level of cytopenia I suspect she may be progressing towards systemic leukemia, noted nucleated red blood cell in peripheral smear.  I did discuss with patient and  regarding these findings and the need to obtain a bone marrow biopsy.  The order has been placed.

## 2020-03-02 ENCOUNTER — OFFICE VISIT (OUTPATIENT)
Dept: RADIATION ONCOLOGY | Facility: CLINIC | Age: 80
End: 2020-03-02
Payer: MEDICARE

## 2020-03-02 ENCOUNTER — DOCUMENTATION ONLY (OUTPATIENT)
Dept: HEMATOLOGY/ONCOLOGY | Facility: CLINIC | Age: 80
End: 2020-03-02

## 2020-03-02 ENCOUNTER — INFUSION (OUTPATIENT)
Dept: INFUSION THERAPY | Facility: HOSPITAL | Age: 80
End: 2020-03-02
Attending: INTERNAL MEDICINE
Payer: MEDICARE

## 2020-03-02 VITALS
DIASTOLIC BLOOD PRESSURE: 78 MMHG | HEART RATE: 77 BPM | TEMPERATURE: 97 F | RESPIRATION RATE: 18 BRPM | SYSTOLIC BLOOD PRESSURE: 148 MMHG

## 2020-03-02 VITALS
TEMPERATURE: 98 F | SYSTOLIC BLOOD PRESSURE: 141 MMHG | HEART RATE: 73 BPM | RESPIRATION RATE: 18 BRPM | DIASTOLIC BLOOD PRESSURE: 80 MMHG | BODY MASS INDEX: 21.25 KG/M2 | WEIGHT: 115.5 LBS | HEIGHT: 62 IN

## 2020-03-02 DIAGNOSIS — R22.32 AXILLARY MASS, LEFT: ICD-10-CM

## 2020-03-02 DIAGNOSIS — C93.10 CHRONIC MYELOMONOCYTIC LEUKEMIA NOT HAVING ACHIEVED REMISSION: Primary | ICD-10-CM

## 2020-03-02 PROCEDURE — 1101F PR PT FALLS ASSESS DOC 0-1 FALLS W/OUT INJ PAST YR: ICD-10-PCS | Mod: HCNC,CPTII,S$GLB, | Performed by: RADIOLOGY

## 2020-03-02 PROCEDURE — 1159F MED LIST DOCD IN RCRD: CPT | Mod: HCNC,S$GLB,, | Performed by: RADIOLOGY

## 2020-03-02 PROCEDURE — 3077F SYST BP >= 140 MM HG: CPT | Mod: HCNC,CPTII,S$GLB, | Performed by: RADIOLOGY

## 2020-03-02 PROCEDURE — 99999 PR PBB SHADOW E&M-EST. PATIENT-LVL IV: ICD-10-PCS | Mod: PBBFAC,HCNC,, | Performed by: RADIOLOGY

## 2020-03-02 PROCEDURE — 1126F AMNT PAIN NOTED NONE PRSNT: CPT | Mod: HCNC,S$GLB,, | Performed by: RADIOLOGY

## 2020-03-02 PROCEDURE — 3079F DIAST BP 80-89 MM HG: CPT | Mod: HCNC,CPTII,S$GLB, | Performed by: RADIOLOGY

## 2020-03-02 PROCEDURE — 99999 PR PBB SHADOW E&M-EST. PATIENT-LVL IV: CPT | Mod: PBBFAC,HCNC,, | Performed by: RADIOLOGY

## 2020-03-02 PROCEDURE — 96401 CHEMO ANTI-NEOPL SQ/IM: CPT | Mod: HCNC

## 2020-03-02 PROCEDURE — 99213 OFFICE O/P EST LOW 20 MIN: CPT | Mod: HCNC,S$GLB,, | Performed by: RADIOLOGY

## 2020-03-02 PROCEDURE — 99213 PR OFFICE/OUTPT VISIT, EST, LEVL III, 20-29 MIN: ICD-10-PCS | Mod: HCNC,S$GLB,, | Performed by: RADIOLOGY

## 2020-03-02 PROCEDURE — 63600175 PHARM REV CODE 636 W HCPCS: Mod: JG,HCNC | Performed by: INTERNAL MEDICINE

## 2020-03-02 PROCEDURE — 1159F PR MEDICATION LIST DOCUMENTED IN MEDICAL RECORD: ICD-10-PCS | Mod: HCNC,S$GLB,, | Performed by: RADIOLOGY

## 2020-03-02 PROCEDURE — 1126F PR PAIN SEVERITY QUANTIFIED, NO PAIN PRESENT: ICD-10-PCS | Mod: HCNC,S$GLB,, | Performed by: RADIOLOGY

## 2020-03-02 PROCEDURE — 3077F PR MOST RECENT SYSTOLIC BLOOD PRESSURE >= 140 MM HG: ICD-10-PCS | Mod: HCNC,CPTII,S$GLB, | Performed by: RADIOLOGY

## 2020-03-02 PROCEDURE — 3079F PR MOST RECENT DIASTOLIC BLOOD PRESSURE 80-89 MM HG: ICD-10-PCS | Mod: HCNC,CPTII,S$GLB, | Performed by: RADIOLOGY

## 2020-03-02 PROCEDURE — 1101F PT FALLS ASSESS-DOCD LE1/YR: CPT | Mod: HCNC,CPTII,S$GLB, | Performed by: RADIOLOGY

## 2020-03-02 RX ORDER — AZACITIDINE 100 MG/1
75 INJECTION, POWDER, LYOPHILIZED, FOR SOLUTION INTRAVENOUS; SUBCUTANEOUS
Status: COMPLETED | OUTPATIENT
Start: 2020-03-02 | End: 2020-03-02

## 2020-03-02 RX ADMIN — AZACITIDINE 115 MG: 100 INJECTION, POWDER, LYOPHILIZED, FOR SOLUTION SUBCUTANEOUS at 11:03

## 2020-03-02 NOTE — NURSING
Injection x2 given without difficulties.Bandaid applied. Patient instructed to stay in the clinic for 15 minutes. Patient verbalized understanding and will notify nurse with any complaints.

## 2020-03-02 NOTE — PROGRESS NOTES
Brief f/u with pt. She is smiling, well-groomed, and in no visible acute distress. Says she is feeling good today; was feeling a bit weak yesterday. Otherwise she has no complaints. She is not interested in pursuing insurance premium reimbursement through [employer] at this time and has worked out financial arrangements with financial counselor. She has no new SW needs today; SW will follow pt and remain available to assist with any ongoing needs as they arise.

## 2020-03-02 NOTE — PLAN OF CARE
"Patient feet elevated and blanket provided for comfort. Patient denies pain today and reports feeling "great" today. NAD noted at visit.  "

## 2020-03-02 NOTE — PROGRESS NOTES
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 female who presents today for follow-up.  This is her first follow up since completing radiation. Since then, she has done well  Today, her skin is normal and never was irritated or erythematous. The mass is decreased in size, still present, but mobile and softer in texture. She is now again following with Dr. Bronson who is giving azacitidine and will likely do bone marrow biopsy soon. She notes nodule on her right hip but this could be rheumatoid nodule which she gets.    PHYSICAL EXAM:   Constitutional: well appearing, no acute distress, ECOG 1 - Ambulates, capable of light work  Vitals:    BP (!) 141/80   Pulse 73   Temp 97.7 °F (36.5 °C)   Resp 18   Ht 5' 2" (1.575 m)   Wt 52.4 kg (115 lb 8 oz)   LMP  (LMP Unknown)   BMI 21.13 kg/m²   Eyes: sclera anicteric, EOMI, pupils equal, round and reactive to light  ENT: oral cavity without lesions, moist mucous membranes  Neck: trachea midline, neck supple  Lymphatic: left axilla with round 2cm mass soft ,mobile  Skin : no erythema, purple nodules, or telengiectasias    Laboratory & X-Ray Findings: Per above.      ASSESSMENT: good response and recovered well from acute toxicities of radiation    PLAN: Ms. Parker has a good response in her left axilla and has recovered well. She will continue to follow with medical oncology and can see me as needed. I would be happy to see her again if more symptomatic soft tissue masses arise. If left axillary mass grows or returns, could re-treat but would prefer to wait at least 3-6 months.    Anne Marie Almaraz III, M.D.  Radiation Oncology  [facility] [address] [place] [address]  Ph: " [phone]  [email or web address]

## 2020-03-02 NOTE — DISCHARGE INSTRUCTIONS
[facility]  [address] [address]  [address]  [phone] phone     [phone] fax  Hours of Operation: Monday- Friday 8:00am- 5:00pm  After hours phone  [phone]  Hematology / Oncology Physicians on call      Dr. Ruben Baird, GILBERT Youngblood NP Tyesha Taylor, NP    Please call with any concerns regarding your appointment today.      FALL PREVENTION   Falls often occur due to slipping, tripping or losing your balance. Here are ways to reduce your risk of falling again.   Was there anything that caused your fall that can be fixed, removed or replaced?   Make your home safe by keeping walkways clear of objects you may trip over.   Use non-slip pads under rugs.   Do not walk in poorly lit areas.   Do not stand on chairs or wobbly ladders.   Use caution when reaching overhead or looking upward. This position can cause a loss of balance.   Be sure your shoes fit properly, have non-slip bottoms and are in good condition.   Be cautious when going up and down stairs, curbs, and when walking on uneven sidewalks.   If your balance is poor, consider using a cane or walker.   If your fall was related to alcohol use, stop or limit alcohol intake.   If your fall was related to use of sleeping medicines, talk to your doctor about this. You may need to reduce your dosage at bedtime if you awaken during the night to go to the bathroom.   To reduce the need for nighttime bathroom trips:   Avoid drinking fluids for several hours before going to bed   Empty your bladder before going to bed   Men can keep a urinal at the bedside   © 1152-8663 [facility], [address] [place] [address], [place] [address]. All rights reserved. This information is not intended as a substitute for professional medical care. Always follow your healthcare professional's instructions.      Support  "Groups/Classes    Support groups and classes are being offered at the   [facility] and [facility]    "Cooking with Cancer" (Nutrition Class):  Second Wednesday of each month   at noon at the Cancer Center.  Metastatic Support Group:  Third Tuesday of each month   at noon at the Cancer Center.  Next Steps Class/Group: Second and fourth Thursday of each month at noon at the Cancer Center.  Hope Chest (Breast Cancer Support Group): First Tuesday of each month   at 5:30pm at the [facility] location.  [employer]: [facility]: Second and third Tuesday of each month from 7:30am - 2pm.  [facility]: First and fourth Tuesday of each month from 7:30am - 2pm    If you are interested in attending or would like more information please ask our social workers or your nurse!        "

## 2020-03-03 ENCOUNTER — INFUSION (OUTPATIENT)
Dept: INFUSION THERAPY | Facility: HOSPITAL | Age: 80
End: 2020-03-03
Attending: INTERNAL MEDICINE
Payer: MEDICARE

## 2020-03-03 VITALS
HEART RATE: 80 BPM | TEMPERATURE: 98 F | DIASTOLIC BLOOD PRESSURE: 71 MMHG | HEIGHT: 62 IN | RESPIRATION RATE: 18 BRPM | WEIGHT: 115.5 LBS | BODY MASS INDEX: 21.25 KG/M2 | SYSTOLIC BLOOD PRESSURE: 152 MMHG

## 2020-03-03 DIAGNOSIS — C93.10 CHRONIC MYELOMONOCYTIC LEUKEMIA NOT HAVING ACHIEVED REMISSION: Primary | ICD-10-CM

## 2020-03-03 PROCEDURE — 96401 CHEMO ANTI-NEOPL SQ/IM: CPT | Mod: HCNC

## 2020-03-03 PROCEDURE — 63600175 PHARM REV CODE 636 W HCPCS: Mod: JG,HCNC | Performed by: INTERNAL MEDICINE

## 2020-03-03 RX ORDER — AZACITIDINE 100 MG/1
75 INJECTION, POWDER, LYOPHILIZED, FOR SOLUTION INTRAVENOUS; SUBCUTANEOUS
Status: COMPLETED | OUTPATIENT
Start: 2020-03-03 | End: 2020-03-03

## 2020-03-03 RX ADMIN — AZACITIDINE 115 MG: 100 INJECTION, POWDER, LYOPHILIZED, FOR SOLUTION INTRAVENOUS; SUBCUTANEOUS at 10:03

## 2020-03-03 NOTE — NURSING
1019am: Injection given without difficulties.Bandaid applied. Patient instructed to stay in the clinic for 15 minutes. Patient verbalized understanding and will notify nurse with any complaints.

## 2020-03-03 NOTE — DISCHARGE INSTRUCTIONS
[facility]  [address] [address] [facility] [address]  [address]  [phone] phone     [phone] fax  Hours of Operation: Monday- Friday 8:00am- 5:00pm  After hours phone  [phone]  Hematology / Oncology Physicians on call      GILBERT Miller Dr., Dr., Dr., Dr., NP Sydney Prescott, NP Tyesha Taylor, NP    Please call with any concerns regarding your appointment today.HOME CARE AFTER CHEMOTHERAPY   Meals   Many patients feel sick and lose their appetites during treatment. Eat small meals several times a day. Choose bland foods with little taste or smell if you have problems with nausea. Be sure to cook all food thoroughly. This kills bacteria and helps you avoid intestinal infection. Soft foods are easier to swallow and digest.   Activity   Exercise keeps you strong and keeps your heart and lungs active. Talk to your doctor about an appropriate exercise program for you.   Skin Care   To prevent a skin infection, bathe or shower once a day. Use a moisturizing soap and wash with warm water. Avoid very hot or cold water. Chemotherapy can make your skin dry . Apply moisturizing lotion to help relieve dry skin. Some drugs used in high doses can cause slight burns to appear (like sunburn). Ask for a special cream to help relieve the burn and protect your skin.   Prevent Mouth Sores   During chemotherapy, many people get mouth sores. Do the following to help prevent mouth sores or to ease discomfort.   Brush your teeth with a soft-bristle toothbrush after every meal.  Don't use dental floss if your platelet count is below 50,000. Your doctor or nurse will tell you if this is the case.  Use an oral swab or special soft toothbrush if your gums bleed during regular brushing.  Use mouthwash as directed. If you can't tolerate commercial mouthwash, use salt and baking soda to clean your mouth. Mix 1 teaspoon of salt and 1  teaspoon of baking soda into a glass of water. Swish and spit.  Call your doctor or return to this facility if you develop any of the following:   Sore throat   White patches in the mouth or throat   Fever of 100.4ºF (38ºC) or higher, or as directed by your healthcare provider  © 2000-2011 [facility], [address] [place] [address]. All rights reserved. This information is not intended as a substitute for professional medical care. Always follow your healthcare professional's   FALL PREVENTION   Falls often occur due to slipping, tripping or losing your balance. Here are ways to reduce your risk of falling again.   Was there anything that caused your fall that can be fixed, removed or replaced?   Make your home safe by keeping walkways clear of objects you may trip over.   Use non-slip pads under rugs.   Do not walk in poorly lit areas.   Do not stand on chairs or wobbly ladders.   Use caution when reaching overhead or looking upward. This position can cause a loss of balance.   Be sure your shoes fit properly, have non-slip bottoms and are in good condition.   Be cautious when going up and down stairs, curbs, and when walking on uneven sidewalks.   If your balance is poor, consider using a cane or walker.   If your fall was related to alcohol use, stop or limit alcohol intake.   If your fall was related to use of sleeping medicines, talk to your doctor about this. You may need to reduce your dosage at bedtime if you awaken during the night to go to the bathroom.   To reduce the need for nighttime bathroom trips:   Avoid drinking fluids for several hours before going to bed   Empty your bladder before going to bed   Men can keep a urinal at the bedside   © 2000-2011 [facility], [address] [place] [address]. All rights reserved. This information is not intended as a substitute for professional medical care. Always follow your healthcare professional's instructions.  Support  "Groups/Classes    Support groups and classes are being offered at the   [facility] and [facility]!!    "Cooking with Cancer" (Nutrition Class):  Second Wednesday of each month   at noon at the Cancer Center.  Metastatic Support Group:  Third Tuesday of each month   at noon at the Cancer Center.  Next Steps Class/Group: Second and fourth Thursday of each month at noon at the Cancer Center.  Hope Chest (Breast Cancer Support Group): First Tuesday of each month   at 5:30pm at the [facility] location.  [employer]: [facility]: Second and third Tuesday of each month from 7:30am - 2pm.  [facility]: First and fourth Tuesday of each month from 7:30am - 2pm    If you are interested in attending or would like more information please ask our social workers or your nurse!  "

## 2020-03-04 ENCOUNTER — INFUSION (OUTPATIENT)
Dept: INFUSION THERAPY | Facility: HOSPITAL | Age: 80
End: 2020-03-04
Attending: INTERNAL MEDICINE
Payer: MEDICARE

## 2020-03-04 VITALS
HEART RATE: 90 BPM | TEMPERATURE: 98 F | SYSTOLIC BLOOD PRESSURE: 153 MMHG | DIASTOLIC BLOOD PRESSURE: 70 MMHG | RESPIRATION RATE: 16 BRPM | OXYGEN SATURATION: 95 %

## 2020-03-04 DIAGNOSIS — C93.10 CHRONIC MYELOMONOCYTIC LEUKEMIA NOT HAVING ACHIEVED REMISSION: Primary | ICD-10-CM

## 2020-03-04 PROCEDURE — 96401 CHEMO ANTI-NEOPL SQ/IM: CPT | Mod: HCNC

## 2020-03-04 PROCEDURE — 63600175 PHARM REV CODE 636 W HCPCS: Mod: JG,HCNC | Performed by: INTERNAL MEDICINE

## 2020-03-04 RX ORDER — AZACITIDINE 100 MG/1
75 INJECTION, POWDER, LYOPHILIZED, FOR SOLUTION INTRAVENOUS; SUBCUTANEOUS
Status: COMPLETED | OUTPATIENT
Start: 2020-03-04 | End: 2020-03-04

## 2020-03-04 RX ADMIN — AZACITIDINE 115 MG: 100 INJECTION, POWDER, LYOPHILIZED, FOR SOLUTION INTRAVENOUS; SUBCUTANEOUS at 10:03

## 2020-03-04 NOTE — DISCHARGE INSTRUCTIONS
[facility]  [address] [facility] [address]  [address] [address]  [phone] phone     [phone] fax  Hours of Operation: Monday- Friday 8:00am- 5:00pm  After hours phone  [phone]  Hematology / Oncology Physicians on call      GILBERT Miller Dr., Dr., Dr., Dr., NP Sydney Prescott, NP Tyesha Taylor, NP    Please call with any concerns regarding your appointment today.    HOME CARE AFTER CHEMOTHERAPY   Meals   Many patients feel sick and lose their appetites during treatment. Eat small meals several times a day. Choose bland foods with little taste or smell if you have problems with nausea. Be sure to cook all food thoroughly. This kills bacteria and helps you avoid intestinal infection. Soft foods are easier to swallow and digest.   Activity   Exercise keeps you strong and keeps your heart and lungs active. Talk to your doctor about an appropriate exercise program for you.   Skin Care   To prevent a skin infection, bathe or shower once a day. Use a moisturizing soap and wash with warm water. Avoid very hot or cold water. Chemotherapy can make your skin dry . Apply moisturizing lotion to help relieve dry skin. Some drugs used in high doses can cause slight burns to appear (like sunburn). Ask for a special cream to help relieve the burn and protect your skin.   Prevent Mouth Sores   During chemotherapy, many people get mouth sores. Do the following to help prevent mouth sores or to ease discomfort.   Brush your teeth with a soft-bristle toothbrush after every meal.  Don't use dental floss if your platelet count is below 50,000. Your doctor or nurse will tell you if this is the case.  Use an oral swab or special soft toothbrush if your gums bleed during regular brushing.  Use mouthwash as directed. If you can't tolerate commercial mouthwash, use salt and baking soda to clean your mouth. Mix 1 teaspoon of salt and 1  teaspoon of baking soda into a glass of water. Swish and spit.  Call your doctor or return to this facility if you develop any of the following:   Sore throat   White patches in the mouth or throat   Fever of 100.4ºF (38ºC) or higher, or as directed by your healthcare provider  © 2000-2011 [facility], [address] [place] [address]. All rights reserved. This information is not intended as a substitute for professional medical care. Always follow your healthcare professional's   FALL PREVENTION   Falls often occur due to slipping, tripping or losing your balance. Here are ways to reduce your risk of falling again.   Was there anything that caused your fall that can be fixed, removed or replaced?   Make your home safe by keeping walkways clear of objects you may trip over.   Use non-slip pads under rugs.   Do not walk in poorly lit areas.   Do not stand on chairs or wobbly ladders.   Use caution when reaching overhead or looking upward. This position can cause a loss of balance.   Be sure your shoes fit properly, have non-slip bottoms and are in good condition.   Be cautious when going up and down stairs, curbs, and when walking on uneven sidewalks.   If your balance is poor, consider using a cane or walker.   If your fall was related to alcohol use, stop or limit alcohol intake.   If your fall was related to use of sleeping medicines, talk to your doctor about this. You may need to reduce your dosage at bedtime if you awaken during the night to go to the bathroom.   To reduce the need for nighttime bathroom trips:   Avoid drinking fluids for several hours before going to bed   Empty your bladder before going to bed   Men can keep a urinal at the bedside   © 2000-2011 [facility], [address] [place] [address]. All rights reserved. This information is not intended as a substitute for professional medical care. Always follow your healthcare professional's instructions.  WAYS TO HELP  PREVENT INFECTION         WASH YOUR HANDS OFTEN DURING THE DAY, ESPECIALLY BEFORE YOU EAT, AFTER USING THE BATHROOM, AND AFTER TOUCHING ANIMALS     STAY AWAY FROM PEOPLE WHO HAVE ILLNESSES YOU CAN CATCH; SUCH AS COLDS, FLU, CHICKEN POX     TRY TO AVOID CROWDS     STAY AWAY FROM CHILDREN WHO RECENTLY HAVE RECEIVED LIVE VIRUS VACCINES     MAINTAIN GOOD MOUTH CARE     DO NOT SQUEEZE OR SCRATCH PIMPLES     CLEAN CUTS & SCRAPES RIGHT AWAY AND DAILY UNTIL HEALED WITH WARM WATER, SOAP & AN ANTISEPTIC     AVOID CONTACT WITH LITTER BOXES, BIRD CAGES, & FISH TANKS     AVOID STANDING WATER, IE., BIRD BATHS, FLOWER POTS/VASES, OR HUMIDIFIERS     WEAR GLOVES WHEN GARDENING OR CLEANING UP AFTER OTHERS, ESPECIALLY BABIES & SMALL CHILDREN     DO NOT EAT RAW FISH, SEAFOOD, MEAT, OR EGGS

## 2020-03-05 ENCOUNTER — INFUSION (OUTPATIENT)
Dept: INFUSION THERAPY | Facility: HOSPITAL | Age: 80
End: 2020-03-05
Attending: INTERNAL MEDICINE
Payer: MEDICARE

## 2020-03-05 VITALS
WEIGHT: 115.5 LBS | HEIGHT: 62 IN | OXYGEN SATURATION: 98 % | DIASTOLIC BLOOD PRESSURE: 73 MMHG | BODY MASS INDEX: 21.25 KG/M2 | HEART RATE: 87 BPM | SYSTOLIC BLOOD PRESSURE: 128 MMHG | TEMPERATURE: 97 F | RESPIRATION RATE: 16 BRPM

## 2020-03-05 DIAGNOSIS — C93.10 CHRONIC MYELOMONOCYTIC LEUKEMIA NOT HAVING ACHIEVED REMISSION: Primary | ICD-10-CM

## 2020-03-05 PROCEDURE — 96401 CHEMO ANTI-NEOPL SQ/IM: CPT | Mod: HCNC

## 2020-03-05 PROCEDURE — 63600175 PHARM REV CODE 636 W HCPCS: Mod: JW,JG,HCNC | Performed by: INTERNAL MEDICINE

## 2020-03-05 RX ORDER — AZACITIDINE 100 MG/1
75 INJECTION, POWDER, LYOPHILIZED, FOR SOLUTION INTRAVENOUS; SUBCUTANEOUS
Status: COMPLETED | OUTPATIENT
Start: 2020-03-05 | End: 2020-03-05

## 2020-03-05 RX ADMIN — AZACITIDINE 115 MG: 100 INJECTION, POWDER, LYOPHILIZED, FOR SOLUTION SUBCUTANEOUS at 10:03

## 2020-03-05 NOTE — NURSING
1006am: Injection given without difficulties.Bandaid applied. Patient instructed to stay in the clinic for 15 minutes. Patient verbalized understanding and will notify nurse with any complaints.

## 2020-03-05 NOTE — DISCHARGE INSTRUCTIONS
[facility]  [address] [address]  [address]  [phone] phone     [phone] fax  Hours of Operation: Monday- Friday 8:00am- 5:00pm  After hours phone  [phone]  Hematology / Oncology Physicians on call      GILBERT Miller Dr., Dr., Dr., Dr., NP Sydney Prescott, NP Tyesha Taylor, NP    Please call with any concerns regarding your appointment today.HOME CARE AFTER CHEMOTHERAPY   Meals   Many patients feel sick and lose their appetites during treatment. Eat small meals several times a day. Choose bland foods with little taste or smell if you have problems with nausea. Be sure to cook all food thoroughly. This kills bacteria and helps you avoid intestinal infection. Soft foods are easier to swallow and digest.   Activity   Exercise keeps you strong and keeps your heart and lungs active. Talk to your doctor about an appropriate exercise program for you.   Skin Care   To prevent a skin infection, bathe or shower once a day. Use a moisturizing soap and wash with warm water. Avoid very hot or cold water. Chemotherapy can make your skin dry . Apply moisturizing lotion to help relieve dry skin. Some drugs used in high doses can cause slight burns to appear (like sunburn). Ask for a special cream to help relieve the burn and protect your skin.   Prevent Mouth Sores   During chemotherapy, many people get mouth sores. Do the following to help prevent mouth sores or to ease discomfort.   Brush your teeth with a soft-bristle toothbrush after every meal.  Don't use dental floss if your platelet count is below 50,000. Your doctor or nurse will tell you if this is the case.  Use an oral swab or special soft toothbrush if your gums bleed during regular brushing.  Use mouthwash as directed. If you can't tolerate commercial mouthwash, use salt and baking soda to clean your mouth. Mix 1 teaspoon of salt and 1  teaspoon of baking soda into a glass of water. Swish and spit.  Call your doctor or return to this facility if you develop any of the following:   Sore throat   White patches in the mouth or throat   Fever of 100.4ºF (38ºC) or higher, or as directed by your healthcare provider  © 2000-2011 [facility], [address] [place] [address]. All rights reserved. This information is not intended as a substitute for professional medical care. Always follow your healthcare professional's   FALL PREVENTION   Falls often occur due to slipping, tripping or losing your balance. Here are ways to reduce your risk of falling again.   Was there anything that caused your fall that can be fixed, removed or replaced?   Make your home safe by keeping walkways clear of objects you may trip over.   Use non-slip pads under rugs.   Do not walk in poorly lit areas.   Do not stand on chairs or wobbly ladders.   Use caution when reaching overhead or looking upward. This position can cause a loss of balance.   Be sure your shoes fit properly, have non-slip bottoms and are in good condition.   Be cautious when going up and down stairs, curbs, and when walking on uneven sidewalks.   If your balance is poor, consider using a cane or walker.   If your fall was related to alcohol use, stop or limit alcohol intake.   If your fall was related to use of sleeping medicines, talk to your doctor about this. You may need to reduce your dosage at bedtime if you awaken during the night to go to the bathroom.   To reduce the need for nighttime bathroom trips:   Avoid drinking fluids for several hours before going to bed   Empty your bladder before going to bed   Men can keep a urinal at the bedside   © 2000-2011 [facility], [address] [place] [address]. All rights reserved. This information is not intended as a substitute for professional medical care. Always follow your healthcare professional's instructions.  Support  "Groups/Classes    Support groups and classes are being offered at the   [facility] and [facility]    "Cooking with Cancer" (Nutrition Class):  Second Wednesday of each month   at noon at the Cancer Center.  Metastatic Support Group:  Third Tuesday of each month   at noon at the Cancer Center.  Next Steps Class/Group: Second and fourth Thursday of each month at noon at the Cancer Center.  Hope [facility] (Breast Cancer Support Group): First Tuesday of each month   at 5:30pm at the [facility] location.  [employer]: [facility]: Second and third Tuesday of each month from 7:30am - 2pm.  [facility]: First and fourth Tuesday of each month from 7:30am - 2pm    If you are interested in attending or would like more information please ask our social workers or your nurse!  "

## 2020-03-06 ENCOUNTER — OFFICE VISIT (OUTPATIENT)
Dept: HEMATOLOGY/ONCOLOGY | Facility: CLINIC | Age: 80
End: 2020-03-06
Payer: MEDICARE

## 2020-03-06 ENCOUNTER — INFUSION (OUTPATIENT)
Dept: INFUSION THERAPY | Facility: HOSPITAL | Age: 80
End: 2020-03-06
Attending: INTERNAL MEDICINE
Payer: MEDICARE

## 2020-03-06 VITALS
SYSTOLIC BLOOD PRESSURE: 144 MMHG | HEART RATE: 93 BPM | HEIGHT: 62 IN | BODY MASS INDEX: 20.9 KG/M2 | HEIGHT: 62 IN | BODY MASS INDEX: 20.9 KG/M2 | DIASTOLIC BLOOD PRESSURE: 80 MMHG | WEIGHT: 113.56 LBS | TEMPERATURE: 98 F | RESPIRATION RATE: 14 BRPM | OXYGEN SATURATION: 80 % | WEIGHT: 113.56 LBS

## 2020-03-06 DIAGNOSIS — E03.9 ACQUIRED HYPOTHYROIDISM: ICD-10-CM

## 2020-03-06 DIAGNOSIS — C93.10 CHRONIC MYELOMONOCYTIC LEUKEMIA NOT HAVING ACHIEVED REMISSION: Primary | ICD-10-CM

## 2020-03-06 DIAGNOSIS — M05.711 RHEUMATOID ARTHRITIS INVOLVING RIGHT SHOULDER WITH POSITIVE RHEUMATOID FACTOR: Chronic | ICD-10-CM

## 2020-03-06 PROCEDURE — 1101F PT FALLS ASSESS-DOCD LE1/YR: CPT | Mod: HCNC,CPTII,S$GLB, | Performed by: INTERNAL MEDICINE

## 2020-03-06 PROCEDURE — 3079F PR MOST RECENT DIASTOLIC BLOOD PRESSURE 80-89 MM HG: ICD-10-PCS | Mod: HCNC,CPTII,S$GLB, | Performed by: INTERNAL MEDICINE

## 2020-03-06 PROCEDURE — 3079F DIAST BP 80-89 MM HG: CPT | Mod: HCNC,CPTII,S$GLB, | Performed by: INTERNAL MEDICINE

## 2020-03-06 PROCEDURE — 1159F PR MEDICATION LIST DOCUMENTED IN MEDICAL RECORD: ICD-10-PCS | Mod: HCNC,S$GLB,, | Performed by: INTERNAL MEDICINE

## 2020-03-06 PROCEDURE — 96401 CHEMO ANTI-NEOPL SQ/IM: CPT | Mod: HCNC

## 2020-03-06 PROCEDURE — 3077F SYST BP >= 140 MM HG: CPT | Mod: HCNC,CPTII,S$GLB, | Performed by: INTERNAL MEDICINE

## 2020-03-06 PROCEDURE — 1126F AMNT PAIN NOTED NONE PRSNT: CPT | Mod: HCNC,S$GLB,, | Performed by: INTERNAL MEDICINE

## 2020-03-06 PROCEDURE — 1159F MED LIST DOCD IN RCRD: CPT | Mod: HCNC,S$GLB,, | Performed by: INTERNAL MEDICINE

## 2020-03-06 PROCEDURE — 99999 PR PBB SHADOW E&M-EST. PATIENT-LVL IV: ICD-10-PCS | Mod: PBBFAC,HCNC,, | Performed by: INTERNAL MEDICINE

## 2020-03-06 PROCEDURE — 99215 PR OFFICE/OUTPT VISIT, EST, LEVL V, 40-54 MIN: ICD-10-PCS | Mod: 25,HCNC,S$GLB, | Performed by: INTERNAL MEDICINE

## 2020-03-06 PROCEDURE — 99999 PR PBB SHADOW E&M-EST. PATIENT-LVL IV: CPT | Mod: PBBFAC,HCNC,, | Performed by: INTERNAL MEDICINE

## 2020-03-06 PROCEDURE — 1101F PR PT FALLS ASSESS DOC 0-1 FALLS W/OUT INJ PAST YR: ICD-10-PCS | Mod: HCNC,CPTII,S$GLB, | Performed by: INTERNAL MEDICINE

## 2020-03-06 PROCEDURE — 99215 OFFICE O/P EST HI 40 MIN: CPT | Mod: 25,HCNC,S$GLB, | Performed by: INTERNAL MEDICINE

## 2020-03-06 PROCEDURE — 63600175 PHARM REV CODE 636 W HCPCS: Mod: JW,JG,HCNC | Performed by: INTERNAL MEDICINE

## 2020-03-06 PROCEDURE — 1126F PR PAIN SEVERITY QUANTIFIED, NO PAIN PRESENT: ICD-10-PCS | Mod: HCNC,S$GLB,, | Performed by: INTERNAL MEDICINE

## 2020-03-06 PROCEDURE — 3077F PR MOST RECENT SYSTOLIC BLOOD PRESSURE >= 140 MM HG: ICD-10-PCS | Mod: HCNC,CPTII,S$GLB, | Performed by: INTERNAL MEDICINE

## 2020-03-06 RX ORDER — AZACITIDINE 100 MG/1
75 INJECTION, POWDER, LYOPHILIZED, FOR SOLUTION INTRAVENOUS; SUBCUTANEOUS
Status: COMPLETED | OUTPATIENT
Start: 2020-03-06 | End: 2020-03-06

## 2020-03-06 RX ADMIN — AZACITIDINE 115 MG: 100 INJECTION, POWDER, LYOPHILIZED, FOR SOLUTION SUBCUTANEOUS at 10:03

## 2020-03-06 NOTE — DISCHARGE INSTRUCTIONS
[facility]  [address] [address]  [address]  [phone] phone     [phone] fax  Hours of Operation: Monday- Friday 8:00am- 5:00pm  After hours phone  [phone]  Hematology / Oncology Physicians on call      GILBERT Miller Dr., Dr., Dr., Dr., NP Sydney Prescott, NP Tyesha Taylor, NP    Please call with any concerns regarding your appointment today.HOME CARE AFTER CHEMOTHERAPY   Meals   Many patients feel sick and lose their appetites during treatment. Eat small meals several times a day. Choose bland foods with little taste or smell if you have problems with nausea. Be sure to cook all food thoroughly. This kills bacteria and helps you avoid intestinal infection. Soft foods are easier to swallow and digest.   Activity   Exercise keeps you strong and keeps your heart and lungs active. Talk to your doctor about an appropriate exercise program for you.   Skin Care   To prevent a skin infection, bathe or shower once a day. Use a moisturizing soap and wash with warm water. Avoid very hot or cold water. Chemotherapy can make your skin dry . Apply moisturizing lotion to help relieve dry skin. Some drugs used in high doses can cause slight burns to appear (like sunburn). Ask for a special cream to help relieve the burn and protect your skin.   Prevent Mouth Sores   During chemotherapy, many people get mouth sores. Do the following to help prevent mouth sores or to ease discomfort.   Brush your teeth with a soft-bristle toothbrush after every meal.  Don't use dental floss if your platelet count is below 50,000. Your doctor or nurse will tell you if this is the case.  Use an oral swab or special soft toothbrush if your gums bleed during regular brushing.  Use mouthwash as directed. If you can't tolerate commercial mouthwash, use salt and baking soda to clean your mouth. Mix 1 teaspoon of salt and 1  teaspoon of baking soda into a glass of water. Swish and spit.  Call your doctor or return to this facility if you develop any of the following:   Sore throat   White patches in the mouth or throat   Fever of 100.4ºF (38ºC) or higher, or as directed by your healthcare provider  © 2000-2011 [facility], [address] [place] [address]. All rights reserved. This information is not intended as a substitute for professional medical care. Always follow your healthcare professional's   FALL PREVENTION   Falls often occur due to slipping, tripping or losing your balance. Here are ways to reduce your risk of falling again.   Was there anything that caused your fall that can be fixed, removed or replaced?   Make your home safe by keeping walkways clear of objects you may trip over.   Use non-slip pads under rugs.   Do not walk in poorly lit areas.   Do not stand on chairs or wobbly ladders.   Use caution when reaching overhead or looking upward. This position can cause a loss of balance.   Be sure your shoes fit properly, have non-slip bottoms and are in good condition.   Be cautious when going up and down stairs, curbs, and when walking on uneven sidewalks.   If your balance is poor, consider using a cane or walker.   If your fall was related to alcohol use, stop or limit alcohol intake.   If your fall was related to use of sleeping medicines, talk to your doctor about this. You may need to reduce your dosage at bedtime if you awaken during the night to go to the bathroom.   To reduce the need for nighttime bathroom trips:   Avoid drinking fluids for several hours before going to bed   Empty your bladder before going to bed   Men can keep a urinal at the bedside   © 2000-2011 [facility], [address] [place] [address]. All rights reserved. This information is not intended as a substitute for professional medical care. Always follow your healthcare professional's instructions.  Support  "Groups/Classes    Support groups and classes are being offered at the   [facility] and [facility]!!    "Cooking with Cancer" (Nutrition Class):  Second Wednesday of each month   at noon at the Cancer Center.  Metastatic Support Group:  Third Tuesday of each month   at noon at the Cancer Center.  Next Steps Class/Group: Second and fourth Thursday of each month at noon at the Cancer Center.  [facility] (Breast Cancer Support Group): First Tuesday of each month   at 5:30pm at the [facility] location.  [employer]: [facility]: Second and third Tuesday of each month from 7:30am - 2pm.  [facility]: First and fourth Tuesday of each month from 7:30am - 2pm    If you are interested in attending or would like more information please ask our social workers or your nurse!  "

## 2020-03-06 NOTE — ASSESSMENT & PLAN NOTE
Reviewed labs today, noted elevated WBC at 20,000, mild decrease from prior.  Still concerning for possible refractory CMML.  Hemoglobin noted at 7.0 grams/deciliter, patient remains asymptomatic.  No need for blood transfusions.  Status post 4 cycles of treatment with Vidaza.  Overall tolerated well.    Plan to obtain the repeat bone marrow biopsy for evaluation.  Noted persistent nucleated red blood cell in peripheral smear.  No evidence of blast.

## 2020-03-06 NOTE — H&P (VIEW-ONLY)
Subjective:       Patient ID: Sarah Parker is a 80 y.o. female.    Chief Complaint:  Chronic myelomonocytic leukemia    Follow-up   Associated symptoms include fatigue and weakness. Pertinent negatives include no abdominal pain, arthralgias, chest pain, chills, congestion, coughing, diaphoresis, fever, headaches, joint swelling, myalgias, nausea, neck pain, numbness, rash, sore throat or vomiting.      Patient is a 79-year-old female presents for reinstitution  of Vidaza therapy for chronic myelomonocytic leukemia patient who is currently on treatment with azacitidine.  She is here for routine follow-up.      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  Status post 4 cycles.  Overall tolerated well.  Presents today for routine office visit.  Denies shortness of breath, palpitation, chest discomfort, abdominal pain, diarrhea or dysuria.  Also denies hemoptysis, hematemesis, hematochezia, melena.  No urinary symptoms per patient.      Past Medical History:   Diagnosis Date    Acid reflux     Anxiety     Back pain     Bronchitis, chronic obstructive w acute bronchitis 7/29/2016    Cancer     [facility] arms, face- Dr. Lata Tejada    Cataract     2+NS    Degenerative disc disease     Depression     Dry mouth     Hernia, hiatal 11/18/2013    Hypertension     Hypothyroid     Macrocytic anemia 5/3/2016    Macular degeneration     Migraines     Mixed anxiety and depressive disorder     Multiple fractures of ribs of right side     Osteoporosis     Other hyperlipidemia 10/11/2019    Pneumonia     Pneumonia due to other staphylococcus     Rheumatoid arthritis     Rheumatoid arthritis(714.0)     Rheumatoid arthritis(714.0)     Remicade, MTX.     Family History   Problem Relation Age of Onset    Heart disease Mother     Hyperlipidemia Mother     Hypertension Mother     Osteoarthritis Mother     Cataracts Mother     Hypertension Father     Osteoarthritis Father     Heart disease  Father     Asthma Sister     Chronic back pain Sister     Hypertension Sister     Osteoarthritis Sister     Thyroid disease Sister     Asthma Brother     Cancer Brother     Chronic back pain Brother     Diabetes Mellitus Brother     Hypertension Brother     Osteoarthritis Brother     Thyroid disease Brother     Cancer Maternal Grandfather     Fibromyalgia Daughter     Heart disease Maternal Grandmother     Colon cancer Neg Hx     Diabetes Neg Hx      Social History     Socioeconomic History    Marital status:      Spouse name: Not on file    Number of children: Not on file    Years of education: Not on file    Highest education level: Not on file   Occupational History    Occupation: retired   Social Needs    Financial resource strain: Not on file    Food insecurity:     Worry: Not on file     Inability: Not on file    Transportation needs:     Medical: Not on file     Non-medical: Not on file   Tobacco Use    Smoking status: Former Smoker     Packs/day: 0.25     Years: 2.00     Pack years: 0.50     Last attempt to quit: 1965     Years since quittin.3    Smokeless tobacco: Never Used   Substance and Sexual Activity    Alcohol use: No    Drug use: No    Sexual activity: Never     Partners: Male   Lifestyle    Physical activity:     Days per week: Not on file     Minutes per session: Not on file    Stress: Not at all   Relationships    Social connections:     Talks on phone: Not on file     Gets together: Not on file     Attends [employer] service: Not on file     Active member of club or organization: Not on file     Attends meetings of clubs or organizations: Not on file     Relationship status: Not on file   Other Topics Concern    Not on file   Social History Narrative    Patient is aretired and live with .     Past Surgical History:   Procedure Laterality Date    APPENDECTOMY  1985    BREAST BIOPSY      CATARACT EXTRACTION Bilateral 6/11/15    Dr. Booth     CHOLECYSTECTOMY  2013    cryoablasion kidney Left 09/27/2016    feet Bilateral     rheumatoid    FRACTURE SURGERY Right     tibia    HERNIA REPAIR      HYSTERECTOMY  1970    partial    JOINT REPLACEMENT      bilateral knees (2008), hands, wrists, knuckles, toes    LAPAROSCOPIC NISSEN FUNDOPLICATION      TRANSFORAMINAL EPIDURAL INJECTION OF STEROID Left 6/25/2019    Procedure: Left L5/S1 TF AYAAN with local;  Surgeon: Rowdy Felix MD;  Location: [facility];  Service: Pain Management;  Laterality: Left;       Labs:  Lab Results   Component Value Date    WBC 20.19 (H) 03/06/2020    HGB 7.0 (L) 03/06/2020    HCT 23.3 (L) 03/06/2020     (H) 03/06/2020    PLT 93 (L) 03/06/2020     BMP  Lab Results   Component Value Date     (L) 03/06/2020    K 4.8 03/06/2020     03/06/2020    CO2 26 03/06/2020    BUN 20 03/06/2020    CREATININE 1.0 03/06/2020    CALCIUM 9.2 03/06/2020    ANIONGAP 6 (L) 03/06/2020    ESTGFRAFRICA >60 03/06/2020    EGFRNONAA 54 (A) 03/06/2020     Lab Results   Component Value Date    ALT 10 03/06/2020    AST 21 03/06/2020    ALKPHOS 139 (H) 03/06/2020    BILITOT 0.4 03/06/2020       Lab Results   Component Value Date    IRON 93 01/17/2020    TIBC 425 01/17/2020    FERRITIN 276 01/17/2020     Lab Results   Component Value Date    SUJUYGQG00 1918 (H) 08/25/2019     Lab Results   Component Value Date    FOLATE 12.0 08/25/2019     Lab Results   Component Value Date    TSH 5.174 (H) 09/03/2019         Review of Systems   Constitutional: Positive for activity change and fatigue. Negative for chills, diaphoresis and fever.   HENT: Negative for congestion, dental problem, drooling, ear discharge, ear pain, facial swelling, hearing loss, mouth sores, nosebleeds, postnasal drip, rhinorrhea, sinus pressure, sneezing, sore throat, tinnitus, trouble swallowing and voice change.    Eyes: Negative for photophobia, pain, discharge, redness, itching and visual disturbance.   Respiratory:  Negative for cough, choking, chest tightness, shortness of breath, wheezing and stridor.    Cardiovascular: Negative for chest pain, palpitations and leg swelling.   Gastrointestinal: Negative for abdominal distention, abdominal pain, anal bleeding, blood in stool, constipation, diarrhea, nausea, rectal pain and vomiting.   Endocrine: Negative for cold intolerance, heat intolerance, polydipsia, polyphagia and polyuria.   Genitourinary: Negative for decreased urine volume, difficulty urinating, dyspareunia, dysuria, enuresis, flank pain, frequency, genital sores, hematuria, menstrual problem, pelvic pain, urgency, vaginal bleeding, vaginal discharge and vaginal pain.   Musculoskeletal: Negative for arthralgias, back pain, gait problem, joint swelling, myalgias, neck pain and neck stiffness.   Skin: Negative for color change, pallor and rash.   Allergic/Immunologic: Negative for environmental allergies, food allergies and immunocompromised state.   Neurological: Positive for weakness. Negative for dizziness, tremors, seizures, syncope, facial asymmetry, speech difficulty, light-headedness, numbness and headaches.   Hematological: Negative for adenopathy. Does not bruise/bleed easily.   Psychiatric/Behavioral: Positive for dysphoric mood. Negative for agitation, behavioral problems, confusion, decreased concentration, hallucinations, self-injury, sleep disturbance and suicidal ideas. The patient is nervous/anxious. The patient is not hyperactive.        Objective:      Physical Exam   Constitutional: She is oriented to person, place, and time. She appears cachectic.   HENT:   Head: Normocephalic and atraumatic.   Right Ear: External ear normal.   Left Ear: External ear normal.   Nose: Nose normal. Right sinus exhibits no maxillary sinus tenderness and no frontal sinus tenderness. Left sinus exhibits no maxillary sinus tenderness and no frontal sinus tenderness.   Mouth/Throat: Oropharynx is clear and moist. No  oropharyngeal exudate.   Eyes: Pupils are equal, round, and reactive to light. Conjunctivae, EOM and lids are normal. Right eye exhibits no discharge. Left eye exhibits no discharge. Right conjunctiva is not injected. Right conjunctiva has no hemorrhage. Left conjunctiva is not injected. Left conjunctiva has no hemorrhage. No scleral icterus.   Neck: Normal range of motion. Neck supple. No JVD present. No tracheal deviation present. No thyromegaly present.   Cardiovascular: Normal rate and regular rhythm.   Pulmonary/Chest: Effort normal. No stridor. No respiratory distress. She exhibits no tenderness.   Abdominal: Soft. She exhibits no distension and no mass. There is no splenomegaly or hepatomegaly. There is no tenderness. There is no rebound.   Musculoskeletal: Normal range of motion. She exhibits no edema or tenderness.   Lymphadenopathy:     She has no cervical adenopathy.     She has no axillary adenopathy.        Right: No supraclavicular adenopathy present.        Left: No supraclavicular adenopathy present.   Neurological: She is alert and oriented to person, place, and time. No cranial nerve deficit. Coordination normal.   Skin: Skin is dry. No rash noted. She is not diaphoretic. No erythema.   Psychiatric: She has a normal mood and affect. Her behavior is normal. Judgment and thought content normal.   Vitals reviewed.          Assessment:      1. Chronic myelomonocytic leukemia not having achieved remission    2. Acquired hypothyroidism    3. Rheumatoid arthritis involving right shoulder with positive rheumatoid factor           Plan:     Chronic myelomonocytic leukemia not having achieved remission  Reviewed labs today, noted elevated WBC at 20,000, mild decrease from prior.  Still concerning for possible refractory CMML.  Hemoglobin noted at 7.0 grams/deciliter, patient remains asymptomatic.  No need for blood transfusions.  Status post 4 cycles of treatment with Vidaza.  Overall tolerated well.    Plan  to obtain the repeat bone marrow biopsy for evaluation.  Noted persistent nucleated red blood cell in peripheral smear.  No evidence of blast.    Acquired hypothyroidism  Management per PCP.    Rheumatoid arthritis  Follows with rheumatologist.      Denton Knox MD

## 2020-03-06 NOTE — PROGRESS NOTES
Subjective:       Patient ID: Sarah Parker is a 80 y.o. female.    Chief Complaint:  Chronic myelomonocytic leukemia    Follow-up   Associated symptoms include fatigue and weakness. Pertinent negatives include no abdominal pain, arthralgias, chest pain, chills, congestion, coughing, diaphoresis, fever, headaches, joint swelling, myalgias, nausea, neck pain, numbness, rash, sore throat or vomiting.      Patient is a 79-year-old female presents for reinstitution  of Vidaza therapy for chronic myelomonocytic leukemia patient who is currently on treatment with azacitidine.  She is here for routine follow-up.      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  Status post 4 cycles.  Overall tolerated well.  Presents today for routine office visit.  Denies shortness of breath, palpitation, chest discomfort, abdominal pain, diarrhea or dysuria.  Also denies hemoptysis, hematemesis, hematochezia, melena.  No urinary symptoms per patient.      Past Medical History:   Diagnosis Date    Acid reflux     Anxiety     Back pain     Bronchitis, chronic obstructive w acute bronchitis 7/29/2016    Cancer     [facility] arms, face- Dr. Lata Tejada    Cataract     2+NS    Degenerative disc disease     Depression     Dry mouth     Hernia, hiatal 11/18/2013    Hypertension     Hypothyroid     Macrocytic anemia 5/3/2016    Macular degeneration     Migraines     Mixed anxiety and depressive disorder     Multiple fractures of ribs of right side     Osteoporosis     Other hyperlipidemia 10/11/2019    Pneumonia     Pneumonia due to other staphylococcus     Rheumatoid arthritis     Rheumatoid arthritis(714.0)     Rheumatoid arthritis(714.0)     Remicade, MTX.     Family History   Problem Relation Age of Onset    Heart disease Mother     Hyperlipidemia Mother     Hypertension Mother     Osteoarthritis Mother     Cataracts Mother     Hypertension Father     Osteoarthritis Father     Heart disease  Father     Asthma Sister     Chronic back pain Sister     Hypertension Sister     Osteoarthritis Sister     Thyroid disease Sister     Asthma Brother     Cancer Brother     Chronic back pain Brother     Diabetes Mellitus Brother     Hypertension Brother     Osteoarthritis Brother     Thyroid disease Brother     Cancer Maternal Grandfather     Fibromyalgia Daughter     Heart disease Maternal Grandmother     Colon cancer Neg Hx     Diabetes Neg Hx      Social History     Socioeconomic History    Marital status:      Spouse name: Not on file    Number of children: Not on file    Years of education: Not on file    Highest education level: Not on file   Occupational History    Occupation: retired   Social Needs    Financial resource strain: Not on file    Food insecurity:     Worry: Not on file     Inability: Not on file    Transportation needs:     Medical: Not on file     Non-medical: Not on file   Tobacco Use    Smoking status: Former Smoker     Packs/day: 0.25     Years: 2.00     Pack years: 0.50     Last attempt to quit: 1965     Years since quittin.3    Smokeless tobacco: Never Used   Substance and Sexual Activity    Alcohol use: No    Drug use: No    Sexual activity: Never     Partners: Male   Lifestyle    Physical activity:     Days per week: Not on file     Minutes per session: Not on file    Stress: Not at all   Relationships    Social connections:     Talks on phone: Not on file     Gets together: Not on file     Attends Yazidism service: Not on file     Active member of club or organization: Not on file     Attends meetings of clubs or organizations: Not on file     Relationship status: Not on file   Other Topics Concern    Not on file   Social History Narrative    Patient is aretired and live with .     Past Surgical History:   Procedure Laterality Date    APPENDECTOMY  1985    BREAST BIOPSY      CATARACT EXTRACTION Bilateral 6/11/15    Dr. Booth     CHOLECYSTECTOMY  2013    cryoablasion kidney Left 09/27/2016    feet Bilateral     rheumatoid    FRACTURE SURGERY Right     tibia    HERNIA REPAIR      HYSTERECTOMY  1970    partial    JOINT REPLACEMENT      bilateral knees (2008), hands, wrists, knuckles, toes    LAPAROSCOPIC NISSEN FUNDOPLICATION      TRANSFORAMINAL EPIDURAL INJECTION OF STEROID Left 6/25/2019    Procedure: Left L5/S1 TF AYAAN with local;  Surgeon: Rowdy Felix MD;  Location: [facility];  Service: Pain Management;  Laterality: Left;       Labs:  Lab Results   Component Value Date    WBC 20.19 (H) 03/06/2020    HGB 7.0 (L) 03/06/2020    HCT 23.3 (L) 03/06/2020     (H) 03/06/2020    PLT 93 (L) 03/06/2020     BMP  Lab Results   Component Value Date     (L) 03/06/2020    K 4.8 03/06/2020     03/06/2020    CO2 26 03/06/2020    BUN 20 03/06/2020    CREATININE 1.0 03/06/2020    CALCIUM 9.2 03/06/2020    ANIONGAP 6 (L) 03/06/2020    ESTGFRAFRICA >60 03/06/2020    EGFRNONAA 54 (A) 03/06/2020     Lab Results   Component Value Date    ALT 10 03/06/2020    AST 21 03/06/2020    ALKPHOS 139 (H) 03/06/2020    BILITOT 0.4 03/06/2020       Lab Results   Component Value Date    IRON 93 01/17/2020    TIBC 425 01/17/2020    FERRITIN 276 01/17/2020     Lab Results   Component Value Date    [id number] 1918 (H) 08/25/2019     Lab Results   Component Value Date    FOLATE 12.0 08/25/2019     Lab Results   Component Value Date    TSH 5.174 (H) 09/03/2019         Review of Systems   Constitutional: Positive for activity change and fatigue. Negative for chills, diaphoresis and fever.   HENT: Negative for congestion, dental problem, drooling, ear discharge, ear pain, facial swelling, hearing loss, mouth sores, nosebleeds, postnasal drip, rhinorrhea, sinus pressure, sneezing, sore throat, tinnitus, trouble swallowing and voice change.    Eyes: Negative for photophobia, pain, discharge, redness, itching and visual disturbance.   Respiratory:  Negative for cough, choking, chest tightness, shortness of breath, wheezing and stridor.    Cardiovascular: Negative for chest pain, palpitations and leg swelling.   Gastrointestinal: Negative for abdominal distention, abdominal pain, anal bleeding, blood in stool, constipation, diarrhea, nausea, rectal pain and vomiting.   Endocrine: Negative for cold intolerance, heat intolerance, polydipsia, polyphagia and polyuria.   Genitourinary: Negative for decreased urine volume, difficulty urinating, dyspareunia, dysuria, enuresis, flank pain, frequency, genital sores, hematuria, menstrual problem, pelvic pain, urgency, vaginal bleeding, vaginal discharge and vaginal pain.   Musculoskeletal: Negative for arthralgias, back pain, gait problem, joint swelling, myalgias, neck pain and neck stiffness.   Skin: Negative for color change, pallor and rash.   Allergic/Immunologic: Negative for environmental allergies, food allergies and immunocompromised state.   Neurological: Positive for weakness. Negative for dizziness, tremors, seizures, syncope, facial asymmetry, speech difficulty, light-headedness, numbness and headaches.   Hematological: Negative for adenopathy. Does not bruise/bleed easily.   Psychiatric/Behavioral: Positive for dysphoric mood. Negative for agitation, behavioral problems, confusion, decreased concentration, hallucinations, self-injury, sleep disturbance and suicidal ideas. The patient is nervous/anxious. The patient is not hyperactive.        Objective:      Physical Exam   Constitutional: She is oriented to person, place, and time. She appears cachectic.   HENT:   Head: Normocephalic and atraumatic.   Right Ear: External ear normal.   Left Ear: External ear normal.   Nose: Nose normal. Right sinus exhibits no maxillary sinus tenderness and no frontal sinus tenderness. Left sinus exhibits no maxillary sinus tenderness and no frontal sinus tenderness.   Mouth/Throat: Oropharynx is clear and moist. No  oropharyngeal exudate.   Eyes: Pupils are equal, round, and reactive to light. Conjunctivae, EOM and lids are normal. Right eye exhibits no discharge. Left eye exhibits no discharge. Right conjunctiva is not injected. Right conjunctiva has no hemorrhage. Left conjunctiva is not injected. Left conjunctiva has no hemorrhage. No scleral icterus.   Neck: Normal range of motion. Neck supple. No JVD present. No tracheal deviation present. No thyromegaly present.   Cardiovascular: Normal rate and regular rhythm.   Pulmonary/Chest: Effort normal. No stridor. No respiratory distress. She exhibits no tenderness.   Abdominal: Soft. She exhibits no distension and no mass. There is no splenomegaly or hepatomegaly. There is no tenderness. There is no rebound.   Musculoskeletal: Normal range of motion. She exhibits no edema or tenderness.   Lymphadenopathy:     She has no cervical adenopathy.     She has no axillary adenopathy.        Right: No supraclavicular adenopathy present.        Left: No supraclavicular adenopathy present.   Neurological: She is alert and oriented to person, place, and time. No cranial nerve deficit. Coordination normal.   Skin: Skin is dry. No rash noted. She is not diaphoretic. No erythema.   Psychiatric: She has a normal mood and affect. Her behavior is normal. Judgment and thought content normal.   Vitals reviewed.          Assessment:      1. Chronic myelomonocytic leukemia not having achieved remission    2. Acquired hypothyroidism    3. Rheumatoid arthritis involving right shoulder with positive rheumatoid factor           Plan:     Chronic myelomonocytic leukemia not having achieved remission  Reviewed labs today, noted elevated WBC at 20,000, mild decrease from prior.  Still concerning for possible refractory CMML.  Hemoglobin noted at 7.0 grams/deciliter, patient remains asymptomatic.  No need for blood transfusions.  Status post 4 cycles of treatment with Vidaza.  Overall tolerated well.    Plan  to obtain the repeat bone marrow biopsy for evaluation.  Noted persistent nucleated red blood cell in peripheral smear.  No evidence of blast.    Acquired hypothyroidism  Management per PCP.    Rheumatoid arthritis  Follows with rheumatologist.      Denton Knox MD

## 2020-03-09 ENCOUNTER — INFUSION (OUTPATIENT)
Dept: INFUSION THERAPY | Facility: HOSPITAL | Age: 80
End: 2020-03-09
Attending: INTERNAL MEDICINE
Payer: MEDICARE

## 2020-03-09 VITALS
WEIGHT: 113.56 LBS | RESPIRATION RATE: 16 BRPM | DIASTOLIC BLOOD PRESSURE: 84 MMHG | SYSTOLIC BLOOD PRESSURE: 138 MMHG | TEMPERATURE: 98 F | HEART RATE: 89 BPM | BODY MASS INDEX: 20.9 KG/M2 | OXYGEN SATURATION: 97 % | HEIGHT: 62 IN

## 2020-03-09 DIAGNOSIS — C93.10 CHRONIC MYELOMONOCYTIC LEUKEMIA NOT HAVING ACHIEVED REMISSION: Primary | ICD-10-CM

## 2020-03-09 PROCEDURE — 63600175 PHARM REV CODE 636 W HCPCS: Mod: JG,HCNC | Performed by: INTERNAL MEDICINE

## 2020-03-09 PROCEDURE — 96401 CHEMO ANTI-NEOPL SQ/IM: CPT | Mod: HCNC

## 2020-03-09 RX ORDER — AZACITIDINE 100 MG/1
75 INJECTION, POWDER, LYOPHILIZED, FOR SOLUTION INTRAVENOUS; SUBCUTANEOUS
Status: COMPLETED | OUTPATIENT
Start: 2020-03-09 | End: 2020-03-09

## 2020-03-09 RX ADMIN — AZACITIDINE 115 MG: 100 INJECTION, POWDER, LYOPHILIZED, FOR SOLUTION SUBCUTANEOUS at 10:03

## 2020-03-10 ENCOUNTER — TELEPHONE (OUTPATIENT)
Dept: RADIOLOGY | Facility: HOSPITAL | Age: 80
End: 2020-03-10

## 2020-03-10 ENCOUNTER — TELEPHONE (OUTPATIENT)
Dept: HEMATOLOGY/ONCOLOGY | Facility: CLINIC | Age: 80
End: 2020-03-10

## 2020-03-10 DIAGNOSIS — D61.810 PANCYTOPENIA DUE TO ANTINEOPLASTIC CHEMOTHERAPY: Primary | ICD-10-CM

## 2020-03-10 DIAGNOSIS — D64.9 ANEMIA, UNSPECIFIED TYPE: ICD-10-CM

## 2020-03-10 DIAGNOSIS — D72.829 LEUKOCYTOSIS, UNSPECIFIED TYPE: ICD-10-CM

## 2020-03-10 DIAGNOSIS — T45.1X5A PANCYTOPENIA DUE TO ANTINEOPLASTIC CHEMOTHERAPY: Primary | ICD-10-CM

## 2020-03-10 NOTE — TELEPHONE ENCOUNTER
Returned patients call back, patient wanted to know if CT is still scheduled for jose. She states no one called her in ref to, the day prior, as mentioned that they would. Informed scheduling dates are still active on jose. w labs prior at the hospital.. Pt verbalized understanding.

## 2020-03-10 NOTE — TELEPHONE ENCOUNTER
Pre procedure call complete, pt to arrive at hospital at 730, npo p mn x sips of water with meds. Must have ride.  All questions answered, pt verbalized understanding of instructions.

## 2020-03-11 ENCOUNTER — HOSPITAL ENCOUNTER (OUTPATIENT)
Dept: RADIOLOGY | Facility: HOSPITAL | Age: 80
Discharge: HOME | End: 2020-03-11
Attending: INTERNAL MEDICINE
Payer: MEDICARE

## 2020-03-11 VITALS
HEIGHT: 62 IN | DIASTOLIC BLOOD PRESSURE: 79 MMHG | RESPIRATION RATE: 17 BRPM | HEART RATE: 85 BPM | WEIGHT: 114 LBS | BODY MASS INDEX: 20.98 KG/M2 | TEMPERATURE: 98 F | SYSTOLIC BLOOD PRESSURE: 135 MMHG | OXYGEN SATURATION: 100 %

## 2020-03-11 DIAGNOSIS — C93.10 CHRONIC MYELOMONOCYTIC LEUKEMIA NOT HAVING ACHIEVED REMISSION: ICD-10-CM

## 2020-03-11 PROCEDURE — 88341 IMHCHEM/IMCYTCHM EA ADD ANTB: CPT | Mod: 59 | Performed by: PATHOLOGY

## 2020-03-11 PROCEDURE — 88313 PR  SPECIAL STAINS,GROUP II: ICD-10-PCS | Mod: 26,HCNC,, | Performed by: PATHOLOGY

## 2020-03-11 PROCEDURE — 88305 TISSUE EXAM BY PATHOLOGIST: ICD-10-PCS | Mod: 26,HCNC,, | Performed by: PATHOLOGY

## 2020-03-11 PROCEDURE — 88341 IMHCHEM/IMCYTCHM EA ADD ANTB: CPT | Mod: 26,HCNC,, | Performed by: PATHOLOGY

## 2020-03-11 PROCEDURE — 88189 FLOWCYTOMETRY/READ 16 & >: CPT | Mod: HCNC,,, | Performed by: PATHOLOGY

## 2020-03-11 PROCEDURE — 77012 CT SCAN FOR NEEDLE BIOPSY: CPT | Mod: TC,HCNC

## 2020-03-11 PROCEDURE — 63600175 PHARM REV CODE 636 W HCPCS: Mod: HCNC | Performed by: RADIOLOGY

## 2020-03-11 PROCEDURE — 88364 INSITU HYBRIDIZATION (FISH): CPT | Mod: HCNC | Performed by: PATHOLOGY

## 2020-03-11 PROCEDURE — 88341 PR IHC OR ICC EACH ADD'L SINGLE ANTIBODY  STAINPR: ICD-10-PCS | Mod: 26,HCNC,, | Performed by: PATHOLOGY

## 2020-03-11 PROCEDURE — 88185 FLOWCYTOMETRY/TC ADD-ON: CPT | Mod: HCNC | Performed by: PATHOLOGY

## 2020-03-11 PROCEDURE — 88305 TISSUE EXAM BY PATHOLOGIST: CPT | Mod: 26,HCNC,, | Performed by: PATHOLOGY

## 2020-03-11 PROCEDURE — 88311 DECALCIFY TISSUE: CPT | Mod: 26,HCNC,, | Performed by: PATHOLOGY

## 2020-03-11 PROCEDURE — 88365 INSITU HYBRIDIZATION (FISH): CPT | Mod: 26,HCNC,, | Performed by: PATHOLOGY

## 2020-03-11 PROCEDURE — 88342 IMHCHEM/IMCYTCHM 1ST ANTB: CPT | Mod: 26,59,HCNC, | Performed by: PATHOLOGY

## 2020-03-11 PROCEDURE — 88237 TISSUE CULTURE BONE MARROW: CPT | Mod: HCNC

## 2020-03-11 PROCEDURE — 88305 TISSUE EXAM BY PATHOLOGIST: CPT | Mod: HCNC | Performed by: PATHOLOGY

## 2020-03-11 PROCEDURE — 88311 PR  DECALCIFY TISSUE: ICD-10-PCS | Mod: 26,HCNC,, | Performed by: PATHOLOGY

## 2020-03-11 PROCEDURE — 88184 FLOWCYTOMETRY/ TC 1 MARKER: CPT | Mod: HCNC | Performed by: PATHOLOGY

## 2020-03-11 PROCEDURE — 88313 SPECIAL STAINS GROUP 2: CPT | Mod: HCNC | Performed by: PATHOLOGY

## 2020-03-11 PROCEDURE — 88342 CHG IMMUNOCYTOCHEMISTRY: ICD-10-PCS | Mod: 26,59,HCNC, | Performed by: PATHOLOGY

## 2020-03-11 PROCEDURE — 88189 PR  FLOWCYTOMETRY/READ, 16 & > MARKERS: ICD-10-PCS | Mod: HCNC,,, | Performed by: PATHOLOGY

## 2020-03-11 PROCEDURE — 88342 IMHCHEM/IMCYTCHM 1ST ANTB: CPT | Mod: 91 | Performed by: PATHOLOGY

## 2020-03-11 PROCEDURE — 85097 PR  BONE MARROW,SMEAR INTERPRETATION: ICD-10-PCS | Mod: HCNC,,, | Performed by: PATHOLOGY

## 2020-03-11 PROCEDURE — 88341 IMHCHEM/IMCYTCHM EA ADD ANTB: CPT | Mod: HCNC | Performed by: PATHOLOGY

## 2020-03-11 PROCEDURE — 88313 SPECIAL STAINS GROUP 2: CPT | Mod: 26,HCNC,, | Performed by: PATHOLOGY

## 2020-03-11 PROCEDURE — 85097 BONE MARROW INTERPRETATION: CPT | Mod: HCNC,,, | Performed by: PATHOLOGY

## 2020-03-11 PROCEDURE — 88365 PR  TISSUE HYBRIDIZATION: ICD-10-PCS | Mod: 26,HCNC,, | Performed by: PATHOLOGY

## 2020-03-11 PROCEDURE — 88342 IMHCHEM/IMCYTCHM 1ST ANTB: CPT | Mod: HCNC | Performed by: PATHOLOGY

## 2020-03-11 PROCEDURE — 88365 INSITU HYBRIDIZATION (FISH): CPT | Mod: HCNC | Performed by: PATHOLOGY

## 2020-03-11 RX ORDER — FENTANYL CITRATE 50 UG/ML
INJECTION, SOLUTION INTRAMUSCULAR; INTRAVENOUS CODE/TRAUMA/SEDATION MEDICATION
Status: COMPLETED | OUTPATIENT
Start: 2020-03-11 | End: 2020-03-11

## 2020-03-11 RX ORDER — MIDAZOLAM HYDROCHLORIDE 1 MG/ML
INJECTION INTRAMUSCULAR; INTRAVENOUS CODE/TRAUMA/SEDATION MEDICATION
Status: COMPLETED | OUTPATIENT
Start: 2020-03-11 | End: 2020-03-11

## 2020-03-11 RX ADMIN — FENTANYL CITRATE 25 MCG: 50 INJECTION, SOLUTION INTRAMUSCULAR; INTRAVENOUS at 09:03

## 2020-03-11 RX ADMIN — MIDAZOLAM HYDROCHLORIDE 0.5 MG: 1 INJECTION, SOLUTION INTRAMUSCULAR; INTRAVENOUS at 09:03

## 2020-03-11 NOTE — PLAN OF CARE
Pt stable, dc instructions reviewed with pt and daughter, verbalized understanding, copy given with AVS

## 2020-03-11 NOTE — DISCHARGE SUMMARY
Pre Op Diagnosis: CML     Post Op Diagnosis: same     Procedure:  Bone marrow biopsy     Procedure performed by: Lilia JUARES, Barbara BOSS     Written Informed Consent Obtained: Yes     Specimen Removed:  yes     Estimated Blood Loss:  minimal     Findings: Local anesthesia and moderate sedation were used.     The patient tolerated the procedure well and there were no complications.      Sterile technique was performed in the left iliac, lidocaine was used as a local anesthetic.  Multiple samples taken from the left iliac bone.  Pt tolerated the procedure well without immediate complications.  Please see radiologist report for details. F/u with PCP and/or ordering physician.

## 2020-03-11 NOTE — SEDATION DOCUMENTATION
Patient brought to CT, placed on CT table prone with arms above head.  CM in place.  VSS.  Patient verbalizes understanding of procedure

## 2020-03-11 NOTE — SEDATION DOCUMENTATION
Procedure complete.  Patient tolerated well.  VSS.  Pressure held to puncture site.  Band aid to puncture site - C/D/I

## 2020-03-11 NOTE — DISCHARGE INSTRUCTIONS
Recovery After Procedural Sedation (Adult)  You have been given medicine by vein to make you sleep during your surgery. This may have included both a pain medicine and sleeping medicine. Most of the effects have worn off. But you may still have some drowsiness for the next 6 to 8 hours.  Home care  Follow these guidelines when you get home:  · For the next 8 hours, you should be watched by a responsible adult. This person should make sure your condition is not getting worse.  · Don't drink any alcohol for the next 24 hours.  · Don't drive, operate dangerous machinery, or make important business or personal decisions during the next 24 hours.  Note: Your healthcare provider may tell you not to take any medicine by mouth for pain or sleep in the next 4 hours. These medicines may react with the medicines you were given in the hospital. This could cause a much stronger response than usual.  Follow-up care  Follow up with your healthcare provider if you are not alert and back to your usual level of activity within 12 hours.  When to seek medical advice  Call your healthcare provider right away if any of these occur:  · Drowsiness gets worse  · Weakness or dizziness gets worse  · Repeated vomiting  · You can't be awakened   Date Last Reviewed: 10/18/2016  © 8479-3795 [employer]. [address] [place] [address], [place] [address]. All rights reserved. This information is not intended as a substitute for professional medical care. Always follow your healthcare professional's instructions.  Bone Marrow Aspiration and Biopsy  Does this test have other names?  Bone marrow exam  What is this test?  This is a two-part test that looks at the blood cells in a sample of bone marrow, the spongy tissue within certain bones. This test may help your healthcare provider diagnose or monitor a blood disease or health condition affecting your marrow.  Your bone marrow has a liquid part and a solid part. Aspiration uses a needle to  remove a sample of the liquid part of bone marrow. Biopsy uses a larger needle to remove a small amount of bone with its marrow.  Part of the job of bone marrow is to make blood cells. This test can find out how well your bone marrow is working. This test is also done to find some types of cancer.  Why do I need this test?  You might have this test if your healthcare provider wants to find out the health of your bone marrow or to check on how well your marrow is making blood cells.  You may have an aspiration to check for:  · The health of your bone marrow for a transplant  · Acute leukemia  · Multiple myeloma  In some cases, bone marrow aspiration is used to confirm chromosome disorders in newborns.  You may have an aspiration followed by a biopsy if you could have:  · Bacterial, fungal, or parasitic infection  · Unexplained anemia, leucopenia, or thrombocytopenia  · Metastatic cancer or many other diseases  What other tests might I have along with this test?  Your healthcare provider may also order these tests:  · Complete blood count, or CBC  · Reticulocyte count to find out your red blood cell survival rate  What do my test results mean?  Many things may affect your lab test results. These include the method each lab uses to do the test. Even if your test results are different from the normal value, you may not have a problem. To learn what the results mean for you, talk with your healthcare provider.  The lab will look at different aspects of your bone marrow to help find certain diseases or conditions. These aspects include:  · Type and number of blood cells  · Any abnormalities in the size, shape, or look of cells  · Level of iron in the bone marrow  · Abnormal amount of young white blood cells, called blasts  · Any chromosomal abnormalities  Depending on what is seen, your results may mean you have an infection, a blood disease, leukemia, or cancer that has spread to the bone marrow from another site.  Your  healthcare provider will take your results and combine this information with information from your physical exam, health history, and other types of tests to make a diagnosis.   If your results are negative, your provider may order other tests to diagnose your condition.   How is this test done?  These tests require a sample of bone marrow. A number of sites on your body can be used for marrow aspiration, but the hip bone is a common spot. You will likely lie on your side or stomach on an exam table. Your healthcare provider will numb the area of the test. You may feel a slight prick from the needle that the provider uses to give the numbing agent.  Does this test pose any risks?  It's not possible to numb the bone, so you may feel slight pain during the procedure. But you shouldn't feel any pain afterward. Risks from a bone marrow test are rare, but you could have bleeding or an infection.  What might affect my test results?  Other factors aren't likely to affect your results.  How do I get ready for this test?  Tell your healthcare provider if you take aspirin or have any allergies. Also tell your provider if you are pregnant, take any blood-thinner medicines, or have a history of bleeding problems.  Be sure your provider knows about all other medicines, herbs, vitamins, and supplements you are taking. This includes medicines that don't need a prescription and any illicit drugs you may use.     © 6451-4235 [employer]. [address] [place] [address], [place] [address]. All rights reserved. This information is not intended as a substitute for professional medical care. Always follow your healthcare professional's instructions.

## 2020-03-18 ENCOUNTER — TELEPHONE (OUTPATIENT)
Dept: HEMATOLOGY/ONCOLOGY | Facility: CLINIC | Age: 80
End: 2020-03-18

## 2020-03-18 LAB
CHROM BANDING METHOD: NORMAL
CHROMOSOME ANALYSIS BM ADDITIONAL INFORMATION: NORMAL
CHROMOSOME ANALYSIS BM RELEASED BY: NORMAL
CHROMOSOME ANALYSIS BM RESULT SUMMARY: NORMAL
CLINICAL CYTOGENETICIST REVIEW: NORMAL
KARYOTYP MAR: NORMAL
REASON FOR REFERRAL (NARRATIVE): NORMAL
REF LAB TEST METHOD: NORMAL
SPECIMEN SOURCE: NORMAL
SPECIMEN: NORMAL

## 2020-03-18 NOTE — TELEPHONE ENCOUNTER
----- Message from Butch Otto sent at 3/18/2020  9:01 AM CDT -----  Contact: Self- [phone]  Pt would like a call from nurse in regards to appt on 3/20/20. Please call back at [phone].     Thank You,   Butch Otto

## 2020-03-23 ENCOUNTER — TELEPHONE (OUTPATIENT)
Dept: HEMATOLOGY/ONCOLOGY | Facility: CLINIC | Age: 80
End: 2020-03-23

## 2020-03-23 LAB
COMMENT: NORMAL
FINAL PATHOLOGIC DIAGNOSIS: NORMAL
GROSS: NORMAL
Lab: NORMAL
MICROSCOPIC EXAM: NORMAL
SUPPLEMENTAL DIAGNOSIS: NORMAL

## 2020-03-23 NOTE — TELEPHONE ENCOUNTER
----- Message from Tessy Banda sent at 3/23/2020  8:22 AM CDT -----  Contact: pt  The pt request a return call no additional info given and can be reached at [phone]///thxMW

## 2020-03-23 NOTE — TELEPHONE ENCOUNTER
Returned pt's call back.. She was calling for bmbx results since her appt was cancled.. And will call to reschedule later .. Sent to  To please advise results via ph per pt req.

## 2020-03-24 ENCOUNTER — TELEPHONE (OUTPATIENT)
Dept: HEMATOLOGY/ONCOLOGY | Facility: CLINIC | Age: 80
End: 2020-03-24

## 2020-03-24 ENCOUNTER — LAB VISIT (OUTPATIENT)
Dept: LAB | Facility: HOSPITAL | Age: 80
End: 2020-03-24
Attending: INTERNAL MEDICINE
Payer: MEDICARE

## 2020-03-24 DIAGNOSIS — C93.10 CHRONIC MYELOMONOCYTIC LEUKEMIA NOT HAVING ACHIEVED REMISSION: ICD-10-CM

## 2020-03-24 DIAGNOSIS — D64.9 ANEMIA, UNSPECIFIED TYPE: Primary | ICD-10-CM

## 2020-03-24 LAB
ALBUMIN SERPL BCP-MCNC: 3.8 G/DL (ref 3.5–5.2)
ALP SERPL-CCNC: 145 U/L (ref 55–135)
ALT SERPL W/O P-5'-P-CCNC: 16 U/L (ref 10–44)
ANION GAP SERPL CALC-SCNC: 11 MMOL/L (ref 8–16)
ANISOCYTOSIS BLD QL SMEAR: ABNORMAL
AST SERPL-CCNC: 36 U/L (ref 10–40)
BASOPHILS NFR BLD: 0 % (ref 0–1.9)
BILIRUB SERPL-MCNC: 0.5 MG/DL (ref 0.1–1)
BUN SERPL-MCNC: 27 MG/DL (ref 8–23)
CALCIUM SERPL-MCNC: 9.3 MG/DL (ref 8.7–10.5)
CHLORIDE SERPL-SCNC: 101 MMOL/L (ref 95–110)
CO2 SERPL-SCNC: 23 MMOL/L (ref 23–29)
CREAT SERPL-MCNC: 1 MG/DL (ref 0.5–1.4)
DIFFERENTIAL METHOD: ABNORMAL
EOSINOPHIL NFR BLD: 0 % (ref 0–8)
ERYTHROCYTE [DISTWIDTH] IN BLOOD BY AUTOMATED COUNT: 25.2 % (ref 11.5–14.5)
EST. GFR  (AFRICAN AMERICAN): >60 ML/MIN/1.73 M^2
EST. GFR  (NON AFRICAN AMERICAN): 54.1 ML/MIN/1.73 M^2
GLUCOSE SERPL-MCNC: 96 MG/DL (ref 70–110)
HCT VFR BLD AUTO: 23 % (ref 37–48.5)
HGB BLD-MCNC: 6.7 G/DL (ref 12–16)
HYPOCHROMIA BLD QL SMEAR: ABNORMAL
IMM GRANULOCYTES # BLD AUTO: ABNORMAL K/UL (ref 0–0.04)
IMM GRANULOCYTES NFR BLD AUTO: ABNORMAL % (ref 0–0.5)
LYMPHOCYTES NFR BLD: 40 % (ref 18–48)
MCH RBC QN AUTO: 29.9 PG (ref 27–31)
MCHC RBC AUTO-ENTMCNC: 29.1 G/DL (ref 32–36)
MCV RBC AUTO: 103 FL (ref 82–98)
MONOCYTES NFR BLD: 3 % (ref 4–15)
MYELOCYTES NFR BLD MANUAL: 15 %
NEUTROPHILS NFR BLD: 42 % (ref 38–73)
NRBC BLD-RTO: 7 /100 WBC
PLATELET # BLD AUTO: 50 K/UL (ref 150–350)
PLATELET BLD QL SMEAR: ABNORMAL
PMV BLD AUTO: ABNORMAL FL (ref 9.2–12.9)
POIKILOCYTOSIS BLD QL SMEAR: ABNORMAL
POLYCHROMASIA BLD QL SMEAR: ABNORMAL
POTASSIUM SERPL-SCNC: 4.7 MMOL/L (ref 3.5–5.1)
PROT SERPL-MCNC: 8.6 G/DL (ref 6–8.4)
RBC # BLD AUTO: 2.24 M/UL (ref 4–5.4)
SCHISTOCYTES BLD QL SMEAR: PRESENT
SODIUM SERPL-SCNC: 135 MMOL/L (ref 136–145)
SPHEROCYTES BLD QL SMEAR: ABNORMAL
STOMATOCYTES BLD QL SMEAR: PRESENT
WBC # BLD AUTO: 29.05 K/UL (ref 3.9–12.7)

## 2020-03-24 PROCEDURE — 85027 COMPLETE CBC AUTOMATED: CPT | Mod: HCNC

## 2020-03-24 PROCEDURE — 85007 BL SMEAR W/DIFF WBC COUNT: CPT | Mod: HCNC

## 2020-03-24 PROCEDURE — 85060 BLOOD SMEAR INTERPRETATION: CPT | Mod: HCNC,,, | Performed by: PATHOLOGY

## 2020-03-24 PROCEDURE — 80053 COMPREHEN METABOLIC PANEL: CPT | Mod: HCNC,PO

## 2020-03-24 PROCEDURE — 85060 PATHOLOGIST REVIEW: ICD-10-PCS | Mod: HCNC,,, | Performed by: PATHOLOGY

## 2020-03-24 PROCEDURE — 36415 COLL VENOUS BLD VENIPUNCTURE: CPT | Mod: HCNC,PO

## 2020-03-24 RX ORDER — HYDROCODONE BITARTRATE AND ACETAMINOPHEN 500; 5 MG/1; MG/1
TABLET ORAL ONCE
Status: CANCELLED | OUTPATIENT
Start: 2020-03-24 | End: 2020-03-24

## 2020-03-25 ENCOUNTER — INFUSION (OUTPATIENT)
Dept: INFUSION THERAPY | Facility: HOSPITAL | Age: 80
End: 2020-03-25
Attending: INTERNAL MEDICINE
Payer: MEDICARE

## 2020-03-25 VITALS
DIASTOLIC BLOOD PRESSURE: 79 MMHG | RESPIRATION RATE: 18 BRPM | SYSTOLIC BLOOD PRESSURE: 141 MMHG | HEART RATE: 91 BPM | TEMPERATURE: 98 F

## 2020-03-25 DIAGNOSIS — C93.10 CHRONIC MYELOMONOCYTIC LEUKEMIA NOT HAVING ACHIEVED REMISSION: Primary | ICD-10-CM

## 2020-03-25 DIAGNOSIS — D64.9 ANEMIA, UNSPECIFIED TYPE: ICD-10-CM

## 2020-03-25 DIAGNOSIS — C93.10 CHRONIC MYELOMONOCYTIC LEUKEMIA NOT HAVING ACHIEVED REMISSION: ICD-10-CM

## 2020-03-25 LAB — PATH REV BLD -IMP: NORMAL

## 2020-03-25 PROCEDURE — 36430 TRANSFUSION BLD/BLD COMPNT: CPT | Mod: HCNC

## 2020-03-25 RX ORDER — HYDROCODONE BITARTRATE AND ACETAMINOPHEN 500; 5 MG/1; MG/1
TABLET ORAL ONCE
Status: DISCONTINUED | OUTPATIENT
Start: 2020-03-25 | End: 2020-03-25 | Stop reason: HOSPADM

## 2020-03-25 NOTE — PLAN OF CARE
Patient tolerated blood transfusion well; no adverse reactions noted. Iv discontinued with unit intact. Patient did not want avs printed. Ambulatory off unit with nurse. No complaints voiced.

## 2020-03-25 NOTE — PATIENT INSTRUCTIONS
Preventing Falls in the Home  An adult or child can fall for many reasons. If you are an older adult, you may fall because your reaction time slows down. Your muscles and joints may get stiff, weak, or less flexible because of illness, medicines, or a physical condition. These things can also make a child more likely to fall or be injured in a fall.  Other health problems that make falls more likely include:  · Arthritis  · Dizziness or lightheadedness when you get out of bed (orthostatic hypotension)  · History of a stroke  · Dizziness  · Anemia  · Certain medicines taken for mental illness  · Problems with balance or gait  · History of falls with or without an injury  · Changes in vision (vision impairment)  · Changes in thinking skills and memory (cognitive impairment)  Injuries from a fall can include broken bones, dislocated joints, and cuts. When these injuries are serious enough, they can make it impossible for you or a child who is injured in a fall to live on his or her own.  Prevention tips  To help prevent falls and fall-related injuries, follow the tips below.   Floors  Make floors safer by doing the following:   · Put nonskid pads under area rugs.  · Remove throw rugs.  · Replace worn floor coverings.  · Tack carpets firmly to each step on carpeted stairs. Put nonskid strips on the edges of uncarpeted stairs.  · Keep floors and stairs free of clutter and cords.  · Arrange furniture so there are clear pathways.  · Clean up any spills right away.  · Wear shoes that fit.  Bathrooms    Make bathrooms safer by doing the following:   · Install grab bars in the tub or shower.  · Apply nonskid strips or put a nonskid rubber mat in the tub or shower.  · Sit on a bath chair to bathe.  · Use bathmats with nonskid backing.  Lighting and the environment  Improve lighting in your home by doing the following:   · Keep a flashlight in each room. Or put a lamp next to the bed within easy reach.  · Put nightlights in  the bedrooms, hallways, kitchen, and bathrooms.  · Make sure all stairways have good lighting.  · Take your time when going up and down stairs.  · Put handrails on both sides of stairs and in walkways for more support. To prevent injury to your wrist or arm, dont use handrails to pull yourself up.  · Install grab bars to pull yourself up.  · Move or rearrange items that you use often. This will make them easier to find or reach.  · Look at your home to find any safety hazards. Especially look at doorways, walkways, and the driveway. Remove or repair any safety problems that you find.  Date Last Reviewed: 8/1/2016  © 5725-4223 [employer]. [address] [place] [address], [place] [address]. All rights reserved. This information is not intended as a substitute for professional medical care. Always follow your healthcare professional's instructions.

## 2020-04-06 ENCOUNTER — TELEPHONE (OUTPATIENT)
Dept: HEMATOLOGY/ONCOLOGY | Facility: CLINIC | Age: 80
End: 2020-04-06

## 2020-04-06 NOTE — TELEPHONE ENCOUNTER
----- Message from Tessy Banda sent at 4/6/2020  7:57 AM CDT -----  Contact: pt  The pt request a return call, no additional info given and can be reached at [phone]///thxMW

## 2020-04-06 NOTE — TELEPHONE ENCOUNTER
----- Message from Elliot Diaz sent at 4/6/2020 12:50 PM CDT -----  Contact: pt  Pt called and would like Mallory to give her all back. Pt would like information about her chemo treatments    Pt can be reached at [phone]

## 2020-04-15 ENCOUNTER — TELEPHONE (OUTPATIENT)
Dept: FAMILY MEDICINE | Facility: CLINIC | Age: 80
End: 2020-04-15

## 2020-04-15 NOTE — TELEPHONE ENCOUNTER
----- Message from Swati Blue sent at 4/15/2020  9:48 AM CDT -----  Contact: pt  Pt feels that she has an infection on her back and would like you to look at it. Pt can be reached at [phone]      Thanks,  Swati Blue

## 2020-04-15 NOTE — TELEPHONE ENCOUNTER
Pt states she has a painful bump on her lower back, states it has redness around it and wants to know if you can take a look at it. She says that she has lukemia and is getting nohemi treatments and wants to make sure that it is not infection. I offered her to do a virtual visit and she stated that she does not have a smart phone to do it and would like to be seen if possible.

## 2020-04-16 ENCOUNTER — OFFICE VISIT (OUTPATIENT)
Dept: FAMILY MEDICINE | Facility: CLINIC | Age: 80
End: 2020-04-16
Payer: MEDICARE

## 2020-04-16 VITALS
SYSTOLIC BLOOD PRESSURE: 116 MMHG | HEART RATE: 91 BPM | OXYGEN SATURATION: 98 % | TEMPERATURE: 99 F | DIASTOLIC BLOOD PRESSURE: 84 MMHG | BODY MASS INDEX: 21.25 KG/M2 | HEIGHT: 62 IN | WEIGHT: 115.5 LBS

## 2020-04-16 DIAGNOSIS — I10 ESSENTIAL HYPERTENSION: Chronic | ICD-10-CM

## 2020-04-16 DIAGNOSIS — T45.1X5A PANCYTOPENIA DUE TO ANTINEOPLASTIC CHEMOTHERAPY: ICD-10-CM

## 2020-04-16 DIAGNOSIS — D61.810 PANCYTOPENIA DUE TO ANTINEOPLASTIC CHEMOTHERAPY: ICD-10-CM

## 2020-04-16 DIAGNOSIS — L02.91 ABSCESS: Primary | ICD-10-CM

## 2020-04-16 DIAGNOSIS — C93.10 CHRONIC MYELOMONOCYTIC LEUKEMIA NOT HAVING ACHIEVED REMISSION: Chronic | ICD-10-CM

## 2020-04-16 DIAGNOSIS — F33.9 DEPRESSION, RECURRENT: ICD-10-CM

## 2020-04-16 DIAGNOSIS — M05.711 RHEUMATOID ARTHRITIS INVOLVING RIGHT SHOULDER WITH POSITIVE RHEUMATOID FACTOR: Chronic | ICD-10-CM

## 2020-04-16 PROCEDURE — 3079F DIAST BP 80-89 MM HG: CPT | Mod: HCNC,CPTII,S$GLB, | Performed by: INTERNAL MEDICINE

## 2020-04-16 PROCEDURE — 3074F SYST BP LT 130 MM HG: CPT | Mod: HCNC,CPTII,S$GLB, | Performed by: INTERNAL MEDICINE

## 2020-04-16 PROCEDURE — 1159F PR MEDICATION LIST DOCUMENTED IN MEDICAL RECORD: ICD-10-PCS | Mod: HCNC,S$GLB,, | Performed by: INTERNAL MEDICINE

## 2020-04-16 PROCEDURE — 99214 PR OFFICE/OUTPT VISIT, EST, LEVL IV, 30-39 MIN: ICD-10-PCS | Mod: HCNC,S$GLB,, | Performed by: INTERNAL MEDICINE

## 2020-04-16 PROCEDURE — 99999 PR PBB SHADOW E&M-EST. PATIENT-LVL III: ICD-10-PCS | Mod: PBBFAC,HCNC,, | Performed by: INTERNAL MEDICINE

## 2020-04-16 PROCEDURE — 99214 OFFICE O/P EST MOD 30 MIN: CPT | Mod: HCNC,S$GLB,, | Performed by: INTERNAL MEDICINE

## 2020-04-16 PROCEDURE — 1101F PT FALLS ASSESS-DOCD LE1/YR: CPT | Mod: HCNC,CPTII,S$GLB, | Performed by: INTERNAL MEDICINE

## 2020-04-16 PROCEDURE — 1125F PR PAIN SEVERITY QUANTIFIED, PAIN PRESENT: ICD-10-PCS | Mod: HCNC,S$GLB,, | Performed by: INTERNAL MEDICINE

## 2020-04-16 PROCEDURE — 1125F AMNT PAIN NOTED PAIN PRSNT: CPT | Mod: HCNC,S$GLB,, | Performed by: INTERNAL MEDICINE

## 2020-04-16 PROCEDURE — 99499 RISK ADDL DX/OHS AUDIT: ICD-10-PCS | Mod: HCNC,S$GLB,, | Performed by: INTERNAL MEDICINE

## 2020-04-16 PROCEDURE — 1101F PR PT FALLS ASSESS DOC 0-1 FALLS W/OUT INJ PAST YR: ICD-10-PCS | Mod: HCNC,CPTII,S$GLB, | Performed by: INTERNAL MEDICINE

## 2020-04-16 PROCEDURE — 99499 UNLISTED E&M SERVICE: CPT | Mod: HCNC,S$GLB,, | Performed by: INTERNAL MEDICINE

## 2020-04-16 PROCEDURE — 1159F MED LIST DOCD IN RCRD: CPT | Mod: HCNC,S$GLB,, | Performed by: INTERNAL MEDICINE

## 2020-04-16 PROCEDURE — 3074F PR MOST RECENT SYSTOLIC BLOOD PRESSURE < 130 MM HG: ICD-10-PCS | Mod: HCNC,CPTII,S$GLB, | Performed by: INTERNAL MEDICINE

## 2020-04-16 PROCEDURE — 3079F PR MOST RECENT DIASTOLIC BLOOD PRESSURE 80-89 MM HG: ICD-10-PCS | Mod: HCNC,CPTII,S$GLB, | Performed by: INTERNAL MEDICINE

## 2020-04-16 PROCEDURE — 99999 PR PBB SHADOW E&M-EST. PATIENT-LVL III: CPT | Mod: PBBFAC,HCNC,, | Performed by: INTERNAL MEDICINE

## 2020-04-16 RX ORDER — SULFAMETHOXAZOLE AND TRIMETHOPRIM 800; 160 MG/1; MG/1
1 TABLET ORAL 2 TIMES DAILY
Qty: 14 TABLET | Refills: 0 | Status: SHIPPED | OUTPATIENT
Start: 2020-04-16 | End: 2020-04-23

## 2020-04-16 NOTE — PROGRESS NOTES
"Subjective:      Patient ID: Sarah Parker is a 80 y.o. female.    Chief Complaint: bump on back      HPI  Pt here for follow up of medical problems and 5 days ago started with a painful lump, "felt like a rheumatoid nodule" but this got more painful and didn't go down.  No f/c/sw.  Some sinus drip and occasional cough.  No hacking cough and no shortness of breath.  Constipation, black due to iron.  Received one unit PRBC about 3 weeks ago.  To have new round of chemo for CML soon.  Lots stress at home with dementia  and Covid seclusion.  Thinks dealing with the stress alright on current medication.  Daughter brings food.  Staying inside other than chemo treatments and labs and to come in here today.      Updated/ annual due 10/20:  HM: 11/19 fluvax, 5/16 [id number], 10/14 [id number], 10/13 TDaP, 1/17 BMD/will bring to Dr. Tejada rep 2y, 2/19 MMG, 10/13 EGD, 2015 Cscope Dr. Garcia rep 5y, Pain Dr. Carlin.     Review of Systems   Constitutional: Negative for chills, diaphoresis and fever.   Respiratory: Negative for cough and shortness of breath.    Cardiovascular: Negative for chest pain, palpitations and leg swelling.   Gastrointestinal: Negative for blood in stool, constipation, diarrhea, nausea and vomiting.   Genitourinary: Negative for dysuria, frequency and hematuria.   Psychiatric/Behavioral: The patient is not nervous/anxious.          Objective:   /84 (BP Location: Right arm, Patient Position: Sitting, BP Method: Medium (Manual))   Pulse 91   Temp 98.5 °F (36.9 °C) (Tympanic)   Ht 5' 2" (1.575 m)   Wt 52.4 kg (115 lb 8.3 oz)   LMP  (LMP Unknown)   SpO2 98%   BMI 21.13 kg/m²     Physical Exam   Constitutional: She is oriented to person, place, and time. She appears well-developed.   HENT:   Mouth/Throat: Oropharynx is clear and moist.   Neck: Neck supple. Carotid bruit is not present. No thyroid mass present.   Cardiovascular: Normal rate, regular rhythm and intact distal pulses. Exam " reveals no gallop and no friction rub.   No murmur heard.  Pulmonary/Chest: Effort normal and breath sounds normal. She has no wheezes. She has no rales.   Abdominal: Soft. Bowel sounds are normal. She exhibits no mass. There is no hepatosplenomegaly. There is no tenderness.   Musculoskeletal: She exhibits no edema.   Lymphadenopathy:     She has no cervical adenopathy.   Neurological: She is alert and oriented to person, place, and time.   Skin:   Left low back with boil, dark red erythema 3cm diameter, no drainage, tender to palp.   Psychiatric: She has a normal mood and affect.           Assessment:       1. Abscess    2. Rheumatoid arthritis involving right shoulder with positive rheumatoid factor    3. Pancytopenia due to antineoplastic chemotherapy    4. Essential hypertension    5. Chronic myelomonocytic leukemia not having achieved remission    6. Depression, recurrent          Plan:     Abscess- no drainage now.  Hot compresses, Bactrim x 7d.  Call if not draining and resolving.  -     sulfamethoxazole-trimethoprim 800-160mg (BACTRIM DS) 800-160 mg Tab; Take 1 tablet by mouth 2 (two) times daily. for 7 days  Dispense: 14 tablet; Refill: 0    Rheumatoid arthritis involving right shoulder with positive rheumatoid factor, immunosupp.    Essential hypertension- good control, cont rx.    Chronic myelomonocytic leukemia not having achieved remission, Pancytopenia due to antineoplastic chemotherapy- per HemeOnc.    Depression, recurrent- will keep me updated, if feeling overloaded/overwhelmed and will change rx regimen if needed.    RTC as scheduled in 2mo.

## 2020-04-24 ENCOUNTER — OFFICE VISIT (OUTPATIENT)
Dept: HEMATOLOGY/ONCOLOGY | Facility: CLINIC | Age: 80
End: 2020-04-24
Payer: MEDICARE

## 2020-04-24 ENCOUNTER — LAB VISIT (OUTPATIENT)
Dept: LAB | Facility: HOSPITAL | Age: 80
End: 2020-04-24
Attending: INTERNAL MEDICINE
Payer: MEDICARE

## 2020-04-24 VITALS
DIASTOLIC BLOOD PRESSURE: 75 MMHG | SYSTOLIC BLOOD PRESSURE: 161 MMHG | WEIGHT: 114 LBS | HEART RATE: 77 BPM | HEIGHT: 62 IN | BODY MASS INDEX: 20.98 KG/M2 | RESPIRATION RATE: 14 BRPM | OXYGEN SATURATION: 85 % | TEMPERATURE: 98 F

## 2020-04-24 DIAGNOSIS — C93.10 CHRONIC MYELOMONOCYTIC LEUKEMIA NOT HAVING ACHIEVED REMISSION: Chronic | ICD-10-CM

## 2020-04-24 DIAGNOSIS — L02.91 CUTANEOUS ABSCESS, UNSPECIFIED SITE: Primary | ICD-10-CM

## 2020-04-24 DIAGNOSIS — M05.711 RHEUMATOID ARTHRITIS INVOLVING RIGHT SHOULDER WITH POSITIVE RHEUMATOID FACTOR: Chronic | ICD-10-CM

## 2020-04-24 LAB
ALBUMIN SERPL BCP-MCNC: 3.5 G/DL (ref 3.5–5.2)
ALP SERPL-CCNC: 180 U/L (ref 55–135)
ALT SERPL W/O P-5'-P-CCNC: 14 U/L (ref 10–44)
ANION GAP SERPL CALC-SCNC: 9 MMOL/L (ref 8–16)
AST SERPL-CCNC: 21 U/L (ref 10–40)
BILIRUB SERPL-MCNC: 0.4 MG/DL (ref 0.1–1)
BUN SERPL-MCNC: 24 MG/DL (ref 8–23)
CALCIUM SERPL-MCNC: 8.6 MG/DL (ref 8.7–10.5)
CHLORIDE SERPL-SCNC: 107 MMOL/L (ref 95–110)
CO2 SERPL-SCNC: 19 MMOL/L (ref 23–29)
CREAT SERPL-MCNC: 1.1 MG/DL (ref 0.5–1.4)
EST. GFR  (AFRICAN AMERICAN): 56 ML/MIN/1.73 M^2
EST. GFR  (NON AFRICAN AMERICAN): 48 ML/MIN/1.73 M^2
GLUCOSE SERPL-MCNC: 89 MG/DL (ref 70–110)
POTASSIUM SERPL-SCNC: 4.6 MMOL/L (ref 3.5–5.1)
PROT SERPL-MCNC: 8.3 G/DL (ref 6–8.4)
SODIUM SERPL-SCNC: 135 MMOL/L (ref 136–145)

## 2020-04-24 PROCEDURE — 1159F PR MEDICATION LIST DOCUMENTED IN MEDICAL RECORD: ICD-10-PCS | Mod: HCNC,S$GLB,, | Performed by: INTERNAL MEDICINE

## 2020-04-24 PROCEDURE — 36415 COLL VENOUS BLD VENIPUNCTURE: CPT | Mod: HCNC

## 2020-04-24 PROCEDURE — 1101F PT FALLS ASSESS-DOCD LE1/YR: CPT | Mod: HCNC,CPTII,S$GLB, | Performed by: INTERNAL MEDICINE

## 2020-04-24 PROCEDURE — 80053 COMPREHEN METABOLIC PANEL: CPT | Mod: HCNC

## 2020-04-24 PROCEDURE — 99999 PR PBB SHADOW E&M-EST. PATIENT-LVL V: ICD-10-PCS | Mod: PBBFAC,HCNC,, | Performed by: INTERNAL MEDICINE

## 2020-04-24 PROCEDURE — 1101F PR PT FALLS ASSESS DOC 0-1 FALLS W/OUT INJ PAST YR: ICD-10-PCS | Mod: HCNC,CPTII,S$GLB, | Performed by: INTERNAL MEDICINE

## 2020-04-24 PROCEDURE — 3078F DIAST BP <80 MM HG: CPT | Mod: HCNC,CPTII,S$GLB, | Performed by: INTERNAL MEDICINE

## 2020-04-24 PROCEDURE — 3077F PR MOST RECENT SYSTOLIC BLOOD PRESSURE >= 140 MM HG: ICD-10-PCS | Mod: HCNC,CPTII,S$GLB, | Performed by: INTERNAL MEDICINE

## 2020-04-24 PROCEDURE — 1126F AMNT PAIN NOTED NONE PRSNT: CPT | Mod: HCNC,S$GLB,, | Performed by: INTERNAL MEDICINE

## 2020-04-24 PROCEDURE — 99215 OFFICE O/P EST HI 40 MIN: CPT | Mod: HCNC,S$GLB,, | Performed by: INTERNAL MEDICINE

## 2020-04-24 PROCEDURE — 3077F SYST BP >= 140 MM HG: CPT | Mod: HCNC,CPTII,S$GLB, | Performed by: INTERNAL MEDICINE

## 2020-04-24 PROCEDURE — 99215 PR OFFICE/OUTPT VISIT, EST, LEVL V, 40-54 MIN: ICD-10-PCS | Mod: HCNC,S$GLB,, | Performed by: INTERNAL MEDICINE

## 2020-04-24 PROCEDURE — 3078F PR MOST RECENT DIASTOLIC BLOOD PRESSURE < 80 MM HG: ICD-10-PCS | Mod: HCNC,CPTII,S$GLB, | Performed by: INTERNAL MEDICINE

## 2020-04-24 PROCEDURE — 1126F PR PAIN SEVERITY QUANTIFIED, NO PAIN PRESENT: ICD-10-PCS | Mod: HCNC,S$GLB,, | Performed by: INTERNAL MEDICINE

## 2020-04-24 PROCEDURE — 99999 PR PBB SHADOW E&M-EST. PATIENT-LVL V: CPT | Mod: PBBFAC,HCNC,, | Performed by: INTERNAL MEDICINE

## 2020-04-24 PROCEDURE — 1159F MED LIST DOCD IN RCRD: CPT | Mod: HCNC,S$GLB,, | Performed by: INTERNAL MEDICINE

## 2020-04-24 RX ORDER — HYDROCODONE BITARTRATE AND ACETAMINOPHEN 500; 5 MG/1; MG/1
TABLET ORAL ONCE
Status: CANCELLED | OUTPATIENT
Start: 2020-04-24 | End: 2020-04-24

## 2020-04-24 NOTE — PROGRESS NOTES
Subjective:       Patient ID: Sarah Parker is a 80 y.o. female.    Chief Complaint:  Chronic myelomonocytic leukemia    Follow-up   Associated symptoms include fatigue and weakness. Pertinent negatives include no abdominal pain, arthralgias, chest pain, chills, congestion, coughing, diaphoresis, fever, headaches, joint swelling, myalgias, nausea, neck pain, numbness, rash, sore throat or vomiting.      Patient is a 80-year-old female presents for reinstitution  of Vidaza therapy for chronic myelomonocytic leukemia patient who is currently on treatment with azacitidine.  She is here for routine follow-up.      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  Status post 4 cycles.  Overall tolerated well.  Patient was recently seen by primary care physician for abscess on her back for which she was prescribed Bactrim for 7 days.  She claims the bump has resolved and denies drainage from the site.  She denies fever, chills, nausea or vomiting, shortness of breath, palpitation, chest discomfort, abdominal pain, diarrhea or dysuria.  Also denies hemoptysis, hematemesis, hematochezia, melena.  No urinary symptoms per patient.      Past Medical History:   Diagnosis Date    Acid reflux     Anxiety     Back pain     Bronchitis, chronic obstructive w acute bronchitis 7/29/2016    Cancer     [facility] arms, face- Dr. Lata Tejada    Cataract     2+NS    Degenerative disc disease     Depression     Dry mouth     Hernia, hiatal 11/18/2013    Hypertension     Hypothyroid     Macrocytic anemia 5/3/2016    Macular degeneration     Migraines     Mixed anxiety and depressive disorder     Multiple fractures of ribs of right side     Osteoporosis     Other hyperlipidemia 10/11/2019    Pneumonia     Pneumonia due to other staphylococcus     Rheumatoid arthritis     Rheumatoid arthritis(714.0)     Rheumatoid arthritis(714.0)     Remicade, MTX.     Family History   Problem Relation Age of Onset     Heart disease Mother     Hyperlipidemia Mother     Hypertension Mother     Osteoarthritis Mother     Cataracts Mother     Hypertension Father     Osteoarthritis Father     Heart disease Father     Asthma Sister     Chronic back pain Sister     Hypertension Sister     Osteoarthritis Sister     Thyroid disease Sister     Asthma Brother     Cancer Brother     Chronic back pain Brother     Diabetes Mellitus Brother     Hypertension Brother     Osteoarthritis Brother     Thyroid disease Brother     Cancer Maternal Grandfather     Fibromyalgia Daughter     Heart disease Maternal Grandmother     Colon cancer Neg Hx     Diabetes Neg Hx      Social History     Socioeconomic History    Marital status:      Spouse name: Not on file    Number of children: Not on file    Years of education: Not on file    Highest education level: Not on file   Occupational History    Occupation: retired   Social Needs    Financial resource strain: Not on file    Food insecurity:     Worry: Not on file     Inability: Not on file    Transportation needs:     Medical: Not on file     Non-medical: Not on file   Tobacco Use    Smoking status: Former Smoker     Packs/day: 0.25     Years: 2.00     Pack years: 0.50     Last attempt to quit: 1965     Years since quittin.5    Smokeless tobacco: Never Used   Substance and Sexual Activity    Alcohol use: No    Drug use: No    Sexual activity: Never     Partners: Male   Lifestyle    Physical activity:     Days per week: Not on file     Minutes per session: Not on file    Stress: Not at all   Relationships    Social connections:     Talks on phone: Not on file     Gets together: Not on file     Attends Jainism service: Not on file     Active member of club or organization: Not on file     Attends meetings of clubs or organizations: Not on file     Relationship status: Not on file   Other Topics Concern    Not on file   Social History Narrative     Patient is aretired and live with .     Past Surgical History:   Procedure Laterality Date    APPENDECTOMY  1985    BREAST BIOPSY      CATARACT EXTRACTION Bilateral 6/11/15    Dr. Booth    CHOLECYSTECTOMY  2013    cryoablasion kidney Left 09/27/2016    feet Bilateral     rheumatoid    FRACTURE SURGERY Right     tibia    HERNIA REPAIR      HYSTERECTOMY  1970    partial    JOINT REPLACEMENT      bilateral knees (2008), hands, wrists, knuckles, toes    LAPAROSCOPIC NISSEN FUNDOPLICATION      TRANSFORAMINAL EPIDURAL INJECTION OF STEROID Left 6/25/2019    Procedure: Left L5/S1 TF AYAAN with local;  Surgeon: Rowdy Felix MD;  Location: [facility];  Service: Pain Management;  Laterality: Left;       Labs:  Lab Results   Component Value Date    WBC 19.92 (H) 04/24/2020    HGB 6.3 (L) 04/24/2020    HCT 22.4 (L) 04/24/2020     (H) 04/24/2020    PLT 79 (L) 04/24/2020     BMP  Lab Results   Component Value Date     04/24/2020    K 4.6 04/24/2020     04/24/2020    CO2 20 (L) 04/24/2020    BUN 23 04/24/2020    CREATININE 1.1 04/24/2020    CALCIUM 9.1 04/24/2020    ANIONGAP 9 04/24/2020    ESTGFRAFRICA 56 (A) 04/24/2020    EGFRNONAA 48 (A) 04/24/2020     Lab Results   Component Value Date    ALT 12 04/24/2020    AST 21 04/24/2020    ALKPHOS 194 (H) 04/24/2020    BILITOT 0.4 04/24/2020       Lab Results   Component Value Date    IRON 93 01/17/2020    TIBC 425 01/17/2020    FERRITIN 276 01/17/2020     Lab Results   Component Value Date    [id number] 1918 (H) 08/25/2019     Lab Results   Component Value Date    FOLATE 12.0 08/25/2019     Lab Results   Component Value Date    TSH 5.174 (H) 09/03/2019         Review of Systems   Constitutional: Positive for activity change and fatigue. Negative for chills, diaphoresis and fever.   HENT: Negative for congestion, dental problem, drooling, ear discharge, ear pain, facial swelling, hearing loss, mouth sores, nosebleeds, postnasal drip,  rhinorrhea, sinus pressure, sneezing, sore throat, tinnitus, trouble swallowing and voice change.    Eyes: Negative for photophobia, pain, discharge, redness, itching and visual disturbance.   Respiratory: Negative for cough, choking, chest tightness, shortness of breath, wheezing and stridor.    Cardiovascular: Negative for chest pain, palpitations and leg swelling.   Gastrointestinal: Negative for abdominal distention, abdominal pain, anal bleeding, blood in stool, constipation, diarrhea, nausea, rectal pain and vomiting.   Endocrine: Negative for cold intolerance, heat intolerance, polydipsia, polyphagia and polyuria.   Genitourinary: Negative for decreased urine volume, difficulty urinating, dyspareunia, dysuria, enuresis, flank pain, frequency, genital sores, hematuria, menstrual problem, pelvic pain, urgency, vaginal bleeding, vaginal discharge and vaginal pain.   Musculoskeletal: Negative for arthralgias, back pain, gait problem, joint swelling, myalgias, neck pain and neck stiffness.   Skin: Negative for color change, pallor and rash.   Allergic/Immunologic: Negative for environmental allergies, food allergies and immunocompromised state.   Neurological: Positive for weakness. Negative for dizziness, tremors, seizures, syncope, facial asymmetry, speech difficulty, light-headedness, numbness and headaches.   Hematological: Negative for adenopathy. Does not bruise/bleed easily.   Psychiatric/Behavioral: Positive for dysphoric mood. Negative for agitation, behavioral problems, confusion, decreased concentration, hallucinations, self-injury, sleep disturbance and suicidal ideas. The patient is nervous/anxious. The patient is not hyperactive.        Objective:      Physical Exam   Constitutional: She is oriented to person, place, and time. She appears cachectic.   HENT:   Head: Normocephalic and atraumatic.   Right Ear: External ear normal.   Left Ear: External ear normal.   Nose: Nose normal. Right sinus  exhibits no maxillary sinus tenderness and no frontal sinus tenderness. Left sinus exhibits no maxillary sinus tenderness and no frontal sinus tenderness.   Mouth/Throat: Oropharynx is clear and moist. No oropharyngeal exudate.   Eyes: Pupils are equal, round, and reactive to light. Conjunctivae, EOM and lids are normal. Right eye exhibits no discharge. Left eye exhibits no discharge. Right conjunctiva is not injected. Right conjunctiva has no hemorrhage. Left conjunctiva is not injected. Left conjunctiva has no hemorrhage. No scleral icterus.   Neck: Normal range of motion. Neck supple. No JVD present. No tracheal deviation present. No thyromegaly present.   Cardiovascular: Normal rate and regular rhythm.   Pulmonary/Chest: Effort normal. No stridor. No respiratory distress. She exhibits no tenderness.   Abdominal: Soft. She exhibits no distension and no mass. There is no splenomegaly or hepatomegaly. There is no tenderness. There is no rebound.   Musculoskeletal: Normal range of motion. She exhibits deformity. She exhibits no edema or tenderness.   Deformed digits of upper extremity secondary to arthritis.   Lymphadenopathy:     She has no cervical adenopathy.     She has no axillary adenopathy.        Right: No supraclavicular adenopathy present.        Left: No supraclavicular adenopathy present.   Neurological: She is alert and oriented to person, place, and time. No cranial nerve deficit. Coordination normal.   Skin: Skin is dry. No rash noted. She is not diaphoretic. No erythema.   Psychiatric: She has a normal mood and affect. Her behavior is normal. Judgment and thought content normal.   Vitals reviewed.        Assessment:      1. Cutaneous abscess, unspecified site    2. Chronic myelomonocytic leukemia not having achieved remission    3. Rheumatoid arthritis involving right shoulder with positive rheumatoid factor           Plan:     Chronic myelomonocytic leukemia not having achieved remission  CMML  currently on treatment with azacitidine.  Status post 4 cycles.  Recent bone marrow showed overall improvement from CMML -2 to CMML-1.  Plan is to continue treatment with hypomethylating agent.  Reviewed labs today, noted significant drop in hemoglobin to 6.3 grams/deciliter.  This is likely due to azacitidine.  Recommend transfusion of 1 unit of irradiated packed red blood cell however patient declined and instead will rather get transfused on 04/26/2020.  Order has been placed and consent obtained.  Plan to see her back in 2 weeks with repeat labs.    Rheumatoid arthritis  Follows with rheumatology service.    Skin abscess:  Status post Bactrim therapy.  Currently using mupirocin cream. Follows with PCP.  Nondraining abscess per PCP notes.      Denton Knox MD

## 2020-04-24 NOTE — ASSESSMENT & PLAN NOTE
CMML currently on treatment with azacitidine.  Status post 4 cycles.  Recent bone marrow showed overall improvement from CMML -2 to CMML-1.  Plan is to continue treatment with hypomethylating agent.  Reviewed labs today, noted significant drop in hemoglobin to 6.3 grams/deciliter.  This is likely due to azacitidine.  Recommend transfusion of 1 unit of irradiated packed red blood cell however patient declined and instead will rather get transfused on 04/26/2020.  Order has been placed and consent obtained.  Plan to see her back in 2 weeks with repeat labs.

## 2020-04-27 ENCOUNTER — LAB VISIT (OUTPATIENT)
Dept: LAB | Facility: HOSPITAL | Age: 80
End: 2020-04-27
Attending: INTERNAL MEDICINE
Payer: MEDICARE

## 2020-04-27 ENCOUNTER — INFUSION (OUTPATIENT)
Dept: INFUSION THERAPY | Facility: HOSPITAL | Age: 80
End: 2020-04-27
Attending: INTERNAL MEDICINE
Payer: MEDICARE

## 2020-04-27 VITALS
SYSTOLIC BLOOD PRESSURE: 144 MMHG | TEMPERATURE: 99 F | HEART RATE: 84 BPM | DIASTOLIC BLOOD PRESSURE: 77 MMHG | OXYGEN SATURATION: 92 % | RESPIRATION RATE: 18 BRPM

## 2020-04-27 DIAGNOSIS — C93.10 CHRONIC MYELOMONOCYTIC LEUKEMIA NOT HAVING ACHIEVED REMISSION: Chronic | ICD-10-CM

## 2020-04-27 LAB
ABO + RH BLD: NORMAL
BLD GP AB SCN CELLS X3 SERPL QL: NORMAL
BLD PROD TYP BPU: NORMAL
BLD PROD TYP BPU: NORMAL
BLOOD UNIT EXPIRATION DATE: NORMAL
BLOOD UNIT EXPIRATION DATE: NORMAL
BLOOD UNIT TYPE CODE: 5100
BLOOD UNIT TYPE CODE: 9500
BLOOD UNIT TYPE: NORMAL
BLOOD UNIT TYPE: NORMAL
CODING SYSTEM: NORMAL
CODING SYSTEM: NORMAL
DISPENSE STATUS: NORMAL
DISPENSE STATUS: NORMAL
NUM UNITS TRANS PACKED RBC: NORMAL
NUM UNITS TRANS PACKED RBC: NORMAL

## 2020-04-27 PROCEDURE — 36430 TRANSFUSION BLD/BLD COMPNT: CPT | Mod: HCNC

## 2020-04-27 PROCEDURE — 86920 COMPATIBILITY TEST SPIN: CPT | Mod: HCNC

## 2020-04-27 PROCEDURE — 86850 RBC ANTIBODY SCREEN: CPT | Mod: HCNC

## 2020-04-27 PROCEDURE — P9040 RBC LEUKOREDUCED IRRADIATED: HCPCS | Mod: HCNC

## 2020-04-27 PROCEDURE — 36415 COLL VENOUS BLD VENIPUNCTURE: CPT | Mod: HCNC

## 2020-04-27 RX ORDER — HYDROCODONE BITARTRATE AND ACETAMINOPHEN 500; 5 MG/1; MG/1
TABLET ORAL ONCE
Status: DISCONTINUED | OUTPATIENT
Start: 2020-04-27 | End: 2020-04-27 | Stop reason: HOSPADM

## 2020-04-27 NOTE — PLAN OF CARE
Pt tolerated transfusion of one unit irradiated prbc without s/s reaction.  AVS printed, reviewed.  Pt discharged ambulatory with personal belongings.

## 2020-05-09 NOTE — ASSESSMENT & PLAN NOTE
CMML currently on azacitidine, status post cycle 4.  Bone marrow showed improvement from CMML 2 to CMML 1.  Plan to initiate treatment of cycle 5 of azacitidine however CBC today showed a hemoglobin of 6.0 grams/deciliter.  Patient remains asymptomatic from the standpoint of anemia.  Will hold off on initiating azacitidine at this time.  Plan to re-evaluate in 1 week.

## 2020-05-09 NOTE — PROGRESS NOTES
Subjective:       Patient ID: Sarah Parker is a 80 y.o. female.    Chief Complaint:  Chronic myelomonocytic leukemia    Follow-up   Associated symptoms include fatigue, a rash (Posterior lower back) and weakness. Pertinent negatives include no abdominal pain, arthralgias, chest pain, chills, congestion, coughing, diaphoresis, fever, headaches, joint swelling, myalgias, nausea, neck pain, numbness, sore throat or vomiting.      Patient is a 80-year-old female presents for reinstitution  of Vidaza therapy for chronic myelomonocytic leukemia patient who is currently on treatment with azacitidine.  She is here for routine follow-up.      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  Status post 4 cycles.  Overall tolerated well.  Recently treated for back abscess with antibiotics, however it appears the abscess has erupted and more aggressive per patient.  She denies fever, chills, nausea or vomiting, shortness of breath, palpitation, chest discomfort, abdominal pain, diarrhea or dysuria.  Also denies hemoptysis, hematemesis, hematochezia, melena.  No urinary symptoms per patient.      Past Medical History:   Diagnosis Date    Acid reflux     Anxiety     Back pain     Bronchitis, chronic obstructive w acute bronchitis 7/29/2016    Cancer     [facility] arms, face- Dr. Lata Tejada    Cataract     2+NS    Degenerative disc disease     Depression     Dry mouth     Hernia, hiatal 11/18/2013    Hypertension     Hypothyroid     Macrocytic anemia 5/3/2016    Macular degeneration     Migraines     Mixed anxiety and depressive disorder     Multiple fractures of ribs of right side     Osteoporosis     Other hyperlipidemia 10/11/2019    Pneumonia     Pneumonia due to other staphylococcus     Rheumatoid arthritis     Rheumatoid arthritis(714.0)     Rheumatoid arthritis(714.0)     Remicade, MTX.     Family History   Problem Relation Age of Onset    Heart disease Mother     Hyperlipidemia  Mother     Hypertension Mother     Osteoarthritis Mother     Cataracts Mother     Hypertension Father     Osteoarthritis Father     Heart disease Father     Asthma Sister     Chronic back pain Sister     Hypertension Sister     Osteoarthritis Sister     Thyroid disease Sister     Asthma Brother     Cancer Brother     Chronic back pain Brother     Diabetes Mellitus Brother     Hypertension Brother     Osteoarthritis Brother     Thyroid disease Brother     Cancer Maternal Grandfather     Fibromyalgia Daughter     Heart disease Maternal Grandmother     Colon cancer Neg Hx     Diabetes Neg Hx      Social History     Socioeconomic History    Marital status:      Spouse name: Not on file    Number of children: Not on file    Years of education: Not on file    Highest education level: Not on file   Occupational History    Occupation: retired   Social Needs    Financial resource strain: Not on file    Food insecurity:     Worry: Not on file     Inability: Not on file    Transportation needs:     Medical: Not on file     Non-medical: Not on file   Tobacco Use    Smoking status: Former Smoker     Packs/day: 0.25     Years: 2.00     Pack years: 0.50     Last attempt to quit: 1965     Years since quittin.5    Smokeless tobacco: Never Used   Substance and Sexual Activity    Alcohol use: No    Drug use: No    Sexual activity: Never     Partners: Male   Lifestyle    Physical activity:     Days per week: Not on file     Minutes per session: Not on file    Stress: Not at all   Relationships    Social connections:     Talks on phone: Not on file     Gets together: Not on file     Attends Oriental orthodox service: Not on file     Active member of club or organization: Not on file     Attends meetings of clubs or organizations: Not on file     Relationship status: Not on file   Other Topics Concern    Not on file   Social History Narrative    Patient is aretired and live with .      Past Surgical History:   Procedure Laterality Date    APPENDECTOMY  1985    BREAST BIOPSY      CATARACT EXTRACTION Bilateral 6/11/15    Dr. Booth    CHOLECYSTECTOMY  2013    cryoablasion kidney Left 09/27/2016    feet Bilateral     rheumatoid    FRACTURE SURGERY Right     tibia    HERNIA REPAIR      HYSTERECTOMY  1970    partial    JOINT REPLACEMENT      bilateral knees (2008), hands, wrists, knuckles, toes    LAPAROSCOPIC NISSEN FUNDOPLICATION      TRANSFORAMINAL EPIDURAL INJECTION OF STEROID Left 6/25/2019    Procedure: Left L5/S1 TF AYAAN with local;  Surgeon: Rowdy Felix MD;  Location: [facility];  Service: Pain Management;  Laterality: Left;       Labs:  Lab Results   Component Value Date    WBC 30.84 (H) 05/11/2020    HGB 6.0 (L) 05/11/2020    HCT 20.7 (L) 05/11/2020    MCV 97 05/11/2020    PLT 59 (L) 05/11/2020     BMP  Lab Results   Component Value Date     (L) 05/11/2020    K 4.0 05/11/2020     05/11/2020    CO2 23 05/11/2020    BUN 16 05/11/2020    CREATININE 0.9 05/11/2020    CALCIUM 9.0 05/11/2020    ANIONGAP 9 05/11/2020    ESTGFRAFRICA >60 05/11/2020    EGFRNONAA >60 05/11/2020     Lab Results   Component Value Date    ALT 44 05/11/2020    AST 32 05/11/2020    ALKPHOS 283 (H) 05/11/2020    BILITOT 0.5 05/11/2020       Lab Results   Component Value Date    IRON 93 01/17/2020    TIBC 425 01/17/2020    FERRITIN 276 01/17/2020     Lab Results   Component Value Date    [id number] 1918 (H) 08/25/2019     Lab Results   Component Value Date    FOLATE 12.0 08/25/2019     Lab Results   Component Value Date    TSH 5.174 (H) 09/03/2019         Review of Systems   Constitutional: Positive for activity change and fatigue. Negative for chills, diaphoresis and fever.   HENT: Negative for congestion, dental problem, drooling, ear discharge, ear pain, facial swelling, hearing loss, mouth sores, nosebleeds, postnasal drip, rhinorrhea, sinus pressure, sneezing, sore throat, tinnitus,  trouble swallowing and voice change.    Eyes: Negative for photophobia, pain, discharge, redness, itching and visual disturbance.   Respiratory: Negative for cough, choking, chest tightness, shortness of breath, wheezing and stridor.    Cardiovascular: Negative for chest pain, palpitations and leg swelling.   Gastrointestinal: Negative for abdominal distention, abdominal pain, anal bleeding, blood in stool, constipation, diarrhea, nausea, rectal pain and vomiting.   Endocrine: Negative for cold intolerance, heat intolerance, polydipsia, polyphagia and polyuria.   Genitourinary: Negative for decreased urine volume, difficulty urinating, dyspareunia, dysuria, enuresis, flank pain, frequency, genital sores, hematuria, menstrual problem, pelvic pain, urgency, vaginal bleeding, vaginal discharge and vaginal pain.   Musculoskeletal: Negative for arthralgias, back pain, gait problem, joint swelling, myalgias, neck pain and neck stiffness.   Skin: Positive for rash (Posterior lower back). Negative for color change and pallor.   Allergic/Immunologic: Negative for environmental allergies, food allergies and immunocompromised state.   Neurological: Positive for weakness. Negative for dizziness, tremors, seizures, syncope, facial asymmetry, speech difficulty, light-headedness, numbness and headaches.   Hematological: Negative for adenopathy. Does not bruise/bleed easily.   Psychiatric/Behavioral: Positive for dysphoric mood. Negative for agitation, behavioral problems, confusion, decreased concentration, hallucinations, self-injury, sleep disturbance and suicidal ideas. The patient is nervous/anxious. The patient is not hyperactive.        Objective:      Physical Exam   Constitutional: She is oriented to person, place, and time. She appears cachectic.   HENT:   Head: Normocephalic and atraumatic.   Right Ear: External ear normal.   Left Ear: External ear normal.   Nose: Nose normal. Right sinus exhibits no maxillary sinus  tenderness and no frontal sinus tenderness. Left sinus exhibits no maxillary sinus tenderness and no frontal sinus tenderness.   Mouth/Throat: Oropharynx is clear and moist. No oropharyngeal exudate.   Eyes: Pupils are equal, round, and reactive to light. Conjunctivae, EOM and lids are normal. Right eye exhibits no discharge. Left eye exhibits no discharge. Right conjunctiva is not injected. Right conjunctiva has no hemorrhage. Left conjunctiva is not injected. Left conjunctiva has no hemorrhage. No scleral icterus.   Neck: Normal range of motion. Neck supple. No JVD present. No tracheal deviation present. No thyromegaly present.   Cardiovascular: Normal rate and regular rhythm.   Pulmonary/Chest: Effort normal. No stridor. No respiratory distress. She exhibits no tenderness.   Abdominal: Soft. She exhibits no distension and no mass. There is no splenomegaly or hepatomegaly. There is no tenderness. There is no rebound.   Musculoskeletal: Normal range of motion. She exhibits deformity. She exhibits no edema or tenderness.   Deformed digits of upper extremity secondary to arthritis.   Lymphadenopathy:     She has no cervical adenopathy.     She has no axillary adenopathy.        Right: No supraclavicular adenopathy present.        Left: No supraclavicular adenopathy present.   Neurological: She is alert and oriented to person, place, and time. No cranial nerve deficit. Coordination normal.   Skin: Skin is dry. Rash (Posterior lower back, diffuse rash with mildly necrotic centers.  Tender to touch and erythema noted around the lesions.) noted. She is not diaphoretic. No erythema.   Psychiatric: She has a normal mood and affect. Her behavior is normal. Judgment and thought content normal.   Vitals reviewed.        Assessment:      1. Anemia due to chronic blood loss    2. Chronic myelomonocytic leukemia not having achieved remission    3. Rheumatoid arthritis involving right shoulder with positive rheumatoid factor     4. Thrombocytopenia    5. Macrocytic anemia           Plan:     Chronic myelomonocytic leukemia not having achieved remission  CMML currently on azacitidine, status post cycle 4.  Bone marrow showed improvement from CMML 2 to CMML 1.  Plan to initiate treatment of cycle 5 of azacitidine however CBC today showed a hemoglobin of 6.0 grams/deciliter.  Patient remains asymptomatic from the standpoint of anemia.  Will hold off on initiating azacitidine at this time.  Plan to re-evaluate in 1 week.    Rheumatoid arthritis  Stable.  Follows with Rheumatology service.    Thrombocytopenia  Noted moderate thrombocytopenia.  Likely related to CMML versus less likely azacitidine.  Will monitor.    Macrocytic anemia  Noted hemoglobin at 6.0 grams/deciliter, asymptomatic.  Recommend transfusion of irradiated packed red blood cell, patient declined transfusion today and instead will go to the emergency room tomorrow-05/12/2020.  Will also place order for colonoscopy evaluation to rule out GI involvement.  Ordered iron studies.    Skin abscess:  Failed outpatient antibiotic therapy.  Bactrim and mupirocin per PCP.  Does not appear as shingles.  Erythema shins and tender.  Recommend going to the emergency room for evaluation for possible drainage and IV antibiotics.  Patient declined but will attempt to go to the emergency room tomorrow 05/12/2020.      Denton Knox MD

## 2020-05-11 ENCOUNTER — OFFICE VISIT (OUTPATIENT)
Dept: HEMATOLOGY/ONCOLOGY | Facility: CLINIC | Age: 80
DRG: 854 | End: 2020-05-11
Payer: MEDICARE

## 2020-05-11 VITALS
WEIGHT: 103.81 LBS | RESPIRATION RATE: 17 BRPM | SYSTOLIC BLOOD PRESSURE: 124 MMHG | TEMPERATURE: 98 F | BODY MASS INDEX: 19.1 KG/M2 | OXYGEN SATURATION: 98 % | HEIGHT: 62 IN | DIASTOLIC BLOOD PRESSURE: 74 MMHG | HEART RATE: 81 BPM

## 2020-05-11 DIAGNOSIS — C93.10 CHRONIC MYELOMONOCYTIC LEUKEMIA NOT HAVING ACHIEVED REMISSION: Chronic | ICD-10-CM

## 2020-05-11 DIAGNOSIS — D53.9 MACROCYTIC ANEMIA: ICD-10-CM

## 2020-05-11 DIAGNOSIS — M05.711 RHEUMATOID ARTHRITIS INVOLVING RIGHT SHOULDER WITH POSITIVE RHEUMATOID FACTOR: Chronic | ICD-10-CM

## 2020-05-11 DIAGNOSIS — D69.6 THROMBOCYTOPENIA: ICD-10-CM

## 2020-05-11 DIAGNOSIS — D50.0 ANEMIA DUE TO CHRONIC BLOOD LOSS: Primary | ICD-10-CM

## 2020-05-11 PROCEDURE — 3074F PR MOST RECENT SYSTOLIC BLOOD PRESSURE < 130 MM HG: ICD-10-PCS | Mod: HCNC,CPTII,S$GLB, | Performed by: INTERNAL MEDICINE

## 2020-05-11 PROCEDURE — 99999 PR PBB SHADOW E&M-EST. PATIENT-LVL IV: CPT | Mod: PBBFAC,HCNC,, | Performed by: INTERNAL MEDICINE

## 2020-05-11 PROCEDURE — 1125F AMNT PAIN NOTED PAIN PRSNT: CPT | Mod: HCNC,S$GLB,, | Performed by: INTERNAL MEDICINE

## 2020-05-11 PROCEDURE — 3078F DIAST BP <80 MM HG: CPT | Mod: HCNC,CPTII,S$GLB, | Performed by: INTERNAL MEDICINE

## 2020-05-11 PROCEDURE — 99215 PR OFFICE/OUTPT VISIT, EST, LEVL V, 40-54 MIN: ICD-10-PCS | Mod: HCNC,S$GLB,, | Performed by: INTERNAL MEDICINE

## 2020-05-11 PROCEDURE — 1101F PT FALLS ASSESS-DOCD LE1/YR: CPT | Mod: HCNC,CPTII,S$GLB, | Performed by: INTERNAL MEDICINE

## 2020-05-11 PROCEDURE — 1159F PR MEDICATION LIST DOCUMENTED IN MEDICAL RECORD: ICD-10-PCS | Mod: HCNC,S$GLB,, | Performed by: INTERNAL MEDICINE

## 2020-05-11 PROCEDURE — 1101F PR PT FALLS ASSESS DOC 0-1 FALLS W/OUT INJ PAST YR: ICD-10-PCS | Mod: HCNC,CPTII,S$GLB, | Performed by: INTERNAL MEDICINE

## 2020-05-11 PROCEDURE — 3078F PR MOST RECENT DIASTOLIC BLOOD PRESSURE < 80 MM HG: ICD-10-PCS | Mod: HCNC,CPTII,S$GLB, | Performed by: INTERNAL MEDICINE

## 2020-05-11 PROCEDURE — 1125F PR PAIN SEVERITY QUANTIFIED, PAIN PRESENT: ICD-10-PCS | Mod: HCNC,S$GLB,, | Performed by: INTERNAL MEDICINE

## 2020-05-11 PROCEDURE — 99999 PR PBB SHADOW E&M-EST. PATIENT-LVL IV: ICD-10-PCS | Mod: PBBFAC,HCNC,, | Performed by: INTERNAL MEDICINE

## 2020-05-11 PROCEDURE — 3074F SYST BP LT 130 MM HG: CPT | Mod: HCNC,CPTII,S$GLB, | Performed by: INTERNAL MEDICINE

## 2020-05-11 PROCEDURE — 1159F MED LIST DOCD IN RCRD: CPT | Mod: HCNC,S$GLB,, | Performed by: INTERNAL MEDICINE

## 2020-05-11 PROCEDURE — 99215 OFFICE O/P EST HI 40 MIN: CPT | Mod: HCNC,S$GLB,, | Performed by: INTERNAL MEDICINE

## 2020-05-11 NOTE — ASSESSMENT & PLAN NOTE
Noted moderate thrombocytopenia.  Likely related to CMML versus less likely azacitidine.  Will monitor.

## 2020-05-11 NOTE — ASSESSMENT & PLAN NOTE
Noted hemoglobin at 6.0 grams/deciliter, asymptomatic.  Recommend transfusion of irradiated packed red blood cell, patient declined transfusion today and instead will go to the emergency room tomorrow-05/12/2020.  Will also place order for colonoscopy evaluation to rule out GI involvement.  Ordered iron studies.

## 2020-05-12 ENCOUNTER — ANESTHESIA (OUTPATIENT)
Dept: SURGERY | Facility: HOSPITAL | Age: 80
DRG: 854 | End: 2020-05-12
Payer: MEDICARE

## 2020-05-12 ENCOUNTER — HOSPITAL ENCOUNTER (INPATIENT)
Facility: HOSPITAL | Age: 80
LOS: 3 days | Discharge: HOME HEALTH | DRG: 854 | End: 2020-05-15
Attending: EMERGENCY MEDICINE | Admitting: INTERNAL MEDICINE
Payer: MEDICARE

## 2020-05-12 ENCOUNTER — ANESTHESIA EVENT (OUTPATIENT)
Dept: SURGERY | Facility: HOSPITAL | Age: 80
DRG: 854 | End: 2020-05-12
Payer: MEDICARE

## 2020-05-12 DIAGNOSIS — R00.0 TACHYCARDIA: ICD-10-CM

## 2020-05-12 DIAGNOSIS — A41.9 SEPSIS: ICD-10-CM

## 2020-05-12 DIAGNOSIS — L02.212 ABSCESS OF LOWER BACK: ICD-10-CM

## 2020-05-12 DIAGNOSIS — L03.312 CELLULITIS OF BACK EXCEPT BUTTOCK: Primary | ICD-10-CM

## 2020-05-12 DIAGNOSIS — A41.9 SEPSIS, DUE TO UNSPECIFIED ORGANISM, UNSPECIFIED WHETHER ACUTE ORGAN DYSFUNCTION PRESENT: ICD-10-CM

## 2020-05-12 LAB
ACANTHOCYTES BLD QL SMEAR: PRESENT
ALBUMIN SERPL BCP-MCNC: 3.4 G/DL (ref 3.5–5.2)
ALP SERPL-CCNC: 303 U/L (ref 55–135)
ALT SERPL W/O P-5'-P-CCNC: 41 U/L (ref 10–44)
ANION GAP SERPL CALC-SCNC: 12 MMOL/L (ref 8–16)
ANISOCYTOSIS BLD QL SMEAR: SLIGHT
AST SERPL-CCNC: 29 U/L (ref 10–40)
BACTERIA #/AREA URNS HPF: NORMAL /HPF
BASOPHILS # BLD AUTO: ABNORMAL K/UL (ref 0–0.2)
BASOPHILS NFR BLD: 0 % (ref 0–1.9)
BILIRUB SERPL-MCNC: 0.5 MG/DL (ref 0.1–1)
BILIRUB UR QL STRIP: NEGATIVE
BUN SERPL-MCNC: 16 MG/DL (ref 8–23)
CALCIUM SERPL-MCNC: 9.1 MG/DL (ref 8.7–10.5)
CHLORIDE SERPL-SCNC: 99 MMOL/L (ref 95–110)
CLARITY UR: CLEAR
CO2 SERPL-SCNC: 23 MMOL/L (ref 23–29)
COLOR UR: YELLOW
CREAT SERPL-MCNC: 0.8 MG/DL (ref 0.5–1.4)
DACRYOCYTES BLD QL SMEAR: ABNORMAL
DIFFERENTIAL METHOD: ABNORMAL
EOSINOPHIL # BLD AUTO: ABNORMAL K/UL (ref 0–0.5)
EOSINOPHIL NFR BLD: 0 % (ref 0–8)
ERYTHROCYTE [DISTWIDTH] IN BLOOD BY AUTOMATED COUNT: 22.4 % (ref 11.5–14.5)
EST. GFR  (AFRICAN AMERICAN): >60 ML/MIN/1.73 M^2
EST. GFR  (NON AFRICAN AMERICAN): >60 ML/MIN/1.73 M^2
GLUCOSE SERPL-MCNC: 116 MG/DL (ref 70–110)
GLUCOSE UR QL STRIP: NEGATIVE
HCT VFR BLD AUTO: 22.8 % (ref 37–48.5)
HGB BLD-MCNC: 6.7 G/DL (ref 12–16)
HGB UR QL STRIP: ABNORMAL
HYPOCHROMIA BLD QL SMEAR: ABNORMAL
IMM GRANULOCYTES # BLD AUTO: ABNORMAL K/UL (ref 0–0.04)
IMM GRANULOCYTES NFR BLD AUTO: ABNORMAL % (ref 0–0.5)
KETONES UR QL STRIP: NEGATIVE
LACTATE SERPL-SCNC: 1.1 MMOL/L (ref 0.5–2.2)
LACTATE SERPL-SCNC: 1.1 MMOL/L (ref 0.5–2.2)
LEUKOCYTE ESTERASE UR QL STRIP: ABNORMAL
LYMPHOCYTES # BLD AUTO: ABNORMAL K/UL (ref 1–4.8)
LYMPHOCYTES NFR BLD: 50 % (ref 18–48)
MCH RBC QN AUTO: 27.9 PG (ref 27–31)
MCHC RBC AUTO-ENTMCNC: 29.4 G/DL (ref 32–36)
MCV RBC AUTO: 95 FL (ref 82–98)
MICROSCOPIC COMMENT: NORMAL
MONOCYTES # BLD AUTO: ABNORMAL K/UL (ref 0.3–1)
MONOCYTES NFR BLD: 15 % (ref 4–15)
NEUTROPHILS NFR BLD: 34 % (ref 38–73)
NEUTS BAND NFR BLD MANUAL: 1 %
NITRITE UR QL STRIP: NEGATIVE
NRBC BLD-RTO: 6 /100 WBC
OVALOCYTES BLD QL SMEAR: ABNORMAL
PH UR STRIP: 7 [PH] (ref 5–8)
PLATELET # BLD AUTO: 56 K/UL (ref 150–350)
PMV BLD AUTO: ABNORMAL FL (ref 9.2–12.9)
POIKILOCYTOSIS BLD QL SMEAR: ABNORMAL
POLYCHROMASIA BLD QL SMEAR: ABNORMAL
POTASSIUM SERPL-SCNC: 4.2 MMOL/L (ref 3.5–5.1)
PROCALCITONIN SERPL IA-MCNC: 0.2 NG/ML
PROT SERPL-MCNC: 8.5 G/DL (ref 6–8.4)
PROT UR QL STRIP: NEGATIVE
RBC # BLD AUTO: 2.4 M/UL (ref 4–5.4)
RBC #/AREA URNS HPF: 1 /HPF (ref 0–4)
SARS-COV-2 RDRP RESP QL NAA+PROBE: NEGATIVE
SCHISTOCYTES BLD QL SMEAR: PRESENT
SODIUM SERPL-SCNC: 134 MMOL/L (ref 136–145)
SP GR UR STRIP: 1.01 (ref 1–1.03)
SPHEROCYTES BLD QL SMEAR: ABNORMAL
SQUAMOUS #/AREA URNS HPF: 5 /HPF
STOMATOCYTES BLD QL SMEAR: PRESENT
TARGETS BLD QL SMEAR: ABNORMAL
URN SPEC COLLECT METH UR: ABNORMAL
UROBILINOGEN UR STRIP-ACNC: NEGATIVE EU/DL
WBC # BLD AUTO: 30.38 K/UL (ref 3.9–12.7)
WBC #/AREA URNS HPF: 4 /HPF (ref 0–5)

## 2020-05-12 PROCEDURE — 86901 BLOOD TYPING SEROLOGIC RH(D): CPT | Mod: HCNC

## 2020-05-12 PROCEDURE — 11106 INCAL BX SKN SINGLE LES: CPT | Mod: 51,HCNC,, | Performed by: SURGERY

## 2020-05-12 PROCEDURE — 36000706: Mod: HCNC | Performed by: SURGERY

## 2020-05-12 PROCEDURE — 36000707: Mod: HCNC | Performed by: SURGERY

## 2020-05-12 PROCEDURE — 87205 SMEAR GRAM STAIN: CPT | Mod: HCNC

## 2020-05-12 PROCEDURE — 80053 COMPREHEN METABOLIC PANEL: CPT | Mod: HCNC

## 2020-05-12 PROCEDURE — 25000003 PHARM REV CODE 250: Mod: HCNC | Performed by: SURGERY

## 2020-05-12 PROCEDURE — 93005 ELECTROCARDIOGRAM TRACING: CPT | Mod: HCNC

## 2020-05-12 PROCEDURE — 87070 CULTURE OTHR SPECIMN AEROBIC: CPT | Mod: HCNC

## 2020-05-12 PROCEDURE — 37000009 HC ANESTHESIA EA ADD 15 MINS: Mod: HCNC | Performed by: SURGERY

## 2020-05-12 PROCEDURE — 81000 URINALYSIS NONAUTO W/SCOPE: CPT | Mod: HCNC

## 2020-05-12 PROCEDURE — U0002 COVID-19 LAB TEST NON-CDC: HCPCS | Mod: HCNC

## 2020-05-12 PROCEDURE — 10061 I&D ABSCESS COMP/MULTIPLE: CPT | Mod: HCNC,,, | Performed by: SURGERY

## 2020-05-12 PROCEDURE — 84145 PROCALCITONIN (PCT): CPT | Mod: HCNC

## 2020-05-12 PROCEDURE — 25000003 PHARM REV CODE 250: Mod: HCNC | Performed by: EMERGENCY MEDICINE

## 2020-05-12 PROCEDURE — 63600175 PHARM REV CODE 636 W HCPCS: Mod: HCNC | Performed by: NURSE PRACTITIONER

## 2020-05-12 PROCEDURE — 99223 1ST HOSP IP/OBS HIGH 75: CPT | Mod: HCNC,,, | Performed by: INTERNAL MEDICINE

## 2020-05-12 PROCEDURE — 11106 PR INCISIONAL BIOPSY, SKIN, SINGLE LESION: ICD-10-PCS | Mod: 51,HCNC,, | Performed by: SURGERY

## 2020-05-12 PROCEDURE — 71000033 HC RECOVERY, INTIAL HOUR: Mod: HCNC | Performed by: SURGERY

## 2020-05-12 PROCEDURE — 96365 THER/PROPH/DIAG IV INF INIT: CPT | Mod: HCNC

## 2020-05-12 PROCEDURE — 99291 CRITICAL CARE FIRST HOUR: CPT | Mod: HCNC

## 2020-05-12 PROCEDURE — 99222 1ST HOSP IP/OBS MODERATE 55: CPT | Mod: 25,HCNC,, | Performed by: SURGERY

## 2020-05-12 PROCEDURE — 87070 CULTURE OTHR SPECIMN AEROBIC: CPT | Mod: 59,HCNC

## 2020-05-12 PROCEDURE — 96361 HYDRATE IV INFUSION ADD-ON: CPT | Mod: HCNC

## 2020-05-12 PROCEDURE — 99222 PR INITIAL HOSPITAL CARE,LEVL II: ICD-10-PCS | Mod: 25,HCNC,, | Performed by: SURGERY

## 2020-05-12 PROCEDURE — 87075 CULTR BACTERIA EXCEPT BLOOD: CPT | Mod: HCNC

## 2020-05-12 PROCEDURE — 85007 BL SMEAR W/DIFF WBC COUNT: CPT | Mod: HCNC

## 2020-05-12 PROCEDURE — 93010 ELECTROCARDIOGRAM REPORT: CPT | Mod: HCNC,,, | Performed by: INTERNAL MEDICINE

## 2020-05-12 PROCEDURE — 83605 ASSAY OF LACTIC ACID: CPT | Mod: 91,HCNC

## 2020-05-12 PROCEDURE — 86920 COMPATIBILITY TEST SPIN: CPT | Mod: HCNC

## 2020-05-12 PROCEDURE — 96375 TX/PRO/DX INJ NEW DRUG ADDON: CPT | Performed by: EMERGENCY MEDICINE

## 2020-05-12 PROCEDURE — 99223 PR INITIAL HOSPITAL CARE,LEVL III: ICD-10-PCS | Mod: HCNC,,, | Performed by: INTERNAL MEDICINE

## 2020-05-12 PROCEDURE — 25500020 PHARM REV CODE 255: Mod: HCNC | Performed by: INTERNAL MEDICINE

## 2020-05-12 PROCEDURE — 25000003 PHARM REV CODE 250: Mod: HCNC | Performed by: NURSE PRACTITIONER

## 2020-05-12 PROCEDURE — 36430 TRANSFUSION BLD/BLD COMPNT: CPT | Mod: HCNC

## 2020-05-12 PROCEDURE — 37000008 HC ANESTHESIA 1ST 15 MINUTES: Mod: HCNC | Performed by: SURGERY

## 2020-05-12 PROCEDURE — 87040 BLOOD CULTURE FOR BACTERIA: CPT | Mod: 59,HCNC

## 2020-05-12 PROCEDURE — 63600175 PHARM REV CODE 636 W HCPCS: Mod: HCNC | Performed by: NURSE ANESTHETIST, CERTIFIED REGISTERED

## 2020-05-12 PROCEDURE — 87186 SC STD MICRODIL/AGAR DIL: CPT | Mod: HCNC

## 2020-05-12 PROCEDURE — 10061 PR DRAIN SKIN ABSCESS COMPLIC: ICD-10-PCS | Mod: HCNC,,, | Performed by: SURGERY

## 2020-05-12 PROCEDURE — 21400001 HC TELEMETRY ROOM: Mod: HCNC

## 2020-05-12 PROCEDURE — 63600175 PHARM REV CODE 636 W HCPCS: Mod: HCNC | Performed by: EMERGENCY MEDICINE

## 2020-05-12 PROCEDURE — 11000001 HC ACUTE MED/SURG PRIVATE ROOM: Mod: HCNC

## 2020-05-12 PROCEDURE — 63600175 PHARM REV CODE 636 W HCPCS: Mod: HCNC | Performed by: SURGERY

## 2020-05-12 PROCEDURE — 85027 COMPLETE CBC AUTOMATED: CPT | Mod: HCNC

## 2020-05-12 PROCEDURE — 87077 CULTURE AEROBIC IDENTIFY: CPT | Mod: HCNC

## 2020-05-12 PROCEDURE — 83605 ASSAY OF LACTIC ACID: CPT | Mod: HCNC

## 2020-05-12 PROCEDURE — 93010 EKG 12-LEAD: ICD-10-PCS | Mod: HCNC,,, | Performed by: INTERNAL MEDICINE

## 2020-05-12 PROCEDURE — 36415 COLL VENOUS BLD VENIPUNCTURE: CPT | Mod: HCNC

## 2020-05-12 PROCEDURE — 87102 FUNGUS ISOLATION CULTURE: CPT | Mod: HCNC

## 2020-05-12 RX ORDER — DEXTROSE MONOHYDRATE 100 MG/ML
25 INJECTION, SOLUTION INTRAVENOUS
Status: DISCONTINUED | OUTPATIENT
Start: 2020-05-12 | End: 2020-05-15 | Stop reason: HOSPADM

## 2020-05-12 RX ORDER — IBUPROFEN 200 MG
24 TABLET ORAL
Status: DISCONTINUED | OUTPATIENT
Start: 2020-05-12 | End: 2020-05-15 | Stop reason: HOSPADM

## 2020-05-12 RX ORDER — PROPOFOL 10 MG/ML
VIAL (ML) INTRAVENOUS
Status: DISCONTINUED | OUTPATIENT
Start: 2020-05-12 | End: 2020-05-12

## 2020-05-12 RX ORDER — PRAVASTATIN SODIUM 10 MG/1
10 TABLET ORAL DAILY
Status: DISCONTINUED | OUTPATIENT
Start: 2020-05-12 | End: 2020-05-15 | Stop reason: HOSPADM

## 2020-05-12 RX ORDER — SODIUM CHLORIDE, SODIUM LACTATE, POTASSIUM CHLORIDE, CALCIUM CHLORIDE 600; 310; 30; 20 MG/100ML; MG/100ML; MG/100ML; MG/100ML
INJECTION, SOLUTION INTRAVENOUS CONTINUOUS PRN
Status: DISCONTINUED | OUTPATIENT
Start: 2020-05-12 | End: 2020-05-12

## 2020-05-12 RX ORDER — IBUPROFEN 200 MG
16 TABLET ORAL
Status: DISCONTINUED | OUTPATIENT
Start: 2020-05-12 | End: 2020-05-15 | Stop reason: HOSPADM

## 2020-05-12 RX ORDER — DULOXETIN HYDROCHLORIDE 20 MG/1
40 CAPSULE, DELAYED RELEASE ORAL DAILY
Status: DISCONTINUED | OUTPATIENT
Start: 2020-05-12 | End: 2020-05-15 | Stop reason: HOSPADM

## 2020-05-12 RX ORDER — ACETAMINOPHEN 325 MG/1
650 TABLET ORAL EVERY 4 HOURS PRN
Status: DISCONTINUED | OUTPATIENT
Start: 2020-05-12 | End: 2020-05-15 | Stop reason: HOSPADM

## 2020-05-12 RX ORDER — GLUCAGON 1 MG
1 KIT INJECTION
Status: DISCONTINUED | OUTPATIENT
Start: 2020-05-12 | End: 2020-05-15 | Stop reason: HOSPADM

## 2020-05-12 RX ORDER — TAMSULOSIN HYDROCHLORIDE 0.4 MG/1
0.4 CAPSULE ORAL DAILY
Status: DISCONTINUED | OUTPATIENT
Start: 2020-05-12 | End: 2020-05-15 | Stop reason: HOSPADM

## 2020-05-12 RX ORDER — ONDANSETRON 2 MG/ML
INJECTION INTRAMUSCULAR; INTRAVENOUS
Status: DISCONTINUED | OUTPATIENT
Start: 2020-05-12 | End: 2020-05-12

## 2020-05-12 RX ORDER — TOPIRAMATE 25 MG/1
25 TABLET ORAL 2 TIMES DAILY
Status: DISCONTINUED | OUTPATIENT
Start: 2020-05-12 | End: 2020-05-15 | Stop reason: HOSPADM

## 2020-05-12 RX ORDER — SODIUM CHLORIDE 0.9 % (FLUSH) 0.9 %
10 SYRINGE (ML) INJECTION
Status: DISCONTINUED | OUTPATIENT
Start: 2020-05-12 | End: 2020-05-15 | Stop reason: HOSPADM

## 2020-05-12 RX ORDER — METRONIDAZOLE 500 MG/100ML
500 INJECTION, SOLUTION INTRAVENOUS
Status: DISCONTINUED | OUTPATIENT
Start: 2020-05-12 | End: 2020-05-12

## 2020-05-12 RX ORDER — ONDANSETRON 8 MG/1
8 TABLET, ORALLY DISINTEGRATING ORAL EVERY 8 HOURS PRN
Status: DISCONTINUED | OUTPATIENT
Start: 2020-05-12 | End: 2020-05-15 | Stop reason: HOSPADM

## 2020-05-12 RX ORDER — CEFEPIME HYDROCHLORIDE 1 G/50ML
2 INJECTION, SOLUTION INTRAVENOUS
Status: DISCONTINUED | OUTPATIENT
Start: 2020-05-12 | End: 2020-05-14

## 2020-05-12 RX ORDER — LEVOTHYROXINE SODIUM 88 UG/1
88 TABLET ORAL
Status: DISCONTINUED | OUTPATIENT
Start: 2020-05-13 | End: 2020-05-15 | Stop reason: HOSPADM

## 2020-05-12 RX ORDER — HYDROCODONE BITARTRATE AND ACETAMINOPHEN 7.5; 325 MG/1; MG/1
1 TABLET ORAL EVERY 6 HOURS PRN
Status: DISCONTINUED | OUTPATIENT
Start: 2020-05-12 | End: 2020-05-15 | Stop reason: HOSPADM

## 2020-05-12 RX ORDER — LANOLIN ALCOHOL/MO/W.PET/CERES
400 CREAM (GRAM) TOPICAL 3 TIMES DAILY
Status: DISCONTINUED | OUTPATIENT
Start: 2020-05-12 | End: 2020-05-15 | Stop reason: HOSPADM

## 2020-05-12 RX ORDER — METOPROLOL SUCCINATE 50 MG/1
50 TABLET, EXTENDED RELEASE ORAL DAILY
Status: DISCONTINUED | OUTPATIENT
Start: 2020-05-13 | End: 2020-05-15 | Stop reason: HOSPADM

## 2020-05-12 RX ORDER — ONDANSETRON 2 MG/ML
4 INJECTION INTRAMUSCULAR; INTRAVENOUS EVERY 8 HOURS PRN
Status: DISCONTINUED | OUTPATIENT
Start: 2020-05-12 | End: 2020-05-15 | Stop reason: HOSPADM

## 2020-05-12 RX ORDER — HYDROCODONE BITARTRATE AND ACETAMINOPHEN 500; 5 MG/1; MG/1
TABLET ORAL
Status: DISCONTINUED | OUTPATIENT
Start: 2020-05-12 | End: 2020-05-14 | Stop reason: SDUPTHER

## 2020-05-12 RX ORDER — FERROUS GLUCONATE 324(37.5)
324 TABLET ORAL 2 TIMES DAILY WITH MEALS
Status: DISCONTINUED | OUTPATIENT
Start: 2020-05-12 | End: 2020-05-15 | Stop reason: HOSPADM

## 2020-05-12 RX ORDER — DEXTROSE MONOHYDRATE 100 MG/ML
12.5 INJECTION, SOLUTION INTRAVENOUS
Status: DISCONTINUED | OUTPATIENT
Start: 2020-05-12 | End: 2020-05-15 | Stop reason: HOSPADM

## 2020-05-12 RX ORDER — BUPIVACAINE HYDROCHLORIDE 2.5 MG/ML
INJECTION, SOLUTION EPIDURAL; INFILTRATION; INTRACAUDAL
Status: DISCONTINUED | OUTPATIENT
Start: 2020-05-12 | End: 2020-05-12 | Stop reason: HOSPADM

## 2020-05-12 RX ORDER — METOPROLOL SUCCINATE 50 MG/1
50 TABLET, EXTENDED RELEASE ORAL DAILY
Status: DISCONTINUED | OUTPATIENT
Start: 2020-05-12 | End: 2020-05-12

## 2020-05-12 RX ORDER — VANCOMYCIN HCL IN 5 % DEXTROSE 1G/250ML
1000 PLASTIC BAG, INJECTION (ML) INTRAVENOUS ONCE
Status: COMPLETED | OUTPATIENT
Start: 2020-05-12 | End: 2020-05-12

## 2020-05-12 RX ORDER — FENTANYL CITRATE 50 UG/ML
INJECTION, SOLUTION INTRAMUSCULAR; INTRAVENOUS
Status: DISCONTINUED | OUTPATIENT
Start: 2020-05-12 | End: 2020-05-12

## 2020-05-12 RX ORDER — LIDOCAINE HYDROCHLORIDE 20 MG/ML
INJECTION INTRAVENOUS
Status: DISCONTINUED | OUTPATIENT
Start: 2020-05-12 | End: 2020-05-12

## 2020-05-12 RX ORDER — LIDOCAINE HYDROCHLORIDE 10 MG/ML
INJECTION, SOLUTION EPIDURAL; INFILTRATION; INTRACAUDAL; PERINEURAL
Status: DISCONTINUED | OUTPATIENT
Start: 2020-05-12 | End: 2020-05-12 | Stop reason: HOSPADM

## 2020-05-12 RX ORDER — HYDROCODONE BITARTRATE AND ACETAMINOPHEN 5; 325 MG/1; MG/1
1 TABLET ORAL EVERY 6 HOURS PRN
Status: DISCONTINUED | OUTPATIENT
Start: 2020-05-12 | End: 2020-05-15 | Stop reason: HOSPADM

## 2020-05-12 RX ADMIN — Medication 100 MG: at 05:05

## 2020-05-12 RX ADMIN — SODIUM CHLORIDE, SODIUM LACTATE, POTASSIUM CHLORIDE, AND CALCIUM CHLORIDE: .6; .31; .03; .02 INJECTION, SOLUTION INTRAVENOUS at 05:05

## 2020-05-12 RX ADMIN — HYDROCODONE BITARTRATE AND ACETAMINOPHEN 1 TABLET: 7.5; 325 TABLET ORAL at 08:05

## 2020-05-12 RX ADMIN — PROPOFOL 20 MG: 10 INJECTION, EMULSION INTRAVENOUS at 05:05

## 2020-05-12 RX ADMIN — IOHEXOL 75 ML: 350 INJECTION, SOLUTION INTRAVENOUS at 01:05

## 2020-05-12 RX ADMIN — VANCOMYCIN HYDROCHLORIDE 1000 MG: 1 INJECTION, POWDER, LYOPHILIZED, FOR SOLUTION INTRAVENOUS at 12:05

## 2020-05-12 RX ADMIN — ONDANSETRON 4 MG: 2 INJECTION, SOLUTION INTRAMUSCULAR; INTRAVENOUS at 05:05

## 2020-05-12 RX ADMIN — FENTANYL CITRATE 25 MCG: 50 INJECTION, SOLUTION INTRAMUSCULAR; INTRAVENOUS at 05:05

## 2020-05-12 RX ADMIN — AMITRIPTYLINE HYDROCHLORIDE 75 MG: 50 TABLET, FILM COATED ORAL at 08:05

## 2020-05-12 RX ADMIN — PIPERACILLIN AND TAZOBACTAM 4.5 G: 4; .5 INJECTION, POWDER, LYOPHILIZED, FOR SOLUTION INTRAVENOUS; PARENTERAL at 11:05

## 2020-05-12 RX ADMIN — PROPOFOL 50 MG: 10 INJECTION, EMULSION INTRAVENOUS at 05:05

## 2020-05-12 RX ADMIN — PROPOFOL 30 MG: 10 INJECTION, EMULSION INTRAVENOUS at 05:05

## 2020-05-12 RX ADMIN — SODIUM CHLORIDE 1413 ML: 0.9 INJECTION, SOLUTION INTRAVENOUS at 10:05

## 2020-05-12 RX ADMIN — HYDROCODONE BITARTRATE AND ACETAMINOPHEN 1 TABLET: 5; 325 TABLET ORAL at 12:05

## 2020-05-12 RX ADMIN — TOPIRAMATE 25 MG: 25 TABLET, FILM COATED ORAL at 08:05

## 2020-05-12 RX ADMIN — Medication 400 MG: at 08:05

## 2020-05-12 RX ADMIN — CEFEPIME HYDROCHLORIDE 2 G: 2 INJECTION, SOLUTION INTRAVENOUS at 08:05

## 2020-05-12 NOTE — PLAN OF CARE
Attempted to reach pt and her daughter via phone. No answer. Will FU PRN to complete DC assessment.   Garrett Harrington LMSW 5/12/2020 4:05 PM

## 2020-05-12 NOTE — HPI
80-year-old female with CML referred for back abscess.  Patient reports 1st noticing a boil on her back approximately a week ago and has been undergoing antibiotic therapy.  Since that time she has noted multiple spots of open ulceration on her back with drainage of purulent fluid.  She underwent CT scan upon admission for IV antibiotics and was noted to have cellulitis of the area but no definitive abscess.  Denies any fevers or chills.

## 2020-05-12 NOTE — ASSESSMENT & PLAN NOTE
- Place in OBS  - Likely secondary to lower back cellulitis/abscess  - HR >90, WBCs 30k  - Lactic acid 1.1  - Given bolus of IVFs per sepsis protocol  - BC drawn and are pending  - Continue IV ABX  - Currently hemodynamically stable

## 2020-05-12 NOTE — ANESTHESIA PREPROCEDURE EVALUATION
05/12/2020  Sarah Parker is a 80 y.o., female.    Anesthesia Evaluation    I have reviewed the Patient Summary Reports.    I have reviewed the Nursing Notes.   I have reviewed the Medications.     Review of Systems  Anesthesia Hx:  No problems with previous Anesthesia  Denies Family Hx of Anesthesia complications.   Denies Personal Hx of Anesthesia complications.   Social:  Former Smoker, No Alcohol Use    Hematology/Oncology:         -- Anemia: Hematology Comments: Leukocytosis  Chronic myelomonocytic leukemia not having achieved remission  Pancytopenia due to antineoplastic chemotherapy  Thrombocytopenia     Oncology Comments: skin     EENT/Dental:   Cataract  Macular degeneration   Cardiovascular:   Hypertension Denies MI.   Denies CABG/stent.      hyperlipidemia ECG has been reviewed.    Pulmonary:   Pneumonia COPD Denies Sleep Apnea.    Renal/:   Denies Chronic Renal Disease.  Renal mass   Hepatic/GI:   Hiatal Hernia, GERD Denies Liver Disease. Denies Hepatitis.    Musculoskeletal:   DDD  Osteoporosis  RA  Lumbar radiculopathy   Neurological:   Denies CVA. Headaches Denies Seizures.   Peripheral Neuropathy    Endocrine:   Denies Diabetes. Hypothyroidism    Psych:   anxiety depression          Physical Exam  General:  Cachexia    Airway/Jaw/Neck:  Airway Findings: Mouth Opening: Normal Tongue: Normal  General Airway Assessment: Adult  Mallampati: II      Dental:  Dental Findings: In tact   Chest/Lungs:  Chest/Lungs Findings: Clear to auscultation, Normal Respiratory Rate     Heart/Vascular:  Heart Findings: Rate: Normal  Rhythm: Regular Rhythm             Anesthesia Plan  Type of Anesthesia, risks & benefits discussed:  Anesthesia Type:  MAC  Patient's Preference:   Intra-op Monitoring Plan: standard ASA monitors  Intra-op Monitoring Plan Comments:   Post Op Pain Control Plan:   Post Op Pain  Control Plan Comments:   Induction:   IV  Beta Blocker:  Patient is on a Beta-Blocker and has received one dose within the past 24 hours (No further documentation required).       Informed Consent: Patient understands risks and agrees with Anesthesia plan.  Questions answered. Anesthesia consent signed with patient.  ASA Score: 3     Day of Surgery Review of History & Physical: I have interviewed and examined the patient. I have reviewed the patient's H&P dated:  There are no significant changes.  H&P update referred to the surgeon.         Ready For Surgery From Anesthesia Perspective.

## 2020-05-12 NOTE — ED PROVIDER NOTES
SCRIBE #1 NOTE: I, Carlitos Doss, am scribing for, and in the presence of, Denzel France MD. I have scribed the entire note.      History      Chief Complaint   Patient presents with    Cellulitis     Arrives to ER with wound to back reports wound was originally abscess that became worse, sent home with oral abx with no improvement instructed to come here by PCP for IV abx. Rates pain 5/10, - fever,  -n/v/d       Review of patient's allergies indicates:   Allergen Reactions    Codeine      Other reaction(s): hyper  Other reaction(s): hyper    Doxycycline     Gabapentin Other (See Comments)     Bad dreams        HPI   HPI    5/12/2020, 9:42 AM   History obtained from the patient      History of Present Illness: Sarah Parker is a 80 y.o. female patient with a PMHx of cancer, HTN who presents to the Emergency Department for back cellulitis, onset 1.5 weeks PTA. Symptoms are constant and moderate in severity. No mitigating or exacerbating factors reported. Associated sxs include back pain. Patient denies any fever, chills, n/v/d, SOB, CP, weakness, numbness, dizziness, headache, and all other sxs at this time. Pt was put on a course of PO abx by her PCP, with no improvement of sxs. No further complaints or concerns at this time.     Arrival mode: Personal vehicle    PCP: Briseida Bennett MD       Past Medical History:  Past Medical History:   Diagnosis Date    Acid reflux     Anxiety     Back pain     Bronchitis, chronic obstructive w acute bronchitis 7/29/2016    Cancer     [facility] arms, face- Dr. Lata Tejada    Cataract     2+NS    Degenerative disc disease     Depression     Dry mouth     Hernia, hiatal 11/18/2013    Hypertension     Hypothyroid     Macrocytic anemia 5/3/2016    Macular degeneration     Migraines     Mixed anxiety and depressive disorder     Multiple fractures of ribs of right side     Osteoporosis     Other hyperlipidemia 10/11/2019    Pneumonia     Pneumonia due to  other staphylococcus     Rheumatoid arthritis     Rheumatoid arthritis(714.0)     Rheumatoid arthritis(714.0)     Remicade, MTX.       Past Surgical History:  Past Surgical History:   Procedure Laterality Date    APPENDECTOMY  1985    BREAST BIOPSY      CATARACT EXTRACTION Bilateral 6/11/15    Dr. Booth    CHOLECYSTECTOMY  2013    cryoablasion kidney Left 2016    feet Bilateral     rheumatoid    FRACTURE SURGERY Right     tibia    HERNIA REPAIR      HYSTERECTOMY  1970    partial    JOINT REPLACEMENT      bilateral knees (), hands, wrists, knuckles, toes    LAPAROSCOPIC NISSEN FUNDOPLICATION      TRANSFORAMINAL EPIDURAL INJECTION OF STEROID Left 2019    Procedure: Left L5/S1 TF AYAAN with local;  Surgeon: Rowdy Felix MD;  Location: [facility];  Service: Pain Management;  Laterality: Left;         Family History:  Family History   Problem Relation Age of Onset    Heart disease Mother     Hyperlipidemia Mother     Hypertension Mother     Osteoarthritis Mother     Cataracts Mother     Hypertension Father     Osteoarthritis Father     Heart disease Father     Asthma Sister     Chronic back pain Sister     Hypertension Sister     Osteoarthritis Sister     Thyroid disease Sister     Asthma Brother     Cancer Brother     Chronic back pain Brother     Diabetes Mellitus Brother     Hypertension Brother     Osteoarthritis Brother     Thyroid disease Brother     Cancer Maternal Grandfather     Fibromyalgia Daughter     Heart disease Maternal Grandmother     Colon cancer Neg Hx     Diabetes Neg Hx        Social History:  Social History     Tobacco Use    Smoking status: Former Smoker     Packs/day: 0.25     Years: 2.00     Pack years: 0.50     Last attempt to quit: 1965     Years since quittin.5    Smokeless tobacco: Never Used   Substance and Sexual Activity    Alcohol use: No    Drug use: No    Sexual activity: Never     Partners: Male       BALDEV    Review of Systems   Constitutional: Negative for chills, diaphoresis, fatigue and fever.   HENT: Negative for sore throat.    Respiratory: Negative for shortness of breath.    Cardiovascular: Negative for chest pain.   Gastrointestinal: Negative for diarrhea, nausea and vomiting.   Genitourinary: Negative for dysuria.   Musculoskeletal: Positive for back pain.   Skin: Positive for color change (cellulitis to back). Negative for rash.   Neurological: Negative for dizziness, seizures, weakness, light-headedness, numbness and headaches.   Hematological: Does not bruise/bleed easily.   All other systems reviewed and are negative.    Physical Exam      Initial Vitals [05/12/20 0933]   BP Pulse Resp Temp SpO2   123/71 105 20 98.8 °F (37.1 °C) 100 %      MAP       --          Physical Exam  Nursing Notes and Vital Signs Reviewed.  Constitutional: Patient is in no acute distress. Elderly.  Head: Atraumatic. Normocephalic.  Eyes: PERRL. EOM intact. Conjunctivae are not pale. No scleral icterus.  ENT: Mucous membranes are moist. Oropharynx is clear and symmetric.    Neck: Supple. Full ROM. No lymphadenopathy.  Cardiovascular: Regular rate. Regular rhythm. No murmurs, rubs, or gallops. Distal pulses are 2+ and symmetric.  Pulmonary/Chest: No respiratory distress. Clear to auscultation bilaterally. No wheezing or rales.  Abdominal: Soft and non-distended.  There is no tenderness.  No rebound, guarding, or rigidity.   Musculoskeletal: Moves all extremities. No obvious deformities. No edema.  Skin: Warm and dry. Multiple areas of induration and erythema to the back. See picture below for further details.  Neurological:  Alert, awake, and appropriate.  Normal speech.  No acute focal neurological deficits are appreciated.  Psychiatric: Normal affect. Good eye contact. Appropriate in content.            ED Course    Critical Care  Date/Time: 5/12/2020 11:58 AM  Performed by: Denzel France MD  Authorized by: Denzel France,  "MD   Direct patient critical care time: 12 minutes  Additional history critical care time: 10 minutes  Ordering / reviewing critical care time: 12 minutes  Documentation critical care time: 11 minutes  Total critical care time (exclusive of procedural time) : 45 minutes  Critical care was necessary to treat or prevent imminent or life-threatening deterioration of the following conditions: sepsis.        ED Vital Signs:  Vitals:    05/12/20 0933 05/12/20 0945 05/12/20 0948 05/12/20 1012   BP: 123/71 (!) 141/74  136/66   Pulse: 105 104 100 97   Resp: 20 19 19   Temp: 98.8 °F (37.1 °C)      TempSrc: Oral      SpO2: 100% 100%  99%   Weight: 47.1 kg (103 lb 13.4 oz)      Height: 5' 2" (1.575 m)       05/12/20 1031 05/12/20 1131 05/12/20 1132   BP: (!) 140/65 (!) 144/64    Pulse: 96 85    Resp: 20 19   Temp:      TempSrc:      SpO2: 99% 100%    Weight:      Height:          Abnormal Lab Results:  Labs Reviewed   CBC W/ AUTO DIFFERENTIAL - Abnormal; Notable for the following components:       Result Value    WBC 30.38 (*)     RBC 2.40 (*)     Hemoglobin 6.7 (*)     Hematocrit 22.8 (*)     Mean Corpuscular Hemoglobin Conc 29.4 (*)     RDW 22.4 (*)     Platelets 56 (*)     nRBC 6 (*)     Gran% 34.0 (*)     Lymph% 50.0 (*)     All other components within normal limits   COMPREHENSIVE METABOLIC PANEL - Abnormal; Notable for the following components:    Sodium 134 (*)     Glucose 116 (*)     Total Protein 8.5 (*)     Albumin 3.4 (*)     Alkaline Phosphatase 303 (*)     All other components within normal limits   CULTURE, BLOOD   CULTURE, BLOOD   LACTIC ACID, PLASMA   PROCALCITONIN   URINALYSIS, REFLEX TO URINE CULTURE   LACTIC ACID, PLASMA   SARS-COV-2 RNA AMPLIFICATION, QUAL        All Lab Results:  Results for orders placed or performed during the hospital encounter of 05/12/20   CBC auto differential   Result Value Ref Range    WBC 30.38 (H) 3.90 - 12.70 K/uL    RBC 2.40 (L) 4.00 - 5.40 M/uL    Hemoglobin 6.7 (L) 12.0 - " 16.0 g/dL    Hematocrit 22.8 (L) 37.0 - 48.5 %    Mean Corpuscular Volume 95 82 - 98 fL    Mean Corpuscular Hemoglobin 27.9 27.0 - 31.0 pg    Mean Corpuscular Hemoglobin Conc 29.4 (L) 32.0 - 36.0 g/dL    RDW 22.4 (H) 11.5 - 14.5 %    Platelets 56 (L) 150 - 350 K/uL    MPV SEE COMMENT 9.2 - 12.9 fL    Immature Granulocytes CANCELED 0.0 - 0.5 %    Immature Grans (Abs) CANCELED 0.00 - 0.04 K/uL    Lymph # CANCELED 1.0 - 4.8 K/uL    Mono # CANCELED 0.3 - 1.0 K/uL    Eos # CANCELED 0.0 - 0.5 K/uL    Baso # CANCELED 0.00 - 0.20 K/uL    nRBC 6 (A) 0 /100 WBC    Gran% 34.0 (L) 38.0 - 73.0 %    Lymph% 50.0 (H) 18.0 - 48.0 %    Mono% 15.0 4.0 - 15.0 %    Eosinophil% 0.0 0.0 - 8.0 %    Basophil% 0.0 0.0 - 1.9 %    Bands 1.0 %    Aniso Slight     Poik Moderate     Poly Occasional     Hypo Occasional     Ovalocytes Occasional     Target Cells Occasional     Tear Drop Cells Occasional     Stomatocytes Present     Spherocytes Occasional     Acanthocytes Present     Schistocytes Present     Differential Method Manual    Comprehensive metabolic panel   Result Value Ref Range    Sodium 134 (L) 136 - 145 mmol/L    Potassium 4.2 3.5 - 5.1 mmol/L    Chloride 99 95 - 110 mmol/L    CO2 23 23 - 29 mmol/L    Glucose 116 (H) 70 - 110 mg/dL    BUN, Bld 16 8 - 23 mg/dL    Creatinine 0.8 0.5 - 1.4 mg/dL    Calcium 9.1 8.7 - 10.5 mg/dL    Total Protein 8.5 (H) 6.0 - 8.4 g/dL    Albumin 3.4 (L) 3.5 - 5.2 g/dL    Total Bilirubin 0.5 0.1 - 1.0 mg/dL    Alkaline Phosphatase 303 (H) 55 - 135 U/L    AST 29 10 - 40 U/L    ALT 41 10 - 44 U/L    Anion Gap 12 8 - 16 mmol/L    eGFR if African American >60 >60 mL/min/1.73 m^2    eGFR if non African American >60 >60 mL/min/1.73 m^2   Lactic acid, plasma #1   Result Value Ref Range    Lactate (Lactic Acid) 1.1 0.5 - 2.2 mmol/L     Imaging Results:  Imaging Results          X-Ray Chest AP Portable (Final result)  Result time 05/12/20 10:40:49    Final result by SANDOR Daniels Sr., MD (05/12/20 10:40:49)                  Impression:      1. There is a subtle amount of haziness in the midportion of the left lung.  This is characteristic of atelectasis or subtle pneumonia.  2. There is a mild amount of levoconvex curvature of the thoracic spine.  3. There are mild osteoarthritic changes in the left glenohumeral joint.  4. There are healed fractures on the right side of the thoracic cage.  .      Electronically signed by: Bin Daniels MD  Date:    05/12/2020  Time:    10:40             Narrative:    EXAMINATION:  XR CHEST AP PORTABLE    CLINICAL HISTORY:  Sepsis;    COMPARISON:  09/05/2019    FINDINGS:  The size of the heart is normal.  There is a subtle amount of haziness in the midportion of the left lung.  There is no focal pulmonary infiltrate visualized in the right lung.  There is no pneumothorax.  The costophrenic angles are sharp.  There is a mild amount of levoconvex curvature of the thoracic spine.  There are healed fractures on the right side of the thoracic cage.  There are mild osteoarthritic changes in the left glenohumeral joint.                               The EKG was ordered, reviewed, and independently interpreted by the ED provider.  Interpretation time: 10:00  Rate: 97 BPM  Rhythm: normal sinus rhythm  Interpretation: RBBB. T wave abnormality, consider inferior ischemia. No STEMI.           The Emergency Provider reviewed the vital signs and test results, which are outlined above.    ED Discussion     11:25 AM: Discussed case with Jean Marie Mott NP ([facility]). Dr. Sloan agrees with current care and management of pt and accepts admission.   Admitting Service: Hospital Medicine  Admitting Physician: Dr. Sloan  Admit to: Obs    11:26 AM: Re-evaluated pt. I have discussed test results, shared treatment plan, and the need for admission with patient at bedside. Pt expresses understanding at this time and agree with all information. All questions answered. Pt has no further questions or  concerns at this time. Pt is ready for admit.         ED Medication(s):  Medications   vancomycin - pharmacy to dose (has no administration in time range)   vancomycin in dextrose 5 % 1 gram/250 mL IVPB 1,000 mg (has no administration in time range)   sodium chloride 0.9% flush 10 mL (has no administration in time range)   glucose chewable tablet 16 g (has no administration in time range)   glucose chewable tablet 24 g (has no administration in time range)   dextrose 10 % infusion (has no administration in time range)   dextrose 10 % infusion (has no administration in time range)   glucagon (human recombinant) injection 1 mg (has no administration in time range)   acetaminophen tablet 650 mg (has no administration in time range)   ondansetron disintegrating tablet 8 mg (has no administration in time range)   ondansetron injection 4 mg (has no administration in time range)   piperacillin-tazobactam 4.5 g in dextrose 5 % 100 mL IVPB (ready to mix system) (has no administration in time range)   sodium chloride 0.9% bolus 1,413 mL (1,413 mLs Intravenous New Bag 5/12/20 1051)   piperacillin-tazobactam 4.5 g in dextrose 5 % 100 mL IVPB (ready to mix system) (0 g Intravenous Stopped 5/12/20 1155)          New Prescriptions    No medications on file         Medical Decision Making    Medical Decision Making:   Clinical Tests:   Lab Tests: Ordered and Reviewed  Radiological Study: Ordered and Reviewed  Medical Tests: Ordered and Reviewed           Scribe Attestation:   Scribe #1: I performed the above scribed service and the documentation accurately describes the services I performed. I attest to the accuracy of the note.    Attending:   Physician Attestation Statement for Scribe #1: I, Denzel France MD, personally performed the services described in this documentation, as scribed by Carlitos Doss, in my presence, and it is both accurate and complete.          Clinical Impression       ICD-10-CM ICD-9-CM   1. Cellulitis of  back except buttock L03.312 682.2   2. Tachycardia R00.0 785.0       Disposition:   Disposition: Placed in Observation  Condition: Fair         Denzel France MD  05/12/20 [id number]

## 2020-05-12 NOTE — PLAN OF CARE
Patient remains injury free.  No signs  or symptoms of distress noted.  Patient denies any pain or discomfort at this time and will continue to be monitored.

## 2020-05-12 NOTE — SUBJECTIVE & OBJECTIVE
No current facility-administered medications on file prior to encounter.      Current Outpatient Medications on File Prior to Encounter   Medication Sig    amitriptyline (ELAVIL) 75 MG tablet TAKE 1 TABLET BY MOUTH EVERY EVENING    calcium citrate-vitamin D (CITRACAL + D) 315-200 mg-unit per tablet Take 1 tablet by mouth once daily.     DULoxetine (CYMBALTA) 20 MG capsule Take 2 capsules (40 mg total) by mouth once daily.    ferrous gluconate 324 mg (37.5 mg iron) Tab tablet Take 1 tablet (324 mg total) by mouth daily with breakfast. (Patient taking differently: Take 324 mg by mouth 2 (two) times daily with meals. )    fluticasone propionate (FLONASE) 50 mcg/actuation nasal spray 2 sprays (100 mcg total) by Each Nostril route once daily.    hydrocodone-acetaminophen 5-325mg (NORCO) 5-325 mg per tablet TK 1 T PO Q 6 H PRN    leucovorin (WELLCOVORIN) 5 mg Tab TAKE ONE WEEKLY ON SATURDAYS    levothyroxine (SYNTHROID) 88 MCG tablet TAKE 1 TABLET BY MOUTH BEFORE BREAKFAST    magnesium oxide (MAG-OX) 400 mg (241.3 mg magnesium) tablet Take 1 tablet (400 mg total) by mouth 3 (three) times daily.    metoprolol succinate (TOPROL-XL) 50 MG 24 hr tablet TAKE 1 TABLET(50 MG) BY MOUTH EVERY DAY    multivitamin capsule Take by mouth. As directed    ondansetron (ZOFRAN) 4 MG tablet Take 1 tablet (4 mg total) by mouth every 8 (eight) hours as needed for Nausea.    pravastatin (PRAVACHOL) 10 MG tablet TAKE 1 TABLET(10 MG) BY MOUTH EVERY DAY    topiramate (TOPAMAX) 25 MG tablet Take 1 tablet (25 mg total) by mouth at night x 1 week then increase to 2 (two) times daily. Increase as tolerated.    valsartan-hydrochlorothiazide (DIOVAN-HCT) 80-12.5 mg per tablet TAKE 1 TABLET BY MOUTH DAILY    albuterol (PROVENTIL) 2.5 mg /3 mL (0.083 %) nebulizer solution Take 3 mLs (2.5 mg total) by nebulization every 6 (six) hours as needed for Wheezing.    benzonatate (TESSALON) 100 MG capsule Take 1-2 capsules (100-200 mg total) by  mouth 3 (three) times daily as needed for Cough.    hydrOXYzine HCl (ATARAX) 25 MG tablet Take 25 mg by mouth nightly.     meclizine (ANTIVERT) 50 MG tablet Take 25 mg by mouth 3 (three) times daily as needed.    prochlorperazine (COMPAZINE) 5 MG tablet TAKE 1 TABLET(5 MG) BY MOUTH EVERY 6 HOURS AS NEEDED FOR NAUSEA    tamsulosin (FLOMAX) 0.4 mg Cap Take 1 capsule (0.4 mg total) by mouth once daily. For urinary retention       Review of patient's allergies indicates:   Allergen Reactions    Codeine      Other reaction(s): hyper  Other reaction(s): hyper    Doxycycline     Gabapentin Other (See Comments)     Bad dreams       Past Medical History:   Diagnosis Date    Acid reflux     Anxiety     Back pain     Bronchitis, chronic obstructive w acute bronchitis 7/29/2016    Cancer     [facility] arms, face- Dr. Lata Tejada    Cataract     2+NS    Degenerative disc disease     Depression     Dry mouth     Hernia, hiatal 11/18/2013    Hypertension     Hypothyroid     Macrocytic anemia 5/3/2016    Macular degeneration     Migraines     Mixed anxiety and depressive disorder     Multiple fractures of ribs of right side     Osteoporosis     Other hyperlipidemia 10/11/2019    Pneumonia     Pneumonia due to other staphylococcus     Rheumatoid arthritis     Rheumatoid arthritis(714.0)     Rheumatoid arthritis(714.0)     Remicade, MTX.     Past Surgical History:   Procedure Laterality Date    APPENDECTOMY  1985    BREAST BIOPSY      CATARACT EXTRACTION Bilateral 6/11/15    Dr. Booth    CHOLECYSTECTOMY  2013    cryoablasion kidney Left 09/27/2016    feet Bilateral     rheumatoid    FRACTURE SURGERY Right     tibia    HERNIA REPAIR      HYSTERECTOMY  1970    partial    JOINT REPLACEMENT      bilateral knees (2008), hands, wrists, knuckles, toes    LAPAROSCOPIC NISSEN FUNDOPLICATION      TRANSFORAMINAL EPIDURAL INJECTION OF STEROID Left 6/25/2019    Procedure: Left L5/S1 TF AYAAN with local;   Surgeon: Rowdy Felix MD;  [facility];  Service: Pain Management;  Laterality: Left;     Family History     Problem Relation (Age of Onset)    Asthma Sister, Brother    Cancer Brother, Maternal Grandfather    Cataracts Mother    Chronic back pain Sister, Brother    Diabetes Mellitus Brother    Fibromyalgia Daughter    Heart disease Mother, Father, Maternal Grandmother    Hyperlipidemia Mother    Hypertension Mother, Father, Sister, Brother    Osteoarthritis Mother, Father, Sister, Brother    Thyroid disease Sister, Brother        Tobacco Use    Smoking status: Former Smoker     Packs/day: 0.25     Years: 2.00     Pack years: 0.50     Last attempt to quit: 1965     Years since quittin.5    Smokeless tobacco: Never Used   Substance and Sexual Activity    Alcohol use: No    Drug use: No    Sexual activity: Never     Partners: Male     Review of Systems   Constitutional: Negative for chills, fatigue, fever and unexpected weight change.   Respiratory: Negative for cough, shortness of breath, wheezing and stridor.    Cardiovascular: Negative for chest pain, palpitations and leg swelling.   Gastrointestinal: Negative for abdominal distention, abdominal pain, constipation, diarrhea, nausea and vomiting.   Genitourinary: Negative for difficulty urinating, dysuria, frequency, hematuria and urgency.   Skin: Positive for color change and wound. Negative for pallor and rash.   Hematological: Does not bruise/bleed easily.     Objective:     Vital Signs (Most Recent):  Temp: 99.4 °F (37.4 °C) (20 1409)  Pulse: 88 (20 1409)  Resp: 20 (20 1409)  BP: (!) 127/58 (20 1409)  SpO2: 99 % (20 1409) Vital Signs (24h Range):  Temp:  [98.5 °F (36.9 °C)-99.4 °F (37.4 °C)] 99.4 °F (37.4 °C)  Pulse:  [] 88  Resp:  [14-20] 20  SpO2:  [97 %-100 %] 99 %  BP: (123-144)/(58-74) 127/58     Weight: 47.1 kg (103 lb 13.4 oz)  Body mass index is 18.99 kg/m².    Physical Exam    Constitutional: She is oriented to person, place, and time. She appears well-developed and well-nourished.   HENT:   Head: Normocephalic and atraumatic.   Eyes: EOM are normal.   Neck: Neck supple.   Cardiovascular: Normal rate and regular rhythm.   Pulmonary/Chest: Effort normal and breath sounds normal.   Abdominal: Soft. Bowel sounds are normal. She exhibits no distension. There is no tenderness.   Neurological: She is alert and oriented to person, place, and time.   Skin: Skin is warm and dry.        Vitals reviewed.      Significant Labs:  CBC:   Recent Labs   Lab 05/12/20  1002   WBC 30.38*   RBC 2.40*   HGB 6.7*   HCT 22.8*   PLT 56*   MCV 95   MCH 27.9   MCHC 29.4*     BMP:   Recent Labs   Lab 05/12/20  1002   *   *   K 4.2   CL 99   CO2 23   BUN 16   CREATININE 0.8   CALCIUM 9.1       Significant Diagnostics:  I have reviewed all pertinent imaging results/findings within the past 24 hours.     CT:  Extensive degenerative changes involving the lumbar spine.  There is evidence of severe spinal stenosis at L4-5 and L3-4.  No evidence of discitis or definite epidural abscess.  There is soft tissue thickening involving the posterior soft tissues with thickening of the skin measuring up to 1 cm.  There is an area of nodular soft tissue thickening left paramedian at the L3 level measuring 2 cm without definite ring enhancement to suggest abscess.

## 2020-05-12 NOTE — LETTER
[id number] [place] [address]  [place] [address]  Phone: [phone]  Fax: [phone] May 15, 2020       Attn: Pre-determination Dept.    RE:  Sarah Parker          OC #: [id number]          : 1940    To Whom It May Concern:  I am sending this letter on behalf of Sarah Parker (a 80 y.o. female) for pre-approval for KCI Wound Vac; specifically due for her extensive tissue defect on her lower back. She just had an extensive debridement yesterday, and is expected to be discharge home with the system. At our facility, we are accustomed to use KCI system and impressed with the effectiveness of the system with phenomenal outcomes. The other brand of wound vac system is not compatible with our KCI system and is expected to compromise the patient care. Thus I am requesting for [employer] to approve the KCI Wound vac system for this unfortunate patient.  Our team is sending this letter to receive pre-approval for the indicated procedure.  Please let us know if you have any questions or require any further information.  Sincerely,      Mckinley Shannon MD, WINSOME, FACS  Surgeon  [facility].

## 2020-05-12 NOTE — ASSESSMENT & PLAN NOTE
- CT scan pending to evaluate further  - General surgery consult pending   - Keep NPO for now pending surgery consult  - Continue IV ABX  - See plan as per above

## 2020-05-12 NOTE — TRANSFER OF CARE
"Anesthesia Transfer of Care Note    Patient: Sarah Parker    Procedure(s) Performed: Procedure(s) (LRB):  INCISION AND DRAINAGE, ABSCESS (N/A)    Patient location: PACU    Anesthesia Type: MAC    Transport from OR: Transported from OR on room air with adequate spontaneous ventilation    Post pain: adequate analgesia    Post assessment: tolerated procedure well and no apparent anesthetic complications    Post vital signs: stable    Level of consciousness: awake    Nausea/Vomiting: no nausea/vomiting    Complications: none    Transfer of care protocol was followed      Last vitals:   Visit Vitals  BP (!) 152/66 (BP Location: Left arm, Patient Position: Lying)   Pulse 92   Temp 37.2 °C (99 °F) (Oral)   Resp 18   Ht 5' 2" (1.575 m)   Wt 47.1 kg (103 lb 13.4 oz)   LMP  (LMP Unknown)   SpO2 (!) 87%   Breastfeeding? No   BMI 18.99 kg/m²     "

## 2020-05-12 NOTE — HPI
"Sarah Parker is a 80 y.o. CF with a PMHx of CML and associated anemia followed outpatient by Dr. Estrada s/p cycle 4 of Azacitidine, HTN, HLD, Depression, Hypothyroidism, and RA who presented to the Emergency Department today with c/o worsening back cellulitis, which onset approximately 1.5 weeks PTA.  Symptoms are constant and moderate in severity.  No mitigating or exacerbating factors reported.  Associated sxs include low back pain.  She denies any known injury or wound stating "all of a sudden a boil came up".  Patient denies any fever, chills, n/v/d, SOB, CP, weakness, numbness, dizziness, headache, and all other sxs at this time.  Patient was put on a course of Bactrim by her PCP, with worsening of symptoms.  No further complaints or concerns at this time.  ER workup showed:  Stable VS.  CBC showed WBCs 30 k (chronically elevated due to CML, baseline appears to be 20-28), Hgb 6.7 (improved from 6.0 yesterday at which time she declined PRBCs), Platelets 56 and consistent with baseline.  CMP showed Alk phos 303, otherwise unremarkable.  Lactic acid 1.1.  Procalcitonin pending.  BC were drawn and patient was given Vanc and Zosyn in the ER.  Hospital Medicine called for admission.  CT scan pending along with General Surgery consult.  Patient will be placed in OBS. She is a Full Code.  Her SDM is her daughter Albina who can be reached at [phone].  "

## 2020-05-12 NOTE — ANESTHESIA POSTPROCEDURE EVALUATION
Anesthesia Post Evaluation    Patient: Sarah Parker    Procedure(s) Performed: Procedure(s) (LRB):  INCISION AND DRAINAGE, ABSCESS (N/A)    Final Anesthesia Type: MAC    Patient location during evaluation: PACU  Patient participation: Yes- Able to Participate  Level of consciousness: awake  Post-procedure vital signs: reviewed and stable  Pain management: adequate  Airway patency: patent    PONV status at discharge: No PONV  Anesthetic complications: no      Cardiovascular status: blood pressure returned to baseline and hemodynamically stable  Respiratory status: unassisted, spontaneous ventilation and room air  Hydration status: euvolemic  Follow-up not needed.          Vitals Value Taken Time   /66 5/12/2020  4:22 PM   Temp 37.2 °C (99 °F) 5/12/2020  4:22 PM   Pulse 92 5/12/2020  4:22 PM   Resp 18 5/12/2020  4:22 PM   SpO2 87 % 5/12/2020  4:22 PM         No case tracking events are documented in the log.      Pain/Diana Score: Pain Rating Prior to Med Admin: 9 (5/12/2020 12:58 PM)

## 2020-05-12 NOTE — ASSESSMENT & PLAN NOTE
- Followed outpatient by Dr. Estrada s/p cycle 4 of Azacitidine  - Chemo currently on hold due to worsening anemia  - Hemoc consulted

## 2020-05-12 NOTE — H&P
"[facility] Hospital Medicine  History & Physical    Patient Name: Sarah Parker  MRN: [id number]  Admission Date: 5/12/2020  Attending Physician: Denzel France MD   Primary Care Provider: Briseida Bennett MD         Patient information was obtained from patient, past medical records and ER records.     Subjective:     Principal Problem:Sepsis    Chief Complaint:   Chief Complaint   Patient presents with    Cellulitis     Arrives to ER with wound to back reports wound was originally abscess that became worse, sent home with oral abx with no improvement instructed to come here by PCP for IV abx. Rates pain 5/10, - fever,  -n/v/d        HPI: Sarah Parker is a 80 y.o.  CF with a PMHx of CML and associated anemia followed outpatient by Dr. Estrada s/p cycle 4 of Azacitidine, HTN, HLD, Depression, Hypothyroidism, and RA who presented to the Emergency Department today with c/o worsening back cellulitis, which onset approximately 1.5 weeks PTA.  Symptoms are constant and moderate in severity.  No mitigating or exacerbating factors reported.  Associated sxs include  low back pain.  She denies any known injury or wound stating "all of a sudden a boil came up".  Patient denies any fever, chills, n/v/d, SOB, CP, weakness, numbness, dizziness, headache, and all other sxs at this time.  Patient was put on a course of Bactrim by her PCP, with worsening of symptoms.  No further complaints or concerns at this time.  ER workup showed:  Stable VS.  CBC showed WBCs 30 k (chronically elevated due to CML, baseline appears to be 20-28), Hgb 6.7 (improved from 6.0 yesterday at which time she declined PRBCs), Platelets 56 and consistent with baseline.  CMP showed Alk phos 303, otherwise unremarkable.  Lactic acid 1.1.  Procalcitonin pending.  BC were drawn and patient was given Vanc and Zosyn in the ER.  Hospital Medicine called for admission.  CT scan pending along with General Surgery consult.  Patient will " be placed in OBS. She is a Full Code.  Her SDM is her daughter Albina who can be reached at [phone].    Past Medical History:   Diagnosis Date    Acid reflux     Anxiety     Back pain     Bronchitis, chronic obstructive w acute bronchitis 7/29/2016    [facility]    Cataract     2+NS    Degenerative disc disease     Depression     Dry mouth     Hernia, hiatal 11/18/2013    Hypertension     Hypothyroid     Macrocytic anemia 5/3/2016    Macular degeneration     Migraines     Mixed anxiety and depressive disorder     Multiple fractures of ribs of right side     Osteoporosis     Other hyperlipidemia 10/11/2019    Pneumonia     Pneumonia due to other staphylococcus     Rheumatoid arthritis     Rheumatoid arthritis(714.0)     Rheumatoid arthritis(714.0)     Remicade, MTX.       Past Surgical History:   Procedure Laterality Date    APPENDECTOMY  1985    BREAST BIOPSY      CATARACT EXTRACTION Bilateral 6/11/15    Dr. Booth    CHOLECYSTECTOMY  2013    cryoablasion kidney Left 09/27/2016    feet Bilateral     rheumatoid    FRACTURE SURGERY Right     tibia    HERNIA REPAIR      HYSTERECTOMY  1970    partial    JOINT REPLACEMENT      bilateral knees (2008), hands, wrists, knuckles, toes    LAPAROSCOPIC NISSEN FUNDOPLICATION      TRANSFORAMINAL EPIDURAL INJECTION OF STEROID Left 6/25/2019    Procedure: Left L5/S1 TF AYAAN with local;  Surgeon: Rowdy Felix MD;  Location: [facility];  Service: Pain Management;  Laterality: Left;       Review of patient's allergies indicates:   Allergen Reactions    Codeine      Other reaction(s): hyper  Other reaction(s): hyper    Doxycycline     Gabapentin Other (See Comments)     Bad dreams       No current facility-administered medications on file prior to encounter.      Current Outpatient Medications on File Prior to Encounter   Medication Sig    amitriptyline (ELAVIL) 75 MG tablet TAKE 1 TABLET BY MOUTH  EVERY EVENING    calcium citrate-vitamin D (CITRACAL + D) 315-200 mg-unit per tablet Take 1 tablet by mouth once daily.     DULoxetine (CYMBALTA) 20 MG capsule Take 2 capsules (40 mg total) by mouth once daily.    ferrous gluconate 324 mg (37.5 mg iron) Tab tablet Take 1 tablet (324 mg total) by mouth daily with breakfast. (Patient taking differently: Take 324 mg by mouth 2 (two) times daily with meals. )    fluticasone propionate (FLONASE) 50 mcg/actuation nasal spray 2 sprays (100 mcg total) by Each Nostril route once daily.    hydrocodone-acetaminophen 5-325mg (NORCO) 5-325 mg per tablet TK 1 T PO Q 6 H PRN    leucovorin (WELLCOVORIN) 5 mg Tab TAKE ONE WEEKLY ON SATURDAYS    levothyroxine (SYNTHROID) 88 MCG tablet TAKE 1 TABLET BY MOUTH BEFORE BREAKFAST    magnesium oxide (MAG-OX) 400 mg (241.3 mg magnesium) tablet Take 1 tablet (400 mg total) by mouth 3 (three) times daily.    metoprolol succinate (TOPROL-XL) 50 MG 24 hr tablet TAKE 1 TABLET(50 MG) BY MOUTH EVERY DAY    multivitamin capsule Take by mouth. As directed    ondansetron (ZOFRAN) 4 MG tablet Take 1 tablet (4 mg total) by mouth every 8 (eight) hours as needed for Nausea.    pravastatin (PRAVACHOL) 10 MG tablet TAKE 1 TABLET(10 MG) BY MOUTH EVERY DAY    topiramate (TOPAMAX) 25 MG tablet Take 1 tablet (25 mg total) by mouth at night x 1 week then increase to 2 (two) times daily. Increase as tolerated.    valsartan-hydrochlorothiazide (DIOVAN-HCT) 80-12.5 mg per tablet TAKE 1 TABLET BY MOUTH DAILY    albuterol (PROVENTIL) 2.5 mg /3 mL (0.083 %) nebulizer solution Take 3 mLs (2.5 mg total) by nebulization every 6 (six) hours as needed for Wheezing.    benzonatate (TESSALON) 100 MG capsule Take 1-2 capsules (100-200 mg total) by mouth 3 (three) times daily as needed for Cough.    hydrOXYzine HCl (ATARAX) 25 MG tablet Take 25 mg by mouth nightly.     meclizine (ANTIVERT) 50 MG tablet Take 25 mg by mouth 3 (three) times daily as needed.     prochlorperazine (COMPAZINE) 5 MG tablet TAKE 1 TABLET(5 MG) BY MOUTH EVERY 6 HOURS AS NEEDED FOR NAUSEA    tamsulosin (FLOMAX) 0.4 mg Cap Take 1 capsule (0.4 mg total) by mouth once daily. For urinary retention     Family History     Problem Relation (Age of Onset)    Asthma Sister, Brother    Cancer Brother, Maternal Grandfather    Cataracts Mother    Chronic back pain Sister, Brother    Diabetes Mellitus Brother    Fibromyalgia Daughter    Heart disease Mother, Father, Maternal Grandmother    Hyperlipidemia Mother    Hypertension Mother, Father, Sister, Brother    Osteoarthritis Mother, Father, Sister, Brother    Thyroid disease Sister, Brother        Tobacco Use    Smoking status: Former Smoker     Packs/day: 0.25     Years: 2.00     Pack years: 0.50     Last attempt to quit: 1965     Years since quittin.5    Smokeless tobacco: Never Used   Substance and Sexual Activity    Alcohol use: No    Drug use: No    Sexual activity: Never     Partners: Male     Review of Systems   Constitutional: Negative for chills, diaphoresis, fatigue and fever.   HENT: Negative for facial swelling, hearing loss, mouth sores, sneezing, sore throat, tinnitus and trouble swallowing.    Eyes: Negative for photophobia, pain, discharge, redness and visual disturbance.   Respiratory: Negative for apnea, cough, choking, chest tightness, shortness of breath, wheezing and stridor.    Cardiovascular: Negative for chest pain, palpitations and leg swelling.   Gastrointestinal: Negative for abdominal distention, abdominal pain, anal bleeding, blood in stool, constipation, diarrhea, nausea, rectal pain and vomiting.   Endocrine: Negative for cold intolerance, heat intolerance, polydipsia, polyphagia and polyuria.   Genitourinary: Negative for difficulty urinating, dysuria, flank pain, frequency, hematuria, pelvic pain, urgency, vaginal bleeding, vaginal discharge and vaginal pain.   Musculoskeletal: Positive for back pain. Negative  for arthralgias, gait problem, myalgias and neck stiffness.   Skin: Positive for color change and wound. Negative for pallor and rash.        + erythema, multiple boils/abscesses.   Allergic/Immunologic: Negative for food allergies.   Neurological: Negative for dizziness, tremors, seizures, syncope, speech difficulty, weakness and headaches.   Hematological: Negative for adenopathy. Does not bruise/bleed easily.   Psychiatric/Behavioral: Negative for behavioral problems and confusion. The patient is not nervous/anxious.    All other systems reviewed and are negative.    Objective:     Vital Signs (Most Recent):  Temp: 98.5 °F (36.9 °C) (05/12/20 1231)  Pulse: 86 (05/12/20 1249)  Resp: 14 (05/12/20 1237)  BP: (!) 135/59 (05/12/20 1249)  SpO2: 97 % (05/12/20 1249) Vital Signs (24h Range):  Temp:  [98.5 °F (36.9 °C)-98.8 °F (37.1 °C)] 98.5 °F (36.9 °C)  Pulse:  [] 86  Resp:  [14-20] 14  SpO2:  [97 %-100 %] 97 %  BP: (123-144)/(59-74) 135/59     Weight: 47.1 kg (103 lb 13.4 oz)  Body mass index is 18.99 kg/m².    Physical Exam   Constitutional: She is oriented to person, place, and time. She appears well-developed and well-nourished.   Thin, cachectic CF resting comfortably in bed NAD.   HENT:   Head: Normocephalic and atraumatic.   Mouth/Throat: Oropharynx is clear and moist.   Eyes: Pupils are equal, round, and reactive to light. Conjunctivae and EOM are normal.   Neck: Normal range of motion. Neck supple.   Cardiovascular: Normal rate, regular rhythm, normal heart sounds and intact distal pulses. Exam reveals no gallop and no friction rub.   No murmur heard.  Pulmonary/Chest: Effort normal and breath sounds normal. No respiratory distress. She has no wheezes. She has no rales. She exhibits no tenderness.   Abdominal: Soft. Bowel sounds are normal. She exhibits no distension and no mass. There is no tenderness.   Musculoskeletal: Normal range of motion. She exhibits no edema or tenderness.   Neurological: She is  alert and oriented to person, place, and time.   Skin: Skin is warm and dry. No rash noted. There is erythema.        Diffuse erythema with multiple abscesses/boils/scabs to lumbar spine region, tender to touch (see pictures). Also with LLE cellulitis with multiple intact scabs (see pictures).   Psychiatric: She has a normal mood and affect. Her behavior is normal. Judgment and thought content normal.   Nursing note and vitals reviewed.        CRANIAL NERVES     CN III, IV, VI   Pupils are equal, round, and reactive to light.  Extraocular motions are normal.        Lumbar region of back        LLE Cellulitis with multiple scabs intact.     Significant Labs:   CBC:   Recent Labs   Lab 05/11/20  0920 05/12/20  1002   WBC 30.84* 30.38*   HGB 6.0* 6.7*   HCT 20.7* 22.8*   PLT 59* 56*     CMP:   Recent Labs   Lab 05/11/20  0920 05/12/20  1002   * 134*   K 4.0 4.2    99   CO2 23 23   * 116*   BUN 16 16   CREATININE 0.9 0.8   CALCIUM 9.0 9.1   PROT 8.0 8.5*   ALBUMIN 3.2* 3.4*   BILITOT 0.5 0.5   ALKPHOS 283* 303*   AST 32 29   ALT 44 41   ANIONGAP 9 12   EGFRNONAA >60 >60     Lactic Acid:   Recent Labs   Lab 05/12/20  1002   LACTATE 1.1       Significant Imaging: I have reviewed all pertinent imaging results/findings within the past 24 hours.    Assessment/Plan:     * Sepsis  - Place in OBS  - Likely secondary to lower back cellulitis/abscess  - HR >90, WBCs 30k  - Lactic acid 1.1  - Given bolus of IVFs per sepsis protocol  - BC drawn and are pending  - Continue IV ABX  - Currently hemodynamically stable      Cellulitis of lower back  - Failed outpatient treatment with bactrim  - Diffuse cellulitis associated with multiple abscesses  - Continue Vanc and Cefepime  - PharmD consulted to assist with Vanc dosing  - CT lumbar spine pending  - General surgery consult pending  - Keep NPO for now pending surgery consult  - Monitor      Abscess of lower back  - CT scan pending to evaluate further  - General  surgery consult pending   - Keep NPO for now pending surgery consult  - Continue IV ABX  - See plan as per above      Chronic myelomonocytic leukemia not having achieved remission  - Followed outpatient by Dr. Estrada s/p cycle 4 of Azacitidine  - Chemo currently on hold due to worsening anemia  - Hemoc consulted      Anemia  - Hgb 6.7  - Will plan to transfuse 1 unit of PRBCs if ok with Hemoc  - Continue home FeSo4  - Daily CBC        Thrombocytopenia  - Platelet count stable and appears at baseline  - No bleeding noted  - Daily CBC      Essential hypertension  - BP under fair control  - Continue home Toprol XL   - Resume Diovan pending BP trends  - Monitor and adjust meds as needed      Acquired hypothyroidism  - Continue home Synthroid      Cachexia  - Dietician consulted for recs      Depression, recurrent  - Continue home meds      Other hyperlipidemia  - Continue home Statin      Rheumatoid arthritis  - Not currently taking any medications except Norco  - Continue Norco prn pain      VTE Risk Mitigation (From admission, onward)         Ordered     IP VTE HIGH RISK PATIENT  Once      05/12/20 1147     Place sequential compression device  Until discontinued      05/12/20 1147     Reason for No Pharmacological VTE Prophylaxis  Once     Question:  Reasons:  Answer:  Thrombocytopenia    05/12/20 1147                   Milagros Arreola, ROCIO, ACNP-BC  Department of Hospital Medicine   [facility]

## 2020-05-12 NOTE — ASSESSMENT & PLAN NOTE
- Failed outpatient treatment with bactrim  - Diffuse cellulitis associated with multiple abscesses  - Continue Vanc and Cefepime  - PharmD consulted to assist with Vanc dosing  - CT lumbar spine pending  - General surgery consult pending  - Keep NPO for now pending surgery consult  - Monitor

## 2020-05-12 NOTE — ASSESSMENT & PLAN NOTE
OR for incision and drainage   Daily wound care  Antibiotics per Medicine  Risks and benefits discussed with patient

## 2020-05-12 NOTE — OP NOTE
[facility]  Surgery Department  Operative Note    SUMMARY     Date of Procedure: 5/12/2020     Procedure: Procedure(s) (LRB):  INCISION AND DRAINAGE multiple back abscesses  Incisional biopsy back inflamed tissues    Surgeon(s) and Role:     * Emerson Hathaway MD - Primary    Assisting Surgeon: None    Pre-Operative Diagnosis: Abscess of lower back [L02.212]    Post-Operative Diagnosis: Post-Op Diagnosis Codes:     * Abscess of lower back [L02.212]    Anesthesia: Monitor Anesthesia Care    Technical Procedures Used:  Incision and drainage multiple back abscesses, incisional biopsy back tissue    Description of the Findings of the Procedure:  Multiple back abscesses      Complications: No    Estimated Blood Loss (EBL):  10 cc           Implants: * No implants in log *    Specimens:   Inflamed back tissue           Condition: Good    Disposition: PACU - hemodynamically stable.    Procedure in detail:  Patient was brought to the OR and monitored anesthesia care.  Her back was prepped and draped in the usual sterile fashion.  Local anesthetic was injected at the abscess sites.  Incisions were made over the 5 abscess cavities.  These were opened up and irrigated until no further pus remained.  The cavities were probed and loculations opened until cavities were fully drain.  Necrotic ulcerated skin was sharply debrided using 15 blade.  Incisional tissue biopsy was taken from the inflamed ulcerated skin.  Hemostasis was noted.  The wounds were packed with quarter-inch gauze.  Sterile dressing was applied and the patient was transferred to recovery in stable satisfactory condition.

## 2020-05-12 NOTE — PROGRESS NOTES
Pharmacokinetic Initial Assessment: IV Vancomycin    Assessment/Plan:    Initiate intravenous vancomycin with loading dose of 1000 mg once with subsequent doses when random concentrations are less than 20 mcg/mL  Desired empiric serum trough concentration is 15 to 20 mcg/mL  Draw vancomycin random level on 5/13/2020 at 1000.  Pharmacy will continue to follow and monitor vancomycin.    Also noted that the patient has received contrast media.  Kidney function will be closely monitored.    Please contact pharmacy at extension [phone] with any questions regarding this assessment.     Thank you for the consult,   Koby Garay       Patient brief summary:  Sarah Parker is a 80 y.o. female initiated on antimicrobial therapy with IV Vancomycin for treatment of suspected bacteremia    Drug Allergies:   Review of patient's allergies indicates:   Allergen Reactions    Codeine      Other reaction(s): hyper  Other reaction(s): hyper    Doxycycline     Gabapentin Other (See Comments)     Bad dreams       Actual Body Weight:   47.1kg    Renal Function:   Estimated Creatinine Clearance: 43.1 mL/min (based on SCr of 0.8 mg/dL).,     Dialysis Method (if applicable):  N/A    CBC (last 72 hours):  Recent Labs   Lab Result Units 05/11/20  0920 05/12/20  1002   WBC K/uL 30.84* 30.38*   Hemoglobin g/dL 6.0* 6.7*   Hematocrit % 20.7* 22.8*   Platelets K/uL 59* 56*   Gran% % 29.0* 34.0*   Lymph% % 70.0* 50.0*   Mono% % 1.0* 15.0   Eosinophil% % 0.0 0.0   Basophil% % 0.0 0.0   Differential Method  Manual Manual       Metabolic Panel (last 72 hours):  Recent Labs   Lab Result Units 05/11/20  0920 05/12/20  1002 05/12/20  1230   Sodium mmol/L 134* 134*  --    Potassium mmol/L 4.0 4.2  --    Chloride mmol/L 102 99  --    CO2 mmol/L 23 23  --    Glucose mg/dL 134* 116*  --    Glucose, UA   --   --  Negative   BUN, Bld mg/dL 16 16  --    Creatinine mg/dL 0.9 0.8  --    Albumin g/dL 3.2* 3.4*  --    Total Bilirubin mg/dL 0.5 0.5  --     Alkaline Phosphatase U/L 283* 303*  --    AST U/L 32 29  --    ALT U/L 44 41  --        Drug levels (last 3 results):  No results for input(s): VANCOMYCINRA, VANCOMYCINPE, VANCOMYCINTR in the last 72 hours.    Microbiologic Results:  Microbiology Results (last 7 days)     Procedure Component Value Units Date/Time    Blood culture x two cultures. Draw prior to antibiotics. [[id number]] Collected:  05/12/20 1000    Order Status:  Sent Specimen:  Blood from Peripheral, Antecubital, Right Updated:  05/12/20 1500    Blood culture x two cultures. Draw prior to antibiotics. [[id number]] Collected:  05/12/20 1006    Order Status:  Sent Specimen:  Blood from Peripheral, Wrist, Left Updated:  05/12/20 1500

## 2020-05-12 NOTE — CONSULTS
[facility] -   General Surgery  Consult Note    Patient Name: Sarah Parker  MRN: [id number]  Code Status: Full Code  Admission Date: 5/12/2020  Hospital Length of Stay: 0 days  Attending Physician: Hari Sloan MD  Primary Care Provider: Briseida Bennett MD    Patient information was obtained from patient.     Inpatient consult to General Surgery  Consult performed by: Emerson Hathaway MD  Consult ordered by: Milagros Arreola NP        Subjective:     Principal Problem: Sepsis    History of Present Illness: 80-year-old female with CML referred for back abscess.  Patient reports 1st noticing a boil on her back approximately a week ago and has been undergoing antibiotic therapy.  Since that time she has noted multiple spots of open ulceration on her back with drainage of purulent fluid.  She underwent CT scan upon admission for IV antibiotics and was noted to have cellulitis of the area but no definitive abscess.  Denies any fevers or chills.    No current facility-administered medications on file prior to encounter.      Current Outpatient Medications on File Prior to Encounter   Medication Sig    amitriptyline (ELAVIL) 75 MG tablet TAKE 1 TABLET BY MOUTH EVERY EVENING    calcium citrate-vitamin D (CITRACAL + D) 315-200 mg-unit per tablet Take 1 tablet by mouth once daily.     DULoxetine (CYMBALTA) 20 MG capsule Take 2 capsules (40 mg total) by mouth once daily.    ferrous gluconate 324 mg (37.5 mg iron) Tab tablet Take 1 tablet (324 mg total) by mouth daily with breakfast. (Patient taking differently: Take 324 mg by mouth 2 (two) times daily with meals. )    fluticasone propionate (FLONASE) 50 mcg/actuation nasal spray 2 sprays (100 mcg total) by Each Nostril route once daily.    hydrocodone-acetaminophen 5-325mg (NORCO) 5-325 mg per tablet TK 1 T PO Q 6 H PRN    leucovorin (WELLCOVORIN) 5 mg Tab TAKE ONE WEEKLY ON SATURDAYS    levothyroxine (SYNTHROID) 88 MCG tablet TAKE 1 TABLET BY  MOUTH BEFORE BREAKFAST    magnesium oxide (MAG-OX) 400 mg (241.3 mg magnesium) tablet Take 1 tablet (400 mg total) by mouth 3 (three) times daily.    metoprolol succinate (TOPROL-XL) 50 MG 24 hr tablet TAKE 1 TABLET(50 MG) BY MOUTH EVERY DAY    multivitamin capsule Take by mouth. As directed    ondansetron (ZOFRAN) 4 MG tablet Take 1 tablet (4 mg total) by mouth every 8 (eight) hours as needed for Nausea.    pravastatin (PRAVACHOL) 10 MG tablet TAKE 1 TABLET(10 MG) BY MOUTH EVERY DAY    topiramate (TOPAMAX) 25 MG tablet Take 1 tablet (25 mg total) by mouth at night x 1 week then increase to 2 (two) times daily. Increase as tolerated.    valsartan-hydrochlorothiazide (DIOVAN-HCT) 80-12.5 mg per tablet TAKE 1 TABLET BY MOUTH DAILY    albuterol (PROVENTIL) 2.5 mg /3 mL (0.083 %) nebulizer solution Take 3 mLs (2.5 mg total) by nebulization every 6 (six) hours as needed for Wheezing.    benzonatate (TESSALON) 100 MG capsule Take 1-2 capsules (100-200 mg total) by mouth 3 (three) times daily as needed for Cough.    hydrOXYzine HCl (ATARAX) 25 MG tablet Take 25 mg by mouth nightly.     meclizine (ANTIVERT) 50 MG tablet Take 25 mg by mouth 3 (three) times daily as needed.    prochlorperazine (COMPAZINE) 5 MG tablet TAKE 1 TABLET(5 MG) BY MOUTH EVERY 6 HOURS AS NEEDED FOR NAUSEA    tamsulosin (FLOMAX) 0.4 mg Cap Take 1 capsule (0.4 mg total) by mouth once daily. For urinary retention       Review of patient's allergies indicates:   Allergen Reactions    Codeine      Other reaction(s): hyper  Other reaction(s): hyper    Doxycycline     Gabapentin Other (See Comments)     Bad dreams       Past Medical History:   Diagnosis Date    Acid reflux     Anxiety     Back pain     Bronchitis, chronic obstructive w acute bronchitis 7/29/2016    Cancer     NMSC arms, face- Dr. Lata Tejada    Cataract     2+NS    Degenerative disc disease     Depression     Dry mouth     Hernia, hiatal 11/18/2013    Hypertension      Hypothyroid     Macrocytic anemia 5/3/2016    Macular degeneration     Migraines     Mixed anxiety and depressive disorder     Multiple fractures of ribs of right side     Osteoporosis     Other hyperlipidemia 10/11/2019    Pneumonia     Pneumonia due to other staphylococcus     Rheumatoid arthritis     Rheumatoid arthritis(714.0)     Rheumatoid arthritis(714.0)     Remicade, MTX.     Past Surgical History:   Procedure Laterality Date    APPENDECTOMY  1985    BREAST BIOPSY      CATARACT EXTRACTION Bilateral 6/11/15    Dr. Booth    CHOLECYSTECTOMY  2013    cryoablasion kidney Left 2016    feet Bilateral     rheumatoid    FRACTURE SURGERY Right     tibia    HERNIA REPAIR      HYSTERECTOMY  1970    partial    JOINT REPLACEMENT      bilateral knees (), hands, wrists, knuckles, toes    LAPAROSCOPIC NISSEN FUNDOPLICATION      TRANSFORAMINAL EPIDURAL INJECTION OF STEROID Left 2019    Procedure: Left L5/S1 TF AYAAN with local;  Surgeon: Rowdy Felix MD;  Location: [facility] [place];  Service: Pain Management;  Laterality: Left;     Family History     Problem Relation (Age of Onset)    Asthma Sister, Brother    Cancer Brother, Maternal Grandfather    Cataracts Mother    Chronic back pain Sister, Brother    Diabetes Mellitus Brother    Fibromyalgia Daughter    Heart disease Mother, Father, Maternal Grandmother    Hyperlipidemia Mother    Hypertension Mother, Father, Sister, Brother    Osteoarthritis Mother, Father, Sister, Brother    Thyroid disease Sister, Brother        Tobacco Use    Smoking status: Former Smoker     Packs/day: 0.25     Years: 2.00     Pack years: 0.50     Last attempt to quit: 1965     Years since quittin.5    Smokeless tobacco: Never Used   Substance and Sexual Activity    Alcohol use: No    Drug use: No    Sexual activity: Never     Partners: Male     Review of Systems   Constitutional: Negative for chills, fatigue, fever and unexpected weight  change.   Respiratory: Negative for cough, shortness of breath, wheezing and stridor.    Cardiovascular: Negative for chest pain, palpitations and leg swelling.   Gastrointestinal: Negative for abdominal distention, abdominal pain, constipation, diarrhea, nausea and vomiting.   Genitourinary: Negative for difficulty urinating, dysuria, frequency, hematuria and urgency.   Skin: Positive for color change and wound. Negative for pallor and rash.   Hematological: Does not bruise/bleed easily.     Objective:     Vital Signs (Most Recent):  Temp: 99.4 °F (37.4 °C) (05/12/20 1409)  Pulse: 88 (05/12/20 1409)  Resp: 20 (05/12/20 1409)  BP: (!) 127/58 (05/12/20 1409)  SpO2: 99 % (05/12/20 1409) Vital Signs (24h Range):  Temp:  [98.5 °F (36.9 °C)-99.4 °F (37.4 °C)] 99.4 °F (37.4 °C)  Pulse:  [] 88  Resp:  [14-20] 20  SpO2:  [97 %-100 %] 99 %  BP: (123-144)/(58-74) 127/58     Weight: 47.1 kg (103 lb 13.4 oz)  Body mass index is 18.99 kg/m².    Physical Exam   Constitutional: She is oriented to person, place, and time. She appears well-developed and well-nourished.   HENT:   Head: Normocephalic and atraumatic.   Eyes: EOM are normal.   Neck: Neck supple.   Cardiovascular: Normal rate and regular rhythm.   Pulmonary/Chest: Effort normal and breath sounds normal.   Abdominal: Soft. Bowel sounds are normal. She exhibits no distension. There is no tenderness.   Neurological: She is alert and oriented to person, place, and time.   Skin: Skin is warm and dry.        Vitals reviewed.      Significant Labs:  CBC:   Recent Labs   Lab 05/12/20  1002   WBC 30.38*   RBC 2.40*   HGB 6.7*   HCT 22.8*   PLT 56*   MCV 95   MCH 27.9   MCHC 29.4*     BMP:   Recent Labs   Lab 05/12/20  1002   *   *   K 4.2   CL 99   CO2 23   BUN 16   CREATININE 0.8   CALCIUM 9.1       Significant Diagnostics:  I have reviewed all pertinent imaging results/findings within the past 24 hours.     CT:  Extensive degenerative changes involving the  lumbar spine.  There is evidence of severe spinal stenosis at L4-5 and L3-4.  No evidence of discitis or definite epidural abscess.  There is soft tissue thickening involving the posterior soft tissues with thickening of the skin measuring up to 1 cm.  There is an area of nodular soft tissue thickening left paramedian at the L3 level measuring 2 cm without definite ring enhancement to suggest abscess.    Assessment/Plan:     Abscess of lower back  OR for incision and drainage   Daily wound care  Antibiotics per Medicine  Risks and benefits discussed with patient              VTE Risk Mitigation (From admission, onward)         Ordered     IP VTE HIGH RISK PATIENT  Once      05/12/20 1147     Place sequential compression device  Until discontinued      05/12/20 1147     Reason for No Pharmacological VTE Prophylaxis  Once     Question:  Reasons:  Answer:  Thrombocytopenia    05/12/20 1147                Thank you for your consult. I will follow-up with patient. Please contact us if you have any additional questions.    Emerson Hathaway MD  General Surgery  [facility] -

## 2020-05-12 NOTE — ASSESSMENT & PLAN NOTE
- BP under fair control  - Continue home Toprol XL   - Resume Diovan pending BP trends  - Monitor and adjust meds as needed

## 2020-05-12 NOTE — ASSESSMENT & PLAN NOTE
- Hgb 6.7  - Will plan to transfuse 1 unit of PRBCs if ok with Hemoc  - Continue home FeSo4  - Daily CBC

## 2020-05-12 NOTE — SUBJECTIVE & OBJECTIVE
Past Medical History:   Diagnosis Date    Acid reflux     Anxiety     Back pain     Bronchitis, chronic obstructive w acute bronchitis 7/29/2016    Cancer     NMSC arms, face- Dr. Lata Tejada    Cataract     2+NS    Degenerative disc disease     Depression     Dry mouth     Hernia, hiatal 11/18/2013    Hypertension     Hypothyroid     Macrocytic anemia 5/3/2016    Macular degeneration     Migraines     Mixed anxiety and depressive disorder     Multiple fractures of ribs of right side     Osteoporosis     Other hyperlipidemia 10/11/2019    Pneumonia     Pneumonia due to other staphylococcus     Rheumatoid arthritis     Rheumatoid arthritis(714.0)     Rheumatoid arthritis(714.0)     Remicade, MTX.       Past Surgical History:   Procedure Laterality Date    APPENDECTOMY  1985    BREAST BIOPSY      CATARACT EXTRACTION Bilateral 6/11/15    Dr. Booth    CHOLECYSTECTOMY  2013    cryoablasion kidney Left 09/27/2016    feet Bilateral     rheumatoid    FRACTURE SURGERY Right     tibia    HERNIA REPAIR      HYSTERECTOMY  1970    partial    JOINT REPLACEMENT      bilateral knees (2008), hands, wrists, knuckles, toes    LAPAROSCOPIC NISSEN FUNDOPLICATION      TRANSFORAMINAL EPIDURAL INJECTION OF STEROID Left 6/25/2019    Procedure: Left L5/S1 TF AYAAN with local;  Surgeon: Rowdy Felix MD;  Location: [facility];  Service: Pain Management;  Laterality: Left;       Review of patient's allergies indicates:   Allergen Reactions    Codeine      Other reaction(s): hyper  Other reaction(s): hyper    Doxycycline     Gabapentin Other (See Comments)     Bad dreams       No current facility-administered medications on file prior to encounter.      Current Outpatient Medications on File Prior to Encounter   Medication Sig    amitriptyline (ELAVIL) 75 MG tablet TAKE 1 TABLET BY MOUTH EVERY EVENING    calcium citrate-vitamin D (CITRACAL + D) 315-200 mg-unit per tablet Take 1 tablet by mouth once  daily.     DULoxetine (CYMBALTA) 20 MG capsule Take 2 capsules (40 mg total) by mouth once daily.    ferrous gluconate 324 mg (37.5 mg iron) Tab tablet Take 1 tablet (324 mg total) by mouth daily with breakfast. (Patient taking differently: Take 324 mg by mouth 2 (two) times daily with meals. )    fluticasone propionate (FLONASE) 50 mcg/actuation nasal spray 2 sprays (100 mcg total) by Each Nostril route once daily.    hydrocodone-acetaminophen 5-325mg (NORCO) 5-325 mg per tablet TK 1 T PO Q 6 H PRN    leucovorin (WELLCOVORIN) 5 mg Tab TAKE ONE WEEKLY ON SATURDAYS    levothyroxine (SYNTHROID) 88 MCG tablet TAKE 1 TABLET BY MOUTH BEFORE BREAKFAST    magnesium oxide (MAG-OX) 400 mg (241.3 mg magnesium) tablet Take 1 tablet (400 mg total) by mouth 3 (three) times daily.    metoprolol succinate (TOPROL-XL) 50 MG 24 hr tablet TAKE 1 TABLET(50 MG) BY MOUTH EVERY DAY    multivitamin capsule Take by mouth. As directed    ondansetron (ZOFRAN) 4 MG tablet Take 1 tablet (4 mg total) by mouth every 8 (eight) hours as needed for Nausea.    pravastatin (PRAVACHOL) 10 MG tablet TAKE 1 TABLET(10 MG) BY MOUTH EVERY DAY    topiramate (TOPAMAX) 25 MG tablet Take 1 tablet (25 mg total) by mouth at night x 1 week then increase to 2 (two) times daily. Increase as tolerated.    valsartan-hydrochlorothiazide (DIOVAN-HCT) 80-12.5 mg per tablet TAKE 1 TABLET BY MOUTH DAILY    albuterol (PROVENTIL) 2.5 mg /3 mL (0.083 %) nebulizer solution Take 3 mLs (2.5 mg total) by nebulization every 6 (six) hours as needed for Wheezing.    benzonatate (TESSALON) 100 MG capsule Take 1-2 capsules (100-200 mg total) by mouth 3 (three) times daily as needed for Cough.    hydrOXYzine HCl (ATARAX) 25 MG tablet Take 25 mg by mouth nightly.     meclizine (ANTIVERT) 50 MG tablet Take 25 mg by mouth 3 (three) times daily as needed.    prochlorperazine (COMPAZINE) 5 MG tablet TAKE 1 TABLET(5 MG) BY MOUTH EVERY 6 HOURS AS NEEDED FOR NAUSEA     tamsulosin (FLOMAX) 0.4 mg Cap Take 1 capsule (0.4 mg total) by mouth once daily. For urinary retention     Family History     Problem Relation (Age of Onset)    Asthma Sister, Brother    Cancer Brother, Maternal Grandfather    Cataracts Mother    Chronic back pain Sister, Brother    Diabetes Mellitus Brother    Fibromyalgia Daughter    Heart disease Mother, Father, Maternal Grandmother    Hyperlipidemia Mother    Hypertension Mother, Father, Sister, Brother    Osteoarthritis Mother, Father, Sister, Brother    Thyroid disease Sister, Brother        Tobacco Use    Smoking status: Former Smoker     Packs/day: 0.25     Years: 2.00     Pack years: 0.50     Last attempt to quit: 1965     Years since quittin.5    Smokeless tobacco: Never Used   Substance and Sexual Activity    Alcohol use: No    Drug use: No    Sexual activity: Never     Partners: Male     Review of Systems   Constitutional: Negative for chills, diaphoresis, fatigue and fever.   HENT: Negative for facial swelling, hearing loss, mouth sores, sneezing, sore throat, tinnitus and trouble swallowing.    Eyes: Negative for photophobia, pain, discharge, redness and visual disturbance.   Respiratory: Negative for apnea, cough, choking, chest tightness, shortness of breath, wheezing and stridor.    Cardiovascular: Negative for chest pain, palpitations and leg swelling.   Gastrointestinal: Negative for abdominal distention, abdominal pain, anal bleeding, blood in stool, constipation, diarrhea, nausea, rectal pain and vomiting.   Endocrine: Negative for cold intolerance, heat intolerance, polydipsia, polyphagia and polyuria.   Genitourinary: Negative for difficulty urinating, dysuria, flank pain, frequency, hematuria, pelvic pain, urgency, vaginal bleeding, vaginal discharge and vaginal pain.   Musculoskeletal: Positive for back pain. Negative for arthralgias, gait problem, myalgias and neck stiffness.   Skin: Positive for color change and wound.  Negative for pallor and rash.        + erythema, multiple boils/abscesses.   Allergic/Immunologic: Negative for food allergies.   Neurological: Negative for dizziness, tremors, seizures, syncope, speech difficulty, weakness and headaches.   Hematological: Negative for adenopathy. Does not bruise/bleed easily.   Psychiatric/Behavioral: Negative for behavioral problems and confusion. The patient is not nervous/anxious.    All other systems reviewed and are negative.    Objective:     Vital Signs (Most Recent):  Temp: 98.5 °F (36.9 °C) (05/12/20 1231)  Pulse: 86 (05/12/20 1249)  Resp: 14 (05/12/20 1237)  BP: (!) 135/59 (05/12/20 1249)  SpO2: 97 % (05/12/20 1249) Vital Signs (24h Range):  Temp:  [98.5 °F (36.9 °C)-98.8 °F (37.1 °C)] 98.5 °F (36.9 °C)  Pulse:  [] 86  Resp:  [14-20] 14  SpO2:  [97 %-100 %] 97 %  BP: (123-144)/(59-74) 135/59     Weight: 47.1 kg (103 lb 13.4 oz)  Body mass index is 18.99 kg/m².    Physical Exam   Constitutional: She is oriented to person, place, and time. She appears well-developed and well-nourished.   Thin, cachectic CF resting comfortably in bed NAD.   HENT:   Head: Normocephalic and atraumatic.   Mouth/Throat: Oropharynx is clear and moist.   Eyes: Pupils are equal, round, and reactive to light. Conjunctivae and EOM are normal.   Neck: Normal range of motion. Neck supple.   Cardiovascular: Normal rate, regular rhythm, normal heart sounds and intact distal pulses. Exam reveals no gallop and no friction rub.   No murmur heard.  Pulmonary/Chest: Effort normal and breath sounds normal. No respiratory distress. She has no wheezes. She has no rales. She exhibits no tenderness.   Abdominal: Soft. Bowel sounds are normal. She exhibits no distension and no mass. There is no tenderness.   Musculoskeletal: Normal range of motion. She exhibits no edema or tenderness.   Neurological: She is alert and oriented to person, place, and time.   Skin: Skin is warm and dry. No rash noted. There is  erythema.        Diffuse erythema with multiple abscesses/boils/scabs to lumbar spine region, tender to touch (see pictures). Also with LLE cellulitis with multiple intact scabs (see pictures).   Psychiatric: She has a normal mood and affect. Her behavior is normal. Judgment and thought content normal.   Nursing note and vitals reviewed.        CRANIAL NERVES     CN III, IV, VI   Pupils are equal, round, and reactive to light.  Extraocular motions are normal.        Lumbar region of back        LLE Cellulitis with multiple scabs intact.     Significant Labs:   CBC:   Recent Labs   Lab 05/11/20  0920 05/12/20  1002   WBC 30.84* 30.38*   HGB 6.0* 6.7*   HCT 20.7* 22.8*   PLT 59* 56*     CMP:   Recent Labs   Lab 05/11/20  0920 05/12/20  1002   * 134*   K 4.0 4.2    99   CO2 23 23   * 116*   BUN 16 16   CREATININE 0.9 0.8   CALCIUM 9.0 9.1   PROT 8.0 8.5*   ALBUMIN 3.2* 3.4*   BILITOT 0.5 0.5   ALKPHOS 283* 303*   AST 32 29   ALT 44 41   ANIONGAP 9 12   EGFRNONAA >60 >60     Lactic Acid:   Recent Labs   Lab 05/12/20  1002   LACTATE 1.1       Significant Imaging: I have reviewed all pertinent imaging results/findings within the past 24 hours.

## 2020-05-13 LAB
ABO + RH BLD: NORMAL
ACANTHOCYTES BLD QL SMEAR: PRESENT
ANION GAP SERPL CALC-SCNC: 8 MMOL/L (ref 8–16)
ANISOCYTOSIS BLD QL SMEAR: SLIGHT
BASOPHILS # BLD AUTO: ABNORMAL K/UL (ref 0–0.2)
BASOPHILS NFR BLD: 0 % (ref 0–1.9)
BLASTS NFR BLD MANUAL: 3 %
BLD GP AB SCN CELLS X3 SERPL QL: NORMAL
BLD PROD TYP BPU: NORMAL
BLOOD UNIT EXPIRATION DATE: NORMAL
BLOOD UNIT TYPE CODE: 5100
BLOOD UNIT TYPE: NORMAL
BUN SERPL-MCNC: 13 MG/DL (ref 8–23)
BURR CELLS BLD QL SMEAR: ABNORMAL
CALCIUM SERPL-MCNC: 8.5 MG/DL (ref 8.7–10.5)
CHLORIDE SERPL-SCNC: 101 MMOL/L (ref 95–110)
CO2 SERPL-SCNC: 24 MMOL/L (ref 23–29)
CODING SYSTEM: NORMAL
CREAT SERPL-MCNC: 0.8 MG/DL (ref 0.5–1.4)
DACRYOCYTES BLD QL SMEAR: ABNORMAL
DIFFERENTIAL METHOD: ABNORMAL
DISPENSE STATUS: NORMAL
EOSINOPHIL # BLD AUTO: ABNORMAL K/UL (ref 0–0.5)
EOSINOPHIL NFR BLD: 1 % (ref 0–8)
ERYTHROCYTE [DISTWIDTH] IN BLOOD BY AUTOMATED COUNT: 20.3 % (ref 11.5–14.5)
EST. GFR  (AFRICAN AMERICAN): >60 ML/MIN/1.73 M^2
EST. GFR  (NON AFRICAN AMERICAN): >60 ML/MIN/1.73 M^2
GLUCOSE SERPL-MCNC: 102 MG/DL (ref 70–110)
HCT VFR BLD AUTO: 22.5 % (ref 37–48.5)
HGB BLD-MCNC: 6.7 G/DL (ref 12–16)
HYPOCHROMIA BLD QL SMEAR: ABNORMAL
IMM GRANULOCYTES # BLD AUTO: ABNORMAL K/UL (ref 0–0.04)
IMM GRANULOCYTES NFR BLD AUTO: ABNORMAL % (ref 0–0.5)
LYMPHOCYTES # BLD AUTO: ABNORMAL K/UL (ref 1–4.8)
LYMPHOCYTES NFR BLD: 35 % (ref 18–48)
MCH RBC QN AUTO: 27.9 PG (ref 27–31)
MCHC RBC AUTO-ENTMCNC: 29.8 G/DL (ref 32–36)
MCV RBC AUTO: 94 FL (ref 82–98)
METAMYELOCYTES NFR BLD MANUAL: 2 %
MONOCYTES # BLD AUTO: ABNORMAL K/UL (ref 0.3–1)
MONOCYTES NFR BLD: 9 % (ref 4–15)
NEUTROPHILS NFR BLD: 49 % (ref 38–73)
NEUTS BAND NFR BLD MANUAL: 1 %
NRBC BLD-RTO: 5 /100 WBC
NUM UNITS TRANS PACKED RBC: NORMAL
OVALOCYTES BLD QL SMEAR: ABNORMAL
PLATELET # BLD AUTO: 41 K/UL (ref 150–350)
PMV BLD AUTO: ABNORMAL FL (ref 9.2–12.9)
POIKILOCYTOSIS BLD QL SMEAR: ABNORMAL
POTASSIUM SERPL-SCNC: 4.6 MMOL/L (ref 3.5–5.1)
RBC # BLD AUTO: 2.4 M/UL (ref 4–5.4)
SCHISTOCYTES BLD QL SMEAR: PRESENT
SODIUM SERPL-SCNC: 133 MMOL/L (ref 136–145)
STOMATOCYTES BLD QL SMEAR: PRESENT
TARGETS BLD QL SMEAR: ABNORMAL
VANCOMYCIN SERPL-MCNC: 7.2 UG/ML
WBC # BLD AUTO: 26.47 K/UL (ref 3.9–12.7)

## 2020-05-13 PROCEDURE — 80202 ASSAY OF VANCOMYCIN: CPT | Mod: HCNC

## 2020-05-13 PROCEDURE — 25000003 PHARM REV CODE 250: Mod: HCNC | Performed by: SURGERY

## 2020-05-13 PROCEDURE — 99233 PR SUBSEQUENT HOSPITAL CARE,LEVL III: ICD-10-PCS | Mod: HCNC,,, | Performed by: INTERNAL MEDICINE

## 2020-05-13 PROCEDURE — 85027 COMPLETE CBC AUTOMATED: CPT | Mod: HCNC

## 2020-05-13 PROCEDURE — 63600175 PHARM REV CODE 636 W HCPCS: Mod: HCNC | Performed by: INTERNAL MEDICINE

## 2020-05-13 PROCEDURE — 85007 BL SMEAR W/DIFF WBC COUNT: CPT | Mod: HCNC

## 2020-05-13 PROCEDURE — 99233 SBSQ HOSP IP/OBS HIGH 50: CPT | Mod: HCNC,,, | Performed by: INTERNAL MEDICINE

## 2020-05-13 PROCEDURE — 25000003 PHARM REV CODE 250: Mod: HCNC | Performed by: INTERNAL MEDICINE

## 2020-05-13 PROCEDURE — 21400001 HC TELEMETRY ROOM: Mod: HCNC

## 2020-05-13 PROCEDURE — 80048 BASIC METABOLIC PNL TOTAL CA: CPT | Mod: HCNC

## 2020-05-13 PROCEDURE — 63600175 PHARM REV CODE 636 W HCPCS: Mod: HCNC | Performed by: SURGERY

## 2020-05-13 PROCEDURE — P9040 RBC LEUKOREDUCED IRRADIATED: HCPCS | Mod: HCNC

## 2020-05-13 RX ORDER — HYDROCODONE BITARTRATE AND ACETAMINOPHEN 500; 5 MG/1; MG/1
TABLET ORAL
Status: DISCONTINUED | OUTPATIENT
Start: 2020-05-13 | End: 2020-05-14 | Stop reason: SDUPTHER

## 2020-05-13 RX ORDER — SODIUM CHLORIDE 9 MG/ML
INJECTION, SOLUTION INTRAVENOUS CONTINUOUS
Status: DISCONTINUED | OUTPATIENT
Start: 2020-05-13 | End: 2020-05-13

## 2020-05-13 RX ADMIN — SODIUM CHLORIDE: 0.9 INJECTION, SOLUTION INTRAVENOUS at 11:05

## 2020-05-13 RX ADMIN — SODIUM CHLORIDE: 0.9 INJECTION, SOLUTION INTRAVENOUS at 04:05

## 2020-05-13 RX ADMIN — METOPROLOL SUCCINATE 50 MG: 50 TABLET, EXTENDED RELEASE ORAL at 08:05

## 2020-05-13 RX ADMIN — HYDROCODONE BITARTRATE AND ACETAMINOPHEN 1 TABLET: 7.5; 325 TABLET ORAL at 09:05

## 2020-05-13 RX ADMIN — Medication 400 MG: at 02:05

## 2020-05-13 RX ADMIN — VANCOMYCIN HYDROCHLORIDE 750 MG: 750 INJECTION, POWDER, LYOPHILIZED, FOR SOLUTION INTRAVENOUS at 11:05

## 2020-05-13 RX ADMIN — TAMSULOSIN HYDROCHLORIDE 0.4 MG: 0.4 CAPSULE ORAL at 08:05

## 2020-05-13 RX ADMIN — CEFEPIME HYDROCHLORIDE 2 G: 2 INJECTION, SOLUTION INTRAVENOUS at 08:05

## 2020-05-13 RX ADMIN — Medication 400 MG: at 08:05

## 2020-05-13 RX ADMIN — PRAVASTATIN SODIUM 10 MG: 10 TABLET ORAL at 08:05

## 2020-05-13 RX ADMIN — DULOXETINE HYDROCHLORIDE 40 MG: 20 CAPSULE, DELAYED RELEASE ORAL at 08:05

## 2020-05-13 RX ADMIN — AMITRIPTYLINE HYDROCHLORIDE 75 MG: 50 TABLET, FILM COATED ORAL at 09:05

## 2020-05-13 RX ADMIN — FERROUS GLUCONATE TAB 324 MG (37.5 MG ELEMENTAL IRON) 324 MG: 324 (37.5 FE) TAB at 08:05

## 2020-05-13 RX ADMIN — TOPIRAMATE 25 MG: 25 TABLET, FILM COATED ORAL at 08:05

## 2020-05-13 RX ADMIN — HYDROCODONE BITARTRATE AND ACETAMINOPHEN 1 TABLET: 7.5; 325 TABLET ORAL at 05:05

## 2020-05-13 RX ADMIN — TOPIRAMATE 25 MG: 25 TABLET, FILM COATED ORAL at 09:05

## 2020-05-13 RX ADMIN — HYDROCODONE BITARTRATE AND ACETAMINOPHEN 1 TABLET: 7.5; 325 TABLET ORAL at 02:05

## 2020-05-13 RX ADMIN — FERROUS GLUCONATE TAB 324 MG (37.5 MG ELEMENTAL IRON) 324 MG: 324 (37.5 FE) TAB at 04:05

## 2020-05-13 RX ADMIN — Medication 400 MG: at 09:05

## 2020-05-13 RX ADMIN — LEVOTHYROXINE SODIUM 88 MCG: 88 TABLET ORAL at 05:05

## 2020-05-13 NOTE — HOSPITAL COURSE
5/13- Pt states pain in the abscess area is better. S/P  incision and drainage of back abscess yesterday . Afebrile . Vitals are fairly stable . Tissue gram stain resulted few gram positive cocci . Blood culture x 2  no growth to date. Current antibiotic - Cefepime and Vancomycin IV. Labs - WBC trended down 26.4 ( near baseline ) , Hb 6.7, Pl 41. Will have 2 units of irradiated P-RBC transfusion today.     5/14- Pt has no new C/O . Awake , alert and oriented. Remains afebrile . BP is elevated noted . Review home meds and resume as appropriate. WBC 20 K( at baseline) , Hgb 9.4 ( s/p 2 units P-RBC yesterday) . Pl down to 13. No signs of active bleeding. Hematology recommended to transfuse one dose of Pl today . Tissue culture resulted growth of Staph Aureus . Final susceptibility is pending . DC Cefepime . Continue Vancomycin. On exam,  some induration palpated in the area next to previous I&D. Wound care on board. Will speak to General Surgery.     5/15- Remains afebrile. Underwent 2nd I&D and wound vac placement by Dr. Shannon last night . Pt states feeling better . Denies pain to her back. Vitals are stable . Labs resulted WBC 22.8, Hbg 8.6> 9.4, Pl 69. Wound culture resulted Staph Aureus - susceptibility is pending . Possible DC home with wound vac and oral antibiotic once susceptibility obtained .     Addendum- Wound vac delivered at bedside . Susceptibility resulted growth of Staph Aureus sensitive to clindamycin , bactrim and Tetracycline. At this point decision is made to discharge pt home with oral Doxycyline and wound vac with home health service for wound vac care. Pt is examined before discharge and deemed stable and suitable for discharge.

## 2020-05-13 NOTE — PROGRESS NOTES
Pharmacokinetic Assessment Follow Up: IV Vancomycin    Vancomycin serum concentration assessment(s):    The random level was drawn correctly and can be used to guide therapy at this time. The measurement is below the desired definitive target range of 10 to 15 mcg/mL.    Vancomycin Regimen Plan:    A 750 mg dose was given this morning. Continue to pulse dose. Next serum trough concentration measured at 1045 prior to dose on 5/14.    Drug levels (last 3 results):  Recent Labs   Lab Result Units 05/13/20  0749   Vancomycin, Random ug/mL 7.2       Pharmacy will continue to follow and monitor vancomycin.    Please contact pharmacy at extension [phone] for questions regarding this assessment.    Thank you for the consult,   Cruzito Romo PharmD 5/13/2020 9:41 AM       Patient brief summary:  Sarah Parker is a 80 y.o. female initiated on antimicrobial therapy with IV Vancomycin for treatment of skin & soft tissue infection.    The patient is being pulse dosed based on random vancomycin levels.    Drug Allergies:   Review of patient's allergies indicates:   Allergen Reactions    Codeine      Other reaction(s): hyper  Other reaction(s): hyper    Doxycycline     Gabapentin Other (See Comments)     Bad dreams       Actual Body Weight:   47.1 kg    Renal Function:   Estimated Creatinine Clearance: 43.1 mL/min (based on SCr of 0.8 mg/dL).,     Dialysis Method (if applicable):  No dialysis    CBC (last 72 hours):  Recent Labs   Lab Result Units 05/11/20 0920 05/12/20  1002 05/13/20  0749   WBC K/uL 30.84* 30.38* 26.47*   Hemoglobin g/dL 6.0* 6.7* 6.7*   Hematocrit % 20.7* 22.8* 22.5*   Platelets K/uL 59* 56* 41*   Gran% % 29.0* 34.0* 49.0   Lymph% % 70.0* 50.0* 35.0   Mono% % 1.0* 15.0 9.0   Eosinophil% % 0.0 0.0 1.0   Basophil% % 0.0 0.0 0.0   Differential Method  Manual Manual Manual       Metabolic Panel (last 72 hours):  Recent Labs   Lab Result Units 05/11/20  0920 05/12/20  1002 05/12/20  1230 05/13/20  0749   Sodium  mmol/L 134* 134*  --  133*   Potassium mmol/L 4.0 4.2  --  4.6   Chloride mmol/L 102 99  --  101   CO2 mmol/L 23 23  --  24   Glucose mg/dL 134* 116*  --  102   Glucose, UA   --   --  Negative  --    BUN, Bld mg/dL 16 16  --  13   Creatinine mg/dL 0.9 0.8  --  0.8   Albumin g/dL 3.2* 3.4*  --   --    Total Bilirubin mg/dL 0.5 0.5  --   --    Alkaline Phosphatase U/L 283* 303*  --   --    AST U/L 32 29  --   --    ALT U/L 44 41  --   --        Vancomycin Administrations:  vancomycin given in the last 96 hours                   vancomycin in dextrose 5 % 1 gram/250 mL IVPB 1,000 mg ()  Restarted 05/12/20 1241     1,000 mg New Bag  1208                Microbiologic Results:  Microbiology Results (last 7 days)     Procedure Component Value Units Date/Time    Tissue culture [[id number]] Collected:  05/12/20 1754    Order Status:  Completed Specimen:  Biopsy from Back, Left Updated:  05/13/20 0531     Gram Stain Result Rare WBC's      Few Gram positive cocci    Aerobic culture [[id number]] Collected:  05/12/20 1755    Order Status:  Sent Specimen:  Wound from Back Updated:  05/13/20 0207    Culture, Anaerobe [[id number]] Collected:  05/12/20 1755    Order Status:  Sent Specimen:  Wound from Back Updated:  05/13/20 0206    Fungus culture [[id number]] Collected:  05/12/20 1755    Order Status:  Sent Specimen:  Wound from Back Updated:  05/13/20 0206    Blood culture x two cultures. Draw prior to antibiotics. [[id number]] Collected:  05/12/20 1000    Order Status:  Completed Specimen:  Blood from Peripheral, Antecubital, Right Updated:  05/13/20 0115     Blood Culture, Routine No Growth to date    Narrative:       Aerobic and anaerobic    Blood culture x two cultures. Draw prior to antibiotics. [[id number]] Collected:  05/12/20 1006    Order Status:  Completed Specimen:  Blood from Peripheral, Wrist, Left Updated:  05/13/20 0115     Blood Culture, Routine No Growth to date    Narrative:       Aerobic and anaerobic    Gram stain  [[id number]] Collected:  05/12/20 1752    Order Status:  Canceled Specimen:  Wound from Back

## 2020-05-13 NOTE — CONSULTS
Consulted to this 80 year old female patient who is s/p I&D for back abscess. PMHx of CML and associated anemia followed outpatient by Dr. Estrada s/p cycle 4 of Azacitidine, HTN, HLD, Depression, Hypothyroidism, and RA who presented to the Emergency Department today with c/o worsening back cellulitis, which onset approximately 1.5 weeks PTA. Patient underwent I&D yesterday. Patient is awake and alert, lying on right side. Sacrum, buttocks, coccyx and heels intact with no redness. Patient has large indurated are to her lower back with several I&D sites within. The deepest site is about 3 cm. Several of the sites are filled with adherent yellow slough. Wound edges are deep maroon/purple. Periwound is reddened and indurated. All cleansed well with saline and patted dry. 5 of the sites were packed with 1/4 inch plain packing strip moistened with saline. Padded with ABD and secured with tape. Also noted are several scabs to the left shin. LLE with hemosiderin staining. One of the scabs noted to have some purulent drainage. Patient reports these occurred while she was cutting grass a few weeks ago and a tree branch scratched her, however they are very similar in appearance to lower back wounds prior to I&D site. Cleansed well with saline and patted dry. Covered with foam dressing. Patient tolerated well. Will continue to follow.     I&D sites to lower back:  1. Cleanse with saline  2. Pat dry  3. Pack wounds with 1/4 inch plain packing strip moistened with saline  4. Cover with ABD and secure with tape  5. Change daily     LLE wounds:  1. Cleanse with saline  2. Pat dry  3. Cover with foam dressing  4. Change every 3-4 days or prn excess drainage

## 2020-05-13 NOTE — PLAN OF CARE
Plan of care reviewed and discussed with patient.  No acute distress noted.  Safety and fall precautions in place.  IV hydration initiated.  Wound care consult to see patient, dressing changed per wound care staff.  Pain managed with non-analgesic measures.  Will continue to monitor.

## 2020-05-13 NOTE — ASSESSMENT & PLAN NOTE
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 Heme/Onc recommended 2 additional units  of irradiated P-RBC trans today

## 2020-05-13 NOTE — SUBJECTIVE & OBJECTIVE
Interval History:  Status post incision drainage of multiple abscess sites yesterday.  Patient reports pain improved    Medications:  Continuous Infusions:  Scheduled Meds:   amitriptyline  75 mg Oral QHS    ceFEPime (MAXIPIME) IVPB  2 g Intravenous Q12H    DULoxetine  40 mg Oral Daily    ferrous gluconate  324 mg Oral BID WM    levothyroxine  88 mcg Oral Before breakfast    magnesium oxide  400 mg Oral TID    metoprolol succinate  50 mg Oral Daily    pravastatin  10 mg Oral Daily    tamsulosin  0.4 mg Oral Daily    topiramate  25 mg Oral BID     PRN Meds:sodium chloride, sodium chloride, acetaminophen, dextrose 10 % in water (D10W), dextrose 10 % in water (D10W), glucagon (human recombinant), glucose, glucose, HYDROcodone-acetaminophen, HYDROcodone-acetaminophen, ondansetron, ondansetron, sodium chloride 0.9%, Pharmacy to dose Vancomycin consult **AND** vancomycin - pharmacy to dose     Review of patient's allergies indicates:   Allergen Reactions    Codeine      Other reaction(s): hyper  Other reaction(s): hyper    Doxycycline     Gabapentin Other (See Comments)     Bad dreams     Objective:     Vital Signs (Most Recent):  Temp: 97.6 °F (36.4 °C) (05/13/20 1219)  Pulse: 81 (05/13/20 1219)  Resp: 18 (05/13/20 1219)  BP: (!) 130/59 (05/13/20 1219)  SpO2: 98 % (05/13/20 1219) Vital Signs (24h Range):  Temp:  [97.6 °F (36.4 °C)-99.7 °F (37.6 °C)] 97.6 °F (36.4 °C)  Pulse:  [81-98] 81  Resp:  [15-21] 18  SpO2:  [87 %-100 %] 98 %  BP: (106-175)/(51-75) 130/59     Weight: 47.1 kg (103 lb 13.4 oz)  Body mass index is 18.99 kg/m².    Intake/Output - Last 3 Shifts       05/11 0700 - 05/12 0659 05/12 0700 - 05/13 0659 05/13 0700 - 05/14 0659    P.O.  390 120    I.V. (mL/kg)  450 (9.6)     Blood  441.7     IV Piggyback  1563     Total Intake(mL/kg)  2844.7 (60.4) 120 (2.5)    Net  +2844.7 +120           Urine Occurrence  2 x 2 x          Physical Exam   Constitutional: She is oriented to person, place, and time.  She appears well-developed and well-nourished.   HENT:   Head: Normocephalic and atraumatic.   Eyes: EOM are normal.   Neck: Neck supple.   Cardiovascular: Normal rate and regular rhythm.   Pulmonary/Chest: Effort normal and breath sounds normal.   Abdominal: Soft. Bowel sounds are normal. She exhibits no distension. There is no tenderness.   Neurological: She is alert and oriented to person, place, and time.   Skin: Skin is warm and dry.        Vitals reviewed.      Significant Labs:  CBC:   Recent Labs   Lab 05/13/20  0749   WBC 26.47*   RBC 2.40*   HGB 6.7*   HCT 22.5*   PLT 41*   MCV 94   MCH 27.9   MCHC 29.8*     BMP:   Recent Labs   Lab 05/13/20  0749      *   K 4.6      CO2 24   BUN 13   CREATININE 0.8   CALCIUM 8.5*

## 2020-05-13 NOTE — CONSULTS
[facility]  Hematology/Oncology  Consult Note    Patient Name: Sarah Parker  MRN: [id number]  Admission Date: 5/12/2020  Hospital Length of Stay: 0 days  Code Status: Full Code   Attending Provider: Hari Slaon MD  Consulting Provider: Christiano Colón MD  Primary Care Physician: Briseida Bennett MD  Principal Problem:Sepsis    Consults  Subjective:     HPI:  Mrs. Sarah Parker is a 81 yo woman with chronic myelomonocytic leukemia and associated anemias/p cycle 4 of Azacitidine, presented to the Emergency Department today with c/o worsening back cellulitis.  Presented to ER.  Stable VS.  CBC showed WBCs 30 k (chronically elevated due to CML, baseline appears to be 20-28), Hgb 6.7 (improved from 6.0 yesterday at which time she declined PRBCs), Platelets 56 and consistent with baseline.  CMP showed Alk phos 303, otherwise unremarkable.  Lactic acid 1.1.  Procalcitonin pending.  BC were drawn and patient was given Vanc and Zosyn in the ER.    CT lumbar spine  Impression       Extensive degenerative changes involving the lumbar spine.  There is evidence of severe spinal stenosis at L4-5 and L3-4.  No evidence of discitis or definite epidural abscess.    There is soft tissue thickening involving the posterior soft tissues with thickening of the skin measuring up to 1 cm.  There is an area of nodular soft tissue thickening left paramedian at the L3 level measuring 2 cm without definite ring enhancement to suggest abscess.      Underwent incision and drainage of back abscess today.    Oncology Treatment Plan:   OP AZACITADINE 7-DAY (SUB-Q)    Medications:  Continuous Infusions:  Scheduled Meds:   amitriptyline  75 mg Oral QHS    ceFEPime (MAXIPIME) IVPB  2 g Intravenous Q12H    DULoxetine  40 mg Oral Daily    ferrous gluconate  324 mg Oral BID WM    [START ON 5/13/2020] levothyroxine  88 mcg Oral Before breakfast    magnesium oxide  400 mg Oral TID    [START ON 5/13/2020] metoprolol succinate  50 mg  Oral Daily    pravastatin  10 mg Oral Daily    tamsulosin  0.4 mg Oral Daily    topiramate  25 mg Oral BID     PRN Meds:sodium chloride, acetaminophen, dextrose 10 % in water (D10W), dextrose 10 % in water (D10W), glucagon (human recombinant), glucose, glucose, HYDROcodone-acetaminophen, HYDROcodone-acetaminophen, ondansetron, ondansetron, sodium chloride 0.9%, Pharmacy to dose Vancomycin consult **AND** vancomycin - pharmacy to dose     Review of patient's allergies indicates:   Allergen Reactions    Codeine      Other reaction(s): hyper  Other reaction(s): hyper    Doxycycline     Gabapentin Other (See Comments)     Bad dreams        Past Medical History:   Diagnosis Date    Acid reflux     Anxiety     Back pain     Bronchitis, chronic obstructive w acute bronchitis 7/29/2016    Cancer     [facility] arms, face- Dr. Lata Tejada    Cataract     2+NS    Degenerative disc disease     Depression     Dry mouth     Hernia, hiatal 11/18/2013    Hypertension     Hypothyroid     Macrocytic anemia 5/3/2016    Macular degeneration     Migraines     Mixed anxiety and depressive disorder     Multiple fractures of ribs of right side     Osteoporosis     Other hyperlipidemia 10/11/2019    Pneumonia     Pneumonia due to other staphylococcus     Rheumatoid arthritis     Rheumatoid arthritis(714.0)     Rheumatoid arthritis(714.0)     Remicade, MTX.     Past Surgical History:   Procedure Laterality Date    APPENDECTOMY  1985    BREAST BIOPSY      CATARACT EXTRACTION Bilateral 6/11/15    Dr. Booth    CHOLECYSTECTOMY  2013    cryoablasion kidney Left 09/27/2016    feet Bilateral     rheumatoid    FRACTURE SURGERY Right     tibia    HERNIA REPAIR      HYSTERECTOMY  1970    partial    JOINT REPLACEMENT      bilateral knees (2008), hands, wrists, knuckles, toes    LAPAROSCOPIC NISSEN FUNDOPLICATION      TRANSFORAMINAL EPIDURAL INJECTION OF STEROID Left 6/25/2019    Procedure: Left L5/S1 TF AYAAN with  local;  Surgeon: Rowdy Felix MD;  Location: [facility];  Service: Pain Management;  Laterality: Left;     Family History     Problem Relation (Age of Onset)    Asthma Sister, Brother    Cancer Brother, Maternal Grandfather    Cataracts Mother    Chronic back pain Sister, Brother    Diabetes Mellitus Brother    Fibromyalgia Daughter    Heart disease Mother, Father, Maternal Grandmother    Hyperlipidemia Mother    Hypertension Mother, Father, Sister, Brother    Osteoarthritis Mother, Father, Sister, Brother    Thyroid disease Sister, Brother        Tobacco Use    Smoking status: Former Smoker     Packs/day: 0.25     Years: 2.00     Pack years: 0.50     Last attempt to quit: 1965     Years since quittin.5    Smokeless tobacco: Never Used   Substance and Sexual Activity    Alcohol use: No    Drug use: No    Sexual activity: Never     Partners: Male       Review of Systems   Constitutional: Negative.    HENT: Negative.    Eyes: Negative.    Respiratory: Negative.    Cardiovascular: Negative.    Gastrointestinal: Negative.    Endocrine: Negative.    Genitourinary: Negative.    Musculoskeletal: Negative.    Skin: Negative.    Allergic/Immunologic: Negative.    Neurological: Negative.    Hematological: Negative.    Psychiatric/Behavioral: Negative.      Objective:     Vital Signs (Most Recent):  Temp: 99.7 °F (37.6 °C) (20)  Pulse: 98 (20)  Resp: 15 (20)  BP: 132/60 (20)  SpO2: 98 % (20) Vital Signs (24h Range):  Temp:  [98.5 °F (36.9 °C)-99.7 °F (37.6 °C)] 99.7 °F (37.6 °C)  Pulse:  [] 98  Resp:  [14-21] 15  SpO2:  [87 %-100 %] 98 %  BP: (123-175)/(58-75) 132/60     Weight: 47.1 kg (103 lb 13.4 oz)  Body mass index is 18.99 kg/m².  Body surface area is 1.44 meters squared.      Intake/Output Summary (Last 24 hours) at 2020 8337  Last data filed at 2020  Gross per 24 hour   Intake 2203 ml   Output --   Net 2203 ml        Physical Exam   Constitutional: She is oriented to person, place, and time. She appears well-developed and well-nourished.   HENT:   Head: Normocephalic and atraumatic.   Eyes: Conjunctivae and EOM are normal.   Neck: Normal range of motion. Neck supple.   Cardiovascular: Normal rate and regular rhythm.   Pulmonary/Chest: Effort normal and breath sounds normal.   Abdominal: Soft. Bowel sounds are normal.   Musculoskeletal: Normal range of motion.   Neurological: She is alert and oriented to person, place, and time.   Skin: Skin is warm and dry.   Psychiatric: She has a normal mood and affect. Her behavior is normal. Judgment and thought content normal.   Nursing note and vitals reviewed.      Significant Labs:   All pertinent labs from the last 24 hours have been reviewed.    Diagnostic Results:  I have reviewed all pertinent imaging results/findings within the past 24 hours.    Assessment/Plan:     Cellulitis of lower back  05/12/2020 Continue on iv cefepime.  Await blood and would cultures.  Consider consult Dr. Recinos in Infectious Disease.    Chronic myelomonocytic leukemia not having achieved remission  05/12/2020 follow up with Dr. Knox upon discharge for further management of CMMoL    Anemia  05/12/2020 agree with transfuse 1 unit RBC.        Thank you for your consult. I will follow-up with patient. Please contact us if you have any additional questions.    Christiano Colón MD  Hematology/Oncology  [facility]

## 2020-05-13 NOTE — ASSESSMENT & PLAN NOTE
05/12/2020 Continue on iv cefepime.  Await blood and would cultures.  Consider consult Dr. Recinos in Infectious Disease.

## 2020-05-13 NOTE — PROGRESS NOTES
"[facility] Hospital Medicine  Progress Note    Patient Name: Sarah Parker  MRN: [id number]  Patient Class: IP- Inpatient   Admission Date: 5/12/2020  Length of Stay: 1 days  Attending Physician: Hari Sloan MD  Primary Care Provider: Briseida Bennett MD        Subjective:     Principal Problem:Sepsis        HPI:  Sarah Parker is a 80 y.o.  CF with a PMHx of CML and associated anemia followed outpatient by Dr. Estrada s/p cycle 4 of Azacitidine, HTN, HLD, Depression, Hypothyroidism, and RA who presented to the Emergency Department today with c/o worsening back cellulitis, which onset approximately 1.5 weeks PTA.  Symptoms are constant and moderate in severity.  No mitigating or exacerbating factors reported.  Associated sxs include  low back pain.  She denies any known injury or wound stating "all of a sudden a boil came up".  Patient denies any fever, chills, n/v/d, SOB, CP, weakness, numbness, dizziness, headache, and all other sxs at this time.  Patient was put on a course of Bactrim by her PCP, with worsening of symptoms.  No further complaints or concerns at this time.  ER workup showed:  Stable VS.  CBC showed WBCs 30 k (chronically elevated due to CML, baseline appears to be 20-28), Hgb 6.7 (improved from 6.0 yesterday at which time she declined PRBCs), Platelets 56 and consistent with baseline.  CMP showed Alk phos 303, otherwise unremarkable.  Lactic acid 1.1.  Procalcitonin pending.  BC were drawn and patient was given Vanc and Zosyn in the ER.  Hospital Medicine called for admission.  CT scan pending along with General Surgery consult.  Patient will be placed in OBS. She is a Full Code.  Her SDM is her daughter Albina who can be reached at [phone].    Overview/Hospital Course:  5/13- Pt states pain in the abscess area is better. S/P  incision and drainage of back abscess yesterday . Afebrile . Vitals are fairly stable . Tissue gram stain resulted few gram positive cocci " . Blood culture x 2  no growth to date. Current antibiotic - Cefepime and Vancomycin IV. Labs - WBC trended down 26.4 ( near baseline ) , Hb 6.7, Pl 41. Will have 2 units of irradiated P-RBC transfusion today.     Interval History:      S/P  incision and drainage of back abscess yesterday . Afebrile . Vitals are fairly stable . Tissue gram stain resulted few gram positive cocci . Blood culture x 2  no growth to date. Current antibiotic - Cefepime and Vancomycin IV. Labs - WBC trended down 26.4 ( near baseline ) , Hb 6.7, Pl 41. Will have 2 units of irradiated P-RBC transfusion today    Review of Systems   Constitutional: Positive for fatigue. Negative for activity change, appetite change and fever.   HENT: Negative for sore throat.    Eyes: Negative for visual disturbance.   Respiratory: Negative for cough, chest tightness and shortness of breath.    Cardiovascular: Negative for chest pain, palpitations and leg swelling.   Gastrointestinal: Negative for abdominal distention, abdominal pain, constipation, diarrhea, nausea and vomiting.   Endocrine: Negative for polyuria.   Genitourinary: Negative for decreased urine volume, dysuria, flank pain, frequency and hematuria.   Musculoskeletal: Positive for back pain. Negative for gait problem.   Skin: Negative for rash.   Neurological: Negative for syncope, speech difficulty, weakness, light-headedness and headaches.   Psychiatric/Behavioral: Negative for confusion, hallucinations and sleep disturbance.     Objective:     Vital Signs (Most Recent):  Temp: 97.6 °F (36.4 °C) (05/13/20 1219)  Pulse: 81 (05/13/20 1219)  Resp: 18 (05/13/20 1219)  BP: (!) 130/59 (05/13/20 1219)  SpO2: 98 % (05/13/20 1219) Vital Signs (24h Range):  Temp:  [97.6 °F (36.4 °C)-99.7 °F (37.6 °C)] 97.6 °F (36.4 °C)  Pulse:  [81-98] 81  Resp:  [14-21] 18  SpO2:  [87 %-100 %] 98 %  BP: (106-175)/(51-75) 130/59     Weight: 47.1 kg (103 lb 13.4 oz)  Body mass index is 18.99 kg/m².    Intake/Output Summary  (Last 24 hours) at 5/13/2020 1231  Last data filed at 5/13/2020 0823  Gross per 24 hour   Intake 1451.67 ml   Output --   Net 1451.67 ml      Physical Exam   Constitutional: She is oriented to person, place, and time. She appears well-developed and well-nourished. No distress.   HENT:   Head: Normocephalic and atraumatic.   Mouth/Throat: No oropharyngeal exudate.   Eyes: Pupils are equal, round, and reactive to light. Conjunctivae and EOM are normal.   Neck: Normal range of motion. Neck supple. No JVD present. No thyromegaly present.   Cardiovascular: Normal rate, regular rhythm and normal heart sounds.   No murmur heard.  Pulmonary/Chest: Effort normal and breath sounds normal. No respiratory distress. She has no wheezes. She has no rales. She exhibits no tenderness.   Abdominal: Soft. Bowel sounds are normal. She exhibits no distension. There is no tenderness. There is no rebound and no guarding.   Musculoskeletal: Normal range of motion. She exhibits no edema.   Lymphadenopathy:     She has no cervical adenopathy.   Neurological: She is alert and oriented to person, place, and time. She displays normal reflexes. No cranial nerve deficit or sensory deficit.   Skin: She is not diaphoretic.   Wound dressings in place lower back/sacral area . Less erythema noted. Less induration appreciated    Psychiatric: She has a normal mood and affect.       Significant Labs:   CBC:   Recent Labs   Lab 05/12/20  1002 05/13/20  0749   WBC 30.38* 26.47*   HGB 6.7* 6.7*   HCT 22.8* 22.5*   PLT 56* 41*     CMP:   Recent Labs   Lab 05/12/20  1002 05/13/20  0749   * 133*   K 4.2 4.6   CL 99 101   CO2 23 24   * 102   BUN 16 13   CREATININE 0.8 0.8   CALCIUM 9.1 8.5*   PROT 8.5*  --    ALBUMIN 3.4*  --    BILITOT 0.5  --    ALKPHOS 303*  --    AST 29  --    ALT 41  --    ANIONGAP 12 8   EGFRNONAA >60 >60       Significant Imaging:       Assessment/Plan:      * Sepsis  - Place in OBS  - Likely secondary to lower back  cellulitis/abscess  - HR >90, WBCs 30k  - Lactic acid 1.1  - Given bolus of IVFs per sepsis protocol  - BC drawn and are pending  - Continue IV ABX  - Currently hemodynamically stable  -S/P I &D of back abscesses       Cellulitis of lower back  - Failed outpatient treatment with bactrim  - Diffuse cellulitis associated with multiple abscesses  - Continue Vanc and Cefepime  - PharmD consulted to assist with Vanc dosing  - CT lumbar spine done   - General surgery consult obtained for eval of I&D   - S/P I &D of back abscesses 5/12/20       Abscess of lower back  - CT scan pending to evaluate further  - General surgery consult obtained   -S/P I &D of back abscess 5/12/20  - Tissue gram ranjit resulted few gram positive cocci - final identification and susceptibility pending   - Blood cultures x 2 neg to date       Anemia  - 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 Heme/Onc recommended 2 additional units  of irradiated P-RBC trans today         Chronic myelomonocytic leukemia not having achieved remission  - Followed outpatient by Dr. Estrada s/p cycle 4 of Azacitidine  - Chemo currently on hold due to worsening anemia  - Hemoc consulted      Thrombocytopenia  - Platelet count stable and appears at baseline  - No bleeding noted  - Daily CBC      Essential hypertension  - BP under fair control  - Continue home Toprol XL   - Resume Diovan pending BP trends  - Monitor and adjust meds as needed      Cachexia  - Dietician consulted for recs      Other hyperlipidemia  - Continue home Statin      Depression, recurrent  - Continue home meds      Acquired hypothyroidism  - Continue home Synthroid      Rheumatoid arthritis  - Not currently taking any medications except Norco  - Continue Norco prn pain        VTE Risk Mitigation (From admission,  onward)         Ordered     IP VTE HIGH RISK PATIENT  Once      05/12/20 1147     Place sequential compression device  Until discontinued      05/12/20 1147     Reason for No Pharmacological VTE Prophylaxis  Once     Question:  Reasons:  Answer:  Thrombocytopenia    05/12/20 1147                      Hari Sloan MD  Department of Hospital Medicine   [facility]

## 2020-05-13 NOTE — SUBJECTIVE & OBJECTIVE
Oncology Treatment Plan:   OP AZACITADINE 7-DAY (SUB-Q)    Medications:  Continuous Infusions:  Scheduled Meds:   amitriptyline  75 mg Oral QHS    ceFEPime (MAXIPIME) IVPB  2 g Intravenous Q12H    DULoxetine  40 mg Oral Daily    ferrous gluconate  324 mg Oral BID WM    [START ON 5/13/2020] levothyroxine  88 mcg Oral Before breakfast    magnesium oxide  400 mg Oral TID    [START ON 5/13/2020] metoprolol succinate  50 mg Oral Daily    pravastatin  10 mg Oral Daily    tamsulosin  0.4 mg Oral Daily    topiramate  25 mg Oral BID     PRN Meds:sodium chloride, acetaminophen, dextrose 10 % in water (D10W), dextrose 10 % in water (D10W), glucagon (human recombinant), glucose, glucose, HYDROcodone-acetaminophen, HYDROcodone-acetaminophen, ondansetron, ondansetron, sodium chloride 0.9%, Pharmacy to dose Vancomycin consult **AND** vancomycin - pharmacy to dose     Review of patient's allergies indicates:   Allergen Reactions    Codeine      Other reaction(s): hyper  Other reaction(s): hyper    Doxycycline     Gabapentin Other (See Comments)     Bad dreams        Past Medical History:   Diagnosis Date    Acid reflux     Anxiety     Back pain     Bronchitis, chronic obstructive w acute bronchitis 7/29/2016    Cancer     NMSC arms, face- Dr. Lata Tejada    Cataract     2+NS    Degenerative disc disease     Depression     Dry mouth     Hernia, hiatal 11/18/2013    Hypertension     Hypothyroid     Macrocytic anemia 5/3/2016    Macular degeneration     Migraines     Mixed anxiety and depressive disorder     Multiple fractures of ribs of right side     Osteoporosis     Other hyperlipidemia 10/11/2019    Pneumonia     Pneumonia due to other staphylococcus     Rheumatoid arthritis     Rheumatoid arthritis(714.0)     Rheumatoid arthritis(714.0)     Remicade, MTX.     Past Surgical History:   Procedure Laterality Date    APPENDECTOMY  1985    BREAST BIOPSY      CATARACT EXTRACTION Bilateral 6/11/15     Dr. Booth    CHOLECYSTECTOMY  2013    cryoablasion kidney Left 2016    feet Bilateral     rheumatoid    FRACTURE SURGERY Right     tibia    HERNIA REPAIR      HYSTERECTOMY  1970    partial    JOINT REPLACEMENT      bilateral knees (), hands, wrists, knuckles, toes    LAPAROSCOPIC NISSEN FUNDOPLICATION      TRANSFORAMINAL EPIDURAL INJECTION OF STEROID Left 2019    Procedure: Left L5/S1 TF AYAAN with local;  Surgeon: Rowdy Felix MD;  Location: [facility];  Service: Pain Management;  Laterality: Left;     Family History     Problem Relation (Age of Onset)    Asthma Sister, Brother    Cancer Brother, Maternal Grandfather    Cataracts Mother    Chronic back pain Sister, Brother    Diabetes Mellitus Brother    Fibromyalgia Daughter    Heart disease Mother, Father, Maternal Grandmother    Hyperlipidemia Mother    Hypertension Mother, Father, Sister, Brother    Osteoarthritis Mother, Father, Sister, Brother    Thyroid disease Sister, Brother        Tobacco Use    Smoking status: Former Smoker     Packs/day: 0.25     Years: 2.00     Pack years: 0.50     Last attempt to quit: 1965     Years since quittin.5    Smokeless tobacco: Never Used   Substance and Sexual Activity    Alcohol use: No    Drug use: No    Sexual activity: Never     Partners: Male       Review of Systems   Constitutional: Negative.    HENT: Negative.    Eyes: Negative.    Respiratory: Negative.    Cardiovascular: Negative.    Gastrointestinal: Negative.    Endocrine: Negative.    Genitourinary: Negative.    Musculoskeletal: Negative.    Skin: Negative.    Allergic/Immunologic: Negative.    Neurological: Negative.    Hematological: Negative.    Psychiatric/Behavioral: Negative.      Objective:     Vital Signs (Most Recent):  Temp: 99.7 °F (37.6 °C) (20)  Pulse: 98 (20)  Resp: 15 (20)  BP: 132/60 (20)  SpO2: 98 % (20) Vital Signs (24h Range):  Temp:  [98.5  °F (36.9 °C)-99.7 °F (37.6 °C)] 99.7 °F (37.6 °C)  Pulse:  [] 98  Resp:  [14-21] 15  SpO2:  [87 %-100 %] 98 %  BP: (123-175)/(58-75) 132/60     Weight: 47.1 kg (103 lb 13.4 oz)  Body mass index is 18.99 kg/m².  Body surface area is 1.44 meters squared.      Intake/Output Summary (Last 24 hours) at 5/12/2020 2241  Last data filed at 5/12/2020 2010  Gross per 24 hour   Intake 2203 ml   Output --   Net 2203 ml       Physical Exam   Constitutional: She is oriented to person, place, and time. She appears well-developed and well-nourished.   HENT:   Head: Normocephalic and atraumatic.   Eyes: Conjunctivae and EOM are normal.   Neck: Normal range of motion. Neck supple.   Cardiovascular: Normal rate and regular rhythm.   Pulmonary/Chest: Effort normal and breath sounds normal.   Abdominal: Soft. Bowel sounds are normal.   Musculoskeletal: Normal range of motion.   Neurological: She is alert and oriented to person, place, and time.   Skin: Skin is warm and dry.   Psychiatric: She has a normal mood and affect. Her behavior is normal. Judgment and thought content normal.   Nursing note and vitals reviewed.      Significant Labs:   All pertinent labs from the last 24 hours have been reviewed.    Diagnostic Results:  I have reviewed all pertinent imaging results/findings within the past 24 hours.

## 2020-05-13 NOTE — ASSESSMENT & PLAN NOTE
- Failed outpatient treatment with bactrim  - Diffuse cellulitis associated with multiple abscesses  - Continue Vanc and Cefepime  - PharmD consulted to assist with Vanc dosing  - CT lumbar spine done   - General surgery consult obtained for eval of I&D   - S/P I &D of back abscesses 5/12/20

## 2020-05-13 NOTE — PLAN OF CARE
Pt complains of back pain. Pain medication is effective. Dressing reinforced. IV ABX given per order. No injuries. Will continue to monitor. 12 hour chart check is completed.

## 2020-05-13 NOTE — PROGRESS NOTES
[facility] -   Hematology/Oncology  Progress Note    Patient Name: Sarah Parker  Admission Date: 5/12/2020  Hospital Length of Stay: 1 days  Code Status: Full Code     Subjective:     HPI:  Mrs. Sarah Parker is a 81 yo woman with chronic myelomonocytic leukemia and associated anemias/p cycle 4 of Azacitidine, presented to the Emergency Department today with c/o worsening back cellulitis.  Presented to ER.  Stable VS.  CBC showed WBCs 30 k (chronically elevated due to CML, baseline appears to be 20-28), Hgb 6.7 (improved from 6.0 yesterday at which time she declined PRBCs), Platelets 56 and consistent with baseline.  CMP showed Alk phos 303, otherwise unremarkable.  Lactic acid 1.1.  Procalcitonin pending.  BC were drawn and patient was given Vanc and Zosyn in the ER.    CT lumbar spine  Impression       Extensive degenerative changes involving the lumbar spine.  There is evidence of severe spinal stenosis at L4-5 and L3-4.  No evidence of discitis or definite epidural abscess.    There is soft tissue thickening involving the posterior soft tissues with thickening of the skin measuring up to 1 cm.  There is an area of nodular soft tissue thickening left paramedian at the L3 level measuring 2 cm without definite ring enhancement to suggest abscess.      Underwent incision and drainage of back abscess today.    Interval history: hemoglobin 6.7. Patient denies anemia symptoms. Reports feeling well. Denies any pain. Dressing in place to wound  Oncology Treatment Plan:   OP AZACITADINE 7-DAY (SUB-Q)    Medications:  Continuous Infusions:   sodium chloride 0.9% 50 mL/hr at 05/13/20 1149     Scheduled Meds:   amitriptyline  75 mg Oral QHS    ceFEPime (MAXIPIME) IVPB  2 g Intravenous Q12H    DULoxetine  40 mg Oral Daily    ferrous gluconate  324 mg Oral BID WM    levothyroxine  88 mcg Oral Before breakfast    magnesium oxide  400 mg Oral TID    metoprolol succinate  50 mg Oral Daily    pravastatin   10 mg Oral Daily    tamsulosin  0.4 mg Oral Daily    topiramate  25 mg Oral BID    vancomycin (VANCOCIN) IVPB  750 mg Intravenous Q24H     PRN Meds:sodium chloride, acetaminophen, dextrose 10 % in water (D10W), dextrose 10 % in water (D10W), glucagon (human recombinant), glucose, glucose, HYDROcodone-acetaminophen, HYDROcodone-acetaminophen, ondansetron, ondansetron, sodium chloride 0.9%, Pharmacy to dose Vancomycin consult **AND** vancomycin - pharmacy to dose     Review of patient's allergies indicates:   Allergen Reactions    Codeine      Other reaction(s): hyper  Other reaction(s): hyper    Doxycycline     Gabapentin Other (See Comments)     Bad dreams        Past Medical History:   Diagnosis Date    Acid reflux     Anxiety     Back pain     Bronchitis, chronic obstructive w acute bronchitis 7/29/2016    Cancer     NMSC arms, face- Dr. Lata Tejada    Cataract     2+NS    Degenerative disc disease     Depression     Dry mouth     Hernia, hiatal 11/18/2013    Hypertension     Hypothyroid     Macrocytic anemia 5/3/2016    Macular degeneration     Migraines     Mixed anxiety and depressive disorder     Multiple fractures of ribs of right side     Osteoporosis     Other hyperlipidemia 10/11/2019    Pneumonia     Pneumonia due to other staphylococcus     Rheumatoid arthritis     Rheumatoid arthritis(714.0)     Rheumatoid arthritis(714.0)     Remicade, MTX.     Past Surgical History:   Procedure Laterality Date    APPENDECTOMY  1985    BREAST BIOPSY      CATARACT EXTRACTION Bilateral 6/11/15    Dr. Booth    CHOLECYSTECTOMY  2013    cryoablasion kidney Left 09/27/2016    feet Bilateral     rheumatoid    FRACTURE SURGERY Right     tibia    HERNIA REPAIR      HYSTERECTOMY  1970    partial    INCISION AND DRAINAGE OF ABSCESS N/A 5/12/2020    Procedure: INCISION AND DRAINAGE, ABSCESS;  Surgeon: Emerson Hathaway MD;  Location: [facility];  Service: General;  Laterality: N/A;    INCISIONAL  BIOPSY N/A 2020    Procedure: INCISIONAL BIOPSY;  Surgeon: Emerson Hathaway MD;  [facility];  Service: General;  Laterality: N/A;    JOINT REPLACEMENT      bilateral knees (), hands, wrists, knuckles, toes    LAPAROSCOPIC NISSEN FUNDOPLICATION      TRANSFORAMINAL EPIDURAL INJECTION OF STEROID Left 2019    Procedure: Left L5/S1 TF AYAAN with local;  Surgeon: Rowdy Felix MD;  [facility];  Service: Pain Management;  Laterality: Left;     Family History     Problem Relation (Age of Onset)    Asthma Sister, Brother    Cancer Brother, Maternal Grandfather    Cataracts Mother    Chronic back pain Sister, Brother    Diabetes Mellitus Brother    Fibromyalgia Daughter    Heart disease Mother, Father, Maternal Grandmother    Hyperlipidemia Mother    Hypertension Mother, Father, Sister, Brother    Osteoarthritis Mother, Father, Sister, Brother    Thyroid disease Sister, Brother        Tobacco Use    Smoking status: Former Smoker     Packs/day: 0.25     Years: 2.00     Pack years: 0.50     Last attempt to quit: 1965     Years since quittin.5    Smokeless tobacco: Never Used   Substance and Sexual Activity    Alcohol use: No    Drug use: No    Sexual activity: Never     Partners: Male       Review of Systems   Constitutional: Negative.    HENT: Negative.    Eyes: Negative.    Respiratory: Negative.    Cardiovascular: Negative.    Gastrointestinal: Negative.    Endocrine: Negative.    Genitourinary: Negative.    Musculoskeletal: Negative.    Skin: Positive for wound.   Allergic/Immunologic: Negative.    Neurological: Negative.    Hematological: Negative.    Psychiatric/Behavioral: Negative.      Objective:     Vital Signs (Most Recent):  Temp: 98.2 °F (36.8 °C) (20)  Pulse: 84 (20)  Resp: 16 (20)  BP: (!) 113/57 (20)  SpO2: 97 % (20) Vital Signs (24h Range):  Temp:  [98 °F (36.7 °C)-99.7 °F (37.6 °C)] 98.2 °F (36.8  °C)  Pulse:  [84-98] 84  Resp:  [14-21] 16  SpO2:  [87 %-100 %] 97 %  BP: (106-175)/(51-75) 113/57     Weight: 47.1 kg (103 lb 13.4 oz)  Body mass index is 18.99 kg/m².  Body surface area is 1.44 meters squared.      Intake/Output Summary (Last 24 hours) at 5/13/2020 1200  Last data filed at 5/13/2020 0823  Gross per 24 hour   Intake 2864.67 ml   Output --   Net 2864.67 ml       Physical Exam   Constitutional: She is oriented to person, place, and time. She appears well-developed and well-nourished.   HENT:   Head: Normocephalic and atraumatic.   Eyes: Conjunctivae and EOM are normal.   Neck: Normal range of motion. Neck supple.   Cardiovascular: Normal rate and regular rhythm.   Pulmonary/Chest: Effort normal and breath sounds normal.   Abdominal: Soft. Bowel sounds are normal.   Musculoskeletal: Normal range of motion.   Neurological: She is alert and oriented to person, place, and time.   Skin: Skin is warm and dry.        Psychiatric: She has a normal mood and affect. Her behavior is normal. Judgment and thought content normal.   Nursing note and vitals reviewed.      Significant Labs:   All pertinent labs from the last 24 hours have been reviewed.    Diagnostic Results:  I have reviewed all pertinent imaging results/findings within the past 24 hours.    Assessment/Plan:     Cellulitis of lower back  05/13/2020   --WBC trending downward  --Continue on iv cefepime.  Await blood and would cultures  --Consider consult Dr. Recinos in Infectious Disease for antibiotic guidance   --follow wound care recs    Chronic myelomonocytic leukemia not having achieved remission  05/12/2020 follow up with Dr. Knox upon discharge for further management of CMMoL    Anemia  05/12/2020 agree with transfuse 1 unit RBC    5/13/20  --hemoglobin 6.7. Consider additional 1 unit irradiated PRBCs to maintain hg > 7  --daily cbc        Thank you for your consult. I will follow-up with patient. Please contact us if you have any additional  questions.     Cathryn Qureshi NP  Hematology/Oncology  [facility]

## 2020-05-13 NOTE — HPI
Mrs. Sarah Parker is a 79 yo woman with chronic myelomonocytic leukemia and associated anemias/p cycle 4 of Azacitidine, presented to the Emergency Department today with c/o worsening back cellulitis.  Presented to ER.  Stable VS.  CBC showed WBCs 30 k (chronically elevated due to CML, baseline appears to be 20-28), Hgb 6.7 (improved from 6.0 yesterday at which time she declined PRBCs), Platelets 56 and consistent with baseline.  CMP showed Alk phos 303, otherwise unremarkable.  Lactic acid 1.1.  Procalcitonin pending.  BC were drawn and patient was given Vanc and Zosyn in the ER.    CT lumbar spine  Impression       Extensive degenerative changes involving the lumbar spine.  There is evidence of severe spinal stenosis at L4-5 and L3-4.  No evidence of discitis or definite epidural abscess.    There is soft tissue thickening involving the posterior soft tissues with thickening of the skin measuring up to 1 cm.  There is an area of nodular soft tissue thickening left paramedian at the L3 level measuring 2 cm without definite ring enhancement to suggest abscess.      Underwent incision and drainage of back abscess today.

## 2020-05-13 NOTE — ASSESSMENT & PLAN NOTE
05/13/2020   --WBC trending downward  --Continue on iv cefepime.  Await blood and would cultures  --Consider consult Dr. Recinos in Infectious Disease for antibiotic guidance   --follow wound care recs

## 2020-05-13 NOTE — SUBJECTIVE & OBJECTIVE
Interval history: hemoglobin 6.7. Patient denies anemia symptoms. Reports feeling well. Denies any pain. Dressing in place to wound  Oncology Treatment Plan:   OP AZACITADINE 7-DAY (SUB-Q)    Medications:  Continuous Infusions:   sodium chloride 0.9% 50 mL/hr at 05/13/20 1149     Scheduled Meds:   amitriptyline  75 mg Oral QHS    ceFEPime (MAXIPIME) IVPB  2 g Intravenous Q12H    DULoxetine  40 mg Oral Daily    ferrous gluconate  324 mg Oral BID WM    levothyroxine  88 mcg Oral Before breakfast    magnesium oxide  400 mg Oral TID    metoprolol succinate  50 mg Oral Daily    pravastatin  10 mg Oral Daily    tamsulosin  0.4 mg Oral Daily    topiramate  25 mg Oral BID    vancomycin (VANCOCIN) IVPB  750 mg Intravenous Q24H     PRN Meds:sodium chloride, acetaminophen, dextrose 10 % in water (D10W), dextrose 10 % in water (D10W), glucagon (human recombinant), glucose, glucose, HYDROcodone-acetaminophen, HYDROcodone-acetaminophen, ondansetron, ondansetron, sodium chloride 0.9%, Pharmacy to dose Vancomycin consult **AND** vancomycin - pharmacy to dose     Review of patient's allergies indicates:   Allergen Reactions    Codeine      Other reaction(s): hyper  Other reaction(s): hyper    Doxycycline     Gabapentin Other (See Comments)     Bad dreams        Past Medical History:   Diagnosis Date    Acid reflux     Anxiety     Back pain     Bronchitis, chronic obstructive w acute bronchitis 7/29/2016    Cancer     NMSC arms, face- Dr. Lata Tejada    Cataract     2+NS    Degenerative disc disease     Depression     Dry mouth     Hernia, hiatal 11/18/2013    Hypertension     Hypothyroid     Macrocytic anemia 5/3/2016    Macular degeneration     Migraines     Mixed anxiety and depressive disorder     Multiple fractures of ribs of right side     Osteoporosis     Other hyperlipidemia 10/11/2019    Pneumonia     Pneumonia due to other staphylococcus     Rheumatoid arthritis     Rheumatoid  arthritis(714.0)     Rheumatoid arthritis(714.0)     Remicade, MTX.     Past Surgical History:   Procedure Laterality Date    APPENDECTOMY  1985    BREAST BIOPSY      CATARACT EXTRACTION Bilateral 6/11/15    Dr. Booth    CHOLECYSTECTOMY  2013    cryoablasion kidney Left 2016    feet Bilateral     rheumatoid    FRACTURE SURGERY Right     tibia    HERNIA REPAIR      HYSTERECTOMY  1970    partial    INCISION AND DRAINAGE OF ABSCESS N/A 2020    Procedure: INCISION AND DRAINAGE, ABSCESS;  Surgeon: Emerson Hathaway MD;  Location: [facility];  Service: General;  Laterality: N/A;    INCISIONAL BIOPSY N/A 2020    Procedure: INCISIONAL BIOPSY;  Surgeon: Emerson Hathaway MD;  Location: [facility] OR;  Service: General;  Laterality: N/A;    JOINT REPLACEMENT      bilateral knees (), hands, wrists, knuckles, toes    LAPAROSCOPIC NISSEN FUNDOPLICATION      TRANSFORAMINAL EPIDURAL INJECTION OF STEROID Left 2019    Procedure: Left L5/S1 TF AYAAN with local;  Surgeon: Rowdy Felix MD;  Location: [facility];  Service: Pain Management;  Laterality: Left;     Family History     Problem Relation (Age of Onset)    Asthma Sister, Brother    Cancer Brother, Maternal Grandfather    Cataracts Mother    Chronic back pain Sister, Brother    Diabetes Mellitus Brother    Fibromyalgia Daughter    Heart disease Mother, Father, Maternal Grandmother    Hyperlipidemia Mother    Hypertension Mother, Father, Sister, Brother    Osteoarthritis Mother, Father, Sister, Brother    Thyroid disease Sister, Brother        Tobacco Use    Smoking status: Former Smoker     Packs/day: 0.25     Years: 2.00     Pack years: 0.50     Last attempt to quit: 1965     Years since quittin.5    Smokeless tobacco: Never Used   Substance and Sexual Activity    Alcohol use: No    Drug use: No    Sexual activity: Never     Partners: Male       Review of Systems   Constitutional: Negative.    HENT: Negative.    Eyes: Negative.     Respiratory: Negative.    Cardiovascular: Negative.    Gastrointestinal: Negative.    Endocrine: Negative.    Genitourinary: Negative.    Musculoskeletal: Negative.    Skin: Positive for wound.   Allergic/Immunologic: Negative.    Neurological: Negative.    Hematological: Negative.    Psychiatric/Behavioral: Negative.      Objective:     Vital Signs (Most Recent):  Temp: 98.2 °F (36.8 °C) (05/13/20 0732)  Pulse: 84 (05/13/20 0732)  Resp: 16 (05/13/20 0732)  BP: (!) 113/57 (05/13/20 0732)  SpO2: 97 % (05/13/20 0732) Vital Signs (24h Range):  Temp:  [98 °F (36.7 °C)-99.7 °F (37.6 °C)] 98.2 °F (36.8 °C)  Pulse:  [84-98] 84  Resp:  [14-21] 16  SpO2:  [87 %-100 %] 97 %  BP: (106-175)/(51-75) 113/57     Weight: 47.1 kg (103 lb 13.4 oz)  Body mass index is 18.99 kg/m².  Body surface area is 1.44 meters squared.      Intake/Output Summary (Last 24 hours) at 5/13/2020 1200  Last data filed at 5/13/2020 0823  Gross per 24 hour   Intake 2864.67 ml   Output --   Net 2864.67 ml       Physical Exam   Constitutional: She is oriented to person, place, and time. She appears well-developed and well-nourished.   HENT:   Head: Normocephalic and atraumatic.   Eyes: Conjunctivae and EOM are normal.   Neck: Normal range of motion. Neck supple.   Cardiovascular: Normal rate and regular rhythm.   Pulmonary/Chest: Effort normal and breath sounds normal.   Abdominal: Soft. Bowel sounds are normal.   Musculoskeletal: Normal range of motion.   Neurological: She is alert and oriented to person, place, and time.   Skin: Skin is warm and dry.        Psychiatric: She has a normal mood and affect. Her behavior is normal. Judgment and thought content normal.   Nursing note and vitals reviewed.      Significant Labs:   All pertinent labs from the last 24 hours have been reviewed.    Diagnostic Results:  I have reviewed all pertinent imaging results/findings within the past 24 hours.

## 2020-05-13 NOTE — ASSESSMENT & PLAN NOTE
- CT scan pending to evaluate further  - General surgery consult obtained   -S/P I &D of back abscess 5/12/20  - Tissue gram ranjit resulted few gram positive cocci - final identification and susceptibility pending   - Blood cultures x 2 neg to date

## 2020-05-13 NOTE — PLAN OF CARE
Met with patient completed initial discharge assessment. Patient is independent with adls and iadls.  Patient lives with her spouse Escobar who along with her daughter Albina help to manage her healthcare.  Patient denies any post hospital needs or services at this time. She indicated that she expects to be able to go home with her . Her daughter lives next door to them.  Updated white board with 's name and number. Transitional Care Folder, Discharge Planning Begins on Admission pamphlet, [facility] Bedside Delivery pamphlet, Advance Directive information given to patient along with the contact information given.Instructed patient or family to call with any questions or concerns.        D/C Plan: home  PCP: Dr Briseida Reyes  Preferred Pharm[facility]: [email or web address]  Discharge transportation:POV  My [facility]:declined  Pharmacy Bedside Delivery:yes           05/13/20 2468   Discharge Assessment   Assessment Type Discharge Planning Assessment   Confirmed/corrected address and phone number on facesheet? Yes   Assessment information obtained from? Patient;Medical Record   Expected Length of Stay (days)   (tbd)   Communicated expected length of stay with patient/caregiver yes   Prior to hospitilization cognitive status: Alert/Oriented   Prior to hospitalization functional status: Independent   Current cognitive status: Alert/Oriented   Current Functional Status: Independent   Facility Arrived From: home   Lives With spouse   Able to Return to Prior Arrangements yes   Is patient able to care for self after discharge? Yes   Who are your caregiver(s) and their phone number(s)? Escobar Parker ( spouse ) [phone] or Albina Martínez ( daughter ) [phone]   Patient's perception of discharge disposition home or selfcare   Readmission Within the Last 30 Days no previous admission in last 30 days   Patient currently being followed by outpatient case management? No   Patient currently receives any other  outside agency services? No   Equipment Currently Used at Home none  (has equipment not in use)   Do you have any problems affording any of your prescribed medications? No   Is the patient taking medications as prescribed? yes   Does the patient have transportation home? Yes   Transportation Anticipated family or friend will provide   Does the patient receive services at the [facility]? No   Discharge Plan A Home;Home with family   Discharge Plan B Home Health   DME Needed Upon Discharge  none   Patient/Family in Agreement with Plan yes

## 2020-05-13 NOTE — ASSESSMENT & PLAN NOTE
Status post incision and drainage  Wound care consult for dressing changes daily  Antibiotics per Medicine

## 2020-05-13 NOTE — SUBJECTIVE & OBJECTIVE
Interval History:      S/P  incision and drainage of back abscess yesterday . Afebrile . Vitals are fairly stable . Tissue gram stain resulted few gram positive cocci . Blood culture x 2  no growth to date. Current antibiotic - Cefepime and Vancomycin IV. Labs - WBC trended down 26.4 ( near baseline ) , Hb 6.7, Pl 41. Will have 2 units of irradiated P-RBC transfusion today    Review of Systems   Constitutional: Positive for fatigue. Negative for activity change, appetite change and fever.   HENT: Negative for sore throat.    Eyes: Negative for visual disturbance.   Respiratory: Negative for cough, chest tightness and shortness of breath.    Cardiovascular: Negative for chest pain, palpitations and leg swelling.   Gastrointestinal: Negative for abdominal distention, abdominal pain, constipation, diarrhea, nausea and vomiting.   Endocrine: Negative for polyuria.   Genitourinary: Negative for decreased urine volume, dysuria, flank pain, frequency and hematuria.   Musculoskeletal: Positive for back pain. Negative for gait problem.   Skin: Negative for rash.   Neurological: Negative for syncope, speech difficulty, weakness, light-headedness and headaches.   Psychiatric/Behavioral: Negative for confusion, hallucinations and sleep disturbance.     Objective:     Vital Signs (Most Recent):  Temp: 97.6 °F (36.4 °C) (05/13/20 1219)  Pulse: 81 (05/13/20 1219)  Resp: 18 (05/13/20 1219)  BP: (!) 130/59 (05/13/20 1219)  SpO2: 98 % (05/13/20 1219) Vital Signs (24h Range):  Temp:  [97.6 °F (36.4 °C)-99.7 °F (37.6 °C)] 97.6 °F (36.4 °C)  Pulse:  [81-98] 81  Resp:  [14-21] 18  SpO2:  [87 %-100 %] 98 %  BP: (106-175)/(51-75) 130/59     Weight: 47.1 kg (103 lb 13.4 oz)  Body mass index is 18.99 kg/m².    Intake/Output Summary (Last 24 hours) at 5/13/2020 1231  Last data filed at 5/13/2020 0823  Gross per 24 hour   Intake 1451.67 ml   Output --   Net 1451.67 ml      Physical Exam   Constitutional: She is oriented to person, place, and  time. She appears well-developed and well-nourished. No distress.   HENT:   Head: Normocephalic and atraumatic.   Mouth/Throat: No oropharyngeal exudate.   Eyes: Pupils are equal, round, and reactive to light. Conjunctivae and EOM are normal.   Neck: Normal range of motion. Neck supple. No JVD present. No thyromegaly present.   Cardiovascular: Normal rate, regular rhythm and normal heart sounds.   No murmur heard.  Pulmonary/Chest: Effort normal and breath sounds normal. No respiratory distress. She has no wheezes. She has no rales. She exhibits no tenderness.   Abdominal: Soft. Bowel sounds are normal. She exhibits no distension. There is no tenderness. There is no rebound and no guarding.   Musculoskeletal: Normal range of motion. She exhibits no edema.   Lymphadenopathy:     She has no cervical adenopathy.   Neurological: She is alert and oriented to person, place, and time. She displays normal reflexes. No cranial nerve deficit or sensory deficit.   Skin: She is not diaphoretic.   Wound dressings in place lower back/sacral area . Less erythema noted. Less induration appreciated    Psychiatric: She has a normal mood and affect.       Significant Labs:   CBC:   Recent Labs   Lab 05/12/20  1002 05/13/20  0749   WBC 30.38* 26.47*   HGB 6.7* 6.7*   HCT 22.8* 22.5*   PLT 56* 41*     CMP:   Recent Labs   Lab 05/12/20  1002 05/13/20  0749   * 133*   K 4.2 4.6   CL 99 101   CO2 23 24   * 102   BUN 16 13   CREATININE 0.8 0.8   CALCIUM 9.1 8.5*   PROT 8.5*  --    ALBUMIN 3.4*  --    BILITOT 0.5  --    ALKPHOS 303*  --    AST 29  --    ALT 41  --    ANIONGAP 12 8   EGFRNONAA >60 >60       Significant Imaging:

## 2020-05-13 NOTE — ASSESSMENT & PLAN NOTE
05/12/2020 agree with transfuse 1 unit RBC    5/13/20  --hemoglobin 6.7. Consider additional 1 unit irradiated PRBCs to maintain hg > 7  --daily cbc

## 2020-05-13 NOTE — ASSESSMENT & PLAN NOTE
- Place in OBS  - Likely secondary to lower back cellulitis/abscess  - HR >90, WBCs 30k  - Lactic acid 1.1  - Given bolus of IVFs per sepsis protocol  - BC drawn and are pending  - Continue IV ABX  - Currently hemodynamically stable  -S/P I &D of back abscesses

## 2020-05-14 ENCOUNTER — ANESTHESIA EVENT (OUTPATIENT)
Dept: SURGERY | Facility: HOSPITAL | Age: 80
DRG: 854 | End: 2020-05-14
Payer: MEDICARE

## 2020-05-14 ENCOUNTER — ANESTHESIA (OUTPATIENT)
Dept: SURGERY | Facility: HOSPITAL | Age: 80
DRG: 854 | End: 2020-05-14
Payer: MEDICARE

## 2020-05-14 LAB
ANION GAP SERPL CALC-SCNC: 11 MMOL/L (ref 8–16)
ANISOCYTOSIS BLD QL SMEAR: SLIGHT
BASOPHILS # BLD AUTO: ABNORMAL K/UL (ref 0–0.2)
BASOPHILS NFR BLD: 0 % (ref 0–1.9)
BLASTS NFR BLD MANUAL: 2 %
BLD PROD TYP BPU: NORMAL
BLOOD UNIT EXPIRATION DATE: NORMAL
BLOOD UNIT TYPE CODE: 7300
BLOOD UNIT TYPE: NORMAL
BUN SERPL-MCNC: 16 MG/DL (ref 8–23)
CALCIUM SERPL-MCNC: 8.2 MG/DL (ref 8.7–10.5)
CHLORIDE SERPL-SCNC: 104 MMOL/L (ref 95–110)
CO2 SERPL-SCNC: 19 MMOL/L (ref 23–29)
CODING SYSTEM: NORMAL
CREAT SERPL-MCNC: 0.8 MG/DL (ref 0.5–1.4)
DACRYOCYTES BLD QL SMEAR: ABNORMAL
DIFFERENTIAL METHOD: ABNORMAL
DISPENSE STATUS: NORMAL
EOSINOPHIL # BLD AUTO: ABNORMAL K/UL (ref 0–0.5)
EOSINOPHIL NFR BLD: 2 % (ref 0–8)
ERYTHROCYTE [DISTWIDTH] IN BLOOD BY AUTOMATED COUNT: 20.1 % (ref 11.5–14.5)
EST. GFR  (AFRICAN AMERICAN): >60 ML/MIN/1.73 M^2
EST. GFR  (NON AFRICAN AMERICAN): >60 ML/MIN/1.73 M^2
GLUCOSE SERPL-MCNC: 90 MG/DL (ref 70–110)
HCT VFR BLD AUTO: 29.7 % (ref 37–48.5)
HGB BLD-MCNC: 9.4 G/DL (ref 12–16)
IMM GRANULOCYTES # BLD AUTO: ABNORMAL K/UL (ref 0–0.04)
IMM GRANULOCYTES NFR BLD AUTO: ABNORMAL % (ref 0–0.5)
LYMPHOCYTES # BLD AUTO: ABNORMAL K/UL (ref 1–4.8)
LYMPHOCYTES NFR BLD: 38 % (ref 18–48)
MCH RBC QN AUTO: 27.8 PG (ref 27–31)
MCHC RBC AUTO-ENTMCNC: 31.6 G/DL (ref 32–36)
MCV RBC AUTO: 88 FL (ref 82–98)
METAMYELOCYTES NFR BLD MANUAL: 2 %
MONOCYTES # BLD AUTO: ABNORMAL K/UL (ref 0.3–1)
MONOCYTES NFR BLD: 9 % (ref 4–15)
MYELOCYTES NFR BLD MANUAL: 2 %
NEUTROPHILS # BLD AUTO: ABNORMAL K/UL (ref 1.8–7.7)
NEUTROPHILS NFR BLD: 37 % (ref 38–73)
NEUTS BAND NFR BLD MANUAL: 8 %
NRBC BLD-RTO: 8 /100 WBC
NUM UNITS TRANS WBC-POOR PLATPHERESIS: NORMAL
OVALOCYTES BLD QL SMEAR: ABNORMAL
PLATELET # BLD AUTO: 13 K/UL (ref 150–350)
PMV BLD AUTO: ABNORMAL FL (ref 9.2–12.9)
POIKILOCYTOSIS BLD QL SMEAR: SLIGHT
POLYCHROMASIA BLD QL SMEAR: ABNORMAL
POTASSIUM SERPL-SCNC: 4.5 MMOL/L (ref 3.5–5.1)
RBC # BLD AUTO: 3.38 M/UL (ref 4–5.4)
SODIUM SERPL-SCNC: 134 MMOL/L (ref 136–145)
SPHEROCYTES BLD QL SMEAR: ABNORMAL
VANCOMYCIN SERPL-MCNC: 3.1 UG/ML
WBC # BLD AUTO: 20.06 K/UL (ref 3.9–12.7)

## 2020-05-14 PROCEDURE — 80202 ASSAY OF VANCOMYCIN: CPT | Mod: HCNC

## 2020-05-14 PROCEDURE — 63600175 PHARM REV CODE 636 W HCPCS: Mod: HCNC | Performed by: INTERNAL MEDICINE

## 2020-05-14 PROCEDURE — 36000706: Mod: HCNC | Performed by: SURGERY

## 2020-05-14 PROCEDURE — 36430 TRANSFUSION BLD/BLD COMPNT: CPT | Mod: HCNC

## 2020-05-14 PROCEDURE — 63600175 PHARM REV CODE 636 W HCPCS: Mod: HCNC | Performed by: ANESTHESIOLOGY

## 2020-05-14 PROCEDURE — 25000003 PHARM REV CODE 250: Mod: HCNC | Performed by: SURGERY

## 2020-05-14 PROCEDURE — 99233 PR SUBSEQUENT HOSPITAL CARE,LEVL III: ICD-10-PCS | Mod: HCNC,,, | Performed by: INTERNAL MEDICINE

## 2020-05-14 PROCEDURE — 11043 DBRDMT MUSC&/FSCA 1ST 20/<: CPT | Mod: 58,HCNC,, | Performed by: SURGERY

## 2020-05-14 PROCEDURE — 25000003 PHARM REV CODE 250: Mod: HCNC | Performed by: INTERNAL MEDICINE

## 2020-05-14 PROCEDURE — 37000009 HC ANESTHESIA EA ADD 15 MINS: Mod: HCNC | Performed by: SURGERY

## 2020-05-14 PROCEDURE — 94761 N-INVAS EAR/PLS OXIMETRY MLT: CPT | Mod: HCNC

## 2020-05-14 PROCEDURE — 85007 BL SMEAR W/DIFF WBC COUNT: CPT | Mod: HCNC

## 2020-05-14 PROCEDURE — 97605 NEG PRS WND THER DME<=50SQCM: CPT | Mod: HCNC,,, | Performed by: SURGERY

## 2020-05-14 PROCEDURE — P9037 PLATE PHERES LEUKOREDU IRRAD: HCPCS | Mod: HCNC

## 2020-05-14 PROCEDURE — 87070 CULTURE OTHR SPECIMN AEROBIC: CPT | Mod: HCNC

## 2020-05-14 PROCEDURE — 37000008 HC ANESTHESIA 1ST 15 MINUTES: Mod: HCNC | Performed by: SURGERY

## 2020-05-14 PROCEDURE — 99900035 HC TECH TIME PER 15 MIN (STAT): Mod: HCNC

## 2020-05-14 PROCEDURE — 85027 COMPLETE CBC AUTOMATED: CPT | Mod: HCNC

## 2020-05-14 PROCEDURE — 97605 PR NEG PRESS WOUND THERAPY (NPWT) W/NON-DISPOSABLE WOUND VAC DEVICE (DME), <=50 CM: ICD-10-PCS | Mod: HCNC,,, | Performed by: SURGERY

## 2020-05-14 PROCEDURE — 21400001 HC TELEMETRY ROOM: Mod: HCNC

## 2020-05-14 PROCEDURE — 71000033 HC RECOVERY, INTIAL HOUR: Mod: HCNC | Performed by: SURGERY

## 2020-05-14 PROCEDURE — 87186 SC STD MICRODIL/AGAR DIL: CPT | Mod: HCNC

## 2020-05-14 PROCEDURE — 11046 DBRDMT MUSC&/FSCA EA ADDL: CPT | Mod: HCNC,,, | Performed by: SURGERY

## 2020-05-14 PROCEDURE — 11043 PR DEBRIDEMENT, SKIN, SUB-Q TISSUE,MUSCLE,=<20 SQ CM: ICD-10-PCS | Mod: 58,HCNC,, | Performed by: SURGERY

## 2020-05-14 PROCEDURE — 63600175 PHARM REV CODE 636 W HCPCS: Mod: HCNC | Performed by: NURSE ANESTHETIST, CERTIFIED REGISTERED

## 2020-05-14 PROCEDURE — 11046 PR DEB MUSC/FASCIA ADD-ON: ICD-10-PCS | Mod: HCNC,,, | Performed by: SURGERY

## 2020-05-14 PROCEDURE — 87077 CULTURE AEROBIC IDENTIFY: CPT | Mod: HCNC

## 2020-05-14 PROCEDURE — 80048 BASIC METABOLIC PNL TOTAL CA: CPT | Mod: HCNC

## 2020-05-14 PROCEDURE — 36000707: Mod: HCNC | Performed by: SURGERY

## 2020-05-14 PROCEDURE — 99233 SBSQ HOSP IP/OBS HIGH 50: CPT | Mod: HCNC,,, | Performed by: INTERNAL MEDICINE

## 2020-05-14 PROCEDURE — 87205 SMEAR GRAM STAIN: CPT | Mod: HCNC

## 2020-05-14 PROCEDURE — 63600175 PHARM REV CODE 636 W HCPCS: Mod: HCNC | Performed by: SURGERY

## 2020-05-14 PROCEDURE — 36415 COLL VENOUS BLD VENIPUNCTURE: CPT | Mod: HCNC

## 2020-05-14 RX ORDER — FENTANYL CITRATE 50 UG/ML
25 INJECTION, SOLUTION INTRAMUSCULAR; INTRAVENOUS EVERY 5 MIN PRN
Status: DISCONTINUED | OUTPATIENT
Start: 2020-05-14 | End: 2020-05-14

## 2020-05-14 RX ORDER — PROPOFOL 10 MG/ML
VIAL (ML) INTRAVENOUS
Status: DISCONTINUED | OUTPATIENT
Start: 2020-05-14 | End: 2020-05-14

## 2020-05-14 RX ORDER — HYDROMORPHONE HYDROCHLORIDE 2 MG/ML
0.2 INJECTION, SOLUTION INTRAMUSCULAR; INTRAVENOUS; SUBCUTANEOUS EVERY 5 MIN PRN
Status: DISCONTINUED | OUTPATIENT
Start: 2020-05-14 | End: 2020-05-14

## 2020-05-14 RX ORDER — ONDANSETRON 2 MG/ML
4 INJECTION INTRAMUSCULAR; INTRAVENOUS DAILY PRN
Status: DISCONTINUED | OUTPATIENT
Start: 2020-05-14 | End: 2020-05-14

## 2020-05-14 RX ORDER — FENTANYL CITRATE 50 UG/ML
INJECTION, SOLUTION INTRAMUSCULAR; INTRAVENOUS
Status: DISCONTINUED | OUTPATIENT
Start: 2020-05-14 | End: 2020-05-14

## 2020-05-14 RX ORDER — FERROUS SULFATE, DRIED 160(50) MG
1 TABLET, EXTENDED RELEASE ORAL DAILY
Status: DISCONTINUED | OUTPATIENT
Start: 2020-05-14 | End: 2020-05-15 | Stop reason: HOSPADM

## 2020-05-14 RX ORDER — METOCLOPRAMIDE HYDROCHLORIDE 5 MG/ML
10 INJECTION INTRAMUSCULAR; INTRAVENOUS EVERY 10 MIN PRN
Status: DISCONTINUED | OUTPATIENT
Start: 2020-05-14 | End: 2020-05-14

## 2020-05-14 RX ORDER — SODIUM CHLORIDE 0.9 % (FLUSH) 0.9 %
3 SYRINGE (ML) INJECTION EVERY 8 HOURS
Status: DISCONTINUED | OUTPATIENT
Start: 2020-05-14 | End: 2020-05-14

## 2020-05-14 RX ORDER — LIDOCAINE HYDROCHLORIDE 10 MG/ML
1 INJECTION, SOLUTION EPIDURAL; INFILTRATION; INTRACAUDAL; PERINEURAL ONCE
Status: DISCONTINUED | OUTPATIENT
Start: 2020-05-14 | End: 2020-05-15 | Stop reason: HOSPADM

## 2020-05-14 RX ORDER — HYDROCODONE BITARTRATE AND ACETAMINOPHEN 500; 5 MG/1; MG/1
TABLET ORAL
Status: DISCONTINUED | OUTPATIENT
Start: 2020-05-14 | End: 2020-05-15 | Stop reason: HOSPADM

## 2020-05-14 RX ORDER — HYDROCHLOROTHIAZIDE 12.5 MG/1
12.5 TABLET ORAL DAILY
Status: DISCONTINUED | OUTPATIENT
Start: 2020-05-14 | End: 2020-05-15 | Stop reason: HOSPADM

## 2020-05-14 RX ORDER — CEFAZOLIN SODIUM 2 G/50ML
2 SOLUTION INTRAVENOUS
Status: CANCELLED | OUTPATIENT
Start: 2020-05-14

## 2020-05-14 RX ORDER — VANCOMYCIN HCL IN 5 % DEXTROSE 1G/250ML
1000 PLASTIC BAG, INJECTION (ML) INTRAVENOUS
Status: DISCONTINUED | OUTPATIENT
Start: 2020-05-14 | End: 2020-05-15

## 2020-05-14 RX ORDER — SODIUM CHLORIDE 9 MG/ML
INJECTION, SOLUTION INTRAVENOUS CONTINUOUS
Status: CANCELLED | OUTPATIENT
Start: 2020-05-14

## 2020-05-14 RX ORDER — MEPERIDINE HYDROCHLORIDE 25 MG/ML
12.5 INJECTION INTRAMUSCULAR; INTRAVENOUS; SUBCUTANEOUS ONCE AS NEEDED
Status: DISCONTINUED | OUTPATIENT
Start: 2020-05-14 | End: 2020-05-14

## 2020-05-14 RX ORDER — BUPIVACAINE HYDROCHLORIDE 2.5 MG/ML
INJECTION, SOLUTION EPIDURAL; INFILTRATION; INTRACAUDAL
Status: DISCONTINUED | OUTPATIENT
Start: 2020-05-14 | End: 2020-05-14 | Stop reason: HOSPADM

## 2020-05-14 RX ORDER — DIPHENHYDRAMINE HYDROCHLORIDE 50 MG/ML
25 INJECTION INTRAMUSCULAR; INTRAVENOUS EVERY 6 HOURS PRN
Status: DISCONTINUED | OUTPATIENT
Start: 2020-05-14 | End: 2020-05-15 | Stop reason: HOSPADM

## 2020-05-14 RX ORDER — LOSARTAN POTASSIUM 25 MG/1
25 TABLET ORAL DAILY
Status: DISCONTINUED | OUTPATIENT
Start: 2020-05-14 | End: 2020-05-15 | Stop reason: HOSPADM

## 2020-05-14 RX ADMIN — TAMSULOSIN HYDROCHLORIDE 0.4 MG: 0.4 CAPSULE ORAL at 08:05

## 2020-05-14 RX ADMIN — PROPOFOL 50 MG: 10 INJECTION, EMULSION INTRAVENOUS at 06:05

## 2020-05-14 RX ADMIN — VANCOMYCIN HYDROCHLORIDE 1000 MG: 1 INJECTION, POWDER, LYOPHILIZED, FOR SOLUTION INTRAVENOUS at 01:05

## 2020-05-14 RX ADMIN — LEVOTHYROXINE SODIUM 88 MCG: 88 TABLET ORAL at 05:05

## 2020-05-14 RX ADMIN — LOSARTAN POTASSIUM 25 MG: 25 TABLET ORAL at 01:05

## 2020-05-14 RX ADMIN — TOPIRAMATE 25 MG: 25 TABLET, FILM COATED ORAL at 08:05

## 2020-05-14 RX ADMIN — DULOXETINE HYDROCHLORIDE 40 MG: 20 CAPSULE, DELAYED RELEASE ORAL at 08:05

## 2020-05-14 RX ADMIN — PROPOFOL 50 MG: 10 INJECTION, EMULSION INTRAVENOUS at 05:05

## 2020-05-14 RX ADMIN — SODIUM CHLORIDE: 0.9 INJECTION, SOLUTION INTRAVENOUS at 05:05

## 2020-05-14 RX ADMIN — OYSTER SHELL CALCIUM WITH VITAMIN D 1 TABLET: 500; 200 TABLET, FILM COATED ORAL at 01:05

## 2020-05-14 RX ADMIN — CEFEPIME HYDROCHLORIDE 2 G: 2 INJECTION, SOLUTION INTRAVENOUS at 08:05

## 2020-05-14 RX ADMIN — SODIUM CHLORIDE 100 ML: 0.9 INJECTION, SOLUTION INTRAVENOUS at 06:05

## 2020-05-14 RX ADMIN — Medication 400 MG: at 08:05

## 2020-05-14 RX ADMIN — FERROUS GLUCONATE TAB 324 MG (37.5 MG ELEMENTAL IRON) 324 MG: 324 (37.5 FE) TAB at 04:05

## 2020-05-14 RX ADMIN — FENTANYL CITRATE 100 MCG: 50 INJECTION, SOLUTION INTRAMUSCULAR; INTRAVENOUS at 05:05

## 2020-05-14 RX ADMIN — AMITRIPTYLINE HYDROCHLORIDE 75 MG: 50 TABLET, FILM COATED ORAL at 08:05

## 2020-05-14 RX ADMIN — METOPROLOL SUCCINATE 50 MG: 50 TABLET, EXTENDED RELEASE ORAL at 08:05

## 2020-05-14 RX ADMIN — FERROUS GLUCONATE TAB 324 MG (37.5 MG ELEMENTAL IRON) 324 MG: 324 (37.5 FE) TAB at 08:05

## 2020-05-14 RX ADMIN — Medication 400 MG: at 02:05

## 2020-05-14 RX ADMIN — PRAVASTATIN SODIUM 10 MG: 10 TABLET ORAL at 08:05

## 2020-05-14 RX ADMIN — FENTANYL CITRATE 100 MCG: 50 INJECTION, SOLUTION INTRAMUSCULAR; INTRAVENOUS at 06:05

## 2020-05-14 RX ADMIN — HYDROCODONE BITARTRATE AND ACETAMINOPHEN 1 TABLET: 7.5; 325 TABLET ORAL at 11:05

## 2020-05-14 RX ADMIN — HYDROMORPHONE HYDROCHLORIDE 0.2 MG: 2 INJECTION INTRAMUSCULAR; INTRAVENOUS; SUBCUTANEOUS at 07:05

## 2020-05-14 RX ADMIN — HYDROCHLOROTHIAZIDE 12.5 MG: 12.5 TABLET ORAL at 01:05

## 2020-05-14 NOTE — ASSESSMENT & PLAN NOTE
- Platelet count stable and appears at baseline  - No bleeding noted  - Daily CBC  5/14- Pl is down to 13. Will transfuse one dose of platelet today per Hematology

## 2020-05-14 NOTE — PLAN OF CARE
Patient received on shift laying in bed awake, x4, in no acute distress. Vitals remains within stable limits, medications tolerated well. Dressing care performed on shift as per wound care orders; pt. Tolerated well. Dressing remained clean, dry and intact throughout shift. Will cont to monitor progress.

## 2020-05-14 NOTE — SUBJECTIVE & OBJECTIVE
Interval History:    Pt has no new C/O . Awake , alert and oriented. Remains afebrile . BP is elevated noted . Review home meds and resume as appropriate. WBC 20 K( at baseline) , Hgb 9.4 ( s/p 2 units P-RBC yesterday) . Pl down to 13. No signs of active bleeding. Hematology recommended to transfuse one dose of Pl today . Tissue culture resulted growth of Staph Aureus . Final susceptibility is pending . DC Cefepime . Continue Vancomycin. On exam,  some induration palpated in the area next to previous I&D. Wound care on board. Will speak to General Surgery.       Review of Systems   Constitutional: Positive for activity change. Negative for appetite change and fever.   HENT: Negative for sore throat.    Eyes: Negative for visual disturbance.   Respiratory: Negative for cough, chest tightness and shortness of breath.    Cardiovascular: Negative for chest pain, palpitations and leg swelling.   Gastrointestinal: Negative for abdominal distention, abdominal pain, constipation, diarrhea, nausea and vomiting.   Endocrine: Negative for polyuria.   Genitourinary: Negative for decreased urine volume, dysuria, flank pain, frequency and hematuria.   Musculoskeletal: Positive for back pain (at abscess area). Negative for gait problem.   Skin: Positive for rash (lower leg , Left ).   Neurological: Negative for syncope, speech difficulty, weakness, light-headedness and headaches.   Psychiatric/Behavioral: Negative for confusion, hallucinations and sleep disturbance.     Objective:     Vital Signs (Most Recent):  Temp: 98.1 °F (36.7 °C) (05/14/20 1220)  Pulse: 99 (05/14/20 1220)  Resp: 18 (05/14/20 1220)  BP: (!) 180/84 (05/14/20 1220)  SpO2: 99 % (05/14/20 1220) Vital Signs (24h Range):  Temp:  [97.8 °F (36.6 °C)-98.9 °F (37.2 °C)] 98.1 °F (36.7 °C)  Pulse:  [76-99] 99  Resp:  [16-20] 18  SpO2:  [96 %-100 %] 99 %  BP: (117-184)/(53-84) 180/84     Weight: 47.1 kg (103 lb 13.4 oz)  Body mass index is 18.99 kg/m².    Intake/Output  Summary (Last 24 hours) at 5/14/2020 1254  Last data filed at 5/14/2020 0603  Gross per 24 hour   Intake 1003.33 ml   Output --   Net 1003.33 ml      Physical Exam   Constitutional: She is oriented to person, place, and time. She appears well-developed. No distress.   HENT:   Head: Normocephalic and atraumatic.   Mouth/Throat: No oropharyngeal exudate.   Eyes: Pupils are equal, round, and reactive to light. Conjunctivae and EOM are normal.   Neck: Normal range of motion. Neck supple. No JVD present. No thyromegaly present.   Cardiovascular: Normal rate, regular rhythm and normal heart sounds.   No murmur heard.  Pulmonary/Chest: Effort normal and breath sounds normal. No respiratory distress. She has no wheezes. She has no rales. She exhibits no tenderness.   Abdominal: Soft. Bowel sounds are normal. She exhibits no distension. There is no tenderness. There is no rebound and no guarding.   Musculoskeletal: Normal range of motion. She exhibits no edema.   Lymphadenopathy:     She has no cervical adenopathy.   Neurological: She is alert and oriented to person, place, and time. She displays normal reflexes. No cranial nerve deficit or sensory deficit.   Skin: Skin is warm and dry. No rash noted. She is not diaphoretic. There is erythema (lower back and area . Rash noted left lower leg alt aspect ).   Erythema and induration noted lower back near previous I&D.    Psychiatric: She has a normal mood and affect.       Significant Labs:   Blood Culture: No results for input(s): LABBLOO in the last 48 hours.  CBC:   Recent Labs   Lab 05/13/20  0749 05/14/20  0530   WBC 26.47* 20.06*   HGB 6.7* 9.4*   HCT 22.5* 29.7*   PLT 41* 13*     CMP:   Recent Labs   Lab 05/13/20  0749 05/14/20  0530   * 134*   K 4.6 4.5    104   CO2 24 19*    90   BUN 13 16   CREATININE 0.8 0.8   CALCIUM 8.5* 8.2*   ANIONGAP 8 11   EGFRNONAA >60 >60      Specimen: Biopsy from Back, Left Updated: 05/14/20 0817    Aerobic Culture -  Tissue STAPHYLOCOCCUS AUREUS   Many   Susceptibility pending        Significant Imaging:

## 2020-05-14 NOTE — ANESTHESIA PREPROCEDURE EVALUATION
05/14/2020  Sarah Parker is a 80 y.o., female.    Anesthesia Evaluation    I have reviewed the Patient Summary Reports.    I have reviewed the Nursing Notes.   I have reviewed the Medications.     Review of Systems  Anesthesia Hx:  No problems with previous Anesthesia  History of prior surgery of interest to airway management or planning: Previous anesthesia: General  Denies Personal Hx of Anesthesia complications.   Social:  Non-Smoker    Hematology/Oncology:  Hematology Normal   Oncology Normal    -- Leukemia: Chronic Myeloid Leukemia (CML)    EENT/Dental:EENT/Dental Normal   Cardiovascular:   Hypertension ECG has been reviewed.    Pulmonary:   COPD Asthma    Renal/:  Renal/ Normal     Hepatic/GI:   Hiatal Hernia, GERD    Musculoskeletal:   Arthritis  Rheumatoid   Neurological:   Headaches   Peripheral Neuropathy    Endocrine:   Hypothyroidism    Dermatological:  Skin Normal    Psych:   Psychiatric History anxiety depression          Physical Exam  General:  Well nourished    Airway/Jaw/Neck:  Airway Findings: Mouth Opening: Normal Tongue: Normal  Mallampati: II      Dental:  Dental Findings: In tact   Chest/Lungs:  Chest/Lungs Clear    Heart/Vascular:  Heart Findings: Normal Heart murmur: negative       Mental Status:  Mental Status Findings:  Cooperative, Alert and Oriented         Anesthesia Plan  Type of Anesthesia, risks & benefits discussed:  Anesthesia Type:  MAC  Patient's Preference:   Intra-op Monitoring Plan: standard ASA monitors  Intra-op Monitoring Plan Comments:   Post Op Pain Control Plan: multimodal analgesia  Post Op Pain Control Plan Comments:   Induction:   IV  Beta Blocker:  Patient is on a Beta-Blocker and has received one dose within the past 24 hours (No further documentation required).       Informed Consent: Patient understands risks and agrees with Anesthesia plan.   Questions answered. Anesthesia consent signed with patient.  ASA Score: 3  emergent   Day of Surgery Review of History & Physical: I have interviewed and examined the patient. I have reviewed the patient's H&P dated:    H&P update referred to the surgeon.         Ready For Surgery From Anesthesia Perspective.

## 2020-05-14 NOTE — TRANSFER OF CARE
"Anesthesia Transfer of Care Note    Patient: Sarah Parker    Procedure(s) Performed: Procedure(s) (LRB):  DEBRIDEMENT, WOUND (N/A)  APPLICATION, WOUND VAC    Patient location: PACU    Anesthesia Type: MAC    Transport from OR: Transported from OR on room air with adequate spontaneous ventilation    Post pain: adequate analgesia    Post assessment: no apparent anesthetic complications    Post vital signs: stable    Level of consciousness: awake    Nausea/Vomiting: no nausea/vomiting    Complications: none    Transfer of care protocol was followed      Last vitals:   Visit Vitals  BP (!) 164/67   Pulse 76   Temp 36.8 °C (98.3 °F) (Temporal)   Resp 14   Ht 5' 2" (1.575 m)   Wt 47.1 kg (103 lb 13.4 oz)   LMP  (LMP Unknown)   SpO2 100%   Breastfeeding? No   BMI 18.99 kg/m²     "

## 2020-05-14 NOTE — SUBJECTIVE & OBJECTIVE
Interval history: hemoglobin 9.4 s/p 2 units PRBCs transfusion. WBC continues to trend downward. Platelets 13, awaiting platelet transfusion. Patient feels well and hoping to discharge soon  Oncology Treatment Plan:   OP AZACITADINE 7-DAY (SUB-Q)    Medications:  Continuous Infusions:    Scheduled Meds:   amitriptyline  75 mg Oral QHS    calcium-vitamin D3  1 tablet Oral Daily    DULoxetine  40 mg Oral Daily    ferrous gluconate  324 mg Oral BID WM    hydroCHLOROthiazide  12.5 mg Oral Daily    levothyroxine  88 mcg Oral Before breakfast    losartan  25 mg Oral Daily    magnesium oxide  400 mg Oral TID    metoprolol succinate  50 mg Oral Daily    pravastatin  10 mg Oral Daily    tamsulosin  0.4 mg Oral Daily    topiramate  25 mg Oral BID    vancomycin (VANCOCIN) IVPB  1,000 mg Intravenous Q24H     PRN Meds:sodium chloride, acetaminophen, dextrose 10 % in water (D10W), dextrose 10 % in water (D10W), glucagon (human recombinant), glucose, glucose, HYDROcodone-acetaminophen, HYDROcodone-acetaminophen, ondansetron, ondansetron, sodium chloride 0.9%, Pharmacy to dose Vancomycin consult **AND** vancomycin - pharmacy to dose     Review of patient's allergies indicates:   Allergen Reactions    Codeine      Other reaction(s): hyper  Other reaction(s): hyper    Doxycycline     Gabapentin Other (See Comments)     Bad dreams        Past Medical History:   Diagnosis Date    Acid reflux     Anxiety     Back pain     Bronchitis, chronic obstructive w acute bronchitis 7/29/2016    Cancer     [facility] arms, face- Dr. Lata Tejada    Cataract     2+NS    Degenerative disc disease     Depression     Dry mouth     Hernia, hiatal 11/18/2013    Hypertension     Hypothyroid     Macrocytic anemia 5/3/2016    Macular degeneration     Migraines     Mixed anxiety and depressive disorder     Multiple fractures of ribs of right side     Osteoporosis     Other hyperlipidemia 10/11/2019    Pneumonia     Pneumonia  due to other staphylococcus     Rheumatoid arthritis     Rheumatoid arthritis(714.0)     Rheumatoid arthritis(714.0)     Remicade, MTX.     Past Surgical History:   Procedure Laterality Date    APPENDECTOMY  1985    BREAST BIOPSY      CATARACT EXTRACTION Bilateral 6/11/15    Dr. Booth    CHOLECYSTECTOMY  2013    cryoablasion kidney Left 2016    feet Bilateral     rheumatoid    FRACTURE SURGERY Right     tibia    HERNIA REPAIR      HYSTERECTOMY  1970    partial    INCISION AND DRAINAGE OF ABSCESS N/A 2020    Procedure: INCISION AND DRAINAGE, ABSCESS;  Surgeon: Emerson Hathaway MD;  Location: [facility] OR;  Service: General;  Laterality: N/A;    INCISIONAL BIOPSY N/A 2020    Procedure: INCISIONAL BIOPSY;  Surgeon: Emerson Hathaway MD;  Location: [facility] OR;  Service: General;  Laterality: N/A;    JOINT REPLACEMENT      bilateral knees (), hands, wrists, knuckles, toes    LAPAROSCOPIC NISSEN FUNDOPLICATION      TRANSFORAMINAL EPIDURAL INJECTION OF STEROID Left 2019    Procedure: Left L5/S1 TF AYAAN with local;  Surgeon: Rowdy Felix MD;  Location: [facility] PAIN T;  Service: Pain Management;  Laterality: Left;     Family History     Problem Relation (Age of Onset)    Asthma Sister, Brother    Cancer Brother, Maternal Grandfather    Cataracts Mother    Chronic back pain Sister, Brother    Diabetes Mellitus Brother    Fibromyalgia Daughter    Heart disease Mother, Father, Maternal Grandmother    Hyperlipidemia Mother    Hypertension Mother, Father, Sister, Brother    Osteoarthritis Mother, Father, Sister, Brother    Thyroid disease Sister, Brother        Tobacco Use    Smoking status: Former Smoker     Packs/day: 0.25     Years: 2.00     Pack years: 0.50     Last attempt to quit: 1965     Years since quittin.5    Smokeless tobacco: Never Used   Substance and Sexual Activity    Alcohol use: No    Drug use: No    Sexual activity: Never     Partners: Male       Review of Systems    Constitutional: Negative.    HENT: Negative.    Eyes: Negative.    Respiratory: Negative.    Cardiovascular: Negative.    Gastrointestinal: Negative.    Endocrine: Negative.    Genitourinary: Negative.    Musculoskeletal: Negative.    Skin: Positive for wound.   Allergic/Immunologic: Negative.    Neurological: Negative.    Hematological: Negative.    Psychiatric/Behavioral: Negative.      Objective:     Vital Signs (Most Recent):  Temp: 98.1 °F (36.7 °C) (05/14/20 1220)  Pulse: 99 (05/14/20 1220)  Resp: 18 (05/14/20 1220)  BP: (!) 180/84 (05/14/20 1220)  SpO2: 99 % (05/14/20 1220) Vital Signs (24h Range):  Temp:  [97.8 °F (36.6 °C)-98.9 °F (37.2 °C)] 98.1 °F (36.7 °C)  Pulse:  [76-99] 99  Resp:  [16-20] 18  SpO2:  [96 %-100 %] 99 %  BP: (117-184)/(53-84) 180/84     Weight: 47.1 kg (103 lb 13.4 oz)  Body mass index is 18.99 kg/m².  Body surface area is 1.44 meters squared.      Intake/Output Summary (Last 24 hours) at 5/14/2020 1316  Last data filed at 5/14/2020 0603  Gross per 24 hour   Intake 1003.33 ml   Output --   Net 1003.33 ml       Physical Exam   Constitutional: She is oriented to person, place, and time. She appears well-developed and well-nourished.   HENT:   Head: Normocephalic and atraumatic.   Eyes: Conjunctivae and EOM are normal.   Neck: Normal range of motion. Neck supple.   Cardiovascular: Normal rate and regular rhythm.   Pulmonary/Chest: Effort normal and breath sounds normal.   Abdominal: Soft. Bowel sounds are normal.   Musculoskeletal: Normal range of motion.   Neurological: She is alert and oriented to person, place, and time.   Skin: Skin is warm and dry.        Psychiatric: She has a normal mood and affect. Her behavior is normal. Judgment and thought content normal.   Nursing note and vitals reviewed.      Significant Labs:   All pertinent labs from the last 24 hours have been reviewed.    Diagnostic Results:  I have reviewed all pertinent imaging results/findings within the past 24  hours.

## 2020-05-14 NOTE — ASSESSMENT & PLAN NOTE
- Failed outpatient treatment with bactrim  - Diffuse cellulitis associated with multiple abscesses  - Continue Vanc and Cefepime  - PharmD consulted to assist with Vanc dosing  - CT lumbar spine done   - General surgery consult obtained for eval of I&D   - S/P I &D of back abscesses 5/12/20   -Tissue culture resulted growth of Staph Aureus . Continue Vancomycin until final susceptibility results . DC Cefepime.   - Some worsening induration noted in the lower back. Will speak to General Surgery - 5/14/20

## 2020-05-14 NOTE — ASSESSMENT & PLAN NOTE
- BP under fair control  - Continue home Toprol XL   - Resume Diovan pending BP trends  - Monitor and adjust meds as needed  - Resume ARB ( home med)

## 2020-05-14 NOTE — ANESTHESIA POSTPROCEDURE EVALUATION
Anesthesia Post Evaluation    Patient: Sarah Parker    Procedure(s) Performed: Procedure(s) (LRB):  DEBRIDEMENT, WOUND (N/A)  APPLICATION, WOUND VAC    Final Anesthesia Type: MAC    Patient location during evaluation: PACU  Patient participation: Yes- Able to Participate  Level of consciousness: awake and alert  Post-procedure vital signs: reviewed and stable  Pain management: adequate  Airway patency: patent  LEEANN mitigation strategies: Extubation while patient is awake  PONV status at discharge: No PONV  Anesthetic complications: no      Cardiovascular status: hemodynamically stable  Respiratory status: spontaneous ventilation  Hydration status: euvolemic  Follow-up not needed.          Vitals Value Taken Time   /67 5/14/2020  6:51 PM   Temp 36.8 °C (98.3 °F) 5/14/2020  6:40 PM   Pulse 78 5/14/2020  6:52 PM   Resp 34 5/14/2020  6:52 PM   SpO2 100 % 5/14/2020  6:52 PM   Vitals shown include unvalidated device data.      No case tracking events are documented in the log.      Pain/Diana Score: Pain Rating Prior to Med Admin: 7 (5/13/2020  9:40 PM)  Pain Rating Post Med Admin: 2 (5/13/2020  3:37 PM)  Diana Score: 9 (5/14/2020  6:45 PM)

## 2020-05-14 NOTE — PROGRESS NOTES
"[facility] Hospital Medicine  Progress Note    Patient Name: Sarah Parker  MRN: [id number]  Patient Class: IP- Inpatient   Admission Date: 5/12/2020  Length of Stay: 2 days  Attending Physician: Hari Sloan MD  Primary Care Provider: Briseida Bennett MD        Subjective:     Principal Problem:Sepsis        HPI:  Sarah Parker is a 80 y.o.  CF with a PMHx of CML and associated anemia followed outpatient by Dr. Estrada s/p cycle 4 of Azacitidine, HTN, HLD, Depression, Hypothyroidism, and RA who presented to the Emergency Department today with c/o worsening back cellulitis, which onset approximately 1.5 weeks PTA.  Symptoms are constant and moderate in severity.  No mitigating or exacerbating factors reported.  Associated sxs include  low back pain.  She denies any known injury or wound stating "all of a sudden a boil came up".  Patient denies any fever, chills, n/v/d, SOB, CP, weakness, numbness, dizziness, headache, and all other sxs at this time.  Patient was put on a course of Bactrim by her PCP, with worsening of symptoms.  No further complaints or concerns at this time.  ER workup showed:  Stable VS.  CBC showed WBCs 30 k (chronically elevated due to CML, baseline appears to be 20-28), Hgb 6.7 (improved from 6.0 yesterday at which time she declined PRBCs), Platelets 56 and consistent with baseline.  CMP showed Alk phos 303, otherwise unremarkable.  Lactic acid 1.1.  Procalcitonin pending.  BC were drawn and patient was given Vanc and Zosyn in the ER.  Hospital Medicine called for admission.  CT scan pending along with General Surgery consult.  Patient will be placed in OBS. She is a Full Code.  Her SDM is her daughter Albina who can be reached at [phone].    Overview/Hospital Course:  5/13- Pt states pain in the abscess area is better. S/P  incision and drainage of back abscess yesterday . Afebrile . Vitals are fairly stable . Tissue gram stain resulted few gram positive cocci " . Blood culture x 2  no growth to date. Current antibiotic - Cefepime and Vancomycin IV. Labs - WBC trended down 26.4 ( near baseline ) , Hb 6.7, Pl 41. Will have 2 units of irradiated P-RBC transfusion today.     5/14- Pt has no new C/O . Awake , alert and oriented. Remains afebrile . BP is elevated noted . Review home meds and resume as appropriate. WBC 20 K( at baseline) , Hgb 9.4 ( s/p 2 units P-RBC yesterday) . Pl down to 13. No signs of active bleeding. Hematology recommended to transfuse one dose of Pl today . Tissue culture resulted growth of Staph Aureus . Final susceptibility is pending . DC Cefepime . Continue Vancomycin. On exam,  some induration palpated in the area next to previous I&D. Wound care on board. Will speak to General Surgery.     Interval History:    Pt has no new C/O . Awake , alert and oriented. Remains afebrile . BP is elevated noted . Review home meds and resume as appropriate. WBC 20 K( at baseline) , Hgb 9.4 ( s/p 2 units P-RBC yesterday) . Pl down to 13. No signs of active bleeding. Hematology recommended to transfuse one dose of Pl today . Tissue culture resulted growth of Staph Aureus . Final susceptibility is pending . DC Cefepime . Continue Vancomycin. On exam,  some induration palpated in the area next to previous I&D. Wound care on board. Will speak to General Surgery.       Review of Systems   Constitutional: Positive for activity change. Negative for appetite change and fever.   HENT: Negative for sore throat.    Eyes: Negative for visual disturbance.   Respiratory: Negative for cough, chest tightness and shortness of breath.    Cardiovascular: Negative for chest pain, palpitations and leg swelling.   Gastrointestinal: Negative for abdominal distention, abdominal pain, constipation, diarrhea, nausea and vomiting.   Endocrine: Negative for polyuria.   Genitourinary: Negative for decreased urine volume, dysuria, flank pain, frequency and hematuria.   Musculoskeletal: Positive  for back pain (at abscess area). Negative for gait problem.   Skin: Positive for rash (lower leg , Left ).   Neurological: Negative for syncope, speech difficulty, weakness, light-headedness and headaches.   Psychiatric/Behavioral: Negative for confusion, hallucinations and sleep disturbance.     Objective:     Vital Signs (Most Recent):  Temp: 98.1 °F (36.7 °C) (05/14/20 1220)  Pulse: 99 (05/14/20 1220)  Resp: 18 (05/14/20 1220)  BP: (!) 180/84 (05/14/20 1220)  SpO2: 99 % (05/14/20 1220) Vital Signs (24h Range):  Temp:  [97.8 °F (36.6 °C)-98.9 °F (37.2 °C)] 98.1 °F (36.7 °C)  Pulse:  [76-99] 99  Resp:  [16-20] 18  SpO2:  [96 %-100 %] 99 %  BP: (117-184)/(53-84) 180/84     Weight: 47.1 kg (103 lb 13.4 oz)  Body mass index is 18.99 kg/m².    Intake/Output Summary (Last 24 hours) at 5/14/2020 1254  Last data filed at 5/14/2020 0603  Gross per 24 hour   Intake 1003.33 ml   Output --   Net 1003.33 ml      Physical Exam   Constitutional: She is oriented to person, place, and time. She appears well-developed. No distress.   HENT:   Head: Normocephalic and atraumatic.   Mouth/Throat: No oropharyngeal exudate.   Eyes: Pupils are equal, round, and reactive to light. Conjunctivae and EOM are normal.   Neck: Normal range of motion. Neck supple. No JVD present. No thyromegaly present.   Cardiovascular: Normal rate, regular rhythm and normal heart sounds.   No murmur heard.  Pulmonary/Chest: Effort normal and breath sounds normal. No respiratory distress. She has no wheezes. She has no rales. She exhibits no tenderness.   Abdominal: Soft. Bowel sounds are normal. She exhibits no distension. There is no tenderness. There is no rebound and no guarding.   Musculoskeletal: Normal range of motion. She exhibits no edema.   Lymphadenopathy:     She has no cervical adenopathy.   Neurological: She is alert and oriented to person, place, and time. She displays normal reflexes. No cranial nerve deficit or sensory deficit.   Skin: Skin is  warm and dry. No rash noted. She is not diaphoretic. There is erythema (lower back and area . Rash noted left lower leg alt aspect ).   Erythema and induration noted lower back near previous I&D.    Psychiatric: She has a normal mood and affect.       Significant Labs:   Blood Culture: No results for input(s): LABBLOO in the last 48 hours.  CBC:   Recent Labs   Lab 05/13/20  0749 05/14/20  0530   WBC 26.47* 20.06*   HGB 6.7* 9.4*   HCT 22.5* 29.7*   PLT 41* 13*     CMP:   Recent Labs   Lab 05/13/20  0749 05/14/20  0530   * 134*   K 4.6 4.5    104   CO2 24 19*    90   BUN 13 16   CREATININE 0.8 0.8   CALCIUM 8.5* 8.2*   ANIONGAP 8 11   EGFRNONAA >60 >60      Specimen: Biopsy from Back, Left Updated: 05/14/20 0817    Aerobic Culture - Tissue STAPHYLOCOCCUS AUREUS   Many   Susceptibility pending        Significant Imaging:       Assessment/Plan:      * Sepsis  - Place in OBS  - Likely secondary to lower back cellulitis/abscess  - HR >90, WBCs 30k  - Lactic acid 1.1  - Given bolus of IVFs per sepsis protocol  - BC drawn and are pending  - Continue IV ABX  - Currently hemodynamically stable  -S/P I &D of back abscesses       Cellulitis of lower back  - Failed outpatient treatment with bactrim  - Diffuse cellulitis associated with multiple abscesses  - Continue Vanc and Cefepime  - PharmD consulted to assist with Vanc dosing  - CT lumbar spine done   - General surgery consult obtained for eval of I&D   - S/P I &D of back abscesses 5/12/20   -Tissue culture resulted growth of Staph Aureus . Continue Vancomycin until final susceptibility results . DC Cefepime.   - Some worsening induration noted in the lower back. Will speak to General Surgery - 5/14/20     Abscess of lower back  - CT scan pending to evaluate further  - General surgery consult obtained   -S/P I &D of back abscess 5/12/20  - Tissue gram ranjit resulted few gram positive cocci - final identification and susceptibility pending   - Blood  cultures x 2 neg to date   -Tissue culture resulted growth of Staph Aureus . Continue Vancomycin until final susceptibility results . DC Cefepime.   - Some worsening induration noted in the lower back. 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 Heme/Onc recommended 2 additional units  of irradiated P-RBC trans today   5/14- Hbg appropriately improved post transfusion         Chronic myelomonocytic leukemia not having achieved remission  - Followed outpatient by Dr. Estrdaa s/p cycle 4 of Azacitidine  - Chemo currently on hold due to worsening anemia  - Hemoc consulted      Thrombocytopenia  - Platelet count stable and appears at baseline  - No bleeding noted  - Daily CBC  5/14- Pl is down to 13. Will transfuse one dose of platelet today per Hematology       Essential hypertension  - BP under fair control  - Continue home Toprol XL   - Resume Diovan pending BP trends  - Monitor and adjust meds as needed  - Resume ARB ( home med)       Cachexia  - Dietician consulted for recs      Other hyperlipidemia  - Continue home Statin      Depression, recurrent  - Continue home meds      Acquired hypothyroidism  - Continue home Synthroid      Rheumatoid arthritis  - Not currently taking any medications except Norco  - Continue Norco prn pain        VTE Risk Mitigation (From admission, onward)         Ordered     IP VTE HIGH RISK PATIENT  Once      05/12/20 1147     Place sequential compression device  Until discontinued      05/12/20 1147     Reason for No Pharmacological VTE Prophylaxis  Once     Question:  Reasons:  Answer:  Thrombocytopenia    05/12/20 1147                      Hari Sloan MD  Department of Hospital Medicine   [facility]

## 2020-05-14 NOTE — PROGRESS NOTES
Pharmacokinetic Assessment Follow Up: IV Vancomycin    Vancomycin serum concentration assessment(s):    The random level was drawn correctly and can be used to guide therapy at this time. The measurement is below the desired definitive target range of 10 to 15 mcg/mL.    Vancomycin Regimen Plan:    Change regimen to Vancomycin 1000 mg IV every 12 hours with next serum trough concentration measured at 5/16 prior to 3rd dose on 5/16    Drug levels (last 3 results):  Recent Labs   Lab Result Units 05/13/20  0749 05/14/20  1053   Vancomycin, Random ug/mL 7.2 3.1       Pharmacy will continue to follow and monitor vancomycin.    Please contact pharmacy at extension [phone] for questions regarding this assessment.    Thank you for the consult,   Tyesha Alvarenga       Patient brief summary:  Sarah Parker is a 80 y.o. female initiated on antimicrobial therapy with IV Vancomycin for treatment of skin & soft tissue infection    The patient's current regimen is Vancomycin 1gram Q24    Drug Allergies:   Review of patient's allergies indicates:   Allergen Reactions    Codeine      Other reaction(s): hyper  Other reaction(s): hyper    Doxycycline     Gabapentin Other (See Comments)     Bad dreams       Actual Body Weight:   47.1kg    Renal Function:   Estimated Creatinine Clearance: 43.1 mL/min (based on SCr of 0.8 mg/dL).,     Dialysis Method (if applicable):  N/A    CBC (last 72 hours):  Recent Labs   Lab Result Units 05/12/20  1002 05/13/20  0749 05/14/20  0530   WBC K/uL 30.38* 26.47* 20.06*   Hemoglobin g/dL 6.7* 6.7* 9.4*   Hematocrit % 22.8* 22.5* 29.7*   Platelets K/uL 56* 41* 13*   Gran% % 34.0* 49.0 37.0*   Lymph% % 50.0* 35.0 38.0   Mono% % 15.0 9.0 9.0   Eosinophil% % 0.0 1.0 2.0   Basophil% % 0.0 0.0 0.0   Differential Method  Manual Manual Manual       Metabolic Panel (last 72 hours):  Recent Labs   Lab Result Units 05/12/20  1002 05/12/20  1230 05/13/20  0749 05/14/20  0530   Sodium mmol/L 134*  --  133*  134*   Potassium mmol/L 4.2  --  4.6 4.5   Chloride mmol/L 99  --  101 104   CO2 mmol/L 23  --  24 19*   Glucose mg/dL 116*  --  102 90   Glucose, UA   --  Negative  --   --    BUN, Bld mg/dL 16  --  13 16   Creatinine mg/dL 0.8  --  0.8 0.8   Albumin g/dL 3.4*  --   --   --    Total Bilirubin mg/dL 0.5  --   --   --    Alkaline Phosphatase U/L 303*  --   --   --    AST U/L 29  --   --   --    ALT U/L 41  --   --   --        Vancomycin Administrations:  vancomycin given in the last 96 hours                   vancomycin 750 mg in dextrose 5 % 250 mL IVPB (ready to mix system) (mg) 750 mg New Bag 05/13/20 1149    vancomycin in dextrose 5 % 1 gram/250 mL IVPB 1,000 mg ()  Restarted 05/12/20 1241     1,000 mg New Bag  1208                Microbiologic Results:  Microbiology Results (last 7 days)     Procedure Component Value Units Date/Time    Aerobic culture [[id number]]  (Abnormal) Collected:  05/12/20 1755    Order Status:  Completed Specimen:  Wound from Back Updated:  05/14/20 0820     Aerobic Bacterial Culture STAPHYLOCOCCUS AUREUS  Many  Susceptibility pending      Tissue culture [[id number]]  (Abnormal) Collected:  05/12/20 1754    Order Status:  Completed Specimen:  Biopsy from Back, Left Updated:  05/14/20 0817     Aerobic Culture - Tissue STAPHYLOCOCCUS AUREUS  Many  Susceptibility pending       Gram Stain Result Rare WBC's      Few Gram positive cocci    Culture, Anaerobe [[id number]] Collected:  05/12/20 1755    Order Status:  Completed Specimen:  Wound from Back Updated:  05/14/20 0756     Anaerobic Culture Culture in progress    Blood culture x two cultures. Draw prior to antibiotics. [[id number]] Collected:  05/12/20 1000    Order Status:  Completed Specimen:  Blood from Peripheral, Antecubital, Right Updated:  05/13/20 1612     Blood Culture, Routine No Growth to date      No Growth to date    Narrative:       Aerobic and anaerobic    Blood culture x two cultures. Draw prior to antibiotics. [[id number]]  Collected:  05/12/20 1006    Order Status:  Completed Specimen:  Blood from Peripheral, Wrist, Left Updated:  05/13/20 1612     Blood Culture, Routine No Growth to date      No Growth to date    Narrative:       Aerobic and anaerobic    Fungus culture [[id number]] Collected:  05/12/20 1755    Order Status:  Completed Specimen:  Wound from Back Updated:  05/13/20 1147     Fungus (Mycology) Culture Culture in progress    Gram stain [[id number]] Collected:  05/12/20 1755    Order Status:  Canceled Specimen:  Wound from Back

## 2020-05-14 NOTE — ASSESSMENT & PLAN NOTE
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 Heme/Onc recommended 2 additional units  of irradiated P-RBC trans today   5/14- Hbg appropriately improved post transfusion

## 2020-05-14 NOTE — ASSESSMENT & PLAN NOTE
05/14/2020   --WBC trending downward  --Continue on iv cefepime.  Await blood and would cultures  --Consider consult Dr. Recinos in Infectious Disease for antibiotic guidance   --follow wound care recs

## 2020-05-14 NOTE — CONSULTS
Received phone call from Dr. Sloan regarding patient's wound and concern over induration. Induration was present yesterday and is not improving. Spoke with Dr. Shannon who requested bedside assessment. Upon assessment, patients wound noted to be increasingly indurated and erythematic. Per Dr. Shannon, he will perform further I&D tonight and states patient will need wound vac application tomorrow. Will follow up.

## 2020-05-14 NOTE — PLAN OF CARE
Pt resting on stretcher s/p I&D Back, wound vac placement performed under MAC anesthesia. Respirations even and unlabored on room air with O2 sats at 99%. VSS. Will cont to monitor. See flow sheet for detailed assessment.

## 2020-05-14 NOTE — PROGRESS NOTES
[facility]  Hematology/Oncology  Progress Note    Patient Name: Sarah Parker  Admission Date: 5/12/2020  Hospital Length of Stay: 2 days  Code Status: Full Code     Subjective:     HPI:  Mrs. Sarah Parker is a 81 yo woman with chronic myelomonocytic leukemia and associated anemias/p cycle 4 of Azacitidine, presented to the Emergency Department today with c/o worsening back cellulitis.  Presented to ER.  Stable VS.  CBC showed WBCs 30 k (chronically elevated due to CML, baseline appears to be 20-28), Hgb 6.7 (improved from 6.0 yesterday at which time she declined PRBCs), Platelets 56 and consistent with baseline.  CMP showed Alk phos 303, otherwise unremarkable.  Lactic acid 1.1.  Procalcitonin pending.  BC were drawn and patient was given Vanc and Zosyn in the ER.    CT lumbar spine  Impression       Extensive degenerative changes involving the lumbar spine.  There is evidence of severe spinal stenosis at L4-5 and L3-4.  No evidence of discitis or definite epidural abscess.    There is soft tissue thickening involving the posterior soft tissues with thickening of the skin measuring up to 1 cm.  There is an area of nodular soft tissue thickening left paramedian at the L3 level measuring 2 cm without definite ring enhancement to suggest abscess.      Underwent incision and drainage of back abscess today.    Interval history: hemoglobin 9.4 s/p 2 units PRBCs transfusion. WBC continues to trend downward. Platelets 13, awaiting platelet transfusion. Patient feels well and hoping to discharge soon  Oncology Treatment Plan:   OP AZACITADINE 7-DAY (SUB-Q)    Medications:  Continuous Infusions:    Scheduled Meds:   amitriptyline  75 mg Oral QHS    calcium-vitamin D3  1 tablet Oral Daily    DULoxetine  40 mg Oral Daily    ferrous gluconate  324 mg Oral BID WM    hydroCHLOROthiazide  12.5 mg Oral Daily    levothyroxine  88 mcg Oral Before breakfast    losartan  25 mg Oral Daily    magnesium oxide   400 mg Oral TID    metoprolol succinate  50 mg Oral Daily    pravastatin  10 mg Oral Daily    tamsulosin  0.4 mg Oral Daily    topiramate  25 mg Oral BID    vancomycin (VANCOCIN) IVPB  1,000 mg Intravenous Q24H     PRN Meds:sodium chloride, acetaminophen, dextrose 10 % in water (D10W), dextrose 10 % in water (D10W), glucagon (human recombinant), glucose, glucose, HYDROcodone-acetaminophen, HYDROcodone-acetaminophen, ondansetron, ondansetron, sodium chloride 0.9%, Pharmacy to dose Vancomycin consult **AND** vancomycin - pharmacy to dose     Review of patient's allergies indicates:   Allergen Reactions    Codeine      Other reaction(s): hyper  Other reaction(s): hyper    Doxycycline     Gabapentin Other (See Comments)     Bad dreams        Past Medical History:   Diagnosis Date    Acid reflux     Anxiety     Back pain     Bronchitis, chronic obstructive w acute bronchitis 7/29/2016    Cancer     [facility] arms, face- Dr. Lata Tejada    Cataract     2+NS    Degenerative disc disease     Depression     Dry mouth     Hernia, hiatal 11/18/2013    Hypertension     Hypothyroid     Macrocytic anemia 5/3/2016    Macular degeneration     Migraines     Mixed anxiety and depressive disorder     Multiple fractures of ribs of right side     Osteoporosis     Other hyperlipidemia 10/11/2019    Pneumonia     Pneumonia due to other staphylococcus     Rheumatoid arthritis     Rheumatoid arthritis(714.0)     Rheumatoid arthritis(714.0)     Remicade, MTX.     Past Surgical History:   Procedure Laterality Date    APPENDECTOMY  1985    BREAST BIOPSY      CATARACT EXTRACTION Bilateral 6/11/15    Dr. Booth    CHOLECYSTECTOMY  2013    cryoablasion kidney Left 09/27/2016    feet Bilateral     rheumatoid    FRACTURE SURGERY Right     tibia    HERNIA REPAIR      HYSTERECTOMY  1970    partial    INCISION AND DRAINAGE OF ABSCESS N/A 5/12/2020    Procedure: INCISION AND DRAINAGE, ABSCESS;  Surgeon: Emerson VEE  MD Rivas;  Location[facility] OR;  Service: General;  Laterality: N/A;    INCISIONAL BIOPSY N/A 2020    Procedure: INCISIONAL BIOPSY;  Surgeon: Emerson Hathaway MD;  Location: [facility] OR;  Service: General;  Laterality: N/A;    JOINT REPLACEMENT      bilateral knees (), hands, wrists, knuckles, toes    LAPAROSCOPIC NISSEN FUNDOPLICATION      TRANSFORAMINAL EPIDURAL INJECTION OF STEROID Left 2019    Procedure: Left L5/S1 TF AYAAN with local;  Surgeon: Rowdy Felix MD;  Location: [facility] PAIN T;  Service: Pain Management;  Laterality: Left;     Family History     Problem Relation (Age of Onset)    Asthma Sister, Brother    Cancer Brother, Maternal Grandfather    Cataracts Mother    Chronic back pain Sister, Brother    Diabetes Mellitus Brother    Fibromyalgia Daughter    Heart disease Mother, Father, Maternal Grandmother    Hyperlipidemia Mother    Hypertension Mother, Father, Sister, Brother    Osteoarthritis Mother, Father, Sister, Brother    Thyroid disease Sister, Brother        Tobacco Use    Smoking status: Former Smoker     Packs/day: 0.25     Years: 2.00     Pack years: 0.50     Last attempt to quit: 1965     Years since quittin.5    Smokeless tobacco: Never Used   Substance and Sexual Activity    Alcohol use: No    Drug use: No    Sexual activity: Never     Partners: Male       Review of Systems   Constitutional: Negative.    HENT: Negative.    Eyes: Negative.    Respiratory: Negative.    Cardiovascular: Negative.    Gastrointestinal: Negative.    Endocrine: Negative.    Genitourinary: Negative.    Musculoskeletal: Negative.    Skin: Positive for wound.   Allergic/Immunologic: Negative.    Neurological: Negative.    Hematological: Negative.    Psychiatric/Behavioral: Negative.      Objective:     Vital Signs (Most Recent):  Temp: 98.1 °F (36.7 °C) (20 1220)  Pulse: 99 (20 1220)  Resp: 18 (20 1220)  BP: (!) 180/84 (20 1220)  SpO2: 99 % (20 1220) Vital  Signs (24h Range):  Temp:  [97.8 °F (36.6 °C)-98.9 °F (37.2 °C)] 98.1 °F (36.7 °C)  Pulse:  [76-99] 99  Resp:  [16-20] 18  SpO2:  [96 %-100 %] 99 %  BP: (117-184)/(53-84) 180/84     Weight: 47.1 kg (103 lb 13.4 oz)  Body mass index is 18.99 kg/m².  Body surface area is 1.44 meters squared.      Intake/Output Summary (Last 24 hours) at 5/14/2020 1316  Last data filed at 5/14/2020 0603  Gross per 24 hour   Intake 1003.33 ml   Output --   Net 1003.33 ml       Physical Exam   Constitutional: She is oriented to person, place, and time. She appears well-developed and well-nourished.   HENT:   Head: Normocephalic and atraumatic.   Eyes: Conjunctivae and EOM are normal.   Neck: Normal range of motion. Neck supple.   Cardiovascular: Normal rate and regular rhythm.   Pulmonary/Chest: Effort normal and breath sounds normal.   Abdominal: Soft. Bowel sounds are normal.   Musculoskeletal: Normal range of motion.   Neurological: She is alert and oriented to person, place, and time.   Skin: Skin is warm and dry.        Psychiatric: She has a normal mood and affect. Her behavior is normal. Judgment and thought content normal.   Nursing note and vitals reviewed.      Significant Labs:   All pertinent labs from the last 24 hours have been reviewed.    Diagnostic Results:  I have reviewed all pertinent imaging results/findings within the past 24 hours.    Assessment/Plan:     Abscess of lower back  --wound culture shows gram + cocci  --On IV Vanc  --remains afebrile with downward trend in WBC    Cellulitis of lower back  05/14/2020   --WBC trending downward  --Continue on iv cefepime.  Await blood and would cultures  --Consider consult Dr. Rceinos in Infectious Disease for antibiotic guidance   --follow wound care recs    Thrombocytopenia  --platelet count 13  --transfuse 1 unit irradiated platelets today  --Monitor S&S bleeding      Chronic myelomonocytic leukemia not having achieved remission  05/14/2020 follow up with Dr. Knox  upon discharge for further management of CMMoL    Anemia  05/12/2020 agree with transfuse 1 unit RBC    5/14/20  --hemoglobin 9.4 s/p 2 units PRBCs. No urgent indication for additional blood transfusion  --Monitor. Maintain hg > 8        Thank you for your consult. I will follow-up with patient. Please contact us if you have any additional questions.     Cathryn Qureshi NP  Hematology/Oncology  [facility]

## 2020-05-14 NOTE — ASSESSMENT & PLAN NOTE
05/12/2020 agree with transfuse 1 unit RBC    5/14/20  --hemoglobin 9.4 s/p 2 units PRBCs. No urgent indication for additional blood transfusion  --Monitor. Maintain hg > 8

## 2020-05-14 NOTE — ASSESSMENT & PLAN NOTE
- CT scan pending to evaluate further  - General surgery consult obtained   -S/P I &D of back abscess 5/12/20  - Tissue gram ranjit resulted few gram positive cocci - final identification and susceptibility pending   - Blood cultures x 2 neg to date   -Tissue culture resulted growth of Staph Aureus . Continue Vancomycin until final susceptibility results . DC Cefepime.   - Some worsening induration noted in the lower back. Will speak to General Surgery - 5/14/20

## 2020-05-14 NOTE — OP NOTE
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       Procedure(s) (LRB):  DEBRIDEMENT, WOUND (N/A)  APPLICATION, WOUND VAC    Surgeon(s) and Role:     * Mckinley Shannon MD - Primary    ASSISTANTS: None  ANESTHESIA: General    FINDINGS:  The cellulitis and an abscess was noted down to the fascial level on the back.  The area was measured to be 15 by 12 cm in size.  The depth was about 2 cm.  The infection progressed down to the paraspinal muscle.    ESTIMATED BLOOD LOSS: 30 mL              COMPLICATIONS:  None    SPECIMEN:  Necrotic tissue lower back.    Implants: Wound VAC.    INDICATION:  Necrosis and worsening infection and drainage.    DESCRIPTION OF PROCEDURE:    The patient was taken operating room, and underwent sedation while the patient was positioned in left lateral decubitus position.  The affected area was prepped and draped in the usual sterile manner.  After local infiltration with 0.25% Marcaine plain, generously around the margin of the necrotic tissue, and with electrocautery, the area was completely removed including the abscess cavity.  Hemostasis was achieved.  And clean wound VAC was then applied to the area to the pressure of  125 mm Hg.  During the procedure, the EBL was 30 mL.  Patient tolerated her procedure without any signs complication.  The patient was later taken to the recovery room for further recovery.           CONDITION: Good    DISPOSITION: PACU - hemodynamically stable.     Mckinley Shannon

## 2020-05-14 NOTE — PLAN OF CARE
Fall precautions implemented. Pt remained free from falls/injuries  IV abx infused per orders. Received 2 units of RBCs during shift, tolerated well.  Activity encouraged. Pt ambulated to bathroom with standby assist. Back incision dressing CDI. Pain adequately controlled. Safety precautions implemented. Chart reviewed. Will continue to monitor.

## 2020-05-15 VITALS
RESPIRATION RATE: 18 BRPM | DIASTOLIC BLOOD PRESSURE: 62 MMHG | WEIGHT: 103.81 LBS | TEMPERATURE: 100 F | SYSTOLIC BLOOD PRESSURE: 146 MMHG | BODY MASS INDEX: 19.1 KG/M2 | HEART RATE: 85 BPM | HEIGHT: 62 IN | OXYGEN SATURATION: 96 %

## 2020-05-15 PROBLEM — A41.9 SEPSIS: Status: RESOLVED | Noted: 2019-08-21 | Resolved: 2020-05-15

## 2020-05-15 LAB
ACANTHOCYTES BLD QL SMEAR: PRESENT
ANION GAP SERPL CALC-SCNC: 10 MMOL/L (ref 8–16)
ANISOCYTOSIS BLD QL SMEAR: SLIGHT
BACTERIA SPEC AEROBE CULT: ABNORMAL
BACTERIA TISS AEROBE CULT: ABNORMAL
BASOPHILS # BLD AUTO: ABNORMAL K/UL (ref 0–0.2)
BASOPHILS NFR BLD: 0 % (ref 0–1.9)
BUN SERPL-MCNC: 19 MG/DL (ref 8–23)
BURR CELLS BLD QL SMEAR: ABNORMAL
CALCIUM SERPL-MCNC: 8.3 MG/DL (ref 8.7–10.5)
CHLORIDE SERPL-SCNC: 100 MMOL/L (ref 95–110)
CO2 SERPL-SCNC: 24 MMOL/L (ref 23–29)
CREAT SERPL-MCNC: 0.8 MG/DL (ref 0.5–1.4)
DACRYOCYTES BLD QL SMEAR: ABNORMAL
DIFFERENTIAL METHOD: ABNORMAL
EOSINOPHIL # BLD AUTO: ABNORMAL K/UL (ref 0–0.5)
EOSINOPHIL NFR BLD: 0 % (ref 0–8)
ERYTHROCYTE [DISTWIDTH] IN BLOOD BY AUTOMATED COUNT: 19.3 % (ref 11.5–14.5)
EST. GFR  (AFRICAN AMERICAN): >60 ML/MIN/1.73 M^2
EST. GFR  (NON AFRICAN AMERICAN): >60 ML/MIN/1.73 M^2
GLUCOSE SERPL-MCNC: 115 MG/DL (ref 70–110)
GRAM STN SPEC: ABNORMAL
GRAM STN SPEC: ABNORMAL
HCT VFR BLD AUTO: 27.4 % (ref 37–48.5)
HGB BLD-MCNC: 8.6 G/DL (ref 12–16)
HYPOCHROMIA BLD QL SMEAR: ABNORMAL
IMM GRANULOCYTES # BLD AUTO: ABNORMAL K/UL (ref 0–0.04)
IMM GRANULOCYTES NFR BLD AUTO: ABNORMAL % (ref 0–0.5)
LYMPHOCYTES # BLD AUTO: ABNORMAL K/UL (ref 1–4.8)
LYMPHOCYTES NFR BLD: 40 % (ref 18–48)
MAGNESIUM SERPL-MCNC: 1.6 MG/DL (ref 1.6–2.6)
MCH RBC QN AUTO: 28.1 PG (ref 27–31)
MCHC RBC AUTO-ENTMCNC: 31.4 G/DL (ref 32–36)
MCV RBC AUTO: 90 FL (ref 82–98)
MONOCYTES # BLD AUTO: ABNORMAL K/UL (ref 0.3–1)
MONOCYTES NFR BLD: 19 % (ref 4–15)
NEUTROPHILS NFR BLD: 36 % (ref 38–73)
NEUTS BAND NFR BLD MANUAL: 5 %
NRBC BLD-RTO: 4 /100 WBC
OVALOCYTES BLD QL SMEAR: ABNORMAL
PHOSPHATE SERPL-MCNC: 3.7 MG/DL (ref 2.7–4.5)
PLATELET # BLD AUTO: 69 K/UL (ref 150–350)
PLATELET BLD QL SMEAR: ABNORMAL
PMV BLD AUTO: ABNORMAL FL (ref 9.2–12.9)
POIKILOCYTOSIS BLD QL SMEAR: SLIGHT
POLYCHROMASIA BLD QL SMEAR: ABNORMAL
POTASSIUM SERPL-SCNC: 3.9 MMOL/L (ref 3.5–5.1)
RBC # BLD AUTO: 3.06 M/UL (ref 4–5.4)
SCHISTOCYTES BLD QL SMEAR: PRESENT
SODIUM SERPL-SCNC: 134 MMOL/L (ref 136–145)
SPHEROCYTES BLD QL SMEAR: ABNORMAL
STOMATOCYTES BLD QL SMEAR: PRESENT
TARGETS BLD QL SMEAR: ABNORMAL
WBC # BLD AUTO: 22.8 K/UL (ref 3.9–12.7)

## 2020-05-15 PROCEDURE — 83735 ASSAY OF MAGNESIUM: CPT | Mod: HCNC

## 2020-05-15 PROCEDURE — 80048 BASIC METABOLIC PNL TOTAL CA: CPT | Mod: HCNC

## 2020-05-15 PROCEDURE — 94761 N-INVAS EAR/PLS OXIMETRY MLT: CPT | Mod: HCNC

## 2020-05-15 PROCEDURE — 85007 BL SMEAR W/DIFF WBC COUNT: CPT | Mod: HCNC

## 2020-05-15 PROCEDURE — 85027 COMPLETE CBC AUTOMATED: CPT | Mod: HCNC

## 2020-05-15 PROCEDURE — 63600175 PHARM REV CODE 636 W HCPCS: Mod: HCNC | Performed by: INTERNAL MEDICINE

## 2020-05-15 PROCEDURE — 25000003 PHARM REV CODE 250: Mod: HCNC | Performed by: INTERNAL MEDICINE

## 2020-05-15 PROCEDURE — 84100 ASSAY OF PHOSPHORUS: CPT | Mod: HCNC

## 2020-05-15 PROCEDURE — 25000003 PHARM REV CODE 250: Mod: HCNC | Performed by: SURGERY

## 2020-05-15 RX ORDER — DOXYCYCLINE HYCLATE 100 MG
100 TABLET ORAL EVERY 12 HOURS
Qty: 20 TABLET | Refills: 0 | Status: SHIPPED | OUTPATIENT
Start: 2020-05-15 | End: 2020-05-25

## 2020-05-15 RX ORDER — DOXYCYCLINE HYCLATE 100 MG
100 TABLET ORAL EVERY 12 HOURS
Status: DISCONTINUED | OUTPATIENT
Start: 2020-05-15 | End: 2020-05-15 | Stop reason: HOSPADM

## 2020-05-15 RX ADMIN — TOPIRAMATE 25 MG: 25 TABLET, FILM COATED ORAL at 09:05

## 2020-05-15 RX ADMIN — LOSARTAN POTASSIUM 25 MG: 25 TABLET ORAL at 09:05

## 2020-05-15 RX ADMIN — HYDROCHLOROTHIAZIDE 12.5 MG: 12.5 TABLET ORAL at 09:05

## 2020-05-15 RX ADMIN — FERROUS GLUCONATE TAB 324 MG (37.5 MG ELEMENTAL IRON) 324 MG: 324 (37.5 FE) TAB at 09:05

## 2020-05-15 RX ADMIN — TAMSULOSIN HYDROCHLORIDE 0.4 MG: 0.4 CAPSULE ORAL at 09:05

## 2020-05-15 RX ADMIN — PRAVASTATIN SODIUM 10 MG: 10 TABLET ORAL at 09:05

## 2020-05-15 RX ADMIN — DULOXETINE HYDROCHLORIDE 40 MG: 20 CAPSULE, DELAYED RELEASE ORAL at 09:05

## 2020-05-15 RX ADMIN — OYSTER SHELL CALCIUM WITH VITAMIN D 1 TABLET: 500; 200 TABLET, FILM COATED ORAL at 09:05

## 2020-05-15 RX ADMIN — METOPROLOL SUCCINATE 50 MG: 50 TABLET, EXTENDED RELEASE ORAL at 09:05

## 2020-05-15 RX ADMIN — LEVOTHYROXINE SODIUM 88 MCG: 88 TABLET ORAL at 06:05

## 2020-05-15 RX ADMIN — VANCOMYCIN HYDROCHLORIDE 1000 MG: 1 INJECTION, POWDER, LYOPHILIZED, FOR SOLUTION INTRAVENOUS at 01:05

## 2020-05-15 RX ADMIN — Medication 400 MG: at 09:05

## 2020-05-15 NOTE — SUBJECTIVE & OBJECTIVE
Interval history: s/p repeat I&D with wound vac placement. Possible discharge today pending wound culture susceptibilities results to determine oral antibiotic regimen. Patient reports feeling well overall. Denies pain. VS unremarkable. Labs stable  Oncology Treatment Plan:   OP AZACITADINE 7-DAY (SUB-Q)    Medications:  Continuous Infusions:    Scheduled Meds:   amitriptyline  75 mg Oral QHS    calcium-vitamin D3  1 tablet Oral Daily    DULoxetine  40 mg Oral Daily    ferrous gluconate  324 mg Oral BID WM    hydroCHLOROthiazide  12.5 mg Oral Daily    levothyroxine  88 mcg Oral Before breakfast    lidocaine (PF) 10 mg/ml (1%)  1 mL Intradermal Once    losartan  25 mg Oral Daily    magnesium oxide  400 mg Oral TID    metoprolol succinate  50 mg Oral Daily    pravastatin  10 mg Oral Daily    tamsulosin  0.4 mg Oral Daily    topiramate  25 mg Oral BID    vancomycin (VANCOCIN) IVPB  1,000 mg Intravenous Q24H     PRN Meds:sodium chloride, acetaminophen, dextrose 10 % in water (D10W), dextrose 10 % in water (D10W), diphenhydrAMINE, glucagon (human recombinant), glucose, glucose, HYDROcodone-acetaminophen, HYDROcodone-acetaminophen, ondansetron, ondansetron, promethazine (PHENERGAN) IVPB, sodium chloride 0.9%     Review of patient's allergies indicates:   Allergen Reactions    Codeine      Other reaction(s): hyper  Other reaction(s): hyper    Doxycycline     Gabapentin Other (See Comments)     Bad dreams        Past Medical History:   Diagnosis Date    Acid reflux     Anxiety     Back pain     Bronchitis, chronic obstructive w acute bronchitis 7/29/2016    Cancer     NMSC arms, face- Dr. Lata Tejada    Cataract     2+NS    Degenerative disc disease     Depression     Dry mouth     Hernia, hiatal 11/18/2013    Hypertension     Hypothyroid     Macrocytic anemia 5/3/2016    Macular degeneration     Migraines     Mixed anxiety and depressive disorder     Multiple fractures of ribs of right  side     Osteoporosis     Other hyperlipidemia 10/11/2019    Pneumonia     Pneumonia due to other staphylococcus     Rheumatoid arthritis     Rheumatoid arthritis(714.0)     Rheumatoid arthritis(714.0)     Remicade, MTX.     Past Surgical History:   Procedure Laterality Date    APPENDECTOMY  1985    BREAST BIOPSY      CATARACT EXTRACTION Bilateral 6/11/15    Dr. Booth    CHOLECYSTECTOMY  2013    cryoablasion kidney Left 2016    feet Bilateral     rheumatoid    FRACTURE SURGERY Right     tibia    HERNIA REPAIR      HYSTERECTOMY  1970    partial    INCISION AND DRAINAGE OF ABSCESS N/A 2020    Procedure: INCISION AND DRAINAGE, ABSCESS;  Surgeon: Emerson Hathaway MD;  Location: [facility] OR;  Service: General;  Laterality: N/A;    INCISIONAL BIOPSY N/A 2020    Procedure: INCISIONAL BIOPSY;  Surgeon: Emerson Hathaway MD;  Location: [facility] OR;  Service: General;  Laterality: N/A;    JOINT REPLACEMENT      bilateral knees (), hands, wrists, knuckles, toes    LAPAROSCOPIC NISSEN FUNDOPLICATION      TRANSFORAMINAL EPIDURAL INJECTION OF STEROID Left 2019    Procedure: Left L5/S1 TF AYAAN with local;  Surgeon: Rowdy Felix MD;  Location: [facility] PAIN T;  Service: Pain Management;  Laterality: Left;     Family History     Problem Relation (Age of Onset)    Asthma Sister, Brother    Cancer Brother, Maternal Grandfather    Cataracts Mother    Chronic back pain Sister, Brother    Diabetes Mellitus Brother    Fibromyalgia Daughter    Heart disease Mother, Father, Maternal Grandmother    Hyperlipidemia Mother    Hypertension Mother, Father, Sister, Brother    Osteoarthritis Mother, Father, Sister, Brother    Thyroid disease Sister, Brother        Tobacco Use    Smoking status: Former Smoker     Packs/day: 0.25     Years: 2.00     Pack years: 0.50     Last attempt to quit: 1965     Years since quittin.5    Smokeless tobacco: Never Used   Substance and Sexual Activity    Alcohol use: No     Drug use: No    Sexual activity: Never     Partners: Male       Review of Systems   Constitutional: Negative.    HENT: Negative.    Eyes: Negative.    Respiratory: Negative.    Cardiovascular: Negative.    Gastrointestinal: Negative.    Endocrine: Negative.    Genitourinary: Negative.    Musculoskeletal: Negative.    Skin: Positive for wound.   Allergic/Immunologic: Negative.    Neurological: Negative.    Hematological: Negative.    Psychiatric/Behavioral: Negative.      Objective:     Vital Signs (Most Recent):  Temp: 98.1 °F (36.7 °C) (05/15/20 0753)  Pulse: 86 (05/15/20 0812)  Resp: 18 (05/15/20 0753)  BP: (!) 125/58 (05/15/20 0753)  SpO2: 95 % (05/15/20 0812) Vital Signs (24h Range):  Temp:  [98.1 °F (36.7 °C)-99.8 °F (37.7 °C)] 98.1 °F (36.7 °C)  Pulse:  [] 86  Resp:  [13-23] 18  SpO2:  [90 %-100 %] 95 %  BP: (112-195)/(54-87) 125/58     Weight: 47.1 kg (103 lb 13.4 oz)  Body mass index is 18.99 kg/m².  Body surface area is 1.44 meters squared.      Intake/Output Summary (Last 24 hours) at 5/15/2020 1032  Last data filed at 5/15/2020 1025  Gross per 24 hour   Intake 120 ml   Output 70 ml   Net 50 ml       Physical Exam   Constitutional: She is oriented to person, place, and time. She appears well-developed and well-nourished.   HENT:   Head: Normocephalic and atraumatic.   Eyes: Conjunctivae and EOM are normal.   Neck: Normal range of motion. Neck supple.   Cardiovascular: Normal rate and regular rhythm.   Pulmonary/Chest: Effort normal and breath sounds normal.   Abdominal: Soft. Bowel sounds are normal.   Musculoskeletal: Normal range of motion.   Neurological: She is alert and oriented to person, place, and time.   Skin: Skin is warm and dry.        Psychiatric: She has a normal mood and affect. Her behavior is normal. Judgment and thought content normal.   Nursing note and vitals reviewed.      Significant Labs:   All pertinent labs from the last 24 hours have been reviewed.    Diagnostic  Results:  I have reviewed all pertinent imaging results/findings within the past 24 hours.

## 2020-05-15 NOTE — NURSING
Wound vac switched over to home vac. Discharge instructions, written and verbal, given to patient. Teach back done. Prescription given to patient from pharmacy. Patient to call for her ride.

## 2020-05-15 NOTE — CONSULTS
[facility]  Infectious Disease  Consult Note    Patient Name: Sarah Parker  MRN: [id number]  Admission Date: 5/12/2020  Hospital Length of Stay: 3 days  Attending Physician: Hari Sloan MD  Primary Care Provider: Briseida Bennett MD     Isolation Status: No active isolations    Patient information was obtained from patient, past medical records and ER records.      Consults  Assessment/Plan:     Abscess of lower back  Will continue vancomycin, will use final drug susceptibility to switch to Po regime      Thrombocytopenia  Will monitor for bleeding , will avoid thrombocytopenic meds     Chronic myelomonocytic leukemia not having achieved remission  Oncology follow up .    Essential hypertension  BP control as per primary team        Thank you for your consult. I will follow-up with patient. Please contact us if you have any additional questions.    Escobar Recinos MD  Infectious Disease  [facility]    Subjective:     Principal Problem: Sepsis    HPI: 80 y.o.  CF with a PMHx of CML and associated anemia followed outpatient by Dr. Estrada s/p cycle 4 of Azacitidine, HTN, HLD, Depression, Hypothyroidism, and RA who presented to the Emergency Department today with c/o worsening back cellulitis, which onset approximately 1.5 weeks PTA.  Since admission, she was seen by Gen surgery and I and D was done , Cultures are now positive for staph aureus .       Past Medical History:   Diagnosis Date    Acid reflux     Anxiety     Back pain     Bronchitis, chronic obstructive w acute bronchitis 7/29/2016    Cancer     NMSC arms, face- Dr. Lata Tejada    Cataract     2+NS    Degenerative disc disease     Depression     Dry mouth     Hernia, hiatal 11/18/2013    Hypertension     Hypothyroid     Macrocytic anemia 5/3/2016    Macular degeneration     Migraines     Mixed anxiety and depressive disorder     Multiple fractures of ribs of right side     Osteoporosis     Other  hyperlipidemia 10/11/2019    Pneumonia     Pneumonia due to other staphylococcus     Rheumatoid arthritis     Rheumatoid arthritis(714.0)     Rheumatoid arthritis(714.0)     Remicade, MTX.       Past Surgical History:   Procedure Laterality Date    APPENDECTOMY  1985    BREAST BIOPSY      CATARACT EXTRACTION Bilateral 6/11/15    Dr. Booth    CHOLECYSTECTOMY  2013    cryoablasion kidney Left 09/27/2016    feet Bilateral     rheumatoid    FRACTURE SURGERY Right     tibia    HERNIA REPAIR      HYSTERECTOMY  1970    partial    INCISION AND DRAINAGE OF ABSCESS N/A 5/12/2020    Procedure: INCISION AND DRAINAGE, ABSCESS;  Surgeon: Emerson Hathaway MD;  Location: [facility] OR;  Service: General;  Laterality: N/A;    INCISIONAL BIOPSY N/A 5/12/2020    Procedure: INCISIONAL BIOPSY;  Surgeon: Emerson Hathaway MD;  Location: [facility] OR;  Service: General;  Laterality: N/A;    JOINT REPLACEMENT      bilateral knees (2008), hands, wrists, knuckles, toes    LAPAROSCOPIC NISSEN FUNDOPLICATION      TRANSFORAMINAL EPIDURAL INJECTION OF STEROID Left 6/25/2019    Procedure: Left L5/S1 TF AYAAN with local;  Surgeon: Rowdy Felix MD;  Location: [facility] PAIN MGT;  Service: Pain Management;  Laterality: Left;       Review of patient's allergies indicates:   Allergen Reactions    Codeine      Other reaction(s): hyper  Other reaction(s): hyper    Doxycycline     Gabapentin Other (See Comments)     Bad dreams       Medications:  Medications Prior to Admission   Medication Sig    amitriptyline (ELAVIL) 75 MG tablet TAKE 1 TABLET BY MOUTH EVERY EVENING    calcium citrate-vitamin D (CITRACAL + D) 315-200 mg-unit per tablet Take 1 tablet by mouth once daily.     DULoxetine (CYMBALTA) 20 MG capsule Take 2 capsules (40 mg total) by mouth once daily.    ferrous gluconate 324 mg (37.5 mg iron) Tab tablet Take 1 tablet (324 mg total) by mouth daily with breakfast. (Patient taking differently: Take 324 mg by mouth 2 (two) times daily with  meals. )    fluticasone propionate (FLONASE) 50 mcg/actuation nasal spray 2 sprays (100 mcg total) by Each Nostril route once daily.    hydrocodone-acetaminophen 5-325mg (NORCO) 5-325 mg per tablet TK 1 T PO Q 6 H PRN    leucovorin (WELLCOVORIN) 5 mg Tab TAKE ONE WEEKLY ON SATURDAYS    levothyroxine (SYNTHROID) 88 MCG tablet TAKE 1 TABLET BY MOUTH BEFORE BREAKFAST    magnesium oxide (MAG-OX) 400 mg (241.3 mg magnesium) tablet Take 1 tablet (400 mg total) by mouth 3 (three) times daily.    metoprolol succinate (TOPROL-XL) 50 MG 24 hr tablet TAKE 1 TABLET(50 MG) BY MOUTH EVERY DAY    multivitamin capsule Take by mouth. As directed    ondansetron (ZOFRAN) 4 MG tablet Take 1 tablet (4 mg total) by mouth every 8 (eight) hours as needed for Nausea.    pravastatin (PRAVACHOL) 10 MG tablet TAKE 1 TABLET(10 MG) BY MOUTH EVERY DAY    topiramate (TOPAMAX) 25 MG tablet Take 1 tablet (25 mg total) by mouth at night x 1 week then increase to 2 (two) times daily. Increase as tolerated.    valsartan-hydrochlorothiazide (DIOVAN-HCT) 80-12.5 mg per tablet TAKE 1 TABLET BY MOUTH DAILY    albuterol (PROVENTIL) 2.5 mg /3 mL (0.083 %) nebulizer solution Take 3 mLs (2.5 mg total) by nebulization every 6 (six) hours as needed for Wheezing.    benzonatate (TESSALON) 100 MG capsule Take 1-2 capsules (100-200 mg total) by mouth 3 (three) times daily as needed for Cough.    hydrOXYzine HCl (ATARAX) 25 MG tablet Take 25 mg by mouth nightly.     meclizine (ANTIVERT) 50 MG tablet Take 25 mg by mouth 3 (three) times daily as needed.    prochlorperazine (COMPAZINE) 5 MG tablet TAKE 1 TABLET(5 MG) BY MOUTH EVERY 6 HOURS AS NEEDED FOR NAUSEA    tamsulosin (FLOMAX) 0.4 mg Cap Take 1 capsule (0.4 mg total) by mouth once daily. For urinary retention     Antibiotics (From admission, onward)    Start     Stop Route Frequency Ordered    05/14/20 1300  vancomycin in dextrose 5 % 1 gram/250 mL IVPB 1,000 mg      -- IV Every 24 hours  (non-standard times) 20 1215    20 1145  vancomycin - pharmacy to dose  (vancomycin IVPB)      -- IV pharmacy to manage frequency 20 1045        Antifungals (From admission, onward)    None        Antivirals (From admission, onward)    None           Immunization History   Administered Date(s) Administered    Influenza 10/21/2008, 10/25/2010    Influenza - High Dose - PF (65 years and older) 2011, 10/04/2012, 10/14/2013, 10/06/2014, 2015, 10/28/2016, 2017, 2018, 10/22/2019    Pneumococcal Conjugate - 13 Valent 2016    Pneumococcal Polysaccharide - 23 Valent 2009, 10/16/2014    Tdap 10/14/2013       Family History     Problem Relation (Age of Onset)    Asthma Sister, Brother    Cancer Brother, Maternal Grandfather    Cataracts Mother    Chronic back pain Sister, Brother    Diabetes Mellitus Brother    Fibromyalgia Daughter    Heart disease Mother, Father, Maternal Grandmother    Hyperlipidemia Mother    Hypertension Mother, Father, Sister, Brother    Osteoarthritis Mother, Father, Sister, Brother    Thyroid disease Sister, Brother        Social History     Socioeconomic History    Marital status:      Spouse name: Not on file    Number of children: Not on file    Years of education: Not on file    Highest education level: Not on file   Occupational History    Occupation: retired   Social Needs    Financial resource strain: Not on file    Food insecurity:     Worry: Not on file     Inability: Not on file    Transportation needs:     Medical: Not on file     Non-medical: Not on file   Tobacco Use    Smoking status: Former Smoker     Packs/day: 0.25     Years: 2.00     Pack years: 0.50     Last attempt to quit: 1965     Years since quittin.5    Smokeless tobacco: Never Used   Substance and Sexual Activity    Alcohol use: No    Drug use: No    Sexual activity: Never     Partners: Male   Lifestyle    Physical activity:     Days per  week: Not on file     Minutes per session: Not on file    Stress: Not at all   Relationships    Social connections:     Talks on phone: Not on file     Gets together: Not on file     Attends Faith service: Not on file     Active member of club or organization: Not on file     Attends meetings of clubs or organizations: Not on file     Relationship status: Not on file   Other Topics Concern    Not on file   Social History Narrative    Patient is aretired and live with .     Review of Systems   Constitutional: Negative for chills, diaphoresis, fatigue and fever.   HENT: Negative for facial swelling, hearing loss, mouth sores, sneezing, sore throat, tinnitus and trouble swallowing.    Eyes: Negative for photophobia, pain, discharge, redness and visual disturbance.   Respiratory: Negative for apnea, cough, choking, chest tightness, shortness of breath, wheezing and stridor.    Cardiovascular: Negative for chest pain, palpitations and leg swelling.   Gastrointestinal: Negative for abdominal distention, abdominal pain, anal bleeding, blood in stool, constipation, diarrhea, nausea, rectal pain and vomiting.   Endocrine: Negative for cold intolerance, heat intolerance, polydipsia, polyphagia and polyuria.   Genitourinary: Negative for difficulty urinating, dysuria, flank pain, frequency, hematuria, pelvic pain, urgency, vaginal bleeding, vaginal discharge and vaginal pain.   Musculoskeletal: Positive for back pain. Negative for arthralgias, gait problem, myalgias and neck stiffness.   Skin: Positive for color change and wound. Negative for pallor and rash.        + erythema, multiple boils/abscesses.   Allergic/Immunologic: Negative for food allergies.   Neurological: Negative for dizziness, tremors, seizures, syncope, speech difficulty, weakness and headaches.   Hematological: Negative for adenopathy. Does not bruise/bleed easily.   Psychiatric/Behavioral: Negative for behavioral problems and confusion. The  patient is not nervous/anxious.    All other systems reviewed and are negative.    Objective:     Vital Signs (Most Recent):  Temp: 98.4 °F (36.9 °C) (05/15/20 0352)  Pulse: 98 (05/15/20 0527)  Resp: 16 (05/15/20 0352)  BP: (!) 112/54 (05/15/20 0352)  SpO2: (!) 90 % (05/15/20 0352) Vital Signs (24h Range):  Temp:  [98.1 °F (36.7 °C)-99.8 °F (37.7 °C)] 98.4 °F (36.9 °C)  Pulse:  [] 98  Resp:  [13-23] 16  SpO2:  [90 %-100 %] 90 %  BP: (112-195)/(54-87) 112/54     Weight: 47.1 kg (103 lb 13.4 oz)  Body mass index is 18.99 kg/m².    Estimated Creatinine Clearance: 43.1 mL/min (based on SCr of 0.8 mg/dL).    Physical Exam   Constitutional: She is oriented to person, place, and time. She appears well-developed. No distress.   HENT:   Head: Normocephalic and atraumatic.   Mouth/Throat: No oropharyngeal exudate.   Eyes: Pupils are equal, round, and reactive to light. Conjunctivae and EOM are normal.   Neck: Normal range of motion. Neck supple. No JVD present. No thyromegaly present.   Cardiovascular: Normal rate, regular rhythm and normal heart sounds.   No murmur heard.  Pulmonary/Chest: Effort normal and breath sounds normal. No respiratory distress. She has no wheezes. She has no rales. She exhibits no tenderness.   Abdominal: Soft. Bowel sounds are normal. She exhibits no distension. There is no tenderness. There is no rebound and no guarding.   Musculoskeletal: Normal range of motion. She exhibits no edema.   Lymphadenopathy:     She has no cervical adenopathy.   Neurological: She is alert and oriented to person, place, and time. She displays normal reflexes. No cranial nerve deficit or sensory deficit.   Skin: Skin is warm and dry. No rash noted. She is not diaphoretic. There is erythema (wound vac noted  ).   Erythema and induration noted lower back near previous I&D.    Psychiatric: She has a normal mood and affect.   Nursing note and vitals reviewed.      Significant Labs:   Blood Culture:   Recent Labs   Lab  05/12/20  1000 05/12/20  1006   LABBLOO No Growth to date  No Growth to date  No Growth to date No Growth to date  No Growth to date  No Growth to date     BMP:   Recent Labs   Lab 05/15/20  0556   *   *   K 3.9      CO2 24   BUN 19   CREATININE 0.8   CALCIUM 8.3*   MG 1.6     CBC:   Recent Labs   Lab 05/14/20  0530 05/15/20  0556   WBC 20.06* 22.80*   HGB 9.4* 8.6*   HCT 29.7* 27.4*   PLT 13* 69*     Wound Culture:   Recent Labs   Lab 05/12/20  1755   LABAERO STAPHYLOCOCCUS AUREUS  Many  Susceptibility pending  *     All pertinent labs within the past 24 hours have been reviewed.    Significant Imaging: I have reviewed all pertinent imaging results/findings within the past 24 hours.

## 2020-05-15 NOTE — PLAN OF CARE
[facility] wound vac noted, faxed to [facility]. Communicated with Lino Sharma at [facility]. They are not in network with [facility]. Asked if documentation from surgeon would likely assist with receiving insurance authorization.Edith to call me back. Update to Dr Sloan.Secure message sent to Dr Shannon requesting to review and advise.       05/15/20 1015   Post-Acute Status   Post-Acute Authorization E   E Status Referrals Sent   Discharge Delays (!) Other       Spoke with HEATHER Gold at [facility] discussed obstacles with obtaining an out of network [facility] Vac. Contacted Edith SHARIF [facility] Rep who requested a letter from the MD indicating why he prefers, and why a I vac is requested. Update to Dr Shannon who wrote letter. Will email letter to [facility] once signature obtained. Update to bismark Tompkins nurse.

## 2020-05-15 NOTE — ASSESSMENT & PLAN NOTE
05/15/2020   --WBC stable. Patient afebrile  --Continue on iv cefepime.  Await final wound culture susceptibilities for oral abx regimen  --Consider consult Dr. Recinos in Infectious Disease for antibiotic guidance   --follow wound care recs

## 2020-05-15 NOTE — PROGRESS NOTES
"[facility] Hospital Medicine  Progress Note    Patient Name: Sarah Parker  MRN: [id number]  Patient Class: IP- Inpatient   Admission Date: 5/12/2020  Length of Stay: 3 days  Attending Physician: Hari Sloan MD  Primary Care Provider: Briseida Bennett MD        Subjective:     Principal Problem:Sepsis        HPI:  Sarah Parker is a 80 y.o.  CF with a PMHx of CML and associated anemia followed outpatient by Dr. Estrada s/p cycle 4 of Azacitidine, HTN, HLD, Depression, Hypothyroidism, and RA who presented to the Emergency Department today with c/o worsening back cellulitis, which onset approximately 1.5 weeks PTA.  Symptoms are constant and moderate in severity.  No mitigating or exacerbating factors reported.  Associated sxs include  low back pain.  She denies any known injury or wound stating "all of a sudden a boil came up".  Patient denies any fever, chills, n/v/d, SOB, CP, weakness, numbness, dizziness, headache, and all other sxs at this time.  Patient was put on a course of Bactrim by her PCP, with worsening of symptoms.  No further complaints or concerns at this time.  ER workup showed:  Stable VS.  CBC showed WBCs 30 k (chronically elevated due to CML, baseline appears to be 20-28), Hgb 6.7 (improved from 6.0 yesterday at which time she declined PRBCs), Platelets 56 and consistent with baseline.  CMP showed Alk phos 303, otherwise unremarkable.  Lactic acid 1.1.  Procalcitonin pending.  BC were drawn and patient was given Vanc and Zosyn in the ER.  Hospital Medicine called for admission.  CT scan pending along with General Surgery consult.  Patient will be placed in OBS. She is a Full Code.  Her SDM is her daughter Albina who can be reached at [phone].    Overview/Hospital Course:  5/13- Pt states pain in the abscess area is better. S/P  incision and drainage of back abscess yesterday . Afebrile . Vitals are fairly stable . Tissue gram stain resulted few gram positive cocci " . Blood culture x 2  no growth to date. Current antibiotic - Cefepime and Vancomycin IV. Labs - WBC trended down 26.4 ( near baseline ) , Hb 6.7, Pl 41. Will have 2 units of irradiated P-RBC transfusion today.     5/14- Pt has no new C/O . Awake , alert and oriented. Remains afebrile . BP is elevated noted . Review home meds and resume as appropriate. WBC 20 K( at baseline) , Hgb 9.4 ( s/p 2 units P-RBC yesterday) . Pl down to 13. No signs of active bleeding. Hematology recommended to transfuse one dose of Pl today . Tissue culture resulted growth of Staph Aureus . Final susceptibility is pending . DC Cefepime . Continue Vancomycin. On exam,  some induration palpated in the area next to previous I&D. Wound care on board. Will speak to General Surgery.     5/15- Remains afebrile. Underwent 2nd I&D and wound vac placement by Dr. Shannon last night . Pt states feeling better . Denies pain to her back. Vitals are stable . Labs resulted WBC 22.8, Hbg 8.6> 9.4, Pl 69. Wound culture resulted Staph Aureus - susceptibility is pending . Possible DC home with wound vac and oral antibiotic once susceptibility obtained .     Interval History:   Remains afebrile. Underwent 2nd I&D and wound vac placement by Dr. Shannon last night . Pt states feeling better . Denies pain to her back. Vitals are stable . Labs resulted WBC 22.8, Hbg 8.6> 9.4, Pl 69. Wound culture resulted Staph Aureus - susceptibility is pending . Possible DC home with wound vac and oral antibiotic once susceptibility obtained .         Review of Systems   Constitutional: Positive for activity change. Negative for appetite change and fever.   HENT: Negative for sore throat.    Eyes: Negative for visual disturbance.   Respiratory: Negative for cough, chest tightness and shortness of breath.    Cardiovascular: Negative for chest pain, palpitations and leg swelling.   Gastrointestinal: Negative for abdominal distention, abdominal pain, constipation, diarrhea, nausea and  vomiting.   Endocrine: Negative for polyuria.   Genitourinary: Negative for decreased urine volume, dysuria, flank pain, frequency and hematuria.   Musculoskeletal: Positive for back pain (better ). Negative for gait problem.   Skin: Negative for rash.   Neurological: Negative for syncope, speech difficulty, weakness, light-headedness and headaches.   Psychiatric/Behavioral: Negative for confusion, hallucinations and sleep disturbance.     Objective:     Vital Signs (Most Recent):  Temp: 98.1 °F (36.7 °C) (05/15/20 0753)  Pulse: 86 (05/15/20 0812)  Resp: 18 (05/15/20 0753)  BP: (!) 125/58 (05/15/20 0753)  SpO2: 95 % (05/15/20 0812) Vital Signs (24h Range):  Temp:  [98.1 °F (36.7 °C)-99.8 °F (37.7 °C)] 98.1 °F (36.7 °C)  Pulse:  [] 86  Resp:  [13-23] 18  SpO2:  [90 %-100 %] 95 %  BP: (112-195)/(54-87) 125/58     Weight: 47.1 kg (103 lb 13.4 oz)  Body mass index is 18.99 kg/m².    Intake/Output Summary (Last 24 hours) at 5/15/2020 1015  Last data filed at 5/15/2020 0600  Gross per 24 hour   Intake --   Output 70 ml   Net -70 ml      Physical Exam   Constitutional: She is oriented to person, place, and time. She appears well-developed. No distress.   HENT:   Head: Normocephalic and atraumatic.   Mouth/Throat: No oropharyngeal exudate.   Eyes: Pupils are equal, round, and reactive to light. Conjunctivae and EOM are normal.   Neck: Normal range of motion. Neck supple. No JVD present. No thyromegaly present.   Cardiovascular: Normal rate, regular rhythm and normal heart sounds.   No murmur heard.  Pulmonary/Chest: Effort normal and breath sounds normal. No respiratory distress. She has no wheezes. She has no rales. She exhibits no tenderness.   Abdominal: Soft. Bowel sounds are normal. She exhibits no distension. There is no tenderness. There is no rebound and no guarding.   Musculoskeletal: Normal range of motion. She exhibits no edema.   Lymphadenopathy:     She has no cervical adenopathy.   Neurological: She is  alert and oriented to person, place, and time. She displays normal reflexes. No cranial nerve deficit or sensory deficit.   Skin: Skin is warm and dry. No rash noted. She is not diaphoretic.   Wound vac in place lower back . Some erythema noted around  wound vac.    Psychiatric: She has a normal mood and affect.       Significant Labs:   BMP:   Recent Labs   Lab 05/15/20  0556   *   *   K 3.9      CO2 24   BUN 19   CREATININE 0.8   CALCIUM 8.3*   MG 1.6     CBC:   Recent Labs   Lab 05/14/20 0530 05/15/20  0556   WBC 20.06* 22.80*   HGB 9.4* 8.6*   HCT 29.7* 27.4*   PLT 13* 69*     CMP:   Recent Labs   Lab 05/14/20  0530 05/15/20  0556   * 134*   K 4.5 3.9    100   CO2 19* 24   GLU 90 115*   BUN 16 19   CREATININE 0.8 0.8   CALCIUM 8.2* 8.3*   ANIONGAP 11 10   EGFRNONAA >60 >60     Abnormal) Collected: 05/12/20 9288   Order Status: Completed Specimen: Wound from Back Updated: 05/14/20 0820    Aerobic Bacterial Culture STAPHYLOCOCCUS AUREUS   Many   Susceptibility pending            Significant Imaging:       Assessment/Plan:      * Sepsis  - Place in OBS  - Likely secondary to lower back cellulitis/abscess  - HR >90, WBCs 30k  - Lactic acid 1.1  - Given bolus of IVFs per sepsis protocol  - BC drawn and are pending  - Continue IV ABX  - Currently hemodynamically stable  -S/P I &D of back abscesses   -Underwent second I&D and wound vac placement - 5/14/20   -Continue Vancomycin IV until susceptibility is back     Cellulitis of lower back  - Failed outpatient treatment with bactrim  - Diffuse cellulitis associated with multiple abscesses  - Continue Vanc and Cefepime  - PharmD consulted to assist with Vanc dosing  - CT lumbar spine done   - General surgery consult obtained for eval of I&D   - S/P I &D of back abscesses 5/12/20   -Tissue culture resulted growth of Staph Aureus . Continue Vancomycin until final susceptibility results . DC Cefepime.   - Some worsening induration noted in the  lower back. Will speak to General Surgery - 5/14/20   -Underwent second I&D and wound vac placement - 5/14/20   -Continue Vancomycin IV until susceptibility is back    Abscess of lower back  - CT scan pending to evaluate further  - General surgery consult obtained   -S/P I &D of back abscess 5/12/20  - Tissue gram ranjit resulted few gram positive cocci - final identification and susceptibility pending   - Blood cultures x 2 neg to date   -Tissue culture resulted growth of Staph Aureus . Continue Vancomycin until final susceptibility results . DC Cefepime.   - Some worsening induration noted in the lower back. 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 Heme/Onc recommended 2 additional units  of irradiated P-RBC trans today   5/14- Hbg appropriately improved post transfusion . Monitor H/H         Chronic myelomonocytic leukemia not having achieved remission  - Followed outpatient by Dr. Estrada s/p cycle 4 of Azacitidine  - Chemo currently on hold due to worsening anemia  - Hemoc consulted      Thrombocytopenia  - Platelet count stable and appears at baseline  - No bleeding noted  - Daily CBC  -Pl is down to 13. Will transfuse one dose of platelet today per Hematology - 5/14/20  -Monitor counts       Essential hypertension  - BP under fair control  - Continue home Toprol XL   - Resume Diovan pending BP trends  - Monitor and adjust meds as needed  - Resume ARB ( home med)       Cachexia  - Dietician consulted for recs      Other hyperlipidemia  - Continue home Statin      Depression, recurrent  - Continue home meds      Acquired hypothyroidism  - Continue home Synthroid      Rheumatoid arthritis  - Not currently taking any medications except Norco  - Continue Norco prn  pain        VTE Risk Mitigation (From admission, onward)         Ordered     IP VTE HIGH RISK PATIENT  Once      05/12/20 1147     Place sequential compression device  Until discontinued      05/12/20 1147     Reason for No Pharmacological VTE Prophylaxis  Once     Question:  Reasons:  Answer:  Thrombocytopenia    05/12/20 1147                      Hari Sloan MD  Department of Hospital Medicine   [facility]

## 2020-05-15 NOTE — ASSESSMENT & PLAN NOTE
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 Heme/Onc recommended 2 additional units  of irradiated P-RBC trans today   5/14- Hbg appropriately improved post transfusion . Monitor H/H

## 2020-05-15 NOTE — HPI
80 y.o.  CF with a PMHx of CML and associated anemia followed outpatient by Dr. Estrada s/p cycle 4 of Azacitidine, HTN, HLD, Depression, Hypothyroidism, and RA who presented to the Emergency Department today with c/o worsening back cellulitis, which onset approximately 1.5 weeks PTA.  Since admission, she was seen by Gen surgery and I and D was done , Cultures are now positive for staph aureus .

## 2020-05-15 NOTE — PROGRESS NOTES
Pharmacokinetic Assessment Sign Off: IV Vancomycin    Therapy with Vancomycin complete and/or consult discontinued by provider.  Pharmacy will sign off, please re-consult as needed.    Thank you for allowing us to participate in this patient's care.     Adelina Antonio PharmD 05/15/2020 2:37 PM

## 2020-05-15 NOTE — ASSESSMENT & PLAN NOTE
- Platelet count stable and appears at baseline  - No bleeding noted  - Daily CBC  -Pl is down to 13. Will transfuse one dose of platelet today per Hematology - 5/14/20  -Monitor counts

## 2020-05-15 NOTE — PLAN OF CARE
Email received from Chance Guzman [address] [place] [address], [employer] Oncology Order Specialist order is being denied due to out of network. Contacted Edith SHARIF [employer] Coordinator discussed letter written by Dr Shannon providing medical necessity. Unfortunately she stated that since  purchased [employer] the process has been changed. If provider wishes to rochelle an out of network provider then the provider request authorization from [facility].   All the above discussed with providers. Contacted Wilma DEGROOT, Liaison with [facility] appropriate paperwork and signed forms faxed to [facility] @ [phone]. Once [facility] has insurance authorization, Wilma will come to the hospital to deliver the vac.  Left VM for HEATHER Gold Nurse at [facility].         05/15/20 1339   Post-Acute Status   Post-Acute Authorization HME   HME Status Referrals Sent   Discharge Delays (!) Other          Wilma DEGROOT , Liaison with [facility] delivered vac to primary nurse with instructions. Discharge orders noted, contacted Lindy Admissions Coordinator at [facility] notified of patient discharging with a [employer] vac from [facility].

## 2020-05-15 NOTE — PROGRESS NOTES
Chart reviewed, noted to have returned to OR yesterday per Dr. Shannon for further debridement and wound vac placement. Wound vac dressing change scheduled for Monday 5/18 if remains hospitalized. Wound vac intact with no leak, good seal. Small-moderate amount serosanguinous drainage noted.     Reportable conditions for Patients utilizing Negative Pressure Wound Vac Therapy:  1. Loss of seal, raised foam, or wound drainage suddenly decreasing in amount or stopping.  2. Bright red blood or rapid filling of the cannister  3. Periwound erythema, heat, edema, pain, elevated temperature and white blood cell count, cloudy or foul-smelling wound drainage, excess bleeding, macerated periwound skin  4. Wound drainage becoming foul smelling and cloudy  5. Inability to trouble shoot alarm for greater than two (2) hours    Primary Nurse Assessment should include the following - Document VAC in the NPWT LDA  1. Document system patency  2. Amount and description of wound fluid  3. Pain level  4. Tolerance of NPWT  5. Level of suction   6. Color of foam  7. Any air leak noted

## 2020-05-15 NOTE — ASSESSMENT & PLAN NOTE
05/12/2020 agree with transfuse 1 unit RBC    5/15/20  --hemoglobin 8.6>>9.4. Patient did have repeat I&D on yesterday which most likely explains slight drop in H/H. Possible discharge today pending susceptibility results for oral antibiotic regimen. Patient will have close follow up in Heme-Onc clinic with her primary Hematologist.    --Monitor. Maintain hg > 8

## 2020-05-15 NOTE — PROGRESS NOTES
[facility] -   Hematology/Oncology  Progress Note    Patient Name: Sarah Parker  Admission Date: 5/12/2020  Hospital Length of Stay: 3 days  Code Status: Full Code     Subjective:     HPI:  Mrs. Sarah Parker is a 79 yo woman with chronic myelomonocytic leukemia and associated anemias/p cycle 4 of Azacitidine, presented to the Emergency Department today with c/o worsening back cellulitis.  Presented to ER.  Stable VS.  CBC showed WBCs 30 k (chronically elevated due to CML, baseline appears to be 20-28), Hgb 6.7 (improved from 6.0 yesterday at which time she declined PRBCs), Platelets 56 and consistent with baseline.  CMP showed Alk phos 303, otherwise unremarkable.  Lactic acid 1.1.  Procalcitonin pending.  BC were drawn and patient was given Vanc and Zosyn in the ER.    CT lumbar spine  Impression       Extensive degenerative changes involving the lumbar spine.  There is evidence of severe spinal stenosis at L4-5 and L3-4.  No evidence of discitis or definite epidural abscess.    There is soft tissue thickening involving the posterior soft tissues with thickening of the skin measuring up to 1 cm.  There is an area of nodular soft tissue thickening left paramedian at the L3 level measuring 2 cm without definite ring enhancement to suggest abscess.      Underwent incision and drainage of back abscess today.    Interval history: s/p repeat I&D with wound vac placement. Possible discharge today pending wound culture susceptibilities results to determine oral antibiotic regimen. Patient reports feeling well overall. Denies pain. VS unremarkable. Labs stable  Oncology Treatment Plan:   OP AZACITADINE 7-DAY (SUB-Q)    Medications:  Continuous Infusions:    Scheduled Meds:   amitriptyline  75 mg Oral QHS    calcium-vitamin D3  1 tablet Oral Daily    DULoxetine  40 mg Oral Daily    ferrous gluconate  324 mg Oral BID WM    hydroCHLOROthiazide  12.5 mg Oral Daily    levothyroxine  88 mcg Oral Before  breakfast    lidocaine (PF) 10 mg/ml (1%)  1 mL Intradermal Once    losartan  25 mg Oral Daily    magnesium oxide  400 mg Oral TID    metoprolol succinate  50 mg Oral Daily    pravastatin  10 mg Oral Daily    tamsulosin  0.4 mg Oral Daily    topiramate  25 mg Oral BID    vancomycin (VANCOCIN) IVPB  1,000 mg Intravenous Q24H     PRN Meds:sodium chloride, acetaminophen, dextrose 10 % in water (D10W), dextrose 10 % in water (D10W), diphenhydrAMINE, glucagon (human recombinant), glucose, glucose, HYDROcodone-acetaminophen, HYDROcodone-acetaminophen, ondansetron, ondansetron, promethazine (PHENERGAN) IVPB, sodium chloride 0.9%     Review of patient's allergies indicates:   Allergen Reactions    Codeine      Other reaction(s): hyper  Other reaction(s): hyper    Doxycycline     Gabapentin Other (See Comments)     Bad dreams        Past Medical History:   Diagnosis Date    Acid reflux     Anxiety     Back pain     Bronchitis, chronic obstructive w acute bronchitis 7/29/2016    Cancer     [facility] arms, face- Dr. Lata Tejada    Cataract     2+NS    Degenerative disc disease     Depression     Dry mouth     Hernia, hiatal 11/18/2013    Hypertension     Hypothyroid     Macrocytic anemia 5/3/2016    Macular degeneration     Migraines     Mixed anxiety and depressive disorder     Multiple fractures of ribs of right side     Osteoporosis     Other hyperlipidemia 10/11/2019    Pneumonia     Pneumonia due to other staphylococcus     Rheumatoid arthritis     Rheumatoid arthritis(714.0)     Rheumatoid arthritis(714.0)     Remicade, MTX.     Past Surgical History:   Procedure Laterality Date    APPENDECTOMY  1985    BREAST BIOPSY      CATARACT EXTRACTION Bilateral 6/11/15    Dr. Booth    CHOLECYSTECTOMY  2013    cryoablasion kidney Left 09/27/2016    feet Bilateral     rheumatoid    FRACTURE SURGERY Right     tibia    HERNIA REPAIR      HYSTERECTOMY  1970    partial    INCISION AND DRAINAGE  OF ABSCESS N/A 2020    Procedure: INCISION AND DRAINAGE, ABSCESS;  Surgeon: Emerson Hathaway MD;  Location: [facility] OR;  Service: General;  Laterality: N/A;    INCISIONAL BIOPSY N/A 2020    Procedure: INCISIONAL BIOPSY;  Surgeon: Emerson Hathaway MD;  Location: [facility] OR;  Service: General;  Laterality: N/A;    JOINT REPLACEMENT      bilateral knees (), hands, wrists, knuckles, toes    LAPAROSCOPIC NISSEN FUNDOPLICATION      TRANSFORAMINAL EPIDURAL INJECTION OF STEROID Left 2019    Procedure: Left L5/S1 TF AYAAN with local;  Surgeon: Rowdy Felix MD;  Location: [facility] PAIN T;  Service: Pain Management;  Laterality: Left;     Family History     Problem Relation (Age of Onset)    Asthma Sister, Brother    Cancer Brother, Maternal Grandfather    Cataracts Mother    Chronic back pain Sister, Brother    Diabetes Mellitus Brother    Fibromyalgia Daughter    Heart disease Mother, Father, Maternal Grandmother    Hyperlipidemia Mother    Hypertension Mother, Father, Sister, Brother    Osteoarthritis Mother, Father, Sister, Brother    Thyroid disease Sister, Brother        Tobacco Use    Smoking status: Former Smoker     Packs/day: 0.25     Years: 2.00     Pack years: 0.50     Last attempt to quit: 1965     Years since quittin.5    Smokeless tobacco: Never Used   Substance and Sexual Activity    Alcohol use: No    Drug use: No    Sexual activity: Never     Partners: Male       Review of Systems   Constitutional: Negative.    HENT: Negative.    Eyes: Negative.    Respiratory: Negative.    Cardiovascular: Negative.    Gastrointestinal: Negative.    Endocrine: Negative.    Genitourinary: Negative.    Musculoskeletal: Negative.    Skin: Positive for wound.   Allergic/Immunologic: Negative.    Neurological: Negative.    Hematological: Negative.    Psychiatric/Behavioral: Negative.      Objective:     Vital Signs (Most Recent):  Temp: 98.1 °F (36.7 °C) (05/15/20 0753)  Pulse: 86 (05/15/20 0812)  Resp:  18 (05/15/20 0753)  BP: (!) 125/58 (05/15/20 0753)  SpO2: 95 % (05/15/20 0812) Vital Signs (24h Range):  Temp:  [98.1 °F (36.7 °C)-99.8 °F (37.7 °C)] 98.1 °F (36.7 °C)  Pulse:  [] 86  Resp:  [13-23] 18  SpO2:  [90 %-100 %] 95 %  BP: (112-195)/(54-87) 125/58     Weight: 47.1 kg (103 lb 13.4 oz)  Body mass index is 18.99 kg/m².  Body surface area is 1.44 meters squared.      Intake/Output Summary (Last 24 hours) at 5/15/2020 1032  Last data filed at 5/15/2020 1025  Gross per 24 hour   Intake 120 ml   Output 70 ml   Net 50 ml       Physical Exam   Constitutional: She is oriented to person, place, and time. She appears well-developed and well-nourished.   HENT:   Head: Normocephalic and atraumatic.   Eyes: Conjunctivae and EOM are normal.   Neck: Normal range of motion. Neck supple.   Cardiovascular: Normal rate and regular rhythm.   Pulmonary/Chest: Effort normal and breath sounds normal.   Abdominal: Soft. Bowel sounds are normal.   Musculoskeletal: Normal range of motion.   Neurological: She is alert and oriented to person, place, and time.   Skin: Skin is warm and dry.        Psychiatric: She has a normal mood and affect. Her behavior is normal. Judgment and thought content normal.   Nursing note and vitals reviewed.      Significant Labs:   All pertinent labs from the last 24 hours have been reviewed.    Diagnostic Results:  I have reviewed all pertinent imaging results/findings within the past 24 hours.    Assessment/Plan:     Abscess of lower back  --wound culture shows gram + cocci  --On IV Vanc  --remains afebrile with downward trend in WBC    Cellulitis of lower back  05/15/2020   --WBC stable. Patient afebrile  --Continue on iv cefepime.  Await final wound culture susceptibilities for oral abx regimen  --Consider consult Dr. Recinos in Infectious Disease for antibiotic guidance   --follow wound care recs    Thrombocytopenia  --platelet count 13  --transfuse 1 unit irradiated platelets today  --Monitor S&S  bleeding    5/15/20  --platelet count 69. Stable monitor        Chronic myelomonocytic leukemia not having achieved remission  05/15/2020 follow up with Dr. Knox upon discharge for further management of CMMoL    Anemia  05/12/2020 agree with transfuse 1 unit RBC    5/15/20  --hemoglobin 8.6>>9.4. Patient did have repeat I&D on yesterday which most likely explains slight drop in H/H. Possible discharge today pending susceptibility results for oral antibiotic regimen. Patient will have close follow up in Heme-Onc clinic with her primary Hematologist.    --Monitor. Maintain hg > 8          Thank you for your consult. I will follow-up with patient. Please contact us if you have any additional questions.     Cathryn Qureshi NP  Hematology/Oncology  [facility]

## 2020-05-15 NOTE — ASSESSMENT & PLAN NOTE
- Failed outpatient treatment with bactrim  - Diffuse cellulitis associated with multiple abscesses  - Continue Vanc and Cefepime  - PharmD consulted to assist with Vanc dosing  - CT lumbar spine done   - General surgery consult obtained for eval of I&D   - S/P I &D of back abscesses 5/12/20   -Tissue culture resulted growth of Staph Aureus . Continue Vancomycin until final susceptibility results . DC Cefepime.   - Some worsening induration noted in the lower back. Will speak to General Surgery - 5/14/20   -Underwent second I&D and wound vac placement - 5/14/20   -Continue Vancomycin IV until susceptibility is back

## 2020-05-15 NOTE — PLAN OF CARE
Remains injury free. Denies c/o pain. Ambulates in room without difficulty. Adequate for discharge.

## 2020-05-15 NOTE — SUBJECTIVE & OBJECTIVE
Interval History:   Remains afebrile. Underwent 2nd I&D and wound vac placement by Dr. Shannon last night . Pt states feeling better . Denies pain to her back. Vitals are stable . Labs resulted WBC 22.8, Hbg 8.6> 9.4, Pl 69. Wound culture resulted Staph Aureus - susceptibility is pending . Possible DC home with wound vac and oral antibiotic once susceptibility obtained .         Review of Systems   Constitutional: Positive for activity change. Negative for appetite change and fever.   HENT: Negative for sore throat.    Eyes: Negative for visual disturbance.   Respiratory: Negative for cough, chest tightness and shortness of breath.    Cardiovascular: Negative for chest pain, palpitations and leg swelling.   Gastrointestinal: Negative for abdominal distention, abdominal pain, constipation, diarrhea, nausea and vomiting.   Endocrine: Negative for polyuria.   Genitourinary: Negative for decreased urine volume, dysuria, flank pain, frequency and hematuria.   Musculoskeletal: Positive for back pain (better ). Negative for gait problem.   Skin: Negative for rash.   Neurological: Negative for syncope, speech difficulty, weakness, light-headedness and headaches.   Psychiatric/Behavioral: Negative for confusion, hallucinations and sleep disturbance.     Objective:     Vital Signs (Most Recent):  Temp: 98.1 °F (36.7 °C) (05/15/20 0753)  Pulse: 86 (05/15/20 0812)  Resp: 18 (05/15/20 0753)  BP: (!) 125/58 (05/15/20 0753)  SpO2: 95 % (05/15/20 0812) Vital Signs (24h Range):  Temp:  [98.1 °F (36.7 °C)-99.8 °F (37.7 °C)] 98.1 °F (36.7 °C)  Pulse:  [] 86  Resp:  [13-23] 18  SpO2:  [90 %-100 %] 95 %  BP: (112-195)/(54-87) 125/58     Weight: 47.1 kg (103 lb 13.4 oz)  Body mass index is 18.99 kg/m².    Intake/Output Summary (Last 24 hours) at 5/15/2020 1015  Last data filed at 5/15/2020 0600  Gross per 24 hour   Intake --   Output 70 ml   Net -70 ml      Physical Exam   Constitutional: She is oriented to person, place, and time. She  appears well-developed. No distress.   HENT:   Head: Normocephalic and atraumatic.   Mouth/Throat: No oropharyngeal exudate.   Eyes: Pupils are equal, round, and reactive to light. Conjunctivae and EOM are normal.   Neck: Normal range of motion. Neck supple. No JVD present. No thyromegaly present.   Cardiovascular: Normal rate, regular rhythm and normal heart sounds.   No murmur heard.  Pulmonary/Chest: Effort normal and breath sounds normal. No respiratory distress. She has no wheezes. She has no rales. She exhibits no tenderness.   Abdominal: Soft. Bowel sounds are normal. She exhibits no distension. There is no tenderness. There is no rebound and no guarding.   Musculoskeletal: Normal range of motion. She exhibits no edema.   Lymphadenopathy:     She has no cervical adenopathy.   Neurological: She is alert and oriented to person, place, and time. She displays normal reflexes. No cranial nerve deficit or sensory deficit.   Skin: Skin is warm and dry. No rash noted. She is not diaphoretic.   Wound vac in place lower back . Some erythema noted around  wound vac.    Psychiatric: She has a normal mood and affect.       Significant Labs:   BMP:   Recent Labs   Lab 05/15/20  0556   *   *   K 3.9      CO2 24   BUN 19   CREATININE 0.8   CALCIUM 8.3*   MG 1.6     CBC:   Recent Labs   Lab 05/14/20  0530 05/15/20  0556   WBC 20.06* 22.80*   HGB 9.4* 8.6*   HCT 29.7* 27.4*   PLT 13* 69*     CMP:   Recent Labs   Lab 05/14/20 0530 05/15/20  0556   * 134*   K 4.5 3.9    100   CO2 19* 24   GLU 90 115*   BUN 16 19   CREATININE 0.8 0.8   CALCIUM 8.2* 8.3*   ANIONGAP 11 10   EGFRNONAA >60 >60     Abnormal) Collected: 05/12/20 6126   Order Status: Completed Specimen: Wound from Back Updated: 05/14/20 0820    Aerobic Bacterial Culture STAPHYLOCOCCUS AUREUS   Many   Susceptibility pending            Significant Imaging:

## 2020-05-15 NOTE — PLAN OF CARE
05/15/20 1601   Final Note   Assessment Type Final Discharge Note   Anticipated Discharge Disposition Home-Health   Right Care Referral Info   Post Acute Recommendation Home-care   Facility Name [facility]

## 2020-05-15 NOTE — ASSESSMENT & PLAN NOTE
- Place in OBS  - Likely secondary to lower back cellulitis/abscess  - HR >90, WBCs 30k  - Lactic acid 1.1  - Given bolus of IVFs per sepsis protocol  - BC drawn and are pending  - Continue IV ABX  - Currently hemodynamically stable  -S/P I &D of back abscesses   -Underwent second I&D and wound vac placement - 5/14/20   -Continue Vancomycin IV until susceptibility is back

## 2020-05-15 NOTE — PLAN OF CARE
Met with patient to discuss discharge needs: wound vac and home health for dressing changes. Patient stated that she would like to use [facility]. She has used them in the past and indicated that she was pleased with their care. Preference letter obtained for [facility]. Referral placed in [facility] to [facility].Patient accepted by [facility]. Faxed Critical access hospital application to obtain a wound vac.         05/15/20 4147   Post-Acute Status   Post-Acute Authorization Home Health   Home Health Status Referrals Sent   Discharge Delays (!) Other

## 2020-05-15 NOTE — ASSESSMENT & PLAN NOTE
--platelet count 13  --transfuse 1 unit irradiated platelets today  --Monitor S&S bleeding    5/15/20  --platelet count 69. Stable monitor

## 2020-05-15 NOTE — PLAN OF CARE
Fall precautions implemented. Pt remained free from falls/injuries.  Went for debridement and wound vac placement during shift.  Wound vac intact to lower back on continuous suction therapy at 125 mmHg, seroussang drainage in canister. Pain adequately managed. Safety precautions implemented. Chart reviewed. Will continue to monitor.

## 2020-05-15 NOTE — PROGRESS NOTES
The patient was seen and examined this morning.  Her vital signs remained to be stable.  Her wound looks better today with wound VAC in place.  The induration around her wound has subsided overnight.  She is up and ambulating and able to go to bathroom.    The plan is continue with wound VAC management.  She can be discharged from the hospital with p.o. antibiotic and home health visit for wound VAC change.    Mckinley Shannon

## 2020-05-16 LAB
BACTERIA BLD CULT: NORMAL
BACTERIA BLD CULT: NORMAL

## 2020-05-17 NOTE — DISCHARGE SUMMARY
"[facility] Hospital Medicine  Discharge Summary      Patient Name: Sarah Parker  MRN: [id number]  Admission Date: 5/12/2020  Hospital Length of Stay: 3 days  Discharge Date and Time:  5/15/2020, 04:46 PM  Attending Physician: No att. providers found   Discharging Provider: Hari Sloan MD  Primary Care Provider: Briseida Bennett MD      HPI:   Sarah Parker is a 80 y.o.  CF with a PMHx of CML and associated anemia followed outpatient by Dr. Estrada s/p cycle 4 of Azacitidine, HTN, HLD, Depression, Hypothyroidism, and RA who presented to the Emergency Department today with c/o worsening back cellulitis, which onset approximately 1.5 weeks PTA.  Symptoms are constant and moderate in severity.  No mitigating or exacerbating factors reported.  Associated sxs include  low back pain.  She denies any known injury or wound stating "all of a sudden a boil came up".  Patient denies any fever, chills, n/v/d, SOB, CP, weakness, numbness, dizziness, headache, and all other sxs at this time.  Patient was put on a course of Bactrim by her PCP, with worsening of symptoms.  No further complaints or concerns at this time.  ER workup showed:  Stable VS.  CBC showed WBCs 30 k (chronically elevated due to CML, baseline appears to be 20-28), Hgb 6.7 (improved from 6.0 yesterday at which time she declined PRBCs), Platelets 56 and consistent with baseline.  CMP showed Alk phos 303, otherwise unremarkable.  Lactic acid 1.1.  Procalcitonin pending.  BC were drawn and patient was given Vanc and Zosyn in the ER.  Hospital Medicine called for admission.  CT scan pending along with General Surgery consult.  Patient will be placed in OBS. She is a Full Code.  Her SDM is her daughter Albina who can be reached at [phone].    Procedure(s) (LRB):  DEBRIDEMENT, WOUND (N/A)  APPLICATION, WOUND VAC (N/A)      Hospital Course:   5/13- Pt states pain in the abscess area is better. S/P  incision and drainage of back " abscess yesterday . Afebrile . Vitals are fairly stable . Tissue gram stain resulted few gram positive cocci . Blood culture x 2  no growth to date. Current antibiotic - Cefepime and Vancomycin IV. Labs - WBC trended down 26.4 ( near baseline ) , Hb 6.7, Pl 41. Will have 2 units of irradiated P-RBC transfusion today.     5/14- Pt has no new C/O . Awake , alert and oriented. Remains afebrile . BP is elevated noted . Review home meds and resume as appropriate. WBC 20 K( at baseline) , Hgb 9.4 ( s/p 2 units P-RBC yesterday) . Pl down to 13. No signs of active bleeding. Hematology recommended to transfuse one dose of Pl today . Tissue culture resulted growth of Staph Aureus . Final susceptibility is pending . DC Cefepime . Continue Vancomycin. On exam,  some induration palpated in the area next to previous I&D. Wound care on board. Will speak to General Surgery.     5/15- Remains afebrile. Underwent 2nd I&D and wound vac placement by Dr. Shannon last night . Pt states feeling better . Denies pain to her back. Vitals are stable . Labs resulted WBC 22.8, Hbg 8.6> 9.4, Pl 69. Wound culture resulted Staph Aureus - susceptibility is pending . Possible DC home with wound vac and oral antibiotic once susceptibility obtained .     Addendum- Wound vac delivered at bedside . Susceptibility resulted growth of Staph Aureus sensitive to clindamycin , bactrim and Tetracycline. At this point decision is made to discharge pt home with oral Doxycyline and wound vac with home health service for wound vac care. Pt is examined before discharge and deemed stable and suitable for discharge.        Consults:   Consults (From admission, onward)        Status Ordering Provider     Inpatient consult to General Surgery  Once     Provider:  (Not yet assigned)    Completed EUGENE GUZMAN          No new Assessment & Plan notes have been filed under this hospital service since the last note was generated.  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] 09/13/2019 Yes     Chronic    Thrombocytopenia [D69.6] 12/31/2019 Yes    Essential hypertension [I10]  Yes     Chronic    Cachexia [R64] 12/31/2019 Yes    Other hyperlipidemia [E78.49] 10/11/2019 Yes    Acquired hypothyroidism [E03.9]  Yes    Depression, recurrent [F33.9]  Yes    Rheumatoid arthritis [M06.9] 06/19/2013 Yes     Chronic      Problems Resolved During this Admission:    Diagnosis Date Noted Date Resolved POA    PRINCIPAL PROBLEM:  Sepsis [A41.9] 08/21/2019 05/15/2020 Yes       Discharged Condition: stable    Disposition: Home-Health Care [facility]    Follow Up:  Follow-up Information     [facility].    Specialty:  Home Health Services  Why:  Home Health  Contact information:  [address]  [facility] [address]  [phone]             Briseida Bennett MD. Schedule an appointment as soon as possible for a visit in 3 days.    Specialty:  Internal Medicine  Why:  Discharge follow up   Contact information:  [address]  [facility] [address]  [phone]             Mckinley Shannon MD. Schedule an appointment as soon as possible for a visit in 1 week.    Specialties:  General Surgery, Bariatrics  Why:  Discharge follow up   Contact information:  [address]  [place] [address]  [phone]             [facility].    Specialty:  DME Provider  Why:  DME  Contact information:  [address] [facility] [address]  [phone]                 Patient Instructions:      Ambulatory referral/consult to Home Health   Standing Status: Future   Referral Priority: Routine Referral Type: Home Health   Referral Reason: Specialty Services Required   Requested Specialty: Home Health Services   Number of Visits  Requested: 1     Diet Adult Regular     Activity as tolerated       Significant Diagnostic Studies: Labs: BMP: No results for input(s): GLU, NA, K, CL, CO2, BUN, CREATININE, CALCIUM, MG in the last 48 hours., CMP No results for input(s): NA, K, CL, CO2, GLU, BUN, CREATININE, CALCIUM, PROT, ALBUMIN, BILITOT, ALKPHOS, AST, ALT, ANIONGAP, ESTGFRAFRICA, EGFRNONAA in the last 48 hours. and CBC No results for input(s): WBC, HGB, HCT, PLT in the last 48 hours.    Pending Diagnostic Studies:     None         Medications:  Reconciled Home Medications:      Medication List      START taking these medications    doxycycline 100 MG tablet  Commonly known as:  VIBRA-TABS  Take 1 tablet (100 mg total) by mouth every 12 (twelve) hours. for 10 days        CHANGE how you take these medications    ferrous gluconate 324 mg (37.5 mg iron) Tab tablet  Take 1 tablet (324 mg total) by mouth daily with breakfast.  What changed:  when to take this        CONTINUE taking these medications    albuterol 2.5 mg /3 mL (0.083 %) nebulizer solution  Commonly known as:  PROVENTIL  Take 3 mLs (2.5 mg total) by nebulization every 6 (six) hours as needed for Wheezing.     amitriptyline 75 MG tablet  Commonly known as:  ELAVIL  TAKE 1 TABLET BY MOUTH EVERY EVENING     benzonatate 100 MG capsule  Commonly known as:  TESSALON  Take 1-2 capsules (100-200 mg total) by mouth 3 (three) times daily as needed for Cough.     CITRACAL PLUS D 315-200 mg-unit per tablet  Generic drug:  calcium citrate-vitamin D3 315-200 mg  Take 1 tablet by mouth once daily.     DULoxetine 20 MG capsule  Commonly known as:  CYMBALTA  Take 2 capsules (40 mg total) by mouth once daily.     fluticasone propionate 50 mcg/actuation nasal spray  Commonly known as:  FLONASE  2 sprays (100 mcg total) by Each Nostril route once daily.     HYDROcodone-acetaminophen 5-325 mg per tablet  Commonly known as:  NORCO  TK 1 T PO Q 6 H PRN     hydroxyzine HCL 25 MG tablet  Commonly known as:   ATARAX  Take 25 mg by mouth nightly.     leucovorin 5 mg Tab  Commonly known as:  WELLCOVORIN  TAKE ONE WEEKLY ON SATURDAYS     levothyroxine 88 MCG tablet  Commonly known as:  SYNTHROID  TAKE 1 TABLET BY MOUTH BEFORE BREAKFAST     magnesium oxide 400 mg (241.3 mg magnesium) tablet  Commonly known as:  MAG-OX  Take 1 tablet (400 mg total) by mouth 3 (three) times daily.     meclizine 50 MG tablet  Commonly known as:  ANTIVERT  Take 25 mg by mouth 3 (three) times daily as needed.     metoprolol succinate 50 MG 24 hr tablet  Commonly known as:  TOPROL-XL  TAKE 1 TABLET(50 MG) BY MOUTH EVERY DAY     multivitamin capsule  Take by mouth. As directed     ondansetron 4 MG tablet  Commonly known as:  ZOFRAN  Take 1 tablet (4 mg total) by mouth every 8 (eight) hours as needed for Nausea.     pravastatin 10 MG tablet  Commonly known as:  PRAVACHOL  TAKE 1 TABLET(10 MG) BY MOUTH EVERY DAY     prochlorperazine 5 MG tablet  Commonly known as:  COMPAZINE  TAKE 1 TABLET(5 MG) BY MOUTH EVERY 6 HOURS AS NEEDED FOR NAUSEA     tamsulosin 0.4 mg Cap  Commonly known as:  FLOMAX  Take 1 capsule (0.4 mg total) by mouth once daily. For urinary retention     topiramate 25 MG tablet  Commonly known as:  TOPAMAX  Take 1 tablet (25 mg total) by mouth at night x 1 week then increase to 2 (two) times daily. Increase as tolerated.     valsartan-hydrochlorothiazide 80-12.5 mg per tablet  Commonly known as:  DIOVAN-HCT  TAKE 1 TABLET BY MOUTH DAILY            Indwelling Lines/Drains at time of discharge:   Lines/Drains/Airways     None                 Time spent on the discharge of patient: 40  minutes  Patient was seen and examined on the date of discharge and determined to be suitable for discharge.         Hari Sloan MD  Department of Hospital Medicine  [facility]

## 2020-05-18 ENCOUNTER — PATIENT OUTREACH (OUTPATIENT)
Dept: ADMINISTRATIVE | Facility: CLINIC | Age: 80
End: 2020-05-18

## 2020-05-18 ENCOUNTER — OFFICE VISIT (OUTPATIENT)
Dept: HEMATOLOGY/ONCOLOGY | Facility: CLINIC | Age: 80
End: 2020-05-18
Payer: MEDICARE

## 2020-05-18 VITALS
HEART RATE: 98 BPM | HEIGHT: 62 IN | TEMPERATURE: 98 F | SYSTOLIC BLOOD PRESSURE: 163 MMHG | DIASTOLIC BLOOD PRESSURE: 88 MMHG | BODY MASS INDEX: 21.1 KG/M2 | WEIGHT: 114.63 LBS | OXYGEN SATURATION: 96 %

## 2020-05-18 DIAGNOSIS — C93.10 CHRONIC MYELOMONOCYTIC LEUKEMIA NOT HAVING ACHIEVED REMISSION: Primary | ICD-10-CM

## 2020-05-18 DIAGNOSIS — D69.6 THROMBOCYTOPENIA: ICD-10-CM

## 2020-05-18 DIAGNOSIS — D64.9 ANEMIA, UNSPECIFIED TYPE: ICD-10-CM

## 2020-05-18 DIAGNOSIS — L02.212 ABSCESS OF LOWER BACK: ICD-10-CM

## 2020-05-18 LAB
BACTERIA SPEC ANAEROBE CULT: NORMAL
BACTERIA TISS AEROBE CULT: ABNORMAL
GRAM STN SPEC: ABNORMAL
GRAM STN SPEC: ABNORMAL

## 2020-05-18 PROCEDURE — 3079F DIAST BP 80-89 MM HG: CPT | Mod: HCNC,CPTII,S$GLB, | Performed by: INTERNAL MEDICINE

## 2020-05-18 PROCEDURE — 99215 OFFICE O/P EST HI 40 MIN: CPT | Mod: HCNC,S$GLB,, | Performed by: INTERNAL MEDICINE

## 2020-05-18 PROCEDURE — 99999 PR PBB SHADOW E&M-EST. PATIENT-LVL IV: ICD-10-PCS | Mod: PBBFAC,HCNC,, | Performed by: INTERNAL MEDICINE

## 2020-05-18 PROCEDURE — 99215 PR OFFICE/OUTPT VISIT, EST, LEVL V, 40-54 MIN: ICD-10-PCS | Mod: HCNC,S$GLB,, | Performed by: INTERNAL MEDICINE

## 2020-05-18 PROCEDURE — 1101F PT FALLS ASSESS-DOCD LE1/YR: CPT | Mod: HCNC,CPTII,S$GLB, | Performed by: INTERNAL MEDICINE

## 2020-05-18 PROCEDURE — 3077F SYST BP >= 140 MM HG: CPT | Mod: HCNC,CPTII,S$GLB, | Performed by: INTERNAL MEDICINE

## 2020-05-18 PROCEDURE — 1101F PR PT FALLS ASSESS DOC 0-1 FALLS W/OUT INJ PAST YR: ICD-10-PCS | Mod: HCNC,CPTII,S$GLB, | Performed by: INTERNAL MEDICINE

## 2020-05-18 PROCEDURE — 99999 PR PBB SHADOW E&M-EST. PATIENT-LVL IV: CPT | Mod: PBBFAC,HCNC,, | Performed by: INTERNAL MEDICINE

## 2020-05-18 PROCEDURE — 1126F AMNT PAIN NOTED NONE PRSNT: CPT | Mod: HCNC,S$GLB,, | Performed by: INTERNAL MEDICINE

## 2020-05-18 PROCEDURE — 1126F PR PAIN SEVERITY QUANTIFIED, NO PAIN PRESENT: ICD-10-PCS | Mod: HCNC,S$GLB,, | Performed by: INTERNAL MEDICINE

## 2020-05-18 PROCEDURE — 1159F MED LIST DOCD IN RCRD: CPT | Mod: HCNC,S$GLB,, | Performed by: INTERNAL MEDICINE

## 2020-05-18 PROCEDURE — 3077F PR MOST RECENT SYSTOLIC BLOOD PRESSURE >= 140 MM HG: ICD-10-PCS | Mod: HCNC,CPTII,S$GLB, | Performed by: INTERNAL MEDICINE

## 2020-05-18 PROCEDURE — 3079F PR MOST RECENT DIASTOLIC BLOOD PRESSURE 80-89 MM HG: ICD-10-PCS | Mod: HCNC,CPTII,S$GLB, | Performed by: INTERNAL MEDICINE

## 2020-05-18 PROCEDURE — 1159F PR MEDICATION LIST DOCUMENTED IN MEDICAL RECORD: ICD-10-PCS | Mod: HCNC,S$GLB,, | Performed by: INTERNAL MEDICINE

## 2020-05-18 NOTE — ASSESSMENT & PLAN NOTE
On antibiotics with doxycycline for MRSA.  Management through Infectious Disease and surgery.  Currently with wound VAC placement.  Noted serosanguineous fluid.

## 2020-05-18 NOTE — PROGRESS NOTES
C3 nurse attempted to contact patient. No answer. The following message was left for the patient to return the call:  Good afternoon, I am a nurse calling on behalf of [facility] from the [facility]ination [facility].  This is a Transitional Care Call for Sarah Parker. When you have a moment please contact us at [phone] or [phone] Monday through Friday, between the hours of 8 am to 4 pm. We look forward to speaking with you. On behalf of [facility] have a nice day.    The patient does not have a scheduled HOSFU appointment within 7 days post hospital discharge date 5/15. Message sent to Physician staff to assist with HOSFU appointment scheduling.

## 2020-05-18 NOTE — PROGRESS NOTES
Subjective:       Patient ID: Sarah Parker is a 80 y.o. female.    Chief Complaint:  Chronic myelomonocytic leukemia    Follow-up   Associated symptoms include fatigue and weakness. Pertinent negatives include no abdominal pain, arthralgias, chest pain, chills, congestion, coughing, diaphoresis, fever, headaches, joint swelling, myalgias, nausea, neck pain, numbness, sore throat or vomiting.      Patient is a 80-year-old female presents for reinstitution  of Vidaza therapy for chronic myelomonocytic leukemia patient who is currently on treatment with azacitidine.  She is here for routine follow-up.      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  Status post 4 cycles.  Overall tolerated well.  Recently treated for back abscess with antibiotics, however it appears the abscess has erupted and more aggressive per patient.  She was seen by myself on 05/11/2020 and at that time I recommend going to the emergency room for evaluation and possible incision and drainage.  Patient presented to the emergency room on 05/12/2020 and at that time was noted to have cellulitis of back weight abscess.  She underwent incision and drainage by Dr. Shannon, she also had a wound VAC placed.  Microbiology showed MRSA.  She is currently on antibiotics with doxycycline for 10 days.    Today she denies any systemic symptoms including fever, chills, worsening lower back pain, nausea or diarrhea.  Denies hemoptysis, hematemesis, hematochezia, melena.  No urinary symptoms per patient. She is scheduled to follow-up with Dr. Shannon on 5/22/2020.     Past Medical History:   Diagnosis Date    Acid reflux     Anxiety     Back pain     Bronchitis, chronic obstructive w acute bronchitis 7/29/2016    Cancer     [facility] arms, face- Dr. Lata Tejada    Cataract     2+NS    Degenerative disc disease     Depression     Dry mouth     Hernia, hiatal 11/18/2013    Hypertension     Hypothyroid     Macrocytic anemia 5/3/2016     Macular degeneration     Migraines     Mixed anxiety and depressive disorder     Multiple fractures of ribs of right side     Osteoporosis     Other hyperlipidemia 10/11/2019    Pneumonia     Pneumonia due to other staphylococcus     Rheumatoid arthritis     Rheumatoid arthritis(714.0)     Rheumatoid arthritis(714.0)     Remicade, MTX.     Family History   Problem Relation Age of Onset    Heart disease Mother     Hyperlipidemia Mother     Hypertension Mother     Osteoarthritis Mother     Cataracts Mother     Hypertension Father     Osteoarthritis Father     Heart disease Father     Asthma Sister     Chronic back pain Sister     Hypertension Sister     Osteoarthritis Sister     Thyroid disease Sister     Asthma Brother     Cancer Brother     Chronic back pain Brother     Diabetes Mellitus Brother     Hypertension Brother     Osteoarthritis Brother     Thyroid disease Brother     Cancer Maternal Grandfather     Fibromyalgia Daughter     Heart disease Maternal Grandmother     Colon cancer Neg Hx     Diabetes Neg Hx      Social History     Socioeconomic History    Marital status:      Spouse name: Not on file    Number of children: Not on file    Years of education: Not on file    Highest education level: Not on file   Occupational History    Occupation: retired   Social Needs    Financial resource strain: Not on file    Food insecurity:     Worry: Not on file     Inability: Not on file    Transportation needs:     Medical: Not on file     Non-medical: Not on file   Tobacco Use    Smoking status: Former Smoker     Packs/day: 0.25     Years: 2.00     Pack years: 0.50     Last attempt to quit: 1965     Years since quittin.5    Smokeless tobacco: Never Used   Substance and Sexual Activity    Alcohol use: No    Drug use: No    Sexual activity: Never     Partners: Male   Lifestyle    Physical activity:     Days per week: Not on file     Minutes per session:  Not on file    Stress: Not at all   Relationships    Social connections:     Talks on phone: Not on file     Gets together: Not on file     Attends Restorationism service: Not on file     Active member of club or organization: Not on file     Attends meetings of clubs or organizations: Not on file     Relationship status: Not on file   Other Topics Concern    Not on file   Social History Narrative    Patient is aretired and live with .     Past Surgical History:   Procedure Laterality Date    APPENDECTOMY  1985    APPLICATION OF WOUND VACUUM-ASSISTED CLOSURE DEVICE N/A 5/14/2020    Procedure: APPLICATION, WOUND VAC;  Surgeon: Mckinley Shannon MD;  [facility];  Service: General;  Laterality: N/A;    BREAST BIOPSY      CATARACT EXTRACTION Bilateral 6/11/15    Dr. Booth    CHOLECYSTECTOMY  2013    cryoablasion kidney Left 09/27/2016    feet Bilateral     rheumatoid    FRACTURE SURGERY Right     tibia    HERNIA REPAIR      HYSTERECTOMY  1970    partial    INCISION AND DRAINAGE OF ABSCESS N/A 5/12/2020    Procedure: INCISION AND DRAINAGE, ABSCESS;  Surgeon: Emerson Hathaway MD;  [facility];  Service: General;  Laterality: N/A;    INCISIONAL BIOPSY N/A 5/12/2020    Procedure: INCISIONAL BIOPSY;  Surgeon: Emerson Hathaway MD;  [facility];  Service: General;  Laterality: N/A;    JOINT REPLACEMENT      bilateral knees (2008), hands, wrists, knuckles, toes    LAPAROSCOPIC NISSEN FUNDOPLICATION      TRANSFORAMINAL EPIDURAL INJECTION OF STEROID Left 6/25/2019    Procedure: Left L5/S1 TF AYAAN with local;  Surgeon: Rowdy Felix MD;  Location: [facility];  Service: Pain Management;  Laterality: Left;    WOUND DEBRIDEMENT N/A 5/14/2020    Procedure: DEBRIDEMENT, WOUND;  Surgeon: Mckinley Shannon MD;  [facility];  Service: General;  Laterality: N/A;       Labs:  Lab Results   Component Value Date    WBC 22.80 (H) 05/15/2020    HGB 8.6 (L) 05/15/2020    HCT 27.4 (L) 05/15/2020    MCV 90  05/15/2020    PLT 69 (L) 05/15/2020     BMP  Lab Results   Component Value Date     (L) 05/15/2020    K 3.9 05/15/2020     05/15/2020    CO2 24 05/15/2020    BUN 19 05/15/2020    CREATININE 0.8 05/15/2020    CALCIUM 8.3 (L) 05/15/2020    ANIONGAP 10 05/15/2020    ESTGFRAFRICA >60 05/15/2020    EGFRNONAA >60 05/15/2020     Lab Results   Component Value Date    ALT 41 05/12/2020    AST 29 05/12/2020    ALKPHOS 303 (H) 05/12/2020    BILITOT 0.5 05/12/2020       Lab Results   Component Value Date    IRON 93 01/17/2020    TIBC 425 01/17/2020    FERRITIN 276 01/17/2020     Lab Results   Component Value Date    OMBJDAKH04 1918 (H) 08/25/2019     Lab Results   Component Value Date    FOLATE 12.0 08/25/2019     Lab Results   Component Value Date    TSH 5.174 (H) 09/03/2019         Review of Systems   Constitutional: Positive for activity change and fatigue. Negative for chills, diaphoresis and fever.   HENT: Negative for congestion, dental problem, drooling, ear discharge, ear pain, facial swelling, hearing loss, mouth sores, nosebleeds, postnasal drip, rhinorrhea, sinus pressure, sneezing, sore throat, tinnitus, trouble swallowing and voice change.    Eyes: Negative for photophobia, pain, discharge, redness, itching and visual disturbance.   Respiratory: Negative for cough, choking, chest tightness, shortness of breath, wheezing and stridor.    Cardiovascular: Negative for chest pain, palpitations and leg swelling.   Gastrointestinal: Negative for abdominal distention, abdominal pain, anal bleeding, blood in stool, constipation, diarrhea, nausea, rectal pain and vomiting.   Endocrine: Negative for cold intolerance, heat intolerance, polydipsia, polyphagia and polyuria.   Genitourinary: Negative for decreased urine volume, difficulty urinating, dyspareunia, dysuria, enuresis, flank pain, frequency, genital sores, hematuria, menstrual problem, pelvic pain, urgency, vaginal bleeding, vaginal discharge and vaginal  pain.   Musculoskeletal: Negative for arthralgias, gait problem, joint swelling, myalgias, neck pain and neck stiffness.   Skin: Negative for color change and pallor.   Allergic/Immunologic: Negative for environmental allergies, food allergies and immunocompromised state.   Neurological: Positive for weakness. Negative for dizziness, tremors, seizures, syncope, facial asymmetry, speech difficulty, light-headedness, numbness and headaches.   Hematological: Negative for adenopathy. Does not bruise/bleed easily.   Psychiatric/Behavioral: Positive for dysphoric mood. Negative for agitation, behavioral problems, confusion, decreased concentration, hallucinations, self-injury, sleep disturbance and suicidal ideas. The patient is nervous/anxious. The patient is not hyperactive.        Objective:      Physical Exam   Constitutional: She is oriented to person, place, and time. She appears cachectic.   HENT:   Head: Normocephalic and atraumatic.   Right Ear: External ear normal.   Left Ear: External ear normal.   Nose: Nose normal. Right sinus exhibits no maxillary sinus tenderness and no frontal sinus tenderness. Left sinus exhibits no maxillary sinus tenderness and no frontal sinus tenderness.   Mouth/Throat: Oropharynx is clear and moist. No oropharyngeal exudate.   Eyes: Pupils are equal, round, and reactive to light. Conjunctivae, EOM and lids are normal. Right eye exhibits no discharge. Left eye exhibits no discharge. Right conjunctiva is not injected. Right conjunctiva has no hemorrhage. Left conjunctiva is not injected. Left conjunctiva has no hemorrhage. No scleral icterus.   Neck: Normal range of motion. Neck supple. No JVD present. No tracheal deviation present. No thyromegaly present.   Cardiovascular: Normal rate and regular rhythm.   Pulmonary/Chest: Effort normal. No stridor. No respiratory distress. She exhibits no tenderness.   Abdominal: Soft. She exhibits no distension and no mass. There is no splenomegaly  or hepatomegaly. There is no tenderness. There is no rebound.   Musculoskeletal: Normal range of motion. She exhibits deformity. She exhibits no edema or tenderness.   Deformed digits of upper extremity secondary to arthritis.   Lymphadenopathy:     She has no cervical adenopathy.     She has no axillary adenopathy.        Right: No supraclavicular adenopathy present.        Left: No supraclavicular adenopathy present.   Neurological: She is alert and oriented to person, place, and time. No cranial nerve deficit. Coordination normal.   Skin: Skin is dry. Rash (Posterior lower back, diffuse rash with mildly necrotic centers.  Tender to touch and erythema noted around the lesions.) noted. She is not diaphoretic. No erythema.   Wound VAC in place to the posterior lower back wound.  Draining serosanguineous fluid.   Psychiatric: She has a normal mood and affect. Her behavior is normal. Judgment and thought content normal.   Vitals reviewed.        Assessment:      1. Chronic myelomonocytic leukemia not having achieved remission    2. Anemia, unspecified type    3. Thrombocytopenia    4. Abscess of lower back           Plan:     Chronic myelomonocytic leukemia not having achieved remission  Vidaza on hold given recent infection and currently on antibiotics.  Noted leukocytosis which is likely related to recent infection.  Will reassess in 2 weeks.    Anemia  Normocytic anemia.  Noted hemoglobin of 8.6 grams/deciliter.  Continue to monitor.    Thrombocytopenia  Secondary to Vidaza.  Improving and noted count at 69,000. No petechiae on exam.    Abscess of lower back  On antibiotics with doxycycline for MRSA.  Management through Infectious Disease and surgery.  Currently with wound VAC placement.  Noted serosanguineous fluid.      Denton Knox MD

## 2020-05-18 NOTE — ASSESSMENT & PLAN NOTE
Vidaza on hold given recent infection and currently on antibiotics.  Noted leukocytosis which is likely related to recent infection.  Will reassess in 2 weeks.

## 2020-05-18 NOTE — PHYSICIAN QUERY
"PT Name: Sarah Parker  MR #: [id number]    Physician Query Form - Procedure Clarification     CDS/: Susan ISAAC        Contact information: [email or web address]  This form is a permanent document in the medical record.     Query Date: May 18, 2020  By submitting this query, we are merely seeking further clarification of documentation. Please utilize your independent clinical judgment when addressing the question(s) below.    The Medical record contains the following:     Indicators       Supporting Clinical Findings   Location in Medical Record   X Documentation of "Debridement"   DEBRIDEMENT, WOUND (N/A)  APPLICATION, WOUND VAC  Necrotic tissue lower back.        The patient was taken operating room, and underwent sedation while the patient was positioned in left lateral decubitus position.  The affected area was prepped and draped in the usual sterile manner.  After local infiltration with 0.25% Marcaine plain, generously around the margin of the necrotic tissue, and with electrocautery, the area was completely removed including the abscess cavity.  Hemostasis was achieved.  And clean wound VAC was then applied to the area to the pressure of 125 mm Hg.     Findings: The cellulitis and an abscess was noted down to the fascial level on the back.  The area was measured to be 15 by 12 cm in size.  The depth was about 2 cm.  The infection progressed down to the paraspinal muscle.   OP Report 5/14 NANDA Shannon MD    Documentation of "I & D"     X EBL = 30 ml OP Report 5/14 NANDA Shannon MD    Other:       Excisional debridement is a surgical removal of  nonvitalized tissue, necrosis or slough. The use of a sharp instrument does not always indicate that an excisional debridement was performed.  Non excisional debridement is the scraping, washing, irrigating, brushing away or removal of loose tissue fragments.      Provider, please specify type of procedure(s) performed including the type and depth of debridement to " Necrotic tissue lower back.    [  x  ]  Excisional Debridement (Specify site and depth of tissue removed)              * Site: (Specify) _Lower back_____       *Depth of tissue excised:       [    ] Skin [    ] Subcutaneous Tissue/Fascia [    ] Muscle [    ] Tendon [    ] Bone   [    ]  Non-excisional Debridement                *Site : (Specify): _Lower back________       *Depth of tissue excised:       [    ] Skin [    ] Subcutaneous Tissue/Fascia [    ] Muscle [    ] Tendon [    ] Bone   [    ] Other Procedure (Specify) ________     [  ] Clinically Undetermined

## 2020-05-18 NOTE — PHYSICIAN QUERY
"PT Name: Sarah Parker  MR #: [id number]    Physician Query Form - Cause and Effect Relationship Clarification      CDS/: Susan Schreiber RN CDI       Contact information: [email or web address]    This form is a permanent document in the medical record.     Query Date: May 18, 2020    By submitting this query, we are merely seeking further clarification of documentation. Please utilize your independent clinical judgment when addressing the question(s) below.    The Medical record contains the following:  Supporting Clinical Findings   Location in record    Sepsis  - Place in OBS  - Likely secondary to lower back cellulitis/abscess  - HR >90, WBCs 30k  - Lactic acid 1.1  - Given bolus of IVFs per sepsis protocol  - BC drawn and are pending  - Continue IV ABX  - Currently hemodynamically stable                                                                                                          Tissue gram stain resulted few gram positive cocci . Blood culture x 2  no growth to date. Current antibiotic - Cefepime and Vancomycin IV.                                                                               H&P 5/12 C [facility] / [facility] medicine 5/15 S Nathalia JUARES      Lt back biopsy - 5/12    METHICILLIN RESISTANT STAPHYLOCOCCUS AUREUS - Many        Rt. Back tissue 5/14    METHICILLIN RESISTANT STAPHYLOCOCCUS AUREUS   Moderate                                                                                                                                                                                 Lab results         Provider, please clarify if there is any correlation between   'Sepsis" and "Methicillin resistant staphylococcus aureus."           Are the conditions:      [ x ] Due to or associated with each other   [  ] Unrelated to each other   [  ] Other (Please Specify): _________________________   [  ] Clinically Undetermined                                                  "

## 2020-05-20 NOTE — PHYSICIAN QUERY
PT Name: Sarah Parker  MR #: [id number]     Physician Query Form - Documentation Clarification      CDS/: Susan Schrebier RN CDI     Contact information: [email or web address]    This form is a permanent document in the medical record.     Query Date: May 20, 2020    By submitting this query, we are merely seeking further clarification of documentation. Please utilize your independent clinical judgment when addressing the question(s) below.    The Medical record reflects the following:    Supporting Clinical Findings Location in Medical Record   Query answer 5/19  Excisional Debridement of lower back   Query answer 5/19 721 am  NANDA Shannon MD   Necrotic tissue lower back.  the area was completely removed including the abscess cavity.     Findings: The cellulitis and an abscess was noted down to the fascial level on the back.      The area was measured to be 15 by 12 cm in size.  The depth was about 2 cm.       The infection progressed down to the paraspinal muscle. OP Report 5/14 A Mirtha JUARES                                                                            Doctor, Please specify diagnosis or diagnoses associated with above clinical findings.    Doctor, please clarify the depth of the excisional debridement of the lower back was to the fascia or to the fascia including muscle.    Provider Use Only       [ x  ] Excisional debridement, of the lower back to the depth of the fascia.       [   ] Excisional debridement, of the lower back to the depth of the muscle.       [   ] Other, please specify_____________.                                                                                                               [  ] Clinically Undetermined

## 2020-05-22 ENCOUNTER — EXTERNAL HOME HEALTH (OUTPATIENT)
Dept: HOME HEALTH SERVICES | Facility: HOSPITAL | Age: 80
End: 2020-05-22
Payer: MEDICARE

## 2020-05-22 ENCOUNTER — OFFICE VISIT (OUTPATIENT)
Dept: SURGERY | Facility: CLINIC | Age: 80
End: 2020-05-22
Payer: MEDICARE

## 2020-05-22 VITALS
BODY MASS INDEX: 20.12 KG/M2 | HEART RATE: 107 BPM | TEMPERATURE: 99 F | SYSTOLIC BLOOD PRESSURE: 153 MMHG | WEIGHT: 110 LBS | DIASTOLIC BLOOD PRESSURE: 91 MMHG

## 2020-05-22 DIAGNOSIS — Z98.890 STATUS POST DEBRIDEMENT: Primary | ICD-10-CM

## 2020-05-22 PROCEDURE — 99999 PR PBB SHADOW E&M-EST. PATIENT-LVL III: CPT | Mod: PBBFAC,HCNC,, | Performed by: SURGERY

## 2020-05-22 PROCEDURE — 99024 PR POST-OP FOLLOW-UP VISIT: ICD-10-PCS | Mod: HCNC,S$GLB,, | Performed by: SURGERY

## 2020-05-22 PROCEDURE — 99024 POSTOP FOLLOW-UP VISIT: CPT | Mod: HCNC,S$GLB,, | Performed by: SURGERY

## 2020-05-22 PROCEDURE — 99999 PR PBB SHADOW E&M-EST. PATIENT-LVL III: ICD-10-PCS | Mod: PBBFAC,HCNC,, | Performed by: SURGERY

## 2020-05-22 NOTE — PROGRESS NOTES
Sarah Parker 80 y.o.female  This patient was seen for postoperative visit. Sarah Parker has no specific complaints and denies of any issues relevant to the surgery. The wound is healing with wound VAC on without any complications.  I have discussed the pathology result with the patient. Sarah Parker will follow up in 1 month.  Mckinley Shannon

## 2020-05-25 ENCOUNTER — HOSPITAL ENCOUNTER (EMERGENCY)
Facility: HOSPITAL | Age: 80
Discharge: HOME | End: 2020-05-25
Attending: EMERGENCY MEDICINE
Payer: MEDICARE

## 2020-05-25 VITALS
RESPIRATION RATE: 18 BRPM | OXYGEN SATURATION: 100 % | HEART RATE: 72 BPM | WEIGHT: 108.38 LBS | BODY MASS INDEX: 19.94 KG/M2 | DIASTOLIC BLOOD PRESSURE: 90 MMHG | TEMPERATURE: 98 F | HEIGHT: 62 IN | SYSTOLIC BLOOD PRESSURE: 127 MMHG

## 2020-05-25 DIAGNOSIS — T14.8XXA BLEEDING FROM WOUND: Primary | ICD-10-CM

## 2020-05-25 PROCEDURE — 25000003 PHARM REV CODE 250: Mod: HCNC | Performed by: EMERGENCY MEDICINE

## 2020-05-25 PROCEDURE — 99283 EMERGENCY DEPT VISIT LOW MDM: CPT | Mod: HCNC

## 2020-05-25 RX ORDER — SILVER NITRATE 38.21; 12.74 MG/1; MG/1
1 STICK TOPICAL
Status: COMPLETED | OUTPATIENT
Start: 2020-05-25 | End: 2020-05-25

## 2020-05-25 RX ADMIN — SILVER NITRATE APPLICATORS 1 APPLICATOR: 25; 75 STICK TOPICAL at 09:05

## 2020-05-26 ENCOUNTER — TELEPHONE (OUTPATIENT)
Dept: SURGERY | Facility: CLINIC | Age: 80
End: 2020-05-26

## 2020-05-26 ENCOUNTER — DOCUMENT SCAN (OUTPATIENT)
Dept: HOME HEALTH SERVICES | Facility: HOSPITAL | Age: 80
End: 2020-05-26
Payer: MEDICARE

## 2020-05-26 NOTE — TELEPHONE ENCOUNTER
----- Message from Alaina Mac sent at 5/26/2020 12:28 PM CDT -----  Contact: PT  Type:  Sooner Apoointment Request    Caller is requesting a sooner appointment.  Caller declined first available appointment listed below.  Caller will not accept being placed on the waitlist and is requesting a message be sent to doctor.  Name of Caller:PT  When is the first available appointment?6/19  Symptoms:Post-OP visit, States she had surgery 5/12 and she had to got to the ER. They told her she needed to be seen in two days.  Would the patient rather a call back or a response via [facility]? Call back  Best Call Back Number:[phone]  Additional Information: #    Thank you

## 2020-05-26 NOTE — TELEPHONE ENCOUNTER
----- Message from Macie Lutz sent at 5/26/2020  9:05 AM CDT -----  Contact: maira morrissey/ [facility]  needs call back regarding wound care orders, will elaborate....[phone]

## 2020-05-26 NOTE — ED PROVIDER NOTES
Pt c/o bleeding and pain at drain site in back.      SCRIBE #1 NOTE: I, Jesús Rios, am scribing for, and in the presence of, Khushbu Cheek MD. I have scribed the entire note.       History     Chief Complaint   Patient presents with    Post-op Problem     Patient had wound debridement for abscess last week. Patient states that her wound vac was removed and is bleeding at the site.     Review of patient's allergies indicates:   Allergen Reactions    Codeine      Other reaction(s): hyper  Other reaction(s): hyper    Gabapentin Other (See Comments)     Bad dreams         History of Present Illness     HPI    5/25/2020, 9:12 PM   History obtained from the patient      History of Present Illness: Sarah Parker is a 80 y.o. female patient with a PMHx of degenerative disc disease, HTN, osteoporosis, and rheumatoid arthritis who presents to the Emergency Department for evaluation of post-op problem which onset gradually today. Pt states she had boils on her lower L back and has ran out of canisters for her wound vac and reported to ED with dressing in place. Pt denies taking blood thinners or asparin. Symptoms are constant and moderate in severity. No mitigating or exacerbating factors reported. No associated sxs reported. Patient denies any fever, SOB, CP, abd pain, N/V, back pain, HA, dizziness, weakness, and all other sxs at this time. No prior Tx reported. No further complaints or concerns at this time.        Arrival mode: Personal vehicle    PCP: Briseida Bennett MD        Past Medical History:  Past Medical History:   Diagnosis Date    Acid reflux     Anxiety     Back pain     Bronchitis, chronic obstructive w acute bronchitis 7/29/2016    Cancer     [facility] arms, face- Dr. Lata Tejada    Cataract     2+NS    Degenerative disc disease     Depression     Dry mouth     Hernia, hiatal 11/18/2013    Hypertension     Hypothyroid     Macrocytic anemia 5/3/2016    Macular degeneration      Migraines     Mixed anxiety and depressive disorder     Multiple fractures of ribs of right side     Osteoporosis     Other hyperlipidemia 10/11/2019    Pneumonia     Pneumonia due to other staphylococcus     Rheumatoid arthritis     Rheumatoid arthritis(714.0)     Rheumatoid arthritis(714.0)     Remicade, MTX.       Past Surgical History:  Past Surgical History:   Procedure Laterality Date    APPENDECTOMY  1985    APPLICATION OF WOUND VACUUM-ASSISTED CLOSURE DEVICE N/A 5/14/2020    Procedure: APPLICATION, WOUND VAC;  Surgeon: Mckinley Shannon MD;  Location: [facility] OR;  Service: General;  Laterality: N/A;    BREAST BIOPSY      CATARACT EXTRACTION Bilateral 6/11/15    Dr. Booth    CHOLECYSTECTOMY  2013    cryoablasion kidney Left 09/27/2016    feet Bilateral     rheumatoid    FRACTURE SURGERY Right     tibia    HERNIA REPAIR      HYSTERECTOMY  1970    partial    INCISION AND DRAINAGE OF ABSCESS N/A 5/12/2020    Procedure: INCISION AND DRAINAGE, ABSCESS;  Surgeon: Emerson Hathaway MD;  Location: [facility] OR;  Service: General;  Laterality: N/A;    INCISIONAL BIOPSY N/A 5/12/2020    Procedure: INCISIONAL BIOPSY;  Surgeon: Emerson Hathaway MD;  Location: [facility] OR;  Service: General;  Laterality: N/A;    JOINT REPLACEMENT      bilateral knees (2008), hands, wrists, knuckles, toes    LAPAROSCOPIC NISSEN FUNDOPLICATION      TRANSFORAMINAL EPIDURAL INJECTION OF STEROID Left 6/25/2019    Procedure: Left L5/S1 TF AYAAN with local;  Surgeon: Rowdy Felix MD;  Location: [facility] PAIN MGT;  Service: Pain Management;  Laterality: Left;    WOUND DEBRIDEMENT N/A 5/14/2020    Procedure: DEBRIDEMENT, WOUND;  Surgeon: Mckinley Shannon MD;  Location: [facility] OR;  Service: General;  Laterality: N/A;         Family History:  Family History   Problem Relation Age of Onset    Heart disease Mother     Hyperlipidemia Mother     Hypertension Mother     Osteoarthritis Mother     Cataracts Mother     Hypertension Father     Osteoarthritis  Father     Heart disease Father     Asthma Sister     Chronic back pain Sister     Hypertension Sister     Osteoarthritis Sister     Thyroid disease Sister     Asthma Brother     Cancer Brother     Chronic back pain Brother     Diabetes Mellitus Brother     Hypertension Brother     Osteoarthritis Brother     Thyroid disease Brother     Cancer Maternal Grandfather     Fibromyalgia Daughter     Heart disease Maternal Grandmother     Colon cancer Neg Hx     Diabetes Neg Hx        Social History:  Social History     Tobacco Use    Smoking status: Former Smoker     Packs/day: 0.25     Years: 2.00     Pack years: 0.50     Last attempt to quit: 1965     Years since quittin.6    Smokeless tobacco: Never Used   Substance and Sexual Activity    Alcohol use: No    Drug use: No    Sexual activity: Never     Partners: Male        Review of Systems     Review of Systems   Constitutional: Negative for fever.   HENT: Negative for sore throat.    Respiratory: Negative for shortness of breath.    Cardiovascular: Negative for chest pain.   Gastrointestinal: Negative for abdominal pain, nausea and vomiting.   Genitourinary: Negative for dysuria.   Musculoskeletal: Negative for back pain.   Skin: Positive for wound (L lower back that was dressed). Negative for rash.   Neurological: Negative for dizziness, weakness and headaches.   Hematological: Does not bruise/bleed easily.   All other systems reviewed and are negative.       Physical Exam     Initial Vitals [20]   BP Pulse Resp Temp SpO2   (!) 184/77 99 18 98.6 °F (37 °C) 100 %      MAP       --          Physical Exam  Nursing Notes and Vital Signs Reviewed.  Constitutional: Patient is in no acute distress. Well-developed and well-nourished.  Head: Atraumatic. Normocephalic.  Eyes: PERRL. EOM intact. Conjunctivae are not pale. No scleral icterus.  ENT: Mucous membranes are moist. Oropharynx is clear and symmetric.    Neck: Supple. Full ROM.  "No lymphadenopathy.  Cardiovascular: Regular rate. Regular rhythm. No murmurs, rubs, or gallops. Distal pulses are 2+ and symmetric.  Pulmonary/Chest: No respiratory distress. Clear to auscultation bilaterally. No wheezing or rales.  Abdominal: Soft and non-distended.  There is no tenderness.  No rebound, guarding, or rigidity. Good bowel sounds.  Genitourinary: No CVA tenderness  Musculoskeletal: Moves all extremities. No obvious deformities. No edema. No calf tenderness.  Skin: Warm and dry. Wound cauterized with silver nitrate. Dry dressing placed back on wound.   Neurological:  Alert, awake, and appropriate.  Normal speech.  No acute focal neurological deficits are appreciated.  Psychiatric: Normal affect. Good eye contact. Appropriate in content.     ED Course   Procedures  ED Vital Signs:  Vitals:    05/25/20 1940 05/25/20 2045 05/25/20 2137   BP: (!) 184/77 (!) 135/94 (!) 127/90   Pulse: 99 70 72   Resp: 18 18 18   Temp: 98.6 °F (37 °C) 98.6 °F (37 °C) 98 °F (36.7 °C)   TempSrc: Oral Oral Oral   SpO2: 100% 100% 100%   Weight: 49.2 kg (108 lb 5.7 oz)     Height: 5' 2" (1.575 m)         Abnormal Lab Results:  Labs Reviewed - No data to display     All Lab Results:  None        Imaging Results:  None       The Emergency Provider reviewed the vital signs and test results, which are outlined above.     ED Discussion     9:35 PM: Reassessed pt at this time.  Pt states her condition has improved at this time. Discussed with pt all pertinent ED information. Discussed pt dx and plan of tx. Gave pt all f/u and return to the ED instructions. All questions and concerns were addressed at this time. Pt expresses understanding of information and instructions, and is comfortable with plan to discharge. Pt is stable for discharge.    I discussed with patient and/or family/caretaker that evaluation in the ED does not suggest any emergent or life threatening medical conditions requiring immediate intervention beyond what was " provided in the ED, and I believe patient is safe for discharge.  Regardless, an unremarkable evaluation in the ED does not preclude the development or presence of a serious of life threatening condition. As such, patient was instructed to return immediately for any worsening or change in current symptoms.    I discussed wound care precautions with patient and/or family/caretaker; specifically that all wounds have risk of infection despite efforts to cleanse and debride the wound; and there is a risk of an occult foreign body (and thus increased risk of infection) despite a negative examination.  I discussed with patient need to return for any signs of infection, specifically redness, increased pain, fever, drainage of pus, or any concern, immediately.     ED Medication(s):  Medications   silver nitrate applicators applicator 1 applicator (1 applicator Topical (Top) Given 5/25/20 2122)       Discharge Medication List as of 5/25/2020  9:33 PM          Follow-up Information     Mckinley Shannon MD In 2 days.    Specialties:  General Surgery, Bariatrics  Contact information:  [address] [place] [address]  [phone]             [facility]    Specialty:  Emergency Medicine  Why:  As needed, If symptoms worsen  Contact information:  [address] [address] [id number] [phone]                                   Scribe Attestation:   Scribe #1: I performed the above scribed service and the documentation accurately describes the services I performed. I attest to the accuracy of the note.     Attending:   Physician Attestation Statement for Scribe #1: I, Khushbu Cheek MD, personally performed the services described in this documentation, as scribed by Jesús Rios, in my presence, and it is both accurate and complete.           Clinical Impression       ICD-10-CM ICD-9-CM   1. Bleeding from wound T14.8XXA 958.2       Disposition:   Disposition:  Discharged  Condition: Stable        Si ALEXANDER Cheek MD  05/26/20 2129

## 2020-05-26 NOTE — TELEPHONE ENCOUNTER
Returned call s/w  Neetu/[facility]-Nurse. Informed her to continue wound vac as ordered per Dr Shannon. Encouraged to call back with any other questions/concerns. Neetu voiced an understanding

## 2020-05-27 ENCOUNTER — TELEPHONE (OUTPATIENT)
Dept: HEMATOLOGY/ONCOLOGY | Facility: CLINIC | Age: 80
End: 2020-05-27

## 2020-05-27 ENCOUNTER — TELEPHONE (OUTPATIENT)
Dept: FAMILY MEDICINE | Facility: CLINIC | Age: 80
End: 2020-05-27

## 2020-05-27 NOTE — TELEPHONE ENCOUNTER
Spoke to patient informed that Dr. Knox will be at [facility] location which is the reason she was scheduled with another provider.  States she prefers to see Dr. Knox and wants  appointments changed.  Appointments changed to [facility] with Dr. Knox.

## 2020-05-27 NOTE — TELEPHONE ENCOUNTER
HH nurse reports that pt is c/o dizziness & weakness.  She had an abscess debridement that had started uncontrolled bleeding.  Home Health sent her to the ER for that Monday and the wound was cauterized and today wound vac was replaced.  She has minimal drainage now.  Nurse is requesting lab orders to check H/H, possibly more.  Please advise./rpr

## 2020-05-27 NOTE — TELEPHONE ENCOUNTER
----- Message from Jonel Pickering sent at 5/27/2020  9:22 AM CDT -----  Contact: Pt   Pt called in regards to a appointment 06/01/2020 with Dr. Colón and wanted to know why she is seeing the provider.  Caller can be reached at [phone] (pbha).

## 2020-05-27 NOTE — TELEPHONE ENCOUNTER
----- Message from Moises Chou sent at 5/27/2020 10:50 AM CDT -----  Contact: H/H Nurse Faith [phone]  Patient would like to get medical advice.     Comments:  Patient went to ER due to womb was bleeding, wants to know if patient can have LAB work done, stating has been feeling weak, call to inform. H/H Nurse C[place]vas [phone]    Please call an advise  Thank you

## 2020-05-28 ENCOUNTER — DOCUMENT SCAN (OUTPATIENT)
Dept: HOME HEALTH SERVICES | Facility: HOSPITAL | Age: 80
End: 2020-05-28
Payer: MEDICARE

## 2020-05-28 ENCOUNTER — TELEPHONE (OUTPATIENT)
Dept: HEMATOLOGY/ONCOLOGY | Facility: CLINIC | Age: 80
End: 2020-05-28

## 2020-05-28 ENCOUNTER — TELEPHONE (OUTPATIENT)
Dept: FAMILY MEDICINE | Facility: CLINIC | Age: 80
End: 2020-05-28

## 2020-05-28 ENCOUNTER — NURSE TRIAGE (OUTPATIENT)
Dept: ADMINISTRATIVE | Facility: CLINIC | Age: 80
End: 2020-05-28

## 2020-05-28 ENCOUNTER — HOSPITAL ENCOUNTER (EMERGENCY)
Facility: HOSPITAL | Age: 80
Discharge: HOME | End: 2020-05-28
Attending: EMERGENCY MEDICINE
Payer: MEDICARE

## 2020-05-28 VITALS
RESPIRATION RATE: 20 BRPM | SYSTOLIC BLOOD PRESSURE: 146 MMHG | WEIGHT: 102 LBS | DIASTOLIC BLOOD PRESSURE: 69 MMHG | TEMPERATURE: 99 F | BODY MASS INDEX: 18.77 KG/M2 | OXYGEN SATURATION: 100 % | HEART RATE: 96 BPM | HEIGHT: 62 IN

## 2020-05-28 DIAGNOSIS — D64.9 SYMPTOMATIC ANEMIA: ICD-10-CM

## 2020-05-28 DIAGNOSIS — I95.9 HYPOTENSION: ICD-10-CM

## 2020-05-28 DIAGNOSIS — C92.10 CML (CHRONIC MYELOCYTIC LEUKEMIA): ICD-10-CM

## 2020-05-28 DIAGNOSIS — D69.6 THROMBOCYTOPENIA: ICD-10-CM

## 2020-05-28 DIAGNOSIS — Z51.89 ENCOUNTER FOR BLOOD TRANSFUSION: Primary | ICD-10-CM

## 2020-05-28 LAB
ABO + RH BLD: NORMAL
ALBUMIN SERPL BCP-MCNC: 3.3 G/DL (ref 3.5–5.2)
ALP SERPL-CCNC: 168 U/L (ref 55–135)
ALT SERPL W/O P-5'-P-CCNC: 11 U/L (ref 10–44)
ANION GAP SERPL CALC-SCNC: 9 MMOL/L (ref 8–16)
ANISOCYTOSIS BLD QL SMEAR: SLIGHT
APTT BLDCRRT: 30.2 SEC (ref 21–32)
AST SERPL-CCNC: 16 U/L (ref 10–40)
BASOPHILS NFR BLD: 0 % (ref 0–1.9)
BILIRUB SERPL-MCNC: 0.4 MG/DL (ref 0.1–1)
BLASTS NFR BLD MANUAL: 2 %
BLD GP AB SCN CELLS X3 SERPL QL: NORMAL
BLD PROD TYP BPU: NORMAL
BLD PROD TYP BPU: NORMAL
BLOOD UNIT EXPIRATION DATE: NORMAL
BLOOD UNIT EXPIRATION DATE: NORMAL
BLOOD UNIT TYPE CODE: 5100
BLOOD UNIT TYPE CODE: 5100
BLOOD UNIT TYPE: NORMAL
BLOOD UNIT TYPE: NORMAL
BUN SERPL-MCNC: 30 MG/DL (ref 8–23)
CALCIUM SERPL-MCNC: 9.1 MG/DL (ref 8.7–10.5)
CHLORIDE SERPL-SCNC: 99 MMOL/L (ref 95–110)
CO2 SERPL-SCNC: 25 MMOL/L (ref 23–29)
CODING SYSTEM: NORMAL
CODING SYSTEM: NORMAL
CREAT SERPL-MCNC: 1 MG/DL (ref 0.5–1.4)
DACRYOCYTES BLD QL SMEAR: ABNORMAL
DIFFERENTIAL METHOD: ABNORMAL
DISPENSE STATUS: NORMAL
DISPENSE STATUS: NORMAL
EOSINOPHIL NFR BLD: 0 % (ref 0–8)
ERYTHROCYTE [DISTWIDTH] IN BLOOD BY AUTOMATED COUNT: 18 % (ref 11.5–14.5)
EST. GFR  (AFRICAN AMERICAN): >60 ML/MIN/1.73 M^2
EST. GFR  (NON AFRICAN AMERICAN): 54 ML/MIN/1.73 M^2
GLUCOSE SERPL-MCNC: 93 MG/DL (ref 70–110)
HCT VFR BLD AUTO: 22.8 % (ref 37–48.5)
HGB BLD-MCNC: 7 G/DL (ref 12–16)
HYPOCHROMIA BLD QL SMEAR: ABNORMAL
IMM GRANULOCYTES # BLD AUTO: ABNORMAL K/UL (ref 0–0.04)
IMM GRANULOCYTES NFR BLD AUTO: ABNORMAL % (ref 0–0.5)
INR PPP: 1 (ref 0.8–1.2)
LYMPHOCYTES NFR BLD: 52 % (ref 18–48)
MCH RBC QN AUTO: 28.1 PG (ref 27–31)
MCHC RBC AUTO-ENTMCNC: 30.7 G/DL (ref 32–36)
MCV RBC AUTO: 92 FL (ref 82–98)
METAMYELOCYTES NFR BLD MANUAL: 1 %
MONOCYTES NFR BLD: 20 % (ref 4–15)
MYELOCYTES NFR BLD MANUAL: 2 %
NEUTROPHILS NFR BLD: 19 % (ref 38–73)
NEUTS BAND NFR BLD MANUAL: 4 %
NRBC BLD-RTO: 2 /100 WBC
NUM UNITS TRANS PACKED RBC: NORMAL
NUM UNITS TRANS PACKED RBC: NORMAL
OVALOCYTES BLD QL SMEAR: ABNORMAL
PLATELET # BLD AUTO: 46 K/UL (ref 150–350)
PLATELET BLD QL SMEAR: ABNORMAL
PMV BLD AUTO: ABNORMAL FL (ref 9.2–12.9)
POIKILOCYTOSIS BLD QL SMEAR: SLIGHT
POLYCHROMASIA BLD QL SMEAR: ABNORMAL
POTASSIUM SERPL-SCNC: 5 MMOL/L (ref 3.5–5.1)
PROT SERPL-MCNC: 8.2 G/DL (ref 6–8.4)
PROTHROMBIN TIME: 10.8 SEC (ref 9–12.5)
RBC # BLD AUTO: 2.49 M/UL (ref 4–5.4)
SODIUM SERPL-SCNC: 133 MMOL/L (ref 136–145)
TARGETS BLD QL SMEAR: ABNORMAL
WBC # BLD AUTO: 27.21 K/UL (ref 3.9–12.7)

## 2020-05-28 PROCEDURE — 63600175 PHARM REV CODE 636 W HCPCS: Mod: HCNC | Performed by: EMERGENCY MEDICINE

## 2020-05-28 PROCEDURE — 96374 THER/PROPH/DIAG INJ IV PUSH: CPT | Mod: HCNC

## 2020-05-28 PROCEDURE — 93010 EKG 12-LEAD: ICD-10-PCS | Mod: HCNC,,, | Performed by: INTERNAL MEDICINE

## 2020-05-28 PROCEDURE — 86920 COMPATIBILITY TEST SPIN: CPT | Mod: HCNC

## 2020-05-28 PROCEDURE — 93010 ELECTROCARDIOGRAM REPORT: CPT | Mod: HCNC,,, | Performed by: INTERNAL MEDICINE

## 2020-05-28 PROCEDURE — 36430 TRANSFUSION BLD/BLD COMPNT: CPT | Mod: 59,HCNC

## 2020-05-28 PROCEDURE — 85007 BL SMEAR W/DIFF WBC COUNT: CPT | Mod: 91,HCNC

## 2020-05-28 PROCEDURE — 85027 COMPLETE CBC AUTOMATED: CPT | Mod: 91,HCNC

## 2020-05-28 PROCEDURE — 36415 COLL VENOUS BLD VENIPUNCTURE: CPT | Mod: HCNC

## 2020-05-28 PROCEDURE — 85610 PROTHROMBIN TIME: CPT | Mod: HCNC

## 2020-05-28 PROCEDURE — 99291 CRITICAL CARE FIRST HOUR: CPT | Mod: 25,HCNC

## 2020-05-28 PROCEDURE — 93005 ELECTROCARDIOGRAM TRACING: CPT | Mod: HCNC

## 2020-05-28 PROCEDURE — 85730 THROMBOPLASTIN TIME PARTIAL: CPT | Mod: HCNC

## 2020-05-28 PROCEDURE — P9040 RBC LEUKOREDUCED IRRADIATED: HCPCS | Mod: HCNC

## 2020-05-28 PROCEDURE — 86901 BLOOD TYPING SEROLOGIC RH(D): CPT | Mod: HCNC

## 2020-05-28 PROCEDURE — 80053 COMPREHEN METABOLIC PANEL: CPT | Mod: 91,HCNC

## 2020-05-28 PROCEDURE — 25000003 PHARM REV CODE 250: Mod: HCNC | Performed by: EMERGENCY MEDICINE

## 2020-05-28 RX ORDER — DIPHENHYDRAMINE HYDROCHLORIDE 50 MG/ML
12.5 INJECTION INTRAMUSCULAR; INTRAVENOUS ONCE
Status: COMPLETED | OUTPATIENT
Start: 2020-05-28 | End: 2020-05-28

## 2020-05-28 RX ORDER — ACETAMINOPHEN 500 MG
500 TABLET ORAL ONCE
Status: COMPLETED | OUTPATIENT
Start: 2020-05-28 | End: 2020-05-28

## 2020-05-28 RX ORDER — HYDROCODONE BITARTRATE AND ACETAMINOPHEN 500; 5 MG/1; MG/1
TABLET ORAL
Status: DISCONTINUED | OUTPATIENT
Start: 2020-05-28 | End: 2020-05-28 | Stop reason: HOSPADM

## 2020-05-28 RX ADMIN — ACETAMINOPHEN 500 MG: 500 TABLET ORAL at 04:05

## 2020-05-28 RX ADMIN — SODIUM CHLORIDE: 9 INJECTION, SOLUTION INTRAVENOUS at 05:05

## 2020-05-28 RX ADMIN — DIPHENHYDRAMINE HYDROCHLORIDE 12.5 MG: 50 INJECTION, SOLUTION INTRAMUSCULAR; INTRAVENOUS at 04:05

## 2020-05-28 NOTE — ED PROVIDER NOTES
SCRIBE #1 NOTE: I, Maureen Tilley, am scribing for, and in the presence of, Mckenna Wood DO. I have scribed the entire note.      History      Chief Complaint   Patient presents with    blood transfusion     pt sent by  nurse, states she needs a blood transfusion bc her BP was low this morning       Review of patient's allergies indicates:   Allergen Reactions    Codeine      Other reaction(s): hyper  Other reaction(s): hyper    Gabapentin Other (See Comments)     Bad dreams        HPI   HPI    5/28/2020, 1:33 PM   History obtained from the patient      History of Present Illness: Sarah Parker is a 80 y.o. female patient, with an extensive history of cancer, hyperlipidemia, and osteoporosis,  who presents to the Emergency Department for an evaluation of dizziness which onset gradually this morning. Symptoms are constant and moderate in severity. The patient notes that she was evaluated in this hospital on 5/25/2020 for bleeding from her wound VAC. The bleeding area was cauterized.  She reports that over the last 24 hours she has experienced some minimal to scant serosanguineous discharge from the wound VAC bag. This morning, a few hours PTA, the patient's home health nurse asked the patient to get up from her chair. When the patient attempted to rise, she reports that she became overwhelmed with dizziness and weakness. The patient states that she has had similar experiences in the past that required a blood transfusion. She presents now to the ED noting that though her dizziness and weakness have resolved now, she is still concerned that she may need a blood transfusion. No other mitigating or exacerbating factors reported. Associated symptoms include pallor. Patient denies any nausea, vomiting, recent medication changes other than one antibiotic, frequent urination, difficulty urinating, hematuria, chest pain, leg swelling, LOC, blood in the stool, new back or abdominal pain, and all other sxs at  this time. No prior treatment reported today. No further complaints or concerns at this time.       Arrival mode: Personal vehicle    PCP: Briseida Bennett MD       Past Medical History:  Past Medical History:   Diagnosis Date    Acid reflux     Anxiety     Back pain     Bronchitis, chronic obstructive w acute bronchitis 7/29/2016    Cancer     [facility] arms, face- Dr. Lata Tejada    Cataract     2+NS    Degenerative disc disease     Depression     Dry mouth     Hernia, hiatal 11/18/2013    Hypertension     Hypothyroid     Macrocytic anemia 5/3/2016    Macular degeneration     Migraines     Mixed anxiety and depressive disorder     Multiple fractures of ribs of right side     Osteoporosis     Other hyperlipidemia 10/11/2019    Pneumonia     Pneumonia due to other staphylococcus     Rheumatoid arthritis     Rheumatoid arthritis(714.0)     Rheumatoid arthritis(714.0)     Remicade, MTX.       Past Surgical History:  Past Surgical History:   Procedure Laterality Date    APPENDECTOMY  1985    APPLICATION OF WOUND VACUUM-ASSISTED CLOSURE DEVICE N/A 5/14/2020    Procedure: APPLICATION, WOUND VAC;  Surgeon: Mckinley Shannon MD;  Location: [facility] OR;  Service: General;  Laterality: N/A;    BREAST BIOPSY      CATARACT EXTRACTION Bilateral 6/11/15    Dr. Booth    CHOLECYSTECTOMY  2013    cryoablasion kidney Left 09/27/2016    feet Bilateral     rheumatoid    FRACTURE SURGERY Right     tibia    HERNIA REPAIR      HYSTERECTOMY  1970    partial    INCISION AND DRAINAGE OF ABSCESS N/A 5/12/2020    Procedure: INCISION AND DRAINAGE, ABSCESS;  Surgeon: Emerson Hathaway MD;  Location: [facility] OR;  Service: General;  Laterality: N/A;    INCISIONAL BIOPSY N/A 5/12/2020    Procedure: INCISIONAL BIOPSY;  Surgeon: Emerson Hathaway MD;  Location: [facility] OR;  Service: General;  Laterality: N/A;    JOINT REPLACEMENT      bilateral knees (2008), hands, wrists, knuckles, toes    LAPAROSCOPIC NISSEN FUNDOPLICATION       TRANSFORAMINAL EPIDURAL INJECTION OF STEROID Left 2019    Procedure: Left L5/S1 TF AYAAN with local;  Surgeon: Rowdy Felix MD;  Location: [facility];  Service: Pain Management;  Laterality: Left;    WOUND DEBRIDEMENT N/A 2020    Procedure: DEBRIDEMENT, WOUND;  Surgeon: Mckinley Shannon MD;  Location: [facility] OR;  Service: General;  Laterality: N/A;         Family History:  Family History   Problem Relation Age of Onset    Heart disease Mother     Hyperlipidemia Mother     Hypertension Mother     Osteoarthritis Mother     Cataracts Mother     Hypertension Father     Osteoarthritis Father     Heart disease Father     Asthma Sister     Chronic back pain Sister     Hypertension Sister     Osteoarthritis Sister     Thyroid disease Sister     Asthma Brother     Cancer Brother     Chronic back pain Brother     Diabetes Mellitus Brother     Hypertension Brother     Osteoarthritis Brother     Thyroid disease Brother     Cancer Maternal Grandfather     Fibromyalgia Daughter     Heart disease Maternal Grandmother     Colon cancer Neg Hx     Diabetes Neg Hx        Social History:  Social History     Tobacco Use    Smoking status: Former Smoker     Packs/day: 0.25     Years: 2.00     Pack years: 0.50     Last attempt to quit: 1965     Years since quittin.6    Smokeless tobacco: Never Used   Substance and Sexual Activity    Alcohol use: No    Drug use: No    Sexual activity: Never     Partners: Male       ROS   Review of Systems   Constitutional: Negative for fever.        No recent medication changes other than abx.  No LOC.   HENT: Negative for sore throat.    Respiratory: Negative for shortness of breath.    Cardiovascular: Negative for chest pain and leg swelling.   Gastrointestinal: Negative for abdominal pain (new), blood in stool, nausea and vomiting.   Genitourinary: Negative for difficulty urinating, dysuria, frequency and hematuria.   Musculoskeletal: Negative for back  pain (new).   Skin: Positive for pallor. Negative for rash.        Positive for wound discharge.   Neurological: Positive for dizziness (resolved) and weakness (resolved).   Hematological: Does not bruise/bleed easily.   All other systems reviewed and are negative.    Physical Exam      Initial Vitals [05/28/20 1128]   BP Pulse Resp Temp SpO2   (!) 118/56 92 17 98 °F (36.7 °C) 100 %      MAP       --          Physical Exam  Nursing Notes and Vital Signs Reviewed.  Constitutional: Patient is in no acute distress. Well-developed and well-nourished.  Head: Atraumatic. Normocephalic.  Eyes: PERRL. EOM intact. Conjunctivae are pale. No scleral icterus.  ENT: Mucous membranes are moist. Oropharynx is clear and symmetric.    Neck: Supple. Full ROM. No lymphadenopathy.  Cardiovascular: Regular rate. Regular rhythm. No murmurs, rubs, or gallops. Distal pulses are 2+ and symmetric.  Pulmonary/Chest: No respiratory distress. Clear to auscultation bilaterally. No wheezing or rales.  Abdominal: Soft and non-distended.  There is no tenderness.  No rebound, guarding, or rigidity. Good bowel sounds.  Genitourinary: No CVA tenderness  Musculoskeletal: Deformity to fingers from arthritis. Moves all extremities.  No edema. No calf tenderness.  Skin: Pallor. Wound VAC is functioning and the chamber in the wound VAC has been present for under 24 hours. There is minimal drainage that is mostly clear, and slightly blood tinged.  Neurological:  Alert, awake, and appropriate.  Normal speech.  No acute focal neurological deficits are appreciated.  Psychiatric: Normal affect. Good eye contact. Appropriate in content.    ED Course    Critical Care  Date/Time: 5/28/2020 2:49 PM  Performed by: Mckenna Wood DO  Authorized by: Mckenna Wood DO   Direct patient critical care time: 20 minutes  Additional history critical care time: 5 minutes  Ordering / reviewing critical care time: 0 minutes  Documentation critical care time: 5  minutes  Consulting other physicians critical care time: 5 minutes  Total critical care time (exclusive of procedural time) : 35 minutes  Critical care time was exclusive of separately billable procedures and treating other patients and teaching time.  Critical care was necessary to treat or prevent imminent or life-threatening deterioration of the following conditions: circulatory failure.  Critical care was time spent personally by me on the following activities: development of treatment plan with patient or surrogate, discussions with consultants, interpretation of cardiac output measurements, evaluation of patient's response to treatment, examination of patient, obtaining history from patient or surrogate, ordering and performing treatments and interventions, ordering and review of laboratory studies, pulse oximetry, re-evaluation of patient's condition and review of old charts.        ED Vital Signs:  Vitals:    05/28/20 1446 05/28/20 1502 05/28/20 1516 05/28/20 1531   BP: 134/69 134/72 (!) 140/68 133/62   Pulse: 93 85 84 86   Resp: 19 18 18 19   Temp:       TempSrc:       SpO2: 100% 100% 100% 100%   Weight:       Height:        05/28/20 1546 05/28/20 1616 05/28/20 1629 05/28/20 1631   BP: 137/67 (!) 143/77 (!) 143/77 (!) 142/74   Pulse: 88 90 90 92   Resp: 20 17 17 20   Temp:   98.3 °F (36.8 °C)    TempSrc:   Oral    SpO2: 100% 100% 99% 100%   Weight:       Height:        05/28/20 1702 05/28/20 1716 05/28/20 1717 05/28/20 1731   BP: (!) 143/67 (!) 149/69 (!) 149/69 (!) 153/73   Pulse: 87 87 90 87   Resp: 18 20 19 18   Temp: 98.7 °F (37.1 °C)  98.5 °F (36.9 °C)    TempSrc: Oral  Oral    SpO2: 100% 100% 100% 100%   Weight:       Height:        05/28/20 1801 05/28/20 1832 05/28/20 1931   BP: (!) 154/71 (!) 140/86 130/60   Pulse: 86 98 89   Resp: 19 20 19   Temp:   98.5 °F (36.9 °C)   TempSrc:   Oral   SpO2: 100% 100% 99%   Weight:      Height:          Abnormal Lab Results:  Labs Reviewed   CBC W/ AUTO  DIFFERENTIAL - Abnormal; Notable for the following components:       Result Value    WBC 27.21 (*)     RBC 2.49 (*)     Hemoglobin 7.0 (*)     Hematocrit 22.8 (*)     Mean Corpuscular Hemoglobin Conc 30.7 (*)     RDW 18.0 (*)     Platelets 46 (*)     nRBC 2 (*)     Gran% 19.0 (*)     Lymph% 52.0 (*)     Mono% 20.0 (*)     Blasts 2.0 (*)     Platelet Estimate Decreased (*)     All other components within normal limits   COMPREHENSIVE METABOLIC PANEL - Abnormal; Notable for the following components:    Sodium 133 (*)     BUN, Bld 30 (*)     Albumin 3.3 (*)     Alkaline Phosphatase 168 (*)     eGFR if non  54 (*)     All other components within normal limits   PROTIME-INR   APTT   TYPE & SCREEN   PREPARE RBC SOFT        All Lab Results:  Results for orders placed or performed during the hospital encounter of 05/28/20   CBC auto differential   Result Value Ref Range    WBC 27.21 (H) 3.90 - 12.70 K/uL    RBC 2.49 (L) 4.00 - 5.40 M/uL    Hemoglobin 7.0 (L) 12.0 - 16.0 g/dL    Hematocrit 22.8 (L) 37.0 - 48.5 %    Mean Corpuscular Volume 92 82 - 98 fL    Mean Corpuscular Hemoglobin 28.1 27.0 - 31.0 pg    Mean Corpuscular Hemoglobin Conc 30.7 (L) 32.0 - 36.0 g/dL    RDW 18.0 (H) 11.5 - 14.5 %    Platelets 46 (L) 150 - 350 K/uL    MPV SEE COMMENT 9.2 - 12.9 fL    Immature Granulocytes CANCELED 0.0 - 0.5 %    Immature Grans (Abs) CANCELED 0.00 - 0.04 K/uL    nRBC 2 (A) 0 /100 WBC    Gran% 19.0 (L) 38.0 - 73.0 %    Lymph% 52.0 (H) 18.0 - 48.0 %    Mono% 20.0 (H) 4.0 - 15.0 %    Eosinophil% 0.0 0.0 - 8.0 %    Basophil% 0.0 0.0 - 1.9 %    Bands 4.0 %    Metamyelocytes 1.0 %    Myelocytes 2.0 %    Blasts 2.0 (A) 0 %    Platelet Estimate Decreased (A)     Aniso Slight     Poik Slight     Poly Occasional     Hypo Occasional     Ovalocytes Occasional     Target Cells Occasional     Tear Drop Cells Occasional     Differential Method Manual    Comprehensive metabolic panel   Result Value Ref Range    Sodium 133 (L) 136  - 145 mmol/L    Potassium 5.0 3.5 - 5.1 mmol/L    Chloride 99 95 - 110 mmol/L    CO2 25 23 - 29 mmol/L    Glucose 93 70 - 110 mg/dL    BUN, Bld 30 (H) 8 - 23 mg/dL    Creatinine 1.0 0.5 - 1.4 mg/dL    Calcium 9.1 8.7 - 10.5 mg/dL    Total Protein 8.2 6.0 - 8.4 g/dL    Albumin 3.3 (L) 3.5 - 5.2 g/dL    Total Bilirubin 0.4 0.1 - 1.0 mg/dL    Alkaline Phosphatase 168 (H) 55 - 135 U/L    AST 16 10 - 40 U/L    ALT 11 10 - 44 U/L    Anion Gap 9 8 - 16 mmol/L    eGFR if African American >60 >60 mL/min/1.73 m^2    eGFR if non African American 54 (A) >60 mL/min/1.73 m^2   Protime-INR   Result Value Ref Range    Prothrombin Time 10.8 9.0 - 12.5 sec    INR 1.0 0.8 - 1.2   APTT   Result Value Ref Range    aPTT 30.2 21.0 - 32.0 sec   Type & Screen   Result Value Ref Range    Group & Rh O POS     Indirect Saw NEG    Prepare RBC 1 Unit   Result Value Ref Range    UNIT NUMBER [id number]     Product Code O9166Y51     DISPENSE STATUS RETURNED     CODING SYSTEM [id number]     Unit Blood Type Code 5100     Unit Blood Type O POS     Unit Expiration [id number]     UNIT NUMBER [id number]     Product Code K3865Y20     DISPENSE STATUS ISSUED     CODING SYSTEM [id number]     Unit Blood Type Code 5100     Unit Blood Type O POS     Unit Expiration [id number]          Imaging Results:  Imaging Results    None        The EKG was ordered, reviewed, and independently interpreted by the ED provider.  Interpretation time: 13:11  Rate: 87 BPM  Rhythm: normal sinus rhythm  Interpretation: Left axis deviation. Right bundle branch block. No STEMI.           The Emergency Provider reviewed the vital signs and test results, which are outlined above.    ED Discussion     2:11 PM: Discussed pt's case with Dr. Shwetha Roque (Hematology/Oncology) who recommends transfusing 1u pbc leukoreduced, irradiated blood.    8:17 PM: Reassessed pt at this time.  Pt states her condition has improved at this time. Discussed with pt all pertinent ED information  and results. Discussed pt dx and plan of tx. Gave pt all f/u and return to the ED instructions. All questions and concerns were addressed at this time. Pt expresses understanding of information and instructions, and is comfortable with plan to discharge. Pt is stable for discharge.    I discussed with patient and/or family/caretaker that evaluation in the ED does not suggest any emergent or life threatening medical conditions requiring immediate intervention beyond what was provided in the ED, and I believe patient is safe for discharge.  Regardless, an unremarkable evaluation in the ED does not preclude the development or presence of a serious of life threatening condition. As such, patient was instructed to return immediately for any worsening or change in current symptoms.      ED Course as of May 28 2044   Thu May 28, 2020   8777 The risks/ benefits of receiving a blood transfusion were discussed with the patient.  The risks include transmission of hepatitis(  B, C.), HIV, transfusion reactions,serum sickness, volume overload potentially necessitating ventilation support, or ALLERGIC reactions ( which can be potentially life-threatening).      [LB]      ED Course User Index  [LB] Mckenna Wood,        ED Medication(s):  Medications   0.9%  NaCl infusion (for blood administration) ( Intravenous New Bag 5/28/20 1702)   acetaminophen tablet 500 mg (500 mg Oral Given 5/28/20 1638)   diphenhydrAMINE injection 12.5 mg (12.5 mg Intravenous Given 5/28/20 1638)       Follow-up Information     Denton Knox MD In 2 days.    Specialty:  Hematology and Oncology  Why:  Return to emergency department for:  Easy bleeding, easy bruising, passing out, lightheadedness, chest pain, or other concerns.  Contact information:  [address]  [place] [address] [phone]                  New Prescriptions    No medications on file          Medical Decision Making    Medical Decision Making:   Clinical Tests:    Lab Tests: Ordered and Reviewed  Medical Tests: Ordered and Reviewed           Scribe Attestation:   Scribe #1: I performed the above scribed service and the documentation accurately describes the services I performed. I attest to the accuracy of the note.    Attending:   Physician Attestation Statement for Scribe #1: I, Mckenna Wood DO, personally performed the services described in this documentation, as scribed by Maureen Tilley, in my presence, and it is both accurate and complete.          Clinical Impression       ICD-10-CM ICD-9-CM   1. Encounter for blood transfusion Z51.89 V58.2   2. Hypotension I95.9 458.9   3. CML (chronic myelocytic leukemia) C92.10 205.10   4. Thrombocytopenia D69.6 287.5   5. Symptomatic anemia D64.9 285.9       Disposition:   Disposition: Discharged  Condition: Stable         Mckenna Wood DO  05/28/20 2045

## 2020-05-28 NOTE — TELEPHONE ENCOUNTER
Spoke to patient's daughter calling very upset that her mother waited three hours in the ER and wanted to notify provider.  Message sent to provider.

## 2020-05-28 NOTE — ED NOTES
Report received from JUNI Carnes. Patient sitting up in bed, no acute distress noted, awake, alert, and oriented x 3, calm, respirations even and unlabored, and skin is warm and dry. Patient verbalized understanding of status and plan of care. Patient denies needs at this time. Side rails up x 2, call light in reach, bed low and locked.  Will continue to monitor.

## 2020-05-28 NOTE — TELEPHONE ENCOUNTER
Patient being monitored by Post Procedural Symptom Tracker, patient had recent clinic visit. No additional follow-up needed at this time.    Reason for Disposition   Caller has already spoken with the PCP (or office), and has no further questions    Protocols used: NO CONTACT OR DUPLICATE CONTACT CALL-A-[place]

## 2020-05-28 NOTE — TELEPHONE ENCOUNTER
HH nurse called to advise the she recommended pt to go to the ER.  BP 78/48  P 89    Pt is weak and lethargic, hands are cold.    Daughter is going to bring pt to [facility] ER./rpr

## 2020-05-28 NOTE — TELEPHONE ENCOUNTER
----- Message from Tomas Honeycutt sent at 5/28/2020  2:37 PM CDT -----  Contact: pt daughter  .Type:  Needs Medical Advice    Who Called: abraham  Symptoms (please be specific):   How long has patient had these symptoms:    Pharmacy name and phone #:    Would the patient rather a call back or a response via [facility]? call  Best Call Back Number: [phone]  Additional Information:   Caller is requesting a call back from the nurse In regards to the pt being in the ER for 3 hours now and no one has done anything for her yet the caller states that the Dr in the ER dose not know what to do for the pt and want to speak to Dr Knox please.   PACU

## 2020-05-28 NOTE — TELEPHONE ENCOUNTER
----- Message from Rain Davila sent at 5/28/2020 10:18 AM CDT -----  Contact: [facility]  Would liek to consult with nurse to verify she did received labs, and to inform nurse she is about to send pt to ER due to high blood pressure. Please give a call back at [phone].              Thanks,  Rain SINGH

## 2020-06-01 ENCOUNTER — LAB VISIT (OUTPATIENT)
Dept: LAB | Facility: HOSPITAL | Age: 80
End: 2020-06-01
Attending: INTERNAL MEDICINE
Payer: MEDICARE

## 2020-06-01 ENCOUNTER — OFFICE VISIT (OUTPATIENT)
Dept: HEMATOLOGY/ONCOLOGY | Facility: CLINIC | Age: 80
End: 2020-06-01
Payer: MEDICARE

## 2020-06-01 VITALS
BODY MASS INDEX: 20.08 KG/M2 | SYSTOLIC BLOOD PRESSURE: 136 MMHG | DIASTOLIC BLOOD PRESSURE: 74 MMHG | WEIGHT: 109.13 LBS | OXYGEN SATURATION: 97 % | TEMPERATURE: 98 F | HEART RATE: 93 BPM | HEIGHT: 62 IN | RESPIRATION RATE: 18 BRPM

## 2020-06-01 DIAGNOSIS — D69.6 THROMBOCYTOPENIA: ICD-10-CM

## 2020-06-01 DIAGNOSIS — D53.9 MACROCYTIC ANEMIA: ICD-10-CM

## 2020-06-01 DIAGNOSIS — C93.10 CHRONIC MYELOMONOCYTIC LEUKEMIA NOT HAVING ACHIEVED REMISSION: Primary | ICD-10-CM

## 2020-06-01 DIAGNOSIS — C93.10 CHRONIC MYELOMONOCYTIC LEUKEMIA NOT HAVING ACHIEVED REMISSION: ICD-10-CM

## 2020-06-01 LAB
ALBUMIN SERPL BCP-MCNC: 3.1 G/DL (ref 3.5–5.2)
ALP SERPL-CCNC: 157 U/L (ref 55–135)
ALT SERPL W/O P-5'-P-CCNC: 7 U/L (ref 10–44)
ANION GAP SERPL CALC-SCNC: 8 MMOL/L (ref 8–16)
AST SERPL-CCNC: 15 U/L (ref 10–40)
BASOPHILS # BLD AUTO: ABNORMAL K/UL (ref 0–0.2)
BASOPHILS NFR BLD: 0 % (ref 0–1.9)
BILIRUB SERPL-MCNC: 0.3 MG/DL (ref 0.1–1)
BLASTS NFR BLD MANUAL: 2 %
BUN SERPL-MCNC: 22 MG/DL (ref 8–23)
CALCIUM SERPL-MCNC: 8.6 MG/DL (ref 8.7–10.5)
CHLORIDE SERPL-SCNC: 104 MMOL/L (ref 95–110)
CO2 SERPL-SCNC: 23 MMOL/L (ref 23–29)
CREAT SERPL-MCNC: 1.1 MG/DL (ref 0.5–1.4)
DIFFERENTIAL METHOD: ABNORMAL
EOSINOPHIL # BLD AUTO: ABNORMAL K/UL (ref 0–0.5)
EOSINOPHIL NFR BLD: 0 % (ref 0–8)
ERYTHROCYTE [DISTWIDTH] IN BLOOD BY AUTOMATED COUNT: 18.3 % (ref 11.5–14.5)
EST. GFR  (AFRICAN AMERICAN): 56 ML/MIN/1.73 M^2
EST. GFR  (NON AFRICAN AMERICAN): 48 ML/MIN/1.73 M^2
GLUCOSE SERPL-MCNC: 95 MG/DL (ref 70–110)
HCT VFR BLD AUTO: 21.9 % (ref 37–48.5)
HGB BLD-MCNC: 6.7 G/DL (ref 12–16)
IMM GRANULOCYTES # BLD AUTO: ABNORMAL K/UL (ref 0–0.04)
IMM GRANULOCYTES NFR BLD AUTO: ABNORMAL % (ref 0–0.5)
LYMPHOCYTES # BLD AUTO: ABNORMAL K/UL (ref 1–4.8)
LYMPHOCYTES NFR BLD: 61 % (ref 18–48)
MCH RBC QN AUTO: 28.5 PG (ref 27–31)
MCHC RBC AUTO-ENTMCNC: 30.6 G/DL (ref 32–36)
MCV RBC AUTO: 93 FL (ref 82–98)
MONOCYTES # BLD AUTO: ABNORMAL K/UL (ref 0.3–1)
MONOCYTES NFR BLD: 0 % (ref 4–15)
NEUTROPHILS NFR BLD: 36 % (ref 38–73)
NRBC BLD-RTO: 2 /100 WBC
PLATELET # BLD AUTO: 29 K/UL (ref 150–350)
PMV BLD AUTO: ABNORMAL FL (ref 9.2–12.9)
POTASSIUM SERPL-SCNC: 4.7 MMOL/L (ref 3.5–5.1)
PROMYELOCYTES NFR BLD MANUAL: 1 %
PROT SERPL-MCNC: 7.6 G/DL (ref 6–8.4)
RBC # BLD AUTO: 2.35 M/UL (ref 4–5.4)
SODIUM SERPL-SCNC: 135 MMOL/L (ref 136–145)
WBC # BLD AUTO: 24.37 K/UL (ref 3.9–12.7)

## 2020-06-01 PROCEDURE — 1101F PR PT FALLS ASSESS DOC 0-1 FALLS W/OUT INJ PAST YR: ICD-10-PCS | Mod: HCNC,CPTII,S$GLB, | Performed by: INTERNAL MEDICINE

## 2020-06-01 PROCEDURE — 3078F PR MOST RECENT DIASTOLIC BLOOD PRESSURE < 80 MM HG: ICD-10-PCS | Mod: HCNC,CPTII,S$GLB, | Performed by: INTERNAL MEDICINE

## 2020-06-01 PROCEDURE — 1126F AMNT PAIN NOTED NONE PRSNT: CPT | Mod: HCNC,S$GLB,, | Performed by: INTERNAL MEDICINE

## 2020-06-01 PROCEDURE — 99999 PR PBB SHADOW E&M-EST. PATIENT-LVL IV: ICD-10-PCS | Mod: PBBFAC,HCNC,, | Performed by: INTERNAL MEDICINE

## 2020-06-01 PROCEDURE — 1101F PT FALLS ASSESS-DOCD LE1/YR: CPT | Mod: HCNC,CPTII,S$GLB, | Performed by: INTERNAL MEDICINE

## 2020-06-01 PROCEDURE — 99215 PR OFFICE/OUTPT VISIT, EST, LEVL V, 40-54 MIN: ICD-10-PCS | Mod: HCNC,S$GLB,, | Performed by: INTERNAL MEDICINE

## 2020-06-01 PROCEDURE — 85007 BL SMEAR W/DIFF WBC COUNT: CPT | Mod: HCNC

## 2020-06-01 PROCEDURE — 3075F PR MOST RECENT SYSTOLIC BLOOD PRESS GE 130-139MM HG: ICD-10-PCS | Mod: HCNC,CPTII,S$GLB, | Performed by: INTERNAL MEDICINE

## 2020-06-01 PROCEDURE — 3075F SYST BP GE 130 - 139MM HG: CPT | Mod: HCNC,CPTII,S$GLB, | Performed by: INTERNAL MEDICINE

## 2020-06-01 PROCEDURE — 80053 COMPREHEN METABOLIC PANEL: CPT | Mod: HCNC

## 2020-06-01 PROCEDURE — 36415 COLL VENOUS BLD VENIPUNCTURE: CPT | Mod: HCNC

## 2020-06-01 PROCEDURE — 1159F PR MEDICATION LIST DOCUMENTED IN MEDICAL RECORD: ICD-10-PCS | Mod: HCNC,S$GLB,, | Performed by: INTERNAL MEDICINE

## 2020-06-01 PROCEDURE — 3078F DIAST BP <80 MM HG: CPT | Mod: HCNC,CPTII,S$GLB, | Performed by: INTERNAL MEDICINE

## 2020-06-01 PROCEDURE — 99999 PR PBB SHADOW E&M-EST. PATIENT-LVL IV: CPT | Mod: PBBFAC,HCNC,, | Performed by: INTERNAL MEDICINE

## 2020-06-01 PROCEDURE — 1159F MED LIST DOCD IN RCRD: CPT | Mod: HCNC,S$GLB,, | Performed by: INTERNAL MEDICINE

## 2020-06-01 PROCEDURE — 1126F PR PAIN SEVERITY QUANTIFIED, NO PAIN PRESENT: ICD-10-PCS | Mod: HCNC,S$GLB,, | Performed by: INTERNAL MEDICINE

## 2020-06-01 PROCEDURE — 99215 OFFICE O/P EST HI 40 MIN: CPT | Mod: HCNC,S$GLB,, | Performed by: INTERNAL MEDICINE

## 2020-06-01 PROCEDURE — 85027 COMPLETE CBC AUTOMATED: CPT | Mod: HCNC

## 2020-06-01 RX ORDER — HYDROCODONE BITARTRATE AND ACETAMINOPHEN 500; 5 MG/1; MG/1
TABLET ORAL ONCE
Status: CANCELLED | OUTPATIENT
Start: 2020-06-01 | End: 2020-06-01

## 2020-06-01 NOTE — ASSESSMENT & PLAN NOTE
Hemoglobin noted at 6.3 grams/deciliter.  This is likely due to blood loss anemia from the wound.  She remains asymptomatic from the standpoint of fatigue, weakness or shortness of breath.  Will continue to monitor.

## 2020-06-01 NOTE — PROGRESS NOTES
Subjective:       Patient ID: Sarah Parker is a 80 y.o. female.    Chief Complaint:  Chronic myelomonocytic leukemia    Follow-up   Pertinent negatives include no abdominal pain, arthralgias, chest pain, chills, congestion, coughing, diaphoresis, fever, headaches, joint swelling, myalgias, nausea, neck pain, numbness, sore throat or vomiting.      Patient is a 80-year-old female presents for reinstitution  of Vidaza therapy for chronic myelomonocytic leukemia patient who is currently on treatment with azacitidine.  She is here for routine follow-up.      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  Status post 4 cycles.  Overall tolerated well. Recently treated for back abscess with antibiotics, however it appears the abscess has erupted and more aggressive per patient.  She was seen by myself on 05/11/2020 and at that time I recommend going to the emergency room for evaluation and possible incision and drainage. Patient presented to the emergency room on 05/12/2020 and at that time was noted to have cellulitis of back weight abscess.  She underwent incision and drainage by Dr. Shannon, she also had a wound VAC placed.  Microbiology showed MRSA.  Continues to follow-up with Dr. Shannon.    Presents today for routine visit.  Denies any symptoms including fever, chills, worsening lower back pain, nausea or diarrhea.  She denies chest pain, shortness of breath, fatigue or weakness.    She did however complain of significant bleeding from her wound resulting in cauterization by surgery service.    Past Medical History:   Diagnosis Date    Acid reflux     Anxiety     Back pain     Bronchitis, chronic obstructive w acute bronchitis 7/29/2016    Cancer     [facility] arms, face- Dr. Lata Tejada    Cataract     2+NS    Degenerative disc disease     Depression     Dry mouth     Hernia, hiatal 11/18/2013    Hypertension     Hypothyroid     Macrocytic anemia 5/3/2016    Macular degeneration      Migraines     Mixed anxiety and depressive disorder     Multiple fractures of ribs of right side     Osteoporosis     Other hyperlipidemia 10/11/2019    Pneumonia     Pneumonia due to other staphylococcus     Rheumatoid arthritis     Rheumatoid arthritis(714.0)     Rheumatoid arthritis(714.0)     Remicade, MTX.     Family History   Problem Relation Age of Onset    Heart disease Mother     Hyperlipidemia Mother     Hypertension Mother     Osteoarthritis Mother     Cataracts Mother     Hypertension Father     Osteoarthritis Father     Heart disease Father     Asthma Sister     Chronic back pain Sister     Hypertension Sister     Osteoarthritis Sister     Thyroid disease Sister     Asthma Brother     Cancer Brother     Chronic back pain Brother     Diabetes Mellitus Brother     Hypertension Brother     Osteoarthritis Brother     Thyroid disease Brother     Cancer Maternal Grandfather     Fibromyalgia Daughter     Heart disease Maternal Grandmother     Colon cancer Neg Hx     Diabetes Neg Hx      Social History     Socioeconomic History    Marital status:      Spouse name: Not on file    Number of children: Not on file    Years of education: Not on file    Highest education level: Not on file   Occupational History    Occupation: retired   Social Needs    Financial resource strain: Not on file    Food insecurity:     Worry: Not on file     Inability: Not on file    Transportation needs:     Medical: Not on file     Non-medical: Not on file   Tobacco Use    Smoking status: Former Smoker     Packs/day: 0.25     Years: 2.00     Pack years: 0.50     Last attempt to quit: 1965     Years since quittin.6    Smokeless tobacco: Never Used   Substance and Sexual Activity    Alcohol use: No    Drug use: No    Sexual activity: Never     Partners: Male   Lifestyle    Physical activity:     Days per week: Not on file     Minutes per session: Not on file    Stress: Not  at all   Relationships    Social connections:     Talks on phone: Not on file     Gets together: Not on file     Attends Rastafarian service: Not on file     Active member of club or organization: Not on file     Attends meetings of clubs or organizations: Not on file     Relationship status: Not on file   Other Topics Concern    Not on file   Social History Narrative    Patient is aretired and live with .     Past Surgical History:   Procedure Laterality Date    APPENDECTOMY  1985    APPLICATION OF WOUND VACUUM-ASSISTED CLOSURE DEVICE N/A 5/14/2020    Procedure: APPLICATION, WOUND VAC;  Surgeon: Mckinley Shannon MD;  Location: [facility] OR;  Service: General;  Laterality: N/A;    BREAST BIOPSY      CATARACT EXTRACTION Bilateral 6/11/15    Dr. Booth    CHOLECYSTECTOMY  2013    cryoablasion kidney Left 09/27/2016    feet Bilateral     rheumatoid    FRACTURE SURGERY Right     tibia    HERNIA REPAIR      HYSTERECTOMY  1970    partial    INCISION AND DRAINAGE OF ABSCESS N/A 5/12/2020    Procedure: INCISION AND DRAINAGE, ABSCESS;  Surgeon: Emerson Hathaway MD;  Location: [facility];  Service: General;  Laterality: N/A;    INCISIONAL BIOPSY N/A 5/12/2020    Procedure: INCISIONAL BIOPSY;  Surgeon: Emerson Hathaway MD;  Location: [facility];  Service: General;  Laterality: N/A;    JOINT REPLACEMENT      bilateral knees (2008), hands, wrists, knuckles, toes    LAPAROSCOPIC NISSEN FUNDOPLICATION      TRANSFORAMINAL EPIDURAL INJECTION OF STEROID Left 6/25/2019    Procedure: Left L5/S1 TF AYAAN with local;  Surgeon: Rowdy Felix MD;  Location: [facility];  Service: Pain Management;  Laterality: Left;    WOUND DEBRIDEMENT N/A 5/14/2020    Procedure: DEBRIDEMENT, WOUND;  Surgeon: Mckinley Shannon MD;  Location: [facility] OR;  Service: General;  Laterality: N/A;       Labs:  Lab Results   Component Value Date    WBC 24.37 (H) 06/01/2020    HGB 6.7 (L) 06/01/2020    HCT 21.9 (L) 06/01/2020    MCV 93 06/01/2020    PLT 29 (LL)  06/01/2020     BMP  Lab Results   Component Value Date     (L) 06/01/2020    K 4.7 06/01/2020     06/01/2020    CO2 23 06/01/2020    BUN 22 06/01/2020    CREATININE 1.1 06/01/2020    CALCIUM 8.6 (L) 06/01/2020    ANIONGAP 8 06/01/2020    ESTGFRAFRICA 56 (A) 06/01/2020    EGFRNONAA 48 (A) 06/01/2020     Lab Results   Component Value Date    ALT 7 (L) 06/01/2020    AST 15 06/01/2020    ALKPHOS 157 (H) 06/01/2020    BILITOT 0.3 06/01/2020       Lab Results   Component Value Date    IRON 93 01/17/2020    TIBC 425 01/17/2020    FERRITIN 276 01/17/2020     Lab Results   Component Value Date    ZNIYVBYE11 1918 (H) 08/25/2019     Lab Results   Component Value Date    FOLATE 12.0 08/25/2019     Lab Results   Component Value Date    TSH 5.174 (H) 09/03/2019         Review of Systems   Constitutional: Positive for activity change. Negative for chills, diaphoresis and fever.   HENT: Negative for congestion, dental problem, drooling, ear discharge, ear pain, facial swelling, hearing loss, mouth sores, nosebleeds, postnasal drip, rhinorrhea, sinus pressure, sneezing, sore throat, tinnitus, trouble swallowing and voice change.    Eyes: Negative for photophobia, pain, discharge, redness, itching and visual disturbance.   Respiratory: Negative for cough, choking, chest tightness, shortness of breath, wheezing and stridor.    Cardiovascular: Negative for chest pain, palpitations and leg swelling.   Gastrointestinal: Negative for abdominal distention, abdominal pain, anal bleeding, blood in stool, constipation, diarrhea, nausea, rectal pain and vomiting.   Endocrine: Negative for cold intolerance, heat intolerance, polydipsia, polyphagia and polyuria.   Genitourinary: Negative for decreased urine volume, difficulty urinating, dyspareunia, dysuria, enuresis, flank pain, frequency, genital sores, hematuria, menstrual problem, pelvic pain, urgency, vaginal bleeding, vaginal discharge and vaginal pain.   Musculoskeletal:  Negative for arthralgias, gait problem, joint swelling, myalgias, neck pain and neck stiffness.   Skin: Negative for color change and pallor.   Allergic/Immunologic: Negative for environmental allergies, food allergies and immunocompromised state.   Neurological: Negative for dizziness, tremors, seizures, syncope, facial asymmetry, speech difficulty, light-headedness, numbness and headaches.   Hematological: Negative for adenopathy. Does not bruise/bleed easily.   Psychiatric/Behavioral: Positive for dysphoric mood. Negative for agitation, behavioral problems, confusion, decreased concentration, hallucinations, self-injury, sleep disturbance and suicidal ideas. The patient is nervous/anxious. The patient is not hyperactive.        Objective:      Physical Exam   Constitutional: She is oriented to person, place, and time. She appears cachectic.   HENT:   Head: Normocephalic and atraumatic.   Right Ear: External ear normal.   Left Ear: External ear normal.   Nose: Nose normal. Right sinus exhibits no maxillary sinus tenderness and no frontal sinus tenderness. Left sinus exhibits no maxillary sinus tenderness and no frontal sinus tenderness.   Mouth/Throat: Oropharynx is clear and moist. No oropharyngeal exudate.   Eyes: Pupils are equal, round, and reactive to light. Conjunctivae, EOM and lids are normal. Right eye exhibits no discharge. Left eye exhibits no discharge. Right conjunctiva is not injected. Right conjunctiva has no hemorrhage. Left conjunctiva is not injected. Left conjunctiva has no hemorrhage. No scleral icterus.   Neck: Normal range of motion. Neck supple. No JVD present. No tracheal deviation present. No thyromegaly present.   Cardiovascular: Normal rate and regular rhythm.   Pulmonary/Chest: Effort normal. No stridor. No respiratory distress. She exhibits no tenderness.   Abdominal: Soft. She exhibits no distension and no mass. There is no splenomegaly or hepatomegaly. There is no tenderness. There  is no rebound.   Musculoskeletal: Normal range of motion. She exhibits deformity. She exhibits no edema or tenderness.   Deformed digits of upper extremity secondary to arthritis.   Lymphadenopathy:     She has no cervical adenopathy.     She has no axillary adenopathy.        Right: No supraclavicular adenopathy present.        Left: No supraclavicular adenopathy present.   Neurological: She is alert and oriented to person, place, and time. No cranial nerve deficit. Coordination normal.   Skin: Skin is dry. She is not diaphoretic. No erythema.   Wound VAC in place to the posterior lower back wound.  Draining serosanguineous fluid.   Psychiatric: She has a normal mood and affect. Her behavior is normal. Judgment and thought content normal.   Vitals reviewed.        Assessment:      1. Chronic myelomonocytic leukemia not having achieved remission    2. Macrocytic anemia    3. Thrombocytopenia           Plan:     Chronic myelomonocytic leukemia not having achieved remission  Will continue to hold by days a given current infection.  Noted leukocytosis which could be reactionary due to current infection or related to CMML.  No clinical evidence of infection.  Continue to monitor    Macrocytic anemia  Hemoglobin noted at 6.3 grams/deciliter.  This is likely due to blood loss anemia from the wound.  She remains asymptomatic from the standpoint of fatigue, weakness or shortness of breath.  Will continue to monitor.    Thrombocytopenia  Severe thrombocytopenia.  Noted platelet count at 05305.  T given recent bleeding from wound, will transfuse 1 dose of platelet.      Denton Knox MD

## 2020-06-01 NOTE — ASSESSMENT & PLAN NOTE
Will continue to hold by days a given current infection.  Noted leukocytosis which could be reactionary due to current infection or related to CMML.  No clinical evidence of infection.  Continue to monitor

## 2020-06-01 NOTE — ASSESSMENT & PLAN NOTE
Severe thrombocytopenia.  Noted platelet count at 32400.  T given recent bleeding from wound, will transfuse 1 dose of platelet.

## 2020-06-01 NOTE — PROGRESS NOTES
Subjective:      Patient ID: Sarah Parker is a 80 y.o. female.    Chief Complaint: Follow-up      HPI  Here for follow up of medical problems and f/u of 5/28/20 ER visit for:  1. Encounter for blood transfusion Z51.89 V58.2   2. Hypotension I95.9 458.9   3. CML (chronic myelocytic leukemia) C92.10 205.10   4. Thrombocytopenia D69.6 287.5   5. Symptomatic anemia D64.9 285.9     ------------------------------------------------------------------------------  Prior hospital course:  Hospital Course:   5/13- Pt states pain in the abscess area is better. S/P  incision and drainage of back abscess yesterday . Afebrile . Vitals are fairly stable . Tissue gram stain resulted few gram positive cocci . Blood culture x 2  no growth to date. Current antibiotic - Cefepime and Vancomycin IV. Labs - WBC trended down 26.4 ( near baseline ) , Hb 6.7, Pl 41. Will have 2 units of irradiated P-RBC transfusion today.      5/14- Pt has no new C/O . Awake , alert and oriented. Remains afebrile . BP is elevated noted . Review home meds and resume as appropriate. WBC 20 K( at baseline) , Hgb 9.4 ( s/p 2 units P-RBC yesterday) . Pl down to 13. No signs of active bleeding. Hematology recommended to transfuse one dose of Pl today . Tissue culture resulted growth of Staph Aureus . Final susceptibility is pending . DC Cefepime . Continue Vancomycin. On exam,  some induration palpated in the area next to previous I&D. Wound care on board. Will speak to General Surgery.      5/15- Remains afebrile. Underwent 2nd I&D and wound vac placement by Dr. Shannon last night . Pt states feeling better . Denies pain to her back. Vitals are stable . Labs resulted WBC 22.8, Hbg 8.6> 9.4, Pl 69. Wound culture resulted Staph Aureus - susceptibility is pending . Possible DC home with wound vac and oral antibiotic once susceptibility obtained .      Addendum- Wound vac delivered at bedside . Susceptibility resulted growth of Staph Aureus sensitive to clindamycin ,  "bactrim and Tetracycline. At this point decision is made to discharge pt home with oral Doxycyline and wound vac with home health service for wound vac care. Pt is examined before discharge and deemed stable and suitable for discharge.    -------------------------------------------------------------------------   Has been transfused for PRBC and platelets.  Yesterday got platelets. Feeling well in general.  No f/c/sw/cough.  No n/v.  No overt BRB in wound vac lately.  No cp/sob/palp.  BMs normal, black, on oral iron.  Off doxycycline now.  Daughter is helping at home, with pt and .  Depression doing ok on rx.    Updated/ annual due 10/20:  HM: 11/19 fluvax, 5/16 [id number], 10/14 [id number], 10/13 TDaP, 1/17 BMD/will bring to Dr. Tejada rep 2y, 2/20 MMG, 10/13 EGD, 2015 Cscope Dr. Garcia rep 5y, Pain Dr. Carlin.     Review of Systems   Constitutional: Negative for chills, diaphoresis and fever.   Respiratory: Negative for cough and shortness of breath.    Cardiovascular: Negative for chest pain, palpitations and leg swelling.   Gastrointestinal: Negative for blood in stool, constipation, diarrhea, nausea and vomiting.   Genitourinary: Negative for dysuria, frequency and hematuria.   Psychiatric/Behavioral: The patient is not nervous/anxious.          Objective:   /68   Pulse 84   Temp 97 °F (36.1 °C) (Oral)   Ht 5' 2" (1.575 m)   Wt 49.5 kg (109 lb 2 oz)   LMP  (LMP Unknown)   BMI 19.96 kg/m²     Physical Exam   Constitutional: She is oriented to person, place, and time. She appears well-developed.   HENT:   Mouth/Throat: Oropharynx is clear and moist.   Neck: Neck supple. Carotid bruit is not present. No thyroid mass present.   Cardiovascular: Normal rate, regular rhythm and intact distal pulses. Exam reveals no gallop and no friction rub.   No murmur heard.  Pulmonary/Chest: Effort normal and breath sounds normal. She has no wheezes. She has no rales.   Abdominal: Soft. Bowel sounds are normal. " She exhibits no mass. There is no hepatosplenomegaly. There is no tenderness.   Musculoskeletal: She exhibits no edema.   Lymphadenopathy:     She has no cervical adenopathy.   Neurological: She is alert and oriented to person, place, and time.   Psychiatric: She has a normal mood and affect.       Results for SARAH ROLDAN (MRN [id number]) as of 6/3/2020 10:17   Ref. Range 5/13/2020 07:49 5/14/2020 05:30 5/15/2020 05:56 5/28/2020 10:30 5/28/2020 13:23 6/1/2020 08:53   WBC Latest Ref Range: 3.90 - 12.70 K/uL 26.47 (H) 20.06 (H) 22.80 (H) 27.97 (H) 27.21 (H) 24.37 (H)   RBC Latest Ref Range: 4.00 - 5.40 M/uL 2.40 (L) 3.38 (L) 3.06 (L) 2.53 (L) 2.49 (L) 2.35 (L)   Hemoglobin Latest Ref Range: 12.0 - 16.0 g/dL 6.7 (L) 9.4 (L) 8.6 (L) 7.0 (L) 7.0 (L) 6.7 (L)   Hematocrit Latest Ref Range: 37.0 - 48.5 % 22.5 (L) 29.7 (L) 27.4 (L) 24.2 (L) 22.8 (L) 21.9 (L)   MCV Latest Ref Range: 82 - 98 fL 94 88 90 96 92 93   MCH Latest Ref Range: 27.0 - 31.0 pg 27.9 27.8 28.1 27.7 28.1 28.5   MCHC Latest Ref Range: 32.0 - 36.0 g/dL 29.8 (L) 31.6 (L) 31.4 (L) 28.9 (L) 30.7 (L) 30.6 (L)   RDW Latest Ref Range: 11.5 - 14.5 % 20.3 (H) 20.1 (H) 19.3 (H) 18.3 (H) 18.0 (H) 18.3 (H)   Platelets Latest Ref Range: 150 - 350 K/uL 41 (L) 13 (LL) 69 (L) 38 (LL) 46 (L) 29 (LL)       Assessment:       1. Iron deficiency anemia due to chronic blood loss    2. Simple chronic bronchitis    3. Chronic myelomonocytic leukemia not having achieved remission    4. Essential hypertension    5. Immunosuppressed status    6. Rheumatoid arthritis involving right shoulder with positive rheumatoid factor    7. Other hyperlipidemia    8. Preventive measure    9. Thrombocytopenia    10. Depression, recurrent          Plan:     Sarah was seen today for follow-up.    Diagnoses and all orders for this visit:    Iron deficiency anemia due to chronic blood loss, Thrombocytopenia- per HemeONc, to see next week.  To have scopes.    Simple chronic bronchitis- doing  well.    Chronic myelomonocytic leukemia not having achieved remission- per HemeOnc.    Essential hypertension- stable, has lost 6# in past year, may need to decrease rx.   Monitor and let me know.  POSS STOP HCTZ.    Rheumatoid arthritis involving right shoulder with positive rheumatoid factor, Immunosuppressed status- pain doing well lately.    Other hyperlipidemia  -     Lipid Panel; Future  -     TSH; Future    Preventive measure- due in 4mo.  -     Lipid Panel; Future  -     TSH; Future    Depression, recurrent- doing well, cont rx.

## 2020-06-02 ENCOUNTER — INFUSION (OUTPATIENT)
Dept: INFUSION THERAPY | Facility: HOSPITAL | Age: 80
End: 2020-06-02
Attending: INTERNAL MEDICINE
Payer: MEDICARE

## 2020-06-02 VITALS
SYSTOLIC BLOOD PRESSURE: 136 MMHG | TEMPERATURE: 98 F | RESPIRATION RATE: 18 BRPM | DIASTOLIC BLOOD PRESSURE: 70 MMHG | HEART RATE: 88 BPM

## 2020-06-02 DIAGNOSIS — C93.10 CHRONIC MYELOMONOCYTIC LEUKEMIA NOT HAVING ACHIEVED REMISSION: ICD-10-CM

## 2020-06-02 LAB
BLD PROD TYP BPU: NORMAL
BLOOD UNIT EXPIRATION DATE: NORMAL
BLOOD UNIT TYPE CODE: 7300
BLOOD UNIT TYPE: NORMAL
CODING SYSTEM: NORMAL
DISPENSE STATUS: NORMAL
NUM UNITS TRANS WBC-POOR PLATPHERESIS: NORMAL

## 2020-06-02 PROCEDURE — P9037 PLATE PHERES LEUKOREDU IRRAD: HCPCS | Mod: HCNC

## 2020-06-02 PROCEDURE — 36430 TRANSFUSION BLD/BLD COMPNT: CPT | Mod: HCNC

## 2020-06-02 RX ORDER — HYDROCODONE BITARTRATE AND ACETAMINOPHEN 500; 5 MG/1; MG/1
TABLET ORAL ONCE
Status: DISCONTINUED | OUTPATIENT
Start: 2020-06-02 | End: 2020-06-02 | Stop reason: HOSPADM

## 2020-06-02 NOTE — PLAN OF CARE
Pt tolerated transfusion of one unit irradiated platelets without s/s reaction. Followup appointments reviewed.  Pt discharged ambulatory with personal belongings.

## 2020-06-03 ENCOUNTER — OFFICE VISIT (OUTPATIENT)
Dept: FAMILY MEDICINE | Facility: CLINIC | Age: 80
End: 2020-06-03
Payer: MEDICARE

## 2020-06-03 VITALS
HEIGHT: 62 IN | SYSTOLIC BLOOD PRESSURE: 118 MMHG | TEMPERATURE: 97 F | HEART RATE: 84 BPM | WEIGHT: 109.13 LBS | BODY MASS INDEX: 20.08 KG/M2 | DIASTOLIC BLOOD PRESSURE: 68 MMHG

## 2020-06-03 DIAGNOSIS — Z29.9 PREVENTIVE MEASURE: ICD-10-CM

## 2020-06-03 DIAGNOSIS — D69.6 THROMBOCYTOPENIA: ICD-10-CM

## 2020-06-03 DIAGNOSIS — I10 ESSENTIAL HYPERTENSION: Chronic | ICD-10-CM

## 2020-06-03 DIAGNOSIS — F33.9 DEPRESSION, RECURRENT: ICD-10-CM

## 2020-06-03 DIAGNOSIS — C93.10 CHRONIC MYELOMONOCYTIC LEUKEMIA NOT HAVING ACHIEVED REMISSION: Chronic | ICD-10-CM

## 2020-06-03 DIAGNOSIS — J41.0 SIMPLE CHRONIC BRONCHITIS: ICD-10-CM

## 2020-06-03 DIAGNOSIS — M05.711 RHEUMATOID ARTHRITIS INVOLVING RIGHT SHOULDER WITH POSITIVE RHEUMATOID FACTOR: Chronic | ICD-10-CM

## 2020-06-03 DIAGNOSIS — E78.49 OTHER HYPERLIPIDEMIA: ICD-10-CM

## 2020-06-03 DIAGNOSIS — D50.0 IRON DEFICIENCY ANEMIA DUE TO CHRONIC BLOOD LOSS: Primary | ICD-10-CM

## 2020-06-03 DIAGNOSIS — D84.9 IMMUNOSUPPRESSED STATUS: ICD-10-CM

## 2020-06-03 PROCEDURE — 99214 OFFICE O/P EST MOD 30 MIN: CPT | Mod: HCNC,S$GLB,, | Performed by: INTERNAL MEDICINE

## 2020-06-03 PROCEDURE — 99999 PR PBB SHADOW E&M-EST. PATIENT-LVL III: CPT | Mod: PBBFAC,HCNC,, | Performed by: INTERNAL MEDICINE

## 2020-06-03 PROCEDURE — 1100F PTFALLS ASSESS-DOCD GE2>/YR: CPT | Mod: HCNC,CPTII,S$GLB, | Performed by: INTERNAL MEDICINE

## 2020-06-03 PROCEDURE — 3074F PR MOST RECENT SYSTOLIC BLOOD PRESSURE < 130 MM HG: ICD-10-PCS | Mod: HCNC,CPTII,S$GLB, | Performed by: INTERNAL MEDICINE

## 2020-06-03 PROCEDURE — 1159F PR MEDICATION LIST DOCUMENTED IN MEDICAL RECORD: ICD-10-PCS | Mod: HCNC,S$GLB,, | Performed by: INTERNAL MEDICINE

## 2020-06-03 PROCEDURE — 1159F MED LIST DOCD IN RCRD: CPT | Mod: HCNC,S$GLB,, | Performed by: INTERNAL MEDICINE

## 2020-06-03 PROCEDURE — 3078F PR MOST RECENT DIASTOLIC BLOOD PRESSURE < 80 MM HG: ICD-10-PCS | Mod: HCNC,CPTII,S$GLB, | Performed by: INTERNAL MEDICINE

## 2020-06-03 PROCEDURE — 3078F DIAST BP <80 MM HG: CPT | Mod: HCNC,CPTII,S$GLB, | Performed by: INTERNAL MEDICINE

## 2020-06-03 PROCEDURE — 3288F FALL RISK ASSESSMENT DOCD: CPT | Mod: HCNC,CPTII,S$GLB, | Performed by: INTERNAL MEDICINE

## 2020-06-03 PROCEDURE — 1100F PR PT FALLS ASSESS DOC 2+ FALLS/FALL W/INJURY/YR: ICD-10-PCS | Mod: HCNC,CPTII,S$GLB, | Performed by: INTERNAL MEDICINE

## 2020-06-03 PROCEDURE — 99214 PR OFFICE/OUTPT VISIT, EST, LEVL IV, 30-39 MIN: ICD-10-PCS | Mod: HCNC,S$GLB,, | Performed by: INTERNAL MEDICINE

## 2020-06-03 PROCEDURE — 3074F SYST BP LT 130 MM HG: CPT | Mod: HCNC,CPTII,S$GLB, | Performed by: INTERNAL MEDICINE

## 2020-06-03 PROCEDURE — 99999 PR PBB SHADOW E&M-EST. PATIENT-LVL III: ICD-10-PCS | Mod: PBBFAC,HCNC,, | Performed by: INTERNAL MEDICINE

## 2020-06-03 PROCEDURE — 3288F PR FALLS RISK ASSESSMENT DOCUMENTED: ICD-10-PCS | Mod: HCNC,CPTII,S$GLB, | Performed by: INTERNAL MEDICINE

## 2020-06-09 ENCOUNTER — OFFICE VISIT (OUTPATIENT)
Dept: HEMATOLOGY/ONCOLOGY | Facility: CLINIC | Age: 80
End: 2020-06-09
Payer: MEDICARE

## 2020-06-09 ENCOUNTER — LAB VISIT (OUTPATIENT)
Dept: LAB | Facility: HOSPITAL | Age: 80
End: 2020-06-09
Attending: INTERNAL MEDICINE
Payer: MEDICARE

## 2020-06-09 VITALS
OXYGEN SATURATION: 97 % | TEMPERATURE: 98 F | BODY MASS INDEX: 20.45 KG/M2 | HEIGHT: 62 IN | WEIGHT: 111.13 LBS | SYSTOLIC BLOOD PRESSURE: 142 MMHG | HEART RATE: 98 BPM | DIASTOLIC BLOOD PRESSURE: 83 MMHG

## 2020-06-09 DIAGNOSIS — L03.312 CELLULITIS OF LOWER BACK: ICD-10-CM

## 2020-06-09 DIAGNOSIS — C93.10 CHRONIC MYELOMONOCYTIC LEUKEMIA NOT HAVING ACHIEVED REMISSION: ICD-10-CM

## 2020-06-09 DIAGNOSIS — D53.9 MACROCYTIC ANEMIA: ICD-10-CM

## 2020-06-09 DIAGNOSIS — C93.10 CHRONIC MYELOMONOCYTIC LEUKEMIA NOT HAVING ACHIEVED REMISSION: Chronic | ICD-10-CM

## 2020-06-09 DIAGNOSIS — D50.0 IRON DEFICIENCY ANEMIA DUE TO CHRONIC BLOOD LOSS: ICD-10-CM

## 2020-06-09 DIAGNOSIS — D69.6 THROMBOCYTOPENIA: ICD-10-CM

## 2020-06-09 DIAGNOSIS — D72.829 LEUKOCYTOSIS, UNSPECIFIED TYPE: ICD-10-CM

## 2020-06-09 LAB
ALBUMIN SERPL BCP-MCNC: 3.1 G/DL (ref 3.5–5.2)
ALP SERPL-CCNC: 178 U/L (ref 55–135)
ALT SERPL W/O P-5'-P-CCNC: 9 U/L (ref 10–44)
ANION GAP SERPL CALC-SCNC: 9 MMOL/L (ref 8–16)
AST SERPL-CCNC: 19 U/L (ref 10–40)
BASOPHILS # BLD AUTO: ABNORMAL K/UL (ref 0–0.2)
BASOPHILS NFR BLD: 0 % (ref 0–1.9)
BILIRUB SERPL-MCNC: 0.5 MG/DL (ref 0.1–1)
BUN SERPL-MCNC: 16 MG/DL (ref 8–23)
CALCIUM SERPL-MCNC: 8.6 MG/DL (ref 8.7–10.5)
CHLORIDE SERPL-SCNC: 106 MMOL/L (ref 95–110)
CO2 SERPL-SCNC: 23 MMOL/L (ref 23–29)
CREAT SERPL-MCNC: 0.9 MG/DL (ref 0.5–1.4)
DIFFERENTIAL METHOD: ABNORMAL
EOSINOPHIL # BLD AUTO: ABNORMAL K/UL (ref 0–0.5)
EOSINOPHIL NFR BLD: 1 % (ref 0–8)
ERYTHROCYTE [DISTWIDTH] IN BLOOD BY AUTOMATED COUNT: 20.2 % (ref 11.5–14.5)
EST. GFR  (AFRICAN AMERICAN): >60 ML/MIN/1.73 M^2
EST. GFR  (NON AFRICAN AMERICAN): >60 ML/MIN/1.73 M^2
GLUCOSE SERPL-MCNC: 96 MG/DL (ref 70–110)
HCT VFR BLD AUTO: 20.9 % (ref 37–48.5)
HGB BLD-MCNC: 6 G/DL (ref 12–16)
IMM GRANULOCYTES # BLD AUTO: ABNORMAL K/UL (ref 0–0.04)
IMM GRANULOCYTES NFR BLD AUTO: ABNORMAL % (ref 0–0.5)
LYMPHOCYTES # BLD AUTO: ABNORMAL K/UL (ref 1–4.8)
LYMPHOCYTES NFR BLD: 45 % (ref 18–48)
MCH RBC QN AUTO: 27.8 PG (ref 27–31)
MCHC RBC AUTO-ENTMCNC: 28.7 G/DL (ref 32–36)
MCV RBC AUTO: 97 FL (ref 82–98)
METAMYELOCYTES NFR BLD MANUAL: 3 %
MONOCYTES # BLD AUTO: ABNORMAL K/UL (ref 0.3–1)
MONOCYTES NFR BLD: 13 % (ref 4–15)
NEUTROPHILS NFR BLD: 38 % (ref 38–73)
NRBC BLD-RTO: 8 /100 WBC
PATH REV BLD -IMP: NORMAL
PLATELET # BLD AUTO: 51 K/UL (ref 150–350)
PMV BLD AUTO: ABNORMAL FL (ref 9.2–12.9)
POTASSIUM SERPL-SCNC: 4.3 MMOL/L (ref 3.5–5.1)
PROT SERPL-MCNC: 7.7 G/DL (ref 6–8.4)
RBC # BLD AUTO: 2.16 M/UL (ref 4–5.4)
SODIUM SERPL-SCNC: 138 MMOL/L (ref 136–145)
WBC # BLD AUTO: 21.38 K/UL (ref 3.9–12.7)

## 2020-06-09 PROCEDURE — 85060 BLOOD SMEAR INTERPRETATION: CPT | Mod: HCNC,,, | Performed by: PATHOLOGY

## 2020-06-09 PROCEDURE — 1159F MED LIST DOCD IN RCRD: CPT | Mod: HCNC,S$GLB,, | Performed by: INTERNAL MEDICINE

## 2020-06-09 PROCEDURE — 3077F SYST BP >= 140 MM HG: CPT | Mod: HCNC,CPTII,S$GLB, | Performed by: INTERNAL MEDICINE

## 2020-06-09 PROCEDURE — 99999 PR PBB SHADOW E&M-EST. PATIENT-LVL IV: CPT | Mod: PBBFAC,HCNC,, | Performed by: INTERNAL MEDICINE

## 2020-06-09 PROCEDURE — 3077F PR MOST RECENT SYSTOLIC BLOOD PRESSURE >= 140 MM HG: ICD-10-PCS | Mod: HCNC,CPTII,S$GLB, | Performed by: INTERNAL MEDICINE

## 2020-06-09 PROCEDURE — 1159F PR MEDICATION LIST DOCUMENTED IN MEDICAL RECORD: ICD-10-PCS | Mod: HCNC,S$GLB,, | Performed by: INTERNAL MEDICINE

## 2020-06-09 PROCEDURE — 99215 OFFICE O/P EST HI 40 MIN: CPT | Mod: HCNC,S$GLB,, | Performed by: INTERNAL MEDICINE

## 2020-06-09 PROCEDURE — 36415 COLL VENOUS BLD VENIPUNCTURE: CPT | Mod: HCNC

## 2020-06-09 PROCEDURE — 80053 COMPREHEN METABOLIC PANEL: CPT | Mod: HCNC

## 2020-06-09 PROCEDURE — 1101F PT FALLS ASSESS-DOCD LE1/YR: CPT | Mod: HCNC,CPTII,S$GLB, | Performed by: INTERNAL MEDICINE

## 2020-06-09 PROCEDURE — 1101F PR PT FALLS ASSESS DOC 0-1 FALLS W/OUT INJ PAST YR: ICD-10-PCS | Mod: HCNC,CPTII,S$GLB, | Performed by: INTERNAL MEDICINE

## 2020-06-09 PROCEDURE — 85007 BL SMEAR W/DIFF WBC COUNT: CPT | Mod: HCNC

## 2020-06-09 PROCEDURE — 99215 PR OFFICE/OUTPT VISIT, EST, LEVL V, 40-54 MIN: ICD-10-PCS | Mod: HCNC,S$GLB,, | Performed by: INTERNAL MEDICINE

## 2020-06-09 PROCEDURE — 3079F DIAST BP 80-89 MM HG: CPT | Mod: HCNC,CPTII,S$GLB, | Performed by: INTERNAL MEDICINE

## 2020-06-09 PROCEDURE — 85060 PATHOLOGIST REVIEW: ICD-10-PCS | Mod: HCNC,,, | Performed by: PATHOLOGY

## 2020-06-09 PROCEDURE — 3079F PR MOST RECENT DIASTOLIC BLOOD PRESSURE 80-89 MM HG: ICD-10-PCS | Mod: HCNC,CPTII,S$GLB, | Performed by: INTERNAL MEDICINE

## 2020-06-09 PROCEDURE — 1126F PR PAIN SEVERITY QUANTIFIED, NO PAIN PRESENT: ICD-10-PCS | Mod: HCNC,S$GLB,, | Performed by: INTERNAL MEDICINE

## 2020-06-09 PROCEDURE — 99999 PR PBB SHADOW E&M-EST. PATIENT-LVL IV: ICD-10-PCS | Mod: PBBFAC,HCNC,, | Performed by: INTERNAL MEDICINE

## 2020-06-09 PROCEDURE — 1126F AMNT PAIN NOTED NONE PRSNT: CPT | Mod: HCNC,S$GLB,, | Performed by: INTERNAL MEDICINE

## 2020-06-09 PROCEDURE — 85027 COMPLETE CBC AUTOMATED: CPT | Mod: HCNC

## 2020-06-09 RX ORDER — HYDROCODONE BITARTRATE AND ACETAMINOPHEN 500; 5 MG/1; MG/1
TABLET ORAL ONCE
Status: CANCELLED | OUTPATIENT
Start: 2020-06-09 | End: 2020-06-09

## 2020-06-09 NOTE — ASSESSMENT & PLAN NOTE
Chronic myelomonocytic leukemia, currently on Vidaza which has been on hold recently due to Guerra infection.  Reviewed labs today and noted......  Will continue to hold Vidaza, return to clinic in 2 weeks with repeat labs.

## 2020-06-09 NOTE — ASSESSMENT & PLAN NOTE
Anemia was thought to be due to acute blood loss secondary to wound infection.  Most recent hemoglobin was noted at 6.3 however patient was asymptomatic.  Today CBC showed hemoglobin of.....

## 2020-06-09 NOTE — ASSESSMENT & PLAN NOTE
Severe thrombocytopenia, no evidence of active bleed.  Noted platelet count at [address].  No petechiae or ecchymosis on exam.  Will continue to monitor.  Likely this is related to CMML.

## 2020-06-09 NOTE — ASSESSMENT & PLAN NOTE
Normocytic anemia, likely due to chronic inflammation secondary to rheumatoid arthritis versus chemotherapy effect versus bone marrow disorder from CMML.  Patient has received multiple blood products with minimal improvement in hemoglobin.  Endoscopic evaluation was recommended however she declined stating that she had colonoscopy within the past 5 years.  She was signed release of information form today so that we can obtain record from outside facility where the procedure was done.  Meanwhile has CBC showed hemoglobin of 6.0 grams/deciliter today.  Will plan to transfuse with 2 units of irradiated packed red blood cell.  Will see her back in about 2-3 weeks or sooner if needed.

## 2020-06-10 ENCOUNTER — INFUSION (OUTPATIENT)
Dept: INFUSION THERAPY | Facility: HOSPITAL | Age: 80
End: 2020-06-10
Attending: INTERNAL MEDICINE
Payer: MEDICARE

## 2020-06-10 VITALS
TEMPERATURE: 98 F | RESPIRATION RATE: 20 BRPM | DIASTOLIC BLOOD PRESSURE: 82 MMHG | HEART RATE: 81 BPM | SYSTOLIC BLOOD PRESSURE: 159 MMHG

## 2020-06-10 DIAGNOSIS — C93.10 CHRONIC MYELOMONOCYTIC LEUKEMIA NOT HAVING ACHIEVED REMISSION: Chronic | ICD-10-CM

## 2020-06-10 DIAGNOSIS — D50.0 IRON DEFICIENCY ANEMIA DUE TO CHRONIC BLOOD LOSS: ICD-10-CM

## 2020-06-10 PROCEDURE — 36430 TRANSFUSION BLD/BLD COMPNT: CPT | Mod: HCNC

## 2020-06-10 RX ORDER — HYDROCODONE BITARTRATE AND ACETAMINOPHEN 500; 5 MG/1; MG/1
TABLET ORAL ONCE
Status: DISCONTINUED | OUTPATIENT
Start: 2020-06-10 | End: 2020-06-10 | Stop reason: HOSPADM

## 2020-06-10 NOTE — PLAN OF CARE
Patient tolerated blood transfusion well; no adverse reactions noted. Discharge/follow up information reviewed. Verbalizes understanding.

## 2020-06-15 ENCOUNTER — TELEPHONE (OUTPATIENT)
Dept: HEMATOLOGY/ONCOLOGY | Facility: CLINIC | Age: 80
End: 2020-06-15

## 2020-06-18 LAB — FUNGUS SPEC CULT: NORMAL

## 2020-06-22 ENCOUNTER — TELEPHONE (OUTPATIENT)
Dept: SURGERY | Facility: CLINIC | Age: 80
End: 2020-06-22

## 2020-06-22 NOTE — TELEPHONE ENCOUNTER
----- Message from Tessy Banda sent at 6/22/2020  1:20 PM CDT -----  Contact: Kayla - [facility] States a new wound care order is needed, please call to do a verbal order at [phone]///[id number]

## 2020-06-26 ENCOUNTER — OFFICE VISIT (OUTPATIENT)
Dept: SURGERY | Facility: CLINIC | Age: 80
End: 2020-06-26
Payer: MEDICARE

## 2020-06-26 VITALS
TEMPERATURE: 98 F | HEART RATE: 99 BPM | DIASTOLIC BLOOD PRESSURE: 79 MMHG | WEIGHT: 107.81 LBS | BODY MASS INDEX: 19.84 KG/M2 | HEIGHT: 62 IN | SYSTOLIC BLOOD PRESSURE: 138 MMHG

## 2020-06-26 DIAGNOSIS — C44.621 SQUAMOUS CELL CARCINOMA OF SKIN OF UPPER EXTREMITY, INCLUDING SHOULDER, UNSPECIFIED LATERALITY: Primary | ICD-10-CM

## 2020-06-26 PROCEDURE — 1101F PR PT FALLS ASSESS DOC 0-1 FALLS W/OUT INJ PAST YR: ICD-10-PCS | Mod: HCNC,CPTII,S$GLB, | Performed by: SURGERY

## 2020-06-26 PROCEDURE — 3078F DIAST BP <80 MM HG: CPT | Mod: HCNC,CPTII,S$GLB, | Performed by: SURGERY

## 2020-06-26 PROCEDURE — 99999 PR PBB SHADOW E&M-EST. PATIENT-LVL V: ICD-10-PCS | Mod: PBBFAC,HCNC,, | Performed by: SURGERY

## 2020-06-26 PROCEDURE — 1125F PR PAIN SEVERITY QUANTIFIED, PAIN PRESENT: ICD-10-PCS | Mod: HCNC,S$GLB,, | Performed by: SURGERY

## 2020-06-26 PROCEDURE — 1125F AMNT PAIN NOTED PAIN PRSNT: CPT | Mod: HCNC,S$GLB,, | Performed by: SURGERY

## 2020-06-26 PROCEDURE — 3075F SYST BP GE 130 - 139MM HG: CPT | Mod: HCNC,CPTII,S$GLB, | Performed by: SURGERY

## 2020-06-26 PROCEDURE — 99999 PR PBB SHADOW E&M-EST. PATIENT-LVL V: CPT | Mod: PBBFAC,HCNC,, | Performed by: SURGERY

## 2020-06-26 PROCEDURE — 3075F PR MOST RECENT SYSTOLIC BLOOD PRESS GE 130-139MM HG: ICD-10-PCS | Mod: HCNC,CPTII,S$GLB, | Performed by: SURGERY

## 2020-06-26 PROCEDURE — 1101F PT FALLS ASSESS-DOCD LE1/YR: CPT | Mod: HCNC,CPTII,S$GLB, | Performed by: SURGERY

## 2020-06-26 PROCEDURE — 99213 PR OFFICE/OUTPT VISIT, EST, LEVL III, 20-29 MIN: ICD-10-PCS | Mod: HCNC,S$GLB,, | Performed by: SURGERY

## 2020-06-26 PROCEDURE — 99499 UNLISTED E&M SERVICE: CPT | Mod: HCNC,S$GLB,, | Performed by: SURGERY

## 2020-06-26 PROCEDURE — 1159F PR MEDICATION LIST DOCUMENTED IN MEDICAL RECORD: ICD-10-PCS | Mod: HCNC,S$GLB,, | Performed by: SURGERY

## 2020-06-26 PROCEDURE — 1159F MED LIST DOCD IN RCRD: CPT | Mod: HCNC,S$GLB,, | Performed by: SURGERY

## 2020-06-26 PROCEDURE — 3078F PR MOST RECENT DIASTOLIC BLOOD PRESSURE < 80 MM HG: ICD-10-PCS | Mod: HCNC,CPTII,S$GLB, | Performed by: SURGERY

## 2020-06-26 PROCEDURE — 99213 OFFICE O/P EST LOW 20 MIN: CPT | Mod: HCNC,S$GLB,, | Performed by: SURGERY

## 2020-06-26 PROCEDURE — 99499 RISK ADDL DX/OHS AUDIT: ICD-10-PCS | Mod: HCNC,S$GLB,, | Performed by: SURGERY

## 2020-06-26 RX ORDER — SODIUM CHLORIDE 9 MG/ML
INJECTION, SOLUTION INTRAVENOUS CONTINUOUS
Status: CANCELLED | OUTPATIENT
Start: 2020-06-26

## 2020-06-26 NOTE — H&P (VIEW-ONLY)
History & Physical    SUBJECTIVE:     History of Present Illness:  Patient is a 80 y.o. female presents with malignant growth on her left forearm. Onset of symptoms was gradual starting several months ago with unchanged course since that time. Patient denies of any pain associated with the lesion.  She was also being seen for the evaluation of the wound on her back which was consistent with an abscess and necrosis of skin.  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  She came in for the examination of the lower back wound which is healing appropriately.  The patient was instructed to do normal dressing with daily shower.    Chief Complaint   Patient presents with    Post-op Evaluation       Review of patient's allergies indicates:   Allergen Reactions    Codeine      Other reaction(s): hyper  Other reaction(s): hyper    Gabapentin Other (See Comments)     Bad dreams       Current Outpatient Medications   Medication Sig Dispense Refill    amitriptyline (ELAVIL) 75 MG tablet TAKE 1 TABLET BY MOUTH EVERY EVENING 90 tablet 3    benzonatate (TESSALON) 100 MG capsule Take 1-2 capsules (100-200 mg total) by mouth 3 (three) times daily as needed for Cough. 60 capsule 0    calcium citrate-vitamin D (CITRACAL + D) 315-200 mg-unit per tablet Take 1 tablet by mouth once daily.       DULoxetine (CYMBALTA) 20 MG capsule TAKE 2 CAPSULES(40 MG) BY MOUTH EVERY  capsule 3    ferrous gluconate 324 mg (37.5 mg iron) Tab tablet Take 1 tablet (324 mg total) by mouth daily with breakfast. (Patient taking differently: Take 324 mg by mouth 2 (two) times daily with meals. ) 30 tablet 11    fluticasone propionate (FLONASE) 50 mcg/actuation nasal spray 2 sprays (100 mcg total) by Each Nostril route once daily. 16 g 6    hydrocodone-acetaminophen 5-325mg (NORCO) 5-325 mg per tablet TK 1 T PO Q 6 H PRN  0    hydrOXYzine HCl (ATARAX) 25 MG tablet Take 25 mg by mouth nightly.       leucovorin (WELLCOVORIN) 5 mg  Tab TAKE ONE WEEKLY ON SATURDAYS 4 tablet 3    levothyroxine (SYNTHROID) 88 MCG tablet TAKE 1 TABLET BY MOUTH BEFORE BREAKFAST 90 tablet 3    magnesium oxide (MAG-OX) 400 mg (241.3 mg magnesium) tablet Take 1 tablet (400 mg total) by mouth 3 (three) times daily.  0    meclizine (ANTIVERT) 50 MG tablet Take 25 mg by mouth 3 (three) times daily as needed.      metoprolol succinate (TOPROL-XL) 50 MG 24 hr tablet TAKE 1 TABLET(50 MG) BY MOUTH EVERY DAY 30 tablet 11    multivitamin capsule Take by mouth. As directed      ondansetron (ZOFRAN) 4 MG tablet Take 1 tablet (4 mg total) by mouth every 8 (eight) hours as needed for Nausea. 30 tablet 1    pravastatin (PRAVACHOL) 10 MG tablet TAKE 1 TABLET(10 MG) BY MOUTH EVERY DAY 30 tablet 11    prochlorperazine (COMPAZINE) 5 MG tablet TAKE 1 TABLET(5 MG) BY MOUTH EVERY 6 HOURS AS NEEDED FOR NAUSEA 385 tablet 1    topiramate (TOPAMAX) 25 MG tablet Take 1 tablet (25 mg total) by mouth at night x 1 week then increase to 2 (two) times daily. Increase as tolerated. 60 tablet 0    valsartan-hydrochlorothiazide (DIOVAN-HCT) 80-12.5 mg per tablet TAKE 1 TABLET BY MOUTH DAILY 90 tablet 3    albuterol (PROVENTIL) 2.5 mg /3 mL (0.083 %) nebulizer solution Take 3 mLs (2.5 mg total) by nebulization every 6 (six) hours as needed for Wheezing. 1 Box 5    tamsulosin (FLOMAX) 0.4 mg Cap Take 1 capsule (0.4 mg total) by mouth once daily. For urinary retention 30 capsule 0     No current facility-administered medications for this visit.        Past Medical History:   Diagnosis Date    Acid reflux     Anxiety     Back pain     Bronchitis, chronic obstructive w acute bronchitis 7/29/2016    [facility]    Cataract     2+NS    Degenerative disc disease     Depression     Dry mouth     Hernia, hiatal 11/18/2013    Hypertension     Hypothyroid     Macrocytic anemia 5/3/2016    Macular degeneration     Migraines     Mixed anxiety and  depressive disorder     Multiple fractures of ribs of right side     Osteoporosis     Other hyperlipidemia 10/11/2019    Pneumonia     Pneumonia due to other staphylococcus     Rheumatoid arthritis     Rheumatoid arthritis(714.0)     Rheumatoid arthritis(714.0)     Remicade, MTX.     Past Surgical History:   Procedure Laterality Date    APPENDECTOMY  1985    APPLICATION OF WOUND VACUUM-ASSISTED CLOSURE DEVICE N/A 5/14/2020    Procedure: APPLICATION, WOUND VAC;  Surgeon: Mckinley Shannon MD;  Location: [facility];  Service: General;  Laterality: N/A;    BREAST BIOPSY      CATARACT EXTRACTION Bilateral 6/11/15    Dr. Booth    CHOLECYSTECTOMY  2013    cryoablasion kidney Left 09/27/2016    feet Bilateral     rheumatoid    FRACTURE SURGERY Right     tibia    HERNIA REPAIR      HYSTERECTOMY  1970    partial    INCISION AND DRAINAGE OF ABSCESS N/A 5/12/2020    Procedure: INCISION AND DRAINAGE, ABSCESS;  Surgeon: Emerson Hathaway MD;  Location: [facility] OR;  Service: General;  Laterality: N/A;    INCISIONAL BIOPSY N/A 5/12/2020    Procedure: INCISIONAL BIOPSY;  Surgeon: Emerson Hathaway MD;  Location: [facility] OR;  Service: General;  Laterality: N/A;    JOINT REPLACEMENT      bilateral knees (2008), hands, wrists, knuckles, toes    LAPAROSCOPIC NISSEN FUNDOPLICATION      TRANSFORAMINAL EPIDURAL INJECTION OF STEROID Left 6/25/2019    Procedure: Left L5/S1 TF AYAAN with local;  Surgeon: Rowdy Felix MD;  Location: [facility];  Service: Pain Management;  Laterality: Left;    WOUND DEBRIDEMENT N/A 5/14/2020    Procedure: DEBRIDEMENT, WOUND;  Surgeon: Mckinley Shannon MD;  Location: [facility] OR;  Service: General;  Laterality: N/A;     Family History   Problem Relation Age of Onset    Heart disease Mother     Hyperlipidemia Mother     Hypertension Mother     Osteoarthritis Mother     Cataracts Mother     Hypertension Father     Osteoarthritis Father     Heart disease Father     Asthma Sister     Chronic back pain  "Sister     Hypertension Sister     Osteoarthritis Sister     Thyroid disease Sister     Asthma Brother     Cancer Brother     Chronic back pain Brother     Diabetes Mellitus Brother     Hypertension Brother     Osteoarthritis Brother     Thyroid disease Brother     Cancer Maternal Grandfather     Fibromyalgia Daughter     Heart disease Maternal Grandmother     Colon cancer Neg Hx     Diabetes Neg Hx      Social History     Tobacco Use    Smoking status: Former Smoker     Packs/day: 0.25     Years: 2.00     Pack years: 0.50     Quit date: 1965     Years since quittin.6    Smokeless tobacco: Never Used   Substance Use Topics    Alcohol use: No    Drug use: No        Review of Systems:  Review of Systems   Constitutional: Negative for chills and fever.   HENT: Negative for sore throat and trouble swallowing.    Eyes: Negative.    Respiratory: Negative for cough and shortness of breath.    Cardiovascular: Negative.    Gastrointestinal: Negative for abdominal distention, abdominal pain, nausea and vomiting.   Endocrine: Negative.    Genitourinary: Negative.    Musculoskeletal: Negative.    Skin: Positive for wound.   Allergic/Immunologic: Negative.    Neurological: Negative.    Hematological: Does not bruise/bleed easily.   Psychiatric/Behavioral: Negative.        OBJECTIVE:     Vital Signs (Most Recent)  Temp: 98.3 °F (36.8 °C) (20 1051)  Pulse: 99 (20 1051)  BP: 138/79 (20 1051)  5' 2" (1.575 m)  48.9 kg (107 lb 12.9 oz)     Physical Exam:  Physical Exam  Constitutional:       Appearance: She is well-developed.   HENT:      Head: Normocephalic.      Right Ear: External ear normal.      Left Ear: External ear normal.      Nose: Nose normal.   Eyes:      General: No scleral icterus.     Pupils: Pupils are equal, round, and reactive to light.   Neck:      Musculoskeletal: Normal range of motion and neck supple.      Thyroid: No thyromegaly.   Cardiovascular:      Rate and " Rhythm: Normal rate and regular rhythm.      Heart sounds: Normal heart sounds. No murmur.   Pulmonary:      Effort: Pulmonary effort is normal.      Breath sounds: Normal breath sounds.   Abdominal:      General: Bowel sounds are normal.      Palpations: Abdomen is soft.      Tenderness: There is no abdominal tenderness. There is no guarding.   Musculoskeletal: Normal range of motion.   Lymphadenopathy:      Cervical: No cervical adenopathy.   Skin:     General: Skin is warm and dry.      Comments: A raised circular will with umbilication in the middle growth in the middle of forearm on the left side.  The finding is consistent with squamous cell carcinoma.   Neurological:      Mental Status: She is alert and oriented to person, place, and time.         Laboratory  Lab Results   Component Value Date    WBC 21.38 (H) 06/09/2020    HGB 6.0 (L) 06/09/2020    HCT 20.9 (L) 06/09/2020    PLT 51 (L) 06/09/2020    CHOL 132 09/03/2019    TRIG 250 (H) 09/03/2019    HDL 33 (L) 09/03/2019    ALT 9 (L) 06/09/2020    AST 19 06/09/2020     06/09/2020    K 4.3 06/09/2020     06/09/2020    CREATININE 0.9 06/09/2020    BUN 16 06/09/2020    CO2 23 06/09/2020    TSH 5.174 (H) 09/03/2019    INR 1.0 05/28/2020    GLUF 83 01/31/2007       Results for orders placed during the hospital encounter of 07/09/19   CT Abdomen Pelvis W Wo Contrast    Narrative EXAMINATION:  CT ABDOMEN PELVIS W WO CONTRAST    CLINICAL HISTORY:  renal mass, palpable right abdomen mass.;Right upper quadrant abdominal swelling, mass and lump    TECHNIQUE:  Low dose axial images, sagittal and coronal reformations were obtained from the lung bases to the pubic symphysis before and following the IV administration of 75 mL of Omnipaque 350.  Water was administered as contrast media    COMPARISON:  06/28/2018    FINDINGS:  ABDOMEN    Lung bases: The lung bases are clear.  A moderate-sized hiatal hernia is noted.    Liver/gallbladder/biliary: There is a stable  nonenhancing hypodensity within the medial aspect of the dome of the liver that appears to be cystic and stable in appearance when compared to multiple prior exams.  The gallbladder is surgically absent.No biliary ductal dilation.    Pancreas: The pancreas is unremarkable in appearance.    Spleen: The spleen is enlarged and measures up to 14.1 cm in the craniocaudal dimension.    Adrenals: Unremarkable    Kidneys: The kidneys are equally perfused and demonstrate no solid masses. There is some scarring and volume loss associated with the upper pole of the left kidney secondary to prior ablation that is similar in appearance to the prior exam.  There is a small exophytic upper pole left renal cyst that measures 12 mm.  There is a larger lower pole to interpolar left renal cyst that is stable and also exophytic and measures up to 3.2 cm.    Bowel/Mesentery: There is no evidence of bowel obstruction.  No mesenteric stranding or adenopathy.    Retroperitoneum: Stable nonenlarged retroperitoneal lymph nodes.Prominent vascular calcification seen involving the aorta.    PELVIS    Genitourinary/Reproductive organs: The patient is status post hysterectomy and oophorectomy.    Adenopathy: There is a left external iliac chain lymph node that measures approximately 12 mm in the short axis.  There is also an obturator/external iliac chain lymph node on the right that has enlarged in the interval and measures approximately 17-18 mm in the short axis as compared to 9 mm in the short axis on the prior study.  The more inferiorly located left external iliac chain lymph node now measures approximately 11-12 mm in the short axis as compared to 8 mm on the prior exam.    Free Fluid: No free fluid    Osseus Structures/Soft tissues: No suspicious appearing osseus lesions. No significant soft tissue abnormality.      Impression 1. Stable appearance of an area of ablation/volume loss associated with the upper pole of the left kidney with no  signs of local recurrent disease.  2. Interval enlargement of a couple of obturator/external iliac chain lymph node on the right.  This is nonspecific.  Enlarged left external iliac chain lymph node and small nonenlarged retroperitoneal lymph nodes unchanged.      Electronically signed by: Thai Montemayor DO  Date:    07/09/2019  Time:    08:57         Diagnostic Results:  Labs: Reviewed  ECG: Reviewed  X-Ray: Reviewed    None    ASSESSMENT/PLAN:         Rule out squamous cell carcinoma, left forearm.    PLAN:Plan     The plan is to proceed with excision under local mac.  Scheduled tree scheduled for July 2, 2020 at 11:00 a.m..    Mckinley Shannon

## 2020-06-26 NOTE — PROGRESS NOTES
History & Physical    SUBJECTIVE:     History of Present Illness:  Patient is a 80 y.o. female presents with malignant growth on her left forearm. Onset of symptoms was gradual starting several months ago with unchanged course since that time. Patient denies of any pain associated with the lesion.  She was also being seen for the evaluation of the wound on her back which was consistent with an abscess and necrosis of skin.  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  She came in for the examination of the lower back wound which is healing appropriately.  The patient was instructed to do normal dressing with daily shower.    Chief Complaint   Patient presents with    Post-op Evaluation       Review of patient's allergies indicates:   Allergen Reactions    Codeine      Other reaction(s): hyper  Other reaction(s): hyper    Gabapentin Other (See Comments)     Bad dreams       Current Outpatient Medications   Medication Sig Dispense Refill    amitriptyline (ELAVIL) 75 MG tablet TAKE 1 TABLET BY MOUTH EVERY EVENING 90 tablet 3    benzonatate (TESSALON) 100 MG capsule Take 1-2 capsules (100-200 mg total) by mouth 3 (three) times daily as needed for Cough. 60 capsule 0    calcium citrate-vitamin D (CITRACAL + D) 315-200 mg-unit per tablet Take 1 tablet by mouth once daily.       DULoxetine (CYMBALTA) 20 MG capsule TAKE 2 CAPSULES(40 MG) BY MOUTH EVERY  capsule 3    ferrous gluconate 324 mg (37.5 mg iron) Tab tablet Take 1 tablet (324 mg total) by mouth daily with breakfast. (Patient taking differently: Take 324 mg by mouth 2 (two) times daily with meals. ) 30 tablet 11    fluticasone propionate (FLONASE) 50 mcg/actuation nasal spray 2 sprays (100 mcg total) by Each Nostril route once daily. 16 g 6    hydrocodone-acetaminophen 5-325mg (NORCO) 5-325 mg per tablet TK 1 T PO Q 6 H PRN  0    hydrOXYzine HCl (ATARAX) 25 MG tablet Take 25 mg by mouth nightly.       leucovorin (WELLCOVORIN) 5 mg  Tab TAKE ONE WEEKLY ON SATURDAYS 4 tablet 3    levothyroxine (SYNTHROID) 88 MCG tablet TAKE 1 TABLET BY MOUTH BEFORE BREAKFAST 90 tablet 3    magnesium oxide (MAG-OX) 400 mg (241.3 mg magnesium) tablet Take 1 tablet (400 mg total) by mouth 3 (three) times daily.  0    meclizine (ANTIVERT) 50 MG tablet Take 25 mg by mouth 3 (three) times daily as needed.      metoprolol succinate (TOPROL-XL) 50 MG 24 hr tablet TAKE 1 TABLET(50 MG) BY MOUTH EVERY DAY 30 tablet 11    multivitamin capsule Take by mouth. As directed      ondansetron (ZOFRAN) 4 MG tablet Take 1 tablet (4 mg total) by mouth every 8 (eight) hours as needed for Nausea. 30 tablet 1    pravastatin (PRAVACHOL) 10 MG tablet TAKE 1 TABLET(10 MG) BY MOUTH EVERY DAY 30 tablet 11    prochlorperazine (COMPAZINE) 5 MG tablet TAKE 1 TABLET(5 MG) BY MOUTH EVERY 6 HOURS AS NEEDED FOR NAUSEA 385 tablet 1    topiramate (TOPAMAX) 25 MG tablet Take 1 tablet (25 mg total) by mouth at night x 1 week then increase to 2 (two) times daily. Increase as tolerated. 60 tablet 0    valsartan-hydrochlorothiazide (DIOVAN-HCT) 80-12.5 mg per tablet TAKE 1 TABLET BY MOUTH DAILY 90 tablet 3    albuterol (PROVENTIL) 2.5 mg /3 mL (0.083 %) nebulizer solution Take 3 mLs (2.5 mg total) by nebulization every 6 (six) hours as needed for Wheezing. 1 Box 5    tamsulosin (FLOMAX) 0.4 mg Cap Take 1 capsule (0.4 mg total) by mouth once daily. For urinary retention 30 capsule 0     No current facility-administered medications for this visit.        Past Medical History:   Diagnosis Date    Acid reflux     Anxiety     Back pain     Bronchitis, chronic obstructive w acute bronchitis 7/29/2016    [facility]    Cataract     2+NS    Degenerative disc disease     Depression     Dry mouth     Hernia, hiatal 11/18/2013    Hypertension     Hypothyroid     Macrocytic anemia 5/3/2016    Macular degeneration     Migraines     Mixed anxiety and  depressive disorder     Multiple fractures of ribs of right side     Osteoporosis     Other hyperlipidemia 10/11/2019    Pneumonia     Pneumonia due to other staphylococcus     Rheumatoid arthritis     Rheumatoid arthritis(714.0)     Rheumatoid arthritis(714.0)     Remicade, MTX.     Past Surgical History:   Procedure Laterality Date    APPENDECTOMY  1985    APPLICATION OF WOUND VACUUM-ASSISTED CLOSURE DEVICE N/A 5/14/2020    Procedure: APPLICATION, WOUND VAC;  Surgeon: Mckinley Shannon MD;  Location: [facility] [place];  Service: General;  Laterality: N/A;    BREAST BIOPSY      CATARACT EXTRACTION Bilateral 6/11/15    Dr. Booth    CHOLECYSTECTOMY  2013    cryoablasion kidney Left 09/27/2016    feet Bilateral     rheumatoid    FRACTURE SURGERY Right     tibia    HERNIA REPAIR      HYSTERECTOMY  1970    partial    INCISION AND DRAINAGE OF ABSCESS N/A 5/12/2020    Procedure: INCISION AND DRAINAGE, ABSCESS;  Surgeon: Emerson Hathaway MD;  Location: [facility] OR;  Service: General;  Laterality: N/A;    INCISIONAL BIOPSY N/A 5/12/2020    Procedure: INCISIONAL BIOPSY;  Surgeon: Emerson Hathaway MD;  Location: [facility] [place];  Service: General;  Laterality: N/A;    JOINT REPLACEMENT      bilateral knees (2008), hands, wrists, knuckles, toes    LAPAROSCOPIC NISSEN FUNDOPLICATION      TRANSFORAMINAL EPIDURAL INJECTION OF STEROID Left 6/25/2019    Procedure: Left L5/S1 TF AYAAN with local;  Surgeon: Rowdy Felix MD;  Location: [facility] PAIN [facility]GT;  Service: Pain Management;  Laterality: Left;    WOUND DEBRIDEMENT N/A 5/14/2020    Procedure: DEBRIDEMENT, WOUND;  Surgeon: Mckinley Shannon MD;  Location: [facility] OR;  Service: General;  Laterality: N/A;     Family History   Problem Relation Age of Onset    Heart disease Mother     Hyperlipidemia Mother     Hypertension Mother     Osteoarthritis Mother     Cataracts Mother     Hypertension Father     Osteoarthritis Father     Heart disease Father     Asthma Sister     Chronic back pain  "Sister     Hypertension Sister     Osteoarthritis Sister     Thyroid disease Sister     Asthma Brother     Cancer Brother     Chronic back pain Brother     Diabetes Mellitus Brother     Hypertension Brother     Osteoarthritis Brother     Thyroid disease Brother     Cancer Maternal Grandfather     Fibromyalgia Daughter     Heart disease Maternal Grandmother     Colon cancer Neg Hx     Diabetes Neg Hx      Social History     Tobacco Use    Smoking status: Former Smoker     Packs/day: 0.25     Years: 2.00     Pack years: 0.50     Quit date: 1965     Years since quittin.6    Smokeless tobacco: Never Used   Substance Use Topics    Alcohol use: No    Drug use: No        Review of Systems:  Review of Systems   Constitutional: Negative for chills and fever.   HENT: Negative for sore throat and trouble swallowing.    Eyes: Negative.    Respiratory: Negative for cough and shortness of breath.    Cardiovascular: Negative.    Gastrointestinal: Negative for abdominal distention, abdominal pain, nausea and vomiting.   Endocrine: Negative.    Genitourinary: Negative.    Musculoskeletal: Negative.    Skin: Positive for wound.   Allergic/Immunologic: Negative.    Neurological: Negative.    Hematological: Does not bruise/bleed easily.   Psychiatric/Behavioral: Negative.        OBJECTIVE:     Vital Signs (Most Recent)  Temp: 98.3 °F (36.8 °C) (20 1051)  Pulse: 99 (20 1051)  BP: 138/79 (20 1051)  5' 2" (1.575 m)  48.9 kg (107 lb 12.9 oz)     Physical Exam:  Physical Exam  Constitutional:       Appearance: She is well-developed.   HENT:      Head: Normocephalic.      Right Ear: External ear normal.      Left Ear: External ear normal.      Nose: Nose normal.   Eyes:      General: No scleral icterus.     Pupils: Pupils are equal, round, and reactive to light.   Neck:      Musculoskeletal: Normal range of motion and neck supple.      Thyroid: No thyromegaly.   Cardiovascular:      Rate and " Rhythm: Normal rate and regular rhythm.      Heart sounds: Normal heart sounds. No murmur.   Pulmonary:      Effort: Pulmonary effort is normal.      Breath sounds: Normal breath sounds.   Abdominal:      General: Bowel sounds are normal.      Palpations: Abdomen is soft.      Tenderness: There is no abdominal tenderness. There is no guarding.   Musculoskeletal: Normal range of motion.   Lymphadenopathy:      Cervical: No cervical adenopathy.   Skin:     General: Skin is warm and dry.      Comments: A raised circular will with umbilication in the middle growth in the middle of forearm on the left side.  The finding is consistent with squamous cell carcinoma.   Neurological:      Mental Status: She is alert and oriented to person, place, and time.         Laboratory  Lab Results   Component Value Date    WBC 21.38 (H) 06/09/2020    HGB 6.0 (L) 06/09/2020    HCT 20.9 (L) 06/09/2020    PLT 51 (L) 06/09/2020    CHOL 132 09/03/2019    TRIG 250 (H) 09/03/2019    HDL 33 (L) 09/03/2019    ALT 9 (L) 06/09/2020    AST 19 06/09/2020     06/09/2020    K 4.3 06/09/2020     06/09/2020    CREATININE 0.9 06/09/2020    BUN 16 06/09/2020    CO2 23 06/09/2020    TSH 5.174 (H) 09/03/2019    INR 1.0 05/28/2020    GLUF 83 01/31/2007       Results for orders placed during the hospital encounter of 07/09/19   CT Abdomen Pelvis W Wo Contrast    Narrative EXAMINATION:  CT ABDOMEN PELVIS W WO CONTRAST    CLINICAL HISTORY:  renal mass, palpable right abdomen mass.;Right upper quadrant abdominal swelling, mass and lump    TECHNIQUE:  Low dose axial images, sagittal and coronal reformations were obtained from the lung bases to the pubic symphysis before and following the IV administration of 75 mL of Omnipaque 350.  Water was administered as contrast media    COMPARISON:  06/28/2018    FINDINGS:  ABDOMEN    Lung bases: The lung bases are clear.  A moderate-sized hiatal hernia is noted.    Liver/gallbladder/biliary: There is a stable  nonenhancing hypodensity within the medial aspect of the dome of the liver that appears to be cystic and stable in appearance when compared to multiple prior exams.  The gallbladder is surgically absent.No biliary ductal dilation.    Pancreas: The pancreas is unremarkable in appearance.    Spleen: The spleen is enlarged and measures up to 14.1 cm in the craniocaudal dimension.    Adrenals: Unremarkable    Kidneys: The kidneys are equally perfused and demonstrate no solid masses. There is some scarring and volume loss associated with the upper pole of the left kidney secondary to prior ablation that is similar in appearance to the prior exam.  There is a small exophytic upper pole left renal cyst that measures 12 mm.  There is a larger lower pole to interpolar left renal cyst that is stable and also exophytic and measures up to 3.2 cm.    Bowel/Mesentery: There is no evidence of bowel obstruction.  No mesenteric stranding or adenopathy.    Retroperitoneum: Stable nonenlarged retroperitoneal lymph nodes.Prominent vascular calcification seen involving the aorta.    PELVIS    Genitourinary/Reproductive organs: The patient is status post hysterectomy and oophorectomy.    Adenopathy: There is a left external iliac chain lymph node that measures approximately 12 mm in the short axis.  There is also an obturator/external iliac chain lymph node on the right that has enlarged in the interval and measures approximately 17-18 mm in the short axis as compared to 9 mm in the short axis on the prior study.  The more inferiorly located left external iliac chain lymph node now measures approximately 11-12 mm in the short axis as compared to 8 mm on the prior exam.    Free Fluid: No free fluid    Osseus Structures/Soft tissues: No suspicious appearing osseus lesions. No significant soft tissue abnormality.      Impression 1. Stable appearance of an area of ablation/volume loss associated with the upper pole of the left kidney with no  signs of local recurrent disease.  2. Interval enlargement of a couple of obturator/external iliac chain lymph node on the right.  This is nonspecific.  Enlarged left external iliac chain lymph node and small nonenlarged retroperitoneal lymph nodes unchanged.      Electronically signed by: Thai Montemayor DO  Date:    07/09/2019  Time:    08:57         Diagnostic Results:  Labs: Reviewed  ECG: Reviewed  X-Ray: Reviewed    None    ASSESSMENT/PLAN:         Rule out squamous cell carcinoma, left forearm.    PLAN:Plan     The plan is to proceed with excision under local mac.  Scheduled tree scheduled for July 2, 2020 at 11:00 a.m..    Mckinley Shannon

## 2020-06-29 ENCOUNTER — DOCUMENT SCAN (OUTPATIENT)
Dept: HOME HEALTH SERVICES | Facility: HOSPITAL | Age: 80
End: 2020-06-29
Payer: MEDICARE

## 2020-06-30 ENCOUNTER — OFFICE VISIT (OUTPATIENT)
Dept: HEMATOLOGY/ONCOLOGY | Facility: CLINIC | Age: 80
End: 2020-06-30
Payer: MEDICARE

## 2020-06-30 VITALS
DIASTOLIC BLOOD PRESSURE: 77 MMHG | RESPIRATION RATE: 19 BRPM | HEART RATE: 99 BPM | WEIGHT: 108.94 LBS | TEMPERATURE: 98 F | BODY MASS INDEX: 20.05 KG/M2 | OXYGEN SATURATION: 95 % | HEIGHT: 62 IN | SYSTOLIC BLOOD PRESSURE: 135 MMHG

## 2020-06-30 DIAGNOSIS — D50.0 IRON DEFICIENCY ANEMIA DUE TO CHRONIC BLOOD LOSS: ICD-10-CM

## 2020-06-30 DIAGNOSIS — L02.212 ABSCESS OF LOWER BACK: ICD-10-CM

## 2020-06-30 DIAGNOSIS — C94.80 LEUKEMIA CUTIS: ICD-10-CM

## 2020-06-30 DIAGNOSIS — L98.8 LEUKEMIA CUTIS: ICD-10-CM

## 2020-06-30 DIAGNOSIS — C93.10 CHRONIC MYELOMONOCYTIC LEUKEMIA NOT HAVING ACHIEVED REMISSION: Primary | ICD-10-CM

## 2020-06-30 DIAGNOSIS — L98.9 SKIN LESION OF LEFT UPPER EXTREMITY: ICD-10-CM

## 2020-06-30 PROCEDURE — 99999 PR PBB SHADOW E&M-EST. PATIENT-LVL IV: CPT | Mod: PBBFAC,HCNC,, | Performed by: INTERNAL MEDICINE

## 2020-06-30 PROCEDURE — 1159F PR MEDICATION LIST DOCUMENTED IN MEDICAL RECORD: ICD-10-PCS | Mod: HCNC,S$GLB,, | Performed by: INTERNAL MEDICINE

## 2020-06-30 PROCEDURE — 1125F PR PAIN SEVERITY QUANTIFIED, PAIN PRESENT: ICD-10-PCS | Mod: HCNC,S$GLB,, | Performed by: INTERNAL MEDICINE

## 2020-06-30 PROCEDURE — 1125F AMNT PAIN NOTED PAIN PRSNT: CPT | Mod: HCNC,S$GLB,, | Performed by: INTERNAL MEDICINE

## 2020-06-30 PROCEDURE — 1101F PR PT FALLS ASSESS DOC 0-1 FALLS W/OUT INJ PAST YR: ICD-10-PCS | Mod: HCNC,CPTII,S$GLB, | Performed by: INTERNAL MEDICINE

## 2020-06-30 PROCEDURE — 3078F DIAST BP <80 MM HG: CPT | Mod: HCNC,CPTII,S$GLB, | Performed by: INTERNAL MEDICINE

## 2020-06-30 PROCEDURE — 1159F MED LIST DOCD IN RCRD: CPT | Mod: HCNC,S$GLB,, | Performed by: INTERNAL MEDICINE

## 2020-06-30 PROCEDURE — 3075F SYST BP GE 130 - 139MM HG: CPT | Mod: HCNC,CPTII,S$GLB, | Performed by: INTERNAL MEDICINE

## 2020-06-30 PROCEDURE — 3078F PR MOST RECENT DIASTOLIC BLOOD PRESSURE < 80 MM HG: ICD-10-PCS | Mod: HCNC,CPTII,S$GLB, | Performed by: INTERNAL MEDICINE

## 2020-06-30 PROCEDURE — 99999 PR PBB SHADOW E&M-EST. PATIENT-LVL IV: ICD-10-PCS | Mod: PBBFAC,HCNC,, | Performed by: INTERNAL MEDICINE

## 2020-06-30 PROCEDURE — 3075F PR MOST RECENT SYSTOLIC BLOOD PRESS GE 130-139MM HG: ICD-10-PCS | Mod: HCNC,CPTII,S$GLB, | Performed by: INTERNAL MEDICINE

## 2020-06-30 PROCEDURE — 99215 PR OFFICE/OUTPT VISIT, EST, LEVL V, 40-54 MIN: ICD-10-PCS | Mod: HCNC,S$GLB,, | Performed by: INTERNAL MEDICINE

## 2020-06-30 PROCEDURE — 99215 OFFICE O/P EST HI 40 MIN: CPT | Mod: HCNC,S$GLB,, | Performed by: INTERNAL MEDICINE

## 2020-06-30 PROCEDURE — 1101F PT FALLS ASSESS-DOCD LE1/YR: CPT | Mod: HCNC,CPTII,S$GLB, | Performed by: INTERNAL MEDICINE

## 2020-06-30 NOTE — ASSESSMENT & PLAN NOTE
Normocytic anemia is likely due to chronic inflammation given extensive history for rheumatoid arthritis.  Hemoglobin is at 7.5 grams/deciliter, patient remains asymptomatic from anemia.  Will continue to monitor.

## 2020-06-30 NOTE — ASSESSMENT & PLAN NOTE
Status post I and D, wound VAC removed.  Healing very well, continues to follow with surgery service.

## 2020-06-30 NOTE — ASSESSMENT & PLAN NOTE
Chronic myelomonocytic leukemia, on Vidaza which is currently on hold given recent infection.  Will continue to hold, reviewed labs today and noted severe thrombocytopenia [place] at [id number].  No ecchymosis or petechiae on exam.  Denies abnormal bleed.  Will recheck again in 2 weeks.

## 2020-06-30 NOTE — PRE ADMISSION SCREENING
Pre op instructions reviewed with patient per phone:    To confirm, Your surgeon has instructed you:  Surgery is scheduled 7/2/2020 at 1025.      [facility] main lobby by 0900.   Pre admit office to call afternoon prior to surgery with final arrival time    Covid 19 testing is scheduled for 6/30/2020 at 1130  @ [facility]  Please self quarantine after Covid testing, prior to surgery      INSTRUCTIONS IMPORTANT!!!  ¨ Do not eat, drink, or smoke after 12 midnight prior to surgery, including water. OK to brush teeth, no gum, candy or mints!    ¨ Take only these medicines with a small swallow of water-morning of surgery.  Synthroid, Prilosec, metoprolol          ____   Due to COVID 19 concerns, 1 visitor will be allowed in the pre operative area, and must adhere to social distancing guidelines.  One visitor/family member is currently allowed to visit in-patient rooms from 10:00 a.m - 6:00 p.m    ____   Family/caregivers will be updated re pt status via text/cell phone      ____  Do not wear makeup, including mascara.  ____  No powder, lotions or creams to surgical area.  ____  Please remove all jewelry, including piercings and leave at home.  ____  No money or valuables needed. Please leave at home.  ____  Please bring identification and insurance information to hospital.  ____  If going home the same day, arrange for a ride home. You will not be able to   drive if Anesthesia was used.  ____  Children, under 12 years old, must remain in the waiting room with an adult.  They are not allowed in patient areas.  ____  Wear loose fitting clothing. Allow for dressings, bandages.  ____  Stop Aspirin, Ibuprofen, Motrin and Aleve at least 5-7 days before surgery, unless otherwise instructed by your doctor, or the nurse.   You MAY use Tylenol/acetaminophen until day of surgery.  ____  If you take diabetic medication, do not take am of surgery unless instructed by   Doctor.  ____  Stop taking any Fish Oil supplement or any Vitamins that contain Vitamin E at least 5 days prior to surgery.          Bathing Instructions-- The night before surgery and the morning prior to coming to the hospital:   -Do not shave the surgical area.   -Shower and wash your hair and body as usual with anti-bacterial  soap and shampoo.   -Rinse your hair and body completely.   -Use one packet of hibiclens to wash the surgical site (using your hand) gently for 5 minutes.  Do not scrub you skin too hard.   -Do not use hibiclens on your head, face, or genitals.   -Do not wash with anti-bacterial soap after you use the hibiclens.   -Rinse your body thoroughly.   -Dry with clean, soft towel.  Do not use lotion, cream, deodorant, or powders on   the surgical site.    Use antibacterial soap in place of hibiclens if your surgery is on the head, face or genitals.         Surgical Site Infection    Prevention of surgical site infections:     -Keep incisions clean and dry.   -Do not soak/submerge incisions in water until completely healed.   -Do not apply lotions, powders, creams, or deodorants to site.   -Always make sure hands are cleaned with antibacterial soap/ alcohol-based   prior to touching the surgical site.  (This includes doctors, nurses, staff, and yourself.)    Signs and symptoms:   -Redness and pain around the area where you had surgery   -Drainage of cloudy fluid from your surgical wound   -Fever over 100.4  I have read or had read and explained to me, and understand the above information.

## 2020-06-30 NOTE — PROGRESS NOTES
Subjective:       Patient ID: Sarah Parker is a 80 y.o. female.    Chief Complaint:  Chronic myelomonocytic leukemia    Follow-up  Pertinent negatives include no abdominal pain, arthralgias, chest pain, chills, congestion, coughing, diaphoresis, fever, headaches, joint swelling, myalgias, nausea, neck pain, numbness, sore throat or vomiting.      Patient is a 80-year-old female presents for reinstitution  of Vidaza therapy for chronic myelomonocytic leukemia patient who is currently on treatment with azacitidine.  She is here for routine follow-up.      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  Status post 4 cycles.  Overall tolerated well. Recently treated for back abscess with antibiotics, however it appears the abscess has erupted and more aggressive per patient.  She was seen by myself on 05/11/2020 and at that time I recommend going to the emergency room for evaluation and possible incision and drainage. Patient presented to the emergency room on 05/12/2020 and at that time was noted to have cellulitis of back weight abscess.  She underwent incision and drainage by Dr. Shannon, she also had a wound VAC placement which has been removed.    Presents today for routine follow-up. Denies any symptoms including fever, chills, worsening lower back pain, nausea or diarrhea.  She denies chest pain.  She complains of fatigue, shortness of breath with minimal exertion.    Past Medical History:   Diagnosis Date    Acid reflux     Anxiety     Back pain     Bronchitis, chronic obstructive w acute bronchitis 7/29/2016    Cancer     [facility] arms, face- Dr. Lata Tejada    Cataract     2+NS    Degenerative disc disease     Depression     Dry mouth     Hernia, hiatal 11/18/2013    Hypertension     Hypothyroid     Macrocytic anemia 5/3/2016    Macular degeneration     Migraines     Mixed anxiety and depressive disorder     Multiple fractures of ribs of right side     Osteoporosis     Other  hyperlipidemia 10/11/2019    Pneumonia     Pneumonia due to other staphylococcus     Rheumatoid arthritis     Rheumatoid arthritis(714.0)     Rheumatoid arthritis(714.0)     Remicade, MTX.     Family History   Problem Relation Age of Onset    Heart disease Mother     Hyperlipidemia Mother     Hypertension Mother     Osteoarthritis Mother     Cataracts Mother     Hypertension Father     Osteoarthritis Father     Heart disease Father     Asthma Sister     Chronic back pain Sister     Hypertension Sister     Osteoarthritis Sister     Thyroid disease Sister     Asthma Brother     Cancer Brother     Chronic back pain Brother     Diabetes Mellitus Brother     Hypertension Brother     Osteoarthritis Brother     Thyroid disease Brother     Cancer Maternal Grandfather     Fibromyalgia Daughter     Heart disease Maternal Grandmother     Colon cancer Neg Hx     Diabetes Neg Hx      Social History     Socioeconomic History    Marital status:      Spouse name: Not on file    Number of children: Not on file    Years of education: Not on file    Highest education level: Not on file   Occupational History    Occupation: retired   Social Needs    Financial resource strain: Not on file    Food insecurity     Worry: Not on file     Inability: Not on file    Transportation needs     Medical: Not on file     Non-medical: Not on file   Tobacco Use    Smoking status: Former Smoker     Packs/day: 0.25     Years: 2.00     Pack years: 0.50     Quit date: 1965     Years since quittin.6    Smokeless tobacco: Never Used   Substance and Sexual Activity    Alcohol use: No    Drug use: No    Sexual activity: Never     Partners: Male   Lifestyle    Physical activity     Days per week: Not on file     Minutes per session: Not on file    Stress: Not at all   Relationships    Social connections     Talks on phone: Not on file     Gets together: Not on file     Attends Mandaen service:  Not on file     Active member of club or organization: Not on file     Attends meetings of clubs or organizations: Not on file     Relationship status: Not on file   Other Topics Concern    Not on file   Social History Narrative    Patient is aretired and live with .     Past Surgical History:   Procedure Laterality Date    APPENDECTOMY  1985    APPLICATION OF WOUND VACUUM-ASSISTED CLOSURE DEVICE N/A 5/14/2020    Procedure: APPLICATION, WOUND VAC;  Surgeon: Mckinley Shannon MD;  Location: [facility] OR;  Service: General;  Laterality: N/A;    BREAST BIOPSY      CATARACT EXTRACTION Bilateral 6/11/15    Dr. Booth    CHOLECYSTECTOMY  2013    cryoablasion kidney Left 09/27/2016    feet Bilateral     rheumatoid    FRACTURE SURGERY Right     tibia    HERNIA REPAIR      HYSTERECTOMY  1970    partial    INCISION AND DRAINAGE OF ABSCESS N/A 5/12/2020    Procedure: INCISION AND DRAINAGE, ABSCESS;  Surgeon: Emerson Hathaway MD;  Location: [facility] OR;  Service: General;  Laterality: N/A;    INCISIONAL BIOPSY N/A 5/12/2020    Procedure: INCISIONAL BIOPSY;  Surgeon: Emerson Hathaway MD;  Location: [facility] OR;  Service: General;  Laterality: N/A;    JOINT REPLACEMENT      bilateral knees (2008), hands, wrists, knuckles, toes    LAPAROSCOPIC NISSEN FUNDOPLICATION      TRANSFORAMINAL EPIDURAL INJECTION OF STEROID Left 6/25/2019    Procedure: Left L5/S1 TF AYAAN with local;  Surgeon: Rowdy Felix MD;  Location: [facility];  Service: Pain Management;  Laterality: Left;    WOUND DEBRIDEMENT N/A 5/14/2020    Procedure: DEBRIDEMENT, WOUND;  Surgeon: Mckinley Shannon MD;  Location: [facility] OR;  Service: General;  Laterality: N/A;       Labs:  Lab Results   Component Value Date    WBC 29.77 (H) 06/30/2020    HGB 7.5 (L) 06/30/2020    HCT 24.4 (L) 06/30/2020    MCV 94 06/30/2020    PLT 41 (L) 06/30/2020     BMP  Lab Results   Component Value Date     06/30/2020    K 4.9 06/30/2020     06/30/2020    CO2 24 06/30/2020    BUN 21  06/30/2020    CREATININE 0.9 06/30/2020    CALCIUM 9.0 06/30/2020    ANIONGAP 12 06/30/2020    ESTGFRAFRICA >60 06/30/2020    EGFRNONAA >60 06/30/2020     Lab Results   Component Value Date    ALT 23 06/30/2020    AST 31 06/30/2020    ALKPHOS 301 (H) 06/30/2020    BILITOT 0.4 06/30/2020       Lab Results   Component Value Date    IRON 93 01/17/2020    TIBC 425 01/17/2020    FERRITIN 276 01/17/2020     Lab Results   Component Value Date    OHMEGKVW98 1918 (H) 08/25/2019     Lab Results   Component Value Date    FOLATE 12.0 08/25/2019     Lab Results   Component Value Date    TSH 5.174 (H) 09/03/2019         Review of Systems   Constitutional: Positive for activity change. Negative for chills, diaphoresis and fever.   HENT: Negative for congestion, dental problem, drooling, ear discharge, ear pain, facial swelling, hearing loss, mouth sores, nosebleeds, postnasal drip, rhinorrhea, sinus pressure, sneezing, sore throat, tinnitus, trouble swallowing and voice change.    Eyes: Negative for photophobia, pain, discharge, redness, itching and visual disturbance.   Respiratory: Negative for cough, choking, chest tightness, shortness of breath, wheezing and stridor.    Cardiovascular: Negative for chest pain, palpitations and leg swelling.   Gastrointestinal: Negative for abdominal distention, abdominal pain, anal bleeding, blood in stool, constipation, diarrhea, nausea, rectal pain and vomiting.   Endocrine: Negative for cold intolerance, heat intolerance, polydipsia, polyphagia and polyuria.   Genitourinary: Negative for decreased urine volume, difficulty urinating, dyspareunia, dysuria, enuresis, flank pain, frequency, genital sores, hematuria, menstrual problem, pelvic pain, urgency, vaginal bleeding, vaginal discharge and vaginal pain.   Musculoskeletal: Negative for arthralgias, gait problem, joint swelling, myalgias, neck pain and neck stiffness.   Skin: Negative for color change and pallor.   Allergic/Immunologic:  Negative for environmental allergies, food allergies and immunocompromised state.   Neurological: Negative for dizziness, tremors, seizures, syncope, facial asymmetry, speech difficulty, light-headedness, numbness and headaches.   Hematological: Negative for adenopathy. Does not bruise/bleed easily.   Psychiatric/Behavioral: Positive for dysphoric mood. Negative for agitation, behavioral problems, confusion, decreased concentration, hallucinations, self-injury, sleep disturbance and suicidal ideas. The patient is nervous/anxious. The patient is not hyperactive.        Objective:      Physical Exam  Vitals signs reviewed.   Constitutional:       Appearance: She is cachectic. She is not diaphoretic.   HENT:      Head: Normocephalic and atraumatic.      Right Ear: External ear normal.      Left Ear: External ear normal.      Nose: Nose normal.      Right Sinus: No maxillary sinus tenderness or frontal sinus tenderness.      Left Sinus: No maxillary sinus tenderness or frontal sinus tenderness.      Mouth/Throat:      Pharynx: No oropharyngeal exudate.   Eyes:      General: Lids are normal. No scleral icterus.        Right eye: No discharge.         Left eye: No discharge.      Conjunctiva/sclera: Conjunctivae normal.      Right eye: Right conjunctiva is not injected. No hemorrhage.     Left eye: Left conjunctiva is not injected. No hemorrhage.     Pupils: Pupils are equal, round, and reactive to light.   Neck:      Musculoskeletal: Normal range of motion and neck supple.      Thyroid: No thyromegaly.      Vascular: No JVD.      Trachea: No tracheal deviation.   Cardiovascular:      Rate and Rhythm: Normal rate and regular rhythm.   Pulmonary:      Effort: Pulmonary effort is normal. No respiratory distress.      Breath sounds: No stridor.   Chest:      Chest wall: No tenderness.   Abdominal:      General: There is no distension.      Palpations: Abdomen is soft. There is no hepatomegaly, splenomegaly or mass.       Tenderness: There is no abdominal tenderness. There is no rebound.   Musculoskeletal: Normal range of motion.         General: Deformity present. No tenderness.      Comments: Deformed digits of upper extremity secondary to arthritis.   Lymphadenopathy:      Cervical: No cervical adenopathy.      Upper Body:      Right upper body: No supraclavicular adenopathy.      Left upper body: No supraclavicular adenopathy.   Skin:     General: Skin is dry.      Findings: Lesion (Left mid forearm) present. No erythema.      Comments: Noted left outter thigh lesion, mildly tender.   Neurological:      Mental Status: She is alert and oriented to person, place, and time.      Cranial Nerves: No cranial nerve deficit.      Coordination: Coordination normal.      Gait: Gait abnormal.   Psychiatric:         Behavior: Behavior normal.         Thought Content: Thought content normal.         Judgment: Judgment normal.           Assessment:      1. Chronic myelomonocytic leukemia not having achieved remission    2. Leukemia cutis    3. Iron deficiency anemia due to chronic blood loss    4. Abscess of lower back    5. Skin lesion of left upper extremity           Plan:     Leukemia cutis  Leukemia Mitchell of left upper arm, status post radiation therapy.    Chronic myelomonocytic leukemia not having achieved remission  Chronic myelomonocytic leukemia, on Vidaza which is currently on hold given recent infection.  Will continue to hold, reviewed labs today and noted severe thrombocytopenia with [place] at [address].  No ecchymosis or petechiae on exam.  Denies abnormal bleed.  Will recheck again in 2 weeks.    Anemia  Normocytic anemia is likely due to chronic inflammation given extensive history for rheumatoid arthritis.  Hemoglobin is at 7.5 grams/deciliter, patient remains asymptomatic from anemia.  Will continue to monitor.    Abscess of lower back  Status post I and D, wound VAC removed.  Healing very well, continues to follow with  surgery service.    Left upper forearm lesion:  Plan for excision by Dr. Shannon on 07/02/2020.  Will await pathology report.    Denton Knox MD

## 2020-07-01 ENCOUNTER — TELEPHONE (OUTPATIENT)
Dept: HEMATOLOGY/ONCOLOGY | Facility: CLINIC | Age: 80
End: 2020-07-01

## 2020-07-01 ENCOUNTER — ANESTHESIA EVENT (OUTPATIENT)
Dept: SURGERY | Facility: HOSPITAL | Age: 80
End: 2020-07-01
Payer: MEDICARE

## 2020-07-02 ENCOUNTER — ANESTHESIA (OUTPATIENT)
Dept: SURGERY | Facility: HOSPITAL | Age: 80
End: 2020-07-02
Payer: MEDICARE

## 2020-07-02 ENCOUNTER — HOSPITAL ENCOUNTER (OUTPATIENT)
Facility: HOSPITAL | Age: 80
Discharge: HOME | End: 2020-07-02
Attending: SURGERY | Admitting: SURGERY
Payer: MEDICARE

## 2020-07-02 VITALS
OXYGEN SATURATION: 98 % | HEIGHT: 62 IN | BODY MASS INDEX: 19.92 KG/M2 | SYSTOLIC BLOOD PRESSURE: 123 MMHG | HEART RATE: 78 BPM | WEIGHT: 108.25 LBS | TEMPERATURE: 99 F | DIASTOLIC BLOOD PRESSURE: 59 MMHG | RESPIRATION RATE: 16 BRPM

## 2020-07-02 DIAGNOSIS — R22.42 MASS OF THIGH, LEFT: ICD-10-CM

## 2020-07-02 DIAGNOSIS — C44.621 SQUAMOUS CELL CARCINOMA OF SKIN OF UPPER EXTREMITY, INCLUDING SHOULDER, UNSPECIFIED LATERALITY: ICD-10-CM

## 2020-07-02 DIAGNOSIS — C44.629 SQUAMOUS CELL CARCINOMA OF SKIN OF LEFT UPPER EXTREMITY, INCLUDING SHOULDER: Primary | ICD-10-CM

## 2020-07-02 PROCEDURE — 88332 PATH CONSLTJ SURG EA ADD BLK: CPT | Mod: HCNC | Performed by: PATHOLOGY

## 2020-07-02 PROCEDURE — 71000015 HC POSTOP RECOV 1ST HR: Mod: HCNC | Performed by: SURGERY

## 2020-07-02 PROCEDURE — 88305 TISSUE EXAM BY PATHOLOGIST: ICD-10-PCS | Mod: 26,HCNC,, | Performed by: PATHOLOGY

## 2020-07-02 PROCEDURE — 36000707: Mod: HCNC | Performed by: SURGERY

## 2020-07-02 PROCEDURE — 11601 PR EXC SKIN MALIG 0.6-1 CM TRUNK,ARM,LEG: ICD-10-PCS | Mod: HCNC,LT,, | Performed by: SURGERY

## 2020-07-02 PROCEDURE — 37000008 HC ANESTHESIA 1ST 15 MINUTES: Mod: HCNC | Performed by: SURGERY

## 2020-07-02 PROCEDURE — 11402 PR EXC SKIN BENIG 1.1-2 CM TRUNK,ARM,LEG: ICD-10-PCS | Mod: 51,HCNC,LT, | Performed by: SURGERY

## 2020-07-02 PROCEDURE — 63600175 PHARM REV CODE 636 W HCPCS: Mod: HCNC | Performed by: NURSE ANESTHETIST, CERTIFIED REGISTERED

## 2020-07-02 PROCEDURE — 88331 PATH CONSLTJ SURG 1 BLK 1SPC: CPT | Mod: 26,HCNC,, | Performed by: PATHOLOGY

## 2020-07-02 PROCEDURE — 11402 EXC TR-EXT B9+MARG 1.1-2 CM: CPT | Mod: 51,HCNC,LT, | Performed by: SURGERY

## 2020-07-02 PROCEDURE — 88305 TISSUE EXAM BY PATHOLOGIST: CPT | Mod: 59,HCNC | Performed by: PATHOLOGY

## 2020-07-02 PROCEDURE — 11601 EXC TR-EXT MAL+MARG 0.6-1 CM: CPT | Mod: HCNC,LT,, | Performed by: SURGERY

## 2020-07-02 PROCEDURE — 25000003 PHARM REV CODE 250: Mod: HCNC | Performed by: NURSE ANESTHETIST, CERTIFIED REGISTERED

## 2020-07-02 PROCEDURE — 37000009 HC ANESTHESIA EA ADD 15 MINS: Mod: HCNC | Performed by: SURGERY

## 2020-07-02 PROCEDURE — 88305 TISSUE EXAM BY PATHOLOGIST: CPT | Mod: 26,HCNC,, | Performed by: PATHOLOGY

## 2020-07-02 PROCEDURE — 25000003 PHARM REV CODE 250: Mod: HCNC | Performed by: SURGERY

## 2020-07-02 PROCEDURE — 36000706: Mod: HCNC | Performed by: SURGERY

## 2020-07-02 PROCEDURE — 88331 PR  PATH CONSULT IN SURG,W FRZ SEC: ICD-10-PCS | Mod: 26,HCNC,, | Performed by: PATHOLOGY

## 2020-07-02 PROCEDURE — 88331 PATH CONSLTJ SURG 1 BLK 1SPC: CPT | Mod: HCNC | Performed by: PATHOLOGY

## 2020-07-02 PROCEDURE — 71000033 HC RECOVERY, INTIAL HOUR: Mod: HCNC | Performed by: SURGERY

## 2020-07-02 RX ORDER — SODIUM CHLORIDE 9 MG/ML
INJECTION, SOLUTION INTRAVENOUS CONTINUOUS
Status: DISCONTINUED | OUTPATIENT
Start: 2020-07-02 | End: 2020-07-02 | Stop reason: HOSPADM

## 2020-07-02 RX ORDER — SODIUM CHLORIDE 9 MG/ML
INJECTION, SOLUTION INTRAVENOUS CONTINUOUS
Status: CANCELLED | OUTPATIENT
Start: 2020-07-02

## 2020-07-02 RX ORDER — CEPHALEXIN 500 MG/1
500 CAPSULE ORAL EVERY 8 HOURS
Qty: 15 CAPSULE | Refills: 0 | Status: SHIPPED | OUTPATIENT
Start: 2020-07-02 | End: 2020-07-07

## 2020-07-02 RX ORDER — FENTANYL CITRATE 50 UG/ML
INJECTION, SOLUTION INTRAMUSCULAR; INTRAVENOUS
Status: DISCONTINUED | OUTPATIENT
Start: 2020-07-02 | End: 2020-07-02

## 2020-07-02 RX ORDER — CEFAZOLIN SODIUM 2 G/50ML
2 SOLUTION INTRAVENOUS
Status: DISCONTINUED | OUTPATIENT
Start: 2020-07-02 | End: 2020-07-02 | Stop reason: HOSPADM

## 2020-07-02 RX ORDER — ONDANSETRON 2 MG/ML
4 INJECTION INTRAMUSCULAR; INTRAVENOUS DAILY PRN
Status: DISCONTINUED | OUTPATIENT
Start: 2020-07-02 | End: 2020-07-02 | Stop reason: HOSPADM

## 2020-07-02 RX ORDER — SODIUM CHLORIDE, SODIUM LACTATE, POTASSIUM CHLORIDE, CALCIUM CHLORIDE 600; 310; 30; 20 MG/100ML; MG/100ML; MG/100ML; MG/100ML
INJECTION, SOLUTION INTRAVENOUS CONTINUOUS PRN
Status: DISCONTINUED | OUTPATIENT
Start: 2020-07-02 | End: 2020-07-02

## 2020-07-02 RX ORDER — PROPOFOL 10 MG/ML
VIAL (ML) INTRAVENOUS
Status: DISCONTINUED | OUTPATIENT
Start: 2020-07-02 | End: 2020-07-02

## 2020-07-02 RX ORDER — ONDANSETRON 2 MG/ML
INJECTION INTRAMUSCULAR; INTRAVENOUS
Status: DISCONTINUED | OUTPATIENT
Start: 2020-07-02 | End: 2020-07-02

## 2020-07-02 RX ORDER — LIDOCAINE HYDROCHLORIDE 10 MG/ML
INJECTION, SOLUTION EPIDURAL; INFILTRATION; INTRACAUDAL; PERINEURAL
Status: DISCONTINUED | OUTPATIENT
Start: 2020-07-02 | End: 2020-07-02 | Stop reason: HOSPADM

## 2020-07-02 RX ORDER — FENTANYL CITRATE 50 UG/ML
25 INJECTION, SOLUTION INTRAMUSCULAR; INTRAVENOUS EVERY 5 MIN PRN
Status: DISCONTINUED | OUTPATIENT
Start: 2020-07-02 | End: 2020-07-02 | Stop reason: HOSPADM

## 2020-07-02 RX ORDER — LIDOCAINE HYDROCHLORIDE 10 MG/ML
INJECTION, SOLUTION EPIDURAL; INFILTRATION; INTRACAUDAL; PERINEURAL
Status: DISCONTINUED | OUTPATIENT
Start: 2020-07-02 | End: 2020-07-02

## 2020-07-02 RX ADMIN — FENTANYL CITRATE 25 MCG: 50 INJECTION, SOLUTION INTRAMUSCULAR; INTRAVENOUS at 10:07

## 2020-07-02 RX ADMIN — FENTANYL CITRATE 25 MCG: 50 INJECTION, SOLUTION INTRAMUSCULAR; INTRAVENOUS at 09:07

## 2020-07-02 RX ADMIN — PROPOFOL 40 MG: 10 INJECTION, EMULSION INTRAVENOUS at 09:07

## 2020-07-02 RX ADMIN — PROPOFOL 20 MG: 10 INJECTION, EMULSION INTRAVENOUS at 10:07

## 2020-07-02 RX ADMIN — SODIUM CHLORIDE, SODIUM LACTATE, POTASSIUM CHLORIDE, AND CALCIUM CHLORIDE: 600; 310; 30; 20 INJECTION, SOLUTION INTRAVENOUS at 09:07

## 2020-07-02 RX ADMIN — PROPOFOL 10 MG: 10 INJECTION, EMULSION INTRAVENOUS at 10:07

## 2020-07-02 RX ADMIN — ONDANSETRON 4 MG: 2 INJECTION, SOLUTION INTRAMUSCULAR; INTRAVENOUS at 10:07

## 2020-07-02 RX ADMIN — LIDOCAINE HYDROCHLORIDE 50 MG: 10 INJECTION, SOLUTION EPIDURAL; INFILTRATION; INTRACAUDAL; PERINEURAL at 09:07

## 2020-07-02 NOTE — OP NOTE
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       Procedure(s) (LRB):  EXCISION, NEOPLASM (Left)    Surgeon(s) and Role:     * Mckinley Shannon MD - Primary    ASSISTANTS: None  ANESTHESIA: Local MAC    FINDINGS:  The left forearm lesion was appeared to be malignant and was completely excised.  Left forearm lesion was measured to be 1 cm in diameter.  The left anterior thigh lesion was found to be hard and not fixated to the anterior fascia.  It was measured 1.5 cm in diameter.    ESTIMATED BLOOD LOSS: 5 mL              COMPLICATIONS:  None    SPECIMEN:  Left forearm lesion:  Squamous cell carcinoma by frozen section., left thigh lesion was found to be vascular tumor of unknown nature by frozen section.    Implants: None    INDICATION:  Multiple lesions on patient's left forearm as well as left thigh.    DESCRIPTION OF PROCEDURE:    The patient was taken to the operating room, and underwent sedation per anesthesia protocol.  The patient was sedated, and her left forearm as well as left anterior thigh was prepped and draped in usual sterile manner.  A time-out was taken to identify the patient as well as the purpose of the procedures.  After local injection using total of 30 mL of 1% lidocaine, plane around each lesion, an elliptical incision was made and further down into the subcu level and further dissected until entire lesion was excised completely.  Hemostasis achieved.  Both incisions were approximated in the usual  fashion with 3 0 nylon simple vertical mattress stitches.  The wounds were gently wrapped with Coban after applying a layer of sterile gauze.  During this procedure, the patient did well without any difficulty.  The patient was later awakened, taken to the recovery room for further recovery.           CONDITION: Good    DISPOSITION: PACU - hemodynamically stable.     Mckinley Shannon

## 2020-07-02 NOTE — INTERVAL H&P NOTE
The patient has been examined and the H&P has been reviewed:    I concur with the findings and no changes have occurred since H&P was written.    Anesthesia/Surgery risks, benefits and alternative options discussed and understood by patient/family.          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

## 2020-07-02 NOTE — ANESTHESIA PREPROCEDURE EVALUATION
07/02/2020  Sarah Parker is a 80 y.o., female.    Anesthesia Evaluation    I have reviewed the Patient Summary Reports.    I have reviewed the Nursing Notes.    I have reviewed the Medications.     Review of Systems  Anesthesia Hx:  No problems with previous Anesthesia  History of prior surgery of interest to airway management or planning: Previous anesthesia: General  Denies Personal Hx of Anesthesia complications.   Social:  Non-Smoker, Former Smoker    Hematology/Oncology:     Oncology Normal    -- Anemia:  -- Leukemia: Chronic Myeloid Leukemia (CML)    EENT/Dental:EENT/Dental Normal   Cardiovascular:   Hypertension ECG has been reviewed.    Pulmonary:   Pneumonia COPD Asthma    Renal/:  Renal/ Normal     Hepatic/GI:   Hiatal Hernia, GERD    Musculoskeletal:   Arthritis  Rheumatoid  Degenerative disc disease  Osteoporosis   Neurological:   Neuromuscular Disease, Headaches    Endocrine:   Hypothyroidism    Dermatological:  Skin Normal    Psych:   Psychiatric History anxiety depression        Patient Active Problem List   Diagnosis    Rheumatoid arthritis    Acquired hypothyroidism    Depression, recurrent    Rheumatoid lung    Atherosclerosis of aorta    ARMD (age related macular degeneration)    Hiatal hernia with gastroesophageal reflux    Essential hypertension    Osteopenia    Macrocytic anemia    Leukocytosis    Multiple lung nodules on CT    Renal mass    History of skin cancer    Chronic bronchitis    Calcified granuloma of lung    COPD with exacerbation    Lumbar radiculopathy    Melanoma of right upper arm    Idiopathic peripheral neuropathy    Rheumatoid arthritis involving right foot    Rheumatoid arthritis involving left foot    Monoclonal paraproteinemia    Anemia    Chronic myelomonocytic leukemia not having achieved remission    Other hyperlipidemia     Immunosuppressed status    Axillary mass, left    Pancytopenia due to antineoplastic chemotherapy    Thrombocytopenia    Cachexia    Leukemia cutis    Cellulitis of lower back    Abscess of lower back     No current facility-administered medications on file prior to encounter.      Current Outpatient Medications on File Prior to Encounter   Medication Sig Dispense Refill    amitriptyline (ELAVIL) 75 MG tablet TAKE 1 TABLET BY MOUTH EVERY EVENING 90 tablet 3    calcium citrate-vitamin D (CITRACAL + D) 315-200 mg-unit per tablet Take 1 tablet by mouth once daily.       DULoxetine (CYMBALTA) 20 MG capsule TAKE 2 CAPSULES(40 MG) BY MOUTH EVERY DAY (Patient taking differently: before meals, at bedtime and at 0200. ) 180 capsule 3    ferrous gluconate 324 mg (37.5 mg iron) Tab tablet Take 1 tablet (324 mg total) by mouth daily with breakfast. (Patient taking differently: Take 324 mg by mouth 2 (two) times daily with meals. ) 30 tablet 11    fluticasone propionate (FLONASE) 50 mcg/actuation nasal spray 2 sprays (100 mcg total) by Each Nostril route once daily. 16 g 6    hydrocodone-acetaminophen 5-325mg (NORCO) 5-325 mg per tablet TK 1 T PO Q 6 H PRN  0    levothyroxine (SYNTHROID) 88 MCG tablet TAKE 1 TABLET BY MOUTH BEFORE BREAKFAST 90 tablet 3    magnesium oxide (MAG-OX) 400 mg (241.3 mg magnesium) tablet Take 1 tablet (400 mg total) by mouth 3 (three) times daily.  0    metoprolol succinate (TOPROL-XL) 50 MG 24 hr tablet TAKE 1 TABLET(50 MG) BY MOUTH EVERY DAY 30 tablet 11    multivitamin capsule Take by mouth. As directed      ondansetron (ZOFRAN) 4 MG tablet Take 1 tablet (4 mg total) by mouth every 8 (eight) hours as needed for Nausea. 30 tablet 1    pravastatin (PRAVACHOL) 10 MG tablet TAKE 1 TABLET(10 MG) BY MOUTH EVERY DAY 30 tablet 11    prochlorperazine (COMPAZINE) 5 MG tablet TAKE 1 TABLET(5 MG) BY MOUTH EVERY 6 HOURS AS NEEDED FOR NAUSEA 385 tablet 1    topiramate (TOPAMAX) 25 MG tablet  Take 1 tablet (25 mg total) by mouth at night x 1 week then increase to 2 (two) times daily. Increase as tolerated. 60 tablet 0    valsartan-hydrochlorothiazide (DIOVAN-HCT) 80-12.5 mg per tablet TAKE 1 TABLET BY MOUTH DAILY 90 tablet 3    albuterol (PROVENTIL) 2.5 mg /3 mL (0.083 %) nebulizer solution Take 3 mLs (2.5 mg total) by nebulization every 6 (six) hours as needed for Wheezing. 1 Box 5    benzonatate (TESSALON) 100 MG capsule Take 1-2 capsules (100-200 mg total) by mouth 3 (three) times daily as needed for Cough. 60 capsule 0    hydrOXYzine HCl (ATARAX) 25 MG tablet Take 25 mg by mouth nightly.       leucovorin (WELLCOVORIN) 5 mg Tab TAKE ONE WEEKLY ON SATURDAYS 4 tablet 3    meclizine (ANTIVERT) 50 MG tablet Take 25 mg by mouth 3 (three) times daily as needed.      tamsulosin (FLOMAX) 0.4 mg Cap Take 1 capsule (0.4 mg total) by mouth once daily. For urinary retention 30 capsule 0     Past Surgical History:   Procedure Laterality Date    APPENDECTOMY  1985    APPLICATION OF WOUND VACUUM-ASSISTED CLOSURE DEVICE N/A 5/14/2020    Procedure: APPLICATION, WOUND VAC;  Surgeon: Mckinley Shannon MD;  Location: [facility] OR;  Service: General;  Laterality: N/A;    BREAST BIOPSY      CATARACT EXTRACTION Bilateral 6/11/15    Dr. Booth    CHOLECYSTECTOMY  2013    cryoablasion kidney Left 09/27/2016    feet Bilateral     rheumatoid    FRACTURE SURGERY Right     tibia    HERNIA REPAIR      HYSTERECTOMY  1970    partial    INCISION AND DRAINAGE OF ABSCESS N/A 5/12/2020    Procedure: INCISION AND DRAINAGE, ABSCESS;  Surgeon: Emerson Hathaway MD;  Location: [facility] OR;  Service: General;  Laterality: N/A;    INCISIONAL BIOPSY N/A 5/12/2020    Procedure: INCISIONAL BIOPSY;  Surgeon: Emerson Hathaway MD;  Location: [facility] OR;  Service: General;  Laterality: N/A;    JOINT REPLACEMENT      bilateral knees (2008), hands, wrists, knuckles, toes    LAPAROSCOPIC NISSEN FUNDOPLICATION      TRANSFORAMINAL EPIDURAL INJECTION OF  STEROID Left 6/25/2019    Procedure: Left L5/S1 TF AYAAN with local;  Surgeon: Rowdy Felix MD;  [facility];  Service: Pain Management;  Laterality: Left;    WOUND DEBRIDEMENT N/A 5/14/2020    Procedure: DEBRIDEMENT, WOUND;  Surgeon: Mckinley Shannon MD;  Location: [facility] OR;  Service: General;  Laterality: N/A;         Physical Exam  General:  Well nourished    Airway/Jaw/Neck:  Airway Findings: Mouth Opening: Normal Tongue: Normal  General Airway Assessment: Adult  Mallampati: II  TM Distance: 4 - 6 cm  Jaw/Neck Findings:  Neck ROM: Normal ROM      Dental:  Dental Findings: In tact   Chest/Lungs:  Chest/Lungs Findings: Clear to auscultation, Normal Respiratory Rate     Heart/Vascular:  Heart Findings: Rate: Normal  Rhythm: Regular Rhythm  Sounds: Normal        Mental Status:  Mental Status Findings:  Cooperative, Alert and Oriented         Anesthesia Plan  Type of Anesthesia, risks & benefits discussed:  Anesthesia Type:  MAC  Patient's Preference:   Intra-op Monitoring Plan: standard ASA monitors  Intra-op Monitoring Plan Comments:   Post Op Pain Control Plan: multimodal analgesia and per primary service following discharge from PACU  Post Op Pain Control Plan Comments:   Induction:   IV  Beta Blocker:  Patient is on a Beta-Blocker and has received one dose within the past 24 hours (No further documentation required).       Informed Consent: Patient understands risks and agrees with Anesthesia plan.  Questions answered. Anesthesia consent signed with patient.  ASA Score: 3     Day of Surgery Review of History & Physical:  There are no significant changes.          Ready For Surgery From Anesthesia Perspective.       Chemistry        Component Value Date/Time     06/30/2020 1003    K 4.9 06/30/2020 1003     06/30/2020 1003    CO2 24 06/30/2020 1003    BUN 21 06/30/2020 1003    CREATININE 0.9 06/30/2020 1003     06/30/2020 1003        Component Value Date/Time    CALCIUM 9.0 06/30/2020  1003    ALKPHOS 301 (H) 06/30/2020 1003    AST 31 06/30/2020 1003    ALT 23 06/30/2020 1003    BILITOT 0.4 06/30/2020 1003    ESTGFRAFRICA >60 06/30/2020 1003    EGFRNONAA >60 06/30/2020 1003        Lab Results   Component Value Date    WBC 29.77 (H) 06/30/2020    HGB 7.5 (L) 06/30/2020    HCT 24.4 (L) 06/30/2020    MCV 94 06/30/2020    PLT 41 (L) 06/30/2020

## 2020-07-02 NOTE — DISCHARGE SUMMARY
[facility]  Short Stay  Discharge Summary    Admit Date: 7/2/2020    Discharge Date and Time: 7/2/2020      Discharge Attending Physician: Mckinley Sahnnon MD     Reason for Admission:  Left forearm lesion and left anterior thigh lesion.    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    Discharge Condition: Stable    Follow up/Patient Instructions:      Medications:      Medication List      START taking these medications    cephALEXin 500 MG capsule  Commonly known as: KEFLEX  Take 1 capsule (500 mg total) by mouth every 8 (eight) hours. for 5 days        CHANGE how you take these medications    DULoxetine 20 MG capsule  Commonly known as: CYMBALTA  TAKE 2 CAPSULES(40 MG) BY MOUTH EVERY DAY  What changed: See the new instructions.     ferrous gluconate 324 mg (37.5 mg iron) Tab tablet  Take 1 tablet (324 mg total) by mouth daily with breakfast.  What changed: when to take this        CONTINUE taking these medications    albuterol 2.5 mg /3 mL (0.083 %) nebulizer solution  Commonly known as: PROVENTIL  Take 3 mLs (2.5 mg total) by nebulization every 6 (six) hours as needed for Wheezing.     amitriptyline 75 MG tablet  Commonly known as: ELAVIL  TAKE 1 TABLET BY MOUTH EVERY EVENING     CITRACAL PLUS D 315-200 mg-unit per tablet  Generic drug: calcium citrate-vitamin D3 315-200 mg     fluticasone propionate 50 mcg/actuation nasal spray  Commonly known as: FLONASE  2 sprays (100 mcg total) by Each Nostril route once daily.      HYDROcodone-acetaminophen 5-325 mg per tablet  Commonly known as: NORCO     hydrOXYzine HCL 25 MG tablet  Commonly known as: ATARAX     levothyroxine 88 MCG tablet  Commonly known as: SYNTHROID  TAKE 1 TABLET BY MOUTH BEFORE BREAKFAST     magnesium oxide 400 mg (241.3 mg magnesium) tablet  Commonly known as: MAG-OX  Take 1 tablet (400 mg total) by mouth 3 (three) times daily.     meclizine 50 MG tablet  Commonly known as: ANTIVERT     metoprolol succinate 50 MG 24 hr tablet  Commonly known as: TOPROL-XL  TAKE 1 TABLET(50 MG) BY MOUTH EVERY DAY     multivitamin capsule     ondansetron 4 MG tablet  Commonly known as: ZOFRAN  Take 1 tablet (4 mg total) by mouth every 8 (eight) hours as needed for Nausea.     pravastatin 10 MG tablet  Commonly known as: PRAVACHOL  TAKE 1 TABLET(10 MG) BY MOUTH EVERY DAY     prochlorperazine 5 MG tablet  Commonly known as: COMPAZINE  TAKE 1 TABLET(5 MG) BY MOUTH EVERY 6 HOURS AS NEEDED FOR NAUSEA     topiramate 25 MG tablet  Commonly known as: TOPAMAX  Take 1 tablet (25 mg total) by mouth at night x 1 week then increase to 2 (two) times daily. Increase as tolerated.     valsartan-hydrochlorothiazide 80-12.5 mg per tablet  Commonly known as: DIOVAN-HCT  TAKE 1 TABLET BY MOUTH DAILY        ASK your doctor about these medications    benzonatate 100 MG capsule  Commonly known as: TESSALON  Take 1-2 capsules (100-200 mg total) by mouth 3 (three) times daily as needed for Cough.     leucovorin 5 mg Tab  Commonly known as: WELLCOVORIN  TAKE ONE WEEKLY ON SATURDAYS     tamsulosin 0.4 mg Cap  Commonly known as: FLOMAX  Take 1 capsule (0.4 mg total) by mouth once daily. For urinary retention           Where to Get Your Medications      These medications were sent to [facility] [address] [place], [place] [id number]    Hours: Mon-Fri, 8a-5:30p Phone: [phone]   · cephALEXin 500 MG capsule       Discharge Procedure Orders   Diet general     Remove dressing in 48  tiffany Shannon

## 2020-07-02 NOTE — ANESTHESIA POSTPROCEDURE EVALUATION
Anesthesia Post Evaluation    Patient: Sarah Parker    Procedure(s) Performed: Procedure(s) (LRB):  EXCISION, NEOPLASM (Left)    Final Anesthesia Type: MAC    Patient location during evaluation: PACU  Patient participation: Yes- Able to Participate  Level of consciousness: awake and alert  Post-procedure vital signs: reviewed and stable  Pain management: adequate  Airway patency: patent  LEEANN mitigation strategies: Extubation while patient is awake  PONV status at discharge: No PONV  Anesthetic complications: no      Cardiovascular status: hemodynamically stable  Respiratory status: spontaneous ventilation  Hydration status: euvolemic  Follow-up not needed.          Vitals Value Taken Time   /59 07/02/20 1145   Temp 36.9 °C (98.5 °F) 07/02/20 1115   Pulse 78 07/02/20 1145   Resp 16 07/02/20 1145   SpO2 98 % 07/02/20 1145         Event Time   Out of Recovery 11:21:03         Pain/Diana Score: Diana Score: 10 (7/2/2020 11:15 AM)

## 2020-07-02 NOTE — DISCHARGE INSTRUCTIONS
Remove dressing in 48 hours.  Then the patient should wash the wound with soap and water (essentially taking shower daily) and leave open to air.  No ointments.

## 2020-07-02 NOTE — TRANSFER OF CARE
"Anesthesia Transfer of Care Note    Patient: Sarah Parker    Procedure(s) Performed: Procedure(s) (LRB):  EXCISION, NEOPLASM (Left)    Patient location: PACU    Anesthesia Type: MAC    Transport from OR: Transported from OR on room air with adequate spontaneous ventilation    Post pain: adequate analgesia    Post assessment: no apparent anesthetic complications and tolerated procedure well    Post vital signs: stable    Level of consciousness: awake, alert and oriented    Nausea/Vomiting: no nausea/vomiting    Complications: none    Transfer of care protocol was followed      Last vitals:   Visit Vitals  /75 (BP Location: Left arm)   Pulse 87   Temp 36.5 °C (97.7 °F) (Temporal)   Resp 16   Ht 5' 2" (1.575 m)   Wt 49.1 kg (108 lb 3.9 oz)   LMP  (LMP Unknown)   SpO2 100%   Breastfeeding No   BMI 19.80 kg/m²     "

## 2020-07-07 LAB
FINAL PATHOLOGIC DIAGNOSIS: NORMAL
FROZEN SECTION DIAGNOSIS: NORMAL
FROZEN SECTION FOOTNOTE: NORMAL
GROSS: NORMAL
MICROSCOPIC EXAM: NORMAL

## 2020-07-09 ENCOUNTER — HOSPITAL ENCOUNTER (EMERGENCY)
Facility: HOSPITAL | Age: 80
Discharge: HOME | End: 2020-07-09
Attending: EMERGENCY MEDICINE
Payer: MEDICARE

## 2020-07-09 VITALS
HEIGHT: 62 IN | RESPIRATION RATE: 10 BRPM | HEART RATE: 77 BPM | SYSTOLIC BLOOD PRESSURE: 168 MMHG | OXYGEN SATURATION: 100 % | TEMPERATURE: 99 F | BODY MASS INDEX: 19.8 KG/M2 | DIASTOLIC BLOOD PRESSURE: 92 MMHG

## 2020-07-09 DIAGNOSIS — R53.1 WEAKNESS: ICD-10-CM

## 2020-07-09 DIAGNOSIS — D64.9 SYMPTOMATIC ANEMIA: Primary | ICD-10-CM

## 2020-07-09 LAB
ABO + RH BLD: NORMAL
ALBUMIN SERPL BCP-MCNC: 3 G/DL (ref 3.5–5.2)
ALP SERPL-CCNC: 208 U/L (ref 55–135)
ALT SERPL W/O P-5'-P-CCNC: 14 U/L (ref 10–44)
ANION GAP SERPL CALC-SCNC: 10 MMOL/L (ref 8–16)
AST SERPL-CCNC: 28 U/L (ref 10–40)
BASOPHILS # BLD AUTO: ABNORMAL K/UL (ref 0–0.2)
BASOPHILS NFR BLD: 0 % (ref 0–1.9)
BILIRUB SERPL-MCNC: 0.3 MG/DL (ref 0.1–1)
BILIRUB UR QL STRIP: NEGATIVE
BLASTS NFR BLD MANUAL: 3 %
BLD GP AB SCN CELLS X3 SERPL QL: NORMAL
BLD PROD TYP BPU: NORMAL
BLOOD UNIT EXPIRATION DATE: NORMAL
BLOOD UNIT TYPE CODE: 5100
BLOOD UNIT TYPE: NORMAL
BUN SERPL-MCNC: 23 MG/DL (ref 8–23)
CALCIUM SERPL-MCNC: 8.6 MG/DL (ref 8.7–10.5)
CHLORIDE SERPL-SCNC: 102 MMOL/L (ref 95–110)
CLARITY UR: CLEAR
CO2 SERPL-SCNC: 22 MMOL/L (ref 23–29)
CODING SYSTEM: NORMAL
COLOR UR: YELLOW
CREAT SERPL-MCNC: 0.9 MG/DL (ref 0.5–1.4)
DIFFERENTIAL METHOD: ABNORMAL
DISPENSE STATUS: NORMAL
EOSINOPHIL # BLD AUTO: ABNORMAL K/UL (ref 0–0.5)
EOSINOPHIL NFR BLD: 0 % (ref 0–8)
ERYTHROCYTE [DISTWIDTH] IN BLOOD BY AUTOMATED COUNT: 19.9 % (ref 11.5–14.5)
EST. GFR  (AFRICAN AMERICAN): >60 ML/MIN/1.73 M^2
EST. GFR  (NON AFRICAN AMERICAN): >60 ML/MIN/1.73 M^2
GLUCOSE SERPL-MCNC: 122 MG/DL (ref 70–110)
GLUCOSE UR QL STRIP: NEGATIVE
HCT VFR BLD AUTO: 16.9 % (ref 37–48.5)
HGB BLD-MCNC: 4.9 G/DL (ref 12–16)
HGB UR QL STRIP: ABNORMAL
IMM GRANULOCYTES # BLD AUTO: ABNORMAL K/UL (ref 0–0.04)
IMM GRANULOCYTES NFR BLD AUTO: ABNORMAL % (ref 0–0.5)
KETONES UR QL STRIP: NEGATIVE
LEUKOCYTE ESTERASE UR QL STRIP: NEGATIVE
LIPASE SERPL-CCNC: 66 U/L (ref 4–60)
LYMPHOCYTES # BLD AUTO: ABNORMAL K/UL (ref 1–4.8)
LYMPHOCYTES NFR BLD: 55 % (ref 18–48)
MCH RBC QN AUTO: 28 PG (ref 27–31)
MCHC RBC AUTO-ENTMCNC: 29 G/DL (ref 32–36)
MCV RBC AUTO: 97 FL (ref 82–98)
METAMYELOCYTES NFR BLD MANUAL: 3 %
MONOCYTES # BLD AUTO: ABNORMAL K/UL (ref 0.3–1)
MONOCYTES NFR BLD: 18 % (ref 4–15)
MYELOCYTES NFR BLD MANUAL: 2 %
NEUTROPHILS NFR BLD: 19 % (ref 38–73)
NITRITE UR QL STRIP: NEGATIVE
NRBC BLD-RTO: 11 /100 WBC
NUM UNITS TRANS PACKED RBC: NORMAL
PH UR STRIP: 7 [PH] (ref 5–8)
PLATELET # BLD AUTO: 52 K/UL (ref 150–350)
PMV BLD AUTO: ABNORMAL FL (ref 9.2–12.9)
POTASSIUM SERPL-SCNC: 4.2 MMOL/L (ref 3.5–5.1)
PROT SERPL-MCNC: 7.8 G/DL (ref 6–8.4)
PROT UR QL STRIP: NEGATIVE
RBC # BLD AUTO: 1.75 M/UL (ref 4–5.4)
SODIUM SERPL-SCNC: 134 MMOL/L (ref 136–145)
SP GR UR STRIP: 1.01 (ref 1–1.03)
TROPONIN I SERPL DL<=0.01 NG/ML-MCNC: 0.02 NG/ML (ref 0–0.03)
URN SPEC COLLECT METH UR: ABNORMAL
UROBILINOGEN UR STRIP-ACNC: NEGATIVE EU/DL
WBC # BLD AUTO: 48.22 K/UL (ref 3.9–12.7)

## 2020-07-09 PROCEDURE — 36430 TRANSFUSION BLD/BLD COMPNT: CPT | Mod: HCNC

## 2020-07-09 PROCEDURE — 80053 COMPREHEN METABOLIC PANEL: CPT | Mod: HCNC

## 2020-07-09 PROCEDURE — 83690 ASSAY OF LIPASE: CPT | Mod: HCNC

## 2020-07-09 PROCEDURE — 93010 EKG 12-LEAD: ICD-10-PCS | Mod: HCNC,,, | Performed by: INTERNAL MEDICINE

## 2020-07-09 PROCEDURE — 93005 ELECTROCARDIOGRAM TRACING: CPT | Mod: HCNC

## 2020-07-09 PROCEDURE — 85007 BL SMEAR W/DIFF WBC COUNT: CPT | Mod: HCNC

## 2020-07-09 PROCEDURE — 86850 RBC ANTIBODY SCREEN: CPT | Mod: HCNC

## 2020-07-09 PROCEDURE — P9612 CATHETERIZE FOR URINE SPEC: HCPCS | Mod: HCNC

## 2020-07-09 PROCEDURE — 99285 EMERGENCY DEPT VISIT HI MDM: CPT | Mod: 25,HCNC

## 2020-07-09 PROCEDURE — P9040 RBC LEUKOREDUCED IRRADIATED: HCPCS | Mod: HCNC

## 2020-07-09 PROCEDURE — 93010 ELECTROCARDIOGRAM REPORT: CPT | Mod: HCNC,,, | Performed by: INTERNAL MEDICINE

## 2020-07-09 PROCEDURE — 86920 COMPATIBILITY TEST SPIN: CPT | Mod: HCNC,59

## 2020-07-09 PROCEDURE — 81003 URINALYSIS AUTO W/O SCOPE: CPT | Mod: HCNC

## 2020-07-09 PROCEDURE — 85027 COMPLETE CBC AUTOMATED: CPT | Mod: HCNC

## 2020-07-09 PROCEDURE — 84484 ASSAY OF TROPONIN QUANT: CPT | Mod: HCNC

## 2020-07-09 RX ORDER — HYDROCODONE BITARTRATE AND ACETAMINOPHEN 500; 5 MG/1; MG/1
TABLET ORAL
Status: DISCONTINUED | OUTPATIENT
Start: 2020-07-09 | End: 2020-07-09 | Stop reason: HOSPADM

## 2020-07-09 NOTE — ED TRIAGE NOTES
"Arrives to ER complaining of generalized weakness for a few days, reports having to have transfusions in the past, states "I feel weak, my last visit my platelets were low." Denying any CP, n/v/d appears pale, oxygen saturation 100% on RA  "

## 2020-07-09 NOTE — ED PROVIDER NOTES
"SCRIBE #1 NOTE: I, Eric Garay, am scribing for, and in the presence of, Duy Trujillo Jr., MD. I have scribed the entire note.       History     Chief Complaint   Patient presents with    Weakness     Pt states, "I have LEukemia and am very weak, I think I need a blood transfusion."     Review of patient's allergies indicates:   Allergen Reactions    Codeine      Other reaction(s): hyper  Other reaction(s): hyper    Gabapentin Other (See Comments)     Bad dreams         History of Present Illness     HPI    7/9/2020, 10:23 AM  History obtained from the patient      History of Present Illness: Sarah Parker is a 80 y.o. female patient with a history of CML, anemia who presents to the Emergency Department for evaluation of generalized weakness which onset 3 days ago. Symptoms are constant and moderate in severity. No mitigating or exacerbating factors reported. Associated sxs include near syncope and SOB (x today). Patient denies any fever, chills, cough, leg swelling, chest pain, extremity numbness, and all other sxs at this time. Prior Tx includes none. No further complaints or concerns at this time. Patient states current symptoms are similar to prior anemia.      Arrival mode: Personal transportation      PCP: Briseida Bennett MD      Past Medical History:  Past Medical History:   Diagnosis Date    Acid reflux     Anxiety     Back pain     Bronchitis, chronic obstructive w acute bronchitis 7/29/2016    [facility]    Cataract     2+NS    Chronic myelomonocytic leukemia     Degenerative disc disease     Depression     Dry mouth     Encounter for blood transfusion     Hernia, hiatal 11/18/2013    Hypertension     Hypothyroid     Iron deficiency anemia     Macrocytic anemia 5/3/2016    Macular degeneration     Migraines     Mixed anxiety and depressive disorder     Multiple fractures of ribs of right side     Osteoporosis     Other hyperlipidemia " 10/11/2019    Pneumonia     Pneumonia due to other staphylococcus     Rheumatoid arthritis     Rheumatoid arthritis(714.0)     Rheumatoid arthritis(714.0)     Remicade, MTX.       Past Surgical History:  Past Surgical History:   Procedure Laterality Date    APPENDECTOMY  1985    APPLICATION OF WOUND VACUUM-ASSISTED CLOSURE DEVICE N/A 5/14/2020    Procedure: APPLICATION, WOUND VAC;  Surgeon: Mckinley Shannon MD;  Location: [facility] OR;  Service: General;  Laterality: N/A;    BREAST BIOPSY      CATARACT EXTRACTION Bilateral 6/11/15    Dr. Booth    CHOLECYSTECTOMY  2013    cryoablasion kidney Left 09/27/2016    EXCISION OF SQUAMOUS CELL CARCINOMA Left 7/2/2020    Procedure: EXCISION, CARCINOMA, SQUAMOUS CELL;  Surgeon: Mckinley Shannon MD;  Location: [facility] OR;  Service: General;  Laterality: Left;    feet Bilateral     rheumatoid    FRACTURE SURGERY Right     tibia    HERNIA REPAIR      HYSTERECTOMY  1970    partial    INCISION AND DRAINAGE OF ABSCESS N/A 5/12/2020    Procedure: INCISION AND DRAINAGE, ABSCESS;  Surgeon: Emerson Hathaway MD;  Location: [facility] OR;  Service: General;  Laterality: N/A;    INCISIONAL BIOPSY N/A 5/12/2020    Procedure: INCISIONAL BIOPSY;  Surgeon: Emerson Hathaway MD;  Location: [facility] OR;  Service: General;  Laterality: N/A;    JOINT REPLACEMENT      bilateral knees (2008), hands, wrists, knuckles, toes    LAPAROSCOPIC NISSEN FUNDOPLICATION      TRANSFORAMINAL EPIDURAL INJECTION OF STEROID Left 6/25/2019    Procedure: Left L5/S1 TF AYAAN with local;  Surgeon: Rowdy Felix MD;  Location: [facility];  Service: Pain Management;  Laterality: Left;    WOUND DEBRIDEMENT N/A 5/14/2020    Procedure: DEBRIDEMENT, WOUND;  Surgeon: Mckinley Shannon MD;  Location: [facility] OR;  Service: General;  Laterality: N/A;         Family History:  Family History   Problem Relation Age of Onset    Heart disease Mother     Hyperlipidemia Mother     Hypertension Mother     Osteoarthritis Mother     Cataracts Mother      Hypertension Father     Osteoarthritis Father     Heart disease Father     Asthma Sister     Chronic back pain Sister     Hypertension Sister     Osteoarthritis Sister     Thyroid disease Sister     Asthma Brother     Cancer Brother     Chronic back pain Brother     Diabetes Mellitus Brother     Hypertension Brother     Osteoarthritis Brother     Thyroid disease Brother     Cancer Maternal Grandfather     Fibromyalgia Daughter     Heart disease Maternal Grandmother     Colon cancer Neg Hx     Diabetes Neg Hx        Social History:   Social History     Tobacco Use    Smoking status: Former Smoker     Packs/day: 0.25     Years: 2.00     Pack years: 0.50     Quit date: 1965     Years since quittin.7    Smokeless tobacco: Never Used   Substance and Sexual Activity    Alcohol use: No    Drug use: No    Sexual activity: Never     Partners: Male        Review of Systems     Review of Systems   Constitutional: Positive for fatigue. Negative for chills and fever.   HENT: Negative for sore throat.    Respiratory: Positive for shortness of breath. Negative for cough.    Cardiovascular: Negative for chest pain and leg swelling.   Gastrointestinal: Negative for nausea.   Genitourinary: Negative for dysuria.   Musculoskeletal: Negative for back pain.   Skin: Negative for rash.   Neurological: Positive for syncope (near). Negative for weakness and numbness.   Hematological: Does not bruise/bleed easily.   All other systems reviewed and are negative.       Physical Exam     Initial Vitals [20 0959]   BP Pulse Resp Temp SpO2   126/60 84 20 98.1 °F (36.7 °C) 100 %      MAP       --          Physical Exam  Nursing Notes and Vital Signs Reviewed.  Constitutional: Well-developed and well-nourished. Patient is in NAD.  Head: Atraumatic. Normocephalic.  Eyes: PERRL. EOM intact. Conjunctivae are pale. No scleral icterus.  ENT: Mucous membranes are moist. Oropharynx is clear and symmetric.     Neck: Supple. Full ROM. No lymphadenopathy.  Cardiovascular: Regular rate. Regular rhythm. No murmurs, rubs, or gallops. Distal pulses are 2+ and symmetric.  Pulmonary/Chest: No respiratory distress. Clear to auscultation bilaterally. No wheezing or rales.  Abdominal: Soft and non-distended.  There is no tenderness.  No rebound, guarding, or rigidity. Good bowel sounds.  Genitourinary: No CVA tenderness  Musculoskeletal: Moves all extremities. No obvious deformities. No calf tenderness.  Skin: Warm and dry.  Neurological:  Alert, awake, and appropriate.  Normal speech.  No acute focal neurological deficits are appreciated.  Psychiatric: Normal affect. Good eye contact. Appropriate in content.     ED Course   Critical Care    Date/Time: 7/9/2020 10:59 AM  Performed by: Duy Trujillo Jr., MD  Authorized by: Duy Trujillo Jr., MD   Direct patient critical care time: 25 minutes  Additional history critical care time: 10 minutes  Ordering / reviewing critical care time: 15 minutes  Documentation critical care time: 10 minutes  Total critical care time (exclusive of procedural time) : 60 minutes  Critical care time was exclusive of separately billable procedures and treating other patients and teaching time.  Critical care was necessary to treat or prevent imminent or life-threatening deterioration of the following conditions: Symptomatic anemia.  Critical care was time spent personally by me on the following activities: blood draw for specimens, development of treatment plan with patient or surrogate, interpretation of cardiac output measurements, evaluation of patient's response to treatment, examination of patient, obtaining history from patient or surrogate, ordering and performing treatments and interventions, ordering and review of laboratory studies, ordering and review of radiographic studies, pulse oximetry, re-evaluation of patient's condition, review of old charts and discussions with  "consultants.        ED Vital Signs:  Vitals:    07/09/20 0959 07/09/20 1030 07/09/20 1033 07/09/20 1221   BP: 126/60   129/62   Pulse: 84 79 79 77   Resp: 20 20  16   Temp: 98.1 °F (36.7 °C)   98.1 °F (36.7 °C)   TempSrc: Oral   Oral   SpO2: 100% 100%  100%   Height: 5' 2" (1.575 m)       07/09/20 1233 07/09/20 1248 07/09/20 1341   BP: 126/64 139/63 137/72   Pulse: 75 76 76   Resp: 15 15 15   Temp: 98.6 °F (37 °C) 98.7 °F (37.1 °C)    TempSrc: Oral Oral    SpO2: 100% 100% 100%   Height:          Abnormal Lab Results:  Labs Reviewed   CBC W/ AUTO DIFFERENTIAL - Abnormal; Notable for the following components:       Result Value    WBC 48.22 (*)     RBC 1.75 (*)     Hemoglobin 4.9 (*)     Hematocrit 16.9 (*)     Mean Corpuscular Hemoglobin Conc 29.0 (*)     RDW 19.9 (*)     Platelets 52 (*)     nRBC 11 (*)     Gran% 19.0 (*)     Lymph% 55.0 (*)     Mono% 18.0 (*)     Blasts 3.0 (*)     All other components within normal limits    Narrative:     Hgb, Hct critical result(s) called and verbal readback obtained from   Carina Minor RN  by SOFIA 07/09/2020 10:48   COMPREHENSIVE METABOLIC PANEL - Abnormal; Notable for the following components:    Sodium 134 (*)     CO2 22 (*)     Glucose 122 (*)     Calcium 8.6 (*)     Albumin 3.0 (*)     Alkaline Phosphatase 208 (*)     All other components within normal limits   LIPASE - Abnormal; Notable for the following components:    Lipase 66 (*)     All other components within normal limits   URINALYSIS, REFLEX TO URINE CULTURE - Abnormal; Notable for the following components:    Occult Blood UA Trace (*)     All other components within normal limits    Narrative:     Specimen Source->Urine   TROPONIN I   TYPE & SCREEN   PREPARE RBC SOFT        All Lab Results:  Results for orders placed or performed during the hospital encounter of 07/09/20   CBC auto differential   Result Value Ref Range    WBC 48.22 (H) 3.90 - 12.70 K/uL    RBC 1.75 (L) 4.00 - 5.40 M/uL    Hemoglobin 4.9 (LL) " 12.0 - 16.0 g/dL    Hematocrit 16.9 (LL) 37.0 - 48.5 %    Mean Corpuscular Volume 97 82 - 98 fL    Mean Corpuscular Hemoglobin 28.0 27.0 - 31.0 pg    Mean Corpuscular Hemoglobin Conc 29.0 (L) 32.0 - 36.0 g/dL    RDW 19.9 (H) 11.5 - 14.5 %    Platelets 52 (L) 150 - 350 K/uL    MPV SEE COMMENT 9.2 - 12.9 fL    Immature Granulocytes CANCELED 0.0 - 0.5 %    Immature Grans (Abs) CANCELED 0.00 - 0.04 K/uL    Lymph # CANCELED 1.0 - 4.8 K/uL    Mono # CANCELED 0.3 - 1.0 K/uL    Eos # CANCELED 0.0 - 0.5 K/uL    Baso # CANCELED 0.00 - 0.20 K/uL    nRBC 11 (A) 0 /100 WBC    Gran% 19.0 (L) 38.0 - 73.0 %    Lymph% 55.0 (H) 18.0 - 48.0 %    Mono% 18.0 (H) 4.0 - 15.0 %    Eosinophil% 0.0 0.0 - 8.0 %    Basophil% 0.0 0.0 - 1.9 %    Metamyelocytes 3.0 %    Myelocytes 2.0 %    Blasts 3.0 (A) 0 %    Differential Method Manual    Comprehensive metabolic panel   Result Value Ref Range    Sodium 134 (L) 136 - 145 mmol/L    Potassium 4.2 3.5 - 5.1 mmol/L    Chloride 102 95 - 110 mmol/L    CO2 22 (L) 23 - 29 mmol/L    Glucose 122 (H) 70 - 110 mg/dL    BUN, Bld 23 8 - 23 mg/dL    Creatinine 0.9 0.5 - 1.4 mg/dL    Calcium 8.6 (L) 8.7 - 10.5 mg/dL    Total Protein 7.8 6.0 - 8.4 g/dL    Albumin 3.0 (L) 3.5 - 5.2 g/dL    Total Bilirubin 0.3 0.1 - 1.0 mg/dL    Alkaline Phosphatase 208 (H) 55 - 135 U/L    AST 28 10 - 40 U/L    ALT 14 10 - 44 U/L    Anion Gap 10 8 - 16 mmol/L    eGFR if African American >60 >60 mL/min/1.73 m^2    eGFR if non African American >60 >60 mL/min/1.73 m^2   Lipase   Result Value Ref Range    Lipase 66 (H) 4 - 60 U/L   Urinalysis, Reflex to Urine Culture Urine, Clean Catch    Specimen: Urine   Result Value Ref Range    Specimen UA Urine, Clean Catch     Color, UA Yellow Yellow, Straw, Monse    Appearance, UA Clear Clear    pH, UA 7.0 5.0 - 8.0    Specific Gravity, UA 1.010 1.005 - 1.030    Protein, UA Negative Negative    Glucose, UA Negative Negative    Ketones, UA Negative Negative    Bilirubin (UA) Negative Negative     Occult Blood UA Trace (A) Negative    Nitrite, UA Negative Negative    Urobilinogen, UA Negative <2.0 EU/dL    Leukocytes, UA Negative Negative   Troponin I   Result Value Ref Range    Troponin I 0.017 0.000 - 0.026 ng/mL   Type & Screen   Result Value Ref Range    Group & Rh O POS     Indirect Saw NEG    Prepare RBC 3 Units; anemia   Result Value Ref Range    UNIT NUMBER [id number]     Product Code C9737M83     DISPENSE STATUS CROSSMATCHED     CODING SYSTEM QVHH373     Unit Blood Type Code 5100     Unit Blood Type O POS     Unit Expiration [id number]     UNIT NUMBER [id number]     Product Code A6441C99     DISPENSE STATUS CROSSMATCHED     CODING SYSTEM GCGE613     Unit Blood Type Code 5100     Unit Blood Type O POS     Unit Expiration [id number]     UNIT NUMBER [id number]     Product Code L0400P08     DISPENSE STATUS ISSUED     CODING SYSTEM [id number]     Unit Blood Type Code 5100     Unit Blood Type O POS     Unit Expiration [id number]          Imaging Results          X-Ray Chest AP Portable (Final result)  Result time 07/09/20 10:59:25    Final result by Zeyad Rodrigues MD (07/09/20 10:59:25)                 Impression:      No acute process seen.      Electronically signed by: Zeyad Rodrigues MD  Date:    07/09/2020  Time:    10:59             Narrative:    EXAMINATION:  XR CHEST AP PORTABLE    CLINICAL HISTORY:  weakness;    FINDINGS:  Single view of the chest.  Comparison 05/12/2020    Cardiac silhouette is normal.  Aorta demonstrates atherosclerotic disease.  The lungs demonstrate no evidence of active disease.  No evidence of pleural effusion or pneumothorax.  Bones demonstrate advanced degenerative change and scoliotic curvature of the lumbar spine.  Advanced degenerative changes of the glenohumeral AC joints.  Narrowing of the rotator interval suggest chronic rotator cuff injury.                                 The EKG was ordered, reviewed, and independently interpreted by the ED  provider.  Interpretation time: 1040  Rate: 79 BPM  Rhythm: NSR  Interpretation: RBBB. No STEMI.           The Emergency Provider reviewed the vital signs and test results, which are outlined above.     ED Discussion     11:02 AM: Discussed pt's case with Dr. Colón (Hem/Onc) who recommends transfusing 3 units RBC.     1:52 PM: Reassessed pt at this time.  Pt states her condition has improved at this time. Discussed with pt all pertinent ED information and results. Discussed pt dx and plan of tx. Gave pt all f/u and return to the ED instructions. All questions and concerns were addressed at this time. Pt expresses understanding of information and instructions, and is comfortable with plan to discharge. Pt is stable for discharge.    I discussed with patient and/or family/caretaker that evaluation in the ED does not suggest any emergent or life threatening medical conditions requiring immediate intervention beyond what was provided in the ED, and I believe patient is safe for discharge.  Regardless, an unremarkable evaluation in the ED does not preclude the development or presence of a serious of life threatening condition. As such, patient was instructed to return immediately for any worsening or change in current symptoms.    1:54 PM  Patient is stable nontoxic.  Patient has anemia that appears to be recurrent chronic secondary to her leukemia.  Patient is stable safe for discharge in my opinion.  She is to follow up with her doctor.  She seems reliable     MDM     CBC auto differential  Order: [id number]  Status:  Final result   Visible to patient:  No (not released)   Next appt:  07/13/2020 at 10:20 AM in Lab ([facility])   Dx:  Chronic myelomonocytic leukemia not h...  (important suggestion)  Newer results are available. Click to view them now.    Ref Range & Units 9d ago   WBC 3.90 - 12.70 K/uL 29.77High     RBC 4.00 - 5.40 M/uL 2.60Low     Hemoglobin 12.0 - 16.0 g/dL 7.5Low     Hematocrit 37.0 - 48.5 %  24.4Low     Mean Corpuscular Volume 82 - 98 fL 94    Mean Corpuscular Hemoglobin 27.0 - 31.0 pg 28.8    Mean Corpuscular Hemoglobin Conc 32.0 - 36.0 g/dL 30.7Low     RDW 11.5 - 14.5 % 18.0High     Platelets 150 - 350 K/uL 41Low     MPV 9.2 - 12.9 fL SEE COMMENT    Comment: Result not available.   Immature Granulocytes 0.0 - 0.5 % CANCELED    Comment: Result canceled by the ancillary.   Immature Grans (Abs) 0.00 - 0.04 K/uL CANCELED    Comment: Mild elevation in immature granulocytes is non specific and   can be seen in a variety of conditions including stress response,   acute inflammation, trauma and pregnancy. Correlation with other   laboratory and clinical findings is essential.     Result canceled by the ancillary.    Lymph # 1.0 - 4.8 K/uL CANCELED    Comment: Result canceled by the ancillary.   Mono # 0.3 - 1.0 K/uL CANCELED    Comment: Result canceled by the ancillary.   Eos # 0.0 - 0.5 K/uL CANCELED    Comment: Result canceled by the ancillary.   Baso # 0.00 - 0.20 K/uL CANCELED    Comment: Result canceled by the ancillary.   nRBC 0 /100 WBC 3Abnormal     Gran% 38.0 - 73.0 % 23.0Low     Lymph% 18.0 - 48.0 % 49.0High     Mono% 4.0 - 15.0 % 24.0High     Eosinophil% 0.0 - 8.0 % 0.0    Basophil% 0.0 - 1.9 % 0.0    Bands % 2.0    Metamyelocytes % 2.0    Platelet Estimate  DecreasedAbnormal     Aniso  Slight    Hypo  Occasional    Differential Method  Manual    Resulting Agency  [facility]         Specimen Collected: 06/30/20 10:03 Last Resulted: 06/30/20 10:52                Medical Decision Making:   Clinical Tests:   Lab Tests: Ordered and Reviewed  Radiological Study: Ordered and Reviewed  Medical Tests: Ordered and Reviewed           ED Medication(s):  Medications   0.9%  NaCl infusion (for blood administration) (has no administration in time range)       New Prescriptions    No medications on file       Follow-up Information     Briseida Bennett MD.    Specialty: Internal Medicine  Contact information:  [id number] [id number]  [phone]             Denton Knox MD.    Specialty: Hematology and Oncology  Contact information:  [address]  [place] [address]  [phone]                       Scribe Attestation:   Scribe #1: I performed the above scribed service and the documentation accurately describes the services I performed. I attest to the accuracy of the note.     Attending:   Physician Attestation Statement for Scribe #1: I, Duy Trujillo Jr., MD, personally performed the services described in this documentation, as scribed by Eric Garay, in my presence, and it is both accurate and complete.           Clinical Impression       ICD-10-CM ICD-9-CM   1. Symptomatic anemia  D64.9 285.9   2. Weakness  R53.1 780.79       Disposition:   Disposition: Discharged  Condition: Stable         Duy Trujillo Jr., MD  07/09/20 3638

## 2020-07-09 NOTE — DISCHARGE INSTRUCTIONS
Continue all home medications.  Follow up with Heme-Onc doctor next available.  Return as needed for any worsening symptoms, problems, questions or concerns.

## 2020-07-11 NOTE — PROGRESS NOTES
Subjective:       Patient ID: Sarah Parker is a 80 y.o. female.    Chief Complaint:  Chronic myelomonocytic leukemia    Follow-up  Pertinent negatives include no abdominal pain, arthralgias, chest pain, chills, congestion, coughing, diaphoresis, fever, headaches, joint swelling, myalgias, nausea, neck pain, numbness, sore throat or vomiting.      Patient is a 80-year-old female presents for reinstitution  of Vidaza therapy for chronic myelomonocytic leukemia patient who is currently on treatment with azacitidine.  She is here for routine follow-up.      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  Recently treated for MRSA infection of lower back.    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  Negative for perineural or lymphovascular invasion.  Negative surgical margins were achieved based on pathology report. Also biopsied was a subcutaneous lesion on the left thigh which was nonmalignant pathologically.    She was seen in the emergency room on 07/09/2020 for symptomatic anemia for which she was transfused with 3 units of packed red blood cell subsequently discharged.  Today she presents to clinic for follow-up.    Continues to complain of weakness and fatigue and shortness of breath.  Appetite remains stable per patient.  Denies fever, chills, cough, unintentional weight loss, night sweats with nausea or vomiting.  Denies diarrhea or dysuria.  She also denies abnormal bleed.      Past Medical History:   Diagnosis Date    Acid reflux     Anxiety     Back pain     Bronchitis, chronic obstructive w acute bronchitis 7/29/2016    Cancer     [facility] arms, face- Dr. Lata Tejada    Cataract     2+NS    Chronic myelomonocytic leukemia     Degenerative disc disease      Depression     Dry mouth     Encounter for blood transfusion     Hernia, hiatal 2013    Hypertension     Hypothyroid     Iron deficiency anemia     Macrocytic anemia 5/3/2016    Macular degeneration     Migraines     Mixed anxiety and depressive disorder     Multiple fractures of ribs of right side     Osteoporosis     Other hyperlipidemia 10/11/2019    Pneumonia     Pneumonia due to other staphylococcus     Rheumatoid arthritis     Rheumatoid arthritis(714.0)     Rheumatoid arthritis(714.0)     Remicade, MTX.     Family History   Problem Relation Age of Onset    Heart disease Mother     Hyperlipidemia Mother     Hypertension Mother     Osteoarthritis Mother     Cataracts Mother     Hypertension Father     Osteoarthritis Father     Heart disease Father     Asthma Sister     Chronic back pain Sister     Hypertension Sister     Osteoarthritis Sister     Thyroid disease Sister     Asthma Brother     Cancer Brother     Chronic back pain Brother     Diabetes Mellitus Brother     Hypertension Brother     Osteoarthritis Brother     Thyroid disease Brother     Cancer Maternal Grandfather     Fibromyalgia Daughter     Heart disease Maternal Grandmother     Colon cancer Neg Hx     Diabetes Neg Hx      Social History     Socioeconomic History    Marital status:      Spouse name: Not on file    Number of children: Not on file    Years of education: Not on file    Highest education level: Not on file   Occupational History    Occupation: retired   Social Needs    Financial resource strain: Not on file    Food insecurity     Worry: Not on file     Inability: Not on file    Transportation needs     Medical: Not on file     Non-medical: Not on file   Tobacco Use    Smoking status: Former Smoker     Packs/day: 0.25     Years: 2.00     Pack years: 0.50     Quit date: 1965     Years since quittin.7    Smokeless tobacco: Never Used   Substance and Sexual  Activity    Alcohol use: No    Drug use: No    Sexual activity: Never     Partners: Male   Lifestyle    Physical activity     Days per week: Not on file     Minutes per session: Not on file    Stress: Not at all   Relationships    Social connections     Talks on phone: Not on file     Gets together: Not on file     Attends [employer] service: Not on file     Active member of club or organization: Not on file     Attends meetings of clubs or organizations: Not on file     Relationship status: Not on file   Other Topics Concern    Not on file   Social History Narrative    Patient is aretired and live with .     Past Surgical History:   Procedure Laterality Date    APPENDECTOMY  1985    APPLICATION OF WOUND VACUUM-ASSISTED CLOSURE DEVICE N/A 5/14/2020    Procedure: APPLICATION, WOUND VAC;  Surgeon: Mckinley Shannon MD;  Location: [facility] OR;  Service: General;  Laterality: N/A;    BREAST BIOPSY      CATARACT EXTRACTION Bilateral 6/11/15    Dr. Booth    CHOLECYSTECTOMY  2013    cryoablasion kidney Left 09/27/2016    EXCISION OF SQUAMOUS CELL CARCINOMA Left 7/2/2020    Procedure: EXCISION, CARCINOMA, SQUAMOUS CELL;  Surgeon: Mckinley Shannon MD;  Location: [facility] OR;  Service: General;  Laterality: Left;    feet Bilateral     rheumatoid    FRACTURE SURGERY Right     tibia    HERNIA REPAIR      HYSTERECTOMY  1970    partial    INCISION AND DRAINAGE OF ABSCESS N/A 5/12/2020    Procedure: INCISION AND DRAINAGE, ABSCESS;  Surgeon: Emerson Hathaway MD;  Location: [facility] OR;  Service: General;  Laterality: N/A;    INCISIONAL BIOPSY N/A 5/12/2020    Procedure: INCISIONAL BIOPSY;  Surgeon: Emerson Hathaway MD;  Location: [facility] OR;  Service: General;  Laterality: N/A;    JOINT REPLACEMENT      bilateral knees (2008), hands, wrists, knuckles, toes    LAPAROSCOPIC NISSEN FUNDOPLICATION      TRANSFORAMINAL EPIDURAL INJECTION OF STEROID Left 6/25/2019    Procedure: Left L5/S1 TF AYAAN with local;  Surgeon: Rowdy Felix MD;   Location: [facility];  Service: Pain Management;  Laterality: Left;    WOUND DEBRIDEMENT N/A 5/14/2020    Procedure: DEBRIDEMENT, WOUND;  Surgeon: Mckinley Shannon MD;  Location: [facility] OR;  Service: General;  Laterality: N/A;       Labs:  Lab Results   Component Value Date    WBC 37.34 (H) 07/13/2020    HGB 9.7 (L) 07/13/2020    HCT 31.8 (L) 07/13/2020    MCV 95 07/13/2020    PLT 34 (LL) 07/13/2020     BMP  Lab Results   Component Value Date     (L) 07/09/2020    K 4.2 07/09/2020     07/09/2020    CO2 22 (L) 07/09/2020    BUN 23 07/09/2020    CREATININE 0.9 07/09/2020    CALCIUM 8.6 (L) 07/09/2020    ANIONGAP 10 07/09/2020    ESTGFRAFRICA >60 07/09/2020    EGFRNONAA >60 07/09/2020     Lab Results   Component Value Date    ALT 14 07/09/2020    AST 28 07/09/2020    ALKPHOS 208 (H) 07/09/2020    BILITOT 0.3 07/09/2020       Lab Results   Component Value Date    IRON 93 01/17/2020    TIBC 425 01/17/2020    FERRITIN 276 01/17/2020     Lab Results   Component Value Date    CXUSRVVN67 1918 (H) 08/25/2019     Lab Results   Component Value Date    FOLATE 12.0 08/25/2019     Lab Results   Component Value Date    TSH 5.174 (H) 09/03/2019         Review of Systems   Constitutional: Positive for activity change. Negative for chills, diaphoresis and fever.   HENT: Negative for congestion, dental problem, drooling, ear discharge, ear pain, facial swelling, hearing loss, mouth sores, nosebleeds, postnasal drip, rhinorrhea, sinus pressure, sneezing, sore throat, tinnitus, trouble swallowing and voice change.    Eyes: Negative for photophobia, pain, discharge, redness, itching and visual disturbance.   Respiratory: Negative for cough, choking, chest tightness, shortness of breath, wheezing and stridor.    Cardiovascular: Negative for chest pain, palpitations and leg swelling.   Gastrointestinal: Negative for abdominal distention, abdominal pain, anal bleeding, blood in stool, constipation, diarrhea, nausea, rectal pain and  vomiting.   Endocrine: Negative for cold intolerance, heat intolerance, polydipsia, polyphagia and polyuria.   Genitourinary: Negative for decreased urine volume, difficulty urinating, dyspareunia, dysuria, enuresis, flank pain, frequency, genital sores, hematuria, menstrual problem, pelvic pain, urgency, vaginal bleeding, vaginal discharge and vaginal pain.   Musculoskeletal: Negative for arthralgias, gait problem, joint swelling, myalgias, neck pain and neck stiffness.   Skin: Negative for color change and pallor.   Allergic/Immunologic: Negative for environmental allergies, food allergies and immunocompromised state.   Neurological: Negative for dizziness, tremors, seizures, syncope, facial asymmetry, speech difficulty, light-headedness, numbness and headaches.   Hematological: Negative for adenopathy. Does not bruise/bleed easily.   Psychiatric/Behavioral: Positive for dysphoric mood. Negative for agitation, behavioral problems, confusion, decreased concentration, hallucinations, self-injury, sleep disturbance and suicidal ideas. The patient is nervous/anxious. The patient is not hyperactive.        Objective:      Physical Exam  Vitals signs reviewed.   Constitutional:       Appearance: She is cachectic. She is not diaphoretic.   HENT:      Head: Normocephalic and atraumatic.      Right Ear: External ear normal.      Left Ear: External ear normal.      Nose: Nose normal.      Right Sinus: No maxillary sinus tenderness or frontal sinus tenderness.      Left Sinus: No maxillary sinus tenderness or frontal sinus tenderness.      Mouth/Throat:      Pharynx: No oropharyngeal exudate.   Eyes:      General: Lids are normal. No scleral icterus.        Right eye: No discharge.         Left eye: No discharge.      Conjunctiva/sclera: Conjunctivae normal.      Right eye: Right conjunctiva is not injected. No hemorrhage.     Left eye: Left conjunctiva is not injected. No hemorrhage.     Pupils: Pupils are equal, round, and  reactive to light.   Neck:      Musculoskeletal: Normal range of motion and neck supple.      Thyroid: No thyromegaly.      Vascular: No JVD.      Trachea: No tracheal deviation.   Cardiovascular:      Rate and Rhythm: Normal rate and regular rhythm.   Pulmonary:      Effort: Pulmonary effort is normal. No respiratory distress.      Breath sounds: No stridor.   Chest:      Chest wall: No tenderness.   Abdominal:      General: There is no distension.      Palpations: Abdomen is soft. There is no hepatomegaly, splenomegaly or mass.      Tenderness: There is no abdominal tenderness. There is no rebound.   Musculoskeletal: Normal range of motion.         General: Deformity present. No tenderness.      Comments: Deformed digits of upper extremity secondary to arthritis.   Lymphadenopathy:      Cervical: No cervical adenopathy.      Upper Body:      Right upper body: No supraclavicular adenopathy.      Left upper body: No supraclavicular adenopathy.   Skin:     General: Skin is dry.      Findings: No erythema.      Comments: Bandage of the left upper forearm-site of recent surgery   Neurological:      Mental Status: She is alert and oriented to person, place, and time.      Cranial Nerves: No cranial nerve deficit.      Coordination: Coordination normal.      Gait: Gait abnormal.   Psychiatric:         Behavior: Behavior normal.         Thought Content: Thought content normal.         Judgment: Judgment normal.           Assessment:      1. Chronic myelomonocytic leukemia not having achieved remission    2. Squamous cell carcinoma of skin of left upper extremity, including shoulder    3. Acquired hypothyroidism    4. Rheumatoid arthritis involving right shoulder with positive rheumatoid factor    5. Monoclonal paraproteinemia    6. Iron deficiency anemia due to chronic blood loss           Plan:     Chronic myelomonocytic leukemia not having achieved remission  CMML type 1, plan to reinitiate Vidaza.  Transplant  ineligible.  Reviewed recent peripheral blood smear noted mild increase in blast.  There is also increasing blood product dependency.  Please in disease who refractory disease.  Patient has been off treatment for the past few months due to infection.    We agreed to restart Vidaza and monitor with subsequent blood test.  If no improvement is noted, the options are as follows;    -second-line therapy with low-dose cytarabine.  -stop all treatment and consider hospice care    Plan to see her back at the completion of this cycle of Vidaza in about 2 weeks.    Squamous cell carcinoma of skin of upper extremity, including shoulder  Well to moderately differentiated keratinizing squamous cell carcinoma, negative for fabiana new year old lymphovascular invasion.  Surgical margins were negative.  No chemotherapy required.  Will continue to monitor.    Acquired hypothyroidism  Management per PCP.  On 88 medical g of Synthroid    Rheumatoid arthritis  Recently seen by rheumatologist.  Recommended tapering dose of steroid.    Monoclonal paraproteinemia  We marrow biopsy these shoe about 15-20% lambda restricted free light chain protein.  Previous immunofixation showed IgG lambda specific monoclonal band in gamma.     Will obtain repeat immunoglobulin free light chain analysis, electrophoresis and immunofixation, quantitative immunoglobulin assay.    Anemia  Will consider erythropoietin stimulating agent as supportive care from [facility]      Denton Knox MD

## 2020-07-11 NOTE — ASSESSMENT & PLAN NOTE
CMML type 1, plan to reinitiate Vidaza.  Transplant ineligible.  Reviewed recent peripheral blood smear noted mild increase in blast.  There is also increasing blood product dependency.  Please in disease who refractory disease.  Patient has been off treatment for the past few months due to infection.    We agreed to restart Vidaza and monitor with subsequent blood test.  If no improvement is noted, the options are as follows;    -second-line therapy with low-dose cytarabine.  -stop all treatment and consider hospice care    Plan to see her back at the completion of this cycle of Vidaza in about 2 weeks.

## 2020-07-11 NOTE — ASSESSMENT & PLAN NOTE
Well to moderately differentiated keratinizing squamous cell carcinoma, negative for fabiana new year old lymphovascular invasion.  Surgical margins were negative.  No chemotherapy required.  Will continue to monitor.

## 2020-07-11 NOTE — ASSESSMENT & PLAN NOTE
We marrow biopsy these shoe about 15-20% lambda restricted free light chain protein.  Previous immunofixation showed IgG lambda specific monoclonal band in gamma.     Will obtain repeat immunoglobulin free light chain analysis, electrophoresis and immunofixation, quantitative immunoglobulin assay.

## 2020-07-13 ENCOUNTER — TELEPHONE (OUTPATIENT)
Dept: HEMATOLOGY/ONCOLOGY | Facility: CLINIC | Age: 80
End: 2020-07-13

## 2020-07-13 ENCOUNTER — OFFICE VISIT (OUTPATIENT)
Dept: HEMATOLOGY/ONCOLOGY | Facility: CLINIC | Age: 80
End: 2020-07-13
Payer: MEDICARE

## 2020-07-13 ENCOUNTER — LAB VISIT (OUTPATIENT)
Dept: LAB | Facility: HOSPITAL | Age: 80
End: 2020-07-13
Attending: INTERNAL MEDICINE
Payer: MEDICARE

## 2020-07-13 VITALS
HEIGHT: 62 IN | TEMPERATURE: 99 F | WEIGHT: 108.69 LBS | SYSTOLIC BLOOD PRESSURE: 127 MMHG | HEART RATE: 99 BPM | OXYGEN SATURATION: 93 % | BODY MASS INDEX: 20 KG/M2 | DIASTOLIC BLOOD PRESSURE: 76 MMHG | RESPIRATION RATE: 18 BRPM

## 2020-07-13 DIAGNOSIS — E03.9 ACQUIRED HYPOTHYROIDISM: ICD-10-CM

## 2020-07-13 DIAGNOSIS — D47.2 MONOCLONAL PARAPROTEINEMIA: ICD-10-CM

## 2020-07-13 DIAGNOSIS — M05.711 RHEUMATOID ARTHRITIS INVOLVING RIGHT SHOULDER WITH POSITIVE RHEUMATOID FACTOR: Chronic | ICD-10-CM

## 2020-07-13 DIAGNOSIS — C93.10 CHRONIC MYELOMONOCYTIC LEUKEMIA NOT HAVING ACHIEVED REMISSION: ICD-10-CM

## 2020-07-13 DIAGNOSIS — D50.0 IRON DEFICIENCY ANEMIA DUE TO CHRONIC BLOOD LOSS: ICD-10-CM

## 2020-07-13 DIAGNOSIS — C44.629 SQUAMOUS CELL CARCINOMA OF SKIN OF LEFT UPPER EXTREMITY, INCLUDING SHOULDER: ICD-10-CM

## 2020-07-13 DIAGNOSIS — C93.10 CHRONIC MYELOMONOCYTIC LEUKEMIA NOT HAVING ACHIEVED REMISSION: Chronic | ICD-10-CM

## 2020-07-13 LAB
ALBUMIN SERPL BCP-MCNC: 3.3 G/DL (ref 3.5–5.2)
ALP SERPL-CCNC: 210 U/L (ref 55–135)
ALT SERPL W/O P-5'-P-CCNC: 17 U/L (ref 10–44)
ANION GAP SERPL CALC-SCNC: 10 MMOL/L (ref 8–16)
ANISOCYTOSIS BLD QL SMEAR: SLIGHT
AST SERPL-CCNC: 28 U/L (ref 10–40)
BASOPHILS # BLD AUTO: ABNORMAL K/UL (ref 0–0.2)
BASOPHILS NFR BLD: 0 % (ref 0–1.9)
BILIRUB SERPL-MCNC: 0.5 MG/DL (ref 0.1–1)
BLASTS NFR BLD MANUAL: 1 %
BUN SERPL-MCNC: 31 MG/DL (ref 8–23)
CALCIUM SERPL-MCNC: 8.8 MG/DL (ref 8.7–10.5)
CHLORIDE SERPL-SCNC: 102 MMOL/L (ref 95–110)
CO2 SERPL-SCNC: 23 MMOL/L (ref 23–29)
CREAT SERPL-MCNC: 1 MG/DL (ref 0.5–1.4)
DACRYOCYTES BLD QL SMEAR: ABNORMAL
DIFFERENTIAL METHOD: ABNORMAL
EOSINOPHIL # BLD AUTO: ABNORMAL K/UL (ref 0–0.5)
EOSINOPHIL NFR BLD: 0 % (ref 0–8)
ERYTHROCYTE [DISTWIDTH] IN BLOOD BY AUTOMATED COUNT: 18 % (ref 11.5–14.5)
EST. GFR  (AFRICAN AMERICAN): >60 ML/MIN/1.73 M^2
EST. GFR  (NON AFRICAN AMERICAN): 54 ML/MIN/1.73 M^2
GLUCOSE SERPL-MCNC: 115 MG/DL (ref 70–110)
HCT VFR BLD AUTO: 31.8 % (ref 37–48.5)
HGB BLD-MCNC: 9.7 G/DL (ref 12–16)
IGA SERPL-MCNC: 93 MG/DL (ref 40–350)
IGG SERPL-MCNC: 2354 MG/DL (ref 650–1600)
IGM SERPL-MCNC: 26 MG/DL (ref 50–300)
IMM GRANULOCYTES # BLD AUTO: ABNORMAL K/UL (ref 0–0.04)
IMM GRANULOCYTES NFR BLD AUTO: ABNORMAL % (ref 0–0.5)
LYMPHOCYTES # BLD AUTO: ABNORMAL K/UL (ref 1–4.8)
LYMPHOCYTES NFR BLD: 53 % (ref 18–48)
MCH RBC QN AUTO: 28.9 PG (ref 27–31)
MCHC RBC AUTO-ENTMCNC: 30.5 G/DL (ref 32–36)
MCV RBC AUTO: 95 FL (ref 82–98)
METAMYELOCYTES NFR BLD MANUAL: 6 %
MONOCYTES # BLD AUTO: ABNORMAL K/UL (ref 0.3–1)
MONOCYTES NFR BLD: 4 % (ref 4–15)
MYELOCYTES NFR BLD MANUAL: 5 %
NEUTROPHILS # BLD AUTO: ABNORMAL K/UL (ref 1.8–7.7)
NEUTROPHILS NFR BLD: 18 % (ref 38–73)
NEUTS BAND NFR BLD MANUAL: 12 %
NRBC BLD-RTO: 2 /100 WBC
OVALOCYTES BLD QL SMEAR: ABNORMAL
PLATELET # BLD AUTO: 34 K/UL (ref 150–350)
PMV BLD AUTO: ABNORMAL FL (ref 9.2–12.9)
POIKILOCYTOSIS BLD QL SMEAR: SLIGHT
POTASSIUM SERPL-SCNC: 4.4 MMOL/L (ref 3.5–5.1)
PROMYELOCYTES NFR BLD MANUAL: 1 %
PROT SERPL-MCNC: 8.3 G/DL (ref 6–8.4)
RBC # BLD AUTO: 3.36 M/UL (ref 4–5.4)
SODIUM SERPL-SCNC: 135 MMOL/L (ref 136–145)
SPHEROCYTES BLD QL SMEAR: ABNORMAL
WBC # BLD AUTO: 37.34 K/UL (ref 3.9–12.7)

## 2020-07-13 PROCEDURE — 80053 COMPREHEN METABOLIC PANEL: CPT | Mod: HCNC

## 2020-07-13 PROCEDURE — 3078F DIAST BP <80 MM HG: CPT | Mod: HCNC,CPTII,S$GLB, | Performed by: INTERNAL MEDICINE

## 2020-07-13 PROCEDURE — 85027 COMPLETE CBC AUTOMATED: CPT | Mod: HCNC

## 2020-07-13 PROCEDURE — 99999 PR PBB SHADOW E&M-EST. PATIENT-LVL V: ICD-10-PCS | Mod: PBBFAC,HCNC,, | Performed by: INTERNAL MEDICINE

## 2020-07-13 PROCEDURE — 1126F PR PAIN SEVERITY QUANTIFIED, NO PAIN PRESENT: ICD-10-PCS | Mod: HCNC,S$GLB,, | Performed by: INTERNAL MEDICINE

## 2020-07-13 PROCEDURE — 3074F PR MOST RECENT SYSTOLIC BLOOD PRESSURE < 130 MM HG: ICD-10-PCS | Mod: HCNC,CPTII,S$GLB, | Performed by: INTERNAL MEDICINE

## 2020-07-13 PROCEDURE — 86334 PATHOLOGIST INTERPRETATION IFE: ICD-10-PCS | Mod: 26,HCNC,, | Performed by: PATHOLOGY

## 2020-07-13 PROCEDURE — 84165 PROTEIN E-PHORESIS SERUM: CPT | Mod: HCNC

## 2020-07-13 PROCEDURE — 1159F PR MEDICATION LIST DOCUMENTED IN MEDICAL RECORD: ICD-10-PCS | Mod: HCNC,S$GLB,, | Performed by: INTERNAL MEDICINE

## 2020-07-13 PROCEDURE — 99215 PR OFFICE/OUTPT VISIT, EST, LEVL V, 40-54 MIN: ICD-10-PCS | Mod: HCNC,S$GLB,, | Performed by: INTERNAL MEDICINE

## 2020-07-13 PROCEDURE — 86334 IMMUNOFIX E-PHORESIS SERUM: CPT | Mod: 26,HCNC,, | Performed by: PATHOLOGY

## 2020-07-13 PROCEDURE — 1126F AMNT PAIN NOTED NONE PRSNT: CPT | Mod: HCNC,S$GLB,, | Performed by: INTERNAL MEDICINE

## 2020-07-13 PROCEDURE — 85007 BL SMEAR W/DIFF WBC COUNT: CPT | Mod: HCNC

## 2020-07-13 PROCEDURE — 83520 IMMUNOASSAY QUANT NOS NONAB: CPT | Mod: 59,HCNC

## 2020-07-13 PROCEDURE — 1101F PT FALLS ASSESS-DOCD LE1/YR: CPT | Mod: HCNC,CPTII,S$GLB, | Performed by: INTERNAL MEDICINE

## 2020-07-13 PROCEDURE — 1101F PR PT FALLS ASSESS DOC 0-1 FALLS W/OUT INJ PAST YR: ICD-10-PCS | Mod: HCNC,CPTII,S$GLB, | Performed by: INTERNAL MEDICINE

## 2020-07-13 PROCEDURE — 36415 COLL VENOUS BLD VENIPUNCTURE: CPT | Mod: HCNC

## 2020-07-13 PROCEDURE — 84165 PATHOLOGIST INTERPRETATION SPE: ICD-10-PCS | Mod: 26,HCNC,, | Performed by: PATHOLOGY

## 2020-07-13 PROCEDURE — 84165 PROTEIN E-PHORESIS SERUM: CPT | Mod: 26,HCNC,, | Performed by: PATHOLOGY

## 2020-07-13 PROCEDURE — 3078F PR MOST RECENT DIASTOLIC BLOOD PRESSURE < 80 MM HG: ICD-10-PCS | Mod: HCNC,CPTII,S$GLB, | Performed by: INTERNAL MEDICINE

## 2020-07-13 PROCEDURE — 86334 IMMUNOFIX E-PHORESIS SERUM: CPT | Mod: HCNC

## 2020-07-13 PROCEDURE — 99999 PR PBB SHADOW E&M-EST. PATIENT-LVL V: CPT | Mod: PBBFAC,HCNC,, | Performed by: INTERNAL MEDICINE

## 2020-07-13 PROCEDURE — 82784 ASSAY IGA/IGD/IGG/IGM EACH: CPT | Mod: 59,HCNC

## 2020-07-13 PROCEDURE — 1159F MED LIST DOCD IN RCRD: CPT | Mod: HCNC,S$GLB,, | Performed by: INTERNAL MEDICINE

## 2020-07-13 PROCEDURE — 3074F SYST BP LT 130 MM HG: CPT | Mod: HCNC,CPTII,S$GLB, | Performed by: INTERNAL MEDICINE

## 2020-07-13 PROCEDURE — 99215 OFFICE O/P EST HI 40 MIN: CPT | Mod: HCNC,S$GLB,, | Performed by: INTERNAL MEDICINE

## 2020-07-14 LAB
ALBUMIN SERPL ELPH-MCNC: 3.64 G/DL (ref 3.35–5.55)
ALPHA1 GLOB SERPL ELPH-MCNC: 0.54 G/DL (ref 0.17–0.41)
ALPHA2 GLOB SERPL ELPH-MCNC: 0.89 G/DL (ref 0.43–0.99)
B-GLOBULIN SERPL ELPH-MCNC: 0.72 G/DL (ref 0.5–1.1)
GAMMA GLOB SERPL ELPH-MCNC: 2.11 G/DL (ref 0.67–1.58)
INTERPRETATION SERPL IFE-IMP: NORMAL
PATHOLOGIST INTERPRETATION IFE: NORMAL
PATHOLOGIST INTERPRETATION SPE: NORMAL
PROT SERPL-MCNC: 7.9 G/DL (ref 6–8.4)

## 2020-07-15 ENCOUNTER — NURSE TRIAGE (OUTPATIENT)
Dept: ADMINISTRATIVE | Facility: CLINIC | Age: 80
End: 2020-07-15

## 2020-07-15 LAB
KAPPA LC SER QL IA: 3.78 MG/DL (ref 0.33–1.94)
KAPPA LC/LAMBDA SER IA: 0.45 (ref 0.26–1.65)
LAMBDA LC SER QL IA: 8.36 MG/DL (ref 0.57–2.63)

## 2020-07-15 PROCEDURE — G0179 PR HOME HEALTH MD RECERTIFICATION: ICD-10-PCS | Mod: ,,, | Performed by: INTERNAL MEDICINE

## 2020-07-15 PROCEDURE — G0179 MD RECERTIFICATION HHA PT: HCPCS | Mod: ,,, | Performed by: INTERNAL MEDICINE

## 2020-07-15 NOTE — TELEPHONE ENCOUNTER
Post Procedural Symptom Tracker. Pt had in office visit on 7/13/20 which was 11 days post-op from her procedure on 7/2/20. Pt denied symptoms. Per symptom tracker protocol, no contact made. No follow up needed.      Reason for Disposition   Caller has cancelled the call before the first contact    Protocols used: NO CONTACT OR DUPLICATE CONTACT CALL-A-[place]

## 2020-07-17 ENCOUNTER — OFFICE VISIT (OUTPATIENT)
Dept: SURGERY | Facility: CLINIC | Age: 80
End: 2020-07-17
Payer: MEDICARE

## 2020-07-17 VITALS
HEART RATE: 90 BPM | BODY MASS INDEX: 20 KG/M2 | HEIGHT: 62 IN | WEIGHT: 108.69 LBS | DIASTOLIC BLOOD PRESSURE: 78 MMHG | TEMPERATURE: 98 F | SYSTOLIC BLOOD PRESSURE: 146 MMHG

## 2020-07-17 DIAGNOSIS — C93.10 CHRONIC MYELOMONOCYTIC LEUKEMIA NOT HAVING ACHIEVED REMISSION: Primary | Chronic | ICD-10-CM

## 2020-07-17 PROCEDURE — 99999 PR PBB SHADOW E&M-EST. PATIENT-LVL IV: CPT | Mod: PBBFAC,HCNC,, | Performed by: SURGERY

## 2020-07-17 PROCEDURE — 99999 PR PBB SHADOW E&M-EST. PATIENT-LVL IV: ICD-10-PCS | Mod: PBBFAC,HCNC,, | Performed by: SURGERY

## 2020-07-17 PROCEDURE — 99024 PR POST-OP FOLLOW-UP VISIT: ICD-10-PCS | Mod: HCNC,S$GLB,, | Performed by: SURGERY

## 2020-07-17 PROCEDURE — 99024 POSTOP FOLLOW-UP VISIT: CPT | Mod: HCNC,S$GLB,, | Performed by: SURGERY

## 2020-07-17 NOTE — PROGRESS NOTES
Sarah Parker 80 y.o.female  This patient was seen for postoperative visit. Sarah Jesus Parker has no specific complaints and denies of any issues relevant to the surgery. The wound has healed without any complications.  I have discussed the pathology result with the patient. Sarah Parker will follow up in 1 month.  I have informed the patient to have the wound care nurse to use silver nitrate on the back wound.    Mckinley Shannon

## 2020-07-21 ENCOUNTER — OFFICE VISIT (OUTPATIENT)
Dept: OPHTHALMOLOGY | Facility: CLINIC | Age: 80
End: 2020-07-21
Payer: COMMERCIAL

## 2020-07-21 DIAGNOSIS — H52.4 BILATERAL PRESBYOPIA: ICD-10-CM

## 2020-07-21 DIAGNOSIS — I10 ESSENTIAL HYPERTENSION: Primary | ICD-10-CM

## 2020-07-21 DIAGNOSIS — Z96.1 PSEUDOPHAKIA OF BOTH EYES: ICD-10-CM

## 2020-07-21 PROCEDURE — 92015 PR REFRACTION: ICD-10-PCS | Mod: HCNC,S$GLB,, | Performed by: OPTOMETRIST

## 2020-07-21 PROCEDURE — 99999 PR PBB SHADOW E&M-EST. PATIENT-LVL III: CPT | Mod: PBBFAC,HCNC,, | Performed by: OPTOMETRIST

## 2020-07-21 PROCEDURE — 99999 PR PBB SHADOW E&M-EST. PATIENT-LVL III: ICD-10-PCS | Mod: PBBFAC,HCNC,, | Performed by: OPTOMETRIST

## 2020-07-21 PROCEDURE — 92014 COMPRE OPH EXAM EST PT 1/>: CPT | Mod: HCNC,S$GLB,, | Performed by: OPTOMETRIST

## 2020-07-21 PROCEDURE — 92014 PR EYE EXAM, EST PATIENT,COMPREHESV: ICD-10-PCS | Mod: HCNC,S$GLB,, | Performed by: OPTOMETRIST

## 2020-07-21 PROCEDURE — 92015 DETERMINE REFRACTIVE STATE: CPT | Mod: HCNC,S$GLB,, | Performed by: OPTOMETRIST

## 2020-07-21 NOTE — PROGRESS NOTES
HPI     No visual complaints.  Patient wears OTC readers.  Last eye exam 06/05/2019 TRF.  Update glasses RX.    Last edited by Jaymie Banda on 7/21/2020 11:26 AM. (History)            Assessment /Plan     For exam results, see Encounter Report.    Essential hypertension    Pseudophakia of both eyes    Bilateral presbyopia      No HTN Retinopathy    Stable IOL OU.    Dispense Final Rx for glasses.  RTC 1 year  Discussed above and answered questions.

## 2020-07-22 ENCOUNTER — EXTERNAL HOME HEALTH (OUTPATIENT)
Dept: HOME HEALTH SERVICES | Facility: HOSPITAL | Age: 80
End: 2020-07-22
Payer: MEDICARE

## 2020-07-22 NOTE — PROGRESS NOTES
60 Day Recert Order # [id number]  New Cert Period: 07/15/2020 to 09/12/2020 with [facility] - Dr. Briseida Reyes

## 2020-07-24 ENCOUNTER — TELEPHONE (OUTPATIENT)
Dept: SURGERY | Facility: CLINIC | Age: 80
End: 2020-07-24

## 2020-07-24 ENCOUNTER — ANESTHESIA EVENT (OUTPATIENT)
Dept: SURGERY | Facility: HOSPITAL | Age: 80
DRG: 856 | End: 2020-07-24
Payer: MEDICARE

## 2020-07-24 ENCOUNTER — HOSPITAL ENCOUNTER (INPATIENT)
Facility: HOSPITAL | Age: 80
LOS: 3 days | Discharge: HOME HEALTH | DRG: 856 | End: 2020-07-27
Attending: EMERGENCY MEDICINE | Admitting: INTERNAL MEDICINE
Payer: MEDICARE

## 2020-07-24 DIAGNOSIS — D64.9 ANEMIA, UNSPECIFIED TYPE: ICD-10-CM

## 2020-07-24 DIAGNOSIS — T81.49XA WOUND INFECTION AFTER SURGERY: ICD-10-CM

## 2020-07-24 DIAGNOSIS — D64.9 ANEMIA: ICD-10-CM

## 2020-07-24 DIAGNOSIS — C92.10 CML (CHRONIC MYELOCYTIC LEUKEMIA): Primary | ICD-10-CM

## 2020-07-24 DIAGNOSIS — D69.6 THROMBOCYTOPENIA: ICD-10-CM

## 2020-07-24 DIAGNOSIS — R07.9 CHEST PAIN: ICD-10-CM

## 2020-07-24 LAB
ABO + RH BLD: NORMAL
ACANTHOCYTES BLD QL SMEAR: PRESENT
ALBUMIN SERPL BCP-MCNC: 3 G/DL (ref 3.5–5.2)
ALP SERPL-CCNC: 292 U/L (ref 55–135)
ALT SERPL W/O P-5'-P-CCNC: 37 U/L (ref 10–44)
ANION GAP SERPL CALC-SCNC: 13 MMOL/L (ref 8–16)
ANISOCYTOSIS BLD QL SMEAR: SLIGHT
AST SERPL-CCNC: 28 U/L (ref 10–40)
BASOPHILS # BLD AUTO: ABNORMAL K/UL (ref 0–0.2)
BASOPHILS NFR BLD: 0 % (ref 0–1.9)
BILIRUB SERPL-MCNC: 0.4 MG/DL (ref 0.1–1)
BLASTS NFR BLD MANUAL: 1 %
BLD GP AB SCN CELLS X3 SERPL QL: NORMAL
BUN SERPL-MCNC: 28 MG/DL (ref 8–23)
CALCIUM SERPL-MCNC: 8.7 MG/DL (ref 8.7–10.5)
CHLORIDE SERPL-SCNC: 98 MMOL/L (ref 95–110)
CO2 SERPL-SCNC: 21 MMOL/L (ref 23–29)
CREAT SERPL-MCNC: 1 MG/DL (ref 0.5–1.4)
DACRYOCYTES BLD QL SMEAR: ABNORMAL
DIFFERENTIAL METHOD: ABNORMAL
EOSINOPHIL # BLD AUTO: ABNORMAL K/UL (ref 0–0.5)
EOSINOPHIL NFR BLD: 0 % (ref 0–8)
ERYTHROCYTE [DISTWIDTH] IN BLOOD BY AUTOMATED COUNT: 17.2 % (ref 11.5–14.5)
EST. GFR  (AFRICAN AMERICAN): >60 ML/MIN/1.73 M^2
EST. GFR  (NON AFRICAN AMERICAN): 54 ML/MIN/1.73 M^2
GLUCOSE SERPL-MCNC: 127 MG/DL (ref 70–110)
HCT VFR BLD AUTO: 17.7 % (ref 37–48.5)
HGB BLD-MCNC: 5.4 G/DL (ref 12–16)
HYPOCHROMIA BLD QL SMEAR: ABNORMAL
IMM GRANULOCYTES # BLD AUTO: ABNORMAL K/UL (ref 0–0.04)
IMM GRANULOCYTES NFR BLD AUTO: ABNORMAL % (ref 0–0.5)
LACTATE SERPL-SCNC: 1.4 MMOL/L (ref 0.5–2.2)
LYMPHOCYTES # BLD AUTO: ABNORMAL K/UL (ref 1–4.8)
LYMPHOCYTES NFR BLD: 42 % (ref 18–48)
MCH RBC QN AUTO: 28.6 PG (ref 27–31)
MCHC RBC AUTO-ENTMCNC: 30.5 G/DL (ref 32–36)
MCV RBC AUTO: 94 FL (ref 82–98)
METAMYELOCYTES NFR BLD MANUAL: 8 %
MONOCYTES # BLD AUTO: ABNORMAL K/UL (ref 0.3–1)
MONOCYTES NFR BLD: 21 % (ref 4–15)
MYELOCYTES NFR BLD MANUAL: 6 %
NEUTROPHILS NFR BLD: 21 % (ref 38–73)
NRBC BLD-RTO: 4 /100 WBC
PLATELET # BLD AUTO: 30 K/UL (ref 150–350)
PLATELET BLD QL SMEAR: ABNORMAL
PMV BLD AUTO: ABNORMAL FL (ref 9.2–12.9)
POIKILOCYTOSIS BLD QL SMEAR: SLIGHT
POLYCHROMASIA BLD QL SMEAR: ABNORMAL
POTASSIUM SERPL-SCNC: 4.5 MMOL/L (ref 3.5–5.1)
PROMYELOCYTES NFR BLD MANUAL: 1 %
PROT SERPL-MCNC: 7.7 G/DL (ref 6–8.4)
RBC # BLD AUTO: 1.89 M/UL (ref 4–5.4)
SARS-COV-2 RDRP RESP QL NAA+PROBE: NEGATIVE
SCHISTOCYTES BLD QL SMEAR: PRESENT
SODIUM SERPL-SCNC: 132 MMOL/L (ref 136–145)
STOMATOCYTES BLD QL SMEAR: PRESENT
WBC # BLD AUTO: 53.71 K/UL (ref 3.9–12.7)

## 2020-07-24 PROCEDURE — 25000003 PHARM REV CODE 250: Mod: HCNC | Performed by: NURSE PRACTITIONER

## 2020-07-24 PROCEDURE — 85060 BLOOD SMEAR INTERPRETATION: CPT | Mod: HCNC,,, | Performed by: PATHOLOGY

## 2020-07-24 PROCEDURE — A4216 STERILE WATER/SALINE, 10 ML: HCPCS | Mod: HCNC | Performed by: NURSE PRACTITIONER

## 2020-07-24 PROCEDURE — 36415 COLL VENOUS BLD VENIPUNCTURE: CPT | Mod: HCNC

## 2020-07-24 PROCEDURE — 25000003 PHARM REV CODE 250: Mod: HCNC | Performed by: EMERGENCY MEDICINE

## 2020-07-24 PROCEDURE — 99222 1ST HOSP IP/OBS MODERATE 55: CPT | Mod: HCNC,,, | Performed by: SURGERY

## 2020-07-24 PROCEDURE — U0002 COVID-19 LAB TEST NON-CDC: HCPCS | Mod: HCNC

## 2020-07-24 PROCEDURE — 85060 PATHOLOGIST REVIEW: ICD-10-PCS | Mod: HCNC,,, | Performed by: PATHOLOGY

## 2020-07-24 PROCEDURE — 11000001 HC ACUTE MED/SURG PRIVATE ROOM: Mod: HCNC

## 2020-07-24 PROCEDURE — 36430 TRANSFUSION BLD/BLD COMPNT: CPT | Mod: HCNC

## 2020-07-24 PROCEDURE — 87040 BLOOD CULTURE FOR BACTERIA: CPT | Mod: 59,HCNC

## 2020-07-24 PROCEDURE — 85007 BL SMEAR W/DIFF WBC COUNT: CPT | Mod: HCNC

## 2020-07-24 PROCEDURE — 63600175 PHARM REV CODE 636 W HCPCS: Mod: HCNC | Performed by: NURSE PRACTITIONER

## 2020-07-24 PROCEDURE — 80053 COMPREHEN METABOLIC PANEL: CPT | Mod: HCNC

## 2020-07-24 PROCEDURE — 96365 THER/PROPH/DIAG IV INF INIT: CPT | Mod: HCNC

## 2020-07-24 PROCEDURE — 63600175 PHARM REV CODE 636 W HCPCS: Mod: HCNC | Performed by: EMERGENCY MEDICINE

## 2020-07-24 PROCEDURE — 96375 TX/PRO/DX INJ NEW DRUG ADDON: CPT | Mod: HCNC

## 2020-07-24 PROCEDURE — 86850 RBC ANTIBODY SCREEN: CPT | Mod: HCNC

## 2020-07-24 PROCEDURE — 86920 COMPATIBILITY TEST SPIN: CPT | Mod: HCNC

## 2020-07-24 PROCEDURE — 85027 COMPLETE CBC AUTOMATED: CPT | Mod: HCNC

## 2020-07-24 PROCEDURE — 99291 CRITICAL CARE FIRST HOUR: CPT | Mod: 25,HCNC

## 2020-07-24 PROCEDURE — 99222 PR INITIAL HOSPITAL CARE,LEVL II: ICD-10-PCS | Mod: HCNC,,, | Performed by: SURGERY

## 2020-07-24 PROCEDURE — 83605 ASSAY OF LACTIC ACID: CPT | Mod: HCNC

## 2020-07-24 PROCEDURE — 21400001 HC TELEMETRY ROOM: Mod: HCNC

## 2020-07-24 RX ORDER — ACETAMINOPHEN 325 MG/1
650 TABLET ORAL EVERY 8 HOURS PRN
Status: DISCONTINUED | OUTPATIENT
Start: 2020-07-24 | End: 2020-07-27 | Stop reason: HOSPADM

## 2020-07-24 RX ORDER — SODIUM CHLORIDE 9 MG/ML
500 INJECTION, SOLUTION INTRAVENOUS
Status: COMPLETED | OUTPATIENT
Start: 2020-07-24 | End: 2020-07-24

## 2020-07-24 RX ORDER — FERROUS GLUCONATE 324(38)MG
324 TABLET ORAL 2 TIMES DAILY
Status: ON HOLD | COMMUNITY
End: 2020-08-13 | Stop reason: HOSPADM

## 2020-07-24 RX ORDER — METOPROLOL SUCCINATE 50 MG/1
50 TABLET, EXTENDED RELEASE ORAL DAILY
Status: DISCONTINUED | OUTPATIENT
Start: 2020-07-25 | End: 2020-07-27 | Stop reason: HOSPADM

## 2020-07-24 RX ORDER — LANOLIN ALCOHOL/MO/W.PET/CERES
400 CREAM (GRAM) TOPICAL 2 TIMES DAILY
Status: DISCONTINUED | OUTPATIENT
Start: 2020-07-24 | End: 2020-07-27 | Stop reason: HOSPADM

## 2020-07-24 RX ORDER — HYDROCODONE BITARTRATE AND ACETAMINOPHEN 5; 325 MG/1; MG/1
1 TABLET ORAL EVERY 6 HOURS PRN
Status: DISCONTINUED | OUTPATIENT
Start: 2020-07-24 | End: 2020-07-25 | Stop reason: SDUPTHER

## 2020-07-24 RX ORDER — PRAVASTATIN SODIUM 10 MG/1
10 TABLET ORAL NIGHTLY
Status: DISCONTINUED | OUTPATIENT
Start: 2020-07-24 | End: 2020-07-27 | Stop reason: HOSPADM

## 2020-07-24 RX ORDER — TALC
6 POWDER (GRAM) TOPICAL NIGHTLY PRN
Status: DISCONTINUED | OUTPATIENT
Start: 2020-07-24 | End: 2020-07-25 | Stop reason: SDUPTHER

## 2020-07-24 RX ORDER — LEVOTHYROXINE SODIUM 88 UG/1
88 TABLET ORAL
Status: DISCONTINUED | OUTPATIENT
Start: 2020-07-25 | End: 2020-07-27 | Stop reason: HOSPADM

## 2020-07-24 RX ORDER — ONDANSETRON 2 MG/ML
4 INJECTION INTRAMUSCULAR; INTRAVENOUS EVERY 8 HOURS PRN
Status: DISCONTINUED | OUTPATIENT
Start: 2020-07-24 | End: 2020-07-27 | Stop reason: HOSPADM

## 2020-07-24 RX ORDER — VANCOMYCIN HCL IN 5 % DEXTROSE 1G/250ML
1000 PLASTIC BAG, INJECTION (ML) INTRAVENOUS
Status: COMPLETED | OUTPATIENT
Start: 2020-07-24 | End: 2020-07-24

## 2020-07-24 RX ORDER — DULOXETIN HYDROCHLORIDE 20 MG/1
40 CAPSULE, DELAYED RELEASE ORAL DAILY
Status: DISCONTINUED | OUTPATIENT
Start: 2020-07-25 | End: 2020-07-27 | Stop reason: HOSPADM

## 2020-07-24 RX ORDER — SODIUM CHLORIDE 0.9 % (FLUSH) 0.9 %
10 SYRINGE (ML) INJECTION EVERY 8 HOURS
Status: DISCONTINUED | OUTPATIENT
Start: 2020-07-24 | End: 2020-07-27 | Stop reason: HOSPADM

## 2020-07-24 RX ORDER — FLUTICASONE PROPIONATE 50 MCG
2 SPRAY, SUSPENSION (ML) NASAL DAILY
Status: DISCONTINUED | OUTPATIENT
Start: 2020-07-25 | End: 2020-07-26

## 2020-07-24 RX ORDER — FERROUS SULFATE 325(65) MG
325 TABLET, DELAYED RELEASE (ENTERIC COATED) ORAL 2 TIMES DAILY
Status: DISCONTINUED | OUTPATIENT
Start: 2020-07-24 | End: 2020-07-27 | Stop reason: HOSPADM

## 2020-07-24 RX ORDER — HYDROCODONE BITARTRATE AND ACETAMINOPHEN 500; 5 MG/1; MG/1
TABLET ORAL
Status: DISCONTINUED | OUTPATIENT
Start: 2020-07-24 | End: 2020-07-25

## 2020-07-24 RX ADMIN — Medication 10 ML: at 09:07

## 2020-07-24 RX ADMIN — FERROUS SULFATE TAB EC 325 MG (65 MG FE EQUIVALENT) 325 MG: 325 (65 FE) TABLET DELAYED RESPONSE at 09:07

## 2020-07-24 RX ADMIN — MAGNESIUM OXIDE 400 MG (241.3 MG MAGNESIUM) TABLET 400 MG: TABLET at 09:07

## 2020-07-24 RX ADMIN — PIPERACILLIN AND TAZOBACTAM 4.5 G: 4; .5 INJECTION, POWDER, LYOPHILIZED, FOR SOLUTION INTRAVENOUS; PARENTERAL at 06:07

## 2020-07-24 RX ADMIN — VANCOMYCIN HYDROCHLORIDE 1000 MG: 1 INJECTION, POWDER, LYOPHILIZED, FOR SOLUTION INTRAVENOUS at 04:07

## 2020-07-24 RX ADMIN — HYDROCODONE BITARTRATE AND ACETAMINOPHEN 1 TABLET: 5; 325 TABLET ORAL at 09:07

## 2020-07-24 RX ADMIN — AMITRIPTYLINE HYDROCHLORIDE 75 MG: 50 TABLET, FILM COATED ORAL at 09:07

## 2020-07-24 RX ADMIN — PRAVASTATIN SODIUM 10 MG: 10 TABLET ORAL at 09:07

## 2020-07-24 RX ADMIN — SODIUM CHLORIDE 500 ML: 0.9 INJECTION, SOLUTION INTRAVENOUS at 04:07

## 2020-07-24 NOTE — HPI
80-year-old female known patient of Dr. Shannon with history of CML, RA, debility, squamous cell carcinoma recently excised, HTN, anemia admitted in may 2020 for abscess of her lower back. She underwent debridement and has not had chemotherapy since. She was sent to ED today by her home health agency for fever and wound drainage. She reports pain at her lower back site and LLE surgical site. She denies any trauma.

## 2020-07-24 NOTE — TELEPHONE ENCOUNTER
Home health nurse called, Ira, patient's wound looks bad. She is running 100.7 fever, induration all around her back and abdomen. She has not been to the bathroom in 5 days. Her wound where the stitches were is not closing.   Per Dr. Shannon, patient needs to go to ED

## 2020-07-24 NOTE — ED PROVIDER NOTES
Per Shukri Tijerina NP: 80 year old female sent to ER by Dr. Shannon for further evaluation of fever and wound to back.  Pt had recent surgical drainage of skin infection on back.  Pt reports fever of 100.7 today and pain worsening X 2 days.     SCRIBE #1 NOTE: I, Carlitos Doss, am scribing for, and in the presence of, Zeyad Mayer MD. I have scribed the entire note.      History      Chief Complaint   Patient presents with    Infection     pt sent here by Dr. Shannon for infection to her back       Review of patient's allergies indicates:   Allergen Reactions    Codeine      Other reaction(s): hyper  Other reaction(s): hyper    Gabapentin Other (See Comments)     Bad dreams        HPI   HPI    7/24/2020, 3:32 PM   History obtained from the patient      History of Present Illness: Sarah Parker is a 80 y.o. female patient with a PMHx of chronic myelomonocytic leukemia who presents to the Emergency Department for back wound check. Pt had a recent surgical drainage of a skin infection on her back, and had a wound vac placed.  Patient states that everything was going well until a couple days ago back pain started again and the wound started draining again.  Pt was referred to the ED by Dr. Shannon (General Surgery) for further evaluation. Symptoms are constant and moderate in severity. No mitigating or exacerbating factors reported. Associated sxs include fever (Tmax 100.7) and back pain. Patient denies any chills, n/v/d, SOB, CP, weakness, numbness, dizziness, headache, and all other sxs at this time. No further complaints or concerns at this time.     Arrival mode: Personal vehicle    PCP: Briseida Bennett MD       Past Medical History:  Past Medical History:   Diagnosis Date    Acid reflux     Anxiety     Back pain     Bronchitis, chronic obstructive w acute bronchitis 7/29/2016    Cancer     [facility] arms, face- Dr. Lata Tejada    Cataract     2+NS    Chronic myelomonocytic leukemia     Degenerative disc disease      Depression     Dry mouth     Encounter for blood transfusion     Hernia, hiatal 11/18/2013    Hypertension     Hypothyroid     Iron deficiency anemia     Macrocytic anemia 5/3/2016    Macular degeneration     Migraines     Mixed anxiety and depressive disorder     Multiple fractures of ribs of right side     Osteoporosis     Other hyperlipidemia 10/11/2019    Pneumonia     Pneumonia due to other staphylococcus     Rheumatoid arthritis     Rheumatoid arthritis(714.0)     Rheumatoid arthritis(714.0)     Remicade, MTX.     Past Surgical History:  Past Surgical History:   Procedure Laterality Date    APPENDECTOMY  1985    APPLICATION OF WOUND VACUUM-ASSISTED CLOSURE DEVICE N/A 5/14/2020    Procedure: APPLICATION, WOUND VAC;  Surgeon: Mckinley Shannon MD;  Location: [facility] OR;  Service: General;  Laterality: N/A;    BREAST BIOPSY      CATARACT EXTRACTION Bilateral 6/11/15    Dr. Booth    CHOLECYSTECTOMY  2013    cryoablasion kidney Left 09/27/2016    EXCISION OF SQUAMOUS CELL CARCINOMA Left 7/2/2020    Procedure: EXCISION, CARCINOMA, SQUAMOUS CELL;  Surgeon: Mckinley Shannon MD;  Location: [facility] OR;  Service: General;  Laterality: Left;    feet Bilateral     rheumatoid    FRACTURE SURGERY Right     tibia    HERNIA REPAIR      HYSTERECTOMY  1970    partial    INCISION AND DRAINAGE OF ABSCESS N/A 5/12/2020    Procedure: INCISION AND DRAINAGE, ABSCESS;  Surgeon: Emerson Hathaway MD;  Location: [facility] OR;  Service: General;  Laterality: N/A;    INCISIONAL BIOPSY N/A 5/12/2020    Procedure: INCISIONAL BIOPSY;  Surgeon: Emerson Hathaway MD;  Location: [facility] OR;  Service: General;  Laterality: N/A;    JOINT REPLACEMENT      bilateral knees (2008), hands, wrists, knuckles, toes    LAPAROSCOPIC NISSEN FUNDOPLICATION      TRANSFORAMINAL EPIDURAL INJECTION OF STEROID Left 6/25/2019    Procedure: Left L5/S1 TF AYAAN with local;  Surgeon: Rowdy Felix MD;  Location: [facility] PAIN MGT;  Service: Pain Management;   Laterality: Left;    WOUND DEBRIDEMENT N/A 2020    Procedure: DEBRIDEMENT, WOUND;  Surgeon: Mckinley Shannon MD;  Location: [facility] OR;  Service: General;  Laterality: N/A;         Family History:  Family History   Problem Relation Age of Onset    Heart disease Mother     Hyperlipidemia Mother     Hypertension Mother     Osteoarthritis Mother     Cataracts Mother     Hypertension Father     Osteoarthritis Father     Heart disease Father     Asthma Sister     Chronic back pain Sister     Hypertension Sister     Osteoarthritis Sister     Thyroid disease Sister     Asthma Brother     Cancer Brother     Chronic back pain Brother     Diabetes Mellitus Brother     Hypertension Brother     Osteoarthritis Brother     Thyroid disease Brother     Cancer Maternal Grandfather     Fibromyalgia Daughter     Heart disease Maternal Grandmother     Colon cancer Neg Hx     Diabetes Neg Hx        Social History:  Social History     Tobacco Use    Smoking status: Former Smoker     Packs/day: 0.25     Years: 2.00     Pack years: 0.50     Quit date: 1965     Years since quittin.7    Smokeless tobacco: Never Used   Substance and Sexual Activity    Alcohol use: No    Drug use: No    Sexual activity: Never     Partners: Male       ROS   Review of Systems   Constitutional: Positive for fever (Tmax 100.7). Negative for chills.   HENT: Negative for sore throat.    Respiratory: Negative for shortness of breath.    Cardiovascular: Negative for chest pain.   Gastrointestinal: Negative for diarrhea, nausea and vomiting.   Genitourinary: Negative for dysuria.   Musculoskeletal: Positive for back pain.   Skin: Positive for wound (back). Negative for rash.   Neurological: Negative for dizziness, seizures, weakness, light-headedness, numbness and headaches.   Hematological: Does not bruise/bleed easily.   All other systems reviewed and are negative.    Physical Exam      Initial Vitals   BP Pulse Resp Temp SpO2    07/24/20 1407 07/24/20 1407 07/24/20 1407 07/24/20 1407 07/24/20 1615   122/70 (!) 120 18 98.8 °F (37.1 °C) 96 %      MAP       --                 Physical Exam  Nursing Notes and Vital Signs Reviewed.  Constitutional: Patient is in no acute distress. Well-developed and well-nourished.  Head: Atraumatic. Normocephalic.  Eyes: PERRL. EOM intact. Conjunctivae are not pale. No scleral icterus.  ENT: Mucous membranes are moist. Oropharynx is clear and symmetric.    Neck: Supple. Full ROM. No lymphadenopathy.  Cardiovascular: Regular rate. Regular rhythm. No murmurs, rubs, or gallops. Distal pulses are 2+ and symmetric.  Pulmonary/Chest: No respiratory distress. Clear to auscultation bilaterally. No wheezing or rales.  Abdominal: Soft and non-distended.  There is no tenderness.  No rebound, guarding, or rigidity.   Musculoskeletal: Moves all extremities. No obvious deformities. No edema.  Skin: lower back wound with purulence  Neurological:  Alert, awake, and appropriate.  Normal speech.  No acute focal neurological deficits are appreciated.  Psychiatric: Normal affect. Good eye contact. Appropriate in content.    ED Course    Critical Care    Date/Time: 7/24/2020 5:12 PM  Performed by: Zeyad Mayer MD  Authorized by: Zeyad Mayer MD   Direct patient critical care time: 15 minutes  Additional history critical care time: 5 minutes  Ordering / reviewing critical care time: 5 minutes  Documentation critical care time: 5 minutes  Consulting other physicians critical care time: 5 minutes  Total critical care time (exclusive of procedural time) : 35 minutes  Critical care time was exclusive of separately billable procedures and treating other patients and teaching time.  Critical care was necessary to treat or prevent imminent or life-threatening deterioration of the following conditions: anemia requiring transfusion.  Critical care was time spent personally by me on the following activities: blood draw for specimens,  development of treatment plan with patient or surrogate, discussions with consultants, interpretation of cardiac output measurements, evaluation of patient's response to treatment, examination of patient, obtaining history from patient or surrogate, ordering and performing treatments and interventions, ordering and review of laboratory studies, ordering and review of radiographic studies, pulse oximetry and re-evaluation of patient's condition.        ED Vital Signs:  Vitals:    07/24/20 1407 07/24/20 1615   BP: 122/70    Pulse: (!) 120 (!) 116   Resp: 18    Temp: 98.8 °F (37.1 °C)    TempSrc: Oral    SpO2:  96%       Abnormal Lab Results:  Labs Reviewed   COMPREHENSIVE METABOLIC PANEL - Abnormal; Notable for the following components:       Result Value    Sodium 132 (*)     CO2 21 (*)     Glucose 127 (*)     BUN, Bld 28 (*)     Albumin 3.0 (*)     Alkaline Phosphatase 292 (*)     eGFR if non  54 (*)     All other components within normal limits   CBC W/ AUTO DIFFERENTIAL - Abnormal; Notable for the following components:    WBC 53.71 (*)     RBC 1.89 (*)     Hemoglobin 5.4 (*)     Hematocrit 17.7 (*)     Mean Corpuscular Hemoglobin Conc 30.5 (*)     RDW 17.2 (*)     Platelets 30 (*)     nRBC 4 (*)     Gran% 21.0 (*)     Mono% 21.0 (*)     Blasts 1.0 (*)     Platelet Estimate Decreased (*)     All other components within normal limits    Narrative:     WBC,HGB,HCT,PLT  critical result(s) called and verbal readback   obtained from LELAND MCKEON RN by ORALIA 07/24/2020 16:43   CULTURE, BLOOD   CULTURE, BLOOD   LACTIC ACID, PLASMA   SARS-COV-2 RNA AMPLIFICATION, QUAL   TYPE & SCREEN   PREPARE RBC SOFT        All Lab Results:  Results for orders placed or performed during the hospital encounter of 07/24/20   Comprehensive metabolic panel   Result Value Ref Range    Sodium 132 (L) 136 - 145 mmol/L    Potassium 4.5 3.5 - 5.1 mmol/L    Chloride 98 95 - 110 mmol/L    CO2 21 (L) 23 - 29 mmol/L    Glucose 127  (H) 70 - 110 mg/dL    BUN, Bld 28 (H) 8 - 23 mg/dL    Creatinine 1.0 0.5 - 1.4 mg/dL    Calcium 8.7 8.7 - 10.5 mg/dL    Total Protein 7.7 6.0 - 8.4 g/dL    Albumin 3.0 (L) 3.5 - 5.2 g/dL    Total Bilirubin 0.4 0.1 - 1.0 mg/dL    Alkaline Phosphatase 292 (H) 55 - 135 U/L    AST 28 10 - 40 U/L    ALT 37 10 - 44 U/L    Anion Gap 13 8 - 16 mmol/L    eGFR if African American >60 >60 mL/min/1.73 m^2    eGFR if non African American 54 (A) >60 mL/min/1.73 m^2   Lactic acid, plasma   Result Value Ref Range    Lactate (Lactic Acid) 1.4 0.5 - 2.2 mmol/L   CBC auto differential   Result Value Ref Range    WBC 53.71 (HH) 3.90 - 12.70 K/uL    RBC 1.89 (L) 4.00 - 5.40 M/uL    Hemoglobin 5.4 (LL) 12.0 - 16.0 g/dL    Hematocrit 17.7 (LL) 37.0 - 48.5 %    Mean Corpuscular Volume 94 82 - 98 fL    Mean Corpuscular Hemoglobin 28.6 27.0 - 31.0 pg    Mean Corpuscular Hemoglobin Conc 30.5 (L) 32.0 - 36.0 g/dL    RDW 17.2 (H) 11.5 - 14.5 %    Platelets 30 (LL) 150 - 350 K/uL    MPV SEE COMMENT 9.2 - 12.9 fL    Immature Granulocytes CANCELED 0.0 - 0.5 %    Immature Grans (Abs) CANCELED 0.00 - 0.04 K/uL    Lymph # CANCELED 1.0 - 4.8 K/uL    Mono # CANCELED 0.3 - 1.0 K/uL    Eos # CANCELED 0.0 - 0.5 K/uL    Baso # CANCELED 0.00 - 0.20 K/uL    nRBC 4 (A) 0 /100 WBC    Gran% 21.0 (L) 38.0 - 73.0 %    Lymph% 42.0 18.0 - 48.0 %    Mono% 21.0 (H) 4.0 - 15.0 %    Eosinophil% 0.0 0.0 - 8.0 %    Basophil% 0.0 0.0 - 1.9 %    Metamyelocytes 8.0 %    Myelocytes 6.0 %    Promyelocytes 1.0 %    Blasts 1.0 (A) 0 %    Platelet Estimate Decreased (A)     Aniso Slight     Poik Slight     Poly Occasional     Hypo Occasional     Tear Drop Cells Occasional     Stomatocytes Present     Acanthocytes Present     Schistocytes Present     Differential Method Manual    COVID-19 Rapid Screening   Result Value Ref Range    SARS-CoV-2 RNA, Amplification, Qual Negative Negative   Type & Screen   Result Value Ref Range    Group & Rh O POS     Indirect Saw NEG       Imaging Results:  Imaging Results    None                 The Emergency Provider reviewed the vital signs and test results, which are outlined above.    ED Discussion     5:13 PM: Discussed pt's case with Dr. Alfaro (General Surgery), who will come and evaluate pt at bedside.    5:30 PM: Dr. Alfaro (General Surgery) has evaluated pt at bedside and recommends admission to Hospital Medicine. Per Dr. Alfaro, pt will likely need wound debridement but after pt is resusictated with blood products.    6:01 PM: Discussed case with TREASURE Freire ([facility]). Dr. Rivero agrees with current care and management of pt and accepts admission.   Admitting Service: Hospital Medicine  Admitting Physician: Dr. Rivero  Admit to: Med Tele    6:02 PM: Re-evaluated pt. I have discussed test results, shared treatment plan, and the need for admission with patient at bedside. Pt expresses understanding at this time and agree with all information. All questions answered. Pt has no further questions or concerns at this time. Pt is ready for admit.           ED Medication(s):  Medications   vancomycin - pharmacy to dose (has no administration in time range)   0.9%  NaCl infusion (for blood administration) (has no administration in time range)   piperacillin-tazobactam 4.5 g in dextrose 5 % 100 mL IVPB (ready to mix system) (0 g Intravenous Paused 7/24/20 1756)   0.9%  NaCl infusion (0 mLs Intravenous Stopped 7/24/20 1654)   vancomycin in dextrose 5 % 1 gram/250 mL IVPB 1,000 mg (1,000 mg Intravenous New Bag 7/24/20 1605)   blood transfusion 2 units     New Prescriptions    No medications on file           Medical Decision Making    Medical Decision Making:   Clinical Tests:   Lab Tests: Ordered and Reviewed           Scribe Attestation:   Scribe #1: I performed the above scribed service and the documentation accurately describes the services I performed. I attest to the accuracy of the note.    Attending:   Physician  Attestation Statement for Scribe #1: I, Zeyad Mayer MD, personally performed the services described in this documentation, as scribed by Carlitos Doss, in my presence, and it is both accurate and complete.          Clinical Impression       ICD-10-CM ICD-9-CM   1. CML (chronic myelocytic leukemia)  C92.10 205.10   2. Wound infection after surgery  T81.49XA 998.59   3. Anemia, unspecified type  D64.9 285.9   4. Thrombocytopenia  D69.6 287.5       Disposition:   Disposition: Admitted  Condition: Fair         Zeyad Mayer MD  07/25/20 1938

## 2020-07-24 NOTE — SUBJECTIVE & OBJECTIVE
No current facility-administered medications on file prior to encounter.      Current Outpatient Medications on File Prior to Encounter   Medication Sig    albuterol (PROVENTIL) 2.5 mg /3 mL (0.083 %) nebulizer solution Take 3 mLs (2.5 mg total) by nebulization every 6 (six) hours as needed for Wheezing.    amitriptyline (ELAVIL) 75 MG tablet TAKE 1 TABLET BY MOUTH EVERY EVENING    benzonatate (TESSALON) 100 MG capsule Take 1-2 capsules (100-200 mg total) by mouth 3 (three) times daily as needed for Cough.    calcium citrate-vitamin D (CITRACAL + D) 315-200 mg-unit per tablet Take 1 tablet by mouth once daily.     DULoxetine (CYMBALTA) 20 MG capsule TAKE 2 CAPSULES(40 MG) BY MOUTH EVERY DAY (Patient taking differently: before meals, at bedtime and at 0200. )    ferrous gluconate 324 mg (37.5 mg iron) Tab tablet Take 1 tablet (324 mg total) by mouth daily with breakfast. (Patient taking differently: Take 324 mg by mouth 2 (two) times daily with meals. )    fluticasone propionate (FLONASE) 50 mcg/actuation nasal spray 2 sprays (100 mcg total) by Each Nostril route once daily.    hydrocodone-acetaminophen 5-325mg (NORCO) 5-325 mg per tablet TK 1 T PO Q 6 H PRN    hydrOXYzine HCl (ATARAX) 25 MG tablet Take 25 mg by mouth nightly.     leucovorin (WELLCOVORIN) 5 mg Tab TAKE ONE WEEKLY ON SATURDAYS    levothyroxine (SYNTHROID) 88 MCG tablet TAKE 1 TABLET BY MOUTH BEFORE BREAKFAST    magnesium oxide (MAG-OX) 400 mg (241.3 mg magnesium) tablet Take 1 tablet (400 mg total) by mouth 3 (three) times daily.    meclizine (ANTIVERT) 50 MG tablet Take 25 mg by mouth 3 (three) times daily as needed.    metoprolol succinate (TOPROL-XL) 50 MG 24 hr tablet TAKE 1 TABLET(50 MG) BY MOUTH EVERY DAY    multivitamin capsule Take by mouth. As directed    ondansetron (ZOFRAN) 4 MG tablet Take 1 tablet (4 mg total) by mouth every 8 (eight) hours as needed for Nausea.    pravastatin (PRAVACHOL) 10 MG tablet TAKE 1 TABLET(10 MG)  BY MOUTH EVERY DAY    prochlorperazine (COMPAZINE) 5 MG tablet TAKE 1 TABLET(5 MG) BY MOUTH EVERY 6 HOURS AS NEEDED FOR NAUSEA    tamsulosin (FLOMAX) 0.4 mg Cap Take 1 capsule (0.4 mg total) by mouth once daily. For urinary retention    topiramate (TOPAMAX) 25 MG tablet Take 1 tablet (25 mg total) by mouth at night x 1 week then increase to 2 (two) times daily. Increase as tolerated. (Patient not taking: Reported on 7/21/2020)    valsartan-hydrochlorothiazide (DIOVAN-HCT) 80-12.5 mg per tablet TAKE 1 TABLET BY MOUTH DAILY       Review of patient's allergies indicates:   Allergen Reactions    Codeine      Other reaction(s): hyper  Other reaction(s): hyper    Gabapentin Other (See Comments)     Bad dreams       Past Medical History:   Diagnosis Date    Acid reflux     Anxiety     Back pain     Bronchitis, chronic obstructive w acute bronchitis 7/29/2016    Cancer     [facility] arms, face- Dr. Lata Tejada    Cataract     2+NS    Chronic myelomonocytic leukemia     Degenerative disc disease     Depression     Dry mouth     Encounter for blood transfusion     Hernia, hiatal 11/18/2013    Hypertension     Hypothyroid     Iron deficiency anemia     Macrocytic anemia 5/3/2016    Macular degeneration     Migraines     Mixed anxiety and depressive disorder     Multiple fractures of ribs of right side     Osteoporosis     Other hyperlipidemia 10/11/2019    Pneumonia     Pneumonia due to other staphylococcus     Rheumatoid arthritis     Rheumatoid arthritis(714.0)     Rheumatoid arthritis(714.0)     Remicade, MTX.     Past Surgical History:   Procedure Laterality Date    APPENDECTOMY  1985    APPLICATION OF WOUND VACUUM-ASSISTED CLOSURE DEVICE N/A 5/14/2020    Procedure: APPLICATION, WOUND VAC;  Surgeon: Mckinley Shannon MD;  Location: [facility];  Service: General;  Laterality: N/A;    BREAST BIOPSY      CATARACT EXTRACTION Bilateral 6/11/15    Dr. Booth    CHOLECYSTECTOMY  2013    cryoablasion  kidney Left 2016    EXCISION OF SQUAMOUS CELL CARCINOMA Left 2020    Procedure: EXCISION, CARCINOMA, SQUAMOUS CELL;  Surgeon: Mckinley Shannon MD;  Location: [facility] OR;  Service: General;  Laterality: Left;    feet Bilateral     rheumatoid    FRACTURE SURGERY Right     tibia    HERNIA REPAIR      HYSTERECTOMY  1970    partial    INCISION AND DRAINAGE OF ABSCESS N/A 2020    Procedure: INCISION AND DRAINAGE, ABSCESS;  Surgeon: Emerson Hathaway MD;  Location: [facility] OR;  Service: General;  Laterality: N/A;    INCISIONAL BIOPSY N/A 2020    Procedure: INCISIONAL BIOPSY;  Surgeon: Emerson Hathaway MD;  Location: [facility] OR;  Service: General;  Laterality: N/A;    JOINT REPLACEMENT      bilateral knees (), hands, wrists, knuckles, toes    LAPAROSCOPIC NISSEN FUNDOPLICATION      TRANSFORAMINAL EPIDURAL INJECTION OF STEROID Left 2019    Procedure: Left L5/S1 TF AYAAN with local;  Surgeon: Rowdy Felix MD;  Location: [facility] PAIN [facility]GT;  Service: Pain Management;  Laterality: Left;    WOUND DEBRIDEMENT N/A 2020    Procedure: DEBRIDEMENT, WOUND;  Surgeon: Mckinley Shannon MD;  Location: [facility] OR;  Service: General;  Laterality: N/A;     Family History     Problem Relation (Age of Onset)    Asthma Sister, Brother    Cancer Brother, Maternal Grandfather    Cataracts Mother    Chronic back pain Sister, Brother    Diabetes Mellitus Brother    Fibromyalgia Daughter    Heart disease Mother, Father, Maternal Grandmother    Hyperlipidemia Mother    Hypertension Mother, Father, Sister, Brother    Osteoarthritis Mother, Father, Sister, Brother    Thyroid disease Sister, Brother        Tobacco Use    Smoking status: Former Smoker     Packs/day: 0.25     Years: 2.00     Pack years: 0.50     Quit date: 1965     Years since quittin.7    Smokeless tobacco: Never Used   Substance and Sexual Activity    Alcohol use: No    Drug use: No    Sexual activity: Never     Partners: Male     Review of Systems    Constitutional: Positive for fatigue and fever. Negative for unexpected weight change.   HENT: Negative for congestion.    Eyes: Negative for visual disturbance.   Respiratory: Negative for shortness of breath.    Cardiovascular: Negative for chest pain.   Gastrointestinal: Negative for abdominal pain.   Genitourinary: Negative for difficulty urinating.   Musculoskeletal: Positive for arthralgias, back pain and joint swelling.   Skin: Positive for wound (lower mid back). Negative for rash.   Allergic/Immunologic: Positive for immunocompromised state.   Neurological: Negative for weakness.   Psychiatric/Behavioral: The patient is not nervous/anxious.      Objective:     Vital Signs (Most Recent):  Temp: 98.8 °F (37.1 °C) (07/24/20 1407)  Pulse: (!) 116 (07/24/20 1615)  Resp: 18 (07/24/20 1407)  BP: 122/70 (07/24/20 1407)  SpO2: 96 % (07/24/20 1615) Vital Signs (24h Range):  Temp:  [98.8 °F (37.1 °C)] 98.8 °F (37.1 °C)  Pulse:  [116-120] 116  Resp:  [18] 18  SpO2:  [96 %] 96 %  BP: (122)/(70) 122/70     Weight: (please obtain bed weight)  There is no height or weight on file to calculate BMI.    Physical Exam  Constitutional:       General: She is awake.      Appearance: She is cachectic.   HENT:      Head: Normocephalic.      Mouth/Throat:      Pharynx: Oropharynx is clear.   Eyes:      Extraocular Movements: Extraocular movements intact.   Neck:      Musculoskeletal: Normal range of motion.   Cardiovascular:      Rate and Rhythm: Normal rate.   Musculoskeletal:         General: Deformity (arthritis) present. No swelling.   Skin:         Neurological:      Mental Status: She is alert and oriented to person, place, and time.   Psychiatric:         Mood and Affect: Mood normal.         Behavior: Behavior is cooperative.         Significant Labs:  CBC:   Recent Labs   Lab 07/24/20  1621   WBC 53.71*   RBC 1.89*   HGB 5.4*   HCT 17.7*   PLT 30*   MCV 94   MCH 28.6   MCHC 30.5*     BMP:   Recent Labs   Lab  07/24/20  1503   *   *   K 4.5   CL 98   CO2 21*   BUN 28*   CREATININE 1.0   CALCIUM 8.7       Significant Diagnostics:  I have reviewed and interpreted all pertinent imaging results/findings within the past 24 hours.

## 2020-07-24 NOTE — CONSULTS
[facility] -   General Surgery  Consult Note    Patient Name: Sarah Parker  MRN: [id number]  Code Status: Prior  Admission Date: 7/24/2020  Hospital Length of Stay: 0 days  Attending Physician: Zeyad Mayer MD  Primary Care Provider: Briseida Bennett MD    Patient information was obtained from patient and ER records.     Inpatient consult to General Surgery  Consult performed by: Berenice Alfaro MD  Consult ordered by: Riri Marx NP        Subjective:     Principal Problem: <principal problem not specified>    History of Present Illness: 80-year-old female known patient of Dr. Shannon with history of CML, RA, debility, squamous cell carcinoma recently excised, HTN, anemia admitted in may 2020 for abscess of her lower back. She underwent debridement and has not had chemotherapy since. She was sent to ED today by her home health agency for fever and wound drainage. She reports pain at her lower back site and LLE surgical site. She denies any trauma.     No current facility-administered medications on file prior to encounter.      Current Outpatient Medications on File Prior to Encounter   Medication Sig    albuterol (PROVENTIL) 2.5 mg /3 mL (0.083 %) nebulizer solution Take 3 mLs (2.5 mg total) by nebulization every 6 (six) hours as needed for Wheezing.    amitriptyline (ELAVIL) 75 MG tablet TAKE 1 TABLET BY MOUTH EVERY EVENING    benzonatate (TESSALON) 100 MG capsule Take 1-2 capsules (100-200 mg total) by mouth 3 (three) times daily as needed for Cough.    calcium citrate-vitamin D (CITRACAL + D) 315-200 mg-unit per tablet Take 1 tablet by mouth once daily.     DULoxetine (CYMBALTA) 20 MG capsule TAKE 2 CAPSULES(40 MG) BY MOUTH EVERY DAY (Patient taking differently: before meals, at bedtime and at 0200. )    ferrous gluconate 324 mg (37.5 mg iron) Tab tablet Take 1 tablet (324 mg total) by mouth daily with breakfast. (Patient taking differently: Take 324 mg by mouth 2 (two) times  daily with meals. )    fluticasone propionate (FLONASE) 50 mcg/actuation nasal spray 2 sprays (100 mcg total) by Each Nostril route once daily.    hydrocodone-acetaminophen 5-325mg (NORCO) 5-325 mg per tablet TK 1 T PO Q 6 H PRN    hydrOXYzine HCl (ATARAX) 25 MG tablet Take 25 mg by mouth nightly.     leucovorin (WELLCOVORIN) 5 mg Tab TAKE ONE WEEKLY ON SATURDAYS    levothyroxine (SYNTHROID) 88 MCG tablet TAKE 1 TABLET BY MOUTH BEFORE BREAKFAST    magnesium oxide (MAG-OX) 400 mg (241.3 mg magnesium) tablet Take 1 tablet (400 mg total) by mouth 3 (three) times daily.    meclizine (ANTIVERT) 50 MG tablet Take 25 mg by mouth 3 (three) times daily as needed.    metoprolol succinate (TOPROL-XL) 50 MG 24 hr tablet TAKE 1 TABLET(50 MG) BY MOUTH EVERY DAY    multivitamin capsule Take by mouth. As directed    ondansetron (ZOFRAN) 4 MG tablet Take 1 tablet (4 mg total) by mouth every 8 (eight) hours as needed for Nausea.    pravastatin (PRAVACHOL) 10 MG tablet TAKE 1 TABLET(10 MG) BY MOUTH EVERY DAY    prochlorperazine (COMPAZINE) 5 MG tablet TAKE 1 TABLET(5 MG) BY MOUTH EVERY 6 HOURS AS NEEDED FOR NAUSEA    tamsulosin (FLOMAX) 0.4 mg Cap Take 1 capsule (0.4 mg total) by mouth once daily. For urinary retention    topiramate (TOPAMAX) 25 MG tablet Take 1 tablet (25 mg total) by mouth at night x 1 week then increase to 2 (two) times daily. Increase as tolerated. (Patient not taking: Reported on 7/21/2020)    valsartan-hydrochlorothiazide (DIOVAN-HCT) 80-12.5 mg per tablet TAKE 1 TABLET BY MOUTH DAILY       Review of patient's allergies indicates:   Allergen Reactions    Codeine      Other reaction(s): hyper  Other reaction(s): hyper    Gabapentin Other (See Comments)     Bad dreams       Past Medical History:   Diagnosis Date    Acid reflux     Anxiety     Back pain     Bronchitis, chronic obstructive w acute bronchitis 7/29/2016    Cancer     [facility] arms, face- Dr. Lata Tejada    Cataract     2+NS     Chronic myelomonocytic leukemia     Degenerative disc disease     Depression     Dry mouth     Encounter for blood transfusion     Hernia, hiatal 11/18/2013    Hypertension     Hypothyroid     Iron deficiency anemia     Macrocytic anemia 5/3/2016    Macular degeneration     Migraines     Mixed anxiety and depressive disorder     Multiple fractures of ribs of right side     Osteoporosis     Other hyperlipidemia 10/11/2019    Pneumonia     Pneumonia due to other staphylococcus     Rheumatoid arthritis     Rheumatoid arthritis(714.0)     Rheumatoid arthritis(714.0)     Remicade, MTX.     Past Surgical History:   Procedure Laterality Date    APPENDECTOMY  1985    APPLICATION OF WOUND VACUUM-ASSISTED CLOSURE DEVICE N/A 5/14/2020    Procedure: APPLICATION, WOUND VAC;  Surgeon: Mckinley Shannon MD;  Location: [facility] [place];  Service: General;  Laterality: N/A;    BREAST BIOPSY      CATARACT EXTRACTION Bilateral 6/11/15    Dr. Booth    CHOLECYSTECTOMY  2013    cryoablasion kidney Left 09/27/2016    EXCISION OF SQUAMOUS CELL CARCINOMA Left 7/2/2020    Procedure: EXCISION, CARCINOMA, SQUAMOUS CELL;  Surgeon: Mckinley Shannon MD;  Location: [facility];  Service: General;  Laterality: Left;    feet Bilateral     rheumatoid    FRACTURE SURGERY Right     tibia    HERNIA REPAIR      HYSTERECTOMY  1970    partial    INCISION AND DRAINAGE OF ABSCESS N/A 5/12/2020    Procedure: INCISION AND DRAINAGE, ABSCESS;  Surgeon: Emerson Hathaway MD;  Location: [facility];  Service: General;  Laterality: N/A;    INCISIONAL BIOPSY N/A 5/12/2020    Procedure: INCISIONAL BIOPSY;  Surgeon: Emerson Hathaway MD;  Location: [facility] OR;  Service: General;  Laterality: N/A;    JOINT REPLACEMENT      bilateral knees (2008), hands, wrists, knuckles, toes    LAPAROSCOPIC NISSEN FUNDOPLICATION      TRANSFORAMINAL EPIDURAL INJECTION OF STEROID Left 6/25/2019    Procedure: Left L5/S1 TF AYAAN with local;  Surgeon: Rowdy Felix MD;  Location: [facility] PAIN  MGT;  Service: Pain Management;  Laterality: Left;    WOUND DEBRIDEMENT N/A 2020    Procedure: DEBRIDEMENT, WOUND;  Surgeon: Mckinley Shannon MD;  Location: [facility];  Service: General;  Laterality: N/A;     Family History     Problem Relation (Age of Onset)    Asthma Sister, Brother    Cancer Brother, Maternal Grandfather    Cataracts Mother    Chronic back pain Sister, Brother    Diabetes Mellitus Brother    Fibromyalgia Daughter    Heart disease Mother, Father, Maternal Grandmother    Hyperlipidemia Mother    Hypertension Mother, Father, Sister, Brother    Osteoarthritis Mother, Father, Sister, Brother    Thyroid disease Sister, Brother        Tobacco Use    Smoking status: Former Smoker     Packs/day: 0.25     Years: 2.00     Pack years: 0.50     Quit date: 1965     Years since quittin.7    Smokeless tobacco: Never Used   Substance and Sexual Activity    Alcohol use: No    Drug use: No    Sexual activity: Never     Partners: Male     Review of Systems   Constitutional: Positive for fatigue and fever. Negative for unexpected weight change.   HENT: Negative for congestion.    Eyes: Negative for visual disturbance.   Respiratory: Negative for shortness of breath.    Cardiovascular: Negative for chest pain.   Gastrointestinal: Negative for abdominal pain.   Genitourinary: Negative for difficulty urinating.   Musculoskeletal: Positive for arthralgias, back pain and joint swelling.   Skin: Positive for wound (lower mid back). Negative for rash.   Allergic/Immunologic: Positive for immunocompromised state.   Neurological: Negative for weakness.   Psychiatric/Behavioral: The patient is not nervous/anxious.      Objective:     Vital Signs (Most Recent):  Temp: 98.8 °F (37.1 °C) (20 1407)  Pulse: (!) 116 (20 1615)  Resp: 18 (20 1407)  BP: 122/70 (20 1407)  SpO2: 96 % (20 1615) Vital Signs (24h Range):  Temp:  [98.8 °F (37.1 °C)] 98.8 °F (37.1 °C)  Pulse:  [116-120] 116  Resp:   [18] 18  SpO2:  [96 %] 96 %  BP: (122)/(70) 122/70     Weight: (please obtain bed weight)  There is no height or weight on file to calculate BMI.    Physical Exam  Constitutional:       General: She is awake.      Appearance: She is cachectic.   HENT:      Head: Normocephalic.      Mouth/Throat:      Pharynx: Oropharynx is clear.   Eyes:      Extraocular Movements: Extraocular movements intact.   Neck:      Musculoskeletal: Normal range of motion.   Cardiovascular:      Rate and Rhythm: Normal rate.   Musculoskeletal:         General: Deformity (arthritis) present. No swelling.   Skin:         Neurological:      Mental Status: She is alert and oriented to person, place, and time.   Psychiatric:         Mood and Affect: Mood normal.         Behavior: Behavior is cooperative.         Significant Labs:  CBC:   Recent Labs   Lab 07/24/20  1621   WBC 53.71*   RBC 1.89*   HGB 5.4*   HCT 17.7*   PLT 30*   MCV 94   MCH 28.6   MCHC 30.5*     BMP:   Recent Labs   Lab 07/24/20  1503   *   *   K 4.5   CL 98   CO2 21*   BUN 28*   CREATININE 1.0   CALCIUM 8.7       Significant Diagnostics:  I have reviewed and interpreted all pertinent imaging results/findings within the past 24 hours.    Assessment/Plan:     Wound infection after surgery  Immunocompromised patient current the febrile with pain at prior surgical site abscess drainage.  Recommend CT lumbar spine for evaluation  Medicine admission for anemia, transfusion recommended in preparation for operating room this weekend for wound debridement  Tentatively planned for tomorrow morning   NPO after midnight.      VTE Risk Mitigation (From admission, onward)    None          Thank you for your consult. I will follow-up with patient. Please contact us if you have any additional questions.    Berenice Alfaro MD  General Surgery  [facility] -

## 2020-07-24 NOTE — ASSESSMENT & PLAN NOTE
Immunocompromised patient current the febrile with pain at prior surgical site abscess drainage.  Recommend CT lumbar spine for evaluation  Medicine admission for anemia, transfusion recommended in preparation for operating room this weekend for wound debridement  Tentatively planned for tomorrow morning   NPO after midnight.

## 2020-07-24 NOTE — ED NOTES
"Report given to MOHAN Myles on Med/Surg. MOHAN Myles requests "if we can" wait to bring pt up at 7:15 due to shift report.   "

## 2020-07-25 ENCOUNTER — ANESTHESIA (OUTPATIENT)
Dept: SURGERY | Facility: HOSPITAL | Age: 80
DRG: 856 | End: 2020-07-25
Payer: MEDICARE

## 2020-07-25 PROBLEM — C92.10 CML (CHRONIC MYELOCYTIC LEUKEMIA): Status: ACTIVE | Noted: 2020-07-25

## 2020-07-25 PROBLEM — L76.31: Status: ACTIVE | Noted: 2020-07-25

## 2020-07-25 LAB
ACANTHOCYTES BLD QL SMEAR: PRESENT
ANION GAP SERPL CALC-SCNC: 10 MMOL/L (ref 8–16)
ANISOCYTOSIS BLD QL SMEAR: SLIGHT
APTT BLDCRRT: 32 SEC (ref 21–32)
BASOPHILS # BLD AUTO: ABNORMAL K/UL (ref 0–0.2)
BASOPHILS NFR BLD: 0 % (ref 0–1.9)
BLD PROD TYP BPU: NORMAL
BLOOD UNIT EXPIRATION DATE: NORMAL
BLOOD UNIT TYPE CODE: 5100
BLOOD UNIT TYPE CODE: 5100
BLOOD UNIT TYPE CODE: 7300
BLOOD UNIT TYPE: NORMAL
BUN SERPL-MCNC: 20 MG/DL (ref 8–23)
CALCIUM SERPL-MCNC: 8 MG/DL (ref 8.7–10.5)
CHLORIDE SERPL-SCNC: 101 MMOL/L (ref 95–110)
CO2 SERPL-SCNC: 23 MMOL/L (ref 23–29)
CODING SYSTEM: NORMAL
CREAT SERPL-MCNC: 0.7 MG/DL (ref 0.5–1.4)
DACRYOCYTES BLD QL SMEAR: ABNORMAL
DIFFERENTIAL METHOD: ABNORMAL
DISPENSE STATUS: NORMAL
EOSINOPHIL # BLD AUTO: ABNORMAL K/UL (ref 0–0.5)
EOSINOPHIL NFR BLD: 0 % (ref 0–8)
ERYTHROCYTE [DISTWIDTH] IN BLOOD BY AUTOMATED COUNT: 15 % (ref 11.5–14.5)
EST. GFR  (AFRICAN AMERICAN): >60 ML/MIN/1.73 M^2
EST. GFR  (NON AFRICAN AMERICAN): >60 ML/MIN/1.73 M^2
GLUCOSE SERPL-MCNC: 111 MG/DL (ref 70–110)
HCT VFR BLD AUTO: 22 % (ref 37–48.5)
HGB BLD-MCNC: 7.2 G/DL (ref 12–16)
HYPOCHROMIA BLD QL SMEAR: ABNORMAL
IMM GRANULOCYTES # BLD AUTO: ABNORMAL K/UL (ref 0–0.04)
IMM GRANULOCYTES NFR BLD AUTO: ABNORMAL % (ref 0–0.5)
INR PPP: 1 (ref 0.8–1.2)
LYMPHOCYTES # BLD AUTO: ABNORMAL K/UL (ref 1–4.8)
LYMPHOCYTES NFR BLD: 35 % (ref 18–48)
MAGNESIUM SERPL-MCNC: 2.7 MG/DL (ref 1.6–2.6)
MCH RBC QN AUTO: 29.8 PG (ref 27–31)
MCHC RBC AUTO-ENTMCNC: 32.7 G/DL (ref 32–36)
MCV RBC AUTO: 91 FL (ref 82–98)
METAMYELOCYTES NFR BLD MANUAL: 10 %
MONOCYTES # BLD AUTO: ABNORMAL K/UL (ref 0.3–1)
MONOCYTES NFR BLD: 23 % (ref 4–15)
MYELOCYTES NFR BLD MANUAL: 5 %
NEUTROPHILS # BLD AUTO: ABNORMAL K/UL (ref 1.8–7.7)
NEUTROPHILS NFR BLD: 18 % (ref 38–73)
NEUTS BAND NFR BLD MANUAL: 9 %
NRBC BLD-RTO: 3 /100 WBC
NUM UNITS TRANS PACKED RBC: NORMAL
NUM UNITS TRANS PACKED RBC: NORMAL
NUM UNITS TRANS WBC-POOR PLATPHERESIS: NORMAL
OVALOCYTES BLD QL SMEAR: ABNORMAL
PLATELET # BLD AUTO: 19 K/UL (ref 150–350)
PLATELET BLD QL SMEAR: ABNORMAL
PMV BLD AUTO: ABNORMAL FL (ref 9.2–12.9)
POIKILOCYTOSIS BLD QL SMEAR: SLIGHT
POLYCHROMASIA BLD QL SMEAR: ABNORMAL
POTASSIUM SERPL-SCNC: 3.3 MMOL/L (ref 3.5–5.1)
PROTHROMBIN TIME: 10.9 SEC (ref 9–12.5)
RBC # BLD AUTO: 2.42 M/UL (ref 4–5.4)
SCHISTOCYTES BLD QL SMEAR: PRESENT
SODIUM SERPL-SCNC: 134 MMOL/L (ref 136–145)
SPHEROCYTES BLD QL SMEAR: ABNORMAL
STOMATOCYTES BLD QL SMEAR: PRESENT
WBC # BLD AUTO: 29.61 K/UL (ref 3.9–12.7)

## 2020-07-25 PROCEDURE — 88304 PR  SURG PATH,LEVEL III: ICD-10-PCS | Mod: 26,HCNC,, | Performed by: PATHOLOGY

## 2020-07-25 PROCEDURE — 80048 BASIC METABOLIC PNL TOTAL CA: CPT | Mod: HCNC

## 2020-07-25 PROCEDURE — 10140 PR DRAINAGE OF HEMATOMA/FLUID: ICD-10-PCS | Mod: 78,HCNC,, | Performed by: SURGERY

## 2020-07-25 PROCEDURE — 25000003 PHARM REV CODE 250: Mod: HCNC | Performed by: SURGERY

## 2020-07-25 PROCEDURE — 37000009 HC ANESTHESIA EA ADD 15 MINS: Mod: HCNC | Performed by: SURGERY

## 2020-07-25 PROCEDURE — 97605 PR NEG PRESS WOUND THERAPY (NPWT) W/NON-DISPOSABLE WOUND VAC DEVICE (DME), <=50 CM: ICD-10-PCS | Mod: HCNC,,, | Performed by: SURGERY

## 2020-07-25 PROCEDURE — 11042 PR DEBRIDEMENT, SKIN, SUB-Q TISSUE,=<20 SQ CM: ICD-10-PCS | Mod: 51,78,HCNC, | Performed by: SURGERY

## 2020-07-25 PROCEDURE — 99232 PR SUBSEQUENT HOSPITAL CARE,LEVL II: ICD-10-PCS | Mod: 25,HCNC,, | Performed by: SURGERY

## 2020-07-25 PROCEDURE — 36415 COLL VENOUS BLD VENIPUNCTURE: CPT | Mod: HCNC

## 2020-07-25 PROCEDURE — 63600175 PHARM REV CODE 636 W HCPCS: Mod: HCNC | Performed by: NURSE ANESTHETIST, CERTIFIED REGISTERED

## 2020-07-25 PROCEDURE — 11000001 HC ACUTE MED/SURG PRIVATE ROOM: Mod: HCNC

## 2020-07-25 PROCEDURE — P9037 PLATE PHERES LEUKOREDU IRRAD: HCPCS | Mod: HCNC

## 2020-07-25 PROCEDURE — 88304 TISSUE EXAM BY PATHOLOGIST: CPT | Mod: 26,HCNC,, | Performed by: PATHOLOGY

## 2020-07-25 PROCEDURE — 25000003 PHARM REV CODE 250: Mod: HCNC | Performed by: NURSE PRACTITIONER

## 2020-07-25 PROCEDURE — 63600175 PHARM REV CODE 636 W HCPCS: Mod: HCNC | Performed by: SURGERY

## 2020-07-25 PROCEDURE — 87070 CULTURE OTHR SPECIMN AEROBIC: CPT | Mod: HCNC

## 2020-07-25 PROCEDURE — 85007 BL SMEAR W/DIFF WBC COUNT: CPT | Mod: HCNC

## 2020-07-25 PROCEDURE — 10140 I&D HMTMA SEROMA/FLUID COLLJ: CPT | Mod: 78,HCNC,, | Performed by: SURGERY

## 2020-07-25 PROCEDURE — 87077 CULTURE AEROBIC IDENTIFY: CPT | Mod: HCNC

## 2020-07-25 PROCEDURE — 63600175 PHARM REV CODE 636 W HCPCS: Mod: HCNC | Performed by: NURSE PRACTITIONER

## 2020-07-25 PROCEDURE — 27000221 HC OXYGEN, UP TO 24 HOURS: Mod: HCNC

## 2020-07-25 PROCEDURE — 25000003 PHARM REV CODE 250: Mod: HCNC | Performed by: NURSE ANESTHETIST, CERTIFIED REGISTERED

## 2020-07-25 PROCEDURE — 11045 DBRDMT SUBQ TISS EACH ADDL: CPT | Mod: 78,HCNC,, | Performed by: SURGERY

## 2020-07-25 PROCEDURE — 94761 N-INVAS EAR/PLS OXIMETRY MLT: CPT | Mod: HCNC

## 2020-07-25 PROCEDURE — 99232 SBSQ HOSP IP/OBS MODERATE 35: CPT | Mod: 25,HCNC,, | Performed by: SURGERY

## 2020-07-25 PROCEDURE — 11045 PR DEB SUBQ TISSUE ADD-ON: ICD-10-PCS | Mod: 78,HCNC,, | Performed by: SURGERY

## 2020-07-25 PROCEDURE — 99223 1ST HOSP IP/OBS HIGH 75: CPT | Mod: HCNC,,, | Performed by: INTERNAL MEDICINE

## 2020-07-25 PROCEDURE — 97605 NEG PRS WND THER DME<=50SQCM: CPT | Mod: HCNC,,, | Performed by: SURGERY

## 2020-07-25 PROCEDURE — 36430 TRANSFUSION BLD/BLD COMPNT: CPT | Mod: HCNC

## 2020-07-25 PROCEDURE — 25500020 PHARM REV CODE 255: Mod: HCNC | Performed by: INTERNAL MEDICINE

## 2020-07-25 PROCEDURE — C1729 CATH, DRAINAGE: HCPCS | Mod: HCNC | Performed by: SURGERY

## 2020-07-25 PROCEDURE — A4216 STERILE WATER/SALINE, 10 ML: HCPCS | Mod: HCNC | Performed by: SURGERY

## 2020-07-25 PROCEDURE — 87075 CULTR BACTERIA EXCEPT BLOOD: CPT | Mod: HCNC

## 2020-07-25 PROCEDURE — P9040 RBC LEUKOREDUCED IRRADIATED: HCPCS | Mod: HCNC

## 2020-07-25 PROCEDURE — 36000707: Mod: HCNC | Performed by: SURGERY

## 2020-07-25 PROCEDURE — 99223 PR INITIAL HOSPITAL CARE,LEVL III: ICD-10-PCS | Mod: HCNC,,, | Performed by: INTERNAL MEDICINE

## 2020-07-25 PROCEDURE — 83735 ASSAY OF MAGNESIUM: CPT | Mod: HCNC

## 2020-07-25 PROCEDURE — 37000008 HC ANESTHESIA 1ST 15 MINUTES: Mod: HCNC | Performed by: SURGERY

## 2020-07-25 PROCEDURE — 85730 THROMBOPLASTIN TIME PARTIAL: CPT | Mod: HCNC

## 2020-07-25 PROCEDURE — 36000706: Mod: HCNC | Performed by: SURGERY

## 2020-07-25 PROCEDURE — 85027 COMPLETE CBC AUTOMATED: CPT | Mod: HCNC

## 2020-07-25 PROCEDURE — 21400001 HC TELEMETRY ROOM: Mod: HCNC

## 2020-07-25 PROCEDURE — 11042 DBRDMT SUBQ TIS 1ST 20SQCM/<: CPT | Mod: 51,78,HCNC, | Performed by: SURGERY

## 2020-07-25 PROCEDURE — 63600175 PHARM REV CODE 636 W HCPCS: Mod: HCNC | Performed by: ANESTHESIOLOGY

## 2020-07-25 PROCEDURE — 71000033 HC RECOVERY, INTIAL HOUR: Mod: HCNC | Performed by: SURGERY

## 2020-07-25 PROCEDURE — 87186 SC STD MICRODIL/AGAR DIL: CPT | Mod: HCNC

## 2020-07-25 PROCEDURE — 85610 PROTHROMBIN TIME: CPT | Mod: HCNC

## 2020-07-25 PROCEDURE — 94799 UNLISTED PULMONARY SVC/PX: CPT | Mod: HCNC

## 2020-07-25 PROCEDURE — 88304 TISSUE EXAM BY PATHOLOGIST: CPT | Mod: HCNC | Performed by: PATHOLOGY

## 2020-07-25 RX ORDER — HYDROMORPHONE HYDROCHLORIDE 2 MG/ML
0.2 INJECTION, SOLUTION INTRAMUSCULAR; INTRAVENOUS; SUBCUTANEOUS EVERY 5 MIN PRN
Status: DISCONTINUED | OUTPATIENT
Start: 2020-07-25 | End: 2020-07-26

## 2020-07-25 RX ORDER — LACTULOSE 10 G/15ML
20 SOLUTION ORAL EVERY 6 HOURS PRN
Status: DISCONTINUED | OUTPATIENT
Start: 2020-07-25 | End: 2020-07-27 | Stop reason: HOSPADM

## 2020-07-25 RX ORDER — BISACODYL 10 MG
10 SUPPOSITORY, RECTAL RECTAL DAILY PRN
Status: DISCONTINUED | OUTPATIENT
Start: 2020-07-25 | End: 2020-07-27 | Stop reason: HOSPADM

## 2020-07-25 RX ORDER — SODIUM CHLORIDE, SODIUM LACTATE, POTASSIUM CHLORIDE, CALCIUM CHLORIDE 600; 310; 30; 20 MG/100ML; MG/100ML; MG/100ML; MG/100ML
INJECTION, SOLUTION INTRAVENOUS CONTINUOUS PRN
Status: DISCONTINUED | OUTPATIENT
Start: 2020-07-25 | End: 2020-07-25

## 2020-07-25 RX ORDER — SODIUM CHLORIDE 0.9 % (FLUSH) 0.9 %
3 SYRINGE (ML) INJECTION EVERY 8 HOURS
Status: DISCONTINUED | OUTPATIENT
Start: 2020-07-25 | End: 2020-07-26

## 2020-07-25 RX ORDER — PROPOFOL 10 MG/ML
VIAL (ML) INTRAVENOUS
Status: DISCONTINUED | OUTPATIENT
Start: 2020-07-25 | End: 2020-07-25

## 2020-07-25 RX ORDER — KETAMINE HYDROCHLORIDE 50 MG/ML
INJECTION, SOLUTION INTRAMUSCULAR; INTRAVENOUS
Status: DISCONTINUED | OUTPATIENT
Start: 2020-07-25 | End: 2020-07-25

## 2020-07-25 RX ORDER — MEPERIDINE HYDROCHLORIDE 25 MG/ML
12.5 INJECTION INTRAMUSCULAR; INTRAVENOUS; SUBCUTANEOUS ONCE AS NEEDED
Status: DISCONTINUED | OUTPATIENT
Start: 2020-07-25 | End: 2020-07-26

## 2020-07-25 RX ORDER — ACETAMINOPHEN 325 MG/1
650 TABLET ORAL EVERY 6 HOURS PRN
Status: DISCONTINUED | OUTPATIENT
Start: 2020-07-25 | End: 2020-07-27 | Stop reason: HOSPADM

## 2020-07-25 RX ORDER — ONDANSETRON 8 MG/1
8 TABLET, ORALLY DISINTEGRATING ORAL EVERY 8 HOURS PRN
Status: DISCONTINUED | OUTPATIENT
Start: 2020-07-25 | End: 2020-07-27 | Stop reason: HOSPADM

## 2020-07-25 RX ORDER — BUPIVACAINE HYDROCHLORIDE 2.5 MG/ML
INJECTION, SOLUTION EPIDURAL; INFILTRATION; INTRACAUDAL
Status: DISCONTINUED | OUTPATIENT
Start: 2020-07-25 | End: 2020-07-25 | Stop reason: HOSPADM

## 2020-07-25 RX ORDER — TALC
9 POWDER (GRAM) TOPICAL NIGHTLY PRN
Status: DISCONTINUED | OUTPATIENT
Start: 2020-07-25 | End: 2020-07-27 | Stop reason: HOSPADM

## 2020-07-25 RX ORDER — HYDROCODONE BITARTRATE AND ACETAMINOPHEN 10; 325 MG/1; MG/1
1 TABLET ORAL EVERY 4 HOURS PRN
Status: DISCONTINUED | OUTPATIENT
Start: 2020-07-25 | End: 2020-07-27 | Stop reason: HOSPADM

## 2020-07-25 RX ORDER — HYDROCODONE BITARTRATE AND ACETAMINOPHEN 5; 325 MG/1; MG/1
1 TABLET ORAL EVERY 4 HOURS PRN
Status: DISCONTINUED | OUTPATIENT
Start: 2020-07-25 | End: 2020-07-27 | Stop reason: HOSPADM

## 2020-07-25 RX ORDER — METOCLOPRAMIDE HYDROCHLORIDE 5 MG/ML
10 INJECTION INTRAMUSCULAR; INTRAVENOUS EVERY 10 MIN PRN
Status: DISCONTINUED | OUTPATIENT
Start: 2020-07-25 | End: 2020-07-26

## 2020-07-25 RX ORDER — DIPHENHYDRAMINE HYDROCHLORIDE 50 MG/ML
25 INJECTION INTRAMUSCULAR; INTRAVENOUS EVERY 6 HOURS PRN
Status: DISCONTINUED | OUTPATIENT
Start: 2020-07-25 | End: 2020-07-26

## 2020-07-25 RX ORDER — HYDROCODONE BITARTRATE AND ACETAMINOPHEN 500; 5 MG/1; MG/1
TABLET ORAL
Status: DISCONTINUED | OUTPATIENT
Start: 2020-07-25 | End: 2020-07-26

## 2020-07-25 RX ORDER — PROPOFOL 10 MG/ML
VIAL (ML) INTRAVENOUS CONTINUOUS PRN
Status: DISCONTINUED | OUTPATIENT
Start: 2020-07-25 | End: 2020-07-25

## 2020-07-25 RX ORDER — LIDOCAINE HYDROCHLORIDE 10 MG/ML
INJECTION, SOLUTION EPIDURAL; INFILTRATION; INTRACAUDAL; PERINEURAL
Status: DISCONTINUED | OUTPATIENT
Start: 2020-07-25 | End: 2020-07-25 | Stop reason: HOSPADM

## 2020-07-25 RX ORDER — DIPHENHYDRAMINE HYDROCHLORIDE 50 MG/ML
25 INJECTION INTRAMUSCULAR; INTRAVENOUS EVERY 4 HOURS PRN
Status: DISCONTINUED | OUTPATIENT
Start: 2020-07-25 | End: 2020-07-27 | Stop reason: HOSPADM

## 2020-07-25 RX ORDER — AMOXICILLIN 250 MG
1 CAPSULE ORAL 2 TIMES DAILY
Status: DISCONTINUED | OUTPATIENT
Start: 2020-07-25 | End: 2020-07-27 | Stop reason: HOSPADM

## 2020-07-25 RX ORDER — CHLORHEXIDINE GLUCONATE ORAL RINSE 1.2 MG/ML
10 SOLUTION DENTAL 2 TIMES DAILY
Status: DISCONTINUED | OUTPATIENT
Start: 2020-07-25 | End: 2020-07-27 | Stop reason: HOSPADM

## 2020-07-25 RX ORDER — CLONIDINE HYDROCHLORIDE 0.1 MG/1
0.1 TABLET ORAL EVERY 6 HOURS PRN
Status: DISCONTINUED | OUTPATIENT
Start: 2020-07-25 | End: 2020-07-27 | Stop reason: HOSPADM

## 2020-07-25 RX ORDER — CEFEPIME HYDROCHLORIDE 1 G/50ML
2 INJECTION, SOLUTION INTRAVENOUS
Status: DISCONTINUED | OUTPATIENT
Start: 2020-07-25 | End: 2020-07-27 | Stop reason: HOSPADM

## 2020-07-25 RX ORDER — FENTANYL CITRATE 50 UG/ML
INJECTION, SOLUTION INTRAMUSCULAR; INTRAVENOUS
Status: DISCONTINUED | OUTPATIENT
Start: 2020-07-25 | End: 2020-07-25

## 2020-07-25 RX ADMIN — HYDROMORPHONE HYDROCHLORIDE 0.2 MG: 2 INJECTION INTRAMUSCULAR; INTRAVENOUS; SUBCUTANEOUS at 04:07

## 2020-07-25 RX ADMIN — AMITRIPTYLINE HYDROCHLORIDE 75 MG: 50 TABLET, FILM COATED ORAL at 08:07

## 2020-07-25 RX ADMIN — CEFEPIME HYDROCHLORIDE 2 G: 2 INJECTION, SOLUTION INTRAVENOUS at 05:07

## 2020-07-25 RX ADMIN — VANCOMYCIN HYDROCHLORIDE 500 MG: 500 INJECTION, POWDER, LYOPHILIZED, FOR SOLUTION INTRAVENOUS at 06:07

## 2020-07-25 RX ADMIN — PROPOFOL 50 MG: 10 INJECTION, EMULSION INTRAVENOUS at 02:07

## 2020-07-25 RX ADMIN — CHLORHEXIDINE GLUCONATE 0.12% ORAL RINSE 10 ML: 1.2 LIQUID ORAL at 08:07

## 2020-07-25 RX ADMIN — PRAVASTATIN SODIUM 10 MG: 10 TABLET ORAL at 08:07

## 2020-07-25 RX ADMIN — ONDANSETRON 4 MG: 2 INJECTION INTRAMUSCULAR; INTRAVENOUS at 04:07

## 2020-07-25 RX ADMIN — STANDARDIZED SENNA CONCENTRATE AND DOCUSATE SODIUM 1 TABLET: 8.6; 5 TABLET ORAL at 08:07

## 2020-07-25 RX ADMIN — PROPOFOL 70 MCG/KG/MIN: 10 INJECTION, EMULSION INTRAVENOUS at 02:07

## 2020-07-25 RX ADMIN — KETAMINE HYDROCHLORIDE 20 MG: 50 INJECTION INTRAMUSCULAR; INTRAVENOUS at 03:07

## 2020-07-25 RX ADMIN — IOHEXOL 60 ML: 350 INJECTION, SOLUTION INTRAVENOUS at 12:07

## 2020-07-25 RX ADMIN — FENTANYL CITRATE 50 MCG: 50 INJECTION, SOLUTION INTRAMUSCULAR; INTRAVENOUS at 02:07

## 2020-07-25 RX ADMIN — SODIUM CHLORIDE, SODIUM LACTATE, POTASSIUM CHLORIDE, AND CALCIUM CHLORIDE: 600; 310; 30; 20 INJECTION, SOLUTION INTRAVENOUS at 02:07

## 2020-07-25 RX ADMIN — FENTANYL CITRATE 50 MCG: 50 INJECTION, SOLUTION INTRAMUSCULAR; INTRAVENOUS at 03:07

## 2020-07-25 RX ADMIN — FERROUS SULFATE TAB EC 325 MG (65 MG FE EQUIVALENT) 325 MG: 325 (65 FE) TABLET DELAYED RESPONSE at 08:07

## 2020-07-25 RX ADMIN — LEVOTHYROXINE SODIUM 88 MCG: 88 TABLET ORAL at 05:07

## 2020-07-25 RX ADMIN — MAGNESIUM OXIDE 400 MG (241.3 MG MAGNESIUM) TABLET 400 MG: TABLET at 08:07

## 2020-07-25 RX ADMIN — Medication 10 ML: at 10:07

## 2020-07-25 RX ADMIN — PIPERACILLIN AND TAZOBACTAM 4.5 G: 4; .5 INJECTION, POWDER, LYOPHILIZED, FOR SOLUTION INTRAVENOUS; PARENTERAL at 02:07

## 2020-07-25 RX ADMIN — PIPERACILLIN AND TAZOBACTAM 4.5 G: 4; .5 INJECTION, POWDER, LYOPHILIZED, FOR SOLUTION INTRAVENOUS; PARENTERAL at 09:07

## 2020-07-25 NOTE — ANESTHESIA POSTPROCEDURE EVALUATION
Anesthesia Post Evaluation    Patient: Sarah Parker    Procedure(s) Performed: Procedure(s) (LRB):  DEBRIDEMENT, WOUND (Left)  APPLICATION, WOUND VAC (Left)  DEBRIDEMENT (Left)  EVACUATION, HEMATOMA (Left)    Final Anesthesia Type: MAC    Patient location during evaluation: PACU  Patient participation: Yes- Able to Participate  Level of consciousness: awake and alert  Post-procedure vital signs: reviewed and stable  Pain management: adequate  Airway patency: patent  LEEANN mitigation strategies: Extubation while patient is awake  PONV status at discharge: No PONV  Anesthetic complications: no      Cardiovascular status: hemodynamically stable  Respiratory status: spontaneous ventilation  Hydration status: euvolemic  Follow-up not needed.          Vitals Value Taken Time   /63 07/25/20 1553   Temp 98 07/25/20 1554   Pulse 96 07/25/20 1554   Resp 51 07/25/20 1554   SpO2 98 % 07/25/20 1554   Vitals shown include unvalidated device data.      No case tracking events are documented in the log.      Pain/Diana Score: Pain Rating Prior to Med Admin: 6 (7/24/2020  9:29 PM)  Pain Rating Post Med Admin: 0 (7/24/2020 10:15 PM)

## 2020-07-25 NOTE — PROGRESS NOTES
[facility] Medicine  Progress Note    Patient Name: Sarah Parker  MRN: [id number]  Patient Class: IP- Inpatient   Admission Date: 7/24/2020  Length of Stay: 1 days  Attending Physician: Fam Antunez MD  Primary Care Provider: Briseida Bennett MD        Subjective:     Principal Problem:Wound infection after surgery    HPI:  81 y/o WF with hx of CML with chronic anemia, gerd, HTN, HLD, RA, squamous cell carcinoma excision, hypothyroidism to ED per home health for evaluation of a lower back wound which began worsening over the past 3 days, today associated with moderate amount of serosanguinous drainage and with accompanying fever (Tmax 100.7) and pain (6/10) to the site. Was previously admitted 05/2020 for lower back abscess and required debridement and wound vac placement; also recently had SCC removed from her L thigh and LFA. Denies CP, edema, palpitations, SOB, wheezing, orthopnea, PND, abdominal pain, N/V/D, constipation, dysuria, flank pain, HA, dizziness, cough, chills, falls/trauma, blurred vision, focal deficits. Found in ED to be febrile (100.7) with leukocytosis (53.71), anemia (5.4&17.7), thrombocytopenia (30) - pt was given 1L IV fluids and started on IV vancomycin/zosyn. Plan is for debridement in OR in AM per general surgery. Hospital medicine called for admission - pt placed on med-[employer]; Full code, pt's surrogate decision maker is daughter, Albina Martínez.     Overview/Hospital Course:  No notes on file    Interval History: plans for wound debridement today. Will need to give unit of platelets for thrombocytopenia.    Review of Systems   Constitutional: Positive for fatigue and fever. Negative for activity change, chills and diaphoresis.   HENT: Negative for congestion, trouble swallowing and voice change.    Eyes: Negative for photophobia and discharge.   Respiratory: Negative for cough, chest tightness, shortness of breath and wheezing.    Cardiovascular: Negative  for chest pain, palpitations and leg swelling.   Gastrointestinal: Negative for abdominal pain, blood in stool, constipation, diarrhea, nausea and vomiting.   Endocrine: Negative for cold intolerance, heat intolerance, polydipsia, polyphagia and polyuria.   Genitourinary: Negative for difficulty urinating, dysuria, flank pain, frequency and urgency.   Musculoskeletal: Positive for arthralgias (chronic RA) and back pain (at wound site).   Skin: Positive for wound (lower back, LFA, L thigh). Negative for rash.   Allergic/Immunologic: Positive for immunocompromised state.   Neurological: Negative for dizziness, seizures, syncope, facial asymmetry, weakness, light-headedness, numbness and headaches.   Psychiatric/Behavioral: Negative for confusion and hallucinations.      Objective:     Vital Signs (Most Recent):  Temp: 98.7 °F (37.1 °C) (07/25/20 1650)  Pulse: 103 (07/25/20 1706)  Resp: 15 (07/25/20 1706)  BP: (!) 151/67 (07/25/20 1706)  SpO2: (!) 94 % (07/25/20 1706) Vital Signs (24h Range):  Temp:  [98.1 °F (36.7 °C)-99.7 °F (37.6 °C)] 98.7 °F (37.1 °C)  Pulse:  [] 103  Resp:  [15-20] 15  SpO2:  [94 %-100 %] 94 %  BP: (116-187)/(40-72) 151/67     Weight: 49.1 kg (108 lb 3.9 oz)  Body mass index is 19.8 kg/m².    Intake/Output Summary (Last 24 hours) at 7/25/2020 1728  Last data filed at 7/25/2020 1546  Gross per 24 hour   Intake 2545.24 ml   Output 15 ml   Net 2530.24 ml      Physical Exam  Vitals signs reviewed.   Constitutional:       General: She is not in acute distress.     Appearance: Normal appearance. She is ill-appearing. She is not toxic-appearing.   HENT:      Head: Normocephalic and atraumatic.      Nose: Nose normal. No congestion.      Mouth/Throat:      Mouth: Mucous membranes are moist.   Eyes:      Pupils: Pupils are equal, round, and reactive to light.   Neck:      Musculoskeletal: Normal range of motion and neck supple. No muscular tenderness.   Cardiovascular:      Rate and Rhythm: Normal  rate and regular rhythm.      Pulses: Normal pulses.      Heart sounds: Normal heart sounds.   Pulmonary:      Effort: Pulmonary effort is normal. No respiratory distress.      Breath sounds: Normal breath sounds. No wheezing or rhonchi.   Abdominal:      General: Abdomen is flat. Bowel sounds are normal. There is no distension.      Palpations: Abdomen is soft.      Tenderness: There is no abdominal tenderness. There is no rebound.   Musculoskeletal: Normal range of motion.      Right lower leg: No edema.      Left lower leg: No edema.   Skin:     General: Skin is warm and dry.      Capillary Refill: Capillary refill takes less than 2 seconds.      Findings: No bruising or rash.      Comments: 1. Wound to mid-lower back extending to L with small serosanguinous drainage, dressing CDI  2. L FA incision, healing - no evidence of infection  3. L thigh incision - possible hematoma   Neurological:      General: No focal deficit present.      Mental Status: She is alert and oriented to person, place, and time.   Psychiatric:         Mood and Affect: Mood normal.         Behavior: Behavior normal.         Thought Content: Thought content normal.         Judgment: Judgment normal.           Significant Labs:   CBC:   Recent Labs   Lab 07/24/20  1621 07/25/20  0804   WBC 53.71* 29.61*   HGB 5.4* 7.2*   HCT 17.7* 22.0*   PLT 30* 19*     CMP:   Recent Labs   Lab 07/24/20  1503 07/25/20  0804   * 134*   K 4.5 3.3*   CL 98 101   CO2 21* 23   * 111*   BUN 28* 20   CREATININE 1.0 0.7   CALCIUM 8.7 8.0*   PROT 7.7  --    ALBUMIN 3.0*  --    BILITOT 0.4  --    ALKPHOS 292*  --    AST 28  --    ALT 37  --    ANIONGAP 13 10   EGFRNONAA 54* >60       Significant Imaging: I have reviewed all pertinent imaging results/findings within the past 24 hours.      Assessment/Plan:      * Wound infection after surgery  General surgery consulted - likely to OR in AM for debridement  CT lumbar spine pending to r/o abscess  BC x2  pending  NPO after MN  Continue IV vanc/zosyn for now  Provide adequate pain control  Tylenol prn fever    July 25, 2020  Plans for wound debridement today with Dr. Alfaro  Change Zosyn to Cefepime    Chronic myelomonocytic leukemia not having achieved remission  Followed by Dr. Knox, currently treated with Vidaza  WBCs elevated over baseline likely d/t wound infection  Thrombocytopenia near baseline    July 25, 2020  Platelets are down to 18k today. Will transfuse one unit today.   Case discussed with Dr. Miranda.    Anemia  Likely r/t CML, in treatment  Transfuse 2 units PRBCs tonight  Repeat labs in AM    July 25, 20220  Hgb 7.5 today following 2 units  Repeat CBC in am. Anticipate transfusing an additional unit while hospitalized.    Essential hypertension  Stable at this time  VS per unit routine  Home meds resumed        VTE Risk Mitigation (From admission, onward)         Ordered     IP VTE HIGH RISK PATIENT  Once      07/25/20 1647     Place sequential compression device  Until discontinued      07/24/20 2055                Lili Crane NP  Department of Hospital Medicine   [facility] -

## 2020-07-25 NOTE — HPI
79 y/o WF with hx of CML with chronic anemia, gerd, HTN, HLD, RA, squamous cell carcinoma excision, hypothyroidism to ED per home health for evaluation of a lower back wound which began worsening over the past 3 days, today associated with moderate amount of serosanguinous drainage and with accompanying fever (Tmax 100.7) and pain (6/10) to the site. Was previously admitted 05/2020 for lower back abscess and required debridement and wound vac placement; also recently had SCC removed from her L thigh and LFA. Denies CP, edema, palpitations, SOB, wheezing, orthopnea, PND, abdominal pain, N/V/D, constipation, dysuria, flank pain, HA, dizziness, cough, chills, falls/trauma, blurred vision, focal deficits. Found in ED to be febrile (100.7) with leukocytosis (53.71), anemia (5.4&17.7), thrombocytopenia (30) - pt was given 1L IV fluids and started on IV vancomycin/zosyn. Plan is for debridement in OR in AM per general surgery. Hospital medicine called for admission - pt placed on med-tele; Full code, pt's surrogate decision maker is daughter, Albina Martínez.

## 2020-07-25 NOTE — TRANSFER OF CARE
"Anesthesia Transfer of Care Note    Patient: Sarah Parker    Procedure(s) Performed: Procedure(s) (LRB):  DEBRIDEMENT, WOUND (Left)  APPLICATION, WOUND VAC (Left)  DEBRIDEMENT (Left)  EVACUATION, HEMATOMA (Left)    Patient location: PACU    Anesthesia Type: MAC    Transport from OR: Transported from OR on room air with adequate spontaneous ventilation    Post pain: adequate analgesia    Post assessment: no apparent anesthetic complications    Post vital signs: stable    Level of consciousness: awake    Nausea/Vomiting: no nausea/vomiting    Complications: none    Transfer of care protocol was followed      Last vitals:   Visit Vitals  /60 (BP Location: Right arm, Patient Position: Lying)   Pulse 94   Temp 36.9 °C (98.5 °F) (Oral)   Resp 17   Ht 5' 2" (1.575 m)   Wt 49.1 kg (108 lb 3.9 oz)   LMP  (LMP Unknown)   SpO2 98%   Breastfeeding No   BMI 19.80 kg/m²     "

## 2020-07-25 NOTE — HPI
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  Patient is admitted to the hospital with severe pancytopenia anemia.  At this point planned surgical debridement and question as to whether not transfusion is warranted we were asked to see the patient for further evaluation

## 2020-07-25 NOTE — OP NOTE
[facility] -   General Surgery  Operative Note    SUMMARY     Date of Procedure: 7/25/2020     Procedure: Procedure(s) (LRB):  DEBRIDEMENT, WOUND (Left)  APPLICATION, WOUND VAC (Left)  DEBRIDEMENT (Left)  EVACUATION, HEMATOMA (Left)       Surgeon(s) and Role:     * Berenice Alfaro MD - Primary    Assisting Surgeon: None    Pre-Operative Diagnosis: Wound infection after surgery [T81.49XA]    Post-Operative Diagnosis: Post-Op Diagnosis Codes:     * Wound infection after surgery [T81.49XA]    Anesthesia: Local MAC    Drains, Packs, Catheters: 8 Fr ZE to left anterior thigh hematoma cavity, black sponge wound vac to lower back and left superior gluteal wound, gauze packing to right superior gluteal wound    Estimated Blood Loss (EBL): 15 mL           Implants: * No implants in log *    Specimens:   Specimen (12h ago, onward)    None           Description of the Findings of the Procedure:  Lower back wound with necrotic tissue and purulence identified once the wound eschar was unroofed.  Debrided back to healthy tissue and black sponge wound VAC placed.  Wound measures 6.5 cm lengthx 4 cm width x 1 cm depth in size. Bilateral pressure wounds to ischial tuberosities.  Left ulcer debrided to healthy wound edges with resultant defect measuring 1.5 x 1.5 by 2 cm.  The left ulcer debrided to healthy wound edges with resulted defect of 0.5 x 0.5 cm in size.  Left anterior thigh surgical site with postoperative hematoma evacuated.  Hemostasis achieved and drain left in hematoma cavity.  Incision was primarily closed.    Significant Surgical Tasks Conducted by the Assistant(s), if Applicable:  None    Complications: No    Procedure in detail:  The patient was taken the operating room and laid on the operating table in supine position.  Mac anesthesia was administered.  She was then placed in the right lateral decubitus position with pressure points padded.  Her lower back, buttocks, and anterior left thigh were  prepped and draped in sterile fashion with Betadine solution.  Time-out was performed verifying patient identification, correct surgical site, IV antibiotic administration, and other pertinent details the case.    The wound was circumferentially debrided to healthy edges using Bovie cautery.  Once the overlying eschar was unroofed, purulence was encountered.  This was cultured and sent for microbiology.  Given her history of squamous cell carcinoma, the debrided tissue was sent for permanent pathology.  Hemostasis was achieved.    Attention was then drawn to bilateral ischial tuberosity pressure ulcers.  The ulcers were circumferentially debrided back to healthy tissue.  The lower back wound and left ischial tuberosity wound were dressed in sterile fashion using a black sponge wound VAC with bridge.  This was applied in standard fashion with good seal.  This small right ischial tuberosity pressure ulcer debridement site was packed with gauze and covered with tape.    My attention was drawn to the left thigh.  The incision was opened and hematoma was evacuated.  The site was copiously irrigated no active bleeding was identified.  A an 8 Citizen of [place] drain was placed within the hematoma cavity and the incision was closed using interrupted nylon sutures.  The patient tolerated the procedure well was returned recovery room in stable condition.    Condition: Stable    Disposition: PACU - hemodynamically stable.    Attestation: I performed the procedure.    Berenice Alfaro

## 2020-07-25 NOTE — PLAN OF CARE
Assessment completed via phone with pt.  Pt educated on advance directives.  Pt states she is current with [facility] and would like to continue services at time of d/c.  Pt may need home vac at discharge; however, no orders  noted at present.  Pt's PCP is Briseida Bennett MD and her pharmacy of choice is   [id number] - [place], [place] - [address]  [address]  [place] [address]  Phone: [phone] Fax: [phone]    [facility]  [address]  [place] [address]  Phone: [phone] Fax: [phone]    [facility] - [place] - [address]  [address] [place] [place] [address]  Phone: [phone] Fax: [phone]         07/25/20 1714   Discharge Assessment   Assessment Type Discharge Planning Assessment   Confirmed/corrected address and phone number on facesheet? Yes   Assessment information obtained from? Patient   Prior to hospitilization cognitive status: Alert/Oriented   Prior to hospitalization functional status: Independent   Current cognitive status: Alert/Oriented   Current Functional Status: Needs Assistance   Facility Arrived From: Home   Lives With   (family)   Able to Return to Prior Arrangements yes   Is patient able to care for self after discharge?   (family will assist)   Who are your caregiver(s) and their phone number(s)? abraham Martínez daughter [phone]   Patient currently being followed by outpatient case management? Unable to determine (comments)   Patient currently receives any other outside agency services? Yes   Name and contact number of agency or person providing outside services Connie LEE   Is it the patient/care giver preference to resume care with the current outside agency? Yes   Equipment Currently Used at Home walker, rolling;walker, standard;shower chair;cane, straight   Do you have any problems affording any of your prescribed medications? No   Is the patient  taking medications as prescribed? yes   Does the patient have transportation home? Yes   Transportation Anticipated family or friend will provide   DME Needed Upon Discharge  other (see comments)  (possible wound vac at d/c)   Patient/Family in Agreement with Plan yes

## 2020-07-25 NOTE — ASSESSMENT & PLAN NOTE
General surgery consulted - likely to OR in AM for debridement  CT lumbar spine pending to r/o abscess  BC x2 pending  NPO after MN  Continue IV vanc/zosyn for now  Provide adequate pain control  Tylenol prn fever    July 25, 2020  Plans for wound debridement today with Dr. Alfaro  Change Zosyn to Cefepime

## 2020-07-25 NOTE — ASSESSMENT & PLAN NOTE
General surgery consulted - likely to OR in AM for debridement  CT lumbar spine pending to r/o abscess  BC x2 pending  NPO after MN  Continue IV vanc/zosyn for now  Provide adequate pain control  Tylenol prn fever

## 2020-07-25 NOTE — ASSESSMENT & PLAN NOTE
Patient admitted with chronic myelomonocytic leukemia with pancytopenia patient is on supportive care at this point would agree with transfusion of packed red cells and platelets until level is reached that surgeon feels comfortable with.  At this point discussed implications with Hospital Medicine with surgery as well as with patient

## 2020-07-25 NOTE — SUBJECTIVE & OBJECTIVE
Past Medical History:   Diagnosis Date    Acid reflux     Anxiety     Back pain     Bronchitis, chronic obstructive w acute bronchitis 7/29/2016    Cancer     [facility] arms, face- Dr. Lata Tejada    Cataract     2+NS    Chronic myelomonocytic leukemia     Degenerative disc disease     Depression     Depression     Dry mouth     Encounter for blood transfusion     Hernia, hiatal 11/18/2013    Hypertension     Hypothyroid     Hypothyroidism     Iron deficiency anemia     Macrocytic anemia 5/3/2016    Macular degeneration     Migraines     Mixed anxiety and depressive disorder     Multiple fractures of ribs of right side     Osteoporosis     Other hyperlipidemia 10/11/2019    Pneumonia     Pneumonia due to other staphylococcus     Rheumatoid arthritis     Rheumatoid arthritis(714.0)     Rheumatoid arthritis(714.0)     Remicade, MTX.       Past Surgical History:   Procedure Laterality Date    APPENDECTOMY  1985    APPLICATION OF WOUND VACUUM-ASSISTED CLOSURE DEVICE N/A 5/14/2020    Procedure: APPLICATION, WOUND VAC;  Surgeon: Mckinley Shannon MD;  Location: [facility] OR;  Service: General;  Laterality: N/A;    BREAST BIOPSY      CATARACT EXTRACTION Bilateral 6/11/15    Dr. Booth    CHOLECYSTECTOMY  2013    cryoablasion kidney Left 09/27/2016    EXCISION OF SQUAMOUS CELL CARCINOMA Left 7/2/2020    Procedure: EXCISION, CARCINOMA, SQUAMOUS CELL;  Surgeon: Mckinley Shannon MD;  Location: [facility] OR;  Service: General;  Laterality: Left;    feet Bilateral     rheumatoid    FRACTURE SURGERY Right     tibia    HERNIA REPAIR      HYSTERECTOMY  1970    partial    INCISION AND DRAINAGE OF ABSCESS N/A 5/12/2020    Procedure: INCISION AND DRAINAGE, ABSCESS;  Surgeon: Emerson Hathaway MD;  Location: [facility] OR;  Service: General;  Laterality: N/A;    INCISIONAL BIOPSY N/A 5/12/2020    Procedure: INCISIONAL BIOPSY;  Surgeon: Emerson Hathaway MD;  Location: [facility] OR;  Service: General;  Laterality: N/A;    JOINT  REPLACEMENT      bilateral knees (2008), hands, wrists, knuckles, toes    LAPAROSCOPIC NISSEN FUNDOPLICATION      TRANSFORAMINAL EPIDURAL INJECTION OF STEROID Left 6/25/2019    Procedure: Left L5/S1 TF AYAAN with local;  Surgeon: Rowdy Felix MD;  Location: [facility];  Service: Pain Management;  Laterality: Left;    WOUND DEBRIDEMENT N/A 5/14/2020    Procedure: DEBRIDEMENT, WOUND;  Surgeon: Mckinley Shannon MD;  Location[facility];  Service: General;  Laterality: N/A;       Review of patient's allergies indicates:   Allergen Reactions    Codeine      Other reaction(s): hyper  Other reaction(s): hyper    Gabapentin Other (See Comments)     Bad dreams       No current facility-administered medications on file prior to encounter.      Current Outpatient Medications on File Prior to Encounter   Medication Sig    amitriptyline (ELAVIL) 75 MG tablet TAKE 1 TABLET BY MOUTH EVERY EVENING    calcium citrate-vitamin D (CITRACAL + D) 315-200 mg-unit per tablet Take 1 tablet by mouth once daily.     DULoxetine (CYMBALTA) 20 MG capsule TAKE 2 CAPSULES(40 MG) BY MOUTH EVERY DAY (Patient taking differently: before meals, at bedtime and at 0200. )    ferrous gluconate (FERGON) 324 MG tablet Take 324 mg by mouth 2 (two) times daily.    fluticasone propionate (FLONASE) 50 mcg/actuation nasal spray 2 sprays (100 mcg total) by Each Nostril route once daily.    hydrocodone-acetaminophen 5-325mg (NORCO) 5-325 mg per tablet TK 1 T PO Q 6 H PRN    hydrOXYzine HCl (ATARAX) 25 MG tablet Take 25 mg by mouth nightly.     levothyroxine (SYNTHROID) 88 MCG tablet TAKE 1 TABLET BY MOUTH BEFORE BREAKFAST    magnesium oxide (MAG-OX) 400 mg (241.3 mg magnesium) tablet Take 1 tablet (400 mg total) by mouth 3 (three) times daily. (Patient taking differently: Take 400 mg by mouth 2 (two) times daily. )    meclizine (ANTIVERT) 50 MG tablet Take 25 mg by mouth 3 (three) times daily as needed.    metoprolol succinate (TOPROL-XL) 50 MG 24 hr  tablet TAKE 1 TABLET(50 MG) BY MOUTH EVERY DAY    multivitamin capsule Take by mouth. As directed    ondansetron (ZOFRAN) 4 MG tablet Take 1 tablet (4 mg total) by mouth every 8 (eight) hours as needed for Nausea.    pravastatin (PRAVACHOL) 10 MG tablet TAKE 1 TABLET(10 MG) BY MOUTH EVERY DAY (Patient taking differently: Take 10 mg by mouth every evening. )    prochlorperazine (COMPAZINE) 5 MG tablet TAKE 1 TABLET(5 MG) BY MOUTH EVERY 6 HOURS AS NEEDED FOR NAUSEA    valsartan-hydrochlorothiazide (DIOVAN-HCT) 80-12.5 mg per tablet TAKE 1 TABLET BY MOUTH DAILY    albuterol (PROVENTIL) 2.5 mg /3 mL (0.083 %) nebulizer solution Take 3 mLs (2.5 mg total) by nebulization every 6 (six) hours as needed for Wheezing.    tamsulosin (FLOMAX) 0.4 mg Cap Take 1 capsule (0.4 mg total) by mouth once daily. For urinary retention    topiramate (TOPAMAX) 25 MG tablet Take 1 tablet (25 mg total) by mouth at night x 1 week then increase to 2 (two) times daily. Increase as tolerated. (Patient not taking: Reported on 2020)    [DISCONTINUED] benzonatate (TESSALON) 100 MG capsule Take 1-2 capsules (100-200 mg total) by mouth 3 (three) times daily as needed for Cough.    [DISCONTINUED] leucovorin (WELLCOVORIN) 5 mg Tab TAKE ONE WEEKLY ON      Family History     Problem Relation (Age of Onset)    Asthma Sister, Brother    Cancer Brother, Maternal Grandfather    Cataracts Mother    Chronic back pain Sister, Brother    Diabetes Mellitus Brother    Fibromyalgia Daughter    Heart disease Mother, Father, Maternal Grandmother    Hyperlipidemia Mother    Hypertension Mother, Father, Sister, Brother    Osteoarthritis Mother, Father, Sister, Brother    Thyroid disease Sister, Brother        Tobacco Use    Smoking status: Former Smoker     Packs/day: 0.25     Years: 2.00     Pack years: 0.50     Quit date: 1965     Years since quittin.7    Smokeless tobacco: Never Used   Substance and Sexual Activity    Alcohol  use: No    Drug use: No    Sexual activity: Never     Partners: Male     Review of Systems   Constitutional: Positive for fatigue and fever. Negative for activity change, chills and diaphoresis.   HENT: Negative for congestion, trouble swallowing and voice change.    Eyes: Negative for photophobia and discharge.   Respiratory: Negative for cough, chest tightness, shortness of breath and wheezing.    Cardiovascular: Negative for chest pain, palpitations and leg swelling.   Gastrointestinal: Negative for abdominal pain, blood in stool, constipation, diarrhea, nausea and vomiting.   Endocrine: Negative for cold intolerance, heat intolerance, polydipsia, polyphagia and polyuria.   Genitourinary: Negative for difficulty urinating, dysuria, flank pain, frequency and urgency.   Musculoskeletal: Positive for arthralgias (chronic RA) and back pain (at wound site).   Skin: Positive for wound (lower back, LFA, L thigh). Negative for rash.   Allergic/Immunologic: Positive for immunocompromised state.   Neurological: Negative for dizziness, seizures, syncope, facial asymmetry, weakness, light-headedness, numbness and headaches.   Psychiatric/Behavioral: Negative for confusion and hallucinations.         Objective:     Vital Signs (Most Recent):  Temp: 99.3 °F (37.4 °C) (07/24/20 1807)  Pulse: 109 (07/24/20 1902)  Resp: 18 (07/24/20 1902)  BP: (!) 142/53 (07/24/20 1902)  SpO2: 96 % (07/24/20 1902) Vital Signs (24h Range):  Temp:  [98.8 °F (37.1 °C)-99.3 °F (37.4 °C)] 99.3 °F (37.4 °C)  Pulse:  [109-120] 109  Resp:  [18] 18  SpO2:  [96 %] 96 %  BP: (122-142)/(40-70) 142/53     Weight: 49.1 kg (108 lb 3.9 oz)  Body mass index is 19.8 kg/m².    Physical Exam  Vitals signs reviewed.   Constitutional:       General: She is not in acute distress.     Appearance: Normal appearance. She is ill-appearing. She is not toxic-appearing.   HENT:      Head: Normocephalic and atraumatic.      Nose: Nose normal. No congestion.       Mouth/Throat:      Mouth: Mucous membranes are moist.   Eyes:      Pupils: Pupils are equal, round, and reactive to light.   Neck:      Musculoskeletal: Normal range of motion and neck supple. No muscular tenderness.   Cardiovascular:      Rate and Rhythm: Normal rate and regular rhythm.      Pulses: Normal pulses.      Heart sounds: Normal heart sounds.   Pulmonary:      Effort: Pulmonary effort is normal. No respiratory distress.      Breath sounds: Normal breath sounds. No wheezing or rhonchi.   Abdominal:      General: Abdomen is flat. Bowel sounds are normal. There is no distension.      Palpations: Abdomen is soft.      Tenderness: There is no abdominal tenderness. There is no rebound.   Musculoskeletal: Normal range of motion.      Right lower leg: No edema.      Left lower leg: No edema.   Skin:     General: Skin is warm and dry.      Capillary Refill: Capillary refill takes less than 2 seconds.      Findings: No bruising or rash.      Comments: 1. Wound to mid-lower back extending to L with small serosanguinous drainage, dressing CDI  2. L FA incision, healing - no evidence of infection  3. L thigh incision - possible hematoma   Neurological:      General: No focal deficit present.      Mental Status: She is alert and oriented to person, place, and time.   Psychiatric:         Mood and Affect: Mood normal.         Behavior: Behavior normal.         Thought Content: Thought content normal.         Judgment: Judgment normal.                       Significant Labs:   CBC:   Recent Labs   Lab 07/24/20  1621   WBC 53.71*   HGB 5.4*   HCT 17.7*   PLT 30*     CMP:   Recent Labs   Lab 07/24/20  1503   *   K 4.5   CL 98   CO2 21*   *   BUN 28*   CREATININE 1.0   CALCIUM 8.7   PROT 7.7   ALBUMIN 3.0*   BILITOT 0.4   ALKPHOS 292*   AST 28   ALT 37   ANIONGAP 13   EGFRNONAA 54*       Lactic Acid:   Recent Labs   Lab 07/24/20  1503   LACTATE 1.4

## 2020-07-25 NOTE — ASSESSMENT & PLAN NOTE
Followed by Dr. Knox, currently treated with Vidaza  WBCs elevated over baseline likely d/t wound infection  Thrombocytopenia near baseline    July 25, 2020  Platelets are down to 18k today. Will transfuse one unit today.   Case discussed with Dr. Miranda.

## 2020-07-25 NOTE — CONSULTS
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  Patient is admitted to the hospital with severe pancytopenia anemia.  At this point planned surgical debridement and question as to whether not transfusion is warranted we were asked to see the patient for further evaluation    Oncology Treatment Plan:   OP AZACITADINE 7-DAY (SUB-Q)    Medications:  Continuous Infusions:  Scheduled Meds:   amitriptyline  75 mg Oral QHS    DULoxetine  40 mg Oral Daily    ferrous sulfate  325 mg Oral BID    fluticasone propionate  2 spray Each Nostril Daily    levothyroxine  88 mcg Oral Before breakfast    magnesium oxide  400 mg Oral BID    metoprolol succinate  50 mg Oral Daily    piperacillin-tazobactam (ZOSYN) IVPB  4.5 g Intravenous Q8H    pravastatin  10 mg Oral QHS    sodium chloride 0.9%  10 mL Intravenous Q8H    vancomycin (VANCOCIN) IVPB  500 mg Intravenous Q24H     PRN Meds:sodium chloride, acetaminophen, HYDROcodone-acetaminophen, melatonin, ondansetron     Review of patient's allergies indicates:   Allergen Reactions    Codeine      Other reaction(s): hyper  Other reaction(s): hyper    Gabapentin Other (See Comments)     Bad dreams        Past Medical History:   Diagnosis Date    Acid reflux     Anxiety     Back pain     Bronchitis, chronic obstructive w acute bronchitis 7/29/2016    Cancer     [facility] arms, face- Dr. Lata Tejada    Cataract     2+NS     Chronic myelomonocytic leukemia     Degenerative disc disease     Depression     Depression     Dry mouth     Encounter for blood transfusion     Hernia, hiatal 11/18/2013    Hypertension     Hypothyroid     Hypothyroidism     Iron deficiency anemia     Macrocytic anemia 5/3/2016    Macular degeneration     Migraines     Mixed anxiety and depressive disorder     Multiple fractures of ribs of right side     Osteoporosis     Other hyperlipidemia 10/11/2019    Pneumonia     Pneumonia due to other staphylococcus     Rheumatoid arthritis     Rheumatoid arthritis(714.0)     Rheumatoid arthritis(714.0)     Remicade, MTX.     Past Surgical History:   Procedure Laterality Date    APPENDECTOMY  1985    APPLICATION OF WOUND VACUUM-ASSISTED CLOSURE DEVICE N/A 5/14/2020    Procedure: APPLICATION, WOUND VAC;  Surgeon: Mckinley Shannon MD;  Location: [facility] OR;  Service: General;  Laterality: N/A;    BREAST BIOPSY      CATARACT EXTRACTION Bilateral 6/11/15    Dr. Booth    CHOLECYSTECTOMY  2013    cryoablasion kidney Left 09/27/2016    EXCISION OF SQUAMOUS CELL CARCINOMA Left 7/2/2020    Procedure: EXCISION, CARCINOMA, SQUAMOUS CELL;  Surgeon: Mckinley Shannon MD;  Location: [facility] OR;  Service: General;  Laterality: Left;    feet Bilateral     rheumatoid    FRACTURE SURGERY Right     tibia    HERNIA REPAIR      HYSTERECTOMY  1970    partial    INCISION AND DRAINAGE OF ABSCESS N/A 5/12/2020    Procedure: INCISION AND DRAINAGE, ABSCESS;  Surgeon: Emerson Hathaway MD;  Location: [facility] OR;  Service: General;  Laterality: N/A;    INCISIONAL BIOPSY N/A 5/12/2020    Procedure: INCISIONAL BIOPSY;  Surgeon: Emerson Hathaway MD;  Location: [facility] OR;  Service: General;  Laterality: N/A;    JOINT REPLACEMENT      bilateral knees (2008), hands, wrists, knuckles, toes    LAPAROSCOPIC NISSEN FUNDOPLICATION      TRANSFORAMINAL EPIDURAL INJECTION OF STEROID Left 6/25/2019    Procedure: Left L5/S1 TF AYAAN with local;  Surgeon:  Rowdy Felix MD;  Location: [facility];  Service: Pain Management;  Laterality: Left;    WOUND DEBRIDEMENT N/A 2020    Procedure: DEBRIDEMENT, WOUND;  Surgeon: Mckinley Shannon MD;  Location: [facility] OR;  Service: General;  Laterality: N/A;     Family History     Problem Relation (Age of Onset)    Asthma Sister, Brother    Cancer Brother, Maternal Grandfather    Cataracts Mother    Chronic back pain Sister, Brother    Diabetes Mellitus Brother    Fibromyalgia Daughter    Heart disease Mother, Father, Maternal Grandmother    Hyperlipidemia Mother    Hypertension Mother, Father, Sister, Brother    Osteoarthritis Mother, Father, Sister, Brother    Thyroid disease Sister, Brother        Tobacco Use    Smoking status: Former Smoker     Packs/day: 0.25     Years: 2.00     Pack years: 0.50     Quit date: 1965     Years since quittin.7    Smokeless tobacco: Never Used   Substance and Sexual Activity    Alcohol use: No    Drug use: No    Sexual activity: Never     Partners: Male       Review of Systems   Constitutional: Positive for activity change, appetite change, fatigue and unexpected weight change. Negative for chills, diaphoresis and fever.   HENT: Negative for congestion, dental problem, drooling, ear discharge, ear pain, facial swelling, hearing loss, mouth sores, nosebleeds, postnasal drip, rhinorrhea, sinus pressure, sneezing, sore throat, tinnitus, trouble swallowing and voice change.    Eyes: Negative for photophobia, pain, discharge, redness, itching and visual disturbance.   Respiratory: Negative for cough, choking, chest tightness, shortness of breath, wheezing and stridor.    Cardiovascular: Negative for chest pain, palpitations and leg swelling.   Gastrointestinal: Negative for abdominal distention, abdominal pain, anal bleeding, blood in stool, constipation, diarrhea, nausea, rectal pain and vomiting.   Endocrine: Negative for cold intolerance, heat intolerance, polydipsia, polyphagia  and polyuria.   Genitourinary: Negative for decreased urine volume, difficulty urinating, dyspareunia, dysuria, enuresis, flank pain, frequency, genital sores, hematuria, menstrual problem, pelvic pain, urgency, vaginal bleeding, vaginal discharge and vaginal pain.   Musculoskeletal: Negative for arthralgias, back pain, gait problem, joint swelling, myalgias, neck pain and neck stiffness.   Skin: Positive for wound. Negative for color change, pallor and rash.   Allergic/Immunologic: Negative for environmental allergies, food allergies and immunocompromised state.   Neurological: Positive for weakness. Negative for dizziness, tremors, seizures, syncope, facial asymmetry, speech difficulty, light-headedness, numbness and headaches.   Hematological: Negative for adenopathy. Does not bruise/bleed easily.   Psychiatric/Behavioral: Negative for agitation, behavioral problems, confusion, decreased concentration, dysphoric mood, hallucinations, self-injury, sleep disturbance and suicidal ideas. The patient is nervous/anxious. The patient is not hyperactive.      Objective:     Vital Signs (Most Recent):  Temp: 99.2 °F (37.3 °C) (07/25/20 0728)  Pulse: 93 (07/25/20 0728)  Resp: 15 (07/25/20 0728)  BP: (!) 140/64 (07/25/20 0728)  SpO2: 98 % (07/25/20 0728) Vital Signs (24h Range):  Temp:  [98.1 °F (36.7 °C)-99.7 °F (37.6 °C)] 99.2 °F (37.3 °C)  Pulse:  [] 93  Resp:  [15-20] 15  SpO2:  [96 %-100 %] 98 %  BP: (122-187)/(40-70) 140/64     Weight: 49.1 kg (108 lb 3.9 oz)  Body mass index is 19.8 kg/m².  Body surface area is 1.47 meters squared.      Intake/Output Summary (Last 24 hours) at 7/25/2020 0953  Last data filed at 7/25/2020 0700  Gross per 24 hour   Intake 2077.91 ml   Output --   Net 2077.91 ml       Physical Exam  Vitals signs reviewed.   Constitutional:       General: She is not in acute distress.     Appearance: She is well-developed. She is ill-appearing. She is not diaphoretic.   HENT:      Head:  Normocephalic and atraumatic.      Right Ear: External ear normal.      Left Ear: External ear normal.      Nose: Nose normal.      Right Sinus: No maxillary sinus tenderness or frontal sinus tenderness.      Left Sinus: No maxillary sinus tenderness or frontal sinus tenderness.      Mouth/Throat:      Pharynx: No oropharyngeal exudate.   Eyes:      General: Lids are normal. No scleral icterus.        Right eye: No discharge.         Left eye: No discharge.      Conjunctiva/sclera: Conjunctivae normal.      Right eye: Right conjunctiva is not injected. No hemorrhage.     Left eye: Left conjunctiva is not injected. No hemorrhage.     Pupils: Pupils are equal, round, and reactive to light.   Neck:      Musculoskeletal: Normal range of motion and neck supple.      Thyroid: No thyromegaly.      Vascular: No JVD.      Trachea: No tracheal deviation.   Cardiovascular:      Rate and Rhythm: Normal rate and regular rhythm.      Heart sounds: Normal heart sounds.   Pulmonary:      Effort: Pulmonary effort is normal. No respiratory distress.      Breath sounds: Normal breath sounds. No stridor.   Chest:      Chest wall: No tenderness.   Abdominal:      General: Bowel sounds are normal. There is no distension.      Palpations: Abdomen is soft. There is no hepatomegaly, splenomegaly or mass.      Tenderness: There is no abdominal tenderness. There is no rebound.   Musculoskeletal: Normal range of motion.         General: Deformity present. No swelling or tenderness.        Arms:    Lymphadenopathy:      Cervical: No cervical adenopathy.      Upper Body:      Right upper body: No supraclavicular adenopathy.      Left upper body: No supraclavicular adenopathy.   Skin:     General: Skin is dry.      Findings: No erythema or rash.   Neurological:      Mental Status: She is alert and oriented to person, place, and time.      Cranial Nerves: No cranial nerve deficit.      Coordination: Coordination normal.   Psychiatric:          Behavior: Behavior normal.         Thought Content: Thought content normal.         Judgment: Judgment normal.         Significant Labs:   BMP:   Recent Labs   Lab 07/24/20  1503 07/25/20  0804   * 111*   * 134*   K 4.5 3.3*   CL 98 101   CO2 21* 23   BUN 28* 20   CREATININE 1.0 0.7   CALCIUM 8.7 8.0*   MG  --  2.7*   , CBC:   Recent Labs   Lab 07/24/20  1621 07/25/20  0804   WBC 53.71* 29.61*   HGB 5.4* 7.2*   HCT 17.7* 22.0*   PLT 30* 19*   , CMP:   Recent Labs   Lab 07/24/20  1503 07/25/20  0804   * 134*   K 4.5 3.3*   CL 98 101   CO2 21* 23   * 111*   BUN 28* 20   CREATININE 1.0 0.7   CALCIUM 8.7 8.0*   PROT 7.7  --    ALBUMIN 3.0*  --    BILITOT 0.4  --    ALKPHOS 292*  --    AST 28  --    ALT 37  --    ANIONGAP 13 10   EGFRNONAA 54* >60   , Coagulation:   Recent Labs   Lab 07/25/20  0804   INR 1.0   APTT 32.0   , Haptoglobin: No results for input(s): HAPTOGLOBIN in the last 48 hours., Immunology: No results for input(s): SPEP, TOBIN, STEWART, FREELAMBDALI in the last 48 hours., LDH: No results for input(s): LDHCSF, BFSOURCE in the last 48 hours., LFTs:   Recent Labs   Lab 07/24/20  1503   ALT 37   AST 28   ALKPHOS 292*   BILITOT 0.4   PROT 7.7   ALBUMIN 3.0*   , Reticulocytes: No results for input(s): RETIC in the last 48 hours. and Tumor Markers: No results for input(s): PSA, CEA, , AFPTM, [id number],  in the last 48 hours.    Invalid input(s): ALGTM    Diagnostic Results:  I have reviewed all pertinent imaging results/findings within the past 24 hours.    Assessment/Plan:     Chronic myelomonocytic leukemia not having achieved remission  Patient admitted with chronic myelomonocytic leukemia with pancytopenia patient is on supportive care at this point would agree with transfusion of packed red cells and platelets until level is reached that surgeon feels comfortable with.  At this point discussed implications with Hospital Medicine with surgery as well as with patient        Thank  you for your consult. I will follow-up with patient. Please contact us if you have any additional questions.    Corby Miranda MD  Hematology/Oncology  [facility]

## 2020-07-25 NOTE — ANESTHESIA PREPROCEDURE EVALUATION
07/25/2020  Sarah Parker is a 80 y.o., female.    Pre-op Assessment    I have reviewed the Patient Summary Reports.     I have reviewed the Nursing Notes.    I have reviewed the Medications.     Review of Systems  Anesthesia Hx:  No problems with previous Anesthesia  History of prior surgery of interest to airway management or planning: Previous anesthesia: General  Denies Personal Hx of Anesthesia complications.   Social:  Former Smoker, Non-Smoker    Hematology/Oncology:     Oncology Normal    -- Anemia: Blood Loss Anemia  Acute   -- Leukemia: Chronic Myeloid Leukemia (CML)  Hematology Comments: Receiving 2nd of 2 units of PRBC since midnight.    Has received Platelets.    Oncology Comments: CML     EENT/Dental:EENT/Dental Normal   Cardiovascular:   Hypertension ECG has been reviewed.    Pulmonary:   Pneumonia COPD Asthma    Renal/:  Renal/ Normal     Hepatic/GI:   Hiatal Hernia, GERD    Musculoskeletal:   Arthritis  Rheumatoid  Degenerative disc disease  Osteoporosis   Neurological:   Neuromuscular Disease, Headaches    Endocrine:   Hypothyroidism    Dermatological:  Skin Normal    Psych:   Psychiatric History anxiety depression        Patient Active Problem List   Diagnosis    Rheumatoid arthritis    Acquired hypothyroidism    Depression, recurrent    Rheumatoid lung    Atherosclerosis of aorta    ARMD (age related macular degeneration)    Hiatal hernia with gastroesophageal reflux    Essential hypertension    Osteopenia    Macrocytic anemia    Leukocytosis    Multiple lung nodules on CT    Renal mass    History of skin cancer    Chronic bronchitis    Calcified granuloma of lung    COPD with exacerbation    Lumbar radiculopathy    Melanoma of right upper arm    Idiopathic peripheral neuropathy    Rheumatoid arthritis involving right foot    Rheumatoid arthritis involving  left foot    Monoclonal paraproteinemia    Anemia    Chronic myelomonocytic leukemia not having achieved remission    Other hyperlipidemia    Immunosuppressed status    Axillary mass, left    Pancytopenia due to antineoplastic chemotherapy    Thrombocytopenia    Cachexia    Leukemia cutis    Cellulitis of lower back    Abscess of lower back    Squamous cell carcinoma of skin of upper extremity, including shoulder    Mass of thigh, left    Wound infection after surgery     Current Facility-Administered Medications on File Prior to Visit   Medication Dose Route Frequency Provider Last Rate Last Dose    0.9%  NaCl infusion (for blood administration)   Intravenous Q24H PRN Zeyad Mayer MD        acetaminophen tablet 650 mg  650 mg Oral Q8H PRN Riri Marx NP        amitriptyline tablet 75 mg  75 mg Oral QHS Riri Marx NP   75 mg at 07/24/20 2126    DULoxetine DR capsule 40 mg  40 mg Oral Daily Riri Marx NP        ferrous sulfate EC tablet 325 mg  325 mg Oral BID Riri Marx NP   325 mg at 07/24/20 2127    fluticasone propionate 50 mcg/actuation nasal spray 100 mcg  2 spray Each Nostril Daily Riri Marx NP        HYDROcodone-acetaminophen 5-325 mg per tablet 1 tablet  1 tablet Oral Q6H PRN Riri Marx NP   1 tablet at 07/24/20 2129    levothyroxine tablet 88 mcg  88 mcg Oral Before breakfast Riri Marx NP   88 mcg at 07/25/20 0530    magnesium oxide tablet 400 mg  400 mg Oral BID Riri Marx NP   400 mg at 07/24/20 2127    melatonin tablet 6 mg  6 mg Oral Nightly PRN Riri Marx NP        metoprolol succinate (TOPROL-XL) 24 hr tablet 50 mg  50 mg Oral Daily Riri Marx NP        ondansetron injection 4 mg  4 mg Intravenous Q8H PRN Riri Marx NP        piperacillin-tazobactam 4.5 g in dextrose 5 % 100 mL IVPB (ready to mix system)  4.5 g Intravenous Q8H Riri Marx NP 25 mL/hr at 07/25/20 0253 4.5 g at  07/25/20 0253    pravastatin tablet 10 mg  10 mg Oral QHS Riri Marx NP   10 mg at 07/24/20 2127    sodium chloride 0.9% flush 10 mL  10 mL Intravenous Q8H Riri Marx NP   10 mL at 07/24/20 2134    vancomycin 500 mg in dextrose 5 % 100 mL IVPB (ready to mix system)  500 mg Intravenous Q24H Fam Antunez MD         Current Outpatient Medications on File Prior to Visit   Medication Sig Dispense Refill    albuterol (PROVENTIL) 2.5 mg /3 mL (0.083 %) nebulizer solution Take 3 mLs (2.5 mg total) by nebulization every 6 (six) hours as needed for Wheezing. 1 Box 5    amitriptyline (ELAVIL) 75 MG tablet TAKE 1 TABLET BY MOUTH EVERY EVENING 90 tablet 3    calcium citrate-vitamin D (CITRACAL + D) 315-200 mg-unit per tablet Take 1 tablet by mouth once daily.       DULoxetine (CYMBALTA) 20 MG capsule TAKE 2 CAPSULES(40 MG) BY MOUTH EVERY DAY (Patient taking differently: before meals, at bedtime and at 0200. ) 180 capsule 3    ferrous gluconate (FERGON) 324 MG tablet Take 324 mg by mouth 2 (two) times daily.      fluticasone propionate (FLONASE) 50 mcg/actuation nasal spray 2 sprays (100 mcg total) by Each Nostril route once daily. 16 g 6    hydrocodone-acetaminophen 5-325mg (NORCO) 5-325 mg per tablet TK 1 T PO Q 6 H PRN  0    hydrOXYzine HCl (ATARAX) 25 MG tablet Take 25 mg by mouth nightly.       levothyroxine (SYNTHROID) 88 MCG tablet TAKE 1 TABLET BY MOUTH BEFORE BREAKFAST 90 tablet 3    magnesium oxide (MAG-OX) 400 mg (241.3 mg magnesium) tablet Take 1 tablet (400 mg total) by mouth 3 (three) times daily. (Patient taking differently: Take 400 mg by mouth 2 (two) times daily. )  0    meclizine (ANTIVERT) 50 MG tablet Take 25 mg by mouth 3 (three) times daily as needed.      metoprolol succinate (TOPROL-XL) 50 MG 24 hr tablet TAKE 1 TABLET(50 MG) BY MOUTH EVERY DAY 30 tablet 11    multivitamin capsule Take by mouth. As directed      ondansetron (ZOFRAN) 4 MG tablet Take 1 tablet (4 mg  total) by mouth every 8 (eight) hours as needed for Nausea. 30 tablet 1    pravastatin (PRAVACHOL) 10 MG tablet TAKE 1 TABLET(10 MG) BY MOUTH EVERY DAY (Patient taking differently: Take 10 mg by mouth every evening. ) 30 tablet 11    prochlorperazine (COMPAZINE) 5 MG tablet TAKE 1 TABLET(5 MG) BY MOUTH EVERY 6 HOURS AS NEEDED FOR NAUSEA 385 tablet 1    tamsulosin (FLOMAX) 0.4 mg Cap Take 1 capsule (0.4 mg total) by mouth once daily. For urinary retention 30 capsule 0    topiramate (TOPAMAX) 25 MG tablet Take 1 tablet (25 mg total) by mouth at night x 1 week then increase to 2 (two) times daily. Increase as tolerated. (Patient not taking: Reported on 7/21/2020) 60 tablet 0    valsartan-hydrochlorothiazide (DIOVAN-HCT) 80-12.5 mg per tablet TAKE 1 TABLET BY MOUTH DAILY 90 tablet 3     Past Surgical History:   Procedure Laterality Date    APPENDECTOMY  1985    APPLICATION OF WOUND VACUUM-ASSISTED CLOSURE DEVICE N/A 5/14/2020    Procedure: APPLICATION, WOUND VAC;  Surgeon: Mckinley Shannon MD;  Location: [facility] OR;  Service: General;  Laterality: N/A;    BREAST BIOPSY      CATARACT EXTRACTION Bilateral 6/11/15    Dr. Booth    CHOLECYSTECTOMY  2013    cryoablasion kidney Left 09/27/2016    EXCISION OF SQUAMOUS CELL CARCINOMA Left 7/2/2020    Procedure: EXCISION, CARCINOMA, SQUAMOUS CELL;  Surgeon: Mckinley Shannon MD;  Location: [facility] OR;  Service: General;  Laterality: Left;    feet Bilateral     rheumatoid    FRACTURE SURGERY Right     tibia    HERNIA REPAIR      HYSTERECTOMY  1970    partial    INCISION AND DRAINAGE OF ABSCESS N/A 5/12/2020    Procedure: INCISION AND DRAINAGE, ABSCESS;  Surgeon: Emerson Hathaway MD;  Location: [facility] OR;  Service: General;  Laterality: N/A;    INCISIONAL BIOPSY N/A 5/12/2020    Procedure: INCISIONAL BIOPSY;  Surgeon: Emerson Hathaway MD;  Location: [facility] OR;  Service: General;  Laterality: N/A;    JOINT REPLACEMENT      bilateral knees (2008), hands, wrists, knuckles, toes     LAPAROSCOPIC NISSEN FUNDOPLICATION      TRANSFORAMINAL EPIDURAL INJECTION OF STEROID Left 6/25/2019    Procedure: Left L5/S1 TF AYAAN with local;  Surgeon: Rowdy Felix MD;  Location: [facility];  Service: Pain Management;  Laterality: Left;    WOUND DEBRIDEMENT N/A 5/14/2020    Procedure: DEBRIDEMENT, WOUND;  Surgeon: Mckinley Shannon MD;  Location: [facility] OR;  Service: General;  Laterality: N/A;         Physical Exam  General:  Well nourished    Airway/Jaw/Neck:  Airway Findings: Mouth Opening: Normal Tongue: Normal  General Airway Assessment: Adult  Mallampati: II  TM Distance: 4 - 6 cm  Jaw/Neck Findings:  Neck ROM: Normal ROM      Dental:  Dental Findings: In tact   Chest/Lungs:  Chest/Lungs Findings: Clear to auscultation, Normal Respiratory Rate     Heart/Vascular:  Heart Findings: Rate: Normal  Rhythm: Regular Rhythm  Sounds: Normal        Mental Status:  Mental Status Findings:  Cooperative, Alert and Oriented         Anesthesia Plan  Type of Anesthesia, risks & benefits discussed:  Anesthesia Type:  general, MAC  Patient's Preference:   Intra-op Monitoring Plan: standard ASA monitors  Intra-op Monitoring Plan Comments:   Post Op Pain Control Plan: multimodal analgesia and per primary service following discharge from PACU  Post Op Pain Control Plan Comments:   Induction:   IV  Beta Blocker:  Patient is on a Beta-Blocker and has received one dose within the past 24 hours (No further documentation required).       Informed Consent: Patient understands risks and agrees with Anesthesia plan.  Questions answered. Anesthesia consent signed with patient.  ASA Score: 4  emergent   Day of Surgery Review of History & Physical:  There are no significant changes.          Ready For Surgery From Anesthesia Perspective.       Chemistry        Component Value Date/Time     (L) 07/24/2020 1503    K 4.5 07/24/2020 1503    CL 98 07/24/2020 1503    CO2 21 (L) 07/24/2020 1503    BUN 28 (H) 07/24/2020 1503    CREATININE  1.0 07/24/2020 1503     (H) 07/24/2020 1503        Component Value Date/Time    CALCIUM 8.7 07/24/2020 1503    ALKPHOS 292 (H) 07/24/2020 1503    AST 28 07/24/2020 1503    ALT 37 07/24/2020 1503    BILITOT 0.4 07/24/2020 1503    ESTGFRAFRICA >60 07/24/2020 1503    EGFRNONAA 54 (A) 07/24/2020 1503        Lab Results   Component Value Date    WBC 29.61 (H) 07/25/2020    HGB 7.2 (L) 07/25/2020    HCT 22.0 (L) 07/25/2020    MCV 91 07/25/2020    PLT 19 (LL) 07/25/2020

## 2020-07-25 NOTE — H&P
[facility] Hospital Medicine  History & Physical    Patient Name: Sarah Parker  MRN: [id number]  Admission Date: 7/24/2020  Attending Physician: Sujata Rivero MD   Primary Care Provider: Briseida Bennett MD         Patient information was obtained from patient, past medical records and ER records.     Subjective:     Principal Problem:Wound infection after surgery    Chief Complaint:   Chief Complaint   Patient presents with    Infection     pt sent here by Dr. Shannon for infection to her back        HPI: 81 y/o WF with hx of CML with chronic anemia, gerd, HTN, HLD, RA, squamous cell carcinoma excision, hypothyroidism to ED per home health for evaluation of a lower back wound which began worsening over the past 3 days, today associated with moderate amount of serosanguinous drainage and with accompanying fever (Tmax 100.7) and pain (6/10) to the site. Was previously admitted 05/2020 for lower back abscess and required debridement and wound vac placement; also recently had SCC removed from her L thigh and LFA. Denies CP, edema, palpitations, SOB, wheezing, orthopnea, PND, abdominal pain, N/V/D, constipation, dysuria, flank pain, HA, dizziness, cough, chills, falls/trauma, blurred vision, focal deficits. Found in ED to be febrile (100.7) with leukocytosis (53.71), anemia (5.4&17.7), thrombocytopenia (30) - pt was given 1L IV fluids and started on IV vancomycin/zosyn. Plan is for debridement in OR in AM per general surgery. Hospital medicine called for admission - pt placed on med-tele; Full code, pt's surrogate decision maker is daughter, Albina Martínez.     Past Medical History:   Diagnosis Date    Acid reflux     Anxiety     Back pain     Bronchitis, chronic obstructive w acute bronchitis 7/29/2016    Cancer     [facility] arms, face- Dr. Lata Tejada    Cataract     2+NS    Chronic myelomonocytic leukemia     Degenerative disc disease     Depression     Depression     Dry mouth      Encounter for blood transfusion     Hernia, hiatal 11/18/2013    Hypertension     Hypothyroid     Hypothyroidism     Iron deficiency anemia     Macrocytic anemia 5/3/2016    Macular degeneration     Migraines     Mixed anxiety and depressive disorder     Multiple fractures of ribs of right side     Osteoporosis     Other hyperlipidemia 10/11/2019    Pneumonia     Pneumonia due to other staphylococcus     Rheumatoid arthritis     Rheumatoid arthritis(714.0)     Rheumatoid arthritis(714.0)     Remicade, MTX.       Past Surgical History:   Procedure Laterality Date    APPENDECTOMY  1985    APPLICATION OF WOUND VACUUM-ASSISTED CLOSURE DEVICE N/A 5/14/2020    Procedure: APPLICATION, WOUND VAC;  Surgeon: Mckinley Shannon MD;  Location: [facility] OR;  Service: General;  Laterality: N/A;    BREAST BIOPSY      CATARACT EXTRACTION Bilateral 6/11/15    Dr. Booth    CHOLECYSTECTOMY  2013    cryoablasion kidney Left 09/27/2016    EXCISION OF SQUAMOUS CELL CARCINOMA Left 7/2/2020    Procedure: EXCISION, CARCINOMA, SQUAMOUS CELL;  Surgeon: Mckinley Shannon MD;  Location: [facility] OR;  Service: General;  Laterality: Left;    feet Bilateral     rheumatoid    FRACTURE SURGERY Right     tibia    HERNIA REPAIR      HYSTERECTOMY  1970    partial    INCISION AND DRAINAGE OF ABSCESS N/A 5/12/2020    Procedure: INCISION AND DRAINAGE, ABSCESS;  Surgeon: Emerson Hathaway MD;  Location: [facility] OR;  Service: General;  Laterality: N/A;    INCISIONAL BIOPSY N/A 5/12/2020    Procedure: INCISIONAL BIOPSY;  Surgeon: Emerson Hathaway MD;  Location: [facility] OR;  Service: General;  Laterality: N/A;    JOINT REPLACEMENT      bilateral knees (2008), hands, wrists, knuckles, toes    LAPAROSCOPIC NISSEN FUNDOPLICATION      TRANSFORAMINAL EPIDURAL INJECTION OF STEROID Left 6/25/2019    Procedure: Left L5/S1 TF AYAAN with local;  Surgeon: Rowdy Felix MD;  Location[facility];  Service: Pain Management;  Laterality: Left;    WOUND DEBRIDEMENT N/A  5/14/2020    Procedure: DEBRIDEMENT, WOUND;  Surgeon: Mckinley Shannon MD;  Location: [facility];  Service: General;  Laterality: N/A;       Review of patient's allergies indicates:   Allergen Reactions    Codeine      Other reaction(s): hyper  Other reaction(s): hyper    Gabapentin Other (See Comments)     Bad dreams       No current facility-administered medications on file prior to encounter.      Current Outpatient Medications on File Prior to Encounter   Medication Sig    amitriptyline (ELAVIL) 75 MG tablet TAKE 1 TABLET BY MOUTH EVERY EVENING    calcium citrate-vitamin D (CITRACAL + D) 315-200 mg-unit per tablet Take 1 tablet by mouth once daily.     DULoxetine (CYMBALTA) 20 MG capsule TAKE 2 CAPSULES(40 MG) BY MOUTH EVERY DAY (Patient taking differently: before meals, at bedtime and at 0200. )    ferrous gluconate (FERGON) 324 MG tablet Take 324 mg by mouth 2 (two) times daily.    fluticasone propionate (FLONASE) 50 mcg/actuation nasal spray 2 sprays (100 mcg total) by Each Nostril route once daily.    hydrocodone-acetaminophen 5-325mg (NORCO) 5-325 mg per tablet TK 1 T PO Q 6 H PRN    hydrOXYzine HCl (ATARAX) 25 MG tablet Take 25 mg by mouth nightly.     levothyroxine (SYNTHROID) 88 MCG tablet TAKE 1 TABLET BY MOUTH BEFORE BREAKFAST    magnesium oxide (MAG-OX) 400 mg (241.3 mg magnesium) tablet Take 1 tablet (400 mg total) by mouth 3 (three) times daily. (Patient taking differently: Take 400 mg by mouth 2 (two) times daily. )    meclizine (ANTIVERT) 50 MG tablet Take 25 mg by mouth 3 (three) times daily as needed.    metoprolol succinate (TOPROL-XL) 50 MG 24 hr tablet TAKE 1 TABLET(50 MG) BY MOUTH EVERY DAY    multivitamin capsule Take by mouth. As directed    ondansetron (ZOFRAN) 4 MG tablet Take 1 tablet (4 mg total) by mouth every 8 (eight) hours as needed for Nausea.    pravastatin (PRAVACHOL) 10 MG tablet TAKE 1 TABLET(10 MG) BY MOUTH EVERY DAY (Patient taking differently: Take 10 mg by mouth  every evening. )    prochlorperazine (COMPAZINE) 5 MG tablet TAKE 1 TABLET(5 MG) BY MOUTH EVERY 6 HOURS AS NEEDED FOR NAUSEA    valsartan-hydrochlorothiazide (DIOVAN-HCT) 80-12.5 mg per tablet TAKE 1 TABLET BY MOUTH DAILY    albuterol (PROVENTIL) 2.5 mg /3 mL (0.083 %) nebulizer solution Take 3 mLs (2.5 mg total) by nebulization every 6 (six) hours as needed for Wheezing.    tamsulosin (FLOMAX) 0.4 mg Cap Take 1 capsule (0.4 mg total) by mouth once daily. For urinary retention    topiramate (TOPAMAX) 25 MG tablet Take 1 tablet (25 mg total) by mouth at night x 1 week then increase to 2 (two) times daily. Increase as tolerated. (Patient not taking: Reported on 2020)    [DISCONTINUED] benzonatate (TESSALON) 100 MG capsule Take 1-2 capsules (100-200 mg total) by mouth 3 (three) times daily as needed for Cough.    [DISCONTINUED] leucovorin (WELLCOVORIN) 5 mg Tab TAKE ONE WEEKLY ON      Family History     Problem Relation (Age of Onset)    Asthma Sister, Brother    Cancer Brother, Maternal Grandfather    Cataracts Mother    Chronic back pain Sister, Brother    Diabetes Mellitus Brother    Fibromyalgia Daughter    Heart disease Mother, Father, Maternal Grandmother    Hyperlipidemia Mother    Hypertension Mother, Father, Sister, Brother    Osteoarthritis Mother, Father, Sister, Brother    Thyroid disease Sister, Brother        Tobacco Use    Smoking status: Former Smoker     Packs/day: 0.25     Years: 2.00     Pack years: 0.50     Quit date: 1965     Years since quittin.7    Smokeless tobacco: Never Used   Substance and Sexual Activity    Alcohol use: No    Drug use: No    Sexual activity: Never     Partners: Male     Review of Systems   Constitutional: Positive for fatigue and fever. Negative for activity change, chills and diaphoresis.   HENT: Negative for congestion, trouble swallowing and voice change.    Eyes: Negative for photophobia and discharge.   Respiratory: Negative for  cough, chest tightness, shortness of breath and wheezing.    Cardiovascular: Negative for chest pain, palpitations and leg swelling.   Gastrointestinal: Negative for abdominal pain, blood in stool, constipation, diarrhea, nausea and vomiting.   Endocrine: Negative for cold intolerance, heat intolerance, polydipsia, polyphagia and polyuria.   Genitourinary: Negative for difficulty urinating, dysuria, flank pain, frequency and urgency.   Musculoskeletal: Positive for arthralgias (chronic RA) and back pain (at wound site).   Skin: Positive for wound (lower back, LFA, L thigh). Negative for rash.   Allergic/Immunologic: Positive for immunocompromised state.   Neurological: Negative for dizziness, seizures, syncope, facial asymmetry, weakness, light-headedness, numbness and headaches.   Psychiatric/Behavioral: Negative for confusion and hallucinations.         Objective:     Vital Signs (Most Recent):  Temp: 99.3 °F (37.4 °C) (07/24/20 1807)  Pulse: 109 (07/24/20 1902)  Resp: 18 (07/24/20 1902)  BP: (!) 142/53 (07/24/20 1902)  SpO2: 96 % (07/24/20 1902) Vital Signs (24h Range):  Temp:  [98.8 °F (37.1 °C)-99.3 °F (37.4 °C)] 99.3 °F (37.4 °C)  Pulse:  [109-120] 109  Resp:  [18] 18  SpO2:  [96 %] 96 %  BP: (122-142)/(40-70) 142/53     Weight: 49.1 kg (108 lb 3.9 oz)  Body mass index is 19.8 kg/m².    Physical Exam  Vitals signs reviewed.   Constitutional:       General: She is not in acute distress.     Appearance: Normal appearance. She is ill-appearing. She is not toxic-appearing.   HENT:      Head: Normocephalic and atraumatic.      Nose: Nose normal. No congestion.      Mouth/Throat:      Mouth: Mucous membranes are moist.   Eyes:      Pupils: Pupils are equal, round, and reactive to light.   Neck:      Musculoskeletal: Normal range of motion and neck supple. No muscular tenderness.   Cardiovascular:      Rate and Rhythm: Normal rate and regular rhythm.      Pulses: Normal pulses.      Heart sounds: Normal heart sounds.    Pulmonary:      Effort: Pulmonary effort is normal. No respiratory distress.      Breath sounds: Normal breath sounds. No wheezing or rhonchi.   Abdominal:      General: Abdomen is flat. Bowel sounds are normal. There is no distension.      Palpations: Abdomen is soft.      Tenderness: There is no abdominal tenderness. There is no rebound.   Musculoskeletal: Normal range of motion.      Right lower leg: No edema.      Left lower leg: No edema.   Skin:     General: Skin is warm and dry.      Capillary Refill: Capillary refill takes less than 2 seconds.      Findings: No bruising or rash.      Comments: 1. Wound to mid-lower back extending to L with small serosanguinous drainage, dressing CDI  2. L FA incision, healing - no evidence of infection  3. L thigh incision - possible hematoma   Neurological:      General: No focal deficit present.      Mental Status: She is alert and oriented to person, place, and time.   Psychiatric:         Mood and Affect: Mood normal.         Behavior: Behavior normal.         Thought Content: Thought content normal.         Judgment: Judgment normal.                       Significant Labs:   CBC:   Recent Labs   Lab 07/24/20  1621   WBC 53.71*   HGB 5.4*   HCT 17.7*   PLT 30*     CMP:   Recent Labs   Lab 07/24/20  1503   *   K 4.5   CL 98   CO2 21*   *   BUN 28*   CREATININE 1.0   CALCIUM 8.7   PROT 7.7   ALBUMIN 3.0*   BILITOT 0.4   ALKPHOS 292*   AST 28   ALT 37   ANIONGAP 13   EGFRNONAA 54*       Lactic Acid:   Recent Labs   Lab 07/24/20  1503   LACTATE 1.4     Assessment/Plan:     * Wound infection after surgery  General surgery consulted - likely to OR in AM for debridement  CT lumbar spine pending to r/o abscess  BC x2 pending  NPO after MN  Continue IV vanc/zosyn for now  Provide adequate pain control  Tylenol prn fever    Anemia  Likely r/t CML, in treatment  Transfuse 2 units PRBCs tonight  Repeat labs in AM      Chronic myelomonocytic leukemia not having  achieved remission  Followed by Dr. Knox, currently treated with Vidaza  WBCs elevated over baseline likely d/t wound infection  Thrombocytopenia near baseline    Essential hypertension  Stable at this time  VS per unit routine  Home meds resumed        VTE Risk Mitigation (From admission, onward)         Ordered     IP VTE HIGH RISK PATIENT  Once      07/24/20 2055     Place sequential compression device  Until discontinued      07/24/20 2055                   Riri Marx NP  Department of Hospital Medicine   [facility]

## 2020-07-25 NOTE — CONSULTS
Pharmacokinetic Initial Assessment: IV Vancomycin    Assessment/Plan:    Initiate intravenous vancomycin with loading dose of 1000 mg once followed by a maintenance dose of vancomycin 500 mg IV every 24 hours  Desired empiric serum trough concentration is 15 to 20 mcg/mL  Draw vancomycin trough level 30 min prior to third dose on 7/26/2020 at approximately 1530.  Pharmacy will continue to follow and monitor vancomycin.      Please contact pharmacy at extension [phone] with any questions regarding this assessment.     Thank you for the consult,   Trini Ramirez       Patient brief summary:  Sarah Parker is a 80 y.o. female initiated on antimicrobial therapy with IV Vancomycin for treatment of suspected  back abscess in chemotherapy patient, fever. History of MRSA in abscess within the past 60 days.     Drug Allergies:   Review of patient's allergies indicates:   Allergen Reactions    Codeine      Other reaction(s): hyper  Other reaction(s): hyper    Gabapentin Other (See Comments)     Bad dreams       Actual Body Weight:   49.1 Kg     Renal Function:   Estimated Creatinine Clearance: 35.9 mL/min (based on SCr of 1 mg/dL).,     Dialysis Method (if applicable):  N/A    CBC (last 72 hours):  Recent Labs   Lab Result Units 07/24/20  1621   WBC K/uL 53.71*   Hemoglobin g/dL 5.4*   Hematocrit % 17.7*   Platelets K/uL 30*   Gran% % 21.0*   Lymph% % 42.0   Mono% % 21.0*   Eosinophil% % 0.0   Basophil% % 0.0   Differential Method  Manual       Metabolic Panel (last 72 hours):  Recent Labs   Lab Result Units 07/24/20  1503   Sodium mmol/L 132*   Potassium mmol/L 4.5   Chloride mmol/L 98   CO2 mmol/L 21*   Glucose mg/dL 127*   BUN, Bld mg/dL 28*   Creatinine mg/dL 1.0   Albumin g/dL 3.0*   Total Bilirubin mg/dL 0.4   Alkaline Phosphatase U/L 292*   AST U/L 28   ALT U/L 37       Drug levels (last 3 results):  No results for input(s): VANCOMYCINRA, VANCOMYCINPE, VANCOMYCINTR in the last 72 hours.    Microbiologic  Results:  Microbiology Results (last 7 days)       Procedure Component Value Units Date/Time    Blood Culture #2 **CANNOT BE ORDERED STAT** [[id number]] Collected: 07/24/20 1503    Order Status: Sent Specimen: Blood from Peripheral, Antecubital, Left Updated: 07/24/20 2039    Blood Culture #1 **CANNOT BE ORDERED STAT** [[id number]] Collected: 07/24/20 1445    Order Status: Sent Specimen: Blood from Peripheral, Antecubital, Left Updated: 07/24/20 2039

## 2020-07-25 NOTE — PROGRESS NOTES
[facility]   General Surgery  Progress Note    Subjective:     History of Present Illness:  80-year-old female known patient of Dr. Shannon with history of CML, RA, debility, squamous cell carcinoma recently excised, HTN, anemia admitted in may 2020 for abscess of her lower back. She underwent debridement and has not had chemotherapy since. She was sent to ED today by her home health agency for fever and wound drainage. She reports pain at her lower back site and LLE surgical site. She denies any trauma.     Post-Op Info:  Procedure(s) (LRB):  DEBRIDEMENT, WOUND (N/A)   Day of Surgery     Interval History: Tm 99.7 overnight. VSS. Still with painful surgical site to back. Transfused 1 pack plt and 2 units pRBCs preoperatively.     Medications:  Continuous Infusions:  Scheduled Meds:   amitriptyline  75 mg Oral QHS    ceFEPime (MAXIPIME) IVPB  2 g Intravenous Q12H    DULoxetine  40 mg Oral Daily    ferrous sulfate  325 mg Oral BID    fluticasone propionate  2 spray Each Nostril Daily    levothyroxine  88 mcg Oral Before breakfast    magnesium oxide  400 mg Oral BID    metoprolol succinate  50 mg Oral Daily    nozaseptin   Each Nostril BID    pravastatin  10 mg Oral QHS    sodium chloride 0.9%  10 mL Intravenous Q8H    vancomycin (VANCOCIN) IVPB  500 mg Intravenous Q24H     PRN Meds:sodium chloride, acetaminophen, HYDROcodone-acetaminophen, melatonin, ondansetron     Review of patient's allergies indicates:   Allergen Reactions    Codeine      Other reaction(s): hyper  Other reaction(s): hyper    Gabapentin Other (See Comments)     Bad dreams     Objective:     Vital Signs (Most Recent):  Temp: 98.5 °F (36.9 °C) (07/25/20 1057)  Pulse: 94 (07/25/20 1126)  Resp: 17 (07/25/20 1126)  BP: 129/60 (07/25/20 1126)  SpO2: 98 % (07/25/20 0728) Vital Signs (24h Range):  Temp:  [98.1 °F (36.7 °C)-99.7 °F (37.6 °C)] 98.5 °F (36.9 °C)  Pulse:  [] 94  Resp:  [15-20] 17  SpO2:  [96 %-100 %] 98 %  BP:  (116-187)/(40-64) 129/60     Weight: 49.1 kg (108 lb 3.9 oz)  Body mass index is 19.8 kg/m².    Intake/Output - Last 3 Shifts       07/23 0700 - 07/24 0659 07/24 0700 - 07/25 0659 07/25 0700 - 07/26 0659    P.O.  120     I.V. (mL/kg)  500 (10.2) 60 (1.2)    Blood  1047.9 467.3    IV Piggyback  350     Total Intake(mL/kg)  2017.9 (41.1) 527.3 (10.7)    Net  +2017.9 +527.3           Urine Occurrence  1 x           Physical Exam  Constitutional:       Appearance: She is cachectic. She is ill-appearing.   HENT:      Head: Normocephalic.   Cardiovascular:      Rate and Rhythm: Normal rate.   Pulmonary:      Effort: Pulmonary effort is normal.   Skin:               Significant Labs:  CBC:   Recent Labs   Lab 07/25/20  0804   WBC 29.61*   RBC 2.42*   HGB 7.2*   HCT 22.0*   PLT 19*   MCV 91   MCH 29.8   MCHC 32.7     BMP:   Recent Labs   Lab 07/25/20  0804   *   *   K 3.3*      CO2 23   BUN 20   CREATININE 0.7   CALCIUM 8.0*   MG 2.7*       Significant Diagnostics:  CT: I have reviewed all pertinent results/findings within the past 24 hours and my personal findings are:  local cellulitis of wound, no deep abscess identified    Assessment/Plan:     * Wound infection after surgery  To OR today for wound debridement of lower back and left leg surgical sites  Continue antibiotics therapy  Transfused preoperatively  Risks, benefits, and alternatives were discussed in detail. The patient agrees to proceed with surgery and all questions were answered.     Chronic myelomonocytic leukemia not having achieved remission  Hematology/Oncology on board        Berenice Alfaro MD  General Surgery  [facility]

## 2020-07-25 NOTE — ASSESSMENT & PLAN NOTE
Followed by Dr. Knox, currently treated with Vidaza  WBCs elevated over baseline likely d/t wound infection  Thrombocytopenia near baseline

## 2020-07-25 NOTE — PLAN OF CARE
Patient received on shift laying in bed sleeping but easily awoken , x4, in no acute distress or discomfort. Vitals present within stable limits. PO Medications held as per pt. Request till after impending procedure. Safety precautions maintained. Will cont. To monitor progress.

## 2020-07-25 NOTE — SUBJECTIVE & OBJECTIVE
Oncology Treatment Plan:   OP AZACITADINE 7-DAY (SUB-Q)    Medications:  Continuous Infusions:  Scheduled Meds:   amitriptyline  75 mg Oral QHS    DULoxetine  40 mg Oral Daily    ferrous sulfate  325 mg Oral BID    fluticasone propionate  2 spray Each Nostril Daily    levothyroxine  88 mcg Oral Before breakfast    magnesium oxide  400 mg Oral BID    metoprolol succinate  50 mg Oral Daily    piperacillin-tazobactam (ZOSYN) IVPB  4.5 g Intravenous Q8H    pravastatin  10 mg Oral QHS    sodium chloride 0.9%  10 mL Intravenous Q8H    vancomycin (VANCOCIN) IVPB  500 mg Intravenous Q24H     PRN Meds:sodium chloride, acetaminophen, HYDROcodone-acetaminophen, melatonin, ondansetron     Review of patient's allergies indicates:   Allergen Reactions    Codeine      Other reaction(s): hyper  Other reaction(s): hyper    Gabapentin Other (See Comments)     Bad dreams        Past Medical History:   Diagnosis Date    Acid reflux     Anxiety     Back pain     Bronchitis, chronic obstructive w acute bronchitis 7/29/2016    Cancer     [facility] arms, face- Dr. Lata Tejada    Cataract     2+NS    Chronic myelomonocytic leukemia     Degenerative disc disease     Depression     Depression     Dry mouth     Encounter for blood transfusion     Hernia, hiatal 11/18/2013    Hypertension     Hypothyroid     Hypothyroidism     Iron deficiency anemia     Macrocytic anemia 5/3/2016    Macular degeneration     Migraines     Mixed anxiety and depressive disorder     Multiple fractures of ribs of right side     Osteoporosis     Other hyperlipidemia 10/11/2019    Pneumonia     Pneumonia due to other staphylococcus     Rheumatoid arthritis     Rheumatoid arthritis(714.0)     Rheumatoid arthritis(714.0)     Remicade, MTX.     Past Surgical History:   Procedure Laterality Date    APPENDECTOMY  1985    APPLICATION OF WOUND VACUUM-ASSISTED CLOSURE DEVICE N/A 5/14/2020    Procedure: APPLICATION, WOUND VAC;  Surgeon:  Mckinley Shannon MD;  Location: [facility] OR;  Service: General;  Laterality: N/A;    BREAST BIOPSY      CATARACT EXTRACTION Bilateral 6/11/15    Dr. Booth    CHOLECYSTECTOMY  2013    cryoablasion kidney Left 09/27/2016    EXCISION OF SQUAMOUS CELL CARCINOMA Left 7/2/2020    Procedure: EXCISION, CARCINOMA, SQUAMOUS CELL;  Surgeon: Mckinley Shannon MD;  Location: [facility] OR;  Service: General;  Laterality: Left;    feet Bilateral     rheumatoid    FRACTURE SURGERY Right     tibia    HERNIA REPAIR      HYSTERECTOMY  1970    partial    INCISION AND DRAINAGE OF ABSCESS N/A 5/12/2020    Procedure: INCISION AND DRAINAGE, ABSCESS;  Surgeon: Emerson Hathaway MD;  Location: [facility] OR;  Service: General;  Laterality: N/A;    INCISIONAL BIOPSY N/A 5/12/2020    Procedure: INCISIONAL BIOPSY;  Surgeon: Emerson Hathaway MD;  Location: [facility] OR;  Service: General;  Laterality: N/A;    JOINT REPLACEMENT      bilateral knees (2008), hands, wrists, knuckles, toes    LAPAROSCOPIC NISSEN FUNDOPLICATION      TRANSFORAMINAL EPIDURAL INJECTION OF STEROID Left 6/25/2019    Procedure: Left L5/S1 TF AYAAN with local;  Surgeon: Rowdy Felix MD;  Location: [facility] PAIN [facility]GT;  Service: Pain Management;  Laterality: Left;    WOUND DEBRIDEMENT N/A 5/14/2020    Procedure: DEBRIDEMENT, WOUND;  Surgeon: Mckinley Shannon MD;  Location: [facility] OR;  Service: General;  Laterality: N/A;     Family History     Problem Relation (Age of Onset)    Asthma Sister, Brother    Cancer Brother, Maternal Grandfather    Cataracts Mother    Chronic back pain Sister, Brother    Diabetes Mellitus Brother    Fibromyalgia Daughter    Heart disease Mother, Father, Maternal Grandmother    Hyperlipidemia Mother    Hypertension Mother, Father, Sister, Brother    Osteoarthritis Mother, Father, Sister, Brother    Thyroid disease Sister, Brother        Tobacco Use    Smoking status: Former Smoker     Packs/day: 0.25     Years: 2.00     Pack years: 0.50     Quit date: 11/2/1965     Years since  quittin.7    Smokeless tobacco: Never Used   Substance and Sexual Activity    Alcohol use: No    Drug use: No    Sexual activity: Never     Partners: Male       Review of Systems   Constitutional: Positive for activity change, appetite change, fatigue and unexpected weight change. Negative for chills, diaphoresis and fever.   HENT: Negative for congestion, dental problem, drooling, ear discharge, ear pain, facial swelling, hearing loss, mouth sores, nosebleeds, postnasal drip, rhinorrhea, sinus pressure, sneezing, sore throat, tinnitus, trouble swallowing and voice change.    Eyes: Negative for photophobia, pain, discharge, redness, itching and visual disturbance.   Respiratory: Negative for cough, choking, chest tightness, shortness of breath, wheezing and stridor.    Cardiovascular: Negative for chest pain, palpitations and leg swelling.   Gastrointestinal: Negative for abdominal distention, abdominal pain, anal bleeding, blood in stool, constipation, diarrhea, nausea, rectal pain and vomiting.   Endocrine: Negative for cold intolerance, heat intolerance, polydipsia, polyphagia and polyuria.   Genitourinary: Negative for decreased urine volume, difficulty urinating, dyspareunia, dysuria, enuresis, flank pain, frequency, genital sores, hematuria, menstrual problem, pelvic pain, urgency, vaginal bleeding, vaginal discharge and vaginal pain.   Musculoskeletal: Negative for arthralgias, back pain, gait problem, joint swelling, myalgias, neck pain and neck stiffness.   Skin: Positive for wound. Negative for color change, pallor and rash.   Allergic/Immunologic: Negative for environmental allergies, food allergies and immunocompromised state.   Neurological: Positive for weakness. Negative for dizziness, tremors, seizures, syncope, facial asymmetry, speech difficulty, light-headedness, numbness and headaches.   Hematological: Negative for adenopathy. Does not bruise/bleed easily.   Psychiatric/Behavioral:  Negative for agitation, behavioral problems, confusion, decreased concentration, dysphoric mood, hallucinations, self-injury, sleep disturbance and suicidal ideas. The patient is nervous/anxious. The patient is not hyperactive.      Objective:     Vital Signs (Most Recent):  Temp: 99.2 °F (37.3 °C) (07/25/20 0728)  Pulse: 93 (07/25/20 0728)  Resp: 15 (07/25/20 0728)  BP: (!) 140/64 (07/25/20 0728)  SpO2: 98 % (07/25/20 0728) Vital Signs (24h Range):  Temp:  [98.1 °F (36.7 °C)-99.7 °F (37.6 °C)] 99.2 °F (37.3 °C)  Pulse:  [] 93  Resp:  [15-20] 15  SpO2:  [96 %-100 %] 98 %  BP: (122-187)/(40-70) 140/64     Weight: 49.1 kg (108 lb 3.9 oz)  Body mass index is 19.8 kg/m².  Body surface area is 1.47 meters squared.      Intake/Output Summary (Last 24 hours) at 7/25/2020 0953  Last data filed at 7/25/2020 0700  Gross per 24 hour   Intake 2077.91 ml   Output --   Net 2077.91 ml       Physical Exam  Vitals signs reviewed.   Constitutional:       General: She is not in acute distress.     Appearance: She is well-developed. She is ill-appearing. She is not diaphoretic.   HENT:      Head: Normocephalic and atraumatic.      Right Ear: External ear normal.      Left Ear: External ear normal.      Nose: Nose normal.      Right Sinus: No maxillary sinus tenderness or frontal sinus tenderness.      Left Sinus: No maxillary sinus tenderness or frontal sinus tenderness.      Mouth/Throat:      Pharynx: No oropharyngeal exudate.   Eyes:      General: Lids are normal. No scleral icterus.        Right eye: No discharge.         Left eye: No discharge.      Conjunctiva/sclera: Conjunctivae normal.      Right eye: Right conjunctiva is not injected. No hemorrhage.     Left eye: Left conjunctiva is not injected. No hemorrhage.     Pupils: Pupils are equal, round, and reactive to light.   Neck:      Musculoskeletal: Normal range of motion and neck supple.      Thyroid: No thyromegaly.      Vascular: No JVD.      Trachea: No tracheal  deviation.   Cardiovascular:      Rate and Rhythm: Normal rate and regular rhythm.      Heart sounds: Normal heart sounds.   Pulmonary:      Effort: Pulmonary effort is normal. No respiratory distress.      Breath sounds: Normal breath sounds. No stridor.   Chest:      Chest wall: No tenderness.   Abdominal:      General: Bowel sounds are normal. There is no distension.      Palpations: Abdomen is soft. There is no hepatomegaly, splenomegaly or mass.      Tenderness: There is no abdominal tenderness. There is no rebound.   Musculoskeletal: Normal range of motion.         General: Deformity present. No swelling or tenderness.        Arms:    Lymphadenopathy:      Cervical: No cervical adenopathy.      Upper Body:      Right upper body: No supraclavicular adenopathy.      Left upper body: No supraclavicular adenopathy.   Skin:     General: Skin is dry.      Findings: No erythema or rash.   Neurological:      Mental Status: She is alert and oriented to person, place, and time.      Cranial Nerves: No cranial nerve deficit.      Coordination: Coordination normal.   Psychiatric:         Behavior: Behavior normal.         Thought Content: Thought content normal.         Judgment: Judgment normal.         Significant Labs:   BMP:   Recent Labs   Lab 07/24/20  1503 07/25/20  0804   * 111*   * 134*   K 4.5 3.3*   CL 98 101   CO2 21* 23   BUN 28* 20   CREATININE 1.0 0.7   CALCIUM 8.7 8.0*   MG  --  2.7*   , CBC:   Recent Labs   Lab 07/24/20  1621 07/25/20  0804   WBC 53.71* 29.61*   HGB 5.4* 7.2*   HCT 17.7* 22.0*   PLT 30* 19*   , CMP:   Recent Labs   Lab 07/24/20  1503 07/25/20  0804   * 134*   K 4.5 3.3*   CL 98 101   CO2 21* 23   * 111*   BUN 28* 20   CREATININE 1.0 0.7   CALCIUM 8.7 8.0*   PROT 7.7  --    ALBUMIN 3.0*  --    BILITOT 0.4  --    ALKPHOS 292*  --    AST 28  --    ALT 37  --    ANIONGAP 13 10   EGFRNONAA 54* >60   , Coagulation:   Recent Labs   Lab 07/25/20  0804   INR 1.0   APTT  32.0   , Haptoglobin: No results for input(s): HAPTOGLOBIN in the last 48 hours., Immunology: No results for input(s): SPEP, TOBIN, STEWART, FREELAMBDALI in the last 48 hours., LDH: No results for input(s): LDHCSF, BFSOURCE in the last 48 hours., LFTs:   Recent Labs   Lab 07/24/20  1503   ALT 37   AST 28   ALKPHOS 292*   BILITOT 0.4   PROT 7.7   ALBUMIN 3.0*   , Reticulocytes: No results for input(s): RETIC in the last 48 hours. and Tumor Markers: No results for input(s): PSA, CEA, , AFPTM, [id number],  in the last 48 hours.    Invalid input(s): ALGTM    Diagnostic Results:  I have reviewed all pertinent imaging results/findings within the past 24 hours.

## 2020-07-25 NOTE — SUBJECTIVE & OBJECTIVE
Interval History: plans for wound debridement today. Will need to give unit of platelets for thrombocytopenia.    Review of Systems   Constitutional: Positive for fatigue and fever. Negative for activity change, chills and diaphoresis.   HENT: Negative for congestion, trouble swallowing and voice change.    Eyes: Negative for photophobia and discharge.   Respiratory: Negative for cough, chest tightness, shortness of breath and wheezing.    Cardiovascular: Negative for chest pain, palpitations and leg swelling.   Gastrointestinal: Negative for abdominal pain, blood in stool, constipation, diarrhea, nausea and vomiting.   Endocrine: Negative for cold intolerance, heat intolerance, polydipsia, polyphagia and polyuria.   Genitourinary: Negative for difficulty urinating, dysuria, flank pain, frequency and urgency.   Musculoskeletal: Positive for arthralgias (chronic RA) and back pain (at wound site).   Skin: Positive for wound (lower back, LFA, L thigh). Negative for rash.   Allergic/Immunologic: Positive for immunocompromised state.   Neurological: Negative for dizziness, seizures, syncope, facial asymmetry, weakness, light-headedness, numbness and headaches.   Psychiatric/Behavioral: Negative for confusion and hallucinations.      Objective:     Vital Signs (Most Recent):  Temp: 98.7 °F (37.1 °C) (07/25/20 1650)  Pulse: 103 (07/25/20 1706)  Resp: 15 (07/25/20 1706)  BP: (!) 151/67 (07/25/20 1706)  SpO2: (!) 94 % (07/25/20 1706) Vital Signs (24h Range):  Temp:  [98.1 °F (36.7 °C)-99.7 °F (37.6 °C)] 98.7 °F (37.1 °C)  Pulse:  [] 103  Resp:  [15-20] 15  SpO2:  [94 %-100 %] 94 %  BP: (116-187)/(40-72) 151/67     Weight: 49.1 kg (108 lb 3.9 oz)  Body mass index is 19.8 kg/m².    Intake/Output Summary (Last 24 hours) at 7/25/2020 1728  Last data filed at 7/25/2020 1546  Gross per 24 hour   Intake 2545.24 ml   Output 15 ml   Net 2530.24 ml      Physical Exam  Vitals signs reviewed.   Constitutional:       General: She  is not in acute distress.     Appearance: Normal appearance. She is ill-appearing. She is not toxic-appearing.   HENT:      Head: Normocephalic and atraumatic.      Nose: Nose normal. No congestion.      Mouth/Throat:      Mouth: Mucous membranes are moist.   Eyes:      Pupils: Pupils are equal, round, and reactive to light.   Neck:      Musculoskeletal: Normal range of motion and neck supple. No muscular tenderness.   Cardiovascular:      Rate and Rhythm: Normal rate and regular rhythm.      Pulses: Normal pulses.      Heart sounds: Normal heart sounds.   Pulmonary:      Effort: Pulmonary effort is normal. No respiratory distress.      Breath sounds: Normal breath sounds. No wheezing or rhonchi.   Abdominal:      General: Abdomen is flat. Bowel sounds are normal. There is no distension.      Palpations: Abdomen is soft.      Tenderness: There is no abdominal tenderness. There is no rebound.   Musculoskeletal: Normal range of motion.      Right lower leg: No edema.      Left lower leg: No edema.   Skin:     General: Skin is warm and dry.      Capillary Refill: Capillary refill takes less than 2 seconds.      Findings: No bruising or rash.      Comments: 1. Wound to mid-lower back extending to L with small serosanguinous drainage, dressing CDI  2. L FA incision, healing - no evidence of infection  3. L thigh incision - possible hematoma   Neurological:      General: No focal deficit present.      Mental Status: She is alert and oriented to person, place, and time.   Psychiatric:         Mood and Affect: Mood normal.         Behavior: Behavior normal.         Thought Content: Thought content normal.         Judgment: Judgment normal.           Significant Labs:   CBC:   Recent Labs   Lab 07/24/20  1621 07/25/20  0804   WBC 53.71* 29.61*   HGB 5.4* 7.2*   HCT 17.7* 22.0*   PLT 30* 19*     CMP:   Recent Labs   Lab 07/24/20  1503 07/25/20  0804   * 134*   K 4.5 3.3*   CL 98 101   CO2 21* 23   * 111*   BUN  28* 20   CREATININE 1.0 0.7   CALCIUM 8.7 8.0*   PROT 7.7  --    ALBUMIN 3.0*  --    BILITOT 0.4  --    ALKPHOS 292*  --    AST 28  --    ALT 37  --    ANIONGAP 13 10   EGFRNONAA 54* >60       Significant Imaging: I have reviewed all pertinent imaging results/findings within the past 24 hours.

## 2020-07-25 NOTE — ASSESSMENT & PLAN NOTE
Likely r/t CML, in treatment  Transfuse 2 units PRBCs tonight  Repeat labs in AM    July 25, 20220  Hgb 7.5 today following 2 units  Repeat CBC in am. Anticipate transfusing an additional unit while hospitalized.

## 2020-07-25 NOTE — ASSESSMENT & PLAN NOTE
To OR today for wound debridement of lower back and left leg surgical sites  Continue antibiotics therapy  Transfused preoperatively  Risks, benefits, and alternatives were discussed in detail. The patient agrees to proceed with surgery and all questions were answered.

## 2020-07-25 NOTE — NURSING
Patient returns to floor from PACU, x4, in no acute distress. Vitals present within stable limits. Wound vac to back and ZE drain to LLE remains clean and intact. Safety precautions re educated to pt. And verbalizes understanding. Heart monitor in place, leads fixed appropriately on pt.;  SR noted. Family at bedside. Will continue to monitor

## 2020-07-26 LAB
ACANTHOCYTES BLD QL SMEAR: PRESENT
ANION GAP SERPL CALC-SCNC: 10 MMOL/L (ref 8–16)
ANISOCYTOSIS BLD QL SMEAR: SLIGHT
BASOPHILS # BLD AUTO: ABNORMAL K/UL (ref 0–0.2)
BASOPHILS NFR BLD: 0 % (ref 0–1.9)
BLD PROD TYP BPU: NORMAL
BLOOD UNIT EXPIRATION DATE: NORMAL
BLOOD UNIT TYPE CODE: 5100
BLOOD UNIT TYPE: NORMAL
BUN SERPL-MCNC: 16 MG/DL (ref 8–23)
CALCIUM SERPL-MCNC: 7.9 MG/DL (ref 8.7–10.5)
CHLORIDE SERPL-SCNC: 102 MMOL/L (ref 95–110)
CO2 SERPL-SCNC: 21 MMOL/L (ref 23–29)
CODING SYSTEM: NORMAL
CREAT SERPL-MCNC: 0.8 MG/DL (ref 0.5–1.4)
DACRYOCYTES BLD QL SMEAR: ABNORMAL
DIFFERENTIAL METHOD: ABNORMAL
DISPENSE STATUS: NORMAL
EOSINOPHIL # BLD AUTO: ABNORMAL K/UL (ref 0–0.5)
EOSINOPHIL NFR BLD: 0 % (ref 0–8)
ERYTHROCYTE [DISTWIDTH] IN BLOOD BY AUTOMATED COUNT: 16.4 % (ref 11.5–14.5)
EST. GFR  (AFRICAN AMERICAN): >60 ML/MIN/1.73 M^2
EST. GFR  (NON AFRICAN AMERICAN): >60 ML/MIN/1.73 M^2
GLUCOSE SERPL-MCNC: 98 MG/DL (ref 70–110)
HCT VFR BLD AUTO: 21.5 % (ref 37–48.5)
HGB BLD-MCNC: 7 G/DL (ref 12–16)
HYPOCHROMIA BLD QL SMEAR: ABNORMAL
IMM GRANULOCYTES # BLD AUTO: ABNORMAL K/UL (ref 0–0.04)
IMM GRANULOCYTES NFR BLD AUTO: ABNORMAL % (ref 0–0.5)
LYMPHOCYTES # BLD AUTO: ABNORMAL K/UL (ref 1–4.8)
LYMPHOCYTES NFR BLD: 47 % (ref 18–48)
MAGNESIUM SERPL-MCNC: 1.6 MG/DL (ref 1.6–2.6)
MCH RBC QN AUTO: 29.7 PG (ref 27–31)
MCHC RBC AUTO-ENTMCNC: 32.6 G/DL (ref 32–36)
MCV RBC AUTO: 91 FL (ref 82–98)
METAMYELOCYTES NFR BLD MANUAL: 9 %
MONOCYTES # BLD AUTO: ABNORMAL K/UL (ref 0.3–1)
MONOCYTES NFR BLD: 19 % (ref 4–15)
MYELOCYTES NFR BLD MANUAL: 5 %
NEUTROPHILS NFR BLD: 20 % (ref 38–73)
NRBC BLD-RTO: 2 /100 WBC
NUM UNITS TRANS PACKED RBC: NORMAL
OVALOCYTES BLD QL SMEAR: ABNORMAL
PLATELET # BLD AUTO: 43 K/UL (ref 150–350)
PLATELET BLD QL SMEAR: ABNORMAL
PMV BLD AUTO: 12.5 FL (ref 9.2–12.9)
POIKILOCYTOSIS BLD QL SMEAR: SLIGHT
POLYCHROMASIA BLD QL SMEAR: ABNORMAL
POTASSIUM SERPL-SCNC: 3.4 MMOL/L (ref 3.5–5.1)
RBC # BLD AUTO: 2.36 M/UL (ref 4–5.4)
SCHISTOCYTES BLD QL SMEAR: PRESENT
SODIUM SERPL-SCNC: 133 MMOL/L (ref 136–145)
SPHEROCYTES BLD QL SMEAR: ABNORMAL
STOMATOCYTES BLD QL SMEAR: PRESENT
VANCOMYCIN TROUGH SERPL-MCNC: 6.5 UG/ML (ref 10–22)
WBC # BLD AUTO: 28.94 K/UL (ref 3.9–12.7)

## 2020-07-26 PROCEDURE — 63600175 PHARM REV CODE 636 W HCPCS: Mod: HCNC | Performed by: SURGERY

## 2020-07-26 PROCEDURE — 99232 PR SUBSEQUENT HOSPITAL CARE,LEVL II: ICD-10-PCS | Mod: HCNC,,, | Performed by: INTERNAL MEDICINE

## 2020-07-26 PROCEDURE — 80202 ASSAY OF VANCOMYCIN: CPT | Mod: HCNC

## 2020-07-26 PROCEDURE — 99900035 HC TECH TIME PER 15 MIN (STAT): Mod: HCNC

## 2020-07-26 PROCEDURE — 36430 TRANSFUSION BLD/BLD COMPNT: CPT | Mod: HCNC

## 2020-07-26 PROCEDURE — 36415 COLL VENOUS BLD VENIPUNCTURE: CPT | Mod: HCNC

## 2020-07-26 PROCEDURE — 80048 BASIC METABOLIC PNL TOTAL CA: CPT | Mod: HCNC

## 2020-07-26 PROCEDURE — 94761 N-INVAS EAR/PLS OXIMETRY MLT: CPT | Mod: HCNC

## 2020-07-26 PROCEDURE — 99024 PR POST-OP FOLLOW-UP VISIT: ICD-10-PCS | Mod: HCNC,,, | Performed by: SURGERY

## 2020-07-26 PROCEDURE — 25000242 PHARM REV CODE 250 ALT 637 W/ HCPCS: Mod: HCNC | Performed by: INTERNAL MEDICINE

## 2020-07-26 PROCEDURE — 63600175 PHARM REV CODE 636 W HCPCS: Mod: HCNC | Performed by: INTERNAL MEDICINE

## 2020-07-26 PROCEDURE — 25000003 PHARM REV CODE 250: Mod: HCNC | Performed by: NURSE PRACTITIONER

## 2020-07-26 PROCEDURE — 97162 PT EVAL MOD COMPLEX 30 MIN: CPT | Mod: HCNC

## 2020-07-26 PROCEDURE — 85027 COMPLETE CBC AUTOMATED: CPT | Mod: HCNC

## 2020-07-26 PROCEDURE — A4216 STERILE WATER/SALINE, 10 ML: HCPCS | Mod: HCNC | Performed by: SURGERY

## 2020-07-26 PROCEDURE — 25000003 PHARM REV CODE 250: Mod: HCNC | Performed by: INTERNAL MEDICINE

## 2020-07-26 PROCEDURE — 11000001 HC ACUTE MED/SURG PRIVATE ROOM: Mod: HCNC

## 2020-07-26 PROCEDURE — 83735 ASSAY OF MAGNESIUM: CPT | Mod: HCNC

## 2020-07-26 PROCEDURE — 21400001 HC TELEMETRY ROOM: Mod: HCNC

## 2020-07-26 PROCEDURE — 94799 UNLISTED PULMONARY SVC/PX: CPT | Mod: HCNC

## 2020-07-26 PROCEDURE — 99232 SBSQ HOSP IP/OBS MODERATE 35: CPT | Mod: HCNC,,, | Performed by: INTERNAL MEDICINE

## 2020-07-26 PROCEDURE — 25000003 PHARM REV CODE 250: Mod: HCNC | Performed by: SURGERY

## 2020-07-26 PROCEDURE — 99024 POSTOP FOLLOW-UP VISIT: CPT | Mod: HCNC,,, | Performed by: SURGERY

## 2020-07-26 PROCEDURE — 85007 BL SMEAR W/DIFF WBC COUNT: CPT | Mod: HCNC

## 2020-07-26 PROCEDURE — P9040 RBC LEUKOREDUCED IRRADIATED: HCPCS | Mod: HCNC

## 2020-07-26 RX ORDER — FLUTICASONE PROPIONATE 50 MCG
2 SPRAY, SUSPENSION (ML) NASAL NIGHTLY
Status: DISCONTINUED | OUTPATIENT
Start: 2020-07-26 | End: 2020-07-27 | Stop reason: HOSPADM

## 2020-07-26 RX ORDER — HYDROCODONE BITARTRATE AND ACETAMINOPHEN 500; 5 MG/1; MG/1
TABLET ORAL
Status: DISCONTINUED | OUTPATIENT
Start: 2020-07-26 | End: 2020-07-27 | Stop reason: HOSPADM

## 2020-07-26 RX ORDER — VANCOMYCIN HCL IN 5 % DEXTROSE 1G/250ML
1000 PLASTIC BAG, INJECTION (ML) INTRAVENOUS
Status: DISCONTINUED | OUTPATIENT
Start: 2020-07-26 | End: 2020-07-27 | Stop reason: HOSPADM

## 2020-07-26 RX ORDER — PANTOPRAZOLE SODIUM 40 MG/1
40 TABLET, DELAYED RELEASE ORAL DAILY
Status: DISCONTINUED | OUTPATIENT
Start: 2020-07-26 | End: 2020-07-27 | Stop reason: HOSPADM

## 2020-07-26 RX ADMIN — VANCOMYCIN HYDROCHLORIDE 1000 MG: 1 INJECTION, POWDER, LYOPHILIZED, FOR SOLUTION INTRAVENOUS at 05:07

## 2020-07-26 RX ADMIN — CHLORHEXIDINE GLUCONATE 0.12% ORAL RINSE 10 ML: 1.2 LIQUID ORAL at 08:07

## 2020-07-26 RX ADMIN — PANTOPRAZOLE SODIUM 40 MG: 40 TABLET, DELAYED RELEASE ORAL at 01:07

## 2020-07-26 RX ADMIN — FLUTICASONE PROPIONATE 100 MCG: 50 SPRAY, METERED NASAL at 08:07

## 2020-07-26 RX ADMIN — HYDROCODONE BITARTRATE AND ACETAMINOPHEN 1 TABLET: 5; 325 TABLET ORAL at 08:07

## 2020-07-26 RX ADMIN — HYDROCODONE BITARTRATE AND ACETAMINOPHEN 1 TABLET: 5; 325 TABLET ORAL at 01:07

## 2020-07-26 RX ADMIN — AMITRIPTYLINE HYDROCHLORIDE 75 MG: 50 TABLET, FILM COATED ORAL at 08:07

## 2020-07-26 RX ADMIN — STANDARDIZED SENNA CONCENTRATE AND DOCUSATE SODIUM 1 TABLET: 8.6; 5 TABLET ORAL at 08:07

## 2020-07-26 RX ADMIN — Medication 10 ML: at 10:07

## 2020-07-26 RX ADMIN — DULOXETINE HYDROCHLORIDE 40 MG: 20 CAPSULE, DELAYED RELEASE ORAL at 08:07

## 2020-07-26 RX ADMIN — LEVOTHYROXINE SODIUM 88 MCG: 88 TABLET ORAL at 05:07

## 2020-07-26 RX ADMIN — METOPROLOL SUCCINATE 50 MG: 50 TABLET, EXTENDED RELEASE ORAL at 08:07

## 2020-07-26 RX ADMIN — FERROUS SULFATE TAB EC 325 MG (65 MG FE EQUIVALENT) 325 MG: 325 (65 FE) TABLET DELAYED RESPONSE at 08:07

## 2020-07-26 RX ADMIN — CEFEPIME HYDROCHLORIDE 2 G: 2 INJECTION, SOLUTION INTRAVENOUS at 05:07

## 2020-07-26 RX ADMIN — MAGNESIUM OXIDE 400 MG (241.3 MG MAGNESIUM) TABLET 400 MG: TABLET at 08:07

## 2020-07-26 RX ADMIN — PRAVASTATIN SODIUM 10 MG: 10 TABLET ORAL at 08:07

## 2020-07-26 RX ADMIN — HYDROCODONE BITARTRATE AND ACETAMINOPHEN 1 TABLET: 5; 325 TABLET ORAL at 06:07

## 2020-07-26 RX ADMIN — Medication 10 ML: at 05:07

## 2020-07-26 RX ADMIN — CEFEPIME HYDROCHLORIDE 2 G: 2 INJECTION, SOLUTION INTRAVENOUS at 03:07

## 2020-07-26 NOTE — ASSESSMENT & PLAN NOTE
POD# 1 s/p debridement of lower back wound with wound vac placement and evacuated of LLE surgical site hematoma    Pain control  Remove ZE drain  Home health wound vac forms completed   Awaiting culture results, continue current antibiotic therapy per primary team

## 2020-07-26 NOTE — PROGRESS NOTES
[facility] -   General Surgery  Progress Note    Subjective:     History of Present Illness:  80-year-old female known patient of Dr. Shannon with history of CML, RA, debility, squamous cell carcinoma recently excised, HTN, anemia admitted in may 2020 for abscess of her lower back. She underwent debridement and has not had chemotherapy since. She was sent to ED today by her home health agency for fever and wound drainage. She reports pain at her lower back site and LLE surgical site. She denies any trauma.     Post-Op Info:  Procedure(s) (LRB):  DEBRIDEMENT, WOUND (Left)  APPLICATION, WOUND VAC (Left)  DEBRIDEMENT (Left)  EVACUATION, HEMATOMA (Left)   1 Day Post-Op     Interval History: Tm 99.3 and tachycardic overnight. Denies pain at surgical sites, only mild soreness. Tolerating diet.     Medications:  Continuous Infusions:  Scheduled Meds:   amitriptyline  75 mg Oral QHS    ceFEPime (MAXIPIME) IVPB  2 g Intravenous Q12H    chlorhexidine  10 mL Mouth/Throat BID    DULoxetine  40 mg Oral Daily    ferrous sulfate  325 mg Oral BID    fluticasone propionate  2 spray Each Nostril QHS    levothyroxine  88 mcg Oral Before breakfast    magnesium oxide  400 mg Oral BID    metoprolol succinate  50 mg Oral Daily    nozaseptin   Each Nostril BID    pantoprazole  40 mg Oral Daily    pravastatin  10 mg Oral QHS    senna-docusate 8.6-50 mg  1 tablet Oral BID    sodium chloride 0.9%  10 mL Intravenous Q8H    vancomycin (VANCOCIN) IVPB  500 mg Intravenous Q24H     PRN Meds:sodium chloride, acetaminophen, acetaminophen, bisacodyL, cloNIDine, diphenhydrAMINE, HYDROcodone-acetaminophen, HYDROcodone-acetaminophen, lactulose, melatonin, ondansetron, ondansetron, promethazine (PHENERGAN) IVPB     Review of patient's allergies indicates:   Allergen Reactions    Codeine      Other reaction(s): hyper  Other reaction(s): hyper    Gabapentin Other (See Comments)     Bad dreams     Objective:     Vital Signs (Most  Recent):  Temp: 98.8 °F (37.1 °C) (07/26/20 1216)  Pulse: 94 (07/26/20 1216)  Resp: 19 (07/26/20 1216)  BP: (!) 150/67 (07/26/20 1216)  SpO2: 97 % (07/26/20 1216) Vital Signs (24h Range):  Temp:  [96.9 °F (36.1 °C)-99.3 °F (37.4 °C)] 98.8 °F (37.1 °C)  Pulse:  [] 94  Resp:  [15-20] 19  SpO2:  [92 %-100 %] 97 %  BP: (118-172)/(56-72) 150/67     Weight: 49.1 kg (108 lb 3.9 oz)  Body mass index is 19.8 kg/m².    Intake/Output - Last 3 Shifts       07/24 0700 - 07/25 0659 07/25 0700 - 07/26 0659 07/26 0700 - 07/27 0659    P.O. 120      I.V. (mL/kg) 500 (10.2) 560 (11.4)     Blood 1047.9 467.3     IV Piggyback 350 100     Total Intake(mL/kg) 2017.9 (41.1) 1127.3 (23)     Drains  15     Other  40     Blood  15     Total Output  70     Net +2017.9 +1057.3            Urine Occurrence 1 x  1 x          Physical Exam  Vitals signs reviewed.   Constitutional:       General: She is not in acute distress.     Appearance: She is well-developed.   HENT:      Head: Normocephalic and atraumatic.   Neck:      Musculoskeletal: Neck supple.   Cardiovascular:      Rate and Rhythm: Normal rate and regular rhythm.   Pulmonary:      Effort: Pulmonary effort is normal.   Abdominal:      General: There is no distension.      Palpations: Abdomen is soft.      Tenderness: There is no abdominal tenderness.   Musculoskeletal: Normal range of motion.   Skin:     General: Skin is warm.          Neurological:      Mental Status: She is alert and oriented to person, place, and time.         Significant Labs:  CBC:   Recent Labs   Lab 07/26/20  0523   WBC 28.94*   RBC 2.36*   HGB 7.0*   HCT 21.5*   PLT 43*   MCV 91   MCH 29.7   MCHC 32.6     BMP:   Recent Labs   Lab 07/26/20  0523   GLU 98   *   K 3.4*      CO2 21*   BUN 16   CREATININE 0.8   CALCIUM 7.9*   MG 1.6       Significant Diagnostics:  I have reviewed and interpreted all pertinent imaging results/findings within the past 24 hours.    Assessment/Plan:     * Wound infection  after surgery  POD# 1 s/p debridement of lower back wound with wound vac placement and evacuated of LLE surgical site hematoma    Pain control  Remove ZE drain  Home health wound vac forms completed   Awaiting culture results, continue current antibiotic therapy per primary team      Postoperative hematoma of subcutaneous tissue following dermatologic procedure  Remove ZE drain today  Surgical incision intact, no evidence of active bleeding or ongoing hematoma    Chronic myelomonocytic leukemia not having achieved remission  Hematology/Oncology on board  Low platelet count likely contributed to perioperative hematoma    Anemia  Primary team to transfuse today        Berenice Alfaro MD  General Surgery  [facility]

## 2020-07-26 NOTE — PT/OT/SLP EVAL
Physical Therapy Evaluation    Patient Name:  Sarah Parker   MRN:  [id number]    Recommendations:     Discharge Recommendations:  home   Discharge Equipment Recommendations: none   Barriers to discharge: None    Assessment:     Sarah Parker is a 80 y.o. female admitted with a medical diagnosis of Wound infection after surgery.  She presents with the following impairments/functional limitations:  weakness, impaired endurance, impaired self care skills, impaired functional mobilty, gait instability, impaired balance, pain.    Rehab Prognosis: Good; patient would benefit from acute skilled PT services to address these deficits and reach maximum level of function.    Recent Surgery: Procedure(s) (LRB):  DEBRIDEMENT, WOUND (Left)  APPLICATION, WOUND VAC (Left)  DEBRIDEMENT (Left)  EVACUATION, HEMATOMA (Left) 1 Day Post-Op    Plan:     During this hospitalization, patient to be seen 5 x/week to address the identified rehab impairments via gait training, therapeutic activities, therapeutic exercises and progress toward the following goals:    · Plan of Care Expires:  08/02/20    Subjective     Chief Complaint: Weakness, decreased mobility s/p sx  Patient/Family Comments/goals: To go home  Pain/Comfort:  · Pain Rating 1: 2/10  · Location - Orientation 1: lower  · Location 1: back  · Pain Addressed 1: Reposition, Distraction, Pre-medicate for activity  · Pain Rating Post-Intervention 1: 2/10    Patients cultural, spiritual, [employer] conflicts given the current situation: no    Living Environment:  Patient lives home with her spouse in a one-story house with ramp access to enter.  Her daughter lives nearby and can assist them as needed.  Prior to admission, patients level of function was independent with amb and ADLs.  Equipment used at home: walker, rolling, shower chair, cane, straight.  DME owned (not currently used): none.  Upon discharge, patient will have assistance from her family.    Objective:      Communicated with [facility] and nurse Luz Maria prior to session.  Patient found HOB elevated with peripheral IV, wound vac, ZE drain  upon PT entry to room.    General Precautions: Standard, fall   Orthopedic Precautions:N/A   Braces: N/A     Exams:  · Cognitive Exam:  Patient is oriented to Person, Place and Situation  · Gross Motor Coordination:  WFL  · Postural Exam:  Patient presented with the following abnormalities:    · -       Rounded shoulders  · -       Forward head  · RLE ROM: WFL  · RLE Strength: grossly 4+/5  · LLE ROM: WFL  · LLE Strength: grossly 4+/5    Functional Mobility:  · Bed Mobility:     · Rolling Left:  stand by assistance  · Scooting: stand by assistance  · Supine to Sit: stand by assistance  · Transfers:     · Sit to Stand:  contact guard assistance without AD  · Bed to Chair: contact guard assistance with  no AD  using  Stand Pivot  · Gait: Patient ambulated 40 feet without AD with CGA; slow danilo, slightly flexed posture, minimally unsteady.  One LOB requiring CGA to correct.  · Balance: Good sitting balance; Fair + standing balance.    Therapeutic Activities and Exercises:   Patient participated in bed mobility, transfer training, and gait training (see above for details).  Patient demonstrated knowledge of seated BLE therapeutic exercises x 10 reps; patient agreeable to performing them intermittently while up in chair.  Encouraged patient to sit up in chair as long as tolerated and to call nurse's station for assistance when ready to return to bed; patient verbalized understanding.    AM-PAC 6 CLICK MOBILITY  Total Score:18     Patient left up in chair with all lines intact, call button in reach and chair alarm on.    GOALS:   Multidisciplinary Problems     Physical Therapy Goals        Problem: Physical Therapy Goal    Goal Priority Disciplines Outcome Goal Variances Interventions   Physical Therapy Goal     PT, PT/OT      Description: LTGs to be met within 7 days (8/2/20):  1.   Patient will perform bed mobility with modified independence  2.  Patient will perform functional transfers without AD with modified independence  3.  Patient will ambulate 150 feet without AD with SPV and no gross LOB                   History:     Past Medical History:   Diagnosis Date    Acid reflux     Anxiety     Back pain     Bronchitis, chronic obstructive w acute bronchitis 7/29/2016    Cancer     [facility] arms, face- Dr. Lata Tejada    Cataract     2+NS    Chronic myelomonocytic leukemia     Degenerative disc disease     Depression     Depression     Dry mouth     Encounter for blood transfusion     Hernia, hiatal 11/18/2013    Hypertension     Hypothyroid     Hypothyroidism     Iron deficiency anemia     Macrocytic anemia 5/3/2016    Macular degeneration     Migraines     Mixed anxiety and depressive disorder     Multiple fractures of ribs of right side     Osteoporosis     Other hyperlipidemia 10/11/2019    Pneumonia     Pneumonia due to other staphylococcus     Rheumatoid arthritis     Rheumatoid arthritis(714.0)     Rheumatoid arthritis(714.0)     Remicade, MTX.       Past Surgical History:   Procedure Laterality Date    APPENDECTOMY  1985    APPLICATION OF WOUND VACUUM-ASSISTED CLOSURE DEVICE N/A 5/14/2020    Procedure: APPLICATION, WOUND VAC;  Surgeon: Mckinley Shannon MD;  Location: [facility] OR;  Service: General;  Laterality: N/A;    BREAST BIOPSY      CATARACT EXTRACTION Bilateral 6/11/15    Dr. Booth    CHOLECYSTECTOMY  2013    cryoablasion kidney Left 09/27/2016    EXCISION OF SQUAMOUS CELL CARCINOMA Left 7/2/2020    Procedure: EXCISION, CARCINOMA, SQUAMOUS CELL;  Surgeon: Mckinley Shannon MD;  Location: [facility] OR;  Service: General;  Laterality: Left;    feet Bilateral     rheumatoid    FRACTURE SURGERY Right     tibia    HERNIA REPAIR      HYSTERECTOMY  1970    partial    INCISION AND DRAINAGE OF ABSCESS N/A 5/12/2020    Procedure: INCISION AND DRAINAGE, ABSCESS;   Surgeon: Emerson Hathaway MD;  Location: [facility] OR;  Service: General;  Laterality: N/A;    INCISIONAL BIOPSY N/A 5/12/2020    Procedure: INCISIONAL BIOPSY;  Surgeon: Emerson Hathaway MD;  Location: [facility] OR;  Service: General;  Laterality: N/A;    JOINT REPLACEMENT      bilateral knees (2008), hands, wrists, knuckles, toes    LAPAROSCOPIC NISSEN FUNDOPLICATION      TRANSFORAMINAL EPIDURAL INJECTION OF STEROID Left 6/25/2019    Procedure: Left L5/S1 TF AYAAN with local;  Surgeon: Rowdy Felix MD;  Location: [facility]IN T;  Service: Pain Management;  Laterality: Left;    WOUND DEBRIDEMENT N/A 5/14/2020    Procedure: DEBRIDEMENT, WOUND;  Surgeon: Mckinley Shannon MD;  Location: [facility];  Service: General;  Laterality: N/A;       Time Tracking:     PT Received On: 07/26/20  PT Start Time: 0902     PT Stop Time: 0916  PT Total Time (min): 14 min     Billable Minutes: Evaluation 14 min      Iman Dumas, PT, DPT  07/26/2020

## 2020-07-26 NOTE — ASSESSMENT & PLAN NOTE
Patient admitted with chronic myelomonocytic leukemia with pancytopenia patient is on supportive care at this point would agree with transfusion of packed red cells and platelets until level is reached that surgeon feels comfortable with.  At this point discussed implications with Hospital Medicine with surgery as well as with patient  07/26/2020 patient's counts adequate this morning.  Patient states that she feels much better after surgical debridement.  Continue follow-up

## 2020-07-26 NOTE — SUBJECTIVE & OBJECTIVE
Interval History:  Patient states that she feels much better this morning after surgical debridement    Oncology Treatment Plan:   OP AZACITADINE 7-DAY (SUB-Q)    Medications:  Continuous Infusions:  Scheduled Meds:   amitriptyline  75 mg Oral QHS    ceFEPime (MAXIPIME) IVPB  2 g Intravenous Q12H    chlorhexidine  10 mL Mouth/Throat BID    DULoxetine  40 mg Oral Daily    ferrous sulfate  325 mg Oral BID    fluticasone propionate  2 spray Each Nostril Daily    levothyroxine  88 mcg Oral Before breakfast    magnesium oxide  400 mg Oral BID    metoprolol succinate  50 mg Oral Daily    nozaseptin   Each Nostril BID    pravastatin  10 mg Oral QHS    senna-docusate 8.6-50 mg  1 tablet Oral BID    sodium chloride 0.9%  10 mL Intravenous Q8H    sodium chloride 0.9%  3 mL Intravenous Q8H    vancomycin (VANCOCIN) IVPB  500 mg Intravenous Q24H     PRN Meds:sodium chloride, acetaminophen, acetaminophen, bisacodyL, cloNIDine, diphenhydrAMINE, HYDROcodone-acetaminophen, HYDROcodone-acetaminophen, lactulose, melatonin, ondansetron, ondansetron, promethazine (PHENERGAN) IVPB     Review of Systems   Constitutional: Positive for activity change and fatigue. Negative for appetite change, chills, diaphoresis, fever and unexpected weight change.   HENT: Negative for congestion, dental problem, drooling, ear discharge, ear pain, facial swelling, hearing loss, mouth sores, nosebleeds, postnasal drip, rhinorrhea, sinus pressure, sneezing, sore throat, tinnitus, trouble swallowing and voice change.    Eyes: Negative for photophobia, pain, discharge, redness, itching and visual disturbance.   Respiratory: Negative for cough, choking, chest tightness, shortness of breath, wheezing and stridor.    Cardiovascular: Negative for chest pain, palpitations and leg swelling.   Gastrointestinal: Negative for abdominal distention, abdominal pain, anal bleeding, blood in stool, constipation, diarrhea, nausea, rectal pain and vomiting.    Endocrine: Negative for cold intolerance, heat intolerance, polydipsia, polyphagia and polyuria.   Genitourinary: Negative for decreased urine volume, difficulty urinating, dyspareunia, dysuria, enuresis, flank pain, frequency, genital sores, hematuria, menstrual problem, pelvic pain, urgency, vaginal bleeding, vaginal discharge and vaginal pain.   Musculoskeletal: Negative for arthralgias, back pain, gait problem, joint swelling, myalgias, neck pain and neck stiffness.   Skin: Negative for color change, pallor and rash.   Allergic/Immunologic: Negative for environmental allergies, food allergies and immunocompromised state.   Neurological: Positive for weakness. Negative for dizziness, tremors, seizures, syncope, facial asymmetry, speech difficulty, light-headedness, numbness and headaches.   Hematological: Negative for adenopathy. Does not bruise/bleed easily.   Psychiatric/Behavioral: Positive for dysphoric mood. Negative for agitation, behavioral problems, confusion, decreased concentration, hallucinations, self-injury, sleep disturbance and suicidal ideas. The patient is nervous/anxious. The patient is not hyperactive.      Objective:     Vital Signs (Most Recent):  Temp: 99.3 °F (37.4 °C) (07/26/20 0805)  Pulse: (!) 111 (07/26/20 0805)  Resp: 18 (07/26/20 0831)  BP: (!) 144/72 (07/26/20 0805)  SpO2: 96 % (07/26/20 0805) Vital Signs (24h Range):  Temp:  [97 °F (36.1 °C)-99.3 °F (37.4 °C)] 99.3 °F (37.4 °C)  Pulse:  [] 111  Resp:  [15-20] 18  SpO2:  [94 %-100 %] 96 %  BP: (116-172)/(54-72) 144/72     Weight: 49.1 kg (108 lb 3.9 oz)  Body mass index is 19.8 kg/m².  Body surface area is 1.47 meters squared.      Intake/Output Summary (Last 24 hours) at 7/26/2020 0836  Last data filed at 7/26/2020 0540  Gross per 24 hour   Intake 1067.33 ml   Output 70 ml   Net 997.33 ml       Physical Exam  Vitals signs reviewed.   Constitutional:       General: She is not in acute distress.     Appearance: She is  well-developed. She is ill-appearing. She is not diaphoretic.   HENT:      Head: Normocephalic and atraumatic.      Right Ear: External ear normal.      Left Ear: External ear normal.      Nose: Nose normal.      Right Sinus: No maxillary sinus tenderness or frontal sinus tenderness.      Left Sinus: No maxillary sinus tenderness or frontal sinus tenderness.      Mouth/Throat:      Pharynx: No oropharyngeal exudate.   Eyes:      General: Lids are normal. No scleral icterus.        Right eye: No discharge.         Left eye: No discharge.      Conjunctiva/sclera: Conjunctivae normal.      Right eye: Right conjunctiva is not injected. No hemorrhage.     Left eye: Left conjunctiva is not injected. No hemorrhage.     Pupils: Pupils are equal, round, and reactive to light.   Neck:      Musculoskeletal: Normal range of motion and neck supple.      Thyroid: No thyromegaly.      Vascular: No JVD.      Trachea: No tracheal deviation.   Cardiovascular:      Rate and Rhythm: Normal rate.   Pulmonary:      Effort: Pulmonary effort is normal. No respiratory distress.      Breath sounds: No stridor.   Chest:      Chest wall: No tenderness.   Abdominal:      General: Bowel sounds are normal. There is no distension.      Palpations: Abdomen is soft. There is no hepatomegaly, splenomegaly or mass.      Tenderness: There is no abdominal tenderness. There is no rebound.   Musculoskeletal: Normal range of motion.         General: No tenderness.   Lymphadenopathy:      Cervical: No cervical adenopathy.      Upper Body:      Right upper body: No supraclavicular adenopathy.      Left upper body: No supraclavicular adenopathy.   Skin:     General: Skin is dry.      Findings: No erythema or rash.   Neurological:      Mental Status: She is alert and oriented to person, place, and time.      Cranial Nerves: No cranial nerve deficit.      Coordination: Coordination normal.   Psychiatric:         Behavior: Behavior normal.         Thought  Content: Thought content normal.         Judgment: Judgment normal.         Significant Labs:   BMP:   Recent Labs   Lab 07/24/20  1503 07/25/20  0804   * 111*   * 134*   K 4.5 3.3*   CL 98 101   CO2 21* 23   BUN 28* 20   CREATININE 1.0 0.7   CALCIUM 8.7 8.0*   MG  --  2.7*   , CBC:   Recent Labs   Lab 07/24/20  1621 07/25/20  0804 07/26/20  0523   WBC 53.71* 29.61* 28.94*   HGB 5.4* 7.2* 7.0*   HCT 17.7* 22.0* 21.5*   PLT 30* 19* 43*   , CMP:   Recent Labs   Lab 07/24/20  1503 07/25/20  0804   * 134*   K 4.5 3.3*   CL 98 101   CO2 21* 23   * 111*   BUN 28* 20   CREATININE 1.0 0.7   CALCIUM 8.7 8.0*   PROT 7.7  --    ALBUMIN 3.0*  --    BILITOT 0.4  --    ALKPHOS 292*  --    AST 28  --    ALT 37  --    ANIONGAP 13 10   EGFRNONAA 54* >60   , Coagulation:   Recent Labs   Lab 07/25/20  0804   INR 1.0   APTT 32.0   , Haptoglobin: No results for input(s): HAPTOGLOBIN in the last 48 hours., Immunology: No results for input(s): SPEP, TOBIN, STEWART, FREELAMBDALI in the last 48 hours., LDH: No results for input(s): LDHCSF, BFSOURCE in the last 48 hours. and LFTs:   Recent Labs   Lab 07/24/20  1503   ALT 37   AST 28   ALKPHOS 292*   BILITOT 0.4   PROT 7.7   ALBUMIN 3.0*       Diagnostic Results:  I have reviewed all pertinent imaging results/findings within the past 24 hours.

## 2020-07-26 NOTE — NURSING
Called pharm to verify vanc dosage change, stopped current infusion awaiting adjusted dose to be brought up based on vanc trough 6.5

## 2020-07-26 NOTE — HOSPITAL COURSE
Sarah Parker is a 80 year old female who was admitted to [facility] for wound infection following surgery. She underwent wound debridement with placement of wound vac and left lower extremity hematoma evacuation. ZE drain to left lower extremity removed today. She was transfused one unit PRBC's and one unit of platelets given history of CML. She will need to discharge home with wound vac. Awaiting  to arrange.

## 2020-07-26 NOTE — PROGRESS NOTES
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  Patient is admitted to the hospital with severe pancytopenia anemia.  At this point planned surgical debridement and question as to whether not transfusion is warranted we were asked to see the patient for further evaluation    Interval History:  Patient states that she feels much better this morning after surgical debridement    Oncology Treatment Plan:   OP AZACITADINE 7-DAY (SUB-Q)    Medications:  Continuous Infusions:  Scheduled Meds:   amitriptyline  75 mg Oral QHS    ceFEPime (MAXIPIME) IVPB  2 g Intravenous Q12H    chlorhexidine  10 mL Mouth/Throat BID    DULoxetine  40 mg Oral Daily    ferrous sulfate  325 mg Oral BID    fluticasone propionate  2 spray Each Nostril Daily    levothyroxine  88 mcg Oral Before breakfast    magnesium oxide  400 mg Oral BID    metoprolol succinate  50 mg Oral Daily    nozaseptin   Each Nostril BID    pravastatin  10 mg Oral QHS    senna-docusate 8.6-50 mg  1 tablet Oral BID    sodium chloride 0.9%  10 mL Intravenous Q8H    sodium chloride 0.9%  3 mL Intravenous Q8H    vancomycin (VANCOCIN) IVPB  500 mg Intravenous Q24H     PRN Meds:sodium chloride, acetaminophen, acetaminophen, bisacodyL, cloNIDine, diphenhydrAMINE, HYDROcodone-acetaminophen, HYDROcodone-acetaminophen, lactulose, melatonin, ondansetron, ondansetron, promethazine (PHENERGAN) IVPB     Review of Systems   Constitutional: Positive for activity change and fatigue. Negative for appetite change, chills, diaphoresis, fever and unexpected weight change.   HENT: Negative for congestion, dental  problem, drooling, ear discharge, ear pain, facial swelling, hearing loss, mouth sores, nosebleeds, postnasal drip, rhinorrhea, sinus pressure, sneezing, sore throat, tinnitus, trouble swallowing and voice change.    Eyes: Negative for photophobia, pain, discharge, redness, itching and visual disturbance.   Respiratory: Negative for cough, choking, chest tightness, shortness of breath, wheezing and stridor.    Cardiovascular: Negative for chest pain, palpitations and leg swelling.   Gastrointestinal: Negative for abdominal distention, abdominal pain, anal bleeding, blood in stool, constipation, diarrhea, nausea, rectal pain and vomiting.   Endocrine: Negative for cold intolerance, heat intolerance, polydipsia, polyphagia and polyuria.   Genitourinary: Negative for decreased urine volume, difficulty urinating, dyspareunia, dysuria, enuresis, flank pain, frequency, genital sores, hematuria, menstrual problem, pelvic pain, urgency, vaginal bleeding, vaginal discharge and vaginal pain.   Musculoskeletal: Negative for arthralgias, back pain, gait problem, joint swelling, myalgias, neck pain and neck stiffness.   Skin: Negative for color change, pallor and rash.   Allergic/Immunologic: Negative for environmental allergies, food allergies and immunocompromised state.   Neurological: Positive for weakness. Negative for dizziness, tremors, seizures, syncope, facial asymmetry, speech difficulty, light-headedness, numbness and headaches.   Hematological: Negative for adenopathy. Does not bruise/bleed easily.   Psychiatric/Behavioral: Positive for dysphoric mood. Negative for agitation, behavioral problems, confusion, decreased concentration, hallucinations, self-injury, sleep disturbance and suicidal ideas. The patient is nervous/anxious. The patient is not hyperactive.      Objective:     Vital Signs (Most Recent):  Temp: 99.3 °F (37.4 °C) (07/26/20 0805)  Pulse: (!) 111 (07/26/20 0805)  Resp: 18 (07/26/20 0831)  BP: (!)  144/72 (07/26/20 0805)  SpO2: 96 % (07/26/20 0805) Vital Signs (24h Range):  Temp:  [97 °F (36.1 °C)-99.3 °F (37.4 °C)] 99.3 °F (37.4 °C)  Pulse:  [] 111  Resp:  [15-20] 18  SpO2:  [94 %-100 %] 96 %  BP: (116-172)/(54-72) 144/72     Weight: 49.1 kg (108 lb 3.9 oz)  Body mass index is 19.8 kg/m².  Body surface area is 1.47 meters squared.      Intake/Output Summary (Last 24 hours) at 7/26/2020 0836  Last data filed at 7/26/2020 0540  Gross per 24 hour   Intake 1067.33 ml   Output 70 ml   Net 997.33 ml       Physical Exam  Vitals signs reviewed.   Constitutional:       General: She is not in acute distress.     Appearance: She is well-developed. She is ill-appearing. She is not diaphoretic.   HENT:      Head: Normocephalic and atraumatic.      Right Ear: External ear normal.      Left Ear: External ear normal.      Nose: Nose normal.      Right Sinus: No maxillary sinus tenderness or frontal sinus tenderness.      Left Sinus: No maxillary sinus tenderness or frontal sinus tenderness.      Mouth/Throat:      Pharynx: No oropharyngeal exudate.   Eyes:      General: Lids are normal. No scleral icterus.        Right eye: No discharge.         Left eye: No discharge.      Conjunctiva/sclera: Conjunctivae normal.      Right eye: Right conjunctiva is not injected. No hemorrhage.     Left eye: Left conjunctiva is not injected. No hemorrhage.     Pupils: Pupils are equal, round, and reactive to light.   Neck:      Musculoskeletal: Normal range of motion and neck supple.      Thyroid: No thyromegaly.      Vascular: No JVD.      Trachea: No tracheal deviation.   Cardiovascular:      Rate and Rhythm: Normal rate.   Pulmonary:      Effort: Pulmonary effort is normal. No respiratory distress.      Breath sounds: No stridor.   Chest:      Chest wall: No tenderness.   Abdominal:      General: Bowel sounds are normal. There is no distension.      Palpations: Abdomen is soft. There is no hepatomegaly, splenomegaly or mass.       Tenderness: There is no abdominal tenderness. There is no rebound.   Musculoskeletal: Normal range of motion.         General: No tenderness.   Lymphadenopathy:      Cervical: No cervical adenopathy.      Upper Body:      Right upper body: No supraclavicular adenopathy.      Left upper body: No supraclavicular adenopathy.   Skin:     General: Skin is dry.      Findings: No erythema or rash.   Neurological:      Mental Status: She is alert and oriented to person, place, and time.      Cranial Nerves: No cranial nerve deficit.      Coordination: Coordination normal.   Psychiatric:         Behavior: Behavior normal.         Thought Content: Thought content normal.         Judgment: Judgment normal.         Significant Labs:   BMP:   Recent Labs   Lab 07/24/20  1503 07/25/20  0804   * 111*   * 134*   K 4.5 3.3*   CL 98 101   CO2 21* 23   BUN 28* 20   CREATININE 1.0 0.7   CALCIUM 8.7 8.0*   MG  --  2.7*   , CBC:   Recent Labs   Lab 07/24/20  1621 07/25/20  0804 07/26/20  0523   WBC 53.71* 29.61* 28.94*   HGB 5.4* 7.2* 7.0*   HCT 17.7* 22.0* 21.5*   PLT 30* 19* 43*   , CMP:   Recent Labs   Lab 07/24/20  1503 07/25/20  0804   * 134*   K 4.5 3.3*   CL 98 101   CO2 21* 23   * 111*   BUN 28* 20   CREATININE 1.0 0.7   CALCIUM 8.7 8.0*   PROT 7.7  --    ALBUMIN 3.0*  --    BILITOT 0.4  --    ALKPHOS 292*  --    AST 28  --    ALT 37  --    ANIONGAP 13 10   EGFRNONAA 54* >60   , Coagulation:   Recent Labs   Lab 07/25/20  0804   INR 1.0   APTT 32.0   , Haptoglobin: No results for input(s): HAPTOGLOBIN in the last 48 hours., Immunology: No results for input(s): SPEP, TOBIN, STEWART, FREELAMBDALI in the last 48 hours., LDH: No results for input(s): LDHCSF, BFSOURCE in the last 48 hours. and LFTs:   Recent Labs   Lab 07/24/20  1503   ALT 37   AST 28   ALKPHOS 292*   BILITOT 0.4   PROT 7.7   ALBUMIN 3.0*       Diagnostic Results:  I have reviewed all pertinent imaging results/findings within the past 24  hours.    Assessment/Plan:     Chronic myelomonocytic leukemia not having achieved remission  Patient admitted with chronic myelomonocytic leukemia with pancytopenia patient is on supportive care at this point would agree with transfusion of packed red cells and platelets until level is reached that surgeon feels comfortable with.  At this point discussed implications with Hospital Medicine with surgery as well as with patient  07/26/2020 patient's counts adequate this morning.  Patient states that she feels much better after surgical debridement.  Continue follow-up        Thank you for your consult. I will follow-up with patient. Please contact us if you have any additional questions.     Corby Miranda MD  Hematology/Oncology  [facility]

## 2020-07-26 NOTE — SUBJECTIVE & OBJECTIVE
Interval History: Tm 99.3 and tachycardic overnight. Denies pain at surgical sites, only mild soreness. Tolerating diet.     Medications:  Continuous Infusions:  Scheduled Meds:   amitriptyline  75 mg Oral QHS    ceFEPime (MAXIPIME) IVPB  2 g Intravenous Q12H    chlorhexidine  10 mL Mouth/Throat BID    DULoxetine  40 mg Oral Daily    ferrous sulfate  325 mg Oral BID    fluticasone propionate  2 spray Each Nostril QHS    levothyroxine  88 mcg Oral Before breakfast    magnesium oxide  400 mg Oral BID    metoprolol succinate  50 mg Oral Daily    nozaseptin   Each Nostril BID    pantoprazole  40 mg Oral Daily    pravastatin  10 mg Oral QHS    senna-docusate 8.6-50 mg  1 tablet Oral BID    sodium chloride 0.9%  10 mL Intravenous Q8H    vancomycin (VANCOCIN) IVPB  500 mg Intravenous Q24H     PRN Meds:sodium chloride, acetaminophen, acetaminophen, bisacodyL, cloNIDine, diphenhydrAMINE, HYDROcodone-acetaminophen, HYDROcodone-acetaminophen, lactulose, melatonin, ondansetron, ondansetron, promethazine (PHENERGAN) IVPB     Review of patient's allergies indicates:   Allergen Reactions    Codeine      Other reaction(s): hyper  Other reaction(s): hyper    Gabapentin Other (See Comments)     Bad dreams     Objective:     Vital Signs (Most Recent):  Temp: 98.8 °F (37.1 °C) (07/26/20 1216)  Pulse: 94 (07/26/20 1216)  Resp: 19 (07/26/20 1216)  BP: (!) 150/67 (07/26/20 1216)  SpO2: 97 % (07/26/20 1216) Vital Signs (24h Range):  Temp:  [96.9 °F (36.1 °C)-99.3 °F (37.4 °C)] 98.8 °F (37.1 °C)  Pulse:  [] 94  Resp:  [15-20] 19  SpO2:  [92 %-100 %] 97 %  BP: (118-172)/(56-72) 150/67     Weight: 49.1 kg (108 lb 3.9 oz)  Body mass index is 19.8 kg/m².    Intake/Output - Last 3 Shifts       07/24 0700 - 07/25 0659 07/25 0700 - 07/26 0659 07/26 0700 - 07/27 0659    P.O. 120      I.V. (mL/kg) 500 (10.2) 560 (11.4)     Blood 1047.9 467.3     IV Piggyback 350 100     Total Intake(mL/kg) 2017.9 (41.1) 1127.3 (23)     Drains   15     Other  40     Blood  15     Total Output  70     Net +2017.9 +1057.3            Urine Occurrence 1 x  1 x          Physical Exam  Vitals signs reviewed.   Constitutional:       General: She is not in acute distress.     Appearance: She is well-developed.   HENT:      Head: Normocephalic and atraumatic.   Neck:      Musculoskeletal: Neck supple.   Cardiovascular:      Rate and Rhythm: Normal rate and regular rhythm.   Pulmonary:      Effort: Pulmonary effort is normal.   Abdominal:      General: There is no distension.      Palpations: Abdomen is soft.      Tenderness: There is no abdominal tenderness.   Musculoskeletal: Normal range of motion.   Skin:     General: Skin is warm.          Neurological:      Mental Status: She is alert and oriented to person, place, and time.         Significant Labs:  CBC:   Recent Labs   Lab 07/26/20  0523   WBC 28.94*   RBC 2.36*   HGB 7.0*   HCT 21.5*   PLT 43*   MCV 91   MCH 29.7   MCHC 32.6     BMP:   Recent Labs   Lab 07/26/20  0523   GLU 98   *   K 3.4*      CO2 21*   BUN 16   CREATININE 0.8   CALCIUM 7.9*   MG 1.6       Significant Diagnostics:  I have reviewed and interpreted all pertinent imaging results/findings within the past 24 hours.

## 2020-07-26 NOTE — SUBJECTIVE & OBJECTIVE
Interval History: doing well today. Agreeable to unit of blood. Social service is arranging wound vac.    Review of Systems   Constitutional: Positive for fatigue. Negative for activity change, chills and diaphoresis.   HENT: Negative for congestion, trouble swallowing and voice change.    Eyes: Negative for photophobia and discharge.   Respiratory: Negative for cough, chest tightness, shortness of breath and wheezing.    Cardiovascular: Negative for chest pain, palpitations and leg swelling.   Gastrointestinal: Negative for abdominal pain, blood in stool, constipation, diarrhea, nausea and vomiting.   Endocrine: Negative for cold intolerance, heat intolerance, polydipsia, polyphagia and polyuria.   Genitourinary: Negative for difficulty urinating, dysuria, flank pain, frequency and urgency.   Musculoskeletal: Positive for arthralgias (chronic RA) and back pain (at wound site).   Skin: Positive for wound (lower back, LFA, L thigh). Negative for rash.   Allergic/Immunologic: Positive for immunocompromised state.   Neurological: Negative for dizziness, seizures, syncope, facial asymmetry, weakness, light-headedness, numbness and headaches.   Psychiatric/Behavioral: Negative for confusion and hallucinations.     Objective:     Vital Signs (Most Recent):  Temp: 98.4 °F (36.9 °C) (07/26/20 1709)  Pulse: 91 (07/26/20 1709)  Resp: 18 (07/26/20 1709)  BP: 139/63 (07/26/20 1709)  SpO2: (!) 92 % (07/26/20 1427) Vital Signs (24h Range):  Temp:  [96.9 °F (36.1 °C)-99.3 °F (37.4 °C)] 98.4 °F (36.9 °C)  Pulse:  [] 91  Resp:  [16-20] 18  SpO2:  [92 %-99 %] 92 %  BP: (118-157)/(56-72) 139/63     Weight: 49.1 kg (108 lb 3.9 oz)  Body mass index is 19.8 kg/m².    Intake/Output Summary (Last 24 hours) at 7/26/2020 1755  Last data filed at 7/26/2020 1427  Gross per 24 hour   Intake 429.17 ml   Output 55 ml   Net 374.17 ml      Physical Exam  Vitals signs reviewed.   Constitutional:       General: She is not in acute distress.      Appearance: Normal appearance. She is ill-appearing. She is not toxic-appearing.   HENT:      Head: Normocephalic and atraumatic.      Nose: Nose normal. No congestion.      Mouth/Throat:      Mouth: Mucous membranes are moist.   Eyes:      Pupils: Pupils are equal, round, and reactive to light.   Neck:      Musculoskeletal: Normal range of motion and neck supple. No muscular tenderness.   Cardiovascular:      Rate and Rhythm: Normal rate and regular rhythm.      Pulses: Normal pulses.      Heart sounds: Normal heart sounds.   Pulmonary:      Effort: Pulmonary effort is normal. No respiratory distress.      Breath sounds: Normal breath sounds. No wheezing or rhonchi.   Abdominal:      General: Abdomen is flat. Bowel sounds are normal. There is no distension.      Palpations: Abdomen is soft.      Tenderness: There is no abdominal tenderness. There is no rebound.   Musculoskeletal: Normal range of motion.      Right lower leg: No edema.      Left lower leg: No edema.   Skin:     General: Skin is warm and dry.      Capillary Refill: Capillary refill takes less than 2 seconds.      Findings: No bruising or rash.      Comments: Wound to lower back with wound vac dressing in place.   Left lower extremity with dressing and drain in place from hematoma.   Neurological:      General: No focal deficit present.      Mental Status: She is alert and oriented to person, place, and time.   Psychiatric:         Mood and Affect: Mood normal.         Behavior: Behavior normal.         Thought Content: Thought content normal.         Judgment: Judgment normal.       Significant Labs:   CBC:   Recent Labs   Lab 07/25/20  0804 07/26/20  0523   WBC 29.61* 28.94*   HGB 7.2* 7.0*   HCT 22.0* 21.5*   PLT 19* 43*     CMP:   Recent Labs   Lab 07/25/20  0804 07/26/20  0523   * 133*   K 3.3* 3.4*    102   CO2 23 21*   * 98   BUN 20 16   CREATININE 0.7 0.8   CALCIUM 8.0* 7.9*   ANIONGAP 10 10   EGFRNONAA >60 >60        Significant Imaging: I have reviewed all pertinent imaging results/findings within the past 24 hours.

## 2020-07-26 NOTE — PLAN OF CARE
Patient currently requires SBA for bed mobility, CGA for transfers without AD, and CGA for ambulation without AD 40 feet.

## 2020-07-26 NOTE — NURSING
1unit prbcs currently infusing pt daisy warren    msged providers about pt req to resume prilosec for heartburn indigestion

## 2020-07-26 NOTE — ASSESSMENT & PLAN NOTE
Remove ZE drain today  Surgical incision intact, no evidence of active bleeding or ongoing hematoma

## 2020-07-26 NOTE — PLAN OF CARE
Fall precautions implemented. Pt remained free from falls/injuries.  Turning, coughing and deep breathing encouraged. Wound vac to lower back intact to continuous suction at 125 mmHg. Minimal amount of seroussang drainage noted in canister. ZE drain intact with seroussang drainage in bulb. Pain adequately managed. Safety precautions implemented. Chart reviewed. Will continue to monitor.

## 2020-07-26 NOTE — PLAN OF CARE
Discussed poc with pt, pt verbalized understanding    modified visitor policy per CDC guidelines  allowing one visitor from 8a-6pm    Purposeful rounding every 2hours    VS wnl  Cardiac monitoring in use, pt is NSR to ST, tele monitor # [id number]    Fall precautions in place, remains injury free  Pt denies c/o nausea  Pain under control with PRN meds    ZE drain removed site CDI  Accurate I&Os  Abx given as prescribed  Bed locked at lowest position  Call light within reach    Chart check complete  Will cont to monitor

## 2020-07-26 NOTE — PROGRESS NOTES
Pharmacokinetic Assessment Follow Up: IV Vancomycin    Vancomycin serum concentration assessment(s):    The trough level was drawn correctly and can be used to guide therapy at this time. The measurement is below the desired definitive target range of 15 to 20 mcg/mL.    Vancomycin Regimen Plan:    Change regimen to Vancomycin 1000 mg IV every 24 hours with next serum trough concentration measured at 1630 prior to 3rd dose on 7/28    Drug levels (last 3 results):  Recent Labs   Lab Result Units 07/26/20  1501   Vancomycin-Trough ug/mL 6.5*       Pharmacy will continue to follow and monitor vancomycin.    Please contact pharmacy at extension [phone] for questions regarding this assessment.    Thank you for the consult,   Tyesha Alvarenga       Patient brief summary:  Sarah Parker is a 80 y.o. female initiated on antimicrobial therapy with IV Vancomycin for treatment of skin & soft tissue infection    The patient's current regimen is Vancomycin 1000mg q24    Drug Allergies:   Review of patient's allergies indicates:   Allergen Reactions    Codeine      Other reaction(s): hyper  Other reaction(s): hyper    Gabapentin Other (See Comments)     Bad dreams       Actual Body Weight:   49.1kg    Renal Function:   Estimated Creatinine Clearance: 44.9 mL/min (based on SCr of 0.8 mg/dL).,     Dialysis Method (if applicable):  N/A    CBC (last 72 hours):  Recent Labs   Lab Result Units 07/24/20  1621 07/25/20  0804 07/26/20  0523   WBC K/uL 53.71* 29.61* 28.94*   Hemoglobin g/dL 5.4* 7.2* 7.0*   Hematocrit % 17.7* 22.0* 21.5*   Platelets K/uL 30* 19* 43*   Gran% % 21.0* 18.0* 20.0*   Lymph% % 42.0 35.0 47.0   Mono% % 21.0* 23.0* 19.0*   Eosinophil% % 0.0 0.0 0.0   Basophil% % 0.0 0.0 0.0   Differential Method  Manual Manual Manual       Metabolic Panel (last 72 hours):  Recent Labs   Lab Result Units 07/24/20  1503 07/25/20  0804 07/26/20  0523   Sodium mmol/L 132* 134* 133*   Potassium mmol/L 4.5 3.3* 3.4*   Chloride  mmol/L 98 101 102   CO2 mmol/L 21* 23 21*   Glucose mg/dL 127* 111* 98   BUN, Bld mg/dL 28* 20 16   Creatinine mg/dL 1.0 0.7 0.8   Albumin g/dL 3.0*  --   --    Total Bilirubin mg/dL 0.4  --   --    Alkaline Phosphatase U/L 292*  --   --    AST U/L 28  --   --    ALT U/L 37  --   --    Magnesium mg/dL  --  2.7* 1.6       Vancomycin Administrations:  vancomycin given in the last 96 hours                   vancomycin 500 mg in dextrose 5 % 100 mL IVPB (ready to mix system) (mg) 500 mg New Bag 07/26/20 1643     500 mg New Bag 07/25/20 1800    vancomycin in dextrose 5 % 1 gram/250 mL IVPB 1,000 mg (mg) 1,000 mg New Bag 07/24/20 1605                Microbiologic Results:  Microbiology Results (last 7 days)     Procedure Component Value Units Date/Time    Blood Culture #2 **CANNOT BE ORDERED STAT** [[id number]] Collected: 07/24/20 1503    Order Status: Completed Specimen: Blood from Peripheral, Antecubital, Left Updated: 07/25/20 2212     Blood Culture, Routine No Growth to date      No Growth to date    Blood Culture #1 **CANNOT BE ORDERED STAT** [[id number]] Collected: 07/24/20 1445    Order Status: Completed Specimen: Blood from Peripheral, Antecubital, Left Updated: 07/25/20 2212     Blood Culture, Routine No Growth to date      No Growth to date    Aerobic culture [[id number]] Collected: 07/25/20 1510    Order Status: Sent Specimen: Abscess from Back Updated: 07/25/20 1923    Culture, Anaerobe [[id number]] Collected: 07/25/20 1510    Order Status: Sent Specimen: Abscess from Back Updated: 07/25/20 1923

## 2020-07-27 VITALS
WEIGHT: 108.25 LBS | DIASTOLIC BLOOD PRESSURE: 61 MMHG | BODY MASS INDEX: 19.92 KG/M2 | OXYGEN SATURATION: 95 % | HEIGHT: 62 IN | SYSTOLIC BLOOD PRESSURE: 133 MMHG | RESPIRATION RATE: 18 BRPM | HEART RATE: 90 BPM | TEMPERATURE: 99 F

## 2020-07-27 LAB
ANION GAP SERPL CALC-SCNC: 9 MMOL/L (ref 8–16)
BASOPHILS # BLD AUTO: ABNORMAL K/UL (ref 0–0.2)
BASOPHILS NFR BLD: 0 % (ref 0–1.9)
BUN SERPL-MCNC: 21 MG/DL (ref 8–23)
CALCIUM SERPL-MCNC: 7.7 MG/DL (ref 8.7–10.5)
CHLORIDE SERPL-SCNC: 99 MMOL/L (ref 95–110)
CO2 SERPL-SCNC: 20 MMOL/L (ref 23–29)
CREAT SERPL-MCNC: 0.8 MG/DL (ref 0.5–1.4)
DIFFERENTIAL METHOD: ABNORMAL
EOSINOPHIL # BLD AUTO: ABNORMAL K/UL (ref 0–0.5)
EOSINOPHIL NFR BLD: 0 % (ref 0–8)
ERYTHROCYTE [DISTWIDTH] IN BLOOD BY AUTOMATED COUNT: 17.1 % (ref 11.5–14.5)
EST. GFR  (AFRICAN AMERICAN): >60 ML/MIN/1.73 M^2
EST. GFR  (NON AFRICAN AMERICAN): >60 ML/MIN/1.73 M^2
GLUCOSE SERPL-MCNC: 97 MG/DL (ref 70–110)
HCT VFR BLD AUTO: 24.4 % (ref 37–48.5)
HGB BLD-MCNC: 7.7 G/DL (ref 12–16)
IMM GRANULOCYTES # BLD AUTO: ABNORMAL K/UL (ref 0–0.04)
IMM GRANULOCYTES NFR BLD AUTO: ABNORMAL % (ref 0–0.5)
LYMPHOCYTES # BLD AUTO: ABNORMAL K/UL (ref 1–4.8)
LYMPHOCYTES NFR BLD: 31 % (ref 18–48)
MAGNESIUM SERPL-MCNC: 1.4 MG/DL (ref 1.6–2.6)
MCH RBC QN AUTO: 28.9 PG (ref 27–31)
MCHC RBC AUTO-ENTMCNC: 31.6 G/DL (ref 32–36)
MCV RBC AUTO: 92 FL (ref 82–98)
METAMYELOCYTES NFR BLD MANUAL: 8 %
MONOCYTES # BLD AUTO: ABNORMAL K/UL (ref 0.3–1)
MONOCYTES NFR BLD: 23 % (ref 4–15)
MYELOCYTES NFR BLD MANUAL: 2 %
NEUTROPHILS NFR BLD: 36 % (ref 38–73)
NRBC BLD-RTO: 1 /100 WBC
PLATELET # BLD AUTO: 46 K/UL (ref 150–350)
PMV BLD AUTO: 12.8 FL (ref 9.2–12.9)
POTASSIUM SERPL-SCNC: 3.8 MMOL/L (ref 3.5–5.1)
RBC # BLD AUTO: 2.66 M/UL (ref 4–5.4)
SODIUM SERPL-SCNC: 128 MMOL/L (ref 136–145)
WBC # BLD AUTO: 29.2 K/UL (ref 3.9–12.7)

## 2020-07-27 PROCEDURE — 85027 COMPLETE CBC AUTOMATED: CPT | Mod: HCNC

## 2020-07-27 PROCEDURE — 94799 UNLISTED PULMONARY SVC/PX: CPT | Mod: HCNC

## 2020-07-27 PROCEDURE — 99232 PR SUBSEQUENT HOSPITAL CARE,LEVL II: ICD-10-PCS | Mod: HCNC,,, | Performed by: INTERNAL MEDICINE

## 2020-07-27 PROCEDURE — 99024 PR POST-OP FOLLOW-UP VISIT: ICD-10-PCS | Mod: HCNC,,, | Performed by: SURGERY

## 2020-07-27 PROCEDURE — 80048 BASIC METABOLIC PNL TOTAL CA: CPT | Mod: HCNC,ER

## 2020-07-27 PROCEDURE — 97116 GAIT TRAINING THERAPY: CPT | Mod: HCNC | Performed by: PHYSICAL THERAPIST

## 2020-07-27 PROCEDURE — A4216 STERILE WATER/SALINE, 10 ML: HCPCS | Mod: HCNC | Performed by: SURGERY

## 2020-07-27 PROCEDURE — 36415 COLL VENOUS BLD VENIPUNCTURE: CPT | Mod: HCNC

## 2020-07-27 PROCEDURE — 25000003 PHARM REV CODE 250: Mod: HCNC | Performed by: NURSE PRACTITIONER

## 2020-07-27 PROCEDURE — 99232 SBSQ HOSP IP/OBS MODERATE 35: CPT | Mod: HCNC,,, | Performed by: INTERNAL MEDICINE

## 2020-07-27 PROCEDURE — 63600175 PHARM REV CODE 636 W HCPCS: Mod: HCNC | Performed by: SURGERY

## 2020-07-27 PROCEDURE — 83735 ASSAY OF MAGNESIUM: CPT | Mod: HCNC,ER

## 2020-07-27 PROCEDURE — 85007 BL SMEAR W/DIFF WBC COUNT: CPT | Mod: HCNC

## 2020-07-27 PROCEDURE — 25000003 PHARM REV CODE 250: Mod: HCNC | Performed by: SURGERY

## 2020-07-27 PROCEDURE — 99900035 HC TECH TIME PER 15 MIN (STAT): Mod: HCNC

## 2020-07-27 PROCEDURE — 99024 POSTOP FOLLOW-UP VISIT: CPT | Mod: HCNC,,, | Performed by: SURGERY

## 2020-07-27 PROCEDURE — 97110 THERAPEUTIC EXERCISES: CPT | Mod: HCNC | Performed by: PHYSICAL THERAPIST

## 2020-07-27 RX ORDER — CEFDINIR 300 MG/1
300 CAPSULE ORAL 2 TIMES DAILY
Qty: 14 CAPSULE | Refills: 0 | Status: SHIPPED | OUTPATIENT
Start: 2020-07-27 | End: 2020-08-03

## 2020-07-27 RX ADMIN — Medication 10 ML: at 05:07

## 2020-07-27 RX ADMIN — STANDARDIZED SENNA CONCENTRATE AND DOCUSATE SODIUM 1 TABLET: 8.6; 5 TABLET ORAL at 09:07

## 2020-07-27 RX ADMIN — DULOXETINE HYDROCHLORIDE 40 MG: 20 CAPSULE, DELAYED RELEASE ORAL at 09:07

## 2020-07-27 RX ADMIN — CEFEPIME HYDROCHLORIDE 2 G: 2 INJECTION, SOLUTION INTRAVENOUS at 05:07

## 2020-07-27 RX ADMIN — LEVOTHYROXINE SODIUM 88 MCG: 88 TABLET ORAL at 05:07

## 2020-07-27 RX ADMIN — MAGNESIUM OXIDE 400 MG (241.3 MG MAGNESIUM) TABLET 400 MG: TABLET at 09:07

## 2020-07-27 RX ADMIN — CHLORHEXIDINE GLUCONATE 0.12% ORAL RINSE 10 ML: 1.2 LIQUID ORAL at 09:07

## 2020-07-27 RX ADMIN — FERROUS SULFATE TAB EC 325 MG (65 MG FE EQUIVALENT) 325 MG: 325 (65 FE) TABLET DELAYED RESPONSE at 09:07

## 2020-07-27 RX ADMIN — PANTOPRAZOLE SODIUM 40 MG: 40 TABLET, DELAYED RELEASE ORAL at 09:07

## 2020-07-27 RX ADMIN — METOPROLOL SUCCINATE 50 MG: 50 TABLET, EXTENDED RELEASE ORAL at 09:07

## 2020-07-27 NOTE — PLAN OF CARE
Fall precautions implemented. Pt remained free from falls/injuries.  IV abx infused per orders. NSR-sinus tachy on monitor HR 90'-110. Tmax 100.4 decreased aerosol temp and instructed pt on breathing exercises using the IS, return demonstration received. Wound vac intact with small amount of seroussang drainage in cannister. Pain adequately managed. Safety precautions implemented. Chart reviewed. Will continue to monitor.

## 2020-07-27 NOTE — NURSING
Patient discharged to home. AVS reviewed and explained with pt, verbalized understanding. Home wound vac delivered to bedside, HH set up to assess pt tomorrow and place wound vac. IV and cardiac monitor removed. All belongings accompanied pt.

## 2020-07-27 NOTE — ASSESSMENT & PLAN NOTE
POD# 2 s/p debridement of lower back wound with wound vac placement and evacuated of LLE surgical site hematoma    Wound care to change wound vac today prior to discharge  Home health pending  Cultures + staph, susceptibilities pending  antibiotics per primary team

## 2020-07-27 NOTE — PROGRESS NOTES
[facility] -   General Surgery  Progress Note    Subjective:     History of Present Illness:  80-year-old female known patient of Dr. Shannon with history of CML, RA, debility, squamous cell carcinoma recently excised, HTN, anemia admitted in may 2020 for abscess of her lower back. She underwent debridement and has not had chemotherapy since. She was sent to ED today by her home health agency for fever and wound drainage. She reports pain at her lower back site and LLE surgical site. She denies any trauma.     Post-Op Info:  Procedure(s) (LRB):  DEBRIDEMENT, WOUND (Left)  APPLICATION, WOUND VAC (Left)  DEBRIDEMENT (Left)  EVACUATION, HEMATOMA (Left)   2 Days Post-Op     Interval History: No acute issues. AFVSS. Denies pain to surgical sites.     Medications:  Continuous Infusions:  Scheduled Meds:   amitriptyline  75 mg Oral QHS    ceFEPime (MAXIPIME) IVPB  2 g Intravenous Q12H    chlorhexidine  10 mL Mouth/Throat BID    DULoxetine  40 mg Oral Daily    ferrous sulfate  325 mg Oral BID    fluticasone propionate  2 spray Each Nostril QHS    levothyroxine  88 mcg Oral Before breakfast    magnesium oxide  400 mg Oral BID    metoprolol succinate  50 mg Oral Daily    nozaseptin   Each Nostril BID    pantoprazole  40 mg Oral Daily    pravastatin  10 mg Oral QHS    senna-docusate 8.6-50 mg  1 tablet Oral BID    sodium chloride 0.9%  10 mL Intravenous Q8H    vancomycin (VANCOCIN) IVPB  1,000 mg Intravenous Q24H     PRN Meds:sodium chloride, acetaminophen, acetaminophen, bisacodyL, cloNIDine, diphenhydrAMINE, HYDROcodone-acetaminophen, HYDROcodone-acetaminophen, lactulose, melatonin, ondansetron, ondansetron, promethazine (PHENERGAN) IVPB     Review of patient's allergies indicates:   Allergen Reactions    Codeine      Other reaction(s): hyper  Other reaction(s): hyper    Gabapentin Other (See Comments)     Bad dreams     Objective:     Vital Signs (Most Recent):  Temp: 98.7 °F (37.1 °C) (07/27/20  1128)  Pulse: 98 (07/27/20 1128)  Resp: 18 (07/27/20 1128)  BP: (!) 128/58 (07/27/20 1128)  SpO2: 98 % (07/27/20 1128) Vital Signs (24h Range):  Temp:  [96.9 °F (36.1 °C)-100.4 °F (38 °C)] 98.7 °F (37.1 °C)  Pulse:  [] 98  Resp:  [16-19] 18  SpO2:  [92 %-98 %] 98 %  BP: (125-157)/(58-74) 128/58     Weight: 49.1 kg (108 lb 3.9 oz)  Body mass index is 19.8 kg/m².    Intake/Output - Last 3 Shifts       07/25 0700 - 07/26 0659 07/26 0700 - 07/27 0659 07/27 0700 - 07/28 0659    P.O.  240     I.V. (mL/kg) 560 (11.4)      Blood 467.3 329.2     IV Piggyback 100 100     Total Intake(mL/kg) 1127.3 (23) 669.2 (13.6)     Drains 15      Other 40      Blood 15      Total Output 70      Net +1057.3 +669.2            Urine Occurrence  4 x           Physical Exam  Vitals signs reviewed.   Constitutional:       General: She is not in acute distress.     Appearance: She is well-developed. She is cachectic.   HENT:      Head: Normocephalic and atraumatic.   Neck:      Musculoskeletal: Neck supple.   Cardiovascular:      Rate and Rhythm: Normal rate and regular rhythm.   Pulmonary:      Effort: Pulmonary effort is normal.   Abdominal:      General: There is no distension.      Palpations: Abdomen is soft.      Tenderness: There is no abdominal tenderness.   Musculoskeletal: Normal range of motion.   Skin:     General: Skin is warm.          Neurological:      Mental Status: She is alert and oriented to person, place, and time.         Significant Labs:  CBC:   Recent Labs   Lab 07/27/20  0451   WBC 29.20*   RBC 2.66*   HGB 7.7*   HCT 24.4*   PLT 46*   MCV 92   MCH 28.9   MCHC 31.6*     BMP:   Recent Labs   Lab 07/27/20  0451   GLU 97   *   K 3.8   CL 99   CO2 20*   BUN 21   CREATININE 0.8   CALCIUM 7.7*   MG 1.4*       Significant Diagnostics:  none    Assessment/Plan:     * Wound infection after surgery  POD# 2 s/p debridement of lower back wound with wound vac placement and evacuated of LLE surgical site hematoma    Wound  care to change wound vac today prior to discharge  Home health pending  Cultures + staph, susceptibilities pending  antibiotics per primary team      Postoperative hematoma of subcutaneous tissue following dermatologic procedure  Surgical incision intact, no evidence of active bleeding or ongoing hematoma    Chronic myelomonocytic leukemia not having achieved remission  Hematology/Oncology on board  Low platelet count likely contributed to perioperative hematoma    Anemia  Heme/onc on board. H/H and platelets stable. outpatient follow up arranged.       Berenice Alfaro MD  General Surgery  [facility] [place]

## 2020-07-27 NOTE — CONSULTS
Consulted on this 79 y/o F patient for wound vac dressing change and management. Patient is POD2 wound debridement and wound vac application and hematoma evacuation. She is awake and alert, denies pain. Patient is on [employer] wound vac ULTA in hospital, but is expected to discharge tonight with home [employer] Vac. [employer] vac order was signed at 1400 and faxed for approval, however, we have not yet receive authorization or delivery of rotech vac. Our hospital supplies are not compatible with the ordered home wound vac. Due to this, will remove hospital wound vac and apply moist dressing for discharge, and home wound vac will be applied by home health tomorrow.   Discussed all with ZANE Crane, GILBERT and patient.   KCI wound vac ulta paused, clamped, and turned off. Dressing gently removed.     3 wounds noted to lower back, upper buttock. Proximal wound measures 6x5.5x1.5cm. Left distal wound measures [id number]. Right distal wound measures [id number]. All wounds with dark red moist wound beds and small amounts tan slough present. Susi wound skin intact. Cleansed all with saline and patted dry. Susi wound skin painted with cavilon. Wounds filled with aquacel ag rope, then secured all with duoderm. This dressing can be maintained for up to 5 days, however, will be replaced with wound vac dressing by HH at their first visit (which will be Tues or Wed depending on discharge date). Patient tolerated wound care well.

## 2020-07-27 NOTE — PROGRESS NOTES
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  Patient is admitted to the hospital with severe pancytopenia anemia.  At this point planned surgical debridement and question as to whether not transfusion is warranted we were asked to see the patient for further evaluation    Interval History:  Patient denies any pain related to wound VAC, serosanguineous drainage noted in wound VAC.  Patient reports overall improvement in condition.  Plan is for discharge once wound VAC and home health arranged    Oncology Treatment Plan:   OP AZACITADINE 7-DAY (SUB-Q)    Medications:  Continuous Infusions:  Scheduled Meds:   amitriptyline  75 mg Oral QHS    ceFEPime (MAXIPIME) IVPB  2 g Intravenous Q12H    chlorhexidine  10 mL Mouth/Throat BID    DULoxetine  40 mg Oral Daily    ferrous sulfate  325 mg Oral BID    fluticasone propionate  2 spray Each Nostril QHS    levothyroxine  88 mcg Oral Before breakfast    magnesium oxide  400 mg Oral BID    metoprolol succinate  50 mg Oral Daily    nozaseptin   Each Nostril BID    pantoprazole  40 mg Oral Daily    pravastatin  10 mg Oral QHS    senna-docusate 8.6-50 mg  1 tablet Oral BID    sodium chloride 0.9%  10 mL Intravenous Q8H    vancomycin (VANCOCIN) IVPB  1,000 mg Intravenous Q24H     PRN Meds:sodium chloride, acetaminophen, acetaminophen, bisacodyL, cloNIDine, diphenhydrAMINE, HYDROcodone-acetaminophen, HYDROcodone-acetaminophen, lactulose, melatonin, ondansetron, ondansetron, promethazine (PHENERGAN) IVPB     Review of Systems   Constitutional: Positive for activity change and fatigue. Negative for appetite  change, chills, diaphoresis, fever and unexpected weight change.   HENT: Negative for congestion, hearing loss, mouth sores, postnasal drip, rhinorrhea, sore throat and trouble swallowing.    Eyes: Negative for pain, discharge and visual disturbance.   Respiratory: Negative for cough, chest tightness and shortness of breath.    Cardiovascular: Negative for chest pain, palpitations and leg swelling.   Gastrointestinal: Positive for abdominal distention. Negative for abdominal pain, blood in stool, constipation, diarrhea, nausea and vomiting.   Endocrine: Negative for cold intolerance and heat intolerance.   Genitourinary: Negative for difficulty urinating, dyspareunia, flank pain and hematuria.   Musculoskeletal: Negative for arthralgias, back pain and myalgias.   Skin: Positive for wound.   Neurological: Negative for dizziness, weakness, light-headedness and headaches.   Hematological: Negative for adenopathy. Does not bruise/bleed easily.   Psychiatric/Behavioral: Negative for agitation, behavioral problems and confusion. The patient is not nervous/anxious.      Objective:     Vital Signs (Most Recent):  Temp: 98.5 °F (36.9 °C) (07/27/20 0743)  Pulse: 100 (07/27/20 0743)  Resp: 18 (07/27/20 0743)  BP: (!) 151/69 (07/27/20 0743)  SpO2: (!) 94 % (07/27/20 0743) Vital Signs (24h Range):  Temp:  [96.9 °F (36.1 °C)-100.4 °F (38 °C)] 98.5 °F (36.9 °C)  Pulse:  [] 100  Resp:  [16-19] 18  SpO2:  [92 %-98 %] 94 %  BP: (125-157)/(63-74) 151/69     Weight: 49.1 kg (108 lb 3.9 oz)  Body mass index is 19.8 kg/m².  Body surface area is 1.47 meters squared.      Intake/Output Summary (Last 24 hours) at 7/27/2020 1050  Last data filed at 7/27/2020 0555  Gross per 24 hour   Intake 669.17 ml   Output --   Net 669.17 ml       Physical Exam  Vitals signs and nursing note reviewed.   Constitutional:       General: She is not in acute distress.     Appearance: She is well-developed.   HENT:      Head: Normocephalic and atraumatic.       Right Ear: Hearing and external ear normal.      Left Ear: Hearing and external ear normal.      Nose: No rhinorrhea.      Right Sinus: No maxillary sinus tenderness or frontal sinus tenderness.      Left Sinus: No maxillary sinus tenderness or frontal sinus tenderness.      Mouth/Throat:      Mouth: No oral lesions.      Pharynx: Uvula midline.   Eyes:      General:         Right eye: No discharge.         Left eye: No discharge.      Conjunctiva/sclera: Conjunctivae normal.      Pupils: Pupils are equal, round, and reactive to light.   Neck:      Musculoskeletal: Normal range of motion.      Thyroid: No thyromegaly.      Vascular: No carotid bruit.      Trachea: No tracheal deviation.   Cardiovascular:      Rate and Rhythm: Normal rate and regular rhythm.      Pulses:           Dorsalis pedis pulses are 2+ on the right side and 2+ on the left side.      Heart sounds: Normal heart sounds, S1 normal and S2 normal. No murmur.   Pulmonary:      Effort: Pulmonary effort is normal. No respiratory distress.      Breath sounds: Normal breath sounds.   Abdominal:      General: Bowel sounds are normal. There is no distension.      Palpations: Abdomen is soft. There is no mass.      Tenderness: There is no abdominal tenderness.   Musculoskeletal: Normal range of motion.         General: No swelling.   Lymphadenopathy:      Cervical: No cervical adenopathy.      Upper Body:      Right upper body: No supraclavicular adenopathy.      Left upper body: No supraclavicular adenopathy.   Skin:     General: Skin is warm and dry.      Capillary Refill: Capillary refill takes less than 2 seconds.      Findings: No rash.          Neurological:      Mental Status: She is alert and oriented to person, place, and time.      Sensory: No sensory deficit.      Coordination: Coordination normal.      Gait: Gait normal.   Psychiatric:         Mood and Affect: Mood is not anxious or depressed.         Speech: Speech normal.          Behavior: Behavior normal.         Thought Content: Thought content normal.         Judgment: Judgment normal.         Significant Labs:   CBC:   Recent Labs   Lab 07/26/20  0523 07/27/20  0451   WBC 28.94* 29.20*   HGB 7.0* 7.7*   HCT 21.5* 24.4*   PLT 43* 46*    and CMP:   Recent Labs   Lab 07/26/20  0523 07/27/20  0451   * 128*   K 3.4* 3.8    99   CO2 21* 20*   GLU 98 97   BUN 16 21   CREATININE 0.8 0.8   CALCIUM 7.9* 7.7*   ANIONGAP 10 9   EGFRNONAA >60 >60.0       Diagnostic Results:  I have reviewed all pertinent imaging results/findings within the past 24 hours.    Assessment/Plan:     Chronic myelomonocytic leukemia not having achieved remission  Patient admitted with chronic myelomonocytic leukemia with pancytopenia patient is on supportive care at this point would agree with transfusion of packed red cells and platelets until level is reached that surgeon feels comfortable with.  At this point discussed implications with Hospital Medicine with surgery as well as with patient  07/26/2020 patient's counts adequate this morning.  Patient states that she feels much better after surgical debridement.  Continue follow-up    7/27/2020:  Hemoglobin stable, platelets stable.  Patient had seen Dr. Knox last week plan was to restart by days this will be on hold until acute issues are resolved.  Will arrange for follow-up with Dr. Knox after patient is discharged.          Thank you for your consult. I will follow-up with patient. Please contact us if you have any additional questions.     Joan Kay NP  Hematology/Oncology  [facility] [place]

## 2020-07-27 NOTE — PROGRESS NOTES
[facility] Medicine  Progress Note     Patient Name: Sarah Parker  MRN: [id number]  Patient Class: IP- Inpatient     Admission Date: 7/24/2020  Length of Stay: 3 days  Attending Physician: Fam Antunez MD  Primary Care Provider: Briseida Bennett MD     Subjective:      Principal Problem:Wound infection after surgery       HPI:  79 y/o WF with hx of CML with chronic anemia, gerd, HTN, HLD, RA, squamous cell carcinoma excision, hypothyroidism to ED per home health for evaluation of a lower back wound which began worsening over the past 3 days, today associated with moderate amount of serosanguinous drainage and with accompanying fever (Tmax 100.7) and pain (6/10) to the site. Was previously admitted 05/2020 for lower back abscess and required debridement and wound vac placement; also recently had SCC removed from her L thigh and LFA. Denies CP, edema, palpitations, SOB, wheezing, orthopnea, PND, abdominal pain, N/V/D, constipation, dysuria, flank pain, HA, dizziness, cough, chills, falls/trauma, blurred vision, focal deficits. Found in ED to be febrile (100.7) with leukocytosis (53.71), anemia (5.4&17.7), thrombocytopenia (30) - pt was given 1L IV fluids and started on IV vancomycin/zosyn. Plan is for debridement in OR in AM per general surgery. Hospital medicine called for admission - pt placed on med-tele; Full code, pt's surrogate decision maker is daughter, Albina Martínez.      Overview/Hospital Course:  Sarah Parker is a 80 year old female who was admitted to [facility] for wound infection following surgery. She underwent wound debridement with placement of wound vac and left lower extremity hematoma evacuation. EZ drain to left lower extremity removed today. She was transfused one unit PRBC's and one unit of platelets given history of CML. She will need to discharge home with wound vac. Awaiting  to arrange.     Interval History: Awaiting authorization  for wound vac. Doing well.       Review of Systems   Constitutional: Positive for fatigue. Negative for activity change, chills and diaphoresis.   HENT: Negative for congestion, trouble swallowing and voice change.    Eyes: Negative for photophobia and discharge.   Respiratory: Negative for cough, chest tightness, shortness of breath and wheezing.    Cardiovascular: Negative for chest pain, palpitations and leg swelling.   Gastrointestinal: Negative for abdominal pain, blood in stool, constipation, diarrhea, nausea and vomiting.   Endocrine: Negative for cold intolerance, heat intolerance, polydipsia, polyphagia and polyuria.   Genitourinary: Negative for difficulty urinating, dysuria, flank pain, frequency and urgency.   Musculoskeletal: Positive for arthralgias (chronic RA) and back pain (at wound site).   Skin: Positive for wound (lower back, LFA, L thigh). Negative for rash.   Allergic/Immunologic: Positive for immunocompromised state.   Neurological: Negative for dizziness, seizures, syncope, facial asymmetry, weakness, light-headedness, numbness and headaches.   Psychiatric/Behavioral: Negative for confusion and hallucinations.      Objective:      Vital Signs (Most Recent):  Temp: 98.4 °F (36.9 °C) (07/26/20 1709)  Pulse: 91 (07/26/20 1709)  Resp: 18 (07/26/20 1709)  BP: 139/63 (07/26/20 1709)  SpO2: (!) 92 % (07/26/20 1427) Vital Signs (24h Range):  Temp:  [96.9 °F (36.1 °C)-99.3 °F (37.4 °C)] 98.4 °F (36.9 °C)  Pulse:  [] 91  Resp:  [16-20] 18  SpO2:  [92 %-99 %] 92 %  BP: (118-157)/(56-72) 139/63      Weight: 49.1 kg (108 lb 3.9 oz)  Body mass index is 19.8 kg/m².     Intake/Output Summary (Last 24 hours) at 7/26/2020 1755  Last data filed at 7/26/2020 1427      Gross per 24 hour   Intake 429.17 ml   Output 55 ml   Net 374.17 ml      Physical Exam  Vitals signs reviewed.   Constitutional:       General: She is not in acute distress.     Appearance: Normal appearance. She is ill-appearing. She is not  toxic-appearing.   HENT:      Head: Normocephalic and atraumatic.      Nose: Nose normal. No congestion.      Mouth/Throat:      Mouth: Mucous membranes are moist.   Eyes:      Pupils: Pupils are equal, round, and reactive to light.   Neck:      Musculoskeletal: Normal range of motion and neck supple. No muscular tenderness.   Cardiovascular:      Rate and Rhythm: Normal rate and regular rhythm.      Pulses: Normal pulses.      Heart sounds: Normal heart sounds.   Pulmonary:      Effort: Pulmonary effort is normal. No respiratory distress.      Breath sounds: Normal breath sounds. No wheezing or rhonchi.   Abdominal:      General: Abdomen is flat. Bowel sounds are normal. There is no distension.      Palpations: Abdomen is soft.      Tenderness: There is no abdominal tenderness. There is no rebound.   Musculoskeletal: Normal range of motion.      Right lower leg: No edema.      Left lower leg: No edema.   Skin:     General: Skin is warm and dry.      Capillary Refill: Capillary refill takes less than 2 seconds.      Findings: No bruising or rash.      Comments: Wound to lower back with wound vac dressing in place.   Left lower extremity with dressing and drain in place from hematoma.   Neurological:      General: No focal deficit present.      Mental Status: She is alert and oriented to person, place, and time.   Psychiatric:         Mood and Affect: Mood normal.         Behavior: Behavior normal.         Thought Content: Thought content normal.         Judgment: Judgment normal.         Significant Labs:   CBC:   Recent Labs   Lab 07/25/20  0804 07/26/20  0523   WBC 29.61* 28.94*   HGB 7.2* 7.0*   HCT 22.0* 21.5*   PLT 19* 43*      CMP:        Recent Labs   Lab 07/25/20  0804 07/26/20  0523   * 133*   K 3.3* 3.4*    102   CO2 23 21*   * 98   BUN 20 16   CREATININE 0.7 0.8   CALCIUM 8.0* 7.9*   ANIONGAP 10 10   EGFRNONAA >60 >60         Significant Imaging: I have reviewed all pertinent imaging  results/findings within the past 24 hours.       Assessment/Plan:      * Wound infection after surgery  General surgery consulted - likely to OR in AM for debridement  CT lumbar spine pending to r/o abscess  BC x2 pending  NPO after MN  Continue IV vanc/zosyn for now  Provide adequate pain control  Tylenol prn fever     July 25, 2020  Plans for wound debridement today with Dr. Alfaro  Change Zosyn to Cefepime     July 26, 2020  Awaiting wound vac approval from insurance.     July 27, 2020  Wound vac approval pending     Chronic myelomonocytic leukemia not having achieved remission  Followed by Dr. Knox, currently treated with Vidaza  WBCs elevated over baseline likely d/t wound infection  Thrombocytopenia near baseline     July 25, 2020  Platelets are down to 18k today. Will transfuse one unit today.   Case discussed with Dr. Miranda.    July 27, 2020  Platelets are stable at 46k     Anemia  Likely r/t CML, in treatment  Transfuse 2 units PRBCs tonight  Repeat labs in AM     July 25, 20220  Hgb 7.5 today following 2 units  Repeat CBC in am. Anticipate transfusing an additional unit while hospitalized.     July 26, 2020  Hemoglobin 7.0 today. Agreeable to unit of blood.      July 27, 2020  Hemoglobin  7.7 following unit of blood. Hematology will plan to start Procrit tomorrow    Essential hypertension  Stable at this time  VS per unit routine  Home meds resumed               VTE Risk Mitigation (From admission, onward)                  Ordered        IP VTE HIGH RISK PATIENT  Once      07/25/20 1647        Place sequential compression device  Until discontinued      07/24/20 2055                             Lili Crane NP  Department of Hospital Medicine   [facility] -

## 2020-07-27 NOTE — SUBJECTIVE & OBJECTIVE
Interval History:  Patient denies any pain related to wound VAC, serosanguineous drainage noted in wound VAC.  Patient remains thrombocytopenic and anemic.  WBC elevated likely reactive due to wound on back and thigh.  Patient reports overall improvement in condition.  Plan is for discharge once wound VAC and home health arranged    Oncology Treatment Plan:   OP AZACITADINE 7-DAY (SUB-Q)    Medications:  Continuous Infusions:  Scheduled Meds:   amitriptyline  75 mg Oral QHS    ceFEPime (MAXIPIME) IVPB  2 g Intravenous Q12H    chlorhexidine  10 mL Mouth/Throat BID    DULoxetine  40 mg Oral Daily    ferrous sulfate  325 mg Oral BID    fluticasone propionate  2 spray Each Nostril QHS    levothyroxine  88 mcg Oral Before breakfast    magnesium oxide  400 mg Oral BID    metoprolol succinate  50 mg Oral Daily    nozaseptin   Each Nostril BID    pantoprazole  40 mg Oral Daily    pravastatin  10 mg Oral QHS    senna-docusate 8.6-50 mg  1 tablet Oral BID    sodium chloride 0.9%  10 mL Intravenous Q8H    vancomycin (VANCOCIN) IVPB  1,000 mg Intravenous Q24H     PRN Meds:sodium chloride, acetaminophen, acetaminophen, bisacodyL, cloNIDine, diphenhydrAMINE, HYDROcodone-acetaminophen, HYDROcodone-acetaminophen, lactulose, melatonin, ondansetron, ondansetron, promethazine (PHENERGAN) IVPB     Review of Systems   Constitutional: Positive for activity change and fatigue. Negative for appetite change, chills, diaphoresis, fever and unexpected weight change.   HENT: Negative for congestion, hearing loss, mouth sores, postnasal drip, rhinorrhea, sore throat and trouble swallowing.    Eyes: Negative for pain, discharge and visual disturbance.   Respiratory: Negative for cough, chest tightness and shortness of breath.    Cardiovascular: Negative for chest pain, palpitations and leg swelling.   Gastrointestinal: Positive for abdominal distention. Negative for abdominal pain, blood in stool, constipation, diarrhea, nausea  and vomiting.   Endocrine: Negative for cold intolerance and heat intolerance.   Genitourinary: Negative for difficulty urinating, dyspareunia, flank pain and hematuria.   Musculoskeletal: Negative for arthralgias, back pain and myalgias.   Skin: Positive for wound.   Neurological: Negative for dizziness, weakness, light-headedness and headaches.   Hematological: Negative for adenopathy. Does not bruise/bleed easily.   Psychiatric/Behavioral: Negative for agitation, behavioral problems and confusion. The patient is not nervous/anxious.      Objective:     Vital Signs (Most Recent):  Temp: 98.5 °F (36.9 °C) (07/27/20 0743)  Pulse: 100 (07/27/20 0743)  Resp: 18 (07/27/20 0743)  BP: (!) 151/69 (07/27/20 0743)  SpO2: (!) 94 % (07/27/20 0743) Vital Signs (24h Range):  Temp:  [96.9 °F (36.1 °C)-100.4 °F (38 °C)] 98.5 °F (36.9 °C)  Pulse:  [] 100  Resp:  [16-19] 18  SpO2:  [92 %-98 %] 94 %  BP: (125-157)/(63-74) 151/69     Weight: 49.1 kg (108 lb 3.9 oz)  Body mass index is 19.8 kg/m².  Body surface area is 1.47 meters squared.      Intake/Output Summary (Last 24 hours) at 7/27/2020 1050  Last data filed at 7/27/2020 0555  Gross per 24 hour   Intake 669.17 ml   Output --   Net 669.17 ml       Physical Exam  Vitals signs and nursing note reviewed.   Constitutional:       General: She is not in acute distress.     Appearance: She is well-developed.   HENT:      Head: Normocephalic and atraumatic.      Right Ear: Hearing and external ear normal.      Left Ear: Hearing and external ear normal.      Nose: No rhinorrhea.      Right Sinus: No maxillary sinus tenderness or frontal sinus tenderness.      Left Sinus: No maxillary sinus tenderness or frontal sinus tenderness.      Mouth/Throat:      Mouth: No oral lesions.      Pharynx: Uvula midline.   Eyes:      General:         Right eye: No discharge.         Left eye: No discharge.      Conjunctiva/sclera: Conjunctivae normal.      Pupils: Pupils are equal, round, and  reactive to light.   Neck:      Musculoskeletal: Normal range of motion.      Thyroid: No thyromegaly.      Vascular: No carotid bruit.      Trachea: No tracheal deviation.   Cardiovascular:      Rate and Rhythm: Normal rate and regular rhythm.      Pulses:           Dorsalis pedis pulses are 2+ on the right side and 2+ on the left side.      Heart sounds: Normal heart sounds, S1 normal and S2 normal. No murmur.   Pulmonary:      Effort: Pulmonary effort is normal. No respiratory distress.      Breath sounds: Normal breath sounds.   Abdominal:      General: Bowel sounds are normal. There is no distension.      Palpations: Abdomen is soft. There is no mass.      Tenderness: There is no abdominal tenderness.   Musculoskeletal: Normal range of motion.         General: No swelling.   Lymphadenopathy:      Cervical: No cervical adenopathy.      Upper Body:      Right upper body: No supraclavicular adenopathy.      Left upper body: No supraclavicular adenopathy.   Skin:     General: Skin is warm and dry.      Capillary Refill: Capillary refill takes less than 2 seconds.      Findings: No rash.          Neurological:      Mental Status: She is alert and oriented to person, place, and time.      Sensory: No sensory deficit.      Coordination: Coordination normal.      Gait: Gait normal.   Psychiatric:         Mood and Affect: Mood is not anxious or depressed.         Speech: Speech normal.         Behavior: Behavior normal.         Thought Content: Thought content normal.         Judgment: Judgment normal.         Significant Labs:   CBC:   Recent Labs   Lab 07/26/20  0523 07/27/20  0451   WBC 28.94* 29.20*   HGB 7.0* 7.7*   HCT 21.5* 24.4*   PLT 43* 46*    and CMP:   Recent Labs   Lab 07/26/20 0523 07/27/20  0451   * 128*   K 3.4* 3.8    99   CO2 21* 20*   GLU 98 97   BUN 16 21   CREATININE 0.8 0.8   CALCIUM 7.9* 7.7*   ANIONGAP 10 9   EGFRNONAA >60 >60.0       Diagnostic Results:  I have reviewed all  pertinent imaging results/findings within the past 24 hours.

## 2020-07-27 NOTE — ASSESSMENT & PLAN NOTE
Patient admitted with chronic myelomonocytic leukemia with pancytopenia patient is on supportive care at this point would agree with transfusion of packed red cells and platelets until level is reached that surgeon feels comfortable with.  At this point discussed implications with Hospital Medicine with surgery as well as with patient  07/26/2020 patient's counts adequate this morning.  Patient states that she feels much better after surgical debridement.  Continue follow-up    7/27/2020:  Hemoglobin stable, platelets stable.  Patient had seen Dr. Knox last week plan was to restart by days this will be on hold until acute issues are resolved.  Will arrange for follow-up with Dr. Knox after patient is discharged.

## 2020-07-27 NOTE — PROGRESS NOTES
[facility] Medicine  Progress Note    Patient Name: Sarah Parker  MRN: [id number]  Patient Class: IP- Inpatient   Admission Date: 7/24/2020  Length of Stay: 3 days  Attending Physician: Fam Antunez MD  Primary Care Provider: Briseida Bennett MD    Subjective:     Principal Problem:Wound infection after surgery        HPI:  81 y/o WF with hx of CML with chronic anemia, gerd, HTN, HLD, RA, squamous cell carcinoma excision, hypothyroidism to ED per home health for evaluation of a lower back wound which began worsening over the past 3 days, today associated with moderate amount of serosanguinous drainage and with accompanying fever (Tmax 100.7) and pain (6/10) to the site. Was previously admitted 05/2020 for lower back abscess and required debridement and wound vac placement; also recently had SCC removed from her L thigh and LFA. Denies CP, edema, palpitations, SOB, wheezing, orthopnea, PND, abdominal pain, N/V/D, constipation, dysuria, flank pain, HA, dizziness, cough, chills, falls/trauma, blurred vision, focal deficits. Found in ED to be febrile (100.7) with leukocytosis (53.71), anemia (5.4&17.7), thrombocytopenia (30) - pt was given 1L IV fluids and started on IV vancomycin/zosyn. Plan is for debridement in OR in AM per general surgery. Hospital medicine called for admission - pt placed on med-tele; Full code, pt's surrogate decision maker is daughter, Albina Martínez.     Overview/Hospital Course:  Sarah Parker is a 80 year old female who was admitted to [facility] for wound infection following surgery. She underwent wound debridement with placement of wound vac and left lower extremity hematoma evacuation. ZE drain to left lower extremity removed today. She was transfused one unit PRBC's and one unit of platelets given history of CML. She will need to discharge home with wound vac. Awaiting  to arrange.    Interval History: doing well today. Agreeable to  unit of blood. Social service is arranging wound vac.    Review of Systems   Constitutional: Positive for fatigue. Negative for activity change, chills and diaphoresis.   HENT: Negative for congestion, trouble swallowing and voice change.    Eyes: Negative for photophobia and discharge.   Respiratory: Negative for cough, chest tightness, shortness of breath and wheezing.    Cardiovascular: Negative for chest pain, palpitations and leg swelling.   Gastrointestinal: Negative for abdominal pain, blood in stool, constipation, diarrhea, nausea and vomiting.   Endocrine: Negative for cold intolerance, heat intolerance, polydipsia, polyphagia and polyuria.   Genitourinary: Negative for difficulty urinating, dysuria, flank pain, frequency and urgency.   Musculoskeletal: Positive for arthralgias (chronic RA) and back pain (at wound site).   Skin: Positive for wound (lower back, LFA, L thigh). Negative for rash.   Allergic/Immunologic: Positive for immunocompromised state.   Neurological: Negative for dizziness, seizures, syncope, facial asymmetry, weakness, light-headedness, numbness and headaches.   Psychiatric/Behavioral: Negative for confusion and hallucinations.     Objective:     Vital Signs (Most Recent):  Temp: 98.4 °F (36.9 °C) (07/26/20 1709)  Pulse: 91 (07/26/20 1709)  Resp: 18 (07/26/20 1709)  BP: 139/63 (07/26/20 1709)  SpO2: (!) 92 % (07/26/20 1427) Vital Signs (24h Range):  Temp:  [96.9 °F (36.1 °C)-99.3 °F (37.4 °C)] 98.4 °F (36.9 °C)  Pulse:  [] 91  Resp:  [16-20] 18  SpO2:  [92 %-99 %] 92 %  BP: (118-157)/(56-72) 139/63     Weight: 49.1 kg (108 lb 3.9 oz)  Body mass index is 19.8 kg/m².    Intake/Output Summary (Last 24 hours) at 7/26/2020 1755  Last data filed at 7/26/2020 1427  Gross per 24 hour   Intake 429.17 ml   Output 55 ml   Net 374.17 ml      Physical Exam  Vitals signs reviewed.   Constitutional:       General: She is not in acute distress.     Appearance: Normal appearance. She is  ill-appearing. She is not toxic-appearing.   HENT:      Head: Normocephalic and atraumatic.      Nose: Nose normal. No congestion.      Mouth/Throat:      Mouth: Mucous membranes are moist.   Eyes:      Pupils: Pupils are equal, round, and reactive to light.   Neck:      Musculoskeletal: Normal range of motion and neck supple. No muscular tenderness.   Cardiovascular:      Rate and Rhythm: Normal rate and regular rhythm.      Pulses: Normal pulses.      Heart sounds: Normal heart sounds.   Pulmonary:      Effort: Pulmonary effort is normal. No respiratory distress.      Breath sounds: Normal breath sounds. No wheezing or rhonchi.   Abdominal:      General: Abdomen is flat. Bowel sounds are normal. There is no distension.      Palpations: Abdomen is soft.      Tenderness: There is no abdominal tenderness. There is no rebound.   Musculoskeletal: Normal range of motion.      Right lower leg: No edema.      Left lower leg: No edema.   Skin:     General: Skin is warm and dry.      Capillary Refill: Capillary refill takes less than 2 seconds.      Findings: No bruising or rash.      Comments: Wound to lower back with wound vac dressing in place.   Left lower extremity with dressing and drain in place from hematoma.   Neurological:      General: No focal deficit present.      Mental Status: She is alert and oriented to person, place, and time.   Psychiatric:         Mood and Affect: Mood normal.         Behavior: Behavior normal.         Thought Content: Thought content normal.         Judgment: Judgment normal.       Significant Labs:   CBC:   Recent Labs   Lab 07/25/20  0804 07/26/20  0523   WBC 29.61* 28.94*   HGB 7.2* 7.0*   HCT 22.0* 21.5*   PLT 19* 43*     CMP:   Recent Labs   Lab 07/25/20  0804 07/26/20  0523   * 133*   K 3.3* 3.4*    102   CO2 23 21*   * 98   BUN 20 16   CREATININE 0.7 0.8   CALCIUM 8.0* 7.9*   ANIONGAP 10 10   EGFRNONAA >60 >60       Significant Imaging: I have reviewed all  pertinent imaging results/findings within the past 24 hours.      Assessment/Plan:      * Wound infection after surgery  General surgery consulted - likely to OR in AM for debridement  CT lumbar spine pending to r/o abscess  BC x2 pending  NPO after MN  Continue IV vanc/zosyn for now  Provide adequate pain control  Tylenol prn fever    July 25, 2020  Plans for wound debridement today with Dr. Alfaro  Change Zosyn to Cefepime    July 26, 2020  Awaiting wound vac approval from insurance.     Chronic myelomonocytic leukemia not having achieved remission  Followed by Dr. Knox, currently treated with Vidaza  WBCs elevated over baseline likely d/t wound infection  Thrombocytopenia near baseline    July 25, 2020  Platelets are down to 18k today. Will transfuse one unit today.   Case discussed with Dr. Miranda.    Anemia  Likely r/t CML, in treatment  Transfuse 2 units PRBCs tonight  Repeat labs in AM    July 25, 20220  Hgb 7.5 today following 2 units  Repeat CBC in am. Anticipate transfusing an additional unit while hospitalized.    July 26, 2020  Hemoglobin 7.0 today. Agreeable to unit of blood.     Essential hypertension  Stable at this time  VS per unit routine  Home meds resumed        VTE Risk Mitigation (From admission, onward)         Ordered     IP VTE HIGH RISK PATIENT  Once      07/25/20 1647     Place sequential compression device  Until discontinued      07/24/20 2055                      Lili Crane NP  Department of Hospital Medicine   [facility]

## 2020-07-27 NOTE — SUBJECTIVE & OBJECTIVE
Interval History: No acute issues. AFVSS. Denies pain to surgical sites.     Medications:  Continuous Infusions:  Scheduled Meds:   amitriptyline  75 mg Oral QHS    ceFEPime (MAXIPIME) IVPB  2 g Intravenous Q12H    chlorhexidine  10 mL Mouth/Throat BID    DULoxetine  40 mg Oral Daily    ferrous sulfate  325 mg Oral BID    fluticasone propionate  2 spray Each Nostril QHS    levothyroxine  88 mcg Oral Before breakfast    magnesium oxide  400 mg Oral BID    metoprolol succinate  50 mg Oral Daily    nozaseptin   Each Nostril BID    pantoprazole  40 mg Oral Daily    pravastatin  10 mg Oral QHS    senna-docusate 8.6-50 mg  1 tablet Oral BID    sodium chloride 0.9%  10 mL Intravenous Q8H    vancomycin (VANCOCIN) IVPB  1,000 mg Intravenous Q24H     PRN Meds:sodium chloride, acetaminophen, acetaminophen, bisacodyL, cloNIDine, diphenhydrAMINE, HYDROcodone-acetaminophen, HYDROcodone-acetaminophen, lactulose, melatonin, ondansetron, ondansetron, promethazine (PHENERGAN) IVPB     Review of patient's allergies indicates:   Allergen Reactions    Codeine      Other reaction(s): hyper  Other reaction(s): hyper    Gabapentin Other (See Comments)     Bad dreams     Objective:     Vital Signs (Most Recent):  Temp: 98.7 °F (37.1 °C) (07/27/20 1128)  Pulse: 98 (07/27/20 1128)  Resp: 18 (07/27/20 1128)  BP: (!) 128/58 (07/27/20 1128)  SpO2: 98 % (07/27/20 1128) Vital Signs (24h Range):  Temp:  [96.9 °F (36.1 °C)-100.4 °F (38 °C)] 98.7 °F (37.1 °C)  Pulse:  [] 98  Resp:  [16-19] 18  SpO2:  [92 %-98 %] 98 %  BP: (125-157)/(58-74) 128/58     Weight: 49.1 kg (108 lb 3.9 oz)  Body mass index is 19.8 kg/m².    Intake/Output - Last 3 Shifts       07/25 0700 - 07/26 0659 07/26 0700 - 07/27 0659 07/27 0700 - 07/28 0659    P.O.  240     I.V. (mL/kg) 560 (11.4)      Blood 467.3 329.2     IV Piggyback 100 100     Total Intake(mL/kg) 1127.3 (23) 669.2 (13.6)     Drains 15      Other 40      Blood 15      Total Output 70      Net  +1057.3 +669.2            Urine Occurrence  4 x           Physical Exam  Vitals signs reviewed.   Constitutional:       General: She is not in acute distress.     Appearance: She is well-developed. She is cachectic.   HENT:      Head: Normocephalic and atraumatic.   Neck:      Musculoskeletal: Neck supple.   Cardiovascular:      Rate and Rhythm: Normal rate and regular rhythm.   Pulmonary:      Effort: Pulmonary effort is normal.   Abdominal:      General: There is no distension.      Palpations: Abdomen is soft.      Tenderness: There is no abdominal tenderness.   Musculoskeletal: Normal range of motion.   Skin:     General: Skin is warm.          Neurological:      Mental Status: She is alert and oriented to person, place, and time.         Significant Labs:  CBC:   Recent Labs   Lab 07/27/20  0451   WBC 29.20*   RBC 2.66*   HGB 7.7*   HCT 24.4*   PLT 46*   MCV 92   MCH 28.9   MCHC 31.6*     BMP:   Recent Labs   Lab 07/27/20  0451   GLU 97   *   K 3.8   CL 99   CO2 20*   BUN 21   CREATININE 0.8   CALCIUM 7.7*   MG 1.4*       Significant Diagnostics:  none

## 2020-07-27 NOTE — PT/OT/SLP PROGRESS
Physical Therapy  Treatment    Sarah Parker   MRN: [id number]   Admitting Diagnosis: Wound infection after surgery    PT Received On: 07/27/20  PT Start Time: 1025     PT Stop Time: 1050    PT Total Time (min): 25 min       Billable Minutes:  Gait Training 13 min and Therapeutic Activity 12 min    Treatment Type: Treatment  PT/PTA: PT     PTA Visit Number: 0       General Precautions: Standard, fall  Orthopedic Precautions: N/A   Braces: N/A         Subjective:  Communicated with Nurse Sharma and Epic chart review prior to session.  Pt found supine in bed and agreeable to tx at this time.     Pain/Comfort  Pain Rating 1: 0/10    Objective:   Patient found with: peripheral IV, wound vac    Functional Mobility:  Therapeutic Activities and Exercises:  Pt performed supine>sit with SBA and extended time to complete, scooted to EOB with SBA, sit>stand with SBA, gait trained ~50ft with SBA and without AD, t/f to chair with SBA. Pt performed (B) LE therapeutic exercises in sitting 1 x 15 reps: MIP, TKE, AP.      AM-PAC 6 CLICK MOBILITY  How much help from another person does this patient currently need?   1 = Unable, Total/Dependent Assistance  2 = A lot, Maximum/Moderate Assistance  3 = A little, Minimum/Contact Guard/Supervision  4 = None, Modified [place]/Independent    Turning over in bed (including adjusting bedclothes, sheets and blankets)?: 4  Sitting down on and standing up from a chair with arms (e.g., wheelchair, bedside commode, etc.): 3  Moving from lying on back to sitting on the side of the bed?: 4  Moving to and from a bed to a chair (including a wheelchair)?: 3  Need to walk in hospital room?: 3  Climbing 3-5 steps with a railing?: 1  Basic Mobility Total Score: 18    AM-PAC Raw Score CMS G-Code Modifier Level of Impairment Assistance   6 % Total / Unable   7 - 9 CM 80 - 100% Maximal Assist   10 - 14 CL 60 - 80% Moderate Assist   15 - 19 CK 40 - 60% Moderate Assist   20 - 22 CJ 20 - 40% Minimal  Assist   23 CI 1-20% SBA / CGA   24 CH 0% Independent/ Mod I     Patient left up in chair with all lines intact, call button in reach, chair alarm on and Nurse Edith notified.    Assessment:  Sarah Parker is a 80 y.o. female with a medical diagnosis of Wound infection after surgery and presents with impaired functional mobility. Pt will benefit from continued skilled PT in order to address the listed impairments.     Rehab identified problem list/impairments: Rehab identified problem list/impairments: weakness, impaired endurance, impaired self care skills, gait instability, impaired functional mobilty, impaired balance, decreased coordination    Rehab potential is good.    Activity tolerance: Fair    Discharge recommendations: Discharge Facility/Level of Care Needs: home health PT     Barriers to discharge:  UNKNOWN    Equipment recommendations: Equipment Needed After Discharge: none     GOALS:   Multidisciplinary Problems     Physical Therapy Goals        Problem: Physical Therapy Goal    Goal Priority Disciplines Outcome Goal Variances Interventions   Physical Therapy Goal     PT, PT/OT Ongoing, Progressing     Description: LTGs to be met within 7 days (8/2/20):  1.  Patient will perform bed mobility with modified independence  2.  Patient will perform functional transfers without AD with modified independence  3.  Patient will ambulate 150 feet without AD with SPV and no gross LOB                   PLAN:    Patient to be seen 5 x/week  to address the above listed problems via gait training, therapeutic activities, therapeutic exercises  Plan of Care expires: 08/02/20  Plan of Care reviewed with: patient         Keikocharlie Onealsholm, PT/OT  07/27/2020

## 2020-07-28 LAB — BACTERIA SPEC AEROBE CULT: ABNORMAL

## 2020-07-28 NOTE — PLAN OF CARE
07/28/20 0731   Final Note   Assessment Type Final Discharge Note   Anticipated Discharge Disposition Home-Health   Hospital Follow Up  Appt(s) scheduled? Yes   Right Care Referral Info   Post Acute Recommendation Home-care   Facility Name [facility]

## 2020-07-29 LAB
BACTERIA BLD CULT: NORMAL
BACTERIA BLD CULT: NORMAL
BACTERIA SPEC ANAEROBE CULT: NORMAL
FINAL PATHOLOGIC DIAGNOSIS: NORMAL
GROSS: NORMAL
PATH REV BLD -IMP: NORMAL

## 2020-07-29 NOTE — DISCHARGE SUMMARY
[facility] Medicine  Discharge Summary     Patient Name: Sarah Parker  MRN: [id number]  Patient Class: IP- Inpatient     Admission Date: 7/24/2020  Length of Stay: 3 days  Attending Physician: Fam Antunez MD  Primary Care Provider: Briseida Bennett MD     Subjective:      Principal Problem:Wound infection after surgery       HPI:  79 y/o WF with hx of CML with chronic anemia, gerd, HTN, HLD, RA, squamous cell carcinoma excision, hypothyroidism to ED per home health for evaluation of a lower back wound which began worsening over the past 3 days, today associated with moderate amount of serosanguinous drainage and with accompanying fever (Tmax 100.7) and pain (6/10) to the site. Was previously admitted 05/2020 for lower back abscess and required debridement and wound vac placement; also recently had SCC removed from her L thigh and LFA. Denies CP, edema, palpitations, SOB, wheezing, orthopnea, PND, abdominal pain, N/V/D, constipation, dysuria, flank pain, HA, dizziness, cough, chills, falls/trauma, blurred vision, focal deficits. Found in ED to be febrile (100.7) with leukocytosis (53.71), anemia (5.4&17.7), thrombocytopenia (30) - pt was given 1L IV fluids and started on IV vancomycin/zosyn. Plan is for debridement in OR in AM per general surgery. Hospital medicine called for admission - pt placed on med-tele; Full code, pt's surrogate decision maker is daughter, Albina Martínez.      Overview/Hospital Course:  Sarah Parker is a 80 year old female who was admitted to [facility] for wound infection following surgery. She underwent wound debridement with placement of wound vac and left lower extremity hematoma evacuation. ZE drain to left lower extremity removed today. She was transfused one unit PRBC's and one unit of platelets given history of CML. She will need to discharge home with wound vac. Awaiting  to arrange.     Interval History: Awaiting  authorization for wound vac. Doing well.       Review of Systems   Constitutional: Positive for fatigue. Negative for activity change, chills and diaphoresis.   HENT: Negative for congestion, trouble swallowing and voice change.    Eyes: Negative for photophobia and discharge.   Respiratory: Negative for cough, chest tightness, shortness of breath and wheezing.    Cardiovascular: Negative for chest pain, palpitations and leg swelling.   Gastrointestinal: Negative for abdominal pain, blood in stool, constipation, diarrhea, nausea and vomiting.   Endocrine: Negative for cold intolerance, heat intolerance, polydipsia, polyphagia and polyuria.   Genitourinary: Negative for difficulty urinating, dysuria, flank pain, frequency and urgency.   Musculoskeletal: Positive for arthralgias (chronic RA) and back pain (at wound site).   Skin: Positive for wound (lower back, LFA, L thigh). Negative for rash.   Allergic/Immunologic: Positive for immunocompromised state.   Neurological: Negative for dizziness, seizures, syncope, facial asymmetry, weakness, light-headedness, numbness and headaches.   Psychiatric/Behavioral: Negative for confusion and hallucinations.      Objective:      Vital Signs (Most Recent):  Temp: 98.4 °F (36.9 °C) (07/26/20 1709)  Pulse: 91 (07/26/20 1709)  Resp: 18 (07/26/20 1709)  BP: 139/63 (07/26/20 1709)  SpO2: (!) 92 % (07/26/20 1427) Vital Signs (24h Range):  Temp:  [96.9 °F (36.1 °C)-99.3 °F (37.4 °C)] 98.4 °F (36.9 °C)  Pulse:  [] 91  Resp:  [16-20] 18  SpO2:  [92 %-99 %] 92 %  BP: (118-157)/(56-72) 139/63      Weight: 49.1 kg (108 lb 3.9 oz)  Body mass index is 19.8 kg/m².     Intake/Output Summary (Last 24 hours) at 7/26/2020 1755  Last data filed at 7/26/2020 1427      Gross per 24 hour   Intake 429.17 ml   Output 55 ml   Net 374.17 ml      Physical Exam  Vitals signs reviewed.   Constitutional:       General: She is not in acute distress.     Appearance: Normal appearance. She is  ill-appearing. She is not toxic-appearing.   HENT:      Head: Normocephalic and atraumatic.      Nose: Nose normal. No congestion.      Mouth/Throat:      Mouth: Mucous membranes are moist.   Eyes:      Pupils: Pupils are equal, round, and reactive to light.   Neck:      Musculoskeletal: Normal range of motion and neck supple. No muscular tenderness.   Cardiovascular:      Rate and Rhythm: Normal rate and regular rhythm.      Pulses: Normal pulses.      Heart sounds: Normal heart sounds.   Pulmonary:      Effort: Pulmonary effort is normal. No respiratory distress.      Breath sounds: Normal breath sounds. No wheezing or rhonchi.   Abdominal:      General: Abdomen is flat. Bowel sounds are normal. There is no distension.      Palpations: Abdomen is soft.      Tenderness: There is no abdominal tenderness. There is no rebound.   Musculoskeletal: Normal range of motion.      Right lower leg: No edema.      Left lower leg: No edema.   Skin:     General: Skin is warm and dry.      Capillary Refill: Capillary refill takes less than 2 seconds.      Findings: No bruising or rash.      Comments: Wound to lower back with wound vac dressing in place.   Left lower extremity with dressing and drain in place from hematoma.   Neurological:      General: No focal deficit present.      Mental Status: She is alert and oriented to person, place, and time.   Psychiatric:         Mood and Affect: Mood normal.         Behavior: Behavior normal.         Thought Content: Thought content normal.         Judgment: Judgment normal.         Significant Labs:   CBC:   Recent Labs   Lab 07/25/20  0804 07/26/20  0523   WBC 29.61* 28.94*   HGB 7.2* 7.0*   HCT 22.0* 21.5*   PLT 19* 43*      CMP:        Recent Labs   Lab 07/25/20  0804 07/26/20  0523   * 133*   K 3.3* 3.4*    102   CO2 23 21*   * 98   BUN 20 16   CREATININE 0.7 0.8   CALCIUM 8.0* 7.9*   ANIONGAP 10 10   EGFRNONAA >60 >60         Significant Imaging: I have  reviewed all pertinent imaging results/findings within the past 24 hours.       Assessment/Plan:      * Wound infection after surgery  General surgery consulted - likely to OR in AM for debridement  CT lumbar spine pending to r/o abscess  BC x2 pending  NPO after MN  Continue IV vanc/zosyn for now  Provide adequate pain control  Tylenol prn fever     July 25, 2020  Plans for wound debridement today with Dr. Alfaro  Change Zosyn to Cefepime     July 26, 2020  Awaiting wound vac approval from insurance.     July 27, 2020  Wound vac approval pending     Chronic myelomonocytic leukemia not having achieved remission  Followed by Dr. Knox, currently treated with Vidaza  WBCs elevated over baseline likely d/t wound infection  Thrombocytopenia near baseline     July 25, 2020  Platelets are down to 18k today. Will transfuse one unit today.   Case discussed with Dr. Miranda.    July 27, 2020  Platelets are stable at 46k     Anemia  Likely r/t CML, in treatment  Transfuse 2 units PRBCs tonight  Repeat labs in AM     July 25, 20220  Hgb 7.5 today following 2 units  Repeat CBC in am. Anticipate transfusing an additional unit while hospitalized.     July 26, 2020  Hemoglobin 7.0 today. Agreeable to unit of blood.      July 27, 2020  Hemoglobin  7.7 following unit of blood. Hematology will plan to start Procrit tomorrow    Essential hypertension  Stable at this time  VS per unit routine  Home meds resumed               VTE Risk Mitigation (From admission, onward)                  Ordered        IP VTE HIGH RISK PATIENT  Once      07/25/20 1647        Place sequential compression device  Until discontinued      07/24/20 2055                             Lili Crane NP  Department of Hospital Medicine   [facility] Antunez MD  Added due to Discharge Summary mis-categorized as a progress nete

## 2020-07-30 ENCOUNTER — HOSPITAL ENCOUNTER (EMERGENCY)
Facility: HOSPITAL | Age: 80
Discharge: HOME | End: 2020-07-30
Attending: EMERGENCY MEDICINE
Payer: MEDICARE

## 2020-07-30 ENCOUNTER — TELEPHONE (OUTPATIENT)
Dept: HEMATOLOGY/ONCOLOGY | Facility: CLINIC | Age: 80
End: 2020-07-30

## 2020-07-30 ENCOUNTER — TELEPHONE (OUTPATIENT)
Dept: SURGERY | Facility: CLINIC | Age: 80
End: 2020-07-30

## 2020-07-30 VITALS
HEART RATE: 99 BPM | SYSTOLIC BLOOD PRESSURE: 175 MMHG | WEIGHT: 108 LBS | TEMPERATURE: 100 F | HEIGHT: 62 IN | DIASTOLIC BLOOD PRESSURE: 86 MMHG | RESPIRATION RATE: 17 BRPM | OXYGEN SATURATION: 98 % | BODY MASS INDEX: 19.88 KG/M2

## 2020-07-30 DIAGNOSIS — D64.9 ANEMIA, UNSPECIFIED TYPE: Primary | ICD-10-CM

## 2020-07-30 DIAGNOSIS — C92.10 CML (CHRONIC MYELOCYTIC LEUKEMIA): ICD-10-CM

## 2020-07-30 LAB
ABO + RH BLD: NORMAL
ALBUMIN SERPL BCP-MCNC: 2.1 G/DL (ref 3.5–5.2)
ALP SERPL-CCNC: 288 U/L (ref 55–135)
ALT SERPL W/O P-5'-P-CCNC: 18 U/L (ref 10–44)
ANION GAP SERPL CALC-SCNC: 12 MMOL/L (ref 8–16)
AST SERPL-CCNC: 23 U/L (ref 10–40)
BASOPHILS NFR BLD: 0 % (ref 0–1.9)
BILIRUB SERPL-MCNC: 0.3 MG/DL (ref 0.1–1)
BLASTS NFR BLD MANUAL: 4 %
BLD GP AB SCN CELLS X3 SERPL QL: NORMAL
BLD PROD TYP BPU: NORMAL
BLD PROD TYP BPU: NORMAL
BLOOD UNIT EXPIRATION DATE: NORMAL
BLOOD UNIT EXPIRATION DATE: NORMAL
BLOOD UNIT TYPE CODE: 5100
BLOOD UNIT TYPE CODE: 5100
BLOOD UNIT TYPE: NORMAL
BLOOD UNIT TYPE: NORMAL
BUN SERPL-MCNC: 23 MG/DL (ref 8–23)
CALCIUM SERPL-MCNC: 8.1 MG/DL (ref 8.7–10.5)
CHLORIDE SERPL-SCNC: 99 MMOL/L (ref 95–110)
CO2 SERPL-SCNC: 21 MMOL/L (ref 23–29)
CODING SYSTEM: NORMAL
CODING SYSTEM: NORMAL
CREAT SERPL-MCNC: 0.8 MG/DL (ref 0.5–1.4)
DIFFERENTIAL METHOD: ABNORMAL
DISPENSE STATUS: NORMAL
DISPENSE STATUS: NORMAL
EOSINOPHIL NFR BLD: 0 % (ref 0–8)
ERYTHROCYTE [DISTWIDTH] IN BLOOD BY AUTOMATED COUNT: 16.6 % (ref 11.5–14.5)
EST. GFR  (AFRICAN AMERICAN): >60 ML/MIN/1.73 M^2
EST. GFR  (NON AFRICAN AMERICAN): >60 ML/MIN/1.73 M^2
GLUCOSE SERPL-MCNC: 103 MG/DL (ref 70–110)
HCT VFR BLD AUTO: 17.5 % (ref 37–48.5)
HGB BLD-MCNC: 5.5 G/DL (ref 12–16)
IMM GRANULOCYTES # BLD AUTO: ABNORMAL K/UL (ref 0–0.04)
IMM GRANULOCYTES NFR BLD AUTO: ABNORMAL % (ref 0–0.5)
LYMPHOCYTES NFR BLD: 39 % (ref 18–48)
MCH RBC QN AUTO: 28.8 PG (ref 27–31)
MCHC RBC AUTO-ENTMCNC: 31.4 G/DL (ref 32–36)
MCV RBC AUTO: 92 FL (ref 82–98)
METAMYELOCYTES NFR BLD MANUAL: 9 %
MONOCYTES NFR BLD: 24 % (ref 4–15)
MYELOCYTES NFR BLD MANUAL: 1 %
NEUTROPHILS NFR BLD: 21 % (ref 38–73)
NEUTS BAND NFR BLD MANUAL: 2 %
NRBC BLD-RTO: 1 /100 WBC
NUM UNITS TRANS PACKED RBC: NORMAL
NUM UNITS TRANS PACKED RBC: NORMAL
PLATELET # BLD AUTO: 36 K/UL (ref 150–350)
PMV BLD AUTO: ABNORMAL FL (ref 9.2–12.9)
POTASSIUM SERPL-SCNC: 3.5 MMOL/L (ref 3.5–5.1)
PROT SERPL-MCNC: 6.6 G/DL (ref 6–8.4)
RBC # BLD AUTO: 1.91 M/UL (ref 4–5.4)
SODIUM SERPL-SCNC: 132 MMOL/L (ref 136–145)
WBC # BLD AUTO: 51.26 K/UL (ref 3.9–12.7)

## 2020-07-30 PROCEDURE — 85007 BL SMEAR W/DIFF WBC COUNT: CPT | Mod: HCNC

## 2020-07-30 PROCEDURE — 85060 PATHOLOGIST REVIEW: ICD-10-PCS | Mod: HCNC,,, | Performed by: PATHOLOGY

## 2020-07-30 PROCEDURE — P9016 RBC LEUKOCYTES REDUCED: HCPCS | Mod: HCNC

## 2020-07-30 PROCEDURE — 36430 TRANSFUSION BLD/BLD COMPNT: CPT | Mod: HCNC

## 2020-07-30 PROCEDURE — P9040 RBC LEUKOREDUCED IRRADIATED: HCPCS | Mod: HCNC

## 2020-07-30 PROCEDURE — 99291 CRITICAL CARE FIRST HOUR: CPT | Mod: HCNC

## 2020-07-30 PROCEDURE — 36415 COLL VENOUS BLD VENIPUNCTURE: CPT | Mod: HCNC

## 2020-07-30 PROCEDURE — 86901 BLOOD TYPING SEROLOGIC RH(D): CPT | Mod: HCNC

## 2020-07-30 PROCEDURE — 80053 COMPREHEN METABOLIC PANEL: CPT | Mod: HCNC

## 2020-07-30 PROCEDURE — 86920 COMPATIBILITY TEST SPIN: CPT | Mod: HCNC,59

## 2020-07-30 PROCEDURE — 85060 BLOOD SMEAR INTERPRETATION: CPT | Mod: HCNC,,, | Performed by: PATHOLOGY

## 2020-07-30 PROCEDURE — 85027 COMPLETE CBC AUTOMATED: CPT | Mod: HCNC

## 2020-07-30 RX ORDER — HYDROCODONE BITARTRATE AND ACETAMINOPHEN 500; 5 MG/1; MG/1
TABLET ORAL
Status: DISCONTINUED | OUTPATIENT
Start: 2020-07-30 | End: 2020-07-31 | Stop reason: HOSPADM

## 2020-07-30 NOTE — TELEPHONE ENCOUNTER
----- Message from Brinda Ward sent at 7/30/2020 12:07 PM CDT -----  Regarding: Advice  Contact: Patient  Patient would like a call back concerning she is very weak and may need to go to the hospital for blood. Please call at Ph .[phone] (home)

## 2020-07-30 NOTE — PHYSICIAN QUERY
"PT Name: Sarah Parker  MR #: [id number]    Procedure Clarification     CDS/: Bridget Monroy RN, CDS               Contact information: [email or web address]  This form is a permanent document in the medical record.    Query Date: July 30, 2020  By submitting this query, we are merely seeking further clarification of documentation. Please utilize your independent clinical judgment when addressing the question(s) below.    The Medical Record contains the following:   Indicator Supporting Clinical Findings Location in Medical Record   x Documentation of "Debridement" Procedure: Procedure(s) (LRB):  DEBRIDEMENT, WOUND (Left)  APPLICATION, WOUND VAC (Left)  DEBRIDEMENT (Left)  EVACUATION, HEMATOMA (Left)        Description of the Findings of the Procedure:  Lower back wound with necrotic tissue and purulence identified once the wound eschar was unroofed.  Debrided back to healthy tissue and black sponge wound VAC placed.  Wound measures 6.5 cm lengthx 4 cm width x 1 cm depth in size.    The wound was circumferentially debrided to healthy edges using Bovie cautery.  Once the overlying eschar was unroofed, purulence was encountered.  This was cultured and sent for microbiology.  Given her history of squamous cell carcinoma, the debrided tissue was sent for permanent pathology.  Hemostasis was achieved Op Note 7/25            Op Note 7/25                  Op Note 7/25     Documentation of "I&D"      Other       Excisional debridement is a surgical removal of nonvitalized tissue, necrosis or slough. The use of a sharp instrument does not always indicate that an excisional debridement was performed.    Nonexcisional debridement is the scraping, washing, irrigating, brushing away or removal of loose tissue fragments.    Provider, please provide further clarification on the procedure performed on __lower back wound  :    [   ] Excisional Debridement to skin   [x   ] Excisional Debridement to subcutaneous " tissue/fascia   [   ] Excisional Debridement to muscle        [   ] Nonexcisional Debridement to skin   [   ] Nonexcisional Debridement to subcutaneous tissue/fascia   [   ] Nonexcisional Debridement to muscle     [   ] Other Procedure (please specify): _____________   [  ] Clinically Undetermined

## 2020-07-30 NOTE — ED NOTES
Pt lying in bed in NAD,VSS,RR equal and unlabored. Bed is low, locked, and call light in reach. Side rails up x 2.  Daughter at bedside.

## 2020-07-30 NOTE — ED NOTES
Pt lying in bed in NAD,VSS,RR equal and unlabored. Bed is low, locked, and call light in reach. Side rails up x 2.  Family at bedside.

## 2020-07-30 NOTE — TELEPHONE ENCOUNTER
Spoke with Ira/nurse with [facility], states, daughter called her states, pt is very, very weak and unable to get out of the bed. Informed her Dr Shannon is not in clinic today, a message will be sent to him. Advised Ira to have the pt go to ED so they can eval and treat. Encouraged to contact our office with any other questions/concerns. Ira voiced an understanding

## 2020-07-30 NOTE — PHYSICIAN QUERY
"PT Name: Sarah Parker  MR #: [id number]    Procedure Clarification     CDS/: Bridget Monroy RN, CDS               Contact information: [email or web address]  This form is a permanent document in the medical record.    Query Date: July 30, 2020  By submitting this query, we are merely seeking further clarification of documentation. Please utilize your independent clinical judgment when addressing the question(s) below.    The Medical Record contains the following:   Indicator Supporting Clinical Findings Location in Medical Record   x Documentation of "Debridement" Procedure: Procedure(s) (LRB):  DEBRIDEMENT, WOUND (Left)  APPLICATION, WOUND VAC (Left)  DEBRIDEMENT (Left)  EVACUATION, HEMATOMA (Left)      Attention was then drawn to bilateral ischial tuberosity pressure ulcers.  The ulcers were circumferentially debrided back to healthy tissue.    Op Note 7/25            Op Note 7/25    Documentation of "I&D"      Other       Excisional debridement is a surgical removal of nonvitalized tissue, necrosis or slough. The use of a sharp instrument does not always indicate that an excisional debridement was performed.    Nonexcisional debridement is the scraping, washing, irrigating, brushing away or removal of loose tissue fragments.    Provider, please provide further clarification on the procedure performed on __bilateral ischial tuberosity  :    [   ] Excisional Debridement to skin   [ x  ] Excisional Debridement to subcutaneous tissue/fascia   [   ] Excisional Debridement to muscle        [   ] Nonexcisional Debridement to skin   [   ] Nonexcisional Debridement to subcutaneous tissue/fascia   [   ] Nonexcisional Debridement to muscle     [   ] Other Procedure (please specify): _____________   [  ] Clinically Undetermined     "

## 2020-07-30 NOTE — PHYSICIAN QUERY
PT Name: Sarah Parker  MR #: [id number]     Documentation Clarification      CDS/: Bridget Monroy RN, CDS               Contact information: [email or web address]      This form is a permanent document in the medical record.     Query Date: July 30, 2020    By submitting this query, we are merely seeking further clarification of documentation. Please utilize your independent clinical judgment when addressing the question(s) below.    The Medical Record reflects the following:    Supporting Clinical Findings Location in Medical Record   Procedure: Procedure(s) (LRB):  DEBRIDEMENT, WOUND (Left)  APPLICATION, WOUND VAC (Left)  DEBRIDEMENT (Left)  EVACUATION, HEMATOMA (Left)       Description of the Findings of the Procedure:  Lower back wound with necrotic tissue and purulence identified once the wound eschar was unroofed.  Debrided back to healthy tissue and black sponge wound VAC placed.  Wound measures 6.5 cm lengthx 4 cm width x 1 cm depth in size. Bilateral pressure wounds to ischial tuberosities.  Left ulcer debrided to healthy wound edges with resultant defect measuring 1.5 x 1.5 by 2 cm.  The left ulcer debrided to healthy wound edges with resulted defect of 0.5 x 0.5 cm in size    3 wounds noted to lower back, upper buttock. Proximal wound measures 6x5.5x1.5cm. Left distal wound measures [id number]. Right distal wound measures [id number]. All wounds with dark red moist wound beds and small amounts tan slough present.           Op Note 7/25            Op Note 7/25                             Wound Care Consult 7/27              Wound Care Consult 7/27                                                                            Provider, please specify the stages of _Bilateral pressure wounds to ischial tuberosities_      Left ischial tuberosity pressure ulcer:         [   ] Stage 2         [ x  ] Stage 3          [   ] Stage 4          [   ] Unstageable          [   ] Other, please specify ____________       Right ischial tuberosity pressure ulcer:         [   ] Stage 2         [x   ] Stage 3          [   ] Stage 4          [   ] Unstageable          [   ] Other, please specify ____________     [  ] Clinically undetermined                                                                                                           Present on admission (POA) status:   [   ] Yes (Y)                          [  ] Clinically Undetermined (W)  [   ] No (N)                            [   ] Documentation insufficient to determine if condition is POA (U)

## 2020-07-30 NOTE — ED PROVIDER NOTES
SCRIBE #1 NOTE: I, Carlitos Doss, am scribing for, and in the presence of, Zeyad Mayer MD. I have scribed the entire note.      History      Chief Complaint   Patient presents with    generalized weakness     pt reports feeling weak and reports that typically when she feels like this she needs blood transfusion. hx Chronic myelomonocytic leukemia       Review of patient's allergies indicates:   Allergen Reactions    Codeine      Other reaction(s): hyper  Other reaction(s): hyper    Gabapentin Other (See Comments)     Bad dreams        HPI   HPI    7/30/2020, 2:40 PM   History obtained from the patient      History of Present Illness: Sarah Parker is a 80 y.o. female patient with a PMHx of CML who presents to the Emergency Department for generalized weakness, onset this morning. Pt was admitted on 7/24/20 for wound infection, and was discharged yesterday. Pt states that she has had similar sxs in the past, and has needed a blood transfusion to treat her. Symptoms are constant and moderate in severity. No mitigating or exacerbating factors reported. No associated sxs reported. Patient denies any fever, chills, n/v/d, SOB, CP, numbness, dizziness, headache, and all other sxs at this time. No prior Tx reported. No further complaints or concerns at this time.     Arrival mode: Personal vehicle    PCP: Briseida Bennett MD       Past Medical History:  Past Medical History:   Diagnosis Date    Acid reflux     Anxiety     Back pain     Bronchitis, chronic obstructive w acute bronchitis 7/29/2016    Cancer     [facility] arms, face- Dr. Lata Tejada    Cataract     2+NS    Chronic myelomonocytic leukemia     Degenerative disc disease     Depression     Depression     Dry mouth     Encounter for blood transfusion     Hernia, hiatal 11/18/2013    Hypertension     Hypothyroid     Hypothyroidism     Iron deficiency anemia     Macrocytic anemia 5/3/2016    Macular degeneration     Migraines     Mixed  anxiety and depressive disorder     Multiple fractures of ribs of right side     Osteoporosis     Other hyperlipidemia 10/11/2019    Pneumonia     Pneumonia due to other staphylococcus     Rheumatoid arthritis     Rheumatoid arthritis(714.0)     Rheumatoid arthritis(714.0)     Remicade, MTX.       Past Surgical History:  Past Surgical History:   Procedure Laterality Date    APPENDECTOMY  1985    APPLICATION OF WOUND VACUUM-ASSISTED CLOSURE DEVICE N/A 5/14/2020    Procedure: APPLICATION, WOUND VAC;  Surgeon: Mckinley Shannon MD;  Location: [facility] OR;  Service: General;  Laterality: N/A;    APPLICATION OF WOUND VACUUM-ASSISTED CLOSURE DEVICE Left 7/25/2020    Procedure: APPLICATION, WOUND VAC;  Surgeon: Berenice Alfaro MD;  Location: [facility] OR;  Service: General;  Laterality: Left;    BREAST BIOPSY      CATARACT EXTRACTION Bilateral 6/11/15    Dr. Booth    CHOLECYSTECTOMY  2013    cryoablasion kidney Left 09/27/2016    DEBRIDEMENT Left 7/25/2020    Procedure: DEBRIDEMENT;  Surgeon: Berenice Alfaro MD;  Location: [facility] OR;  Service: General;  Laterality: Left;    EVACUATION OF HEMATOMA Left 7/25/2020    Procedure: EVACUATION, HEMATOMA;  Surgeon: Berenice Alfaro MD;  Location: [facility] OR;  Service: General;  Laterality: Left;    EXCISION OF SQUAMOUS CELL CARCINOMA Left 7/2/2020    Procedure: EXCISION, CARCINOMA, SQUAMOUS CELL;  Surgeon: Mckinley Shannon MD;  Location: [facility] OR;  Service: General;  Laterality: Left;    feet Bilateral     rheumatoid    FRACTURE SURGERY Right     tibia    HERNIA REPAIR      HYSTERECTOMY  1970    partial    INCISION AND DRAINAGE OF ABSCESS N/A 5/12/2020    Procedure: INCISION AND DRAINAGE, ABSCESS;  Surgeon: Emerson Hathaway MD;  Location: [facility] OR;  Service: General;  Laterality: N/A;    INCISIONAL BIOPSY N/A 5/12/2020    Procedure: INCISIONAL BIOPSY;  Surgeon: Emerson Hathaway MD;  Location: [facility] OR;  Service: General;  Laterality: N/A;    JOINT REPLACEMENT      bilateral  knees (), hands, wrists, knuckles, toes    LAPAROSCOPIC NISSEN FUNDOPLICATION      TRANSFORAMINAL EPIDURAL INJECTION OF STEROID Left 2019    Procedure: Left L5/S1 TF AYAAN with local;  Surgeon: Rowdy Felix MD;  [facility];  Service: Pain Management;  Laterality: Left;    WOUND DEBRIDEMENT N/A 2020    Procedure: DEBRIDEMENT, WOUND;  Surgeon: Mckinley Shannon MD;  Location: [facility] OR;  Service: General;  Laterality: N/A;    WOUND DEBRIDEMENT Left 2020    Procedure: DEBRIDEMENT, WOUND;  Surgeon: Berenice Alfaro MD;  Location: [facility] OR;  Service: General;  Laterality: Left;         Family History:  Family History   Problem Relation Age of Onset    Heart disease Mother     Hyperlipidemia Mother     Hypertension Mother     Osteoarthritis Mother     Cataracts Mother     Hypertension Father     Osteoarthritis Father     Heart disease Father     Asthma Sister     Chronic back pain Sister     Hypertension Sister     Osteoarthritis Sister     Thyroid disease Sister     Asthma Brother     Cancer Brother     Chronic back pain Brother     Diabetes Mellitus Brother     Hypertension Brother     Osteoarthritis Brother     Thyroid disease Brother     Cancer Maternal Grandfather     Fibromyalgia Daughter     Heart disease Maternal Grandmother     Colon cancer Neg Hx     Diabetes Neg Hx        Social History:  Social History     Tobacco Use    Smoking status: Former Smoker     Packs/day: 0.25     Years: 2.00     Pack years: 0.50     Quit date: 1965     Years since quittin.7    Smokeless tobacco: Never Used   Substance and Sexual Activity    Alcohol use: No    Drug use: No    Sexual activity: Never     Partners: Male       ROS   Review of Systems   Constitutional: Negative for chills and fever.   HENT: Negative for sore throat.    Respiratory: Negative for shortness of breath.    Cardiovascular: Negative for chest pain.   Gastrointestinal: Negative for diarrhea,  nausea and vomiting.   Genitourinary: Negative for dysuria.   Musculoskeletal: Negative for back pain.   Skin: Negative for rash.   Neurological: Positive for weakness (generalized). Negative for dizziness, light-headedness, numbness and headaches.   Hematological: Does not bruise/bleed easily.   All other systems reviewed and are negative.    Physical Exam      Initial Vitals [07/30/20 1400]   BP Pulse Resp Temp SpO2   139/66 (!) 113 20 98.7 °F (37.1 °C) 98 %      MAP       --          Physical Exam  Nursing Notes and Vital Signs Reviewed.  Constitutional: Patient is in no acute distress. Well-developed and well-nourished.  Head: Atraumatic. Normocephalic.  Eyes: PERRL. EOM intact. Conjunctivae are not pale. No scleral icterus.  ENT: Mucous membranes are moist. Oropharynx is clear and symmetric.    Neck: Supple. Full ROM. No lymphadenopathy.  Cardiovascular: Tachycardic. Regular rhythm. No murmurs, rubs, or gallops. Distal pulses are 2+ and symmetric.  Pulmonary/Chest: No respiratory distress. Clear to auscultation bilaterally. No wheezing or rales.  Abdominal: Soft and non-distended.  There is no tenderness.  No rebound, guarding, or rigidity.  Musculoskeletal: Moves all extremities. No obvious deformities. No edema.  Skin: Warm and dry.  Neurological:  Alert, awake, and appropriate.  Normal speech.  No acute focal neurological deficits are appreciated.  Psychiatric: Normal affect. Good eye contact. Appropriate in content.    ED Course    Critical Care    Date/Time: 7/30/2020 3:01 PM  Performed by: Zeyad Mayer MD  Authorized by: Zeyad Mayer MD   Direct patient critical care time: 15 minutes  Additional history critical care time: 5 minutes  Ordering / reviewing critical care time: 10 minutes  Documentation critical care time: 5 minutes  Total critical care time (exclusive of procedural time) : 35 minutes  Critical care time was exclusive of separately billable procedures and treating other patients and  teaching time.  Critical care was necessary to treat or prevent imminent or life-threatening deterioration of the following conditions: Anemia requiring transfusion.  Critical care was time spent personally by me on the following activities: blood draw for specimens, development of treatment plan with patient or surrogate, interpretation of cardiac output measurements, evaluation of patient's response to treatment, examination of patient, obtaining history from patient or surrogate, ordering and performing treatments and interventions, ordering and review of laboratory studies, pulse oximetry, re-evaluation of patient's condition and review of old charts.        ED Vital Signs:  Vitals:    07/30/20 1701 07/30/20 1712 07/30/20 1733 07/30/20 1748   BP: (!) 170/77 (!) 170/77 (!) 162/71 (!) 162/73   Pulse: 106 106 109 106   Resp:  20 17 16   Temp:  99.7 °F (37.6 °C) 99.8 °F (37.7 °C) 99.7 °F (37.6 °C)   TempSrc:  Oral Oral Oral   SpO2: 98% 98% 99% 98%   Weight:       Height:        07/30/20 1840 07/30/20 1940 07/30/20 1950 07/30/20 1959   BP: (!) 161/74 (!) 166/78 (!) 173/98 (!) 163/75   Pulse: 97 98 99 98   Resp: 18 18 17 17   Temp: 99.6 °F (37.6 °C) 98.8 °F (37.1 °C) 98.8 °F (37.1 °C) 98.6 °F (37 °C)   TempSrc: Oral Oral Oral Oral   SpO2: 99% 99% 98% 99%   Weight:       Height:        07/30/20 2008 07/30/20 2024 07/30/20 2119 07/30/20 2139   BP: (!) 165/73 (!) 173/98 (!) 169/74 (!) 170/78   Pulse: 99 99 98 98   Resp: 17 19 20 17   Temp: 98.6 °F (37 °C) 99.8 °F (37.7 °C) 98.4 °F (36.9 °C) 98.6 °F (37 °C)   TempSrc: Oral Oral Oral Oral   SpO2: 99% 98% 97% 98%   Weight:       Height:        07/30/20 2228 07/30/20 2240 07/30/20 2301   BP: (!) 174/81 (!) 171/79 (!) 175/86   Pulse: 99 99 99   Resp: 18 18 17   Temp: 99.7 °F (37.6 °C) 99.7 °F (37.6 °C) 99.6 °F (37.6 °C)   TempSrc: Oral Oral Oral   SpO2: 98% 98% 98%   Weight:      Height:          Abnormal Lab Results:  Labs Reviewed   CBC W/ AUTO DIFFERENTIAL - Abnormal;  Notable for the following components:       Result Value    WBC 51.26 (*)     RBC 1.91 (*)     Hemoglobin 5.5 (*)     Hematocrit 17.5 (*)     Mean Corpuscular Hemoglobin Conc 31.4 (*)     RDW 16.6 (*)     Platelets 36 (*)     nRBC 1 (*)     Gran% 21.0 (*)     Mono% 24.0 (*)     Blasts 4.0 (*)     All other components within normal limits    Narrative:     PLT critical result(s) called and verbal readback obtained from   Renetta Reza RN by [facility] 07/31/2020 06:22  wbc, hgb, hct critical result(s) called and verbal readback obtained   from Renetta Reza RN by [facility] 07/30/2020 14:57   COMPREHENSIVE METABOLIC PANEL - Abnormal; Notable for the following components:    Sodium 132 (*)     CO2 21 (*)     Calcium 8.1 (*)     Albumin 2.1 (*)     Alkaline Phosphatase 288 (*)     All other components within normal limits   PATHOLOGIST REVIEW   TYPE & SCREEN   PREPARE RBC SOFT        All Lab Results:  Results for orders placed or performed during the hospital encounter of 07/30/20   CBC auto differential   Result Value Ref Range    WBC 51.26 (HH) 3.90 - 12.70 K/uL    RBC 1.91 (L) 4.00 - 5.40 M/uL    Hemoglobin 5.5 (LL) 12.0 - 16.0 g/dL    Hematocrit 17.5 (LL) 37.0 - 48.5 %    Mean Corpuscular Volume 92 82 - 98 fL    Mean Corpuscular Hemoglobin 28.8 27.0 - 31.0 pg    Mean Corpuscular Hemoglobin Conc 31.4 (L) 32.0 - 36.0 g/dL    RDW 16.6 (H) 11.5 - 14.5 %    Platelets 36 (LL) 150 - 350 K/uL    MPV SEE COMMENT 9.2 - 12.9 fL    Immature Granulocytes CANCELED 0.0 - 0.5 %    Immature Grans (Abs) CANCELED 0.00 - 0.04 K/uL    nRBC 1 (A) 0 /100 WBC    Gran% 21.0 (L) 38.0 - 73.0 %    Lymph% 39.0 18.0 - 48.0 %    Mono% 24.0 (H) 4.0 - 15.0 %    Eosinophil% 0.0 0.0 - 8.0 %    Basophil% 0.0 0.0 - 1.9 %    Bands 2.0 %    Metamyelocytes 9.0 %    Myelocytes 1.0 %    Blasts 4.0 (A) 0 %    Differential Method Manual    Comprehensive metabolic panel   Result Value Ref Range    Sodium 132 (L) 136 - 145 mmol/L    Potassium 3.5 3.5 - 5.1 mmol/L     Chloride 99 95 - 110 mmol/L    CO2 21 (L) 23 - 29 mmol/L    Glucose 103 70 - 110 mg/dL    BUN, Bld 23 8 - 23 mg/dL    Creatinine 0.8 0.5 - 1.4 mg/dL    Calcium 8.1 (L) 8.7 - 10.5 mg/dL    Total Protein 6.6 6.0 - 8.4 g/dL    Albumin 2.1 (L) 3.5 - 5.2 g/dL    Total Bilirubin 0.3 0.1 - 1.0 mg/dL    Alkaline Phosphatase 288 (H) 55 - 135 U/L    AST 23 10 - 40 U/L    ALT 18 10 - 44 U/L    Anion Gap 12 8 - 16 mmol/L    eGFR if African American >60 >60 mL/min/1.73 m^2    eGFR if non African American >60 >60 mL/min/1.73 m^2   Pathologist Review   Result Value Ref Range    Pathologist Review Peripheral Smear REVIEWED    Type & Screen   Result Value Ref Range    Group & Rh O POS     Indirect Saw NEG    Prepare RBC 2 Units; emergency   Result Value Ref Range    UNIT NUMBER [id number]     Product Code [id number]     DISPENSE STATUS TRANSFUSED     CODING SYSTEM OCRV168     Unit Blood Type Code 5100     Unit Blood Type O POS     Unit Expiration [id number]     UNIT NUMBER [id number]     Product Code [id number]     DISPENSE STATUS TRANSFUSED     CODING SYSTEM TZZW304     Unit Blood Type Code 5100     Unit Blood Type O POS     Unit Expiration [id number]      *Note: Due to a large number of results and/or encounters for the requested time period, some results have not been displayed. A complete set of results can be found in Results Review.     Imaging Results:  Imaging Results    None                 The Emergency Provider reviewed the vital signs and test results, which are outlined above.    ED Discussion     3:53 PM: Reassessed pt at this time. Discussed with pt all pertinent ED information and results. Discussed pt dx and plan of tx. Gave pt all f/u and return to the ED instructions. All questions and concerns were addressed at this time. Pt expresses understanding of information and instructions, and is comfortable with plan to discharge. Pt is stable for discharge.    I discussed with patient and/or family/caretaker  that evaluation in the ED does not suggest any emergent or life threatening medical conditions requiring immediate intervention beyond what was provided in the ED, and I believe patient is safe for discharge.  Regardless, an unremarkable evaluation in the ED does not preclude the development or presence of a serious of life threatening condition. As such, patient was instructed to return immediately for any worsening or change in current symptoms.         ED Medication(s):  Medications - No data to display    Follow-up Information     Follow-up with your oncologist.           [facility] - .    Specialty: Emergency Medicine  Why: As needed, If symptoms worsen  Contact information:  [address] [facility] [address] [phone]                Discharge Medication List as of 7/30/2020  3:54 PM            Medical Decision Making    Medical Decision Making:   Clinical Tests:   Lab Tests: Ordered and Reviewed  Recurrent anemia.  Treated with 2 units of leukoreduced, irradiated blood. Discharged home with instructions to follow-up with Oncology.  ER return precautions provided for any new worsening symptoms           Scribe Attestation:   Scribe #1: I performed the above scribed service and the documentation accurately describes the services I performed. I attest to the accuracy of the note.    Attending:   Physician Attestation Statement for Scribe #1: I, Zeyad aMyer MD, personally performed the services described in this documentation, as scribed by Carlitos Doss, in my presence, and it is both accurate and complete.          Clinical Impression       ICD-10-CM ICD-9-CM   1. Anemia, unspecified type  D64.9 285.9   2. CML (chronic myelocytic leukemia)  C92.10 205.10       Disposition:   Disposition: Discharged  Condition: Stable         Zeyad Mayer MD  07/31/20 1614

## 2020-07-30 NOTE — ED NOTES
MD at bedside discussing risks and benefits of blood transfusion discussed with pt, pt verbalizes understanding, MD and pt sign consent form, RN witness.

## 2020-07-30 NOTE — ED NOTES
Pt lying in bed receiving blood through IV in NAD,VSS,RR equal and unlabored. Bed is low, locked, and call light in reach. Side rails up x 2. Daughter and nurse at bedside.

## 2020-07-31 ENCOUNTER — DOCUMENT SCAN (OUTPATIENT)
Dept: HOME HEALTH SERVICES | Facility: HOSPITAL | Age: 80
End: 2020-07-31
Payer: MEDICARE

## 2020-07-31 LAB — PATH REV BLD -IMP: NORMAL

## 2020-08-02 NOTE — PROGRESS NOTES
Subjective:       Patient ID: Sarah Parker is a 80 y.o. female.    Chief Complaint:  Chronic myelomonocytic leukemia    Follow-up  Associated symptoms include abdominal pain. Pertinent negatives include no arthralgias, chest pain, chills, congestion, coughing, diaphoresis, fever, headaches, joint swelling, myalgias, nausea, neck pain, numbness, sore throat or vomiting.      Patient is a 80-year-old female presents for reinstitution  of Vidaza therapy for chronic myelomonocytic leukemia patient who is currently on treatment with azacitidine.  She is here for routine follow-up.      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  Recently treated for MRSA infection of lower back for which she is on wound VAC.    Her treatment for CMML has been interrupted on multiple occasions due to infections and recent diagnosis of squamous cell skin cancer.    Today she presents in the company of her daughter.  She complains of abdominal pain.  Denies nausea or vomiting, fever, unintentional weight loss, night sweats, chest pain.  Also denies abnormal bleed.    Past Medical History:   Diagnosis Date    Acid reflux     Anxiety     Back pain     Bronchitis, chronic obstructive w acute bronchitis 7/29/2016    Cancer     [facility] arms, face- Dr. Lata Tejada    Cataract     2+NS    Chronic myelomonocytic leukemia     Degenerative disc disease     Depression     Depression     Dry mouth     Encounter for blood transfusion     Hernia, hiatal 11/18/2013    Hypertension     Hypothyroid     Hypothyroidism     Iron deficiency anemia     Macrocytic anemia 5/3/2016    Macular degeneration     Migraines     Mixed anxiety and depressive disorder     Multiple fractures of ribs of right side     Osteoporosis     Other hyperlipidemia 10/11/2019    Pneumonia     Pneumonia due to other staphylococcus     Rheumatoid arthritis      Rheumatoid arthritis(714.0)     Rheumatoid arthritis(714.0)     Remicade, MTX.     Family History   Problem Relation Age of Onset    Heart disease Mother     Hyperlipidemia Mother     Hypertension Mother     Osteoarthritis Mother     Cataracts Mother     Hypertension Father     Osteoarthritis Father     Heart disease Father     Asthma Sister     Chronic back pain Sister     Hypertension Sister     Osteoarthritis Sister     Thyroid disease Sister     Asthma Brother     Cancer Brother     Chronic back pain Brother     Diabetes Mellitus Brother     Hypertension Brother     Osteoarthritis Brother     Thyroid disease Brother     Cancer Maternal Grandfather     Fibromyalgia Daughter     Heart disease Maternal Grandmother     Colon cancer Neg Hx     Diabetes Neg Hx      Social History     Socioeconomic History    Marital status:      Spouse name: Not on file    Number of children: Not on file    Years of education: Not on file    Highest education level: Not on file   Occupational History    Occupation: retired   Social Needs    Financial resource strain: Not on file    Food insecurity     Worry: Not on file     Inability: Not on file    Transportation needs     Medical: Not on file     Non-medical: Not on file   Tobacco Use    Smoking status: Former Smoker     Packs/day: 0.25     Years: 2.00     Pack years: 0.50     Quit date: 1965     Years since quittin.7    Smokeless tobacco: Never Used   Substance and Sexual Activity    Alcohol use: No    Drug use: No    Sexual activity: Never     Partners: Male   Lifestyle    Physical activity     Days per week: Not on file     Minutes per session: Not on file    Stress: Not at all   Relationships    Social connections     Talks on phone: Not on file     Gets together: Not on file     Attends [employer] service: Not on file     Active member of club or organization: Not on file     Attends meetings of clubs or organizations:  Not on file     Relationship status: Not on file   Other Topics Concern    Not on file   Social History Narrative    Patient is aretired and live with .     Past Surgical History:   Procedure Laterality Date    APPENDECTOMY  1985    APPLICATION OF WOUND VACUUM-ASSISTED CLOSURE DEVICE N/A 5/14/2020    Procedure: APPLICATION, WOUND VAC;  Surgeon: Mckinley Shannon MD;  Location: [facility] OR;  Service: General;  Laterality: N/A;    APPLICATION OF WOUND VACUUM-ASSISTED CLOSURE DEVICE Left 7/25/2020    Procedure: APPLICATION, WOUND VAC;  Surgeon: Berenice Alfaro MD;  Location: [facility] OR;  Service: General;  Laterality: Left;    BREAST BIOPSY      CATARACT EXTRACTION Bilateral 6/11/15    Dr. Booth    CHOLECYSTECTOMY  2013    cryoablasion kidney Left 09/27/2016    DEBRIDEMENT Left 7/25/2020    Procedure: DEBRIDEMENT;  Surgeon: Berenice Alfaro MD;  Location: [facility] OR;  Service: General;  Laterality: Left;    EVACUATION OF HEMATOMA Left 7/25/2020    Procedure: EVACUATION, HEMATOMA;  Surgeon: Berenice Alfaro MD;  Location: [facility] OR;  Service: General;  Laterality: Left;    EXCISION OF SQUAMOUS CELL CARCINOMA Left 7/2/2020    Procedure: EXCISION, CARCINOMA, SQUAMOUS CELL;  Surgeon: Mckinley Shannon MD;  Location: [facility] OR;  Service: General;  Laterality: Left;    feet Bilateral     rheumatoid    FRACTURE SURGERY Right     tibia    HERNIA REPAIR      HYSTERECTOMY  1970    partial    INCISION AND DRAINAGE OF ABSCESS N/A 5/12/2020    Procedure: INCISION AND DRAINAGE, ABSCESS;  Surgeon: Emerson Hathaway MD;  Location: [facility] OR;  Service: General;  Laterality: N/A;    INCISIONAL BIOPSY N/A 5/12/2020    Procedure: INCISIONAL BIOPSY;  Surgeon: Emerson Hathaway MD;  Location: [facility] OR;  Service: General;  Laterality: N/A;    JOINT REPLACEMENT      bilateral knees (2008), hands, wrists, knuckles, toes    LAPAROSCOPIC NISSEN FUNDOPLICATION      TRANSFORAMINAL EPIDURAL INJECTION OF STEROID Left 6/25/2019    Procedure: Left  L5/S1 TF AYAAN with local;  Surgeon: Rowdy Felix MD;  Location: [facility];  Service: Pain Management;  Laterality: Left;    WOUND DEBRIDEMENT N/A 5/14/2020    Procedure: DEBRIDEMENT, WOUND;  Surgeon: Mckinley Shannon MD;  Location: [facility] OR;  Service: General;  Laterality: N/A;    WOUND DEBRIDEMENT Left 7/25/2020    Procedure: DEBRIDEMENT, WOUND;  Surgeon: Berenice Alfaro MD;  Location: [facility] OR;  Service: General;  Laterality: Left;       Labs:  Lab Results   Component Value Date    WBC 56.17 (HH) 08/03/2020    HGB 6.8 (L) 08/03/2020    HCT 21.3 (L) 08/03/2020    MCV 94 08/03/2020    PLT 38 (LL) 08/03/2020     BMP  Lab Results   Component Value Date     (L) 08/03/2020    K 4.4 08/03/2020     08/03/2020    CO2 24 08/03/2020    BUN 26 (H) 08/03/2020    CREATININE 0.8 08/03/2020    CALCIUM 7.8 (L) 08/03/2020    ANIONGAP 9 08/03/2020    ESTGFRAFRICA >60 08/03/2020    EGFRNONAA >60 08/03/2020     Lab Results   Component Value Date    ALT 19 08/03/2020    AST 23 08/03/2020    ALKPHOS 251 (H) 08/03/2020    BILITOT 0.3 08/03/2020       Lab Results   Component Value Date    IRON 93 01/17/2020    TIBC 425 01/17/2020    FERRITIN 276 01/17/2020     Lab Results   Component Value Date    KRSMTUBC60 1918 (H) 08/25/2019     Lab Results   Component Value Date    FOLATE 12.0 08/25/2019     Lab Results   Component Value Date    TSH 5.174 (H) 09/03/2019         Review of Systems   Constitutional: Positive for activity change. Negative for chills, diaphoresis and fever.   HENT: Negative for congestion, dental problem, drooling, ear discharge, ear pain, facial swelling, hearing loss, mouth sores, nosebleeds, postnasal drip, rhinorrhea, sinus pressure, sneezing, sore throat, tinnitus, trouble swallowing and voice change.    Eyes: Negative for photophobia, pain, discharge, redness, itching and visual disturbance.   Respiratory: Negative for cough, choking, chest tightness, shortness of breath, wheezing and stridor.     Cardiovascular: Negative for chest pain, palpitations and leg swelling.   Gastrointestinal: Positive for abdominal distention and abdominal pain. Negative for anal bleeding, blood in stool, constipation, diarrhea, nausea, rectal pain and vomiting.   Endocrine: Negative for cold intolerance, heat intolerance, polydipsia, polyphagia and polyuria.   Genitourinary: Negative for decreased urine volume, difficulty urinating, dyspareunia, dysuria, enuresis, flank pain, frequency, genital sores, hematuria, menstrual problem, pelvic pain, urgency, vaginal bleeding, vaginal discharge and vaginal pain.   Musculoskeletal: Negative for arthralgias, gait problem, joint swelling, myalgias, neck pain and neck stiffness.   Skin: Negative for color change and pallor.   Allergic/Immunologic: Negative for environmental allergies, food allergies and immunocompromised state.   Neurological: Negative for dizziness, tremors, seizures, syncope, facial asymmetry, speech difficulty, light-headedness, numbness and headaches.   Hematological: Negative for adenopathy. Does not bruise/bleed easily.   Psychiatric/Behavioral: Positive for dysphoric mood. Negative for agitation, behavioral problems, confusion, decreased concentration, hallucinations, self-injury, sleep disturbance and suicidal ideas. The patient is nervous/anxious. The patient is not hyperactive.        Objective:      Physical Exam  Vitals signs reviewed.   Constitutional:       Appearance: She is cachectic. She is not diaphoretic.   HENT:      Head: Normocephalic and atraumatic.      Right Ear: External ear normal.      Left Ear: External ear normal.      Nose: Nose normal.      Right Sinus: No maxillary sinus tenderness or frontal sinus tenderness.      Left Sinus: No maxillary sinus tenderness or frontal sinus tenderness.      Mouth/Throat:      Pharynx: No oropharyngeal exudate.   Eyes:      General: Lids are normal. No scleral icterus.        Right eye: No discharge.          Left eye: No discharge.      Conjunctiva/sclera: Conjunctivae normal.      Right eye: Right conjunctiva is not injected. No hemorrhage.     Left eye: Left conjunctiva is not injected. No hemorrhage.     Pupils: Pupils are equal, round, and reactive to light.   Neck:      Musculoskeletal: Normal range of motion and neck supple.      Thyroid: No thyromegaly.      Vascular: No JVD.      Trachea: No tracheal deviation.   Cardiovascular:      Rate and Rhythm: Normal rate and regular rhythm.   Pulmonary:      Effort: Pulmonary effort is normal. No respiratory distress.      Breath sounds: No stridor.   Chest:      Chest wall: No tenderness.   Abdominal:      General: There is distension.      Palpations: Abdomen is soft. There is no hepatomegaly, splenomegaly or mass.      Tenderness: There is abdominal tenderness (Tenderness noted in the left upper quadrant). There is no rebound.   Musculoskeletal: Normal range of motion.         General: Deformity present. No tenderness.      Comments: Deformed digits of upper extremity secondary to arthritis.   Lymphadenopathy:      Cervical: No cervical adenopathy.      Upper Body:      Right upper body: No supraclavicular adenopathy.      Left upper body: No supraclavicular adenopathy.   Skin:     General: Skin is dry.      Findings: No erythema.   Neurological:      Mental Status: She is alert and oriented to person, place, and time.      Cranial Nerves: No cranial nerve deficit.      Coordination: Coordination normal.      Gait: Gait abnormal.   Psychiatric:         Behavior: Behavior normal.         Thought Content: Thought content normal.         Judgment: Judgment normal.           Assessment:      1. Abdominal pain, unspecified abdominal location    2. Chronic myelomonocytic leukemia not having achieved remission    3. Monoclonal paraproteinemia    4. 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 Wound infection after surgery           Plan:      Chronic myelomonocytic leukemia not having achieved remission  Chronic myelomonocytic leukemia on hypermethylating agent.  Plan is to resume via days a a monitor for disease response.  If no significant therapeutic response, will consider low-dose cytarabine.    Recently noted pancytopenia from blood work on 07/30/2020 with peripheral blast at 4%.  Concern for disease progression.    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  Will continue to monitor.    Squamous cell carcinoma of skin of upper extremity, including shoulder  Status post surgical resection with negative margins.    Wound infection after surgery  On wound VAC.  Management through surgery service.    Abdominal pain/distension:  Physical exam showed palpable spleen that is tender.  Will obtain CT scan for further evaluation.    Denton Knox MD

## 2020-08-02 NOTE — ASSESSMENT & PLAN NOTE
Chronic myelomonocytic leukemia on hypermethylating agent.  Plan is to resume via days a a monitor for disease response.  If no significant therapeutic response, will consider low-dose cytarabine.    Recently noted pancytopenia from blood work on 07/30/2020 with peripheral blast at 4%.  Concern for disease progression.

## 2020-08-02 NOTE — ASSESSMENT & PLAN NOTE
Electrophoresis and immunofixation showed evidence of IgG lambda monoclonal protein.  Will continue to monitor.

## 2020-08-03 ENCOUNTER — OFFICE VISIT (OUTPATIENT)
Dept: HEMATOLOGY/ONCOLOGY | Facility: CLINIC | Age: 80
End: 2020-08-03
Payer: MEDICARE

## 2020-08-03 ENCOUNTER — LAB VISIT (OUTPATIENT)
Dept: LAB | Facility: HOSPITAL | Age: 80
End: 2020-08-03
Attending: INTERNAL MEDICINE
Payer: MEDICARE

## 2020-08-03 ENCOUNTER — HOSPITAL ENCOUNTER (OUTPATIENT)
Dept: RADIOLOGY | Facility: HOSPITAL | Age: 80
Discharge: HOME | End: 2020-08-03
Attending: INTERNAL MEDICINE
Payer: MEDICARE

## 2020-08-03 VITALS
OXYGEN SATURATION: 98 % | HEART RATE: 101 BPM | DIASTOLIC BLOOD PRESSURE: 86 MMHG | WEIGHT: 113.13 LBS | TEMPERATURE: 98 F | BODY MASS INDEX: 20.82 KG/M2 | SYSTOLIC BLOOD PRESSURE: 140 MMHG | HEIGHT: 62 IN | RESPIRATION RATE: 17 BRPM

## 2020-08-03 DIAGNOSIS — D47.2 MONOCLONAL PARAPROTEINEMIA: ICD-10-CM

## 2020-08-03 DIAGNOSIS — T81.49XA WOUND INFECTION AFTER SURGERY: ICD-10-CM

## 2020-08-03 DIAGNOSIS — C93.10 CHRONIC MYELOMONOCYTIC LEUKEMIA NOT HAVING ACHIEVED REMISSION: Chronic | ICD-10-CM

## 2020-08-03 DIAGNOSIS — D50.0 IRON DEFICIENCY ANEMIA DUE TO CHRONIC BLOOD LOSS: ICD-10-CM

## 2020-08-03 DIAGNOSIS — R10.9 ABDOMINAL PAIN, UNSPECIFIED ABDOMINAL LOCATION: ICD-10-CM

## 2020-08-03 DIAGNOSIS — C44.629 SQUAMOUS CELL CARCINOMA OF SKIN OF LEFT UPPER EXTREMITY, INCLUDING SHOULDER: ICD-10-CM

## 2020-08-03 DIAGNOSIS — R10.9 ABDOMINAL PAIN, UNSPECIFIED ABDOMINAL LOCATION: Primary | ICD-10-CM

## 2020-08-03 LAB
ALBUMIN SERPL BCP-MCNC: 2.4 G/DL (ref 3.5–5.2)
ALP SERPL-CCNC: 251 U/L (ref 55–135)
ALT SERPL W/O P-5'-P-CCNC: 19 U/L (ref 10–44)
ANION GAP SERPL CALC-SCNC: 9 MMOL/L (ref 8–16)
AST SERPL-CCNC: 23 U/L (ref 10–40)
BASOPHILS # BLD AUTO: ABNORMAL K/UL (ref 0–0.2)
BASOPHILS NFR BLD: 0 % (ref 0–1.9)
BILIRUB SERPL-MCNC: 0.3 MG/DL (ref 0.1–1)
BLASTS NFR BLD MANUAL: 11 %
BUN SERPL-MCNC: 26 MG/DL (ref 8–23)
CALCIUM SERPL-MCNC: 7.8 MG/DL (ref 8.7–10.5)
CHLORIDE SERPL-SCNC: 100 MMOL/L (ref 95–110)
CO2 SERPL-SCNC: 24 MMOL/L (ref 23–29)
CREAT SERPL-MCNC: 0.8 MG/DL (ref 0.5–1.4)
DIFFERENTIAL METHOD: ABNORMAL
EOSINOPHIL # BLD AUTO: ABNORMAL K/UL (ref 0–0.5)
EOSINOPHIL NFR BLD: 0 % (ref 0–8)
ERYTHROCYTE [DISTWIDTH] IN BLOOD BY AUTOMATED COUNT: 16.5 % (ref 11.5–14.5)
EST. GFR  (AFRICAN AMERICAN): >60 ML/MIN/1.73 M^2
EST. GFR  (NON AFRICAN AMERICAN): >60 ML/MIN/1.73 M^2
GLUCOSE SERPL-MCNC: 102 MG/DL (ref 70–110)
HCT VFR BLD AUTO: 21.3 % (ref 37–48.5)
HGB BLD-MCNC: 6.8 G/DL (ref 12–16)
IMM GRANULOCYTES # BLD AUTO: ABNORMAL K/UL (ref 0–0.04)
IMM GRANULOCYTES NFR BLD AUTO: ABNORMAL % (ref 0–0.5)
LYMPHOCYTES # BLD AUTO: ABNORMAL K/UL (ref 1–4.8)
LYMPHOCYTES NFR BLD: 33 % (ref 18–48)
MCH RBC QN AUTO: 30 PG (ref 27–31)
MCHC RBC AUTO-ENTMCNC: 31.9 G/DL (ref 32–36)
MCV RBC AUTO: 94 FL (ref 82–98)
METAMYELOCYTES NFR BLD MANUAL: 5 %
MONOCYTES # BLD AUTO: ABNORMAL K/UL (ref 0.3–1)
MONOCYTES NFR BLD: 17 % (ref 4–15)
MYELOCYTES NFR BLD MANUAL: 1 %
NEUTROPHILS NFR BLD: 33 % (ref 38–73)
NRBC BLD-RTO: 1 /100 WBC
PLATELET # BLD AUTO: 38 K/UL (ref 150–350)
PMV BLD AUTO: 12.2 FL (ref 9.2–12.9)
POTASSIUM SERPL-SCNC: 4.4 MMOL/L (ref 3.5–5.1)
PROT SERPL-MCNC: 7.1 G/DL (ref 6–8.4)
RBC # BLD AUTO: 2.27 M/UL (ref 4–5.4)
SODIUM SERPL-SCNC: 133 MMOL/L (ref 136–145)
WBC # BLD AUTO: 56.17 K/UL (ref 3.9–12.7)

## 2020-08-03 PROCEDURE — 85027 COMPLETE CBC AUTOMATED: CPT | Mod: HCNC

## 2020-08-03 PROCEDURE — 74177 CT ABD & PELVIS W/CONTRAST: CPT | Mod: 26,HCNC,, | Performed by: RADIOLOGY

## 2020-08-03 PROCEDURE — 25500020 PHARM REV CODE 255: Mod: HCNC | Performed by: INTERNAL MEDICINE

## 2020-08-03 PROCEDURE — 1159F PR MEDICATION LIST DOCUMENTED IN MEDICAL RECORD: ICD-10-PCS | Mod: HCNC,S$GLB,, | Performed by: INTERNAL MEDICINE

## 2020-08-03 PROCEDURE — 99999 PR PBB SHADOW E&M-EST. PATIENT-LVL IV: ICD-10-PCS | Mod: PBBFAC,HCNC,, | Performed by: INTERNAL MEDICINE

## 2020-08-03 PROCEDURE — 1159F MED LIST DOCD IN RCRD: CPT | Mod: HCNC,S$GLB,, | Performed by: INTERNAL MEDICINE

## 2020-08-03 PROCEDURE — 71260 CT CHEST ABDOMEN PELVIS WITH CONTRAST (XPD): ICD-10-PCS | Mod: 26,HCNC,, | Performed by: RADIOLOGY

## 2020-08-03 PROCEDURE — 3077F PR MOST RECENT SYSTOLIC BLOOD PRESSURE >= 140 MM HG: ICD-10-PCS | Mod: HCNC,CPTII,S$GLB, | Performed by: INTERNAL MEDICINE

## 2020-08-03 PROCEDURE — 74177 CT ABD & PELVIS W/CONTRAST: CPT | Mod: TC,HCNC

## 2020-08-03 PROCEDURE — 1126F AMNT PAIN NOTED NONE PRSNT: CPT | Mod: HCNC,S$GLB,, | Performed by: INTERNAL MEDICINE

## 2020-08-03 PROCEDURE — 3077F SYST BP >= 140 MM HG: CPT | Mod: HCNC,CPTII,S$GLB, | Performed by: INTERNAL MEDICINE

## 2020-08-03 PROCEDURE — 99999 PR PBB SHADOW E&M-EST. PATIENT-LVL IV: CPT | Mod: PBBFAC,HCNC,, | Performed by: INTERNAL MEDICINE

## 2020-08-03 PROCEDURE — 85007 BL SMEAR W/DIFF WBC COUNT: CPT | Mod: HCNC

## 2020-08-03 PROCEDURE — 99215 OFFICE O/P EST HI 40 MIN: CPT | Mod: HCNC,S$GLB,, | Performed by: INTERNAL MEDICINE

## 2020-08-03 PROCEDURE — 36415 COLL VENOUS BLD VENIPUNCTURE: CPT | Mod: HCNC

## 2020-08-03 PROCEDURE — 80053 COMPREHEN METABOLIC PANEL: CPT | Mod: HCNC

## 2020-08-03 PROCEDURE — 3079F PR MOST RECENT DIASTOLIC BLOOD PRESSURE 80-89 MM HG: ICD-10-PCS | Mod: HCNC,CPTII,S$GLB, | Performed by: INTERNAL MEDICINE

## 2020-08-03 PROCEDURE — 1101F PT FALLS ASSESS-DOCD LE1/YR: CPT | Mod: HCNC,CPTII,S$GLB, | Performed by: INTERNAL MEDICINE

## 2020-08-03 PROCEDURE — 99215 PR OFFICE/OUTPT VISIT, EST, LEVL V, 40-54 MIN: ICD-10-PCS | Mod: HCNC,S$GLB,, | Performed by: INTERNAL MEDICINE

## 2020-08-03 PROCEDURE — 3079F DIAST BP 80-89 MM HG: CPT | Mod: HCNC,CPTII,S$GLB, | Performed by: INTERNAL MEDICINE

## 2020-08-03 PROCEDURE — 74177 CT CHEST ABDOMEN PELVIS WITH CONTRAST (XPD): ICD-10-PCS | Mod: 26,HCNC,, | Performed by: RADIOLOGY

## 2020-08-03 PROCEDURE — 71260 CT THORAX DX C+: CPT | Mod: 26,HCNC,, | Performed by: RADIOLOGY

## 2020-08-03 PROCEDURE — 1101F PR PT FALLS ASSESS DOC 0-1 FALLS W/OUT INJ PAST YR: ICD-10-PCS | Mod: HCNC,CPTII,S$GLB, | Performed by: INTERNAL MEDICINE

## 2020-08-03 PROCEDURE — 1126F PR PAIN SEVERITY QUANTIFIED, NO PAIN PRESENT: ICD-10-PCS | Mod: HCNC,S$GLB,, | Performed by: INTERNAL MEDICINE

## 2020-08-03 RX ADMIN — IOHEXOL 30 ML: 350 INJECTION, SOLUTION INTRAVENOUS at 01:08

## 2020-08-03 RX ADMIN — IOHEXOL 100 ML: 350 INJECTION, SOLUTION INTRAVENOUS at 02:08

## 2020-08-09 NOTE — PROGRESS NOTES
Subjective:       Patient ID: Sarah Parker is a 80 y.o. female.    Chief Complaint:  Chronic myelomonocytic leukemia    Follow-up  Associated symptoms include abdominal pain and fatigue. Pertinent negatives include no arthralgias, chest pain, chills, congestion, coughing, diaphoresis, fever, headaches, joint swelling, myalgias, nausea, neck pain, numbness, sore throat or vomiting.      Patient is a 80-year-old female presents for reinstitution  of Vidaza therapy for chronic myelomonocytic leukemia patient who is currently on treatment with azacitidine.  She is here for routine follow-up.      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  Unfortunately treatment has been interrupted for the past several months due to infections requiring wound VAC.  She was also recently diagnosed with squamous skin cancer which was resected with negative margins.    Noted blood product transfusion dependency over the past several weeks.  Presents today for follow-up and continues to complain of chronic and worsening fatigue and shortness of breath with exertion.  CT scan of the chest abdomen and pelvis was obtained after the last visit.    Still complains of abdominal pain.  Denies nausea or vomiting, fever, unintentional weight loss, night sweats, chest pain.  Also denies abnormal bleed.    Past Medical History:   Diagnosis Date    Acid reflux     Anxiety     Back pain     Bronchitis, chronic obstructive w acute bronchitis 7/29/2016    Cancer     [facility] arms, face- Dr. Lata Tejada    Cataract     2+NS    Chronic myelomonocytic leukemia     Degenerative disc disease     Depression     Depression     Dry mouth     Encounter for blood transfusion     Hernia, hiatal 11/18/2013    Hypertension     Hypothyroid     Hypothyroidism     Iron deficiency anemia     Macrocytic anemia 5/3/2016    Macular degeneration      Migraines     Mixed anxiety and depressive disorder     Multiple fractures of ribs of right side     Osteoporosis     Other hyperlipidemia 10/11/2019    Pneumonia     Pneumonia due to other staphylococcus     Rheumatoid arthritis     Rheumatoid arthritis(714.0)     Rheumatoid arthritis(714.0)     Remicade, MTX.     Family History   Problem Relation Age of Onset    Heart disease Mother     Hyperlipidemia Mother     Hypertension Mother     Osteoarthritis Mother     Cataracts Mother     Hypertension Father     Osteoarthritis Father     Heart disease Father     Asthma Sister     Chronic back pain Sister     Hypertension Sister     Osteoarthritis Sister     Thyroid disease Sister     Asthma Brother     Cancer Brother     Chronic back pain Brother     Diabetes Mellitus Brother     Hypertension Brother     Osteoarthritis Brother     Thyroid disease Brother     Cancer Maternal Grandfather     Fibromyalgia Daughter     Heart disease Maternal Grandmother     Colon cancer Neg Hx     Diabetes Neg Hx      Social History     Socioeconomic History    Marital status:      Spouse name: Not on file    Number of children: Not on file    Years of education: Not on file    Highest education level: Not on file   Occupational History    Occupation: retired   Social Needs    Financial resource strain: Not on file    Food insecurity     Worry: Not on file     Inability: Not on file    Transportation needs     Medical: Not on file     Non-medical: Not on file   Tobacco Use    Smoking status: Former Smoker     Packs/day: 0.25     Years: 2.00     Pack years: 0.50     Quit date: 1965     Years since quittin.8    Smokeless tobacco: Never Used   Substance and Sexual Activity    Alcohol use: No    Drug use: No    Sexual activity: Never     Partners: Male   Lifestyle    Physical activity     Days per week: Not on file     Minutes per session: Not on file    Stress: Not at all    Relationships    Social connections     Talks on phone: Not on file     Gets together: Not on file     Attends [employer] service: Not on file     Active member of club or organization: Not on file     Attends meetings of clubs or organizations: Not on file     Relationship status: Not on file   Other Topics Concern    Not on file   Social History Narrative    Patient is aretired and live with .     Past Surgical History:   Procedure Laterality Date    APPENDECTOMY  1985    APPLICATION OF WOUND VACUUM-ASSISTED CLOSURE DEVICE N/A 5/14/2020    Procedure: APPLICATION, WOUND VAC;  Surgeon: Mckinley Shannon MD;  Location: [facility] OR;  Service: General;  Laterality: N/A;    APPLICATION OF WOUND VACUUM-ASSISTED CLOSURE DEVICE Left 7/25/2020    Procedure: APPLICATION, WOUND VAC;  Surgeon: Berenice Alfaro MD;  Location: [facility] OR;  Service: General;  Laterality: Left;    BREAST BIOPSY      CATARACT EXTRACTION Bilateral 6/11/15    Dr. Booth    CHOLECYSTECTOMY  2013    cryoablasion kidney Left 09/27/2016    DEBRIDEMENT Left 7/25/2020    Procedure: DEBRIDEMENT;  Surgeon: Berenice Alfaro MD;  Location: [facility] OR;  Service: General;  Laterality: Left;    EVACUATION OF HEMATOMA Left 7/25/2020    Procedure: EVACUATION, HEMATOMA;  Surgeon: Berenice Alfaro MD;  Location: [facility] OR;  Service: General;  Laterality: Left;    EXCISION OF SQUAMOUS CELL CARCINOMA Left 7/2/2020    Procedure: EXCISION, CARCINOMA, SQUAMOUS CELL;  Surgeon: Mckinley Shannon MD;  Location: [facility] OR;  Service: General;  Laterality: Left;    feet Bilateral     rheumatoid    FRACTURE SURGERY Right     tibia    HERNIA REPAIR      HYSTERECTOMY  1970    partial    INCISION AND DRAINAGE OF ABSCESS N/A 5/12/2020    Procedure: INCISION AND DRAINAGE, ABSCESS;  Surgeon: Emerson Hathaway MD;  Location: [facility] OR;  Service: General;  Laterality: N/A;    INCISIONAL BIOPSY N/A 5/12/2020    Procedure: INCISIONAL BIOPSY;  Surgeon: Emerson Hathaway MD;  Location: [facility]  OR;  Service: General;  Laterality: N/A;    JOINT REPLACEMENT      bilateral knees (2008), hands, wrists, knuckles, toes    LAPAROSCOPIC NISSEN FUNDOPLICATION      TRANSFORAMINAL EPIDURAL INJECTION OF STEROID Left 6/25/2019    Procedure: Left L5/S1 TF AYAAN with local;  Surgeon: Rowdy Felix MD;  Location: [facility] PAIN MGT;  Service: Pain Management;  Laterality: Left;    WOUND DEBRIDEMENT N/A 5/14/2020    Procedure: DEBRIDEMENT, WOUND;  Surgeon: Mckinley Shannon MD;  Location: [facility] OR;  Service: General;  Laterality: N/A;    WOUND DEBRIDEMENT Left 7/25/2020    Procedure: DEBRIDEMENT, WOUND;  Surgeon: Berenice Alfaro MD;  Location: [facility] OR;  Service: General;  Laterality: Left;       Labs:  Lab Results   Component Value Date    WBC 77.24 (HH) 08/10/2020    HGB 5.0 (LL) 08/10/2020    HCT 16.5 (LL) 08/10/2020    MCV 99 (H) 08/10/2020    PLT 30 (LL) 08/10/2020     BMP  Lab Results   Component Value Date     (L) 08/10/2020    K 5.3 (H) 08/10/2020     08/10/2020    CO2 20 (L) 08/10/2020    BUN 30 (H) 08/10/2020    CREATININE 1.1 08/10/2020    CALCIUM 8.5 (L) 08/10/2020    ANIONGAP 11 08/10/2020    ESTGFRAFRICA 56 (A) 08/10/2020    EGFRNONAA 48 (A) 08/10/2020     Lab Results   Component Value Date    ALT 19 08/10/2020    AST 24 08/10/2020    ALKPHOS 209 (H) 08/10/2020    BILITOT 0.3 08/10/2020       Lab Results   Component Value Date    IRON 93 01/17/2020    TIBC 425 01/17/2020    FERRITIN 276 01/17/2020     Lab Results   Component Value Date    BWYAVZAO69 1918 (H) 08/25/2019     Lab Results   Component Value Date    FOLATE 12.0 08/25/2019     Lab Results   Component Value Date    TSH 5.174 (H) 09/03/2019         Review of Systems   Constitutional: Positive for activity change and fatigue. Negative for chills, diaphoresis and fever.   HENT: Negative for congestion, dental problem, drooling, ear discharge, ear pain, facial swelling, hearing loss, mouth sores, nosebleeds, postnasal drip, rhinorrhea, sinus  pressure, sneezing, sore throat, tinnitus, trouble swallowing and voice change.    Eyes: Negative for photophobia, pain, discharge, redness, itching and visual disturbance.   Respiratory: Positive for shortness of breath. Negative for cough, choking, chest tightness, wheezing and stridor.    Cardiovascular: Negative for chest pain, palpitations and leg swelling.   Gastrointestinal: Positive for abdominal distention and abdominal pain. Negative for anal bleeding, blood in stool, constipation, diarrhea, nausea, rectal pain and vomiting.   Endocrine: Negative for cold intolerance, heat intolerance, polydipsia, polyphagia and polyuria.   Genitourinary: Negative for decreased urine volume, difficulty urinating, dyspareunia, dysuria, enuresis, flank pain, frequency, genital sores, hematuria, menstrual problem, pelvic pain, urgency, vaginal bleeding, vaginal discharge and vaginal pain.   Musculoskeletal: Negative for arthralgias, gait problem, joint swelling, myalgias, neck pain and neck stiffness.   Skin: Negative for color change and pallor.   Allergic/Immunologic: Negative for environmental allergies, food allergies and immunocompromised state.   Neurological: Negative for dizziness, tremors, seizures, syncope, facial asymmetry, speech difficulty, light-headedness, numbness and headaches.   Hematological: Negative for adenopathy. Does not bruise/bleed easily.   Psychiatric/Behavioral: Positive for dysphoric mood. Negative for agitation, behavioral problems, confusion, decreased concentration, hallucinations, self-injury, sleep disturbance and suicidal ideas. The patient is nervous/anxious. The patient is not hyperactive.        Objective:      Physical Exam  Vitals signs reviewed.   Constitutional:       General: She is in acute distress.      Appearance: She is cachectic. She is ill-appearing. She is not diaphoretic.   HENT:      Head: Normocephalic and atraumatic.      Right Ear: External ear normal.      Left Ear:  External ear normal.      Nose: Nose normal.      Right Sinus: No maxillary sinus tenderness or frontal sinus tenderness.      Left Sinus: No maxillary sinus tenderness or frontal sinus tenderness.      Mouth/Throat:      Pharynx: No oropharyngeal exudate.   Eyes:      General: Lids are normal. No scleral icterus.        Right eye: No discharge.         Left eye: No discharge.      Conjunctiva/sclera: Conjunctivae normal.      Right eye: Right conjunctiva is not injected. No hemorrhage.     Left eye: Left conjunctiva is not injected. No hemorrhage.     Pupils: Pupils are equal, round, and reactive to light.   Neck:      Musculoskeletal: Normal range of motion and neck supple.      Thyroid: No thyromegaly.      Vascular: No JVD.      Trachea: No tracheal deviation.   Cardiovascular:      Rate and Rhythm: Normal rate and regular rhythm.   Pulmonary:      Effort: Pulmonary effort is normal. No respiratory distress.      Breath sounds: No stridor.   Chest:      Chest wall: No tenderness.   Abdominal:      General: There is distension.      Palpations: Abdomen is soft. There is no hepatomegaly, splenomegaly or mass.      Tenderness: There is abdominal tenderness (Tenderness noted in the left upper quadrant). There is no rebound.   Musculoskeletal: Normal range of motion.         General: Deformity present. No tenderness.      Comments: Deformed digits of upper extremity secondary to arthritis.   Lymphadenopathy:      Cervical: No cervical adenopathy.      Upper Body:      Right upper body: No supraclavicular adenopathy.      Left upper body: No supraclavicular adenopathy.   Skin:     General: Skin is dry.      Findings: No erythema.   Neurological:      Mental Status: She is alert and oriented to person, place, and time.      Cranial Nerves: No cranial nerve deficit.      Coordination: Coordination normal.      Gait: Gait abnormal.   Psychiatric:         Behavior: Behavior normal.         Thought Content: Thought content  normal.         Judgment: Judgment normal.           Assessment:      1. Chronic myelomonocytic leukemia not having achieved remission    2. Lung infection           Plan:     Chronic myelomonocytic leukemia not having achieved remission  CMML on vidaza.  Treatment has been on hold for several months due to infections.  CBC today showed worsening leukocytosis above 70,000. There is evidence of severe anemia with hemoglobin at 5.0 grams/deciliter and severe thrombocyt[place] at [address].  Will referred to outpatient blood product transfusion for 2 units of packed red blood cell.    Review of CT scan today showed;    1. Interval clearing of prior left lower lobe consolidation with interval development of similar areas of consolidation in the bilateral upper lobes, both of which exhibit central necrosis versus abscess formation.  Numerous nodular opacities present elsewhere in both lungs either stable or new compared to prior.  Differential considerations include metastatic disease, disseminated TB/fungal infection, or atypical pneumonia.  2. Worsening mediastinal and bilateral axillary lymphadenopathy.  3. Worsening retroperitoneal and bilateral iliac/inguinal lymphadenopathy.  4. Marked splenomegaly with increase in splenic size compared to prior.  Multiple subcentimeter hypodense foci throughout the spleen and single small enhancing focus worrisome for metastatic disease versus infection.    Given the above findings, will place a referral to infectious disease to further workup to rule out abscess in the lung.  Will also refer to general surgery to discuss the possibility of splenectomy given her comorbidities.    Plan to see her back in 1 week to discuss her disease prognosis and future plans.  She knows to contact us sooner if needed    Abdominal pain/distension:  Physical exam showed palpable spleen that is tender.  See CT scan findings above.    Denton Knox MD

## 2020-08-10 ENCOUNTER — INFUSION (OUTPATIENT)
Dept: INFUSION THERAPY | Facility: HOSPITAL | Age: 80
End: 2020-08-10
Attending: INTERNAL MEDICINE
Payer: MEDICARE

## 2020-08-10 ENCOUNTER — OFFICE VISIT (OUTPATIENT)
Dept: HEMATOLOGY/ONCOLOGY | Facility: CLINIC | Age: 80
DRG: 178 | End: 2020-08-10
Payer: MEDICARE

## 2020-08-10 ENCOUNTER — HOSPITAL ENCOUNTER (INPATIENT)
Facility: HOSPITAL | Age: 80
LOS: 3 days | Discharge: HOME HEALTH | DRG: 178 | End: 2020-08-13
Attending: EMERGENCY MEDICINE | Admitting: INTERNAL MEDICINE
Payer: MEDICARE

## 2020-08-10 VITALS
WEIGHT: 107.38 LBS | RESPIRATION RATE: 17 BRPM | HEIGHT: 62 IN | HEART RATE: 92 BPM | DIASTOLIC BLOOD PRESSURE: 63 MMHG | BODY MASS INDEX: 19.76 KG/M2 | TEMPERATURE: 98 F | SYSTOLIC BLOOD PRESSURE: 119 MMHG | OXYGEN SATURATION: 96 %

## 2020-08-10 DIAGNOSIS — C93.10 CHRONIC MYELOMONOCYTIC LEUKEMIA NOT HAVING ACHIEVED REMISSION: Primary | ICD-10-CM

## 2020-08-10 DIAGNOSIS — C93.10 CHRONIC MYELOMONOCYTIC LEUKEMIA NOT HAVING ACHIEVED REMISSION: ICD-10-CM

## 2020-08-10 DIAGNOSIS — D64.9 ANEMIA, UNSPECIFIED TYPE: ICD-10-CM

## 2020-08-10 DIAGNOSIS — R91.8 LUNG MASS: ICD-10-CM

## 2020-08-10 DIAGNOSIS — J85.2 ABSCESS OF LUNG WITHOUT PNEUMONIA, UNSPECIFIED LATERALITY: Primary | ICD-10-CM

## 2020-08-10 DIAGNOSIS — J18.9 LUNG INFECTION: ICD-10-CM

## 2020-08-10 LAB
BACTERIA #/AREA URNS HPF: NORMAL /HPF
BILIRUB UR QL STRIP: NEGATIVE
CLARITY UR: CLEAR
COLOR UR: YELLOW
GLUCOSE UR QL STRIP: NEGATIVE
HGB UR QL STRIP: ABNORMAL
HYALINE CASTS #/AREA URNS LPF: 1 /LPF
KETONES UR QL STRIP: NEGATIVE
LACTATE SERPL-SCNC: 0.6 MMOL/L (ref 0.5–2.2)
LEUKOCYTE ESTERASE UR QL STRIP: NEGATIVE
MICROSCOPIC COMMENT: NORMAL
NITRITE UR QL STRIP: NEGATIVE
PH UR STRIP: 8 [PH] (ref 5–8)
PROT UR QL STRIP: NEGATIVE
RBC #/AREA URNS HPF: 2 /HPF (ref 0–4)
SARS-COV-2 RDRP RESP QL NAA+PROBE: NEGATIVE
SP GR UR STRIP: 1.01 (ref 1–1.03)
SQUAMOUS #/AREA URNS HPF: 1 /HPF
URN SPEC COLLECT METH UR: ABNORMAL
UROBILINOGEN UR STRIP-ACNC: 1 EU/DL

## 2020-08-10 PROCEDURE — 36430 TRANSFUSION BLD/BLD COMPNT: CPT | Mod: HCNC

## 2020-08-10 PROCEDURE — 87040 BLOOD CULTURE FOR BACTERIA: CPT | Mod: HCNC

## 2020-08-10 PROCEDURE — 83605 ASSAY OF LACTIC ACID: CPT | Mod: HCNC

## 2020-08-10 PROCEDURE — 1101F PR PT FALLS ASSESS DOC 0-1 FALLS W/OUT INJ PAST YR: ICD-10-PCS | Mod: HCNC,CPTII,S$GLB, | Performed by: INTERNAL MEDICINE

## 2020-08-10 PROCEDURE — 3074F PR MOST RECENT SYSTOLIC BLOOD PRESSURE < 130 MM HG: ICD-10-PCS | Mod: HCNC,CPTII,S$GLB, | Performed by: INTERNAL MEDICINE

## 2020-08-10 PROCEDURE — 99215 PR OFFICE/OUTPT VISIT, EST, LEVL V, 40-54 MIN: ICD-10-PCS | Mod: 25,HCNC,S$GLB, | Performed by: INTERNAL MEDICINE

## 2020-08-10 PROCEDURE — 1159F MED LIST DOCD IN RCRD: CPT | Mod: HCNC,S$GLB,, | Performed by: INTERNAL MEDICINE

## 2020-08-10 PROCEDURE — 99291 CRITICAL CARE FIRST HOUR: CPT | Mod: 25,HCNC

## 2020-08-10 PROCEDURE — 81000 URINALYSIS NONAUTO W/SCOPE: CPT | Mod: HCNC

## 2020-08-10 PROCEDURE — U0002 COVID-19 LAB TEST NON-CDC: HCPCS | Mod: HCNC

## 2020-08-10 PROCEDURE — 99999 PR PBB SHADOW E&M-EST. PATIENT-LVL III: CPT | Mod: PBBFAC,HCNC,, | Performed by: INTERNAL MEDICINE

## 2020-08-10 PROCEDURE — 99999 PR PBB SHADOW E&M-EST. PATIENT-LVL III: ICD-10-PCS | Mod: PBBFAC,HCNC,, | Performed by: INTERNAL MEDICINE

## 2020-08-10 PROCEDURE — 1126F AMNT PAIN NOTED NONE PRSNT: CPT | Mod: HCNC,S$GLB,, | Performed by: INTERNAL MEDICINE

## 2020-08-10 PROCEDURE — 11000001 HC ACUTE MED/SURG PRIVATE ROOM: Mod: HCNC

## 2020-08-10 PROCEDURE — 99499 UNLISTED E&M SERVICE: CPT | Mod: HCNC,S$GLB,, | Performed by: INTERNAL MEDICINE

## 2020-08-10 PROCEDURE — 63600175 PHARM REV CODE 636 W HCPCS: Mod: HCNC | Performed by: EMERGENCY MEDICINE

## 2020-08-10 PROCEDURE — 25000003 PHARM REV CODE 250: Mod: HCNC | Performed by: EMERGENCY MEDICINE

## 2020-08-10 PROCEDURE — 1159F PR MEDICATION LIST DOCUMENTED IN MEDICAL RECORD: ICD-10-PCS | Mod: HCNC,S$GLB,, | Performed by: INTERNAL MEDICINE

## 2020-08-10 PROCEDURE — 1101F PT FALLS ASSESS-DOCD LE1/YR: CPT | Mod: HCNC,CPTII,S$GLB, | Performed by: INTERNAL MEDICINE

## 2020-08-10 PROCEDURE — 99499 RISK ADDL DX/OHS AUDIT: ICD-10-PCS | Mod: HCNC,S$GLB,, | Performed by: INTERNAL MEDICINE

## 2020-08-10 PROCEDURE — 3074F SYST BP LT 130 MM HG: CPT | Mod: HCNC,CPTII,S$GLB, | Performed by: INTERNAL MEDICINE

## 2020-08-10 PROCEDURE — 3078F PR MOST RECENT DIASTOLIC BLOOD PRESSURE < 80 MM HG: ICD-10-PCS | Mod: HCNC,CPTII,S$GLB, | Performed by: INTERNAL MEDICINE

## 2020-08-10 PROCEDURE — 3078F DIAST BP <80 MM HG: CPT | Mod: HCNC,CPTII,S$GLB, | Performed by: INTERNAL MEDICINE

## 2020-08-10 PROCEDURE — 99215 OFFICE O/P EST HI 40 MIN: CPT | Mod: 25,HCNC,S$GLB, | Performed by: INTERNAL MEDICINE

## 2020-08-10 PROCEDURE — 1126F PR PAIN SEVERITY QUANTIFIED, NO PAIN PRESENT: ICD-10-PCS | Mod: HCNC,S$GLB,, | Performed by: INTERNAL MEDICINE

## 2020-08-10 RX ORDER — HYDROCODONE BITARTRATE AND ACETAMINOPHEN 500; 5 MG/1; MG/1
TABLET ORAL ONCE
Status: DISCONTINUED | OUTPATIENT
Start: 2020-08-10 | End: 2020-08-10 | Stop reason: HOSPADM

## 2020-08-10 RX ORDER — HYDROCODONE BITARTRATE AND ACETAMINOPHEN 500; 5 MG/1; MG/1
TABLET ORAL
Status: DISCONTINUED | OUTPATIENT
Start: 2020-08-10 | End: 2020-08-13 | Stop reason: HOSPADM

## 2020-08-10 RX ORDER — HYDROCODONE BITARTRATE AND ACETAMINOPHEN 500; 5 MG/1; MG/1
TABLET ORAL ONCE
Status: CANCELLED | OUTPATIENT
Start: 2020-08-10 | End: 2020-08-10

## 2020-08-10 RX ORDER — VANCOMYCIN HCL IN 5 % DEXTROSE 1G/250ML
20 PLASTIC BAG, INJECTION (ML) INTRAVENOUS ONCE
Status: DISCONTINUED | OUTPATIENT
Start: 2020-08-10 | End: 2020-08-11

## 2020-08-10 RX ADMIN — SODIUM CHLORIDE: 0.9 INJECTION, SOLUTION INTRAVENOUS at 11:08

## 2020-08-10 RX ADMIN — PIPERACILLIN AND TAZOBACTAM 4.5 G: 4; .5 INJECTION, POWDER, FOR SOLUTION INTRAVENOUS at 11:08

## 2020-08-10 NOTE — ASSESSMENT & PLAN NOTE
CMML on vidaza.  Treatment has been on hold for several months due to infections.  CBC today showed worsening leukocytosis above 70,000 with 28% blast in peripheral blood.  With the she meets criteria for possible progression to acute myeloid leukemia.  Given severe symptomatic anemia with hemoglobin at 5.0 grams/deciliter and severe thrombocytopenia, will proceed with conservative therapy with blood product transfusions.  Will have family meeting to discuss possible hospice as she is not a candidate for induction chemotherapy for acute myeloid leukemia.    Review of CT scan today showed;    1. Interval clearing of prior left lower lobe consolidation with interval development of similar areas of consolidation in the bilateral upper lobes, both of which exhibit central necrosis versus abscess formation.  Numerous nodular opacities present elsewhere in both lungs either stable or new compared to prior.  Differential considerations include metastatic disease, disseminated TB/fungal infection, or atypical pneumonia.  2. Worsening mediastinal and bilateral axillary lymphadenopathy.  3. Worsening retroperitoneal and bilateral iliac/inguinal lymphadenopathy.  4. Marked splenomegaly with increase in splenic size compared to prior.  Multiple subcentimeter hypodense foci throughout the spleen and single small enhancing focus worrisome for metastatic disease versus infection.    Given the above findings, will place a referral to infectious disease to further workup to rule out abscess in the lung.  She will also be sent to the emergency room for further workup of infectious etiology in the lung.

## 2020-08-10 NOTE — PLAN OF CARE
Patient tolerated blood transfusion of one unit well; no adverse reactions noted. Iv to heplock and secured with coban. Enroute to emergency room to be evaluated per md orders. Family at side.

## 2020-08-10 NOTE — NURSING
1815 Blood transfusion completed. Called emergency room; spoke with Julia Calle who states she has no beds available; and the wait time is 2 hours. Informed of doctors orders for patient to be evaluated there, but states no beds. Call placed to nursing supervisor Chucky and Ms Kidd.

## 2020-08-10 NOTE — NURSING
1525 Emergency room nurse to infusion with ultrasound machine to start iv. Iv started to rt antecubital with #18 gauge jelco.

## 2020-08-10 NOTE — NURSING
1545 Spoke with nursing supervisor Ms Kidd. Nurse has been already on unit and transfusion started. Informed supervisor that nurse can transport patient after unit finishes and maybe bed will be available in the emergency department.

## 2020-08-10 NOTE — NURSING
1540 Dr Knox notified that iv has been started and can proceed with blood transfusion. States ok, but patient has lung nodules that need to be worked up in the emergency room.

## 2020-08-11 ENCOUNTER — TELEPHONE (OUTPATIENT)
Dept: INFECTIOUS DISEASES | Facility: CLINIC | Age: 80
End: 2020-08-11

## 2020-08-11 PROBLEM — C93.10 CHRONIC MYELOMONOCYTIC LEUKEMIA NOT HAVING ACHIEVED REMISSION: Status: ACTIVE | Noted: 2020-08-11

## 2020-08-11 LAB
ACANTHOCYTES BLD QL SMEAR: PRESENT
ANISOCYTOSIS BLD QL SMEAR: SLIGHT
BASO STIPL BLD QL SMEAR: ABNORMAL
BASOPHILS # BLD AUTO: ABNORMAL K/UL (ref 0–0.2)
BASOPHILS # BLD AUTO: ABNORMAL K/UL (ref 0–0.2)
BASOPHILS NFR BLD: 0 % (ref 0–1.9)
BASOPHILS NFR BLD: 0 % (ref 0–1.9)
BLASTS NFR BLD MANUAL: 3 %
CREAT SERPL-MCNC: 0.9 MG/DL (ref 0.5–1.4)
DACRYOCYTES BLD QL SMEAR: ABNORMAL
DIFFERENTIAL METHOD: ABNORMAL
DIFFERENTIAL METHOD: ABNORMAL
EOSINOPHIL # BLD AUTO: ABNORMAL K/UL (ref 0–0.5)
EOSINOPHIL # BLD AUTO: ABNORMAL K/UL (ref 0–0.5)
EOSINOPHIL NFR BLD: 0 % (ref 0–8)
EOSINOPHIL NFR BLD: 1 % (ref 0–8)
ERYTHROCYTE [DISTWIDTH] IN BLOOD BY AUTOMATED COUNT: 16.5 % (ref 11.5–14.5)
ERYTHROCYTE [DISTWIDTH] IN BLOOD BY AUTOMATED COUNT: 17.2 % (ref 11.5–14.5)
EST. GFR  (AFRICAN AMERICAN): >60 ML/MIN/1.73 M^2
EST. GFR  (NON AFRICAN AMERICAN): >60 ML/MIN/1.73 M^2
HCT VFR BLD AUTO: 24.6 % (ref 37–48.5)
HCT VFR BLD AUTO: 25 % (ref 37–48.5)
HGB BLD-MCNC: 7.9 G/DL (ref 12–16)
HGB BLD-MCNC: 8.4 G/DL (ref 12–16)
HYPOCHROMIA BLD QL SMEAR: ABNORMAL
IMM GRANULOCYTES # BLD AUTO: ABNORMAL K/UL (ref 0–0.04)
IMM GRANULOCYTES # BLD AUTO: ABNORMAL K/UL (ref 0–0.04)
IMM GRANULOCYTES NFR BLD AUTO: ABNORMAL % (ref 0–0.5)
IMM GRANULOCYTES NFR BLD AUTO: ABNORMAL % (ref 0–0.5)
LYMPHOCYTES # BLD AUTO: ABNORMAL K/UL (ref 1–4.8)
LYMPHOCYTES # BLD AUTO: ABNORMAL K/UL (ref 1–4.8)
LYMPHOCYTES NFR BLD: 27 % (ref 18–48)
LYMPHOCYTES NFR BLD: 30 % (ref 18–48)
MCH RBC QN AUTO: 30.1 PG (ref 27–31)
MCH RBC QN AUTO: 30.4 PG (ref 27–31)
MCHC RBC AUTO-ENTMCNC: 32.1 G/DL (ref 32–36)
MCHC RBC AUTO-ENTMCNC: 33.6 G/DL (ref 32–36)
MCV RBC AUTO: 90 FL (ref 82–98)
MCV RBC AUTO: 95 FL (ref 82–98)
METAMYELOCYTES NFR BLD MANUAL: 8 %
METAMYELOCYTES NFR BLD MANUAL: 9 %
MONOCYTES # BLD AUTO: ABNORMAL K/UL (ref 0.3–1)
MONOCYTES # BLD AUTO: ABNORMAL K/UL (ref 0.3–1)
MONOCYTES NFR BLD: 14 % (ref 4–15)
MONOCYTES NFR BLD: 19 % (ref 4–15)
MYELOCYTES NFR BLD MANUAL: 1 %
MYELOCYTES NFR BLD MANUAL: 13 %
NEUTROPHILS NFR BLD: 26 % (ref 38–73)
NEUTROPHILS NFR BLD: 35 % (ref 38–73)
NEUTS BAND NFR BLD MANUAL: 14 %
NRBC BLD-RTO: 4 /100 WBC
NRBC BLD-RTO: 4 /100 WBC
OVALOCYTES BLD QL SMEAR: ABNORMAL
PATH REV BLD -IMP: NORMAL
PLATELET # BLD AUTO: 21 K/UL (ref 150–350)
PLATELET # BLD AUTO: 23 K/UL (ref 150–350)
PLATELET BLD QL SMEAR: ABNORMAL
PMV BLD AUTO: ABNORMAL FL (ref 9.2–12.9)
PMV BLD AUTO: ABNORMAL FL (ref 9.2–12.9)
POIKILOCYTOSIS BLD QL SMEAR: SLIGHT
POLYCHROMASIA BLD QL SMEAR: ABNORMAL
PROCALCITONIN SERPL IA-MCNC: 0.18 NG/ML
RBC # BLD AUTO: 2.6 M/UL (ref 4–5.4)
RBC # BLD AUTO: 2.79 M/UL (ref 4–5.4)
SCHISTOCYTES BLD QL SMEAR: PRESENT
VANCOMYCIN SERPL-MCNC: 8.7 UG/ML
WBC # BLD AUTO: 49.51 K/UL (ref 3.9–12.7)
WBC # BLD AUTO: 64.86 K/UL (ref 3.9–12.7)

## 2020-08-11 PROCEDURE — 99223 1ST HOSP IP/OBS HIGH 75: CPT | Mod: HCNC,,, | Performed by: INTERNAL MEDICINE

## 2020-08-11 PROCEDURE — 85060 BLOOD SMEAR INTERPRETATION: CPT | Mod: 91,HCNC,, | Performed by: PATHOLOGY

## 2020-08-11 PROCEDURE — 99223 PR INITIAL HOSPITAL CARE,LEVL III: ICD-10-PCS | Mod: HCNC,,, | Performed by: INTERNAL MEDICINE

## 2020-08-11 PROCEDURE — 85007 BL SMEAR W/DIFF WBC COUNT: CPT | Mod: HCNC

## 2020-08-11 PROCEDURE — 82565 ASSAY OF CREATININE: CPT | Mod: HCNC

## 2020-08-11 PROCEDURE — S0030 INJECTION, METRONIDAZOLE: HCPCS | Mod: HCNC | Performed by: NURSE PRACTITIONER

## 2020-08-11 PROCEDURE — 25000003 PHARM REV CODE 250: Mod: HCNC | Performed by: NURSE PRACTITIONER

## 2020-08-11 PROCEDURE — 25000003 PHARM REV CODE 250: Mod: HCNC | Performed by: INTERNAL MEDICINE

## 2020-08-11 PROCEDURE — 63600175 PHARM REV CODE 636 W HCPCS: Mod: HCNC | Performed by: NURSE PRACTITIONER

## 2020-08-11 PROCEDURE — 85060 PATHOLOGIST REVIEW: ICD-10-PCS | Mod: 91,HCNC,, | Performed by: PATHOLOGY

## 2020-08-11 PROCEDURE — 63600175 PHARM REV CODE 636 W HCPCS: Mod: HCNC | Performed by: EMERGENCY MEDICINE

## 2020-08-11 PROCEDURE — 85027 COMPLETE CBC AUTOMATED: CPT | Mod: HCNC

## 2020-08-11 PROCEDURE — 85060 PATHOLOGIST REVIEW: ICD-10-PCS | Mod: HCNC,,, | Performed by: PATHOLOGY

## 2020-08-11 PROCEDURE — 80202 ASSAY OF VANCOMYCIN: CPT | Mod: HCNC

## 2020-08-11 PROCEDURE — 85060 BLOOD SMEAR INTERPRETATION: CPT | Mod: HCNC,,, | Performed by: PATHOLOGY

## 2020-08-11 PROCEDURE — 63600175 PHARM REV CODE 636 W HCPCS: Mod: HCNC | Performed by: INTERNAL MEDICINE

## 2020-08-11 PROCEDURE — 36415 COLL VENOUS BLD VENIPUNCTURE: CPT | Mod: HCNC

## 2020-08-11 PROCEDURE — 11000001 HC ACUTE MED/SURG PRIVATE ROOM: Mod: HCNC

## 2020-08-11 PROCEDURE — 25000003 PHARM REV CODE 250: Mod: HCNC | Performed by: HOSPITALIST

## 2020-08-11 PROCEDURE — 25000003 PHARM REV CODE 250: Mod: HCNC | Performed by: EMERGENCY MEDICINE

## 2020-08-11 PROCEDURE — 84145 PROCALCITONIN (PCT): CPT | Mod: HCNC

## 2020-08-11 PROCEDURE — 63600175 PHARM REV CODE 636 W HCPCS: Mod: HCNC | Performed by: HOSPITALIST

## 2020-08-11 RX ORDER — METRONIDAZOLE 500 MG/100ML
500 INJECTION, SOLUTION INTRAVENOUS
Status: DISCONTINUED | OUTPATIENT
Start: 2020-08-11 | End: 2020-08-13 | Stop reason: HOSPADM

## 2020-08-11 RX ORDER — CEFEPIME HYDROCHLORIDE 1 G/50ML
2 INJECTION, SOLUTION INTRAVENOUS
Status: DISCONTINUED | OUTPATIENT
Start: 2020-08-11 | End: 2020-08-13 | Stop reason: HOSPADM

## 2020-08-11 RX ORDER — FERROUS SULFATE 325(65) MG
325 TABLET, DELAYED RELEASE (ENTERIC COATED) ORAL 2 TIMES DAILY
Status: DISCONTINUED | OUTPATIENT
Start: 2020-08-11 | End: 2020-08-13 | Stop reason: HOSPADM

## 2020-08-11 RX ORDER — MAG HYDROX/ALUMINUM HYD/SIMETH 200-200-20
30 SUSPENSION, ORAL (FINAL DOSE FORM) ORAL EVERY 6 HOURS PRN
Status: DISCONTINUED | OUTPATIENT
Start: 2020-08-11 | End: 2020-08-13 | Stop reason: HOSPADM

## 2020-08-11 RX ORDER — METOPROLOL SUCCINATE 25 MG/1
25 TABLET, EXTENDED RELEASE ORAL DAILY
Status: DISCONTINUED | OUTPATIENT
Start: 2020-08-11 | End: 2020-08-13 | Stop reason: HOSPADM

## 2020-08-11 RX ORDER — ACETAMINOPHEN 325 MG/1
650 TABLET ORAL EVERY 6 HOURS PRN
Status: DISCONTINUED | OUTPATIENT
Start: 2020-08-11 | End: 2020-08-13 | Stop reason: HOSPADM

## 2020-08-11 RX ORDER — PRAVASTATIN SODIUM 10 MG/1
10 TABLET ORAL NIGHTLY
Status: DISCONTINUED | OUTPATIENT
Start: 2020-08-11 | End: 2020-08-13 | Stop reason: HOSPADM

## 2020-08-11 RX ORDER — LEVOTHYROXINE SODIUM 88 UG/1
88 TABLET ORAL
Status: DISCONTINUED | OUTPATIENT
Start: 2020-08-11 | End: 2020-08-13 | Stop reason: HOSPADM

## 2020-08-11 RX ORDER — VANCOMYCIN HCL IN 5 % DEXTROSE 1G/250ML
20 PLASTIC BAG, INJECTION (ML) INTRAVENOUS ONCE
Status: COMPLETED | OUTPATIENT
Start: 2020-08-11 | End: 2020-08-11

## 2020-08-11 RX ORDER — HYDROXYZINE HYDROCHLORIDE 25 MG/1
25 TABLET, FILM COATED ORAL NIGHTLY
Status: DISCONTINUED | OUTPATIENT
Start: 2020-08-11 | End: 2020-08-13 | Stop reason: HOSPADM

## 2020-08-11 RX ORDER — FERROUS GLUCONATE 324(38)MG
324 TABLET ORAL 2 TIMES DAILY
Status: DISCONTINUED | OUTPATIENT
Start: 2020-08-11 | End: 2020-08-11 | Stop reason: CLARIF

## 2020-08-11 RX ADMIN — HYDROXYZINE HYDROCHLORIDE 25 MG: 25 TABLET, FILM COATED ORAL at 04:08

## 2020-08-11 RX ADMIN — PRAVASTATIN SODIUM 10 MG: 10 TABLET ORAL at 08:08

## 2020-08-11 RX ADMIN — METOPROLOL SUCCINATE 25 MG: 25 TABLET, EXTENDED RELEASE ORAL at 10:08

## 2020-08-11 RX ADMIN — AMITRIPTYLINE HYDROCHLORIDE 75 MG: 50 TABLET, FILM COATED ORAL at 08:08

## 2020-08-11 RX ADMIN — ALUMINUM HYDROXIDE, MAGNESIUM HYDROXIDE, AND SIMETHICONE 30 ML: 200; 200; 20 SUSPENSION ORAL at 10:08

## 2020-08-11 RX ADMIN — METRONIDAZOLE 500 MG: 500 INJECTION, SOLUTION INTRAVENOUS at 03:08

## 2020-08-11 RX ADMIN — PIPERACILLIN AND TAZOBACTAM 4.5 G: 4; .5 INJECTION, POWDER, LYOPHILIZED, FOR SOLUTION INTRAVENOUS; PARENTERAL at 06:08

## 2020-08-11 RX ADMIN — ACETAMINOPHEN 650 MG: 325 TABLET ORAL at 10:08

## 2020-08-11 RX ADMIN — FERROUS SULFATE TAB EC 325 MG (65 MG FE EQUIVALENT) 325 MG: 325 (65 FE) TABLET DELAYED RESPONSE at 08:08

## 2020-08-11 RX ADMIN — VANCOMYCIN HYDROCHLORIDE 1000 MG: 1 INJECTION, POWDER, LYOPHILIZED, FOR SOLUTION INTRAVENOUS at 02:08

## 2020-08-11 RX ADMIN — VANCOMYCIN HYDROCHLORIDE 750 MG: 750 INJECTION, POWDER, LYOPHILIZED, FOR SOLUTION INTRAVENOUS at 11:08

## 2020-08-11 RX ADMIN — HYDROXYZINE HYDROCHLORIDE 25 MG: 25 TABLET, FILM COATED ORAL at 08:08

## 2020-08-11 RX ADMIN — PRAVASTATIN SODIUM 10 MG: 10 TABLET ORAL at 04:08

## 2020-08-11 RX ADMIN — CEFEPIME HYDROCHLORIDE 2 G: 2 INJECTION, SOLUTION INTRAVENOUS at 02:08

## 2020-08-11 RX ADMIN — METRONIDAZOLE 500 MG: 500 INJECTION, SOLUTION INTRAVENOUS at 10:08

## 2020-08-11 RX ADMIN — FERROUS SULFATE TAB EC 325 MG (65 MG FE EQUIVALENT) 325 MG: 325 (65 FE) TABLET DELAYED RESPONSE at 10:08

## 2020-08-11 RX ADMIN — LEVOTHYROXINE SODIUM 88 MCG: 88 TABLET ORAL at 06:08

## 2020-08-11 NOTE — ED NOTES
Blood started bp 161/74, hr 92, spo2 98, temp 99.6  Blood administration documentation:    Confirmed w/ two nurses for pt information and T/S w/ blood matching (Mili). Blood admin report sheet signed and placed in chart.     Pt information: Sarah Parker   MRN: [id number]  : 1940  BBID: [id number]  Type: O +    Unit number: [id number]  ABORH: O+  Exp. Date: 2020  Order number: [id number]  Rate: 150mL/h

## 2020-08-11 NOTE — ED NOTES
Primary nurse stated to not collect type and screen ordered since it was done already,     Beth Schmid, Patient Care Assistant  08/10/20 2050

## 2020-08-11 NOTE — ED NOTES
15 minute blood administration vital  Temp-99.4 oral  bp-159/67  resp-20  spo2-100    No sign of a reaction present

## 2020-08-11 NOTE — NURSING
Critical Value Communication      From: Josefa  Critical Value: WBC 64.86  PLT- 23  Result received from resulting department on: 8/11/20 @ 1149  Call placed to Dr. Alberto at 8/11/20@1152 by SHYLA JUNIOR RN

## 2020-08-11 NOTE — ED NOTES
Pt AAOx3, resting in bed, eyes open side rails up x 2, call bell within reach, even unlabored respiration with equal rise and fall of the chest wall. NAD at this time. Will continue to monitor.

## 2020-08-11 NOTE — H&P
[facility] Medicine  History & Physical    Patient Name: Sarah Parker  MRN: [id number]  Admission Date: 8/10/2020  Attending Physician: Rm Landry MD  Primary Care Provider: Briseida Bennett MD         Patient information was obtained from patient and ER records.     Subjective:     Principal Problem:Abscess of lung without pneumonia    Chief Complaint:   Chief Complaint   Patient presents with    Dizziness        HPI: Mrs. Sarah Parker is a 80 y.o. female patient with medical history noted below who presents to the Emergency Department for an evaluation of abnormal labs. Pt reports that she received 1 unit of blood at the infusion center earlier today. She was referred by Dr. Knox (Hematology and Oncology) here to the ED to receive another unit of blood, per pt. Patient also had CT with multiple lesions with concerns for infectious etiology it was suggested to be started on ABX and admission for further work up. Patient denies any CP, SOB, fever, chills, n/v/d, abdominal pain, and all other sxs at this time. No further complaints or concerns at this time.     Past Medical History:   Diagnosis Date    Acid reflux     Anxiety     Back pain     Bronchitis, chronic obstructive w acute bronchitis 7/29/2016    Cancer     [facility] arms, face- Dr. Lata Tejada    Cataract     2+NS    Chronic myelomonocytic leukemia     Degenerative disc disease     Depression     Depression     Dry mouth     Encounter for blood transfusion     Hernia, hiatal 11/18/2013    Hypertension     Hypothyroid     Hypothyroidism     Iron deficiency anemia     Macrocytic anemia 5/3/2016    Macular degeneration     Migraines     Mixed anxiety and depressive disorder     Multiple fractures of ribs of right side     Osteoporosis     Other hyperlipidemia 10/11/2019    Pneumonia     Pneumonia due to other staphylococcus     Rheumatoid arthritis     Rheumatoid arthritis(714.0)      Rheumatoid arthritis(714.0)     Remicade, MTX.       Past Surgical History:   Procedure Laterality Date    APPENDECTOMY  1985    APPLICATION OF WOUND VACUUM-ASSISTED CLOSURE DEVICE N/A 5/14/2020    Procedure: APPLICATION, WOUND VAC;  Surgeon: Mckinley Shannon MD;  Location: [facility] OR;  Service: General;  Laterality: N/A;    APPLICATION OF WOUND VACUUM-ASSISTED CLOSURE DEVICE Left 7/25/2020    Procedure: APPLICATION, WOUND VAC;  Surgeon: Berenice Alfaro MD;  Location: [facility] OR;  Service: General;  Laterality: Left;    BREAST BIOPSY      CATARACT EXTRACTION Bilateral 6/11/15    Dr. Booth    CHOLECYSTECTOMY  2013    cryoablasion kidney Left 09/27/2016    DEBRIDEMENT Left 7/25/2020    Procedure: DEBRIDEMENT;  Surgeon: Berenice Alfaro MD;  Location: [facility] OR;  Service: General;  Laterality: Left;    EVACUATION OF HEMATOMA Left 7/25/2020    Procedure: EVACUATION, HEMATOMA;  Surgeon: Berenice Alfaro MD;  Location: [facility] OR;  Service: General;  Laterality: Left;    EXCISION OF SQUAMOUS CELL CARCINOMA Left 7/2/2020    Procedure: EXCISION, CARCINOMA, SQUAMOUS CELL;  Surgeon: Mckinley Shannon MD;  Location: [facility] OR;  Service: General;  Laterality: Left;    feet Bilateral     rheumatoid    FRACTURE SURGERY Right     tibia    HERNIA REPAIR      HYSTERECTOMY  1970    partial    INCISION AND DRAINAGE OF ABSCESS N/A 5/12/2020    Procedure: INCISION AND DRAINAGE, ABSCESS;  Surgeon: Emerson Hathaway MD;  Location: [facility] OR;  Service: General;  Laterality: N/A;    INCISIONAL BIOPSY N/A 5/12/2020    Procedure: INCISIONAL BIOPSY;  Surgeon: Emerson Hathaway MD;  Location: [facility] OR;  Service: General;  Laterality: N/A;    JOINT REPLACEMENT      bilateral knees (2008), hands, wrists, knuckles, toes    LAPAROSCOPIC NISSEN FUNDOPLICATION      TRANSFORAMINAL EPIDURAL INJECTION OF STEROID Left 6/25/2019    Procedure: Left L5/S1 TF AYAAN with local;  Surgeon: Rowdy Felix MD;  Location: [facility];  Service: Pain Management;   Laterality: Left;    WOUND DEBRIDEMENT N/A 5/14/2020    Procedure: DEBRIDEMENT, WOUND;  Surgeon: Mckinley Shannon MD;  Location: [facility] OR;  Service: General;  Laterality: N/A;    WOUND DEBRIDEMENT Left 7/25/2020    Procedure: DEBRIDEMENT, WOUND;  Surgeon: Berenice Alfaro MD;  Location: [facility] OR;  Service: General;  Laterality: Left;       Review of patient's allergies indicates:   Allergen Reactions    Codeine      Other reaction(s): hyper  Other reaction(s): hyper    Gabapentin Other (See Comments)     Bad dreams       Current Facility-Administered Medications on File Prior to Encounter   Medication    [DISCONTINUED] 0.9%  NaCl infusion (for blood administration)     Current Outpatient Medications on File Prior to Encounter   Medication Sig    albuterol (PROVENTIL) 2.5 mg /3 mL (0.083 %) nebulizer solution Take 3 mLs (2.5 mg total) by nebulization every 6 (six) hours as needed for Wheezing.    amitriptyline (ELAVIL) 75 MG tablet TAKE 1 TABLET BY MOUTH EVERY EVENING    calcium citrate-vitamin D (CITRACAL + D) 315-200 mg-unit per tablet Take 1 tablet by mouth once daily.     DULoxetine (CYMBALTA) 20 MG capsule TAKE 2 CAPSULES(40 MG) BY MOUTH EVERY DAY (Patient taking differently: before meals, at bedtime and at 0200. )    ferrous gluconate (FERGON) 324 MG tablet Take 324 mg by mouth 2 (two) times daily.    fluticasone propionate (FLONASE) 50 mcg/actuation nasal spray 2 sprays (100 mcg total) by Each Nostril route once daily.    hydrocodone-acetaminophen 5-325mg (NORCO) 5-325 mg per tablet TK 1 T PO Q 6 H PRN    hydrOXYzine HCl (ATARAX) 25 MG tablet Take 25 mg by mouth nightly.     levothyroxine (SYNTHROID) 88 MCG tablet TAKE 1 TABLET BY MOUTH BEFORE BREAKFAST    magnesium oxide (MAG-OX) 400 mg (241.3 mg magnesium) tablet Take 1 tablet (400 mg total) by mouth 3 (three) times daily. (Patient taking differently: Take 400 mg by mouth 2 (two) times daily. )    meclizine (ANTIVERT) 50 MG tablet Take 25 mg by  mouth 3 (three) times daily as needed.    metoprolol succinate (TOPROL-XL) 50 MG 24 hr tablet TAKE 1 TABLET(50 MG) BY MOUTH EVERY DAY    multivitamin capsule Take by mouth. As directed    ondansetron (ZOFRAN) 4 MG tablet Take 1 tablet (4 mg total) by mouth every 8 (eight) hours as needed for Nausea.    pravastatin (PRAVACHOL) 10 MG tablet TAKE 1 TABLET(10 MG) BY MOUTH EVERY DAY (Patient taking differently: Take 10 mg by mouth every evening. )    prochlorperazine (COMPAZINE) 5 MG tablet TAKE 1 TABLET(5 MG) BY MOUTH EVERY 6 HOURS AS NEEDED FOR NAUSEA    tamsulosin (FLOMAX) 0.4 mg Cap Take 1 capsule (0.4 mg total) by mouth once daily. For urinary retention    topiramate (TOPAMAX) 25 MG tablet Take 1 tablet (25 mg total) by mouth at night x 1 week then increase to 2 (two) times daily. Increase as tolerated. (Patient not taking: Reported on 2020)    valsartan-hydrochlorothiazide (DIOVAN-HCT) 80-12.5 mg per tablet TAKE 1 TABLET BY MOUTH DAILY     Family History     Problem Relation (Age of Onset)    Asthma Sister, Brother    Cancer Brother, Maternal Grandfather    Cataracts Mother    Chronic back pain Sister, Brother    Diabetes Mellitus Brother    Fibromyalgia Daughter    Heart disease Mother, Father, Maternal Grandmother    Hyperlipidemia Mother    Hypertension Mother, Father, Sister, Brother    Osteoarthritis Mother, Father, Sister, Brother    Thyroid disease Sister, Brother        Tobacco Use    Smoking status: Former Smoker     Packs/day: 0.25     Years: 2.00     Pack years: 0.50     Quit date: 1965     Years since quittin.8    Smokeless tobacco: Never Used   Substance and Sexual Activity    Alcohol use: No    Drug use: No    Sexual activity: Never     Partners: Male     Review of Systems   Constitutional: Negative for appetite change, chills, fatigue and fever.   HENT: Negative for congestion, ear discharge, ear pain, mouth sores, nosebleeds, sinus pain, sneezing, sore throat, tinnitus  and trouble swallowing.    Eyes: Negative for pain, discharge, redness and itching.   Respiratory: Negative for cough, chest tightness, shortness of breath and wheezing.    Cardiovascular: Negative for chest pain, palpitations and leg swelling.   Gastrointestinal: Negative for abdominal distention, abdominal pain, blood in stool, constipation, diarrhea, nausea and vomiting.   Endocrine: Negative for cold intolerance, heat intolerance, polydipsia, polyphagia and polyuria.   Genitourinary: Negative for difficulty urinating, dyspareunia, dysuria, enuresis, flank pain, frequency, genital sores, hematuria, menstrual problem, pelvic pain, urgency, vaginal bleeding, vaginal discharge and vaginal pain.   Musculoskeletal: Negative for arthralgias, back pain, joint swelling, myalgias and neck pain.   Skin: Negative for pallor and rash.   Neurological: Negative for dizziness, weakness, light-headedness, numbness and headaches.   Hematological: Negative for adenopathy. Does not bruise/bleed easily.   Psychiatric/Behavioral: Negative for confusion and sleep disturbance. The patient is not nervous/anxious.    All other systems reviewed and are negative.    Objective:     Vital Signs (Most Recent):  Temp: 99.4 °F (37.4 °C) (08/10/20 2116)  Pulse: 95 (08/10/20 2301)  Resp: 20 (08/10/20 2301)  BP: (!) 171/84 (08/10/20 2301)  SpO2: 100 % (08/10/20 2301) Vital Signs (24h Range):  Temp:  [97.6 °F (36.4 °C)-99.6 °F (37.6 °C)] 99.4 °F (37.4 °C)  Pulse:  [] 95  Resp:  [10-22] 20  SpO2:  [96 %-100 %] 100 %  BP: (119-183)/(63-84) 171/84     Weight: 50 kg (110 lb 3.2 oz)  Body mass index is 20.16 kg/m².    Physical Exam  Vitals signs and nursing note reviewed.   Constitutional:       Appearance: She is well-developed.   HENT:      Head: Normocephalic and atraumatic.      Right Ear: External ear normal.      Left Ear: External ear normal.      Nose: Nose normal.   Eyes:      Conjunctiva/sclera: Conjunctivae normal.      Pupils: Pupils  are equal, round, and reactive to light.   Neck:      Musculoskeletal: Normal range of motion and neck supple.      Thyroid: No thyromegaly.      Vascular: No JVD.   Cardiovascular:      Rate and Rhythm: Normal rate and regular rhythm.      Heart sounds: Normal heart sounds. No murmur. No friction rub. No gallop.    Pulmonary:      Effort: Pulmonary effort is normal. No respiratory distress.      Breath sounds: Normal breath sounds. No stridor. No wheezing or rales.   Chest:      Chest wall: No tenderness.   Abdominal:      General: Bowel sounds are normal. There is no distension.      Palpations: Abdomen is soft. There is no mass.      Tenderness: There is no abdominal tenderness. There is no guarding or rebound.      Comments: Splenomegaly    Genitourinary:     Vagina: Normal. No vaginal discharge.   Musculoskeletal: Normal range of motion.         General: No deformity.   Lymphadenopathy:      Cervical: No cervical adenopathy.   Skin:     General: Skin is warm.      Capillary Refill: Capillary refill takes less than 2 seconds.      Findings: Bruising present. No erythema or rash.   Neurological:      Mental Status: She is alert and oriented to person, place, and time.      Cranial Nerves: No cranial nerve deficit.      Sensory: No sensory deficit.      Deep Tendon Reflexes: Reflexes normal.   Psychiatric:         Behavior: Behavior normal.           CRANIAL NERVES     CN III, IV, VI   Pupils are equal, round, and reactive to light.       Significant Labs: All pertinent labs within the past 24 hours have been reviewed.    Significant Imaging: I have reviewed and interpreted all pertinent imaging results/findings within the past 24 hours.    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

## 2020-08-11 NOTE — PLAN OF CARE
Patient admitted for PRBC transfusion for hb being 5. She is finished with 2 units PRBC. CT chest doen in ER showed chronic lesions in her lung which doesn't look infectious. She is blast phase as per Hematology. Plc count came back for 23, No indication for transfusion at this movement unless she is bleeding. ID consulted. Will check procalcitonin. Will dc antibiotics if not elevated

## 2020-08-11 NOTE — PROGRESS NOTES
Pharmacokinetic Initial Assessment: IV Vancomycin    Assessment/Plan:    Initiate intravenous vancomycin with loading dose of 20 mg/kg or 1000 mg once with subsequent doses when random concentrations are less than 20 mcg/mL  Desired empiric serum trough concentration is 15 to 20 mcg/mL  Draw vancomycin random level on 8/11 at 2000.  Pharmacy will continue to follow and monitor vancomycin.      Please contact pharmacy at extension [phone] with any questions regarding this assessment.     Thank you for the consult,   Zeyad Flores       Patient brief summary:  Sarah Parker is a 80 y.o. female initiated on antimicrobial therapy with IV Vancomycin for treatment of suspected lower respiratory infection    Drug Allergies:   Review of patient's allergies indicates:   Allergen Reactions    Codeine      Other reaction(s): hyper  Other reaction(s): hyper    Gabapentin Other (See Comments)     Bad dreams       Actual Body Weight:   50 kg    Renal Function:   Estimated Creatinine Clearance: 33.3 mL/min (based on SCr of 1.1 mg/dL).,     Dialysis Method (if applicable):  N/A    CBC (last 72 hours):  Recent Labs   Lab Result Units 08/10/20  1020   WBC K/uL 77.24*   Hemoglobin g/dL 5.0*   Hematocrit % 16.5*   Platelets K/uL 30*   Gran% % 24.0*   Lymph% % 29.0   Mono% % 12.0   Eosinophil% % 1.0   Basophil% % 0.0   Differential Method  Manual       Metabolic Panel (last 72 hours):  Recent Labs   Lab Result Units 08/10/20  1020 08/10/20  2125   Sodium mmol/L 132*  --    Potassium mmol/L 5.3*  --    Chloride mmol/L 101  --    CO2 mmol/L 20*  --    Glucose mg/dL 109  --    Glucose, UA   --  Negative   BUN, Bld mg/dL 30*  --    Creatinine mg/dL 1.1  --    Albumin g/dL 2.8*  --    Total Bilirubin mg/dL 0.3  --    Alkaline Phosphatase U/L 209*  --    AST U/L 24  --    ALT U/L 19  --        Drug levels (last 3 results):  No results for input(s): VANCOMYCINRA, VANCOMYCINPE, VANCOMYCINTR in the last 72 hours.    Microbiologic  Results:  Microbiology Results (last 7 days)       Procedure Component Value Units Date/Time    Blood culture #1 **CANNOT BE ORDERED STAT** [[id number]] Collected: 08/10/20 2040    Order Status: Sent Specimen: Blood from Peripheral, Upper Arm, Left Updated: 08/10/20 2116    Blood culture #2 **CANNOT BE ORDERED STAT** [[id number]] Collected: 08/10/20 2045    Order Status: Sent Specimen: Blood from Peripheral, Upper Arm, Left Updated: 08/10/20 2116

## 2020-08-11 NOTE — ED NOTES
Unable to scan blood at this time due to the previous orders not being able to cross over into the system. Charge nurse Michele made aware of the situation. Blood bank was called, request made to see if order could be linked. Blood bank unable to fulfilled that request at this time. Order receive from Michele that is ok to give blood and blank note documentation.

## 2020-08-11 NOTE — ED NOTES
Spoke to Dr. Trujillo regarding pt type and screen that was already done. Md made aware that pt has already received one unit blood from her hemodynamic infusion today. He stated it was ok to give the order for the 2nd unit of blood. Pharmacy was able to verified that blood band was still the same and currently on the patient and the order for the 2 un it of blood to be release was still active.

## 2020-08-11 NOTE — TELEPHONE ENCOUNTER
----- Message from Escobar Recinos MD sent at 8/10/2020 12:31 PM CDT -----  See ASAP IN CLINIC

## 2020-08-11 NOTE — SUBJECTIVE & OBJECTIVE
Oncology Treatment Plan:   OP AZACITADINE 7-DAY (SUB-Q)    Medications:  Continuous Infusions:  Scheduled Meds:   amitriptyline  75 mg Oral QHS    cefepime in dextrose 5 %  2 g Intravenous Q12H    ferrous sulfate  325 mg Oral BID    hydrOXYzine HCL  25 mg Oral Nightly    levothyroxine  88 mcg Oral Before breakfast    metoprolol succinate  25 mg Oral Daily    metronidazole  500 mg Intravenous Q8H    pravastatin  10 mg Oral QHS     PRN Meds:sodium chloride, Pharmacy to dose Vancomycin consult **AND** vancomycin - pharmacy to dose     Review of patient's allergies indicates:   Allergen Reactions    Codeine      Other reaction(s): hyper  Other reaction(s): hyper    Gabapentin Other (See Comments)     Bad dreams        Past Medical History:   Diagnosis Date    Acid reflux     Anxiety     Back pain     Bronchitis, chronic obstructive w acute bronchitis 7/29/2016    Cancer     [facility] arms, face- Dr. Lata Tejada    Cataract     2+NS    Chronic myelomonocytic leukemia     Degenerative disc disease     Depression     Depression     Dry mouth     Encounter for blood transfusion     Hernia, hiatal 11/18/2013    Hypertension     Hypothyroid     Hypothyroidism     Iron deficiency anemia     Macrocytic anemia 5/3/2016    Macular degeneration     Migraines     Mixed anxiety and depressive disorder     Multiple fractures of ribs of right side     Osteoporosis     Other hyperlipidemia 10/11/2019    Pneumonia     Pneumonia due to other staphylococcus     Rheumatoid arthritis     Rheumatoid arthritis(714.0)     Rheumatoid arthritis(714.0)     Remicade, MTX.     Past Surgical History:   Procedure Laterality Date    APPENDECTOMY  1985    APPLICATION OF WOUND VACUUM-ASSISTED CLOSURE DEVICE N/A 5/14/2020    Procedure: APPLICATION, WOUND VAC;  Surgeon: Mckinley Shannon MD;  Location: [facility];  Service: General;  Laterality: N/A;    APPLICATION OF WOUND VACUUM-ASSISTED CLOSURE DEVICE Left 7/25/2020     Procedure: APPLICATION, WOUND VAC;  Surgeon: Berenice Alfaro MD;  Location: [facility] OR;  Service: General;  Laterality: Left;    BREAST BIOPSY      CATARACT EXTRACTION Bilateral 6/11/15    Dr. Booth    CHOLECYSTECTOMY  2013    cryoablasion kidney Left 09/27/2016    DEBRIDEMENT Left 7/25/2020    Procedure: DEBRIDEMENT;  Surgeon: Berenice Alfaro MD;  Location: [facility] OR;  Service: General;  Laterality: Left;    EVACUATION OF HEMATOMA Left 7/25/2020    Procedure: EVACUATION, HEMATOMA;  Surgeon: Berenice Alfaro MD;  Location: [facility] OR;  Service: General;  Laterality: Left;    EXCISION OF SQUAMOUS CELL CARCINOMA Left 7/2/2020    Procedure: EXCISION, CARCINOMA, SQUAMOUS CELL;  Surgeon: Mckinley Shannon MD;  Location: [facility] OR;  Service: General;  Laterality: Left;    feet Bilateral     rheumatoid    FRACTURE SURGERY Right     tibia    HERNIA REPAIR      HYSTERECTOMY  1970    partial    INCISION AND DRAINAGE OF ABSCESS N/A 5/12/2020    Procedure: INCISION AND DRAINAGE, ABSCESS;  Surgeon: Emerson Hathaway MD;  Location: [facility] OR;  Service: General;  Laterality: N/A;    INCISIONAL BIOPSY N/A 5/12/2020    Procedure: INCISIONAL BIOPSY;  Surgeon: Emerson Hathaway MD;  Location: [facility] OR;  Service: General;  Laterality: N/A;    JOINT REPLACEMENT      bilateral knees (2008), hands, wrists, knuckles, toes    LAPAROSCOPIC NISSEN FUNDOPLICATION      TRANSFORAMINAL EPIDURAL INJECTION OF STEROID Left 6/25/2019    Procedure: Left L5/S1 TF AYAAN with local;  Surgeon: Rowdy Felix MD;  [facility];  Service: Pain Management;  Laterality: Left;    WOUND DEBRIDEMENT N/A 5/14/2020    Procedure: DEBRIDEMENT, WOUND;  Surgeon: Mckinley Shannon MD;  Location: [facility] OR;  Service: General;  Laterality: N/A;    WOUND DEBRIDEMENT Left 7/25/2020    Procedure: DEBRIDEMENT, WOUND;  Surgeon: Berenice Alfaro MD;  Location: [facility] OR;  Service: General;  Laterality: Left;     Family History     Problem Relation (Age of Onset)     Asthma Sister, Brother    Cancer Brother, Maternal Grandfather    Cataracts Mother    Chronic back pain Sister, Brother    Diabetes Mellitus Brother    Fibromyalgia Daughter    Heart disease Mother, Father, Maternal Grandmother    Hyperlipidemia Mother    Hypertension Mother, Father, Sister, Brother    Osteoarthritis Mother, Father, Sister, Brother    Thyroid disease Sister, Brother        Tobacco Use    Smoking status: Former Smoker     Packs/day: 0.25     Years: 2.00     Pack years: 0.50     Quit date: 1965     Years since quittin.8    Smokeless tobacco: Never Used   Substance and Sexual Activity    Alcohol use: No    Drug use: No    Sexual activity: Never     Partners: Male       Review of Systems   Constitutional: Negative.    HENT: Negative.    Eyes: Negative.    Respiratory: Negative.    Gastrointestinal: Negative.    Endocrine: Negative.    Genitourinary: Negative.    Musculoskeletal: Negative.    Skin: Positive for pallor and wound.   Allergic/Immunologic: Positive for immunocompromised state.   Neurological: Negative.    Hematological: Bruises/bleeds easily.   Psychiatric/Behavioral: Negative.      Objective:     Vital Signs (Most Recent):  Temp: 98 °F (36.7 °C) (20)  Pulse: 101 (20 08)  Resp: 17 (20)  BP: (!) 151/72 (20)  SpO2: 100 % (20) Vital Signs (24h Range):  Temp:  [98 °F (36.7 °C)-99.6 °F (37.6 °C)] 98 °F (36.7 °C)  Pulse:  [] 101  Resp:  [10-22] 17  SpO2:  [98 %-100 %] 100 %  BP: (143-183)/(66-84) 151/72     Weight: 45.4 kg (100 lb)  Body mass index is 18.29 kg/m².  Body surface area is 1.41 meters squared.      Intake/Output Summary (Last 24 hours) at 2020 1049  Last data filed at 2020 0330  Gross per 24 hour   Intake 250 ml   Output --   Net 250 ml       Physical Exam  Vitals signs and nursing note reviewed.   Constitutional:       General: She is not in acute distress.     Appearance: She is well-developed. She is  ill-appearing.   HENT:      Head: Normocephalic and atraumatic.      Right Ear: External ear normal.      Left Ear: External ear normal.      Nose: Nose normal.   Eyes:      Conjunctiva/sclera: Conjunctivae normal.      Pupils: Pupils are equal, round, and reactive to light.   Neck:      Musculoskeletal: Normal range of motion and neck supple.      Thyroid: No thyromegaly.      Vascular: No JVD.   Cardiovascular:      Rate and Rhythm: Normal rate and regular rhythm.      Heart sounds: Normal heart sounds. No murmur. No friction rub. No gallop.    Pulmonary:      Effort: Pulmonary effort is normal. No respiratory distress.      Breath sounds: Normal breath sounds. No stridor. No wheezing or rales.   Chest:      Chest wall: No tenderness.   Abdominal:      General: Bowel sounds are normal. There is no distension.      Palpations: Abdomen is soft. There is no mass.      Tenderness: There is no abdominal tenderness. There is no guarding or rebound.   Genitourinary:     Vagina: Normal. No vaginal discharge.   Musculoskeletal: Normal range of motion.         General: No deformity.   Lymphadenopathy:      Cervical: No cervical adenopathy.   Skin:     General: Skin is warm.      Capillary Refill: Capillary refill takes less than 2 seconds.      Findings: Bruising present. No erythema or rash.             Comments: Wound with wound vac in place   Neurological:      Mental Status: She is alert and oriented to person, place, and time.      Cranial Nerves: No cranial nerve deficit.      Sensory: No sensory deficit.      Deep Tendon Reflexes: Reflexes normal.   Psychiatric:         Attention and Perception: Attention normal.         Mood and Affect: Mood normal.         Speech: Speech normal.         Behavior: Behavior normal.         Thought Content: Thought content normal.         Cognition and Memory: Cognition normal.         Judgment: Judgment normal.         Significant Labs:   CBC:   Recent Labs   Lab 08/10/20  1020   WBC  77.24*   HGB 5.0*   HCT 16.5*   PLT 30*   , CMP:   Recent Labs   Lab 08/10/20  1020   *   K 5.3*      CO2 20*      BUN 30*   CREATININE 1.1   CALCIUM 8.5*   PROT 7.7   ALBUMIN 2.8*   BILITOT 0.3   ALKPHOS 209*   AST 24   ALT 19   ANIONGAP 11   EGFRNONAA 48*   , Coagulation: No results for input(s): PT, INR, APTT in the last 48 hours., Immunology: No results for input(s): SPEP, TOBIN, STEWART, FREELAMBDALI in the last 48 hours., LDH: No results for input(s): LDHCSF, BFSOURCE in the last 48 hours., LFTs:   Recent Labs   Lab 08/10/20  1020   ALT 19   AST 24   ALKPHOS 209*   BILITOT 0.3   PROT 7.7   ALBUMIN 2.8*   , Reticulocytes: No results for input(s): RETIC in the last 48 hours., Uric Acid No results for input(s): URICACID in the last 48 hours., Urine Studies:   Recent Labs   Lab 08/10/20  2125   COLORU Yellow   APPEARANCEUA Clear   PHUR 8.0   SPECGRAV 1.015   PROTEINUA Negative   GLUCUA Negative   KETONESU Negative   BILIRUBINUA Negative   OCCULTUA 1+*   NITRITE Negative   UROBILINOGEN 1.0   LEUKOCYTESUR Negative   RBCUA 2   BACTERIA Occasional   SQUAMEPITHEL 1   HYALINECASTS 1    and All pertinent labs from the last 24 hours have been reviewed.    Diagnostic Results:  I have reviewed all pertinent imaging results/findings within the past 24 hours.

## 2020-08-11 NOTE — ED NOTES
Call made to pharmacy regarding medication. Medication currently not available will get back to me after icu pt is squared aware

## 2020-08-11 NOTE — SUBJECTIVE & OBJECTIVE
Past Medical History:   Diagnosis Date    Acid reflux     Anxiety     Back pain     Bronchitis, chronic obstructive w acute bronchitis 7/29/2016    Cancer     [facility] arms, face- Dr. Lata Tejada    Cataract     2+NS    Chronic myelomonocytic leukemia     Degenerative disc disease     Depression     Depression     Dry mouth     Encounter for blood transfusion     Hernia, hiatal 11/18/2013    Hypertension     Hypothyroid     Hypothyroidism     Iron deficiency anemia     Macrocytic anemia 5/3/2016    Macular degeneration     Migraines     Mixed anxiety and depressive disorder     Multiple fractures of ribs of right side     Osteoporosis     Other hyperlipidemia 10/11/2019    Pneumonia     Pneumonia due to other staphylococcus     Rheumatoid arthritis     Rheumatoid arthritis(714.0)     Rheumatoid arthritis(714.0)     Remicade, MTX.       Past Surgical History:   Procedure Laterality Date    APPENDECTOMY  1985    APPLICATION OF WOUND VACUUM-ASSISTED CLOSURE DEVICE N/A 5/14/2020    Procedure: APPLICATION, WOUND VAC;  Surgeon: Mckinley Shannon MD;  Location: [facility] OR;  Service: General;  Laterality: N/A;    APPLICATION OF WOUND VACUUM-ASSISTED CLOSURE DEVICE Left 7/25/2020    Procedure: APPLICATION, WOUND VAC;  Surgeon: Berenice Alfaro MD;  Location: [facility] OR;  Service: General;  Laterality: Left;    BREAST BIOPSY      CATARACT EXTRACTION Bilateral 6/11/15    Dr. Booth    CHOLECYSTECTOMY  2013    cryoablasion kidney Left 09/27/2016    DEBRIDEMENT Left 7/25/2020    Procedure: DEBRIDEMENT;  Surgeon: Berenice Alfaro MD;  Location: [facility] OR;  Service: General;  Laterality: Left;    EVACUATION OF HEMATOMA Left 7/25/2020    Procedure: EVACUATION, HEMATOMA;  Surgeon: Berenice Alfaro MD;  Location: [facility] OR;  Service: General;  Laterality: Left;    EXCISION OF SQUAMOUS CELL CARCINOMA Left 7/2/2020    Procedure: EXCISION, CARCINOMA, SQUAMOUS CELL;  Surgeon: Mckinley Shannon MD;  Location: [facility]  OR;  Service: General;  Laterality: Left;    feet Bilateral     rheumatoid    FRACTURE SURGERY Right     tibia    HERNIA REPAIR      HYSTERECTOMY  1970    partial    INCISION AND DRAINAGE OF ABSCESS N/A 5/12/2020    Procedure: INCISION AND DRAINAGE, ABSCESS;  Surgeon: Emerson Hathaway MD;  [facility];  Service: General;  Laterality: N/A;    INCISIONAL BIOPSY N/A 5/12/2020    Procedure: INCISIONAL BIOPSY;  Surgeon: Emerson Hathaway MD;  [facility];  Service: General;  Laterality: N/A;    JOINT REPLACEMENT      bilateral knees (2008), hands, wrists, knuckles, toes    LAPAROSCOPIC NISSEN FUNDOPLICATION      TRANSFORAMINAL EPIDURAL INJECTION OF STEROID Left 6/25/2019    Procedure: Left L5/S1 TF AYAAN with local;  Surgeon: Rowdy Felix MD;  Location: [facility];  Service: Pain Management;  Laterality: Left;    WOUND DEBRIDEMENT N/A 5/14/2020    Procedure: DEBRIDEMENT, WOUND;  Surgeon: Mckinley Shannon MD;  [facility];  Service: General;  Laterality: N/A;    WOUND DEBRIDEMENT Left 7/25/2020    Procedure: DEBRIDEMENT, WOUND;  Surgeon: Berenice Alfaro MD;  Location[facility];  Service: General;  Laterality: Left;       Review of patient's allergies indicates:   Allergen Reactions    Codeine      Other reaction(s): hyper  Other reaction(s): hyper    Gabapentin Other (See Comments)     Bad dreams       Current Facility-Administered Medications on File Prior to Encounter   Medication    [DISCONTINUED] 0.9%  NaCl infusion (for blood administration)     Current Outpatient Medications on File Prior to Encounter   Medication Sig    albuterol (PROVENTIL) 2.5 mg /3 mL (0.083 %) nebulizer solution Take 3 mLs (2.5 mg total) by nebulization every 6 (six) hours as needed for Wheezing.    amitriptyline (ELAVIL) 75 MG tablet TAKE 1 TABLET BY MOUTH EVERY EVENING    calcium citrate-vitamin D (CITRACAL + D) 315-200 mg-unit per tablet Take 1 tablet by mouth once daily.     DULoxetine (CYMBALTA) 20 MG  capsule TAKE 2 CAPSULES(40 MG) BY MOUTH EVERY DAY (Patient taking differently: before meals, at bedtime and at 0200. )    ferrous gluconate (FERGON) 324 MG tablet Take 324 mg by mouth 2 (two) times daily.    fluticasone propionate (FLONASE) 50 mcg/actuation nasal spray 2 sprays (100 mcg total) by Each Nostril route once daily.    hydrocodone-acetaminophen 5-325mg (NORCO) 5-325 mg per tablet TK 1 T PO Q 6 H PRN    hydrOXYzine HCl (ATARAX) 25 MG tablet Take 25 mg by mouth nightly.     levothyroxine (SYNTHROID) 88 MCG tablet TAKE 1 TABLET BY MOUTH BEFORE BREAKFAST    magnesium oxide (MAG-OX) 400 mg (241.3 mg magnesium) tablet Take 1 tablet (400 mg total) by mouth 3 (three) times daily. (Patient taking differently: Take 400 mg by mouth 2 (two) times daily. )    meclizine (ANTIVERT) 50 MG tablet Take 25 mg by mouth 3 (three) times daily as needed.    metoprolol succinate (TOPROL-XL) 50 MG 24 hr tablet TAKE 1 TABLET(50 MG) BY MOUTH EVERY DAY    multivitamin capsule Take by mouth. As directed    ondansetron (ZOFRAN) 4 MG tablet Take 1 tablet (4 mg total) by mouth every 8 (eight) hours as needed for Nausea.    pravastatin (PRAVACHOL) 10 MG tablet TAKE 1 TABLET(10 MG) BY MOUTH EVERY DAY (Patient taking differently: Take 10 mg by mouth every evening. )    prochlorperazine (COMPAZINE) 5 MG tablet TAKE 1 TABLET(5 MG) BY MOUTH EVERY 6 HOURS AS NEEDED FOR NAUSEA    tamsulosin (FLOMAX) 0.4 mg Cap Take 1 capsule (0.4 mg total) by mouth once daily. For urinary retention    topiramate (TOPAMAX) 25 MG tablet Take 1 tablet (25 mg total) by mouth at night x 1 week then increase to 2 (two) times daily. Increase as tolerated. (Patient not taking: Reported on 7/21/2020)    valsartan-hydrochlorothiazide (DIOVAN-HCT) 80-12.5 mg per tablet TAKE 1 TABLET BY MOUTH DAILY     Family History     Problem Relation (Age of Onset)    Asthma Sister, Brother    Cancer Brother, Maternal Grandfather    Cataracts Mother    Chronic back pain  Sister, Brother    Diabetes Mellitus Brother    Fibromyalgia Daughter    Heart disease Mother, Father, Maternal Grandmother    Hyperlipidemia Mother    Hypertension Mother, Father, Sister, Brother    Osteoarthritis Mother, Father, Sister, Brother    Thyroid disease Sister, Brother        Tobacco Use    Smoking status: Former Smoker     Packs/day: 0.25     Years: 2.00     Pack years: 0.50     Quit date: 1965     Years since quittin.8    Smokeless tobacco: Never Used   Substance and Sexual Activity    Alcohol use: No    Drug use: No    Sexual activity: Never     Partners: Male     Review of Systems   Constitutional: Negative for appetite change, chills, fatigue and fever.   HENT: Negative for congestion, ear discharge, ear pain, mouth sores, nosebleeds, sinus pain, sneezing, sore throat, tinnitus and trouble swallowing.    Eyes: Negative for pain, discharge, redness and itching.   Respiratory: Negative for cough, chest tightness, shortness of breath and wheezing.    Cardiovascular: Negative for chest pain, palpitations and leg swelling.   Gastrointestinal: Negative for abdominal distention, abdominal pain, blood in stool, constipation, diarrhea, nausea and vomiting.   Endocrine: Negative for cold intolerance, heat intolerance, polydipsia, polyphagia and polyuria.   Genitourinary: Negative for difficulty urinating, dyspareunia, dysuria, enuresis, flank pain, frequency, genital sores, hematuria, menstrual problem, pelvic pain, urgency, vaginal bleeding, vaginal discharge and vaginal pain.   Musculoskeletal: Negative for arthralgias, back pain, joint swelling, myalgias and neck pain.   Skin: Negative for pallor and rash.   Neurological: Negative for dizziness, weakness, light-headedness, numbness and headaches.   Hematological: Negative for adenopathy. Does not bruise/bleed easily.   Psychiatric/Behavioral: Negative for confusion and sleep disturbance. The patient is not nervous/anxious.    All other  systems reviewed and are negative.    Objective:     Vital Signs (Most Recent):  Temp: 99.4 °F (37.4 °C) (08/10/20 2116)  Pulse: 95 (08/10/20 2301)  Resp: 20 (08/10/20 2301)  BP: (!) 171/84 (08/10/20 2301)  SpO2: 100 % (08/10/20 2301) Vital Signs (24h Range):  Temp:  [97.6 °F (36.4 °C)-99.6 °F (37.6 °C)] 99.4 °F (37.4 °C)  Pulse:  [] 95  Resp:  [10-22] 20  SpO2:  [96 %-100 %] 100 %  BP: (119-183)/(63-84) 171/84     Weight: 50 kg (110 lb 3.2 oz)  Body mass index is 20.16 kg/m².    Physical Exam  Vitals signs and nursing note reviewed.   Constitutional:       Appearance: She is well-developed.   HENT:      Head: Normocephalic and atraumatic.      Right Ear: External ear normal.      Left Ear: External ear normal.      Nose: Nose normal.   Eyes:      Conjunctiva/sclera: Conjunctivae normal.      Pupils: Pupils are equal, round, and reactive to light.   Neck:      Musculoskeletal: Normal range of motion and neck supple.      Thyroid: No thyromegaly.      Vascular: No JVD.   Cardiovascular:      Rate and Rhythm: Normal rate and regular rhythm.      Heart sounds: Normal heart sounds. No murmur. No friction rub. No gallop.    Pulmonary:      Effort: Pulmonary effort is normal. No respiratory distress.      Breath sounds: Normal breath sounds. No stridor. No wheezing or rales.   Chest:      Chest wall: No tenderness.   Abdominal:      General: Bowel sounds are normal. There is no distension.      Palpations: Abdomen is soft. There is no mass.      Tenderness: There is no abdominal tenderness. There is no guarding or rebound.      Comments: Splenomegaly    Genitourinary:     Vagina: Normal. No vaginal discharge.   Musculoskeletal: Normal range of motion.         General: No deformity.   Lymphadenopathy:      Cervical: No cervical adenopathy.   Skin:     General: Skin is warm.      Capillary Refill: Capillary refill takes less than 2 seconds.      Findings: Bruising present. No erythema or rash.   Neurological:       Mental Status: She is alert and oriented to person, place, and time.      Cranial Nerves: No cranial nerve deficit.      Sensory: No sensory deficit.      Deep Tendon Reflexes: Reflexes normal.   Psychiatric:         Behavior: Behavior normal.           CRANIAL NERVES     CN III, IV, VI   Pupils are equal, round, and reactive to light.       Significant Labs: All pertinent labs within the past 24 hours have been reviewed.    Significant Imaging: I have reviewed and interpreted all pertinent imaging results/findings within the past 24 hours.

## 2020-08-11 NOTE — CONSULTS
[facility] -   Hematology/Oncology  Consult Note    Patient Name: Sarah Parker  MRN: [id number]  Admission Date: 8/10/2020  Hospital Length of Stay: 1 days  Code Status: Prior   Attending Provider: Rm Landry MD  Consulting Provider: Guadalupe Baird NP  Primary Care Physician: Briseida Bennett MD  Principal Problem:Abscess of lung without pneumonia    Consults  Subjective:     HPI:  Mrs. Sarah Parker is a 80 y.o. female patient with medical history noted below who presents to the Emergency Department for an evaluation of abnormal labs. Pt reports that she received 1 unit of blood at the infusion center earlier today. She was referred by Dr. Knox (Hematology and Oncology) here to the ED to receive another unit of blood, per pt. Patient also had CT with multiple lesions with concerns for infectious etiology it was suggested to be started on ABX and admission for further work up. Patient denies any CP, SOB, fever, chills, n/v/d, abdominal pain, and all other sxs at this time. No further complaints or concerns at this time.       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  She has progressed to active leukemia.      Oncology Treatment Plan:   OP AZACITADINE 7-DAY (SUB-Q)    Medications:  Continuous Infusions:  Scheduled Meds:   amitriptyline  75 mg Oral QHS    cefepime in dextrose 5 %  2 g Intravenous Q12H    ferrous sulfate  325 mg Oral BID    hydrOXYzine HCL  25 mg Oral Nightly    levothyroxine  88 mcg Oral Before breakfast    metoprolol succinate  25 mg Oral Daily    metronidazole  500 mg Intravenous Q8H    pravastatin  10 mg Oral QHS     PRN Meds:sodium chloride, Pharmacy to dose Vancomycin consult **AND** vancomycin - pharmacy to dose     Review of patient's allergies indicates:   Allergen Reactions     Codeine      Other reaction(s): hyper  Other reaction(s): hyper    Gabapentin Other (See Comments)     Bad dreams        Past Medical History:   Diagnosis Date    Acid reflux     Anxiety     Back pain     Bronchitis, chronic obstructive w acute bronchitis 7/29/2016    Cancer     [facility] arms, face- Dr. Lata Tejada    Cataract     2+NS    Chronic myelomonocytic leukemia     Degenerative disc disease     Depression     Depression     Dry mouth     Encounter for blood transfusion     Hernia, hiatal 11/18/2013    Hypertension     Hypothyroid     Hypothyroidism     Iron deficiency anemia     Macrocytic anemia 5/3/2016    Macular degeneration     Migraines     Mixed anxiety and depressive disorder     Multiple fractures of ribs of right side     Osteoporosis     Other hyperlipidemia 10/11/2019    Pneumonia     Pneumonia due to other staphylococcus     Rheumatoid arthritis     Rheumatoid arthritis(714.0)     Rheumatoid arthritis(714.0)     Remicade, MTX.     Past Surgical History:   Procedure Laterality Date    APPENDECTOMY  1985    APPLICATION OF WOUND VACUUM-ASSISTED CLOSURE DEVICE N/A 5/14/2020    Procedure: APPLICATION, WOUND VAC;  Surgeon: Mckinley Shannon MD;  Location: [facility] OR;  Service: General;  Laterality: N/A;    APPLICATION OF WOUND VACUUM-ASSISTED CLOSURE DEVICE Left 7/25/2020    Procedure: APPLICATION, WOUND VAC;  Surgeon: Berenice Alfaro MD;  Location: [facility] OR;  Service: General;  Laterality: Left;    BREAST BIOPSY      CATARACT EXTRACTION Bilateral 6/11/15    Dr. Booth    CHOLECYSTECTOMY  2013    cryoablasion kidney Left 09/27/2016    DEBRIDEMENT Left 7/25/2020    Procedure: DEBRIDEMENT;  Surgeon: Berenice Alfaro MD;  Location: [facility] OR;  Service: General;  Laterality: Left;    EVACUATION OF HEMATOMA Left 7/25/2020    Procedure: EVACUATION, HEMATOMA;  Surgeon: Berenice Alfaro MD;  Location: [facility] OR;  Service: General;  Laterality: Left;    EXCISION OF SQUAMOUS  CELL CARCINOMA Left 2020    Procedure: EXCISION, CARCINOMA, SQUAMOUS CELL;  Surgeon: Mckinley Shannon MD;  Location: [facility] OR;  Service: General;  Laterality: Left;    feet Bilateral     rheumatoid    FRACTURE SURGERY Right     tibia    HERNIA REPAIR      HYSTERECTOMY  1970    partial    INCISION AND DRAINAGE OF ABSCESS N/A 2020    Procedure: INCISION AND DRAINAGE, ABSCESS;  Surgeon: Emerson Hathaway MD;  Location: [facility] OR;  Service: General;  Laterality: N/A;    INCISIONAL BIOPSY N/A 2020    Procedure: INCISIONAL BIOPSY;  Surgeon: Emerson Hathaway MD;  Location: [facility] OR;  Service: General;  Laterality: N/A;    JOINT REPLACEMENT      bilateral knees (), hands, wrists, knuckles, toes    LAPAROSCOPIC NISSEN FUNDOPLICATION      TRANSFORAMINAL EPIDURAL INJECTION OF STEROID Left 2019    Procedure: Left L5/S1 TF AYAAN with local;  Surgeon: Rowdy Felix MD;  Location: [facility] PAIN MGT;  Service: Pain Management;  Laterality: Left;    WOUND DEBRIDEMENT N/A 2020    Procedure: DEBRIDEMENT, WOUND;  Surgeon: Mckinley Shannon MD;  Location: [facility] OR;  Service: General;  Laterality: N/A;    WOUND DEBRIDEMENT Left 2020    Procedure: DEBRIDEMENT, WOUND;  Surgeon: Berenice Alfaro MD;  Location: [facility] OR;  Service: General;  Laterality: Left;     Family History     Problem Relation (Age of Onset)    Asthma Sister, Brother    Cancer Brother, Maternal Grandfather    Cataracts Mother    Chronic back pain Sister, Brother    Diabetes Mellitus Brother    Fibromyalgia Daughter    Heart disease Mother, Father, Maternal Grandmother    Hyperlipidemia Mother    Hypertension Mother, Father, Sister, Brother    Osteoarthritis Mother, Father, Sister, Brother    Thyroid disease Sister, Brother        Tobacco Use    Smoking status: Former Smoker     Packs/day: 0.25     Years: 2.00     Pack years: 0.50     Quit date: 1965     Years since quittin.8    Smokeless tobacco: Never Used   Substance and Sexual Activity     Alcohol use: No    Drug use: No    Sexual activity: Never     Partners: Male       Review of Systems   Constitutional: Negative.    HENT: Negative.    Eyes: Negative.    Respiratory: Negative.    Gastrointestinal: Negative.    Endocrine: Negative.    Genitourinary: Negative.    Musculoskeletal: Negative.    Skin: Positive for pallor and wound.   Allergic/Immunologic: Positive for immunocompromised state.   Neurological: Negative.    Hematological: Bruises/bleeds easily.   Psychiatric/Behavioral: Negative.      Objective:     Vital Signs (Most Recent):  Temp: 98 °F (36.7 °C) (08/11/20 0843)  Pulse: 101 (08/11/20 0843)  Resp: 17 (08/11/20 0843)  BP: (!) 151/72 (08/11/20 0843)  SpO2: 100 % (08/11/20 0843) Vital Signs (24h Range):  Temp:  [98 °F (36.7 °C)-99.6 °F (37.6 °C)] 98 °F (36.7 °C)  Pulse:  [] 101  Resp:  [10-22] 17  SpO2:  [98 %-100 %] 100 %  BP: (143-183)/(66-84) 151/72     Weight: 45.4 kg (100 lb)  Body mass index is 18.29 kg/m².  Body surface area is 1.41 meters squared.      Intake/Output Summary (Last 24 hours) at 8/11/2020 1049  Last data filed at 8/11/2020 0330  Gross per 24 hour   Intake 250 ml   Output --   Net 250 ml       Physical Exam  Vitals signs and nursing note reviewed.   Constitutional:       General: She is not in acute distress.     Appearance: She is well-developed. She is ill-appearing.   HENT:      Head: Normocephalic and atraumatic.      Right Ear: External ear normal.      Left Ear: External ear normal.      Nose: Nose normal.   Eyes:      Conjunctiva/sclera: Conjunctivae normal.      Pupils: Pupils are equal, round, and reactive to light.   Neck:      Musculoskeletal: Normal range of motion and neck supple.      Thyroid: No thyromegaly.      Vascular: No JVD.   Cardiovascular:      Rate and Rhythm: Normal rate and regular rhythm.      Heart sounds: Normal heart sounds. No murmur. No friction rub. No gallop.    Pulmonary:      Effort: Pulmonary effort is normal. No  respiratory distress.      Breath sounds: Normal breath sounds. No stridor. No wheezing or rales.   Chest:      Chest wall: No tenderness.   Abdominal:      General: Bowel sounds are normal. There is no distension.      Palpations: Abdomen is soft. There is no mass.      Tenderness: There is no abdominal tenderness. There is no guarding or rebound.   Genitourinary:     Vagina: Normal. No vaginal discharge.   Musculoskeletal: Normal range of motion.         General: No deformity.   Lymphadenopathy:      Cervical: No cervical adenopathy.   Skin:     General: Skin is warm.      Capillary Refill: Capillary refill takes less than 2 seconds.      Findings: Bruising present. No erythema or rash.             Comments: Wound with wound vac in place   Neurological:      Mental Status: She is alert and oriented to person, place, and time.      Cranial Nerves: No cranial nerve deficit.      Sensory: No sensory deficit.      Deep Tendon Reflexes: Reflexes normal.   Psychiatric:         Attention and Perception: Attention normal.         Mood and Affect: Mood normal.         Speech: Speech normal.         Behavior: Behavior normal.         Thought Content: Thought content normal.         Cognition and Memory: Cognition normal.         Judgment: Judgment normal.         Significant Labs:   CBC:   Recent Labs   Lab 08/10/20  1020   WBC 77.24*   HGB 5.0*   HCT 16.5*   PLT 30*   , CMP:   Recent Labs   Lab 08/10/20  1020   *   K 5.3*      CO2 20*      BUN 30*   CREATININE 1.1   CALCIUM 8.5*   PROT 7.7   ALBUMIN 2.8*   BILITOT 0.3   ALKPHOS 209*   AST 24   ALT 19   ANIONGAP 11   EGFRNONAA 48*   , Coagulation: No results for input(s): PT, INR, APTT in the last 48 hours., Immunology: No results for input(s): SPEP, TOBIN, STEWART, FREELAMBDALI in the last 48 hours., LDH: No results for input(s): LDHCSF, BFSOURCE in the last 48 hours., LFTs:   Recent Labs   Lab 08/10/20  1020   ALT 19   AST 24   ALKPHOS 209*   BILITOT 0.3    PROT 7.7   ALBUMIN 2.8*   , Reticulocytes: No results for input(s): RETIC in the last 48 hours., Uric Acid No results for input(s): URICACID in the last 48 hours., Urine Studies:   Recent Labs   Lab 08/10/20  2125   COLORU Yellow   APPEARANCEUA Clear   PHUR 8.0   SPECGRAV 1.015   PROTEINUA Negative   GLUCUA Negative   KETONESU Negative   BILIRUBINUA Negative   OCCULTUA 1+*   NITRITE Negative   UROBILINOGEN 1.0   LEUKOCYTESUR Negative   RBCUA 2   BACTERIA Occasional   SQUAMEPITHEL 1   HYALINECASTS 1    and All pertinent labs from the last 24 hours have been reviewed.    Diagnostic Results:  I have reviewed all pertinent imaging results/findings within the past 24 hours.    Assessment/Plan:     Chronic myelomonocytic leukemia not having achieved remission  Patient with CMML on Vidaza previously however treatment stopped due to complications including development of skin infection requiring debriedment and wound vac placement to lower back.  Aslo developed squamous cell skin cancer to thigh.  Disease has progressed to active Leukemia.  Dr. Knox to have family meeting today to discuss hospice vs possible induction therapy.  Patient received 2 units prbc on admit due to hemoglobin of 5 g/dL.  Continue supportive care at this time with transfusions as needed.  --Repeat CBC now  --CBC every 12 hours  --Transfuse for hemoglobin <7 g/dL  --Transfuse for platelets <10 or active bleeding <20 K        Thank you for your consult. I will follow-up with patient. Please contact us if you have any additional questions.    Guadalupe Baird NP  Hematology/Oncology  [facility] -

## 2020-08-11 NOTE — ED NOTES
Blood administration documentation:    Confirmed w/ two nurses for pt information and T/S w/ blood matching (Mili). Blood admin report sheet signed and placed in chart.     Pt information: Sarah Parker   MRN: [id number]  : 1940  BBID: [id number]  Type: O +      Unit number: [id number]  ABORH: O+  Exp. Date: 2020  Order number: [id number]  Rate: 150mL/h

## 2020-08-11 NOTE — HPI
Mrs. Sarah Parker is a 80 y.o. female patient with medical history noted below who presents to the Emergency Department for an evaluation of abnormal labs. Pt reports that she received 1 unit of blood at the infusion center earlier today. She was referred by Dr. Knox (Hematology and Oncology) here to the ED to receive another unit of blood, per pt. Patient also had CT with multiple lesions with concerns for infectious etiology it was suggested to be started on ABX and admission for further work up. Patient denies any CP, SOB, fever, chills, n/v/d, abdominal pain, and all other sxs at this time. No further complaints or concerns at this time.       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  She has progressed to active leukemia.

## 2020-08-11 NOTE — ED PROVIDER NOTES
SCRIBE #1 NOTE: I, Analisa Alanis, am scribing for, and in the presence of, Duy Trujillo Jr., MD. I have scribed the entire note.       History     Chief Complaint   Patient presents with    Dizziness     Review of patient's allergies indicates:   Allergen Reactions    Codeine      Other reaction(s): hyper  Other reaction(s): hyper    Gabapentin Other (See Comments)     Bad dreams         History of Present Illness     HPI    8/10/2020, 7:25 PM  History obtained from the patient      History of Present Illness: Sarah Parker is a 80 y.o. female patient with a PMHx of Cancer, HTN, Hypothyroidism, and Macrocytic Anemia, who presents to the Emergency Department for an evaluation. Pt reports that she received 1 unit of blood at the infusion center earlier today. She was referred by Dr. Knox (Hematology and Oncology) here to the ED to receive another unit of blood, per pt. Pt also received a CT scan earlier today which revealed numerous nodular opacities present in both lungs. Pt c/o of generalized weakness. Symptoms are constant and moderate in severity. No mitigating or exacerbating factors reported. Patient denies any CP, SOB, fever, chills, n/v/d, abdominal pain, and all other sxs at this time. No further complaints or concerns at this time.       Arrival mode: Personal vehicle    PCP: Briseida Bennett MD        Past Medical History:  Past Medical History:   Diagnosis Date    Acid reflux     Anxiety     Back pain     Bronchitis, chronic obstructive w acute bronchitis 7/29/2016    Cancer     [facility] arms, face- Dr. Lata Tejada    Cataract     2+NS    Chronic myelomonocytic leukemia     Degenerative disc disease     Depression     Depression     Dry mouth     Encounter for blood transfusion     Hernia, hiatal 11/18/2013    Hypertension     Hypothyroid     Hypothyroidism     Iron deficiency anemia     Macrocytic anemia 5/3/2016    Macular degeneration     Migraines     Mixed anxiety  and depressive disorder     Multiple fractures of ribs of right side     Osteoporosis     Other hyperlipidemia 10/11/2019    Pneumonia     Pneumonia due to other staphylococcus     Rheumatoid arthritis     Rheumatoid arthritis(714.0)     Rheumatoid arthritis(714.0)     Remicade, MTX.       Past Surgical History:  Past Surgical History:   Procedure Laterality Date    APPENDECTOMY  1985    APPLICATION OF WOUND VACUUM-ASSISTED CLOSURE DEVICE N/A 5/14/2020    Procedure: APPLICATION, WOUND VAC;  Surgeon: Mckinley Shannon MD;  Location: [facility] OR;  Service: General;  Laterality: N/A;    APPLICATION OF WOUND VACUUM-ASSISTED CLOSURE DEVICE Left 7/25/2020    Procedure: APPLICATION, WOUND VAC;  Surgeon: Berenice Alfaro MD;  Location: [facility] OR;  Service: General;  Laterality: Left;    BREAST BIOPSY      CATARACT EXTRACTION Bilateral 6/11/15    Dr. Booth    CHOLECYSTECTOMY  2013    cryoablasion kidney Left 09/27/2016    DEBRIDEMENT Left 7/25/2020    Procedure: DEBRIDEMENT;  Surgeon: Berenice Alfaro MD;  Location: [facility] OR;  Service: General;  Laterality: Left;    EVACUATION OF HEMATOMA Left 7/25/2020    Procedure: EVACUATION, HEMATOMA;  Surgeon: Berenice Alfaro MD;  Location: [facility] OR;  Service: General;  Laterality: Left;    EXCISION OF SQUAMOUS CELL CARCINOMA Left 7/2/2020    Procedure: EXCISION, CARCINOMA, SQUAMOUS CELL;  Surgeon: Mckinley Shannon MD;  Location: [facility] OR;  Service: General;  Laterality: Left;    feet Bilateral     rheumatoid    FRACTURE SURGERY Right     tibia    HERNIA REPAIR      HYSTERECTOMY  1970    partial    INCISION AND DRAINAGE OF ABSCESS N/A 5/12/2020    Procedure: INCISION AND DRAINAGE, ABSCESS;  Surgeon: Emerson Hathaway MD;  Location: [facility] OR;  Service: General;  Laterality: N/A;    INCISIONAL BIOPSY N/A 5/12/2020    Procedure: INCISIONAL BIOPSY;  Surgeon: Emerson Hathaway MD;  Location: [facility] OR;  Service: General;  Laterality: N/A;    JOINT REPLACEMENT      bilateral knees  (2008), hands, wrists, knuckles, toes    LAPAROSCOPIC NISSEN FUNDOPLICATION      TRANSFORAMINAL EPIDURAL INJECTION OF STEROID Left 2019    Procedure: Left L5/S1 TF AYAAN with local;  Surgeon: Rowdy Felix MD;  Location: [facility];  Service: Pain Management;  Laterality: Left;    WOUND DEBRIDEMENT N/A 2020    Procedure: DEBRIDEMENT, WOUND;  Surgeon: Mckinley Shannon MD;  Location: [facility] OR;  Service: General;  Laterality: N/A;    WOUND DEBRIDEMENT Left 2020    Procedure: DEBRIDEMENT, WOUND;  Surgeon: Berenice Alfaro MD;  Location: [facility] OR;  Service: General;  Laterality: Left;         Family History:  Family History   Problem Relation Age of Onset    Heart disease Mother     Hyperlipidemia Mother     Hypertension Mother     Osteoarthritis Mother     Cataracts Mother     Hypertension Father     Osteoarthritis Father     Heart disease Father     Asthma Sister     Chronic back pain Sister     Hypertension Sister     Osteoarthritis Sister     Thyroid disease Sister     Asthma Brother     Cancer Brother     Chronic back pain Brother     Diabetes Mellitus Brother     Hypertension Brother     Osteoarthritis Brother     Thyroid disease Brother     Cancer Maternal Grandfather     Fibromyalgia Daughter     Heart disease Maternal Grandmother     Colon cancer Neg Hx     Diabetes Neg Hx        Social History:  Social History     Tobacco Use    Smoking status: Former Smoker     Packs/day: 0.25     Years: 2.00     Pack years: 0.50     Quit date: 1965     Years since quittin.8    Smokeless tobacco: Never Used   Substance and Sexual Activity    Alcohol use: No    Drug use: No    Sexual activity: Never     Partners: Male        Review of Systems     Review of Systems   Constitutional: Negative for chills and fever.   HENT: Negative for sore throat.    Respiratory: Negative for cough and shortness of breath.    Cardiovascular: Negative for chest pain.   Gastrointestinal:  Negative for abdominal pain, diarrhea, nausea and vomiting.   Genitourinary: Negative for dysuria.   Musculoskeletal: Negative for back pain.   Skin: Negative for rash.   Neurological: Positive for weakness (generalized).   Hematological: Does not bruise/bleed easily.   All other systems reviewed and are negative.     Physical Exam     Initial Vitals   BP Pulse Resp Temp SpO2   08/10/20 1913 08/10/20 1913 08/10/20 1913 08/10/20 1948 08/10/20 1913   (!) 183/81 94 20 98.7 °F (37.1 °C) 100 %      MAP       --                 Physical Exam  Nursing Notes and Vital Signs Reviewed.  Constitutional: Patient is in no acute distress. Well-developed and well-nourished.  Head: Atraumatic. Normocephalic.  Eyes: PERRL. EOM intact. Conjunctivae are not pale. No scleral icterus.  ENT: Mucous membranes are moist. Oropharynx is clear and symmetric.    Neck: Supple. Full ROM. No lymphadenopathy.  Cardiovascular: Regular rate. Regular rhythm. No murmurs, rubs, or gallops. Distal pulses are 2+ and symmetric.  Pulmonary/Chest: No respiratory distress. Clear to auscultation bilaterally. No wheezing or rales.  Abdominal: Soft and non-distended.  There is no tenderness.  No rebound, guarding, or rigidity.  Musculoskeletal: Moves all extremities. No obvious deformities. No edema. No calf tenderness.  Skin: Warm and dry.  Neurological:  Alert, awake, and appropriate.  Normal speech.  No acute focal neurological deficits are appreciated.  Psychiatric: Normal affect. Good eye contact. Appropriate in content.     ED Course   Critical Care    Date/Time: 8/10/2020 8:45 PM  Performed by: Duy Trujillo Jr., MD  Authorized by: Duy Trujillo Jr., MD   Direct patient critical care time: 10 minutes  Additional history critical care time: 10 minutes  Ordering / reviewing critical care time: 7 minutes  Documentation critical care time: 6 minutes  Total critical care time (exclusive of procedural time) : 33 minutes  Critical care time was exclusive  "of separately billable procedures and treating other patients and teaching time.  Critical care was necessary to treat or prevent imminent or life-threatening deterioration of the following conditions: Anemia and Lung abscess.  Critical care was time spent personally by me on the following activities: blood draw for specimens, development of treatment plan with patient or surrogate, evaluation of patient's response to treatment, examination of patient, obtaining history from patient or surrogate, ordering and performing treatments and interventions, ordering and review of laboratory studies, ordering and review of radiographic studies, pulse oximetry, re-evaluation of patient's condition and review of old charts.        ED Vital Signs:  Vitals:    08/10/20 1913 08/10/20 1916 08/10/20 1931 08/10/20 1948   BP: (!) 183/81 (!) 159/75 (!) 165/79    Pulse: 94 95 96    Resp: 20 20 20    Temp:    98.7 °F (37.1 °C)   TempSrc:    Oral   SpO2: 100% 100% 100%    Weight: 50 kg (110 lb 3.2 oz)      Height: 5' 2" (1.575 m)       08/10/20 2046 08/10/20 2048 08/10/20 2116   BP: (!) 156/69 (!) 161/72 (!) 159/67   Pulse: 94 95 94   Resp: (!) 22 20 20   Temp: 99.6 °F (37.6 °C) 99.6 °F (37.6 °C) 99.4 °F (37.4 °C)   TempSrc: Oral Oral Oral   SpO2: 99% 100% 100%   Weight:      Height:          Abnormal Lab Results:  Labs Reviewed   URINALYSIS, REFLEX TO URINE CULTURE - Abnormal; Notable for the following components:       Result Value    Occult Blood UA 1+ (*)     All other components within normal limits    Narrative:     Specimen Source->Urine   CULTURE, BLOOD   CULTURE, BLOOD   LACTIC ACID, PLASMA   SARS-COV-2 RNA AMPLIFICATION, QUAL   URINALYSIS MICROSCOPIC    Narrative:     Specimen Source->Urine   TYPE & SCREEN        All Lab Results:  Results for orders placed or performed during the hospital encounter of 08/10/20   Lactic acid, plasma   Result Value Ref Range    Lactate (Lactic Acid) 0.6 0.5 - 2.2 mmol/L   COVID-19 Rapid Screening "   Result Value Ref Range    SARS-CoV-2 RNA, Amplification, Qual Negative Negative   Urinalysis, Reflex to Urine Culture Urine, Clean Catch    Specimen: Urine   Result Value Ref Range    Specimen UA Urine, Clean Catch     Color, UA Yellow Yellow, Straw, Omnse    Appearance, UA Clear Clear    pH, UA 8.0 5.0 - 8.0    Specific Gravity, UA 1.015 1.005 - 1.030    Protein, UA Negative Negative    Glucose, UA Negative Negative    Ketones, UA Negative Negative    Bilirubin (UA) Negative Negative    Occult Blood UA 1+ (A) Negative    Nitrite, UA Negative Negative    Urobilinogen, UA 1.0 <2.0 EU/dL    Leukocytes, UA Negative Negative   Urinalysis Microscopic   Result Value Ref Range    RBC, UA 2 0 - 4 /hpf    Bacteria Occasional None-Occ /hpf    Squam Epithel, UA 1 /hpf    Hyaline Casts, UA 1 0-1/lpf /lpf    Microscopic Comment SEE COMMENT      *Note: Due to a large number of results and/or encounters for the requested time period, some results have not been displayed. A complete set of results can be found in Results Review.         Imaging Results:  Imaging Results          CT Chest Without Contrast (Final result)  Result time 08/10/20 20:35:00    Final result by Felipe Vyas MD (08/10/20 20:35:00)                 Impression:      Peripheral large soft tissue opacity again identified in the right upper lobe.  Central area of hypodensity is not clearly identified as on the prior examination.  Persistent nodular soft tissue opacities identified throughout both lungs, some of which in the left lower lobe have decreased in size.  Findings again may reflect atypical infectious process with metastatic disease or mycobacterial infection additional considerations.    Bronchiectasis, ground-glass haziness and secretions within peripheral airways concerning for infectious/inflammatory small airways disease.    Stable enlarged axillary and mediastinal lymph nodes.    All CT scans at this facility use dose modulation, iterative  reconstruction, and/or weight based dosing when appropriate to reduce radiation dose to as low as reasonable achievable.      Electronically signed by: Felipe Vyas  Date:    08/10/2020  Time:    20:35             Narrative:    EXAMINATION:  CT CHEST WITHOUT CONTRAST    CLINICAL HISTORY:  Pneumonia;    TECHNIQUE:  Low dose axial images, sagittal and coronal reformations were obtained from the thoracic inlet to the lung bases. Contrast was not administered.    COMPARISON:  CT chest abdomen pelvis 08/03/2020    FINDINGS:  Base of Neck: Enlarged bilateral axillary lymphadenopathy, unchanged as compared to the recent prior.    Thoracic soft tissues: Normal.    Aorta: Left-sided aortic arch.  Mild moderate aortic atherosclerotic calcification.  No aneurysm.    Heart: Normal in size.  Coronary atherosclerotic disease.  Trace pericardial fluid.    Pulmonary vasculature: Pulmonary arteries distribute normally.    Joanie/Mediastinum: Scattered mediastinal nodes.  There is an enlarged 1.2 cm pretracheal node.  There are calcified hilar lymph nodes right greater than left.  Appearance is similar to the prior.    Airways: Patent.    Lungs/Pleura: Similar appearance of a large, which shaped area of consolidation within the periphery of the right upper lobe.  Size is similar to the prior.  Previously identified low-density focus internally is not well visualized on this noncontrast examination.  Persistent ground-glass haziness within the right upper lobe pulmonary parenchyma.  Stable, focal 2.9 cm opacity in the left upper lobe.  There are additional small soft tissue calcified nodules identified throughout both lungs.  There is bronchiectasis.  Stable 1.0 cm soft tissue nodule in the right lower lobe.  Stable, spiculated focus within the left upper lobe measuring 1.4 cm.  Interval decrease in size of soft tissue opacities within the superior left lower lobe.  There are numerous pulmonary cysts.  No large pleural effusion.  No  pneumothorax.    Esophagus: Large hiatal hernia, esophagus is otherwise within normal limits.    Upper Abdomen: Spleen is enlarged in size.  Postsurgical changes of cholecystectomy.  Abnormal position of the left kidney.  No acute intra-abdominal abnormality.    Bones: Degenerative changes.  No acute or concerning osseous abnormality.  Remote posterior right rib fractures again noted.                                     The Emergency Provider reviewed the vital signs and test results, which are outlined above.     ED Discussion     7:50 PM: Discussed pt's case with Dr. Knox (Hematology and Oncology). He notes that the pt' had a recent CT scan that showed a possible lung abscess. He states that Dr Recinos wants work up and pan cultures to r/o infection.    10:10 PM: Re-evaluated pt. I have discussed test results, shared treatment plan, and the need for admission with patient at bedside. Pt expresses understanding at this time and agree with all information. All questions answered. Pt has no further questions or concerns at this time. Pt is ready for admit.    10:12 PM: Discussed case with Riri Marx NP (Hospital Medicine). Dr. Landry agrees with current care and management of pt and accepts admission.   Admitting Service: Hospital Medicine  Admitting Physician: Dr. Landry  Admit to: Inpatient/Med Surg           Medical Decision Making:   Clinical Tests:   Lab Tests: Ordered and Reviewed  Radiological Study: Ordered and Reviewed           ED Medication(s):  Medications   0.9%  NaCl infusion (for blood administration) (has no administration in time range)   piperacillin-tazobactam 4.5 g in dextrose 5 % 100 mL IVPB (ready to mix system) (has no administration in time range)   vancomycin - pharmacy to dose (has no administration in time range)   vancomycin in dextrose 5 % 1 gram/250 mL IVPB 1,000 mg (has no administration in time range)       New Prescriptions    No medications on file               Scribe  Attestation:   Scribe #1: I performed the above scribed service and the documentation accurately describes the services I performed. I attest to the accuracy of the note.     Attending:   Physician Attestation Statement for Scribe #1: I, Duy Trujillo Jr., MD, personally performed the services described in this documentation, as scribed by Analisa Alanis, in my presence, and it is both accurate and complete.           Clinical Impression     No diagnosis found.    Disposition:   Disposition: Admitted  Condition: Fair         Duy Trujillo Jr., MD  08/10/20 [id number]

## 2020-08-11 NOTE — PLAN OF CARE
Pt. needs met. VSS throughout shift.IVF as ordered IV abx as ordered.  Pt weoght shfted as needed. Pt presented with education, needs reinfocrcement. POC reviewed will continue to monitor and adjust POC.    Problem: Wound  Goal: Optimal Wound Healing  Outcome: Ongoing, Progressing     Problem: Infection  Goal: Infection Symptom Resolution  Outcome: Ongoing, Progressing     Problem: Adult Inpatient Plan of Care  Goal: Plan of Care Review  Outcome: Ongoing, Progressing  Goal: Patient-Specific Goal (Individualization)  Outcome: Ongoing, Progressing  Goal: Absence of Hospital-Acquired Illness or Injury  Outcome: Ongoing, Progressing  Goal: Optimal Comfort and Wellbeing  Outcome: Ongoing, Progressing  Goal: Readiness for Transition of Care  Outcome: Ongoing, Progressing  Goal: Rounds/Family Conference  Outcome: Ongoing, Progressing     Problem: Fall Injury Risk  Goal: Absence of Fall and Fall-Related Injury  Outcome: Ongoing, Progressing

## 2020-08-11 NOTE — ASSESSMENT & PLAN NOTE
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  Aslo developed squamous cell skin cancer to thigh.  Disease has progressed to active Leukemia.  Dr. Knox to have family meeting today to discuss hospice vs possible induction therapy.  Continue supportive care at this time with transfusions as needed.  --CBC every 12 hours  --Transfuse for hemoglobin <7 g/dL  --Transfuse for platelets <10 or active bleeding <20 K

## 2020-08-12 VITALS
RESPIRATION RATE: 18 BRPM | TEMPERATURE: 98 F | OXYGEN SATURATION: 97 % | DIASTOLIC BLOOD PRESSURE: 67 MMHG | HEART RATE: 83 BPM | SYSTOLIC BLOOD PRESSURE: 143 MMHG

## 2020-08-12 PROBLEM — R91.8 LUNG MASS: Status: ACTIVE | Noted: 2020-08-12

## 2020-08-12 LAB
ACANTHOCYTES BLD QL SMEAR: PRESENT
ANISOCYTOSIS BLD QL SMEAR: SLIGHT
ANISOCYTOSIS BLD QL SMEAR: SLIGHT
BASOPHILS NFR BLD: 0 % (ref 0–1.9)
BASOPHILS NFR BLD: 0 % (ref 0–1.9)
BLASTS NFR BLD MANUAL: 1 %
BLASTS NFR BLD MANUAL: 2 %
BLD PROD TYP BPU: NORMAL
BLOOD UNIT EXPIRATION DATE: NORMAL
BLOOD UNIT TYPE CODE: 8400
BLOOD UNIT TYPE: NORMAL
CODING SYSTEM: NORMAL
CREAT SERPL-MCNC: 0.9 MG/DL (ref 0.5–1.4)
CRP SERPL-MCNC: 13.1 MG/L (ref 0–8.2)
DACRYOCYTES BLD QL SMEAR: ABNORMAL
DACRYOCYTES BLD QL SMEAR: ABNORMAL
DIFFERENTIAL METHOD: ABNORMAL
DIFFERENTIAL METHOD: ABNORMAL
DISPENSE STATUS: NORMAL
EOSINOPHIL NFR BLD: 0 % (ref 0–8)
EOSINOPHIL NFR BLD: 1 % (ref 0–8)
ERYTHROCYTE [DISTWIDTH] IN BLOOD BY AUTOMATED COUNT: 17.3 % (ref 11.5–14.5)
ERYTHROCYTE [DISTWIDTH] IN BLOOD BY AUTOMATED COUNT: 17.3 % (ref 11.5–14.5)
ERYTHROCYTE [SEDIMENTATION RATE] IN BLOOD BY WESTERGREN METHOD: 35 MM/HR (ref 0–20)
EST. GFR  (AFRICAN AMERICAN): >60 ML/MIN/1.73 M^2
EST. GFR  (NON AFRICAN AMERICAN): >60 ML/MIN/1.73 M^2
HCT VFR BLD AUTO: 23.1 % (ref 37–48.5)
HCT VFR BLD AUTO: 23.3 % (ref 37–48.5)
HGB BLD-MCNC: 7.2 G/DL (ref 12–16)
HGB BLD-MCNC: 7.3 G/DL (ref 12–16)
HYPOCHROMIA BLD QL SMEAR: ABNORMAL
HYPOCHROMIA BLD QL SMEAR: ABNORMAL
IMM GRANULOCYTES # BLD AUTO: ABNORMAL K/UL (ref 0–0.04)
IMM GRANULOCYTES # BLD AUTO: ABNORMAL K/UL (ref 0–0.04)
IMM GRANULOCYTES NFR BLD AUTO: ABNORMAL % (ref 0–0.5)
IMM GRANULOCYTES NFR BLD AUTO: ABNORMAL % (ref 0–0.5)
LYMPHOCYTES NFR BLD: 40 % (ref 18–48)
LYMPHOCYTES NFR BLD: 51 % (ref 18–48)
MCH RBC QN AUTO: 29.6 PG (ref 27–31)
MCH RBC QN AUTO: 29.7 PG (ref 27–31)
MCHC RBC AUTO-ENTMCNC: 31.2 G/DL (ref 32–36)
MCHC RBC AUTO-ENTMCNC: 31.3 G/DL (ref 32–36)
MCV RBC AUTO: 95 FL (ref 82–98)
MCV RBC AUTO: 95 FL (ref 82–98)
METAMYELOCYTES NFR BLD MANUAL: 3 %
METAMYELOCYTES NFR BLD MANUAL: 7 %
MONOCYTES NFR BLD: 12 % (ref 4–15)
MONOCYTES NFR BLD: 14 % (ref 4–15)
MYELOCYTES NFR BLD MANUAL: 12 %
MYELOCYTES NFR BLD MANUAL: 3 %
NEUTROPHILS NFR BLD: 22 % (ref 38–73)
NEUTROPHILS NFR BLD: 24 % (ref 38–73)
NEUTS BAND NFR BLD MANUAL: 4 %
NEUTS BAND NFR BLD MANUAL: 4 %
NRBC BLD-RTO: 2 /100 WBC
NRBC BLD-RTO: 4 /100 WBC
OVALOCYTES BLD QL SMEAR: ABNORMAL
OVALOCYTES BLD QL SMEAR: ABNORMAL
PATH REV BLD -IMP: NORMAL
PATH REV BLD -IMP: NORMAL
PLATELET # BLD AUTO: 16 K/UL (ref 150–350)
PLATELET # BLD AUTO: 58 K/UL (ref 150–350)
PLATELET BLD QL SMEAR: ABNORMAL
PLATELET BLD QL SMEAR: ABNORMAL
PMV BLD AUTO: 12.8 FL (ref 9.2–12.9)
PMV BLD AUTO: ABNORMAL FL (ref 9.2–12.9)
POIKILOCYTOSIS BLD QL SMEAR: SLIGHT
POIKILOCYTOSIS BLD QL SMEAR: SLIGHT
POLYCHROMASIA BLD QL SMEAR: ABNORMAL
POLYCHROMASIA BLD QL SMEAR: ABNORMAL
RBC # BLD AUTO: 2.43 M/UL (ref 4–5.4)
RBC # BLD AUTO: 2.46 M/UL (ref 4–5.4)
SCHISTOCYTES BLD QL SMEAR: PRESENT
SPHEROCYTES BLD QL SMEAR: ABNORMAL
STOMATOCYTES BLD QL SMEAR: PRESENT
TRANS PLATPHERESIS VOL PATIENT: NORMAL ML
VANCOMYCIN SERPL-MCNC: 13.9 UG/ML
WBC # BLD AUTO: 33.88 K/UL (ref 3.9–12.7)
WBC # BLD AUTO: 36.32 K/UL (ref 3.9–12.7)

## 2020-08-12 PROCEDURE — 99233 SBSQ HOSP IP/OBS HIGH 50: CPT | Mod: HCNC,,, | Performed by: INTERNAL MEDICINE

## 2020-08-12 PROCEDURE — S0030 INJECTION, METRONIDAZOLE: HCPCS | Mod: HCNC | Performed by: NURSE PRACTITIONER

## 2020-08-12 PROCEDURE — 87449 NOS EACH ORGANISM AG IA: CPT | Mod: HCNC

## 2020-08-12 PROCEDURE — 99223 1ST HOSP IP/OBS HIGH 75: CPT | Mod: HCNC,,, | Performed by: INTERNAL MEDICINE

## 2020-08-12 PROCEDURE — 25000003 PHARM REV CODE 250: Mod: HCNC | Performed by: NURSE PRACTITIONER

## 2020-08-12 PROCEDURE — 36415 COLL VENOUS BLD VENIPUNCTURE: CPT | Mod: HCNC

## 2020-08-12 PROCEDURE — 63600175 PHARM REV CODE 636 W HCPCS: Mod: HCNC | Performed by: HOSPITALIST

## 2020-08-12 PROCEDURE — 86635 COCCIDIOIDES ANTIBODY: CPT | Mod: HCNC

## 2020-08-12 PROCEDURE — 82565 ASSAY OF CREATININE: CPT | Mod: HCNC

## 2020-08-12 PROCEDURE — 99223 PR INITIAL HOSPITAL CARE,LEVL III: ICD-10-PCS | Mod: HCNC,,, | Performed by: INTERNAL MEDICINE

## 2020-08-12 PROCEDURE — 80202 ASSAY OF VANCOMYCIN: CPT | Mod: HCNC

## 2020-08-12 PROCEDURE — P9035 PLATELET PHERES LEUKOREDUCED: HCPCS | Mod: HCNC

## 2020-08-12 PROCEDURE — 11000001 HC ACUTE MED/SURG PRIVATE ROOM: Mod: HCNC

## 2020-08-12 PROCEDURE — 63600175 PHARM REV CODE 636 W HCPCS: Mod: HCNC | Performed by: NURSE PRACTITIONER

## 2020-08-12 PROCEDURE — 25000003 PHARM REV CODE 250: Mod: HCNC | Performed by: INTERNAL MEDICINE

## 2020-08-12 PROCEDURE — 85651 RBC SED RATE NONAUTOMATED: CPT | Mod: HCNC

## 2020-08-12 PROCEDURE — 85027 COMPLETE CBC AUTOMATED: CPT | Mod: 91,HCNC

## 2020-08-12 PROCEDURE — 99233 PR SUBSEQUENT HOSPITAL CARE,LEVL III: ICD-10-PCS | Mod: HCNC,,, | Performed by: INTERNAL MEDICINE

## 2020-08-12 PROCEDURE — 85007 BL SMEAR W/DIFF WBC COUNT: CPT | Mod: HCNC

## 2020-08-12 PROCEDURE — 86140 C-REACTIVE PROTEIN: CPT | Mod: HCNC

## 2020-08-12 PROCEDURE — 25000003 PHARM REV CODE 250: Mod: HCNC | Performed by: HOSPITALIST

## 2020-08-12 RX ORDER — HYDROCODONE BITARTRATE AND ACETAMINOPHEN 500; 5 MG/1; MG/1
TABLET ORAL
Status: DISCONTINUED | OUTPATIENT
Start: 2020-08-12 | End: 2020-08-13 | Stop reason: HOSPADM

## 2020-08-12 RX ORDER — MUPIROCIN 20 MG/G
OINTMENT TOPICAL 2 TIMES DAILY
Status: DISCONTINUED | OUTPATIENT
Start: 2020-08-12 | End: 2020-08-13 | Stop reason: HOSPADM

## 2020-08-12 RX ADMIN — FERROUS SULFATE TAB EC 325 MG (65 MG FE EQUIVALENT) 325 MG: 325 (65 FE) TABLET DELAYED RESPONSE at 08:08

## 2020-08-12 RX ADMIN — METRONIDAZOLE 500 MG: 500 INJECTION, SOLUTION INTRAVENOUS at 03:08

## 2020-08-12 RX ADMIN — ACETAMINOPHEN 650 MG: 325 TABLET ORAL at 09:08

## 2020-08-12 RX ADMIN — MUPIROCIN: 20 OINTMENT TOPICAL at 09:08

## 2020-08-12 RX ADMIN — ALUMINUM HYDROXIDE, MAGNESIUM HYDROXIDE, AND SIMETHICONE 30 ML: 200; 200; 20 SUSPENSION ORAL at 09:08

## 2020-08-12 RX ADMIN — METRONIDAZOLE 500 MG: 500 INJECTION, SOLUTION INTRAVENOUS at 09:08

## 2020-08-12 RX ADMIN — VANCOMYCIN HYDROCHLORIDE 750 MG: 750 INJECTION, POWDER, LYOPHILIZED, FOR SOLUTION INTRAVENOUS at 01:08

## 2020-08-12 RX ADMIN — PRAVASTATIN SODIUM 10 MG: 10 TABLET ORAL at 09:08

## 2020-08-12 RX ADMIN — CEFEPIME HYDROCHLORIDE 2 G: 2 INJECTION, SOLUTION INTRAVENOUS at 02:08

## 2020-08-12 RX ADMIN — FERROUS SULFATE TAB EC 325 MG (65 MG FE EQUIVALENT) 325 MG: 325 (65 FE) TABLET DELAYED RESPONSE at 09:08

## 2020-08-12 RX ADMIN — HYDROXYZINE HYDROCHLORIDE 25 MG: 25 TABLET, FILM COATED ORAL at 09:08

## 2020-08-12 RX ADMIN — MUPIROCIN: 20 OINTMENT TOPICAL at 01:08

## 2020-08-12 RX ADMIN — LEVOTHYROXINE SODIUM 88 MCG: 88 TABLET ORAL at 06:08

## 2020-08-12 RX ADMIN — AMITRIPTYLINE HYDROCHLORIDE 75 MG: 50 TABLET, FILM COATED ORAL at 09:08

## 2020-08-12 RX ADMIN — METOPROLOL SUCCINATE 25 MG: 25 TABLET, EXTENDED RELEASE ORAL at 08:08

## 2020-08-12 RX ADMIN — METRONIDAZOLE 500 MG: 500 INJECTION, SOLUTION INTRAVENOUS at 05:08

## 2020-08-12 NOTE — ASSESSMENT & PLAN NOTE
Will need culture directed therapy , started on empiric cefepime,flagyl and vancomycin , will do sputum culture, Gold quantiferon , blood cultures.  Will need bronchoscopy .  Pulmonology consult

## 2020-08-12 NOTE — PLAN OF CARE
SW met with Pt at bedside to complete discharge assessment. Pt stated she lives at home with her spouse. Her help at home is her daughter, who lives next door. Pt. Daughter will pick her up once discharged. Pt. Is currently with [facility]. SW provided a transitional care folder, information on advanced directives, information on pharmacy bedside delivery, and discharge planning begins on admission with contact information for any needs/questions. Pt board updated with CM name and number.    Payor: [employer] MEDICARE / Plan: [employer] MEDICARE HMO / Product Type: Capitation /   PCP: Briseida Bennett MD  Pharmacy:   [id number] - [place], [place] - [address]  [address]  [place] [address]  Phone: [phone] Fax: [phone]    [facility]  [address] [place] [address]  Phone: [phone] Fax: [phone]    [facility] [place] - [address]  [address] [place] [address]  Phone: [phone] Fax: [phone]  Bedside Delivery: Mil[facility]  MyChart: will send link     08/12/20 1115   Discharge Assessment   Assessment Type Discharge Planning Assessment   Confirmed/corrected address and phone number on facesheet? Yes   Assessment information obtained from? Patient   Expected Length of Stay (days)   (TBD)   Communicated expected length of stay with patient/caregiver yes   Prior to hospitilization cognitive status: Alert/Oriented   Prior to hospitalization functional status: Assistive Equipment;Needs Assistance   Current cognitive status: Alert/Oriented   Current Functional Status: Assistive Equipment;Needs Assistance   Facility Arrived From: Home   Lives With spouse   Able to Return to Prior Arrangements yes   Is patient able to care for self after discharge? No   Who are your caregiver(s) and their phone number(s)? Albina Martínez (Daughter) [phone]   Patient's perception of discharge  disposition home health   Readmission Within the Last 30 Days current reason for admission unrelated to previous admission   If yes, most recent facility name: [facility]   Patient currently being followed by outpatient case management? No   Patient currently receives any other outside agency services? No   Equipment Currently Used at Home walker, standard;shower chair   Part D Coverage n/a   Do you have any problems affording any of your prescribed medications? No   Is the patient taking medications as prescribed? yes   Does the patient have transportation home? Yes   Transportation Anticipated family or friend will provide   Dialysis Name and Scheduled days n/a   Does the patient receive services at the [facility]madin Clinic? No   Discharge Plan A Home Health   Discharge Plan B Home with family;Home Health   DME Needed Upon Discharge  none   Patient/Family in Agreement with Plan yes   Readmission Questionnaire   At the time of your discharge, did someone talk to you about what your health problems were? Yes   At the time of discharge, did someone talk to you about what to watch out for regarding worsening of your health problem? Yes   At the time of discharge, did someone talk to you about what to do if you experienced worsening of your health problem? Yes   At the time of discharge, did someone talk to you about which medication to take when you left the hospital and which ones to stop taking? Yes   At the time of discharge, did someone talk to you about when and where to follow up with a doctor after you left the hospital? Yes   What do you believe caused you to be sick enough to be re-admitted? Abscess   How often do you need to have someone help you when you read instructions, pamphlets, or other written material from your doctor or pharmacy? Sometimes   Do you have problems taking your medications as prescribed? No   Do you have any problems affording any of  your prescribed medications? No   Do you  have problems obtaining/receiving your medications? No   Does the patient have transportation to healthcare appointments? No   Living Arrangements house   Does the patient have family/friends to help with healtcare needs after discharge? yes   Does your caregiver provide all the help you need? Yes   Are you currently feeling confused? No   Are you currently having problems thinking? No   Are you currently having memory problems? No   Have you felt down, depressed, or hopeless? 0   Have you felt little interest or pleasure in doing things? 0   In the last 7 days, my sleep quality was: good       Joao Whalen LMSW 8/12/2020 12:31 PM

## 2020-08-12 NOTE — CARE UPDATE
Seen  Consult to follow  Anemia low platelets  Scans reviewed with patient  Expand abx coverage include fungal  Include rhodococcus  Bronchoscopy considered however risk of bleeding high given low plat

## 2020-08-12 NOTE — SUBJECTIVE & OBJECTIVE
Interval History:  No complaints today.  Uneventful evening.  Request wound care to change wound vac dressing to lower back today.      Oncology Treatment Plan:   OP AZACITADINE 7-DAY (SUB-Q)    Medications:  Continuous Infusions:  Scheduled Meds:   amitriptyline  75 mg Oral QHS    cefepime in dextrose 5 %  2 g Intravenous Q12H    ferrous sulfate  325 mg Oral BID    hydrOXYzine HCL  25 mg Oral Nightly    levothyroxine  88 mcg Oral Before breakfast    metoprolol succinate  25 mg Oral Daily    metronidazole  500 mg Intravenous Q8H    pravastatin  10 mg Oral QHS     PRN Meds:sodium chloride, acetaminophen, aluminum-magnesium hydroxide-simethicone, Pharmacy to dose Vancomycin consult **AND** vancomycin - pharmacy to dose     Review of patient's allergies indicates:   Allergen Reactions    Codeine      Other reaction(s): hyper  Other reaction(s): hyper    Gabapentin Other (See Comments)     Bad dreams        Past Medical History:   Diagnosis Date    Acid reflux     Anxiety     Back pain     Bronchitis, chronic obstructive w acute bronchitis 7/29/2016    Cancer     [facility] arms, face- Dr. Lata Tejada    Cataract     2+NS    Chronic myelomonocytic leukemia     Degenerative disc disease     Depression     Depression     Dry mouth     Encounter for blood transfusion     Hernia, hiatal 11/18/2013    Hypertension     Hypothyroid     Hypothyroidism     Iron deficiency anemia     Macrocytic anemia 5/3/2016    Macular degeneration     Migraines     Mixed anxiety and depressive disorder     Multiple fractures of ribs of right side     Osteoporosis     Other hyperlipidemia 10/11/2019    Pneumonia     Pneumonia due to other staphylococcus     Rheumatoid arthritis     Rheumatoid arthritis(714.0)     Rheumatoid arthritis(714.0)     Remicade, MTX.     Past Surgical History:   Procedure Laterality Date    APPENDECTOMY  1985    APPLICATION OF WOUND VACUUM-ASSISTED CLOSURE DEVICE N/A 5/14/2020     Procedure: APPLICATION, WOUND VAC;  Surgeon: Mckinley Shannon MD;  Location: [facility] OR;  Service: General;  Laterality: N/A;    APPLICATION OF WOUND VACUUM-ASSISTED CLOSURE DEVICE Left 7/25/2020    Procedure: APPLICATION, WOUND VAC;  Surgeon: Berenice Alfaro MD;  Location: [facility] OR;  Service: General;  Laterality: Left;    BREAST BIOPSY      CATARACT EXTRACTION Bilateral 6/11/15    Dr. Booth    CHOLECYSTECTOMY  2013    cryoablasion kidney Left 09/27/2016    DEBRIDEMENT Left 7/25/2020    Procedure: DEBRIDEMENT;  Surgeon: Berenice Alfaro MD;  Location: [facility] OR;  Service: General;  Laterality: Left;    EVACUATION OF HEMATOMA Left 7/25/2020    Procedure: EVACUATION, HEMATOMA;  Surgeon: Berenice Alfaro MD;  Location: [facility] OR;  Service: General;  Laterality: Left;    EXCISION OF SQUAMOUS CELL CARCINOMA Left 7/2/2020    Procedure: EXCISION, CARCINOMA, SQUAMOUS CELL;  Surgeon: Mckinley Shannon MD;  Location: [facility] OR;  Service: General;  Laterality: Left;    feet Bilateral     rheumatoid    FRACTURE SURGERY Right     tibia    HERNIA REPAIR      HYSTERECTOMY  1970    partial    INCISION AND DRAINAGE OF ABSCESS N/A 5/12/2020    Procedure: INCISION AND DRAINAGE, ABSCESS;  Surgeon: Emerson Hathaway MD;  Location: [facility] OR;  Service: General;  Laterality: N/A;    INCISIONAL BIOPSY N/A 5/12/2020    Procedure: INCISIONAL BIOPSY;  Surgeon: Emerson Hathaway MD;  Location: [facility] OR;  Service: General;  Laterality: N/A;    JOINT REPLACEMENT      bilateral knees (2008), hands, wrists, knuckles, toes    LAPAROSCOPIC NISSEN FUNDOPLICATION      TRANSFORAMINAL EPIDURAL INJECTION OF STEROID Left 6/25/2019    Procedure: Left L5/S1 TF AYAAN with local;  Surgeon: Rowdy Felix MD;  Location: [facility]IN [facility]GT;  Service: Pain Management;  Laterality: Left;    WOUND DEBRIDEMENT N/A 5/14/2020    Procedure: DEBRIDEMENT, WOUND;  Surgeon: Mckinley Shannon MD;  Location: [facility] OR;  Service: General;  Laterality: N/A;    WOUND DEBRIDEMENT Left  2020    Procedure: DEBRIDEMENT, WOUND;  Surgeon: Berenice Alfaro MD;  Location: [facility];  Service: General;  Laterality: Left;     Family History     Problem Relation (Age of Onset)    Asthma Sister, Brother    Cancer Brother, Maternal Grandfather    Cataracts Mother    Chronic back pain Sister, Brother    Diabetes Mellitus Brother    Fibromyalgia Daughter    Heart disease Mother, Father, Maternal Grandmother    Hyperlipidemia Mother    Hypertension Mother, Father, Sister, Brother    Osteoarthritis Mother, Father, Sister, Brother    Thyroid disease Sister, Brother        Tobacco Use    Smoking status: Former Smoker     Packs/day: 0.25     Years: 2.00     Pack years: 0.50     Quit date: 1965     Years since quittin.8    Smokeless tobacco: Never Used   Substance and Sexual Activity    Alcohol use: No    Drug use: No    Sexual activity: Never     Partners: Male       Review of Systems   Constitutional: Negative.    HENT: Negative.    Eyes: Negative.    Respiratory: Negative.    Gastrointestinal: Negative.    Endocrine: Negative.    Genitourinary: Negative.    Musculoskeletal: Negative.    Skin: Positive for pallor and wound.   Allergic/Immunologic: Positive for immunocompromised state.   Neurological: Negative.    Hematological: Bruises/bleeds easily.   Psychiatric/Behavioral: Negative.      Objective:     Vital Signs (Most Recent):  Temp: 97.8 °F (36.6 °C) (20 07)  Pulse: 78 (20 07)  Resp: 18 (20 07)  BP: 136/62 (20 07)  SpO2: 100 % (20 07) Vital Signs (24h Range):  Temp:  [97.8 °F (36.6 °C)-98.1 °F (36.7 °C)] 97.8 °F (36.6 °C)  Pulse:  [] 78  Resp:  [16-18] 18  SpO2:  [98 %-100 %] 100 %  BP: (128-144)/(61-68) 136/62     Weight: 45.4 kg (100 lb)  Body mass index is 18.29 kg/m².  Body surface area is 1.41 meters squared.    No intake or output data in the 24 hours ending 20 1110    Physical Exam  Vitals signs and nursing note reviewed.    Constitutional:       General: She is not in acute distress.     Appearance: She is well-developed. She is ill-appearing.   HENT:      Head: Normocephalic and atraumatic.      Right Ear: External ear normal.      Left Ear: External ear normal.      Nose: Nose normal.   Eyes:      Conjunctiva/sclera: Conjunctivae normal.      Pupils: Pupils are equal, round, and reactive to light.   Neck:      Musculoskeletal: Normal range of motion and neck supple.      Thyroid: No thyromegaly.      Vascular: No JVD.   Cardiovascular:      Rate and Rhythm: Normal rate and regular rhythm.      Heart sounds: Normal heart sounds. No murmur. No friction rub. No gallop.    Pulmonary:      Effort: Pulmonary effort is normal. No respiratory distress.      Breath sounds: Normal breath sounds. No stridor. No wheezing or rales.   Chest:      Chest wall: No tenderness.   Abdominal:      General: Bowel sounds are normal. There is no distension.      Palpations: Abdomen is soft. There is no mass.      Tenderness: There is no abdominal tenderness. There is no guarding or rebound.   Genitourinary:     Vagina: Normal. No vaginal discharge.   Musculoskeletal: Normal range of motion.         General: No deformity.   Lymphadenopathy:      Cervical: No cervical adenopathy.   Skin:     General: Skin is warm.      Capillary Refill: Capillary refill takes less than 2 seconds.      Findings: Bruising present. No erythema or rash.             Comments: Wound with wound vac in place   Neurological:      Mental Status: She is alert and oriented to person, place, and time.      Cranial Nerves: No cranial nerve deficit.      Sensory: No sensory deficit.      Deep Tendon Reflexes: Reflexes normal.   Psychiatric:         Attention and Perception: Attention normal.         Mood and Affect: Mood normal.         Speech: Speech normal.         Behavior: Behavior normal.         Thought Content: Thought content normal.         Cognition and Memory: Cognition normal.          Judgment: Judgment normal.         Significant Labs:   CBC:   Recent Labs   Lab 08/11/20  1128 08/11/20 2016 08/12/20  0756   WBC 64.86* 49.51* 33.88*   HGB 8.4* 7.9* 7.3*   HCT 25.0* 24.6* 23.3*   PLT 23* 21* 16*   , CMP:   Recent Labs   Lab 08/11/20 2016 08/12/20  0756   CREATININE 0.9 0.9   EGFRNONAA >60 >60   , Coagulation: No results for input(s): PT, INR, APTT in the last 48 hours., Immunology: No results for input(s): SPEP, TOBIN, STEWART, FREELAMBDALI in the last 48 hours., LDH: No results for input(s): LDHCSF, BFSOURCE in the last 48 hours., LFTs:   No results for input(s): ALT, AST, ALKPHOS, BILITOT, PROT, ALBUMIN in the last 48 hours., Reticulocytes: No results for input(s): RETIC in the last 48 hours., Uric Acid No results for input(s): URICACID in the last 48 hours., Urine Studies:   Recent Labs   Lab 08/10/20  2125   COLORU Yellow   APPEARANCEUA Clear   PHUR 8.0   SPECGRAV 1.015   PROTEINUA Negative   GLUCUA Negative   KETONESU Negative   BILIRUBINUA Negative   OCCULTUA 1+*   NITRITE Negative   UROBILINOGEN 1.0   LEUKOCYTESUR Negative   RBCUA 2   BACTERIA Occasional   SQUAMEPITHEL 1   HYALINECASTS 1    and All pertinent labs from the last 24 hours have been reviewed.    Diagnostic Results:  I have reviewed all pertinent imaging results/findings within the past 24 hours.

## 2020-08-12 NOTE — PLAN OF CARE
Pt choice form signed. Pt preference is [facility].     Joao Whalen LMSW 8/12/2020 2:11 PM

## 2020-08-12 NOTE — PROGRESS NOTES
Pharmacokinetic Assessment Follow Up: IV Vancomycin    Vancomycin serum concentration assessment(s):    The random level was drawn incorrectly and cannot be used to guide therapy at this time.    Vancomycin Regimen Plan:  Patient will receive a one time dose of Vancomycin 750 mg at 2230 this evening. 8/11/20  Re-dose when the random level is less than 20 mcg/mL, next level to be drawn at 1100 on 8/12/20    Drug levels (last 3 results):  Recent Labs   Lab Result Units 08/11/20 2016   Vancomycin, Random ug/mL 8.7       Pharmacy will continue to follow and monitor vancomycin.    Please contact pharmacy at extension [phone] for questions regarding this assessment.    Thank you for the consult,   Zeyad Flores       Patient brief summary:  Sarah Parker is a 80 y.o. female initiated on antimicrobial therapy with IV Vancomycin for treatment of  lung abscess  of unclear etiology    The patient's current regimen is pulse dose    Drug Allergies:   Review of patient's allergies indicates:   Allergen Reactions    Codeine      Other reaction(s): hyper  Other reaction(s): hyper    Gabapentin Other (See Comments)     Bad dreams       Actual Body Weight:   45.4 kg    Renal Function:   Estimated Creatinine Clearance: 36.9 mL/min (based on SCr of 0.9 mg/dL).,     Dialysis Method (if applicable):  N/A    CBC (last 72 hours):  Recent Labs   Lab Result Units 08/10/20  1020 08/11/20  1128 08/11/20 2016   WBC K/uL 77.24* 64.86* 49.51*   Hemoglobin g/dL 5.0* 8.4* 7.9*   Hematocrit % 16.5* 25.0* 24.6*   Platelets K/uL 30* 23* 21*   Gran% % 24.0* 26.0* 35.0*   Lymph% % 29.0 27.0 30.0   Mono% % 12.0 19.0* 14.0   Eosinophil% % 1.0 1.0 0.0   Basophil% % 0.0 0.0 0.0   Differential Method  Manual Manual Manual       Metabolic Panel (last 72 hours):  Recent Labs   Lab Result Units 08/10/20  1020 08/10/20  2125 08/11/20 2016   Sodium mmol/L 132*  --   --    Potassium mmol/L 5.3*  --   --    Chloride mmol/L 101  --   --    CO2 mmol/L 20*  --    --    Glucose mg/dL 109  --   --    Glucose, UA   --  Negative  --    BUN, Bld mg/dL 30*  --   --    Creatinine mg/dL 1.1  --  0.9   Albumin g/dL 2.8*  --   --    Total Bilirubin mg/dL 0.3  --   --    Alkaline Phosphatase U/L 209*  --   --    AST U/L 24  --   --    ALT U/L 19  --   --        Vancomycin Administrations:  vancomycin given in the last 96 hours                     vancomycin in dextrose 5 % 1 gram/250 mL IVPB 1,000 mg (mg) 1,000 mg New Bag 08/11/20 0200                    Microbiologic Results:  Microbiology Results (last 7 days)       Procedure Component Value Units Date/Time    Blood culture #2 **CANNOT BE ORDERED STAT** [[id number]] Collected: 08/10/20 2045    Order Status: Completed Specimen: Blood from Peripheral, Upper Arm, Left Updated: 08/11/20 1145     Blood Culture, Routine No Growth to date    Blood culture #1 **CANNOT BE ORDERED STAT** [[id number]] Collected: 08/10/20 2040    Order Status: Completed Specimen: Blood from Peripheral, Upper Arm, Left Updated: 08/11/20 1145     Blood Culture, Routine No Growth to date

## 2020-08-12 NOTE — CONSULTS
"   08/12/20 1005   Skin   Skin WDL ex   Palmer Risk Assessment   Sensory Perception 4-->no impairment   Moisture 4-->rarely moist   Activity 4-->walks frequently   Mobility 3-->slightly limited   Nutrition 3-->adequate   Friction and Shear 3-->no apparent problem   Palmer Score 21        Incision/Site 07/25/20 1518 Back lower   Date First Assessed/Time First Assessed: 07/25/20 1518   Location: Back  Orientation: lower  Additional Comments: I&D sites from past surgeries   Wound Image    Incision WDL ex   Dressing Appearance Intact;Moist drainage   Drainage Amount Moderate   Drainage Characteristics/Odor Serosanguineous   Appearance Red;Yellow;Granulating;Moist   Red (%), Wound Tissue Color 75 %   Yellow (%), Wound Tissue Color 25 %   Periwound Area Intact   Wound Edges Open   Wound Length (cm) 6.5 cm   Wound Width (cm) 6 cm   Wound Depth (cm) 0.3 cm   Wound Volume (cm^3) 11.7 cm^3   Wound Surface Area (cm^2) 39 cm^2   Care Cleansed with:;Sterile normal saline;Applied:;Skin Barrier   Dressing Changed  (wound vac)   Dressing Change Due 08/14/20        Negative Pressure Wound Therapy  07/25/20 1500 lower   Placement Date/Time: 07/25/20 1500   Orientation: lower  Location: Back   NPWT Type Vacuum Therapy   Therapy Setting NPWT Continuous therapy   Pressure Setting NPWT 125 mmHg   Sponges Inserted NPWT 3   Sponges Removed NPWT 3     Consulted on this 79 y/o F patient for wound vac management. She is s/p I&D of multiple abscesses to lower back and has been treated with HH and home wound vac therapy. Her dressing is typically changed every MWF. Chart reviewed and patient assessed. She is on [employer] home wound vac that is currently turned off. Patient's daughter at bedside states it has been off since 2 nights ago and needed to be "cleaned out". Supplies gathered.  Wound vac dressing removed. Home vac placed at bedside with patient's additional belongings. Wounds x3 noted. Mid lower back wound measures 6x6.5x0.3cm. " "wound bed is moist red granulation tissue with yellow film overlying. Left distal lower back wound measures 2.5x2x0.5cm with 100% moist beefy red wound bed. Right distal lower back wound measures 1x1x0.5cm with 100% moist beefy red wound bed. All cleansed with vashe wound cleanser and gauze, for removal of "film". Patted dry. Susi wound skin painted with cavilon. Wounds filled with black foam cut to size and sealed all with drape. Wounds bridged with additional foam and drape, and bridged to left flank where sensatrac pad and tubing applied and attached to [facility] wound vac ULTA at continuous -125 mmHg low intensity suction with good seal noted. pateint tolerated well. Recommend change wound vac dressing every MWF. Will follow.   "

## 2020-08-12 NOTE — ASSESSMENT & PLAN NOTE
Oncology follow up .  Will need to clearly define the goal of therapy .  Case discussed with oncology

## 2020-08-12 NOTE — SUBJECTIVE & OBJECTIVE
Past Medical History:   Diagnosis Date    Acid reflux     Anxiety     Back pain     Bronchitis, chronic obstructive w acute bronchitis 7/29/2016    Cancer     [facility] arms, face- Dr. Lata Tejada    Cataract     2+NS    Chronic myelomonocytic leukemia     Degenerative disc disease     Depression     Depression     Dry mouth     Encounter for blood transfusion     Hernia, hiatal 11/18/2013    Hypertension     Hypothyroid     Hypothyroidism     Iron deficiency anemia     Macrocytic anemia 5/3/2016    Macular degeneration     Migraines     Mixed anxiety and depressive disorder     Multiple fractures of ribs of right side     Osteoporosis     Other hyperlipidemia 10/11/2019    Pneumonia     Pneumonia due to other staphylococcus     Rheumatoid arthritis     Rheumatoid arthritis(714.0)     Rheumatoid arthritis(714.0)     Remicade, MTX.       Past Surgical History:   Procedure Laterality Date    APPENDECTOMY  1985    APPLICATION OF WOUND VACUUM-ASSISTED CLOSURE DEVICE N/A 5/14/2020    Procedure: APPLICATION, WOUND VAC;  Surgeon: Mckinley Shannon MD;  Location: [facility];  Service: General;  Laterality: N/A;    APPLICATION OF WOUND VACUUM-ASSISTED CLOSURE DEVICE Left 7/25/2020    Procedure: APPLICATION, WOUND VAC;  Surgeon: Berenice Alfaro MD;  Location: [facility];  Service: General;  Laterality: Left;    BREAST BIOPSY      CATARACT EXTRACTION Bilateral 6/11/15    Dr. Booth    CHOLECYSTECTOMY  2013    cryoablasion kidney Left 09/27/2016    DEBRIDEMENT Left 7/25/2020    Procedure: DEBRIDEMENT;  Surgeon: Berenice Alfaro MD;  Location: [facility];  Service: General;  Laterality: Left;    EVACUATION OF HEMATOMA Left 7/25/2020    Procedure: EVACUATION, HEMATOMA;  Surgeon: Berenice Alfaro MD;  Location: [facility];  Service: General;  Laterality: Left;    EXCISION OF SQUAMOUS CELL CARCINOMA Left 7/2/2020    Procedure: EXCISION, CARCINOMA, SQUAMOUS CELL;  Surgeon: Mckinley Shannon MD;  Location: [facility];  Service: General;  Laterality: Left;    feet Bilateral     rheumatoid    FRACTURE SURGERY Right     tibia    HERNIA REPAIR      HYSTERECTOMY  1970    partial    INCISION AND DRAINAGE OF ABSCESS N/A 5/12/2020    Procedure: INCISION AND DRAINAGE, ABSCESS;  Surgeon: Emerson Hathaway MD;  Location: [facility] OR;  Service: General;  Laterality: N/A;    INCISIONAL BIOPSY N/A 5/12/2020    Procedure: INCISIONAL BIOPSY;  Surgeon: Emerson Hathaway MD;  Location: [facility] OR;  Service: General;  Laterality: N/A;    JOINT REPLACEMENT      bilateral knees (2008), hands, wrists, knuckles, toes    LAPAROSCOPIC NISSEN FUNDOPLICATION      TRANSFORAMINAL EPIDURAL INJECTION OF STEROID Left 6/25/2019    Procedure: Left L5/S1 TF AYAAN with local;  Surgeon: Rowdy Felix MD;  Location: [facility];  Service: Pain Management;  Laterality: Left;    WOUND DEBRIDEMENT N/A 5/14/2020    Procedure: DEBRIDEMENT, WOUND;  Surgeon: Mckinley Shannon MD;  Location: [facility] OR;  Service: General;  Laterality: N/A;    WOUND DEBRIDEMENT Left 7/25/2020    Procedure: DEBRIDEMENT, WOUND;  Surgeon: Berenice Alfaro MD;  Location: [facility] OR;  Service: General;  Laterality: Left;       Review of patient's allergies indicates:   Allergen Reactions    Codeine      Other reaction(s): hyper  Other reaction(s): hyper    Gabapentin Other (See Comments)     Bad dreams       Medications:  Medications Prior to Admission   Medication Sig    albuterol (PROVENTIL) 2.5 mg /3 mL (0.083 %) nebulizer solution Take 3 mLs (2.5 mg total) by nebulization every 6 (six) hours as needed for Wheezing.    amitriptyline (ELAVIL) 75 MG tablet TAKE 1 TABLET BY MOUTH EVERY EVENING    calcium citrate-vitamin D (CITRACAL + D) 315-200 mg-unit per tablet Take 1 tablet by mouth once daily.     DULoxetine (CYMBALTA) 20 MG capsule TAKE 2 CAPSULES(40 MG) BY MOUTH EVERY DAY (Patient taking differently: before meals, at bedtime and at 0200. )    ferrous gluconate (FERGON) 324 MG tablet Take  324 mg by mouth 2 (two) times daily.    fluticasone propionate (FLONASE) 50 mcg/actuation nasal spray 2 sprays (100 mcg total) by Each Nostril route once daily.    hydrocodone-acetaminophen 5-325mg (NORCO) 5-325 mg per tablet TK 1 T PO Q 6 H PRN    hydrOXYzine HCl (ATARAX) 25 MG tablet Take 25 mg by mouth nightly.     levothyroxine (SYNTHROID) 88 MCG tablet TAKE 1 TABLET BY MOUTH BEFORE BREAKFAST    magnesium oxide (MAG-OX) 400 mg (241.3 mg magnesium) tablet Take 1 tablet (400 mg total) by mouth 3 (three) times daily. (Patient taking differently: Take 400 mg by mouth 2 (two) times daily. )    meclizine (ANTIVERT) 50 MG tablet Take 25 mg by mouth 3 (three) times daily as needed.    metoprolol succinate (TOPROL-XL) 50 MG 24 hr tablet TAKE 1 TABLET(50 MG) BY MOUTH EVERY DAY    multivitamin capsule Take by mouth. As directed    ondansetron (ZOFRAN) 4 MG tablet Take 1 tablet (4 mg total) by mouth every 8 (eight) hours as needed for Nausea.    pravastatin (PRAVACHOL) 10 MG tablet TAKE 1 TABLET(10 MG) BY MOUTH EVERY DAY (Patient taking differently: Take 10 mg by mouth every evening. )    prochlorperazine (COMPAZINE) 5 MG tablet TAKE 1 TABLET(5 MG) BY MOUTH EVERY 6 HOURS AS NEEDED FOR NAUSEA    tamsulosin (FLOMAX) 0.4 mg Cap Take 1 capsule (0.4 mg total) by mouth once daily. For urinary retention    topiramate (TOPAMAX) 25 MG tablet Take 1 tablet (25 mg total) by mouth at night x 1 week then increase to 2 (two) times daily. Increase as tolerated. (Patient not taking: Reported on 7/21/2020)    valsartan-hydrochlorothiazide (DIOVAN-HCT) 80-12.5 mg per tablet TAKE 1 TABLET BY MOUTH DAILY     Antibiotics (From admission, onward)    Start     Stop Route Frequency Ordered    08/11/20 1430  metronidazole IVPB 500 mg      -- IV Every 8 hours (non-standard times) 08/11/20 0820    08/11/20 1400  cefepime in dextrose 5 % IVPB 2 g      -- IV Every 12 hours (non-standard times) 08/11/20 0820    08/10/20 1037  vancomycin -  pharmacy to dose  (vancomycin IVPB)      -- IV pharmacy to manage frequency 08/10/20 2040        Antifungals (From admission, onward)    None        Antivirals (From admission, onward)    None           Immunization History   Administered Date(s) Administered    Influenza 10/21/2008, 10/25/2010    Influenza - High Dose - PF (65 years and older) 2011, 10/04/2012, 10/14/2013, 10/06/2014, 2015, 10/28/2016, 2017, 2018, 10/22/2019    Pneumococcal Conjugate - 13 Valent 2016    Pneumococcal Polysaccharide - 23 Valent 2009, 10/16/2014    Tdap 10/14/2013       Family History     Problem Relation (Age of Onset)    Asthma Sister, Brother    Cancer Brother, Maternal Grandfather    Cataracts Mother    Chronic back pain Sister, Brother    Diabetes Mellitus Brother    Fibromyalgia Daughter    Heart disease Mother, Father, Maternal Grandmother    Hyperlipidemia Mother    Hypertension Mother, Father, Sister, Brother    Osteoarthritis Mother, Father, Sister, Brother    Thyroid disease Sister, Brother        Social History     Socioeconomic History    Marital status:      Spouse name: Not on file    Number of children: Not on file    Years of education: Not on file    Highest education level: Not on file   Occupational History    Occupation: retired   Social Needs    Financial resource strain: Not on file    Food insecurity     Worry: Not on file     Inability: Not on file    Transportation needs     Medical: Not on file     Non-medical: Not on file   Tobacco Use    Smoking status: Former Smoker     Packs/day: 0.25     Years: 2.00     Pack years: 0.50     Quit date: 1965     Years since quittin.8    Smokeless tobacco: Never Used   Substance and Sexual Activity    Alcohol use: No    Drug use: No    Sexual activity: Never     Partners: Male   Lifestyle    Physical activity     Days per week: Not on file     Minutes per session: Not on file    Stress: Not at all    Relationships    Social connections     Talks on phone: Not on file     Gets together: Not on file     Attends [employer] service: Not on file     Active member of club or organization: Not on file     Attends meetings of clubs or organizations: Not on file     Relationship status: Not on file   Other Topics Concern    Not on file   Social History Narrative    Patient is aretired and live with .     Review of Systems   Constitutional: Positive for activity change and appetite change. Negative for chills, fatigue and fever.   HENT: Negative for congestion, ear discharge, ear pain, mouth sores, nosebleeds, sinus pain, sneezing, sore throat, tinnitus and trouble swallowing.    Eyes: Negative for pain, discharge, redness and itching.   Respiratory: Negative for cough, chest tightness, shortness of breath and wheezing.    Cardiovascular: Negative for chest pain, palpitations and leg swelling.   Gastrointestinal: Negative for abdominal distention, abdominal pain, blood in stool, constipation, diarrhea, nausea and vomiting.   Endocrine: Negative for cold intolerance, heat intolerance, polydipsia, polyphagia and polyuria.   Genitourinary: Negative for difficulty urinating, dyspareunia, dysuria, enuresis, flank pain, frequency, genital sores, hematuria, menstrual problem, pelvic pain, urgency, vaginal bleeding, vaginal discharge and vaginal pain.   Musculoskeletal: Negative for arthralgias, back pain, joint swelling, myalgias and neck pain.   Skin: Negative for pallor and rash.   Neurological: Negative for dizziness, weakness, light-headedness, numbness and headaches.   Hematological: Negative for adenopathy. Does not bruise/bleed easily.   Psychiatric/Behavioral: Negative for confusion and sleep disturbance. The patient is not nervous/anxious.    All other systems reviewed and are negative.    Objective:     Vital Signs (Most Recent):  Temp: 97.9 °F (36.6 °C) (08/12/20 0429)  Pulse: 77 (08/12/20 0429)  Resp: 18  (08/12/20 0429)  BP: 130/65 (08/12/20 0429)  SpO2: 98 % (08/12/20 0429) Vital Signs (24h Range):  Temp:  [97.9 °F (36.6 °C)-98.1 °F (36.7 °C)] 97.9 °F (36.6 °C)  Pulse:  [] 77  Resp:  [16-20] 18  SpO2:  [98 %-100 %] 98 %  BP: (128-159)/(61-72) 130/65     Weight: 45.4 kg (100 lb)  Body mass index is 18.29 kg/m².    Estimated Creatinine Clearance: 36.9 mL/min (based on SCr of 0.9 mg/dL).    Physical Exam  Vitals signs and nursing note reviewed.   Constitutional:       Appearance: She is well-developed.   HENT:      Head: Normocephalic and atraumatic.      Right Ear: External ear normal.      Left Ear: External ear normal.      Nose: Nose normal.   Eyes:      Conjunctiva/sclera: Conjunctivae normal.      Pupils: Pupils are equal, round, and reactive to light.   Neck:      Musculoskeletal: Normal range of motion and neck supple.      Thyroid: No thyromegaly.      Vascular: No JVD.   Cardiovascular:      Rate and Rhythm: Normal rate and regular rhythm.      Heart sounds: Normal heart sounds. No murmur. No friction rub. No gallop.    Pulmonary:      Effort: Pulmonary effort is normal. No respiratory distress.      Breath sounds: Normal breath sounds. No stridor. No wheezing or rales.   Chest:      Chest wall: No tenderness.   Abdominal:      General: Bowel sounds are normal. There is no distension.      Palpations: Abdomen is soft. There is no mass.      Tenderness: There is no abdominal tenderness. There is no guarding or rebound.      Comments: Splenomegaly    Genitourinary:     Vagina: Normal. No vaginal discharge.   Musculoskeletal: Normal range of motion.         General: No deformity.      Comments: Wound vac noted in the back   Lymphadenopathy:      Cervical: No cervical adenopathy.   Skin:     General: Skin is warm.      Capillary Refill: Capillary refill takes less than 2 seconds.      Findings: Bruising present. No erythema or rash.   Neurological:      Mental Status: She is alert and oriented to person,  place, and time.      Cranial Nerves: No cranial nerve deficit.      Sensory: No sensory deficit.      Deep Tendon Reflexes: Reflexes normal.   Psychiatric:         Behavior: Behavior normal.         Significant Labs:   Blood Culture:   Recent Labs   Lab 05/12/20  1006 07/24/20  1445 07/24/20  1503 08/10/20  2040 08/10/20  2045   LABBLOO No Growth after 4 days.  No growth after 5 days. No growth after 5 days. No Growth to date No Growth to date     BMP:   Recent Labs   Lab 08/10/20  1020 08/11/20 2016     --    *  --    K 5.3*  --      --    CO2 20*  --    BUN 30*  --    CREATININE 1.1 0.9   CALCIUM 8.5*  --      CBC:   Recent Labs   Lab 08/10/20  1020 08/11/20  1128 08/11/20 2016   WBC 77.24* 64.86* 49.51*   HGB 5.0* 8.4* 7.9*   HCT 16.5* 25.0* 24.6*   PLT 30* 23* 21*     All pertinent labs within the past 24 hours have been reviewed.    Significant Imaging: I have reviewed all pertinent imaging results/findings within the past 24 hours.

## 2020-08-12 NOTE — HPI
80 y.o. female patient with medical history of CMML on treatment with Vidaza who was admitted due to abnormal labs- anemia .  All previous imaging received.  Chest ct scan -08/10  -Peripheral large soft tissue opacity again identified in the right upper lobe.  Central area of hypodensity is not clearly identified as on the prior examination.  Persistent nodular soft tissue opacities identified throughout both lungs, some of which in the left lower lobe have decreased in size.  Findings again may reflect atypical infectious process with metastatic disease or mycobacterial infection additional considerations.   Bronchiectasis, ground-glass haziness and secretions within peripheral airways concerning for infectious/inflammatory small airways disease.  08/03- CT scan -abdomen,pelvis.chest -  1. Interval clearing of prior left lower lobe consolidation with interval development of similar areas of consolidation in the bilateral upper lobes, both of which exhibit central necrosis versus abscess formation.  Numerous nodular opacities present elsewhere in both lungs either stable or new compared to prior.  Differential considerations include metastatic disease, disseminated TB/fungal infection, or atypical pneumonia.  2. Worsening mediastinal and bilateral axillary lymphadenopathy.  3. Worsening retroperitoneal and bilateral iliac/inguinal lymphadenopathy.  4. Marked splenomegaly with increase in splenic size compared to prior.  Multiple subcentimeter hypodense foci throughout the spleen and single small enhancing focus worrisome for metastatic disease versus infection.  Serum procal-0.18.  CBC -  Component      Latest Ref Rng & Units 8/11/2020 8/11/2020 8/10/2020           8:16 PM 11:28 AM    WBC      3.90 - 12.70 K/uL 49.51 (H) 64.86 (HH) 77.24 (HH)

## 2020-08-12 NOTE — PROGRESS NOTES
[facility] Hospital Medicine  Progress Note    Patient Name: Sarah Parker  MRN: [id number]  Patient Class: IP- Inpatient   Admission Date: 8/10/2020  Length of Stay: 2 days  Attending Physician: Pollo Sparrow MD  Primary Care Provider: Briseida Bennett MD        Subjective:     Principal Problem:Abscess of lung without pneumonia        HPI:  Mrs. Sarah Parker is a 80 y.o. female patient with medical history noted below who presents to the Emergency Department for an evaluation of abnormal labs. Pt reports that she received 1 unit of blood at the infusion center earlier today. She was referred by Dr. Knox (Hematology and Oncology) here to the ED to receive another unit of blood, per pt. Patient also had CT with multiple lesions with concerns for infectious etiology it was suggested to be started on ABX and admission for further work up. Patient denies any CP, SOB, fever, chills, n/v/d, abdominal pain, and all other sxs at this time. No further complaints or concerns at this time.     Overview/Hospital Course:  No notes on file    Interval History  No events overnight . PC further dropped       Review of Systems  Objective:     Vital Signs (Most Recent):  Temp: 97.8 °F (36.6 °C) (08/12/20 0712)  Pulse: 78 (08/12/20 0712)  Resp: 18 (08/12/20 0712)  BP: 136/62 (08/12/20 0712)  SpO2: 100 % (08/12/20 0712) Vital Signs (24h Range):  Temp:  [97.8 °F (36.6 °C)-98.1 °F (36.7 °C)] 97.8 °F (36.6 °C)  Pulse:  [] 78  Resp:  [16-18] 18  SpO2:  [98 %-100 %] 100 %  BP: (128-144)/(61-68) 136/62     Weight: 45.4 kg (100 lb)  Body mass index is 18.29 kg/m².    Intake/Output Summary (Last 24 hours) at 8/12/2020 7019  Last data filed at 8/12/2020 1200  Gross per 24 hour   Intake 400 ml   Output --   Net 400 ml      Physical Exam  Constitutional:       Appearance: She is well-developed.   HENT:      Head: Normocephalic and atraumatic.      Right Ear: External ear normal.      Left Ear: External ear  normal.      Nose: Nose normal.   Eyes:      Conjunctiva/sclera: Conjunctivae normal.      Pupils: Pupils are equal, round, and reactive to light.   Neck:      Musculoskeletal: Normal range of motion and neck supple.      Thyroid: No thyromegaly.      Vascular: No JVD.   Cardiovascular:      Rate and Rhythm: Normal rate and regular rhythm.      Heart sounds: Normal heart sounds. No murmur. No friction rub. No gallop.    Pulmonary:      Effort: Pulmonary effort is normal. No respiratory distress.      Breath sounds: Normal breath sounds. No stridor. No wheezing or rales.   Chest:      Chest wall: No tenderness.   Abdominal:      General: Bowel sounds are normal. There is no distension.      Palpations: Abdomen is soft. There is no mass.      Tenderness: There is no abdominal tenderness. There is no guarding or rebound.      Comments: Splenomegaly    Genitourinary:     Vagina: Normal. No vaginal discharge.   Musculoskeletal: Normal range of motion.         General: No deformity.   Lymphadenopathy:      Cervical: No cervical adenopathy.   Skin:     General: Skin is warm.      Capillary Refill: Capillary refill takes less than 2 seconds.      Findings: Bruising present. No erythema or rash.   Neurological:      Mental Status: She is alert and oriented to person, place, and time.      Cranial Nerves: No cranial nerve deficit.      Sensory: No sensory deficit.      Deep Tendon Reflexes: Reflexes normal.   Psychiatric:         Behavior: Behavior normal.   Significant Labs: All pertinent labs within the past 24 hours have been reviewed.    Significant Imaging: I have reviewed and interpreted all pertinent imaging results/findings within the past 24 hours.      Assessment/Plan:      * Abscess of lung without pneumonia   ID and pulm following. Procal not elevated. Await ID recs       chronic myeloid leukemia  Appreciate [facility] - onc recs     Thrombocytopenia  Platelets cont to drop. No bleeding noticed . Will transfuse  platelets.      Other hyperlipidemia  Statins       Anemia  2 units PRBC   Monitor CBC       Leukocytosis  2/2 CML       Essential hypertension  Monitor BP  Continue home meds        VTE Risk Mitigation (From admission, onward)    None                Pollo Sparrow MD  Department of Hospital Medicine   [facility]

## 2020-08-12 NOTE — PROGRESS NOTES
[facility]  Hematology/Oncology  Progress Note    Patient Name: Sarah Parker  Admission Date: 8/10/2020  Hospital Length of Stay: 2 days  Code Status: Prior     Subjective:     HPI:  Mrs. Sarah Parker is a 80 y.o. female patient with medical history noted below who presents to the Emergency Department for an evaluation of abnormal labs. Pt reports that she received 1 unit of blood at the infusion center earlier today. She was referred by Dr. Knox (Hematology and Oncology) here to the ED to receive another unit of blood, per pt. Patient also had CT with multiple lesions with concerns for infectious etiology it was suggested to be started on ABX and admission for further work up. Patient denies any CP, SOB, fever, chills, n/v/d, abdominal pain, and all other sxs at this time. No further complaints or concerns at this time.       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  She has progressed to active leukemia.      Interval History:  No complaints today.  Uneventful evening.  Request wound care to change wound vac dressing to lower back today.      Oncology Treatment Plan:   OP AZACITADINE 7-DAY (SUB-Q)    Medications:  Continuous Infusions:  Scheduled Meds:   amitriptyline  75 mg Oral QHS    cefepime in dextrose 5 %  2 g Intravenous Q12H    ferrous sulfate  325 mg Oral BID    hydrOXYzine HCL  25 mg Oral Nightly    levothyroxine  88 mcg Oral Before breakfast    metoprolol succinate  25 mg Oral Daily    metronidazole  500 mg Intravenous Q8H    pravastatin  10 mg Oral QHS     PRN Meds:sodium chloride, acetaminophen, aluminum-magnesium hydroxide-simethicone, Pharmacy to dose Vancomycin consult **AND** vancomycin - pharmacy to dose     Review of patient's allergies indicates:   Allergen Reactions    Codeine      Other  reaction(s): hyper  Other reaction(s): hyper    Gabapentin Other (See Comments)     Bad dreams        Past Medical History:   Diagnosis Date    Acid reflux     Anxiety     Back pain     Bronchitis, chronic obstructive w acute bronchitis 7/29/2016    Cancer     [facility] arms, face- Dr. Lata Tejada    Cataract     2+NS    Chronic myelomonocytic leukemia     Degenerative disc disease     Depression     Depression     Dry mouth     Encounter for blood transfusion     Hernia, hiatal 11/18/2013    Hypertension     Hypothyroid     Hypothyroidism     Iron deficiency anemia     Macrocytic anemia 5/3/2016    Macular degeneration     Migraines     Mixed anxiety and depressive disorder     Multiple fractures of ribs of right side     Osteoporosis     Other hyperlipidemia 10/11/2019    Pneumonia     Pneumonia due to other staphylococcus     Rheumatoid arthritis     Rheumatoid arthritis(714.0)     Rheumatoid arthritis(714.0)     Remicade, MTX.     Past Surgical History:   Procedure Laterality Date    APPENDECTOMY  1985    APPLICATION OF WOUND VACUUM-ASSISTED CLOSURE DEVICE N/A 5/14/2020    Procedure: APPLICATION, WOUND VAC;  Surgeon: Mckinley Shannon MD;  Location: [facility] OR;  Service: General;  Laterality: N/A;    APPLICATION OF WOUND VACUUM-ASSISTED CLOSURE DEVICE Left 7/25/2020    Procedure: APPLICATION, WOUND VAC;  Surgeon: Berenice Alfaro MD;  Location: [facility] OR;  Service: General;  Laterality: Left;    BREAST BIOPSY      CATARACT EXTRACTION Bilateral 6/11/15    Dr. Booth    CHOLECYSTECTOMY  2013    cryoablasion kidney Left 09/27/2016    DEBRIDEMENT Left 7/25/2020    Procedure: DEBRIDEMENT;  Surgeon: Berenice Alfaro MD;  Location: [facility] OR;  Service: General;  Laterality: Left;    EVACUATION OF HEMATOMA Left 7/25/2020    Procedure: EVACUATION, HEMATOMA;  Surgeon: Berenice Alfaro MD;  Location: [facility] OR;  Service: General;  Laterality: Left;    EXCISION OF SQUAMOUS CELL CARCINOMA Left  2020    Procedure: EXCISION, CARCINOMA, SQUAMOUS CELL;  Surgeon: Mckinley Shannon MD;  Location: [facility] OR;  Service: General;  Laterality: Left;    feet Bilateral     rheumatoid    FRACTURE SURGERY Right     tibia    HERNIA REPAIR      HYSTERECTOMY  1970    partial    INCISION AND DRAINAGE OF ABSCESS N/A 2020    Procedure: INCISION AND DRAINAGE, ABSCESS;  Surgeon: Emerson Hathaway MD;  Location: [facility] OR;  Service: General;  Laterality: N/A;    INCISIONAL BIOPSY N/A 2020    Procedure: INCISIONAL BIOPSY;  Surgeon: Emerson Hathaway MD;  Location: [facility] OR;  Service: General;  Laterality: N/A;    JOINT REPLACEMENT      bilateral knees (), hands, wrists, knuckles, toes    LAPAROSCOPIC NISSEN FUNDOPLICATION      TRANSFORAMINAL EPIDURAL INJECTION OF STEROID Left 2019    Procedure: Left L5/S1 TF AYAAN with local;  Surgeon: Rowdy Felix MD;  Location: [facility] PAIN MGT;  Service: Pain Management;  Laterality: Left;    WOUND DEBRIDEMENT N/A 2020    Procedure: DEBRIDEMENT, WOUND;  Surgeon: Mckinley Shannon MD;  Location: [facility] OR;  Service: General;  Laterality: N/A;    WOUND DEBRIDEMENT Left 2020    Procedure: DEBRIDEMENT, WOUND;  Surgeon: Berenice Alfaro MD;  Location: [facility] OR;  Service: General;  Laterality: Left;     Family History     Problem Relation (Age of Onset)    Asthma Sister, Brother    Cancer Brother, Maternal Grandfather    Cataracts Mother    Chronic back pain Sister, Brother    Diabetes Mellitus Brother    Fibromyalgia Daughter    Heart disease Mother, Father, Maternal Grandmother    Hyperlipidemia Mother    Hypertension Mother, Father, Sister, Brother    Osteoarthritis Mother, Father, Sister, Brother    Thyroid disease Sister, Brother        Tobacco Use    Smoking status: Former Smoker     Packs/day: 0.25     Years: 2.00     Pack years: 0.50     Quit date: 1965     Years since quittin.8    Smokeless tobacco: Never Used   Substance and Sexual Activity    Alcohol use: No     Drug use: No    Sexual activity: Never     Partners: Male       Review of Systems   Constitutional: Negative.    HENT: Negative.    Eyes: Negative.    Respiratory: Negative.    Gastrointestinal: Negative.    Endocrine: Negative.    Genitourinary: Negative.    Musculoskeletal: Negative.    Skin: Positive for pallor and wound.   Allergic/Immunologic: Positive for immunocompromised state.   Neurological: Negative.    Hematological: Bruises/bleeds easily.   Psychiatric/Behavioral: Negative.      Objective:     Vital Signs (Most Recent):  Temp: 97.8 °F (36.6 °C) (08/12/20 0712)  Pulse: 78 (08/12/20 0712)  Resp: 18 (08/12/20 0712)  BP: 136/62 (08/12/20 0712)  SpO2: 100 % (08/12/20 0712) Vital Signs (24h Range):  Temp:  [97.8 °F (36.6 °C)-98.1 °F (36.7 °C)] 97.8 °F (36.6 °C)  Pulse:  [] 78  Resp:  [16-18] 18  SpO2:  [98 %-100 %] 100 %  BP: (128-144)/(61-68) 136/62     Weight: 45.4 kg (100 lb)  Body mass index is 18.29 kg/m².  Body surface area is 1.41 meters squared.    No intake or output data in the 24 hours ending 08/12/20 1110    Physical Exam  Vitals signs and nursing note reviewed.   Constitutional:       General: She is not in acute distress.     Appearance: She is well-developed. She is ill-appearing.   HENT:      Head: Normocephalic and atraumatic.      Right Ear: External ear normal.      Left Ear: External ear normal.      Nose: Nose normal.   Eyes:      Conjunctiva/sclera: Conjunctivae normal.      Pupils: Pupils are equal, round, and reactive to light.   Neck:      Musculoskeletal: Normal range of motion and neck supple.      Thyroid: No thyromegaly.      Vascular: No JVD.   Cardiovascular:      Rate and Rhythm: Normal rate and regular rhythm.      Heart sounds: Normal heart sounds. No murmur. No friction rub. No gallop.    Pulmonary:      Effort: Pulmonary effort is normal. No respiratory distress.      Breath sounds: Normal breath sounds. No stridor. No wheezing or rales.   Chest:      Chest wall:  No tenderness.   Abdominal:      General: Bowel sounds are normal. There is no distension.      Palpations: Abdomen is soft. There is no mass.      Tenderness: There is no abdominal tenderness. There is no guarding or rebound.   Genitourinary:     Vagina: Normal. No vaginal discharge.   Musculoskeletal: Normal range of motion.         General: No deformity.   Lymphadenopathy:      Cervical: No cervical adenopathy.   Skin:     General: Skin is warm.      Capillary Refill: Capillary refill takes less than 2 seconds.      Findings: Bruising present. No erythema or rash.             Comments: Wound with wound vac in place   Neurological:      Mental Status: She is alert and oriented to person, place, and time.      Cranial Nerves: No cranial nerve deficit.      Sensory: No sensory deficit.      Deep Tendon Reflexes: Reflexes normal.   Psychiatric:         Attention and Perception: Attention normal.         Mood and Affect: Mood normal.         Speech: Speech normal.         Behavior: Behavior normal.         Thought Content: Thought content normal.         Cognition and Memory: Cognition normal.         Judgment: Judgment normal.         Significant Labs:   CBC:   Recent Labs   Lab 08/11/20  1128 08/11/20 2016 08/12/20  0756   WBC 64.86* 49.51* 33.88*   HGB 8.4* 7.9* 7.3*   HCT 25.0* 24.6* 23.3*   PLT 23* 21* 16*   , CMP:   Recent Labs   Lab 08/11/20 2016 08/12/20  0756   CREATININE 0.9 0.9   EGFRNONAA >60 >60   , Coagulation: No results for input(s): PT, INR, APTT in the last 48 hours., Immunology: No results for input(s): SPEP, TOBIN, STEWART, FREELAMBDALI in the last 48 hours., LDH: No results for input(s): LDHCSF, BFSOURCE in the last 48 hours., LFTs:   No results for input(s): ALT, AST, ALKPHOS, BILITOT, PROT, ALBUMIN in the last 48 hours., Reticulocytes: No results for input(s): RETIC in the last 48 hours., Uric Acid No results for input(s): URICACID in the last 48 hours., Urine Studies:   Recent Labs   Lab  08/10/20  2125   COLORU Yellow   APPEARANCEUA Clear   PHUR 8.0   SPECGRAV 1.015   PROTEINUA Negative   GLUCUA Negative   KETONESU Negative   BILIRUBINUA Negative   OCCULTUA 1+*   NITRITE Negative   UROBILINOGEN 1.0   LEUKOCYTESUR Negative   RBCUA 2   BACTERIA Occasional   SQUAMEPITHEL 1   HYALINECASTS 1    and All pertinent labs from the last 24 hours have been reviewed.    Diagnostic Results:  I have reviewed all pertinent imaging results/findings within the past 24 hours.    Assessment/Plan:     Chronic myelomonocytic leukemia not having achieved remission  Patient with CMML on Vidaza previously however treatment stopped due to complications including development of skin infection requiring debriedment and wound vac placement to lower back.  Aslo developed squamous cell skin cancer to thigh.  Previous concerns of tranformation to acute leukemia due to increased blast found on CBC; however Dr. Knox and pathologist reviewed peripheral smear slides and this does not seem to be the case.  She will continue workup for lung lesions by ID and pulmonology.  She will follow up in the clinic upon discharge for continued treatment/monitoring.  Continue supportive care at this time with transfusions as needed.    --CBC daily  --Transfuse for hemoglobin <7 g/dL  --Transfuse for platelets <10 or active bleeding <20 K        Thank you for your consult. I will follow-up with patient. Please contact us if you have any additional questions.     Guadalupe Baird NP  Hematology/Oncology  [facility]

## 2020-08-12 NOTE — PROGRESS NOTES
Pharmacokinetic Assessment Follow Up: IV Vancomycin    Vancomycin serum concentration assessment(s):    The random level was drawn correctly and can be used to guide therapy at this time. The measurement is below the desired definitive target range of 15 to 20 mcg/mL.    Vancomycin Regimen Plan:    A 750 mg dose of vancomycin is ordered for administration @ 1230 today. Re-dose when the random level is less than 20 mcg/mL, next level to be drawn at 0600 on 8/13.    Drug levels (last 3 results):  Recent Labs   Lab Result Units 08/11/20 2016 08/12/20  1100   Vancomycin, Random ug/mL 8.7 13.9       Pharmacy will continue to follow and monitor vancomycin.    Please contact pharmacy at extension [phone] for questions regarding this assessment.    Thank you for the consult,   Cruzito Romo PharmD 8/12/2020 11:57 AM       Patient brief summary:  Sarah Parker is a 80 y.o. female initiated on antimicrobial therapy with IV Vancomycin for treatment of a lung abscess and back abscess.    The patient is being pulse dosed based on random vancomycin levels.    Drug Allergies:   Review of patient's allergies indicates:   Allergen Reactions    Codeine      Other reaction(s): hyper  Other reaction(s): hyper    Gabapentin Other (See Comments)     Bad dreams       Actual Body Weight:   45.4 kg    Renal Function:   Estimated Creatinine Clearance: 36.9 mL/min (based on SCr of 0.9 mg/dL).,     Dialysis Method (if applicable):  Not on RRT    CBC (last 72 hours):  Recent Labs   Lab Result Units 08/10/20  1020 08/11/20  1128 08/11/20 2016 08/12/20  0756   WBC K/uL 77.24* 64.86* 49.51* 33.88*   Hemoglobin g/dL 5.0* 8.4* 7.9* 7.3*   Hematocrit % 16.5* 25.0* 24.6* 23.3*   Platelets K/uL 30* 23* 21* 16*   Gran% % 24.0* 26.0* 35.0* 22.0*   Lymph% % 29.0 27.0 30.0 40.0   Mono% % 12.0 19.0* 14.0 14.0   Eosinophil% % 1.0 1.0 0.0 0.0   Basophil% % 0.0 0.0 0.0 0.0   Differential Method  Manual Manual Manual Manual       Metabolic Panel (last 72  hours):  Recent Labs   Lab Result Units 08/10/20  1020 08/10/20  2125 08/11/20 2016 08/12/20  0756   Sodium mmol/L 132*  --   --   --    Potassium mmol/L 5.3*  --   --   --    Chloride mmol/L 101  --   --   --    CO2 mmol/L 20*  --   --   --    Glucose mg/dL 109  --   --   --    Glucose, UA   --  Negative  --   --    BUN, Bld mg/dL 30*  --   --   --    Creatinine mg/dL 1.1  --  0.9 0.9   Albumin g/dL 2.8*  --   --   --    Total Bilirubin mg/dL 0.3  --   --   --    Alkaline Phosphatase U/L 209*  --   --   --    AST U/L 24  --   --   --    ALT U/L 19  --   --   --        Vancomycin Administrations:  vancomycin given in the last 96 hours                   vancomycin 750 mg in dextrose 5 % 250 mL IVPB (ready to mix system) (mg) 750 mg New Bag 08/11/20 2347    vancomycin in dextrose 5 % 1 gram/250 mL IVPB 1,000 mg (mg) 1,000 mg New Bag 08/11/20 0200                Microbiologic Results:  Microbiology Results (last 7 days)     Procedure Component Value Units Date/Time    Blood culture #2 **CANNOT BE ORDERED STAT** [[id number]] Collected: 08/10/20 2045    Order Status: Completed Specimen: Blood from Peripheral, Upper Arm, Left Updated: 08/12/20 0614     Blood Culture, Routine No Growth to date      No Growth to date    Blood culture #1 **CANNOT BE ORDERED STAT** [[id number]] Collected: 08/10/20 2040    Order Status: Completed Specimen: Blood from Peripheral, Upper Arm, Left Updated: 08/12/20 0614     Blood Culture, Routine No Growth to date      No Growth to date    Culture, Respiratory with Gram Stain [[id number]]     Order Status: No result Specimen: Respiratory from Sputum, Expectorated

## 2020-08-12 NOTE — PLAN OF CARE
Pt in bed with eyes closed. No sings of distress noted. IV antibiotics given per Md orders. Call bell in reach, bed in lowest position. Will continue to monitor.

## 2020-08-12 NOTE — SUBJECTIVE & OBJECTIVE
Interval History  No events overnight . PC further dropped       Review of Systems  Objective:     Vital Signs (Most Recent):  Temp: 97.8 °F (36.6 °C) (08/12/20 0712)  Pulse: 78 (08/12/20 0712)  Resp: 18 (08/12/20 0712)  BP: 136/62 (08/12/20 0712)  SpO2: 100 % (08/12/20 0712) Vital Signs (24h Range):  Temp:  [97.8 °F (36.6 °C)-98.1 °F (36.7 °C)] 97.8 °F (36.6 °C)  Pulse:  [] 78  Resp:  [16-18] 18  SpO2:  [98 %-100 %] 100 %  BP: (128-144)/(61-68) 136/62     Weight: 45.4 kg (100 lb)  Body mass index is 18.29 kg/m².    Intake/Output Summary (Last 24 hours) at 8/12/2020 1539  Last data filed at 8/12/2020 1200  Gross per 24 hour   Intake 400 ml   Output --   Net 400 ml      Physical Exam  Constitutional:       Appearance: She is well-developed.   HENT:      Head: Normocephalic and atraumatic.      Right Ear: External ear normal.      Left Ear: External ear normal.      Nose: Nose normal.   Eyes:      Conjunctiva/sclera: Conjunctivae normal.      Pupils: Pupils are equal, round, and reactive to light.   Neck:      Musculoskeletal: Normal range of motion and neck supple.      Thyroid: No thyromegaly.      Vascular: No JVD.   Cardiovascular:      Rate and Rhythm: Normal rate and regular rhythm.      Heart sounds: Normal heart sounds. No murmur. No friction rub. No gallop.    Pulmonary:      Effort: Pulmonary effort is normal. No respiratory distress.      Breath sounds: Normal breath sounds. No stridor. No wheezing or rales.   Chest:      Chest wall: No tenderness.   Abdominal:      General: Bowel sounds are normal. There is no distension.      Palpations: Abdomen is soft. There is no mass.      Tenderness: There is no abdominal tenderness. There is no guarding or rebound.      Comments: Splenomegaly    Genitourinary:     Vagina: Normal. No vaginal discharge.   Musculoskeletal: Normal range of motion.         General: No deformity.   Lymphadenopathy:      Cervical: No cervical adenopathy.   Skin:     General: Skin is  warm.      Capillary Refill: Capillary refill takes less than 2 seconds.      Findings: Bruising present. No erythema or rash.   Neurological:      Mental Status: She is alert and oriented to person, place, and time.      Cranial Nerves: No cranial nerve deficit.      Sensory: No sensory deficit.      Deep Tendon Reflexes: Reflexes normal.   Psychiatric:         Behavior: Behavior normal.   Significant Labs: All pertinent labs within the past 24 hours have been reviewed.    Significant Imaging: I have reviewed and interpreted all pertinent imaging results/findings within the past 24 hours.

## 2020-08-12 NOTE — CONSULTS
[facility]  Infectious Disease  Consult Note    Patient Name: Sarah Parker  MRN: [id number]  Admission Date: 8/10/2020  Hospital Length of Stay: 2 days  Attending Physician: Pollo Sparrow MD  Primary Care Provider: Briseida Bennett MD     Isolation Status: No active isolations    Patient information was obtained from patient, past medical records and ER records.      Consults  Assessment/Plan:     * Abscess of lung without pneumonia  Will need culture directed therapy , started on empiric cefepime,flagyl and vancomycin , will do sputum culture, Gold quantiferon , blood cultures.  Will need bronchoscopy .  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  Will need to clearly define the goal of therapy .  Case discussed with oncology     Abscess of lower back  S/p I and D -will continue wound care , on Vancomycin     Anemia  Will transfuse as needed     Rheumatoid lung  Will continue rheumatology out patient follow up        Thank you for your consult. I will follow-up with patient. Please contact us if you have any additional questions.    Escobar Recinos MD  Infectious Disease  [facility]    Subjective:     Principal Problem: Abscess of lung without pneumonia    HPI:    80 y.o. female patient with medical history of CMML on treatment with Vidaza who was admitted due to abnormal labs- anemia .  All previous imaging received.  Chest ct scan -08/10  -Peripheral large soft tissue opacity again identified in the right upper lobe.  Central area of hypodensity is not clearly identified as on the prior examination.  Persistent nodular soft tissue opacities identified throughout both lungs, some of which in the left lower lobe have decreased in size.  Findings again may reflect atypical infectious process with metastatic disease or  mycobacterial infection additional considerations.   Bronchiectasis, ground-glass haziness and secretions within peripheral airways concerning for infectious/inflammatory small airways disease.  08/03- CT scan -abdomen,pelvis.chest -  1. Interval clearing of prior left lower lobe consolidation with interval development of similar areas of consolidation in the bilateral upper lobes, both of which exhibit central necrosis versus abscess formation.  Numerous nodular opacities present elsewhere in both lungs either stable or new compared to prior.  Differential considerations include metastatic disease, disseminated TB/fungal infection, or atypical pneumonia.  2. Worsening mediastinal and bilateral axillary lymphadenopathy.  3. Worsening retroperitoneal and bilateral iliac/inguinal lymphadenopathy.  4. Marked splenomegaly with increase in splenic size compared to prior.  Multiple subcentimeter hypodense foci throughout the spleen and single small enhancing focus worrisome for metastatic disease versus infection.  Serum procal-0.18.  CBC -  Component      Latest Ref Rng & Units 8/11/2020 8/11/2020 8/10/2020           8:16 PM 11:28 AM    WBC      3.90 - 12.70 K/uL 49.51 (H) 64.86 (HH) 77.24 (HH)       Past Medical History:   Diagnosis Date    Acid reflux     Anxiety     Back pain     Bronchitis, chronic obstructive w acute bronchitis 7/29/2016    Cancer     [facility] arms, face- Dr. Lata Tejada    Cataract     2+NS    Chronic myelomonocytic leukemia     Degenerative disc disease     Depression     Depression     Dry mouth     Encounter for blood transfusion     Hernia, hiatal 11/18/2013    Hypertension     Hypothyroid     Hypothyroidism     Iron deficiency anemia     Macrocytic anemia 5/3/2016    Macular degeneration     Migraines     Mixed anxiety and depressive disorder     Multiple fractures of ribs of right side     Osteoporosis     Other hyperlipidemia 10/11/2019    Pneumonia     Pneumonia due  to other staphylococcus     Rheumatoid arthritis     Rheumatoid arthritis(714.0)     Rheumatoid arthritis(714.0)     Remicade, MTX.       Past Surgical History:   Procedure Laterality Date    APPENDECTOMY  1985    APPLICATION OF WOUND VACUUM-ASSISTED CLOSURE DEVICE N/A 5/14/2020    Procedure: APPLICATION, WOUND VAC;  Surgeon: Mckinley Shannon MD;  Location: [facility] OR;  Service: General;  Laterality: N/A;    APPLICATION OF WOUND VACUUM-ASSISTED CLOSURE DEVICE Left 7/25/2020    Procedure: APPLICATION, WOUND VAC;  Surgeon: Berenice Alfaro MD;  Location: [facility] OR;  Service: General;  Laterality: Left;    BREAST BIOPSY      CATARACT EXTRACTION Bilateral 6/11/15    Dr. Booth    CHOLECYSTECTOMY  2013    cryoablasion kidney Left 09/27/2016    DEBRIDEMENT Left 7/25/2020    Procedure: DEBRIDEMENT;  Surgeon: Berenice Alfaro MD;  Location: [facility] OR;  Service: General;  Laterality: Left;    EVACUATION OF HEMATOMA Left 7/25/2020    Procedure: EVACUATION, HEMATOMA;  Surgeon: Berenice Alfaro MD;  Location: [facility] OR;  Service: General;  Laterality: Left;    EXCISION OF SQUAMOUS CELL CARCINOMA Left 7/2/2020    Procedure: EXCISION, CARCINOMA, SQUAMOUS CELL;  Surgeon: Mckinley Shannon MD;  Location: [facility] OR;  Service: General;  Laterality: Left;    feet Bilateral     rheumatoid    FRACTURE SURGERY Right     tibia    HERNIA REPAIR      HYSTERECTOMY  1970    partial    INCISION AND DRAINAGE OF ABSCESS N/A 5/12/2020    Procedure: INCISION AND DRAINAGE, ABSCESS;  Surgeon: Emerson Hathaway MD;  Location: [facility] OR;  Service: General;  Laterality: N/A;    INCISIONAL BIOPSY N/A 5/12/2020    Procedure: INCISIONAL BIOPSY;  Surgeon: Emerson Hathaway MD;  Location: [facility] OR;  Service: General;  Laterality: N/A;    JOINT REPLACEMENT      bilateral knees (2008), hands, wrists, knuckles, toes    LAPAROSCOPIC NISSEN FUNDOPLICATION      TRANSFORAMINAL EPIDURAL INJECTION OF STEROID Left 6/25/2019    Procedure: Left L5/S1 TF AYAAN with local;   Surgeon: Rowdy Felix MD;  Location: [facility] PAIN [facility]GT;  Service: Pain Management;  Laterality: Left;    WOUND DEBRIDEMENT N/A 5/14/2020    Procedure: DEBRIDEMENT, WOUND;  Surgeon: Mckinley Shannon MD;  Location: [facility] OR;  Service: General;  Laterality: N/A;    WOUND DEBRIDEMENT Left 7/25/2020    Procedure: DEBRIDEMENT, WOUND;  Surgeon: Berenice Alfaro MD;  Location: [facility] OR;  Service: General;  Laterality: Left;       Review of patient's allergies indicates:   Allergen Reactions    Codeine      Other reaction(s): hyper  Other reaction(s): hyper    Gabapentin Other (See Comments)     Bad dreams       Medications:  Medications Prior to Admission   Medication Sig    albuterol (PROVENTIL) 2.5 mg /3 mL (0.083 %) nebulizer solution Take 3 mLs (2.5 mg total) by nebulization every 6 (six) hours as needed for Wheezing.    amitriptyline (ELAVIL) 75 MG tablet TAKE 1 TABLET BY MOUTH EVERY EVENING    calcium citrate-vitamin D (CITRACAL + D) 315-200 mg-unit per tablet Take 1 tablet by mouth once daily.     DULoxetine (CYMBALTA) 20 MG capsule TAKE 2 CAPSULES(40 MG) BY MOUTH EVERY DAY (Patient taking differently: before meals, at bedtime and at 0200. )    ferrous gluconate (FERGON) 324 MG tablet Take 324 mg by mouth 2 (two) times daily.    fluticasone propionate (FLONASE) 50 mcg/actuation nasal spray 2 sprays (100 mcg total) by Each Nostril route once daily.    hydrocodone-acetaminophen 5-325mg (NORCO) 5-325 mg per tablet TK 1 T PO Q 6 H PRN    hydrOXYzine HCl (ATARAX) 25 MG tablet Take 25 mg by mouth nightly.     levothyroxine (SYNTHROID) 88 MCG tablet TAKE 1 TABLET BY MOUTH BEFORE BREAKFAST    magnesium oxide (MAG-OX) 400 mg (241.3 mg magnesium) tablet Take 1 tablet (400 mg total) by mouth 3 (three) times daily. (Patient taking differently: Take 400 mg by mouth 2 (two) times daily. )    meclizine (ANTIVERT) 50 MG tablet Take 25 mg by mouth 3 (three) times daily as needed.    metoprolol succinate (TOPROL-XL) 50 MG  24 hr tablet TAKE 1 TABLET(50 MG) BY MOUTH EVERY DAY    multivitamin capsule Take by mouth. As directed    ondansetron (ZOFRAN) 4 MG tablet Take 1 tablet (4 mg total) by mouth every 8 (eight) hours as needed for Nausea.    pravastatin (PRAVACHOL) 10 MG tablet TAKE 1 TABLET(10 MG) BY MOUTH EVERY DAY (Patient taking differently: Take 10 mg by mouth every evening. )    prochlorperazine (COMPAZINE) 5 MG tablet TAKE 1 TABLET(5 MG) BY MOUTH EVERY 6 HOURS AS NEEDED FOR NAUSEA    tamsulosin (FLOMAX) 0.4 mg Cap Take 1 capsule (0.4 mg total) by mouth once daily. For urinary retention    topiramate (TOPAMAX) 25 MG tablet Take 1 tablet (25 mg total) by mouth at night x 1 week then increase to 2 (two) times daily. Increase as tolerated. (Patient not taking: Reported on 7/21/2020)    valsartan-hydrochlorothiazide (DIOVAN-HCT) 80-12.5 mg per tablet TAKE 1 TABLET BY MOUTH DAILY     Antibiotics (From admission, onward)    Start     Stop Route Frequency Ordered    08/11/20 1430  metronidazole IVPB 500 mg      -- IV Every 8 hours (non-standard times) 08/11/20 0820    08/11/20 1400  cefepime in dextrose 5 % IVPB 2 g      -- IV Every 12 hours (non-standard times) 08/11/20 0820    08/10/20 2140  vancomycin - pharmacy to dose  (vancomycin IVPB)      -- IV pharmacy to manage frequency 08/10/20 2040        Antifungals (From admission, onward)    None        Antivirals (From admission, onward)    None           Immunization History   Administered Date(s) Administered    Influenza 10/21/2008, 10/25/2010    Influenza - High Dose - PF (65 years and older) 09/21/2011, 10/04/2012, 10/14/2013, 10/06/2014, 11/02/2015, 10/28/2016, 09/27/2017, 09/25/2018, 10/22/2019    Pneumococcal Conjugate - 13 Valent 05/11/2016    Pneumococcal Polysaccharide - 23 Valent 09/21/2009, 10/16/2014    Tdap 10/14/2013       Family History     Problem Relation (Age of Onset)    Asthma Sister, Brother    Cancer Brother, Maternal Grandfather    Cataracts Mother     Chronic back pain Sister, Brother    Diabetes Mellitus Brother    Fibromyalgia Daughter    Heart disease Mother, Father, Maternal Grandmother    Hyperlipidemia Mother    Hypertension Mother, Father, Sister, Brother    Osteoarthritis Mother, Father, Sister, Brother    Thyroid disease Sister, Brother        Social History     Socioeconomic History    Marital status:      Spouse name: Not on file    Number of children: Not on file    Years of education: Not on file    Highest education level: Not on file   Occupational History    Occupation: retired   Social Needs    Financial resource strain: Not on file    Food insecurity     Worry: Not on file     Inability: Not on file    Transportation needs     Medical: Not on file     Non-medical: Not on file   Tobacco Use    Smoking status: Former Smoker     Packs/day: 0.25     Years: 2.00     Pack years: 0.50     Quit date: 1965     Years since quittin.8    Smokeless tobacco: Never Used   Substance and Sexual Activity    Alcohol use: No    Drug use: No    Sexual activity: Never     Partners: Male   Lifestyle    Physical activity     Days per week: Not on file     Minutes per session: Not on file    Stress: Not at all   Relationships    Social connections     Talks on phone: Not on file     Gets together: Not on file     Attends Congregational service: Not on file     Active member of club or organization: Not on file     Attends meetings of clubs or organizations: Not on file     Relationship status: Not on file   Other Topics Concern    Not on file   Social History Narrative    Patient is aretired and live with .     Review of Systems   Constitutional: Positive for activity change and appetite change. Negative for chills, fatigue and fever.   HENT: Negative for congestion, ear discharge, ear pain, mouth sores, nosebleeds, sinus pain, sneezing, sore throat, tinnitus and trouble swallowing.    Eyes: Negative for pain, discharge, redness  and itching.   Respiratory: Negative for cough, chest tightness, shortness of breath and wheezing.    Cardiovascular: Negative for chest pain, palpitations and leg swelling.   Gastrointestinal: Negative for abdominal distention, abdominal pain, blood in stool, constipation, diarrhea, nausea and vomiting.   Endocrine: Negative for cold intolerance, heat intolerance, polydipsia, polyphagia and polyuria.   Genitourinary: Negative for difficulty urinating, dyspareunia, dysuria, enuresis, flank pain, frequency, genital sores, hematuria, menstrual problem, pelvic pain, urgency, vaginal bleeding, vaginal discharge and vaginal pain.   Musculoskeletal: Negative for arthralgias, back pain, joint swelling, myalgias and neck pain.   Skin: Negative for pallor and rash.   Neurological: Negative for dizziness, weakness, light-headedness, numbness and headaches.   Hematological: Negative for adenopathy. Does not bruise/bleed easily.   Psychiatric/Behavioral: Negative for confusion and sleep disturbance. The patient is not nervous/anxious.    All other systems reviewed and are negative.    Objective:     Vital Signs (Most Recent):  Temp: 97.9 °F (36.6 °C) (08/12/20 0429)  Pulse: 77 (08/12/20 0429)  Resp: 18 (08/12/20 0429)  BP: 130/65 (08/12/20 0429)  SpO2: 98 % (08/12/20 0429) Vital Signs (24h Range):  Temp:  [97.9 °F (36.6 °C)-98.1 °F (36.7 °C)] 97.9 °F (36.6 °C)  Pulse:  [] 77  Resp:  [16-20] 18  SpO2:  [98 %-100 %] 98 %  BP: (128-159)/(61-72) 130/65     Weight: 45.4 kg (100 lb)  Body mass index is 18.29 kg/m².    Estimated Creatinine Clearance: 36.9 mL/min (based on SCr of 0.9 mg/dL).    Physical Exam  Vitals signs and nursing note reviewed.   Constitutional:       Appearance: She is well-developed.   HENT:      Head: Normocephalic and atraumatic.      Right Ear: External ear normal.      Left Ear: External ear normal.      Nose: Nose normal.   Eyes:      Conjunctiva/sclera: Conjunctivae normal.      Pupils: Pupils are  equal, round, and reactive to light.   Neck:      Musculoskeletal: Normal range of motion and neck supple.      Thyroid: No thyromegaly.      Vascular: No JVD.   Cardiovascular:      Rate and Rhythm: Normal rate and regular rhythm.      Heart sounds: Normal heart sounds. No murmur. No friction rub. No gallop.    Pulmonary:      Effort: Pulmonary effort is normal. No respiratory distress.      Breath sounds: Normal breath sounds. No stridor. No wheezing or rales.   Chest:      Chest wall: No tenderness.   Abdominal:      General: Bowel sounds are normal. There is no distension.      Palpations: Abdomen is soft. There is no mass.      Tenderness: There is no abdominal tenderness. There is no guarding or rebound.      Comments: Splenomegaly    Genitourinary:     Vagina: Normal. No vaginal discharge.   Musculoskeletal: Normal range of motion.         General: No deformity.      Comments: Wound vac noted in the back   Lymphadenopathy:      Cervical: No cervical adenopathy.   Skin:     General: Skin is warm.      Capillary Refill: Capillary refill takes less than 2 seconds.      Findings: Bruising present. No erythema or rash.   Neurological:      Mental Status: She is alert and oriented to person, place, and time.      Cranial Nerves: No cranial nerve deficit.      Sensory: No sensory deficit.      Deep Tendon Reflexes: Reflexes normal.   Psychiatric:         Behavior: Behavior normal.         Significant Labs:   Blood Culture:   Recent Labs   Lab 05/12/20  1006 07/24/20  1445 07/24/20  1503 08/10/20  2040 08/10/20  2045   LABBLOO No Growth after 4 days.  No growth after 5 days. No growth after 5 days. No Growth to date No Growth to date     BMP:   Recent Labs   Lab 08/10/20  1020 08/11/20 2016     --    *  --    K 5.3*  --      --    CO2 20*  --    BUN 30*  --    CREATININE 1.1 0.9   CALCIUM 8.5*  --      CBC:   Recent Labs   Lab 08/10/20  1020 08/11/20  1128 08/11/20 2016   WBC 77.24* 64.86* 49.51*    HGB 5.0* 8.4* 7.9*   HCT 16.5* 25.0* 24.6*   PLT 30* 23* 21*     All pertinent labs within the past 24 hours have been reviewed.    Significant Imaging: I have reviewed all pertinent imaging results/findings within the past 24 hours.

## 2020-08-12 NOTE — PLAN OF CARE
Problem: Wound  Goal: Optimal Wound Healing  Outcome: Ongoing, Progressing     Problem: Infection  Goal: Infection Symptom Resolution  Outcome: Ongoing, Progressing     Problem: Adult Inpatient Plan of Care  Goal: Patient-Specific Goal (Individualization)  Outcome: Ongoing, Progressing

## 2020-08-13 ENCOUNTER — TELEPHONE (OUTPATIENT)
Dept: PULMONOLOGY | Facility: CLINIC | Age: 80
End: 2020-08-13

## 2020-08-13 VITALS
WEIGHT: 100 LBS | HEART RATE: 86 BPM | OXYGEN SATURATION: 100 % | BODY MASS INDEX: 18.4 KG/M2 | RESPIRATION RATE: 17 BRPM | DIASTOLIC BLOOD PRESSURE: 68 MMHG | SYSTOLIC BLOOD PRESSURE: 145 MMHG | TEMPERATURE: 98 F | HEIGHT: 62 IN

## 2020-08-13 LAB
BASOPHILS # BLD AUTO: ABNORMAL K/UL (ref 0–0.2)
BASOPHILS NFR BLD: 0 % (ref 0–1.9)
BLASTS NFR BLD MANUAL: 1 %
CREAT SERPL-MCNC: 0.8 MG/DL (ref 0.5–1.4)
DACRYOCYTES BLD QL SMEAR: ABNORMAL
DIFFERENTIAL METHOD: ABNORMAL
EOSINOPHIL # BLD AUTO: ABNORMAL K/UL (ref 0–0.5)
EOSINOPHIL NFR BLD: 0 % (ref 0–8)
ERYTHROCYTE [DISTWIDTH] IN BLOOD BY AUTOMATED COUNT: 17.1 % (ref 11.5–14.5)
EST. GFR  (AFRICAN AMERICAN): >60 ML/MIN/1.73 M^2
EST. GFR  (NON AFRICAN AMERICAN): >60 ML/MIN/1.73 M^2
HCT VFR BLD AUTO: 23 % (ref 37–48.5)
HGB BLD-MCNC: 7.3 G/DL (ref 12–16)
IMM GRANULOCYTES # BLD AUTO: ABNORMAL K/UL (ref 0–0.04)
IMM GRANULOCYTES NFR BLD AUTO: ABNORMAL % (ref 0–0.5)
LYMPHOCYTES # BLD AUTO: ABNORMAL K/UL (ref 1–4.8)
LYMPHOCYTES NFR BLD: 43 % (ref 18–48)
MCH RBC QN AUTO: 30 PG (ref 27–31)
MCHC RBC AUTO-ENTMCNC: 31.7 G/DL (ref 32–36)
MCV RBC AUTO: 95 FL (ref 82–98)
METAMYELOCYTES NFR BLD MANUAL: 10 %
MONOCYTES # BLD AUTO: ABNORMAL K/UL (ref 0.3–1)
MONOCYTES NFR BLD: 7 % (ref 4–15)
MYELOCYTES NFR BLD MANUAL: 8 %
NEUTROPHILS NFR BLD: 17 % (ref 38–73)
NEUTS BAND NFR BLD MANUAL: 14 %
NRBC BLD-RTO: 2 /100 WBC
OVALOCYTES BLD QL SMEAR: ABNORMAL
PLATELET # BLD AUTO: 45 K/UL (ref 150–350)
PMV BLD AUTO: 12.5 FL (ref 9.2–12.9)
POIKILOCYTOSIS BLD QL SMEAR: SLIGHT
RBC # BLD AUTO: 2.43 M/UL (ref 4–5.4)
VANCOMYCIN SERPL-MCNC: 14.1 UG/ML
WBC # BLD AUTO: 27.7 K/UL (ref 3.9–12.7)

## 2020-08-13 PROCEDURE — 99232 SBSQ HOSP IP/OBS MODERATE 35: CPT | Mod: HCNC,,, | Performed by: INTERNAL MEDICINE

## 2020-08-13 PROCEDURE — 63600175 PHARM REV CODE 636 W HCPCS: Mod: HCNC | Performed by: NURSE PRACTITIONER

## 2020-08-13 PROCEDURE — 25000003 PHARM REV CODE 250: Mod: HCNC | Performed by: NURSE PRACTITIONER

## 2020-08-13 PROCEDURE — 25000003 PHARM REV CODE 250: Mod: HCNC | Performed by: INTERNAL MEDICINE

## 2020-08-13 PROCEDURE — 85007 BL SMEAR W/DIFF WBC COUNT: CPT | Mod: HCNC

## 2020-08-13 PROCEDURE — 80202 ASSAY OF VANCOMYCIN: CPT | Mod: HCNC

## 2020-08-13 PROCEDURE — 25000003 PHARM REV CODE 250: Mod: HCNC | Performed by: HOSPITALIST

## 2020-08-13 PROCEDURE — 36415 COLL VENOUS BLD VENIPUNCTURE: CPT | Mod: HCNC

## 2020-08-13 PROCEDURE — 63600175 PHARM REV CODE 636 W HCPCS: Mod: HCNC | Performed by: HOSPITALIST

## 2020-08-13 PROCEDURE — 82565 ASSAY OF CREATININE: CPT | Mod: HCNC

## 2020-08-13 PROCEDURE — 85027 COMPLETE CBC AUTOMATED: CPT | Mod: HCNC

## 2020-08-13 PROCEDURE — S0030 INJECTION, METRONIDAZOLE: HCPCS | Mod: HCNC | Performed by: NURSE PRACTITIONER

## 2020-08-13 PROCEDURE — 99232 PR SUBSEQUENT HOSPITAL CARE,LEVL II: ICD-10-PCS | Mod: HCNC,,, | Performed by: INTERNAL MEDICINE

## 2020-08-13 RX ORDER — VORICONAZOLE 200 MG/1
200 TABLET, FILM COATED ORAL 2 TIMES DAILY
Status: DISCONTINUED | OUTPATIENT
Start: 2020-08-13 | End: 2020-08-13 | Stop reason: HOSPADM

## 2020-08-13 RX ORDER — FERROUS SULFATE 325(65) MG
325 TABLET, DELAYED RELEASE (ENTERIC COATED) ORAL 2 TIMES DAILY
Qty: 180 TABLET | Refills: 3 | Status: SHIPPED | OUTPATIENT
Start: 2020-08-13 | End: 2021-08-13

## 2020-08-13 RX ORDER — MUPIROCIN 20 MG/G
OINTMENT TOPICAL 2 TIMES DAILY
Qty: 22 G | Refills: 0 | Status: SHIPPED | OUTPATIENT
Start: 2020-08-13 | End: 2020-09-02

## 2020-08-13 RX ORDER — ITRACONAZOLE 100 MG/1
200 CAPSULE ORAL 2 TIMES DAILY
Qty: 56 CAPSULE | Refills: 0 | Status: SHIPPED | OUTPATIENT
Start: 2020-08-13 | End: 2020-08-27

## 2020-08-13 RX ORDER — AMOXICILLIN AND CLAVULANATE POTASSIUM 500; 125 MG/1; MG/1
1 TABLET, FILM COATED ORAL 3 TIMES DAILY
Qty: 42 TABLET | Refills: 0 | Status: SHIPPED | OUTPATIENT
Start: 2020-08-13 | End: 2020-08-27

## 2020-08-13 RX ORDER — VORICONAZOLE 200 MG/1
200 TABLET, FILM COATED ORAL 2 TIMES DAILY
Qty: 28 TABLET | Refills: 0 | Status: SHIPPED | OUTPATIENT
Start: 2020-08-13 | End: 2020-08-27

## 2020-08-13 RX ADMIN — MUPIROCIN: 20 OINTMENT TOPICAL at 09:08

## 2020-08-13 RX ADMIN — VORICONAZOLE 200 MG: 200 TABLET, FILM COATED ORAL at 11:08

## 2020-08-13 RX ADMIN — LEVOTHYROXINE SODIUM 88 MCG: 88 TABLET ORAL at 06:08

## 2020-08-13 RX ADMIN — METRONIDAZOLE 500 MG: 500 INJECTION, SOLUTION INTRAVENOUS at 06:08

## 2020-08-13 RX ADMIN — CEFEPIME HYDROCHLORIDE 2 G: 2 INJECTION, SOLUTION INTRAVENOUS at 01:08

## 2020-08-13 RX ADMIN — FERROUS SULFATE TAB EC 325 MG (65 MG FE EQUIVALENT) 325 MG: 325 (65 FE) TABLET DELAYED RESPONSE at 09:08

## 2020-08-13 RX ADMIN — VANCOMYCIN HYDROCHLORIDE 750 MG: 750 INJECTION, POWDER, LYOPHILIZED, FOR SOLUTION INTRAVENOUS at 09:08

## 2020-08-13 RX ADMIN — METOPROLOL SUCCINATE 25 MG: 25 TABLET, EXTENDED RELEASE ORAL at 09:08

## 2020-08-13 NOTE — ASSESSMENT & PLAN NOTE
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  Close follow up interval imaging in 8 weeks

## 2020-08-13 NOTE — DISCHARGE SUMMARY
[facility] Hospital Medicine  Discharge Summary      Patient Name: Sarah Parker  MRN: [id number]  Admission Date: 8/10/2020  Hospital Length of Stay: 3 days  Discharge Date and Time:  08/13/2020 5:23 PM  Attending Physician: Pollo Ragland MD  Discharging Provider: Lindy Avalos NP  Primary Care Provider: Briseida Bennett MD      HPI:   Mrs. Sarah Parker is a 80 y.o. female patient with medical history noted below who presents to the Emergency Department for an evaluation of abnormal labs. Pt reports that she received 1 unit of blood at the infusion center earlier today. She was referred by Dr. Knox (Hematology and Oncology) here to the ED to receive another unit of blood, per pt. Patient also had CT with multiple lesions with concerns for infectious etiology it was suggested to be started on ABX and admission for further work up. Patient denies any CP, SOB, fever, chills, n/v/d, abdominal pain, and all other sxs at this time. No further complaints or concerns at this time.     * No surgery found *      Hospital Course:   Pulmonology saw the patient in reference to lung abscess:   Differentials: Inflammatory vs Infectious  Inflammatory: likely Rheumatoid lung related: unfortunately off DMARD due to active infection treatment  Infectious: location, infiltrative pattern , indolent suggest fungal or atypical: increase coverage for antifungals: VORICONAZOLE  Tissue sampling : transbronchial biopsy or CT biopsy though possible will be extremely high risk in her current functional status especially with low platelets.  Close follow up interval imaging in 8 weeks  Dr. Recinos saw the patient and followed ABX for lung abscess. He recommended Augmentin and Itraconazole which was prescribed for 14 days at time of discharge  Hematology/Oncology followed patient for anemia and thrombocytopenia. 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 She received blood and platelet transfusion. Pt was  stable for discharge. She was seen and examined and determined to be safe and stable for discharge. She was advised about follow up appointments with Pulmonology and Infectious Ds. Also, she was advised to follow up with her PCP. Home health was resumed for wound VAC care.      Consults:   Consults (From admission, onward)        Status Ordering Provider     Inpatient consult to Hematology/Oncology  Once     Provider:  Denton Knox MD    Acknowledged LISA HENDRICKSON     Inpatient consult to Infectious Diseases  Once     Provider:  Aba Recinos MD    Acknowledged LISA HENDRICKSON     Inpatient consult to Pulmonology  Once     Provider:  (Not yet assigned)    Completed ABA RECINOS     Pharmacy to dose Vancomycin consult  Once     Provider:  (Not yet assigned)    Acknowledged NINO BLAKE JR     Pharmacy to dose Vancomycin consult  Once     Provider:  (Not yet assigned)    Completed LISA HENDRICKSON          No new Assessment & Plan notes have been filed under this hospital service since the last note was generated.  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] 08/11/2020 Yes    Abscess of lung without pneumonia [J85.2] 08/10/2020 Yes    Blast crisis phase of chronic myeloid leukemia [C92.10] 07/25/2020 Yes    Abscess of lower back [L02.212] 05/12/2020 Yes    Thrombocytopenia [D69.6] 12/31/2019 Yes    Other hyperlipidemia [E78.49] 10/11/2019 Yes    Chronic myelomonocytic leukemia not having achieved remission [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    Specialty: Hematology and Oncology  Why: Please keep scheduled appointment   Contact information:  [address]  [place] [address]  [phone]             Sonu Varela MD In 2 weeks.    Specialty: Pulmonary Disease  Why: Hospital Follow Up - Lung Infection  Contact information:  [address]  [place] [address]  [phone]             Escobar Recinos MD In 2 weeks.    Specialty: Infectious Diseases  Why: Hospital Follow Up - Lung Infection  Contact information:  [address] [facility]  [place] [address]  [phone]                 Patient Instructions:      Notify your health care provider if you experience any of the following:  temperature >100.4     Notify your health care provider if you experience any of the following:  persistent nausea and vomiting or diarrhea     Notify your health care provider if you experience any of the following:  difficulty breathing or increased cough     Activity as tolerated       Significant Diagnostic Studies: Labs:   CMP   Recent Labs   Lab 08/11/20 2016 08/12/20 0756 08/13/20  0651   CREATININE 0.9 0.9 0.8   ESTGFRAFRICA >60 >60 >60   EGFRNONAA >60 >60 >60    and CBC   Recent Labs   Lab 08/12/20 0756 08/12/20 2002 08/13/20  0646   WBC 33.88* 36.32* 27.70*   HGB 7.3* 7.2* 7.3*   HCT 23.3* 23.1* 23.0*   PLT 16* 58* 45*       Pending Diagnostic Studies:     Procedure Component Value Units Date/Time    CBC auto differential [[id number]]     Order Status: Sent Lab Status: No result     Specimen: Blood     Fungal Immunodiffusion - Blood [[id number]] Collected: 08/12/20 0756    Order Status: Sent Lab Status: In process Updated: 08/12/20 1610    Specimen: Blood     Fungitell Assay For (1.3)-B-D-Glucans [[id number]] Collected: 08/12/20 0756    Order Status: Sent Lab Status: In process Updated: 08/12/20 1516    Specimen: Blood          Medications:  Reconciled  Home Medications:      Medication List      START taking these medications    amoxicillin-clavulanate 500-125mg 500-125 mg Tab  Commonly known as: AUGMENTIN  Take 1 tablet (500 mg total) by mouth 3 (three) times daily. for 14 days     ferrous sulfate 325 (65 FE) MG EC tablet  Take 1 tablet (325 mg total) by mouth 2 (two) times daily.     itraconazole 100 mg Cap  Commonly known as: SPORANOX  Take 2 capsules (200 mg total) by mouth 2 (two) times daily. for 14 days     mupirocin 2 % ointment  Commonly known as: BACTROBAN  by Nasal route 2 (two) times a day. for 5 days     voriconazole 200 MG Tab  Commonly known as: VFEND  Take 1 tablet (200 mg total) by mouth 2 (two) times daily. for 14 days        CHANGE how you take these medications    DULoxetine 20 MG capsule  Commonly known as: CYMBALTA  TAKE 2 CAPSULES(40 MG) BY MOUTH EVERY DAY  What changed:   · how much to take  · how to take this  · when to take this     magnesium oxide 400 mg (241.3 mg magnesium) tablet  Commonly known as: MAG-OX  Take 1 tablet (400 mg total) by mouth 3 (three) times daily.  What changed: when to take this     pravastatin 10 MG tablet  Commonly known as: PRAVACHOL  TAKE 1 TABLET(10 MG) BY MOUTH EVERY DAY  What changed: when to take this        CONTINUE taking these medications    albuterol 2.5 mg /3 mL (0.083 %) nebulizer solution  Commonly known as: PROVENTIL  Take 3 mLs (2.5 mg total) by nebulization every 6 (six) hours as needed for Wheezing.     amitriptyline 75 MG tablet  Commonly known as: ELAVIL  TAKE 1 TABLET BY MOUTH EVERY EVENING     CITRACAL PLUS D 315-200 mg-unit per tablet  Generic drug: calcium citrate-vitamin D3 315-200 mg  Take 1 tablet by mouth once daily.     fluticasone propionate 50 mcg/actuation nasal spray  Commonly known as: FLONASE  2 sprays (100 mcg total) by Each Nostril route once daily.     HYDROcodone-acetaminophen 5-325 mg per tablet  Commonly known as: NORCO  TK 1 T PO Q 6 H PRN     hydrOXYzine HCL 25 MG  tablet  Commonly known as: ATARAX  Take 25 mg by mouth nightly.     levothyroxine 88 MCG tablet  Commonly known as: SYNTHROID  TAKE 1 TABLET BY MOUTH BEFORE BREAKFAST     meclizine 50 MG tablet  Commonly known as: ANTIVERT  Take 25 mg by mouth 3 (three) times daily as needed.     metoprolol succinate 50 MG 24 hr tablet  Commonly known as: TOPROL-XL  TAKE 1 TABLET(50 MG) BY MOUTH EVERY DAY     multivitamin capsule  Take by mouth. As directed     ondansetron 4 MG tablet  Commonly known as: ZOFRAN  Take 1 tablet (4 mg total) by mouth every 8 (eight) hours as needed for Nausea.     prochlorperazine 5 MG tablet  Commonly known as: COMPAZINE  TAKE 1 TABLET(5 MG) BY MOUTH EVERY 6 HOURS AS NEEDED FOR NAUSEA     tamsulosin 0.4 mg Cap  Commonly known as: FLOMAX  Take 1 capsule (0.4 mg total) by mouth once daily. For urinary retention     topiramate 25 MG tablet  Commonly known as: TOPAMAX  Take 1 tablet (25 mg total) by mouth at night x 1 week then increase to 2 (two) times daily. Increase as tolerated.     valsartan-hydrochlorothiazide 80-12.5 mg per tablet  Commonly known as: DIOVAN-HCT  TAKE 1 TABLET BY MOUTH DAILY        STOP taking these medications    ferrous gluconate 324 MG tablet  Commonly known as: FERGON            Indwelling Lines/Drains at time of discharge:   Lines/Drains/Airways     None                 Time spent on the discharge of patient: > 30 minutes  Patient was seen and examined on the date of discharge and determined to be suitable for discharge.         Lindy Avalos NP  Department of Hospital Medicine  [facility]

## 2020-08-13 NOTE — PROGRESS NOTES
Pharmacokinetic Assessment Follow Up: IV Vancomycin    Vancomycin serum concentration assessment(s):    The random level was drawn correctly and can be used to guide therapy at this time. The measurement is within the desired definitive target range of 15 to 20 mcg/mL.    Vancomycin Regimen Plan:  Change regimen to Vancomycin 750 mg IV every 24 hours with next serum trough concentration measured at 0830 prior to next  dose on 8/14      Drug levels (last 3 results):  Recent Labs   Lab Result Units 08/11/20 2016 08/12/20  1100 08/13/20  0650   Vancomycin, Random ug/mL 8.7 13.9 14.1       Pharmacy will continue to follow and monitor vancomycin.    Please contact pharmacy at extension [phone] for questions regarding this assessment.    Thank you for the consult,   Elizabeth Boyd [facility]       Patient brief summary:  Sarah Parker is a 80 y.o. female initiated on antimicrobial therapy with IV Vancomycin for treatment of lower respiratory infection        Drug Allergies:   Review of patient's allergies indicates:   Allergen Reactions    Codeine      Other reaction(s): hyper  Other reaction(s): hyper    Gabapentin Other (See Comments)     Bad dreams       Actual Body Weight:   45.4 kg    Renal Function:   Estimated Creatinine Clearance: 41.5 mL/min (based on SCr of 0.8 mg/dL).,     Dialysis Method (if applicable):  No dialysis    CBC (last 72 hours):  Recent Labs   Lab Result Units 08/10/20  1020 08/11/20  1128 08/11/20 2016 08/12/20  0756 08/12/20 2002   WBC K/uL 77.24* 64.86* 49.51* 33.88* 36.32*   Hemoglobin g/dL 5.0* 8.4* 7.9* 7.3* 7.2*   Hematocrit % 16.5* 25.0* 24.6* 23.3* 23.1*   Platelets K/uL 30* 23* 21* 16* 58*   Gran% % 24.0* 26.0* 35.0* 22.0* 24.0*   Lymph% % 29.0 27.0 30.0 40.0 51.0*   Mono% % 12.0 19.0* 14.0 14.0 12.0   Eosinophil% % 1.0 1.0 0.0 0.0 1.0   Basophil% % 0.0 0.0 0.0 0.0 0.0   Differential Method  Manual Manual Manual Manual Manual       Metabolic Panel (last 72 hours):  Recent Labs   Lab  Result Units 08/10/20  1020 08/10/20  2125 08/11/20  2016 08/12/20  0756 08/13/20  0651   Sodium mmol/L 132*  --   --   --   --    Potassium mmol/L 5.3*  --   --   --   --    Chloride mmol/L 101  --   --   --   --    CO2 mmol/L 20*  --   --   --   --    Glucose mg/dL 109  --   --   --   --    Glucose, UA   --  Negative  --   --   --    BUN, Bld mg/dL 30*  --   --   --   --    Creatinine mg/dL 1.1  --  0.9 0.9 0.8   Albumin g/dL 2.8*  --   --   --   --    Total Bilirubin mg/dL 0.3  --   --   --   --    Alkaline Phosphatase U/L 209*  --   --   --   --    AST U/L 24  --   --   --   --    ALT U/L 19  --   --   --   --        Vancomycin Administrations:  vancomycin given in the last 96 hours                     vancomycin 750 mg in dextrose 5 % 250 mL IVPB (ready to mix system) (mg) 750 mg New Bag 08/12/20 1308    vancomycin 750 mg in dextrose 5 % 250 mL IVPB (ready to mix system) (mg) 750 mg New Bag 08/11/20 2347    vancomycin in dextrose 5 % 1 gram/250 mL IVPB 1,000 mg (mg) 1,000 mg New Bag 08/11/20 0200                    Microbiologic Results:  Microbiology Results (last 7 days)       Procedure Component Value Units Date/Time    Blood culture #2 **CANNOT BE ORDERED STAT** [[id number]] Collected: 08/10/20 2045    Order Status: Completed Specimen: Blood from Peripheral, Upper Arm, Left Updated: 08/13/20 0613     Blood Culture, Routine No Growth to date      No Growth to date      No Growth to date    Blood culture #1 **CANNOT BE ORDERED STAT** [[id number]] Collected: 08/10/20 2040    Order Status: Completed Specimen: Blood from Peripheral, Upper Arm, Left Updated: 08/13/20 0613     Blood Culture, Routine No Growth to date      No Growth to date      No Growth to date    Culture, Respiratory with Gram Stain [[id number]]     Order Status: No result Specimen: Respiratory from Sputum, Expectorated

## 2020-08-13 NOTE — TELEPHONE ENCOUNTER
----- Message from Malaika Ching RN sent at 8/13/2020  2:05 PM CDT -----  Please call pt to [facility] f/u appt for 2 weeks

## 2020-08-13 NOTE — SUBJECTIVE & OBJECTIVE
Past Medical History:   Diagnosis Date    Acid reflux     Anxiety     Back pain     Bronchitis, chronic obstructive w acute bronchitis 7/29/2016    Cancer     [facility] arms, face- Dr. Lata Tejada    Cataract     2+NS    Chronic myelomonocytic leukemia     Degenerative disc disease     Depression     Depression     Dry mouth     Encounter for blood transfusion     Hernia, hiatal 11/18/2013    Hypertension     Hypothyroid     Hypothyroidism     Iron deficiency anemia     Macrocytic anemia 5/3/2016    Macular degeneration     Migraines     Mixed anxiety and depressive disorder     Multiple fractures of ribs of right side     Osteoporosis     Other hyperlipidemia 10/11/2019    Pneumonia     Pneumonia due to other staphylococcus     Rheumatoid arthritis     Rheumatoid arthritis(714.0)     Rheumatoid arthritis(714.0)     Remicade, MTX.       Past Surgical History:   Procedure Laterality Date    APPENDECTOMY  1985    APPLICATION OF WOUND VACUUM-ASSISTED CLOSURE DEVICE N/A 5/14/2020    Procedure: APPLICATION, WOUND VAC;  Surgeon: Mckinley Shannon MD;  Location: [facility] OR;  Service: General;  Laterality: N/A;    APPLICATION OF WOUND VACUUM-ASSISTED CLOSURE DEVICE Left 7/25/2020    Procedure: APPLICATION, WOUND VAC;  Surgeon: Berenice Alfaro MD;  Location: [facility] OR;  Service: General;  Laterality: Left;    BREAST BIOPSY      CATARACT EXTRACTION Bilateral 6/11/15    Dr. Booth    CHOLECYSTECTOMY  2013    cryoablasion kidney Left 09/27/2016    DEBRIDEMENT Left 7/25/2020    Procedure: DEBRIDEMENT;  Surgeon: Berenice Alfaro MD;  Location: [facility] OR;  Service: General;  Laterality: Left;    EVACUATION OF HEMATOMA Left 7/25/2020    Procedure: EVACUATION, HEMATOMA;  Surgeon: Berenice Alfaro MD;  Location: [facility] OR;  Service: General;  Laterality: Left;    EXCISION OF SQUAMOUS CELL CARCINOMA Left 7/2/2020    Procedure: EXCISION, CARCINOMA, SQUAMOUS CELL;  Surgeon: Mckinley Shannon MD;  Location: [facility]  OR;  Service: General;  Laterality: Left;    feet Bilateral     rheumatoid    FRACTURE SURGERY Right     tibia    HERNIA REPAIR      HYSTERECTOMY  1970    partial    INCISION AND DRAINAGE OF ABSCESS N/A 5/12/2020    Procedure: INCISION AND DRAINAGE, ABSCESS;  Surgeon: Emerson Hathaway MD;  Location: [facility] OR;  Service: General;  Laterality: N/A;    INCISIONAL BIOPSY N/A 5/12/2020    Procedure: INCISIONAL BIOPSY;  Surgeon: Emerson Hathaway MD;  Location: [facility] OR;  Service: General;  Laterality: N/A;    JOINT REPLACEMENT      bilateral knees (2008), hands, wrists, knuckles, toes    LAPAROSCOPIC NISSEN FUNDOPLICATION      TRANSFORAMINAL EPIDURAL INJECTION OF STEROID Left 6/25/2019    Procedure: Left L5/S1 TF AYAAN with local;  Surgeon: Rowdy Felix MD;  Location: [facility] PAIN MGT;  Service: Pain Management;  Laterality: Left;    WOUND DEBRIDEMENT N/A 5/14/2020    Procedure: DEBRIDEMENT, WOUND;  Surgeon: Mckinley Shannon MD;  Location: [facility] OR;  Service: General;  Laterality: N/A;    WOUND DEBRIDEMENT Left 7/25/2020    Procedure: DEBRIDEMENT, WOUND;  Surgeon: Berenice Alfaro MD;  Location: [facility] OR;  Service: General;  Laterality: Left;       Review of patient's allergies indicates:   Allergen Reactions    Codeine      Other reaction(s): hyper  Other reaction(s): hyper    Gabapentin Other (See Comments)     Bad dreams       Family History     Problem Relation (Age of Onset)    Asthma Sister, Brother    Cancer Brother, Maternal Grandfather    Cataracts Mother    Chronic back pain Sister, Brother    Diabetes Mellitus Brother    Fibromyalgia Daughter    Heart disease Mother, Father, Maternal Grandmother    Hyperlipidemia Mother    Hypertension Mother, Father, Sister, Brother    Osteoarthritis Mother, Father, Sister, Brother    Thyroid disease Sister, Brother        Tobacco Use    Smoking status: Former Smoker     Packs/day: 0.25     Years: 2.00     Pack years: 0.50     Quit date: 11/2/1965     Years since quitting:  54.8    Smokeless tobacco: Never Used   Substance and Sexual Activity    Alcohol use: No    Drug use: No    Sexual activity: Never     Partners: Male         Review of Systems   Constitutional: Positive for fatigue.   Respiratory: Negative for cough, choking, shortness of breath, wheezing and stridor.    Musculoskeletal: Positive for arthralgias.   All other systems reviewed and are negative.    Objective:     Vital Signs (Most Recent):  Temp: 98.1 °F (36.7 °C) (08/12/20 1941)  Pulse: 84 (08/12/20 1941)  Resp: 18 (08/12/20 1941)  BP: (!) 158/72 (08/12/20 1941)  SpO2: 100 % (08/12/20 1941) Vital Signs (24h Range):  Temp:  [97.2 °F (36.2 °C)-98.8 °F (37.1 °C)] 98.1 °F (36.7 °C)  Pulse:  [77-95] 84  Resp:  [18] 18  SpO2:  [96 %-100 %] 100 %  BP: (128-169)/(61-74) 158/72     Weight: 45.4 kg (100 lb)  Body mass index is 18.29 kg/m².      Intake/Output Summary (Last 24 hours) at 8/12/2020 2010  Last data filed at 8/12/2020 1800  Gross per 24 hour   Intake 780 ml   Output --   Net 780 ml       Physical Exam  Vitals signs and nursing note reviewed.   Constitutional:       Appearance: Normal appearance. She is ill-appearing.       HENT:      Head: Normocephalic and atraumatic.      Nose: Nose normal.   Eyes:      Extraocular Movements: Extraocular movements intact.      Pupils: Pupils are equal, round, and reactive to light.   Cardiovascular:      Rate and Rhythm: Normal rate and regular rhythm.   Pulmonary:      Effort: Pulmonary effort is normal.      Breath sounds: Normal breath sounds.   Abdominal:      General: Bowel sounds are normal.   Musculoskeletal: Normal range of motion.   Skin:     General: Skin is warm.   Neurological:      General: No focal deficit present.      Mental Status: She is alert and oriented to person, place, and time.   Psychiatric:         Mood and Affect: Mood normal.         Vents:       Lines/Drains/Airways     Peripheral Intravenous Line                 Peripheral IV - Single Lumen  08/10/20 1530 18 G Right;Other (Comment) Antecubital 2 days                Significant Labs:    CBC/Anemia Profile:  Recent Labs   Lab 08/11/20  1128 08/11/20 2016 08/12/20  0756   WBC 64.86* 49.51* 33.88*   HGB 8.4* 7.9* 7.3*   HCT 25.0* 24.6* 23.3*   PLT 23* 21* 16*   MCV 90 95 95   RDW 16.5* 17.2* 17.3*        Chemistries:  Recent Labs   Lab 08/11/20 2016 08/12/20  0756   CREATININE 0.9 0.9       All pertinent labs within the past 24 hours have been reviewed.    Significant Imaging:   I have reviewed and interpreted all pertinent imaging results/findings within the past 24 hours.     CHEST CT  COMPARISON:  CT chest abdomen pelvis 08/03/2020     FINDINGS:  Base of Neck: Enlarged bilateral axillary lymphadenopathy, unchanged as compared to the recent prior.     Thoracic soft tissues: Normal.     Aorta: Left-sided aortic arch.  Mild moderate aortic atherosclerotic calcification.  No aneurysm.     Heart: Normal in size.  Coronary atherosclerotic disease.  Trace pericardial fluid.     Pulmonary vasculature: Pulmonary arteries distribute normally.     Joanie/Mediastinum: Scattered mediastinal nodes.  There is an enlarged 1.2 cm pretracheal node.  There are calcified hilar lymph nodes right greater than left.  Appearance is similar to the prior.     Airways: Patent.     Lungs/Pleura: Similar appearance of a large, which shaped area of consolidation within the periphery of the right upper lobe.  Size is similar to the prior.  Previously identified low-density focus internally is not well visualized on this noncontrast examination.  Persistent ground-glass haziness within the right upper lobe pulmonary parenchyma.  Stable, focal 2.9 cm opacity in the left upper lobe.  There are additional small soft tissue calcified nodules identified throughout both lungs.  There is bronchiectasis.  Stable 1.0 cm soft tissue nodule in the right lower lobe.  Stable, spiculated focus within the left upper lobe measuring 1.4 cm.  Interval  decrease in size of soft tissue opacities within the superior left lower lobe.  There are numerous pulmonary cysts.  No large pleural effusion.  No pneumothorax.     Esophagus: Large hiatal hernia, esophagus is otherwise within normal limits.     Upper Abdomen: Spleen is enlarged in size.  Postsurgical changes of cholecystectomy.  Abnormal position of the left kidney.  No acute intra-abdominal abnormality.     Bones: Degenerative changes.  No acute or concerning osseous abnormality.  Remote posterior right rib fractures again noted.     Impression:     Peripheral large soft tissue opacity again identified in the right upper lobe.  Central area of hypodensity is not clearly identified as on the prior examination.  Persistent nodular soft tissue opacities identified throughout both lungs, some of which in the left lower lobe have decreased in size.  Findings again may reflect atypical infectious process with metastatic disease or mycobacterial infection additional considerations.     Bronchiectasis, ground-glass haziness and secretions within peripheral airways concerning for infectious/inflammatory small airways disease.     Stable enlarged axillary and mediastinal lymph nodes

## 2020-08-13 NOTE — ASSESSMENT & PLAN NOTE
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  Aslo developed squamous cell skin cancer to thigh.  Previous concerns of tranformation to acute leukemia due to increased blast found on CBC; however Dr. Knox and pathologist reviewed peripheral smear slides and this does not seem to be the case.  Pulmonology and ID on the case.  Pulmonology recommended add additional fungal coverage with Voriconazole 400 mg PO bid for lung nodules.  She will follow up in the clinic upon discharge for continued treatment/monitoring.  Continue supportive care at this time with transfusions as needed.    --CBC daily  --Transfuse for hemoglobin <7 g/dL  --Transfuse for platelets <10 or active bleeding <20 K

## 2020-08-13 NOTE — SUBJECTIVE & OBJECTIVE
Interval History: *    08/13: seen and examined: afebrile, added VORI,     Review of Systems   All other systems reviewed and are negative.        Objective:     Vital Signs (Most Recent):  Temp: 97.8 °F (36.6 °C) (08/13/20 0725)  Pulse: 86 (08/13/20 0725)  Resp: 17 (08/13/20 0725)  BP: (!) 145/68 (08/13/20 0725)  SpO2: 100 % (08/13/20 0725) Vital Signs (24h Range):  Temp:  [97.2 °F (36.2 °C)-98.8 °F (37.1 °C)] 97.8 °F (36.6 °C)  Pulse:  [76-95] 86  Resp:  [17-20] 17  SpO2:  [96 %-100 %] 100 %  BP: (130-169)/(63-74) 145/68     Weight: 45.4 kg (100 lb)  Body mass index is 18.29 kg/m².      Intake/Output Summary (Last 24 hours) at 8/13/2020 1041  Last data filed at 8/13/2020 0643  Gross per 24 hour   Intake 940 ml   Output --   Net 940 ml       Physical Exam  Vitals signs and nursing note reviewed.   Constitutional:       Appearance: Normal appearance. She is ill-appearing.       HENT:      Head: Normocephalic and atraumatic.      Nose: Nose normal.   Eyes:      Extraocular Movements: Extraocular movements intact.      Pupils: Pupils are equal, round, and reactive to light.   Cardiovascular:      Rate and Rhythm: Normal rate and regular rhythm.   Pulmonary:      Effort: Pulmonary effort is normal.      Breath sounds: Normal breath sounds.   Abdominal:      General: Bowel sounds are normal.   Musculoskeletal: Normal range of motion.   Skin:     General: Skin is warm.   Neurological:      General: No focal deficit present.      Mental Status: She is alert and oriented to person, place, and time.   Psychiatric:         Mood and Affect: Mood normal.         Vents:       Lines/Drains/Airways     Peripheral Intravenous Line                 Peripheral IV - Single Lumen 08/10/20 1530 18 G Right;Other (Comment) Antecubital 2 days                Significant Labs:    CBC/Anemia Profile:  Recent Labs   Lab 08/12/20  0756 08/12/20 2002 08/13/20  0646   WBC 33.88* 36.32* 27.70*   HGB 7.3* 7.2* 7.3*   HCT 23.3* 23.1* 23.0*   PLT  16* 58* 45*   MCV 95 95 95   RDW 17.3* 17.3* 17.1*        Chemistries:  Recent Labs   Lab 08/11/20  2016 08/12/20  0756 08/13/20  0651   CREATININE 0.9 0.9 0.8       All pertinent labs within the past 24 hours have been reviewed.    Significant Imaging:  I have reviewed and interpreted all pertinent imaging results/findings within the past 24 hours.

## 2020-08-13 NOTE — SUBJECTIVE & OBJECTIVE
Interval History:  No complaints today.  Uneventful evening. Has remained afebrile.  States that she is feeling well.       Oncology Treatment Plan:   OP AZACITADINE 7-DAY (SUB-Q)    Medications:  Continuous Infusions:  Scheduled Meds:   amitriptyline  75 mg Oral QHS    cefepime in dextrose 5 %  2 g Intravenous Q12H    ferrous sulfate  325 mg Oral BID    hydrOXYzine HCL  25 mg Oral Nightly    levothyroxine  88 mcg Oral Before breakfast    metoprolol succinate  25 mg Oral Daily    metronidazole  500 mg Intravenous Q8H    mupirocin   Nasal BID    pravastatin  10 mg Oral QHS    vancomycin (VANCOCIN) IVPB  750 mg Intravenous Q24H    vorconazole (VFEND) IVPB  4 mg/kg Intravenous Q12H     PRN Meds:sodium chloride, sodium chloride, acetaminophen, aluminum-magnesium hydroxide-simethicone, Pharmacy to dose Vancomycin consult **AND** vancomycin - pharmacy to dose     Review of patient's allergies indicates:   Allergen Reactions    Codeine      Other reaction(s): hyper  Other reaction(s): hyper    Gabapentin Other (See Comments)     Bad dreams        Past Medical History:   Diagnosis Date    Acid reflux     Anxiety     Back pain     Bronchitis, chronic obstructive w acute bronchitis 7/29/2016    Cancer     NMSC arms, face- Dr. Lata Tejada    Cataract     2+NS    Chronic myelomonocytic leukemia     Degenerative disc disease     Depression     Depression     Dry mouth     Encounter for blood transfusion     Hernia, hiatal 11/18/2013    Hypertension     Hypothyroid     Hypothyroidism     Iron deficiency anemia     Macrocytic anemia 5/3/2016    Macular degeneration     Migraines     Mixed anxiety and depressive disorder     Multiple fractures of ribs of right side     Osteoporosis     Other hyperlipidemia 10/11/2019    Pneumonia     Pneumonia due to other staphylococcus     Rheumatoid arthritis     Rheumatoid arthritis(714.0)     Rheumatoid arthritis(714.0)     Remicade, MTX.     Past  Surgical History:   Procedure Laterality Date    APPENDECTOMY  1985    APPLICATION OF WOUND VACUUM-ASSISTED CLOSURE DEVICE N/A 5/14/2020    Procedure: APPLICATION, WOUND VAC;  Surgeon: Mckinley Shannon MD;  Location: [facility];  Service: General;  Laterality: N/A;    APPLICATION OF WOUND VACUUM-ASSISTED CLOSURE DEVICE Left 7/25/2020    Procedure: APPLICATION, WOUND VAC;  Surgeon: Berenice Alfaro MD;  Location: [facility] OR;  Service: General;  Laterality: Left;    BREAST BIOPSY      CATARACT EXTRACTION Bilateral 6/11/15    Dr. Booth    CHOLECYSTECTOMY  2013    cryoablasion kidney Left 09/27/2016    DEBRIDEMENT Left 7/25/2020    Procedure: DEBRIDEMENT;  Surgeon: Berenice Alfaro MD;  Location: [facility] OR;  Service: General;  Laterality: Left;    EVACUATION OF HEMATOMA Left 7/25/2020    Procedure: EVACUATION, HEMATOMA;  Surgeon: Berenice Alfaro MD;  Location: [facility] OR;  Service: General;  Laterality: Left;    EXCISION OF SQUAMOUS CELL CARCINOMA Left 7/2/2020    Procedure: EXCISION, CARCINOMA, SQUAMOUS CELL;  Surgeon: Mckinley Shannon MD;  Location: [facility] OR;  Service: General;  Laterality: Left;    feet Bilateral     rheumatoid    FRACTURE SURGERY Right     tibia    HERNIA REPAIR      HYSTERECTOMY  1970    partial    INCISION AND DRAINAGE OF ABSCESS N/A 5/12/2020    Procedure: INCISION AND DRAINAGE, ABSCESS;  Surgeon: Emerson Hathaway MD;  Location: [facility] OR;  Service: General;  Laterality: N/A;    INCISIONAL BIOPSY N/A 5/12/2020    Procedure: INCISIONAL BIOPSY;  Surgeon: Emerson Hathaway MD;  Location: [facility] OR;  Service: General;  Laterality: N/A;    JOINT REPLACEMENT      bilateral knees (2008), hands, wrists, knuckles, toes    LAPAROSCOPIC NISSEN FUNDOPLICATION      TRANSFORAMINAL EPIDURAL INJECTION OF STEROID Left 6/25/2019    Procedure: Left L5/S1 TF AYAAN with local;  Surgeon: Rowdy Felix MD;  Location: [facility] PAIN MGT;  Service: Pain Management;  Laterality: Left;    WOUND DEBRIDEMENT N/A 5/14/2020     Procedure: DEBRIDEMENT, WOUND;  Surgeon: Mckinley Shannon MD;  Location: [facility] OR;  Service: General;  Laterality: N/A;    WOUND DEBRIDEMENT Left 2020    Procedure: DEBRIDEMENT, WOUND;  Surgeon: Berenice Alfaro MD;  Location: [facility] OR;  Service: General;  Laterality: Left;     Family History     Problem Relation (Age of Onset)    Asthma Sister, Brother    Cancer Brother, Maternal Grandfather    Cataracts Mother    Chronic back pain Sister, Brother    Diabetes Mellitus Brother    Fibromyalgia Daughter    Heart disease Mother, Father, Maternal Grandmother    Hyperlipidemia Mother    Hypertension Mother, Father, Sister, Brother    Osteoarthritis Mother, Father, Sister, Brother    Thyroid disease Sister, Brother        Tobacco Use    Smoking status: Former Smoker     Packs/day: 0.25     Years: 2.00     Pack years: 0.50     Quit date: 1965     Years since quittin.8    Smokeless tobacco: Never Used   Substance and Sexual Activity    Alcohol use: No    Drug use: No    Sexual activity: Never     Partners: Male       Review of Systems   Constitutional: Negative.    HENT: Negative.    Eyes: Negative.    Respiratory: Negative.    Gastrointestinal: Negative.    Endocrine: Negative.    Genitourinary: Negative.    Musculoskeletal: Negative.    Skin: Positive for pallor and wound.   Allergic/Immunologic: Positive for immunocompromised state.   Neurological: Negative.    Hematological: Bruises/bleeds easily.   Psychiatric/Behavioral: Negative.      Objective:     Vital Signs (Most Recent):  Temp: 97.8 °F (36.6 °C) (20)  Pulse: 86 (20)  Resp: 17 (20)  BP: (!) 145/68 (20)  SpO2: 100 % (20) Vital Signs (24h Range):  Temp:  [97.2 °F (36.2 °C)-98.8 °F (37.1 °C)] 97.8 °F (36.6 °C)  Pulse:  [76-95] 86  Resp:  [17-20] 17  SpO2:  [96 %-100 %] 100 %  BP: (130-169)/(63-74) 145/68     Weight: 45.4 kg (100 lb)  Body mass index is 18.29 kg/m².  Body surface area is 1.41  meters squared.      Intake/Output Summary (Last 24 hours) at 8/13/2020 1051  Last data filed at 8/13/2020 0643  Gross per 24 hour   Intake 940 ml   Output --   Net 940 ml       Physical Exam  Vitals signs and nursing note reviewed.   Constitutional:       General: She is not in acute distress.     Appearance: She is well-developed. She is ill-appearing.   HENT:      Head: Normocephalic and atraumatic.      Right Ear: External ear normal.      Left Ear: External ear normal.      Nose: Nose normal.   Eyes:      Conjunctiva/sclera: Conjunctivae normal.      Pupils: Pupils are equal, round, and reactive to light.   Neck:      Musculoskeletal: Normal range of motion and neck supple.      Thyroid: No thyromegaly.      Vascular: No JVD.   Cardiovascular:      Rate and Rhythm: Normal rate and regular rhythm.      Heart sounds: Normal heart sounds. No murmur. No friction rub. No gallop.    Pulmonary:      Effort: Pulmonary effort is normal. No respiratory distress.      Breath sounds: Normal breath sounds. No stridor. No wheezing or rales.   Chest:      Chest wall: No tenderness.   Abdominal:      General: Bowel sounds are normal. There is no distension.      Palpations: Abdomen is soft. There is no mass.      Tenderness: There is no abdominal tenderness. There is no guarding or rebound.   Genitourinary:     Vagina: Normal. No vaginal discharge.   Musculoskeletal: Normal range of motion.         General: No deformity.   Lymphadenopathy:      Cervical: No cervical adenopathy.   Skin:     General: Skin is warm.      Capillary Refill: Capillary refill takes less than 2 seconds.      Findings: Bruising present. No erythema or rash.             Comments: Wound with wound vac in place   Neurological:      Mental Status: She is alert and oriented to person, place, and time.      Cranial Nerves: No cranial nerve deficit.      Sensory: No sensory deficit.      Deep Tendon Reflexes: Reflexes normal.   Psychiatric:         Attention  and Perception: Attention normal.         Mood and Affect: Mood normal.         Speech: Speech normal.         Behavior: Behavior normal.         Thought Content: Thought content normal.         Cognition and Memory: Cognition normal.         Judgment: Judgment normal.         Significant Labs:   CBC:   Recent Labs   Lab 08/12/20 0756 08/12/20 2002 08/13/20  0646   WBC 33.88* 36.32* 27.70*   HGB 7.3* 7.2* 7.3*   HCT 23.3* 23.1* 23.0*   PLT 16* 58* 45*   , CMP:   Recent Labs   Lab 08/11/20 2016 08/12/20 0756 08/13/20  0651   CREATININE 0.9 0.9 0.8   EGFRNONAA >60 >60 >60   , Coagulation: No results for input(s): PT, INR, APTT in the last 48 hours., Immunology: No results for input(s): SPEP, TOBIN, STEWART, FREELAMBDALI in the last 48 hours., LDH: No results for input(s): LDHCSF, BFSOURCE in the last 48 hours., LFTs:   No results for input(s): ALT, AST, ALKPHOS, BILITOT, PROT, ALBUMIN in the last 48 hours., Reticulocytes: No results for input(s): RETIC in the last 48 hours., Uric Acid No results for input(s): URICACID in the last 48 hours., Urine Studies:   No results for input(s): COLORU, APPEARANCEUA, PHUR, SPECGRAV, PROTEINUA, GLUCUA, KETONESU, BILIRUBINUA, OCCULTUA, NITRITE, UROBILINOGEN, LEUKOCYTESUR, RBCUA, WBCUA, BACTERIA, SQUAMEPITHEL, HYALINECASTS in the last 48 hours.    Invalid input(s): WRIGHTSUR and All pertinent labs from the last 24 hours have been reviewed.    Diagnostic Results:  I have reviewed all pertinent imaging results/findings within the past 24 hours.

## 2020-08-13 NOTE — PROGRESS NOTES
[facility] -   Hematology/Oncology  Progress Note    Patient Name: Sarah Parker  Admission Date: 8/10/2020  Hospital Length of Stay: 3 days  Code Status: Prior     Subjective:     HPI:  Mrs. Sarah Parker is a 80 y.o. female patient with medical history noted below who presents to the Emergency Department for an evaluation of abnormal labs. Pt reports that she received 1 unit of blood at the infusion center earlier today. She was referred by Dr. Knox (Hematology and Oncology) here to the ED to receive another unit of blood, per pt. Patient also had CT with multiple lesions with concerns for infectious etiology it was suggested to be started on ABX and admission for further work up. Patient denies any CP, SOB, fever, chills, n/v/d, abdominal pain, and all other sxs at this time. No further complaints or concerns at this time.       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  She has progressed to active leukemia.      Interval History:  No complaints today.  Uneventful evening. Has remained afebrile.  States that she is feeling well.       Oncology Treatment Plan:   OP AZACITADINE 7-DAY (SUB-Q)    Medications:  Continuous Infusions:  Scheduled Meds:   amitriptyline  75 mg Oral QHS    cefepime in dextrose 5 %  2 g Intravenous Q12H    ferrous sulfate  325 mg Oral BID    hydrOXYzine HCL  25 mg Oral Nightly    levothyroxine  88 mcg Oral Before breakfast    metoprolol succinate  25 mg Oral Daily    metronidazole  500 mg Intravenous Q8H    mupirocin   Nasal BID    pravastatin  10 mg Oral QHS    vancomycin (VANCOCIN) IVPB  750 mg Intravenous Q24H    vorconazole (VFEND) IVPB  4 mg/kg Intravenous Q12H     PRN Meds:sodium chloride, sodium chloride, acetaminophen, aluminum-magnesium hydroxide-simethicone, Pharmacy to dose  Vancomycin consult **AND** vancomycin - pharmacy to dose     Review of patient's allergies indicates:   Allergen Reactions    Codeine      Other reaction(s): hyper  Other reaction(s): hyper    Gabapentin Other (See Comments)     Bad dreams        Past Medical History:   Diagnosis Date    Acid reflux     Anxiety     Back pain     Bronchitis, chronic obstructive w acute bronchitis 7/29/2016    Cancer     NMSC arms, face- Dr. Lata Tejada    Cataract     2+NS    Chronic myelomonocytic leukemia     Degenerative disc disease     Depression     Depression     Dry mouth     Encounter for blood transfusion     Hernia, hiatal 11/18/2013    Hypertension     Hypothyroid     Hypothyroidism     Iron deficiency anemia     Macrocytic anemia 5/3/2016    Macular degeneration     Migraines     Mixed anxiety and depressive disorder     Multiple fractures of ribs of right side     Osteoporosis     Other hyperlipidemia 10/11/2019    Pneumonia     Pneumonia due to other staphylococcus     Rheumatoid arthritis     Rheumatoid arthritis(714.0)     Rheumatoid arthritis(714.0)     Remicade, MTX.     Past Surgical History:   Procedure Laterality Date    APPENDECTOMY  1985    APPLICATION OF WOUND VACUUM-ASSISTED CLOSURE DEVICE N/A 5/14/2020    Procedure: APPLICATION, WOUND VAC;  Surgeon: Mckinley Shannon MD;  Location: [facility] OR;  Service: General;  Laterality: N/A;    APPLICATION OF WOUND VACUUM-ASSISTED CLOSURE DEVICE Left 7/25/2020    Procedure: APPLICATION, WOUND VAC;  Surgeon: Berenice Alfaro MD;  Location: [facility] OR;  Service: General;  Laterality: Left;    BREAST BIOPSY      CATARACT EXTRACTION Bilateral 6/11/15    Dr. Booth    CHOLECYSTECTOMY  2013    cryoablasion kidney Left 09/27/2016    DEBRIDEMENT Left 7/25/2020    Procedure: DEBRIDEMENT;  Surgeon: Berenice Alfaro MD;  Location: [facility] OR;  Service: General;  Laterality: Left;    EVACUATION OF HEMATOMA Left 7/25/2020    Procedure: EVACUATION,  HEMATOMA;  Surgeon: Berenice Alfaro MD;  Location: [facility] OR;  Service: General;  Laterality: Left;    EXCISION OF SQUAMOUS CELL CARCINOMA Left 7/2/2020    Procedure: EXCISION, CARCINOMA, SQUAMOUS CELL;  Surgeon: Mckinley Shannon MD;  Location: [facility] OR;  Service: General;  Laterality: Left;    feet Bilateral     rheumatoid    FRACTURE SURGERY Right     tibia    HERNIA REPAIR      HYSTERECTOMY  1970    partial    INCISION AND DRAINAGE OF ABSCESS N/A 5/12/2020    Procedure: INCISION AND DRAINAGE, ABSCESS;  Surgeon: Emerson Hathaway MD;  Location: [facility] OR;  Service: General;  Laterality: N/A;    INCISIONAL BIOPSY N/A 5/12/2020    Procedure: INCISIONAL BIOPSY;  Surgeon: Emerson Hathaway MD;  Location: [facility] OR;  Service: General;  Laterality: N/A;    JOINT REPLACEMENT      bilateral knees (2008), hands, wrists, knuckles, toes    LAPAROSCOPIC NISSEN FUNDOPLICATION      TRANSFORAMINAL EPIDURAL INJECTION OF STEROID Left 6/25/2019    Procedure: Left L5/S1 TF AYAAN with local;  Surgeon: Rowdy Felix MD;  Location: [facility] PAIN MGT;  Service: Pain Management;  Laterality: Left;    WOUND DEBRIDEMENT N/A 5/14/2020    Procedure: DEBRIDEMENT, WOUND;  Surgeon: Mckinley Shannon MD;  Location: [facility] OR;  Service: General;  Laterality: N/A;    WOUND DEBRIDEMENT Left 7/25/2020    Procedure: DEBRIDEMENT, WOUND;  Surgeon: Berenice Alfaro MD;  Location: [facility] OR;  Service: General;  Laterality: Left;     Family History     Problem Relation (Age of Onset)    Asthma Sister, Brother    Cancer Brother, Maternal Grandfather    Cataracts Mother    Chronic back pain Sister, Brother    Diabetes Mellitus Brother    Fibromyalgia Daughter    Heart disease Mother, Father, Maternal Grandmother    Hyperlipidemia Mother    Hypertension Mother, Father, Sister, Brother    Osteoarthritis Mother, Father, Sister, Brother    Thyroid disease Sister, Brother        Tobacco Use    Smoking status: Former Smoker     Packs/day: 0.25     Years: 2.00     Pack years:  0.50     Quit date: 1965     Years since quittin.8    Smokeless tobacco: Never Used   Substance and Sexual Activity    Alcohol use: No    Drug use: No    Sexual activity: Never     Partners: Male       Review of Systems   Constitutional: Negative.    HENT: Negative.    Eyes: Negative.    Respiratory: Negative.    Gastrointestinal: Negative.    Endocrine: Negative.    Genitourinary: Negative.    Musculoskeletal: Negative.    Skin: Positive for pallor and wound.   Allergic/Immunologic: Positive for immunocompromised state.   Neurological: Negative.    Hematological: Bruises/bleeds easily.   Psychiatric/Behavioral: Negative.      Objective:     Vital Signs (Most Recent):  Temp: 97.8 °F (36.6 °C) (20)  Pulse: 86 (20)  Resp: 17 (20)  BP: (!) 145/68 (20)  SpO2: 100 % (20) Vital Signs (24h Range):  Temp:  [97.2 °F (36.2 °C)-98.8 °F (37.1 °C)] 97.8 °F (36.6 °C)  Pulse:  [76-95] 86  Resp:  [17-20] 17  SpO2:  [96 %-100 %] 100 %  BP: (130-169)/(63-74) 145/68     Weight: 45.4 kg (100 lb)  Body mass index is 18.29 kg/m².  Body surface area is 1.41 meters squared.      Intake/Output Summary (Last 24 hours) at 2020 1051  Last data filed at 2020 0643  Gross per 24 hour   Intake 940 ml   Output --   Net 940 ml       Physical Exam  Vitals signs and nursing note reviewed.   Constitutional:       General: She is not in acute distress.     Appearance: She is well-developed. She is ill-appearing.   HENT:      Head: Normocephalic and atraumatic.      Right Ear: External ear normal.      Left Ear: External ear normal.      Nose: Nose normal.   Eyes:      Conjunctiva/sclera: Conjunctivae normal.      Pupils: Pupils are equal, round, and reactive to light.   Neck:      Musculoskeletal: Normal range of motion and neck supple.      Thyroid: No thyromegaly.      Vascular: No JVD.   Cardiovascular:      Rate and Rhythm: Normal rate and regular rhythm.      Heart  sounds: Normal heart sounds. No murmur. No friction rub. No gallop.    Pulmonary:      Effort: Pulmonary effort is normal. No respiratory distress.      Breath sounds: Normal breath sounds. No stridor. No wheezing or rales.   Chest:      Chest wall: No tenderness.   Abdominal:      General: Bowel sounds are normal. There is no distension.      Palpations: Abdomen is soft. There is no mass.      Tenderness: There is no abdominal tenderness. There is no guarding or rebound.   Genitourinary:     Vagina: Normal. No vaginal discharge.   Musculoskeletal: Normal range of motion.         General: No deformity.   Lymphadenopathy:      Cervical: No cervical adenopathy.   Skin:     General: Skin is warm.      Capillary Refill: Capillary refill takes less than 2 seconds.      Findings: Bruising present. No erythema or rash.             Comments: Wound with wound vac in place   Neurological:      Mental Status: She is alert and oriented to person, place, and time.      Cranial Nerves: No cranial nerve deficit.      Sensory: No sensory deficit.      Deep Tendon Reflexes: Reflexes normal.   Psychiatric:         Attention and Perception: Attention normal.         Mood and Affect: Mood normal.         Speech: Speech normal.         Behavior: Behavior normal.         Thought Content: Thought content normal.         Cognition and Memory: Cognition normal.         Judgment: Judgment normal.         Significant Labs:   CBC:   Recent Labs   Lab 08/12/20 0756 08/12/20 2002 08/13/20  0646   WBC 33.88* 36.32* 27.70*   HGB 7.3* 7.2* 7.3*   HCT 23.3* 23.1* 23.0*   PLT 16* 58* 45*   , CMP:   Recent Labs   Lab 08/11/20 2016 08/12/20 0756 08/13/20  0651   CREATININE 0.9 0.9 0.8   EGFRNONAA >60 >60 >60   , Coagulation: No results for input(s): PT, INR, APTT in the last 48 hours., Immunology: No results for input(s): SPEP, TOBIN, STEWART, FREELAMBDALI in the last 48 hours., LDH: No results for input(s): LDHCSF, BFSOURCE in the last 48 hours., LFTs:    No results for input(s): ALT, AST, ALKPHOS, BILITOT, PROT, ALBUMIN in the last 48 hours., Reticulocytes: No results for input(s): RETIC in the last 48 hours., Uric Acid No results for input(s): URICACID in the last 48 hours., Urine Studies:   No results for input(s): COLORU, APPEARANCEUA, PHUR, SPECGRAV, PROTEINUA, GLUCUA, KETONESU, BILIRUBINUA, OCCULTUA, NITRITE, UROBILINOGEN, LEUKOCYTESUR, RBCUA, WBCUA, BACTERIA, SQUAMEPITHEL, HYALINECASTS in the last 48 hours.    Invalid input(s): WRIGHTSUR and All pertinent labs from the last 24 hours have been reviewed.    Diagnostic Results:  I have reviewed all pertinent imaging results/findings within the past 24 hours.    Assessment/Plan:     Chronic myelomonocytic leukemia not having achieved remission  Patient with CMML on Vidaza previously however treatment stopped due to complications including development of skin infection requiring debriedment and wound vac placement to lower back.  Aslo developed squamous cell skin cancer to thigh.  Previous concerns of tranformation to acute leukemia due to increased blast found on CBC; however Dr. Knox and pathologist reviewed peripheral smear slides and this does not seem to be the case.  Pulmonology and ID on the case.  Pulmonology recommended add additional fungal coverage with Voriconazole 400 mg PO bid for lung nodules.  She will follow up in the clinic upon discharge for continued treatment/monitoring.  Continue supportive care at this time with transfusions as needed.    --CBC daily  --Transfuse for hemoglobin <7 g/dL  --Transfuse for platelets <10 or active bleeding <20 K        Thank you for your consult. I will follow-up with patient. Please contact us if you have any additional questions.     Guadalupe Baird NP  Hematology/Oncology  [facility]

## 2020-08-13 NOTE — PLAN OF CARE
08/13/20 1413   Final Note   Assessment Type Final Discharge Note   Anticipated Discharge Disposition Home-Health   Right Care Referral Info   Post Acute Recommendation Home-care   Facility Name [facility], State [place], [place]   Post-Acute Status   Post-Acute Authorization Home Health   Home Health Status Set-up Complete   Discharge Delays None known at this time     Pt to DC today with [facility].  Garrett Harrington LMSW 8/13/2020 2:13 PM

## 2020-08-13 NOTE — HOSPITAL COURSE
Pulmonology saw the patient in reference to lung abscess:   Differentials: Inflammatory vs Infectious  Inflammatory: likely Rheumatoid lung related: unfortunately off DMARD due to active infection treatment  Infectious: location, infiltrative pattern , indolent suggest fungal or atypical: increase coverage for antifungals: VORICONAZOLE  Tissue sampling : transbronchial biopsy or CT biopsy though possible will be extremely high risk in her current functional status especially with low platelets.  Close follow up interval imaging in 8 weeks  Dr. Recinos saw the patient and followed ABX for lung abscess. He recommended Augmentin and Itraconazole which was prescribed for 14 days at time of discharge  Hematology/Oncology followed patient for anemia and thrombocytopenia. 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 She received blood and platelet transfusion. Pt was stable for discharge. She was seen and examined and determined to be safe and stable for discharge. She was advised about follow up appointments with Pulmonology and Infectious Ds. Also, she was advised to follow up with her PCP. Home health was resumed for wound VAC care.

## 2020-08-13 NOTE — PLAN OF CARE
Patient remains free from injury.  IVFs/antibiotics infusing as ordered. Wound vac in place with minimal drainage noted. Pain controlled with no distress noted.  Will continue to monitor, administer meds as ordered, provide comfort/safety measures and notify MD of any changes in condition

## 2020-08-13 NOTE — HPI
Sarah Jesus Keith is 80 y.o.   Asked to see for abnormal chest CT  Known Severe RA on methotrexate and Actemra which were stopped 01/2020  Recent recurrent staphy infections : has Wound vac  Admitted fro Abn lab workup: SP PRBC transfusion  Chest CT done 08/03/2020, 08/10/2020 and 09/16/2019 reviewed  No cough, No fever, No SOB, No night sweats  Curently on Abx: Vanco, Cefepime , Flagyl  Also seen by Hem Onc concern for blast crisis: Wbcc:49K

## 2020-08-13 NOTE — ASSESSMENT & PLAN NOTE
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 These has improved  Close follow up interval imaging in 8 weeks: sampling can be scheduled electively  Continue other Abx: transition to PO alternatives: ADRIANA SHEETS Augmentin

## 2020-08-13 NOTE — PLAN OF CARE
IV ABT therapy remains in progress. No adverse reactions noted, remains afebrile. Wound vac to lower back wound. Dressing to left thigh C/D/I. Voices no c/o's @ this time. Remains free from injury/incident. Call light in reach.

## 2020-08-14 ENCOUNTER — PATIENT OUTREACH (OUTPATIENT)
Dept: ADMINISTRATIVE | Facility: CLINIC | Age: 80
End: 2020-08-14

## 2020-08-14 LAB
1,3 BETA GLUCAN SER-MCNC: <31 PG/ML
FUNGITELL COMMENTS: NEGATIVE

## 2020-08-14 NOTE — PATIENT INSTRUCTIONS

## 2020-08-16 LAB
BACTERIA BLD CULT: NORMAL
BACTERIA BLD CULT: NORMAL

## 2020-08-17 NOTE — TELEPHONE ENCOUNTER
It is fine for pt to just keep her HemeOnc appt on Friday- come to me if has other concerns.  SM    
Principal Discharge DX:	Hypoglycemia

## 2020-08-18 ENCOUNTER — DOCUMENT SCAN (OUTPATIENT)
Dept: HOME HEALTH SERVICES | Facility: HOSPITAL | Age: 80
End: 2020-08-18
Payer: MEDICARE

## 2020-08-18 LAB
ASPERGILLUS AB SER QL ID: NORMAL
B DERMAT AB SER QL ID: NORMAL
C IMMITIS AB SER QL ID: NORMAL
H CAPSUL AB SER QL ID: NORMAL

## 2020-08-21 ENCOUNTER — TELEPHONE (OUTPATIENT)
Dept: SURGERY | Facility: CLINIC | Age: 80
End: 2020-08-21

## 2020-08-21 ENCOUNTER — LAB VISIT (OUTPATIENT)
Dept: LAB | Facility: HOSPITAL | Age: 80
End: 2020-08-21
Attending: INTERNAL MEDICINE
Payer: MEDICARE

## 2020-08-21 ENCOUNTER — OFFICE VISIT (OUTPATIENT)
Dept: HEMATOLOGY/ONCOLOGY | Facility: CLINIC | Age: 80
End: 2020-08-21
Payer: MEDICARE

## 2020-08-21 VITALS
WEIGHT: 106.94 LBS | OXYGEN SATURATION: 97 % | HEART RATE: 79 BPM | BODY MASS INDEX: 19.68 KG/M2 | HEIGHT: 62 IN | SYSTOLIC BLOOD PRESSURE: 125 MMHG | TEMPERATURE: 98 F | DIASTOLIC BLOOD PRESSURE: 68 MMHG

## 2020-08-21 DIAGNOSIS — D69.6 THROMBOCYTOPENIA: ICD-10-CM

## 2020-08-21 DIAGNOSIS — L02.212 ABSCESS OF LOWER BACK: ICD-10-CM

## 2020-08-21 DIAGNOSIS — C93.10 CHRONIC MYELOMONOCYTIC LEUKEMIA NOT HAVING ACHIEVED REMISSION: Primary | ICD-10-CM

## 2020-08-21 DIAGNOSIS — C93.10 CHRONIC MYELOMONOCYTIC LEUKEMIA NOT HAVING ACHIEVED REMISSION: ICD-10-CM

## 2020-08-21 DIAGNOSIS — R91.8 MULTIPLE LUNG NODULES ON CT: ICD-10-CM

## 2020-08-21 LAB
ACANTHOCYTES BLD QL SMEAR: PRESENT
ALBUMIN SERPL BCP-MCNC: 3 G/DL (ref 3.5–5.2)
ALP SERPL-CCNC: 168 U/L (ref 55–135)
ALT SERPL W/O P-5'-P-CCNC: 18 U/L (ref 10–44)
ANION GAP SERPL CALC-SCNC: 11 MMOL/L (ref 8–16)
ANISOCYTOSIS BLD QL SMEAR: SLIGHT
AST SERPL-CCNC: 23 U/L (ref 10–40)
BASOPHILS NFR BLD: 0 % (ref 0–1.9)
BILIRUB SERPL-MCNC: 0.3 MG/DL (ref 0.1–1)
BUN SERPL-MCNC: 25 MG/DL (ref 8–23)
CALCIUM SERPL-MCNC: 8.3 MG/DL (ref 8.7–10.5)
CHLORIDE SERPL-SCNC: 102 MMOL/L (ref 95–110)
CO2 SERPL-SCNC: 20 MMOL/L (ref 23–29)
CREAT SERPL-MCNC: 0.9 MG/DL (ref 0.5–1.4)
DACRYOCYTES BLD QL SMEAR: ABNORMAL
DIFFERENTIAL METHOD: ABNORMAL
EOSINOPHIL NFR BLD: 0 % (ref 0–8)
ERYTHROCYTE [DISTWIDTH] IN BLOOD BY AUTOMATED COUNT: 17.2 % (ref 11.5–14.5)
EST. GFR  (AFRICAN AMERICAN): >60 ML/MIN/1.73 M^2
EST. GFR  (NON AFRICAN AMERICAN): >60 ML/MIN/1.73 M^2
GLUCOSE SERPL-MCNC: 104 MG/DL (ref 70–110)
HCT VFR BLD AUTO: 19.9 % (ref 37–48.5)
HGB BLD-MCNC: 6.1 G/DL (ref 12–16)
HYPOCHROMIA BLD QL SMEAR: ABNORMAL
IMM GRANULOCYTES # BLD AUTO: ABNORMAL K/UL (ref 0–0.04)
IMM GRANULOCYTES NFR BLD AUTO: ABNORMAL % (ref 0–0.5)
LYMPHOCYTES NFR BLD: 41 % (ref 18–48)
MCH RBC QN AUTO: 29.6 PG (ref 27–31)
MCHC RBC AUTO-ENTMCNC: 30.7 G/DL (ref 32–36)
MCV RBC AUTO: 97 FL (ref 82–98)
METAMYELOCYTES NFR BLD MANUAL: 9 %
MONOCYTES NFR BLD: 6 % (ref 4–15)
MYELOCYTES NFR BLD MANUAL: 10 %
NEUTROPHILS NFR BLD: 33 % (ref 38–73)
NEUTS BAND NFR BLD MANUAL: 1 %
NRBC BLD-RTO: 3 /100 WBC
OVALOCYTES BLD QL SMEAR: ABNORMAL
PLATELET # BLD AUTO: 13 K/UL (ref 150–350)
PLATELET BLD QL SMEAR: ABNORMAL
PMV BLD AUTO: ABNORMAL FL (ref 9.2–12.9)
POIKILOCYTOSIS BLD QL SMEAR: SLIGHT
POLYCHROMASIA BLD QL SMEAR: ABNORMAL
POTASSIUM SERPL-SCNC: 4.3 MMOL/L (ref 3.5–5.1)
PROT SERPL-MCNC: 7.8 G/DL (ref 6–8.4)
RBC # BLD AUTO: 2.06 M/UL (ref 4–5.4)
SCHISTOCYTES BLD QL SMEAR: PRESENT
SODIUM SERPL-SCNC: 133 MMOL/L (ref 136–145)
SPHEROCYTES BLD QL SMEAR: ABNORMAL
STOMATOCYTES BLD QL SMEAR: PRESENT
WBC # BLD AUTO: 33.62 K/UL (ref 3.9–12.7)

## 2020-08-21 PROCEDURE — 3078F DIAST BP <80 MM HG: CPT | Mod: HCNC,CPTII,S$GLB, | Performed by: INTERNAL MEDICINE

## 2020-08-21 PROCEDURE — 1126F AMNT PAIN NOTED NONE PRSNT: CPT | Mod: HCNC,S$GLB,, | Performed by: INTERNAL MEDICINE

## 2020-08-21 PROCEDURE — 36415 COLL VENOUS BLD VENIPUNCTURE: CPT | Mod: HCNC

## 2020-08-21 PROCEDURE — 3074F SYST BP LT 130 MM HG: CPT | Mod: HCNC,CPTII,S$GLB, | Performed by: INTERNAL MEDICINE

## 2020-08-21 PROCEDURE — 99215 OFFICE O/P EST HI 40 MIN: CPT | Mod: HCNC,S$GLB,, | Performed by: INTERNAL MEDICINE

## 2020-08-21 PROCEDURE — 99999 PR PBB SHADOW E&M-EST. PATIENT-LVL IV: ICD-10-PCS | Mod: PBBFAC,HCNC,, | Performed by: INTERNAL MEDICINE

## 2020-08-21 PROCEDURE — 3074F PR MOST RECENT SYSTOLIC BLOOD PRESSURE < 130 MM HG: ICD-10-PCS | Mod: HCNC,CPTII,S$GLB, | Performed by: INTERNAL MEDICINE

## 2020-08-21 PROCEDURE — 99999 PR PBB SHADOW E&M-EST. PATIENT-LVL IV: CPT | Mod: PBBFAC,HCNC,, | Performed by: INTERNAL MEDICINE

## 2020-08-21 PROCEDURE — 80053 COMPREHEN METABOLIC PANEL: CPT | Mod: HCNC

## 2020-08-21 PROCEDURE — 1101F PT FALLS ASSESS-DOCD LE1/YR: CPT | Mod: HCNC,CPTII,S$GLB, | Performed by: INTERNAL MEDICINE

## 2020-08-21 PROCEDURE — 99215 PR OFFICE/OUTPT VISIT, EST, LEVL V, 40-54 MIN: ICD-10-PCS | Mod: HCNC,S$GLB,, | Performed by: INTERNAL MEDICINE

## 2020-08-21 PROCEDURE — 1159F MED LIST DOCD IN RCRD: CPT | Mod: HCNC,S$GLB,, | Performed by: INTERNAL MEDICINE

## 2020-08-21 PROCEDURE — 85027 COMPLETE CBC AUTOMATED: CPT | Mod: HCNC

## 2020-08-21 PROCEDURE — 1101F PR PT FALLS ASSESS DOC 0-1 FALLS W/OUT INJ PAST YR: ICD-10-PCS | Mod: HCNC,CPTII,S$GLB, | Performed by: INTERNAL MEDICINE

## 2020-08-21 PROCEDURE — 85007 BL SMEAR W/DIFF WBC COUNT: CPT | Mod: HCNC

## 2020-08-21 PROCEDURE — 1126F PR PAIN SEVERITY QUANTIFIED, NO PAIN PRESENT: ICD-10-PCS | Mod: HCNC,S$GLB,, | Performed by: INTERNAL MEDICINE

## 2020-08-21 PROCEDURE — 1159F PR MEDICATION LIST DOCUMENTED IN MEDICAL RECORD: ICD-10-PCS | Mod: HCNC,S$GLB,, | Performed by: INTERNAL MEDICINE

## 2020-08-21 PROCEDURE — 3078F PR MOST RECENT DIASTOLIC BLOOD PRESSURE < 80 MM HG: ICD-10-PCS | Mod: HCNC,CPTII,S$GLB, | Performed by: INTERNAL MEDICINE

## 2020-08-21 NOTE — ASSESSMENT & PLAN NOTE
Status post Vidaza.  Plan to initiate Inqovi.  Meanwhile will hold off on active treatment pending resolution of active infection of the lower back.  Patient is due to see surgery to discuss wound VAC status.  Plan to see her back in 2 weeks with repeat labs.    Also noted asymptomatic anemia with hemoglobin of 6.1 grams/deciliter.  No active bleed.  Continue to monitor.

## 2020-08-21 NOTE — PROGRESS NOTES
Subjective:       Patient ID: Sarah Parker is a 80 y.o. female.    Chief Complaint:  Chronic myelomonocytic leukemia    Follow-up  Pertinent negatives include no arthralgias, chest pain, chills, congestion, coughing, diaphoresis, fever, headaches, joint swelling, myalgias, nausea, neck pain, numbness, sore throat or vomiting.      Patient is a 80-year-old female presents for reinstitution  of Vidaza therapy for chronic myelomonocytic leukemia patient who is currently on treatment with azacitidine.  She is here for routine follow-up.      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  Unfortunately treatment has been interrupted for the past several months due to infections requiring wound VAC.  She was also recently diagnosed with squamous skin cancer which was resected with negative margins.    Noted blood product transfusion dependency over the past several weeks.  She was seen by myself on 08/10/2020 and at that time noted severe weakness and shortness of breath.  She was referred to the hospital for further evaluation.  CT scan of the chest abdomen and pelvis that was obtained prior to last office visit showed diffuse nodularity in the chest with possible suggestion of fungal infection.  Infectious Disease was notified while patient was admitted to the hospital for further evaluation.    She received 2 units of packed red blood cell while in the hospital.  She was also seen by infectious disease who recommended further workup to rule out fungal infection.  Bronchoscopy was also recommended.  With regards to the absence of lower back, wound VAC is in place and patient continued on vancomycin.    She has since been discharged and presents today to clinic to discuss resumption of CMML treatment.  Apart from abdominal distension, patient has no other complaints.    Past Medical History:   Diagnosis Date    Acid reflux      Anxiety     Back pain     Bronchitis, chronic obstructive w acute bronchitis 7/29/2016    Cancer     [facility] arms, face- Dr. Lata Tejada    Cataract     2+NS    Chronic myelomonocytic leukemia     Degenerative disc disease     Depression     Depression     Dry mouth     Encounter for blood transfusion     Hernia, hiatal 11/18/2013    Hypertension     Hypothyroid     Hypothyroidism     Iron deficiency anemia     Macrocytic anemia 5/3/2016    Macular degeneration     Migraines     Mixed anxiety and depressive disorder     Multiple fractures of ribs of right side     Osteoporosis     Other hyperlipidemia 10/11/2019    Pneumonia     Pneumonia due to other staphylococcus     Rheumatoid arthritis     Rheumatoid arthritis(714.0)     Rheumatoid arthritis(714.0)     Remicade, MTX.     Family History   Problem Relation Age of Onset    Heart disease Mother     Hyperlipidemia Mother     Hypertension Mother     Osteoarthritis Mother     Cataracts Mother     Hypertension Father     Osteoarthritis Father     Heart disease Father     Asthma Sister     Chronic back pain Sister     Hypertension Sister     Osteoarthritis Sister     Thyroid disease Sister     Asthma Brother     Cancer Brother     Chronic back pain Brother     Diabetes Mellitus Brother     Hypertension Brother     Osteoarthritis Brother     Thyroid disease Brother     Cancer Maternal Grandfather     Fibromyalgia Daughter     Heart disease Maternal Grandmother     Colon cancer Neg Hx     Diabetes Neg Hx      Social History     Socioeconomic History    Marital status:      Spouse name: Not on file    Number of children: Not on file    Years of education: Not on file    Highest education level: Not on file   Occupational History    Occupation: retired   Social Needs    Financial resource strain: Not on file    Food insecurity     Worry: Not on file     Inability: Not on file    Transportation needs      Medical: Not on file     Non-medical: Not on file   Tobacco Use    Smoking status: Former Smoker     Packs/day: 0.25     Years: 2.00     Pack years: 0.50     Quit date: 1965     Years since quittin.8    Smokeless tobacco: Never Used   Substance and Sexual Activity    Alcohol use: No    Drug use: No    Sexual activity: Never     Partners: Male   Lifestyle    Physical activity     Days per week: Not on file     Minutes per session: Not on file    Stress: Not at all   Relationships    Social connections     Talks on phone: Not on file     Gets together: Not on file     Attends [employer] service: Not on file     Active member of club or organization: Not on file     Attends meetings of clubs or organizations: Not on file     Relationship status: Not on file   Other Topics Concern    Not on file   Social History Narrative    Patient is aretired and live with .     Past Surgical History:   Procedure Laterality Date    APPENDECTOMY      APPLICATION OF WOUND VACUUM-ASSISTED CLOSURE DEVICE N/A 2020    Procedure: APPLICATION, WOUND VAC;  Surgeon: Mckinley Shannon MD;  Location: [facility] OR;  Service: General;  Laterality: N/A;    APPLICATION OF WOUND VACUUM-ASSISTED CLOSURE DEVICE Left 2020    Procedure: APPLICATION, WOUND VAC;  Surgeon: Berenice Alfaro MD;  Location: [facility] OR;  Service: General;  Laterality: Left;    BREAST BIOPSY      CATARACT EXTRACTION Bilateral 6/11/15    Dr. Booth    CHOLECYSTECTOMY  2013    cryoablasion kidney Left 2016    DEBRIDEMENT Left 2020    Procedure: DEBRIDEMENT;  Surgeon: Berenice Alfaro MD;  Location: [facility] OR;  Service: General;  Laterality: Left;    EVACUATION OF HEMATOMA Left 2020    Procedure: EVACUATION, HEMATOMA;  Surgeon: Berenice Alfaro MD;  Location: [facility] OR;  Service: General;  Laterality: Left;    EXCISION OF SQUAMOUS CELL CARCINOMA Left 2020    Procedure: EXCISION, CARCINOMA, SQUAMOUS CELL;  Surgeon: Mckinley  MD Mirtha;  Location: [facility] OR;  Service: General;  Laterality: Left;    feet Bilateral     rheumatoid    FRACTURE SURGERY Right     tibia    HERNIA REPAIR      HYSTERECTOMY  1970    partial    INCISION AND DRAINAGE OF ABSCESS N/A 5/12/2020    Procedure: INCISION AND DRAINAGE, ABSCESS;  Surgeon: Emerson Hathaway MD;  Location: [facility] OR;  Service: General;  Laterality: N/A;    INCISIONAL BIOPSY N/A 5/12/2020    Procedure: INCISIONAL BIOPSY;  Surgeon: Emerson Hathaway MD;  Location: [facility] OR;  Service: General;  Laterality: N/A;    JOINT REPLACEMENT      bilateral knees (2008), hands, wrists, knuckles, toes    LAPAROSCOPIC NISSEN FUNDOPLICATION      TRANSFORAMINAL EPIDURAL INJECTION OF STEROID Left 6/25/2019    Procedure: Left L5/S1 TF AYAAN with local;  Surgeon: Rowdy Felix MD;  Location: [facility] PAIN MGT;  Service: Pain Management;  Laterality: Left;    WOUND DEBRIDEMENT N/A 5/14/2020    Procedure: DEBRIDEMENT, WOUND;  Surgeon: Mckinley Shannon MD;  Location: [facility];  Service: General;  Laterality: N/A;    WOUND DEBRIDEMENT Left 7/25/2020    Procedure: DEBRIDEMENT, WOUND;  Surgeon: Berenice Alfaro MD;  Location: [facility];  Service: General;  Laterality: Left;       Labs:  Lab Results   Component Value Date    WBC 33.62 (H) 08/21/2020    HGB 6.1 (L) 08/21/2020    HCT 19.9 (LL) 08/21/2020    MCV 97 08/21/2020    PLT 13 (LL) 08/21/2020     BMP  Lab Results   Component Value Date     (L) 08/21/2020    K 4.3 08/21/2020     08/21/2020    CO2 20 (L) 08/21/2020    BUN 25 (H) 08/21/2020    CREATININE 0.9 08/21/2020    CALCIUM 8.3 (L) 08/21/2020    ANIONGAP 11 08/21/2020    ESTGFRAFRICA >60 08/21/2020    EGFRNONAA >60 08/21/2020     Lab Results   Component Value Date    ALT 18 08/21/2020    AST 23 08/21/2020    ALKPHOS 168 (H) 08/21/2020    BILITOT 0.3 08/21/2020       Lab Results   Component Value Date    IRON 93 01/17/2020    TIBC 425 01/17/2020    FERRITIN 276 01/17/2020     Lab Results   Component Value Date     ZIHYKJIU60 1918 (H) 08/25/2019     Lab Results   Component Value Date    FOLATE 12.0 08/25/2019     Lab Results   Component Value Date    TSH 5.174 (H) 09/03/2019         Review of Systems   Constitutional: Negative for chills, diaphoresis and fever.   HENT: Negative for congestion, dental problem, drooling, ear discharge, ear pain, facial swelling, hearing loss, mouth sores, nosebleeds, postnasal drip, rhinorrhea, sinus pressure, sneezing, sore throat, tinnitus, trouble swallowing and voice change.    Eyes: Negative for photophobia, pain, discharge, redness, itching and visual disturbance.   Respiratory: Negative for cough, choking, chest tightness, wheezing and stridor.    Cardiovascular: Negative for chest pain, palpitations and leg swelling.   Gastrointestinal: Positive for abdominal distention. Negative for anal bleeding, blood in stool, constipation, diarrhea, nausea, rectal pain and vomiting.   Endocrine: Negative for cold intolerance, heat intolerance, polydipsia, polyphagia and polyuria.   Genitourinary: Negative for decreased urine volume, difficulty urinating, dyspareunia, dysuria, enuresis, flank pain, frequency, genital sores, hematuria, menstrual problem, pelvic pain, urgency, vaginal bleeding, vaginal discharge and vaginal pain.   Musculoskeletal: Negative for arthralgias, gait problem, joint swelling, myalgias, neck pain and neck stiffness.   Skin: Negative for color change and pallor.   Allergic/Immunologic: Negative for environmental allergies, food allergies and immunocompromised state.   Neurological: Negative for dizziness, tremors, seizures, syncope, facial asymmetry, speech difficulty, light-headedness, numbness and headaches.   Hematological: Negative for adenopathy. Does not bruise/bleed easily.   Psychiatric/Behavioral: Positive for dysphoric mood. Negative for agitation, behavioral problems, confusion, decreased concentration, hallucinations, self-injury, sleep disturbance and suicidal  ideas. The patient is nervous/anxious. The patient is not hyperactive.        Objective:      Physical Exam  Vitals signs reviewed.   Constitutional:       Appearance: She is cachectic. She is not diaphoretic.      Comments: Wound VAC in place, nondraining   HENT:      Head: Normocephalic and atraumatic.      Right Ear: External ear normal.      Left Ear: External ear normal.      Nose: Nose normal.      Right Sinus: No maxillary sinus tenderness or frontal sinus tenderness.      Left Sinus: No maxillary sinus tenderness or frontal sinus tenderness.      Mouth/Throat:      Pharynx: No oropharyngeal exudate.   Eyes:      General: Lids are normal. No scleral icterus.        Right eye: No discharge.         Left eye: No discharge.      Conjunctiva/sclera: Conjunctivae normal.      Right eye: Right conjunctiva is not injected. No hemorrhage.     Left eye: Left conjunctiva is not injected. No hemorrhage.     Pupils: Pupils are equal, round, and reactive to light.   Neck:      Musculoskeletal: Normal range of motion and neck supple.      Thyroid: No thyromegaly.      Vascular: No JVD.      Trachea: No tracheal deviation.   Cardiovascular:      Rate and Rhythm: Normal rate and regular rhythm.   Pulmonary:      Effort: Pulmonary effort is normal. No respiratory distress.      Breath sounds: No stridor.   Chest:      Chest wall: No tenderness.   Abdominal:      General: There is distension.      Palpations: Abdomen is soft. There is no hepatomegaly, splenomegaly or mass.      Tenderness: There is no rebound.   Musculoskeletal: Normal range of motion.         General: Deformity present. No tenderness.      Comments: Deformed digits of upper extremity secondary to arthritis.   Lymphadenopathy:      Cervical: No cervical adenopathy.      Upper Body:      Right upper body: No supraclavicular adenopathy.      Left upper body: No supraclavicular adenopathy.   Skin:     General: Skin is dry.      Findings: No erythema.    Neurological:      Mental Status: She is alert and oriented to person, place, and time.      Cranial Nerves: No cranial nerve deficit.      Coordination: Coordination normal.      Gait: Gait abnormal.   Psychiatric:         Behavior: Behavior normal.         Thought Content: Thought content normal.         Judgment: Judgment normal.           Assessment:      1. Chronic myelomonocytic leukemia not having achieved remission    2. Thrombocytopenia    3. Abscess of lower back    4. Multiple lung nodules on CT           Plan:     Chronic myelomonocytic leukemia not having achieved remission  Status post Vidaza.  Plan to initiate Inqovi.  Meanwhile will hold off on active treatment pending resolution of active infection of the lower back.  Patient is due to see surgery to discuss wound VAC status.  Plan to see her back in 2 weeks with repeat labs.    Also noted asymptomatic anemia with hemoglobin of 6.1 grams/deciliter.  No active bleed.  Continue to monitor.    Thrombocytopenia  Severe thrombocytopenia noted at 13,000. No petechiae or ecchymosis on exam.  This is likely related to disease progression.  Patient knows to contact us if needed.  Plan to see her back in 2 weeks.    Abscess of lower back  Would VAC in place.  On vancomycin.  Due to follow-up with general surgery this week per patient.    Multiple lung nodules on CT  Based on assessment while inpatient, those pulmonary nodules are thought to be related to rheumatoid disease.    Abdominal distension:  Physical exam showed palpable spleen, nontender on exam.  Splenomegaly may also explain cytopenia noted on CBC.     Denton Knox MD

## 2020-08-21 NOTE — ASSESSMENT & PLAN NOTE
Based on assessment while inpatient, those pulmonary nodules are thought to be related to rheumatoid disease.

## 2020-08-21 NOTE — TELEPHONE ENCOUNTER
After calling both numbers listed for pt and there was no answer, no way to leave a message I called pt's emergency contact Libanmelva Mauricio and left a voicemail message advising her of pt's scheduled appt with Dr Alfaro on Tuesday 8/24 @ 1320 after her appt with Dr Recinos @ 1200 both at [facility]. - Left my name and call back number [phone]    ----- Message from Berenice Alfaro MD sent at 8/21/2020 10:52 AM CDT -----  Please make her an appt

## 2020-08-21 NOTE — ASSESSMENT & PLAN NOTE
Severe thrombocytopenia noted at 13,000. No petechiae or ecchymosis on exam.  This is likely related to disease progression.  Patient knows to contact us if needed.  Plan to see her back in 2 weeks.

## 2020-08-25 ENCOUNTER — OFFICE VISIT (OUTPATIENT)
Dept: INFECTIOUS DISEASES | Facility: CLINIC | Age: 80
End: 2020-08-25
Payer: MEDICARE

## 2020-08-25 ENCOUNTER — OFFICE VISIT (OUTPATIENT)
Dept: SURGERY | Facility: CLINIC | Age: 80
End: 2020-08-25
Payer: MEDICARE

## 2020-08-25 VITALS — SYSTOLIC BLOOD PRESSURE: 139 MMHG | TEMPERATURE: 98 F | DIASTOLIC BLOOD PRESSURE: 75 MMHG | HEART RATE: 99 BPM

## 2020-08-25 VITALS
TEMPERATURE: 98 F | DIASTOLIC BLOOD PRESSURE: 59 MMHG | SYSTOLIC BLOOD PRESSURE: 110 MMHG | HEART RATE: 94 BPM | BODY MASS INDEX: 19.56 KG/M2 | WEIGHT: 106.94 LBS

## 2020-08-25 DIAGNOSIS — J18.9 LUNG INFECTION: ICD-10-CM

## 2020-08-25 DIAGNOSIS — R91.8 MULTIPLE LUNG NODULES ON CT: ICD-10-CM

## 2020-08-25 DIAGNOSIS — Z48.89 ENCOUNTER FOR POSTOPERATIVE WOUND CHECK: Primary | ICD-10-CM

## 2020-08-25 DIAGNOSIS — L02.212 ABSCESS OF LOWER BACK: ICD-10-CM

## 2020-08-25 DIAGNOSIS — M05.711 RHEUMATOID ARTHRITIS INVOLVING RIGHT SHOULDER WITH POSITIVE RHEUMATOID FACTOR: Chronic | ICD-10-CM

## 2020-08-25 DIAGNOSIS — J85.2 ABSCESS OF LUNG WITHOUT PNEUMONIA, UNSPECIFIED LATERALITY: ICD-10-CM

## 2020-08-25 DIAGNOSIS — C92.10 BLAST CRISIS PHASE OF CHRONIC MYELOID LEUKEMIA: ICD-10-CM

## 2020-08-25 PROBLEM — T81.49XA WOUND INFECTION AFTER SURGERY: Status: RESOLVED | Noted: 2020-07-24 | Resolved: 2020-08-25

## 2020-08-25 PROBLEM — L76.31: Status: RESOLVED | Noted: 2020-07-25 | Resolved: 2020-08-25

## 2020-08-25 PROBLEM — L03.312 CELLULITIS OF LOWER BACK: Status: RESOLVED | Noted: 2020-05-12 | Resolved: 2020-08-25

## 2020-08-25 PROCEDURE — 1159F MED LIST DOCD IN RCRD: CPT | Mod: HCNC,S$GLB,, | Performed by: INTERNAL MEDICINE

## 2020-08-25 PROCEDURE — 99999 PR PBB SHADOW E&M-EST. PATIENT-LVL IV: ICD-10-PCS | Mod: PBBFAC,HCNC,, | Performed by: INTERNAL MEDICINE

## 2020-08-25 PROCEDURE — 99499 RISK ADDL DX/OHS AUDIT: ICD-10-PCS | Mod: HCNC,S$GLB,, | Performed by: INTERNAL MEDICINE

## 2020-08-25 PROCEDURE — 99499 UNLISTED E&M SERVICE: CPT | Mod: HCNC,S$GLB,, | Performed by: INTERNAL MEDICINE

## 2020-08-25 PROCEDURE — 99999 PR PBB SHADOW E&M-EST. PATIENT-LVL IV: ICD-10-PCS | Mod: PBBFAC,HCNC,, | Performed by: SURGERY

## 2020-08-25 PROCEDURE — 99024 POSTOP FOLLOW-UP VISIT: CPT | Mod: HCNC,S$GLB,, | Performed by: SURGERY

## 2020-08-25 PROCEDURE — 99024 PR POST-OP FOLLOW-UP VISIT: ICD-10-PCS | Mod: HCNC,S$GLB,, | Performed by: SURGERY

## 2020-08-25 PROCEDURE — 3075F PR MOST RECENT SYSTOLIC BLOOD PRESS GE 130-139MM HG: ICD-10-PCS | Mod: HCNC,CPTII,S$GLB, | Performed by: INTERNAL MEDICINE

## 2020-08-25 PROCEDURE — 3075F SYST BP GE 130 - 139MM HG: CPT | Mod: HCNC,CPTII,S$GLB, | Performed by: INTERNAL MEDICINE

## 2020-08-25 PROCEDURE — 1159F PR MEDICATION LIST DOCUMENTED IN MEDICAL RECORD: ICD-10-PCS | Mod: HCNC,S$GLB,, | Performed by: INTERNAL MEDICINE

## 2020-08-25 PROCEDURE — 3078F DIAST BP <80 MM HG: CPT | Mod: HCNC,CPTII,S$GLB, | Performed by: INTERNAL MEDICINE

## 2020-08-25 PROCEDURE — 3078F PR MOST RECENT DIASTOLIC BLOOD PRESSURE < 80 MM HG: ICD-10-PCS | Mod: HCNC,CPTII,S$GLB, | Performed by: INTERNAL MEDICINE

## 2020-08-25 PROCEDURE — 99999 PR PBB SHADOW E&M-EST. PATIENT-LVL IV: CPT | Mod: PBBFAC,HCNC,, | Performed by: SURGERY

## 2020-08-25 PROCEDURE — 1101F PT FALLS ASSESS-DOCD LE1/YR: CPT | Mod: HCNC,CPTII,S$GLB, | Performed by: INTERNAL MEDICINE

## 2020-08-25 PROCEDURE — 99214 OFFICE O/P EST MOD 30 MIN: CPT | Mod: HCNC,S$GLB,, | Performed by: INTERNAL MEDICINE

## 2020-08-25 PROCEDURE — 99214 PR OFFICE/OUTPT VISIT, EST, LEVL IV, 30-39 MIN: ICD-10-PCS | Mod: HCNC,S$GLB,, | Performed by: INTERNAL MEDICINE

## 2020-08-25 PROCEDURE — 1101F PR PT FALLS ASSESS DOC 0-1 FALLS W/OUT INJ PAST YR: ICD-10-PCS | Mod: HCNC,CPTII,S$GLB, | Performed by: INTERNAL MEDICINE

## 2020-08-25 PROCEDURE — 99999 PR PBB SHADOW E&M-EST. PATIENT-LVL IV: CPT | Mod: PBBFAC,HCNC,, | Performed by: INTERNAL MEDICINE

## 2020-08-25 NOTE — LETTER
August 25, 2020      Denton Knox MD  [address]  [place] [address]           [facility]  [address] [place] [address],[address]RD FLOOR  [facility] [address]  Phone: [phone]          Patient: Sarah Parker   MR Number: [id number]   YOB: 1940   Date of Visit: 8/25/2020       Dear Dr. Denton Knox:    Thank you for referring Sarah Parker to me for evaluation. Attached you will find relevant portions of my assessment and plan of care.    If you have questions, please do not hesitate to call me. I look forward to following Sarah Parker along with you.    Sincerely,    Escobar Recinos MD    Enclosure  CC:  No Recipients    If you would like to receive this communication electronically, please contact [email or web address] or [phone] to request more information on [employer] Link access.    For providers and/or their staff who would like to refer a patient to [facility], please contact us through our one-stop-shop provider referral line, [facility] , at [phone].    If you feel you have received this communication in error or would no longer like to receive these types of communications, please e-mail [email or web address]

## 2020-08-25 NOTE — PROGRESS NOTES
Subjective:       Patient ID: Sarah Parker is a 80 y.o. female.    Chief Complaint: Follow-up    HPI   80 year old woman with history of CMML and abnormal chest CT scan ,back abscess who was seen in the hospital .  She was discharged on 08/17/2020 with Augmentin and Itraconazole for 14 days .  Fungal serologies - fungal immunodiffusion , fungal beta g;ucan are both negative.  She feels better .      Last Hospital note -  08/12/2020  80 y.o. female patient with medical history of CMML on treatment with Vidaza who was admitted due to abnormal labs- anemia .  All previous imaging received.  Chest ct scan -08/10  -Peripheral large soft tissue opacity again identified in the right upper lobe.  Central area of hypodensity is not clearly identified as on the prior examination.  Persistent nodular soft tissue opacities identified throughout both lungs, some of which in the left lower lobe have decreased in size.  Findings again may reflect atypical infectious process with metastatic disease or mycobacterial infection additional considerations.   Bronchiectasis, ground-glass haziness and secretions within peripheral airways concerning for infectious/inflammatory small airways disease.  08/03- CT scan -abdomen,pelvis.chest -  1. Interval clearing of prior left lower lobe consolidation with interval development of similar areas of consolidation in the bilateral upper lobes, both of which exhibit central necrosis versus abscess formation.  Numerous nodular opacities present elsewhere in both lungs either stable or new compared to prior.  Differential considerations include metastatic disease, disseminated TB/fungal infection, or atypical pneumonia.  2. Worsening mediastinal and bilateral axillary lymphadenopathy.  3. Worsening retroperitoneal and bilateral iliac/inguinal lymphadenopathy.  4. Marked splenomegaly with increase in splenic size compared to prior.  Multiple subcentimeter hypodense foci throughout the spleen and  single small enhancing focus worrisome for metastatic disease versus infection.  Serum procal-0.18.    Review of Systems   Constitutional: Negative for chills and fatigue.   HENT: Negative for nasal congestion, ear pain, facial swelling, sinus pressure/congestion and sore throat.    Eyes: Negative for pain.   Respiratory: Negative for apnea, chest tightness, shortness of breath and stridor.    Cardiovascular: Negative for chest pain, palpitations and leg swelling.   Gastrointestinal: Negative for abdominal distention, abdominal pain, diarrhea and nausea.   Endocrine: Negative for polydipsia and polyphagia.   Genitourinary: Negative for decreased urine volume, difficulty urinating, frequency and genital sores.   Musculoskeletal: Negative for arthralgias and gait problem.   Neurological: Negative for light-headedness and headaches.   Hematological: Negative for adenopathy.   Psychiatric/Behavioral: Negative for agitation, confusion and decreased concentration.         Objective:      Physical Exam  Vitals signs and nursing note reviewed.   Constitutional:       Appearance: She is well-developed.   HENT:      Head: Normocephalic and atraumatic.   Eyes:      Conjunctiva/sclera: Conjunctivae normal.      Pupils: Pupils are equal, round, and reactive to light.   Neck:      Musculoskeletal: Normal range of motion and neck supple.      Thyroid: No thyroid mass or thyromegaly.   Cardiovascular:      Rate and Rhythm: Normal rate.      Heart sounds: Normal heart sounds.   Pulmonary:      Effort: Pulmonary effort is normal. No accessory muscle usage or respiratory distress.      Breath sounds: Normal breath sounds.   Abdominal:      General: Bowel sounds are normal.      Palpations: Abdomen is soft. There is no mass.      Tenderness: There is no abdominal tenderness.   Musculoskeletal: Normal range of motion.      Comments: Has a wound vac    Skin:     Findings: No rash.   Neurological:      Mental Status: She is alert and  oriented to person, place, and time.         Assessment:         1. Lung infection  Ambulatory referral/consult to Infectious Disease   2. Abscess of lung without pneumonia, unspecified laterality     3. Multiple lung nodules on CT     4. 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 Rheumatoid arthritis involving right shoulder with positive rheumatoid factor     6. Abscess of lower back         Plan:       Problem List Items Addressed This Visit     Rheumatoid arthritis (Chronic)     Follow rheumatology          Multiple lung nodules on CT     Follow pulmonology          Abscess of lower back     Continue wound care /surgery follow up .  Completed antimicrobial therapy .  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  Will follow Pulmonology.  Fungal serology -negative  -Fungitell and immunodiffusion.  She denies cough                Other Visit Diagnoses     Lung infection

## 2020-08-25 NOTE — ASSESSMENT & PLAN NOTE
Continue wound care /surgery follow up .  Completed antimicrobial therapy .  She was informed by the nurse that the wound is healing well

## 2020-08-25 NOTE — PROGRESS NOTES
General Surgery     Postop Visit Note      Sarah Parker  80 y.o.    Review of patient's allergies indicates:   Allergen Reactions    Codeine      Other reaction(s): hyper  Other reaction(s): hyper    Gabapentin Other (See Comments)     Bad dreams       Vitals:    08/25/20 1352   BP: (!) 110/59   Pulse: 94   Temp: 97.9 °F (36.6 °C)       SUBJECTIVE:  80 y.o. female presents s/p debridement of back wound and bilateral ischial pressure ulcers. Undergoing home health wound vac m/w/f without issues. Minimal pain and no fevers. Left leg without pain, swelling, or drainage. Completed course of Augmentin and itraconazole.  Recent hospital admission for anemia. Seen by Dr. Recinos today. She has not yet resumed her CMML treatment.     OBJECTIVE:  Left thigh incision healing well, no erythema, no drainage, no tenderness with palpation. Sutures removed. No hematoma.  Back wound vac in place with good seal. No hyperemia at edges of wound noted.      Pathology- reviewed  Final Pathologic Diagnosis 1.  SKIN, LEFT LOWER BACK, DEBRIDEMENT:  Skin and deep subcutaneous   fibroadipose tissue with abscess      Cultures intraop + MRSA      ASSESSMENT:  Encounter for postoperative wound check      Will contact [employer] to send picture of wound since she did not bring supplies to clinic today.   No evidence of ongoing infection  Pending photos, may transition from wound vac to local wound care.     Follow up in about 3 weeks (around 9/15/2020).    Berenice Alfaro

## 2020-08-25 NOTE — ASSESSMENT & PLAN NOTE
She was given 2 weeks of Augmentin and itraconazole-will need to repeat imagig in 4-6 weeks.  Will follow Pulmonology.  Fungal serology -negative  -Fungitell and immunodiffusion.  She denies cough

## 2020-08-31 ENCOUNTER — INFUSION (OUTPATIENT)
Dept: INFUSION THERAPY | Facility: HOSPITAL | Age: 80
End: 2020-08-31
Attending: INTERNAL MEDICINE
Payer: MEDICARE

## 2020-08-31 VITALS
RESPIRATION RATE: 18 BRPM | TEMPERATURE: 98 F | SYSTOLIC BLOOD PRESSURE: 143 MMHG | HEART RATE: 78 BPM | DIASTOLIC BLOOD PRESSURE: 82 MMHG

## 2020-08-31 DIAGNOSIS — C93.10 CHRONIC MYELOMONOCYTIC LEUKEMIA NOT HAVING ACHIEVED REMISSION: ICD-10-CM

## 2020-08-31 DIAGNOSIS — C93.10 CHRONIC MYELOMONOCYTIC LEUKEMIA NOT HAVING ACHIEVED REMISSION: Primary | ICD-10-CM

## 2020-08-31 PROCEDURE — 36430 TRANSFUSION BLD/BLD COMPNT: CPT | Mod: HCNC

## 2020-08-31 RX ORDER — HYDROCODONE BITARTRATE AND ACETAMINOPHEN 500; 5 MG/1; MG/1
TABLET ORAL ONCE
Status: DISCONTINUED | OUTPATIENT
Start: 2020-08-31 | End: 2020-08-31 | Stop reason: HOSPADM

## 2020-08-31 RX ORDER — HYDROCODONE BITARTRATE AND ACETAMINOPHEN 500; 5 MG/1; MG/1
TABLET ORAL ONCE
Status: CANCELLED | OUTPATIENT
Start: 2020-08-31 | End: 2020-08-31

## 2020-08-31 NOTE — PLAN OF CARE
Pt tolerated transfusion of one unit prbc without s/s reaction.  Followup appointments reviewed. Discharged to ride per wheelchair with personal belongings.

## 2020-09-04 ENCOUNTER — LAB VISIT (OUTPATIENT)
Dept: LAB | Facility: HOSPITAL | Age: 80
End: 2020-09-04
Attending: INTERNAL MEDICINE
Payer: MEDICARE

## 2020-09-04 ENCOUNTER — OFFICE VISIT (OUTPATIENT)
Dept: HEMATOLOGY/ONCOLOGY | Facility: CLINIC | Age: 80
End: 2020-09-04
Payer: MEDICARE

## 2020-09-04 ENCOUNTER — TELEPHONE (OUTPATIENT)
Dept: HEMATOLOGY/ONCOLOGY | Facility: CLINIC | Age: 80
End: 2020-09-04

## 2020-09-04 ENCOUNTER — TELEPHONE (OUTPATIENT)
Dept: SURGERY | Facility: CLINIC | Age: 80
End: 2020-09-04

## 2020-09-04 VITALS
TEMPERATURE: 98 F | WEIGHT: 108.69 LBS | BODY MASS INDEX: 20 KG/M2 | OXYGEN SATURATION: 100 % | DIASTOLIC BLOOD PRESSURE: 74 MMHG | HEIGHT: 62 IN | SYSTOLIC BLOOD PRESSURE: 135 MMHG | HEART RATE: 76 BPM

## 2020-09-04 DIAGNOSIS — C93.10 CHRONIC MYELOMONOCYTIC LEUKEMIA NOT HAVING ACHIEVED REMISSION: Primary | ICD-10-CM

## 2020-09-04 DIAGNOSIS — D69.6 THROMBOCYTOPENIA: ICD-10-CM

## 2020-09-04 DIAGNOSIS — D50.0 IRON DEFICIENCY ANEMIA DUE TO CHRONIC BLOOD LOSS: ICD-10-CM

## 2020-09-04 DIAGNOSIS — C93.10 CHRONIC MYELOMONOCYTIC LEUKEMIA NOT HAVING ACHIEVED REMISSION: ICD-10-CM

## 2020-09-04 LAB
ALBUMIN SERPL BCP-MCNC: 3.2 G/DL (ref 3.5–5.2)
ALP SERPL-CCNC: 189 U/L (ref 55–135)
ALT SERPL W/O P-5'-P-CCNC: 14 U/L (ref 10–44)
ANION GAP SERPL CALC-SCNC: 8 MMOL/L (ref 8–16)
ANISOCYTOSIS BLD QL SMEAR: SLIGHT
AST SERPL-CCNC: 24 U/L (ref 10–40)
BASOPHILS # BLD AUTO: ABNORMAL K/UL (ref 0–0.2)
BASOPHILS NFR BLD: 0 % (ref 0–1.9)
BILIRUB SERPL-MCNC: 0.4 MG/DL (ref 0.1–1)
BLASTS NFR BLD MANUAL: 4 %
BUN SERPL-MCNC: 24 MG/DL (ref 8–23)
CALCIUM SERPL-MCNC: 8.8 MG/DL (ref 8.7–10.5)
CHLORIDE SERPL-SCNC: 102 MMOL/L (ref 95–110)
CO2 SERPL-SCNC: 25 MMOL/L (ref 23–29)
CREAT SERPL-MCNC: 0.9 MG/DL (ref 0.5–1.4)
DACRYOCYTES BLD QL SMEAR: ABNORMAL
DIFFERENTIAL METHOD: ABNORMAL
EOSINOPHIL # BLD AUTO: ABNORMAL K/UL (ref 0–0.5)
EOSINOPHIL NFR BLD: 1 % (ref 0–8)
ERYTHROCYTE [DISTWIDTH] IN BLOOD BY AUTOMATED COUNT: 19.3 % (ref 11.5–14.5)
EST. GFR  (AFRICAN AMERICAN): >60 ML/MIN/1.73 M^2
EST. GFR  (NON AFRICAN AMERICAN): >60 ML/MIN/1.73 M^2
GLUCOSE SERPL-MCNC: 93 MG/DL (ref 70–110)
HCT VFR BLD AUTO: 27.6 % (ref 37–48.5)
HGB BLD-MCNC: 8.3 G/DL (ref 12–16)
IMM GRANULOCYTES # BLD AUTO: ABNORMAL K/UL (ref 0–0.04)
IMM GRANULOCYTES NFR BLD AUTO: ABNORMAL % (ref 0–0.5)
LYMPHOCYTES # BLD AUTO: ABNORMAL K/UL (ref 1–4.8)
LYMPHOCYTES NFR BLD: 48 % (ref 18–48)
MCH RBC QN AUTO: 30.9 PG (ref 27–31)
MCHC RBC AUTO-ENTMCNC: 30.1 G/DL (ref 32–36)
MCV RBC AUTO: 103 FL (ref 82–98)
METAMYELOCYTES NFR BLD MANUAL: 5 %
MONOCYTES # BLD AUTO: ABNORMAL K/UL (ref 0.3–1)
MONOCYTES NFR BLD: 5 % (ref 4–15)
NEUTROPHILS # BLD AUTO: ABNORMAL K/UL (ref 1.8–7.7)
NEUTROPHILS NFR BLD: 27 % (ref 38–73)
NEUTS BAND NFR BLD MANUAL: 10 %
NRBC BLD-RTO: 1 /100 WBC
OVALOCYTES BLD QL SMEAR: ABNORMAL
PLATELET # BLD AUTO: 18 K/UL (ref 150–350)
PMV BLD AUTO: ABNORMAL FL (ref 9.2–12.9)
POIKILOCYTOSIS BLD QL SMEAR: SLIGHT
POLYCHROMASIA BLD QL SMEAR: ABNORMAL
POTASSIUM SERPL-SCNC: 4.6 MMOL/L (ref 3.5–5.1)
PROT SERPL-MCNC: 8.2 G/DL (ref 6–8.4)
RBC # BLD AUTO: 2.69 M/UL (ref 4–5.4)
SCHISTOCYTES BLD QL SMEAR: PRESENT
SODIUM SERPL-SCNC: 135 MMOL/L (ref 136–145)
SPHEROCYTES BLD QL SMEAR: ABNORMAL
STOMATOCYTES BLD QL SMEAR: PRESENT
WBC # BLD AUTO: 32.23 K/UL (ref 3.9–12.7)

## 2020-09-04 PROCEDURE — 99215 PR OFFICE/OUTPT VISIT, EST, LEVL V, 40-54 MIN: ICD-10-PCS | Mod: HCNC,S$GLB,, | Performed by: INTERNAL MEDICINE

## 2020-09-04 PROCEDURE — 99999 PR PBB SHADOW E&M-EST. PATIENT-LVL IV: ICD-10-PCS | Mod: PBBFAC,HCNC,, | Performed by: INTERNAL MEDICINE

## 2020-09-04 PROCEDURE — 99999 PR PBB SHADOW E&M-EST. PATIENT-LVL IV: CPT | Mod: PBBFAC,HCNC,, | Performed by: INTERNAL MEDICINE

## 2020-09-04 PROCEDURE — 85027 COMPLETE CBC AUTOMATED: CPT | Mod: HCNC

## 2020-09-04 PROCEDURE — 1126F AMNT PAIN NOTED NONE PRSNT: CPT | Mod: HCNC,S$GLB,, | Performed by: INTERNAL MEDICINE

## 2020-09-04 PROCEDURE — 3075F SYST BP GE 130 - 139MM HG: CPT | Mod: HCNC,CPTII,S$GLB, | Performed by: INTERNAL MEDICINE

## 2020-09-04 PROCEDURE — 1159F PR MEDICATION LIST DOCUMENTED IN MEDICAL RECORD: ICD-10-PCS | Mod: HCNC,S$GLB,, | Performed by: INTERNAL MEDICINE

## 2020-09-04 PROCEDURE — 3078F DIAST BP <80 MM HG: CPT | Mod: HCNC,CPTII,S$GLB, | Performed by: INTERNAL MEDICINE

## 2020-09-04 PROCEDURE — 1159F MED LIST DOCD IN RCRD: CPT | Mod: HCNC,S$GLB,, | Performed by: INTERNAL MEDICINE

## 2020-09-04 PROCEDURE — 85007 BL SMEAR W/DIFF WBC COUNT: CPT | Mod: HCNC

## 2020-09-04 PROCEDURE — 3075F PR MOST RECENT SYSTOLIC BLOOD PRESS GE 130-139MM HG: ICD-10-PCS | Mod: HCNC,CPTII,S$GLB, | Performed by: INTERNAL MEDICINE

## 2020-09-04 PROCEDURE — 99215 OFFICE O/P EST HI 40 MIN: CPT | Mod: HCNC,S$GLB,, | Performed by: INTERNAL MEDICINE

## 2020-09-04 PROCEDURE — 80053 COMPREHEN METABOLIC PANEL: CPT | Mod: HCNC

## 2020-09-04 PROCEDURE — 1101F PR PT FALLS ASSESS DOC 0-1 FALLS W/OUT INJ PAST YR: ICD-10-PCS | Mod: HCNC,CPTII,S$GLB, | Performed by: INTERNAL MEDICINE

## 2020-09-04 PROCEDURE — 1101F PT FALLS ASSESS-DOCD LE1/YR: CPT | Mod: HCNC,CPTII,S$GLB, | Performed by: INTERNAL MEDICINE

## 2020-09-04 PROCEDURE — 36415 COLL VENOUS BLD VENIPUNCTURE: CPT | Mod: HCNC

## 2020-09-04 PROCEDURE — 1126F PR PAIN SEVERITY QUANTIFIED, NO PAIN PRESENT: ICD-10-PCS | Mod: HCNC,S$GLB,, | Performed by: INTERNAL MEDICINE

## 2020-09-04 PROCEDURE — 3078F PR MOST RECENT DIASTOLIC BLOOD PRESSURE < 80 MM HG: ICD-10-PCS | Mod: HCNC,CPTII,S$GLB, | Performed by: INTERNAL MEDICINE

## 2020-09-04 NOTE — ASSESSMENT & PLAN NOTE
Status post recent transfusion with 2 packed red blood cell.  Most recent CBC showed hemoglobin at 8.3 grams/deciliter.  Will continue to monitor.

## 2020-09-04 NOTE — ASSESSMENT & PLAN NOTE
Severe thrombocytopenia noted with platelet count at [address]66.  Complained of intermittent epistaxis that last for few seconds.  Recommend platelet transfusion however patient declined.  Will continue to monitor and transfuse as needed.

## 2020-09-04 NOTE — TELEPHONE ENCOUNTER
"After speaking to [facility]  nurse ([phone]) the following message was sent to Dr Alfaro:  " Ok just talked to her. She said she never received my messages but she had trouble uploading the pics into pt's chart and didn't know how to get back to me. Any how she said she's going to see her this afternoon and is going to email me the pics around 2 today. I'll forward them to you when I get them"        ----- Message from Berenice Alfaro MD sent at 9/4/2020 10:13 AM CDT -----  Did that home health nurse send a picture?  ----- Message -----  From: Denton Knox MD  Sent: 9/4/2020   9:24 AM CDT  To: Berenice Alfaro MD    Any idea when her wound vac reggie be coming off? Waiting to start her back on treatment for chronic leukemia.     Denton         "

## 2020-09-04 NOTE — PROGRESS NOTES
Subjective:       Patient ID: Sarah Parker is a 80 y.o. female.    Chief Complaint:  Chronic myelomonocytic leukemia    Follow-up  Pertinent negatives include no arthralgias, chest pain, chills, congestion, coughing, diaphoresis, fever, headaches, joint swelling, myalgias, nausea, neck pain, numbness, sore throat or vomiting.      Patient is a 80-year-old female presents for reinstitution  of Vidaza therapy for chronic myelomonocytic leukemia patient who is currently on treatment with azacitidine.  Treatment has been on hold due to recent MRSA infection  She is here for routine follow-up.      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  Unfortunately treatment has been interrupted for the past several months due to infections requiring wound VAC.  She was also recently diagnosed with squamous skin cancer which was resected with negative margins.    Presents today for routine follow-up.  Still with wound VAC although nondraining per patient.  Denies any symptoms.  Was transfused with 2 units of packed red blood cell few days ago.      Past Medical History:   Diagnosis Date    Acid reflux     Anxiety     Back pain     Bronchitis, chronic obstructive w acute bronchitis 7/29/2016    Cancer     [facility] arms, face- Dr. Lata Tejada    Cataract     2+NS    Chronic myelomonocytic leukemia     Degenerative disc disease     Depression     Depression     Dry mouth     Encounter for blood transfusion     Hernia, hiatal 11/18/2013    Hypertension     Hypothyroid     Hypothyroidism     Iron deficiency anemia     Macrocytic anemia 5/3/2016    Macular degeneration     Migraines     Mixed anxiety and depressive disorder     Multiple fractures of ribs of right side     Osteoporosis     Other hyperlipidemia 10/11/2019    Pneumonia     Pneumonia due to other staphylococcus     Rheumatoid arthritis     Rheumatoid  arthritis(714.0)     Rheumatoid arthritis(714.0)     Remicade, MTX.     Family History   Problem Relation Age of Onset    Heart disease Mother     Hyperlipidemia Mother     Hypertension Mother     Osteoarthritis Mother     Cataracts Mother     Hypertension Father     Osteoarthritis Father     Heart disease Father     Asthma Sister     Chronic back pain Sister     Hypertension Sister     Osteoarthritis Sister     Thyroid disease Sister     Asthma Brother     Cancer Brother     Chronic back pain Brother     Diabetes Mellitus Brother     Hypertension Brother     Osteoarthritis Brother     Thyroid disease Brother     Cancer Maternal Grandfather     Fibromyalgia Daughter     Heart disease Maternal Grandmother     Colon cancer Neg Hx     Diabetes Neg Hx      Social History     Socioeconomic History    Marital status:      Spouse name: Not on file    Number of children: Not on file    Years of education: Not on file    Highest education level: Not on file   Occupational History    Occupation: retired   Social Needs    Financial resource strain: Not on file    Food insecurity     Worry: Not on file     Inability: Not on file    Transportation needs     Medical: Not on file     Non-medical: Not on file   Tobacco Use    Smoking status: Former Smoker     Packs/day: 0.25     Years: 2.00     Pack years: 0.50     Quit date: 1965     Years since quittin.8    Smokeless tobacco: Never Used   Substance and Sexual Activity    Alcohol use: No    Drug use: No    Sexual activity: Never     Partners: Male   Lifestyle    Physical activity     Days per week: Not on file     Minutes per session: Not on file    Stress: Not at all   Relationships    Social connections     Talks on phone: Not on file     Gets together: Not on file     Attends [employer] service: Not on file     Active member of club or organization: Not on file     Attends meetings of clubs or organizations: Not on file      Relationship status: Not on file   Other Topics Concern    Not on file   Social History Narrative    Patient is aretired and live with .     Past Surgical History:   Procedure Laterality Date    APPENDECTOMY  1985    APPLICATION OF WOUND VACUUM-ASSISTED CLOSURE DEVICE N/A 5/14/2020    Procedure: APPLICATION, WOUND VAC;  Surgeon: Mckinley Shannon MD;  Location: [facility] OR;  Service: General;  Laterality: N/A;    APPLICATION OF WOUND VACUUM-ASSISTED CLOSURE DEVICE Left 7/25/2020    Procedure: APPLICATION, WOUND VAC;  Surgeon: Berenice Alfaro MD;  Location: [facility] OR;  Service: General;  Laterality: Left;    BREAST BIOPSY      CATARACT EXTRACTION Bilateral 6/11/15    Dr. Booth    CHOLECYSTECTOMY  2013    cryoablasion kidney Left 09/27/2016    DEBRIDEMENT Left 7/25/2020    Procedure: DEBRIDEMENT;  Surgeon: Berenice Alfaro MD;  Location: [facility] OR;  Service: General;  Laterality: Left;    EVACUATION OF HEMATOMA Left 7/25/2020    Procedure: EVACUATION, HEMATOMA;  Surgeon: Berenice Alfaro MD;  Location: [facility] OR;  Service: General;  Laterality: Left;    EXCISION OF SQUAMOUS CELL CARCINOMA Left 7/2/2020    Procedure: EXCISION, CARCINOMA, SQUAMOUS CELL;  Surgeon: Mckinley Shannon MD;  Location: [facility] OR;  Service: General;  Laterality: Left;    feet Bilateral     rheumatoid    FRACTURE SURGERY Right     tibia    HERNIA REPAIR      HYSTERECTOMY  1970    partial    INCISION AND DRAINAGE OF ABSCESS N/A 5/12/2020    Procedure: INCISION AND DRAINAGE, ABSCESS;  Surgeon: Emerson Hathaway MD;  Location: [facility] OR;  Service: General;  Laterality: N/A;    INCISIONAL BIOPSY N/A 5/12/2020    Procedure: INCISIONAL BIOPSY;  Surgeon: Emerson Hathaway MD;  Location: [facility] OR;  Service: General;  Laterality: N/A;    JOINT REPLACEMENT      bilateral knees (2008), hands, wrists, knuckles, toes    LAPAROSCOPIC NISSEN FUNDOPLICATION      TRANSFORAMINAL EPIDURAL INJECTION OF STEROID Left 6/25/2019    Procedure: Left L5/S1 TF AYAAN  with local;  Surgeon: Rowdy Felix MD;  Location: [facility];  Service: Pain Management;  Laterality: Left;    WOUND DEBRIDEMENT N/A 5/14/2020    Procedure: DEBRIDEMENT, WOUND;  Surgeon: Mckinley Shannon MD;  Location: [facility] OR;  Service: General;  Laterality: N/A;    WOUND DEBRIDEMENT Left 7/25/2020    Procedure: DEBRIDEMENT, WOUND;  Surgeon: Berenice Alfaro MD;  Location: [facility] OR;  Service: General;  Laterality: Left;       Labs:  Lab Results   Component Value Date    WBC 32.23 (H) 09/04/2020    HGB 8.3 (L) 09/04/2020    HCT 27.6 (L) 09/04/2020     (H) 09/04/2020    PLT 18 (LL) 09/04/2020     BMP  Lab Results   Component Value Date     (L) 09/04/2020    K 4.6 09/04/2020     09/04/2020    CO2 25 09/04/2020    BUN 24 (H) 09/04/2020    CREATININE 0.9 09/04/2020    CALCIUM 8.8 09/04/2020    ANIONGAP 8 09/04/2020    ESTGFRAFRICA >60 09/04/2020    EGFRNONAA >60 09/04/2020     Lab Results   Component Value Date    ALT 14 09/04/2020    AST 24 09/04/2020    ALKPHOS 189 (H) 09/04/2020    BILITOT 0.4 09/04/2020       Lab Results   Component Value Date    IRON 93 01/17/2020    TIBC 425 01/17/2020    FERRITIN 276 01/17/2020     Lab Results   Component Value Date    OOGBHLMR14 1918 (H) 08/25/2019     Lab Results   Component Value Date    FOLATE 12.0 08/25/2019     Lab Results   Component Value Date    TSH 5.174 (H) 09/03/2019         Review of Systems   Constitutional: Negative for chills, diaphoresis and fever.   HENT: Negative for congestion, dental problem, drooling, ear discharge, ear pain, facial swelling, hearing loss, mouth sores, nosebleeds, postnasal drip, rhinorrhea, sinus pressure, sneezing, sore throat, tinnitus, trouble swallowing and voice change.    Eyes: Negative for photophobia, pain, discharge, redness, itching and visual disturbance.   Respiratory: Negative for cough, choking, chest tightness, wheezing and stridor.    Cardiovascular: Negative for chest pain, palpitations and leg  swelling.   Gastrointestinal: Positive for abdominal distention. Negative for anal bleeding, blood in stool, constipation, diarrhea, nausea, rectal pain and vomiting.   Endocrine: Negative for cold intolerance, heat intolerance, polydipsia, polyphagia and polyuria.   Genitourinary: Negative for decreased urine volume, difficulty urinating, dyspareunia, dysuria, enuresis, flank pain, frequency, genital sores, hematuria, menstrual problem, pelvic pain, urgency, vaginal bleeding, vaginal discharge and vaginal pain.   Musculoskeletal: Negative for arthralgias, gait problem, joint swelling, myalgias, neck pain and neck stiffness.   Skin: Negative for color change and pallor.   Allergic/Immunologic: Negative for environmental allergies, food allergies and immunocompromised state.   Neurological: Negative for dizziness, tremors, seizures, syncope, facial asymmetry, speech difficulty, light-headedness, numbness and headaches.   Hematological: Negative for adenopathy. Does not bruise/bleed easily.   Psychiatric/Behavioral: Positive for dysphoric mood. Negative for agitation, behavioral problems, confusion, decreased concentration, hallucinations, self-injury, sleep disturbance and suicidal ideas. The patient is nervous/anxious. The patient is not hyperactive.        Objective:      Physical Exam  Vitals signs reviewed.   Constitutional:       Appearance: She is cachectic. She is not diaphoretic.      Comments: Wound VAC in place, nondraining   HENT:      Head: Normocephalic and atraumatic.      Right Ear: External ear normal.      Left Ear: External ear normal.      Nose: Nose normal.      Right Sinus: No maxillary sinus tenderness or frontal sinus tenderness.      Left Sinus: No maxillary sinus tenderness or frontal sinus tenderness.      Mouth/Throat:      Pharynx: No oropharyngeal exudate.   Eyes:      General: Lids are normal. No scleral icterus.        Right eye: No discharge.         Left eye: No discharge.       Conjunctiva/sclera: Conjunctivae normal.      Right eye: Right conjunctiva is not injected. No hemorrhage.     Left eye: Left conjunctiva is not injected. No hemorrhage.     Pupils: Pupils are equal, round, and reactive to light.   Neck:      Musculoskeletal: Normal range of motion and neck supple.      Thyroid: No thyromegaly.      Vascular: No JVD.      Trachea: No tracheal deviation.   Cardiovascular:      Rate and Rhythm: Normal rate and regular rhythm.   Pulmonary:      Effort: Pulmonary effort is normal. No respiratory distress.      Breath sounds: No stridor.   Chest:      Chest wall: No tenderness.   Abdominal:      General: There is distension.      Palpations: Abdomen is soft. There is no hepatomegaly, splenomegaly or mass.      Tenderness: There is no rebound.   Musculoskeletal: Normal range of motion.         General: Deformity present. No tenderness.      Comments: Deformed digits of upper extremity secondary to arthritis.   Lymphadenopathy:      Cervical: No cervical adenopathy.      Upper Body:      Right upper body: No supraclavicular adenopathy.      Left upper body: No supraclavicular adenopathy.   Skin:     General: Skin is dry.      Findings: No erythema.   Neurological:      Mental Status: She is alert and oriented to person, place, and time.      Cranial Nerves: No cranial nerve deficit.      Coordination: Coordination normal.      Gait: Gait abnormal.   Psychiatric:         Behavior: Behavior normal.         Thought Content: Thought content normal.         Judgment: Judgment normal.           Assessment:      1. Chronic myelomonocytic leukemia not having achieved remission    2. Thrombocytopenia    3. Iron deficiency anemia due to chronic blood loss           Plan:     Chronic myelomonocytic leukemia not having achieved remission  Status post Vidaza.  Plan to initiate Inqovi.  Meanwhile will hold off on active treatment pending resolution of active infection of the lower back.  Patient is due  to see surgery to discuss wound VAC status.  Plan to see her back in 2 weeks with repeat labs.         Thrombocytopenia  Severe thrombocytopenia noted with [place] count at [address].  Complained of intermittent epistaxis that last for few seconds.  Recommend platelet transfusion however patient declined.  Will continue to monitor and transfuse as needed.    Anemia  Status post recent transfusion with 2 packed red blood cell.  Most recent CBC showed hemoglobin at 8.3 grams/deciliter.  Will continue to monitor.      Denton Knox MD

## 2020-09-04 NOTE — ASSESSMENT & PLAN NOTE
Status post Vidaza.  Plan to initiate Inqovi.  Meanwhile will hold off on active treatment pending resolution of active infection of the lower back.  Patient is due to see surgery to discuss wound VAC status.  Plan to see her back in 2 weeks with repeat labs.

## 2020-09-07 ENCOUNTER — PATIENT OUTREACH (OUTPATIENT)
Dept: ADMINISTRATIVE | Facility: OTHER | Age: 80
End: 2020-09-07

## 2020-09-07 NOTE — PROGRESS NOTES
Health Maintenance Due   Topic Date Due    Hepatitis C Screening  1940    Shingles Vaccine (1 of 2) 02/21/1992    DEXA SCAN  01/13/2020    Influenza Vaccine (1) 08/01/2020     Updates were requested from care everywhere.  Chart was reviewed for overdue Proactive [facility] Encounters (DOUGLAS) topics (CRS, Breast Cancer Screening, Eye exam)  Health Maintenance has been updated.  [facility] immunization registry triggered.  Immunizations were reconciled.

## 2020-09-08 ENCOUNTER — OFFICE VISIT (OUTPATIENT)
Dept: INFECTIOUS DISEASES | Facility: CLINIC | Age: 80
End: 2020-09-08
Payer: MEDICARE

## 2020-09-08 ENCOUNTER — DOCUMENT SCAN (OUTPATIENT)
Dept: HOME HEALTH SERVICES | Facility: HOSPITAL | Age: 80
End: 2020-09-08
Payer: MEDICARE

## 2020-09-08 VITALS — SYSTOLIC BLOOD PRESSURE: 135 MMHG | TEMPERATURE: 98 F | HEART RATE: 94 BPM | DIASTOLIC BLOOD PRESSURE: 65 MMHG

## 2020-09-08 DIAGNOSIS — R91.8 MULTIPLE LUNG NODULES ON CT: ICD-10-CM

## 2020-09-08 DIAGNOSIS — D69.6 THROMBOCYTOPENIA: ICD-10-CM

## 2020-09-08 DIAGNOSIS — C93.10 CHRONIC MYELOMONOCYTIC LEUKEMIA NOT HAVING ACHIEVED REMISSION: ICD-10-CM

## 2020-09-08 DIAGNOSIS — J18.9 LUNG INFECTION: Primary | ICD-10-CM

## 2020-09-08 DIAGNOSIS — C94.80 LEUKEMIA CUTIS: ICD-10-CM

## 2020-09-08 DIAGNOSIS — M05.711 RHEUMATOID ARTHRITIS INVOLVING RIGHT SHOULDER WITH POSITIVE RHEUMATOID FACTOR: Chronic | ICD-10-CM

## 2020-09-08 DIAGNOSIS — L98.8 LEUKEMIA CUTIS: ICD-10-CM

## 2020-09-08 PROCEDURE — 3075F PR MOST RECENT SYSTOLIC BLOOD PRESS GE 130-139MM HG: ICD-10-PCS | Mod: HCNC,CPTII,S$GLB, | Performed by: INTERNAL MEDICINE

## 2020-09-08 PROCEDURE — 1101F PR PT FALLS ASSESS DOC 0-1 FALLS W/OUT INJ PAST YR: ICD-10-PCS | Mod: HCNC,CPTII,S$GLB, | Performed by: INTERNAL MEDICINE

## 2020-09-08 PROCEDURE — 99214 OFFICE O/P EST MOD 30 MIN: CPT | Mod: HCNC,S$GLB,, | Performed by: INTERNAL MEDICINE

## 2020-09-08 PROCEDURE — 1101F PT FALLS ASSESS-DOCD LE1/YR: CPT | Mod: HCNC,CPTII,S$GLB, | Performed by: INTERNAL MEDICINE

## 2020-09-08 PROCEDURE — 3078F PR MOST RECENT DIASTOLIC BLOOD PRESSURE < 80 MM HG: ICD-10-PCS | Mod: HCNC,CPTII,S$GLB, | Performed by: INTERNAL MEDICINE

## 2020-09-08 PROCEDURE — 1159F PR MEDICATION LIST DOCUMENTED IN MEDICAL RECORD: ICD-10-PCS | Mod: HCNC,S$GLB,, | Performed by: INTERNAL MEDICINE

## 2020-09-08 PROCEDURE — 3075F SYST BP GE 130 - 139MM HG: CPT | Mod: HCNC,CPTII,S$GLB, | Performed by: INTERNAL MEDICINE

## 2020-09-08 PROCEDURE — 99214 PR OFFICE/OUTPT VISIT, EST, LEVL IV, 30-39 MIN: ICD-10-PCS | Mod: HCNC,S$GLB,, | Performed by: INTERNAL MEDICINE

## 2020-09-08 PROCEDURE — 99499 UNLISTED E&M SERVICE: CPT | Mod: ,,, | Performed by: SURGERY

## 2020-09-08 PROCEDURE — 99999 PR PBB SHADOW E&M-EST. PATIENT-LVL III: CPT | Mod: PBBFAC,HCNC,, | Performed by: INTERNAL MEDICINE

## 2020-09-08 PROCEDURE — 99499 NO LOS: ICD-10-PCS | Mod: ,,, | Performed by: SURGERY

## 2020-09-08 PROCEDURE — 99999 PR PBB SHADOW E&M-EST. PATIENT-LVL III: ICD-10-PCS | Mod: PBBFAC,HCNC,, | Performed by: INTERNAL MEDICINE

## 2020-09-08 PROCEDURE — 3078F DIAST BP <80 MM HG: CPT | Mod: HCNC,CPTII,S$GLB, | Performed by: INTERNAL MEDICINE

## 2020-09-08 PROCEDURE — 1159F MED LIST DOCD IN RCRD: CPT | Mod: HCNC,S$GLB,, | Performed by: INTERNAL MEDICINE

## 2020-09-08 NOTE — ASSESSMENT & PLAN NOTE
Will plan to repeat CT scan in 2 weeks (6 weeks from previous Ct scan done 08/10) .  She has completed 2 weeks of Itraconazole and Augmentin -she is not coughing at this time

## 2020-09-08 NOTE — PROGRESS NOTES
Subjective:       Patient ID: Sarah Parker is a 80 y.o. female.    Chief Complaint: Pneumonia follow up   HPI     Previous clinic note - 08/25/2020  80 year old woman with history of CMML and abnormal chest CT scan ,back abscess who was seen in the hospital .  She was discharged on 08/17/2020 with Augmentin and Itraconazole for 14 days .  Fungal serologies - fungal immunodiffusion , fungal beta g;ucan are both negative.  She feels better .      At this time, she denies fever or chills .She was given 2 weeks of Augmentin and Itraconazole and has completed therapy .    Review of Systems   Constitutional: Negative for chills and fatigue.   HENT: Negative for nasal congestion, ear pain, facial swelling, sinus pressure/congestion and sore throat.    Eyes: Negative for pain.   Respiratory: Negative for apnea, chest tightness, shortness of breath and stridor.    Cardiovascular: Negative for chest pain, palpitations and leg swelling.   Gastrointestinal: Negative for abdominal distention, abdominal pain, diarrhea and nausea.   Endocrine: Negative for polydipsia and polyphagia.   Genitourinary: Negative for decreased urine volume, difficulty urinating, frequency and genital sores.   Musculoskeletal: Negative for arthralgias and gait problem.   Neurological: Negative for light-headedness and headaches.   Hematological: Negative for adenopathy.   Psychiatric/Behavioral: Negative for agitation, confusion and decreased concentration.         Objective:      Physical Exam  Vitals signs and nursing note reviewed.   Constitutional:       Appearance: She is well-developed.      Comments: Cachetic    HENT:      Head: Normocephalic and atraumatic.   Eyes:      Pupils: Pupils are equal, round, and reactive to light.   Neck:      Musculoskeletal: Normal range of motion and neck supple.      Thyroid: No thyromegaly.      Trachea: No tracheal deviation.   Cardiovascular:      Rate and Rhythm: Normal rate and regular rhythm.    Pulmonary:      Effort: No respiratory distress.      Breath sounds: No wheezing or rales.   Abdominal:      General: Bowel sounds are normal. There is no distension.      Palpations: Abdomen is soft.      Tenderness: There is no abdominal tenderness.   Skin:     General: Skin is warm and dry.      Coloration: Skin is not pale.   Neurological:      Mental Status: She is alert and oriented to person, place, and time.      Cranial Nerves: No cranial nerve deficit.      Coordination: Coordination normal.      Deep Tendon Reflexes: Reflexes are normal and symmetric.   Psychiatric:         Thought Content: Thought content normal.         Judgment: Judgment normal.         Assessment:         1. Lung infection  CT Chest Without Contrast   2. Chronic myelomonocytic leukemia not having achieved remission     3. Multiple lung nodules on CT     4. Leukemia cutis     5. Rheumatoid arthritis involving right shoulder with positive rheumatoid factor     6. Thrombocytopenia         Plan:       Problem List Items Addressed This Visit     Rheumatoid arthritis (Chronic)     Follow rheumatology          Multiple lung nodules on CT     Will plan to repeat CT scan in 2 weeks (6 weeks from previous Ct scan done 08/10) .  She has completed 2 weeks of Itraconazole and Augmentin -she is not coughing at this time         Thrombocytopenia     Oncology follow up , advised to watch out for bleeding , follow oncology /gen surgery for splenectomy          Leukemia cutis     Oncology follow up          Chronic myelomonocytic leukemia not having achieved remission     Follow oncology            Other Visit Diagnoses     Lung infection    -  Primary    Relevant Orders    CT Chest Without Contrast

## 2020-09-08 NOTE — ASSESSMENT & PLAN NOTE
Oncology follow up , advised to watch out for bleeding , follow oncology /gen surgery for splenectomy

## 2020-09-09 ENCOUNTER — TELEPHONE (OUTPATIENT)
Dept: HEMATOLOGY/ONCOLOGY | Facility: CLINIC | Age: 80
End: 2020-09-09

## 2020-09-09 NOTE — TELEPHONE ENCOUNTER
Spoke to pharmacy clarified prescription ICD 10 code (C93.10) and monthly quantity of 5 tablets.

## 2020-09-09 NOTE — TELEPHONE ENCOUNTER
----- Message from Tomas Honeycutt sent at 9/9/2020  8:09 AM CDT -----  .Type:  Pharmacy Calling to Clarify an RX    Name of Caller: Yoli   Pharmacy Name:     [facility]   [phone] or fax [phone]    Prescription Name: [email or web address]   What do they need to clarify?:  ICD 10 code and qty   Best Call Back Number:  Additional Information:    They have to do a lab claim

## 2020-09-10 ENCOUNTER — HOSPITAL ENCOUNTER (INPATIENT)
Facility: HOSPITAL | Age: 80
LOS: 1 days | Discharge: HOME | DRG: 841 | End: 2020-09-11
Attending: EMERGENCY MEDICINE | Admitting: INTERNAL MEDICINE
Payer: MEDICARE

## 2020-09-10 DIAGNOSIS — C92.10 CML (CHRONIC MYELOID LEUKEMIA) WITH FAILED REMISSION: ICD-10-CM

## 2020-09-10 DIAGNOSIS — D69.6 THROMBOCYTOPENIA: ICD-10-CM

## 2020-09-10 DIAGNOSIS — D64.9 SYMPTOMATIC ANEMIA: Primary | ICD-10-CM

## 2020-09-10 DIAGNOSIS — D69.6 THROMBOCYTOPENIA: Primary | ICD-10-CM

## 2020-09-10 PROBLEM — E87.1 HYPONATREMIA: Status: ACTIVE | Noted: 2020-09-10

## 2020-09-10 PROBLEM — E86.0 DEHYDRATION: Status: ACTIVE | Noted: 2020-09-10

## 2020-09-10 PROBLEM — S21.209A BACK WOUND: Status: ACTIVE | Noted: 2020-09-10

## 2020-09-10 PROBLEM — R53.81 DEBILITY: Status: ACTIVE | Noted: 2020-09-10

## 2020-09-10 LAB
ABO + RH BLD: NORMAL
ALBUMIN SERPL BCP-MCNC: 3.2 G/DL (ref 3.5–5.2)
ALP SERPL-CCNC: 183 U/L (ref 55–135)
ALT SERPL W/O P-5'-P-CCNC: 14 U/L (ref 10–44)
ANION GAP SERPL CALC-SCNC: 10 MMOL/L (ref 8–16)
ANISOCYTOSIS BLD QL SMEAR: SLIGHT
APTT BLDCRRT: 21.7 SEC (ref 21–32)
AST SERPL-CCNC: 17 U/L (ref 10–40)
BASOPHILS # BLD AUTO: ABNORMAL K/UL (ref 0–0.2)
BASOPHILS NFR BLD: 0 % (ref 0–1.9)
BILIRUB SERPL-MCNC: 0.3 MG/DL (ref 0.1–1)
BLD GP AB SCN CELLS X3 SERPL QL: NORMAL
BUN SERPL-MCNC: 36 MG/DL (ref 8–23)
CALCIUM SERPL-MCNC: 8.6 MG/DL (ref 8.7–10.5)
CHLORIDE SERPL-SCNC: 98 MMOL/L (ref 95–110)
CO2 SERPL-SCNC: 23 MMOL/L (ref 23–29)
CREAT SERPL-MCNC: 1.1 MG/DL (ref 0.5–1.4)
DACRYOCYTES BLD QL SMEAR: ABNORMAL
DIFFERENTIAL METHOD: ABNORMAL
EOSINOPHIL # BLD AUTO: ABNORMAL K/UL (ref 0–0.5)
EOSINOPHIL NFR BLD: 0 % (ref 0–8)
ERYTHROCYTE [DISTWIDTH] IN BLOOD BY AUTOMATED COUNT: 18.8 % (ref 11.5–14.5)
EST. GFR  (AFRICAN AMERICAN): 56 ML/MIN/1.73 M^2
EST. GFR  (NON AFRICAN AMERICAN): 48 ML/MIN/1.73 M^2
GLUCOSE SERPL-MCNC: 145 MG/DL (ref 70–110)
HCT VFR BLD AUTO: 17.7 % (ref 37–48.5)
HGB BLD-MCNC: 5.4 G/DL (ref 12–16)
IMM GRANULOCYTES # BLD AUTO: ABNORMAL K/UL (ref 0–0.04)
IMM GRANULOCYTES NFR BLD AUTO: ABNORMAL % (ref 0–0.5)
INR PPP: 1 (ref 0.8–1.2)
LYMPHOCYTES # BLD AUTO: ABNORMAL K/UL (ref 1–4.8)
LYMPHOCYTES NFR BLD: 40 % (ref 18–48)
MCH RBC QN AUTO: 31 PG (ref 27–31)
MCHC RBC AUTO-ENTMCNC: 30.5 G/DL (ref 32–36)
MCV RBC AUTO: 102 FL (ref 82–98)
METAMYELOCYTES NFR BLD MANUAL: 7 %
MONOCYTES # BLD AUTO: ABNORMAL K/UL (ref 0.3–1)
MONOCYTES NFR BLD: 14 % (ref 4–15)
NEUTROPHILS # BLD AUTO: ABNORMAL K/UL (ref 1.8–7.7)
NEUTROPHILS NFR BLD: 25 % (ref 38–73)
NEUTS BAND NFR BLD MANUAL: 13 %
NRBC BLD-RTO: 3 /100 WBC
OVALOCYTES BLD QL SMEAR: ABNORMAL
PLATELET # BLD AUTO: 8 K/UL (ref 150–350)
PMV BLD AUTO: ABNORMAL FL (ref 9.2–12.9)
POIKILOCYTOSIS BLD QL SMEAR: SLIGHT
POLYCHROMASIA BLD QL SMEAR: ABNORMAL
POTASSIUM SERPL-SCNC: 4.3 MMOL/L (ref 3.5–5.1)
PROMYELOCYTES NFR BLD MANUAL: 1 %
PROT SERPL-MCNC: 7.9 G/DL (ref 6–8.4)
PROTHROMBIN TIME: 10.3 SEC (ref 9–12.5)
RBC # BLD AUTO: 1.74 M/UL (ref 4–5.4)
SARS-COV-2 RDRP RESP QL NAA+PROBE: NEGATIVE
SCHISTOCYTES BLD QL SMEAR: PRESENT
SODIUM SERPL-SCNC: 131 MMOL/L (ref 136–145)
SPHEROCYTES BLD QL SMEAR: ABNORMAL
STOMATOCYTES BLD QL SMEAR: PRESENT
WBC # BLD AUTO: 21.74 K/UL (ref 3.9–12.7)

## 2020-09-10 PROCEDURE — 36430 TRANSFUSION BLD/BLD COMPNT: CPT | Mod: HCNC

## 2020-09-10 PROCEDURE — 85610 PROTHROMBIN TIME: CPT | Mod: HCNC

## 2020-09-10 PROCEDURE — 85027 COMPLETE CBC AUTOMATED: CPT | Mod: HCNC

## 2020-09-10 PROCEDURE — U0002 COVID-19 LAB TEST NON-CDC: HCPCS | Mod: HCNC

## 2020-09-10 PROCEDURE — 25000003 PHARM REV CODE 250: Mod: HCNC | Performed by: INTERNAL MEDICINE

## 2020-09-10 PROCEDURE — 99291 CRITICAL CARE FIRST HOUR: CPT | Mod: 25,HCNC

## 2020-09-10 PROCEDURE — P9016 RBC LEUKOCYTES REDUCED: HCPCS | Mod: HCNC

## 2020-09-10 PROCEDURE — 85730 THROMBOPLASTIN TIME PARTIAL: CPT | Mod: HCNC

## 2020-09-10 PROCEDURE — 11000001 HC ACUTE MED/SURG PRIVATE ROOM: Mod: HCNC

## 2020-09-10 PROCEDURE — 25000003 PHARM REV CODE 250: Mod: HCNC | Performed by: NURSE PRACTITIONER

## 2020-09-10 PROCEDURE — 86920 COMPATIBILITY TEST SPIN: CPT | Mod: HCNC

## 2020-09-10 PROCEDURE — 86850 RBC ANTIBODY SCREEN: CPT | Mod: HCNC

## 2020-09-10 PROCEDURE — 36415 COLL VENOUS BLD VENIPUNCTURE: CPT | Mod: HCNC

## 2020-09-10 PROCEDURE — 85007 BL SMEAR W/DIFF WBC COUNT: CPT | Mod: HCNC

## 2020-09-10 PROCEDURE — 80053 COMPREHEN METABOLIC PANEL: CPT | Mod: HCNC

## 2020-09-10 RX ORDER — LEVOTHYROXINE SODIUM 88 UG/1
88 TABLET ORAL
Status: DISCONTINUED | OUTPATIENT
Start: 2020-09-11 | End: 2020-09-11 | Stop reason: HOSPADM

## 2020-09-10 RX ORDER — HYDROCODONE BITARTRATE AND ACETAMINOPHEN 500; 5 MG/1; MG/1
TABLET ORAL ONCE
Status: CANCELLED | OUTPATIENT
Start: 2020-09-10 | End: 2020-09-10

## 2020-09-10 RX ORDER — ALBUTEROL SULFATE 0.83 MG/ML
2.5 SOLUTION RESPIRATORY (INHALATION) EVERY 4 HOURS PRN
Status: DISCONTINUED | OUTPATIENT
Start: 2020-09-10 | End: 2020-09-11 | Stop reason: HOSPADM

## 2020-09-10 RX ORDER — LANOLIN ALCOHOL/MO/W.PET/CERES
400 CREAM (GRAM) TOPICAL 2 TIMES DAILY
Status: DISCONTINUED | OUTPATIENT
Start: 2020-09-10 | End: 2020-09-11 | Stop reason: HOSPADM

## 2020-09-10 RX ORDER — HYDROCODONE BITARTRATE AND ACETAMINOPHEN 500; 5 MG/1; MG/1
TABLET ORAL
Status: DISCONTINUED | OUTPATIENT
Start: 2020-09-10 | End: 2020-09-11 | Stop reason: HOSPADM

## 2020-09-10 RX ORDER — ONDANSETRON 4 MG/1
4 TABLET, FILM COATED ORAL EVERY 4 HOURS PRN
Status: DISCONTINUED | OUTPATIENT
Start: 2020-09-10 | End: 2020-09-11 | Stop reason: HOSPADM

## 2020-09-10 RX ORDER — HYDROCODONE BITARTRATE AND ACETAMINOPHEN 500; 5 MG/1; MG/1
TABLET ORAL ONCE
Status: DISCONTINUED | OUTPATIENT
Start: 2020-09-10 | End: 2020-09-11 | Stop reason: HOSPADM

## 2020-09-10 RX ORDER — DULOXETIN HYDROCHLORIDE 20 MG/1
40 CAPSULE, DELAYED RELEASE ORAL NIGHTLY
Status: DISCONTINUED | OUTPATIENT
Start: 2020-09-10 | End: 2020-09-11 | Stop reason: HOSPADM

## 2020-09-10 RX ORDER — HYDROCODONE BITARTRATE AND ACETAMINOPHEN 5; 325 MG/1; MG/1
1 TABLET ORAL EVERY 6 HOURS PRN
Status: DISCONTINUED | OUTPATIENT
Start: 2020-09-10 | End: 2020-09-11 | Stop reason: HOSPADM

## 2020-09-10 RX ORDER — SODIUM CHLORIDE 0.9 % (FLUSH) 0.9 %
10 SYRINGE (ML) INJECTION
Status: DISCONTINUED | OUTPATIENT
Start: 2020-09-10 | End: 2020-09-11 | Stop reason: HOSPADM

## 2020-09-10 RX ORDER — ACETAMINOPHEN 325 MG/1
650 TABLET ORAL EVERY 4 HOURS PRN
Status: DISCONTINUED | OUTPATIENT
Start: 2020-09-10 | End: 2020-09-11 | Stop reason: HOSPADM

## 2020-09-10 RX ORDER — TAMSULOSIN HYDROCHLORIDE 0.4 MG/1
0.4 CAPSULE ORAL DAILY
Status: DISCONTINUED | OUTPATIENT
Start: 2020-09-11 | End: 2020-09-11 | Stop reason: HOSPADM

## 2020-09-10 RX ORDER — POLYETHYLENE GLYCOL 3350 17 G/17G
17 POWDER, FOR SOLUTION ORAL 2 TIMES DAILY PRN
Status: DISCONTINUED | OUTPATIENT
Start: 2020-09-10 | End: 2020-09-11 | Stop reason: HOSPADM

## 2020-09-10 RX ORDER — MECLIZINE HYDROCHLORIDE 25 MG/1
25 TABLET ORAL 3 TIMES DAILY PRN
Status: DISCONTINUED | OUTPATIENT
Start: 2020-09-10 | End: 2020-09-11 | Stop reason: HOSPADM

## 2020-09-10 RX ORDER — FLUTICASONE PROPIONATE 50 MCG
2 SPRAY, SUSPENSION (ML) NASAL DAILY
Status: DISCONTINUED | OUTPATIENT
Start: 2020-09-11 | End: 2020-09-11 | Stop reason: HOSPADM

## 2020-09-10 RX ORDER — HYDROXYZINE HYDROCHLORIDE 25 MG/1
25 TABLET, FILM COATED ORAL NIGHTLY
Status: DISCONTINUED | OUTPATIENT
Start: 2020-09-10 | End: 2020-09-11 | Stop reason: HOSPADM

## 2020-09-10 RX ORDER — PRAVASTATIN SODIUM 10 MG/1
10 TABLET ORAL NIGHTLY
Status: DISCONTINUED | OUTPATIENT
Start: 2020-09-10 | End: 2020-09-11 | Stop reason: HOSPADM

## 2020-09-10 RX ORDER — CALCIUM CARBONATE/VITAMIN D3 250-3.125
1 TABLET ORAL DAILY
Status: DISCONTINUED | OUTPATIENT
Start: 2020-09-11 | End: 2020-09-11 | Stop reason: HOSPADM

## 2020-09-10 RX ADMIN — HYDROCODONE BITARTRATE AND ACETAMINOPHEN 1 TABLET: 5; 325 TABLET ORAL at 08:09

## 2020-09-10 RX ADMIN — PRAVASTATIN SODIUM 10 MG: 10 TABLET ORAL at 08:09

## 2020-09-10 RX ADMIN — AMITRIPTYLINE HYDROCHLORIDE 75 MG: 50 TABLET, FILM COATED ORAL at 08:09

## 2020-09-10 RX ADMIN — ACETAMINOPHEN 650 MG: 325 TABLET ORAL at 08:09

## 2020-09-10 RX ADMIN — DULOXETINE HYDROCHLORIDE 40 MG: 20 CAPSULE, DELAYED RELEASE ORAL at 08:09

## 2020-09-10 RX ADMIN — Medication 400 MG: at 08:09

## 2020-09-10 RX ADMIN — HYDROXYZINE HYDROCHLORIDE 25 MG: 25 TABLET, FILM COATED ORAL at 08:09

## 2020-09-10 NOTE — HPI
This is a 80-year-old female who has a history of chronic myelomonocytic anemia requiring frequent packed red blood cell and platelet transfusion, hypertension, hiatal hernia with acid reflux, hypothyroidism, rheumatoid arthritis, macular degeneration, and hyperlipidemia.  Patient reports frequent transfusions usually it least 1 every 2-3 weeks.  Her last transfusion was approximately a week and a half ago.  Per report, patient was sent by Dr. Knox for platelet infusion, because infusion center upstairs could not accommodate her.  Patient has been placed in the hospital for overnight monitoring and packed red blood cell and platelet transfusion.

## 2020-09-10 NOTE — ED PROVIDER NOTES
Per SHONNA Muñoz: Pt sent by Dr. Knox for platelet infusion, because infusion center upstairs could not accommodate her.    SCRIBE #1 NOTE: I, Carlitos Doss, am scribing for, and in the presence of, Luanne Zhang MD. I have scribed the entire note.      History      Chief Complaint   Patient presents with    Anemia     Dizziness and fatigue. Sent by MD for blood & platelet transfusion       Review of patient's allergies indicates:   Allergen Reactions    Codeine      Other reaction(s): hyper  Other reaction(s): hyper    Gabapentin Other (See Comments)     Bad dreams        HPI   HPI    9/10/2020, 2:48 PM   History obtained from the patient      History of Present Illness: Sarah Parker is a 80 y.o. female patient who presents to the Emergency Department for anemia. Pt was referred to the ED by Dr. Knox (Hem/Onc) for further evaluation and blood transfusion. Pt had a platelet count of 18 and an H/H of 8.3/27.6 on 9/4/20. Symptoms are constant and moderate in severity. No mitigating or exacerbating factors reported. Associated sxs include generalized weakness. Patient denies any head trauma/falls, fever, chills, blood in stool, n/v/d, SOB, CP, numbness, dizziness, headache, and all other sxs at this time. No further complaints or concerns at this time.     Arrival mode: Personal vehicle    PCP: Briseida Bennett MD       Past Medical History:  Past Medical History:   Diagnosis Date    Acid reflux     Anxiety     Back pain     Bronchitis, chronic obstructive w acute bronchitis 7/29/2016    Cancer     [facility] arms, face- Dr. Lata Tejada    Cataract     2+NS    Chronic myelomonocytic leukemia     Degenerative disc disease     Depression     Depression     Dry mouth     Encounter for blood transfusion     Hernia, hiatal 11/18/2013    Hypertension     Hypothyroid     Hypothyroidism     Iron deficiency anemia     Macrocytic anemia 5/3/2016    Macular degeneration      Migraines     Mixed anxiety and depressive disorder     Multiple fractures of ribs of right side     Osteoporosis     Other hyperlipidemia 10/11/2019    Pneumonia     Pneumonia due to other staphylococcus     Rheumatoid arthritis     Rheumatoid arthritis(714.0)     Rheumatoid arthritis(714.0)     Remicade, MTX.       Past Surgical History:  Past Surgical History:   Procedure Laterality Date    APPENDECTOMY  1985    APPLICATION OF WOUND VACUUM-ASSISTED CLOSURE DEVICE N/A 5/14/2020    Procedure: APPLICATION, WOUND VAC;  Surgeon: Mckinley Shannon MD;  Location: [facility] OR;  Service: General;  Laterality: N/A;    APPLICATION OF WOUND VACUUM-ASSISTED CLOSURE DEVICE Left 7/25/2020    Procedure: APPLICATION, WOUND VAC;  Surgeon: Berenice Alfrao MD;  Location: [facility] OR;  Service: General;  Laterality: Left;    BREAST BIOPSY      CATARACT EXTRACTION Bilateral 6/11/15    Dr. Booth    CHOLECYSTECTOMY  2013    cryoablasion kidney Left 09/27/2016    DEBRIDEMENT Left 7/25/2020    Procedure: DEBRIDEMENT;  Surgeon: Berenice Alfaro MD;  Location: [facility] OR;  Service: General;  Laterality: Left;    EVACUATION OF HEMATOMA Left 7/25/2020    Procedure: EVACUATION, HEMATOMA;  Surgeon: Berenice Alfaro MD;  Location: [facility] OR;  Service: General;  Laterality: Left;    EXCISION OF SQUAMOUS CELL CARCINOMA Left 7/2/2020    Procedure: EXCISION, CARCINOMA, SQUAMOUS CELL;  Surgeon: Mckinley Shannon MD;  Location: [facility] OR;  Service: General;  Laterality: Left;    feet Bilateral     rheumatoid    FRACTURE SURGERY Right     tibia    HERNIA REPAIR      HYSTERECTOMY  1970    partial    INCISION AND DRAINAGE OF ABSCESS N/A 5/12/2020    Procedure: INCISION AND DRAINAGE, ABSCESS;  Surgeon: Emerson Hathaway MD;  Location: [facility] OR;  Service: General;  Laterality: N/A;    INCISIONAL BIOPSY N/A 5/12/2020    Procedure: INCISIONAL BIOPSY;  Surgeon: Emerson Hathaway MD;  Location: [facility] OR;  Service: General;  Laterality: N/A;    JOINT  REPLACEMENT      bilateral knees (), hands, wrists, knuckles, toes    LAPAROSCOPIC NISSEN FUNDOPLICATION      TRANSFORAMINAL EPIDURAL INJECTION OF STEROID Left 2019    Procedure: Left L5/S1 TF AYAAN with local;  Surgeon: Rowdy Felix MD;  Location: [facility];  Service: Pain Management;  Laterality: Left;    WOUND DEBRIDEMENT N/A 2020    Procedure: DEBRIDEMENT, WOUND;  Surgeon: Mckinley Shannon MD;  Location: [facility] OR;  Service: General;  Laterality: N/A;    WOUND DEBRIDEMENT Left 2020    Procedure: DEBRIDEMENT, WOUND;  Surgeon: Berenice Alfaro MD;  Location: [facility] OR;  Service: General;  Laterality: Left;         Family History:  Family History   Problem Relation Age of Onset    Heart disease Mother     Hyperlipidemia Mother     Hypertension Mother     Osteoarthritis Mother     Cataracts Mother     Hypertension Father     Osteoarthritis Father     Heart disease Father     Asthma Sister     Chronic back pain Sister     Hypertension Sister     Osteoarthritis Sister     Thyroid disease Sister     Asthma Brother     Cancer Brother     Chronic back pain Brother     Diabetes Mellitus Brother     Hypertension Brother     Osteoarthritis Brother     Thyroid disease Brother     Cancer Maternal Grandfather     Fibromyalgia Daughter     Heart disease Maternal Grandmother     Colon cancer Neg Hx     Diabetes Neg Hx        Social History:  Social History     Tobacco Use    Smoking status: Former Smoker     Packs/day: 0.25     Years: 2.00     Pack years: 0.50     Quit date: 1965     Years since quittin.8    Smokeless tobacco: Never Used   Substance and Sexual Activity    Alcohol use: No    Drug use: No    Sexual activity: Never     Partners: Male       ROS   Review of Systems   Constitutional: Negative for chills, diaphoresis, fatigue and fever.   HENT: Negative for sore throat.    Respiratory: Negative for shortness of breath.    Cardiovascular: Negative for chest  pain.   Gastrointestinal: Negative for diarrhea, nausea and vomiting.   Genitourinary: Negative for dysuria.   Musculoskeletal: Negative for back pain.   Skin: Negative for rash.   Neurological: Positive for weakness (generalized). Negative for dizziness, seizures, light-headedness, numbness and headaches.   Hematological: Does not bruise/bleed easily.   All other systems reviewed and are negative.    Physical Exam      Initial Vitals [09/10/20 1243]   BP Pulse Resp Temp SpO2   114/64 98 18 98.2 °F (36.8 °C) 96 %      MAP       --          Physical Exam  Nursing Notes and Vital Signs Reviewed.  Constitutional: Patient is in no acute distress. Well-developed and well-nourished.  Head: Atraumatic. Normocephalic.  Eyes: PERRL. EOM intact. Conjunctivae are not pale. No scleral icterus.  ENT: Mucous membranes are moist. Oropharynx is clear and symmetric.    Neck: Supple. Full ROM. No lymphadenopathy.  Cardiovascular: Regular rate. Regular rhythm. No murmurs, rubs, or gallops. Distal pulses are 2+ and symmetric.  Pulmonary/Chest: No respiratory distress. Clear to auscultation bilaterally. No wheezing or rales.  Abdominal: Soft and non-distended.  There is no tenderness.  No rebound, guarding, or rigidity.   Musculoskeletal: Moves all extremities. No obvious deformities. No edema.  Skin: Warm and dry. Pale.  Neurological:  Alert, awake, and appropriate.  Normal speech.  No acute focal neurological deficits are appreciated.  Psychiatric: Normal affect. Good eye contact. Appropriate in content.    ED Course    Critical Care    Date/Time: 9/10/2020 3:40 PM  Performed by: Luanne Zhang MD  Authorized by: Luanne Zhang MD   Direct patient critical care time: 15 minutes  Additional history critical care time: 5 minutes  Ordering / reviewing critical care time: 10 minutes  Documentation critical care time: 5 minutes  Consulting other physicians critical care time: 5 minutes  Total critical care time (exclusive of  "procedural time) : 40 minutes  Critical care time was exclusive of separately billable procedures and treating other patients and teaching time.  Critical care was necessary to treat or prevent imminent or life-threatening deterioration of the following conditions: Symptomatic anemia.  Critical care was time spent personally by me on the following activities: blood draw for specimens, development of treatment plan with patient or surrogate, discussions with consultants, interpretation of cardiac output measurements, evaluation of patient's response to treatment, examination of patient, obtaining history from patient or surrogate, ordering and performing treatments and interventions, ordering and review of laboratory studies, ordering and review of radiographic studies, pulse oximetry, re-evaluation of patient's condition and review of old charts.        ED Vital Signs:  Vitals:    09/10/20 1243 09/10/20 1517 09/10/20 1626 09/10/20 1635   BP: 114/64 (!) 142/68 (!) 154/82 (!) 145/65   Pulse: 98  76 77   Resp: 18  16 16   Temp: 98.2 °F (36.8 °C)  98.9 °F (37.2 °C) 98.8 °F (37.1 °C)   TempSrc: Oral      SpO2: 96%  99% 100%   Height: 5' 2" (1.575 m)       09/10/20 1636   BP: (!) 145/65   Pulse: 75   Resp: 16   Temp: 98.8 °F (37.1 °C)   TempSrc: Oral   SpO2: 100%   Height:        Abnormal Lab Results:  Labs Reviewed   CBC W/ AUTO DIFFERENTIAL - Abnormal; Notable for the following components:       Result Value    WBC 21.74 (*)     RBC 1.74 (*)     Hemoglobin 5.4 (*)     Hematocrit 17.7 (*)     Mean Corpuscular Volume 102 (*)     Mean Corpuscular Hemoglobin Conc 30.5 (*)     RDW 18.8 (*)     Platelets 8 (*)     nRBC 3 (*)     Gran% 25.0 (*)     All other components within normal limits    Narrative:      H/H PLT  critical result(s) called and verbal readback obtained from   RODY DENNY RN by CHRISTOPHER 09/10/2020 15:00   COMPREHENSIVE METABOLIC PANEL - Abnormal; Notable for the following components:    Sodium 131 (*)     " Glucose 145 (*)     BUN, Bld 36 (*)     Calcium 8.6 (*)     Albumin 3.2 (*)     Alkaline Phosphatase 183 (*)     eGFR if  56 (*)     eGFR if non  48 (*)     All other components within normal limits   APTT   PROTIME-INR   SARS-COV-2 RNA AMPLIFICATION, QUAL    Narrative:     Needs admit   TYPE & SCREEN   PREPARE RBC SOFT   PREPARE PLATELETS (DOSE) SOFT        All Lab Results:  Results for orders placed or performed during the hospital encounter of 09/10/20   APTT   Result Value Ref Range    aPTT 21.7 21.0 - 32.0 sec   CBC auto differential   Result Value Ref Range    WBC 21.74 (H) 3.90 - 12.70 K/uL    RBC 1.74 (L) 4.00 - 5.40 M/uL    Hemoglobin 5.4 (LL) 12.0 - 16.0 g/dL    Hematocrit 17.7 (LL) 37.0 - 48.5 %    Mean Corpuscular Volume 102 (H) 82 - 98 fL    Mean Corpuscular Hemoglobin 31.0 27.0 - 31.0 pg    Mean Corpuscular Hemoglobin Conc 30.5 (L) 32.0 - 36.0 g/dL    RDW 18.8 (H) 11.5 - 14.5 %    Platelets 8 (LL) 150 - 350 K/uL    MPV SEE COMMENT 9.2 - 12.9 fL    Immature Granulocytes Test Not Performed 0.0 - 0.5 %    Gran # (ANC) Test Not Performed 1.8 - 7.7 K/uL    Immature Grans (Abs) Test Not Performed 0.00 - 0.04 K/uL    Lymph # Test Not Performed 1.0 - 4.8 K/uL    Mono # Test Not Performed 0.3 - 1.0 K/uL    Eos # Test Not Performed 0.0 - 0.5 K/uL    Baso # Test Not Performed 0.00 - 0.20 K/uL    nRBC 3 (A) 0 /100 WBC    Gran% 25.0 (L) 38.0 - 73.0 %    Lymph% 40.0 18.0 - 48.0 %    Mono% 14.0 4.0 - 15.0 %    Eosinophil% 0.0 0.0 - 8.0 %    Basophil% 0.0 0.0 - 1.9 %    Bands 13.0 %    Metamyelocytes 7.0 %    Promyelocytes 1.0 %    Aniso Slight     Poik Slight     Poly Occasional     Ovalocytes Occasional     Tear Drop Cells Occasional     Stomatocytes Present     Spherocytes Occasional     Schistocytes Present     Differential Method Manual    Comprehensive metabolic panel   Result Value Ref Range    Sodium 131 (L) 136 - 145 mmol/L    Potassium 4.3 3.5 - 5.1 mmol/L    Chloride 98 95 -  110 mmol/L    CO2 23 23 - 29 mmol/L    Glucose 145 (H) 70 - 110 mg/dL    BUN, Bld 36 (H) 8 - 23 mg/dL    Creatinine 1.1 0.5 - 1.4 mg/dL    Calcium 8.6 (L) 8.7 - 10.5 mg/dL    Total Protein 7.9 6.0 - 8.4 g/dL    Albumin 3.2 (L) 3.5 - 5.2 g/dL    Total Bilirubin 0.3 0.1 - 1.0 mg/dL    Alkaline Phosphatase 183 (H) 55 - 135 U/L    AST 17 10 - 40 U/L    ALT 14 10 - 44 U/L    Anion Gap 10 8 - 16 mmol/L    eGFR if African American 56 (A) >60 mL/min/1.73 m^2    eGFR if non African American 48 (A) >60 mL/min/1.73 m^2   Protime-INR   Result Value Ref Range    Prothrombin Time 10.3 9.0 - 12.5 sec    INR 1.0 0.8 - 1.2   COVID-19 Rapid Screening   Result Value Ref Range    SARS-CoV-2 RNA, Amplification, Qual Negative Negative   Type & Screen   Result Value Ref Range    Group & Rh O POS     Indirect Saw NEG    Prepare RBC 2 Units; symptomatic anemia   Result Value Ref Range    UNIT NUMBER [id number]     Product Code N2656R20     DISPENSE STATUS ISSUED     CODING SYSTEM [id number]     Unit Blood Type Code 5100     Unit Blood Type O POS     Unit Expiration [id number]     UNIT NUMBER [id number]     Product Code [id number]     DISPENSE STATUS CROSSMATCHED     CODING SYSTEM [id number]     Unit Blood Type Code 5100     Unit Blood Type O POS     Unit Expiration [id number]    Prepare Platelets 1 Dose   Result Value Ref Range    UNIT NUMBER [id number]     Product Code [id number]     DISPENSE STATUS CROSSMATCHED     CODING SYSTEM [id number]     Unit Blood Type Code 6200     Unit Blood Type A POS     Unit Expiration [id number]      *Note: Due to a large number of results and/or encounters for the requested time period, some results have not been displayed. A complete set of results can be found in Results Review.     Imaging Results:  Imaging Results    None                 The Emergency Provider reviewed the vital signs and test results, which are outlined above.    ED Discussion     4:41 PM: Discussed case with Liz Matthews NP  ([facility]). Dr. Benavides agrees with current care and management of pt and accepts admission.   Admitting Service: Hospital Medicine  Admitting Physician: Dr. Benavides  Admit to: Inpatient Tele    4:42 PM: Re-evaluated pt. I have discussed test results, shared treatment plan, and the need for admission with patient and family at bedside. Pt and family express understanding at this time and agree with all information. All questions answered. Pt and family have no further questions or concerns at this time. Pt is ready for admit.           ED Medication(s):  Medications   0.9%  NaCl infusion (for blood administration) (has no administration in time range)   0.9%  NaCl infusion (for blood administration) (has no administration in time range)          New Prescriptions    No medications on file         Medical Decision Making    Medical Decision Making:   Clinical Tests:   Lab Tests: Ordered and Reviewed           Scribe Attestation:   Scribe #1: I performed the above scribed service and the documentation accurately describes the services I performed. I attest to the accuracy of the note.    Attending:   Physician Attestation Statement for Scribe #1: I, Luanne Zhang MD, personally performed the services described in this documentation, as scribed by Carlitos Doss, in my presence, and it is both accurate and complete.          Clinical Impression       ICD-10-CM ICD-9-CM   1. Symptomatic anemia  D64.9 285.9   2. Thrombocytopenia  D69.6 287.5   3. CML (chronic myeloid leukemia) with failed remission  C92.10 205.10       Disposition:   Disposition: Admitted  Condition: Fair         Luanne Zhang MD  09/10/20 5963

## 2020-09-11 VITALS
RESPIRATION RATE: 16 BRPM | SYSTOLIC BLOOD PRESSURE: 176 MMHG | HEIGHT: 62 IN | TEMPERATURE: 98 F | OXYGEN SATURATION: 100 % | BODY MASS INDEX: 20.37 KG/M2 | WEIGHT: 110.69 LBS | HEART RATE: 82 BPM | DIASTOLIC BLOOD PRESSURE: 77 MMHG

## 2020-09-11 PROBLEM — D64.9 SYMPTOMATIC ANEMIA: Status: RESOLVED | Noted: 2020-09-10 | Resolved: 2020-09-11

## 2020-09-11 PROBLEM — E86.0 DEHYDRATION: Status: RESOLVED | Noted: 2020-09-10 | Resolved: 2020-09-11

## 2020-09-11 LAB
ANION GAP SERPL CALC-SCNC: 8 MMOL/L (ref 8–16)
ANISOCYTOSIS BLD QL SMEAR: SLIGHT
ANISOCYTOSIS BLD QL SMEAR: SLIGHT
BASOPHILS # BLD AUTO: ABNORMAL K/UL (ref 0–0.2)
BASOPHILS # BLD AUTO: ABNORMAL K/UL (ref 0–0.2)
BASOPHILS NFR BLD: 0 % (ref 0–1.9)
BASOPHILS NFR BLD: 0 % (ref 0–1.9)
BLASTS NFR BLD MANUAL: 2 %
BLD PROD TYP BPU: NORMAL
BLOOD UNIT EXPIRATION DATE: NORMAL
BLOOD UNIT TYPE CODE: 5100
BLOOD UNIT TYPE CODE: 5100
BLOOD UNIT TYPE CODE: 6200
BLOOD UNIT TYPE: NORMAL
BUN SERPL-MCNC: 28 MG/DL (ref 8–23)
CALCIUM SERPL-MCNC: 8.9 MG/DL (ref 8.7–10.5)
CHLORIDE SERPL-SCNC: 100 MMOL/L (ref 95–110)
CO2 SERPL-SCNC: 27 MMOL/L (ref 23–29)
CODING SYSTEM: NORMAL
CREAT SERPL-MCNC: 0.8 MG/DL (ref 0.5–1.4)
DACRYOCYTES BLD QL SMEAR: ABNORMAL
DACRYOCYTES BLD QL SMEAR: ABNORMAL
DIFFERENTIAL METHOD: ABNORMAL
DIFFERENTIAL METHOD: ABNORMAL
DISPENSE STATUS: NORMAL
EOSINOPHIL # BLD AUTO: ABNORMAL K/UL (ref 0–0.5)
EOSINOPHIL # BLD AUTO: ABNORMAL K/UL (ref 0–0.5)
EOSINOPHIL NFR BLD: 1 % (ref 0–8)
EOSINOPHIL NFR BLD: 2 % (ref 0–8)
ERYTHROCYTE [DISTWIDTH] IN BLOOD BY AUTOMATED COUNT: 24 % (ref 11.5–14.5)
ERYTHROCYTE [DISTWIDTH] IN BLOOD BY AUTOMATED COUNT: 25.2 % (ref 11.5–14.5)
EST. GFR  (AFRICAN AMERICAN): >60 ML/MIN/1.73 M^2
EST. GFR  (NON AFRICAN AMERICAN): >60 ML/MIN/1.73 M^2
GLUCOSE SERPL-MCNC: 100 MG/DL (ref 70–110)
HCT VFR BLD AUTO: 21 % (ref 37–48.5)
HCT VFR BLD AUTO: 27.6 % (ref 37–48.5)
HGB BLD-MCNC: 6.6 G/DL (ref 12–16)
HGB BLD-MCNC: 8.8 G/DL (ref 12–16)
IMM GRANULOCYTES # BLD AUTO: ABNORMAL K/UL (ref 0–0.04)
IMM GRANULOCYTES # BLD AUTO: ABNORMAL K/UL (ref 0–0.04)
IMM GRANULOCYTES NFR BLD AUTO: ABNORMAL % (ref 0–0.5)
IMM GRANULOCYTES NFR BLD AUTO: ABNORMAL % (ref 0–0.5)
LYMPHOCYTES # BLD AUTO: ABNORMAL K/UL (ref 1–4.8)
LYMPHOCYTES # BLD AUTO: ABNORMAL K/UL (ref 1–4.8)
LYMPHOCYTES NFR BLD: 39 % (ref 18–48)
LYMPHOCYTES NFR BLD: 46 % (ref 18–48)
MAGNESIUM SERPL-MCNC: 1.9 MG/DL (ref 1.6–2.6)
MCH RBC QN AUTO: 27.3 PG (ref 27–31)
MCH RBC QN AUTO: 28.1 PG (ref 27–31)
MCHC RBC AUTO-ENTMCNC: 31.4 G/DL (ref 32–36)
MCHC RBC AUTO-ENTMCNC: 31.9 G/DL (ref 32–36)
MCV RBC AUTO: 86 FL (ref 82–98)
MCV RBC AUTO: 89 FL (ref 82–98)
METAMYELOCYTES NFR BLD MANUAL: 5 %
METAMYELOCYTES NFR BLD MANUAL: 6 %
MONOCYTES # BLD AUTO: ABNORMAL K/UL (ref 0.3–1)
MONOCYTES # BLD AUTO: ABNORMAL K/UL (ref 0.3–1)
MONOCYTES NFR BLD: 10 % (ref 4–15)
MONOCYTES NFR BLD: 10 % (ref 4–15)
MYELOCYTES NFR BLD MANUAL: 5 %
MYELOCYTES NFR BLD MANUAL: 9 %
NEUTROPHILS NFR BLD: 21 % (ref 38–73)
NEUTROPHILS NFR BLD: 26 % (ref 38–73)
NEUTS BAND NFR BLD MANUAL: 7 %
NEUTS BAND NFR BLD MANUAL: 9 %
NRBC BLD-RTO: 3 /100 WBC
NRBC BLD-RTO: 3 /100 WBC
NUM UNITS TRANS PACKED RBC: NORMAL
NUM UNITS TRANS PACKED RBC: NORMAL
OVALOCYTES BLD QL SMEAR: ABNORMAL
OVALOCYTES BLD QL SMEAR: ABNORMAL
PLATELET # BLD AUTO: 15 K/UL (ref 150–350)
PLATELET # BLD AUTO: 49 K/UL (ref 150–350)
PLATELET BLD QL SMEAR: ABNORMAL
PLATELET BLD QL SMEAR: ABNORMAL
PMV BLD AUTO: ABNORMAL FL (ref 9.2–12.9)
PMV BLD AUTO: ABNORMAL FL (ref 9.2–12.9)
POIKILOCYTOSIS BLD QL SMEAR: SLIGHT
POIKILOCYTOSIS BLD QL SMEAR: SLIGHT
POLYCHROMASIA BLD QL SMEAR: ABNORMAL
POLYCHROMASIA BLD QL SMEAR: ABNORMAL
POTASSIUM SERPL-SCNC: 3.9 MMOL/L (ref 3.5–5.1)
PROMYELOCYTES NFR BLD MANUAL: 2 %
RBC # BLD AUTO: 2.35 M/UL (ref 4–5.4)
RBC # BLD AUTO: 3.22 M/UL (ref 4–5.4)
SCHISTOCYTES BLD QL SMEAR: PRESENT
SCHISTOCYTES BLD QL SMEAR: PRESENT
SODIUM SERPL-SCNC: 135 MMOL/L (ref 136–145)
SPHEROCYTES BLD QL SMEAR: ABNORMAL
SPHEROCYTES BLD QL SMEAR: ABNORMAL
STOMATOCYTES BLD QL SMEAR: PRESENT
STOMATOCYTES BLD QL SMEAR: PRESENT
TRANS PLATPHERESIS VOL PATIENT: NORMAL ML
WBC # BLD AUTO: 21.84 K/UL (ref 3.9–12.7)
WBC # BLD AUTO: 22.35 K/UL (ref 3.9–12.7)

## 2020-09-11 PROCEDURE — 25000003 PHARM REV CODE 250: Mod: HCNC | Performed by: NURSE PRACTITIONER

## 2020-09-11 PROCEDURE — 85007 BL SMEAR W/DIFF WBC COUNT: CPT | Mod: HCNC

## 2020-09-11 PROCEDURE — P9035 PLATELET PHERES LEUKOREDUCED: HCPCS | Mod: HCNC

## 2020-09-11 PROCEDURE — 25000003 PHARM REV CODE 250: Mod: HCNC | Performed by: FAMILY MEDICINE

## 2020-09-11 PROCEDURE — 36430 TRANSFUSION BLD/BLD COMPNT: CPT | Mod: HCNC

## 2020-09-11 PROCEDURE — 83735 ASSAY OF MAGNESIUM: CPT | Mod: HCNC

## 2020-09-11 PROCEDURE — 25500020 PHARM REV CODE 255: Mod: HCNC | Performed by: FAMILY MEDICINE

## 2020-09-11 PROCEDURE — P9016 RBC LEUKOCYTES REDUCED: HCPCS | Mod: HCNC

## 2020-09-11 PROCEDURE — 99223 1ST HOSP IP/OBS HIGH 75: CPT | Mod: HCNC,,, | Performed by: INTERNAL MEDICINE

## 2020-09-11 PROCEDURE — 36415 COLL VENOUS BLD VENIPUNCTURE: CPT | Mod: HCNC

## 2020-09-11 PROCEDURE — 80048 BASIC METABOLIC PNL TOTAL CA: CPT | Mod: HCNC

## 2020-09-11 PROCEDURE — 99223 PR INITIAL HOSPITAL CARE,LEVL III: ICD-10-PCS | Mod: HCNC,,, | Performed by: INTERNAL MEDICINE

## 2020-09-11 PROCEDURE — 85027 COMPLETE CBC AUTOMATED: CPT | Mod: HCNC

## 2020-09-11 RX ORDER — METOPROLOL SUCCINATE 50 MG/1
50 TABLET, EXTENDED RELEASE ORAL DAILY
Status: DISCONTINUED | OUTPATIENT
Start: 2020-09-11 | End: 2020-09-11 | Stop reason: HOSPADM

## 2020-09-11 RX ORDER — VALSARTAN AND HYDROCHLOROTHIAZIDE 80; 12.5 MG/1; MG/1
1 TABLET, FILM COATED ORAL DAILY
Status: DISCONTINUED | OUTPATIENT
Start: 2020-09-11 | End: 2020-09-11

## 2020-09-11 RX ORDER — HYDROCHLOROTHIAZIDE 12.5 MG/1
12.5 TABLET ORAL DAILY
Status: DISCONTINUED | OUTPATIENT
Start: 2020-09-11 | End: 2020-09-11 | Stop reason: HOSPADM

## 2020-09-11 RX ORDER — CANDESARTAN 4 MG/1
8 TABLET ORAL DAILY
Status: DISCONTINUED | OUTPATIENT
Start: 2020-09-11 | End: 2020-09-11 | Stop reason: HOSPADM

## 2020-09-11 RX ADMIN — Medication 400 MG: at 10:09

## 2020-09-11 RX ADMIN — CALCIUM CARBONATE-CHOLECALCIFEROL TAB 250 MG-125 UNIT 1 TABLET: 250-125 TAB at 10:09

## 2020-09-11 RX ADMIN — HYDROCHLOROTHIAZIDE 12.5 MG: 12.5 TABLET ORAL at 12:09

## 2020-09-11 RX ADMIN — METOPROLOL SUCCINATE 50 MG: 50 TABLET, EXTENDED RELEASE ORAL at 12:09

## 2020-09-11 RX ADMIN — CANDESARTAN CILEXETIL 8 MG: 4 TABLET ORAL at 12:09

## 2020-09-11 RX ADMIN — LEVOTHYROXINE SODIUM 88 MCG: 88 TABLET ORAL at 05:09

## 2020-09-11 RX ADMIN — IOHEXOL 75 ML: 350 INJECTION, SOLUTION INTRAVENOUS at 12:09

## 2020-09-11 NOTE — PLAN OF CARE
Pt denies pain. Pt is awaiting midline placement. Will transfusion blood and platelets after placement. No injuries. Will continue to monitor. 12 hour chart check is  completed.

## 2020-09-11 NOTE — SUBJECTIVE & OBJECTIVE
Past Medical History:   Diagnosis Date    Acid reflux     Anxiety     Back pain     Bronchitis, chronic obstructive w acute bronchitis 7/29/2016    Cancer     [facility] arms, face- Dr. Lata Tejada    Cataract     2+NS    Chronic myelomonocytic leukemia     Degenerative disc disease     Depression     Depression     Dry mouth     Encounter for blood transfusion     Hernia, hiatal 11/18/2013    Hypertension     Hypothyroid     Hypothyroidism     Iron deficiency anemia     Macrocytic anemia 5/3/2016    Macular degeneration     Migraines     Mixed anxiety and depressive disorder     Multiple fractures of ribs of right side     Osteoporosis     Other hyperlipidemia 10/11/2019    Pneumonia     Pneumonia due to other staphylococcus     Rheumatoid arthritis     Rheumatoid arthritis(714.0)     Rheumatoid arthritis(714.0)     Remicade, MTX.       Past Surgical History:   Procedure Laterality Date    APPENDECTOMY  1985    APPLICATION OF WOUND VACUUM-ASSISTED CLOSURE DEVICE N/A 5/14/2020    Procedure: APPLICATION, WOUND VAC;  Surgeon: Mckinley Shannon MD;  Location: [facility] OR;  Service: General;  Laterality: N/A;    APPLICATION OF WOUND VACUUM-ASSISTED CLOSURE DEVICE Left 7/25/2020    Procedure: APPLICATION, WOUND VAC;  Surgeon: Berenice Alfaro MD;  Location: [facility] OR;  Service: General;  Laterality: Left;    BREAST BIOPSY      CATARACT EXTRACTION Bilateral 6/11/15    Dr. Booth    CHOLECYSTECTOMY  2013    cryoablasion kidney Left 09/27/2016    DEBRIDEMENT Left 7/25/2020    Procedure: DEBRIDEMENT;  Surgeon: Berenice Alfaro MD;  Location: [facility] OR;  Service: General;  Laterality: Left;    EVACUATION OF HEMATOMA Left 7/25/2020    Procedure: EVACUATION, HEMATOMA;  Surgeon: Berenice Alfaro MD;  Location: [facility] OR;  Service: General;  Laterality: Left;    EXCISION OF SQUAMOUS CELL CARCINOMA Left 7/2/2020    Procedure: EXCISION, CARCINOMA, SQUAMOUS CELL;  Surgeon: Mckinley Shannon MD;  Location: [facility]  OR;  Service: General;  Laterality: Left;    feet Bilateral     rheumatoid    FRACTURE SURGERY Right     tibia    HERNIA REPAIR      HYSTERECTOMY  1970    partial    INCISION AND DRAINAGE OF ABSCESS N/A 5/12/2020    Procedure: INCISION AND DRAINAGE, ABSCESS;  Surgeon: Emerson Hathaway MD;  Location: [facility] OR;  Service: General;  Laterality: N/A;    INCISIONAL BIOPSY N/A 5/12/2020    Procedure: INCISIONAL BIOPSY;  Surgeon: Emerson Hathaway MD;  Location: [facility] OR;  Service: General;  Laterality: N/A;    JOINT REPLACEMENT      bilateral knees (2008), hands, wrists, knuckles, toes    LAPAROSCOPIC NISSEN FUNDOPLICATION      TRANSFORAMINAL EPIDURAL INJECTION OF STEROID Left 6/25/2019    Procedure: Left L5/S1 TF AYAAN with local;  Surgeon: Rowdy Felix MD;  Location: [facility] PAIN MGT;  Service: Pain Management;  Laterality: Left;    WOUND DEBRIDEMENT N/A 5/14/2020    Procedure: DEBRIDEMENT, WOUND;  Surgeon: Mckinley Shannon MD;  Location: [facility] OR;  Service: General;  Laterality: N/A;    WOUND DEBRIDEMENT Left 7/25/2020    Procedure: DEBRIDEMENT, WOUND;  Surgeon: Berenice Alfaro MD;  Location: [facility] OR;  Service: General;  Laterality: Left;       Review of patient's allergies indicates:   Allergen Reactions    Codeine      Other reaction(s): hyper  Other reaction(s): hyper    Gabapentin Other (See Comments)     Bad dreams       No current facility-administered medications on file prior to encounter.      Current Outpatient Medications on File Prior to Encounter   Medication Sig    albuterol (PROVENTIL) 2.5 mg /3 mL (0.083 %) nebulizer solution Take 3 mLs (2.5 mg total) by nebulization every 6 (six) hours as needed for Wheezing.    amitriptyline (ELAVIL) 75 MG tablet TAKE 1 TABLET BY MOUTH EVERY EVENING    calcium citrate-vitamin D (CITRACAL + D) 315-200 mg-unit per tablet Take 1 tablet by mouth once daily.     DULoxetine (CYMBALTA) 20 MG capsule TAKE 2 CAPSULES(40 MG) BY MOUTH EVERY DAY (Patient taking differently:  before meals, at bedtime and at 0200. )    ferrous sulfate 325 (65 FE) MG EC tablet Take 1 tablet (325 mg total) by mouth 2 (two) times daily.    fluticasone propionate (FLONASE) 50 mcg/actuation nasal spray 2 sprays (100 mcg total) by Each Nostril route once daily.    hydrocodone-acetaminophen 5-325mg (NORCO) 5-325 mg per tablet TK 1 T PO Q 6 H PRN    hydrOXYzine HCl (ATARAX) 25 MG tablet Take 25 mg by mouth nightly.     levothyroxine (SYNTHROID) 88 MCG tablet TAKE 1 TABLET BY MOUTH BEFORE BREAKFAST    magnesium oxide (MAG-OX) 400 mg (241.3 mg magnesium) tablet Take 1 tablet (400 mg total) by mouth 3 (three) times daily. (Patient taking differently: Take 400 mg by mouth 2 (two) times daily. )    meclizine (ANTIVERT) 50 MG tablet Take 25 mg by mouth 3 (three) times daily as needed.    metoprolol succinate (TOPROL-XL) 50 MG 24 hr tablet TAKE 1 TABLET(50 MG) BY MOUTH EVERY DAY    multivitamin capsule Take by mouth. As directed    ondansetron (ZOFRAN) 4 MG tablet Take 1 tablet (4 mg total) by mouth every 8 (eight) hours as needed for Nausea.    pravastatin (PRAVACHOL) 10 MG tablet TAKE 1 TABLET(10 MG) BY MOUTH EVERY DAY (Patient taking differently: Take 10 mg by mouth every evening. )    prochlorperazine (COMPAZINE) 5 MG tablet TAKE 1 TABLET(5 MG) BY MOUTH EVERY 6 HOURS AS NEEDED FOR NAUSEA    tamsulosin (FLOMAX) 0.4 mg Cap Take 1 capsule (0.4 mg total) by mouth once daily. For urinary retention    topiramate (TOPAMAX) 25 MG tablet Take 1 tablet (25 mg total) by mouth at night x 1 week then increase to 2 (two) times daily. Increase as tolerated. (Patient not taking: Reported on 7/21/2020)    valsartan-hydrochlorothiazide (DIOVAN-HCT) 80-12.5 mg per tablet TAKE 1 TABLET BY MOUTH DAILY     Family History     Problem Relation (Age of Onset)    Asthma Sister, Brother    Cancer Brother, Maternal Grandfather    Cataracts Mother    Chronic back pain Sister, Brother    Diabetes Mellitus Brother    Fibromyalgia  Daughter    Heart disease Mother, Father, Maternal Grandmother    Hyperlipidemia Mother    Hypertension Mother, Father, Sister, Brother    Osteoarthritis Mother, Father, Sister, Brother    Thyroid disease Sister, Brother        Tobacco Use    Smoking status: Former Smoker     Packs/day: 0.25     Years: 2.00     Pack years: 0.50     Quit date: 1965     Years since quittin.8    Smokeless tobacco: Never Used   Substance and Sexual Activity    Alcohol use: No    Drug use: No    Sexual activity: Never     Partners: Male     Review of Systems   Constitutional: Positive for activity change and fatigue. Negative for diaphoresis and fever.   HENT: Negative for ear discharge, ear pain and facial swelling.    Eyes: Negative for pain and redness.        History macular degeneration   Respiratory: Positive for shortness of breath. Negative for cough and chest tightness.    Cardiovascular: Positive for leg swelling. Negative for chest pain and palpitations.        Chronic bilateral lower extremity edema   Gastrointestinal: Positive for constipation and diarrhea. Negative for abdominal distention, abdominal pain, blood in stool, nausea and vomiting.        Patient reports chronic intermittent diarrhea versus constipation   Endocrine: Negative for polydipsia and polyphagia.   Genitourinary: Negative for difficulty urinating, dysuria, flank pain and hematuria.   Musculoskeletal: Positive for arthralgias.   Skin: Positive for pallor.        Patient reports ongoing back wound for which she has been receiving treatment per [facility] home [facility]-patient reports she is currently not on p.o. antibiotics   Allergic/Immunologic: Negative for food allergies.   Neurological: Positive for weakness. Negative for seizures, facial asymmetry and speech difficulty.   Hematological: Bruises/bleeds easily.   Psychiatric/Behavioral: Negative for agitation, behavioral problems, confusion, hallucinations and suicidal ideas. The patient is  not nervous/anxious.      Objective:     Vital Signs (Most Recent):  Temp: 98.4 °F (36.9 °C) (09/10/20 1855)  Pulse: 81 (09/10/20 1855)  Resp: 16 (09/10/20 1855)  BP: (!) 181/79 (09/10/20 1855)  SpO2: 99 % (09/10/20 1831) Vital Signs (24h Range):  Temp:  [98.2 °F (36.8 °C)-98.9 °F (37.2 °C)] 98.4 °F (36.9 °C)  Pulse:  [70-98] 81  Resp:  [16-24] 16  SpO2:  [96 %-100 %] 99 %  BP: (114-181)/(64-86) 181/79        Body mass index is 19.88 kg/m².    Physical Exam  Constitutional:       General: She is not in acute distress.     Comments: Frail, elderly, debilitated with significant rheumatoid arthritis noted in her hands   HENT:      Head: Normocephalic and atraumatic.      Mouth/Throat:      Mouth: Mucous membranes are moist.   Eyes:      General:         Right eye: No discharge.         Left eye: No discharge.      Conjunctiva/sclera: Conjunctivae normal.   Neck:      Musculoskeletal: Normal range of motion and neck supple. No neck rigidity or muscular tenderness.   Cardiovascular:      Rate and Rhythm: Normal rate and regular rhythm.   Pulmonary:      Effort: No respiratory distress.      Comments: Bilateral breath sounds diminished  Abdominal:      General: Bowel sounds are normal. There is no distension.      Palpations: Abdomen is soft.      Tenderness: There is no abdominal tenderness. There is no guarding.   Genitourinary:     Comments: Not examined  Musculoskeletal:         General: Deformity present.      Right lower leg: Edema present.      Left lower leg: Edema present.      Comments: Stiff joints  Significant rheumatoid arthritis noted in the hands with twisting of the fingers  Scant to 1+ edema to lower extremities   Skin:     General: Skin is warm and dry.      Capillary Refill: Capillary refill takes 2 to 3 seconds.      Coloration: Skin is pale.      Findings: Bruising present.      Comments: Dressing to the back intact  Significant bilateral upper extremity bruising and petechia   Neurological:       Mental Status: She is alert and oriented to person, place, and time. Mental status is at baseline.   Psychiatric:         Mood and Affect: Mood normal.         Behavior: Behavior normal.         Thought Content: Thought content normal.         Judgment: Judgment normal.      Comments: Calm and cooperative             Results for orders placed or performed during the hospital encounter of 09/10/20   APTT   Result Value Ref Range    aPTT 21.7 21.0 - 32.0 sec   CBC auto differential   Result Value Ref Range    WBC 21.74 (H) 3.90 - 12.70 K/uL    RBC 1.74 (L) 4.00 - 5.40 M/uL    Hemoglobin 5.4 (LL) 12.0 - 16.0 g/dL    Hematocrit 17.7 (LL) 37.0 - 48.5 %    Mean Corpuscular Volume 102 (H) 82 - 98 fL    Mean Corpuscular Hemoglobin 31.0 27.0 - 31.0 pg    Mean Corpuscular Hemoglobin Conc 30.5 (L) 32.0 - 36.0 g/dL    RDW 18.8 (H) 11.5 - 14.5 %    Platelets 8 (LL) 150 - 350 K/uL    MPV SEE COMMENT 9.2 - 12.9 fL    Immature Granulocytes Test Not Performed 0.0 - 0.5 %    Gran # (ANC) Test Not Performed 1.8 - 7.7 K/uL    Immature Grans (Abs) Test Not Performed 0.00 - 0.04 K/uL    Lymph # Test Not Performed 1.0 - 4.8 K/uL    Mono # Test Not Performed 0.3 - 1.0 K/uL    Eos # Test Not Performed 0.0 - 0.5 K/uL    Baso # Test Not Performed 0.00 - 0.20 K/uL    nRBC 3 (A) 0 /100 WBC    Gran% 25.0 (L) 38.0 - 73.0 %    Lymph% 40.0 18.0 - 48.0 %    Mono% 14.0 4.0 - 15.0 %    Eosinophil% 0.0 0.0 - 8.0 %    Basophil% 0.0 0.0 - 1.9 %    Bands 13.0 %    Metamyelocytes 7.0 %    Promyelocytes 1.0 %    Aniso Slight     Poik Slight     Poly Occasional     Ovalocytes Occasional     Tear Drop Cells Occasional     Stomatocytes Present     Spherocytes Occasional     Schistocytes Present     Differential Method Manual    Comprehensive metabolic panel   Result Value Ref Range    Sodium 131 (L) 136 - 145 mmol/L    Potassium 4.3 3.5 - 5.1 mmol/L    Chloride 98 95 - 110 mmol/L    CO2 23 23 - 29 mmol/L    Glucose 145 (H) 70 - 110 mg/dL    BUN, Bld 36 (H) 8 -  23 mg/dL    Creatinine 1.1 0.5 - 1.4 mg/dL    Calcium 8.6 (L) 8.7 - 10.5 mg/dL    Total Protein 7.9 6.0 - 8.4 g/dL    Albumin 3.2 (L) 3.5 - 5.2 g/dL    Total Bilirubin 0.3 0.1 - 1.0 mg/dL    Alkaline Phosphatase 183 (H) 55 - 135 U/L    AST 17 10 - 40 U/L    ALT 14 10 - 44 U/L    Anion Gap 10 8 - 16 mmol/L    eGFR if African American 56 (A) >60 mL/min/1.73 m^2    eGFR if non African American 48 (A) >60 mL/min/1.73 m^2   Protime-INR   Result Value Ref Range    Prothrombin Time 10.3 9.0 - 12.5 sec    INR 1.0 0.8 - 1.2   COVID-19 Rapid Screening   Result Value Ref Range    SARS-CoV-2 RNA, Amplification, Qual Negative Negative   Type & Screen   Result Value Ref Range    Group & Rh O POS     Indirect Saw NEG    Prepare RBC 2 Units; symptomatic anemia   Result Value Ref Range    UNIT NUMBER [id number]     Product Code H7616L74     DISPENSE STATUS ISSUED     CODING SYSTEM [id number]     Unit Blood Type Code 5100     Unit Blood Type O POS     Unit Expiration [id number]     UNIT NUMBER [id number]     Product Code C3560C13     DISPENSE STATUS CROSSMATCHED     CODING SYSTEM [id number]     Unit Blood Type Code 5100     Unit Blood Type O POS     Unit Expiration [id number]    Prepare Platelets 1 Dose   Result Value Ref Range    UNIT NUMBER [id number]     Product Code B4605W20     DISPENSE STATUS CROSSMATCHED     CODING SYSTEM [id number]     Unit Blood Type Code 6200     Unit Blood Type A POS     Unit Expiration [id number]      *Note: Due to a large number of results and/or encounters for the requested time period, some results have not been displayed. A complete set of results can be found in Results Review.       .  Imaging Results    None

## 2020-09-11 NOTE — ASSESSMENT & PLAN NOTE
Decreased GFR for baseline-prior records reviewed showed no chronic kidney disease  No IV fluids given due to packed red blood cell transfusion  Repeat BMP in a.m.

## 2020-09-11 NOTE — SUBJECTIVE & OBJECTIVE
Oncology Treatment Plan:   [No treatment plan]    Medications:  Continuous Infusions:  Scheduled Meds:   sodium chloride   Intravenous Once    sodium chloride   Intravenous Once    amitriptyline  75 mg Oral QHS    calcium carbonate-vitamin D3 250-125 mg  1 tablet Oral Daily    DULoxetine  40 mg Oral QHS    fluticasone propionate  2 spray Each Nostril Daily    hydrOXYzine HCL  25 mg Oral Nightly    levothyroxine  88 mcg Oral Before breakfast    magnesium oxide  400 mg Oral BID    pravastatin  10 mg Oral QHS    tamsulosin  0.4 mg Oral Daily     PRN Meds:sodium chloride, sodium chloride, acetaminophen, albuterol sulfate, HYDROcodone-acetaminophen, meclizine, ondansetron, polyethylene glycol, sodium chloride 0.9%     Review of patient's allergies indicates:   Allergen Reactions    Codeine      Other reaction(s): hyper  Other reaction(s): hyper    Gabapentin Other (See Comments)     Bad dreams        Past Medical History:   Diagnosis Date    Acid reflux     Anxiety     Back pain     Bronchitis, chronic obstructive w acute bronchitis 7/29/2016    Cancer     [facility] arms, face- Dr. Lata Tejada    Cataract     2+NS    Chronic myelomonocytic leukemia     Degenerative disc disease     Depression     Depression     Dry mouth     Encounter for blood transfusion     Hernia, hiatal 11/18/2013    Hypertension     Hypothyroid     Hypothyroidism     Iron deficiency anemia     Macrocytic anemia 5/3/2016    Macular degeneration     Migraines     Mixed anxiety and depressive disorder     Multiple fractures of ribs of right side     Osteoporosis     Other hyperlipidemia 10/11/2019    Pneumonia     Pneumonia due to other staphylococcus     Rheumatoid arthritis     Rheumatoid arthritis(714.0)     Rheumatoid arthritis(714.0)     Remicade, MTX.     Past Surgical History:   Procedure Laterality Date    APPENDECTOMY  1985    APPLICATION OF WOUND VACUUM-ASSISTED CLOSURE DEVICE N/A 5/14/2020     Procedure: APPLICATION, WOUND VAC;  Surgeon: Mckinley Shannon MD;  Location: [facility] OR;  Service: General;  Laterality: N/A;    APPLICATION OF WOUND VACUUM-ASSISTED CLOSURE DEVICE Left 7/25/2020    Procedure: APPLICATION, WOUND VAC;  Surgeon: Berenice Alfaro MD;  Location: [facility] OR;  Service: General;  Laterality: Left;    BREAST BIOPSY      CATARACT EXTRACTION Bilateral 6/11/15    Dr. Booth    CHOLECYSTECTOMY  2013    cryoablasion kidney Left 09/27/2016    DEBRIDEMENT Left 7/25/2020    Procedure: DEBRIDEMENT;  Surgeon: Berenice Alfaro MD;  Location: [facility] OR;  Service: General;  Laterality: Left;    EVACUATION OF HEMATOMA Left 7/25/2020    Procedure: EVACUATION, HEMATOMA;  Surgeon: Berenice Alfaro MD;  Location: [facility] OR;  Service: General;  Laterality: Left;    EXCISION OF SQUAMOUS CELL CARCINOMA Left 7/2/2020    Procedure: EXCISION, CARCINOMA, SQUAMOUS CELL;  Surgeon: Mckinley Shannon MD;  Location: [facility] OR;  Service: General;  Laterality: Left;    feet Bilateral     rheumatoid    FRACTURE SURGERY Right     tibia    HERNIA REPAIR      HYSTERECTOMY  1970    partial    INCISION AND DRAINAGE OF ABSCESS N/A 5/12/2020    Procedure: INCISION AND DRAINAGE, ABSCESS;  Surgeon: Emerson Hathaway MD;  Location: [facility] OR;  Service: General;  Laterality: N/A;    INCISIONAL BIOPSY N/A 5/12/2020    Procedure: INCISIONAL BIOPSY;  Surgeon: Emerson Hathaway MD;  Location: [facility] OR;  Service: General;  Laterality: N/A;    JOINT REPLACEMENT      bilateral knees (2008), hands, wrists, knuckles, toes    LAPAROSCOPIC NISSEN FUNDOPLICATION      TRANSFORAMINAL EPIDURAL INJECTION OF STEROID Left 6/25/2019    Procedure: Left L5/S1 TF AYAAN with local;  Surgeon: Rowdy Felix MD;  Location: [facility] PAIN [facility]GT;  Service: Pain Management;  Laterality: Left;    WOUND DEBRIDEMENT N/A 5/14/2020    Procedure: DEBRIDEMENT, WOUND;  Surgeon: Mckinley Shannon MD;  Location: [facility] OR;  Service: General;  Laterality: N/A;    WOUND DEBRIDEMENT Left  2020    Procedure: DEBRIDEMENT, WOUND;  Surgeon: Berenice Alfaro MD;  Location: [facility] OR;  Service: General;  Laterality: Left;     Family History     Problem Relation (Age of Onset)    Asthma Sister, Brother    Cancer Brother, Maternal Grandfather    Cataracts Mother    Chronic back pain Sister, Brother    Diabetes Mellitus Brother    Fibromyalgia Daughter    Heart disease Mother, Father, Maternal Grandmother    Hyperlipidemia Mother    Hypertension Mother, Father, Sister, Brother    Osteoarthritis Mother, Father, Sister, Brother    Thyroid disease Sister, Brother        Tobacco Use    Smoking status: Former Smoker     Packs/day: 0.25     Years: 2.00     Pack years: 0.50     Quit date: 1965     Years since quittin.8    Smokeless tobacco: Never Used   Substance and Sexual Activity    Alcohol use: No    Drug use: No    Sexual activity: Never     Partners: Male       Review of Systems   Constitutional: Positive for activity change and fatigue. Negative for appetite change, chills, diaphoresis, fever and unexpected weight change.   HENT: Negative for congestion, hearing loss, mouth sores, postnasal drip, rhinorrhea, sore throat and trouble swallowing.    Eyes: Negative for discharge and visual disturbance.   Respiratory: Negative for cough, chest tightness and shortness of breath.    Cardiovascular: Negative for chest pain, palpitations and leg swelling.   Gastrointestinal: Negative for abdominal distention, blood in stool, constipation, diarrhea, nausea and vomiting.   Endocrine: Negative for cold intolerance and heat intolerance.   Genitourinary: Negative for difficulty urinating, dyspareunia, flank pain and hematuria.   Musculoskeletal: Positive for arthralgias, back pain, gait problem, joint swelling and myalgias.   Skin: Positive for pallor and wound.   Neurological: Negative for dizziness, weakness, light-headedness and headaches.   Hematological: Negative for adenopathy. Bruises/bleeds  easily.   Psychiatric/Behavioral: Negative for agitation, behavioral problems and confusion. The patient is not nervous/anxious.      Objective:     Vital Signs (Most Recent):  Temp: 98.4 °F (36.9 °C) (09/11/20 1004)  Pulse: 79 (09/11/20 1004)  Resp: 16 (09/11/20 0724)  BP: (!) 140/65 (09/11/20 1004)  SpO2: 97 % (09/11/20 1004) Vital Signs (24h Range):  Temp:  [97.8 °F (36.6 °C)-98.9 °F (37.2 °C)] 98.4 °F (36.9 °C)  Pulse:  [70-98] 79  Resp:  [15-24] 16  SpO2:  [96 %-100 %] 97 %  BP: (114-181)/(64-86) 140/65     Weight: 50.2 kg (110 lb 10.7 oz)  Body mass index is 20.24 kg/m².  Body surface area is 1.48 meters squared.      Intake/Output Summary (Last 24 hours) at 9/11/2020 1034  Last data filed at 9/11/2020 0925  Gross per 24 hour   Intake 2061.33 ml   Output --   Net 2061.33 ml       Physical Exam  Vitals signs and nursing note reviewed.   Constitutional:       General: She is not in acute distress.     Appearance: She is cachectic.   HENT:      Head: Normocephalic and atraumatic.      Right Ear: Hearing and external ear normal.      Left Ear: Hearing and external ear normal.      Nose: No rhinorrhea.      Right Sinus: No maxillary sinus tenderness or frontal sinus tenderness.      Left Sinus: No maxillary sinus tenderness or frontal sinus tenderness.      Mouth/Throat:      Mouth: No oral lesions.      Pharynx: Uvula midline.   Eyes:      General:         Right eye: No discharge.         Left eye: No discharge.      Conjunctiva/sclera: Conjunctivae normal.      Pupils: Pupils are equal, round, and reactive to light.   Neck:      Musculoskeletal: Normal range of motion.      Thyroid: No thyromegaly.      Vascular: No carotid bruit.      Trachea: No tracheal deviation.   Cardiovascular:      Rate and Rhythm: Normal rate and regular rhythm.      Pulses:           Dorsalis pedis pulses are 2+ on the right side and 2+ on the left side.      Heart sounds: Normal heart sounds, S1 normal and S2 normal. No murmur.    Pulmonary:      Effort: Pulmonary effort is normal. No respiratory distress.      Breath sounds: Normal breath sounds.   Abdominal:      General: Bowel sounds are normal. There is no distension.      Palpations: Abdomen is soft. There is no mass.      Tenderness: There is no abdominal tenderness.   Musculoskeletal: Normal range of motion.      Left hip: She exhibits tenderness and swelling.   Lymphadenopathy:      Cervical: No cervical adenopathy.      Upper Body:      Right upper body: No supraclavicular adenopathy.      Left upper body: No supraclavicular adenopathy.   Skin:     General: Skin is warm and dry.      Capillary Refill: Capillary refill takes less than 2 seconds.      Findings: Bruising (Generalized, all extremeties) present. No rash.          Neurological:      Mental Status: She is alert and oriented to person, place, and time.      Sensory: No sensory deficit.      Coordination: Coordination normal.      Gait: Gait normal.   Psychiatric:         Mood and Affect: Mood is not anxious or depressed.         Speech: Speech normal.         Behavior: Behavior normal.         Thought Content: Thought content normal.         Judgment: Judgment normal.         Significant Labs:   CBC:   Recent Labs   Lab 09/10/20  1341 09/11/20  0723   WBC 21.74* 22.35*   HGB 5.4* 6.6*   HCT 17.7* 21.0*   PLT 8* 15*    and CMP:   Recent Labs   Lab 09/10/20  1341 09/11/20  0723   * 135*   K 4.3 3.9   CL 98 100   CO2 23 27   * 100   BUN 36* 28*   CREATININE 1.1 0.8   CALCIUM 8.6* 8.9   PROT 7.9  --    ALBUMIN 3.2*  --    BILITOT 0.3  --    ALKPHOS 183*  --    AST 17  --    ALT 14  --    ANIONGAP 10 8   EGFRNONAA 48* >60       Diagnostic Results:  I have reviewed all pertinent imaging results/findings within the past 24 hours.

## 2020-09-11 NOTE — ASSESSMENT & PLAN NOTE
"Patient has been managed by Hem//Onc for this condition. Plan is for a new treatment, Inqovi, which will be initiated as outpatient. Not currently in blast crisis. Treatment on hold due to infection, managed by ID.     --Daily CBC, CMP  --transfuse PRBC for Hgb <7.0  --Transfuse platelets for platelet count <10K or <25k for active bleeding  --Concerned for hematoma to left hip, patient denies fall, endorses about 2 weeks ago feeling the hip "catch" when walking and has been sore since. Due to current platelet count, would appreciate imaging of hip to monitor. Discussed with HM.   "

## 2020-09-11 NOTE — DISCHARGE SUMMARY
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  Patient reports frequent transfusions usually it least 1 every 2-3 weeks.  Her last transfusion was approximately a week and a half ago.  Per report, patient was sent by Dr. Knox for platelet infusion, because infusion center upstairs could not accommodate her.  Patient has been placed in the hospital for overnight monitoring and packed red blood cell and platelet transfusion.               * No surgery found *      Hospital Course:   79 y/o female with history of CML admitted for symptomatic anemia and thrombocytopenia. Hematology/Onclogy consulted. Initial H/H 5.4/17.7 and platelet count 8. Patient received 2 unit of PRBCs and 1 unit of platelets. Post transfusion H/H 8.8/27.6, Platelets 49. Left hip concerning for hematoma. CT imaging reviewed, no acute findings noted. Case discussed with Hematology/Oncology, ok to discharge from their standpoint. Patient to follow-up with PCP in 3 days for hospital follow-up. Patient to follow-up with Dr. Knox as scheduled outpatient. Patient seen and examined on the date of discharge and found suitable for discharge.      Consults:   Consults (From admission, onward)        Status Ordering Provider     Inpatient consult to Hematology/Oncology  Once     Provider:  Denton Knox MD    Completed LANETTE BRITO          No new Assessment &  Plan notes have been filed under this hospital service since the last note was generated.  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] 07/25/2020 Yes    Thrombocytopenia [D69.6] 12/31/2019 Yes    Other hyperlipidemia [E78.49] 10/11/2019 Yes    Chronic myelomonocytic leukemia not having achieved remission [C93.10] 09/13/2019 Yes     Chronic    COPD (chronic obstructive pulmonary disease) [J44.9] 11/04/2016 Yes    Essential hypertension [I10]  Yes     Chronic    ARMD (age related macular degeneration) [H35.30] 11/11/2013 Yes     Chronic    Acquired hypothyroidism [E03.9]  Yes    Rheumatoid arthritis [M06.9] 06/19/2013 Yes     Chronic      Problems Resolved During this Admission:    Diagnosis Date Noted Date Resolved POA    PRINCIPAL PROBLEM:  Symptomatic anemia [D64.9] 09/10/2020 09/11/2020 Yes    Dehydration [E86.0] 09/10/2020 09/11/2020 Yes       Discharged Condition: stable    Disposition: Home or Self Care    Follow Up:  Follow-up Information     Briseida Bennett MD In 3 days.    Specialty: Internal Medicine  Why: for hospital follow-up   Contact information:  [address] [place] [address]  [phone]             Denton Knox MD In 1 week.    Specialty: Hematology and Oncology  Why: for hospital follow-up   Contact information:  [address] [address] [facility]  [place] [id number] [phone]                 Patient Instructions:      Notify your health care provider if you experience any of the following:  severe uncontrolled pain     Notify your health care provider if you experience any of the following:  persistent nausea and vomiting or diarrhea     Notify your health care provider if you experience any of the following:  difficulty breathing or increased cough     Notify  your health care provider if you experience any of the following:  persistent dizziness, light-headedness, or visual disturbances     Notify your health care provider if you experience any of the following:  increased confusion or weakness     Activity as tolerated       Significant Diagnostic Studies: Labs:   BMP:   Recent Labs   Lab 09/10/20  1341 09/11/20  0723   * 100   * 135*   K 4.3 3.9   CL 98 100   CO2 23 27   BUN 36* 28*   CREATININE 1.1 0.8   CALCIUM 8.6* 8.9   MG  --  1.9   , CMP   Recent Labs   Lab 09/10/20  1341 09/11/20  0723   * 135*   K 4.3 3.9   CL 98 100   CO2 23 27   * 100   BUN 36* 28*   CREATININE 1.1 0.8   CALCIUM 8.6* 8.9   PROT 7.9  --    ALBUMIN 3.2*  --    BILITOT 0.3  --    ALKPHOS 183*  --    AST 17  --    ALT 14  --    ANIONGAP 10 8   ESTGFRAFRICA 56* >60   EGFRNONAA 48* >60    and CBC   Recent Labs   Lab 09/10/20  1341 09/11/20  0723 09/11/20  1309   WBC 21.74* 22.35* 21.84*   HGB 5.4* 6.6* 8.8*   HCT 17.7* 21.0* 27.6*   PLT 8* 15* 49*       Pending Diagnostic Studies:     None         Medications:  Reconciled Home Medications:      Medication List      CHANGE how you take these medications    DULoxetine 20 MG capsule  Commonly known as: CYMBALTA  TAKE 2 CAPSULES(40 MG) BY MOUTH EVERY DAY  What changed:   · how much to take  · how to take this  · when to take this     magnesium oxide 400 mg (241.3 mg magnesium) tablet  Commonly known as: MAG-OX  Take 1 tablet (400 mg total) by mouth 3 (three) times daily.  What changed: when to take this     pravastatin 10 MG tablet  Commonly known as: PRAVACHOL  TAKE 1 TABLET(10 MG) BY MOUTH EVERY DAY  What changed: when to take this        CONTINUE taking these medications    albuterol 2.5 mg /3 mL (0.083 %) nebulizer solution  Commonly known as: PROVENTIL  Take 3 mLs (2.5 mg total) by nebulization every 6 (six) hours as needed for Wheezing.     amitriptyline 75 MG tablet  Commonly known as: ELAVIL  TAKE 1 TABLET BY MOUTH  EVERY EVENING     CITRACAL PLUS D 315 mg-5 mcg (200 unit) per tablet  Generic drug: calcium citrate-vitamin D3 315-200 mg  Take 1 tablet by mouth once daily.     ferrous sulfate 325 (65 FE) MG EC tablet  Take 1 tablet (325 mg total) by mouth 2 (two) times daily.     fluticasone propionate 50 mcg/actuation nasal spray  Commonly known as: FLONASE  2 sprays (100 mcg total) by Each Nostril route once daily.     HYDROcodone-acetaminophen 5-325 mg per tablet  Commonly known as: NORCO  TK 1 T PO Q 6 H PRN     hydrOXYzine HCL 25 MG tablet  Commonly known as: ATARAX  Take 25 mg by mouth nightly.     levothyroxine 88 MCG tablet  Commonly known as: SYNTHROID  TAKE 1 TABLET BY MOUTH BEFORE BREAKFAST     meclizine 50 MG tablet  Commonly known as: ANTIVERT  Take 25 mg by mouth 3 (three) times daily as needed.     metoprolol succinate 50 MG 24 hr tablet  Commonly known as: TOPROL-XL  TAKE 1 TABLET(50 MG) BY MOUTH EVERY DAY     multivitamin capsule  Take by mouth. As directed     ondansetron 4 MG tablet  Commonly known as: ZOFRAN  Take 1 tablet (4 mg total) by mouth every 8 (eight) hours as needed for Nausea.     prochlorperazine 5 MG tablet  Commonly known as: COMPAZINE  TAKE 1 TABLET(5 MG) BY MOUTH EVERY 6 HOURS AS NEEDED FOR NAUSEA     tamsulosin 0.4 mg Cap  Commonly known as: FLOMAX  Take 1 capsule (0.4 mg total) by mouth once daily. For urinary retention     valsartan-hydrochlorothiazide 80-12.5 mg per tablet  Commonly known as: DIOVAN-HCT  TAKE 1 TABLET BY MOUTH DAILY        STOP taking these medications    topiramate 25 MG tablet  Commonly known as: TOPAMAX            Indwelling Lines/Drains at time of discharge:   Lines/Drains/Airways     None                 Time spent on the discharge of patient: 64 minutes  Patient was seen and examined on the date of discharge and determined to be suitable for discharge.         Liz Matthews NP  Department of Hospital Medicine  [facility]

## 2020-09-11 NOTE — ASSESSMENT & PLAN NOTE
Ongoing leukocytosis and thrombocytopenia noted  Patient for 1 dose platelets and 2 units of packed red blood cells tonight  Hematology consulted

## 2020-09-11 NOTE — ASSESSMENT & PLAN NOTE
Platelets at 8-patient received 1 dose of platelets tonight  Hematology consulted  9/11/20  -Platelet count 15   -Left hip concerning for hematoma, CT hip pending

## 2020-09-11 NOTE — H&P
[facility]  History & Physical    Patient Name: Sarah Parker  MRN: [id number]  Admission Date: 9/10/2020  Attending Physician: Ron Dunham, *   Primary Care Provider: Brsieida Bennett MD     Patient seen in the emergency room    Patient information was obtained from patient and ER records.     Subjective:     Principal Problem:Symptomatic anemia    Chief Complaint:   Chief Complaint   Patient presents with    Anemia     Dizziness and fatigue. Sent by MD for blood & platelet transfusion        HPI: This is a 80-year-old female who has a history of chronic myelomonocytic anemia requiring frequent packed red blood cell and platelet transfusion, hypertension, hiatal hernia with acid reflux, hypothyroidism, rheumatoid arthritis, macular degeneration, and hyperlipidemia.  Patient reports frequent transfusions usually it least 1 every 2-3 weeks.  Her last transfusion was approximately a week and a half ago.  Per report, patient was sent by Dr. Knox for platelet infusion, because infusion center upstairs could not accommodate her.  Patient has been placed in the hospital for overnight monitoring and packed red blood cell and platelet transfusion.               Past Medical History:   Diagnosis Date    Acid reflux     Anxiety     Back pain     Bronchitis, chronic obstructive w acute bronchitis 7/29/2016    Cancer     [facility] arms, face- Dr. Lata Tejada    Cataract     2+NS    Chronic myelomonocytic leukemia     Degenerative disc disease     Depression     Depression     Dry mouth     Encounter for blood transfusion     Hernia, hiatal 11/18/2013    Hypertension     Hypothyroid     Hypothyroidism     Iron deficiency anemia     Macrocytic anemia 5/3/2016    Macular degeneration     Migraines     Mixed anxiety and depressive disorder     Multiple fractures of ribs of right side     Osteoporosis     Other hyperlipidemia 10/11/2019    Pneumonia      Pneumonia due to other staphylococcus     Rheumatoid arthritis     Rheumatoid arthritis(714.0)     Rheumatoid arthritis(714.0)     Remicade, MTX.       Past Surgical History:   Procedure Laterality Date    APPENDECTOMY  1985    APPLICATION OF WOUND VACUUM-ASSISTED CLOSURE DEVICE N/A 5/14/2020    Procedure: APPLICATION, WOUND VAC;  Surgeon: Mckinley Shannon MD;  Location: [facility];  Service: General;  Laterality: N/A;    APPLICATION OF WOUND VACUUM-ASSISTED CLOSURE DEVICE Left 7/25/2020    Procedure: APPLICATION, WOUND VAC;  Surgeon: Berenice Alfaro MD;  Location: [facility];  Service: General;  Laterality: Left;    BREAST BIOPSY      CATARACT EXTRACTION Bilateral 6/11/15    Dr. Booth    CHOLECYSTECTOMY  2013    cryoablasion kidney Left 09/27/2016    DEBRIDEMENT Left 7/25/2020    Procedure: DEBRIDEMENT;  Surgeon: Berenice Alfaro MD;  Location: [facility];  Service: General;  Laterality: Left;    EVACUATION OF HEMATOMA Left 7/25/2020    Procedure: EVACUATION, HEMATOMA;  Surgeon: Berenice Alfaro MD;  Location: [facility];  Service: General;  Laterality: Left;    EXCISION OF SQUAMOUS CELL CARCINOMA Left 7/2/2020    Procedure: EXCISION, CARCINOMA, SQUAMOUS CELL;  Surgeon: Mckinley Shannon MD;  Location: [facility];  Service: General;  Laterality: Left;    feet Bilateral     rheumatoid    FRACTURE SURGERY Right     tibia    HERNIA REPAIR      HYSTERECTOMY  1970    partial    INCISION AND DRAINAGE OF ABSCESS N/A 5/12/2020    Procedure: INCISION AND DRAINAGE, ABSCESS;  Surgeon: Emerson Hathaway MD;  Location: [facility];  Service: General;  Laterality: N/A;    INCISIONAL BIOPSY N/A 5/12/2020    Procedure: INCISIONAL BIOPSY;  Surgeon: Emerson Hathaway MD;  Location: [facility];  Service: General;  Laterality: N/A;    JOINT REPLACEMENT      bilateral knees (2008), hands, wrists, knuckles, toes    LAPAROSCOPIC NISSEN FUNDOPLICATION      TRANSFORAMINAL EPIDURAL INJECTION OF STEROID Left 6/25/2019    Procedure: Left L5/S1 TF AYAAN  with local;  Surgeon: Rowdy Felix MD;  Location: [facility];  Service: Pain Management;  Laterality: Left;    WOUND DEBRIDEMENT N/A 5/14/2020    Procedure: DEBRIDEMENT, WOUND;  Surgeon: Mckinley Shannon MD;  Location: [facility] OR;  Service: General;  Laterality: N/A;    WOUND DEBRIDEMENT Left 7/25/2020    Procedure: DEBRIDEMENT, WOUND;  Surgeon: Berenice Alfaro MD;  Location: [facility] OR;  Service: General;  Laterality: Left;       Review of patient's allergies indicates:   Allergen Reactions    Codeine      Other reaction(s): hyper  Other reaction(s): hyper    Gabapentin Other (See Comments)     Bad dreams       No current facility-administered medications on file prior to encounter.      Current Outpatient Medications on File Prior to Encounter   Medication Sig    albuterol (PROVENTIL) 2.5 mg /3 mL (0.083 %) nebulizer solution Take 3 mLs (2.5 mg total) by nebulization every 6 (six) hours as needed for Wheezing.    amitriptyline (ELAVIL) 75 MG tablet TAKE 1 TABLET BY MOUTH EVERY EVENING    calcium citrate-vitamin D (CITRACAL + D) 315-200 mg-unit per tablet Take 1 tablet by mouth once daily.     DULoxetine (CYMBALTA) 20 MG capsule TAKE 2 CAPSULES(40 MG) BY MOUTH EVERY DAY (Patient taking differently: before meals, at bedtime and at 0200. )    ferrous sulfate 325 (65 FE) MG EC tablet Take 1 tablet (325 mg total) by mouth 2 (two) times daily.    fluticasone propionate (FLONASE) 50 mcg/actuation nasal spray 2 sprays (100 mcg total) by Each Nostril route once daily.    hydrocodone-acetaminophen 5-325mg (NORCO) 5-325 mg per tablet TK 1 T PO Q 6 H PRN    hydrOXYzine HCl (ATARAX) 25 MG tablet Take 25 mg by mouth nightly.     levothyroxine (SYNTHROID) 88 MCG tablet TAKE 1 TABLET BY MOUTH BEFORE BREAKFAST    magnesium oxide (MAG-OX) 400 mg (241.3 mg magnesium) tablet Take 1 tablet (400 mg total) by mouth 3 (three) times daily. (Patient taking differently: Take 400 mg by mouth 2 (two) times daily. )    meclizine  (ANTIVERT) 50 MG tablet Take 25 mg by mouth 3 (three) times daily as needed.    metoprolol succinate (TOPROL-XL) 50 MG 24 hr tablet TAKE 1 TABLET(50 MG) BY MOUTH EVERY DAY    multivitamin capsule Take by mouth. As directed    ondansetron (ZOFRAN) 4 MG tablet Take 1 tablet (4 mg total) by mouth every 8 (eight) hours as needed for Nausea.    pravastatin (PRAVACHOL) 10 MG tablet TAKE 1 TABLET(10 MG) BY MOUTH EVERY DAY (Patient taking differently: Take 10 mg by mouth every evening. )    prochlorperazine (COMPAZINE) 5 MG tablet TAKE 1 TABLET(5 MG) BY MOUTH EVERY 6 HOURS AS NEEDED FOR NAUSEA    tamsulosin (FLOMAX) 0.4 mg Cap Take 1 capsule (0.4 mg total) by mouth once daily. For urinary retention    topiramate (TOPAMAX) 25 MG tablet Take 1 tablet (25 mg total) by mouth at night x 1 week then increase to 2 (two) times daily. Increase as tolerated. (Patient not taking: Reported on 2020)    valsartan-hydrochlorothiazide (DIOVAN-HCT) 80-12.5 mg per tablet TAKE 1 TABLET BY MOUTH DAILY     Family History     Problem Relation (Age of Onset)    Asthma Sister, Brother    Cancer Brother, Maternal Grandfather    Cataracts Mother    Chronic back pain Sister, Brother    Diabetes Mellitus Brother    Fibromyalgia Daughter    Heart disease Mother, Father, Maternal Grandmother    Hyperlipidemia Mother    Hypertension Mother, Father, Sister, Brother    Osteoarthritis Mother, Father, Sister, Brother    Thyroid disease Sister, Brother        Tobacco Use    Smoking status: Former Smoker     Packs/day: 0.25     Years: 2.00     Pack years: 0.50     Quit date: 1965     Years since quittin.8    Smokeless tobacco: Never Used   Substance and Sexual Activity    Alcohol use: No    Drug use: No    Sexual activity: Never     Partners: Male     Review of Systems   Constitutional: Positive for activity change and fatigue. Negative for diaphoresis and fever.   HENT: Negative for ear discharge, ear pain and facial swelling.     Eyes: Negative for pain and redness.        History macular degeneration   Respiratory: Positive for shortness of breath. Negative for cough and chest tightness.    Cardiovascular: Positive for leg swelling. Negative for chest pain and palpitations.        Chronic bilateral lower extremity edema   Gastrointestinal: Positive for constipation and diarrhea. Negative for abdominal distention, abdominal pain, blood in stool, nausea and vomiting.        Patient reports chronic intermittent diarrhea versus constipation   Endocrine: Negative for polydipsia and polyphagia.   Genitourinary: Negative for difficulty urinating, dysuria, flank pain and hematuria.   Musculoskeletal: Positive for arthralgias.   Skin: Positive for pallor.        Patient reports ongoing back wound for which she has been receiving treatment per [facility]-patient reports she is currently not on p.o. antibiotics   Allergic/Immunologic: Negative for food allergies.   Neurological: Positive for weakness. Negative for seizures, facial asymmetry and speech difficulty.   Hematological: Bruises/bleeds easily.   Psychiatric/Behavioral: Negative for agitation, behavioral problems, confusion, hallucinations and suicidal ideas. The patient is not nervous/anxious.      Objective:     Vital Signs (Most Recent):  Temp: 98.4 °F (36.9 °C) (09/10/20 1855)  Pulse: 81 (09/10/20 1855)  Resp: 16 (09/10/20 1855)  BP: (!) 181/79 (09/10/20 1855)  SpO2: 99 % (09/10/20 1831) Vital Signs (24h Range):  Temp:  [98.2 °F (36.8 °C)-98.9 °F (37.2 °C)] 98.4 °F (36.9 °C)  Pulse:  [70-98] 81  Resp:  [16-24] 16  SpO2:  [96 %-100 %] 99 %  BP: (114-181)/(64-86) 181/79        Body mass index is 19.88 kg/m².    Physical Exam  Constitutional:       General: She is not in acute distress.     Comments: Frail, elderly, debilitated with significant rheumatoid arthritis noted in her hands   HENT:      Head: Normocephalic and atraumatic.      Mouth/Throat:      Mouth: Mucous membranes  are moist.   Eyes:      General:         Right eye: No discharge.         Left eye: No discharge.      Conjunctiva/sclera: Conjunctivae normal.   Neck:      Musculoskeletal: Normal range of motion and neck supple. No neck rigidity or muscular tenderness.   Cardiovascular:      Rate and Rhythm: Normal rate and regular rhythm.   Pulmonary:      Effort: No respiratory distress.      Comments: Bilateral breath sounds diminished  Abdominal:      General: Bowel sounds are normal. There is no distension.      Palpations: Abdomen is soft.      Tenderness: There is no abdominal tenderness. There is no guarding.   Genitourinary:     Comments: Not examined  Musculoskeletal:         General: Deformity present.      Right lower leg: Edema present.      Left lower leg: Edema present.      Comments: Stiff joints  Significant rheumatoid arthritis noted in the hands with twisting of the fingers  Scant to 1+ edema to lower extremities   Skin:     General: Skin is warm and dry.      Capillary Refill: Capillary refill takes 2 to 3 seconds.      Coloration: Skin is pale.      Findings: Bruising present.      Comments: Dressing to the back intact  Significant bilateral upper extremity bruising and petechia   Neurological:      Mental Status: She is alert and oriented to person, place, and time. Mental status is at baseline.   Psychiatric:         Mood and Affect: Mood normal.         Behavior: Behavior normal.         Thought Content: Thought content normal.         Judgment: Judgment normal.      Comments: Calm and cooperative             Results for orders placed or performed during the hospital encounter of 09/10/20   APTT   Result Value Ref Range    aPTT 21.7 21.0 - 32.0 sec   CBC auto differential   Result Value Ref Range    WBC 21.74 (H) 3.90 - 12.70 K/uL    RBC 1.74 (L) 4.00 - 5.40 M/uL    Hemoglobin 5.4 (LL) 12.0 - 16.0 g/dL    Hematocrit 17.7 (LL) 37.0 - 48.5 %    Mean Corpuscular Volume 102 (H) 82 - 98 fL    Mean Corpuscular  Hemoglobin 31.0 27.0 - 31.0 pg    Mean Corpuscular Hemoglobin Conc 30.5 (L) 32.0 - 36.0 g/dL    RDW 18.8 (H) 11.5 - 14.5 %    Platelets 8 (LL) 150 - 350 K/uL    MPV SEE COMMENT 9.2 - 12.9 fL    Immature Granulocytes Test Not Performed 0.0 - 0.5 %    Gran # (ANC) Test Not Performed 1.8 - 7.7 K/uL    Immature Grans (Abs) Test Not Performed 0.00 - 0.04 K/uL    Lymph # Test Not Performed 1.0 - 4.8 K/uL    Mono # Test Not Performed 0.3 - 1.0 K/uL    Eos # Test Not Performed 0.0 - 0.5 K/uL    Baso # Test Not Performed 0.00 - 0.20 K/uL    nRBC 3 (A) 0 /100 WBC    Gran% 25.0 (L) 38.0 - 73.0 %    Lymph% 40.0 18.0 - 48.0 %    Mono% 14.0 4.0 - 15.0 %    Eosinophil% 0.0 0.0 - 8.0 %    Basophil% 0.0 0.0 - 1.9 %    Bands 13.0 %    Metamyelocytes 7.0 %    Promyelocytes 1.0 %    Aniso Slight     Poik Slight     Poly Occasional     Ovalocytes Occasional     Tear Drop Cells Occasional     Stomatocytes Present     Spherocytes Occasional     Schistocytes Present     Differential Method Manual    Comprehensive metabolic panel   Result Value Ref Range    Sodium 131 (L) 136 - 145 mmol/L    Potassium 4.3 3.5 - 5.1 mmol/L    Chloride 98 95 - 110 mmol/L    CO2 23 23 - 29 mmol/L    Glucose 145 (H) 70 - 110 mg/dL    BUN, Bld 36 (H) 8 - 23 mg/dL    Creatinine 1.1 0.5 - 1.4 mg/dL    Calcium 8.6 (L) 8.7 - 10.5 mg/dL    Total Protein 7.9 6.0 - 8.4 g/dL    Albumin 3.2 (L) 3.5 - 5.2 g/dL    Total Bilirubin 0.3 0.1 - 1.0 mg/dL    Alkaline Phosphatase 183 (H) 55 - 135 U/L    AST 17 10 - 40 U/L    ALT 14 10 - 44 U/L    Anion Gap 10 8 - 16 mmol/L    eGFR if African American 56 (A) >60 mL/min/1.73 m^2    eGFR if non African American 48 (A) >60 mL/min/1.73 m^2   Protime-INR   Result Value Ref Range    Prothrombin Time 10.3 9.0 - 12.5 sec    INR 1.0 0.8 - 1.2   COVID-19 Rapid Screening   Result Value Ref Range    SARS-CoV-2 RNA, Amplification, Qual Negative Negative   Type & Screen   Result Value Ref Range    Group & Rh O POS     Indirect Saw NEG     Prepare RBC 2 Units; symptomatic anemia   Result Value Ref Range    UNIT NUMBER [id number]     Product Code F5070A87     DISPENSE STATUS ISSUED     CODING SYSTEM SDOF871     Unit Blood Type Code 5100     Unit Blood Type O POS     Unit Expiration [id number]     UNIT NUMBER [id number]     Product Code A6845X97     DISPENSE STATUS CROSSMATCHED     CODING SYSTEM MDHT239     Unit Blood Type Code 5100     Unit Blood Type O POS     Unit Expiration [id number]    Prepare Platelets 1 Dose   Result Value Ref Range    UNIT NUMBER [id number]     Product Code Y0276G02     DISPENSE STATUS CROSSMATCHED     CODING SYSTEM [id number]     Unit Blood Type Code 6200     Unit Blood Type A POS     Unit Expiration [id number]      *Note: Due to a large number of results and/or encounters for the requested time period, some results have not been displayed. A complete set of results can be found in Results Review.       .  Imaging Results    None           Assessment/Plan:     * Symptomatic anemia  Hemoglobin 5.4 and hematocrit 17.7-   Telemetry  Patient for packed red blood cell transfusion x2 units tonight  Monitor closely for any signs of decline and or volume overload    Back wound  Chronic-patient reports receives wound care from [facility]  Wound care consulted for dressing changes during admission      Dehydration  Decreased GFR for baseline-prior records reviewed showed no chronic kidney disease  No IV fluids given due to packed red blood cell transfusion  Repeat BMP in a.m.      Hyponatremia  Chronic      Debility  Fall and skin precautions      Blast crisis phase of chronic myeloid leukemia  Ongoing leukocytosis and thrombocytopenia noted  Patient for 1 dose platelets and 2 units of packed red blood cells tonight  Hematology consulted      Thrombocytopenia  Platelets at 8-patient received 1 dose of platelets tonight  Hematology consulted      Other hyperlipidemia  Continue home statin      Chronic  myelomonocytic leukemia not having achieved remission  Patient with ongoing leukocytosis  Currently no signs of acute infection      COPD with exacerbation  Monitor O2 sats, supplement O2 as needed  P.r.n. aerosol treatments      Essential hypertension  Monitor  Home antihypertensives held at the time      ARMD (age related macular degeneration)  Fall precautions      Acquired hypothyroidism  Continue home levothyroxine    Rheumatoid arthritis  Noted  Continue home p.r.n. pain medication        VTE Risk Mitigation (From admission, onward)         Ordered     Place MYLES hose  Until discontinued      09/10/20 1940     IP VTE HIGH RISK PATIENT  Once      09/10/20 1940                Time spent seeing patient( greater than 1/2 spent in direct contact) : 72 minutes    TREASURE Hernandez  Department of Hospital Medicine   [facility]

## 2020-09-11 NOTE — NURSING
Oc checking into pt's blood product transfusion needs,     Per LISA zarco pt only needs 1unit of palt and the 2nd unit of prbcs waiting to be transfused in blood bank then we will get a repeat cbc

## 2020-09-11 NOTE — ASSESSMENT & PLAN NOTE
Chronic-patient reports receives wound care from [facility]  Wound care consulted for dressing changes during admission

## 2020-09-11 NOTE — SUBJECTIVE & OBJECTIVE
Oncology Treatment Plan:   [No treatment plan]    Medications:  Continuous Infusions:  Scheduled Meds:   sodium chloride   Intravenous Once    sodium chloride   Intravenous Once    amitriptyline  75 mg Oral QHS    calcium carbonate-vitamin D3 250-125 mg  1 tablet Oral Daily    DULoxetine  40 mg Oral QHS    fluticasone propionate  2 spray Each Nostril Daily    hydrOXYzine HCL  25 mg Oral Nightly    levothyroxine  88 mcg Oral Before breakfast    magnesium oxide  400 mg Oral BID    pravastatin  10 mg Oral QHS    tamsulosin  0.4 mg Oral Daily     PRN Meds:sodium chloride, sodium chloride, acetaminophen, albuterol sulfate, HYDROcodone-acetaminophen, meclizine, ondansetron, polyethylene glycol, sodium chloride 0.9%     Review of patient's allergies indicates:   Allergen Reactions    Codeine      Other reaction(s): hyper  Other reaction(s): hyper    Gabapentin Other (See Comments)     Bad dreams        Past Medical History:   Diagnosis Date    Acid reflux     Anxiety     Back pain     Bronchitis, chronic obstructive w acute bronchitis 7/29/2016    Cancer     [facility] arms, face- Dr. Lata Tejada    Cataract     2+NS    Chronic myelomonocytic leukemia     Degenerative disc disease     Depression     Depression     Dry mouth     Encounter for blood transfusion     Hernia, hiatal 11/18/2013    Hypertension     Hypothyroid     Hypothyroidism     Iron deficiency anemia     Macrocytic anemia 5/3/2016    Macular degeneration     Migraines     Mixed anxiety and depressive disorder     Multiple fractures of ribs of right side     Osteoporosis     Other hyperlipidemia 10/11/2019    Pneumonia     Pneumonia due to other staphylococcus     Rheumatoid arthritis     Rheumatoid arthritis(714.0)     Rheumatoid arthritis(714.0)     Remicade, MTX.     Past Surgical History:   Procedure Laterality Date    APPENDECTOMY  1985    APPLICATION OF WOUND VACUUM-ASSISTED CLOSURE DEVICE N/A 5/14/2020     Procedure: APPLICATION, WOUND VAC;  Surgeon: Mckinley Shannon MD;  Location: [facility] OR;  Service: General;  Laterality: N/A;    APPLICATION OF WOUND VACUUM-ASSISTED CLOSURE DEVICE Left 7/25/2020    Procedure: APPLICATION, WOUND VAC;  Surgeon: Berenice Alfaro MD;  Location: [facility] OR;  Service: General;  Laterality: Left;    BREAST BIOPSY      CATARACT EXTRACTION Bilateral 6/11/15    Dr. Booth    CHOLECYSTECTOMY  2013    cryoablasion kidney Left 09/27/2016    DEBRIDEMENT Left 7/25/2020    Procedure: DEBRIDEMENT;  Surgeon: Berenice Alfaro MD;  Location: [facility] OR;  Service: General;  Laterality: Left;    EVACUATION OF HEMATOMA Left 7/25/2020    Procedure: EVACUATION, HEMATOMA;  Surgeon: Berenice Alfaro MD;  Location: [facility] OR;  Service: General;  Laterality: Left;    EXCISION OF SQUAMOUS CELL CARCINOMA Left 7/2/2020    Procedure: EXCISION, CARCINOMA, SQUAMOUS CELL;  Surgeon: Mckinley Shannon MD;  Location: [facility] OR;  Service: General;  Laterality: Left;    feet Bilateral     rheumatoid    FRACTURE SURGERY Right     tibia    HERNIA REPAIR      HYSTERECTOMY  1970    partial    INCISION AND DRAINAGE OF ABSCESS N/A 5/12/2020    Procedure: INCISION AND DRAINAGE, ABSCESS;  Surgeon: Emerson Hathaway MD;  Location: [facility] OR;  Service: General;  Laterality: N/A;    INCISIONAL BIOPSY N/A 5/12/2020    Procedure: INCISIONAL BIOPSY;  Surgeon: Emerson Hathaway MD;  Location: [facility] OR;  Service: General;  Laterality: N/A;    JOINT REPLACEMENT      bilateral knees (2008), hands, wrists, knuckles, toes    LAPAROSCOPIC NISSEN FUNDOPLICATION      TRANSFORAMINAL EPIDURAL INJECTION OF STEROID Left 6/25/2019    Procedure: Left L5/S1 TF AYAAN with local;  Surgeon: Rowdy Felix MD;  Location: [facility]IN [facility]GT;  Service: Pain Management;  Laterality: Left;    WOUND DEBRIDEMENT N/A 5/14/2020    Procedure: DEBRIDEMENT, WOUND;  Surgeon: Mckinley Shannon MD;  Location: [facility] OR;  Service: General;  Laterality: N/A;    WOUND DEBRIDEMENT Left  2020    Procedure: DEBRIDEMENT, WOUND;  Surgeon: Berenice Alfaro MD;  Location: [facility] OR;  Service: General;  Laterality: Left;     Family History     Problem Relation (Age of Onset)    Asthma Sister, Brother    Cancer Brother, Maternal Grandfather    Cataracts Mother    Chronic back pain Sister, Brother    Diabetes Mellitus Brother    Fibromyalgia Daughter    Heart disease Mother, Father, Maternal Grandmother    Hyperlipidemia Mother    Hypertension Mother, Father, Sister, Brother    Osteoarthritis Mother, Father, Sister, Brother    Thyroid disease Sister, Brother        Tobacco Use    Smoking status: Former Smoker     Packs/day: 0.25     Years: 2.00     Pack years: 0.50     Quit date: 1965     Years since quittin.8    Smokeless tobacco: Never Used   Substance and Sexual Activity    Alcohol use: No    Drug use: No    Sexual activity: Never     Partners: Male       Review of Systems   Constitutional: Positive for activity change and fatigue. Negative for appetite change, chills, diaphoresis, fever and unexpected weight change.   HENT: Negative for congestion, hearing loss, mouth sores, postnasal drip, rhinorrhea, sore throat and trouble swallowing.    Eyes: Negative for discharge and visual disturbance.   Respiratory: Negative for cough, chest tightness and shortness of breath.    Cardiovascular: Negative for chest pain, palpitations and leg swelling.   Gastrointestinal: Negative for abdominal distention, blood in stool, constipation, diarrhea, nausea and vomiting.   Endocrine: Negative for cold intolerance and heat intolerance.   Genitourinary: Negative for difficulty urinating, dyspareunia, flank pain and hematuria.   Musculoskeletal: Positive for arthralgias, back pain, gait problem, joint swelling and myalgias.   Skin: Positive for pallor and wound.   Neurological: Negative for dizziness, weakness, light-headedness and headaches.   Hematological: Negative for adenopathy. Bruises/bleeds  easily.   Psychiatric/Behavioral: Negative for agitation, behavioral problems and confusion. The patient is not nervous/anxious.      Objective:     Vital Signs (Most Recent):  Temp: 98.4 °F (36.9 °C) (09/11/20 1004)  Pulse: 79 (09/11/20 1004)  Resp: 16 (09/11/20 0724)  BP: (!) 140/65 (09/11/20 1004)  SpO2: 97 % (09/11/20 1004) Vital Signs (24h Range):  Temp:  [97.8 °F (36.6 °C)-98.9 °F (37.2 °C)] 98.4 °F (36.9 °C)  Pulse:  [70-98] 79  Resp:  [15-24] 16  SpO2:  [96 %-100 %] 97 %  BP: (114-181)/(64-86) 140/65     Weight: 50.2 kg (110 lb 10.7 oz)  Body mass index is 20.24 kg/m².  Body surface area is 1.48 meters squared.      Intake/Output Summary (Last 24 hours) at 9/11/2020 1057  Last data filed at 9/11/2020 0925  Gross per 24 hour   Intake 2061.33 ml   Output --   Net 2061.33 ml       Physical Exam  Vitals signs and nursing note reviewed.   Constitutional:       General: She is not in acute distress.     Appearance: She is cachectic.   HENT:      Head: Normocephalic and atraumatic.      Right Ear: Hearing and external ear normal.      Left Ear: Hearing and external ear normal.      Nose: No rhinorrhea.      Right Sinus: No maxillary sinus tenderness or frontal sinus tenderness.      Left Sinus: No maxillary sinus tenderness or frontal sinus tenderness.      Mouth/Throat:      Mouth: No oral lesions.      Pharynx: Uvula midline.   Eyes:      General:         Right eye: No discharge.         Left eye: No discharge.      Conjunctiva/sclera: Conjunctivae normal.      Pupils: Pupils are equal, round, and reactive to light.   Neck:      Musculoskeletal: Normal range of motion.      Thyroid: No thyromegaly.      Vascular: No carotid bruit.      Trachea: No tracheal deviation.   Cardiovascular:      Rate and Rhythm: Normal rate and regular rhythm.      Pulses:           Dorsalis pedis pulses are 2+ on the right side and 2+ on the left side.      Heart sounds: Normal heart sounds, S1 normal and S2 normal. No murmur.    Pulmonary:      Effort: Pulmonary effort is normal. No respiratory distress.      Breath sounds: Normal breath sounds.   Abdominal:      General: Bowel sounds are normal. There is no distension.      Palpations: Abdomen is soft. There is no mass.      Tenderness: There is no abdominal tenderness.   Musculoskeletal: Normal range of motion.      Left hip: She exhibits tenderness and swelling.   Lymphadenopathy:      Cervical: No cervical adenopathy.      Upper Body:      Right upper body: No supraclavicular adenopathy.      Left upper body: No supraclavicular adenopathy.   Skin:     General: Skin is warm and dry.      Capillary Refill: Capillary refill takes less than 2 seconds.      Findings: Bruising (Generalized, all extremeties) present. No rash.          Neurological:      Mental Status: She is alert and oriented to person, place, and time.      Sensory: No sensory deficit.      Coordination: Coordination normal.      Gait: Gait normal.   Psychiatric:         Mood and Affect: Mood is not anxious or depressed.         Speech: Speech normal.         Behavior: Behavior normal.         Thought Content: Thought content normal.         Judgment: Judgment normal.         Significant Labs:   CBC:   Recent Labs   Lab 09/10/20  1341 09/11/20  0723   WBC 21.74* 22.35*   HGB 5.4* 6.6*   HCT 17.7* 21.0*   PLT 8* 15*    and CMP:   Recent Labs   Lab 09/10/20  1341 09/11/20  0723   * 135*   K 4.3 3.9   CL 98 100   CO2 23 27   * 100   BUN 36* 28*   CREATININE 1.1 0.8   CALCIUM 8.6* 8.9   PROT 7.9  --    ALBUMIN 3.2*  --    BILITOT 0.3  --    ALKPHOS 183*  --    AST 17  --    ALT 14  --    ANIONGAP 10 8   EGFRNONAA 48* >60       Diagnostic Results:  I have reviewed all pertinent imaging results/findings within the past 24 hours.

## 2020-09-11 NOTE — HOSPITAL COURSE
79 y/o female with history of CML admitted for symptomatic anemia and thrombocytopenia. Hematology/Onclogy consulted. Initial H/H 5.4/17.7 and platelet count 8. Patient received 2 unit of PRBCs and 1 unit of platelets. Post transfusion H/H 8.8/27.6, Platelets 49. Left hip concerning for hematoma. CT imaging reviewed, no acute findings noted. Case discussed with Hematology/Oncology, ok to discharge from their standpoint. Patient to follow-up with PCP in 3 days for hospital follow-up. Patient to follow-up with Dr. Knox as scheduled outpatient. Patient seen and examined on the date of discharge and found suitable for discharge.

## 2020-09-11 NOTE — ASSESSMENT & PLAN NOTE
Currently being given 1 unit platelets. Admission platelet count: 8K. Extensive bruising to all extremities. Possible hematoma to left hip, plan is for imaging per .    --Daily CBC, CMP  --Transfuse 1 unit platelet for platelet count <10K or <25K if bleeding

## 2020-09-11 NOTE — NURSING
Discharged pt home, iv d/rowan per policy, no follow up appts to make, no discharge meds, vs wnl, nad

## 2020-09-11 NOTE — PROGRESS NOTES
[facility] Hospital Medicine  Progress Note    Patient Name: Sarah Parker  MRN: [id number]  Patient Class: IP- Inpatient   Admission Date: 9/10/2020  Length of Stay: 1 days  Attending Physician: Romario Pitts MD  Primary Care Provider: Briseida Bennett MD        Subjective:     Principal Problem:Symptomatic anemia        HPI:  This is a 80-year-old female who has a history of chronic myelomonocytic anemia requiring frequent packed red blood cell and platelet transfusion, hypertension, hiatal hernia with acid reflux, hypothyroidism, rheumatoid arthritis, macular degeneration, and hyperlipidemia.  Patient reports frequent transfusions usually it least 1 every 2-3 weeks.  Her last transfusion was approximately a week and a half ago.  Per report, patient was sent by Dr. Knox for platelet infusion, because infusion center upstairs could not accommodate her.  Patient has been placed in the hospital for overnight monitoring and packed red blood cell and platelet transfusion.               Overview/Hospital Course:  79 y/o female with history of CML admitted for symptomatic anemia and thrombocytopenia. Hematology/Onclogy consulted. Initial H/H 5.4/17.7 and platelet count 8. Patient has received 1 unit of PRBCs and 1 unit of platelets. Second unit of PRBCs transfusing. Left hip concerning for hematoma. CT imaging pending.     Interval History:  Initial H/H 5.4/17.7 and platelet count 8. Patient has received 1 unit of PRBCs and 1 unit of platelets. Second unit of PRBCs transfusing. Left hip concerning for hematoma. CT imaging pending.       Review of Systems   Constitutional: Positive for activity change and fatigue. Negative for appetite change, chills, diaphoresis, fever and unexpected weight change.   HENT: Negative for congestion, hearing loss, mouth sores, postnasal drip, rhinorrhea, sore throat and trouble swallowing.    Eyes: Negative for discharge and visual disturbance.   Respiratory: Negative for  cough, chest tightness and shortness of breath.    Cardiovascular: Negative for chest pain, palpitations and leg swelling.   Gastrointestinal: Negative for abdominal distention, blood in stool, constipation, diarrhea, nausea and vomiting.   Endocrine: Negative for cold intolerance and heat intolerance.   Genitourinary: Negative for difficulty urinating, dyspareunia, flank pain and hematuria.   Musculoskeletal: Positive for arthralgias, back pain, gait problem, joint swelling and myalgias.   Skin: Positive for pallor and wound.   Neurological: Negative for dizziness, weakness, light-headedness and headaches.   Hematological: Negative for adenopathy. Bruises/bleeds easily.   Psychiatric/Behavioral: Negative for agitation, behavioral problems and confusion. The patient is not nervous/anxious.      Objective:     Vital Signs (Most Recent):  Temp: 98.4 °F (36.9 °C) (09/11/20 1004)  Pulse: 79 (09/11/20 1004)  Resp: 16 (09/11/20 0724)  BP: (!) 140/65 (09/11/20 1004)  SpO2: 97 % (09/11/20 1004) Vital Signs (24h Range):  Temp:  [97.8 °F (36.6 °C)-98.9 °F (37.2 °C)] 98.4 °F (36.9 °C)  Pulse:  [70-98] 79  Resp:  [15-24] 16  SpO2:  [96 %-100 %] 97 %  BP: (114-181)/(64-86) 140/65     Weight: 50.2 kg (110 lb 10.7 oz)  Body mass index is 20.24 kg/m².    Intake/Output Summary (Last 24 hours) at 9/11/2020 1214  Last data filed at 9/11/2020 0925  Gross per 24 hour   Intake 2061.33 ml   Output --   Net 2061.33 ml      Physical Exam  Vitals signs and nursing note reviewed.   Constitutional:       General: She is not in acute distress.     Appearance: She is cachectic.   HENT:      Head: Normocephalic and atraumatic.      Right Ear: Hearing and external ear normal.      Left Ear: Hearing and external ear normal.      Nose: No rhinorrhea.      Right Sinus: No maxillary sinus tenderness or frontal sinus tenderness.      Left Sinus: No maxillary sinus tenderness or frontal sinus tenderness.      Mouth/Throat:      Mouth: No oral lesions.       Pharynx: Uvula midline.   Eyes:      General:         Right eye: No discharge.         Left eye: No discharge.      Conjunctiva/sclera: Conjunctivae normal.      Pupils: Pupils are equal, round, and reactive to light.   Neck:      Musculoskeletal: Normal range of motion.      Thyroid: No thyromegaly.      Vascular: No carotid bruit.      Trachea: No tracheal deviation.   Cardiovascular:      Rate and Rhythm: Normal rate and regular rhythm.      Pulses:           Dorsalis pedis pulses are 2+ on the right side and 2+ on the left side.      Heart sounds: Normal heart sounds, S1 normal and S2 normal. No murmur.   Pulmonary:      Effort: Pulmonary effort is normal. No respiratory distress.      Breath sounds: Normal breath sounds.   Abdominal:      General: Bowel sounds are normal. There is no distension.      Palpations: Abdomen is soft. There is no mass.      Tenderness: There is no abdominal tenderness.   Musculoskeletal: Normal range of motion.      Left hip: She exhibits tenderness and swelling.   Lymphadenopathy:      Cervical: No cervical adenopathy.      Upper Body:      Right upper body: No supraclavicular adenopathy.      Left upper body: No supraclavicular adenopathy.   Skin:     General: Skin is warm and dry.      Capillary Refill: Capillary refill takes less than 2 seconds.      Findings: Bruising (Generalized, all extremeties) present. No rash.          Neurological:      Mental Status: She is alert and oriented to person, place, and time.      Sensory: No sensory deficit.      Coordination: Coordination normal.      Gait: Gait normal.   Psychiatric:         Mood and Affect: Mood is not anxious or depressed.         Speech: Speech normal.         Behavior: Behavior normal.         Thought Content: Thought content normal.         Judgment: Judgment normal.         Significant Labs:   BMP:   Recent Labs   Lab 09/11/20  0723      *   K 3.9      CO2 27   BUN 28*   CREATININE 0.8    CALCIUM 8.9   MG 1.9     CBC:   Recent Labs   Lab 09/10/20  1341 09/11/20  0723   WBC 21.74* 22.35*   HGB 5.4* 6.6*   HCT 17.7* 21.0*   PLT 8* 15*     CMP:   Recent Labs   Lab 09/10/20  1341 09/11/20  0723   * 135*   K 4.3 3.9   CL 98 100   CO2 23 27   * 100   BUN 36* 28*   CREATININE 1.1 0.8   CALCIUM 8.6* 8.9   PROT 7.9  --    ALBUMIN 3.2*  --    BILITOT 0.3  --    ALKPHOS 183*  --    AST 17  --    ALT 14  --    ANIONGAP 10 8   EGFRNONAA 48* >60     All pertinent labs within the past 24 hours have been reviewed.    Significant Imaging:   Imaging Results    None         Assessment/Plan:      * Symptomatic anemia  Hemoglobin 5.4 and hematocrit 17.7-   Telemetry  Patient for packed red blood cell transfusion x2 units tonight  Monitor closely for any signs of decline and or volume overload  9/11/20  -Second unit of PRBCs transfusing   -Will repeat CBC post transfusion       Back wound  Chronic-patient reports receives wound care from [facility]  Wound care consulted for dressing changes during admission      Dehydration  Improved       Hyponatremia  Chronic  Na+ 135      Debility  Fall and skin precautions      Blast crisis phase of chronic myeloid leukemia  Ongoing leukocytosis and thrombocytopenia noted  Hematology consulted    Thrombocytopenia  Platelets at 8-patient received 1 dose of platelets tonight  Hematology consulted  9/11/20  -Platelet count 15   -Left hip concerning for hematoma, CT hip pending      Other hyperlipidemia  Continue home statin      Chronic myelomonocytic leukemia not having achieved remission  Chronic leukocytosis  Currently no signs of acute infection  Hematology/Oncology following       COPD (chronic obstructive pulmonary disease)  Monitor O2 sats, supplement O2 as needed  Duonebs prn       Essential hypertension  Resume home meds   Monitor       ARMD (age related macular degeneration)  Fall precautions      Acquired hypothyroidism  Continue home  levothyroxine    Rheumatoid arthritis  Continue home p.r.n. pain medication        VTE Risk Mitigation (From admission, onward)         Ordered     Place MYLES hose  Until discontinued      09/10/20 1940     IP VTE HIGH RISK PATIENT  Once      09/10/20 1940                Discharge Planning   ANIA:      Code Status: Full Code   Is the patient medically ready for discharge?:     Reason for patient still in hospital (select all that apply): Imaging                     Liz Matthews NP  Department of Hospital Medicine   [facility]

## 2020-09-11 NOTE — SUBJECTIVE & OBJECTIVE
Interval History:  Initial H/H 5.4/17.7 and platelet count 8. Patient has received 1 unit of PRBCs and 1 unit of platelets. Second unit of PRBCs transfusing. Left hip concerning for hematoma. CT imaging pending.       Review of Systems   Constitutional: Positive for activity change and fatigue. Negative for appetite change, chills, diaphoresis, fever and unexpected weight change.   HENT: Negative for congestion, hearing loss, mouth sores, postnasal drip, rhinorrhea, sore throat and trouble swallowing.    Eyes: Negative for discharge and visual disturbance.   Respiratory: Negative for cough, chest tightness and shortness of breath.    Cardiovascular: Negative for chest pain, palpitations and leg swelling.   Gastrointestinal: Negative for abdominal distention, blood in stool, constipation, diarrhea, nausea and vomiting.   Endocrine: Negative for cold intolerance and heat intolerance.   Genitourinary: Negative for difficulty urinating, dyspareunia, flank pain and hematuria.   Musculoskeletal: Positive for arthralgias, back pain, gait problem, joint swelling and myalgias.   Skin: Positive for pallor and wound.   Neurological: Negative for dizziness, weakness, light-headedness and headaches.   Hematological: Negative for adenopathy. Bruises/bleeds easily.   Psychiatric/Behavioral: Negative for agitation, behavioral problems and confusion. The patient is not nervous/anxious.      Objective:     Vital Signs (Most Recent):  Temp: 98.4 °F (36.9 °C) (09/11/20 1004)  Pulse: 79 (09/11/20 1004)  Resp: 16 (09/11/20 0724)  BP: (!) 140/65 (09/11/20 1004)  SpO2: 97 % (09/11/20 1004) Vital Signs (24h Range):  Temp:  [97.8 °F (36.6 °C)-98.9 °F (37.2 °C)] 98.4 °F (36.9 °C)  Pulse:  [70-98] 79  Resp:  [15-24] 16  SpO2:  [96 %-100 %] 97 %  BP: (114-181)/(64-86) 140/65     Weight: 50.2 kg (110 lb 10.7 oz)  Body mass index is 20.24 kg/m².    Intake/Output Summary (Last 24 hours) at 9/11/2020 1214  Last data filed at 9/11/2020 0925  Gross  per 24 hour   Intake 2061.33 ml   Output --   Net 2061.33 ml      Physical Exam  Vitals signs and nursing note reviewed.   Constitutional:       General: She is not in acute distress.     Appearance: She is cachectic.   HENT:      Head: Normocephalic and atraumatic.      Right Ear: Hearing and external ear normal.      Left Ear: Hearing and external ear normal.      Nose: No rhinorrhea.      Right Sinus: No maxillary sinus tenderness or frontal sinus tenderness.      Left Sinus: No maxillary sinus tenderness or frontal sinus tenderness.      Mouth/Throat:      Mouth: No oral lesions.      Pharynx: Uvula midline.   Eyes:      General:         Right eye: No discharge.         Left eye: No discharge.      Conjunctiva/sclera: Conjunctivae normal.      Pupils: Pupils are equal, round, and reactive to light.   Neck:      Musculoskeletal: Normal range of motion.      Thyroid: No thyromegaly.      Vascular: No carotid bruit.      Trachea: No tracheal deviation.   Cardiovascular:      Rate and Rhythm: Normal rate and regular rhythm.      Pulses:           Dorsalis pedis pulses are 2+ on the right side and 2+ on the left side.      Heart sounds: Normal heart sounds, S1 normal and S2 normal. No murmur.   Pulmonary:      Effort: Pulmonary effort is normal. No respiratory distress.      Breath sounds: Normal breath sounds.   Abdominal:      General: Bowel sounds are normal. There is no distension.      Palpations: Abdomen is soft. There is no mass.      Tenderness: There is no abdominal tenderness.   Musculoskeletal: Normal range of motion.      Left hip: She exhibits tenderness and swelling.   Lymphadenopathy:      Cervical: No cervical adenopathy.      Upper Body:      Right upper body: No supraclavicular adenopathy.      Left upper body: No supraclavicular adenopathy.   Skin:     General: Skin is warm and dry.      Capillary Refill: Capillary refill takes less than 2 seconds.      Findings: Bruising (Generalized, all  extremeties) present. No rash.          Neurological:      Mental Status: She is alert and oriented to person, place, and time.      Sensory: No sensory deficit.      Coordination: Coordination normal.      Gait: Gait normal.   Psychiatric:         Mood and Affect: Mood is not anxious or depressed.         Speech: Speech normal.         Behavior: Behavior normal.         Thought Content: Thought content normal.         Judgment: Judgment normal.         Significant Labs:   BMP:   Recent Labs   Lab 09/11/20  0723      *   K 3.9      CO2 27   BUN 28*   CREATININE 0.8   CALCIUM 8.9   MG 1.9     CBC:   Recent Labs   Lab 09/10/20  1341 09/11/20  0723   WBC 21.74* 22.35*   HGB 5.4* 6.6*   HCT 17.7* 21.0*   PLT 8* 15*     CMP:   Recent Labs   Lab 09/10/20  1341 09/11/20  0723   * 135*   K 4.3 3.9   CL 98 100   CO2 23 27   * 100   BUN 36* 28*   CREATININE 1.1 0.8   CALCIUM 8.6* 8.9   PROT 7.9  --    ALBUMIN 3.2*  --    BILITOT 0.3  --    ALKPHOS 183*  --    AST 17  --    ALT 14  --    ANIONGAP 10 8   EGFRNONAA 48* >60     All pertinent labs within the past 24 hours have been reviewed.    Significant Imaging:   Imaging Results    None

## 2020-09-11 NOTE — PLAN OF CARE
Pt provided with transition folder.  Provided with information on advance directives.  Pt is current with [facility] and wishes to continue services at time of discharge.  [place] choice form signed, original placed in chart and pt provided with copy.  Family will provide d/c transport.  Her PCP is Briseida Bennett MD and her pharmacy of choice is   [id number] - [place], [place] - [address]  [address]  [place] [address]  Phone: [phone] Fax: [phone]    [facility]  [address] [facility] [place] [address]  Phone: [phone] Fax: [phone]    [facility] [place] - [address]  [address] [place] [address]  Phone: [phone] Fax: [phone]    [facility] [place] [id number] [facility]  [address] [address] [address]  Phone: [phone] Fax: [phone]       09/11/20 1454   Discharge Assessment   Assessment Type Discharge Planning Assessment   Confirmed/corrected address and phone number on facesheet? Yes   Assessment information obtained from? Patient   Prior to hospitilization cognitive status: Alert/Oriented   Prior to hospitalization functional status: Independent   Current cognitive status: Alert/Oriented   Current Functional Status:   (tbd)   Facility Arrived From: Home   Lives With   (family)   Able to Return to Prior Arrangements yes   Is patient able to care for self after discharge? Yes   Who are your caregiver(s) and their phone number(s)? Albina Martínez Daughter [phone]   Patient's perception of discharge disposition home health   Patient currently being followed by outpatient case management? No   Patient currently receives any other outside agency services? Yes   Name and contact number of agency or person providing outside services [facility]    Is it the patient/care giver preference to resume care with the current outside agency? Yes    Equipment Currently Used at Home none  (pt states she does not have any home DME)   Do you have any problems affording any of your prescribed medications? No   Is the patient taking medications as prescribed? yes   Does the patient have transportation home? Yes   Transportation Anticipated family or friend will provide   Does the patient receive services at the [facility]? No   Discharge Plan A Home Health   DME Needed Upon Discharge    (tbd)

## 2020-09-11 NOTE — ASSESSMENT & PLAN NOTE
Plan is for 2 units PRBC today, admission Hgb: 5.4. Will evaluate Hgb after transfusion.    --Transfuse for Hgb <7.0  --Daily CBC, CMP

## 2020-09-11 NOTE — ASSESSMENT & PLAN NOTE
Hemoglobin 5.4 and hematocrit 17.7-   Telemetry  Patient for packed red blood cell transfusion x2 units tonight  Monitor closely for any signs of decline and or volume overload  9/11/20  -Second unit of PRBCs transfusing   -Will repeat CBC post transfusion

## 2020-09-11 NOTE — HPI
80 y.o. female patient who presents to the Emergency Department for anemia. Pt was referred to the ED by Dr. Knox (Hem/Onc) for further evaluation and blood transfusion. She is currently followed by hem/onc for CML. Other significant hx: RA, COPD. Pt had a platelet count of 18 and an H/H of 8.3/27.6 on 9/4/20. Symptoms are constant and moderate in severity. No mitigating or exacerbating factors reported. Associated sxs include generalized weakness. Patient denies any head trauma/falls, fever, chills, blood in stool, n/v/d, SOB, CP, numbness, dizziness, headache, and all other sxs at this time. No further complaints or concerns at this time.

## 2020-09-11 NOTE — CONSULTS
"[facility] -   Hematology/Oncology  Consult Note    Patient Name: Sarah Parker  MRN: [id number]  Admission Date: 9/10/2020  Hospital Length of Stay: 1 days  Code Status: Full Code   Attending Provider: Romario Pitts MD  Consulting Provider: Joan Kay NP  Primary Care Physician: Briseida Bennett MD  Principal Problem:Symptomatic anemia    Inpatient consult to Hematology/Oncology  Consult performed by: Joan Kay NP  Consult ordered by: Luanne Zhang MD  Reason for consult: CML  Assessment/Recommendations: * Symptomatic anemia  Plan is for 2 units PRBC today, admission Hgb: 5.4. Will evaluate Hgb after transfusion.    --Transfuse for Hgb <7.0  --Daily CBC, CMP    Back wound  Patient currently followed by ID.    --Consult wound care    Thrombocytopenia  Currently being given 1 unit platelets. Admission platelet count: 8K. Extensive bruising to all extremities. Possible hematoma to left hip, plan is for imaging per HM.    --Daily CBC, CMP  --Transfuse 1 unit platelet for platelet count <10K or <25K if bleeding    Chronic myelomonocytic leukemia not having achieved remission  Patient has been managed by Hem//Onc for this condition. Plan is for a new treatment, Inqovi, which will be initiated as outpatient. Not currently in blast crisis. Treatment on hold due to infection, managed by ID.     --Daily CBC, CMP  --transfuse PRBC for Hgb <7.0  --Transfuse platelets for platelet count <10K or <25k for active bleeding  --Concerned for hematoma to left hip, patient denies fall, endorses about 2 weeks ago feeling the hip "catch" when walking and has been sore since. Due to current platelet count, would appreciate imaging of hip to monitor. Discussed with HM.     Rheumatoid arthritis  Currently treated with Elavil and Cymbalta.     --Management per Primary team          Subjective:     HPI:  80 y.o. female patient who presents to the Emergency Department for anemia. Pt was referred to the ED by Dr." Lisette (Hem/Onc) for further evaluation and blood transfusion. She is currently followed by hem/onc for CML. Other significant hx: RA, COPD. Pt had a platelet count of 18 and an H/H of 8.3/27.6 on 9/4/20. Symptoms are constant and moderate in severity. No mitigating or exacerbating factors reported. Associated sxs include generalized weakness. Patient denies any head trauma/falls, fever, chills, blood in stool, n/v/d, SOB, CP, numbness, dizziness, headache, and all other sxs at this time. No further complaints or concerns at this time.     Oncology Treatment Plan:   [No treatment plan]    Medications:  Continuous Infusions:  Scheduled Meds:   sodium chloride   Intravenous Once    sodium chloride   Intravenous Once    amitriptyline  75 mg Oral QHS    calcium carbonate-vitamin D3 250-125 mg  1 tablet Oral Daily    DULoxetine  40 mg Oral QHS    fluticasone propionate  2 spray Each Nostril Daily    hydrOXYzine HCL  25 mg Oral Nightly    levothyroxine  88 mcg Oral Before breakfast    magnesium oxide  400 mg Oral BID    pravastatin  10 mg Oral QHS    tamsulosin  0.4 mg Oral Daily     PRN Meds:sodium chloride, sodium chloride, acetaminophen, albuterol sulfate, HYDROcodone-acetaminophen, meclizine, ondansetron, polyethylene glycol, sodium chloride 0.9%     Review of patient's allergies indicates:   Allergen Reactions    Codeine      Other reaction(s): hyper  Other reaction(s): hyper    Gabapentin Other (See Comments)     Bad dreams        Past Medical History:   Diagnosis Date    Acid reflux     Anxiety     Back pain     Bronchitis, chronic obstructive w acute bronchitis 7/29/2016    Cancer     [facility] arms, face- Dr. Lata Tejada    Cataract     2+NS    Chronic myelomonocytic leukemia     Degenerative disc disease     Depression     Depression     Dry mouth     Encounter for blood transfusion     Hernia, hiatal 11/18/2013    Hypertension     Hypothyroid     Hypothyroidism     Iron deficiency  anemia     Macrocytic anemia 5/3/2016    Macular degeneration     Migraines     Mixed anxiety and depressive disorder     Multiple fractures of ribs of right side     Osteoporosis     Other hyperlipidemia 10/11/2019    Pneumonia     Pneumonia due to other staphylococcus     Rheumatoid arthritis     Rheumatoid arthritis(714.0)     Rheumatoid arthritis(714.0)     Remicade, MTX.     Past Surgical History:   Procedure Laterality Date    APPENDECTOMY  1985    APPLICATION OF WOUND VACUUM-ASSISTED CLOSURE DEVICE N/A 5/14/2020    Procedure: APPLICATION, WOUND VAC;  Surgeon: Mckinley Shannon MD;  Location: [facility] OR;  Service: General;  Laterality: N/A;    APPLICATION OF WOUND VACUUM-ASSISTED CLOSURE DEVICE Left 7/25/2020    Procedure: APPLICATION, WOUND VAC;  Surgeon: Berenice Alfaro MD;  Location: [facility] OR;  Service: General;  Laterality: Left;    BREAST BIOPSY      CATARACT EXTRACTION Bilateral 6/11/15    Dr. Booth    CHOLECYSTECTOMY  2013    cryoablasion kidney Left 09/27/2016    DEBRIDEMENT Left 7/25/2020    Procedure: DEBRIDEMENT;  Surgeon: Berenice Alfaro MD;  Location: [facility] OR;  Service: General;  Laterality: Left;    EVACUATION OF HEMATOMA Left 7/25/2020    Procedure: EVACUATION, HEMATOMA;  Surgeon: Berenice Alfaro MD;  Location: [facility] OR;  Service: General;  Laterality: Left;    EXCISION OF SQUAMOUS CELL CARCINOMA Left 7/2/2020    Procedure: EXCISION, CARCINOMA, SQUAMOUS CELL;  Surgeon: Mckinley Shannon MD;  Location: [facility] OR;  Service: General;  Laterality: Left;    feet Bilateral     rheumatoid    FRACTURE SURGERY Right     tibia    HERNIA REPAIR      HYSTERECTOMY  1970    partial    INCISION AND DRAINAGE OF ABSCESS N/A 5/12/2020    Procedure: INCISION AND DRAINAGE, ABSCESS;  Surgeon: Emerson Hathaway MD;  Location: [facility] OR;  Service: General;  Laterality: N/A;    INCISIONAL BIOPSY N/A 5/12/2020    Procedure: INCISIONAL BIOPSY;  Surgeon: Emerson Hathaway MD;  Location: [facility] OR;  Service:  General;  Laterality: N/A;    JOINT REPLACEMENT      bilateral knees (), hands, wrists, knuckles, toes    LAPAROSCOPIC NISSEN FUNDOPLICATION      TRANSFORAMINAL EPIDURAL INJECTION OF STEROID Left 2019    Procedure: Left L5/S1 TF AYAAN with local;  Surgeon: Rowdy Felix MD;  Location: [facility] PAIN [facility]GT;  Service: Pain Management;  Laterality: Left;    WOUND DEBRIDEMENT N/A 2020    Procedure: DEBRIDEMENT, WOUND;  Surgeon: Mckinley Shannon MD;  Location: [facility] OR;  Service: General;  Laterality: N/A;    WOUND DEBRIDEMENT Left 2020    Procedure: DEBRIDEMENT, WOUND;  Surgeon: Berenice Alfaro MD;  Location: [facility] OR;  Service: General;  Laterality: Left;     Family History     Problem Relation (Age of Onset)    Asthma Sister, Brother    Cancer Brother, Maternal Grandfather    Cataracts Mother    Chronic back pain Sister, Brother    Diabetes Mellitus Brother    Fibromyalgia Daughter    Heart disease Mother, Father, Maternal Grandmother    Hyperlipidemia Mother    Hypertension Mother, Father, Sister, Brother    Osteoarthritis Mother, Father, Sister, Brother    Thyroid disease Sister, Brother        Tobacco Use    Smoking status: Former Smoker     Packs/day: 0.25     Years: 2.00     Pack years: 0.50     Quit date: 1965     Years since quittin.8    Smokeless tobacco: Never Used   Substance and Sexual Activity    Alcohol use: No    Drug use: No    Sexual activity: Never     Partners: Male       Review of Systems   Constitutional: Positive for activity change and fatigue. Negative for appetite change, chills, diaphoresis, fever and unexpected weight change.   HENT: Negative for congestion, hearing loss, mouth sores, postnasal drip, rhinorrhea, sore throat and trouble swallowing.    Eyes: Negative for discharge and visual disturbance.   Respiratory: Negative for cough, chest tightness and shortness of breath.    Cardiovascular: Negative for chest pain, palpitations and leg swelling.    Gastrointestinal: Negative for abdominal distention, blood in stool, constipation, diarrhea, nausea and vomiting.   Endocrine: Negative for cold intolerance and heat intolerance.   Genitourinary: Negative for difficulty urinating, dyspareunia, flank pain and hematuria.   Musculoskeletal: Positive for arthralgias, back pain, gait problem, joint swelling and myalgias.   Skin: Positive for pallor and wound.   Neurological: Negative for dizziness, weakness, light-headedness and headaches.   Hematological: Negative for adenopathy. Bruises/bleeds easily.   Psychiatric/Behavioral: Negative for agitation, behavioral problems and confusion. The patient is not nervous/anxious.      Objective:     Vital Signs (Most Recent):  Temp: 98.4 °F (36.9 °C) (09/11/20 1004)  Pulse: 79 (09/11/20 1004)  Resp: 16 (09/11/20 0724)  BP: (!) 140/65 (09/11/20 1004)  SpO2: 97 % (09/11/20 1004) Vital Signs (24h Range):  Temp:  [97.8 °F (36.6 °C)-98.9 °F (37.2 °C)] 98.4 °F (36.9 °C)  Pulse:  [70-98] 79  Resp:  [15-24] 16  SpO2:  [96 %-100 %] 97 %  BP: (114-181)/(64-86) 140/65     Weight: 50.2 kg (110 lb 10.7 oz)  Body mass index is 20.24 kg/m².  Body surface area is 1.48 meters squared.      Intake/Output Summary (Last 24 hours) at 9/11/2020 1057  Last data filed at 9/11/2020 0925  Gross per 24 hour   Intake 2061.33 ml   Output --   Net 2061.33 ml       Physical Exam  Vitals signs and nursing note reviewed.   Constitutional:       General: She is not in acute distress.     Appearance: She is cachectic.   HENT:      Head: Normocephalic and atraumatic.      Right Ear: Hearing and external ear normal.      Left Ear: Hearing and external ear normal.      Nose: No rhinorrhea.      Right Sinus: No maxillary sinus tenderness or frontal sinus tenderness.      Left Sinus: No maxillary sinus tenderness or frontal sinus tenderness.      Mouth/Throat:      Mouth: No oral lesions.      Pharynx: Uvula midline.   Eyes:      General:         Right eye: No  discharge.         Left eye: No discharge.      Conjunctiva/sclera: Conjunctivae normal.      Pupils: Pupils are equal, round, and reactive to light.   Neck:      Musculoskeletal: Normal range of motion.      Thyroid: No thyromegaly.      Vascular: No carotid bruit.      Trachea: No tracheal deviation.   Cardiovascular:      Rate and Rhythm: Normal rate and regular rhythm.      Pulses:           Dorsalis pedis pulses are 2+ on the right side and 2+ on the left side.      Heart sounds: Normal heart sounds, S1 normal and S2 normal. No murmur.   Pulmonary:      Effort: Pulmonary effort is normal. No respiratory distress.      Breath sounds: Normal breath sounds.   Abdominal:      General: Bowel sounds are normal. There is no distension.      Palpations: Abdomen is soft. There is no mass.      Tenderness: There is no abdominal tenderness.   Musculoskeletal: Normal range of motion.      Left hip: She exhibits tenderness and swelling.   Lymphadenopathy:      Cervical: No cervical adenopathy.      Upper Body:      Right upper body: No supraclavicular adenopathy.      Left upper body: No supraclavicular adenopathy.   Skin:     General: Skin is warm and dry.      Capillary Refill: Capillary refill takes less than 2 seconds.      Findings: Bruising (Generalized, all extremeties) present. No rash.          Neurological:      Mental Status: She is alert and oriented to person, place, and time.      Sensory: No sensory deficit.      Coordination: Coordination normal.      Gait: Gait normal.   Psychiatric:         Mood and Affect: Mood is not anxious or depressed.         Speech: Speech normal.         Behavior: Behavior normal.         Thought Content: Thought content normal.         Judgment: Judgment normal.         Significant Labs:   CBC:   Recent Labs   Lab 09/10/20  1341 09/11/20  0723   WBC 21.74* 22.35*   HGB 5.4* 6.6*   HCT 17.7* 21.0*   PLT 8* 15*    and CMP:   Recent Labs   Lab 09/10/20  1341 09/11/20  0723   *  "135*   K 4.3 3.9   CL 98 100   CO2 23 27   * 100   BUN 36* 28*   CREATININE 1.1 0.8   CALCIUM 8.6* 8.9   PROT 7.9  --    ALBUMIN 3.2*  --    BILITOT 0.3  --    ALKPHOS 183*  --    AST 17  --    ALT 14  --    ANIONGAP 10 8   EGFRNONAA 48* >60       Diagnostic Results:  I have reviewed all pertinent imaging results/findings within the past 24 hours.    Assessment/Plan:     * Symptomatic anemia  Plan is for 2 units PRBC today, admission Hgb: 5.4. Will evaluate Hgb after transfusion.    --Transfuse for Hgb <7.0  --Daily CBC, CMP    Back wound  Patient currently followed by ID.    --Consult wound care    Thrombocytopenia  Currently being given 1 unit platelets. Admission platelet count: 8K. Extensive bruising to all extremities. Possible hematoma to left hip, plan is for imaging per HM.    --Daily CBC, CMP  --Transfuse 1 unit platelet for platelet count <10K or <25K if bleeding    Chronic myelomonocytic leukemia not having achieved remission  Patient has been managed by Hem//Onc for this condition. Plan is for a new treatment, Inqovi, which will be initiated as outpatient. Not currently in blast crisis. Treatment on hold due to infection, managed by ID.     --Daily CBC, CMP  --transfuse PRBC for Hgb <7.0  --Transfuse platelets for platelet count <10K or <25k for active bleeding  --Concerned for hematoma to left hip, patient denies fall, endorses about 2 weeks ago feeling the hip "catch" when walking and has been sore since. Due to current platelet count, would appreciate imaging of hip to monitor. Discussed with HM.     Rheumatoid arthritis  Currently treated with Elavil and Cymbalta.     --Management per Primary team        Thank you for your consult. I will follow-up with patient. Please contact us if you have any additional questions.    Joan Kay NP  Hematology/Oncology  [facility]  "

## 2020-09-11 NOTE — ASSESSMENT & PLAN NOTE
Hemoglobin 5.4 and hematocrit 17.7-   Telemetry  Patient for packed red blood cell transfusion x2 units tonight  Monitor closely for any signs of decline and or volume overload

## 2020-09-11 NOTE — PROGRESS NOTES
[facility] -   Hematology/Oncology  Progress Note    Patient Name: Sarah Parker  Admission Date: 9/10/2020  Hospital Length of Stay: 1 days  Code Status: Full Code     Subjective:     HPI:  80 y.o. female patient who presents to the Emergency Department for anemia. Pt was referred to the ED by Dr. Knox (Hem/Onc) for further evaluation and blood transfusion. She is currently followed by hem/onc for CML. Other significant hx: RA, COPD. Pt had a platelet count of 18 and an H/H of 8.3/27.6 on 9/4/20. Symptoms are constant and moderate in severity. No mitigating or exacerbating factors reported. Associated sxs include generalized weakness. Patient denies any head trauma/falls, fever, chills, blood in stool, n/v/d, SOB, CP, numbness, dizziness, headache, and all other sxs at this time. No further complaints or concerns at this time.     Oncology Treatment Plan:   [No treatment plan]    Medications:  Continuous Infusions:  Scheduled Meds:   sodium chloride   Intravenous Once    sodium chloride   Intravenous Once    amitriptyline  75 mg Oral QHS    calcium carbonate-vitamin D3 250-125 mg  1 tablet Oral Daily    DULoxetine  40 mg Oral QHS    fluticasone propionate  2 spray Each Nostril Daily    hydrOXYzine HCL  25 mg Oral Nightly    levothyroxine  88 mcg Oral Before breakfast    magnesium oxide  400 mg Oral BID    pravastatin  10 mg Oral QHS    tamsulosin  0.4 mg Oral Daily     PRN Meds:sodium chloride, sodium chloride, acetaminophen, albuterol sulfate, HYDROcodone-acetaminophen, meclizine, ondansetron, polyethylene glycol, sodium chloride 0.9%     Review of patient's allergies indicates:   Allergen Reactions    Codeine      Other reaction(s): hyper  Other reaction(s): hyper    Gabapentin Other (See Comments)     Bad dreams        Past Medical History:   Diagnosis Date    Acid reflux     Anxiety     Back pain     Bronchitis, chronic obstructive w acute bronchitis 7/29/2016    Cancer      [facility] arms, face- Dr. Lata Tejada    Cataract     2+NS    Chronic myelomonocytic leukemia     Degenerative disc disease     Depression     Depression     Dry mouth     Encounter for blood transfusion     Hernia, hiatal 11/18/2013    Hypertension     Hypothyroid     Hypothyroidism     Iron deficiency anemia     Macrocytic anemia 5/3/2016    Macular degeneration     Migraines     Mixed anxiety and depressive disorder     Multiple fractures of ribs of right side     Osteoporosis     Other hyperlipidemia 10/11/2019    Pneumonia     Pneumonia due to other staphylococcus     Rheumatoid arthritis     Rheumatoid arthritis(714.0)     Rheumatoid arthritis(714.0)     Remicade, MTX.     Past Surgical History:   Procedure Laterality Date    APPENDECTOMY  1985    APPLICATION OF WOUND VACUUM-ASSISTED CLOSURE DEVICE N/A 5/14/2020    Procedure: APPLICATION, WOUND VAC;  Surgeon: Mckinley Shannon MD;  Location: [facility] OR;  Service: General;  Laterality: N/A;    APPLICATION OF WOUND VACUUM-ASSISTED CLOSURE DEVICE Left 7/25/2020    Procedure: APPLICATION, WOUND VAC;  Surgeon: Berenice Alfaro MD;  Location: [facility] OR;  Service: General;  Laterality: Left;    BREAST BIOPSY      CATARACT EXTRACTION Bilateral 6/11/15    Dr. Booth    CHOLECYSTECTOMY  2013    cryoablasion kidney Left 09/27/2016    DEBRIDEMENT Left 7/25/2020    Procedure: DEBRIDEMENT;  Surgeon: Berenice Alfaro MD;  Location: [facility] OR;  Service: General;  Laterality: Left;    EVACUATION OF HEMATOMA Left 7/25/2020    Procedure: EVACUATION, HEMATOMA;  Surgeon: Berenice Alfaro MD;  Location: [facility] OR;  Service: General;  Laterality: Left;    EXCISION OF SQUAMOUS CELL CARCINOMA Left 7/2/2020    Procedure: EXCISION, CARCINOMA, SQUAMOUS CELL;  Surgeon: Mckinley Shannon MD;  Location: [facility] OR;  Service: General;  Laterality: Left;    feet Bilateral     rheumatoid    FRACTURE SURGERY Right     tibia    HERNIA REPAIR      HYSTERECTOMY  1970     partial    INCISION AND DRAINAGE OF ABSCESS N/A 2020    Procedure: INCISION AND DRAINAGE, ABSCESS;  Surgeon: Emerson Hathaway MD;  Location: [facility] OR;  Service: General;  Laterality: N/A;    INCISIONAL BIOPSY N/A 2020    Procedure: INCISIONAL BIOPSY;  Surgeon: Emerson Hahtaway MD;  Location: [facility] OR;  Service: General;  Laterality: N/A;    JOINT REPLACEMENT      bilateral knees (), hands, wrists, knuckles, toes    LAPAROSCOPIC NISSEN FUNDOPLICATION      TRANSFORAMINAL EPIDURAL INJECTION OF STEROID Left 2019    Procedure: Left L5/S1 TF AYAAN with local;  Surgeon: Rowdy Felix MD;  Location: [facility] PAIN MGT;  Service: Pain Management;  Laterality: Left;    WOUND DEBRIDEMENT N/A 2020    Procedure: DEBRIDEMENT, WOUND;  Surgeon: Mckinley Shannon MD;  Location: [facility] OR;  Service: General;  Laterality: N/A;    WOUND DEBRIDEMENT Left 2020    Procedure: DEBRIDEMENT, WOUND;  Surgeon: Berenice Alfaro MD;  Location: [facility] OR;  Service: General;  Laterality: Left;     Family History     Problem Relation (Age of Onset)    Asthma Sister, Brother    Cancer Brother, Maternal Grandfather    Cataracts Mother    Chronic back pain Sister, Brother    Diabetes Mellitus Brother    Fibromyalgia Daughter    Heart disease Mother, Father, Maternal Grandmother    Hyperlipidemia Mother    Hypertension Mother, Father, Sister, Brother    Osteoarthritis Mother, Father, Sister, Brother    Thyroid disease Sister, Brother        Tobacco Use    Smoking status: Former Smoker     Packs/day: 0.25     Years: 2.00     Pack years: 0.50     Quit date: 1965     Years since quittin.8    Smokeless tobacco: Never Used   Substance and Sexual Activity    Alcohol use: No    Drug use: No    Sexual activity: Never     Partners: Male       Review of Systems   Constitutional: Positive for activity change and fatigue. Negative for appetite change, chills, diaphoresis, fever and unexpected weight change.   HENT: Negative for  congestion, hearing loss, mouth sores, postnasal drip, rhinorrhea, sore throat and trouble swallowing.    Eyes: Negative for discharge and visual disturbance.   Respiratory: Negative for cough, chest tightness and shortness of breath.    Cardiovascular: Negative for chest pain, palpitations and leg swelling.   Gastrointestinal: Negative for abdominal distention, blood in stool, constipation, diarrhea, nausea and vomiting.   Endocrine: Negative for cold intolerance and heat intolerance.   Genitourinary: Negative for difficulty urinating, dyspareunia, flank pain and hematuria.   Musculoskeletal: Positive for arthralgias, back pain, gait problem, joint swelling and myalgias.   Skin: Positive for pallor and wound.   Neurological: Negative for dizziness, weakness, light-headedness and headaches.   Hematological: Negative for adenopathy. Bruises/bleeds easily.   Psychiatric/Behavioral: Negative for agitation, behavioral problems and confusion. The patient is not nervous/anxious.      Objective:     Vital Signs (Most Recent):  Temp: 98.4 °F (36.9 °C) (09/11/20 1004)  Pulse: 79 (09/11/20 1004)  Resp: 16 (09/11/20 0724)  BP: (!) 140/65 (09/11/20 1004)  SpO2: 97 % (09/11/20 1004) Vital Signs (24h Range):  Temp:  [97.8 °F (36.6 °C)-98.9 °F (37.2 °C)] 98.4 °F (36.9 °C)  Pulse:  [70-98] 79  Resp:  [15-24] 16  SpO2:  [96 %-100 %] 97 %  BP: (114-181)/(64-86) 140/65     Weight: 50.2 kg (110 lb 10.7 oz)  Body mass index is 20.24 kg/m².  Body surface area is 1.48 meters squared.      Intake/Output Summary (Last 24 hours) at 9/11/2020 1034  Last data filed at 9/11/2020 0925  Gross per 24 hour   Intake 2061.33 ml   Output --   Net 2061.33 ml       Physical Exam  Vitals signs and nursing note reviewed.   Constitutional:       General: She is not in acute distress.     Appearance: She is cachectic.   HENT:      Head: Normocephalic and atraumatic.      Right Ear: Hearing and external ear normal.      Left Ear: Hearing and external ear  normal.      Nose: No rhinorrhea.      Right Sinus: No maxillary sinus tenderness or frontal sinus tenderness.      Left Sinus: No maxillary sinus tenderness or frontal sinus tenderness.      Mouth/Throat:      Mouth: No oral lesions.      Pharynx: Uvula midline.   Eyes:      General:         Right eye: No discharge.         Left eye: No discharge.      Conjunctiva/sclera: Conjunctivae normal.      Pupils: Pupils are equal, round, and reactive to light.   Neck:      Musculoskeletal: Normal range of motion.      Thyroid: No thyromegaly.      Vascular: No carotid bruit.      Trachea: No tracheal deviation.   Cardiovascular:      Rate and Rhythm: Normal rate and regular rhythm.      Pulses:           Dorsalis pedis pulses are 2+ on the right side and 2+ on the left side.      Heart sounds: Normal heart sounds, S1 normal and S2 normal. No murmur.   Pulmonary:      Effort: Pulmonary effort is normal. No respiratory distress.      Breath sounds: Normal breath sounds.   Abdominal:      General: Bowel sounds are normal. There is no distension.      Palpations: Abdomen is soft. There is no mass.      Tenderness: There is no abdominal tenderness.   Musculoskeletal: Normal range of motion.      Left hip: She exhibits tenderness and swelling.   Lymphadenopathy:      Cervical: No cervical adenopathy.      Upper Body:      Right upper body: No supraclavicular adenopathy.      Left upper body: No supraclavicular adenopathy.   Skin:     General: Skin is warm and dry.      Capillary Refill: Capillary refill takes less than 2 seconds.      Findings: Bruising (Generalized, all extremeties) present. No rash.          Neurological:      Mental Status: She is alert and oriented to person, place, and time.      Sensory: No sensory deficit.      Coordination: Coordination normal.      Gait: Gait normal.   Psychiatric:         Mood and Affect: Mood is not anxious or depressed.         Speech: Speech normal.         Behavior: Behavior  "normal.         Thought Content: Thought content normal.         Judgment: Judgment normal.         Significant Labs:   CBC:   Recent Labs   Lab 09/10/20  1341 09/11/20  0723   WBC 21.74* 22.35*   HGB 5.4* 6.6*   HCT 17.7* 21.0*   PLT 8* 15*    and CMP:   Recent Labs   Lab 09/10/20  1341 09/11/20  0723   * 135*   K 4.3 3.9   CL 98 100   CO2 23 27   * 100   BUN 36* 28*   CREATININE 1.1 0.8   CALCIUM 8.6* 8.9   PROT 7.9  --    ALBUMIN 3.2*  --    BILITOT 0.3  --    ALKPHOS 183*  --    AST 17  --    ALT 14  --    ANIONGAP 10 8   EGFRNONAA 48* >60       Diagnostic Results:  I have reviewed all pertinent imaging results/findings within the past 24 hours.    Assessment/Plan:     * Symptomatic anemia  Plan is for 2 units PRBC today, admission Hgb: 5.4. Will evaluate Hgb after transfusion.    --Transfuse for Hgb <7.0  --Daily CBC, CMP    Back wound  Patient currently followed by ID.    --Consult wound care    Thrombocytopenia  Currently being given 1 unit platelets. Admission platelet count: 8K. Extensive bruising to all extremities. Possible hematoma to left hip, plan is for imaging per HM.    --Daily CBC, CMP  --Transfuse 1 unit platelet for platelet count <10K or <25K if bleeding    Chronic myelomonocytic leukemia not having achieved remission  Patient has been managed by Hem//Onc for this condition. Plan is for a new treatment, Inqovi, which will be initiated as outpatient. Not currently in blast crisis. Treatment on hold due to infection, managed by ID.     --Daily CBC, CMP  --transfuse PRBC for Hgb <7.0  --Transfuse platelets for platelet count <10K or <25k for active bleeding  --Concerned for hematoma to left hip, patient denies fall, endorses about 2 weeks ago feeling the hip "catch" when walking and has been sore since. Due to current platelet count, would appreciate imaging of hip to monitor. Discussed with HM.     Rheumatoid arthritis  Currently treated with Elavil and Cymbalta.     --Management per " Primary team        Thank you for your consult. I will follow-up with patient. Please contact us if you have any additional questions.     Joan Kay NP  Hematology/Oncology  [facility]

## 2020-09-11 NOTE — PLAN OF CARE
Soniar met with pt at bedside to deliver important message from medicare. Form placed in blue folder.       09/11/20 1400   Medicare Message   Important Message from Medicare regarding Discharge Appeal Rights Given to patient/caregiver;Explained to patient/caregiver   Date IMM was signed 09/11/20   Time IMM was signed 1400

## 2020-09-13 PROCEDURE — G0179 MD RECERTIFICATION HHA PT: HCPCS | Mod: ,,, | Performed by: INTERNAL MEDICINE

## 2020-09-13 PROCEDURE — G0179 PR HOME HEALTH MD RECERTIFICATION: ICD-10-PCS | Mod: ,,, | Performed by: INTERNAL MEDICINE

## 2020-09-14 ENCOUNTER — TELEPHONE (OUTPATIENT)
Dept: SURGERY | Facility: CLINIC | Age: 80
End: 2020-09-14

## 2020-09-14 ENCOUNTER — PATIENT OUTREACH (OUTPATIENT)
Dept: ADMINISTRATIVE | Facility: CLINIC | Age: 80
End: 2020-09-14

## 2020-09-14 DIAGNOSIS — D50.0 IRON DEFICIENCY ANEMIA DUE TO CHRONIC BLOOD LOSS: Primary | ICD-10-CM

## 2020-09-14 DIAGNOSIS — R91.8 LUNG MASS: Primary | ICD-10-CM

## 2020-09-14 NOTE — TELEPHONE ENCOUNTER
"Spoke to Neetu - She advised pt has formed a "Lump between her 2 incision sites" Requested an ok to draw labs on pt - Advised she e-mailed 2 pictures of pt's wound - I've received both pictures and forwarded to Dr Alfaro - Also ok'd for CBC and CMP to be drawn - Dr Alfaro has been notified     ----- Message from Lindsay Disla sent at 9/14/2020  1:20 PM CDT -----  Contact: Neetu-[facility]  Neetu with [facility] requesting a call back to know if she can get lab orders for pt and to also speak to someone about pt's wounds. Please call Neetu back at [phone]      "

## 2020-09-14 NOTE — PATIENT INSTRUCTIONS

## 2020-09-14 NOTE — PLAN OF CARE
09/14/20 0758   Final Note   Assessment Type Final Discharge Note   Anticipated Discharge Disposition Home-Health   Right Care Referral Info   Post Acute Recommendation Home-care   Facility Name [facility]

## 2020-09-14 NOTE — PROGRESS NOTES
Spoke with Augusta at [facility]. She will call pt and let her know when she is scheduled.

## 2020-09-15 ENCOUNTER — TELEPHONE (OUTPATIENT)
Dept: HEMATOLOGY/ONCOLOGY | Facility: CLINIC | Age: 80
End: 2020-09-15

## 2020-09-15 ENCOUNTER — LAB VISIT (OUTPATIENT)
Dept: LAB | Facility: HOSPITAL | Age: 80
End: 2020-09-15
Attending: SURGERY
Payer: MEDICARE

## 2020-09-15 ENCOUNTER — TELEPHONE (OUTPATIENT)
Dept: SURGERY | Facility: CLINIC | Age: 80
End: 2020-09-15

## 2020-09-15 DIAGNOSIS — I10 ESSENTIAL HYPERTENSION, MALIGNANT: Primary | ICD-10-CM

## 2020-09-15 LAB
ALBUMIN SERPL BCP-MCNC: 2.8 G/DL (ref 3.5–5.2)
ALP SERPL-CCNC: 148 U/L (ref 55–135)
ALT SERPL W/O P-5'-P-CCNC: 15 U/L (ref 10–44)
ANION GAP SERPL CALC-SCNC: 11 MMOL/L (ref 8–16)
ANISOCYTOSIS BLD QL SMEAR: SLIGHT
AST SERPL-CCNC: 13 U/L (ref 10–40)
BASOPHILS NFR BLD: 0 % (ref 0–1.9)
BILIRUB SERPL-MCNC: 0.2 MG/DL (ref 0.1–1)
BUN SERPL-MCNC: 53 MG/DL (ref 8–23)
CALCIUM SERPL-MCNC: 8.2 MG/DL (ref 8.7–10.5)
CHLORIDE SERPL-SCNC: 98 MMOL/L (ref 95–110)
CO2 SERPL-SCNC: 23 MMOL/L (ref 23–29)
CREAT SERPL-MCNC: 1.1 MG/DL (ref 0.5–1.4)
DIFFERENTIAL METHOD: ABNORMAL
EOSINOPHIL NFR BLD: 0 % (ref 0–8)
ERYTHROCYTE [DISTWIDTH] IN BLOOD BY AUTOMATED COUNT: 22.5 % (ref 11.5–14.5)
EST. GFR  (AFRICAN AMERICAN): 55.6 ML/MIN/1.73 M^2
EST. GFR  (NON AFRICAN AMERICAN): 48.2 ML/MIN/1.73 M^2
GLUCOSE SERPL-MCNC: 161 MG/DL (ref 70–110)
HCT VFR BLD AUTO: 17.2 % (ref 37–48.5)
HGB BLD-MCNC: 5.4 G/DL (ref 12–16)
HYPOCHROMIA BLD QL SMEAR: ABNORMAL
IMM GRANULOCYTES # BLD AUTO: ABNORMAL K/UL (ref 0–0.04)
IMM GRANULOCYTES NFR BLD AUTO: ABNORMAL % (ref 0–0.5)
LYMPHOCYTES NFR BLD: 57 % (ref 18–48)
MCH RBC QN AUTO: 28 PG (ref 27–31)
MCHC RBC AUTO-ENTMCNC: 31.4 G/DL (ref 32–36)
MCV RBC AUTO: 89 FL (ref 82–98)
METAMYELOCYTES NFR BLD MANUAL: 7 %
MONOCYTES NFR BLD: 9 % (ref 4–15)
MYELOCYTES NFR BLD MANUAL: 7 %
NEUTROPHILS NFR BLD: 17 % (ref 38–73)
NEUTS BAND NFR BLD MANUAL: 3 %
NRBC BLD-RTO: 1 /100 WBC
PLATELET # BLD AUTO: 11 K/UL (ref 150–350)
PLATELET BLD QL SMEAR: ABNORMAL
PMV BLD AUTO: ABNORMAL FL (ref 9.2–12.9)
POLYCHROMASIA BLD QL SMEAR: ABNORMAL
POTASSIUM SERPL-SCNC: 3.8 MMOL/L (ref 3.5–5.1)
PROT SERPL-MCNC: 6.9 G/DL (ref 6–8.4)
RBC # BLD AUTO: 1.93 M/UL (ref 4–5.4)
SCHISTOCYTES BLD QL SMEAR: PRESENT
SODIUM SERPL-SCNC: 132 MMOL/L (ref 136–145)
SPHEROCYTES BLD QL SMEAR: ABNORMAL
WBC # BLD AUTO: 22.1 K/UL (ref 3.9–12.7)

## 2020-09-15 PROCEDURE — 85027 COMPLETE CBC AUTOMATED: CPT | Mod: HCNC,PO

## 2020-09-15 PROCEDURE — 85007 BL SMEAR W/DIFF WBC COUNT: CPT | Mod: HCNC,PO

## 2020-09-15 PROCEDURE — 80053 COMPREHEN METABOLIC PANEL: CPT | Mod: HCNC,PO

## 2020-09-15 NOTE — TELEPHONE ENCOUNTER
Spoke to Augusta, Relayed Critical Labs values to Dr Alfaro -     ----- Message from Liss Bentley sent at 9/15/2020  1:46 PM CDT -----  Regarding: [facility]  Augusta w/ [facility] request call back regarding critical lab values: hemoglobin 5.4, hemarocrit 17.2 ,  platelet 11.. call back : [phone]

## 2020-09-15 NOTE — TELEPHONE ENCOUNTER
----- Message from Denton Knox MD sent at 9/14/2020  4:45 PM CDT -----  Contact: Meredith  Ordered cbc and cmp. Please ask Ira to go ahead with blood draw. Thanks  ----- Message -----  From: Leydi Mccabe LPN  Sent: 9/14/2020   1:16 PM CDT  To: Denton Knox MD    Please advise.   ----- Message -----  From: Caleb Becerra  Sent: 9/14/2020  12:55 PM CDT  To: Lisette MENJIVAR Staff    Would like to consult with nurse regarding pt being lightheaded and dizzy.  Ira ([facility]) would like to know if she could have an order to draw blood for labs for Sarah Parker .  Please contact Meredith @ [phone].  Thanks

## 2020-09-16 ENCOUNTER — TELEPHONE (OUTPATIENT)
Dept: HEMATOLOGY/ONCOLOGY | Facility: CLINIC | Age: 80
End: 2020-09-16

## 2020-09-16 ENCOUNTER — DOCUMENT SCAN (OUTPATIENT)
Dept: HOME HEALTH SERVICES | Facility: HOSPITAL | Age: 80
End: 2020-09-16
Payer: MEDICARE

## 2020-09-16 ENCOUNTER — EXTERNAL HOME HEALTH (OUTPATIENT)
Dept: HOME HEALTH SERVICES | Facility: HOSPITAL | Age: 80
End: 2020-09-16
Payer: MEDICARE

## 2020-09-16 NOTE — TELEPHONE ENCOUNTER
----- Message from Cristel Rg sent at 9/16/2020 10:06 AM CDT -----  Regarding: \prior authorization  Contact: pt  Inqovi prescription it is requiring a prior authorization and in need of clinic notes. [phone]  [phone]

## 2020-09-17 ENCOUNTER — HOSPITAL ENCOUNTER (EMERGENCY)
Facility: HOSPITAL | Age: 80
Discharge: HOME | End: 2020-09-17
Attending: EMERGENCY MEDICINE | Admitting: EMERGENCY MEDICINE
Payer: MEDICARE

## 2020-09-17 VITALS
WEIGHT: 110.63 LBS | BODY MASS INDEX: 20.23 KG/M2 | RESPIRATION RATE: 20 BRPM | OXYGEN SATURATION: 99 % | TEMPERATURE: 100 F | SYSTOLIC BLOOD PRESSURE: 157 MMHG | DIASTOLIC BLOOD PRESSURE: 72 MMHG | HEART RATE: 80 BPM

## 2020-09-17 DIAGNOSIS — R00.0 TACHYCARDIA: ICD-10-CM

## 2020-09-17 DIAGNOSIS — C92.10 CML (CHRONIC MYELOCYTIC LEUKEMIA): ICD-10-CM

## 2020-09-17 DIAGNOSIS — R53.1 WEAKNESS: ICD-10-CM

## 2020-09-17 DIAGNOSIS — D64.9 ANEMIA, UNSPECIFIED TYPE: Primary | ICD-10-CM

## 2020-09-17 LAB
ABO + RH BLD: NORMAL
ALBUMIN SERPL BCP-MCNC: 3.2 G/DL (ref 3.5–5.2)
ALP SERPL-CCNC: 176 U/L (ref 55–135)
ALT SERPL W/O P-5'-P-CCNC: 13 U/L (ref 10–44)
ANION GAP SERPL CALC-SCNC: 11 MMOL/L (ref 8–16)
ANISOCYTOSIS BLD QL SMEAR: SLIGHT
AST SERPL-CCNC: 17 U/L (ref 10–40)
BASOPHILS NFR BLD: 0 % (ref 0–1.9)
BILIRUB SERPL-MCNC: 0.4 MG/DL (ref 0.1–1)
BILIRUB UR QL STRIP: NEGATIVE
BLASTS NFR BLD MANUAL: 3 %
BLD GP AB SCN CELLS X3 SERPL QL: NORMAL
BLD PROD TYP BPU: NORMAL
BLD PROD TYP BPU: NORMAL
BLOOD UNIT EXPIRATION DATE: NORMAL
BLOOD UNIT EXPIRATION DATE: NORMAL
BLOOD UNIT TYPE CODE: 5100
BLOOD UNIT TYPE CODE: 5100
BLOOD UNIT TYPE: NORMAL
BLOOD UNIT TYPE: NORMAL
BUN SERPL-MCNC: 37 MG/DL (ref 8–23)
CALCIUM SERPL-MCNC: 8.5 MG/DL (ref 8.7–10.5)
CHLORIDE SERPL-SCNC: 100 MMOL/L (ref 95–110)
CLARITY UR: CLEAR
CO2 SERPL-SCNC: 21 MMOL/L (ref 23–29)
CODING SYSTEM: NORMAL
CODING SYSTEM: NORMAL
COLOR UR: YELLOW
CREAT SERPL-MCNC: 0.9 MG/DL (ref 0.5–1.4)
DACRYOCYTES BLD QL SMEAR: ABNORMAL
DIFFERENTIAL METHOD: ABNORMAL
DISPENSE STATUS: NORMAL
DISPENSE STATUS: NORMAL
EOSINOPHIL NFR BLD: 0 % (ref 0–8)
ERYTHROCYTE [DISTWIDTH] IN BLOOD BY AUTOMATED COUNT: 21.9 % (ref 11.5–14.5)
EST. GFR  (AFRICAN AMERICAN): >60 ML/MIN/1.73 M^2
EST. GFR  (NON AFRICAN AMERICAN): >60 ML/MIN/1.73 M^2
GLUCOSE SERPL-MCNC: 104 MG/DL (ref 70–110)
GLUCOSE UR QL STRIP: NEGATIVE
HCT VFR BLD AUTO: 16.4 % (ref 37–48.5)
HGB BLD-MCNC: 5.1 G/DL (ref 12–16)
HGB UR QL STRIP: ABNORMAL
HYPOCHROMIA BLD QL SMEAR: ABNORMAL
IMM GRANULOCYTES # BLD AUTO: ABNORMAL K/UL (ref 0–0.04)
IMM GRANULOCYTES NFR BLD AUTO: ABNORMAL % (ref 0–0.5)
KETONES UR QL STRIP: NEGATIVE
LEUKOCYTE ESTERASE UR QL STRIP: NEGATIVE
LYMPHOCYTES NFR BLD: 39 % (ref 18–48)
MCH RBC QN AUTO: 27.9 PG (ref 27–31)
MCHC RBC AUTO-ENTMCNC: 31.1 G/DL (ref 32–36)
MCV RBC AUTO: 90 FL (ref 82–98)
METAMYELOCYTES NFR BLD MANUAL: 4 %
MONOCYTES NFR BLD: 28 % (ref 4–15)
MYELOCYTES NFR BLD MANUAL: 5 %
NEUTROPHILS NFR BLD: 20 % (ref 38–73)
NEUTS BAND NFR BLD MANUAL: 1 %
NITRITE UR QL STRIP: NEGATIVE
NRBC BLD-RTO: 2 /100 WBC
NUM UNITS TRANS PACKED RBC: NORMAL
NUM UNITS TRANS PACKED RBC: NORMAL
OVALOCYTES BLD QL SMEAR: ABNORMAL
PH UR STRIP: 7 [PH] (ref 5–8)
PLATELET # BLD AUTO: 13 K/UL (ref 150–350)
PLATELET BLD QL SMEAR: ABNORMAL
PMV BLD AUTO: ABNORMAL FL (ref 9.2–12.9)
POIKILOCYTOSIS BLD QL SMEAR: SLIGHT
POLYCHROMASIA BLD QL SMEAR: ABNORMAL
POTASSIUM SERPL-SCNC: 4.6 MMOL/L (ref 3.5–5.1)
PROT SERPL-MCNC: 7.6 G/DL (ref 6–8.4)
PROT UR QL STRIP: NEGATIVE
RBC # BLD AUTO: 1.83 M/UL (ref 4–5.4)
SARS-COV-2 RDRP RESP QL NAA+PROBE: NEGATIVE
SCHISTOCYTES BLD QL SMEAR: PRESENT
SODIUM SERPL-SCNC: 132 MMOL/L (ref 136–145)
SP GR UR STRIP: 1.01 (ref 1–1.03)
SPHEROCYTES BLD QL SMEAR: ABNORMAL
STOMATOCYTES BLD QL SMEAR: PRESENT
URN SPEC COLLECT METH UR: ABNORMAL
UROBILINOGEN UR STRIP-ACNC: NEGATIVE EU/DL
WBC # BLD AUTO: 41.64 K/UL (ref 3.9–12.7)

## 2020-09-17 PROCEDURE — 99291 CRITICAL CARE FIRST HOUR: CPT | Mod: 25,HCNC

## 2020-09-17 PROCEDURE — 85007 BL SMEAR W/DIFF WBC COUNT: CPT | Mod: HCNC

## 2020-09-17 PROCEDURE — U0002 COVID-19 LAB TEST NON-CDC: HCPCS | Mod: HCNC

## 2020-09-17 PROCEDURE — 85027 COMPLETE CBC AUTOMATED: CPT | Mod: HCNC

## 2020-09-17 PROCEDURE — 36430 TRANSFUSION BLD/BLD COMPNT: CPT | Mod: HCNC

## 2020-09-17 PROCEDURE — 25000003 PHARM REV CODE 250: Mod: HCNC | Performed by: EMERGENCY MEDICINE

## 2020-09-17 PROCEDURE — P9016 RBC LEUKOCYTES REDUCED: HCPCS | Mod: HCNC

## 2020-09-17 PROCEDURE — 81003 URINALYSIS AUTO W/O SCOPE: CPT | Mod: HCNC

## 2020-09-17 PROCEDURE — 80053 COMPREHEN METABOLIC PANEL: CPT | Mod: HCNC

## 2020-09-17 PROCEDURE — 86920 COMPATIBILITY TEST SPIN: CPT | Mod: 59,HCNC

## 2020-09-17 PROCEDURE — 86901 BLOOD TYPING SEROLOGIC RH(D): CPT | Mod: HCNC

## 2020-09-17 PROCEDURE — 96360 HYDRATION IV INFUSION INIT: CPT | Mod: HCNC

## 2020-09-17 RX ORDER — HYDROCODONE BITARTRATE AND ACETAMINOPHEN 500; 5 MG/1; MG/1
TABLET ORAL
Status: DISCONTINUED | OUTPATIENT
Start: 2020-09-17 | End: 2020-09-17 | Stop reason: HOSPADM

## 2020-09-17 RX ADMIN — SODIUM CHLORIDE: 0.9 INJECTION, SOLUTION INTRAVENOUS at 01:09

## 2020-09-17 RX ADMIN — SODIUM CHLORIDE 1000 ML: 0.9 INJECTION, SOLUTION INTRAVENOUS at 10:09

## 2020-09-17 NOTE — ED NOTES
Pt AAOx3, resting in bed, side rails up x 2, call bell within reach. NAD at this time. Patient provided with sandwich and crackers. Daughter remains at bedside. No needs expressed at this time. Will continue to monitor.

## 2020-09-17 NOTE — PLAN OF CARE
09/17/20 0923   ED Admissions Case Approval   ED Admissions Case Approval CM Approved       Initial review for possible hospitalization in complete. Patient meets observation criteria.TB/RN

## 2020-09-17 NOTE — PROVIDER PROGRESS NOTES - EMERGENCY DEPT.
Encounter Date: 9/17/2020    ED Physician Progress Notes       SCRIBE NOTE: I, Rosita Arroyo, am scribing for, and in the presence of,  Denzel France MD.  Physician Statement: I, Denzel France MD, personally performed the services described in this documentation as scribed by Rosita Arroyo in my presence, and it is both accurate and complete.     Physician Note:   3:51 PM: Re-evaluated pt. Pt is resting comfortably and is in no acute distress.  Pt states that she would like to be discharged. Pt will go home after her blood transfusion. D/w pt any concerns expressed at this time. Answered all questions. Pt expresses understanding of information and instructions, and is comfortable with plan to discharge. Pt is stable for discharge.    I discussed with patient and/or family/caretaker that evaluation in the ED does not suggest any emergent or life threatening medical conditions requiring immediate intervention beyond what was provided in the ED, and I believe patient is safe for discharge.  Regardless, an unremarkable evaluation in the ED does not preclude the development or presence of a serious of life threatening condition. As such, patient was instructed to return immediately for any worsening or change in current symptoms.    Disposition: Discharge  Condition: Stable

## 2020-09-17 NOTE — ED NOTES
Notified Dr. France that patient does not wish to be admitted to the hospital and would like to go home after her second unit of blood is transfused. Dr. France went to bedside to speak with pt and family.

## 2020-09-17 NOTE — ED NOTES
Pt AAOx3, resting in bed, side rails up x 2, call bell within reach. NAD at this time. Patient aware of need for urine sample. Labeled urine specimen cup left at bedside. Daughter at bedside. No needs expressed at this time. Will continue to monitor.

## 2020-09-17 NOTE — ED PROVIDER NOTES
SCRIBE #1 NOTE: I, Rosita Arroyo, am scribing for, and in the presence of, Denzel France MD. I have scribed the entire note.       History     Chief Complaint   Patient presents with    Fatigue     reports severe generalized weakness and dizziness on standing; states 2 blood transfusions last week. hx of lukemia      Review of patient's allergies indicates:   Allergen Reactions    Codeine      Other reaction(s): hyper  Other reaction(s): hyper    Gabapentin Other (See Comments)     Bad dreams         History of Present Illness     HPI    9/17/2020, 9:19 AM  History obtained from the patient      History of Present Illness: Sarah Parker is a 80 y.o. female patient with a h/o bronchitis, DDD, depression, hiatal hernia, HTN, hypothyroid, iron deficiency anemia, macrocytic anemia, macular degeneration, PNA, leukemia, rheumatoid arthritis, who presents to the Emergency Department for evaluation of fatigue 1x week. Pt was seen in the ED on 9/10 after being told to come to ED by Dr. Knox for further evaluation and blood transfusion. Pt received two blood transfusions during her hospital stay last week. Symptoms are constant and moderate in severity. Pt's sxs are exacerbated when standing. Associated sxs include generalized weakness and dizziness. Patient denies any fever, chills, sore throat, cough, SOB, CP, n/v, and all other sxs at this time. No further complaints or concerns at this time.       Arrival mode: Personal transportation    PCP: Briseida Bennett MD      Past Medical History:  Past Medical History:   Diagnosis Date    Acid reflux     Anxiety     Back pain     Bronchitis, chronic obstructive w acute bronchitis 7/29/2016    Cancer     [facility] arms, face- Dr. Lata Tejada    Cataract     2+NS    Chronic myelomonocytic leukemia     Degenerative disc disease     Depression     Depression     Dry mouth     Encounter for blood transfusion     Hernia, hiatal 11/18/2013    Hypertension      Hypothyroid     Hypothyroidism     Iron deficiency anemia     Macrocytic anemia 5/3/2016    Macular degeneration     Migraines     Mixed anxiety and depressive disorder     Multiple fractures of ribs of right side     Osteoporosis     Other hyperlipidemia 10/11/2019    Pneumonia     Pneumonia due to other staphylococcus     Rheumatoid arthritis     Rheumatoid arthritis(714.0)     Rheumatoid arthritis(714.0)     Remicade, MTX.       Past Surgical History:  Past Surgical History:   Procedure Laterality Date    APPENDECTOMY  1985    APPLICATION OF WOUND VACUUM-ASSISTED CLOSURE DEVICE N/A 5/14/2020    Procedure: APPLICATION, WOUND VAC;  Surgeon: Mckinley Shannon MD;  Location: [facility] OR;  Service: General;  Laterality: N/A;    APPLICATION OF WOUND VACUUM-ASSISTED CLOSURE DEVICE Left 7/25/2020    Procedure: APPLICATION, WOUND VAC;  Surgeon: Berenice Alfaro MD;  Location: [facility] OR;  Service: General;  Laterality: Left;    BREAST BIOPSY      CATARACT EXTRACTION Bilateral 6/11/15    Dr. Booth    CHOLECYSTECTOMY  2013    cryoablasion kidney Left 09/27/2016    DEBRIDEMENT Left 7/25/2020    Procedure: DEBRIDEMENT;  Surgeon: Berenice Alfaro MD;  Location: [facility] OR;  Service: General;  Laterality: Left;    EVACUATION OF HEMATOMA Left 7/25/2020    Procedure: EVACUATION, HEMATOMA;  Surgeon: Berenice Alfaro MD;  Location: [facility] OR;  Service: General;  Laterality: Left;    EXCISION OF SQUAMOUS CELL CARCINOMA Left 7/2/2020    Procedure: EXCISION, CARCINOMA, SQUAMOUS CELL;  Surgeon: Mckinley Shannon MD;  Location: [facility] OR;  Service: General;  Laterality: Left;    feet Bilateral     rheumatoid    FRACTURE SURGERY Right     tibia    HERNIA REPAIR      HYSTERECTOMY  1970    partial    INCISION AND DRAINAGE OF ABSCESS N/A 5/12/2020    Procedure: INCISION AND DRAINAGE, ABSCESS;  Surgeon: Emreson Hathaway MD;  Location: [facility] OR;  Service: General;  Laterality: N/A;    INCISIONAL BIOPSY N/A 5/12/2020     Procedure: INCISIONAL BIOPSY;  Surgeon: Emerson Hathaway MD;  Location: [facility] OR;  Service: General;  Laterality: N/A;    JOINT REPLACEMENT      bilateral knees (), hands, wrists, knuckles, toes    LAPAROSCOPIC NISSEN FUNDOPLICATION      TRANSFORAMINAL EPIDURAL INJECTION OF STEROID Left 2019    Procedure: Left L5/S1 TF AYAAN with local;  Surgeon: Rowdy Felix MD;  Location: [facility];  Service: Pain Management;  Laterality: Left;    WOUND DEBRIDEMENT N/A 2020    Procedure: DEBRIDEMENT, WOUND;  Surgeon: Mckinley Shannon MD;  Location: [facility] OR;  Service: General;  Laterality: N/A;    WOUND DEBRIDEMENT Left 2020    Procedure: DEBRIDEMENT, WOUND;  Surgeon: Berenice Alfaro MD;  Location: [facility] OR;  Service: General;  Laterality: Left;         Family History:  Family History   Problem Relation Age of Onset    Heart disease Mother     Hyperlipidemia Mother     Hypertension Mother     Osteoarthritis Mother     Cataracts Mother     Hypertension Father     Osteoarthritis Father     Heart disease Father     Asthma Sister     Chronic back pain Sister     Hypertension Sister     Osteoarthritis Sister     Thyroid disease Sister     Asthma Brother     Cancer Brother     Chronic back pain Brother     Diabetes Mellitus Brother     Hypertension Brother     Osteoarthritis Brother     Thyroid disease Brother     Cancer Maternal Grandfather     Fibromyalgia Daughter     Heart disease Maternal Grandmother     Colon cancer Neg Hx     Diabetes Neg Hx        Social History:   Social History     Tobacco Use    Smoking status: Former Smoker     Packs/day: 0.25     Years: 2.00     Pack years: 0.50     Quit date: 1965     Years since quittin.9    Smokeless tobacco: Never Used   Substance and Sexual Activity    Alcohol use: No    Drug use: No    Sexual activity: Never     Partners: Male        Review of Systems     Review of Systems   Constitutional: Positive for fatigue. Negative  for chills and fever.   HENT: Negative for sore throat.    Respiratory: Negative for cough and shortness of breath.    Cardiovascular: Negative for chest pain.   Gastrointestinal: Negative for nausea and vomiting.   Genitourinary: Negative for dysuria.   Musculoskeletal: Negative for back pain.   Skin: Negative for rash.   Neurological: Positive for dizziness and weakness (generalized). Negative for headaches.   Hematological: Does not bruise/bleed easily.   All other systems reviewed and are negative.       Physical Exam     Initial Vitals [09/17/20 0858]   BP Pulse Resp Temp SpO2   (!) 108/52 (!) 137 20 97.7 °F (36.5 °C) (!) 85 %      MAP       --          Physical Exam  Nursing Notes and Vital Signs Reviewed.  Constitutional: Elderly and frail.  Head: Atraumatic. Normocephalic.  Eyes: EOM intact. No scleral icterus.  ENT: Mucous membranes are moist. Oropharynx is clear and symmetric.    Neck: Supple. Full ROM. No lymphadenopathy.  Cardiovascular: Tachycardic. Regular rhythm. No murmurs, rubs, or gallops. Distal pulses are 2+ and symmetric.  Pulmonary/Chest: No respiratory distress. Clear to auscultation bilaterally. No wheezing or rales.  Abdominal: Soft and non-distended.  There is no tenderness.  No rebound, guarding, or rigidity.  Genitourinary: No CVA tenderness  Musculoskeletal: Moves all extremities. No obvious deformities. No calf tenderness.  Skin: Pale appearing.   Neurological:  Alert, awake, and appropriate.  Normal speech.  No acute focal neurological deficits are appreciated.  Psychiatric: Normal affect. Good eye contact. Appropriate in content.     ED Course   Critical Care    Date/Time: 9/17/2020 11:45 AM  Performed by: Denzel France MD  Authorized by: Denzel France MD   Direct patient critical care time: 25 minutes  Additional history critical care time: 10 minutes  Ordering / reviewing critical care time: 10 minutes  Documentation critical care time: 12 minutes  Consulting other  physicians critical care time: 5 minutes  Total critical care time (exclusive of procedural time) : 62 minutes  Critical care was necessary to treat or prevent imminent or life-threatening deterioration of the following conditions: circulatory failure.        ED Vital Signs:  Vitals:    09/17/20 0916 09/17/20 0918 09/17/20 0920 09/17/20 0924   BP:  116/63 115/62 105/61   Pulse: 85 85 89 91   Resp: 20      Temp:       TempSrc:       SpO2:       Weight:        09/17/20 0931 09/17/20 0950 09/17/20 1001 09/17/20 1031   BP: (!) 121/59  132/62 128/60   Pulse: 84 97 85    Resp: 20 20 19    Temp:       TempSrc:       SpO2:  (!) 92%     Weight:        09/17/20 1050 09/17/20 1052 09/17/20 1101 09/17/20 1107   BP:   134/60    Pulse:  88 90 90   Resp:  20  18   Temp:       TempSrc:       SpO2:  98% 100% 99%   Weight: 50.2 kg (110 lb 9.6 oz)       09/17/20 1117 09/17/20 1128 09/17/20 1131   BP:   (!) 155/72   Pulse:  93 93   Resp:  20    Temp: 97.9 °F (36.6 °C)     TempSrc: Oral     SpO2:  100% 99%   Weight:          Abnormal Lab Results:  Labs Reviewed   CBC W/ AUTO DIFFERENTIAL - Abnormal; Notable for the following components:       Result Value    WBC 41.64 (*)     RBC 1.83 (*)     Hemoglobin 5.1 (*)     Hematocrit 16.4 (*)     Mean Corpuscular Hemoglobin Conc 31.1 (*)     RDW 21.9 (*)     Platelets 13 (*)     Immature Granulocytes 17.7 (*)     Gran # (ANC) 8.8 (*)     Immature Grans (Abs) 7.36 (*)     Lymph # 15.5 (*)     Mono # 9.8 (*)     nRBC 2 (*)     Gran% 21.0 (*)     Mono% 23.6 (*)     All other components within normal limits    Narrative:     H&H, PLT   critical result(s) called and verbal readback obtained   from ROXIE RODRIGUEZ RN  by KATHY 09/17/2020 11:08   COMPREHENSIVE METABOLIC PANEL - Abnormal; Notable for the following components:    Sodium 132 (*)     CO2 21 (*)     BUN, Bld 37 (*)     Calcium 8.5 (*)     Albumin 3.2 (*)     Alkaline Phosphatase 176 (*)     All other components within normal limits    SARS-COV-2 RNA AMPLIFICATION, QUAL   URINALYSIS, REFLEX TO URINE CULTURE   TYPE & SCREEN   PREPARE RBC SOFT        All Lab Results:  Results for orders placed or performed during the hospital encounter of 09/17/20   COVID-19 Rapid Screening   Result Value Ref Range    SARS-CoV-2 RNA, Amplification, Qual Negative Negative   CBC auto differential   Result Value Ref Range    WBC 41.64 (H) 3.90 - 12.70 K/uL    RBC 1.83 (L) 4.00 - 5.40 M/uL    Hemoglobin 5.1 (LL) 12.0 - 16.0 g/dL    Hematocrit 16.4 (LL) 37.0 - 48.5 %    Mean Corpuscular Volume 90 82 - 98 fL    Mean Corpuscular Hemoglobin 27.9 27.0 - 31.0 pg    Mean Corpuscular Hemoglobin Conc 31.1 (L) 32.0 - 36.0 g/dL    RDW 21.9 (H) 11.5 - 14.5 %    Platelets 13 (LL) 150 - 350 K/uL    MPV SEE COMMENT 9.2 - 12.9 fL    Immature Granulocytes 17.7 (H) 0.0 - 0.5 %    Gran # (ANC) 8.8 (H) 1.8 - 7.7 K/uL    Immature Grans (Abs) 7.36 (H) 0.00 - 0.04 K/uL    Lymph # 15.5 (H) 1.0 - 4.8 K/uL    Mono # 9.8 (H) 0.3 - 1.0 K/uL    Eos # 0.2 0.0 - 0.5 K/uL    Baso # 0.06 0.00 - 0.20 K/uL    nRBC 2 (A) 0 /100 WBC    Gran% 21.0 (L) 38.0 - 73.0 %    Lymph% 37.1 18.0 - 48.0 %    Mono% 23.6 (H) 4.0 - 15.0 %    Eosinophil% 0.5 0.0 - 8.0 %    Basophil% 0.1 0.0 - 1.9 %    Differential Method Automated    Comprehensive metabolic panel   Result Value Ref Range    Sodium 132 (L) 136 - 145 mmol/L    Potassium 4.6 3.5 - 5.1 mmol/L    Chloride 100 95 - 110 mmol/L    CO2 21 (L) 23 - 29 mmol/L    Glucose 104 70 - 110 mg/dL    BUN, Bld 37 (H) 8 - 23 mg/dL    Creatinine 0.9 0.5 - 1.4 mg/dL    Calcium 8.5 (L) 8.7 - 10.5 mg/dL    Total Protein 7.6 6.0 - 8.4 g/dL    Albumin 3.2 (L) 3.5 - 5.2 g/dL    Total Bilirubin 0.4 0.1 - 1.0 mg/dL    Alkaline Phosphatase 176 (H) 55 - 135 U/L    AST 17 10 - 40 U/L    ALT 13 10 - 44 U/L    Anion Gap 11 8 - 16 mmol/L    eGFR if African American >60 >60 mL/min/1.73 m^2    eGFR if non African American >60 >60 mL/min/1.73 m^2     *Note: Due to a large number of results  and/or encounters for the requested time period, some results have not been displayed. A complete set of results can be found in Results Review.         Imaging Results          X-Ray Chest AP Portable (Final result)  Result time 09/17/20 10:36:32    Final result by Deacon Emery MD (09/17/20 10:36:32)                 Impression:      1.  Stable distribution of pulmonary opacities throughout the lungs when compared to August 10, 2020 chest CT scan.  These opacities may have decreased in size in the interim.  Negative for new pulmonary opacities.    2.  Other stable findings as noted above.      Electronically signed by: Deacon Emery MD  Date:    09/17/2020  Time:    10:36             Narrative:    EXAMINATION:  XR CHEST AP PORTABLE    CLINICAL HISTORY:  sob;    COMPARISON:  Chest CT scan from August 10, 2020    FINDINGS:  EKG leads overlie the chest.  The bilateral upper lobe pulmonary opacities and peripheral reticular left basilar opacities persist.  The lungs are free of new definite pulmonary opacities.  The cardiac silhouette size is normal. The trachea is midline and the mediastinal width is normal. Negative for effusion or pneumothorax.  Pulmonary vasculature is normal. Negative for osseous abnormalities. Stable S-shaped curvature of the spine, calcifications of the aortic knob and degenerative changes of the spine and both shoulder girdles.  Stable old right-sided rib fractures and eventration of the hemidiaphragms.                                     The Emergency Provider reviewed the vital signs and test results, which are outlined above.     ED Discussion       11:27 AM: Discussed case with Lili Crane NP (Hospital Medicine). Dr. La agrees with current care and management of pt and accepts admission.   Admitting Service: Hospital Medicine  Admit to: obs tele    Re-evaluated pt. I have discussed test results, shared treatment plan, and the need for admission with patient and family at bedside.  Pt and family express understanding at this time and agree with all information. All questions answered. Pt and family have no further questions or concerns at this time. Pt is ready for admit.       MDM        Medical Decision Making:   Clinical Tests:   Lab Tests: Ordered and Reviewed  Radiological Study: Ordered and Reviewed           ED Medication(s):  Medications   0.9%  NaCl infusion (for blood administration) (has no administration in time range)   sodium chloride 0.9% bolus 1,000 mL (1,000 mLs Intravenous New Bag 9/17/20 1050)       New Prescriptions    No medications on file               Scribe Attestation:   Scribe #1: I performed the above scribed service and the documentation accurately describes the services I performed. I attest to the accuracy of the note.     Attending:   Physician Attestation Statement for Scribe #1: I, Denzel France MD, personally performed the services described in this documentation, as scribed by Rosita Arroyo, in my presence, and it is both accurate and complete.           Clinical Impression       ICD-10-CM ICD-9-CM   1. Anemia, unspecified type  D64.9 285.9   2. CML (chronic myelocytic leukemia)  C92.10 205.10   3. Weakness  R53.1 780.79   4. Tachycardia  R00.0 785.0       Disposition:   Disposition: Placed in Observation  Condition: Fair         Denzel France MD  09/17/20 1836

## 2020-09-17 NOTE — ED NOTES
Called [facility] and spoke with [facility] to let them know that patient is no longer going to be admitted and will be discharged from ER after second unit of blood is transfused.

## 2020-09-17 NOTE — ED NOTES
Patient placed  In gown and on continuous cardiac monitor, automatic blood pressure cuff and continuous pulse oximeter.

## 2020-09-17 NOTE — ED NOTES
Notified Dr. France that we are having difficulty obtaining IV access and blood collection. Ale RN at bedside with ultrasound machine to attempt access and blood draw.

## 2020-09-17 NOTE — ED NOTES
Pt AAOx3, resting in bed, side rails up x 2, call bell within reach. Daughter at bedside. NAD at this time. Will continue to monitor.

## 2020-09-21 ENCOUNTER — DOCUMENT SCAN (OUTPATIENT)
Dept: HOME HEALTH SERVICES | Facility: HOSPITAL | Age: 80
End: 2020-09-21
Payer: MEDICARE

## 2020-09-22 ENCOUNTER — TELEPHONE (OUTPATIENT)
Dept: SURGERY | Facility: CLINIC | Age: 80
End: 2020-09-22

## 2020-09-22 ENCOUNTER — HOSPITAL ENCOUNTER (EMERGENCY)
Facility: HOSPITAL | Age: 80
Discharge: HOME | End: 2020-09-22
Attending: EMERGENCY MEDICINE
Payer: MEDICARE

## 2020-09-22 VITALS
DIASTOLIC BLOOD PRESSURE: 71 MMHG | RESPIRATION RATE: 18 BRPM | HEART RATE: 89 BPM | BODY MASS INDEX: 20.45 KG/M2 | SYSTOLIC BLOOD PRESSURE: 152 MMHG | TEMPERATURE: 99 F | OXYGEN SATURATION: 100 % | WEIGHT: 111.13 LBS | HEIGHT: 62 IN

## 2020-09-22 DIAGNOSIS — C93.10 CHRONIC MYELOMONOCYTIC LEUKEMIA NOT HAVING ACHIEVED REMISSION: Primary | ICD-10-CM

## 2020-09-22 DIAGNOSIS — D64.9 SYMPTOMATIC ANEMIA: ICD-10-CM

## 2020-09-22 DIAGNOSIS — R53.1 WEAKNESS: ICD-10-CM

## 2020-09-22 PROBLEM — Z51.5 PALLIATIVE CARE ENCOUNTER: Status: ACTIVE | Noted: 2020-09-22

## 2020-09-22 LAB
ABO + RH BLD: NORMAL
ALBUMIN SERPL BCP-MCNC: 2.8 G/DL (ref 3.5–5.2)
ALP SERPL-CCNC: 188 U/L (ref 55–135)
ALT SERPL W/O P-5'-P-CCNC: 10 U/L (ref 10–44)
AMORPH CRY URNS QL MICRO: ABNORMAL
ANION GAP SERPL CALC-SCNC: 13 MMOL/L (ref 8–16)
ANISOCYTOSIS BLD QL SMEAR: SLIGHT
AST SERPL-CCNC: 13 U/L (ref 10–40)
BACTERIA #/AREA URNS HPF: ABNORMAL /HPF
BASOPHILS NFR BLD: 0 % (ref 0–1.9)
BILIRUB SERPL-MCNC: 0.5 MG/DL (ref 0.1–1)
BILIRUB UR QL STRIP: NEGATIVE
BLASTS NFR BLD MANUAL: 3 %
BLD GP AB SCN CELLS X3 SERPL QL: NORMAL
BLD PROD TYP BPU: NORMAL
BLOOD UNIT EXPIRATION DATE: NORMAL
BLOOD UNIT TYPE CODE: 5100
BLOOD UNIT TYPE: NORMAL
BUN SERPL-MCNC: 30 MG/DL (ref 8–23)
CALCIUM SERPL-MCNC: 7.9 MG/DL (ref 8.7–10.5)
CHLORIDE SERPL-SCNC: 98 MMOL/L (ref 95–110)
CLARITY UR: CLEAR
CO2 SERPL-SCNC: 20 MMOL/L (ref 23–29)
CODING SYSTEM: NORMAL
COLOR UR: YELLOW
CREAT SERPL-MCNC: 1.2 MG/DL (ref 0.5–1.4)
DACRYOCYTES BLD QL SMEAR: ABNORMAL
DIFFERENTIAL METHOD: ABNORMAL
DISPENSE STATUS: NORMAL
EOSINOPHIL NFR BLD: 0 % (ref 0–8)
ERYTHROCYTE [DISTWIDTH] IN BLOOD BY AUTOMATED COUNT: 20.5 % (ref 11.5–14.5)
EST. GFR  (AFRICAN AMERICAN): 50 ML/MIN/1.73 M^2
EST. GFR  (NON AFRICAN AMERICAN): 43 ML/MIN/1.73 M^2
GLUCOSE SERPL-MCNC: 107 MG/DL (ref 70–110)
GLUCOSE UR QL STRIP: NEGATIVE
HCT VFR BLD AUTO: 14.5 % (ref 37–48.5)
HGB BLD-MCNC: 4.6 G/DL (ref 12–16)
HGB UR QL STRIP: ABNORMAL
HYPOCHROMIA BLD QL SMEAR: ABNORMAL
IMM GRANULOCYTES # BLD AUTO: ABNORMAL K/UL (ref 0–0.04)
IMM GRANULOCYTES NFR BLD AUTO: ABNORMAL % (ref 0–0.5)
KETONES UR QL STRIP: NEGATIVE
LACTATE SERPL-SCNC: 0.9 MMOL/L (ref 0.5–2.2)
LEUKOCYTE ESTERASE UR QL STRIP: NEGATIVE
LYMPHOCYTES NFR BLD: 51 % (ref 18–48)
MCH RBC QN AUTO: 28.4 PG (ref 27–31)
MCHC RBC AUTO-ENTMCNC: 31.7 G/DL (ref 32–36)
MCV RBC AUTO: 90 FL (ref 82–98)
METAMYELOCYTES NFR BLD MANUAL: 4 %
MICROSCOPIC COMMENT: ABNORMAL
MONOCYTES NFR BLD: 16 % (ref 4–15)
MYELOCYTES NFR BLD MANUAL: 4 %
NEUTROPHILS NFR BLD: 18 % (ref 38–73)
NEUTS BAND NFR BLD MANUAL: 4 %
NITRITE UR QL STRIP: NEGATIVE
NRBC BLD-RTO: 2 /100 WBC
NUM UNITS TRANS PACKED RBC: NORMAL
OVALOCYTES BLD QL SMEAR: ABNORMAL
PH UR STRIP: 6 [PH] (ref 5–8)
PLATELET # BLD AUTO: 12 K/UL (ref 150–350)
PLATELET BLD QL SMEAR: ABNORMAL
PMV BLD AUTO: ABNORMAL FL (ref 9.2–12.9)
POIKILOCYTOSIS BLD QL SMEAR: SLIGHT
POLYCHROMASIA BLD QL SMEAR: ABNORMAL
POTASSIUM SERPL-SCNC: 4.5 MMOL/L (ref 3.5–5.1)
PROT SERPL-MCNC: 7.3 G/DL (ref 6–8.4)
PROT UR QL STRIP: ABNORMAL
RBC # BLD AUTO: 1.62 M/UL (ref 4–5.4)
RBC #/AREA URNS HPF: 4 /HPF (ref 0–4)
SARS-COV-2 RDRP RESP QL NAA+PROBE: NEGATIVE
SCHISTOCYTES BLD QL SMEAR: PRESENT
SODIUM SERPL-SCNC: 131 MMOL/L (ref 136–145)
SP GR UR STRIP: 1.01 (ref 1–1.03)
SPHEROCYTES BLD QL SMEAR: ABNORMAL
STOMATOCYTES BLD QL SMEAR: PRESENT
TROPONIN I SERPL DL<=0.01 NG/ML-MCNC: 0.03 NG/ML (ref 0–0.03)
URN SPEC COLLECT METH UR: ABNORMAL
UROBILINOGEN UR STRIP-ACNC: NEGATIVE EU/DL
WBC # BLD AUTO: 35.42 K/UL (ref 3.9–12.7)
WBC #/AREA URNS HPF: 2 /HPF (ref 0–5)

## 2020-09-22 PROCEDURE — 86901 BLOOD TYPING SEROLOGIC RH(D): CPT | Mod: HCNC

## 2020-09-22 PROCEDURE — 81000 URINALYSIS NONAUTO W/SCOPE: CPT | Mod: HCNC

## 2020-09-22 PROCEDURE — 87040 BLOOD CULTURE FOR BACTERIA: CPT | Mod: HCNC

## 2020-09-22 PROCEDURE — 99223 PR INITIAL HOSPITAL CARE,LEVL III: ICD-10-PCS | Mod: HCNC,,, | Performed by: INTERNAL MEDICINE

## 2020-09-22 PROCEDURE — 99497 ADVNCD CARE PLAN 30 MIN: CPT | Mod: HCNC,25,, | Performed by: PHYSICIAN ASSISTANT

## 2020-09-22 PROCEDURE — 99223 1ST HOSP IP/OBS HIGH 75: CPT | Mod: HCNC,,, | Performed by: PHYSICIAN ASSISTANT

## 2020-09-22 PROCEDURE — 84484 ASSAY OF TROPONIN QUANT: CPT | Mod: HCNC

## 2020-09-22 PROCEDURE — 99291 CRITICAL CARE FIRST HOUR: CPT | Mod: 25,HCNC

## 2020-09-22 PROCEDURE — 36430 TRANSFUSION BLD/BLD COMPNT: CPT | Mod: HCNC

## 2020-09-22 PROCEDURE — 85007 BL SMEAR W/DIFF WBC COUNT: CPT | Mod: HCNC

## 2020-09-22 PROCEDURE — 80053 COMPREHEN METABOLIC PANEL: CPT | Mod: HCNC

## 2020-09-22 PROCEDURE — 99497 PR ADVNCD CARE PLAN 30 MIN: ICD-10-PCS | Mod: HCNC,25,, | Performed by: PHYSICIAN ASSISTANT

## 2020-09-22 PROCEDURE — 99223 PR INITIAL HOSPITAL CARE,LEVL III: ICD-10-PCS | Mod: HCNC,,, | Performed by: PHYSICIAN ASSISTANT

## 2020-09-22 PROCEDURE — 99223 1ST HOSP IP/OBS HIGH 75: CPT | Mod: HCNC,,, | Performed by: INTERNAL MEDICINE

## 2020-09-22 PROCEDURE — 93005 ELECTROCARDIOGRAM TRACING: CPT | Mod: HCNC

## 2020-09-22 PROCEDURE — P9016 RBC LEUKOCYTES REDUCED: HCPCS | Mod: HCNC

## 2020-09-22 PROCEDURE — 96360 HYDRATION IV INFUSION INIT: CPT | Mod: 59,HCNC

## 2020-09-22 PROCEDURE — 85027 COMPLETE CBC AUTOMATED: CPT | Mod: HCNC

## 2020-09-22 PROCEDURE — 25000003 PHARM REV CODE 250: Mod: HCNC | Performed by: EMERGENCY MEDICINE

## 2020-09-22 PROCEDURE — 93010 EKG 12-LEAD: ICD-10-PCS | Mod: HCNC,,, | Performed by: INTERNAL MEDICINE

## 2020-09-22 PROCEDURE — 86920 COMPATIBILITY TEST SPIN: CPT | Mod: HCNC

## 2020-09-22 PROCEDURE — U0002 COVID-19 LAB TEST NON-CDC: HCPCS | Mod: HCNC

## 2020-09-22 PROCEDURE — 83605 ASSAY OF LACTIC ACID: CPT | Mod: HCNC

## 2020-09-22 PROCEDURE — 93010 ELECTROCARDIOGRAM REPORT: CPT | Mod: HCNC,,, | Performed by: INTERNAL MEDICINE

## 2020-09-22 RX ORDER — HYDROCODONE BITARTRATE AND ACETAMINOPHEN 500; 5 MG/1; MG/1
TABLET ORAL
Status: DISCONTINUED | OUTPATIENT
Start: 2020-09-22 | End: 2020-09-22 | Stop reason: HOSPADM

## 2020-09-22 RX ADMIN — SODIUM CHLORIDE 250 ML: 9 INJECTION, SOLUTION INTRAVENOUS at 12:09

## 2020-09-22 NOTE — DISCHARGE INSTRUCTIONS
You have been referred to hospice at your request.  They will meet with you at 8:00 a.m. tomorrow.  Return as needed for any worsening symptoms, problems, questions or concerns.

## 2020-09-22 NOTE — ED NOTES
Pt tolerating blood transfusion well at this time. VSS. Rate increased to 150/hr. Call light within reach and pt's family member at bedside. Pt reports she does not have to urinate at this time

## 2020-09-22 NOTE — CONSULTS
Advance Care Planning    Consult Note  Palliative Medicine      Consult Requested By: Duy Trujillo Jr., MD  Reason for Consult: Goals of care    SUBJECTIVE:     History of Present Illness:  Sraah Parker is a 80 y.o. year old with a history of chronic myelomonocytic leukemia and rheumatoid arthiritis who presented to the emergency department for evaluation of generalized weakness. Her chemotherapy has been on hold since she was diagnosed with MRSA infection and requiring wound vac for healing. Since that time she has become progressively pancytopenic and refractory to frequent transfusions. Oncology has evaluated and both the on call as well as primary oncologist recommend comfort focused care with hospice enrollment. Palliative Medicine was consulted to assist with goals of care. I met Ms. Parker with her daughter Albina and best friend at bedside. Ms. Parker was able to tell me oncology's recommendation but all were still processing the information. I explained when the burden of treatment outweighs the benefit we often times focus on an attainable goal such as comfort and dignity at the end of life and home based care. Ms. Parker says that she would like to be home in her remaining days. I discussed the philosophy of hospice, providing supportive care services to maximize quality of life and comfort care at the end of life. I also discussed the services available with hospice including but not limited to: CNA visit up to an hour a day usually every other day, RN visit usually every other day, MD to oversee care, 24hr phone number to call with questions or concerns, comfort medications, DME, access to GIP unit for acute symptom management, access to respite care, , SW, volunteer program, and bereavement support. Everyone in the room was understandably emotional but agreed that hospice was the best course of action. Ms. Parker would like to defer enrollment until her  can be present and has another  daughter coming from out of town. They elected to meet with hospice tomorrow at 0800 that way all could be present. Ms. Parker told me that she has a living will and that she would like to honor that by changing to DNR/ DNI in light of her terminal diagnosis. I recommended we complete a LaPOST and they agreed. Ms. Parker would like to return home this evening after the blood transfusion is complete. She denies pain and reports she will not have additional needs overnight until hospice comes tomorrow.      Past Medical History:   Diagnosis Date    Acid reflux     Anxiety     Back pain     Bronchitis, chronic obstructive w acute bronchitis 7/29/2016    Cancer     [facility] arms, face- Dr. Lata Tejada    Cataract     2+NS    Chronic myelomonocytic leukemia     Degenerative disc disease     Depression     Depression     Dry mouth     Encounter for blood transfusion     Hernia, hiatal 11/18/2013    Hypertension     Hypothyroid     Hypothyroidism     Iron deficiency anemia     Macrocytic anemia 5/3/2016    Macular degeneration     Migraines     Mixed anxiety and depressive disorder     Multiple fractures of ribs of right side     Osteoporosis     Other hyperlipidemia 10/11/2019    Pneumonia     Pneumonia due to other staphylococcus     Rheumatoid arthritis     Rheumatoid arthritis(714.0)     Rheumatoid arthritis(714.0)     Remicade, MTX.     Past Surgical History:   Procedure Laterality Date    APPENDECTOMY  1985    APPLICATION OF WOUND VACUUM-ASSISTED CLOSURE DEVICE N/A 5/14/2020    Procedure: APPLICATION, WOUND VAC;  Surgeon: Mckinley Shannon MD;  Location: [facility] OR;  Service: General;  Laterality: N/A;    APPLICATION OF WOUND VACUUM-ASSISTED CLOSURE DEVICE Left 7/25/2020    Procedure: APPLICATION, WOUND VAC;  Surgeon: Berenice Alfaro MD;  Location: [facility] OR;  Service: General;  Laterality: Left;    BREAST BIOPSY      CATARACT EXTRACTION Bilateral 6/11/15    Dr. Booth    CHOLECYSTECTOMY  2013     cryoablasion kidney Left 09/27/2016    DEBRIDEMENT Left 7/25/2020    Procedure: DEBRIDEMENT;  Surgeon: Berenice Alfaro MD;  Location: [facility] OR;  Service: General;  Laterality: Left;    EVACUATION OF HEMATOMA Left 7/25/2020    Procedure: EVACUATION, HEMATOMA;  Surgeon: Berenice Alfaro MD;  Location: [facility] OR;  Service: General;  Laterality: Left;    EXCISION OF SQUAMOUS CELL CARCINOMA Left 7/2/2020    Procedure: EXCISION, CARCINOMA, SQUAMOUS CELL;  Surgeon: Mckinley Shannon MD;  Location: [facility] OR;  Service: General;  Laterality: Left;    feet Bilateral     rheumatoid    FRACTURE SURGERY Right     tibia    HERNIA REPAIR      HYSTERECTOMY  1970    partial    INCISION AND DRAINAGE OF ABSCESS N/A 5/12/2020    Procedure: INCISION AND DRAINAGE, ABSCESS;  Surgeon: Emerson Hathaway MD;  Location: [facility] OR;  Service: General;  Laterality: N/A;    INCISIONAL BIOPSY N/A 5/12/2020    Procedure: INCISIONAL BIOPSY;  Surgeon: Emerson Hathaway MD;  Location: [facility] OR;  Service: General;  Laterality: N/A;    JOINT REPLACEMENT      bilateral knees (2008), hands, wrists, knuckles, toes    LAPAROSCOPIC NISSEN FUNDOPLICATION      TRANSFORAMINAL EPIDURAL INJECTION OF STEROID Left 6/25/2019    Procedure: Left L5/S1 TF AYAAN with local;  Surgeon: Rowdy Felix MD;  Location: [facility] PAIN MGT;  Service: Pain Management;  Laterality: Left;    WOUND DEBRIDEMENT N/A 5/14/2020    Procedure: DEBRIDEMENT, WOUND;  Surgeon: Mckinley Shannon MD;  Location: [facility] OR;  Service: General;  Laterality: N/A;    WOUND DEBRIDEMENT Left 7/25/2020    Procedure: DEBRIDEMENT, WOUND;  Surgeon: Berenice Alfaro MD;  Location: [facility] OR;  Service: General;  Laterality: Left;     Family History   Problem Relation Age of Onset    Heart disease Mother     Hyperlipidemia Mother     Hypertension Mother     Osteoarthritis Mother     Cataracts Mother     Hypertension Father     Osteoarthritis Father     Heart disease Father     Asthma Sister     Chronic back  pain Sister     Hypertension Sister     Osteoarthritis Sister     Thyroid disease Sister     Asthma Brother     Cancer Brother     Chronic back pain Brother     Diabetes Mellitus Brother     Hypertension Brother     Osteoarthritis Brother     Thyroid disease Brother     Cancer Maternal Grandfather     Fibromyalgia Daughter     Heart disease Maternal Grandmother     Colon cancer Neg Hx     Diabetes Neg Hx      Social History     Socioeconomic History    Marital status:      Spouse name: Not on file    Number of children: Not on file    Years of education: Not on file    Highest education level: Not on file   Occupational History    Occupation: retired   Social Needs    Financial resource strain: Not on file    Food insecurity     Worry: Not on file     Inability: Not on file    Transportation needs     Medical: Not on file     Non-medical: Not on file   Tobacco Use    Smoking status: Former Smoker     Packs/day: 0.25     Years: 2.00     Pack years: 0.50     Quit date: 1965     Years since quittin.9    Smokeless tobacco: Never Used   Substance and Sexual Activity    Alcohol use: No    Drug use: No    Sexual activity: Never     Partners: Male   Lifestyle    Physical activity     Days per week: Not on file     Minutes per session: Not on file    Stress: Not at all   Relationships    Social connections     Talks on phone: Not on file     Gets together: Not on file     Attends Taoist service: Not on file     Active member of club or organization: Not on file     Attends meetings of clubs or organizations: Not on file     Relationship status: Not on file   Other Topics Concern    Not on file   Social History Narrative    Patient is aretired and live with .      Review of patient's allergies indicates:   Allergen Reactions    Codeine      Other reaction(s): hyper  Other reaction(s): hyper    Gabapentin Other (See Comments)     Bad dreams        Medications:    Current Facility-Administered Medications:     0.9%  NaCl infusion (for blood administration), , Intravenous, Q24H PRN, Duy Trujillo Jr., MD    Current Outpatient Medications:     amitriptyline (ELAVIL) 75 MG tablet, TAKE 1 TABLET BY MOUTH EVERY EVENING, Disp: 90 tablet, Rfl: 3    calcium citrate-vitamin D (CITRACAL + D) 315-200 mg-unit per tablet, Take 1 tablet by mouth once daily. , Disp: , Rfl:     DULoxetine (CYMBALTA) 20 MG capsule, TAKE 2 CAPSULES(40 MG) BY MOUTH EVERY DAY (Patient taking differently: before meals, at bedtime and at 0200. ), Disp: 180 capsule, Rfl: 3    ferrous sulfate 325 (65 FE) MG EC tablet, Take 1 tablet (325 mg total) by mouth 2 (two) times daily., Disp: 180 tablet, Rfl: 3    hydrocodone-acetaminophen 5-325mg (NORCO) 5-325 mg per tablet, TK 1 T PO Q 6 H PRN, Disp: , Rfl: 0    hydrOXYzine HCl (ATARAX) 25 MG tablet, Take 25 mg by mouth nightly. , Disp: , Rfl:     levothyroxine (SYNTHROID) 88 MCG tablet, TAKE 1 TABLET BY MOUTH BEFORE BREAKFAST, Disp: 90 tablet, Rfl: 3    magnesium oxide (MAG-OX) 400 mg (241.3 mg magnesium) tablet, Take 1 tablet (400 mg total) by mouth 3 (three) times daily. (Patient taking differently: Take 400 mg by mouth 2 (two) times daily. ), Disp: , Rfl: 0    meclizine (ANTIVERT) 50 MG tablet, Take 25 mg by mouth 3 (three) times daily as needed., Disp: , Rfl:     metoprolol succinate (TOPROL-XL) 50 MG 24 hr tablet, TAKE 1 TABLET(50 MG) BY MOUTH EVERY DAY, Disp: 30 tablet, Rfl: 11    multivitamin capsule, Take by mouth. As directed, Disp: , Rfl:     ondansetron (ZOFRAN) 4 MG tablet, Take 1 tablet (4 mg total) by mouth every 8 (eight) hours as needed for Nausea., Disp: 30 tablet, Rfl: 1    pravastatin (PRAVACHOL) 10 MG tablet, TAKE 1 TABLET(10 MG) BY MOUTH EVERY DAY (Patient taking differently: Take 10 mg by mouth every evening. ), Disp: 30 tablet, Rfl: 11    prochlorperazine (COMPAZINE) 5 MG tablet, TAKE 1 TABLET(5 MG) BY MOUTH  EVERY 6 HOURS AS NEEDED FOR NAUSEA, Disp: 385 tablet, Rfl: 1    valsartan-hydrochlorothiazide (DIOVAN-HCT) 80-12.5 mg per tablet, TAKE 1 TABLET BY MOUTH DAILY, Disp: 90 tablet, Rfl: 3    albuterol (PROVENTIL) 2.5 mg /3 mL (0.083 %) nebulizer solution, Take 3 mLs (2.5 mg total) by nebulization every 6 (six) hours as needed for Wheezing. (Patient not taking: Reported on 9/14/2020), Disp: 1 Box, Rfl: 5    fluticasone propionate (FLONASE) 50 mcg/actuation nasal spray, 2 sprays (100 mcg total) by Each Nostril route once daily. (Patient not taking: Reported on 9/14/2020), Disp: 16 g, Rfl: 6    tamsulosin (FLOMAX) 0.4 mg Cap, Take 1 capsule (0.4 mg total) by mouth once daily. For urinary retention, Disp: 30 capsule, Rfl: 0    ROS:  Review of Systems   Constitutional: Positive for activity change, appetite change and fatigue.   HENT: Negative for sore throat and trouble swallowing.    Eyes: Negative for photophobia and visual disturbance.   Respiratory: Positive for shortness of breath. Negative for cough.    Cardiovascular: Negative for chest pain and leg swelling.   Gastrointestinal: Negative for abdominal pain and nausea.   Genitourinary: Negative for difficulty urinating.   Musculoskeletal: Negative for back pain.   Neurological: Positive for dizziness and weakness.   Psychiatric/Behavioral: Negative for confusion. The patient is not nervous/anxious.        OBJECTIVE:     Physical Exam:  Vitals: Temp: 97.6 °F (36.4 °C) (09/22/20 1031)  Pulse: 96 (09/22/20 1338)  Resp: 18 (09/22/20 1338)  BP: 126/62 (09/22/20 1246)  SpO2: 100 % (09/22/20 1338)    Gen: well-developed, frail, NAD  Head: atraumatic, normocephalic  Eyes: conjuctiva and sclera clear  Ears: hearing grossly intact with no external abnormality  Mouth: no mucositis, good dentition  Respiratory: CTAB, no wheezing or rhonchi  Heart: RRR  Abdomen: soft, nondistended, normoactive BS  Pulses: 2+ in DP  Extremities: no edema, R hand contracted into fist, L hand all  digits with ulnar displacement  Neurologic: no focal deficits, CN II-XII grossly intact, normal coordination  Skin: no rashes or lesions  Psych: cooperative, depressed mood and affect, normal attention span and concentration    Review of Symptoms    Symptom Assessment (ESAS 0-10 Scale)  Pain:  0  Dyspnea:  0  Anxiety:  0  Nausea:  0  Depression:  0  Anorexia:  5  Fatigue:  5  Insomnia:  0  Restlessness:  0  Agitation:  0     CAM / Delirium:  Negative          ECOG Performance Status Grade:  2 - Ambulates, capable of self care only    Living Arrangements:  Lives with spouse    Advance Directives:   Living Will: No    LaPOST: Yes    Do Not Resuscitate Status: Yes    Medical Power of : No      Decision Making:  Patient answered questions and Family answered questions      Labs:  WBC   Date Value Ref Range Status   09/22/2020 35.42 (H) 3.90 - 12.70 K/uL Final     Hemoglobin   Date Value Ref Range Status   09/22/2020 4.6 (LL) 12.0 - 16.0 g/dL Final     Comment:     H&H, PLT   critical result(s) called and verbal readback obtained   from ELIUD BASURTO RN  by KATHY 09/22/2020 11:34       Hematocrit   Date Value Ref Range Status   09/22/2020 14.5 (LL) 37.0 - 48.5 % Final     Comment:     H&H, PLT   critical result(s) called and verbal readback obtained   from ELIUD BASURTO RN  by KATHY 09/22/2020 11:34       Mean Corpuscular Volume   Date Value Ref Range Status   09/22/2020 90 82 - 98 fL Final     Platelets   Date Value Ref Range Status   09/22/2020 12 (LL) 150 - 350 K/uL Final     Comment:     H&H, PLT   critical result(s) called and verbal readback obtained   from ELIUD BASURTO RN  by KATHY 09/22/2020 11:34         BMP  Lab Results   Component Value Date     (L) 09/22/2020    K 4.5 09/22/2020    CL 98 09/22/2020    CO2 20 (L) 09/22/2020    BUN 30 (H) 09/22/2020    CREATININE 1.2 09/22/2020    CALCIUM 7.9 (L) 09/22/2020    ANIONGAP 13 09/22/2020    ESTGFRAFRICA 50 (A) 09/22/2020    EGFRNONAA 43 (A)  09/22/2020       Lab Results   Component Value Date    AST 13 09/22/2020    ALKPHOS 188 (H) 09/22/2020    BILITOT 0.5 09/22/2020       Albumin   Date Value Ref Range Status   09/22/2020 2.8 (L) 3.5 - 5.2 g/dL Final       Radiology:I have reviewed all pertinent imaging results/findings within the past 24 hours.  - CXR 9/22/20:  No acute cardiopulmonary disease    - CT hip 9/11/20:     1.  There is moderately severe degenerative change involving the left hip joint.  No gross acute hip fracture or dislocation.     2.  Thickening suggested of the wall of the colon within the visualized segments.  Colitis?     3.  Stranding of the soft tissues along the left hip region laterally and greater posterolaterally.  Although this tissue somewhat thick, I cannot visualize a discrete abscess with certainty.  Consider follow-up studies as indicated.    - CT chest 8/10/20:  Peripheral large soft tissue opacity again identified in the right upper lobe.  Central area of hypodensity is not clearly identified as on the prior examination.  Persistent nodular soft tissue opacities identified throughout both lungs, some of which in the left lower lobe have decreased in size.  Findings again may reflect atypical infectious process with metastatic disease or mycobacterial infection additional considerations.     Bronchiectasis, ground-glass haziness and secretions within peripheral airways concerning for infectious/inflammatory small airways disease.     Stable enlarged axillary and mediastinal lymph nodes.    ASSESSMENT   Sarah Parker is a 80 y.o. year old with a history of chronic myelomonocytic leukemia and rheumatoid arthiritis who presented to the emergency department for evaluation of generalized weakness. Her chemotherapy has been on hold since she was diagnosed with MRSA infection and requiring wound vac for healing. Since that time she has become progressively pancytopenic and refractory to frequent transfusions. Oncology has  evaluated and both the on call as well as primary oncologist recommend comfort focused care with hospice enrollment. Palliative Medicine was consulted to assist with goals of care.    PLAN   1. Encounter for Palliative Care  - Code status: DNR/ DNI- changed today  - Surrogate: daughter Albina Martínez  - Details of meeting in [facility]  - Primary outcome of meeting is to redirect to comfort focused care and enroll in hospice. They would like to defer enrollment until tomorrow morning when Mr. Parker and her other daughter can be present.  - PMSW will send hospice referral, choice form on chart  - LaPOST completed    2. Chronic myelomonocytic leukemia  - Oncology notes reviewed  - Redirecting to comfort focused care following transfusion today    3. Rheumatoid arthritis  - She can continue her RA treatments as that is independent of her hospice diagnosis      Discussed case and visit details with Dr. Miranda and Dr. Trujillo.    Thank you for allowing Palliative Medicine to be involved in the care of Sarah Parker.         Medical decision making: HIGH based on high risk of death, untreated symptoms, high risk medications, poor prognosis, deescalating treatments, progression of disease    20 min ACP time spent discussing goals of care, code status, ACP completion, coordination of care and emotional support, formulating and communicating prognosis and goals of care, exploring burden/ benefit of various approaches of treatment. [id number]      Yessenia Cisneros PA-C  Palliative Medicine

## 2020-09-22 NOTE — TELEPHONE ENCOUNTER
----- Message from Lindsay Disla sent at 9/22/2020  8:16 AM CDT -----  Contact: pt  Pt requesting a call back regarding appt. She is not sure she will be able to come to appt, but would like to speak to someone first. Please call pt back at [phone]

## 2020-09-22 NOTE — HPI
80-year-old female history of chronic myelomonocytic leukemia patient has been treated in the past withVIDAZA has had hold on medication because of recent infection.  Patient has become progressively pancytopenic and requiring both blood and platelets on a more frequent basis and becoming refractory to transfusions.  Was asked to see the patient in the emergency room for further treatment recommendations

## 2020-09-22 NOTE — ASSESSMENT & PLAN NOTE
Patient has refractory progressive chronic myelomonocytic leukemia refractory to blood in platelet transfusions.  At this point I spoke with primary oncologist as well as reviewing records.  My recommendations would be hospice care.  I have talked to the family of talk to the daughter by phone in a friend at bedside clearly understands that patient has terminal condition with refractory blood in platelet needs.  At this point would recommend a do not resuscitate order, and referral to hospice.  Discussed implications if she wishes to have a unit transfuse to release some of her symptoms at the present time this would not be unreasonable discussed with emergency room physician as well

## 2020-09-22 NOTE — ED NOTES
Pt presents to ED today with chief complaint of weakness and dizziness. Pt reports she was sent here from her PCP for a blood transfusion. Pt reports weakness and dizziness intermittent x 2 days. Pt reports she was seen here on Thursday and received a blood transfusion of 2 units and platelets. Patient denies chest pain, SOB, cough, N/V/D, fever, chills, urinary s/s or other symptoms at this time. Pt reports she has needed frequent transfusions due to issues with her spleen.     Medical hx and medications reviewed with patient per chart.   Pt A&Ox4 and ambulates with walker at baseline.   VSS. Call light within reach.

## 2020-09-22 NOTE — ED PROVIDER NOTES
SCRIBE #1 NOTE: I, Carlitos Doss, am scribing for, and in the presence of, Duy Trujillo Jr., MD. I have scribed the entire note.      History      Chief Complaint   Patient presents with    Abnormal Lab     sent over for transfusion from MD       Review of patient's allergies indicates:   Allergen Reactions    Codeine      Other reaction(s): hyper  Other reaction(s): hyper    Gabapentin Other (See Comments)     Bad dreams        HPI   HPI    9/22/2020, 10:41 AM   History obtained from the patient      History of Present Illness: Sarah Parker is a 80 y.o. female patient with a PMHx of CMML who presents to the Emergency Department for generalized weakness, onset several days PTA. Pt was referred to the ED by Dr. Knox (Hem/Onc) for further evaluation and possible blood transfusion. Symptoms are constant and moderate in severity. No mitigating or exacerbating factors reported. Associated sxs include SOB. Patient denies any fever, chills, n/v/d, CP, numbness, dizziness, headache, and all other sxs at this time. Pt also follows with Dr. Alfaro (General Surgery). No further complaints or concerns at this time.     Arrival mode: Personal vehicle    PCP: Briseida Bennett MD       Past Medical History:  Past Medical History:   Diagnosis Date    Acid reflux     Anxiety     Back pain     Bronchitis, chronic obstructive w acute bronchitis 7/29/2016    Cancer     [facility] arms, face- Dr. Lata Tejada    Cataract     2+NS    Chronic myelomonocytic leukemia     Degenerative disc disease     Depression     Depression     Dry mouth     Encounter for blood transfusion     Hernia, hiatal 11/18/2013    Hypertension     Hypothyroid     Hypothyroidism     Iron deficiency anemia     Macrocytic anemia 5/3/2016    Macular degeneration     Migraines     Mixed anxiety and depressive disorder     Multiple fractures of ribs of right side     Osteoporosis     Other hyperlipidemia 10/11/2019    Pneumonia      Pneumonia due to other staphylococcus     Rheumatoid arthritis     Rheumatoid arthritis(714.0)     Rheumatoid arthritis(714.0)     Remicade, MTX.       Past Surgical History:  Past Surgical History:   Procedure Laterality Date    APPENDECTOMY  1985    APPLICATION OF WOUND VACUUM-ASSISTED CLOSURE DEVICE N/A 5/14/2020    Procedure: APPLICATION, WOUND VAC;  Surgeon: Mckinley Shannon MD;  Location: [facility] OR;  Service: General;  Laterality: N/A;    APPLICATION OF WOUND VACUUM-ASSISTED CLOSURE DEVICE Left 7/25/2020    Procedure: APPLICATION, WOUND VAC;  Surgeon: Berenice Alfaro MD;  Location: [facility] OR;  Service: General;  Laterality: Left;    BREAST BIOPSY      CATARACT EXTRACTION Bilateral 6/11/15    Dr. Booth    CHOLECYSTECTOMY  2013    cryoablasion kidney Left 09/27/2016    DEBRIDEMENT Left 7/25/2020    Procedure: DEBRIDEMENT;  Surgeon: Berenice Alfaro MD;  Location: [facility] OR;  Service: General;  Laterality: Left;    EVACUATION OF HEMATOMA Left 7/25/2020    Procedure: EVACUATION, HEMATOMA;  Surgeon: Berenice Alfaro MD;  Location: [facility] OR;  Service: General;  Laterality: Left;    EXCISION OF SQUAMOUS CELL CARCINOMA Left 7/2/2020    Procedure: EXCISION, CARCINOMA, SQUAMOUS CELL;  Surgeon: Mckinley Shannon MD;  Location: [facility] OR;  Service: General;  Laterality: Left;    feet Bilateral     rheumatoid    FRACTURE SURGERY Right     tibia    HERNIA REPAIR      HYSTERECTOMY  1970    partial    INCISION AND DRAINAGE OF ABSCESS N/A 5/12/2020    Procedure: INCISION AND DRAINAGE, ABSCESS;  Surgeon: Emerson Hathaway MD;  Location: [facility] OR;  Service: General;  Laterality: N/A;    INCISIONAL BIOPSY N/A 5/12/2020    Procedure: INCISIONAL BIOPSY;  Surgeon: Emerson Hathaway MD;  Location: [facility] OR;  Service: General;  Laterality: N/A;    JOINT REPLACEMENT      bilateral knees (2008), hands, wrists, knuckles, toes    LAPAROSCOPIC NISSEN FUNDOPLICATION      TRANSFORAMINAL EPIDURAL INJECTION OF STEROID Left 6/25/2019     Procedure: Left L5/S1 TF AYAAN with local;  Surgeon: Rowdy Felix MD;  Location: [facility];  Service: Pain Management;  Laterality: Left;    WOUND DEBRIDEMENT N/A 2020    Procedure: DEBRIDEMENT, WOUND;  Surgeon: Mckinley Shannon MD;  Location: [facility] OR;  Service: General;  Laterality: N/A;    WOUND DEBRIDEMENT Left 2020    Procedure: DEBRIDEMENT, WOUND;  Surgeon: Berenice Alfaro MD;  Location: [facility] OR;  Service: General;  Laterality: Left;         Family History:  Family History   Problem Relation Age of Onset    Heart disease Mother     Hyperlipidemia Mother     Hypertension Mother     Osteoarthritis Mother     Cataracts Mother     Hypertension Father     Osteoarthritis Father     Heart disease Father     Asthma Sister     Chronic back pain Sister     Hypertension Sister     Osteoarthritis Sister     Thyroid disease Sister     Asthma Brother     Cancer Brother     Chronic back pain Brother     Diabetes Mellitus Brother     Hypertension Brother     Osteoarthritis Brother     Thyroid disease Brother     Cancer Maternal Grandfather     Fibromyalgia Daughter     Heart disease Maternal Grandmother     Colon cancer Neg Hx     Diabetes Neg Hx        Social History:  Social History     Tobacco Use    Smoking status: Former Smoker     Packs/day: 0.25     Years: 2.00     Pack years: 0.50     Quit date: 1965     Years since quittin.9    Smokeless tobacco: Never Used   Substance and Sexual Activity    Alcohol use: No    Drug use: No    Sexual activity: Never     Partners: Male       ROS   Review of Systems   Constitutional: Negative for chills, diaphoresis, fatigue and fever.   HENT: Negative for sore throat.    Respiratory: Positive for shortness of breath.    Cardiovascular: Negative for chest pain.   Gastrointestinal: Negative for diarrhea, nausea and vomiting.   Genitourinary: Negative for dysuria.   Musculoskeletal: Negative for back pain.   Skin: Negative for rash.    Neurological: Positive for weakness (generalized). Negative for dizziness, seizures, light-headedness, numbness and headaches.   Hematological: Does not bruise/bleed easily.   All other systems reviewed and are negative.    Physical Exam      Initial Vitals   BP Pulse Resp Temp SpO2   09/22/20 1031 09/22/20 1031 09/22/20 1031 09/22/20 1031 09/22/20 1035   (!) 96/46 91 20 97.6 °F (36.4 °C) 96 %      MAP       --                 Physical Exam  Nursing Notes and Vital Signs Reviewed.  Constitutional: Patient is in no acute distress. Well-developed and well-nourished.  Head: Atraumatic. Normocephalic.  Eyes: PERRL. EOM intact. Conjunctivae are not pale. No scleral icterus.  ENT: Mucous membranes are moist. Oropharynx is clear and symmetric.    Neck: Supple. Full ROM. No lymphadenopathy.  Cardiovascular: Regular rate. Regular rhythm. No murmurs, rubs, or gallops. Distal pulses are 2+ and symmetric.  Pulmonary/Chest: No respiratory distress. Clear to auscultation bilaterally. No wheezing or rales.  Abdominal: Soft and non-distended.  There is no tenderness.  No rebound, guarding, or rigidity.   Musculoskeletal: Moves all extremities. No obvious deformities. No edema.  Skin: Warm and dry.  Neurological:  Alert, awake, and appropriate.  Normal speech.  No acute focal neurological deficits are appreciated.  Psychiatric: Normal affect. Good eye contact. Appropriate in content.    ED Course    Critical Care    Date/Time: 9/22/2020 12:55 PM  Performed by: Duy Trujillo Jr., MD  Authorized by: uDy Trujillo Jr., MD   Direct patient critical care time: 15 minutes  Additional history critical care time: 10 minutes  Ordering / reviewing critical care time: 10 minutes  Documentation critical care time: 5 minutes  Consulting other physicians critical care time: 5 minutes  Total critical care time (exclusive of procedural time) : 45 minutes  Critical care time was exclusive of separately billable procedures and treating other  "patients and teaching time.  Critical care was necessary to treat or prevent imminent or life-threatening deterioration of the following conditions: Symptomatic anemia.  Critical care was time spent personally by me on the following activities: blood draw for specimens, development of treatment plan with patient or surrogate, discussions with consultants, interpretation of cardiac output measurements, evaluation of patient's response to treatment, examination of patient, obtaining history from patient or surrogate, ordering and performing treatments and interventions, ordering and review of laboratory studies, ordering and review of radiographic studies, pulse oximetry, re-evaluation of patient's condition and review of old charts.        ED Vital Signs:  Vitals:    09/22/20 1031 09/22/20 1035 09/22/20 1111 09/22/20 1125   BP: (!) 96/46  114/83    Pulse: 91  91 86   Resp: 20 20 20   Temp: 97.6 °F (36.4 °C)      TempSrc: Oral      SpO2:  96% 96%    Weight:   50.4 kg (111 lb 1.8 oz)    Height: 5' 2" (1.575 m)  5' 2" (1.575 m)     09/22/20 1137 09/22/20 1216 09/22/20 1218 09/22/20 1231   BP:  134/64  (!) 133/59   Pulse: 86 85 85 88   Resp: 18 20 20   Temp:       TempSrc:       SpO2:  100% 100% 100%   Weight:       Height:        09/22/20 1232   BP:    Pulse: 87   Resp: 16   Temp:    TempSrc:    SpO2: 100%   Weight:    Height:        Abnormal Lab Results:  Labs Reviewed   CBC W/ AUTO DIFFERENTIAL - Abnormal; Notable for the following components:       Result Value    WBC 35.42 (*)     RBC 1.62 (*)     Hemoglobin 4.6 (*)     Hematocrit 14.5 (*)     Mean Corpuscular Hemoglobin Conc 31.7 (*)     RDW 20.5 (*)     Platelets 12 (*)     nRBC 2 (*)     Gran% 18.0 (*)     Lymph% 51.0 (*)     Mono% 16.0 (*)     Blasts 3.0 (*)     Platelet Estimate Decreased (*)     All other components within normal limits    Narrative:     H&H, PLT   critical result(s) called and verbal readback obtained   from ELIUD BASURTO RN  by KATHY " 09/22/2020 11:34   COMPREHENSIVE METABOLIC PANEL - Abnormal; Notable for the following components:    Sodium 131 (*)     CO2 20 (*)     BUN, Bld 30 (*)     Calcium 7.9 (*)     Albumin 2.8 (*)     Alkaline Phosphatase 188 (*)     eGFR if  50 (*)     eGFR if non  43 (*)     All other components within normal limits   TROPONIN I - Abnormal; Notable for the following components:    Troponin I 0.029 (*)     All other components within normal limits   CULTURE, BLOOD   CULTURE, BLOOD   LACTIC ACID, PLASMA   SARS-COV-2 RNA AMPLIFICATION, QUAL   URINALYSIS, REFLEX TO URINE CULTURE   TYPE & SCREEN   PREPARE RBC SOFT        All Lab Results:  Results for orders placed or performed during the hospital encounter of 09/22/20   CBC auto differential   Result Value Ref Range    WBC 35.42 (H) 3.90 - 12.70 K/uL    RBC 1.62 (L) 4.00 - 5.40 M/uL    Hemoglobin 4.6 (LL) 12.0 - 16.0 g/dL    Hematocrit 14.5 (LL) 37.0 - 48.5 %    Mean Corpuscular Volume 90 82 - 98 fL    Mean Corpuscular Hemoglobin 28.4 27.0 - 31.0 pg    Mean Corpuscular Hemoglobin Conc 31.7 (L) 32.0 - 36.0 g/dL    RDW 20.5 (H) 11.5 - 14.5 %    Platelets 12 (LL) 150 - 350 K/uL    MPV SEE COMMENT 9.2 - 12.9 fL    Immature Granulocytes Test Not Performed 0.0 - 0.5 %    Immature Grans (Abs) Test Not Performed 0.00 - 0.04 K/uL    nRBC 2 (A) 0 /100 WBC    Gran% 18.0 (L) 38.0 - 73.0 %    Lymph% 51.0 (H) 18.0 - 48.0 %    Mono% 16.0 (H) 4.0 - 15.0 %    Eosinophil% 0.0 0.0 - 8.0 %    Basophil% 0.0 0.0 - 1.9 %    Bands 4.0 %    Metamyelocytes 4.0 %    Myelocytes 4.0 %    Blasts 3.0 (A) 0 %    Platelet Estimate Decreased (A)     Aniso Slight     Poik Slight     Poly Occasional     Hypo Occasional     Ovalocytes Occasional     Tear Drop Cells Occasional     Stomatocytes Present     Spherocytes Occasional     Schistocytes Present     Differential Method Manual    Comprehensive metabolic panel   Result Value Ref Range    Sodium 131 (L) 136 - 145 mmol/L     Potassium 4.5 3.5 - 5.1 mmol/L    Chloride 98 95 - 110 mmol/L    CO2 20 (L) 23 - 29 mmol/L    Glucose 107 70 - 110 mg/dL    BUN, Bld 30 (H) 8 - 23 mg/dL    Creatinine 1.2 0.5 - 1.4 mg/dL    Calcium 7.9 (L) 8.7 - 10.5 mg/dL    Total Protein 7.3 6.0 - 8.4 g/dL    Albumin 2.8 (L) 3.5 - 5.2 g/dL    Total Bilirubin 0.5 0.1 - 1.0 mg/dL    Alkaline Phosphatase 188 (H) 55 - 135 U/L    AST 13 10 - 40 U/L    ALT 10 10 - 44 U/L    Anion Gap 13 8 - 16 mmol/L    eGFR if African American 50 (A) >60 mL/min/1.73 m^2    eGFR if non African American 43 (A) >60 mL/min/1.73 m^2   Lactic acid, plasma   Result Value Ref Range    Lactate (Lactic Acid) 0.9 0.5 - 2.2 mmol/L   COVID-19 Rapid Screening   Result Value Ref Range    SARS-CoV-2 RNA, Amplification, Qual Negative Negative   Troponin I   Result Value Ref Range    Troponin I 0.029 (H) 0.000 - 0.026 ng/mL     *Note: Due to a large number of results and/or encounters for the requested time period, some results have not been displayed. A complete set of results can be found in Results Review.     Imaging Results:  Imaging Results          X-Ray Chest PA And Lateral (Final result)  Result time 09/22/20 13:11:39    Final result by Zeyad James MD (09/22/20 13:11:39)                 Impression:      No acute cardiopulmonary disease.  Findings similar to the prior study dated 09/17/2020.      Electronically signed by: Zeyad James MD  Date:    09/22/2020  Time:    13:11             Narrative:    EXAMINATION:  XR CHEST PA AND LATERAL    CLINICAL HISTORY:  Weakness    TECHNIQUE:  PA and lateral views of the chest were performed.    COMPARISON:  09/17/2020.    FINDINGS:  Patchy infiltrate in the right apical region.  Coarsening of bronchovascular markings/COPD.    The cardiac silhouette is enlarged.  Scoliotic thoracic spine.                               The EKG was ordered, reviewed, and independently interpreted by the ED provider.  Interpretation time: 11:17  Rate: 88 BPM  Rhythm: normal  "sinus rhythm  Interpretation: Nonspecific intraventricular block. T wave abnormality, consider inferior ischemia. No STEMI.           The Emergency Provider reviewed the vital signs and test results, which are outlined above.    ED Discussion     10:52 AM: Discussed pt's case with Dr. Knox (Hem/Onc) and Dr. Alfaro (General Surgery). Dr. Knox referred the pt to the ED to r/o infection; per Dr. Knox, "prior CT chest showed some concerning nodules which was attributed to RA versus fungal infection." Dr. Alfaro was scheduled to see the pt today, but will reschedule follow up.    12:38 PM: Dr. Miranda (Hem/Onc) is evaluating pt at bedside.    12:49 PM: Dr. Miranda (Hem/Onc) has evaluated the pt at bedside, and recommends consulting Yessenia Cisneros PA-C (Hospice and Palliative Care) to set up hospice with pt. Dr. Miranda recommends discharging the pt home after blood transfusion.    12:52 PM: Discussed pt's case with Yessenia Cisneros PA-C (Hospice and Palliative Care), who will come evaluate pt at bedside.    1:29 PM: Yessenia Cisneros PA-C (Hospice and Palliative Care) has evaluated pt at bedside and has set up hospice/DNR.    1:31 PM: Reassessed pt at this time. Discussed with pt all pertinent ED information and results. Discussed pt dx and plan of tx. Gave pt all f/u and return to the ED instructions. All questions and concerns were addressed at this time. Pt expresses understanding of information and instructions, and is comfortable with plan to discharge. Pt is stable for discharge.    I discussed with patient and/or family/caretaker that evaluation in the ED does not suggest any emergent or life threatening medical conditions requiring immediate intervention beyond what was provided in the ED, and I believe patient is safe for discharge.  Regardless, an unremarkable evaluation in the ED does not preclude the development or presence of a serious of life threatening condition. As such, patient was " instructed to return immediately for any worsening or change in current symptoms.    2:15 PM  Patient is stable at this time.  She is anemic.  She was evaluated by heme Onc who had a long discussion with the patient who subsequently elected palliative care/hospice.  She was evaluated by palliative care the patient is to be discharged after the unit of blood for symptoms.  She will be seen by hospice at home tomorrow.  The patient verbalized understanding agreement with all.  She has signed a DNR.  Patient's questions have all been answered and she is at her baseline.       ED Medication(s):  Medications   0.9%  NaCl infusion (for blood administration) (has no administration in time range)   sodium chloride 0.9% bolus 250 mL (250 mLs Intravenous New Bag 9/22/20 1202)       Follow-up Information     Yessenia Cisneros PA-C.    Specialty: Hospice and Palliative Medicine  Contact information:  [address] [place] [address] DR Angel RECIO [id number] [phone]                  New Prescriptions    No medications on file         Medical Decision Making    Medical Decision Making:   Clinical Tests:   Lab Tests: Ordered and Reviewed  Radiological Study: Ordered and Reviewed  Medical Tests: Ordered and Reviewed           Scribe Attestation:   Scribe #1: I performed the above scribed service and the documentation accurately describes the services I performed. I attest to the accuracy of the note.    Attending:   Physician Attestation Statement for Scribe #1: I, Duy Trujillo Jr., MD, personally performed the services described in this documentation, as scribed by Carlitos Doss, in my presence, and it is both accurate and complete.          Clinical Impression       ICD-10-CM ICD-9-CM   1. Symptomatic anemia  D64.9 285.9   2. Weakness  R53.1 780.79   3. Chronic myelomonocytic leukemia not having achieved remission  C93.10 205.10       Disposition:   Disposition: Discharged  Condition: Stable         Duy Trujillo Jr., MD  09/22/20  1332       Duy Trujillo Jr., MD  09/22/20 1337       Duy Trujillo Jr., MD  09/22/20 1419

## 2020-09-22 NOTE — SUBJECTIVE & OBJECTIVE
Oncology Treatment Plan:   [No treatment plan]    Medications:  Continuous Infusions:  Scheduled Meds:  PRN Meds:     Review of patient's allergies indicates:   Allergen Reactions    Codeine      Other reaction(s): hyper  Other reaction(s): hyper    Gabapentin Other (See Comments)     Bad dreams        Past Medical History:   Diagnosis Date    Acid reflux     Anxiety     Back pain     Bronchitis, chronic obstructive w acute bronchitis 7/29/2016    Cancer     NMSC arms, face- Dr. Lata eTjada    Cataract     2+NS    Chronic myelomonocytic leukemia     Degenerative disc disease     Depression     Depression     Dry mouth     Encounter for blood transfusion     Hernia, hiatal 11/18/2013    Hypertension     Hypothyroid     Hypothyroidism     Iron deficiency anemia     Macrocytic anemia 5/3/2016    Macular degeneration     Migraines     Mixed anxiety and depressive disorder     Multiple fractures of ribs of right side     Osteoporosis     Other hyperlipidemia 10/11/2019    Pneumonia     Pneumonia due to other staphylococcus     Rheumatoid arthritis     Rheumatoid arthritis(714.0)     Rheumatoid arthritis(714.0)     Remicade, MTX.     Past Surgical History:   Procedure Laterality Date    APPENDECTOMY  1985    APPLICATION OF WOUND VACUUM-ASSISTED CLOSURE DEVICE N/A 5/14/2020    Procedure: APPLICATION, WOUND VAC;  Surgeon: Mckinley Shannon MD;  Location: [facility] OR;  Service: General;  Laterality: N/A;    APPLICATION OF WOUND VACUUM-ASSISTED CLOSURE DEVICE Left 7/25/2020    Procedure: APPLICATION, WOUND VAC;  Surgeon: Berenice Alfaro MD;  Location: [facility] OR;  Service: General;  Laterality: Left;    BREAST BIOPSY      CATARACT EXTRACTION Bilateral 6/11/15    Dr. Booth    CHOLECYSTECTOMY  2013    cryoablasion kidney Left 09/27/2016    DEBRIDEMENT Left 7/25/2020    Procedure: DEBRIDEMENT;  Surgeon: Berenice Alfaro MD;  Location: [facility] OR;  Service: General;  Laterality: Left;     EVACUATION OF HEMATOMA Left 7/25/2020    Procedure: EVACUATION, HEMATOMA;  Surgeon: Berenice Alfaro MD;  Location: [facility] OR;  Service: General;  Laterality: Left;    EXCISION OF SQUAMOUS CELL CARCINOMA Left 7/2/2020    Procedure: EXCISION, CARCINOMA, SQUAMOUS CELL;  Surgeon: Mckinley Shannon MD;  Location: [facility] OR;  Service: General;  Laterality: Left;    feet Bilateral     rheumatoid    FRACTURE SURGERY Right     tibia    HERNIA REPAIR      HYSTERECTOMY  1970    partial    INCISION AND DRAINAGE OF ABSCESS N/A 5/12/2020    Procedure: INCISION AND DRAINAGE, ABSCESS;  Surgeon: Emerson Hathaway MD;  Location: [facility] OR;  Service: General;  Laterality: N/A;    INCISIONAL BIOPSY N/A 5/12/2020    Procedure: INCISIONAL BIOPSY;  Surgeon: Emerson Hathaway MD;  Location: [facility] OR;  Service: General;  Laterality: N/A;    JOINT REPLACEMENT      bilateral knees (2008), hands, wrists, knuckles, toes    LAPAROSCOPIC NISSEN FUNDOPLICATION      TRANSFORAMINAL EPIDURAL INJECTION OF STEROID Left 6/25/2019    Procedure: Left L5/S1 TF AYAAN with local;  Surgeon: Rowdy Felix MD;  Location: [facility] PAIN MGT;  Service: Pain Management;  Laterality: Left;    WOUND DEBRIDEMENT N/A 5/14/2020    Procedure: DEBRIDEMENT, WOUND;  Surgeon: Mckinley Shannon MD;  Location: [facility] OR;  Service: General;  Laterality: N/A;    WOUND DEBRIDEMENT Left 7/25/2020    Procedure: DEBRIDEMENT, WOUND;  Surgeon: Berenice Alfaro MD;  Location: [facility];  Service: General;  Laterality: Left;     Family History     Problem Relation (Age of Onset)    Asthma Sister, Brother    Cancer Brother, Maternal Grandfather    Cataracts Mother    Chronic back pain Sister, Brother    Diabetes Mellitus Brother    Fibromyalgia Daughter    Heart disease Mother, Father, Maternal Grandmother    Hyperlipidemia Mother    Hypertension Mother, Father, Sister, Brother    Osteoarthritis Mother, Father, Sister, Brother    Thyroid disease Sister, Brother        Tobacco Use    Smoking status:  Former Smoker     Packs/day: 0.25     Years: 2.00     Pack years: 0.50     Quit date: 1965     Years since quittin.9    Smokeless tobacco: Never Used   Substance and Sexual Activity    Alcohol use: No    Drug use: No    Sexual activity: Never     Partners: Male       Review of Systems   Constitutional: Positive for activity change and fatigue. Negative for appetite change, chills, diaphoresis, fever and unexpected weight change.   HENT: Negative for congestion, dental problem, drooling, ear discharge, ear pain, facial swelling, hearing loss, mouth sores, nosebleeds, postnasal drip, rhinorrhea, sinus pressure, sneezing, sore throat, tinnitus, trouble swallowing and voice change.         Pale sclera   Eyes: Negative for photophobia, pain, discharge, redness, itching and visual disturbance.   Respiratory: Positive for shortness of breath. Negative for cough, choking, chest tightness, wheezing and stridor.    Cardiovascular: Negative for chest pain, palpitations and leg swelling.   Gastrointestinal: Positive for abdominal distention. Negative for abdominal pain, anal bleeding, blood in stool, constipation, diarrhea, nausea, rectal pain and vomiting.   Endocrine: Negative for cold intolerance, heat intolerance, polydipsia, polyphagia and polyuria.   Genitourinary: Negative for decreased urine volume, difficulty urinating, dyspareunia, dysuria, enuresis, flank pain, frequency, genital sores, hematuria, menstrual problem, pelvic pain, urgency, vaginal bleeding, vaginal discharge and vaginal pain.   Musculoskeletal: Negative for arthralgias, back pain, gait problem, joint swelling, myalgias, neck pain and neck stiffness.   Skin: Negative for color change, pallor and rash.   Allergic/Immunologic: Negative for environmental allergies, food allergies and immunocompromised state.   Neurological: Positive for weakness. Negative for dizziness, tremors, seizures, syncope, facial asymmetry, speech difficulty,  light-headedness, numbness and headaches.   Hematological: Negative for adenopathy. Does not bruise/bleed easily.   Psychiatric/Behavioral: Positive for dysphoric mood. Negative for agitation, behavioral problems, confusion, decreased concentration, hallucinations, self-injury, sleep disturbance and suicidal ideas. The patient is nervous/anxious. The patient is not hyperactive.      Objective:     Vital Signs (Most Recent):  Temp: 97.6 °F (36.4 °C) (09/22/20 1031)  Pulse: 87 (09/22/20 1232)  Resp: 16 (09/22/20 1232)  BP: (!) 133/59 (09/22/20 1231)  SpO2: 100 % (09/22/20 1232) Vital Signs (24h Range):  Temp:  [97.6 °F (36.4 °C)] 97.6 °F (36.4 °C)  Pulse:  [85-91] 87  Resp:  [16-20] 16  SpO2:  [96 %-100 %] 100 %  BP: ()/(46-83) 133/59     Weight: 50.4 kg (111 lb 1.8 oz)  Body mass index is 20.32 kg/m².  Body surface area is 1.48 meters squared.    No intake or output data in the 24 hours ending 09/22/20 1251    Physical Exam  Constitutional:       Appearance: She is ill-appearing.   HENT:      Head: Normocephalic.   Eyes:      Pupils: Pupils are equal, round, and reactive to light.   Pulmonary:      Effort: Respiratory distress present.   Abdominal:      General: There is distension.      Palpations: Abdomen is soft.   Skin:     Coloration: Skin is pale.   Neurological:      Mental Status: She is alert and oriented to person, place, and time.   Psychiatric:         Mood and Affect: Mood normal.         Significant Labs:   BMP:   Recent Labs   Lab 09/22/20  1118      *   K 4.5   CL 98   CO2 20*   BUN 30*   CREATININE 1.2   CALCIUM 7.9*   , CBC:   Recent Labs   Lab 09/22/20  1118   WBC 35.42*   HGB 4.6*   HCT 14.5*   PLT 12*   , CMP:   Recent Labs   Lab 09/22/20  1118   *   K 4.5   CL 98   CO2 20*      BUN 30*   CREATININE 1.2   CALCIUM 7.9*   PROT 7.3   ALBUMIN 2.8*   BILITOT 0.5   ALKPHOS 188*   AST 13   ALT 10   ANIONGAP 13   EGFRNONAA 43*   , Coagulation: No results for input(s): PT,  INR, APTT in the last 48 hours., Haptoglobin: No results for input(s): HAPTOGLOBIN in the last 48 hours., Immunology: No results for input(s): SPEP, TOBIN, STEWART, FREELAMBDALI in the last 48 hours., LDH: No results for input(s): LDHCSF, BFSOURCE in the last 48 hours., LFTs:   Recent Labs   Lab 09/22/20  1118   ALT 10   AST 13   ALKPHOS 188*   BILITOT 0.5   PROT 7.3   ALBUMIN 2.8*   , Reticulocytes: No results for input(s): RETIC in the last 48 hours. and Tumor Markers: No results for input(s): PSA, CEA, , AFPTM, [id number],  in the last 48 hours.    Invalid input(s): ALGTM    Diagnostic Results:  I have reviewed all pertinent imaging results/findings within the past 24 hours.

## 2020-09-22 NOTE — PROGRESS NOTES
[facility] -   Palliative Care       Patient Name: Sarah Parker  MRN: [id number]  Admission Date: 9/22/2020  Hospital Length of Stay: 0 days  Code Status: DNR   Attending Provider: Duy Trujillo Jr., MD  Palliative Care Provider: Yessenia Cisneros PA-C  Primary Care Physician: Briseida Bennett MD  Principal Problem:<principal problem not specified>  Reason for Referral: goals of care  Primary CM/SW:    Palliative consult completed. Patient and family signed preference form for [facility]. [facility] liaison, notified of referral and will meet with patient tomorrow morning for admit to hospice. All referral info faxed to [facility] ([phone]).      Nhung Luna, MSW, LCSW  [phone]

## 2020-09-22 NOTE — CONSULTS
[facility] -   Hematology/Oncology  Consult Note    Patient Name: Sarah Parker  MRN: [id number]  Admission Date: 9/22/2020  Hospital Length of Stay: 0 days  Code Status: Prior   Attending Provider: Duy Trujillo Jr., MD  Consulting Provider: Corby Miranda MD  Primary Care Physician: Briseida Bennett MD  Principal Problem:<principal problem not specified>    Consults  Subjective:     HPI:  80-year-old female history of chronic myelomonocytic leukemia patient has been treated in the past withVIDAZA has had hold on medication because of recent infection.  Patient has become progressively pancytopenic and requiring both blood and platelets on a more frequent basis and becoming refractory to transfusions.  Was asked to see the patient in the emergency room for further treatment recommendations    Oncology Treatment Plan:   [No treatment plan]    Medications:  Continuous Infusions:  Scheduled Meds:  PRN Meds:     Review of patient's allergies indicates:   Allergen Reactions    Codeine      Other reaction(s): hyper  Other reaction(s): hyper    Gabapentin Other (See Comments)     Bad dreams        Past Medical History:   Diagnosis Date    Acid reflux     Anxiety     Back pain     Bronchitis, chronic obstructive w acute bronchitis 7/29/2016    Cancer     NMSC arms, face- Dr. Lata Tejada    Cataract     2+NS    Chronic myelomonocytic leukemia     Degenerative disc disease     Depression     Depression     Dry mouth     Encounter for blood transfusion     Hernia, hiatal 11/18/2013    Hypertension     Hypothyroid     Hypothyroidism     Iron deficiency anemia     Macrocytic anemia 5/3/2016    Macular degeneration     Migraines     Mixed anxiety and depressive disorder     Multiple fractures of ribs of right side     Osteoporosis     Other hyperlipidemia 10/11/2019    Pneumonia     Pneumonia due to other staphylococcus     Rheumatoid arthritis     Rheumatoid arthritis(714.0)      Rheumatoid arthritis(714.0)     Remicade, MTX.     Past Surgical History:   Procedure Laterality Date    APPENDECTOMY  1985    APPLICATION OF WOUND VACUUM-ASSISTED CLOSURE DEVICE N/A 5/14/2020    Procedure: APPLICATION, WOUND VAC;  Surgeon: Mckinley Shannon MD;  Location: [facility] OR;  Service: General;  Laterality: N/A;    APPLICATION OF WOUND VACUUM-ASSISTED CLOSURE DEVICE Left 7/25/2020    Procedure: APPLICATION, WOUND VAC;  Surgeon: Berenice Alfaro MD;  Location: [facility] OR;  Service: General;  Laterality: Left;    BREAST BIOPSY      CATARACT EXTRACTION Bilateral 6/11/15    Dr. Booth    CHOLECYSTECTOMY  2013    cryoablasion kidney Left 09/27/2016    DEBRIDEMENT Left 7/25/2020    Procedure: DEBRIDEMENT;  Surgeon: Berenice Alfaro MD;  Location: [facility] OR;  Service: General;  Laterality: Left;    EVACUATION OF HEMATOMA Left 7/25/2020    Procedure: EVACUATION, HEMATOMA;  Surgeon: Berenice Alfaro MD;  Location: [facility] OR;  Service: General;  Laterality: Left;    EXCISION OF SQUAMOUS CELL CARCINOMA Left 7/2/2020    Procedure: EXCISION, CARCINOMA, SQUAMOUS CELL;  Surgeon: Mckinley Shannon MD;  Location: [facility] OR;  Service: General;  Laterality: Left;    feet Bilateral     rheumatoid    FRACTURE SURGERY Right     tibia    HERNIA REPAIR      HYSTERECTOMY  1970    partial    INCISION AND DRAINAGE OF ABSCESS N/A 5/12/2020    Procedure: INCISION AND DRAINAGE, ABSCESS;  Surgeon: Emerson Hathaway MD;  Location: [facility] OR;  Service: General;  Laterality: N/A;    INCISIONAL BIOPSY N/A 5/12/2020    Procedure: INCISIONAL BIOPSY;  Surgeon: Emerson Hathaway MD;  Location: [facility] OR;  Service: General;  Laterality: N/A;    JOINT REPLACEMENT      bilateral knees (2008), hands, wrists, knuckles, toes    LAPAROSCOPIC NISSEN FUNDOPLICATION      TRANSFORAMINAL EPIDURAL INJECTION OF STEROID Left 6/25/2019    Procedure: Left L5/S1 TF AYAAN with local;  Surgeon: Rowdy Felix MD;  Location: [facility] PAIN MGT;  Service: Pain Management;   Laterality: Left;    WOUND DEBRIDEMENT N/A 2020    Procedure: DEBRIDEMENT, WOUND;  Surgeon: Mckinley Shannon MD;  Location: [facility] OR;  Service: General;  Laterality: N/A;    WOUND DEBRIDEMENT Left 2020    Procedure: DEBRIDEMENT, WOUND;  Surgeon: Berenice Alfaro MD;  Location: [facility] OR;  Service: General;  Laterality: Left;     Family History     Problem Relation (Age of Onset)    Asthma Sister, Brother    Cancer Brother, Maternal Grandfather    Cataracts Mother    Chronic back pain Sister, Brother    Diabetes Mellitus Brother    Fibromyalgia Daughter    Heart disease Mother, Father, Maternal Grandmother    Hyperlipidemia Mother    Hypertension Mother, Father, Sister, Brother    Osteoarthritis Mother, Father, Sister, Brother    Thyroid disease Sister, Brother        Tobacco Use    Smoking status: Former Smoker     Packs/day: 0.25     Years: 2.00     Pack years: 0.50     Quit date: 1965     Years since quittin.9    Smokeless tobacco: Never Used   Substance and Sexual Activity    Alcohol use: No    Drug use: No    Sexual activity: Never     Partners: Male       Review of Systems   Constitutional: Positive for activity change and fatigue. Negative for appetite change, chills, diaphoresis, fever and unexpected weight change.   HENT: Negative for congestion, dental problem, drooling, ear discharge, ear pain, facial swelling, hearing loss, mouth sores, nosebleeds, postnasal drip, rhinorrhea, sinus pressure, sneezing, sore throat, tinnitus, trouble swallowing and voice change.         Pale sclera   Eyes: Negative for photophobia, pain, discharge, redness, itching and visual disturbance.   Respiratory: Positive for shortness of breath. Negative for cough, choking, chest tightness, wheezing and stridor.    Cardiovascular: Negative for chest pain, palpitations and leg swelling.   Gastrointestinal: Positive for abdominal distention. Negative for abdominal pain, anal bleeding, blood in stool,  constipation, diarrhea, nausea, rectal pain and vomiting.   Endocrine: Negative for cold intolerance, heat intolerance, polydipsia, polyphagia and polyuria.   Genitourinary: Negative for decreased urine volume, difficulty urinating, dyspareunia, dysuria, enuresis, flank pain, frequency, genital sores, hematuria, menstrual problem, pelvic pain, urgency, vaginal bleeding, vaginal discharge and vaginal pain.   Musculoskeletal: Negative for arthralgias, back pain, gait problem, joint swelling, myalgias, neck pain and neck stiffness.   Skin: Negative for color change, pallor and rash.   Allergic/Immunologic: Negative for environmental allergies, food allergies and immunocompromised state.   Neurological: Positive for weakness. Negative for dizziness, tremors, seizures, syncope, facial asymmetry, speech difficulty, light-headedness, numbness and headaches.   Hematological: Negative for adenopathy. Does not bruise/bleed easily.   Psychiatric/Behavioral: Positive for dysphoric mood. Negative for agitation, behavioral problems, confusion, decreased concentration, hallucinations, self-injury, sleep disturbance and suicidal ideas. The patient is nervous/anxious. The patient is not hyperactive.      Objective:     Vital Signs (Most Recent):  Temp: 97.6 °F (36.4 °C) (09/22/20 1031)  Pulse: 87 (09/22/20 1232)  Resp: 16 (09/22/20 1232)  BP: (!) 133/59 (09/22/20 1231)  SpO2: 100 % (09/22/20 1232) Vital Signs (24h Range):  Temp:  [97.6 °F (36.4 °C)] 97.6 °F (36.4 °C)  Pulse:  [85-91] 87  Resp:  [16-20] 16  SpO2:  [96 %-100 %] 100 %  BP: ()/(46-83) 133/59     Weight: 50.4 kg (111 lb 1.8 oz)  Body mass index is 20.32 kg/m².  Body surface area is 1.48 meters squared.    No intake or output data in the 24 hours ending 09/22/20 1251    Physical Exam  Constitutional:       Appearance: She is ill-appearing.   HENT:      Head: Normocephalic.   Eyes:      Pupils: Pupils are equal, round, and reactive to light.   Pulmonary:      Effort:  Respiratory distress present.   Abdominal:      General: There is distension.      Palpations: Abdomen is soft.   Skin:     Coloration: Skin is pale.   Neurological:      Mental Status: She is alert and oriented to person, place, and time.   Psychiatric:         Mood and Affect: Mood normal.         Significant Labs:   BMP:   Recent Labs   Lab 09/22/20  1118      *   K 4.5   CL 98   CO2 20*   BUN 30*   CREATININE 1.2   CALCIUM 7.9*   , CBC:   Recent Labs   Lab 09/22/20  1118   WBC 35.42*   HGB 4.6*   HCT 14.5*   PLT 12*   , CMP:   Recent Labs   Lab 09/22/20  1118   *   K 4.5   CL 98   CO2 20*      BUN 30*   CREATININE 1.2   CALCIUM 7.9*   PROT 7.3   ALBUMIN 2.8*   BILITOT 0.5   ALKPHOS 188*   AST 13   ALT 10   ANIONGAP 13   EGFRNONAA 43*   , Coagulation: No results for input(s): PT, INR, APTT in the last 48 hours., Haptoglobin: No results for input(s): HAPTOGLOBIN in the last 48 hours., Immunology: No results for input(s): SPEP, TOBIN, STEWART, FREELAMBDALI in the last 48 hours., LDH: No results for input(s): LDHCSF, BFSOURCE in the last 48 hours., LFTs:   Recent Labs   Lab 09/22/20  1118   ALT 10   AST 13   ALKPHOS 188*   BILITOT 0.5   PROT 7.3   ALBUMIN 2.8*   , Reticulocytes: No results for input(s): RETIC in the last 48 hours. and Tumor Markers: No results for input(s): PSA, CEA, , AFPTM, [id number],  in the last 48 hours.    Invalid input(s): ALGTM    Diagnostic Results:  I have reviewed all pertinent imaging results/findings within the past 24 hours.    Assessment/Plan:     Chronic myelomonocytic leukemia not having achieved remission  Patient has refractory progressive chronic myelomonocytic leukemia refractory to blood in platelet transfusions.  At this point I spoke with primary oncologist as well as reviewing records.  My recommendations would be hospice care.  I have talked to the family of talk to the daughter by phone in a friend at bedside clearly understands that patient has  terminal condition with refractory blood in platelet needs.  At this point would recommend a do not resuscitate order, and referral to hospice.  Discussed implications if she wishes to have a unit transfuse to release some of her symptoms at the present time this would not be unreasonable discussed with emergency room physician as well        Thank you for your consult. I will follow-up with patient. Please contact us if you have any additional questions.    Corby Miranda MD  Hematology/Oncology  [facility]

## 2020-09-22 NOTE — ED NOTES
Provider Tregle to bedside to discuss discharge with patient and her family memeber. Discharge instructions reviewed with patient and her family member. All questions answered. Pt and family member verbalized understanding. Pt wheeled to exit in wheel chair and SBA into her family member's vehicle.

## 2020-09-22 NOTE — ED NOTES
Consent signed for blood transfusion by pt, this RN as witness, and by Dr. Trujillo. Awaiting orders for blood transfusion from Dr. Ohara at this time who stated he is consulting her doctors at this time.     VSS. Call light within reach. Pt has her friend at bedside. Updated on plan of care.

## 2020-09-23 ENCOUNTER — DOCUMENT SCAN (OUTPATIENT)
Dept: HOME HEALTH SERVICES | Facility: HOSPITAL | Age: 80
End: 2020-09-23
Payer: MEDICARE

## 2020-09-27 LAB
BACTERIA BLD CULT: NORMAL
BACTERIA BLD CULT: NORMAL

## 2020-09-28 ENCOUNTER — DOCUMENTATION ONLY (OUTPATIENT)
Dept: PALLIATIVE MEDICINE | Facility: HOSPITAL | Age: 80
End: 2020-09-28

## 2020-09-28 NOTE — PROGRESS NOTES
[facility] - Palliative Medicine  Medical Specialty       Patient Name: Sarah Parker  MRN: [id number]  Primary Care Physician: Briseida Bennett MD    Notified by Hospice of  that patient  last night.     Nhung Luna, MSW, LCSW  [phone]

## 2020-10-01 NOTE — LETTER
October 1, 2020    Family of MS.Delia Jesus Parker  [address]  [place] [address]             [facility]  [address] [facility]  [place] [address]  Phone: [phone]  Fax: [phone] To the family of MS. Parker:    Please accept our deepest condolences in regards to the recent death of your family member, MS.} Parker. She was a most remarkable person, and it was always a pleasure to see her. I hope that we were able to provide her with some relief during the time that she was under our care.     On behalf of myself and my staff, please also extend our condolences to all of your family. If there is anything else that we can do for you, please do not hesitate to contact us.    Sincerely,        Corby Miranda MD

## 2020-12-03 NOTE — PROGRESS NOTES
[facility]  Pulmonology  Progress Note    Patient Name: Sarah Parker  MRN: [id number]  Admission Date: 8/10/2020  Hospital Length of Stay: 3 days  Code Status: Prior  Attending Provider: Pollo Sparrow MD  Primary Care Provider: Briseida Bennett MD   Principal Problem: Rheumatoid lung    Subjective:     Sarah Parker is 80 y.o.   Asked to see for abnormal chest CT  Known Severe RA on methotrexate and Actemra which were stopped 01/2020  Recent recurrent staphy infections : has Wound vac  Admitted fro Abn lab workup: SP PRBC transfusion  Chest CT done 08/03/2020, 08/10/2020 and 09/16/2019 reviewed  No cough, No fever, No SOB, No night sweats  Curently on Abx: Vanco, Cefepime , Flagyl  Also seen by Hem Onc concern for blast crisis: Wbcc:49K    08/13: seen and examined: afebrile, added VORI,     Interval History: *    08/13: seen and examined: afebrile, added VORI,     Review of Systems   All other systems reviewed and are negative.        Objective:     Vital Signs (Most Recent):  Temp: 97.8 °F (36.6 °C) (08/13/20 0725)  Pulse: 86 (08/13/20 0725)  Resp: 17 (08/13/20 0725)  BP: (!) 145/68 (08/13/20 0725)  SpO2: 100 % (08/13/20 0725) Vital Signs (24h Range):  Temp:  [97.2 °F (36.2 °C)-98.8 °F (37.1 °C)] 97.8 °F (36.6 °C)  Pulse:  [76-95] 86  Resp:  [17-20] 17  SpO2:  [96 %-100 %] 100 %  BP: (130-169)/(63-74) 145/68     Weight: 45.4 kg (100 lb)  Body mass index is 18.29 kg/m².      Intake/Output Summary (Last 24 hours) at 8/13/2020 1041  Last data filed at 8/13/2020 0643  Gross per 24 hour   Intake 940 ml   Output --   Net 940 ml       Physical Exam  Vitals signs and nursing note reviewed.   Constitutional:       Appearance: Normal appearance. She is ill-appearing.       HENT:      Head: Normocephalic and atraumatic.      Nose: Nose normal.   Eyes:      Extraocular Movements: Extraocular movements intact.      Pupils: Pupils are equal, round, and reactive to light.   Cardiovascular:      Rate  and Rhythm: Normal rate and regular rhythm.   Pulmonary:      Effort: Pulmonary effort is normal.      Breath sounds: Normal breath sounds.   Abdominal:      General: Bowel sounds are normal.   Musculoskeletal: Normal range of motion.   Skin:     General: Skin is warm.   Neurological:      General: No focal deficit present.      Mental Status: She is alert and oriented to person, place, and time.   Psychiatric:         Mood and Affect: Mood normal.         Vents:       Lines/Drains/Airways     Peripheral Intravenous Line                 Peripheral IV - Single Lumen 08/10/20 1530 18 G Right;Other (Comment) Antecubital 2 days                Significant Labs:    CBC/Anemia Profile:  Recent Labs   Lab 08/12/20 0756 08/12/20 2002 08/13/20  0646   WBC 33.88* 36.32* 27.70*   HGB 7.3* 7.2* 7.3*   HCT 23.3* 23.1* 23.0*   PLT 16* 58* 45*   MCV 95 95 95   RDW 17.3* 17.3* 17.1*        Chemistries:  Recent Labs   Lab 08/11/20 2016 08/12/20 0756 08/13/20  0651   CREATININE 0.9 0.9 0.8       All pertinent labs within the past 24 hours have been reviewed.    Significant Imaging:  I have reviewed and interpreted all pertinent imaging results/findings within the past 24 hours.      ABG  No results for input(s): PH, PO2, PCO2, HCO3, BE in the last 168 hours.  Assessment/Plan:     * Rheumatoid lung  Currently off DMARD    Lung mass  Differentials: Inflammatory vs Infectious  Inflammatory: likely Rheumatoid lung related: unfortunately off DMARD due to active infection treatment  Infectious: location, infiltrative pattern , indolent suggest fungal or atypical: increase coverage for antifungals: VORICONAZOLE  Tissue sampling : transbronchial biopsy or CT biopsy though possible will be extremely high risk in her current functional status especially with low platelets. These has improved  Close follow up interval imaging in 8 weeks: sampling can be scheduled electively  Continue other Abx: transition to PO alternatives: ADRIANA SHEETS  Augmentin    Thrombocytopenia  55  K  Transfusion per Hem Onc      Chronic myelomonocytic leukemia not having achieved remission  Therapy plan per hematology oncology    Sign off  Follow in office       I have reviewed all labs and imaging studies and compared to previous results. I have also discussed labs with all the teams in the medical care of the patient and my plan is outlined below       Sonu Varela MD  Pulmonology  [facility]      Diabetes mellitus

## 2022-04-14 NOTE — PROGRESS NOTES
Subjective:       Patient ID: Sarah Parker is a 79 y.o. female.    Chief Complaint   Patient presents with    Fever     chills    Cough       HPI    Sarah Parker is here today with c/o feeling ill since last week.  Reports max temp 102 with sweats, chills, weakness, fatigue and aches.  History of COPD, on inhaler as ordered.  Reports dry cough, worse PM with SOB and chest tightness.  Headaches have cleared up.   has been ill in the hospital.      Review of Systems   Constitutional: Positive for activity change, appetite change, chills, diaphoresis, fatigue and fever.   HENT: Negative for congestion, ear pain, postnasal drip, rhinorrhea, sinus pain, sneezing, sore throat, trouble swallowing and voice change.    Eyes: Negative.    Respiratory: Positive for cough, chest tightness and shortness of breath. Negative for wheezing.    Cardiovascular: Negative for chest pain, palpitations and leg swelling.   Gastrointestinal: Negative for abdominal pain, nausea and vomiting.   Musculoskeletal: Positive for myalgias.   Skin: Negative.    Neurological: Positive for headaches. Negative for weakness, light-headedness and numbness.   Hematological: Negative for adenopathy.         Review of patient's allergies indicates:   Allergen Reactions    Codeine      Other reaction(s): hyper  Other reaction(s): hyper    Doxycycline     Gabapentin Other (See Comments)     Bad dreams         Current Outpatient Medications on File Prior to Visit   Medication Sig Dispense Refill    albuterol (PROVENTIL) 2.5 mg /3 mL (0.083 %) nebulizer solution Take 3 mLs (2.5 mg total) by nebulization every 6 (six) hours as needed for Wheezing. 1 Box 5    amitriptyline (ELAVIL) 75 MG tablet TAKE 1 TABLET BY MOUTH EVERY EVENING 90 tablet 3    benzonatate (TESSALON) 200 MG capsule Take 1 capsule (200 mg total) by mouth 3 (three) times daily as needed for Cough. 21 capsule 0    calcium citrate-vitamin D (CITRACAL + D) 315-200 mg-unit  per tablet Take 1 tablet by mouth once daily.       DULoxetine (CYMBALTA) 20 MG capsule Take 2 capsules (40 mg total) by mouth once daily. 180 capsule 3    hydrocodone-acetaminophen 5-325mg (NORCO) 5-325 mg per tablet TK 1 T PO Q 6 H PRN  0    hydrOXYzine HCl (ATARAX) 25 MG tablet TAKE 1 TO 2 TABLETS(25 TO 50 MG) BY MOUTH EVERY NIGHT AS NEEDED FOR ANXIETY OR INSOMNIA 60 tablet 3    leucovorin (WELLCOVORIN) 5 mg Tab TAKE ONE WEEKLY ON SATURDAYS 4 tablet 3    levothyroxine (SYNTHROID) 88 MCG tablet TAKE 1 TABLET BY MOUTH BEFORE BREAKFAST 90 tablet 3    meclizine (ANTIVERT) 50 MG tablet Take 25 mg by mouth 3 (three) times daily as needed.      methotrexate 2.5 MG Tab Take 17.5 mg by mouth every 7 days.       metoprolol succinate (TOPROL-XL) 100 MG 24 hr tablet Take 100 mg by mouth once daily.      multivitamin capsule Take by mouth. As directed      ondansetron (ZOFRAN) 4 MG tablet Take 1 tablet (4 mg total) by mouth every 8 (eight) hours as needed for Nausea. 30 tablet 1    oxymetazoline (AFRIN) 0.05 % nasal spray 1 Aerosol, Spray Nasal At bedtime      pravastatin (PRAVACHOL) 10 MG tablet TAKE 1 TABLET(10 MG) BY MOUTH EVERY DAY 30 tablet 11    tocilizumab (ACTEMRA) 80 mg/4 mL (20 mg/mL) Soln Inject into the vein.      topiramate (TOPAMAX) 25 MG tablet Take 1 tablet (25 mg total) by mouth at night x 1 week then increase to 2 (two) times daily. Increase as tolerated. 60 tablet 0    valsartan-hydrochlorothiazide (DIOVAN-HCT) 80-12.5 mg per tablet TAKE 1 TABLET BY MOUTH DAILY 90 tablet 3     Current Facility-Administered Medications on File Prior to Visit   Medication Dose Route Frequency Provider Last Rate Last Dose    methylPREDNISolone acetate injection 60 mg  60 mg Intramuscular 1 time in Clinic/HOD Briseida Reyes MD           Patient Active Problem List   Diagnosis    Rheumatoid arthritis    Acquired hypothyroidism    Depression, recurrent    Rheumatoid lung    Atherosclerosis of aorta     "ARMD (age related macular degeneration)    Hiatal hernia with gastroesophageal reflux    Essential hypertension    Osteopenia    Macrocytic anemia    Leukocytosis    Multiple lung nodules on CT    Renal mass    History of skin cancer    Chronic bronchitis    Calcified granuloma of lung    COPD with exacerbation    Lumbar radiculopathy    Melanoma of right upper arm    Idiopathic peripheral neuropathy    Rheumatoid arthritis involving right foot    Rheumatoid arthritis involving left foot    Monoclonal paraproteinemia         Past medical, surgical, family and social histories have been reviewed today.        Objective:     Vitals:    08/19/19 0938 08/19/19 1014   BP: (!) 103/50 (!) 110/58   Pulse: 109    Temp: 97.6 °F (36.4 °C)    TempSrc: Oral    SpO2: 98%    Weight: 57.1 kg (125 lb 14.1 oz)    Height: 5' 2" (1.575 m)    PainSc: 0-No pain          Physical Exam   Constitutional: She is oriented to person, place, and time.   HENT:   Head: Normocephalic and atraumatic.   Right Ear: Tympanic membrane normal.   Left Ear: Tympanic membrane normal.   Nose: Nose normal.   Mouth/Throat: Oropharynx is clear and moist and mucous membranes are normal.   Eyes: Pupils are equal, round, and reactive to light.   Cardiovascular: Regular rhythm and normal heart sounds. Tachycardia present.   Pulmonary/Chest: Effort normal. No respiratory distress. She has wheezes. She has no rales. She exhibits no tenderness.   Lymphadenopathy:     She has no cervical adenopathy.   Neurological: She is alert and oriented to person, place, and time.         Diagnosis       1. COPD with exacerbation          Assessment/ Plan     COPD with exacerbation  -     betamethasone acetate-betamethasone sodium phosphate injection 9 mg  -     azithromycin (Z-ANUEL) 250 MG tablet; Take 2 tablets by mouth on day 1; Take 1 tablet by mouth on days 2-5  Dispense: 6 tablet; Refill: 0  -     benzonatate (TESSALON) 100 MG capsule; Take 1-2 capsules " (100-200 mg total) by mouth 3 (three) times daily as needed for Cough.  Dispense: 60 capsule; Refill: 0        Injection today per pt request.  Medication discussed, take as directed.  Hydration, fluids.  Monitor BP, hold medication when low (took today).  Symptomatic care, rest and fluids.  Follow-up in clinic as needed.        JULY Dueñas  [facility]    Pt here for falling while playing volleyball onto L arm. States pain to wrist. No obvious deformity noted.

## 2022-09-14 NOTE — SUBJECTIVE & OBJECTIVE
Past Medical History:   Diagnosis Date    Acid reflux     Anxiety     Back pain     Bronchitis, chronic obstructive w acute bronchitis 7/29/2016    Cancer     [facility] arms, face- Dr. Lata Tejada    Cataract     2+NS    Degenerative disc disease     Depression     Dry mouth     Hernia, hiatal 11/18/2013    Hypertension     Hypothyroid     Macrocytic anemia 5/3/2016    Macular degeneration     Migraines     Mixed anxiety and depressive disorder     Multiple fractures of ribs of right side     Osteoporosis     Other hyperlipidemia 10/11/2019    Pneumonia     Pneumonia due to other staphylococcus     Rheumatoid arthritis     Rheumatoid arthritis(714.0)     Rheumatoid arthritis(714.0)     Remicade, MTX.       Past Surgical History:   Procedure Laterality Date    APPENDECTOMY  1985    BREAST BIOPSY      CATARACT EXTRACTION Bilateral 6/11/15    Dr. Booth    CHOLECYSTECTOMY  2013    cryoablasion kidney Left 09/27/2016    feet Bilateral     rheumatoid    FRACTURE SURGERY Right     tibia    HERNIA REPAIR      HYSTERECTOMY  1970    partial    INCISION AND DRAINAGE OF ABSCESS N/A 5/12/2020    Procedure: INCISION AND DRAINAGE, ABSCESS;  Surgeon: Emerson Hathaway MD;  Location: [facility] OR;  Service: General;  Laterality: N/A;    INCISIONAL BIOPSY N/A 5/12/2020    Procedure: INCISIONAL BIOPSY;  Surgeon: Emerson Hathaway MD;  Location: [facility] OR;  Service: General;  Laterality: N/A;    JOINT REPLACEMENT      bilateral knees (2008), hands, wrists, knuckles, toes    LAPAROSCOPIC NISSEN FUNDOPLICATION      TRANSFORAMINAL EPIDURAL INJECTION OF STEROID Left 6/25/2019    Procedure: Left L5/S1 TF AYAAN with local;  Surgeon: Rowdy Felix MD;  Location: [facility];  Service: Pain Management;  Laterality: Left;       Review of patient's allergies indicates:   Allergen Reactions    Codeine      Other reaction(s): hyper  Other reaction(s): hyper    Doxycycline     Gabapentin Other (See Comments)     Bad dreams        Medications:  Medications Prior to Admission   Medication Sig    amitriptyline (ELAVIL) 75 MG tablet TAKE 1 TABLET BY MOUTH EVERY EVENING    calcium citrate-vitamin D (CITRACAL + D) 315-200 mg-unit per tablet Take 1 tablet by mouth once daily.     DULoxetine (CYMBALTA) 20 MG capsule Take 2 capsules (40 mg total) by mouth once daily.    ferrous gluconate 324 mg (37.5 mg iron) Tab tablet Take 1 tablet (324 mg total) by mouth daily with breakfast. (Patient taking differently: Take 324 mg by mouth 2 (two) times daily with meals. )    fluticasone propionate (FLONASE) 50 mcg/actuation nasal spray 2 sprays (100 mcg total) by Each Nostril route once daily.    hydrocodone-acetaminophen 5-325mg (NORCO) 5-325 mg per tablet TK 1 T PO Q 6 H PRN    leucovorin (WELLCOVORIN) 5 mg Tab TAKE ONE WEEKLY ON SATURDAYS    levothyroxine (SYNTHROID) 88 MCG tablet TAKE 1 TABLET BY MOUTH BEFORE BREAKFAST    magnesium oxide (MAG-OX) 400 mg (241.3 mg magnesium) tablet Take 1 tablet (400 mg total) by mouth 3 (three) times daily.    metoprolol succinate (TOPROL-XL) 50 MG 24 hr tablet TAKE 1 TABLET(50 MG) BY MOUTH EVERY DAY    multivitamin capsule Take by mouth. As directed    ondansetron (ZOFRAN) 4 MG tablet Take 1 tablet (4 mg total) by mouth every 8 (eight) hours as needed for Nausea.    pravastatin (PRAVACHOL) 10 MG tablet TAKE 1 TABLET(10 MG) BY MOUTH EVERY DAY    topiramate (TOPAMAX) 25 MG tablet Take 1 tablet (25 mg total) by mouth at night x 1 week then increase to 2 (two) times daily. Increase as tolerated.    valsartan-hydrochlorothiazide (DIOVAN-HCT) 80-12.5 mg per tablet TAKE 1 TABLET BY MOUTH DAILY    albuterol (PROVENTIL) 2.5 mg /3 mL (0.083 %) nebulizer solution Take 3 mLs (2.5 mg total) by nebulization every 6 (six) hours as needed for Wheezing.    benzonatate (TESSALON) 100 MG capsule Take 1-2 capsules (100-200 mg total) by mouth 3 (three) times daily as needed for Cough.    hydrOXYzine HCl (ATARAX) 25 MG  tablet Take 25 mg by mouth nightly.     meclizine (ANTIVERT) 50 MG tablet Take 25 mg by mouth 3 (three) times daily as needed.    prochlorperazine (COMPAZINE) 5 MG tablet TAKE 1 TABLET(5 MG) BY MOUTH EVERY 6 HOURS AS NEEDED FOR NAUSEA    tamsulosin (FLOMAX) 0.4 mg Cap Take 1 capsule (0.4 mg total) by mouth once daily. For urinary retention     Antibiotics (From admission, onward)    Start     Stop Route Frequency Ordered    05/14/20 1300  vancomycin in dextrose 5 % 1 gram/250 mL IVPB 1,000 mg      -- IV Every 24 hours (non-standard times) 05/14/20 1215    05/12/20 1145  vancomycin - pharmacy to dose  (vancomycin IVPB)      -- IV pharmacy to manage frequency 05/12/20 1045        Antifungals (From admission, onward)    None        Antivirals (From admission, onward)    None           Immunization History   Administered Date(s) Administered    Influenza 10/21/2008, 10/25/2010    Influenza - High Dose - PF (65 years and older) 09/21/2011, 10/04/2012, 10/14/2013, 10/06/2014, 11/02/2015, 10/28/2016, 09/27/2017, 09/25/2018, 10/22/2019    Pneumococcal Conjugate - 13 Valent 05/11/2016    Pneumococcal Polysaccharide - 23 Valent 09/21/2009, 10/16/2014    Tdap 10/14/2013       Family History     Problem Relation (Age of Onset)    Asthma Sister, Brother    Cancer Brother, Maternal Grandfather    Cataracts Mother    Chronic back pain Sister, Brother    Diabetes Mellitus Brother    Fibromyalgia Daughter    Heart disease Mother, Father, Maternal Grandmother    Hyperlipidemia Mother    Hypertension Mother, Father, Sister, Brother    Osteoarthritis Mother, Father, Sister, Brother    Thyroid disease Sister, Brother        Social History     Socioeconomic History    Marital status:      Spouse name: Not on file    Number of children: Not on file    Years of education: Not on file    Highest education level: Not on file   Occupational History    Occupation: retired   Social Needs    Financial resource strain: Not on  file    Food insecurity:     Worry: Not on file     Inability: Not on file    Transportation needs:     Medical: Not on file     Non-medical: Not on file   Tobacco Use    Smoking status: Former Smoker     Packs/day: 0.25     Years: 2.00     Pack years: 0.50     Last attempt to quit: 1965     Years since quittin.5    Smokeless tobacco: Never Used   Substance and Sexual Activity    Alcohol use: No    Drug use: No    Sexual activity: Never     Partners: Male   Lifestyle    Physical activity:     Days per week: Not on file     Minutes per session: Not on file    Stress: Not at all   Relationships    Social connections:     Talks on phone: Not on file     Gets together: Not on file     Attends Gnosticist service: Not on file     Active member of club or organization: Not on file     Attends meetings of clubs or organizations: Not on file     Relationship status: Not on file   Other Topics Concern    Not on file   Social History Narrative    Patient is aretired and live with .     Review of Systems   Constitutional: Negative for chills, diaphoresis, fatigue and fever.   HENT: Negative for facial swelling, hearing loss, mouth sores, sneezing, sore throat, tinnitus and trouble swallowing.    Eyes: Negative for photophobia, pain, discharge, redness and visual disturbance.   Respiratory: Negative for apnea, cough, choking, chest tightness, shortness of breath, wheezing and stridor.    Cardiovascular: Negative for chest pain, palpitations and leg swelling.   Gastrointestinal: Negative for abdominal distention, abdominal pain, anal bleeding, blood in stool, constipation, diarrhea, nausea, rectal pain and vomiting.   Endocrine: Negative for cold intolerance, heat intolerance, polydipsia, polyphagia and polyuria.   Genitourinary: Negative for difficulty urinating, dysuria, flank pain, frequency, hematuria, pelvic pain, urgency, vaginal bleeding, vaginal discharge and vaginal pain.   Musculoskeletal:  Positive for back pain. Negative for arthralgias, gait problem, myalgias and neck stiffness.   Skin: Positive for color change and wound. Negative for pallor and rash.        + erythema, multiple boils/abscesses.   Allergic/Immunologic: Negative for food allergies.   Neurological: Negative for dizziness, tremors, seizures, syncope, speech difficulty, weakness and headaches.   Hematological: Negative for adenopathy. Does not bruise/bleed easily.   Psychiatric/Behavioral: Negative for behavioral problems and confusion. The patient is not nervous/anxious.    All other systems reviewed and are negative.    Objective:     Vital Signs (Most Recent):  Temp: 98.4 °F (36.9 °C) (05/15/20 0352)  Pulse: 98 (05/15/20 0527)  Resp: 16 (05/15/20 0352)  BP: (!) 112/54 (05/15/20 0352)  SpO2: (!) 90 % (05/15/20 0352) Vital Signs (24h Range):  Temp:  [98.1 °F (36.7 °C)-99.8 °F (37.7 °C)] 98.4 °F (36.9 °C)  Pulse:  [] 98  Resp:  [13-23] 16  SpO2:  [90 %-100 %] 90 %  BP: (112-195)/(54-87) 112/54     Weight: 47.1 kg (103 lb 13.4 oz)  Body mass index is 18.99 kg/m².    Estimated Creatinine Clearance: 43.1 mL/min (based on SCr of 0.8 mg/dL).    Physical Exam   Constitutional: She is oriented to person, place, and time. She appears well-developed. No distress.   HENT:   Head: Normocephalic and atraumatic.   Mouth/Throat: No oropharyngeal exudate.   Eyes: Pupils are equal, round, and reactive to light. Conjunctivae and EOM are normal.   Neck: Normal range of motion. Neck supple. No JVD present. No thyromegaly present.   Cardiovascular: Normal rate, regular rhythm and normal heart sounds.   No murmur heard.  Pulmonary/Chest: Effort normal and breath sounds normal. No respiratory distress. She has no wheezes. She has no rales. She exhibits no tenderness.   Abdominal: Soft. Bowel sounds are normal. She exhibits no distension. There is no tenderness. There is no rebound and no guarding.   Musculoskeletal: Normal range of motion. She  exhibits no edema.   Lymphadenopathy:     She has no cervical adenopathy.   Neurological: She is alert and oriented to person, place, and time. She displays normal reflexes. No cranial nerve deficit or sensory deficit.   Skin: Skin is warm and dry. No rash noted. She is not diaphoretic. There is erythema (wound vac noted  ).   Erythema and induration noted lower back near previous I&D.    Psychiatric: She has a normal mood and affect.   Nursing note and vitals reviewed.      Significant Labs:   Blood Culture:   Recent Labs   Lab 05/12/20  1000 05/12/20  1006   LABBLOO No Growth to date  No Growth to date  No Growth to date No Growth to date  No Growth to date  No Growth to date     BMP:   Recent Labs   Lab 05/15/20  0556   *   *   K 3.9      CO2 24   BUN 19   CREATININE 0.8   CALCIUM 8.3*   MG 1.6     CBC:   Recent Labs   Lab 05/14/20  0530 05/15/20  0556   WBC 20.06* 22.80*   HGB 9.4* 8.6*   HCT 29.7* 27.4*   PLT 13* 69*     Wound Culture:   Recent Labs   Lab 05/12/20  1755   LABAERO STAPHYLOCOCCUS AUREUS  Many  Susceptibility pending  *     All pertinent labs within the past 24 hours have been reviewed.    Significant Imaging: I have reviewed all pertinent imaging results/findings within the past 24 hours.   Picato Pregnancy And Lactation Text: This medication is Pregnancy Category C. It is unknown if this medication is excreted in breast milk.

## 2022-10-16 NOTE — PLAN OF CARE
Problem: Infection  Goal: Infection Symptom Resolution    Intervention: Prevent or Manage Infection  Patient awake and alert free from falls and injury daughter at bedside, sinus tachcardic rhythm on monitor, denies pain at this time, weight shift assistance provided, conner catheter patent and intact, ABT continues, POC reviewed with patient and daughter,  bed low locked, call light within reach, will continue to monitor         16-Oct-2022 22:17

## 2022-11-07 NOTE — PROGRESS NOTES
[facility] -   General Surgery  Progress Note    Subjective:     History of Present Illness:  80-year-old female with CML referred for back abscess.  Patient reports 1st noticing a boil on her back approximately a week ago and has been undergoing antibiotic therapy.  Since that time she has noted multiple spots of open ulceration on her back with drainage of purulent fluid.  She underwent CT scan upon admission for IV antibiotics and was noted to have cellulitis of the area but no definitive abscess.  Denies any fevers or chills.    Post-Op Info:  Procedure(s) (LRB):  INCISION AND DRAINAGE, ABSCESS (N/A)  INCISIONAL BIOPSY (N/A)   1 Day Post-Op     Interval History:  Status post incision drainage of multiple abscess sites yesterday.  Patient reports pain improved    Medications:  Continuous Infusions:  Scheduled Meds:   amitriptyline  75 mg Oral QHS    ceFEPime (MAXIPIME) IVPB  2 g Intravenous Q12H    DULoxetine  40 mg Oral Daily    ferrous gluconate  324 mg Oral BID WM    levothyroxine  88 mcg Oral Before breakfast    magnesium oxide  400 mg Oral TID    metoprolol succinate  50 mg Oral Daily    pravastatin  10 mg Oral Daily    tamsulosin  0.4 mg Oral Daily    topiramate  25 mg Oral BID     PRN Meds:sodium chloride, sodium chloride, acetaminophen, dextrose 10 % in water (D10W), dextrose 10 % in water (D10W), glucagon (human recombinant), glucose, glucose, HYDROcodone-acetaminophen, HYDROcodone-acetaminophen, ondansetron, ondansetron, sodium chloride 0.9%, Pharmacy to dose Vancomycin consult **AND** vancomycin - pharmacy to dose     Review of patient's allergies indicates:   Allergen Reactions    Codeine      Other reaction(s): hyper  Other reaction(s): hyper    Doxycycline     Gabapentin Other (See Comments)     Bad dreams     Objective:     Vital Signs (Most Recent):  Temp: 97.6 °F (36.4 °C) (05/13/20 1219)  Pulse: 81 (05/13/20 1219)  Resp: 18 (05/13/20 1219)  BP: (!) 130/59 (05/13/20  1219)  SpO2: 98 % (05/13/20 1219) Vital Signs (24h Range):  Temp:  [97.6 °F (36.4 °C)-99.7 °F (37.6 °C)] 97.6 °F (36.4 °C)  Pulse:  [81-98] 81  Resp:  [15-21] 18  SpO2:  [87 %-100 %] 98 %  BP: (106-175)/(51-75) 130/59     Weight: 47.1 kg (103 lb 13.4 oz)  Body mass index is 18.99 kg/m².    Intake/Output - Last 3 Shifts       05/11 0700 - 05/12 0659 05/12 0700 - 05/13 0659 05/13 0700 - 05/14 0659    P.O.  390 120    I.V. (mL/kg)  450 (9.6)     Blood  441.7     IV Piggyback  1563     Total Intake(mL/kg)  2844.7 (60.4) 120 (2.5)    Net  +2844.7 +120           Urine Occurrence  2 x 2 x          Physical Exam   Constitutional: She is oriented to person, place, and time. She appears well-developed and well-nourished.   HENT:   Head: Normocephalic and atraumatic.   Eyes: EOM are normal.   Neck: Neck supple.   Cardiovascular: Normal rate and regular rhythm.   Pulmonary/Chest: Effort normal and breath sounds normal.   Abdominal: Soft. Bowel sounds are normal. She exhibits no distension. There is no tenderness.   Neurological: She is alert and oriented to person, place, and time.   Skin: Skin is warm and dry.        Vitals reviewed.      Significant Labs:  CBC:   Recent Labs   Lab 05/13/20  0749   WBC 26.47*   RBC 2.40*   HGB 6.7*   HCT 22.5*   PLT 41*   MCV 94   MCH 27.9   MCHC 29.8*     BMP:   Recent Labs   Lab 05/13/20  0749      *   K 4.6      CO2 24   BUN 13   CREATININE 0.8   CALCIUM 8.5*           Assessment/Plan:     Abscess of lower back  Status post incision and drainage  Wound care consult for dressing changes daily, will need daily dressing changes upon discharge  Antibiotics per Medicine  Will sign off patient can follow up as outpatient for wound check  Please call with questions                  Emerson Hathaway MD  General Surgery  [facility]   Dr. Simba Tse (PCP)

## 2023-04-07 NOTE — PLAN OF CARE
States she had one episode of diarrhea yesterday and denies any other episodes.  
Implemented All Universal Safety Interventions:  [place] to call system. Call bell, personal items and telephone within reach. Instruct patient to call for assistance. Room bathroom lighting operational. Non-slip footwear when patient is off stretcher. Physically safe environment: no spills, clutter or unnecessary equipment. Stretcher in lowest position, wheels locked, appropriate side rails in place.

## 2024-08-09 NOTE — PLAN OF CARE
Electrophysiology Follow up  Note    DOS: 8/9/2024   [id number]  Melody Rodriguez    Chief complaint/Reason for consult: palpitations     HPI: Pt is a 75 y.o. female who presents to the clinic today in follow up for palpitations. Patient has a past medical history significant for but not limited to: type 2 diabetes, dyslipidemia, multiple sclerosis, osteoarthritis of right knee S/P total knee replacement, hypothyroidism. Patient doing well since starting low dose metoprolol. Having some balance issues that are going to be taken care of with physical therapy. Annual follow up.     Past Medical History:   Diagnosis Date    Acquired hypothyroidism 06/15/2023    Acute nasopharyngitis     Recent chest xray clear    Arthritis     Right knee and thumb joints    Breath shortness     Comes and goes    Cancer (HCC)     Skin cancer squamous cell    Cerebral atrophy (HCC) 03/16/2022    Chronic cough 03/15/2023    COVID-19 virus infection 03/15/2023    Depression     Diabetes (HCC)     Disorder of thyroid     Nodules in both nodes. Biopsy indicates benign. Last ultrasound shows stable.    Groin lump 06/09/2022    Gynecological disorder     Cervical polyp in 1970    Heart burn     Controlled with Omeprazole    Heart murmur     Minor leakage in 3 valves due to scarlet fever as a child    Heart valve disease     Minor leakage in 3 valves due to scarlet fever as a child    Hiatus hernia syndrome     I think    High cholesterol     Taking Simvistatin    Hyperlipidemia     Hypokalemia 05/20/2022    Hyponatremia 12/19/2022    Muscle disorder     HILARY (obstructive sleep apnea) 03/20/2024    HILARY (obstructive sleep apnea) 3/20/2024    Osteoarthritis of right knee 06/15/2021    PONV (postoperative nausea and vomiting) 1970    Only time I’ve been under anesthesia    Recurrent major depressive disorder, in remission ([facility]) 03/16/2022    SI (sacroiliac) pain 06/09/2022    Type 2 diabetes mellitus with stage 3 chronic kidney disease ([facility])  Pt. Is stable. Pt. Received vidaza d4 today. Pt. Will return tomorrow.    06/15/2021    Urinary bladder disorder     MS issue    Urinary incontinence     During MS relapse. Has since resolved    Vitamin D deficiency 06/15/2023       Past Surgical History:   Procedure Laterality Date    PB TOTAL KNEE ARTHROPLASTY Right 12/14/2022    Procedure: RIGHT TOTAL KNEE REPLACEMENT;  Surgeon: Britton Cooney M.D.;  Location: SURGERY [facility] [place];  Service: Orthopedics    GYN SURGERY  1970    Cervical polyp       Social History     Socioeconomic History    Marital status:      Spouse name: Not on file    Number of children: Not on file    Years of education: Not on file    Highest education level: 12th grade   Occupational History    Not on file   Tobacco Use    Smoking status: Never    Smokeless tobacco: Never   Vaping Use    Vaping status: Never Used   Substance and Sexual Activity    Alcohol use: Not Currently     Alcohol/week: 0.6 oz     Types: 1 Glasses of wine per week     Comment: Occasional glass of wine or april    Drug use: Not Currently    Sexual activity: Not Currently     Partners: Male   Other Topics Concern    Not on file   Social History Narrative    Not on file     Social Determinants of Health     Financial Resource Strain: Low Risk  (1/24/2024)    Overall Financial Resource Strain (CARDIA)     Difficulty of Paying Living Expenses: Not hard at all   Food Insecurity: No Food Insecurity (1/24/2024)    Hunger Vital Sign     Worried About Running Out of Food in the Last Year: Never true     Ran Out of Food in the Last Year: Never true   Transportation Needs: No Transportation Needs (1/24/2024)    PRAPARE - Transportation     Lack of Transportation (Medical): No     Lack of Transportation (Non-Medical): No   Physical Activity: Inactive (11/9/2022)    Exercise Vital Sign     Days of Exercise per Week: 0 days     Minutes of Exercise per Session: 0 min   Stress: Stress Concern Present (5/26/2020)    [employer] [place] [employer] - Occupational Stress Questionnaire      Feeling of Stress : To some extent   Social Connections: Moderately Isolated (5/26/2020)    Social Connection and Isolation Panel [NHANES]     Frequency of Communication with Friends and Family: More than three times a week     Frequency of Social Gatherings with Friends and Family: More than three times a week     Attends [employer] Services: Never     Active Member of Clubs or Organizations: Yes     Attends Club or Organization Meetings: More than 4 times per year     Marital Status:    Intimate Partner Violence: Not on file   Housing Stability: Low Risk  (11/9/2022)    Housing Stability Vital Sign     Unable to Pay for Housing in the Last Year: No     Number of Places Lived in the Last Year: 1     Unstable Housing in the Last Year: No       Family History   Problem Relation Age of Onset    Colon Cancer Mother     Heart Disease Mother         Cardiomyopathy    Lung Disease Mother         Emphysema from 2nd hand smoke    Cancer Mother         Colon    Colorectal Cancer Mother     Diabetes Mother     Heart Attack Father     Heart Disease Father         Numerous heart attacks    Diabetes Father     No Known Problems Sister     Cancer Brother         Skin cancer    Other Brother         Gaulbladder issues    Cancer Maternal Grandmother         GI cancer    No Known Problems Maternal Grandfather     No Known Problems Paternal Grandmother     No Known Problems Paternal Grandfather     Other Daughter         Fibromyalgia    Bipolar disorder Daughter     ADHD Son     Depression Son     Hypertension Son        No Known Allergies    Current Outpatient Medications   Medication Sig Dispense Refill    rosuvastatin (CRESTOR) 40 MG tablet Take 1 Tablet by mouth every day. 100 Tablet 3    amoxicillin (AMOXIL) 500 MG Cap Take 4 capsules by mouth one hour prior to dental appt 16 Capsule 0    levothyroxine (SYNTHROID) 50 MCG Tab Take 1 Tablet by mouth every morning on an empty stomach. 90 Tablet 2    buPROPion (WELLBUTRIN  XL) 300 MG XL tablet Take 1 tablet (300 mg) by mouth daily in the morning 90 Tablet 2    paroxetine (PAXIL) 40 MG tablet TAKE 1 TABLET(40 MG) BY MOUTH DAILY IN THE MORNING 90 Tablet 2    metoprolol SR (TOPROL XL) 25 MG TABLET SR 24 HR Take 0.5 Tablets by mouth every evening. 45 Tablet 3    Empagliflozin-metFORMIN HCl ER (SYNJARDY XR) 12.5-1000 MG TABLET SR 24 HR Take 1 tablet by mouth daily 100 Tablet 2    omeprazole (PRILOSEC) 20 MG delayed-release capsule Take 1 capsule by mouth every morning, 30 minutes before breakfast. 100 Capsule 3    tizanidine (ZANAFLEX) 4 MG Tab Take 1 Tablet by mouth 2 times a day as needed (spasm). 200 Tablet 0    paroxetine (PAXIL) 40 MG tablet Take 1 Tablet by mouth every day. Indications: Major Depressive Disorder 100 Tablet 1    zolpidem (AMBIEN) 5 MG Tab Take 1 tablet (5 mg) by mouth daily at bedtime as needed for insomnia 30 Tablet 0    Teriflunomide (AUBAGIO) 14 MG Tab Take 14 mg by mouth every day. Indications: Relapsing, Remitting Multiple Sclerosis      modafinil (PROVIGIL) 200 MG Tab Take 200 mg by mouth 1 time a day as needed. Indications: Tiredness associated with Multiple Sclerosis       No current facility-administered medications for this visit.       Vitals:    08/09/24 1302   BP: 124/78   Pulse: 62   Resp: 14   SpO2: 97%         Review of Systems   Constitutional: Negative.  Negative for malaise/fatigue.   HENT: Negative.     Eyes: Negative.    Respiratory: Negative.  Negative for shortness of breath and wheezing.    Cardiovascular:  Negative for orthopnea, claudication, leg swelling and PND.   Gastrointestinal: Negative.  Negative for nausea.   Genitourinary: Negative.    Musculoskeletal: Negative.    Skin: Negative.    Neurological: Negative.  Negative for dizziness and sensory change.   Endo/Heme/Allergies: Negative.  Does not bruise/bleed easily.   Psychiatric/Behavioral:  Negative for depression and hallucinations. The patient is not nervous/anxious.           EKG  "interpreted by me: sinus rhythm     Physical Exam  Constitutional:       Appearance: Normal appearance.   HENT:      Head: Normocephalic.   Eyes:      Pupils: Pupils are equal, round, and reactive to light.   Neck:      Vascular: No JVD.   Cardiovascular:      Rate and Rhythm: Normal rate and regular rhythm.      Pulses: Normal pulses.      Heart sounds: Normal heart sounds.   Pulmonary:      Effort: Pulmonary effort is normal.      Breath sounds: Normal breath sounds.   Abdominal:      General: Abdomen is flat.      Palpations: Abdomen is soft.   Musculoskeletal:      Cervical back: Normal range of motion.      Right lower leg: No edema.      Left lower leg: No edema.   Skin:     General: Skin is warm and dry.   Neurological:      Mental Status: She is alert and oriented to person, place, and time.   Psychiatric:         Mood and Affect: Mood normal.         Behavior: Behavior normal.          Data:  Lipids:   Lab Results   Component Value Date/Time    CHOLSTRLTOT 120 02/24/2024 07:33 AM    TRIGLYCERIDE 56 02/24/2024 07:33 AM    HDL 55 02/24/2024 07:33 AM    LDL 54 02/24/2024 07:33 AM        BMP:  Lab Results   Component Value Date/Time    SODIUM 138 11/29/2023 1930    POTASSIUM 3.5 (L) 11/29/2023 1930    CHLORIDE 105 11/29/2023 1930    CO2 22 11/29/2023 1930    GLUCOSE 142 (H) 11/29/2023 1930    BUN 19 11/29/2023 1930    CREATININE 0.90 11/29/2023 1930    CALCIUM 8.7 11/29/2023 1930    ANION 11.0 11/29/2023 1930       GFR:  Lab Results   Component Value Date/Time    IFAFRICA >60 03/11/2022 0612    IFNOTAFR >60 03/11/2022 0612        TSH:   Lab Results   Component Value Date/Time    TSHULTRASEN 2.440 11/29/2023 1930       MAGNESIUM:  Lab Results   Component Value Date/Time    MAGNESIUM 1.9 11/29/2023 1930        THYROXINE (T4):   No results found for: \"FREEDIR\"     CBC:   Lab Results   Component Value Date/Time    WBC 10.0 11/29/2023 07:30 PM    RBC 4.44 11/29/2023 07:30 PM    HEMOGLOBIN 13.2 11/29/2023 07:30 PM    " "HEMATOCRIT 40.2 11/29/2023 07:30 PM    MCV 90.5 11/29/2023 07:30 PM    MCH 29.7 11/29/2023 07:30 PM    MCHC 32.8 11/29/2023 07:30 PM    RDW 43.0 11/29/2023 07:30 PM    PLATELETCT 204 11/29/2023 07:30 PM    MPV 8.9 (L) 11/29/2023 07:30 PM    NEUTSPOLYS 69.90 11/29/2023 07:30 PM    LYMPHOCYTES 17.70 (L) 11/29/2023 07:30 PM    MONOCYTES 8.70 11/29/2023 07:30 PM    EOSINOPHILS 2.60 11/29/2023 07:30 PM    BASOPHILS 0.60 11/29/2023 07:30 PM    IMMGRAN 0.50 11/29/2023 07:30 PM    NRBC 0.00 11/29/2023 07:30 PM    NEUTS 6.95 11/29/2023 07:30 PM    LYMPHS 1.76 11/29/2023 07:30 PM    MONOS 0.87 (H) 11/29/2023 07:30 PM    EOS 0.26 11/29/2023 07:30 PM    BASO 0.06 11/29/2023 07:30 PM    IMMGRANAB 0.05 11/29/2023 07:30 PM    NRBCAB 0.00 11/29/2023 07:30 PM        CBC w/o DIFF  Lab Results   Component Value Date/Time    WBC 10.0 11/29/2023 07:30 PM    RBC 4.44 11/29/2023 07:30 PM    HEMOGLOBIN 13.2 11/29/2023 07:30 PM    MCV 90.5 11/29/2023 07:30 PM    MCH 29.7 11/29/2023 07:30 PM    MCHC 32.8 11/29/2023 07:30 PM    RDW 43.0 11/29/2023 07:30 PM    MPV 8.9 (L) 11/29/2023 07:30 PM       LIVER:  Lab Results   Component Value Date/Time    ALKPHOSPHAT 129 (H) 06/01/2024 07:47 AM    ASTSGOT 22 06/01/2024 07:47 AM    ALTSGPT 17 06/01/2024 07:47 AM    TBILIRUBIN 0.5 06/01/2024 07:47 AM       BNP:  No results found for: \"BNPBTYPENAT\"    PT/INR:  No results found for: \"PROTHROMBTM\", \"INR\"          Impression/Plan:  SVT (supraventricular tachycardia) (HCC)   - continue metoprolol   - can use additional half tablet of metoprolol if necessary   - annual follow up                 A total of 20 minutes of time was spent on day of encounter reviewing medical record, performing history and examination, counseling, ordering medication/test/consults, collaborating with referring service, and documentation.    [facility]  Cardiac Electrophysiology    "

## (undated) DEVICE — NDL SAFETY 22G X 1.5 ECLIPSE

## (undated) DEVICE — KIT DRSNG GRNUFM MED 18X12X5CM

## (undated) DEVICE — SYR 30CC LUER LOCK

## (undated) DEVICE — TAPE CLOTH SOFT MEDIPORE 4IN

## (undated) DEVICE — GLOVE SURG BIOGEL LATEX SZ 7.5

## (undated) DEVICE — ELECTRODE REM PLYHSV RETURN 9

## (undated) DEVICE — SYR 10CC LUER LOCK

## (undated) DEVICE — ADHESIVE MASTISOL VIAL 48/BX

## (undated) DEVICE — SEE MEDLINE ITEM 157117

## (undated) DEVICE — COVER OVERHEAD SURG LT BLUE

## (undated) DEVICE — GAUZE SPONGE 4X4 12PLY

## (undated) DEVICE — SEE MEDLINE ITEM 157027

## (undated) DEVICE — SOL NS 1000CC

## (undated) DEVICE — CONTAINER SPECIMEN STRL 4OZ

## (undated) DEVICE — SEE MEDLINE ITEM 152739

## (undated) DEVICE — DRESSING INFOVAC SMALL BLK

## (undated) DEVICE — MANIFOLD 4 PORT

## (undated) DEVICE — NDL HYPODERMIC BLUNT 18G 1.5IN

## (undated) DEVICE — APPLICATOR CHLORAPREP ORN 26ML

## (undated) DEVICE — DRAPE STERI 15 X 15

## (undated) DEVICE — NDL ECLIPSE SAFETY 18GX1-1/2IN

## (undated) DEVICE — SUT VICRYL CTD 2-0 GI 27 SH

## (undated) DEVICE — EVACUATOR WOUND BULB 100CC

## (undated) DEVICE — SOL 9P NACL IRR PIC IL

## (undated) DEVICE — SUT ETHILON 3-0 PS2 18 BLK

## (undated) DEVICE — SEE MEDLINE ITEM 152622

## (undated) DEVICE — CANISTER VACCUUM DRSNG KCI

## (undated) DEVICE — SEE MEDLINE ITEM 152529

## (undated) DEVICE — CLOSURE SKIN STERI STRIP 1/2X4

## (undated) DEVICE — UNDERGLOVES BIOGEL PI SZ 7 LF

## (undated) DEVICE — SEE MEDLINE ITEM 157137

## (undated) DEVICE — SPONGE LAP 18X18 PREWASHED

## (undated) DEVICE — FOAM APP FILM BARRIER NO STING

## (undated) DEVICE — SUPPORT ULNA NERVE PROTECTOR

## (undated) DEVICE — SOL IRR NACL .9% 3000ML

## (undated) DEVICE — DECANTER VIAL ASEPTIC TRANSFER

## (undated) DEVICE — GLOVE SURGICAL LATEX SZ 7

## (undated) DEVICE — SUT VICRYL 4-0 ANTIBACT

## (undated) DEVICE — SEE MEDLINE ITEM 146420

## (undated) DEVICE — NDL SAFETY 25G X 1.5 ECLIPSE

## (undated) DEVICE — DRAIN SIL FLT FULL FLUTED 7F